# Patient Record
Sex: FEMALE | Race: WHITE | NOT HISPANIC OR LATINO | Employment: PART TIME | URBAN - METROPOLITAN AREA
[De-identification: names, ages, dates, MRNs, and addresses within clinical notes are randomized per-mention and may not be internally consistent; named-entity substitution may affect disease eponyms.]

---

## 2017-03-24 ENCOUNTER — OFFICE VISIT (OUTPATIENT)
Dept: INTERNAL MEDICINE | Facility: CLINIC | Age: 57
End: 2017-03-24
Payer: COMMERCIAL

## 2017-03-24 VITALS
SYSTOLIC BLOOD PRESSURE: 116 MMHG | HEART RATE: 73 BPM | WEIGHT: 143 LBS | BODY MASS INDEX: 26.31 KG/M2 | HEIGHT: 62 IN | OXYGEN SATURATION: 96 % | DIASTOLIC BLOOD PRESSURE: 78 MMHG | TEMPERATURE: 98 F

## 2017-03-24 DIAGNOSIS — Z00.00 ROUTINE GENERAL MEDICAL EXAMINATION AT A HEALTH CARE FACILITY: Primary | ICD-10-CM

## 2017-03-24 DIAGNOSIS — Z13.6 CARDIOVASCULAR SCREENING; LDL GOAL LESS THAN 160: ICD-10-CM

## 2017-03-24 DIAGNOSIS — K21.9 GASTROESOPHAGEAL REFLUX DISEASE WITHOUT ESOPHAGITIS: ICD-10-CM

## 2017-03-24 DIAGNOSIS — R53.83 FATIGUE, UNSPECIFIED TYPE: ICD-10-CM

## 2017-03-24 DIAGNOSIS — E21.3 HYPERPARATHYROIDISM (H): ICD-10-CM

## 2017-03-24 DIAGNOSIS — R93.1 ABNORMAL SCREENING CARDIAC CT: ICD-10-CM

## 2017-03-24 PROBLEM — Z71.89 ADVANCED DIRECTIVES, COUNSELING/DISCUSSION: Chronic | Status: ACTIVE | Noted: 2017-03-24

## 2017-03-24 LAB
ANION GAP SERPL CALCULATED.3IONS-SCNC: 8 MMOL/L (ref 3–14)
BUN SERPL-MCNC: 21 MG/DL (ref 7–30)
CALCIUM SERPL-MCNC: 9.2 MG/DL (ref 8.5–10.1)
CHLORIDE SERPL-SCNC: 107 MMOL/L (ref 94–109)
CHOLEST SERPL-MCNC: 246 MG/DL
CO2 SERPL-SCNC: 29 MMOL/L (ref 20–32)
CREAT SERPL-MCNC: 0.66 MG/DL (ref 0.52–1.04)
GFR SERPL CREATININE-BSD FRML MDRD: NORMAL ML/MIN/1.7M2
GLUCOSE SERPL-MCNC: 86 MG/DL (ref 70–99)
HDLC SERPL-MCNC: 54 MG/DL
HGB BLD-MCNC: 14.1 G/DL (ref 11.7–15.7)
LDLC SERPL CALC-MCNC: 167 MG/DL
NONHDLC SERPL-MCNC: 192 MG/DL
POTASSIUM SERPL-SCNC: 4.2 MMOL/L (ref 3.4–5.3)
SODIUM SERPL-SCNC: 144 MMOL/L (ref 133–144)
TRIGL SERPL-MCNC: 123 MG/DL
TSH SERPL DL<=0.005 MIU/L-ACNC: 0.66 MU/L (ref 0.4–4)

## 2017-03-24 PROCEDURE — 84443 ASSAY THYROID STIM HORMONE: CPT | Performed by: INTERNAL MEDICINE

## 2017-03-24 PROCEDURE — 80061 LIPID PANEL: CPT | Performed by: INTERNAL MEDICINE

## 2017-03-24 PROCEDURE — 85018 HEMOGLOBIN: CPT | Performed by: INTERNAL MEDICINE

## 2017-03-24 PROCEDURE — 80048 BASIC METABOLIC PNL TOTAL CA: CPT | Performed by: INTERNAL MEDICINE

## 2017-03-24 PROCEDURE — 36415 COLL VENOUS BLD VENIPUNCTURE: CPT | Performed by: INTERNAL MEDICINE

## 2017-03-24 PROCEDURE — 99396 PREV VISIT EST AGE 40-64: CPT | Performed by: INTERNAL MEDICINE

## 2017-03-24 NOTE — MR AVS SNAPSHOT
After Visit Summary   3/24/2017    Barbi Tiwari    MRN: [id number]           Patient Information     Date Of Birth          1960        Visit Information        Provider Department      3/24/2017 10:40 AM Dustin Choudhury MD [facility]        Today's Diagnoses     Routine general medical examination at a health care facility    -  1    Fatigue, unspecified type        Abnormal screening cardiac CT        Hyperparathyroidism s/p surgery        Gastroesophageal reflux disease without esophagitis        CARDIOVASCULAR SCREENING; LDL GOAL LESS THAN 160          Care Instructions      Preventive Health Recommendations  Female Ages 50 - 64    Yearly exam: See your health care provider every year in order to  o Review health changes.   o Discuss preventive care.    o Review your medicines if your doctor has prescribed any.      Get a Pap test every three years (unless you have an abnormal result and your provider advises testing more often).    If you get Pap tests with HPV test, you only need to test every 5 years, unless you have an abnormal result.     You do not need a Pap test if your uterus was removed (hysterectomy) and you have not had cancer.    You should be tested each year for STDs (sexually transmitted diseases) if you're at risk.     Have a mammogram every 1 to 2 years.    Have a colonoscopy at age 50, or have a yearly FIT test (stool test). These exams screen for colon cancer.      Have a cholesterol test every 5 years, or more often if advised.    Have a diabetes test (fasting glucose) every three years. If you are at risk for diabetes, you should have this test more often.     If you are at risk for osteoporosis (brittle bone disease), think about having a bone density scan (DEXA).    Shots: Get a flu shot each year. Get a tetanus shot every 10 years.    Nutrition:     Eat at least 5 servings of fruits and vegetables each day.    Eat whole-grain bread,  whole-wheat pasta and brown rice instead of white grains and rice.    Talk to your provider about Calcium and Vitamin D.     Lifestyle    Exercise at least 150 minutes a week (30 minutes a day, 5 days a week). This will help you control your weight and prevent disease.    Limit alcohol to one drink per day.    No smoking.     Wear sunscreen to prevent skin cancer.     See your dentist every six months for an exam and cleaning.    See your eye doctor every 1 to 2 years.    The heart-healthy Mediterranean is a healthy eating plan based on typical foods and recipes of Mediterranean-style cooking. Here's how to adopt the Mediterranean diet.   If you're looking for a heart-healthy eating plan, the Mediterranean diet might be right for you. The Mediterranean diet incorporates the basics of healthy eating -- plus a splash of flavorful olive oil and perhaps even a glass of red wine -- among other components characterizing the traditional cooking style of countries bordering the [place].  Most healthy diets include fruits, vegetables, fish and whole grains, and limit unhealthy fats. While these parts of a healthy diet remain tried-and-true, subtle variations or differences in proportions of certain foods may make a difference in your risk of heart disease.  Research has shown that the traditional Mediterranean diet reduces the risk of heart disease. In fact, an analysis of more than 1.5 million healthy adults demonstrated that following a Mediterranean diet was associated with a reduced risk of death from heart disease and cancer, as well as a reduced incidence of Parkinson's and Alzheimer's diseases.   The Dietary Guidelines for Americans recommends the Mediterranean diet as an eating plan that can help promote health and prevent disease. And the Mediterranean diet is one your whole family can follow for good health.   The Mediterranean diet emphasizes:  Eating primarily plant-based foods, such as fruits and  "vegetables, whole grains, legumes and nuts   Replacing butter with healthy fats, such as olive oil   Using herbs and spices instead of salt to flavor foods   Limiting red meat to no more than a few times a month   Eating fish and poultry at least twice a week   Drinking red wine in moderation (optional)  The diet also recognizes the importance of being physically active, and enjoying meals with family and friends.  The Mediterranean diet traditionally includes fruits, vegetables and grains. For example, residents of [facility] average six or more servings a day of antioxidant-rich fruits and vegetables.  Grains in the Mediterranean region are typically whole grain and usually contain very few unhealthy trans fats, and bread is an important part of the diet. However, throughout the Mediterranean region, bread is eaten plain or dipped in olive oil -- not eaten with butter or margarine, which contains saturated or trans fats.   Nuts are another part of a healthy Mediterranean diet. Nuts are high in fat, but most of the fat is healthy. Because nuts are high in calories, they should not be eaten in large amounts -- generally no more than a handful a day. For the best nutrition, avoid candied or honey-roasted and heavily salted nuts.  The focus of the Mediterranean diet isn't on limiting total fat consumption, but rather on choosing healthier types of fat. The Mediterranean diet discourages saturated fats and hydrogenated oils (trans fats), both of which contribute to heart disease.  The Mediterranean diet features olive oil as the primary source of fat. Olive oil is mainly monounsaturated fat -- a type of fat that can help reduce low-density lipoprotein (LDL) cholesterol levels when used in place of saturated or trans fats. \"Extra-virgin\" and \"virgin\" olive oils (the least processed forms) also contain the highest levels of protective plant compounds that provide antioxidant effects.  Canola oil and some nuts contain the " beneficial linolenic acid (a type of omega-3 fatty acid) in addition to healthy unsaturated fat. Omega-3 fatty acids lower triglycerides, decrease blood clotting, and are associated with decreased incidence of sudden heart attacks, improve the health of your blood vessels, and help moderate blood pressure. Fatty fish -- such as mackerel, lake trout, herring, sardines, albacore tuna and salmon -- are rich sources of omega-3 fatty acids. Fish is eaten on a regular basis in the Mediterranean diet.  The health effects of alcohol have been debated for many years, and some doctors are reluctant to encourage alcohol consumption because of the health consequences of excessive drinking. However, alcohol -- in moderation -- has been associated with a reduced risk of heart disease in some research studies.  The Mediterranean diet typically includes a moderate amount of wine, usually red wine. This means no more than 5 ounces (148 milliliters) of wine daily for women of all ages and men older than age 65 and no more than 10 ounces (296 milliliters) of wine daily for younger men. More than this may increase the risk of health problems, including increased risk of certain types of cancer.   If you're unable to limit your alcohol intake to the amounts defined above, if you have a personal or family history of alcohol abuse, or if you have heart or liver disease, refrain from drinking wine or any other alcohol.  The Mediterranean diet is a delicious and healthy way to eat. Many people who switch to this style of eating say they'll never eat any other way. Here are some specific steps to get you started:   Eat your veggies and fruits -- and switch to whole grains. Avariety of plant foods should make up the majority of your meals. They should be minimally processed -- fresh and whole are best. Include veggies and fruits in every meal and eat them for snacks as well. Switch to whole-grain bread and cereal, and begin to eat more  whole-grain rice and pasta products. Keep baby carrots, apples and bananas on hand for quick, satisfying snacks. Fruit salads are a wonderful way to eat a variety of healthy fruit.   Go nuts. Nuts and seeds are good sources of fiber, protein and healthy fats. Keep almonds, cashews, pistachios and walnuts on hand for a quick snack. Choose natural peanut butter, rather than the kind with hydrogenated fat added. Try blended sesame seeds (tahini) as a dip or spread for bread.   Pass on the butter. Try olive or canola oil as a healthy replacement for butter or margarine. Lightly drizzle it over vegetables. After cooking pasta, add a touch of olive oil, some garlic and green onions for flavoring. Dip bread in flavored olive oil or lightly spread it on whole-grain bread for a tasty alternative to butter. Try tahini as a dip or spread for bread too.   Spice it up. Herbs and spices make food tasty and can  for salt and fat in recipes.   Go fish. Eat fish at least twice a week. Fresh or water-packed tuna, salmon, trout, mackerel and herring are healthy choices. Gutierrez, bake or broil fish for great taste and easy cleanup. Avoid breaded and fried fish.   Rein in the red meat. Limit red meat to no more than a few times a month. Substitute fish and poultry for red meat. When choosing red meat, make sure it's lean and keep portions small (about the size of a deck of cards). Also avoid sausage, mae and other high-fat, processed meats.   Choose low-fat dairy. Limit higher fat dairy products, such as whole or 2 percent milk, cheese and ice cream. Switch to skim milk, fat-free yogurt and low-fat cheese            Follow-ups after your visit        Additional Services     SLEEP EVALUATION & MANAGEMENT REFERRAL - ADULT       Please be aware that coverage of these services is subject to the terms and limitations of your health insurance plan.  Call member services at your health plan with any benefit or coverage questions.       Please bring the following to your appointment:    >>   List of current medications   >>   This referral request   >>   Any documents/labs given to you for this referral    Darline Ramos)   [phone] (Age 18 and up)                  Your next 10 appointments already scheduled     Apr 03, 2017 10:45 AM CDT   (Arrive by 10:30 AM)   MR BRAIN W/O & W CONTRAST with [id number]   [facility] ([facility] [place])    [address] [address] [phone]           Take your medicines as usual, unless your doctor tells you not to. Bring a list of your current medicines to your exam (including vitamins, minerals and over-the-counter drugs).  You will be given intravenous contrast for this exam. To prepare:   The day before your exam, drink extra fluids at least six 8-ounce glasses (unless your doctor tells you to restrict your fluids).   Have a blood test (creatinine test) within 30 days of your exam. Go to your clinic or Diagnostic Imaging Department for this test.  The MRI machine uses a strong magnet. Please wear clothes without metal (snaps, zippers). A sweatsuit works well, or we may give you a hospital gown.  Please remove any body piercings and hair extensions before you arrive. You will also remove watches, jewelry, hairpins, wallets, dentures, partial dental plates and hearing aids. You may wear contact lenses, and you may be able to wear your rings. We have a safe place to keep your personal items, but it is safer to leave them at home.   **IMPORTANT** THE INSTRUCTIONS BELOW ARE ONLY FOR THOSE PATIENTS WHO HAVE BEEN TOLD THEY WILL RECEIVE SEDATION OR GENERAL ANESTHESIA DURING THEIR MRI PROCEDURE:  IF YOU WILL RECEIVE SEDATION (take medicine to help you relax during your exam):   You must get the medicine from your doctor before you arrive. Bring the medicine to the exam. Do not take it at home.   Arrive one hour early. Bring  someone who can take you home after the test. Your medicine will make you sleepy. After the exam, you may not drive, take a bus or take a taxi by yourself.   No eating 8 hours before your exam. You may have clear liquids up until 4 hours before your exam. (Clear liquids include water, clear tea, black coffee and fruit juice without pulp.)  IF YOU WILL RECEIVE ANESTHESIA (be asleep for your exam):   Arrive 1 1/2 hours early. Bring someone who can take you home after the test. You may not drive, take a bus or take a taxi by yourself.   No eating 8 hours before your exam. You may have clear liquids up until 4 hours before your exam. (Clear liquids include water, clear tea, black coffee and fruit juice without pulp.)  Please call the Imaging Department at your exam site with any questions.            Apr 10, 2017 10:00 AM CDT   (Arrive by 9:45 AM)   Return Visit with Neo Weinstein MD   [facility] ([facility] [place])    [address] [place] [address] [phone]              Future tests that were ordered for you today     Open Future Orders        Priority Expected Expires Ordered    SLEEP EVALUATION & MANAGEMENT REFERRAL - ADULT Routine  3/24/2018 3/24/2017            Who to contact     If you have questions or need follow up information about today's clinic visit or your schedule please contact [facility] directly at [phone].  Normal or non-critical lab and imaging results will be communicated to you by MyChart, letter or phone within 4 business days after the clinic has received the results. If you do not hear from us within 7 days, please contact the clinic through [employer] or phone. If you have a critical or abnormal lab result, we will notify you by phone as soon as possible.  Submit refill requests through [employer] or call your pharmacy and they will forward the refill request to us. Please allow 3 business  "days for your refill to be completed.          Additional Information About Your Visit        [employer] Information     [employer] gives you secure access to your electronic health record. If you see a primary care provider, you can also send messages to your care team and make appointments. If you have questions, please call your primary care clinic.  If you do not have a primary care provider, please call [phone] and they will assist you.        Care EveryWhere ID     This is your Care EveryWhere ID. This could be used by other organizations to access your [facility] medical records  [id number]        Your Vitals Were     Pulse Temperature Height Pulse Oximetry BMI (Body Mass Index)       73 98  F (36.7  C) (Oral) 5' 2\" (1.575 m) 96% 26.16 kg/m2        Blood Pressure from Last 3 Encounters:   03/24/17 116/78   11/22/16 124/68   10/10/16 129/84    Weight from Last 3 Encounters:   03/24/17 143 lb (64.9 kg)   11/22/16 143 lb (64.9 kg)   10/10/16 144 lb 3.2 oz (65.4 kg)              We Performed the Following     Basic metabolic panel     Hemoglobin     Lipid panel reflex to direct LDL     TSH with free T4 reflex          Today's Medication Changes          These changes are accurate as of: 3/24/17 11:27 AM.  If you have any questions, ask your nurse or doctor.               These medicines have changed or have updated prescriptions.        Dose/Directions    ranitidine 150 MG tablet   Commonly known as:  ZANTAC   This may have changed:    - medication strength  - how much to take  - how to take this  - when to take this  - reasons to take this  - additional instructions   Used for:  Gastroesophageal reflux disease without esophagitis   Changed by:  Dustin Choudhury MD        Dose:  150 mg   Take 1 tablet (150 mg) by mouth 2 times daily as needed for heartburn   Refills:  0         Stop taking these medicines if you haven't already. Please contact your care team if you have questions.     TOPROL XL 50 MG 24 hr " tablet   Generic drug:  metoprolol   Stopped by:  Dustin Choudhury MD                Where to get your medicines      Some of these will need a paper prescription and others can be bought over the counter.  Ask your nurse if you have questions.     You don't need a prescription for these medications     ranitidine 150 MG tablet                Primary Care Provider Office Phone # Fax #    Dustin Choudhury [phone]       [facility] [address] [facility] [address]        Thank you!     Thank you for choosing [facility]  for your care. Our goal is always to provide you with excellent care. Hearing back from our patients is one way we can continue to improve our services. Please take a few minutes to complete the written survey that you may receive in the mail after your visit with us. Thank you!             Your Updated Medication List - Protect others around you: Learn how to safely use, store and throw away your medicines at www.disposemymeds.org.          This list is accurate as of: 3/24/17 11:27 AM.  Always use your most recent med list.                   Brand Name Dispense Instructions for use    acetaminophen 500 MG tablet    TYLENOL     Take 1,000 mg by mouth every 6 hours if needed. Max acetaminophen dose: 4000mg in 24 hrs.       albuterol 108 (90 BASE) MCG/ACT Inhaler    PROAIR HFA/PROVENTIL HFA/VENTOLIN HFA    1 Inhaler    Inhale 2 puffs into the lungs every 6 hours as needed for shortness of breath / dyspnea or wheezing       Calcium-Vitamin D 600-200 MG-UNIT Caps      Take 1 Cap by mouth once daily.       COMPOUND - PHARMACY TO MIX COMPOUNDED MEDICATION    CMPD RX    20 g    Apply 1g daily for 2 weeks, then twice weekly to vagina       ibuprofen 200 MG tablet    ADVIL/MOTRIN     Take 4 tablets by mouth 4 times daily if needed for Pain or Headache. (rare use)       KEPPRA 500 MG tablet   Generic drug:  levETIRAcetam      1,500 mg 2 times  daily Take 3 tablets am and 3 tablets pm       * lamoTRIgine 100 MG tablet    LaMICtal     100 mg Take 1 tablet in the morning and evening       * lamoTRIgine 200 MG tablet    LaMICtal     Take one tablet by mouth every morning and every evening (Take along with 100mg tablets and 200mg= 300mg total am and pm)       MULTI-VITAMINS Tabs      take 1 tablet by oral route once daily with food       ranitidine 150 MG tablet    ZANTAC     Take 1 tablet (150 mg) by mouth 2 times daily as needed for heartburn       tretinoin 0.05 % cream    RETIN-A    45 g    Spread a pea size amount into affected area topically at bedtime.  Use sunscreen SPF>20.       * Notice:  This list has 2 medication(s) that are the same as other medications prescribed for you. Read the directions carefully, and ask your doctor or other care provider to review them with you.

## 2017-03-24 NOTE — NURSING NOTE
"Chief Complaint   Patient presents with     Physical     fasting       Initial /78  Pulse 73  Temp 98  F (36.7  C) (Oral)  Ht 5' 2\" (1.575 m)  Wt 143 lb (64.9 kg)  SpO2 96%  BMI 26.16 kg/m2 Estimated body mass index is 26.16 kg/(m^2) as calculated from the following:    Height as of this encounter: 5' 2\" (1.575 m).    Weight as of this encounter: 143 lb (64.9 kg).  Medication Reconciliation: complete    "

## 2017-03-24 NOTE — PATIENT INSTRUCTIONS
Preventive Health Recommendations  Female Ages 50 - 64    Yearly exam: See your health care provider every year in order to  o Review health changes.   o Discuss preventive care.    o Review your medicines if your doctor has prescribed any.      Get a Pap test every three years (unless you have an abnormal result and your provider advises testing more often).    If you get Pap tests with HPV test, you only need to test every 5 years, unless you have an abnormal result.     You do not need a Pap test if your uterus was removed (hysterectomy) and you have not had cancer.    You should be tested each year for STDs (sexually transmitted diseases) if you're at risk.     Have a mammogram every 1 to 2 years.    Have a colonoscopy at age 50, or have a yearly FIT test (stool test). These exams screen for colon cancer.      Have a cholesterol test every 5 years, or more often if advised.    Have a diabetes test (fasting glucose) every three years. If you are at risk for diabetes, you should have this test more often.     If you are at risk for osteoporosis (brittle bone disease), think about having a bone density scan (DEXA).    Shots: Get a flu shot each year. Get a tetanus shot every 10 years.    Nutrition:     Eat at least 5 servings of fruits and vegetables each day.    Eat whole-grain bread, whole-wheat pasta and brown rice instead of white grains and rice.    Talk to your provider about Calcium and Vitamin D.     Lifestyle    Exercise at least 150 minutes a week (30 minutes a day, 5 days a week). This will help you control your weight and prevent disease.    Limit alcohol to one drink per day.    No smoking.     Wear sunscreen to prevent skin cancer.     See your dentist every six months for an exam and cleaning.    See your eye doctor every 1 to 2 years.    The heart-healthy Mediterranean is a healthy eating plan based on typical foods and recipes of Mediterranean-style cooking. Here's how to adopt the Mediterranean  diet.   If you're looking for a heart-healthy eating plan, the Mediterranean diet might be right for you. The Mediterranean diet incorporates the basics of healthy eating -- plus a splash of flavorful olive oil and perhaps even a glass of red wine -- among other components characterizing the traditional cooking style of countries bordering the [place].  Most healthy diets include fruits, vegetables, fish and whole grains, and limit unhealthy fats. While these parts of a healthy diet remain tried-and-true, subtle variations or differences in proportions of certain foods may make a difference in your risk of heart disease.  Research has shown that the traditional Mediterranean diet reduces the risk of heart disease. In fact, an analysis of more than 1.5 million healthy adults demonstrated that following a Mediterranean diet was associated with a reduced risk of death from heart disease and cancer, as well as a reduced incidence of Parkinson's and Alzheimer's diseases.   The Dietary Guidelines for Americans recommends the Mediterranean diet as an eating plan that can help promote health and prevent disease. And the Mediterranean diet is one your whole family can follow for good health.   The Mediterranean diet emphasizes:  Eating primarily plant-based foods, such as fruits and vegetables, whole grains, legumes and nuts   Replacing butter with healthy fats, such as olive oil   Using herbs and spices instead of salt to flavor foods   Limiting red meat to no more than a few times a month   Eating fish and poultry at least twice a week   Drinking red wine in moderation (optional)  The diet also recognizes the importance of being physically active, and enjoying meals with family and friends.  The Mediterranean diet traditionally includes fruits, vegetables and grains. For example, residents of [facility] average six or more servings a day of antioxidant-rich fruits and vegetables.  Grains in the Mediterranean region  "are typically whole grain and usually contain very few unhealthy trans fats, and bread is an important part of the diet. However, throughout the Mediterranean region, bread is eaten plain or dipped in olive oil -- not eaten with butter or margarine, which contains saturated or trans fats.   Nuts are another part of a healthy Mediterranean diet. Nuts are high in fat, but most of the fat is healthy. Because nuts are high in calories, they should not be eaten in large amounts -- generally no more than a handful a day. For the best nutrition, avoid candied or honey-roasted and heavily salted nuts.  The focus of the Mediterranean diet isn't on limiting total fat consumption, but rather on choosing healthier types of fat. The Mediterranean diet discourages saturated fats and hydrogenated oils (trans fats), both of which contribute to heart disease.  The Mediterranean diet features olive oil as the primary source of fat. Olive oil is mainly monounsaturated fat -- a type of fat that can help reduce low-density lipoprotein (LDL) cholesterol levels when used in place of saturated or trans fats. \"Extra-virgin\" and \"virgin\" olive oils (the least processed forms) also contain the highest levels of protective plant compounds that provide antioxidant effects.  Canola oil and some nuts contain the beneficial linolenic acid (a type of omega-3 fatty acid) in addition to healthy unsaturated fat. Omega-3 fatty acids lower triglycerides, decrease blood clotting, and are associated with decreased incidence of sudden heart attacks, improve the health of your blood vessels, and help moderate blood pressure. Fatty fish -- such as mackerel, lake trout, herring, sardines, albacore tuna and salmon -- are rich sources of omega-3 fatty acids. Fish is eaten on a regular basis in the Mediterranean diet.  The health effects of alcohol have been debated for many years, and some doctors are reluctant to encourage alcohol consumption because of the " health consequences of excessive drinking. However, alcohol -- in moderation -- has been associated with a reduced risk of heart disease in some research studies.  The Mediterranean diet typically includes a moderate amount of wine, usually red wine. This means no more than 5 ounces (148 milliliters) of wine daily for women of all ages and men older than age 65 and no more than 10 ounces (296 milliliters) of wine daily for younger men. More than this may increase the risk of health problems, including increased risk of certain types of cancer.   If you're unable to limit your alcohol intake to the amounts defined above, if you have a personal or family history of alcohol abuse, or if you have heart or liver disease, refrain from drinking wine or any other alcohol.  The Mediterranean diet is a delicious and healthy way to eat. Many people who switch to this style of eating say they'll never eat any other way. Here are some specific steps to get you started:   Eat your veggies and fruits -- and switch to whole grains. Avariety of plant foods should make up the majority of your meals. They should be minimally processed -- fresh and whole are best. Include veggies and fruits in every meal and eat them for snacks as well. Switch to whole-grain bread and cereal, and begin to eat more whole-grain rice and pasta products. Keep baby carrots, apples and bananas on hand for quick, satisfying snacks. Fruit salads are a wonderful way to eat a variety of healthy fruit.   Go nuts. Nuts and seeds are good sources of fiber, protein and healthy fats. Keep almonds, cashews, pistachios and walnuts on hand for a quick snack. Choose natural peanut butter, rather than the kind with hydrogenated fat added. Try blended sesame seeds (tahini) as a dip or spread for bread.   Pass on the butter. Try olive or canola oil as a healthy replacement for butter or margarine. Lightly drizzle it over vegetables. After cooking pasta, add a touch of olive  oil, some garlic and green onions for flavoring. Dip bread in flavored olive oil or lightly spread it on whole-grain bread for a tasty alternative to butter. Try tahini as a dip or spread for bread too.   Spice it up. Herbs and spices make food tasty and can  for salt and fat in recipes.   Go fish. Eat fish at least twice a week. Fresh or water-packed tuna, salmon, trout, mackerel and herring are healthy choices. [place], bake or broil fish for great taste and easy cleanup. Avoid breaded and fried fish.   Rein in the red meat. Limit red meat to no more than a few times a month. Substitute fish and poultry for red meat. When choosing red meat, make sure it's lean and keep portions small (about the size of a deck of cards). Also avoid sausage, mae and other high-fat, processed meats.   Choose low-fat dairy. Limit higher fat dairy products, such as whole or 2 percent milk, cheese and ice cream. Switch to skim milk, fat-free yogurt and low-fat cheese

## 2017-03-24 NOTE — PROGRESS NOTES
SUBJECTIVE:     CC: Barbi Tiwari is an 56 year old woman who presents for preventive health visit.     Healthy Habits:    Do you get at least three servings of calcium containing foods daily (dairy, green leafy vegetables, etc.)? yes    Amount of exercise or daily activities, outside of work: none    Problems taking medications regularly No    Medication side effects: No    Have you had an eye exam in the past two years? yes    Do you see a dentist twice per year? yes    Do you have sleep apnea, excessive snoring or daytime drowsiness? Yes          Today's PHQ-2 Score:   PHQ-2 ( 1999 Pf[employer]) 3/24/2017 5/29/2015   Q1: Little interest or pleasure in doing things 0 0   Q2: Feeling down, depressed or hopeless 0 0   PHQ-2 Score 0 0       Abuse: Current or Past(Physical, Sexual or Emotional)- No  Do you feel safe in your environment - Yes    Social History   Substance Use Topics     Smoking status: Never Smoker     Smokeless tobacco: Never Used     Alcohol use Yes      Comment: 1-2 per month, only at social events     The patient does not drink >3 drinks per day nor >7 drinks per week.    Recent Labs   Lab Test  06/19/15   1004   CHOL  249*   HDL  51   LDL  166*   TRIG  159*   CHOLHDLRATIO  4.9       Reviewed orders with patient.  Reviewed health maintenance and updated orders accordingly - Yes    Mammo Decision Support:  Patient over age 50, mutual decision to screen reflected in health maintenance.    Pertinent mammograms are reviewed under the imaging tab.  History of abnormal Pap smear: NO - age 30- 65 PAP every 3 years recommended    Reviewed and updated as needed this visit by clinical staff  Tobacco  Allergies         Reviewed and updated as needed this visit by Provider            ROS:  C: NEGATIVE for fever, chills, change in weight  I: NEGATIVE for worrisome rashes, moles or lesions  E: NEGATIVE for vision changes or irritation  ENT: NEGATIVE for ear, mouth and throat problems  R: NEGATIVE for  "significant cough or SOB  B: NEGATIVE for masses, tenderness or discharge  CV: NEGATIVE for chest pain, palpitations or peripheral edema  GI: NEGATIVE for nausea, abdominal pain, heartburn, or change in bowel habits  : NEGATIVE for unusual urinary or vaginal symptoms. No vaginal bleeding.  M: NEGATIVE for significant arthralgias or myalgia  N: NEGATIVE for weakness, dizziness or paresthesias  P: NEGATIVE for changes in mood or affect     Problem list, Medication list, Allergies, and Medical/Social/Surgical histories reviewed in [facility] and updated as appropriate.  OBJECTIVE:     /78  Pulse 73  Temp 98  F (36.7  C) (Oral)  Ht 5' 2\" (1.575 m)  Wt 143 lb (64.9 kg)  SpO2 96%  BMI 26.16 kg/m2  EXAM:  GENERAL APPEARANCE: healthy, alert and no distress  EYES: Eyes grossly normal to inspection, PERRL and conjunctivae and sclerae normal  HENT: ear canals and TM's normal, nose and mouth without ulcers or lesions, oropharynx clear and oral mucous membranes moist  NECK: no adenopathy, no asymmetry, masses, or scars and thyroid normal to palpation  RESP: lungs clear to auscultation - no rales, rhonchi or wheezes  CV: regular rate and rhythm, normal S1 S2, no S3 or S4, no murmur, click or rub, no peripheral edema and peripheral pulses strong  ABDOMEN: soft, nontender, no hepatosplenomegaly, no masses and bowel sounds normal  MS: no musculoskeletal defects are noted and gait is age appropriate without ataxia  SKIN: no suspicious lesions or rashes  NEURO: Normal strength and tone, sensory exam grossly normal, mentation intact and speech normal  PSYCH: mentation appears normal and affect normal/bright    Results for orders placed or performed in visit on 03/24/17   Basic metabolic panel   Result Value Ref Range    Sodium 144 133 - 144 mmol/L    Potassium 4.2 3.4 - 5.3 mmol/L    Chloride 107 94 - 109 mmol/L    Carbon Dioxide 29 20 - 32 mmol/L    Anion Gap 8 3 - 14 mmol/L    Glucose 86 70 - 99 mg/dL    Urea Nitrogen 21 7 - " 30 mg/dL    Creatinine 0.66 0.52 - 1.04 mg/dL    GFR Estimate >90  Non  GFR Calc   >60 mL/min/1.7m2    GFR Estimate If Black >90   GFR Calc   >60 mL/min/1.7m2    Calcium 9.2 8.5 - 10.1 mg/dL   Lipid panel reflex to direct LDL   Result Value Ref Range    Cholesterol 246 (H) <200 mg/dL    Triglycerides 123 <150 mg/dL    HDL Cholesterol 54 >49 mg/dL    LDL Cholesterol Calculated 167 (H) <100 mg/dL    Non HDL Cholesterol 192 (H) <130 mg/dL   TSH with free T4 reflex   Result Value Ref Range    TSH 0.66 0.40 - 4.00 mU/L   Hemoglobin   Result Value Ref Range    Hemoglobin 14.1 11.7 - 15.7 g/dL      ASSESSMENT/PLAN:     1. Routine general medical examination at a health care facility  Overall doing quite well    2. Fatigue, unspecified type  Labs normal   - SLEEP EVALUATION & MANAGEMENT REFERRAL - ADULT; Future  - TSH with free T4 reflex  - Hemoglobin    3. Abnormal screening cardiac CT  We discussed CV risk- based on traditional factors her risk is only 2% . Her CaCT score was <10 but likely adds some risk to this - though? Is how much.  She did not continue the statin when previously rx and therefore my recommendation here is to work on diet/exerc- Mediterranean diet - consider recheck of CaCT 5 yrs post previous one and reconsider statin rx based on change in score  - Lipid panel reflex to direct LDL    4. Hyperparathyroidism s/p surgery  Ca+ and TSH fine    5. Gastroesophageal reflux disease without esophagitis  - ranitidine (ZANTAC) 150 MG tablet; Take 1 tablet (150 mg) by mouth 2 times daily as needed for heartburn    6. CARDIOVASCULAR SCREENING; LDL GOAL LESS THAN 160  - Basic metabolic panel  - Lipid panel reflex to direct LDL    COUNSELING:   Reviewed preventive health counseling, as reflected in patient instructions         reports that she has never smoked. She has never used smokeless tobacco.    Estimated body mass index is 26.16 kg/(m^2) as calculated from the following:     "Height as of this encounter: 5' 2\" (1.575 m).    Weight as of this encounter: 143 lb (64.9 kg).       Counseling Resources:  ATP IV Guidelines  Pooled Cohorts Equation Calculator  Breast Cancer Risk Calculator  FRAX Risk Assessment  ICSI Preventive Guidelines  Dietary Guidelines for Americans, 2010  [employer]'s MyPlate  ASA Prophylaxis  Lung CA Screening    Dustin Choudhury MD  [facility]    The 10-year ASCVD risk score (Anjali DUFFY Jr, et al., 2013) is: 2.1%    Values used to calculate the score:      Age: 56 years      Sex: Female      Is Non- : No      Diabetic: No      Tobacco smoker: No      Systolic Blood Pressure: 116 mmHg      Is BP treated: No      HDL Cholesterol: 54 mg/dL      Total Cholesterol: 246 mg/dL   "

## 2017-04-10 ENCOUNTER — ONCOLOGY VISIT (OUTPATIENT)
Dept: ONCOLOGY | Facility: CLINIC | Age: 57
End: 2017-04-10
Attending: INTERNAL MEDICINE
Payer: COMMERCIAL

## 2017-04-10 VITALS
DIASTOLIC BLOOD PRESSURE: 83 MMHG | RESPIRATION RATE: 18 BRPM | SYSTOLIC BLOOD PRESSURE: 117 MMHG | HEIGHT: 62 IN | HEART RATE: 90 BPM | OXYGEN SATURATION: 95 % | BODY MASS INDEX: 26.54 KG/M2 | TEMPERATURE: 98.4 F | WEIGHT: 144.2 LBS

## 2017-04-10 DIAGNOSIS — C71.9 OLIGODENDROGLIOMA (H): Primary | ICD-10-CM

## 2017-04-10 PROCEDURE — 99212 OFFICE O/P EST SF 10 MIN: CPT | Mod: ZF

## 2017-04-10 PROCEDURE — 99214 OFFICE O/P EST MOD 30 MIN: CPT | Mod: ZP | Performed by: INTERNAL MEDICINE

## 2017-04-10 RX ORDER — OMEPRAZOLE 40 MG/1
CAPSULE, DELAYED RELEASE ORAL
COMMUNITY
Start: 2016-06-02 | End: 2017-05-05

## 2017-04-10 ASSESSMENT — PAIN SCALES - GENERAL: PAINLEVEL: NO PAIN (0)

## 2017-04-10 NOTE — LETTER
4/10/2017       RE: Barbi Tiwari  [address] [facility] [id number]     Dear Colleague,    Thank you for referring your patient, Barbi Tiwari, to the [facility]. Please see a copy of my visit note below.    MEDICAL ONCOLOGY PROGRESS NOTE  Apr 10, 2017    Oncologic History:  Ms. 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 She then received 12 cycles of Temodar, completed in 2013.  She has done very well since then.    HISTORY OF PRESENT ILLNESS  Ms. Tiwari is currently doing very well. She denies any major issues, though recently has noticed an increase in symptoms related to facial twitching associated with some verbal difficulties. These occur only intermittently and interestingly occur on the right side of the face and not the left. These episodes typically last for just a few seconds, they do not generalize, and they typically resolve without any post-ictal confusion or other symptoms.v She continues on Lamotrigine under Dr. Hopper for seizure and the Keppra dose has been decreased recently with plans to further down titrate.    She otherwise denies any new neurologic problems, headache, chest pain, shortness of breath or other concerns. She continues work as a nurse and notes no change in intellectual/cognitive function or sensorimotor symptoms.    IMAGING: I reviewed the MRI brain with Ms. Tiwari, which shows no evidence of recurrent disease. There are no new enhancing lesions and only stable T2 changes around the resection cavity are seen today as on prior occasions.    REVIEW OF SYSTEMS  A 12-point ROS is negative except as in HPI    Current Outpatient Prescriptions   Medication     omeprazole (PRILOSEC) 40 MG capsule     ranitidine (ZANTAC) 150 MG tablet     COMPOUND (CMPD RX) - PHARMACY TO MIX COMPOUNDED MEDICATION     tretinoin (RETIN-A) 0.05 % cream      acetaminophen (TYLENOL) 500 MG tablet     Calcium Carbonate-Vitamin D (CALCIUM-VITAMIN D) 600-200 MG-UNIT CAPS     ibuprofen (ADVIL,MOTRIN) 200 MG tablet     Multiple Vitamin (MULTI-VITAMINS) TABS     levETIRAcetam (KEPPRA) 500 MG tablet     lamoTRIgine (LAMICTAL) 100 MG tablet     lamoTRIgine (LAMICTAL) 200 MG tablet     albuterol (PROAIR HFA, PROVENTIL HFA, VENTOLIN HFA) 108 (90 BASE) MCG/ACT inhaler     No current facility-administered medications for this visit.      Past Medical History:   Diagnosis Date     Allergic rhinitis 12/9/2009     Calculus of kidney 1/12/2011    Overview:  S/P LITHOTRIPSY 3/15/11      Esophageal reflux 10/22/2008     Hyperparathyroidism s/p surgery 1/11/2011     Hyperparathyroidism s/p surgery 1/11/2011     oligodendroglioma 7/23/2012     Osteoarthrosis 12/9/2009     Palpitations 6/5/2014     PVCs 6/5/2014     Seizure disorder (related to tumor) 8/17/2011     Past Surgical History:   Procedure Laterality Date     CRANIOTOMY  2011    tumor resection     FOOT SURGERY      mulitple     HYSTERECTOMY, PAP NO LONGER INDICATED  2008    lap hys     LITHOTRIPSY  2010     PARATHYROIDECTOMY  2011     Family History   Problem Relation Age of Onset     Arthritis Mother      Depression Mother      Respiratory Mother      COPD     C.A.D. Father 58     heart attack     DIABETES Brother 70     Depression Sister      Depression Son      Allergies Son      Depression Daughter      Allergies Daughter      Eczema Brother      Depression Sister      Depression Sister      Social History     Social History     Marital status:      Spouse name: N/A     Number of children: N/A     Years of education: N/A     Occupational History     RN- [employer]      Social History Main Topics     Smoking status: Never Smoker     Smokeless tobacco: Never Used     Alcohol use Yes      Comment: 1-2 per month, only at social events     Drug use: No     Sexual activity: Yes     Partners: Female     Birth control/  "protection: Surgical      Comment: hysterectomy     Other Topics Concern     Not on file     Social History Narrative     PHYSICAL EXAMINATION  General: Well appearing woman, in no distress  Eyes: Pupils are equal round and reactive  ENT: Oropharynx is clear  Skin: No rashes  Neuro: Awake, alert, oriented.  Her speech and language is fluent. Her memory and judgment are intact. Cranial nerve status II-XII is negative.  While talking about seizure, she developed a right facial twitch with associated droop and difficulty with verbalization. The episode lasted about 20-30 seconds and when it was done, she was able to verbalize \"That's it\". She was witnessed to have two of these episodes. Otherwise, normal motor, sensory, coordination and reflex exams.    ECOG Performance Status is 0.     IMPRESSION AND PLAN  #1 Resected Oligodendroglioma with 1p/19q co-deletion, status post 12 months adjuvant Temozolomide  #2 Seizure  Ms. Tiwari is doing well from an oncologic standpoint with no evidence of clinical or radiographic progression. She has recently had an increase in seizure activity, and I would advise against further down-titration of her anti-epileptic medications. The concern would be that she may continue to have worsening of seizure and increased risk of generalization.    We will continue with follow-up every 6 months to a year. All questions were answered.     Neo Daugherty M.D.   of Medicine  Hematology, Oncology and Transplantation  [facility]        "

## 2017-04-10 NOTE — MR AVS SNAPSHOT
After Visit Summary   4/10/2017    Barbi Tiwari    MRN: [id number]           Patient Information     Date Of Birth          1960        Visit Information        Provider Department      4/10/2017 10:00 AM Neo Gotti MD [facility]        Today's Diagnoses     Oligodendroglioma (H)    -  1       Follow-ups after your visit        Follow-up notes from your care team     Return in about 1 year (around 4/10/2018).      Your next 10 appointments already scheduled     Apr 02, 2018 10:45 AM CDT   (Arrive by 10:30 AM)   MR BRAIN W/O & W CONTRAST with [id number]   [facility] [place] [facility] ([facility] [place])    [address] [address] [phone]           Take your medicines as usual, unless your doctor tells you not to. Bring a list of your current medicines to your exam (including vitamins, minerals and over-the-counter drugs).  You will be given intravenous contrast for this exam. To prepare:   The day before your exam, drink extra fluids at least six 8-ounce glasses (unless your doctor tells you to restrict your fluids).   Have a blood test (creatinine test) within 30 days of your exam. Go to your clinic or Diagnostic Imaging Department for this test.  The MRI machine uses a strong magnet. Please wear clothes without metal (snaps, zippers). A sweatsuit works well, or we may give you a hospital gown.  Please remove any body piercings and hair extensions before you arrive. You will also remove watches, jewelry, hairpins, wallets, dentures, partial dental plates and hearing aids. You may wear contact lenses, and you may be able to wear your rings. We have a safe place to keep your personal items, but it is safer to leave them at home.   **IMPORTANT** THE INSTRUCTIONS BELOW ARE ONLY FOR THOSE PATIENTS WHO HAVE BEEN TOLD THEY WILL RECEIVE SEDATION OR GENERAL ANESTHESIA DURING THEIR MRI PROCEDURE:  IF YOU WILL  RECEIVE SEDATION (take medicine to help you relax during your exam):   You must get the medicine from your doctor before you arrive. Bring the medicine to the exam. Do not take it at home.   Arrive one hour early. Bring someone who can take you home after the test. Your medicine will make you sleepy. After the exam, you may not drive, take a bus or take a taxi by yourself.   No eating 8 hours before your exam. You may have clear liquids up until 4 hours before your exam. (Clear liquids include water, clear tea, black coffee and fruit juice without pulp.)  IF YOU WILL RECEIVE ANESTHESIA (be asleep for your exam):   Arrive 1 1/2 hours early. Bring someone who can take you home after the test. You may not drive, take a bus or take a taxi by yourself.   No eating 8 hours before your exam. You may have clear liquids up until 4 hours before your exam. (Clear liquids include water, clear tea, black coffee and fruit juice without pulp.)  Please call the Imaging Department at your exam site with any questions.            Apr 09, 2018 10:00 AM CDT   (Arrive by 9:45 AM)   Return Visit with Neo Weinstein MD   [facility] ([facility])    [address] [address] [phone]              Who to contact     If you have questions or need follow up information about today's clinic visit or your schedule please contact [facility] directly at [phone].  Normal or non-critical lab and imaging results will be communicated to you by MyChart, letter or phone within 4 business days after the clinic has received the results. If you do not hear from us within 7 days, please contact the clinic through [employer] or phone. If you have a critical or abnormal lab result, we will notify you by phone as soon as possible.  Submit refill requests through [employer] or call your pharmacy and they will forward the refill request to us. Please  "allow 3 business days for your refill to be completed.          Additional Information About Your Visit        MyChart Information     [employer] gives you secure access to your electronic health record. If you see a primary care provider, you can also send messages to your care team and make appointments. If you have questions, please call your primary care clinic.  If you do not have a primary care provider, please call [phone] and they will assist you.        Care EveryWhere ID     This is your Care EveryWhere ID. This could be used by other organizations to access your [place] medical records  [id number]        Your Vitals Were     Pulse Temperature Respirations Height Pulse Oximetry BMI (Body Mass Index)    90 98.4  F (36.9  C) (Oral) 18 1.575 m (5' 2\") 95% 26.37 kg/m2       Blood Pressure from Last 3 Encounters:   04/17/17 142/76   04/10/17 117/83   03/24/17 116/78    Weight from Last 3 Encounters:   04/17/17 65.3 kg (143 lb 14.4 oz)   04/10/17 65.4 kg (144 lb 3.2 oz)   03/24/17 64.9 kg (143 lb)               Primary Care Provider Office Phone # Fax #    Dustin Choudhury [phone]       [facility] [address] [place] [id number]        Thank you!     Thank you for choosing [facility]  for your care. Our goal is always to provide you with excellent care. Hearing back from our patients is one way we can continue to improve our services. Please take a few minutes to complete the written survey that you may receive in the mail after your visit with us. Thank you!             Your Updated Medication List - Protect others around you: Learn how to safely use, store and throw away your medicines at www.disposemymeds.org.          This list is accurate as of: 4/10/17 11:59 PM.  Always use your most recent med list.                   Brand Name Dispense Instructions for use    acetaminophen 500 MG tablet    TYLENOL     Take 1,000 mg by mouth every 6 " hours if needed. Max acetaminophen dose: 4000mg in 24 hrs.       albuterol 108 (90 BASE) MCG/ACT Inhaler    PROAIR HFA/PROVENTIL HFA/VENTOLIN HFA    1 Inhaler    Inhale 2 puffs into the lungs every 6 hours as needed for shortness of breath / dyspnea or wheezing       Calcium-Vitamin D 600-200 MG-UNIT Caps      Take 1 Cap by mouth once daily.       COMPOUND - PHARMACY TO MIX COMPOUNDED MEDICATION    CMPD RX    20 g    Apply 1g daily for 2 weeks, then twice weekly to vagina       ibuprofen 200 MG tablet    ADVIL/MOTRIN     Take 4 tablets by mouth 4 times daily if needed for Pain or Headache. (rare use)       KEPPRA 500 MG tablet   Generic drug:  levETIRAcetam      1,500 mg 2 times daily Take 3 tablets am and 3 tablets pm       * lamoTRIgine 100 MG tablet    LaMICtal     100 mg Take 1 tablet in the morning and evening       * lamoTRIgine 200 MG tablet    LaMICtal     Take one tablet by mouth every morning and every evening (Take along with 100mg tablets and 200mg= 300mg total am and pm)       MULTI-VITAMINS Tabs      take 1 tablet by oral route once daily with food       omeprazole 40 MG capsule    priLOSEC     Medications is alternated for two weeks when zantac is not working       ranitidine 150 MG tablet    ZANTAC     Take 1 tablet (150 mg) by mouth 2 times daily as needed for heartburn       tretinoin 0.05 % cream    RETIN-A    45 g    Spread a pea size amount into affected area topically at bedtime.  Use sunscreen SPF>20.       * Notice:  This list has 2 medication(s) that are the same as other medications prescribed for you. Read the directions carefully, and ask your doctor or other care provider to review them with you.

## 2017-04-10 NOTE — NURSING NOTE
"Barbi Tiwari is a 56 year old female who presents for:  Chief Complaint   Patient presents with     Oncology Clinic Visit     return patient visit for follow up related to oligodendroglioma        Initial Vitals:  /83  Pulse 90  Temp 98.4  F (36.9  C) (Oral)  Resp 18  Ht 1.575 m (5' 2\")  Wt 65.4 kg (144 lb 3.2 oz)  SpO2 95%  BMI 26.37 kg/m2 Estimated body mass index is 26.37 kg/(m^2) as calculated from the following:    Height as of this encounter: 1.575 m (5' 2\").    Weight as of this encounter: 65.4 kg (144 lb 3.2 oz).. Body surface area is 1.69 meters squared. BP completed using cuff size: regular  No Pain (0) No LMP recorded. Patient has had a hysterectomy. Allergies and medications reviewed.     Medications: Medication refills not needed today.  Pharmacy name entered into [facility]:    [facility] - [place], [place] - [address] [facility] - [place], [place] - [address] [facility]    Comments: patient denied pain/discomfort.     5 minutes for nursing intake (face to face time)   Chelo Akhtar CMA       "

## 2017-04-17 ENCOUNTER — OFFICE VISIT (OUTPATIENT)
Dept: SLEEP MEDICINE | Facility: CLINIC | Age: 57
End: 2017-04-17
Attending: NURSE PRACTITIONER
Payer: COMMERCIAL

## 2017-04-17 VITALS
OXYGEN SATURATION: 97 % | HEIGHT: 62 IN | SYSTOLIC BLOOD PRESSURE: 142 MMHG | DIASTOLIC BLOOD PRESSURE: 76 MMHG | RESPIRATION RATE: 18 BRPM | WEIGHT: 143.9 LBS | BODY MASS INDEX: 26.48 KG/M2 | HEART RATE: 82 BPM

## 2017-04-17 DIAGNOSIS — G25.81 RESTLESS LEGS SYNDROME (RLS): ICD-10-CM

## 2017-04-17 DIAGNOSIS — E66.3 OVERWEIGHT: ICD-10-CM

## 2017-04-17 DIAGNOSIS — G47.50 PARASOMNIA: ICD-10-CM

## 2017-04-17 DIAGNOSIS — R06.83 SNORING: Primary | ICD-10-CM

## 2017-04-17 PROCEDURE — 99211 OFF/OP EST MAY X REQ PHY/QHP: CPT | Mod: ZF

## 2017-04-17 NOTE — PROGRESS NOTES
Allergies:    Allergies   Allergen Reactions     Benoxinate Nausea and Vomiting     Nuts Swelling     Destinee nuts     Sulfa Drugs Hives       Medications:    Current Outpatient Prescriptions   Medication Sig Dispense Refill     omeprazole (PRILOSEC) 40 MG capsule Medications is alternated for two weeks when zantac is not working       ranitidine (ZANTAC) 150 MG tablet Take 1 tablet (150 mg) by mouth 2 times daily as needed for heartburn       COMPOUND (CMPD RX) - PHARMACY TO MIX COMPOUNDED MEDICATION Apply 1g daily for 2 weeks, then twice weekly to vagina 20 g 11     albuterol (PROAIR HFA, PROVENTIL HFA, VENTOLIN HFA) 108 (90 BASE) MCG/ACT inhaler Inhale 2 puffs into the lungs every 6 hours as needed for shortness of breath / dyspnea or wheezing 1 Inhaler 0     tretinoin (RETIN-A) 0.05 % cream Spread a pea size amount into affected area topically at bedtime.  Use sunscreen SPF>20. 45 g 11     acetaminophen (TYLENOL) 500 MG tablet Take 1,000 mg by mouth every 6 hours if needed. Max acetaminophen dose: 4000mg in 24 hrs.       Calcium Carbonate-Vitamin D (CALCIUM-VITAMIN D) 600-200 MG-UNIT CAPS Take 1 Cap by mouth once daily.       ibuprofen (ADVIL,MOTRIN) 200 MG tablet Take 4 tablets by mouth 4 times daily if needed for Pain or Headache. (rare use)       Multiple Vitamin (MULTI-VITAMINS) TABS take 1 tablet by oral route once daily with food       levETIRAcetam (KEPPRA) 500 MG tablet 1,500 mg 2 times daily Take 3 tablets am and 3 tablets pm       lamoTRIgine (LAMICTAL) 100 MG tablet 100 mg Take 1 tablet in the morning and evening       lamoTRIgine (LAMICTAL) 200 MG tablet Take one tablet by mouth every morning and every evening (Take along with 100mg tablets and 200mg= 300mg total am and pm)         Problem List:  Patient Active Problem List    Diagnosis Date Noted     Advanced directives, counseling/discussion 03/24/2017     Priority: Medium     Lumbar radiculopathy 09/08/2016     Priority: Medium     Palpitations  "06/05/2014     PVCs 06/05/2014     Hyperlipidemia LDL goal <100 06/05/2014     Abnormal screening cardiac CT 06/05/2014     Routine general medical examination at a health care facility 02/25/2013     Overview:   Last Physical -  Pre- op 5/5/2011  PAP - 2/2012 nl  [place] - 07/17/2012 = ACR 2/Benign  Lipids - 07/16/2012  HDL = 41  LDL = 108  Colonoscopy - due  Tdap - due       oligodendroglioma 07/23/2012     Seizure disorder (related to tumor) 08/17/2011     Calculus of kidney 01/12/2011     Overview:   S/P LITHOTRIPSY 3/15/11       Hyperparathyroidism s/p surgery 01/11/2011     Allergic rhinitis 12/09/2009     Osteoarthrosis 12/09/2009     Esophageal reflux 10/22/2008        Past Medical/Surgical History:  Past Medical History:   Diagnosis Date     Allergic rhinitis 12/9/2009     Calculus of kidney 1/12/2011    Overview:  S/P LITHOTRIPSY 3/15/11      Esophageal reflux 10/22/2008     Hyperparathyroidism s/p surgery 1/11/2011     Hyperparathyroidism s/p surgery 1/11/2011     oligodendroglioma 7/23/2012     Osteoarthrosis 12/9/2009     Palpitations 6/5/2014     PVCs 6/5/2014     Seizure disorder (related to tumor) 8/17/2011     Past Surgical History:   Procedure Laterality Date     CRANIOTOMY  2011    tumor resection     FOOT SURGERY      mulitple     HYSTERECTOMY, PAP NO LONGER INDICATED  2008    lap hys     LITHOTRIPSY  2010     PARATHYROIDECTOMY  2011           Physical Examination:  Vitals: /76 (BP Location: Right arm, Patient Position: Chair, Cuff Size: Adult Regular)  Pulse 82  Resp 18  Ht 1.575 m (5' 2\")  Wt 65.3 kg (143 lb 14.4 oz)  SpO2 97%  BMI 26.32 kg/m2  BMI= Body mass index is 26.32 kg/(m^2).    Neck Cir (cm): 36 cm    Ipava Total Score 4/17/2017   Total score - Ipava 7       GENERAL APPEARANCE: no distress and fatigued  EYES: Eyes grossly normal to inspection  HENT: oropharynx crowded, uvula elongated and scalloped tongue  NECK: thyroid normal to palpation  RESP: lungs clear to auscultation " - no rales, rhonchi or wheezes  CV: regular rates and rhythm, normal S1 S2, no S3 or S4 and no murmur, click or rub  Extremities are without clubbing, edema  Musculoskeletal:Moves without difficulty  Mallampati Class: IV.  Tonsillar Stage: 1  hidden by pillars.  Dental: Dentition in good condition.  Good advancement of lower jaw without tmd  Skin: without facial rash        CC: Dustin Choudhury

## 2017-04-17 NOTE — NURSING NOTE
"Chief Complaint   Patient presents with     Consult       Initial /76 (BP Location: Right arm, Patient Position: Chair, Cuff Size: Adult Regular)  Pulse 82  Resp 18  Ht 1.575 m (5' 2\")  Wt 65.3 kg (143 lb 14.4 oz)  SpO2 97%  BMI 26.32 kg/m2 Estimated body mass index is 26.32 kg/(m^2) as calculated from the following:    Height as of this encounter: 1.575 m (5' 2\").    Weight as of this encounter: 65.3 kg (143 lb 14.4 oz).  Medication Reconciliation: complete   Neck 36cm  Brian BOONE      "

## 2017-04-17 NOTE — MR AVS SNAPSHOT
"              After Visit Summary   4/17/2017    Barbi Tiwari    MRN: [id number]           Patient Information     Date Of Birth          1960        Visit Information        Provider Department      4/17/2017 10:00 AM Natalia Reynoso APRN [facility], [place], Sleep Study        Today's Diagnoses     Restless legs syndrome (RLS)    -  1      Care Instructions    MY TREATMENT INFORMATION FOR SLEEP APNEA-  Barbi Tiwari    NP : Natalia Reynoso  SLEEP CENTER :  [place]    MY CONTACT NUMBER: [phone]    Ferritin level    [phone] Kenneth    [phone] cell          If I haven't had a sleep study yet, what can I expect?  A personal story from [employer]  https://www.[employer].com/watch?v=AxPLmlRpnCs    Am I having a home sleep study?  Here is a video in case you get home and want to make sure you have done it correctly  https://www.[employer].com/watch?v=BMZ4C5pKlk1&feature=youtu.be    Frequently asked questions:  1. What is Obstructive Sleep Apnea (DENISE)? DENISE is the most common type of sleep apnea. Apnea literally means, \"without breath.\" It is characterized by repetitive pauses in breathing, despite continued effort to breathe, and is usually associated with a reduction in blood oxygen saturation. Apneas can last 10 to over 60 seconds. It is caused by narrowing or collapse of the upper airway as muscles relax during sleep. Severity of sleep apnea is determined by frequency of breathing events and their effect on your sleep and oxygen levels determined during sleep testing.   2. What are the consequences of DENISE? Symptoms include: daytime sleepiness- possibly increasing the risk of falling asleep while driving, unrefreshing/restless sleep, snoring, insomnia, waking frequently to urinate, waking with heartburn or reflux, reduced concentration and memory, and morning headaches. Other health consequences may include development of high blood pressure and other cardiovascular disease in persons who are " susceptible. Untreated DENISE  can contribute to heart disease, stroke and diabetes.   3. What are the treatment options? In most situations, sleep apnea is a lifelong disease that must be managed with daily therapy. Medications are not effective for sleep apnea and surgery is generally not performed until other therapies have been tried. Therapy is usually tailored to the individual patient based on many factors including your wishes as well as severity of sleep apnea and severity of obesity. Continuous Positive Airway (CPAP) is the most reliable treatment. An oral device to hold your jaw forward is usually the next most reliable option. Other options include postioning devices (to keep you off your back), weight loss, and surgery including a tongue pacing device. There is more detail about some of these options below.            1. CPAP-  WHAT DOES IT DO AND HOW CAN I LEARN TO WEAR IT?                               BEFORE I START, CAN I WATCH A MOVIE TO GET A PLAN ON HOW TO USE CPAP?  https://www.[employer].com/watch?f=a0L74yg474Z      Continuous positive airway pressure, or CPAP, is the most effective treatment for obstructive sleep apnea. It works by blowing room air, through a mask, to hold your throat open. A decision to use CPAP is a major step forward in the pursuit of a healthier life. The successful use of CPAP will help you breathe easier, sleep better and live healthier. You can choose CPAP equipment from any durable medical equipment provider that meets your needs.  Using CPAP can be a positive experience if you keep these valdez points in mind:  1. Commitment  CPAP is not a quick fix for your problem. It involves a long-term commitment to improve your sleep and your health.    2. Communication  Stay in close communication with both your sleep doctor and your CPAP supplier. Ask lots of questions and seek help when you need it.    3. Consistency  Use CPAP all night, every night and for every nap. You will receive  "the maximum health benefits from CPAP when you use it every time that you sleep. This will also make it easier for your body to adjust to the treatment.    4. Correction  The first machine and mask that you try may not be the best ones for you. Work with your sleep doctor and your CPAP supplier to make corrections to your equipment selection. Ask about trying a different type of machine or mask if you have ongoing problems. Make sure that your mask is a good fit and learn to use your equipment properly.    5. Challenge  Tell a family member or close friend to ask you each morning if you used your CPAP the previous night. Have someone to challenge you to give it your best effort.    6. Connection   Your adjustment to CPAP will be easier if you are able to connect with others who use the same treatment. Ask your sleep doctor if there is a support group in your area for people who have sleep apnea, or look for one on the Internet.  7. Comfort   Increase your level of comfort by using a saline spray, decongestant or heated humidifier if CPAP irritates your nose, mouth or throat. Use your unit's \"ramp\" setting to slowly get used to the air pressure level. There may be soft pads you can buy that will fit over your mask straps. Look on www.CPAP.com for accessories that can help make CPAP use more comfortable.  8. Cleaning   Clean your mask, tubing and headgear on a regular basis. Put this time in your schedule so that you don't forget to do it. Check and replace the filters for your CPAP unit and humidifier.    9. Completion   Although you are never finished with CPAP therapy, you should reward yourself by celebrating the completion of your first month of treatment. Expect this first month to be your hardest period of adjustment. It will involve some trial and error as you find the machine, mask and pressure settings that are right for you.    10. Continuation  After your first month of treatment, continue to make a daily " commitment to use your CPAP all night, every night and for every nap.    CPAP-Tips to starting with success:  Begin using your CPAP for short periods of time during the day while you watch TV or read.    Use CPAP every night and for every nap. Using it less often reduces the health benefits and makes it harder for your body to get used to it.    Make small adjustments to your mask, tubing, straps and headgear until you get the right fit. Tightening the mask may actually worsen the leak.  If it leaves significant marks on your face or irritates the bridge of your nose, it may not be the best mask for you.  Speak with the person who supplied the mask and consider trying other masks. Insurances will allow you to try different masks during the first month of starting CPAP.  Insurance also covers a new mask, hose and filter about every 6 months.    Use a saline nasal spray to ease mild nasal congestion. Neti-Pot or saline nasal rinses may also help. Nasal gel sprays can help reduce nasal dryness.  Biotene mouthwash can be helpful to protect your teeth if you experience frequent dry mouth.  Dry mouth may be a sign of air escaping out of your mouth or out of the mask in the case of a full face mask.  Speak with your provider if you expect that is the case.     Take a nasal decongestant to relieve more severe nasal or sinus congestion.  Do not use Afrin (oxymetazoline) nasal spray more than 3 days in a row.  Speak with your sleep doctor if your nasal congestion is chronic.    Use a heated humidifier that fits your CPAP model to enhance your breathing comfort. Adjust the heat setting up if you get a dry nose or throat, down if you get condensation in the hose or mask.  Position the CPAP lower than you so that any condensation in the hose drains back into the machine rather than towards the mask.    Try a system that uses nasal pillows if traditional masks give you problems.    Clean your mask, tubing and headgear once a  week. Make sure the equipment dries fully.    Regularly check and replace the filters for your CPAP unit and humidifier.    Work closely with your sleep provider and your CPAP supplier to make sure that you have the machine, mask and air pressure setting that works best for you. It is better to stop using it and call your provider to solve problems than to lay awake all night frustrated with the device.    BESIDES CPAP, WHAT OTHER THERAPIES ARE THERE?      Positioning Device  Positioning devices are generally used when sleep apnea is mild and only occurs on your back.This example shows a pillow that straps around the waist. It may be appropriate for those whose sleep study shows milder sleep apnea that occurs primarily when lying flat on one's back. Preliminary studies have shown benefit but effectiveness at home may need to be verified by a home sleep test. These devices are generally not covered by medical insurance.                      Oral Appliance  What is oral appliance therapy?  An oral appliance is a small acrylic device that fits over the upper and lower teeth or tongue (similar to an orthodontic retainer or a mouth guard). This device slightly advances the lower jaw or tongue, which moves the base of the tongue forward, opens the airway, improves breathing and can effectively treat snoring and obstructive sleep apnea sleep apnea. The appliance is fabricated and customized by a qualified dentist with experience in treating snoring and sleep apnea. Oral appliances are usually well tolerated and have relatively high compliance by patients1, 2, 3.  When is an oral appliance indicated?  Oral appliance therapy is recommended as a first-line treatment for patients with primary snoring, mild sleep apnea, and for patients with moderate sleep apnea who prefer appliance therapy to use of CPAP4, 5. Severity of sleep apnea is determined by sleep testing and is based on the number of respiratory events per hour of  sleep.   How successful is oral appliance therapy?  The success rate of oral appliance therapy in patients with mild sleep apnea is 75-80% while in patients with moderate sleep apnea it is 50-70%. The chance of success in patients with severe sleep apnea is 40-50%. The research also shows that oral appliances have a beneficial effect on the cardiovascular health of DENISE patients at the same magnitude as CPAP therapy7.  Oral appliances should be a second-line treatment in cases of severe sleep apnea, but if not completely successful then a combination therapy utilizing CPAP plus oral appliance therapy may be effective. Oral appliances tend to be effective in a broad range of patients although studies show that the patients who have the highest success are females, younger patients, those with milder disease, and less severe obesity. 3, 6.   The chances of success are lower in patients who have more severe DENISE, are older, and those who are morbidly obese.     Example of an oral appliance   Finding a dentist that practices dental sleep medicine  Specific training is available through the American Academy of Dental Sleep Medicine for dentists interested in working in the field of sleep. To find a dentist who is educated in the field of sleep and the use of oral appliances, near you, visit the Web site of the American Academy of Dental Sleep Medicine; also see   http://www.accpstorage.org/newOrganization/patients/oralAppliances.pdf  To search for a dentist certified in these practices:  Http://aadsm.org/FindADentist.aspx?1  1. Catia, et al. Objectively measured vs self-reported compliance during oral appliance therapy for sleep-disordered breathing. Chest 2013; 144(5): 0736-4137.  2. Ava et al. Objective measurement of compliance during oral appliance therapy for sleep-disordered breathing. Thorax 2013; 68(1): 91-96.  3. John et al. Mandibular advancement devices in 620 men and women with DENISE and  snoring: tolerability and predictors of treatment success. Chest 2004; 125: 8381-8207.  4. Kenney et al. Oral appliances for snoring and DENISE: a review. Sleep 2006; 29: 244-262.  5. Marion et al. Oral appliance treatment for DENISE: an update. J Clin Sleep Med 2014; 10(2): 215-227.  6. Shakeel et al. Predictors of OSAH treatment outcome. J Dent Res 2007; 86: 5854-8906.      Weight Loss:    Weight management is a personal decision.  If you are interested in exploring weight loss strategies, the following discussion covers the impact on weight loss on sleep apnea and the approaches that may be successful.    Weight loss decreases severity of sleep apnea in most people with obesity. For those with mild obesity who have developed snoring with weight gain, even 15-30 pound weight loss can improve and occasionally eliminate sleep apnea.  Structured and life-long dietary and health habits are necessary to lose weight and keep healthier weight levels.     Though there may be significant health benefits from weight loss, long-term weight loss is very difficult to achieve- studies show success with dietary management in less than 10% of people. In addition, substantial weight loss may require years of dietary control and may be difficult if patients have severe obesity. In these cases, surgical management may be considered.  Finally, older individuals who have tolerated obesity without health complications may be less likely to benefit from weight loss strategies.    Your BMI is Body mass index is 26.32 kg/(m^2).  Body mass index (BMI) is one way to tell whether you are at a healthy weight, overweight, or obese. It measures your weight in relation to your height.  A BMI of 18.5 to 24.9 is in the healthy range. A person with a BMI of 25 to 29.9 is considered overweight, and someone with a BMI of 30 or greater is considered obese. More than two-thirds of American adults are considered overweight or obese.  Being overweight  or obese increases the risk for further weight gain. Excess weight may lead to heart disease and diabetes.  Creating and following plans for healthy eating and physical activity may help you improve your health.  Weight control is part of healthy lifestyle and includes exercise, emotional health, and healthy eating habits. Careful eating habits lifelong are the mainstay of weight control. Though there are significant health benefits from weight loss, long-term weight loss with diet alone may be very difficult to achieve- studies show long-term success with dietary management in less than 10% of people. Attaining a healthy weight may be especially difficult to achieve in those with severe obesity. In some cases, medications, devices and surgical management might be considered.  What can you do?  If you are overweight or obese and are interested in methods for weight loss, you should discuss this with your provider.     Consider reducing daily calorie intake by 500 calories.     Keep a food journal.     Avoiding skipping meals, consider cutting portions instead.    Diet combined with exercise helps maintain muscle while optimizing fat loss. Strength training is particularly important for building and maintaining muscle mass. Exercise helps reduce stress, increase energy, and improves fitness. Increasing exercise without diet control, however, may not burn enough calories to loose weight.       Start walking three days a week 10-20 minutes at a time    Work towards walking thirty minutes five days a week     Eventually, increase the speed of your walking for 1-2 minutes at time    In addition, we recommend that you review healthy lifestyles and methods for weight loss available through the National Institutes of Health patient information sites:  http://win.niddk.nih.gov/publications/index.htm    And look into health and wellness programs that may be available through your health insurance provider, employer, local  [facility], or [employer].    Weight management plan: Patient was referred to their PCP to discuss a diet and exercise plan.    Surgery:    Upper Airway Surgery for DENISE  Surgery for DENISE is a second-line treatment option in the management of sleep apnea.  Surgery should be considered for patients who are having a difficult time tolerating CPAP.    Surgery for DENISE is directed at areas that are responsible for narrowing or complete obstruction of the airway during sleep.  There are a wide range of procedures available to enlarge and/or stabilize the airway to prevent blockage of breathing in the three major areas where it can occur: the palate, tongue, and nasal regions.  Successful surgical treatment depends on the accurate identification of the factors responsible for obstructive sleep apnea in each person.  A personalized approach is required because there is no single treatment that works well for everyone.  Because of anatomic variation, consultation with an examination by a sleep surgeon is a critical first step in determining what surgical options are best for each patient.  In some cases, examination during sedation may be recommended in order to guide the selection of procedures.  Patients will be counseled about risks and benefits as well as the typical recovery course after surgery. Surgery is typically not a cure for a person s DENISE.  However, surgery will often significantly improve one s DENISE severity (termed  success rate ).  Even in the absence of a cure, surgery will decrease the cardiovascular risk associated with OSA7; improve overall quality of life8 (sleepiness, functionality, sleep quality, etc).          Palate Procedures:  Patients with DENISE often have narrowing of their airway in the region of their tonsils and uvula.  The goals of palate procedures are to widen the airway in this region as well as to help the tissues resist collapse.  Modern palate procedure techniques focus on tissue  conservation and soft tissue rearrangement, rather than tissue removal.  Often the uvula is preserved in this procedure. Residual sleep apnea is common in patient after pharyngoplasty with an average reduction in sleep apnea events of 33%2.      Tongue Procedures:  While patients are awake, the muscles that surround the throat are active and keep this region open for breathing. These muscles relax during sleep, allowing the tongue and other structures to collapse and block breathing.  There are several different tongue procedures available.  Selection of a tongue base procedure depends on characteristics seen on physical exam.  Generally, procedures are aimed at removing bulky tissues in this area or preventing the back of the tongue from falling back during sleep.  Success rates for tongue surgery range from 50-62%3.    Hypoglossal Nerve Stimulation:  Hypoglossal nerve stimulation has recently received approval from the [place] Food and Drug Administration for the treatment of obstructive sleep apnea.  This is based on research showing that the system was safe and effective in treating sleep apnea6.  Results showed that the median AHI score decreased 68%, from 29.3 to 9.0. This therapy uses an implant system that senses breathing patterns and delivers mild stimulation to airway muscles, which keeps the airway open during sleep.  The system consists of three fully implanted components: a small generator (similar in size to a pacemaker), a breathing sensor, and a stimulation lead.  Using a small handheld remote, a patient turns the therapy on before bed and off upon awakening.    Candidates for this device must be greater than 22 years of age, have moderate to severe DENISE (AHI between 20-65), BMI less than 32, have tried CPAP/oral appliance without success, and have appropriate upper airway anatomy (determined by a sleep endoscopy performed by Dr. Harper).    Hypoglossal Nerve Stimulation Pathway:    The sleep  surgeon s office will work with the patient through the insurance prior-authorization process (including communications and appeals).    Nasal Procedures:  Nasal obstruction can interfere with nasal breathing during the day and night.  Studies have shown that relief of nasal obstruction can improve the ability of some patients to tolerate positive airway pressure therapy for obstructive sleep apnea1.  Treatment options include medications such as nasal saline, topical corticosteroid and antihistamine sprays, and oral medications such as antihistamines or decongestants. Non-surgical treatments can include external nasal dilators for selected patients. If these are not successful by themselves, surgery can improve the nasal airway either alone or in combination with these other options.      Combination Procedures:  Combination of surgical procedures and other treatments may be recommended, particularly if patients have more than one area of narrowing or persistent positional disease.  The success rate of combination surgery ranges from 66-80%2,3.      1. Jordi SNOWDEN. The Role of the Nose in Snoring and Obstructive Sleep Apnoea: An Update.  Eur Arch Otorhinolaryngol. 2011; 268: 1365-73.  2.  Kandi SM; Bobby JA; Nedra JR; Pallanch JF; Nando MB; Nikki SG; Martín MENDEZ. Surgical modifications of the upper airway for obstructive sleep apnea in adults: a systematic review and meta-analysis. SLEEP 2010;33(10):0968-1227. Jones KWON. Hypopharyngeal surgery in obstructive sleep apnea: an evidence-based medicine review.  Arch Otolaryngol Head Neck Surg. 2006 Feb;132(2):206-13.  3. Matthew YH1, Stephania Y, Giorgio BELKIS. The efficacy of anatomically based multilevel surgery for obstructive sleep apnea. Otolaryngol Head Neck Surg. 2003 Oct;129(4):327-35.  4. Jones KWON, Goldberg A. Hypopharyngeal Surgery in Obstructive Sleep Apnea: An Evidence-Based Medicine Review. Arch Otolaryngol Head Neck Surg. 2006 Feb;132(2):206-13.  5. Jaime MIRELES  et al. Upper-Airway Stimulation for Obstructive Sleep Apnea.  N Engl J Med. 2014 Jan 9;370(2):139-49.  6. Leonard Y et al. Increased Incidence of Cardiovascular Disease in Middle-aged Men with Obstructive Sleep Apnea. Am J Respir Crit Care Med; 2002 166: 159-165  7. Jenniffer MATAMOROS et al. Studying Life Effects and Effectiveness of Palatopharyngoplasty (SLEEP) study: Subjective Outcomes of Isolated Uvulopalatopharyngoplasty. Otolaryngol Head Neck Surg. 2011; 144: 623-631.              Your BMI is Body mass index is 26.32 kg/(m^2).  Weight management is a personal decision.  If you are interested in exploring weight loss strategies, the following discussion covers the approaches that may be successful. Body mass index (BMI) is one way to tell whether you are at a healthy weight, overweight, or obese. It measures your weight in relation to your height.  A BMI of 18.5 to 24.9 is in the healthy range. A person with a BMI of 25 to 29.9 is considered overweight, and someone with a BMI of 30 or greater is considered obese. More than two-thirds of American adults are considered overweight or obese.  Being overweight or obese increases the risk for further weight gain. Excess weight may lead to heart disease and diabetes.  Creating and following plans for healthy eating and physical activity may help you improve your health.  Weight control is part of healthy lifestyle and includes exercise, emotional health, and healthy eating habits. Careful eating habits lifelong are the mainstay of weight control. Though there are significant health benefits from weight loss, long-term weight loss with diet alone may be very difficult to achieve- studies show long-term success with dietary management in less than 10% of people. Attaining a healthy weight may be especially difficult to achieve in those with severe obesity. In some cases, medications, devices and surgical management might be considered.  What can you do?  If you are overweight or  obese and are interested in methods for weight loss, you should discuss this with your provider.     Consider reducing daily calorie intake by 500 calories.     Keep a food journal.     Avoiding skipping meals, consider cutting portions instead.    Diet combined with exercise helps maintain muscle while optimizing fat loss. Strength training is particularly important for building and maintaining muscle mass. Exercise helps reduce stress, increase energy, and improves fitness. Increasing exercise without diet control, however, may not burn enough calories to loose weight.       Start walking three days a week 10-20 minutes at a time    Work towards walking thirty minutes five days a week     Eventually, increase the speed of your walking for 1-2 minutes at time    In addition, we recommend that you review healthy lifestyles and methods for weight loss available through the National Institutes of Health patient information sites:  http://win.niddk.nih.gov/publications/index.htm    And look into health and wellness programs that may be available through your health insurance provider, employer, local community center, or radha club.    Weight management plan: Patient was referred to their PCP to discuss a diet and exercise plan.            Follow-ups after your visit        Future tests that were ordered for you today     Open Future Orders        Priority Expected Expires Ordered    Ferritin Routine  6/16/2017 4/17/2017            Who to contact     If you have questions or need follow up information about today's clinic visit or your schedule please [facility] directly at [phone].  Normal or non-critical lab and imaging results will be communicated to you by MyChart, letter or phone within 4 business days after the clinic has received the results. If you do not hear from us within 7 days, please contact the clinic through MyChart or phone. If you have a critical or abnormal lab result,  "we will notify you by phone as soon as possible.  Submit refill requests through [employer] or call your pharmacy and they will forward the refill request to us. Please allow 3 business days for your refill to be completed.          Additional Information About Your Visit        [employer] Information     [employer] gives you secure access to your electronic health record. If you see a primary care provider, you can also send messages to your care team and make appointments. If you have questions, please call your primary care clinic.  If you do not have a primary care provider, please call [phone] and they will assist you.        Care EveryWhere ID     This is your Care EveryWhere ID. This could be used by other organizations to access your [facility] medical records  [id number]        Your Vitals Were     Pulse Respirations Height Pulse Oximetry BMI (Body Mass Index)       82 18 1.575 m (5' 2\") 97% 26.32 kg/m2        Blood Pressure from Last 3 Encounters:   04/17/17 142/76   04/10/17 117/83   03/24/17 116/78    Weight from Last 3 Encounters:   04/17/17 65.3 kg (143 lb 14.4 oz)   04/10/17 65.4 kg (144 lb 3.2 oz)   03/24/17 64.9 kg (143 lb)               Primary Care Provider Office Phone # Fax #    Dustin Choudhury [phone]       [facility] [address] [facility] [address]        Thank you!     Thank you for choosing [facility]  for your care. Our goal is always to provide you with excellent care. Hearing back from our patients is one way we can continue to improve our services. Please take a few minutes to complete the written survey that you may receive in the mail after your visit with us. Thank you!             Your Updated Medication List - Protect others around you: Learn how to safely use, store and throw away your medicines at www.disposemymeds.org.          This list is accurate as of: 4/17/17 11:17 AM.  Always use your most recent med list.          "          Brand Name Dispense Instructions for use    acetaminophen 500 MG tablet    TYLENOL     Take 1,000 mg by mouth every 6 hours if needed. Max acetaminophen dose: 4000mg in 24 hrs.       albuterol 108 (90 BASE) MCG/ACT Inhaler    PROAIR HFA/PROVENTIL HFA/VENTOLIN HFA    1 Inhaler    Inhale 2 puffs into the lungs every 6 hours as needed for shortness of breath / dyspnea or wheezing       Calcium-Vitamin D 600-200 MG-UNIT Caps      Take 1 Cap by mouth once daily.       COMPOUND - PHARMACY TO MIX COMPOUNDED MEDICATION    CMPD RX    20 g    Apply 1g daily for 2 weeks, then twice weekly to vagina       ibuprofen 200 MG tablet    ADVIL/MOTRIN     Take 4 tablets by mouth 4 times daily if needed for Pain or Headache. (rare use)       KEPPRA 500 MG tablet   Generic drug:  levETIRAcetam      1,500 mg 2 times daily Take 3 tablets am and 3 tablets pm       * lamoTRIgine 100 MG tablet    LaMICtal     100 mg Take 1 tablet in the morning and evening       * lamoTRIgine 200 MG tablet    LaMICtal     Take one tablet by mouth every morning and every evening (Take along with 100mg tablets and 200mg= 300mg total am and pm)       MULTI-VITAMINS Tabs      take 1 tablet by oral route once daily with food       omeprazole 40 MG capsule    priLOSEC     Medications is alternated for two weeks when zantac is not working       ranitidine 150 MG tablet    ZANTAC     Take 1 tablet (150 mg) by mouth 2 times daily as needed for heartburn       tretinoin 0.05 % cream    RETIN-A    45 g    Spread a pea size amount into affected area topically at bedtime.  Use sunscreen SPF>20.       * Notice:  This list has 2 medication(s) that are the same as other medications prescribed for you. Read the directions carefully, and ask your doctor or other care provider to review them with you.

## 2017-04-17 NOTE — PATIENT INSTRUCTIONS
"MY TREATMENT INFORMATION FOR SLEEP APNEA-  Barbi Tiwari    NP : Natalia [facility]  SLEEP CENTER :  [place]    MY CONTACT NUMBER: [phone]    Ferritin level    [phone] Kenneth    [phone] cell          If I haven't had a sleep study yet, what can I expect?  A personal story from Ever  https://www.KBLEube.com/watch?v=AxPLmlRpnCs    Am I having a home sleep study?  Here is a video in case you get home and want to make sure you have done it correctly  https://www.KBLEube.com/watch?v=ANA0D0cNew2&feature=youtu.be    Frequently asked questions:  1. What is Obstructive Sleep Apnea (DENISE)? DENISE is the most common type of sleep apnea. Apnea literally means, \"without breath.\" It is characterized by repetitive pauses in breathing, despite continued effort to breathe, and is usually associated with a reduction in blood oxygen saturation. Apneas can last 10 to over 60 seconds. It is caused by narrowing or collapse of the upper airway as muscles relax during sleep. Severity of sleep apnea is determined by frequency of breathing events and their effect on your sleep and oxygen levels determined during sleep testing.   2. What are the consequences of DENISE? Symptoms include: daytime sleepiness- possibly increasing the risk of falling asleep while driving, unrefreshing/restless sleep, snoring, insomnia, waking frequently to urinate, waking with heartburn or reflux, reduced concentration and memory, and morning headaches. Other health consequences may include development of high blood pressure and other cardiovascular disease in persons who are susceptible. Untreated DENISE  can contribute to heart disease, stroke and diabetes.   3. What are the treatment options? In most situations, sleep apnea is a lifelong disease that must be managed with daily therapy. Medications are not effective for sleep apnea and surgery is generally not performed until other therapies have been tried. Therapy is usually tailored to the " individual patient based on many factors including your wishes as well as severity of sleep apnea and severity of obesity. Continuous Positive Airway (CPAP) is the most reliable treatment. An oral device to hold your jaw forward is usually the next most reliable option. Other options include postioning devices (to keep you off your back), weight loss, and surgery including a tongue pacing device. There is more detail about some of these options below.            1. CPAP-  WHAT DOES IT DO AND HOW CAN I LEARN TO WEAR IT?                               BEFORE I START, CAN I WATCH A MOVIE TO GET A PLAN ON HOW TO USE CPAP?  https://www.BioTe[employer].com/watch?y=r9U82kp595Z      Continuous positive airway pressure, or CPAP, is the most effective treatment for obstructive sleep apnea. It works by blowing room air, through a mask, to hold your throat open. A decision to use CPAP is a major step forward in the pursuit of a healthier life. The successful use of CPAP will help you breathe easier, sleep better and live healthier. You can choose CPAP equipment from any durable medical equipment provider that meets your needs.  Using CPAP can be a positive experience if you keep these valdez points in mind:  1. Commitment  CPAP is not a quick fix for your problem. It involves a long-term commitment to improve your sleep and your health.    2. Communication  Stay in close communication with both your sleep doctor and your CPAP supplier. Ask lots of questions and seek help when you need it.    3. Consistency  Use CPAP all night, every night and for every nap. You will receive the maximum health benefits from CPAP when you use it every time that you sleep. This will also make it easier for your body to adjust to the treatment.    4. Correction  The first machine and mask that you try may not be the best ones for you. Work with your sleep doctor and your CPAP supplier to make corrections to your equipment selection. Ask about trying a  "different type of machine or mask if you have ongoing problems. Make sure that your mask is a good fit and learn to use your equipment properly.    5. Challenge  Tell a family member or close friend to ask you each morning if you used your CPAP the previous night. Have someone to challenge you to give it your best effort.    6. Connection   Your adjustment to CPAP will be easier if you are able to connect with others who use the same treatment. Ask your sleep doctor if there is a support group in your area for people who have sleep apnea, or look for one on the Internet.  7. Comfort   Increase your level of comfort by using a saline spray, decongestant or heated humidifier if CPAP irritates your nose, mouth or throat. Use your unit's \"ramp\" setting to slowly get used to the air pressure level. There may be soft pads you can buy that will fit over your mask straps. Look on www.CPAP.com for accessories that can help make CPAP use more comfortable.  8. Cleaning   Clean your mask, tubing and headgear on a regular basis. Put this time in your schedule so that you don't forget to do it. Check and replace the filters for your CPAP unit and humidifier.    9. Completion   Although you are never finished with CPAP therapy, you should reward yourself by celebrating the completion of your first month of treatment. Expect this first month to be your hardest period of adjustment. It will involve some trial and error as you find the machine, mask and pressure settings that are right for you.    10. Continuation  After your first month of treatment, continue to make a daily commitment to use your CPAP all night, every night and for every nap.    CPAP-Tips to starting with success:  Begin using your CPAP for short periods of time during the day while you watch TV or read.    Use CPAP every night and for every nap. Using it less often reduces the health benefits and makes it harder for your body to get used to it.    Make small " adjustments to your mask, tubing, straps and headgear until you get the right fit. Tightening the mask may actually worsen the leak.  If it leaves significant marks on your face or irritates the bridge of your nose, it may not be the best mask for you.  Speak with the person who supplied the mask and consider trying other masks. Insurances will allow you to try different masks during the first month of starting CPAP.  Insurance also covers a new mask, hose and filter about every 6 months.    Use a saline nasal spray to ease mild nasal congestion. Neti-Pot or saline nasal rinses may also help. Nasal gel sprays can help reduce nasal dryness.  Biotene mouthwash can be helpful to protect your teeth if you experience frequent dry mouth.  Dry mouth may be a sign of air escaping out of your mouth or out of the mask in the case of a full face mask.  Speak with your provider if you expect that is the case.     Take a nasal decongestant to relieve more severe nasal or sinus congestion.  Do not use Afrin (oxymetazoline) nasal spray more than 3 days in a row.  Speak with your sleep doctor if your nasal congestion is chronic.    Use a heated humidifier that fits your CPAP model to enhance your breathing comfort. Adjust the heat setting up if you get a dry nose or throat, down if you get condensation in the hose or mask.  Position the CPAP lower than you so that any condensation in the hose drains back into the machine rather than towards the mask.    Try a system that uses nasal pillows if traditional masks give you problems.    Clean your mask, tubing and headgear once a week. Make sure the equipment dries fully.    Regularly check and replace the filters for your CPAP unit and humidifier.    Work closely with your sleep provider and your CPAP supplier to make sure that you have the machine, mask and air pressure setting that works best for you. It is better to stop using it and call your provider to solve problems than to lay  awake all night frustrated with the device.    BESIDES CPAP, WHAT OTHER THERAPIES ARE THERE?      Positioning Device  Positioning devices are generally used when sleep apnea is mild and only occurs on your back.This example shows a pillow that straps around the waist. It may be appropriate for those whose sleep study shows milder sleep apnea that occurs primarily when lying flat on one's back. Preliminary studies have shown benefit but effectiveness at home may need to be verified by a home sleep test. These devices are generally not covered by medical insurance.                      Oral Appliance  What is oral appliance therapy?  An oral appliance is a small acrylic device that fits over the upper and lower teeth or tongue (similar to an orthodontic retainer or a mouth guard). This device slightly advances the lower jaw or tongue, which moves the base of the tongue forward, opens the airway, improves breathing and can effectively treat snoring and obstructive sleep apnea sleep apnea. The appliance is fabricated and customized by a qualified dentist with experience in treating snoring and sleep apnea. Oral appliances are usually well tolerated and have relatively high compliance by patients1, 2, 3.  When is an oral appliance indicated?  Oral appliance therapy is recommended as a first-line treatment for patients with primary snoring, mild sleep apnea, and for patients with moderate sleep apnea who prefer appliance therapy to use of CPAP4, 5. Severity of sleep apnea is determined by sleep testing and is based on the number of respiratory events per hour of sleep.   How successful is oral appliance therapy?  The success rate of oral appliance therapy in patients with mild sleep apnea is 75-80% while in patients with moderate sleep apnea it is 50-70%. The chance of success in patients with severe sleep apnea is 40-50%. The research also shows that oral appliances have a beneficial effect on the cardiovascular health  of DENISE patients at the same magnitude as CPAP therapy7.  Oral appliances should be a second-line treatment in cases of severe sleep apnea, but if not completely successful then a combination therapy utilizing CPAP plus oral appliance therapy may be effective. Oral appliances tend to be effective in a broad range of patients although studies show that the patients who have the highest success are females, younger patients, those with milder disease, and less severe obesity. 3, 6.   The chances of success are lower in patients who have more severe DENISE, are older, and those who are morbidly obese.     Example of an oral appliance   Finding a dentist that practices dental sleep medicine  Specific training is available through the American Academy of Dental Sleep Medicine for dentists interested in working in the field of sleep. To find a dentist who is educated in the field of sleep and the use of oral appliances, near you, visit the Web site of the American Academy of Dental Sleep Medicine; also see   http://www.accpstorage.org/newOrganization/patients/oralAppliances.pdf  To search for a dentist certified in these practices:  Http://aadsm.org/FindADentist.aspx?1  1. Catia, et al. Objectively measured vs self-reported compliance during oral appliance therapy for sleep-disordered breathing. Chest 2013; 144(5): 0657-2427.  2. Ava et al. Objective measurement of compliance during oral appliance therapy for sleep-disordered breathing. Thorax 2013; 68(1): 91-96.  3. John, et al. Mandibular advancement devices in 620 men and women with DENISE and snoring: tolerability and predictors of treatment success. Chest 2004; 125: 0734-0889.  4. Kenney, et al. Oral appliances for snoring and DENISE: a review. Sleep 2006; 29: 244-262.  5. Marion, et al. Oral appliance treatment for DENISE: an update. J Clin Sleep Med 2014; 10(2): 215-227.  6. Shakeel et al. Predictors of OSAH treatment outcome. J Dent Res 2007; 86:  8223-5427.      Weight Loss:    Weight management is a personal decision.  If you are interested in exploring weight loss strategies, the following discussion covers the impact on weight loss on sleep apnea and the approaches that may be successful.    Weight loss decreases severity of sleep apnea in most people with obesity. For those with mild obesity who have developed snoring with weight gain, even 15-30 pound weight loss can improve and occasionally eliminate sleep apnea.  Structured and life-long dietary and health habits are necessary to lose weight and keep healthier weight levels.     Though there may be significant health benefits from weight loss, long-term weight loss is very difficult to achieve- studies show success with dietary management in less than 10% of people. In addition, substantial weight loss may require years of dietary control and may be difficult if patients have severe obesity. In these cases, surgical management may be considered.  Finally, older individuals who have tolerated obesity without health complications may be less likely to benefit from weight loss strategies.    Your BMI is Body mass index is 26.32 kg/(m^2).  Body mass index (BMI) is one way to tell whether you are at a healthy weight, overweight, or obese. It measures your weight in relation to your height.  A BMI of 18.5 to 24.9 is in the healthy range. A person with a BMI of 25 to 29.9 is considered overweight, and someone with a BMI of 30 or greater is considered obese. More than two-thirds of American adults are considered overweight or obese.  Being overweight or obese increases the risk for further weight gain. Excess weight may lead to heart disease and diabetes.  Creating and following plans for healthy eating and physical activity may help you improve your health.  Weight control is part of healthy lifestyle and includes exercise, emotional health, and healthy eating habits. Careful eating habits lifelong are the  mainstay of weight control. Though there are significant health benefits from weight loss, long-term weight loss with diet alone may be very difficult to achieve- studies show long-term success with dietary management in less than 10% of people. Attaining a healthy weight may be especially difficult to achieve in those with severe obesity. In some cases, medications, devices and surgical management might be considered.  What can you do?  If you are overweight or obese and are interested in methods for weight loss, you should discuss this with your provider.     Consider reducing daily calorie intake by 500 calories.     Keep a food journal.     Avoiding skipping meals, consider cutting portions instead.    Diet combined with exercise helps maintain muscle while optimizing fat loss. Strength training is particularly important for building and maintaining muscle mass. Exercise helps reduce stress, increase energy, and improves fitness. Increasing exercise without diet control, however, may not burn enough calories to loose weight.       Start walking three days a week 10-20 minutes at a time    Work towards walking thirty minutes five days a week     Eventually, increase the speed of your walking for 1-2 minutes at time    In addition, we recommend that you review healthy lifestyles and methods for weight loss available through the National Institutes of Health patient information sites:  http://win.niddk.nih.gov/publications/index.htm    And look into health and wellness programs that may be available through your health insurance provider, employer, local community center, or radha club.    Weight management plan: Patient was referred to their PCP to discuss a diet and exercise plan.    Surgery:    Upper Airway Surgery for DENISE  Surgery for DENISE is a second-line treatment option in the management of sleep apnea.  Surgery should be considered for patients who are having a difficult time tolerating CPAP.    Surgery for  DENISE is directed at areas that are responsible for narrowing or complete obstruction of the airway during sleep.  There are a wide range of procedures available to enlarge and/or stabilize the airway to prevent blockage of breathing in the three major areas where it can occur: the palate, tongue, and nasal regions.  Successful surgical treatment depends on the accurate identification of the factors responsible for obstructive sleep apnea in each person.  A personalized approach is required because there is no single treatment that works well for everyone.  Because of anatomic variation, consultation with an examination by a sleep surgeon is a critical first step in determining what surgical options are best for each patient.  In some cases, examination during sedation may be recommended in order to guide the selection of procedures.  Patients will be counseled about risks and benefits as well as the typical recovery course after surgery. Surgery is typically not a cure for a person s DENISE.  However, surgery will often significantly improve one s DENISE severity (termed  success rate ).  Even in the absence of a cure, surgery will decrease the cardiovascular risk associated with OSA7; improve overall quality of life8 (sleepiness, functionality, sleep quality, etc).          Palate Procedures:  Patients with DENISE often have narrowing of their airway in the region of their tonsils and uvula.  The goals of palate procedures are to widen the airway in this region as well as to help the tissues resist collapse.  Modern palate procedure techniques focus on tissue conservation and soft tissue rearrangement, rather than tissue removal.  Often the uvula is preserved in this procedure. Residual sleep apnea is common in patient after pharyngoplasty with an average reduction in sleep apnea events of 33%2.      Tongue Procedures:  While patients are awake, the muscles that surround the throat are active and keep this region open for  breathing. These muscles relax during sleep, allowing the tongue and other structures to collapse and block breathing.  There are several different tongue procedures available.  Selection of a tongue base procedure depends on characteristics seen on physical exam.  Generally, procedures are aimed at removing bulky tissues in this area or preventing the back of the tongue from falling back during sleep.  Success rates for tongue surgery range from 50-62%3.    Hypoglossal Nerve Stimulation:  Hypoglossal nerve stimulation has recently received approval from the [place] Food and Drug Administration for the treatment of obstructive sleep apnea.  This is based on research showing that the system was safe and effective in treating sleep apnea6.  Results showed that the median AHI score decreased 68%, from 29.3 to 9.0. This therapy uses an implant system that senses breathing patterns and delivers mild stimulation to airway muscles, which keeps the airway open during sleep.  The system consists of three fully implanted components: a small generator (similar in size to a pacemaker), a breathing sensor, and a stimulation lead.  Using a small handheld remote, a patient turns the therapy on before bed and off upon awakening.    Candidates for this device must be greater than 22 years of age, have moderate to severe DENISE (AHI between 20-65), BMI less than 32, have tried CPAP/oral appliance without success, and have appropriate upper airway anatomy (determined by a sleep endoscopy performed by Dr. Harper).    Hypoglossal Nerve Stimulation Pathway:    The sleep surgeon s office will work with the patient through the insurance prior-authorization process (including communications and appeals).    Nasal Procedures:  Nasal obstruction can interfere with nasal breathing during the day and night.  Studies have shown that relief of nasal obstruction can improve the ability of some patients to tolerate positive airway pressure therapy  for obstructive sleep apnea1.  Treatment options include medications such as nasal saline, topical corticosteroid and antihistamine sprays, and oral medications such as antihistamines or decongestants. Non-surgical treatments can include external nasal dilators for selected patients. If these are not successful by themselves, surgery can improve the nasal airway either alone or in combination with these other options.      Combination Procedures:  Combination of surgical procedures and other treatments may be recommended, particularly if patients have more than one area of narrowing or persistent positional disease.  The success rate of combination surgery ranges from 66-80%2,3.      1. Jordi SNOWDEN. The Role of the Nose in Snoring and Obstructive Sleep Apnoea: An Update.  Eur Arch Otorhinolaryngol. 2011; 268: 1365-73.  2.  Kandi SM; Bobby JA; Nedra JR; Pallanch JF; Nando MB; Nikki SG; Martín MENDEZ. Surgical modifications of the upper airway for obstructive sleep apnea in adults: a systematic review and meta-analysis. SLEEP 2010;33(10):3506-9750. Jones KWON. Hypopharyngeal surgery in obstructive sleep apnea: an evidence-based medicine review.  Arch Otolaryngol Head Neck Surg. 2006 Feb;132(2):206-13.  3. Matthew YH1, Stephania Y, Giorgio BELKIS. The efficacy of anatomically based multilevel surgery for obstructive sleep apnea. Otolaryngol Head Neck Surg. 2003 Oct;129(4):327-35.  4. Jones KWON, Goldberg A. Hypopharyngeal Surgery in Obstructive Sleep Apnea: An Evidence-Based Medicine Review. Arch Otolaryngol Head Neck Surg. 2006 Feb;132(2):206-13.  5. Jaime PJ et al. Upper-Airway Stimulation for Obstructive Sleep Apnea.  N Engl J Med. 2014 Jan 9;370(2):139-49.  6. Leonard Y et al. Increased Incidence of Cardiovascular Disease in Middle-aged Men with Obstructive Sleep Apnea. Am J Respir Crit Care Med; 2002 166: 159-165  7. Jenniffer MATAMOROS et al. Studying Life Effects and Effectiveness of Palatopharyngoplasty (SLEEP) study: Subjective  Outcomes of Isolated Uvulopalatopharyngoplasty. Otolaryngol Head Neck Surg. 2011; 144: 623-631.              Your BMI is Body mass index is 26.32 kg/(m^2).  Weight management is a personal decision.  If you are interested in exploring weight loss strategies, the following discussion covers the approaches that may be successful. Body mass index (BMI) is one way to tell whether you are at a healthy weight, overweight, or obese. It measures your weight in relation to your height.  A BMI of 18.5 to 24.9 is in the healthy range. A person with a BMI of 25 to 29.9 is considered overweight, and someone with a BMI of 30 or greater is considered obese. More than two-thirds of American adults are considered overweight or obese.  Being overweight or obese increases the risk for further weight gain. Excess weight may lead to heart disease and diabetes.  Creating and following plans for healthy eating and physical activity may help you improve your health.  Weight control is part of healthy lifestyle and includes exercise, emotional health, and healthy eating habits. Careful eating habits lifelong are the mainstay of weight control. Though there are significant health benefits from weight loss, long-term weight loss with diet alone may be very difficult to achieve- studies show long-term success with dietary management in less than 10% of people. Attaining a healthy weight may be especially difficult to achieve in those with severe obesity. In some cases, medications, devices and surgical management might be considered.  What can you do?  If you are overweight or obese and are interested in methods for weight loss, you should discuss this with your provider.     Consider reducing daily calorie intake by 500 calories.     Keep a food journal.     Avoiding skipping meals, consider cutting portions instead.    Diet combined with exercise helps maintain muscle while optimizing fat loss. Strength training is particularly important for  building and maintaining muscle mass. Exercise helps reduce stress, increase energy, and improves fitness. Increasing exercise without diet control, however, may not burn enough calories to loose weight.       Start walking three days a week 10-20 minutes at a time    Work towards walking thirty minutes five days a week     Eventually, increase the speed of your walking for 1-2 minutes at time    In addition, we recommend that you review healthy lifestyles and methods for weight loss available through the National [employer] patient information sites:  http://win.niddk.nih.gov/publications/index.htm    And look into health and wellness programs that may be available through your health insurance provider, employer, local community center, or radha club.    Weight management plan: Patient was referred to their PCP to discuss a diet and exercise plan.

## 2017-04-18 DIAGNOSIS — G25.81 RESTLESS LEGS SYNDROME (RLS): ICD-10-CM

## 2017-04-18 LAB — FERRITIN SERPL-MCNC: 33 NG/ML (ref 8–252)

## 2017-04-18 PROCEDURE — 82728 ASSAY OF FERRITIN: CPT | Performed by: NURSE PRACTITIONER

## 2017-04-18 PROCEDURE — 36415 COLL VENOUS BLD VENIPUNCTURE: CPT | Performed by: NURSE PRACTITIONER

## 2017-04-18 NOTE — PROGRESS NOTES
DATE OF VISIT:  04/17/2017      LOCATION:  [facility].      CHIEF COMPLAINT:  I have been requested to see Ms. 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  She does have a diagnosis of seizures, which heralded the fact that she had a brain tumor and continue to this date with reduced severity on treatment.      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  When working her dayshift, bedtimet is 11:00 p.m. with 11:30 being the latest 2 nights out of the 2-week period.  When she works the p.m. shift she enters bed somewhere between 1 or 2 a.m., on weekends can easily stay up to 11 or 12, and rare occasions may be up to 1 or 2 as if she has worked a 3-11 shift.  Exits bed on work days for day shift 5:00 a.m., 9:00 a.m. when she works the p.m. shift, and usually 8:00 a.m. or a bit after depending upon when she enters bed on weekends.  Sleep latency is about 5 minutes.    Prior to her bedtime she knows she is tired, but seems to not be able to stop herself from going around and doing chores.  This is probably driven by some RLS sensations that she does feel, explanation to follow.  Wakes up 2-3 times a night, one of those times will take Tylenol if RLS is still present.  Is awake for 30-90 minutes; each wakeup can take about 5 minutes to fall back asleep and the frequency of her wakeups are likely related to fluids that she does ingest after work.  Total sleep time is anywhere from 5 hours when she works day shifts to 8 hours on weekends or her p.m. shifts.  When she is not asleep she will watch the clock and be restless.      She is not able to nap.  Activities in bed prior to bedtime is about 15 minutes of reading and will use a phone or computer in bed for a brief period of time.  Reports an achy  sensation in lower thighs and upper portion of her lower extremities that she has noticed for the last 4 years.  It comes on at about bedtime and walking or movement does seem to relieve it.  She feels that this may coincide with her reason to take care of everything that is left undone at the end of the day and does actually feel quite uncomfortable.  This does persist into the middle of the night and is noticed when she has a wake up.  She does grind or clench her teeth.  Since childhood, has been seen sitting up and talking; her words are easy to understand, but the sentences are not well constructed.  She has also experienced some behaviors that are quite complicated such as fixing an IV infusion for her  but could not find the pump so as she was looking for that he did not get his infusion.  Another is walking in the room, does not go further than the bathroom and getting up for the bathroom is frequent.  She has not gone beyond the bathroom I think until she was a teenager where she went to the kitchen and was acting like she was making a whopper.  As far as performing complex behaviors she thinks that these have decreased in time or nonexistent since she did have her surgery and her bed partner in general is not aware of these infrequent behaviors unless they involve him.      SOCIAL HISTORY:  She is , lives with her spouse as 2 college children.  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  There may be a possibility for medical reasons that she could move to 1 shift.  Sleep environment does sound to be conducive to good sleep.      Family history of people with sleep apnea and also with sleep walking in her sister.        ALCOHOL AND OTHER SUBSTANCES:  May have a beer somewhere between 5:00 and 6:00 p.m. very infrequent.  No use of marijuana.  Has taken melatonin in the past but has not helped.  Minimal intake of caffeine.      Ryan Sleepiness score today is 7/24.  She  denies any difficulty with driving or performing at her job.  Seven out of 7 days she is troubled by tiredness or fatigue and does admit to some anxiety and depression, both are untreated at this point.      REVIEW OF SYSTEMS:  A 14-point comprehensive review of systems was completed and negative with the exception of the following:   CONSTITUTIONAL:  Drug allergy to sulfa.     EAR, NOSE AND THROAT:  Ear pain, postnasal drip and runny nose.   CARDIOVASCULAR:  Has had some irregular beats in the past.   NERVOUS SYSTEM:  Gets headaches, seizures; they can come in clusters.  She gets simple possible partial seizures and cluster seizures.  Her first seizure did come out of napping during the day.  It has been some time since she has had some of these more complex expressions and on her current medication I do not believe she is having seizures out of sleep.   MUSCLES AND BONES:  Muscle and joint or bone pain, and swollen joints.   MENTAL HEALTH AND ANXIETY:  As mentioned before.      Allergies:    Allergies   Allergen Reactions     Benoxinate Nausea and Vomiting     Nuts Swelling     Destinee nuts     Sulfa Drugs Hives       Medications:    Current Outpatient Prescriptions   Medication Sig Dispense Refill     omeprazole (PRILOSEC) 40 MG capsule Medications is alternated for two weeks when zantac is not working       ranitidine (ZANTAC) 150 MG tablet Take 1 tablet (150 mg) by mouth 2 times daily as needed for heartburn       COMPOUND (CMPD RX) - PHARMACY TO MIX COMPOUNDED MEDICATION Apply 1g daily for 2 weeks, then twice weekly to vagina 20 g 11     albuterol (PROAIR HFA, PROVENTIL HFA, VENTOLIN HFA) 108 (90 BASE) MCG/ACT inhaler Inhale 2 puffs into the lungs every 6 hours as needed for shortness of breath / dyspnea or wheezing 1 Inhaler 0     tretinoin (RETIN-A) 0.05 % cream Spread a pea size amount into affected area topically at bedtime.  Use sunscreen SPF>20. 45 g 11     acetaminophen (TYLENOL) 500 MG tablet Take 1,000  mg by mouth every 6 hours if needed. Max acetaminophen dose: 4000mg in 24 hrs.       Calcium Carbonate-Vitamin D (CALCIUM-VITAMIN D) 600-200 MG-UNIT CAPS Take 1 Cap by mouth once daily.       ibuprofen (ADVIL,MOTRIN) 200 MG tablet Take 4 tablets by mouth 4 times daily if needed for Pain or Headache. (rare use)       Multiple Vitamin (MULTI-VITAMINS) TABS take 1 tablet by oral route once daily with food       levETIRAcetam (KEPPRA) 500 MG tablet 1,500 mg 2 times daily Take 3 tablets am and 3 tablets pm       lamoTRIgine (LAMICTAL) 100 MG tablet 100 mg Take 1 tablet in the morning and evening       lamoTRIgine (LAMICTAL) 200 MG tablet Take one tablet by mouth every morning and every evening (Take along with 100mg tablets and 200mg= 300mg total am and pm)         Problem List:  Patient Active Problem List    Diagnosis Date Noted     Advanced directives, counseling/discussion 03/24/2017     Priority: Medium     Lumbar radiculopathy 09/08/2016     Priority: Medium     Palpitations 06/05/2014     PVCs 06/05/2014     Hyperlipidemia LDL goal <100 06/05/2014     Abnormal screening cardiac CT 06/05/2014     Routine general medical examination at a health care facility 02/25/2013     Overview:   Last Physical -  Pre- op 5/5/2011  PAP - 2/2012 nl  [place] - 07/17/2012 = ACR 2/Benign  Lipids - 07/16/2012  HDL = 41  LDL = 108  Colonoscopy - due  Tdap - due       oligodendroglioma 07/23/2012     Seizure disorder (related to tumor) 08/17/2011     Calculus of kidney 01/12/2011     Overview:   S/P LITHOTRIPSY 3/15/11       Hyperparathyroidism s/p surgery 01/11/2011     Allergic rhinitis 12/09/2009     Osteoarthrosis 12/09/2009     Esophageal reflux 10/22/2008        Past Medical/Surgical History:  Past Medical History:   Diagnosis Date     Allergic rhinitis 12/9/2009     Calculus of kidney 1/12/2011    Overview:  S/P LITHOTRIPSY 3/15/11      Esophageal reflux 10/22/2008     Hyperparathyroidism s/p surgery 1/11/2011      "Hyperparathyroidism s/p surgery 1/11/2011     oligodendroglioma 7/23/2012     Osteoarthrosis 12/9/2009     Palpitations 6/5/2014     PVCs 6/5/2014     Seizure disorder (related to tumor) 8/17/2011     Past Surgical History:   Procedure Laterality Date     CRANIOTOMY  2011    tumor resection     FOOT SURGERY      mulitple     HYSTERECTOMY, PAP NO LONGER INDICATED  2008    lap hys     LITHOTRIPSY  2010     PARATHYROIDECTOMY  2011     Physical Examination:  Vitals: /76 (BP Location: Right arm, Patient Position: Chair, Cuff Size: Adult Regular)  Pulse 82  Resp 18  Ht 1.575 m (5' 2\")  Wt 65.3 kg (143 lb 14.4 oz)  SpO2 97%  BMI 26.32 kg/m2  BMI= Body mass index is 26.32 kg/(m^2).    Neck Cir (cm): 36 cm    [place] Total Score 4/17/2017   Total score - [place] 7       GENERAL APPEARANCE: no distress and fatigued  EYES: Eyes grossly normal to inspection  HENT: oropharynx crowded, uvula elongated and scalloped tongue  NECK: thyroid normal to palpation  RESP: lungs clear to auscultation - no rales, rhonchi or wheezes  CV: regular rates and rhythm, normal S1 S2, no S3 or S4 and no murmur, click or rub  Extremities are without clubbing, edema  Musculoskeletal:Moves without difficulty  Mallampati Class: IV.  Tonsillar Stage: 1  hidden by pillars.  Dental: Dentition in good condition.  Good advancement of lower jaw without tmd  Skin: without facial rash     ASSESSMENT AND PLAN:  It is my impression that Ms. Tiwari has multiple issues with regard to her sleep.  One is a snort and gasp arousals that sound to appear frequently after a call to her bed partner should he go to bed after she does, however, that is not very frequent.  She has a STOP-Bang score of 3, which it gives her a low but present pretest probability for obstructive sleep apnea.  In the setting of parasomnias, RLS and seizures I do feel that she would benefit from an in-lab study to identify severity if DENISE is identified. The following plan of care has " been developed.   1.  Snort arousals with daytime fatigue in the setting of parasomnias, RLS, seizures, and mild depression and anxiety.  Recommend a split night polysomnogram due to the improved sensitivity of this study.  I will encourage her not to nap prior to her study as well as if she can keep a consistent bedtime for a few days prior to the study so that we may get the best study possible.  I would recommend a sleep aid to give us more total sleep time.  She is open to most possibilities for treatment and will return to see me following her sleep study.      2.  Parasomnias:  Likely with triggers of insufficient sleep with shift work and restless legs.  We did discuss the importance of working a single shift to improve total sleep time, with a likely response of more restful sleep and reduction in seizure activity.   She is looking into this.   3.  Restless legs syndrome.  I have ordered a ferritin level and recommended her ferrous sulfate and vitamin C, should it be abnormal or possible treatment with Neurontin in low doses which likely will improve her ability to initiate sleep, have improved quality of sleep, as well as total sleep time.   Will review this with Dr. Hopper.  4.  Mildly overweight for her height.  This likely is not impacting the snort arousals or gasping that is present.      Thank you for allowing me to participate in this kind woman's care.      Time spent with patient 60 minutes, of which greater than 50% was spent in counseling, education and coordination of care.         CAMDEN DUCKWORTH, CNP             D: 2017 16:35   T: 2017 06:23   MT: FREDO      Name:     DYLAN KAUFFMAN   MRN:      [id number]        Account:      [id number]   :      1960           Visit Date:   2017      Document: [id number]       cc: Dustin Hopper MD -please see #3 above

## 2017-04-19 ENCOUNTER — MYC MEDICAL ADVICE (OUTPATIENT)
Dept: SLEEP MEDICINE | Facility: CLINIC | Age: 57
End: 2017-04-19

## 2017-04-24 NOTE — PROGRESS NOTES
MEDICAL ONCOLOGY PROGRESS NOTE  Apr 10, 2017    Oncologic History:  Ms. 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 She then received 12 cycles of Temodar, completed in 2013.  She has done very well since then.    HISTORY OF PRESENT ILLNESS  Ms. Tiwari is currently doing very well. She denies any major issues, though recently has noticed an increase in symptoms related to facial twitching associated with some verbal difficulties. These occur only intermittently and interestingly occur on the right side of the face and not the left. These episodes typically last for just a few seconds, they do not generalize, and they typically resolve without any post-ictal confusion or other symptoms.v She continues on Lamotrigine under Dr. Hopper for seizure and the Keppra dose has been decreased recently with plans to further down titrate.    She otherwise denies any new neurologic problems, headache, chest pain, shortness of breath or other concerns. She continues work as a nurse and notes no change in intellectual/cognitive function or sensorimotor symptoms.    IMAGING: I reviewed the MRI brain with Ms. Tiwari, which shows no evidence of recurrent disease. There are no new enhancing lesions and only stable T2 changes around the resection cavity are seen today as on prior occasions.    REVIEW OF SYSTEMS  A 12-point ROS is negative except as in HPI    Current Outpatient Prescriptions   Medication     omeprazole (PRILOSEC) 40 MG capsule     ranitidine (ZANTAC) 150 MG tablet     COMPOUND (CMPD RX) - PHARMACY TO MIX COMPOUNDED MEDICATION     tretinoin (RETIN-A) 0.05 % cream     acetaminophen (TYLENOL) 500 MG tablet     Calcium Carbonate-Vitamin D (CALCIUM-VITAMIN D) 600-200 MG-UNIT CAPS     ibuprofen (ADVIL,MOTRIN) 200 MG tablet     Multiple Vitamin (MULTI-VITAMINS) TABS     levETIRAcetam (KEPPRA) 500 MG tablet      lamoTRIgine (LAMICTAL) 100 MG tablet     lamoTRIgine (LAMICTAL) 200 MG tablet     albuterol (PROAIR HFA, PROVENTIL HFA, VENTOLIN HFA) 108 (90 BASE) MCG/ACT inhaler     No current facility-administered medications for this visit.      Past Medical History:   Diagnosis Date     Allergic rhinitis 12/9/2009     Calculus of kidney 1/12/2011    Overview:  S/P LITHOTRIPSY 3/15/11      Esophageal reflux 10/22/2008     Hyperparathyroidism s/p surgery 1/11/2011     Hyperparathyroidism s/p surgery 1/11/2011     oligodendroglioma 7/23/2012     Osteoarthrosis 12/9/2009     Palpitations 6/5/2014     PVCs 6/5/2014     Seizure disorder (related to tumor) 8/17/2011     Past Surgical History:   Procedure Laterality Date     CRANIOTOMY  2011    tumor resection     FOOT SURGERY      mulitple     HYSTERECTOMY, PAP NO LONGER INDICATED  2008    lap hys     LITHOTRIPSY  2010     PARATHYROIDECTOMY  2011     Family History   Problem Relation Age of Onset     Arthritis Mother      Depression Mother      Respiratory Mother      COPD     C.A.D. Father 58     heart attack     DIABETES Brother 70     Depression Sister      Depression Son      Allergies Son      Depression Daughter      Allergies Daughter      Eczema Brother      Depression Sister      Depression Sister      Social History     Social History     Marital status:      Spouse name: N/A     Number of children: N/A     Years of education: N/A     Occupational History     RN- [employer]      Social History Main Topics     Smoking status: Never Smoker     Smokeless tobacco: Never Used     Alcohol use Yes      Comment: 1-2 per month, only at social events     Drug use: No     Sexual activity: Yes     Partners: Female     Birth control/ protection: Surgical      Comment: hysterectomy     Other Topics Concern     Not on file     Social History Narrative     PHYSICAL EXAMINATION  General: Well appearing woman, in no distress  Eyes: Pupils are equal round and reactive  ENT: Oropharynx is  "clear  Skin: No rashes  Neuro: Awake, alert, oriented.  Her speech and language is fluent. Her memory and judgment are intact. Cranial nerve status II-XII is negative.  While talking about seizure, she developed a right facial twitch with associated droop and difficulty with verbalization. The episode lasted about 20-30 seconds and when it was done, she was able to verbalize \"That's it\". She was witnessed to have two of these episodes. Otherwise, normal motor, sensory, coordination and reflex exams.    ECOG Performance Status is 0.     IMPRESSION AND PLAN  #1 Resected Oligodendroglioma with 1p/19q co-deletion, status post 12 months adjuvant Temozolomide  #2 Seizure  Ms. Tiwari is doing well from an oncologic standpoint with no evidence of clinical or radiographic progression. She has recently had an increase in seizure activity, and I would advise against further down-titration of her anti-epileptic medications. The concern would be that she may continue to have worsening of seizure and increased risk of generalization.    We will continue with follow-up every 6 months to a year. All questions were answered.     Neo Daugherty M.D.   of Medicine  Hematology, Oncology and Transplantation  [facility]    "

## 2017-05-01 ENCOUNTER — MYC MEDICAL ADVICE (OUTPATIENT)
Dept: SLEEP MEDICINE | Facility: CLINIC | Age: 57
End: 2017-05-01

## 2017-05-05 ENCOUNTER — APPOINTMENT (OUTPATIENT)
Dept: GENERAL RADIOLOGY | Facility: CLINIC | Age: 57
End: 2017-05-05
Attending: EMERGENCY MEDICINE
Payer: COMMERCIAL

## 2017-05-05 ENCOUNTER — ANESTHESIA (OUTPATIENT)
Dept: SURGERY | Facility: CLINIC | Age: 57
End: 2017-05-05
Payer: COMMERCIAL

## 2017-05-05 ENCOUNTER — ANESTHESIA EVENT (OUTPATIENT)
Dept: SURGERY | Facility: CLINIC | Age: 57
End: 2017-05-05
Payer: COMMERCIAL

## 2017-05-05 ENCOUNTER — APPOINTMENT (OUTPATIENT)
Dept: CT IMAGING | Facility: CLINIC | Age: 57
End: 2017-05-05
Attending: EMERGENCY MEDICINE
Payer: COMMERCIAL

## 2017-05-05 ENCOUNTER — HOSPITAL ENCOUNTER (OUTPATIENT)
Facility: CLINIC | Age: 57
Setting detail: OBSERVATION
Discharge: HOME | End: 2017-05-05
Attending: EMERGENCY MEDICINE | Admitting: INTERNAL MEDICINE
Payer: COMMERCIAL

## 2017-05-05 VITALS
BODY MASS INDEX: 26.13 KG/M2 | DIASTOLIC BLOOD PRESSURE: 66 MMHG | HEIGHT: 62 IN | TEMPERATURE: 98.1 F | RESPIRATION RATE: 24 BRPM | WEIGHT: 142 LBS | HEART RATE: 88 BPM | SYSTOLIC BLOOD PRESSURE: 104 MMHG | OXYGEN SATURATION: 93 %

## 2017-05-05 DIAGNOSIS — N20.1 URETERAL STONE: Primary | ICD-10-CM

## 2017-05-05 DIAGNOSIS — N20.0 NEPHROLITHIASIS: ICD-10-CM

## 2017-05-05 DIAGNOSIS — N13.30 HYDRONEPHROSIS, UNSPECIFIED HYDRONEPHROSIS TYPE: ICD-10-CM

## 2017-05-05 DIAGNOSIS — R82.81 PYURIA: ICD-10-CM

## 2017-05-05 LAB
ALBUMIN UR-MCNC: NEGATIVE MG/DL
ANION GAP SERPL CALCULATED.3IONS-SCNC: 7 MMOL/L (ref 3–14)
APPEARANCE UR: CLEAR
BACTERIA #/AREA URNS HPF: ABNORMAL /HPF
BILIRUB UR QL STRIP: NEGATIVE
BUN SERPL-MCNC: 25 MG/DL (ref 7–30)
CALCIUM SERPL-MCNC: 9.2 MG/DL (ref 8.5–10.1)
CHLORIDE SERPL-SCNC: 106 MMOL/L (ref 94–109)
CO2 SERPL-SCNC: 29 MMOL/L (ref 20–32)
COLOR UR AUTO: ABNORMAL
CREAT SERPL-MCNC: 0.69 MG/DL (ref 0.52–1.04)
ERYTHROCYTE [DISTWIDTH] IN BLOOD BY AUTOMATED COUNT: 12.9 % (ref 10–15)
GFR SERPL CREATININE-BSD FRML MDRD: 88 ML/MIN/1.7M2
GLUCOSE SERPL-MCNC: 155 MG/DL (ref 70–99)
GLUCOSE UR STRIP-MCNC: NEGATIVE MG/DL
HCT VFR BLD AUTO: 44.8 % (ref 35–47)
HGB BLD-MCNC: 14.6 G/DL (ref 11.7–15.7)
HGB UR QL STRIP: NEGATIVE
KETONES UR STRIP-MCNC: NEGATIVE MG/DL
LEUKOCYTE ESTERASE UR QL STRIP: ABNORMAL
MCH RBC QN AUTO: 29.7 PG (ref 26.5–33)
MCHC RBC AUTO-ENTMCNC: 32.6 G/DL (ref 31.5–36.5)
MCV RBC AUTO: 91 FL (ref 78–100)
MUCOUS THREADS #/AREA URNS LPF: PRESENT /LPF
NITRATE UR QL: NEGATIVE
PH UR STRIP: 5.5 PH (ref 5–7)
PLATELET # BLD AUTO: 241 10E9/L (ref 150–450)
POTASSIUM SERPL-SCNC: 3.6 MMOL/L (ref 3.4–5.3)
RBC # BLD AUTO: 4.91 10E12/L (ref 3.8–5.2)
RBC #/AREA URNS AUTO: 9 /HPF (ref 0–2)
SODIUM SERPL-SCNC: 142 MMOL/L (ref 133–144)
SP GR UR STRIP: 1.01 (ref 1–1.03)
SQUAMOUS #/AREA URNS AUTO: <1 /HPF (ref 0–1)
URN SPEC COLLECT METH UR: ABNORMAL
UROBILINOGEN UR STRIP-MCNC: NORMAL MG/DL (ref 0–2)
WBC # BLD AUTO: 9.5 10E9/L (ref 4–11)
WBC #/AREA URNS AUTO: 44 /HPF (ref 0–2)

## 2017-05-05 PROCEDURE — 36000093 ZZH SURGERY LEVEL 4 1ST 30 MIN: Performed by: UROLOGY

## 2017-05-05 PROCEDURE — 25000128 H RX IP 250 OP 636

## 2017-05-05 PROCEDURE — 71000013 ZZH RECOVERY PHASE 1 LEVEL 1 EA ADDTL HR: Performed by: UROLOGY

## 2017-05-05 PROCEDURE — 71000012 ZZH RECOVERY PHASE 1 LEVEL 1 FIRST HR: Performed by: UROLOGY

## 2017-05-05 PROCEDURE — 74010 XR KUB: CPT | Mod: 52

## 2017-05-05 PROCEDURE — G0378 HOSPITAL OBSERVATION PER HR: HCPCS

## 2017-05-05 PROCEDURE — 99285 EMERGENCY DEPT VISIT HI MDM: CPT | Mod: 25

## 2017-05-05 PROCEDURE — 25000128 H RX IP 250 OP 636: Performed by: NURSE ANESTHETIST, CERTIFIED REGISTERED

## 2017-05-05 PROCEDURE — 96375 TX/PRO/DX INJ NEW DRUG ADDON: CPT

## 2017-05-05 PROCEDURE — 87086 URINE CULTURE/COLONY COUNT: CPT | Performed by: EMERGENCY MEDICINE

## 2017-05-05 PROCEDURE — 36000063 ZZH SURGERY LEVEL 4 EA 15 ADDTL MIN: Performed by: UROLOGY

## 2017-05-05 PROCEDURE — 99219 ZZC INITIAL OBSERVATION CARE,LEVL II: CPT | Performed by: PHYSICIAN ASSISTANT

## 2017-05-05 PROCEDURE — 27210995 ZZH RX 272: Performed by: UROLOGY

## 2017-05-05 PROCEDURE — 25000132 ZZH RX MED GY IP 250 OP 250 PS 637: Performed by: PHYSICIAN ASSISTANT

## 2017-05-05 PROCEDURE — 25000132 ZZH RX MED GY IP 250 OP 250 PS 637: Performed by: UROLOGY

## 2017-05-05 PROCEDURE — 96376 TX/PRO/DX INJ SAME DRUG ADON: CPT

## 2017-05-05 PROCEDURE — 37000009 ZZH ANESTHESIA TECHNICAL FEE, EACH ADDTL 15 MIN: Performed by: UROLOGY

## 2017-05-05 PROCEDURE — 27210794 ZZH OR GENERAL SUPPLY STERILE: Performed by: UROLOGY

## 2017-05-05 PROCEDURE — C2617 STENT, NON-COR, TEM W/O DEL: HCPCS | Performed by: UROLOGY

## 2017-05-05 PROCEDURE — 37000008 ZZH ANESTHESIA TECHNICAL FEE, 1ST 30 MIN: Performed by: UROLOGY

## 2017-05-05 PROCEDURE — 25000131 ZZH RX MED GY IP 250 OP 636 PS 637: Performed by: ANESTHESIOLOGY

## 2017-05-05 PROCEDURE — 81001 URINALYSIS AUTO W/SCOPE: CPT | Performed by: EMERGENCY MEDICINE

## 2017-05-05 PROCEDURE — 40000170 ZZH STATISTIC PRE-PROCEDURE ASSESSMENT II: Performed by: UROLOGY

## 2017-05-05 PROCEDURE — 25000125 ZZHC RX 250: Performed by: NURSE ANESTHETIST, CERTIFIED REGISTERED

## 2017-05-05 PROCEDURE — 25800025 ZZH RX 258: Performed by: ANESTHESIOLOGY

## 2017-05-05 PROCEDURE — 40000648 ZZH STATISTIC LITHOTRIPSY IDENTIFIER: Performed by: UROLOGY

## 2017-05-05 PROCEDURE — 96366 THER/PROPH/DIAG IV INF ADDON: CPT

## 2017-05-05 PROCEDURE — C1769 GUIDE WIRE: HCPCS | Performed by: UROLOGY

## 2017-05-05 PROCEDURE — 25800025 ZZH RX 258: Performed by: UROLOGY

## 2017-05-05 PROCEDURE — 80048 BASIC METABOLIC PNL TOTAL CA: CPT | Performed by: EMERGENCY MEDICINE

## 2017-05-05 PROCEDURE — 25000128 H RX IP 250 OP 636: Performed by: PHYSICIAN ASSISTANT

## 2017-05-05 PROCEDURE — 25000128 H RX IP 250 OP 636: Performed by: EMERGENCY MEDICINE

## 2017-05-05 PROCEDURE — 96374 THER/PROPH/DIAG INJ IV PUSH: CPT

## 2017-05-05 PROCEDURE — 85027 COMPLETE CBC AUTOMATED: CPT | Performed by: EMERGENCY MEDICINE

## 2017-05-05 PROCEDURE — 99207 ZZC APP CREDIT; MD BILLING SHARED VISIT: CPT | Performed by: PHYSICIAN ASSISTANT

## 2017-05-05 PROCEDURE — 96365 THER/PROPH/DIAG IV INF INIT: CPT

## 2017-05-05 PROCEDURE — 25000125 ZZHC RX 250: Performed by: ANESTHESIOLOGY

## 2017-05-05 PROCEDURE — 96368 THER/DIAG CONCURRENT INF: CPT

## 2017-05-05 PROCEDURE — 74176 CT ABD & PELVIS W/O CONTRAST: CPT

## 2017-05-05 DEVICE — STENT URETERAL DBL PIGTAIL INLAY 6FRX24CM 778624: Type: IMPLANTABLE DEVICE | Site: URETER | Status: FUNCTIONAL

## 2017-05-05 RX ORDER — FENTANYL CITRATE 50 UG/ML
INJECTION, SOLUTION INTRAMUSCULAR; INTRAVENOUS PRN
Status: DISCONTINUED | OUTPATIENT
Start: 2017-05-05 | End: 2017-05-05

## 2017-05-05 RX ORDER — HYDROMORPHONE HYDROCHLORIDE 1 MG/ML
.3-.5 INJECTION, SOLUTION INTRAMUSCULAR; INTRAVENOUS; SUBCUTANEOUS
Status: DISCONTINUED | OUTPATIENT
Start: 2017-05-05 | End: 2017-05-05 | Stop reason: HOSPADM

## 2017-05-05 RX ORDER — ONDANSETRON 4 MG/1
4 TABLET, ORALLY DISINTEGRATING ORAL EVERY 30 MIN PRN
Status: DISCONTINUED | OUTPATIENT
Start: 2017-05-05 | End: 2017-05-05 | Stop reason: HOSPADM

## 2017-05-05 RX ORDER — HYDROMORPHONE HYDROCHLORIDE 1 MG/ML
INJECTION, SOLUTION INTRAMUSCULAR; INTRAVENOUS; SUBCUTANEOUS
Status: COMPLETED
Start: 2017-05-05 | End: 2017-05-05

## 2017-05-05 RX ORDER — HYDROMORPHONE HYDROCHLORIDE 1 MG/ML
0.5 INJECTION, SOLUTION INTRAMUSCULAR; INTRAVENOUS; SUBCUTANEOUS ONCE
Status: COMPLETED | OUTPATIENT
Start: 2017-05-05 | End: 2017-05-05

## 2017-05-05 RX ORDER — ONDANSETRON 2 MG/ML
4 INJECTION INTRAMUSCULAR; INTRAVENOUS EVERY 6 HOURS PRN
Status: DISCONTINUED | OUTPATIENT
Start: 2017-05-05 | End: 2017-05-05 | Stop reason: HOSPADM

## 2017-05-05 RX ORDER — LAMOTRIGINE 150 MG/1
300 TABLET ORAL 2 TIMES DAILY
Status: DISCONTINUED | OUTPATIENT
Start: 2017-05-05 | End: 2017-05-05 | Stop reason: HOSPADM

## 2017-05-05 RX ORDER — APREPITANT 40 MG/1
40 CAPSULE ORAL DAILY
Status: DISCONTINUED | OUTPATIENT
Start: 2017-05-05 | End: 2017-05-05 | Stop reason: HOSPADM

## 2017-05-05 RX ORDER — NALOXONE HYDROCHLORIDE 0.4 MG/ML
.1-.4 INJECTION, SOLUTION INTRAMUSCULAR; INTRAVENOUS; SUBCUTANEOUS
Status: DISCONTINUED | OUTPATIENT
Start: 2017-05-05 | End: 2017-05-05 | Stop reason: HOSPADM

## 2017-05-05 RX ORDER — SODIUM CHLORIDE, SODIUM LACTATE, POTASSIUM CHLORIDE, CALCIUM CHLORIDE 600; 310; 30; 20 MG/100ML; MG/100ML; MG/100ML; MG/100ML
INJECTION, SOLUTION INTRAVENOUS CONTINUOUS
Status: DISCONTINUED | OUTPATIENT
Start: 2017-05-05 | End: 2017-05-05 | Stop reason: HOSPADM

## 2017-05-05 RX ORDER — ONDANSETRON 2 MG/ML
4 INJECTION INTRAMUSCULAR; INTRAVENOUS ONCE
Status: COMPLETED | OUTPATIENT
Start: 2017-05-05 | End: 2017-05-05

## 2017-05-05 RX ORDER — HYDROMORPHONE HYDROCHLORIDE 1 MG/ML
.3-.5 INJECTION, SOLUTION INTRAMUSCULAR; INTRAVENOUS; SUBCUTANEOUS EVERY 5 MIN PRN
Status: DISCONTINUED | OUTPATIENT
Start: 2017-05-05 | End: 2017-05-05 | Stop reason: HOSPADM

## 2017-05-05 RX ORDER — PROCHLORPERAZINE MALEATE 5 MG
5-10 TABLET ORAL EVERY 6 HOURS PRN
Status: DISCONTINUED | OUTPATIENT
Start: 2017-05-05 | End: 2017-05-05 | Stop reason: HOSPADM

## 2017-05-05 RX ORDER — EPHEDRINE SULFATE 50 MG/ML
INJECTION, SOLUTION INTRAMUSCULAR; INTRAVENOUS; SUBCUTANEOUS PRN
Status: DISCONTINUED | OUTPATIENT
Start: 2017-05-05 | End: 2017-05-05

## 2017-05-05 RX ORDER — ONDANSETRON 2 MG/ML
4 INJECTION INTRAMUSCULAR; INTRAVENOUS EVERY 30 MIN PRN
Status: DISCONTINUED | OUTPATIENT
Start: 2017-05-05 | End: 2017-05-05 | Stop reason: HOSPADM

## 2017-05-05 RX ORDER — ASCORBIC ACID 500 MG
500 TABLET ORAL 2 TIMES DAILY
Status: ON HOLD | COMMUNITY
End: 2020-10-24

## 2017-05-05 RX ORDER — PROCHLORPERAZINE 25 MG
25 SUPPOSITORY, RECTAL RECTAL EVERY 12 HOURS PRN
Status: DISCONTINUED | OUTPATIENT
Start: 2017-05-05 | End: 2017-05-05 | Stop reason: HOSPADM

## 2017-05-05 RX ORDER — CIPROFLOXACIN 2 MG/ML
400 INJECTION, SOLUTION INTRAVENOUS
Status: DISCONTINUED | OUTPATIENT
Start: 2017-05-05 | End: 2017-05-05

## 2017-05-05 RX ORDER — OXYCODONE HYDROCHLORIDE 5 MG/1
5-10 TABLET ORAL
Status: DISCONTINUED | OUTPATIENT
Start: 2017-05-05 | End: 2017-05-05 | Stop reason: HOSPADM

## 2017-05-05 RX ORDER — POLYETHYLENE GLYCOL 3350 17 G/17G
17 POWDER, FOR SOLUTION ORAL DAILY PRN
Status: DISCONTINUED | OUTPATIENT
Start: 2017-05-05 | End: 2017-05-05 | Stop reason: HOSPADM

## 2017-05-05 RX ORDER — LAMOTRIGINE 100 MG/1
200 TABLET ORAL 2 TIMES DAILY
Status: CANCELLED | OUTPATIENT
Start: 2017-05-05

## 2017-05-05 RX ORDER — CIPROFLOXACIN 2 MG/ML
400 INJECTION, SOLUTION INTRAVENOUS EVERY 12 HOURS
Status: DISCONTINUED | OUTPATIENT
Start: 2017-05-05 | End: 2017-05-05 | Stop reason: HOSPADM

## 2017-05-05 RX ORDER — SODIUM CHLORIDE 9 MG/ML
INJECTION, SOLUTION INTRAVENOUS CONTINUOUS
Status: DISCONTINUED | OUTPATIENT
Start: 2017-05-05 | End: 2017-05-05 | Stop reason: HOSPADM

## 2017-05-05 RX ORDER — PROPOFOL 10 MG/ML
INJECTION, EMULSION INTRAVENOUS PRN
Status: DISCONTINUED | OUTPATIENT
Start: 2017-05-05 | End: 2017-05-05

## 2017-05-05 RX ORDER — FERROUS SULFATE 325(65) MG
325 TABLET ORAL 2 TIMES DAILY
COMMUNITY
End: 2019-08-29

## 2017-05-05 RX ORDER — ONDANSETRON 4 MG/1
4 TABLET, ORALLY DISINTEGRATING ORAL EVERY 6 HOURS PRN
Status: DISCONTINUED | OUTPATIENT
Start: 2017-05-05 | End: 2017-05-05 | Stop reason: HOSPADM

## 2017-05-05 RX ORDER — LIDOCAINE HYDROCHLORIDE 20 MG/ML
INJECTION, SOLUTION INFILTRATION; PERINEURAL PRN
Status: DISCONTINUED | OUTPATIENT
Start: 2017-05-05 | End: 2017-05-05

## 2017-05-05 RX ORDER — FENTANYL CITRATE 50 UG/ML
50 INJECTION, SOLUTION INTRAMUSCULAR; INTRAVENOUS ONCE
Status: COMPLETED | OUTPATIENT
Start: 2017-05-05 | End: 2017-05-05

## 2017-05-05 RX ORDER — HYDROCODONE BITARTRATE AND ACETAMINOPHEN 5; 325 MG/1; MG/1
1-2 TABLET ORAL EVERY 6 HOURS PRN
Qty: 16 TABLET | Refills: 0 | Status: SHIPPED | OUTPATIENT
Start: 2017-05-05 | End: 2017-05-15

## 2017-05-05 RX ORDER — ONDANSETRON 2 MG/ML
INJECTION INTRAMUSCULAR; INTRAVENOUS PRN
Status: DISCONTINUED | OUTPATIENT
Start: 2017-05-05 | End: 2017-05-05

## 2017-05-05 RX ORDER — LEVETIRACETAM 750 MG/1
1500 TABLET ORAL 2 TIMES DAILY
Status: DISCONTINUED | OUTPATIENT
Start: 2017-05-05 | End: 2017-05-05

## 2017-05-05 RX ORDER — PROPOFOL 10 MG/ML
INJECTION, EMULSION INTRAVENOUS CONTINUOUS PRN
Status: DISCONTINUED | OUTPATIENT
Start: 2017-05-05 | End: 2017-05-05

## 2017-05-05 RX ORDER — METOCLOPRAMIDE HYDROCHLORIDE 5 MG/ML
10 INJECTION INTRAMUSCULAR; INTRAVENOUS EVERY 6 HOURS
Status: DISCONTINUED | OUTPATIENT
Start: 2017-05-05 | End: 2017-05-05 | Stop reason: HOSPADM

## 2017-05-05 RX ORDER — MEPERIDINE HYDROCHLORIDE 25 MG/ML
12.5 INJECTION INTRAMUSCULAR; INTRAVENOUS; SUBCUTANEOUS EVERY 5 MIN PRN
Status: DISCONTINUED | OUTPATIENT
Start: 2017-05-05 | End: 2017-05-05 | Stop reason: HOSPADM

## 2017-05-05 RX ORDER — KETOROLAC TROMETHAMINE 30 MG/ML
30 INJECTION, SOLUTION INTRAMUSCULAR; INTRAVENOUS ONCE
Status: COMPLETED | OUTPATIENT
Start: 2017-05-05 | End: 2017-05-05

## 2017-05-05 RX ORDER — ACETAMINOPHEN 325 MG/1
650 TABLET ORAL EVERY 4 HOURS PRN
Status: DISCONTINUED | OUTPATIENT
Start: 2017-05-05 | End: 2017-05-05 | Stop reason: HOSPADM

## 2017-05-05 RX ORDER — LAMOTRIGINE 100 MG/1
100 TABLET ORAL EVERY EVENING
Status: DISCONTINUED | OUTPATIENT
Start: 2017-05-05 | End: 2017-05-05

## 2017-05-05 RX ORDER — FENTANYL CITRATE 50 UG/ML
25-50 INJECTION, SOLUTION INTRAMUSCULAR; INTRAVENOUS
Status: DISCONTINUED | OUTPATIENT
Start: 2017-05-05 | End: 2017-05-05 | Stop reason: HOSPADM

## 2017-05-05 RX ORDER — ACETAMINOPHEN 650 MG/1
650 SUPPOSITORY RECTAL EVERY 4 HOURS PRN
Status: DISCONTINUED | OUTPATIENT
Start: 2017-05-05 | End: 2017-05-05 | Stop reason: HOSPADM

## 2017-05-05 RX ORDER — KETOROLAC TROMETHAMINE 30 MG/ML
30 INJECTION, SOLUTION INTRAMUSCULAR; INTRAVENOUS EVERY 6 HOURS PRN
Status: DISCONTINUED | OUTPATIENT
Start: 2017-05-05 | End: 2017-05-05 | Stop reason: HOSPADM

## 2017-05-05 RX ADMIN — Medication 5 MG: at 14:47

## 2017-05-05 RX ADMIN — Medication 5 MG: at 15:10

## 2017-05-05 RX ADMIN — KETOROLAC TROMETHAMINE 30 MG: 30 INJECTION, SOLUTION INTRAMUSCULAR at 05:21

## 2017-05-05 RX ADMIN — FENTANYL CITRATE 50 MCG: 50 INJECTION, SOLUTION INTRAMUSCULAR; INTRAVENOUS at 14:53

## 2017-05-05 RX ADMIN — PHENYLEPHRINE HYDROCHLORIDE 50 MCG: 10 INJECTION, SOLUTION INTRAMUSCULAR; INTRAVENOUS; SUBCUTANEOUS at 14:55

## 2017-05-05 RX ADMIN — Medication 5 MG: at 15:43

## 2017-05-05 RX ADMIN — METOCLOPRAMIDE 10 MG: 5 INJECTION, SOLUTION INTRAMUSCULAR; INTRAVENOUS at 09:33

## 2017-05-05 RX ADMIN — RANITIDINE 150 MG: 150 TABLET ORAL at 10:23

## 2017-05-05 RX ADMIN — Medication 5 MG: at 14:46

## 2017-05-05 RX ADMIN — ONDANSETRON 4 MG: 2 INJECTION INTRAMUSCULAR; INTRAVENOUS at 15:48

## 2017-05-05 RX ADMIN — PHENYLEPHRINE HYDROCHLORIDE 50 MCG: 10 INJECTION, SOLUTION INTRAMUSCULAR; INTRAVENOUS; SUBCUTANEOUS at 14:39

## 2017-05-05 RX ADMIN — ONDANSETRON 4 MG: 2 SOLUTION INTRAMUSCULAR; INTRAVENOUS at 05:24

## 2017-05-05 RX ADMIN — SUCCINYLCHOLINE CHLORIDE 100 MG: 20 INJECTION, SOLUTION INTRAMUSCULAR; INTRAVENOUS at 14:35

## 2017-05-05 RX ADMIN — FENTANYL CITRATE 25 MCG: 50 INJECTION, SOLUTION INTRAMUSCULAR; INTRAVENOUS at 13:49

## 2017-05-05 RX ADMIN — LAMOTRIGINE 300 MG: 150 TABLET ORAL at 10:22

## 2017-05-05 RX ADMIN — CIPROFLOXACIN 400 MG: 2 INJECTION, SOLUTION INTRAVENOUS at 10:15

## 2017-05-05 RX ADMIN — PROPOFOL 180 MCG/KG/MIN: 10 INJECTION, EMULSION INTRAVENOUS at 15:12

## 2017-05-05 RX ADMIN — PHENYLEPHRINE HYDROCHLORIDE 50 MCG: 10 INJECTION, SOLUTION INTRAMUSCULAR; INTRAVENOUS; SUBCUTANEOUS at 14:42

## 2017-05-05 RX ADMIN — FENTANYL CITRATE 50 MCG: 50 INJECTION, SOLUTION INTRAMUSCULAR; INTRAVENOUS at 14:35

## 2017-05-05 RX ADMIN — KETOROLAC TROMETHAMINE 30 MG: 30 INJECTION, SOLUTION INTRAMUSCULAR at 11:29

## 2017-05-05 RX ADMIN — ONDANSETRON 4 MG: 2 SOLUTION INTRAMUSCULAR; INTRAVENOUS at 06:50

## 2017-05-05 RX ADMIN — Medication 5 MG: at 15:04

## 2017-05-05 RX ADMIN — ROCURONIUM BROMIDE 5 MG: 10 INJECTION INTRAVENOUS at 14:35

## 2017-05-05 RX ADMIN — SODIUM CHLORIDE 1000 ML: 9 INJECTION, SOLUTION INTRAVENOUS at 05:22

## 2017-05-05 RX ADMIN — Medication 5 MG: at 15:22

## 2017-05-05 RX ADMIN — PHENYLEPHRINE HYDROCHLORIDE 50 MCG: 10 INJECTION, SOLUTION INTRAMUSCULAR; INTRAVENOUS; SUBCUTANEOUS at 15:43

## 2017-05-05 RX ADMIN — LEVETIRACETAM 1500 MG: 100 INJECTION, SOLUTION INTRAVENOUS at 09:33

## 2017-05-05 RX ADMIN — SODIUM CHLORIDE, POTASSIUM CHLORIDE, SODIUM LACTATE AND CALCIUM CHLORIDE: 600; 310; 30; 20 INJECTION, SOLUTION INTRAVENOUS at 12:53

## 2017-05-05 RX ADMIN — PROPOFOL 180 MG: 10 INJECTION, EMULSION INTRAVENOUS at 14:35

## 2017-05-05 RX ADMIN — HYDROMORPHONE HYDROCHLORIDE 0.5 MG: 1 INJECTION, SOLUTION INTRAMUSCULAR; INTRAVENOUS; SUBCUTANEOUS at 06:50

## 2017-05-05 RX ADMIN — Medication 5 MG: at 14:55

## 2017-05-05 RX ADMIN — LIDOCAINE HYDROCHLORIDE 100 MG: 20 INJECTION, SOLUTION INFILTRATION; PERINEURAL at 14:35

## 2017-05-05 RX ADMIN — PHENYLEPHRINE HYDROCHLORIDE 50 MCG: 10 INJECTION, SOLUTION INTRAMUSCULAR; INTRAVENOUS; SUBCUTANEOUS at 15:10

## 2017-05-05 RX ADMIN — PHENYLEPHRINE HYDROCHLORIDE 100 MCG: 10 INJECTION, SOLUTION INTRAMUSCULAR; INTRAVENOUS; SUBCUTANEOUS at 14:46

## 2017-05-05 RX ADMIN — PROPOFOL 200 MCG/KG/MIN: 10 INJECTION, EMULSION INTRAVENOUS at 14:35

## 2017-05-05 RX ADMIN — SODIUM CHLORIDE, POTASSIUM CHLORIDE, SODIUM LACTATE AND CALCIUM CHLORIDE: 600; 310; 30; 20 INJECTION, SOLUTION INTRAVENOUS at 15:01

## 2017-05-05 RX ADMIN — MIDAZOLAM HYDROCHLORIDE 1 MG: 1 INJECTION, SOLUTION INTRAMUSCULAR; INTRAVENOUS at 14:33

## 2017-05-05 RX ADMIN — PROPOFOL 40 MG: 10 INJECTION, EMULSION INTRAVENOUS at 14:53

## 2017-05-05 RX ADMIN — MIDAZOLAM HYDROCHLORIDE 1 MG: 1 INJECTION, SOLUTION INTRAMUSCULAR; INTRAVENOUS at 14:30

## 2017-05-05 RX ADMIN — PHENYLEPHRINE HYDROCHLORIDE 50 MCG: 10 INJECTION, SOLUTION INTRAMUSCULAR; INTRAVENOUS; SUBCUTANEOUS at 15:04

## 2017-05-05 RX ADMIN — SODIUM CHLORIDE: 9 INJECTION, SOLUTION INTRAVENOUS at 08:40

## 2017-05-05 RX ADMIN — HYDROMORPHONE HYDROCHLORIDE 0.5 MG: 1 INJECTION, SOLUTION INTRAMUSCULAR; INTRAVENOUS; SUBCUTANEOUS at 05:22

## 2017-05-05 RX ADMIN — FENTANYL CITRATE 25 MCG: 50 INJECTION, SOLUTION INTRAMUSCULAR; INTRAVENOUS at 13:33

## 2017-05-05 RX ADMIN — Medication 5 MG: at 15:25

## 2017-05-05 RX ADMIN — PROCHLORPERAZINE EDISYLATE 5 MG: 5 INJECTION INTRAMUSCULAR; INTRAVENOUS at 07:44

## 2017-05-05 RX ADMIN — HYDROMORPHONE HYDROCHLORIDE 0.5 MG: 1 INJECTION, SOLUTION INTRAMUSCULAR; INTRAVENOUS; SUBCUTANEOUS at 05:42

## 2017-05-05 RX ADMIN — APREPITANT 40 MG: 40 CAPSULE ORAL at 12:53

## 2017-05-05 ASSESSMENT — ENCOUNTER SYMPTOMS
VOMITING: 1
DIARRHEA: 1
NAUSEA: 1
HEMATURIA: 0
ABDOMINAL PAIN: 1
DYSURIA: 0
FEVER: 0
SEIZURES: 1

## 2017-05-05 NOTE — TELEPHONE ENCOUNTER
"Emilee Tolliver MA 5/3/2017 9:20 AM CDT        ----- Message -----   From: Barbi Tiwari   Sent: 5/2/2017 4:50 PM   To: Sleep Ur Mychart  Subject: RE:RLS     Hi Natalia. Thanks for this follow up. I just started taking the iron, vitamin C, vitamin D last week. I was to get a recheck in 3 months. Should I wait for the gabapentin and sleep study after that time? (3 months). Also, I am going to [facility] in 2 weeks for 1.5 weeks. Can I start the paul after I get home? Also, how long after I start the paul should I do the sleep study? I'm fine with trying anything that will help me not be so tired all of the time. :)  My pharmacy is the [facility] in [place]. 11pm sleep study time will be fine. It would be in June then, correct?    Thanks, Barbi  ----- Message -----  From: CAMDEN Bateman CNP  Sent: 5/1/2017 8:20 AM CDT  To: Barbi Tiwari  Subject: RLS        Hi Barbi,    I'd like you to consider adding gabapentin to your medical treatment of your RLS. It may not be needed after the ferritin level elevates to the level we are looking for , but may improve your excessive movement and make doing a sleep study more \"managable\".    Add gabapentin 1.0-1.5 hrs before bedtime. Start with 100mg for 5-7 nights, increase to 200mg if 100mg not entirely effective, then increase to 300mg if 200mg not meeting goals. We're looking to see that you can lay down and sleep in the time you are currently up and moving about doing chores, and that with wakeups RLS symptoms are resolved. This alone should help improve total sleep times. Side effects of gabapentin: lightheadedness, dizziness, sleepiness while titrating up in dosage are most common. It is not recommended to take a dose and then drive home, so with your night shift, you'll need to take when you arrive home. What pharmacy would you like me to use?    I should go ahead and order your sleep study, and would suggest an Ambien 5mg to assist both with " falling asleep and ability to refall asleep with any wakeups throughout the night. Should this go to the same pharmacy? Also, the schedule of the study-would 11pm with a sleep aid be reasonable for you?     CAMDEN Tobias, CNP-BC  Sleep Medicine

## 2017-05-05 NOTE — IP AVS SNAPSHOT
[address]    [facility] [address]    Phone:  [phone]                                       After Visit Summary   5/5/2017    Barbi Tiwari    MRN: [id number]           After Visit Summary Signature Page     I have received my discharge instructions, and my questions have been answered. I have discussed any challenges I see with this plan with the nurse or doctor.    ..........................................................................................................................................  Patient/Patient Representative Signature      ..........................................................................................................................................  Patient Representative Print Name and Relationship to Patient    ..................................................               ................................................  Date                                            Time    ..........................................................................................................................................  Reviewed by Signature/Title    ...................................................              ..............................................  Date                                                            Time

## 2017-05-05 NOTE — ED PROVIDER NOTES
History     Chief Complaint:  Abdominal Pain     HPI   Barbi Tiwari is a 56 year old female with history of kidney stones who presents to the emergency department today for evaluation of LLQ abdominal pain that awoke her from sleep at approximately 0245. She also reports nausea with one episode of nonbloody emesis and one episode of diarrhea last night with no blood in stool. Patient reports pain is very similar to pain with previous kidney stone but more intense.  Kidney stone was last experienced 6-7 years ago and confirmed on CT, that ultimately required stenting, per patient.  Does not recall the name of her Urologist.  Pain has not migrated since onset. 10 mg Oxycodone (Rx'd to dtr for her kidney stones) at home provided no relief, prompting her visit today. She reports no urinary symptoms, fever, shortness of breath, or recent trauma. No other concerns were voiced at this time.        Allergies:  Benoxinate  Nuts  Sulfa drugs     Medications:    Prilosec  Zantac  Albuterol  Keppra  Lamictal      Past Medical History:    Allergic rhinitis  Calculus of kidney  Esophageal reflux  Hyperparathyroidism  Oligodendroglioma  Osteoarthritis  Palpitations  Seizure disorder    Past Surgical History:    Craniotomy  Foot surgery  Hysterectomy  Lithotripsy  Parathyroidectomy     Family History:    Mother - arthritis, depression, COPD  Father - CAD    Social History:  The patient was accompanied to the ED by spouse.  Smoking Status: Negative  Alcohol Use: Positive  Marital Status:   [2]  Works as a Cardiology Nurse at the .  Going to [place] today for a graduation.    Review of Systems   Constitutional: Negative for fever.   Gastrointestinal: Positive for abdominal pain, diarrhea, nausea and vomiting.   Genitourinary: Negative for dysuria and hematuria.   All other systems reviewed and are negative.    Physical Exam   First Vitals:  BP: 137/77  Pulse: 78  Temp: 97.7  F (36.5  C)  Resp: 16  Height: 157.5 cm (5'  "2\")  Weight: 64.4 kg (142 lb)  SpO2: 96 %  RA    Physical Exam  General: uncomfortable appearing woman writhing in bed, moving almost constantly and unable to initially get comfortable  HENT: mucous membranes moist  CV: regular rate, regular rhythm  Resp: clear throughout, normal effort, no crackles or wheezing  GI: abdomen soft, states that pressure in LLQ actually helps alleviate some discomfort, no guarding, no palpable masses  MSK:   Thoracic spine: nontender, no CVAT  Lumbar spine: nontender  Pelvis stable.  : deferred  Skin: appropriately warm and dry, no erythema/ecchymosis/vesicles to back  Neuro: alert, clear speech, oriented, ambulatory  Psych: normal mood and affect    Emergency Department Course     Imaging:  Radiology findings were communicated with the patient who voiced understanding of the findings.    KUB xray:  Tiny stones projecting over the lower pole of each kidney,  at least two on the left and one on the right. There is also a small  triangular-shaped calcification measuring 0.6 cm just to the left of  the L2-3 interspace which may be a renal or proximal collecting system  stone. Normal bowel gas pattern.  Reading per radiology     CT A/P: 6mm proximal L ureteral stone with mod hydro    Laboratory:  Laboratory findings were communicated with the patient who voiced understanding of the findings.  BMP: Glucose: 155(H), Creatinine: 0.69  UA: Leukocyte esterase: Large(A), WBC: 44(H), RBC: 9(H), Bacteria: Few(A), Mucous urine: Present(A)  Urine culture: Pending    Interventions:  0521 Toradol 30 mg IV  0522 NS 1000 mL IV  0522 Dilaudid 0.5 mg IV  0524 Zofran 4 mg IV  0542 Dilaudid 0.5 mg IV  0650 Zofran 4 mg IV  0650 Dilaudid 0.5 mg IV     Emergency Department Course:  Nursing notes and vitals reviewed.  I performed an exam of the patient as documented above.   The patient was sent for a xray KUB while in the emergency department, results above.   IV was inserted and blood was drawn for " laboratory testing, results above.  The patient provided a urine sample here in the emergency department. This was sent for laboratory testing, findings above.    At 0552 the patient was rechecked and we discussed available lab and imaging results. At this time, she states that she feels improved after the above interventions.     At 1806 the patient was rechecked and we discussed remaining lab results.     At 0659 the patient was updated on plan of care. I discussed the treatment plan with the patient. They expressed understanding of this plan and consented to admission.    0709: I spoke with Dr. Vee of the urology service regarding patient's presentation, findings, and plan of care.  His service will consult on the patient this morning.    0718: I discussed the patient with Dr Tate, who will admit the patient to a monitored bed for further evaluation and treatment.    I personally reviewed the treatment plan with the Patient and answered all related questions prior to admission.  Impression & Plan      Medical Decision Making:  With the abrupt onset of her symptoms, in the setting of previously confirmed kidney stone, I was highly suspiciously for recurrent ureteral stone. For this reason, I started with an xray in an attempt to minimized the radiation exposure from CT.  However, when her UA showed much more pyuria than hematuria, I felt that it was important to perform CT to confirm the diagnosis.  I considered an infected stone, though in the absence of fever, leukocytosis, or a more protracted course or urinary discomfort, I do not feel that infection is likely.  Dr Vee and I have agreed to withhold any antibiotics at this time.  She was given multiple doses of IV Dilaudid and is still fairly uncomfortable. I think she would benefit from hospitalization for continued treatment and Urologic consultation, with anticipation that she would need a urologic procedure today.  I explained this to the patient and her   at bedside and they were in agreement. She will be admitted to the hospitalists service. While she has seen a urologist in the past for lithotripsy a number of years ago, she does not remember which group she saw and efforts to determine this through the [facility] were unsuccessful.    Diagnosis:    ICD-10-CM    1. Ureteral stone N20.1    2. Hydronephrosis, unspecified hydronephrosis type N13.30    3. Pyuria N39.0        Disposition:   Admission under hospitalists service.    Scribe Disclosure:  I, Tank Sanchez, am serving as a scribe at 5:07 AM on 5/5/2017 to document services personally performed by Hola Duvall, *, based on my observations and the provider's statements to me.     EMERGENCY DEPARTMENT       Hola Duvall MD  05/05/17 0752

## 2017-05-05 NOTE — BRIEF OP NOTE
[facility] [place] Brief Operative Note    Pre-operative diagnosis: URETERAL STONE   Post-operative diagnosis left ureteral calculus, bilateral non obstructing renal calculi,  Urethral stricture    Procedure: URETHRAL DILATION,CYSTOSCOPY,LEFT STENT,ESWL   Surgeon(s): Surgeon(s) and Role:     * Angel Del Rio MD - Primary   Estimated blood loss: * No values recorded between 5/5/2017  2:49 PM and 5/5/2017  3:14 PM *    Specimens: * No specimens in log *   Findings: Bilateral non obstructing renal calculi, proximal left ureteral calculus and colic  Anesthesia: General endotracheal anesthesia   Total IV fluids: (See anesthesia record)   Blood transfusion: No transfusion was given during surgery   Total urine output: (See anesthesia record)   Foreign Bodies: Left Ureteral StentPermanent Implants: NoneComplications: None    Condition: Stable   Comments: See dictated operative report for full details

## 2017-05-05 NOTE — OP NOTE
DATE OF SERVICE:  05/05/2017.      PREOPERATIVE DIAGNOSES:  Left proximal ureteral calculus with renal colic, bilateral nonobstructing renal calculi.      PROCEDURES:  Cystoscopy, urethral dilation, preliminary left ureteral stent (6-Anguillan x 24 cm), left extracorporeal shock wave lithotripsy of 3000 shocks.      BRIEF HISTORY:  Barbi Tiwari is a pleasant 56-year-old woman admitted through the emergency room earlier this morning with acute left flank pain and CT imaging showing a 6 mm proximal ureteral stone with pyelocaliectasis.  She also had nonobstructing stones in the kidney.  She gives a history of previous stone disease, previous ESWL.  She is hoping to have the stone in the ureter taken care since she has plans for travel to [place] for her son's college graduation and birthday, which is happening tomorrow afternoon.  The patient was seen by our PA, Leda Dang, and reviewed with me the chart and labs and films.  I met with the patient and we elected to proceed with cystoscopy and stent placement with ureteral ESWL.  She understands that additional shock wave lithotripsy may be necessary at a later date.  The risks of the procedure, possible complications were fully discussed.  She is scheduled on a semi-urgent basis.      DESCRIPTION OF PROCEDURE:  Patient brought to the OR, given a general anesthetic, prepped and draped in dorsal lithotomy position.  Timeout taken.  Patient, procedure, OR staff identified.  Attempt at passing a 22-Anguillan cystoscope sheath failed because of urethral stricture.  Therefore, it was dilated from 16-24 Anguillan and then the scope was passed without difficulty.  The bladder was entered.  The bladder appeared to be normal with normal right and left ureteral orifices.  The left orifice was catheterized with a 0.035 guidewire.  This was passed under fluoroscopic imaging up past the stone and curled up in the renal pelvis.  I then passed a 6-Anguillan 24 cm double-J ureteral catheter  over the wire and the wire was removed with excellent position of the stent.  The bladder was then emptied and she was given a B&O suppository.  We then repositioned her and found the stone in the ureter, which was then fragmented using lithotripsy.  Stone appeared to be elongated and fragmented, but because she had plans for travel, we elected not to proceed with any type of renal ESWL because of the slight increased risk of bleeding associated with this.      PLAN:  The patient will be dismissed later today, hopefully early evening, and if she feels well, she will be able to go home with pain meds and be dismissed such that she may travel in a car as a passenger to [place] for her son's graduation.  The patient's  will be informed of what to look for in any type of postoperative complications.  The patient will be seen in followup in 1-2 weeks with a preliminary KUB and then either cystoscopy and stent removal or plans for left renal ESWL.         KARLA WILEY MD             D: 2017 15:50   T: 2017 17:50   MT: LIONEL      Name:     DYLAN KAUFFMAN   MRN:      [id number]        Account:        [id number]   :      1960           Procedure Date: 2017      Document: [id number]

## 2017-05-05 NOTE — PHARMACY-ADMISSION MEDICATION HISTORY
Admission medication history interview status for the 5/5/2017  admission is complete. See EPIC admission navigator for prior to admission medications     Medication history source reliability:Good    Actions taken by pharmacist (provider contacted, etc):interviewed patient     Additional medication history information not noted on PTA med list :None    Medication reconciliation/reorder completed by provider prior to medication history? Yes    Time spent in this activity: 15 minutes    Prior to Admission medications    Medication Sig Last Dose Taking? Auth Provider   OXYCODONE HCL PO Take 5 mg by mouth once 5/4/2017 at pm Yes Unknown, Entered By History   ascorbic acid 500 MG TABS Take 500 mg by mouth 2 times daily 5/4/2017 at pm Yes Unknown, Entered By History   ferrous sulfate (IRON) 325 (65 FE) MG tablet Take 325 mg by mouth 2 times daily 5/4/2017 at pm Yes Unknown, Entered By History   VITAMIN D, CHOLECALCIFEROL, PO Take 1,000 Units by mouth 2 times daily 5/4/2017 at pm Yes Unknown, Entered By History   LAMOTRIGINE PO Take 300 mg by mouth 2 times daily 5/4/2017 at pm Yes Unknown, Entered By History   ranitidine (ZANTAC) 150 MG tablet Take 1 tablet (150 mg) by mouth 2 times daily as needed for heartburn  Patient taking differently: Take 150 mg by mouth 2 times daily  5/4/2017 at pm Yes Dustin Choudhury MD   COMPOUND (CMPD RX) - PHARMACY TO MIX COMPOUNDED MEDICATION Apply 1g daily for 2 weeks, then twice weekly to vagina Past Week at Unknown time Yes Lluvia Vega MD   tretinoin (RETIN-A) 0.05 % cream Spread a pea size amount into affected area topically at bedtime.  Use sunscreen SPF>20. 5/4/2017 at Unknown time Yes Benjamin Forbes MD   Calcium Carbonate-Vitamin D (CALCIUM-VITAMIN D) 600-200 MG-UNIT CAPS Take 1 Cap by mouth once daily. 5/4/2017 at Unknown time Yes Reported, Patient   ibuprofen (ADVIL,MOTRIN) 200 MG tablet Take 4 tablets by mouth 4 times daily if needed for Pain or Headache. (rare  use) 5/4/2017 at am Yes Reported, Patient   Multiple Vitamin (MULTI-VITAMINS) TABS take 1 tablet by oral route once daily with food Past Week at Unknown time Yes Reported, Patient   levETIRAcetam (KEPPRA) 500 MG tablet 1,500 mg 2 times daily Take 3 tablets am and 3 tablets pm 5/4/2017 at pm Yes Reported, Patient   albuterol (PROAIR HFA, PROVENTIL HFA, VENTOLIN HFA) 108 (90 BASE) MCG/ACT inhaler Inhale 2 puffs into the lungs every 6 hours as needed for shortness of breath / dyspnea or wheezing   Arianne Haas PA-C   acetaminophen (TYLENOL) 500 MG tablet Take 1,000 mg by mouth every 6 hours if needed. Max acetaminophen dose: 4000mg in 24 hrs.   Reported, Patient

## 2017-05-05 NOTE — ANESTHESIA POSTPROCEDURE EVALUATION
Patient: Barbi Tiwari    Procedure(s):  CYSTO, URETHRAL DILATION, URETERAL LEFT STENT PLACEMENT, LEFT ESWL. - Wound Class: II-Clean Contaminated    Diagnosis:URETERAL STONE  Diagnosis Additional Information: No value filed.    Anesthesia Type:  General, ETT, RSI    Note:  Anesthesia Post Evaluation    Patient location during evaluation: bedside  Patient participation: Able to fully participate in evaluation  Level of consciousness: awake  Pain management: adequate  Airway patency: patent  Cardiovascular status: acceptable  Respiratory status: acceptable  Hydration status: acceptable  PONV: none     Anesthetic complications: None    Comments: No anesthetic complications noted.         Last vitals:  Vitals:    05/05/17 1228 05/05/17 1355 05/05/17 1405   BP: 125/75 127/73    Pulse: 92 88    Resp:  14 16   Temp: 36.3  C (97.4  F)     SpO2: 94% 96% 97%         Electronically Signed By: Yobany Yoon DO, DO  May 5, 2017  4:29 PM

## 2017-05-05 NOTE — H&P
PRIMARY CARE PHYSICIAN:  Dustin Choudhury MD.      DATE OF SERVICE:  05/05/2017.      CHIEF COMPLAINT:  Left abdominal pain.  History obtained from the patient who is an excellent historian.      HISTORY OF PRESENT ILLNESS:  Barbi Tiwari is a 56-year-old female with a past medical history of oligodendroglioma, status post resection in 2011, hyperparathyroidism and GERD who presented to the Emergency Department for evaluation of abdominal pain.  She noted she was in her normal state of health until approximately 2:30 this morning when she awoke with left-sided abdominal pain and associated emesis.  She said it felt like similar kidney stones that she has had in the past.  She had no urinary symptoms, no fever.  She was going to attempt to manage it at home with fluids and took one of her daughters oxycodone with minimal improvement.  She ultimately presented to the Emergency Department for further evaluation.      On arrival to the Emergency Department, blood pressure was 137/77, temperature 97.7, O2 sat 96% on room air.  Laboratory evaluation revealed normal BMP.  CBC without significant leukocytosis.  Urinalysis with large leukocyte esterase, 44 WBCs and 9 RBCs.  Urine culture is pending.  CT of the abdomen and pelvis shows moderate left hydronephrosis due to 0.6 cm proximal ureteral stone and given the above, admission has been requested for further evaluation.      Presently the patient is evaluated in the Emergency Department.  She is having some nausea but notes pain is better after IV medication.      PAST MEDICAL HISTORY:   1.  History of 1p/19q co-deleted oligodendroglioma, status post resection in 2011, also received chemotherapy.   2.  History of seizure disorder secondary to the above.   3.  Hyperparathyroidism.   4.  GERD.   5.  PVCs.      PRIOR TO ADMISSION MEDICATIONS:   Prescriptions Prior to Admission   Medication Sig Dispense Refill Last Dose     OXYCODONE HCL PO Take 5 mg by mouth once   5/4/2017  at pm     ascorbic acid 500 MG TABS Take 500 mg by mouth 2 times daily   5/4/2017 at pm     ferrous sulfate (IRON) 325 (65 FE) MG tablet Take 325 mg by mouth 2 times daily   5/4/2017 at pm     VITAMIN D, CHOLECALCIFEROL, PO Take 1,000 Units by mouth 2 times daily   5/4/2017 at pm     LAMOTRIGINE PO Take 300 mg by mouth 2 times daily   5/4/2017 at pm     ranitidine (ZANTAC) 150 MG tablet Take 1 tablet (150 mg) by mouth 2 times daily as needed for heartburn (Patient taking differently: Take 150 mg by mouth 2 times daily )   5/4/2017 at pm     COMPOUND (CMPD RX) - PHARMACY TO MIX COMPOUNDED MEDICATION Apply 1g daily for 2 weeks, then twice weekly to vagina 20 g 11 Past Week at Unknown time     tretinoin (RETIN-A) 0.05 % cream Spread a pea size amount into affected area topically at bedtime.  Use sunscreen SPF>20. 45 g 11 5/4/2017 at Unknown time     Calcium Carbonate-Vitamin D (CALCIUM-VITAMIN D) 600-200 MG-UNIT CAPS Take 1 Cap by mouth once daily.   5/4/2017 at Unknown time     ibuprofen (ADVIL,MOTRIN) 200 MG tablet Take 4 tablets by mouth 4 times daily if needed for Pain or Headache. (rare use)   5/4/2017 at am     Multiple Vitamin (MULTI-VITAMINS) TABS take 1 tablet by oral route once daily with food   Past Week at Unknown time     levETIRAcetam (KEPPRA) 500 MG tablet 1,500 mg 2 times daily Take 3 tablets am and 3 tablets pm   5/4/2017 at pm     albuterol (PROAIR HFA, PROVENTIL HFA, VENTOLIN HFA) 108 (90 BASE) MCG/ACT inhaler Inhale 2 puffs into the lungs every 6 hours as needed for shortness of breath / dyspnea or wheezing 1 Inhaler 0 Taking     acetaminophen (TYLENOL) 500 MG tablet Take 1,000 mg by mouth every 6 hours if needed. Max acetaminophen dose: 4000mg in 24 hrs.   Taking          ALLERGIES:     1.  Nuts.   2.  Sulfa.      PAST SURGICAL HISTORY:   1.  Parathyroidectomy.   2.  Lithotripsy.   3.  Hysterectomy.   4.  Foot surgery.   5.  Craniotomy.      FAMILY HISTORY:     1.  Mother had arthritis and COPD.      2.  Father had coronary artery disease.      SOCIAL HISTORY:  She is a nonsmoker, drinks alcohol on occasion, is .      REVIEW OF SYSTEMS:  A 10-point review of systems was completed.  Pertinent positives noted in HPI.  All other systems negative.      PHYSICAL EXAMINATION:   GENERAL:  Barbi Tiwari is a pleasant 56-year-old female who is sitting in bed.   VITAL SIGNS:  Blood pressure is 126/77, heart rate 77, temperature 96.1.   HEENT:  Head normocephalic, atraumatic.   NECK:  Supple, no adenopathy or thyromegaly.   CARDIOVASCULAR:  Regular rate and rhythm, no murmurs.   PULMONARY:  Normal effort.  Lungs are clear to auscultation bilaterally.   ABDOMEN:  Nondistended.  Bowel sounds are auscultated.  Adomen is tender in the left lower quadrant.  No significant CVA tenderness.   EXTREMITIES:  Moves all 4 extremities.   NEUROLOGIC:  Alert and oriented.  Cranial nerves 2-12 are grossly intact.      LABORATORY DATA:  Reviewed in Epic.      IMAGING:  Radiology report of CT reviewed.      ASSESSMENT:  Barbi Tiwari is a 56-year-old female who presented to the Emergency Department with complaints of left lower quadrant abdominal pain and is found to have a left-sided 0.6 proximal ureteral stone.  She is being admitted for further evaluation.   1.  0.6 ureteral stone.  The patient will be admitted under observation.  We will hydrate with IV fluids, provide pain control and antiemetics.  Her urinalysis is abnormal, 44 WBCs and positive leukocyte esterase.  We will cover with antibiotics, awaiting urine culture.  Urology has been consulted.  She will remain n.p.o. in anticipation of stent placement or stone removal later today.   2.  History of seizure disorder.  Continue prior to admission antiepileptics.   3.  Gastroesophageal reflux disease.  Continue prior to admission Zantac.   4.  Deep venous thrombosis prophylaxis.  Ambulation.   5.  Code status:   Full code.      This patient was discussed with Dr. Greene  Bebeto of the Hospitalist Service who independently interviewed and examined the patient.  He is in agreement with above plan.         KYLIE DE LA CRUZ MD       As dictated by VALENCIA VILLARREAL PA-C            D: 2017 08:33   T: 2017 10:17   MT: NATALIE      Name:     DYLAN KAUFFMAN   MRN:      [id number]        Account:      [id number]   :      1960           Admitted:     [id number]      Document: [id number]       cc: Dustin Choudhury MD

## 2017-05-05 NOTE — IP AVS SNAPSHOT
MRN:[id number]                      After Visit Summary   5/5/2017    Barbi Tiwari    MRN: [id number]           Thank you!     Thank you for choosing [facility] for your care. Our goal is always to provide you with excellent care. Hearing back from our patients is one way we can continue to improve our services. Please take a few minutes to complete the written survey that you may receive in the mail after you visit with us. Thank you!        Patient Information     Date Of Birth          1960        About your hospital stay     You were admitted on:  May 5, 2017 You last received care in the:  [facility] PACU    You were discharged on:  May 5, 2017       Who to Call     For medical emergencies, please call 911.  For non-urgent questions about your medical care, please call your primary care provider or clinic, [phone]  For questions related to your surgery, please call your surgery clinic        Attending Provider     Provider Specialty    Hola Duvall MD Emergency Medicine    [facility]       Primary Care Provider Office Phone # Fax #    Dustin Choudhury [phone]       [facility] [address] [facility] [address]        After Care Instructions     Diet Instructions       Resume pre procedure diet            Discharge Instructions       Resume Aspirin / warfarin (COUMADIN) when urine is clear to faint pink            Discharge Instructions       Patient to arrange for follow up appointment in 1-2 weeks.  KUB 1ST THEN SEE DR WILEY OR PARTNER FOR CYSTOSCOPY AND STENT REMOVAL.  [facility]  [address] [place] [address]  [phone] (appointments)            Encourage fluids       Encourage fluids at home to keep urine clear to light pink            No Alcohol       for 24 hours post procedure            No driving or operating machinery        until the day after  procedure                  Your next 10 appointments already scheduled     Apr 02, 2018 10:45 AM CDT   (Arrive by 10:30 AM)   MR BRAIN W/O & W CONTRAST with [id number]   [facility] ([facility])    [address] [id number] [phone]           Take your medicines as usual, unless your doctor tells you not to. Bring a list of your current medicines to your exam (including vitamins, minerals and over-the-counter drugs).  You will be given intravenous contrast for this exam. To prepare:   The day before your exam, drink extra fluids at least six 8-ounce glasses (unless your doctor tells you to restrict your fluids).   Have a blood test (creatinine test) within 30 days of your exam. Go to your clinic or Diagnostic Imaging Department for this test.  The MRI machine uses a strong magnet. Please wear clothes without metal (snaps, zippers). A sweatsuit works well, or we may give you a hospital gown.  Please remove any body piercings and hair extensions before you arrive. You will also remove watches, jewelry, hairpins, wallets, dentures, partial dental plates and hearing aids. You may wear contact lenses, and you may be able to wear your rings. We have a safe place to keep your personal items, but it is safer to leave them at home.   **IMPORTANT** THE INSTRUCTIONS BELOW ARE ONLY FOR THOSE PATIENTS WHO HAVE BEEN TOLD THEY WILL RECEIVE SEDATION OR GENERAL ANESTHESIA DURING THEIR MRI PROCEDURE:  IF YOU WILL RECEIVE SEDATION (take medicine to help you relax during your exam):   You must get the medicine from your doctor before you arrive. Bring the medicine to the exam. Do not take it at home.   Arrive one hour early. Bring someone who can take you home after the test. Your medicine will make you sleepy. After the exam, you may not drive, take a bus or take a taxi by yourself.   No eating 8 hours before your exam. You may have clear liquids up until 4  hours before your exam. (Clear liquids include water, clear tea, black coffee and fruit juice without pulp.)  IF YOU WILL RECEIVE ANESTHESIA (be asleep for your exam):   Arrive 1 1/2 hours early. Bring someone who can take you home after the test. You may not drive, take a bus or take a taxi by yourself.   No eating 8 hours before your exam. You may have clear liquids up until 4 hours before your exam. (Clear liquids include water, clear tea, black coffee and fruit juice without pulp.)  Please call the Imaging Department at your exam site with any questions.            Apr 09, 2018 10:00 AM CDT   (Arrive by 9:45 AM)   Return Visit with Neo Weinstein MD   [facility] ([facility])    [address] [place] [id number] [phone]              Further instructions from your care team       Same Day Surgery Discharge Instructions for  Sedation and General Anesthesia       It's not unusual to feel dizzy, light-headed or faint for up to 24 hours after surgery or while taking pain medication.  If you have these symptoms: sit for a few minutes before standing and have someone assist you when you get up to walk or use the bathroom.      You should rest and relax for the next 24 hours. We recommend you make arrangements to have an adult stay with you for at least 24 hours after your discharge.  Avoid hazardous and strenuous activity.      DO NOT DRIVE any vehicle or operate mechanical equipment for 24 hours following the end of your surgery.  Even though you may feel normal, your reactions may be affected by the medication you have received.      Do not drink alcoholic beverages for 24 hours following surgery.       Slowly progress to your regular diet as you feel able. It's not unusual to feel nauseated and/or vomit after receiving anesthesia.  If you develop these symptoms, drink clear liquids (apple juice, ginger ale, broth, 7-up, etc. ) until  you feel better.  If your nausea and vomiting persists for 24 hours, please notify your surgeon.        All narcotic pain medications, along with inactivity and anesthesia, can cause constipation. Drinking plenty of liquids and increasing fiber intake will help.      For any questions of a medical nature, call your surgeon.      Do not make important decisions for 24 hours.      If you had general anesthesia, you may have a sore throat for a couple of days related to the breathing tube used during surgery.  You may use Cepacol lozenges to help with this discomfort.  If it worsens or if you develop a fever, contact your surgeon.       If you feel your pain is not well managed with the pain medications prescribed by your surgeon, please contact your surgeon's office to let them know so they can address your concerns.     Cystoscopy and Stent Placement Discharge Instructions    During surgery, a stent was placed in the ureter.  The ureter is the tube that drains urine from the kidney to the bladder.  The stent is placed to dilate (open) the ureter so the stone fragments can pass easily through the ureter or to decrease ureteral swelling after surgery, or to relieve an obstruction. The stent is made of rubber. The upper end of the stent curls in the kidney while the lower end rests in the bladder      Diet:    Return to the diet that you were on before the procedure, unless you are given specific diet instructions.    It is important to drink 6-8 glasses of fluids per day at home - at least 3-4 glasses should be water.    Activity:    Walk short distances and increase as your strength allows.    You may climb stairs.    Do not do strenuous exercise or heavy lifting until approved by surgeon.    Do not drive while taking narcotic pain medications.    Bathing:    You may take a shower.    While the stent is in place you may experience the following symptoms:    Blood and/or small blood clots in urine.    Bladder spasm  (frequency and urgency of urination).    Discomfort or aching in the back or side where the stent is.    Burning or discomfort at the end of urine stream.    To decrease these symptoms you should:    Take pain medication as prescribed.    Drink plenty of fluids.    If you experience pain at the end of urination try not emptying your bladder completely.    If having discomfort in back or side, decrease activity.    Call your physician if these signs/symptoms are present:    Pain that is not relieved by a short rest or ordered pain medications.    Temperature at or above 101.0 F or chills.    Inability or difficulty urinating.     Excessive blood in urine.    Any questions or concerns.    DISCHARGE INSTRUCTIONS FOLLOWING EXTRACORPOREAL SHOCK LITHOTRIPSY (ESWL)      Your stone(s) has been fragmented into many tiny pieces, which must now pass in your urine.  Usually this process is uneventful.  Most fragments pass in the first one or two week, but some may continue to pass for three months or more.  Some pain or discomfort may accompany the passage of these fragments.    To aid in the passage of fragments, drink lots of fluid.  Aim for 1 glass an hour for the next 1 to 2 weeks (2 quarts or more a day).  Most stone patients will benefit from a continued high fluid intake and urine output indefinitely, even after the fragments are gone.  This helps prevent new stone formation.    Strain your urine.  Take the stone fragments to your urologist.  They will have them analyzed to help determine the cause of your stone(s).    You should walk around and resume every day activities.  Activity may help the stone fragments pass.  You should, however, avoid sports or really strenuous exercise for about a week, or at least until there is no more blood in your urine.    You may resume regular diet.    Call your urologist if you have:  a. Persistent severe pain not relieved by oral medications.  b. Fever (over 101 ).  c. Persistent  "vomiting.    See your urologist as directed.  They will need to take an x-ray to check your progress.  Take your stone fragments to your follow-up appointment.      Your doctor may want you to do body positioning to help pass your stone fragments.  If your doctor wants you to do these exercises do them 1-2 times a day.  (Please refer to back page).  Try to sleep on the untreated side.  Try to drink 1  quarts of liquid per day to help the fragments pass.  If these exercises seem difficult, don't attempt to do them.  If you think you can do them, try it.  But discontinue if positioning make you light headed, sick to your stomach or makes it difficult to breath.        [id number]  Rev. 6/2004              Pending Results     Date and Time Order Name Status Description    5/5/2017 0559 Urine Culture Aerobic Bacterial In process             Admission Information     Date & Time Provider Department Dept. Phone    5/5/2017 Jc Tate MD [facility] PACU [phone]      Your Vitals Were     Blood Pressure Pulse Temperature Respirations Height Weight    104/66 88 98.1  F (36.7  C) 24 1.575 m (5' 2\") 64.4 kg (142 lb)    Pulse Oximetry BMI (Body Mass Index)                93% 25.97 kg/m2          [facility] Information     [facility] [employer] gives you secure access to your electronic health record. If you see a primary care provider, you can also send messages to your care team and make appointments. If you have questions, please call your primary care clinic.  If you do not have a primary care provider, please call [phone] and they will assist you.        Care EveryWhere ID     This is your Care EveryWhere ID. This could be used by other organizations to access your [facility] medical records  [id number]           Review of your medicines      UNREVIEWED medicines. Ask your doctor about these medicines        Dose / Directions    KEPPRA 500 MG tablet   Generic drug:  levETIRAcetam        Dose:  1500 mg   1,500 mg 2 times " daily Take 3 tablets am and 3 tablets pm   Refills:  0       MULTI-VITAMINS Tabs        take 1 tablet by oral route once daily with food   Refills:  0         START taking        Dose / Directions    HYDROcodone-acetaminophen 5-325 MG per tablet   Commonly known as:  NORCO   Used for:  Ureteral stone, Hydronephrosis, unspecified hydronephrosis type        Dose:  1-2 tablet   Take 1-2 tablets by mouth every 6 hours as needed for moderate to severe pain (Moderate to Severe Pain)   Quantity:  16 tablet   Refills:  0         CONTINUE these medicines which may have CHANGED, or have new prescriptions. If we are uncertain of the size of tablets/capsules you have at home, strength may be listed as something that might have changed.        Dose / Directions    ranitidine 150 MG tablet   Commonly known as:  ZANTAC   This may have changed:  when to take this   Used for:  Gastroesophageal reflux disease without esophagitis        Dose:  150 mg   Take 1 tablet (150 mg) by mouth 2 times daily as needed for heartburn   Refills:  0         CONTINUE these medicines which have NOT CHANGED        Dose / Directions    acetaminophen 500 MG tablet   Commonly known as:  TYLENOL        Take 1,000 mg by mouth every 6 hours if needed. Max acetaminophen dose: 4000mg in 24 hrs.   Refills:  0       albuterol 108 (90 BASE) MCG/ACT Inhaler   Commonly known as:  PROAIR HFA/PROVENTIL HFA/VENTOLIN HFA   Used for:  Acute bronchitis, URI (upper respiratory infection)        Dose:  2 puff   Inhale 2 puffs into the lungs every 6 hours as needed for shortness of breath / dyspnea or wheezing   Quantity:  1 Inhaler   Refills:  0       ascorbic acid 500 MG Tabs        Dose:  500 mg   Take 500 mg by mouth 2 times daily   Refills:  0       Calcium-Vitamin D 600-200 MG-UNIT Caps        Take 1 Cap by mouth once daily.   Refills:  0       COMPOUND - PHARMACY TO MIX COMPOUNDED MEDICATION   Commonly known as:  CMPD RX   Used for:  Vaginal atrophy        Apply 1g  daily for 2 weeks, then twice weekly to vagina   Quantity:  20 g   Refills:  11       ferrous sulfate 325 (65 FE) MG tablet   Commonly known as:  IRON        Dose:  325 mg   Take 325 mg by mouth 2 times daily   Refills:  0       ibuprofen 200 MG tablet   Commonly known as:  ADVIL/MOTRIN        Take 4 tablets by mouth 4 times daily if needed for Pain or Headache. (rare use)   Refills:  0       LAMOTRIGINE PO        Dose:  300 mg   Take 300 mg by mouth 2 times daily   Refills:  0       OXYCODONE HCL PO        Dose:  5 mg   Take 5 mg by mouth once   Refills:  0       tretinoin 0.05 % cream   Commonly known as:  RETIN-A   Used for:  Senile sebaceous gland hyperplasia        Spread a pea size amount into affected area topically at bedtime.  Use sunscreen SPF>20.   Quantity:  45 g   Refills:  11       VITAMIN D (CHOLECALCIFEROL) PO        Dose:  1000 Units   Take 1,000 Units by mouth 2 times daily   Refills:  0            Where to get your medicines      Some of these will need a paper prescription and others can be bought over the counter. Ask your nurse if you have questions.     Bring a paper prescription for each of these medications     HYDROcodone-acetaminophen 5-325 MG per tablet                Protect others around you: Learn how to safely use, store and throw away your medicines at www.disposemymeds.org.             Medication List: This is a list of all your medications and when to take them. Check marks below indicate your daily home schedule. Keep this list as a reference.      Medications           Morning Afternoon Evening Bedtime As Needed    acetaminophen 500 MG tablet   Commonly known as:  TYLENOL   Take 1,000 mg by mouth every 6 hours if needed. Max acetaminophen dose: 4000mg in 24 hrs.                                albuterol 108 (90 BASE) MCG/ACT Inhaler   Commonly known as:  PROAIR HFA/PROVENTIL HFA/VENTOLIN HFA   Inhale 2 puffs into the lungs every 6 hours as needed for shortness of breath / dyspnea  or wheezing                                ascorbic acid 500 MG Tabs   Take 500 mg by mouth 2 times daily                                Calcium-Vitamin D 600-200 MG-UNIT Caps   Take 1 Cap by mouth once daily.                                COMPOUND - PHARMACY TO MIX COMPOUNDED MEDICATION   Commonly known as:  CMPD RX   Apply 1g daily for 2 weeks, then twice weekly to vagina                                ferrous sulfate 325 (65 FE) MG tablet   Commonly known as:  IRON   Take 325 mg by mouth 2 times daily                                HYDROcodone-acetaminophen 5-325 MG per tablet   Commonly known as:  NORCO   Take 1-2 tablets by mouth every 6 hours as needed for moderate to severe pain (Moderate to Severe Pain)                                ibuprofen 200 MG tablet   Commonly known as:  ADVIL/MOTRIN   Take 4 tablets by mouth 4 times daily if needed for Pain or Headache. (rare use)                                KEPPRA 500 MG tablet   1,500 mg 2 times daily Take 3 tablets am and 3 tablets pm   Generic drug:  levETIRAcetam                                LAMOTRIGINE PO   Take 300 mg by mouth 2 times daily   Last time this was given:  300 mg on 5/5/2017 10:22 AM                                MULTI-VITAMINS Tabs   take 1 tablet by oral route once daily with food                                OXYCODONE HCL PO   Take 5 mg by mouth once                                ranitidine 150 MG tablet   Commonly known as:  ZANTAC   Take 1 tablet (150 mg) by mouth 2 times daily as needed for heartburn   Last time this was given:  150 mg on 5/5/2017 10:23 AM                                tretinoin 0.05 % cream   Commonly known as:  RETIN-A   Spread a pea size amount into affected area topically at bedtime.  Use sunscreen SPF>20.                                VITAMIN D (CHOLECALCIFEROL) PO   Take 1,000 Units by mouth 2 times daily

## 2017-05-05 NOTE — PROGRESS NOTES
Goals to be met before discharge:    1. Diagnostic tests and consults completed- Patially met, surgery this afternoon  2. Vitals stable- Met  3. Tolerating oral intake- Not met, nausea  4. Adequate pain control- Not met, IV dilaudid and toradol.

## 2017-05-05 NOTE — ED NOTES
"[facility]  ED Nurse Handoff Report    ED Chief complaint: Abdominal Pain (LLQ pain woke her from sleep around 2:45; emesis x1; diarrhea x1 last night)      ED Diagnosis:   Final diagnoses:   None       Code Status: Full Code    Allergies:   Allergies   Allergen Reactions     Benoxinate Nausea and Vomiting     Nuts Swelling     Destinee nuts     Sulfa Drugs Hives       Activity level - Baseline/Home:  Independent    Activity Level - Current:   Independent     Needed?: No    Isolation: No  Infection: Not Applicable    Bariatric?: No    Vital Signs:   Vitals:    05/05/17 0459   BP: 137/77   Pulse: 78   Resp: 16   Temp: 97.7  F (36.5  C)   TempSrc: Oral   SpO2: 96%   Weight: 64.4 kg (142 lb)   Height: 1.575 m (5' 2\")       Cardiac Rhythm: ,        Pain level: 0-10 Pain Scale: 3    Is this patient confused?: No    Patient Report: Initial Complaint: left lower abd pain/flank pain woke her at 0245. Hx of kidney stones  Focused Assessment: left flank pain  Tests Performed: see epic  Abnormal Results: see epic  Treatments provided: dilaudid 0.5mg x3, zofran 4mg x2, normal saline bolus, toradol 30mg IV    Family Comments:  here    OBS brochure/video discussed/provided to patient: Yes    ED Medications:   Medications   0.9% sodium chloride BOLUS (0 mLs Intravenous Stopped 5/5/17 0650)   ketorolac (TORADOL) injection 30 mg (30 mg Intravenous Given 5/5/17 0521)   HYDROmorphone (DILAUDID) injection 0.5 mg (0.5 mg Intravenous Given 5/5/17 0542)   ondansetron (ZOFRAN) injection 4 mg (4 mg Intravenous Given 5/5/17 0524)   ondansetron (ZOFRAN) injection 4 mg (4 mg Intravenous Given 5/5/17 0650)   HYDROmorphone (PF) (DILAUDID) injection 0.5 mg (0.5 mg Intravenous Given 5/5/17 0650)       Drips infusing?:  No      ED NURSE PHONE NUMBER: [phone]       "

## 2017-05-05 NOTE — DISCHARGE INSTRUCTIONS
Same Day Surgery Discharge Instructions for  Sedation and General Anesthesia       It's not unusual to feel dizzy, light-headed or faint for up to 24 hours after surgery or while taking pain medication.  If you have these symptoms: sit for a few minutes before standing and have someone assist you when you get up to walk or use the bathroom.      You should rest and relax for the next 24 hours. We recommend you make arrangements to have an adult stay with you for at least 24 hours after your discharge.  Avoid hazardous and strenuous activity.      DO NOT DRIVE any vehicle or operate mechanical equipment for 24 hours following the end of your surgery.  Even though you may feel normal, your reactions may be affected by the medication you have received.      Do not drink alcoholic beverages for 24 hours following surgery.       Slowly progress to your regular diet as you feel able. It's not unusual to feel nauseated and/or vomit after receiving anesthesia.  If you develop these symptoms, drink clear liquids (apple juice, ginger ale, broth, 7-up, etc. ) until you feel better.  If your nausea and vomiting persists for 24 hours, please notify your surgeon.        All narcotic pain medications, along with inactivity and anesthesia, can cause constipation. Drinking plenty of liquids and increasing fiber intake will help.      For any questions of a medical nature, call your surgeon.      Do not make important decisions for 24 hours.      If you had general anesthesia, you may have a sore throat for a couple of days related to the breathing tube used during surgery.  You may use Cepacol lozenges to help with this discomfort.  If it worsens or if you develop a fever, contact your surgeon.       If you feel your pain is not well managed with the pain medications prescribed by your surgeon, please contact your surgeon's office to let them know so they can address your concerns.     Cystoscopy and Stent Placement Discharge  Instructions    During surgery, a stent was placed in the ureter.  The ureter is the tube that drains urine from the kidney to the bladder.  The stent is placed to dilate (open) the ureter so the stone fragments can pass easily through the ureter or to decrease ureteral swelling after surgery, or to relieve an obstruction. The stent is made of rubber. The upper end of the stent curls in the kidney while the lower end rests in the bladder      Diet:    Return to the diet that you were on before the procedure, unless you are given specific diet instructions.    It is important to drink 6-8 glasses of fluids per day at home - at least 3-4 glasses should be water.    Activity:    Walk short distances and increase as your strength allows.    You may climb stairs.    Do not do strenuous exercise or heavy lifting until approved by surgeon.    Do not drive while taking narcotic pain medications.    Bathing:    You may take a shower.    While the stent is in place you may experience the following symptoms:    Blood and/or small blood clots in urine.    Bladder spasm (frequency and urgency of urination).    Discomfort or aching in the back or side where the stent is.    Burning or discomfort at the end of urine stream.    To decrease these symptoms you should:    Take pain medication as prescribed.    Drink plenty of fluids.    If you experience pain at the end of urination try not emptying your bladder completely.    If having discomfort in back or side, decrease activity.    Call your physician if these signs/symptoms are present:    Pain that is not relieved by a short rest or ordered pain medications.    Temperature at or above 101.0 F or chills.    Inability or difficulty urinating.     Excessive blood in urine.    Any questions or concerns.    DISCHARGE INSTRUCTIONS FOLLOWING EXTRACORPOREAL SHOCK LITHOTRIPSY (ESWL)      Your stone(s) has been fragmented into many tiny pieces, which must now pass in your urine.  Usually  this process is uneventful.  Most fragments pass in the first one or two week, but some may continue to pass for three months or more.  Some pain or discomfort may accompany the passage of these fragments.    To aid in the passage of fragments, drink lots of fluid.  Aim for 1 glass an hour for the next 1 to 2 weeks (2 quarts or more a day).  Most stone patients will benefit from a continued high fluid intake and urine output indefinitely, even after the fragments are gone.  This helps prevent new stone formation.    Strain your urine.  Take the stone fragments to your urologist.  They will have them analyzed to help determine the cause of your stone(s).    You should walk around and resume every day activities.  Activity may help the stone fragments pass.  You should, however, avoid sports or really strenuous exercise for about a week, or at least until there is no more blood in your urine.    You may resume regular diet.    Call your urologist if you have:  a. Persistent severe pain not relieved by oral medications.  b. Fever (over 101 ).  c. Persistent vomiting.    See your urologist as directed.  They will need to take an x-ray to check your progress.  Take your stone fragments to your follow-up appointment.      Your doctor may want you to do body positioning to help pass your stone fragments.  If your doctor wants you to do these exercises do them 1-2 times a day.  (Please refer to back page).  Try to sleep on the untreated side.  Try to drink 1  quarts of liquid per day to help the fragments pass.  If these exercises seem difficult, don't attempt to do them.  If you think you can do them, try it.  But discontinue if positioning make you light headed, sick to your stomach or makes it difficult to breath.        [id number]  Rev. 6/2004

## 2017-05-05 NOTE — ANESTHESIA CARE TRANSFER NOTE
Patient: Barbi Tiwari    Procedure(s):  CYSTO, URETHRAL DILATION, URETERAL LEFT STENT PLACEMENT, LEFT ESWL. - Wound Class: II-Clean Contaminated    Diagnosis: URETERAL STONE  Diagnosis Additional Information: No value filed.    Anesthesia Type:   General, ETT, RSI     Note:  Airway :Face Mask  Patient transferred to:PACU        Vitals: (Last set prior to Anesthesia Care Transfer)    CRNA VITALS  5/5/2017 1530 - 5/5/2017 1608      5/5/2017             Pulse: 124    SpO2: 92 %    Resp Rate (observed): 14    Resp Rate (set):                 Electronically Signed By: CAMDEN Tovar CRNA  May 5, 2017  4:08 PM

## 2017-05-05 NOTE — CONSULTS
Urology consult for: Nephrolithiasis, left flank pain, Proximal left 6mm stone  Req provider: MISHEL Groves      HPI: Pt is an unassigned 57yo female with a PMH significant for kidney stones, hyperparathyroidism, arthritis, and PVCs. Around 0245 early this morning, pt started having severe, jabbing pain in her LLQ behind her left hip that woke her from sleep. Pt walked around and took 10 mg Oxycodone (Rx'd to dtr for her kidney stones) at home, which provided no relief. Pt also had nausea with one episode of emesis. Patient reports pain is very similar to pain with previous kidney stone but more intense. Pt eventually couldn't tolerate the pain any longer, so she presented to [facility] ED for evaluation and treatment.  CT in the ED showed a 6mm proximal stone, so urology     Pt is found sitting in bed with  at bedside. Pt saw Dr. Maria in the distant past for stones, though has not followed with a urologist regularly for years. Pt reports that her last stone episode was ~10 years ago. Pt reports she was able to pass a couple of stones on her own and required lithotripsy (no stent) to pass the other stone. Per chart review, lithotripsy was in 2010/2011. Pt has not had any stone problems since then. Pt reports that at that time her stones were calcium and her calcium was off due to the parathyroid problems. Once she had surgery on the parathyroid, she didn't have the calcium problems and did not form/pass any stones and has not had any urologic problems since then. Pt reports that her only symptoms are pain and nausea/vomiting from pain. Pt denies urinary symptoms including frequency, urgency, dysuria, hematuria, pyuria, foul smell to the urine, or feeling of incomplete bladder emptying. Pt does not feel she had a UTI and did not notice anything abnormal about her urine, though she noted her urine looked a little cloudy in the sample cup. Pt had a longer episode of non-bloody emesis during exam. Pt and   would like to pursue surgical treatment of stone. Pt has been NPO since 8-9pm last night.       Past Medical History:   Diagnosis Date     Allergic rhinitis 12/9/2009     Calculus of kidney 1/12/2011    Overview:  S/P LITHOTRIPSY 3/15/11      Esophageal reflux 10/22/2008     Hyperparathyroidism s/p surgery 1/11/2011     Hyperparathyroidism s/p surgery 1/11/2011     oligodendroglioma 7/23/2012     Osteoarthrosis 12/9/2009     Palpitations 6/5/2014     PVCs 6/5/2014     Seizure disorder (related to tumor) 8/17/2011     Review Of Systems:   General: Denies F/C  Respiratory: Denies SOB  Cardiovascular: Denies CP  Gastrointestinal: Confirms N/V  Genitourinary: See HPI  Musculoskeletal: Denies LE pain  Neurologic: Denies HA  Psychiatric: Denies confusion  Skin: no concerning lesions  Hematology/immunology: no unexpected bruising    Past Surgical History:   Procedure Laterality Date     CRANIOTOMY  2011    tumor resection     FOOT SURGERY      mulitple     HYSTERECTOMY, PAP NO LONGER INDICATED  2008    lap hys     LITHOTRIPSY  2010     PARATHYROIDECTOMY  2011     Social Hx:  Social History     Social History     Marital status:      Spouse name: N/A     Number of children: N/A     Years of education: N/A     Occupational History     RN- [employer]      Social History Main Topics     Smoking status: Never Smoker     Smokeless tobacco: Never Used     Alcohol use Yes      Comment: 1-2 per month, only at social events     Drug use: No     Sexual activity: Yes     Partners: Female     Birth control/ protection: Surgical      Comment: hysterectomy     Other Topics Concern     Not on file     Social History Narrative     Meds:  Current Outpatient Prescriptions   Medication Sig Dispense Refill     [DISCONTINUED] lamoTRIgine (LAMICTAL) 100 MG tablet Take 100 mg by mouth 2 times daily Take 1 tablet in the morning and evening along with 200mg tablet for total of 300mg twice daily       [DISCONTINUED] lamoTRIgine (LAMICTAL) 200  MG tablet Take 200 mg by mouth 2 times daily Take one tablet by mouth every morning and every evening (Take along with 100mg tablets and 200mg= 300mg total am and pm)       Allergies   Allergen Reactions     Benoxinate Nausea and Vomiting     Nuts Swelling     Destinee nuts     Sulfa Drugs Hives     Labs:  ROUTINE IP LABS (Last four results)  BMP  Recent Labs  Lab 05/05/17  0510      POTASSIUM 3.6   CHLORIDE 106   KIAN 9.2   CO2 29   BUN 25   CR 0.69   *     CBC  Recent Labs  Lab 05/05/17  0510   WBC 9.5   RBC 4.91   HGB 14.6   HCT 44.8   MCV 91   MCH 29.7   MCHC 32.6   RDW 12.9        UA RESULTS:  Recent Labs   Lab Test  05/05/17   0550   COLOR  Light Yellow   APPEARANCE  Clear   URINEGLC  Negative   URINEBILI  Negative   URINEKETONE  Negative   SG  1.015   UBLD  Negative   URINEPH  5.5   PROTEIN  Negative   NITRITE  Negative   LEUKEST  Large*   RBCU  9*   WBCU  44*     UC:5/5/17 pending    Imaging:CT ABDOMEN PELVIS W/O CONTRAST 5/5/2017 6:32 AM      INDICATION: Abrupt left lower quadrant pain, history of kidney stones.         TECHNIQUE: Thin axial images through the abdomen and pelvis without  contrast. Coronal reformatted images. Radiation dose for this scan was  reduced using automated exposure control, adjustment of the mA and/or  kV according to patient size, or iterative reconstruction technique.     COMPARISON: 11/25/2014.     FINDINGS: Moderate left hydronephrosis due to a 0.6 cm proximal  ureteral stone. Several additional small left renal stones measuring  up to 0.4 cm at the lower pole of the left kidney. There is also a 0.4  cm stone at the lower pole of the right kidney. Lobulated liver cyst.  Calcified splenic granulomas. The upper abdominal organs are otherwise  negative without benefit of contrast.     Scattered colonic diverticula. Minimal air in the urinary bladder.  Hysterectomy. No bowel obstruction, ascites or other acute findings.         IMPRESSION:  1. Moderate left  "hydronephrosis due to a 0.6 cm proximal ureteral  stone.  2. Small bilateral renal stones measuring up to 0.4 cm.  3. Small amount of bladder air presumably due to recent  catheterization. Correlate clinically.  4. Colonic diverticula.     JAZZY MORALES MD        Exam:  /77 (BP Location: Left arm)  Pulse 78  Temp 96.1  F (35.6  C) (Oral)  Resp 16  Ht 1.575 m (5' 2\")  Wt 64.4 kg (142 lb)  SpO2 94%  BMI 25.97 kg/m2  No intake or output data in the 24 hours ending 05/05/17 0844  EXAM:  Constitutional: healthy, alert, no acute distress and cooperative   Cardiovascular: RRR  Respiratory: CTAB anteriorly, no secondary muscle use  Psychiatric: mentation appears normal and affect normal/bright  Neck: no asymmetry  GI: Pt vomiting non-bloody emesis during exam  Abdomen: +BS, abdomen soft, non-tender on the right, tenderness on the left. No masses, no CVAT  : No urinary catheter in place  MSK: no calf tenderness, no edema, no PCDs  Skin: no open lesions, extremities warm and well perfused  Hematology: no bruising on visible skin      Assessment/plan:    Proximal left 6mm stone, UA questionable for infection   -Plan for Cysto, left stent, possible ESWL with Dr. Del Rio later today pending OR availability   -Pt received IV Cipro for prophylaxis in ED   -Keep NPO- pt has been NPO since 8-9pm   -Continue cares per IM      Discussed exam findings, lab and imaging results and plan with Dr. Del Rio.  Please contact me with any questions or concerns.     Leda Dang PA-C  [facility]  Pager:  [phone]  After 4pm and on weekends, please call [phone]    "

## 2017-05-05 NOTE — ANESTHESIA PREPROCEDURE EVALUATION
Anesthesia Evaluation     .             ROS/MED HX    ENT/Pulmonary:  - neg pulmonary ROS    (-) sleep apnea   Neurologic:     (+)seizures features: h/o brain tumor (oligodendroglioma) 2011. S/P resection/chemo. Seizure perioperatively. ,     Cardiovascular:  - neg cardiovascular ROS       METS/Exercise Tolerance:     Hematologic:  - neg hematologic  ROS       Musculoskeletal:  - neg musculoskeletal ROS       GI/Hepatic:     (+) GERD       Renal/Genitourinary:     (+) chronic renal disease, type: ARF, Nephrolithiasis , Pt has no history of transplant,       Endo:  - neg endo ROS       Psychiatric:         Infectious Disease:  - neg infectious disease ROS       Malignancy:   (+) Malignancy History of Neuro  Neuro CA Remission status post Surgery and Chemo.          Other:                     Physical Exam  Normal systems: cardiovascular, pulmonary and dental    Airway   Mallampati: II  TM distance: >3 FB  Neck ROM: full    Dental     Cardiovascular       Pulmonary                     Anesthesia Plan      History & Physical Review  History and physical reviewed and following examination; no interval change.    ASA Status:  3 .    NPO Status:  > 8 hours    Plan for General, ETT and RSI with Intravenous induction. Maintenance will be TIVA.    PONV prophylaxis:  Ondansetron (or other 5HT-3)  Propofol TIVA  Emend 40mg p.o.      Postoperative Care  Postoperative pain management:  IV analgesics.      Consents  Anesthetic plan, risks, benefits and alternatives discussed with:  Patient..          Procedure: Procedure(s):  COMBINED EXTRACORPOREAL SHOCK WAVE LITHOTRIPSY, CYSTOSCOPY, INSERT STENT URETER(S)  Preop diagnosis: URETERAL STONE    Allergies   Allergen Reactions     Benoxinate Nausea and Vomiting     Nuts Swelling     Destinee nuts     Sulfa Drugs Hives     Past Medical History:   Diagnosis Date     Allergic rhinitis 12/9/2009     Calculus of kidney 1/12/2011    Overview:  S/P LITHOTRIPSY 3/15/11      Esophageal reflux  10/22/2008     Hyperparathyroidism s/p surgery 1/11/2011     Hyperparathyroidism s/p surgery 1/11/2011     oligodendroglioma 7/23/2012     Osteoarthrosis 12/9/2009     Palpitations 6/5/2014     PVCs 6/5/2014     Seizure disorder (related to tumor) 8/17/2011     Past Surgical History:   Procedure Laterality Date     CRANIOTOMY  2011    tumor resection     FOOT SURGERY      mulitple     HYSTERECTOMY, PAP NO LONGER INDICATED  2008    lap hys     LITHOTRIPSY  2010     PARATHYROIDECTOMY  2011     Prior to Admission medications    Medication Sig Start Date End Date Taking? Authorizing Provider   OXYCODONE HCL PO Take 5 mg by mouth once   Yes Unknown, Entered By History   ascorbic acid 500 MG TABS Take 500 mg by mouth 2 times daily   Yes Unknown, Entered By History   ferrous sulfate (IRON) 325 (65 FE) MG tablet Take 325 mg by mouth 2 times daily   Yes Unknown, Entered By History   VITAMIN D, CHOLECALCIFEROL, PO Take 1,000 Units by mouth 2 times daily   Yes Unknown, Entered By History   LAMOTRIGINE PO Take 300 mg by mouth 2 times daily   Yes Unknown, Entered By History   ranitidine (ZANTAC) 150 MG tablet Take 1 tablet (150 mg) by mouth 2 times daily as needed for heartburn  Patient taking differently: Take 150 mg by mouth 2 times daily  3/24/17  Yes Dustin Choudhury MD   COMPOUND (CMPD RX) - PHARMACY TO MIX COMPOUNDED MEDICATION Apply 1g daily for 2 weeks, then twice weekly to vagina 11/22/16  Yes Lluvia Vega MD   tretinoin (RETIN-A) 0.05 % cream Spread a pea size amount into affected area topically at bedtime.  Use sunscreen SPF>20. 1/22/15  Yes Benjamin Forbes MD   Calcium Carbonate-Vitamin D (CALCIUM-VITAMIN D) 600-200 MG-UNIT CAPS Take 1 Cap by mouth once daily.   Yes Reported, Patient   ibuprofen (ADVIL,MOTRIN) 200 MG tablet Take 4 tablets by mouth 4 times daily if needed for Pain or Headache. (rare use) 5/10/12  Yes Reported, Patient   Multiple Vitamin (MULTI-VITAMINS) TABS take 1 tablet by oral route  once daily with food 12/10/09  Yes Reported, Patient   levETIRAcetam (KEPPRA) 500 MG tablet 1,500 mg 2 times daily Take 3 tablets am and 3 tablets pm 12/11/13  Yes Reported, Patient   albuterol (PROAIR HFA, PROVENTIL HFA, VENTOLIN HFA) 108 (90 BASE) MCG/ACT inhaler Inhale 2 puffs into the lungs every 6 hours as needed for shortness of breath / dyspnea or wheezing 5/29/15   Arianne Haas PA-C   acetaminophen (TYLENOL) 500 MG tablet Take 1,000 mg by mouth every 6 hours if needed. Max acetaminophen dose: 4000mg in 24 hrs.    Reported, Patient     Current Facility-Administered Medications Ordered in Epic   Medication Dose Route Frequency Last Rate Last Dose     ranitidine (ZANTAC) tablet 150 mg  150 mg Oral BID   150 mg at 05/05/17 1023     naloxone (NARCAN) injection 0.1-0.4 mg  0.1-0.4 mg Intravenous Q2 Min PRN         acetaminophen (TYLENOL) tablet 650 mg  650 mg Oral Q4H PRN         acetaminophen (TYLENOL) Suppository 650 mg  650 mg Rectal Q4H PRN         0.9% sodium chloride infusion   Intravenous Continuous 125 mL/hr at 05/05/17 0840       oxyCODONE (ROXICODONE) IR tablet 5-10 mg  5-10 mg Oral Q3H PRN         HYDROmorphone (PF) (DILAUDID) injection 0.3-0.5 mg  0.3-0.5 mg Intravenous Q2H PRN         ondansetron (ZOFRAN-ODT) ODT tab 4 mg  4 mg Oral Q6H PRN        Or     ondansetron (ZOFRAN) injection 4 mg  4 mg Intravenous Q6H PRN         prochlorperazine (COMPAZINE) injection 5-10 mg  5-10 mg Intravenous Q6H PRN        Or     prochlorperazine (COMPAZINE) tablet 5-10 mg  5-10 mg Oral Q6H PRN        Or     prochlorperazine (COMPAZINE) Suppository 25 mg  25 mg Rectal Q12H PRN         polyethylene glycol (MIRALAX/GLYCOLAX) Packet 17 g  17 g Oral Daily PRN         ketorolac (TORADOL) injection 30 mg  30 mg Intravenous Q6H PRN   30 mg at 05/05/17 1129     lamoTRIgine (LaMICtal) tablet 300 mg  300 mg Oral BID   300 mg at 05/05/17 1022     ciprofloxacin (CIPRO) infusion 400 mg  400 mg Intravenous Q12H 200 mL/hr  at 05/05/17 1015 400 mg at 05/05/17 1015     metoclopramide (REGLAN) injection 10 mg  10 mg Intravenous Q6H   10 mg at 05/05/17 0933     levETIRAcetam (KEPPRA) 1,500 mg in NaCl 0.9 % 100 mL intermittent infusion  1,500 mg Intravenous Q12H 400 mL/hr at 05/05/17 0933 1,500 mg at 05/05/17 0933     Current Outpatient Prescriptions Ordered in Epic   Medication     [DISCONTINUED] lamoTRIgine (LAMICTAL) 100 MG tablet     [DISCONTINUED] lamoTRIgine (LAMICTAL) 200 MG tablet     Wt Readings from Last 1 Encounters:   05/05/17 64.4 kg (142 lb)     Temp Readings from Last 1 Encounters:   05/05/17 35.6  C (96.1  F) (Oral)     BP Readings from Last 6 Encounters:   05/05/17 126/77   04/17/17 142/76   04/10/17 117/83   03/24/17 116/78   11/22/16 124/68   10/10/16 129/84     Pulse Readings from Last 4 Encounters:   05/05/17 78   04/17/17 82   04/10/17 90   03/24/17 73     Resp Readings from Last 1 Encounters:   05/05/17 16     SpO2 Readings from Last 1 Encounters:   05/05/17 94%     Recent Labs   Lab Test  05/05/17   0510  03/24/17   1135   NA  142  144   POTASSIUM  3.6  4.2   CHLORIDE  106  107   CO2  29  29   ANIONGAP  7  8   GLC  155*  86   BUN  25  21   CR  0.69  0.66   KIAN  9.2  9.2     Recent Labs   Lab Test  05/05/17   0510  03/24/17   1135  10/19/15   1031   WBC  9.5   --   5.7   HGB  14.6  14.1  14.3   PLT  241   --   281     No results for input(s): INR in the last 57632 hours.    Invalid input(s): APTT   RECENT LABS:   ECG:   ECHO:   CXR:                    .

## 2017-05-05 NOTE — PROGRESS NOTES
Report called to pre-op RN. Pt voided, urine strained. NPO with sips for meds x1. Pt belongings left in room per her request. Purse in closet, jewelry on table. Cipro and Keppra given this AM via IV. Consent on front of chart, will need surgeon to review and sign.

## 2017-05-06 LAB
BACTERIA SPEC CULT: NORMAL
MICRO REPORT STATUS: NORMAL
SPECIMEN SOURCE: NORMAL

## 2017-05-08 ENCOUNTER — TELEPHONE (OUTPATIENT)
Dept: INTERNAL MEDICINE | Facility: CLINIC | Age: 57
End: 2017-05-08

## 2017-05-09 ENCOUNTER — MYC MEDICAL ADVICE (OUTPATIENT)
Dept: SLEEP MEDICINE | Facility: CLINIC | Age: 57
End: 2017-05-09

## 2017-05-09 RX ORDER — GABAPENTIN 100 MG/1
CAPSULE ORAL
Qty: 90 CAPSULE | Refills: 1 | Status: SHIPPED | OUTPATIENT
Start: 2017-05-09 | End: 2017-09-05

## 2017-05-10 ENCOUNTER — OFFICE VISIT (OUTPATIENT)
Dept: INTERNAL MEDICINE | Facility: CLINIC | Age: 57
End: 2017-05-10
Payer: COMMERCIAL

## 2017-05-10 ENCOUNTER — TELEPHONE (OUTPATIENT)
Dept: INTERNAL MEDICINE | Facility: CLINIC | Age: 57
End: 2017-05-10

## 2017-05-10 VITALS
SYSTOLIC BLOOD PRESSURE: 108 MMHG | WEIGHT: 142 LBS | OXYGEN SATURATION: 95 % | BODY MASS INDEX: 25.97 KG/M2 | TEMPERATURE: 99.4 F | DIASTOLIC BLOOD PRESSURE: 70 MMHG | HEART RATE: 94 BPM

## 2017-05-10 DIAGNOSIS — N20.0 NEPHROLITHIASIS: Primary | ICD-10-CM

## 2017-05-10 DIAGNOSIS — R82.90 NONSPECIFIC FINDING ON EXAMINATION OF URINE: ICD-10-CM

## 2017-05-10 LAB
ANION GAP SERPL CALCULATED.3IONS-SCNC: 7 MMOL/L (ref 3–14)
BUN SERPL-MCNC: 13 MG/DL (ref 7–30)
CALCIUM SERPL-MCNC: 9.2 MG/DL (ref 8.5–10.1)
CHLORIDE SERPL-SCNC: 105 MMOL/L (ref 94–109)
CO2 SERPL-SCNC: 28 MMOL/L (ref 20–32)
CREAT SERPL-MCNC: 0.72 MG/DL (ref 0.52–1.04)
ERYTHROCYTE [DISTWIDTH] IN BLOOD BY AUTOMATED COUNT: 12.7 % (ref 10–15)
GFR SERPL CREATININE-BSD FRML MDRD: 84 ML/MIN/1.7M2
GLUCOSE SERPL-MCNC: 120 MG/DL (ref 70–99)
HCT VFR BLD AUTO: 42 % (ref 35–47)
HGB BLD-MCNC: 13.3 G/DL (ref 11.7–15.7)
MCH RBC QN AUTO: 29.5 PG (ref 26.5–33)
MCHC RBC AUTO-ENTMCNC: 31.7 G/DL (ref 31.5–36.5)
MCV RBC AUTO: 93 FL (ref 78–100)
PLATELET # BLD AUTO: 257 10E9/L (ref 150–450)
POTASSIUM SERPL-SCNC: 3.9 MMOL/L (ref 3.4–5.3)
RBC # BLD AUTO: 4.51 10E12/L (ref 3.8–5.2)
RBC #/AREA URNS AUTO: ABNORMAL /HPF (ref 0–2)
SODIUM SERPL-SCNC: 140 MMOL/L (ref 133–144)
URNS CMNT MICRO: ABNORMAL
WBC # BLD AUTO: 11.9 10E9/L (ref 4–11)
WBC #/AREA URNS AUTO: ABNORMAL /HPF (ref 0–2)

## 2017-05-10 PROCEDURE — 87186 SC STD MICRODIL/AGAR DIL: CPT | Performed by: INTERNAL MEDICINE

## 2017-05-10 PROCEDURE — 87086 URINE CULTURE/COLONY COUNT: CPT | Performed by: INTERNAL MEDICINE

## 2017-05-10 PROCEDURE — 85027 COMPLETE CBC AUTOMATED: CPT | Performed by: INTERNAL MEDICINE

## 2017-05-10 PROCEDURE — 99214 OFFICE O/P EST MOD 30 MIN: CPT | Performed by: INTERNAL MEDICINE

## 2017-05-10 PROCEDURE — 80048 BASIC METABOLIC PNL TOTAL CA: CPT | Performed by: INTERNAL MEDICINE

## 2017-05-10 PROCEDURE — 36415 COLL VENOUS BLD VENIPUNCTURE: CPT | Performed by: INTERNAL MEDICINE

## 2017-05-10 PROCEDURE — 87088 URINE BACTERIA CULTURE: CPT | Performed by: INTERNAL MEDICINE

## 2017-05-10 PROCEDURE — 81015 MICROSCOPIC EXAM OF URINE: CPT | Performed by: INTERNAL MEDICINE

## 2017-05-10 NOTE — PROGRESS NOTES
"  SUBJECTIVE:                                                    Barbi Tiwari is a 56 year old female who presents to clinic today for the following health issues:    Hospital follow up    Facility:  [facility]  Date of visit: 05/05/17 , d/c'd same day after surgery     Reason for visit: kidney stones w/L sided hydronephrosis  Current Status: all over aches and pains, urgency, constipation, nausea and vomited this am      Barbi Tiwari is a 56-year-old female with a past medical history of oligodendroglioma, status post resection in 2011, hyperparathyroidism and GERD who presented to the Emergency Department for evaluation of abdominal pain. She noted she was in her normal state of health until approximately 2:30 this morning when she awoke with left-sided abdominal pain and associated emesis. She said it felt like similar kidney stones that she has had in the past. She had no urinary symptoms, no fever. She was going to attempt to manage it at home with fluids and took one of her daughters oxycodone with minimal improvement. She ultimately presented to the Emergency Department for further evaluation.       On arrival to the Emergency Department, blood pressure was 137/77, temperature 97.7, O2 sat 96% on room air. Laboratory evaluation revealed normal BMP. CBC without significant leukocytosis. Urinalysis with large leukocyte esterase, 44 WBCs and 9 RBCs. Urine culture is pending. CT of the abdomen and pelvis shows moderate left hydronephrosis due to 0.6 cm proximal ureteral stone    PROCEDURES: Cystoscopy, urethral dilation, preliminary left ureteral stent (6-Romanian x 24 cm), left extracorporeal shock wave lithotripsy of 3000 shocks.     After her procedure she was d/c'd home.  She felt well the next few days while on some pain meds but then noticed gradual fatigue, some nausea the last few days, some dysuria today and feeling that her skin was \"painful\" and \"sensititve\".    Problem list and histories " reviewed & adjusted, as indicated.  Additional history: as documented    Labs reviewed in EPIC    Reviewed and updated as needed this visit by clinical staff  Tobacco  Allergies  Meds  Soc Hx      Reviewed and updated as needed this visit by Provider         ROS:  Constitutional, HEENT, cardiovascular, pulmonary, gi and gu systems are negative, except as otherwise noted.    OBJECTIVE:                                                    /70  Pulse 94  Temp 99.4  F (37.4  C) (Oral)  Wt 142 lb (64.4 kg)  SpO2 95%  BMI 25.97 kg/m2  Body mass index is 25.97 kg/(m^2).  GENERAL APPEARANCE: alert and fatigued  HENT: nose and mouth without ulcers or lesions  NECK: no adenopathy, no asymmetry, masses, or scars and thyroid normal to palpation  RESP: lungs clear to auscultation - no rales, rhonchi or wheezes  CV: regular rates and rhythm, normal S1 S2, no S3 or S4 and no murmur, click or rub  ABDOMEN: soft, nontender, without hepatosplenomegaly or masses and bowel sounds normal  MS: extremities normal- no gross deformities noted    Diagnostic test results:  Results for orders placed or performed in visit on 05/10/17 (from the past 24 hour(s))   CBC with platelets   Result Value Ref Range    WBC 11.9 (H) 4.0 - 11.0 10e9/L    RBC Count 4.51 3.8 - 5.2 10e12/L    Hemoglobin 13.3 11.7 - 15.7 g/dL    Hematocrit 42.0 35.0 - 47.0 %    MCV 93 78 - 100 fl    MCH 29.5 26.5 - 33.0 pg    MCHC 31.7 31.5 - 36.5 g/dL    RDW 12.7 10.0 - 15.0 %    Platelet Count 257 150 - 450 10e9/L   Basic metabolic panel   Result Value Ref Range    Sodium 140 133 - 144 mmol/L    Potassium 3.9 3.4 - 5.3 mmol/L    Chloride 105 94 - 109 mmol/L    Carbon Dioxide 28 20 - 32 mmol/L    Anion Gap 7 3 - 14 mmol/L    Glucose 120 (H) 70 - 99 mg/dL    Urea Nitrogen 13 7 - 30 mg/dL    Creatinine 0.72 0.52 - 1.04 mg/dL    GFR Estimate 84 >60 mL/min/1.7m2    GFR Estimate If Black >90   GFR Calc   >60 mL/min/1.7m2    Calcium 9.2 8.5 - 10.1 mg/dL    Urine Microscopic   Result Value Ref Range    WBC Urine 25-50 (A) 0 - 2 /HPF    RBC Urine  (A) 0 - 2 /HPF    Comment Urine       Interfering substances, dipstick not done  APPEARANCE: ORANGE AND SLIGHTLY CLOUDY          ASSESSMENT/PLAN:                                                    1. Nephrolithiasis  - some pyuria though not significantly more than previously seen at presentation. Will cx urine and see what grows next 24 hrs.  F/u urology tuesday  - CBC with platelets  - Basic metabolic panel  - Urine Microscopic  - Urine Culture Aerobic Bacterial    2. Nonspecific finding on examination of urine  - CBC with platelets  - Basic metabolic panel  - Urine Microscopic  - Urine Culture Aerobic Bacterial      Follow up with Provider - as above     Dustin Choudhury MD  [facility]

## 2017-05-10 NOTE — MR AVS SNAPSHOT
After Visit Summary   5/10/2017    Barbi Tiwari    MRN: [id number]           Patient Information     Date Of Birth          1960        Visit Information        Provider Department      5/10/2017 11:40 AM Dustin Choudhury MD [facility]        Today's Diagnoses     Nephrolithiasis    -  1       Follow-ups after your visit        Your next 10 appointments already scheduled     Apr 02, 2018 10:45 AM CDT   (Arrive by 10:30 AM)   MR BRAIN W/O & W CONTRAST with [id number]   [facility] ([facility])    [address] [address] [phone]           Take your medicines as usual, unless your doctor tells you not to. Bring a list of your current medicines to your exam (including vitamins, minerals and over-the-counter drugs).  You will be given intravenous contrast for this exam. To prepare:   The day before your exam, drink extra fluids at least six 8-ounce glasses (unless your doctor tells you to restrict your fluids).   Have a blood test (creatinine test) within 30 days of your exam. Go to your clinic or Diagnostic Imaging Department for this test.  The MRI machine uses a strong magnet. Please wear clothes without metal (snaps, zippers). A sweatsuit works well, or we may give you a hospital gown.  Please remove any body piercings and hair extensions before you arrive. You will also remove watches, jewelry, hairpins, wallets, dentures, partial dental plates and hearing aids. You may wear contact lenses, and you may be able to wear your rings. We have a safe place to keep your personal items, but it is safer to leave them at home.   **IMPORTANT** THE INSTRUCTIONS BELOW ARE ONLY FOR THOSE PATIENTS WHO HAVE BEEN TOLD THEY WILL RECEIVE SEDATION OR GENERAL ANESTHESIA DURING THEIR MRI PROCEDURE:  IF YOU WILL RECEIVE SEDATION (take medicine to help you relax during your exam):   You must get the medicine  from your doctor before you arrive. Bring the medicine to the exam. Do not take it at home.   Arrive one hour early. Bring someone who can take you home after the test. Your medicine will make you sleepy. After the exam, you may not drive, take a bus or take a taxi by yourself.   No eating 8 hours before your exam. You may have clear liquids up until 4 hours before your exam. (Clear liquids include water, clear tea, black coffee and fruit juice without pulp.)  IF YOU WILL RECEIVE ANESTHESIA (be asleep for your exam):   Arrive 1 1/2 hours early. Bring someone who can take you home after the test. You may not drive, take a bus or take a taxi by yourself.   No eating 8 hours before your exam. You may have clear liquids up until 4 hours before your exam. (Clear liquids include water, clear tea, black coffee and fruit juice without pulp.)  Please call the Imaging Department at your exam site with any questions.            Apr 09, 2018 10:00 AM CDT   (Arrive by 9:45 AM)   Return Visit with Neo Weinstein MD   [facility] ([facility])    [address] [place] [address] [phone]              Who to contact     If you have questions or need follow up information about today's clinic visit or your schedule please contact [facility] directly at [phone].  Normal or non-critical lab and imaging results will be communicated to you by MyChart, letter or phone within 4 business days after the clinic has received the results. If you do not hear from us within 7 days, please contact the clinic through MyChart or phone. If you have a critical or abnormal lab result, we will notify you by phone as soon as possible.  Submit refill requests through iubenda or call your pharmacy and they will forward the refill request to us. Please allow 3 business days for your refill to be completed.          Additional Information About  Your Visit        [facility] Information     [employer]nik gives you secure access to your electronic health record. If you see a primary care provider, you can also send messages to your care team and make appointments. If you have questions, please call your primary care clinic.  If you do not have a primary care provider, please call [phone] and they will assist you.        Care EveryWhere ID     This is your Care EveryWhere ID. This could be used by other organizations to access your [facility] medical records  [id number]        Your Vitals Were     Pulse Temperature Pulse Oximetry BMI (Body Mass Index)          94 99.4  F (37.4  C) (Oral) 95% 25.97 kg/m2         Blood Pressure from Last 3 Encounters:   05/10/17 108/70   05/05/17 104/66   04/17/17 142/76    Weight from Last 3 Encounters:   05/10/17 142 lb (64.4 kg)   05/05/17 142 lb (64.4 kg)   04/17/17 143 lb 14.4 oz (65.3 kg)              We Performed the Following     Basic metabolic panel     CBC with platelets     UA with Microscopic          Today's Medication Changes          These changes are accurate as of: 5/10/17 12:03 PM.  If you have any questions, ask your nurse or doctor.               These medicines have changed or have updated prescriptions.        Dose/Directions    ranitidine 150 MG tablet   Commonly known as:  ZANTAC   This may have changed:  when to take this   Used for:  Gastroesophageal reflux disease without esophagitis        Dose:  150 mg   Take 1 tablet (150 mg) by mouth 2 times daily as needed for heartburn   Refills:  0                Primary Care Provider Office Phone # Fax #    Dustin Choudhury [phone]       [facility] [address] [place] [id number]        Thank you!     Thank you for choosing [facility]  for your care. Our goal is always to provide you with excellent care. Hearing back from our patients is one way we can continue to improve our services. Please  take a few minutes to complete the written survey that you may receive in the mail after your visit with us. Thank you!             Your Updated Medication List - Protect others around you: Learn how to safely use, store and throw away your medicines at www.disposemymeds.org.          This list is accurate as of: 5/10/17 12:03 PM.  Always use your most recent med list.                   Brand Name Dispense Instructions for use    acetaminophen 500 MG tablet    TYLENOL     Take 1,000 mg by mouth every 6 hours if needed. Max acetaminophen dose: 4000mg in 24 hrs.       albuterol 108 (90 BASE) MCG/ACT Inhaler    PROAIR HFA/PROVENTIL HFA/VENTOLIN HFA    1 Inhaler    Inhale 2 puffs into the lungs every 6 hours as needed for shortness of breath / dyspnea or wheezing       ascorbic acid 500 MG Tabs      Take 500 mg by mouth 2 times daily       Calcium-Vitamin D 600-200 MG-UNIT Caps      Reported on 5/10/2017       COMPOUND - PHARMACY TO MIX COMPOUNDED MEDICATION    CMPD RX    20 g    Apply 1g daily for 2 weeks, then twice weekly to vagina       ferrous sulfate 325 (65 FE) MG tablet    IRON     Take 325 mg by mouth 2 times daily       gabapentin 100 MG capsule    NEURONTIN    90 capsule    1.5 hrs before bedtime for 7 nights, increase to 2 pills if not effective after 7 days, increase to 3 pills after 7 days if not completely effective       HYDROcodone-acetaminophen 5-325 MG per tablet    NORCO    16 tablet    Take 1-2 tablets by mouth every 6 hours as needed for moderate to severe pain (Moderate to Severe Pain)       ibuprofen 200 MG tablet    ADVIL/MOTRIN     Take 4 tablets by mouth 4 times daily if needed for Pain or Headache. (rare use)       KEPPRA 500 MG tablet   Generic drug:  levETIRAcetam      1,500 mg 2 times daily Take 3 tablets am and 3 tablets pm       LAMOTRIGINE PO      Take 300 mg by mouth 2 times daily       MULTI-VITAMINS Tabs      take 1 tablet by oral route once daily with food       OXYCODONE HCL PO       Take 5 mg by mouth once Reported on 5/10/2017       ranitidine 150 MG tablet    ZANTAC     Take 1 tablet (150 mg) by mouth 2 times daily as needed for heartburn       tretinoin 0.05 % cream    RETIN-A    45 g    Spread a pea size amount into affected area topically at bedtime.  Use sunscreen SPF>20.       VITAMIN D (CHOLECALCIFEROL) PO      Take 1,000 Units by mouth 2 times daily

## 2017-05-10 NOTE — NURSING NOTE
"Chief Complaint   Patient presents with     ER F/U     [place] on 05/05/17        Initial /70  Pulse 94  Temp 99.4  F (37.4  C) (Oral)  Wt 142 lb (64.4 kg)  SpO2 95%  BMI 25.97 kg/m2 Estimated body mass index is 25.97 kg/(m^2) as calculated from the following:    Height as of 5/5/17: 5' 2\" (1.575 m).    Weight as of this encounter: 142 lb (64.4 kg).  Medication Reconciliation: complete    "

## 2017-05-11 ENCOUNTER — APPOINTMENT (OUTPATIENT)
Dept: GENERAL RADIOLOGY | Facility: CLINIC | Age: 57
End: 2017-05-11
Attending: EMERGENCY MEDICINE
Payer: COMMERCIAL

## 2017-05-11 ENCOUNTER — TELEPHONE (OUTPATIENT)
Dept: NURSING | Facility: CLINIC | Age: 57
End: 2017-05-11

## 2017-05-11 ENCOUNTER — HOSPITAL ENCOUNTER (EMERGENCY)
Facility: CLINIC | Age: 57
Discharge: HOME | End: 2017-05-11
Attending: EMERGENCY MEDICINE | Admitting: EMERGENCY MEDICINE
Payer: COMMERCIAL

## 2017-05-11 VITALS
TEMPERATURE: 99.6 F | OXYGEN SATURATION: 93 % | DIASTOLIC BLOOD PRESSURE: 61 MMHG | BODY MASS INDEX: 26.13 KG/M2 | RESPIRATION RATE: 18 BRPM | SYSTOLIC BLOOD PRESSURE: 109 MMHG | HEIGHT: 62 IN | WEIGHT: 142 LBS

## 2017-05-11 DIAGNOSIS — R68.89 RIGORS: ICD-10-CM

## 2017-05-11 DIAGNOSIS — N12 PYELONEPHRITIS: ICD-10-CM

## 2017-05-11 LAB
ALBUMIN SERPL-MCNC: 3.3 G/DL (ref 3.4–5)
ALP SERPL-CCNC: 122 U/L (ref 40–150)
ALT SERPL W P-5'-P-CCNC: 30 U/L (ref 0–50)
ANION GAP SERPL CALCULATED.3IONS-SCNC: 10 MMOL/L (ref 3–14)
AST SERPL W P-5'-P-CCNC: 20 U/L (ref 0–45)
BASOPHILS # BLD AUTO: 0 10E9/L (ref 0–0.2)
BASOPHILS NFR BLD AUTO: 0.1 %
BILIRUB SERPL-MCNC: 0.9 MG/DL (ref 0.2–1.3)
BUN SERPL-MCNC: 15 MG/DL (ref 7–30)
CALCIUM SERPL-MCNC: 9 MG/DL (ref 8.5–10.1)
CHLORIDE SERPL-SCNC: 105 MMOL/L (ref 94–109)
CO2 BLDCOV-SCNC: 24 MMOL/L (ref 21–28)
CO2 SERPL-SCNC: 25 MMOL/L (ref 20–32)
CREAT SERPL-MCNC: 0.72 MG/DL (ref 0.52–1.04)
DIFFERENTIAL METHOD BLD: ABNORMAL
EOSINOPHIL # BLD AUTO: 0.1 10E9/L (ref 0–0.7)
EOSINOPHIL NFR BLD AUTO: 0.4 %
ERYTHROCYTE [DISTWIDTH] IN BLOOD BY AUTOMATED COUNT: 12.9 % (ref 10–15)
GFR SERPL CREATININE-BSD FRML MDRD: 84 ML/MIN/1.7M2
GLUCOSE SERPL-MCNC: 112 MG/DL (ref 70–99)
HCT VFR BLD AUTO: 39.7 % (ref 35–47)
HGB BLD-MCNC: 13.2 G/DL (ref 11.7–15.7)
IMM GRANULOCYTES # BLD: 0.1 10E9/L (ref 0–0.4)
IMM GRANULOCYTES NFR BLD: 0.5 %
LACTATE BLD-SCNC: 0.9 MMOL/L (ref 0.7–2.1)
LYMPHOCYTES # BLD AUTO: 0.5 10E9/L (ref 0.8–5.3)
LYMPHOCYTES NFR BLD AUTO: 3.5 %
MCH RBC QN AUTO: 30 PG (ref 26.5–33)
MCHC RBC AUTO-ENTMCNC: 33.2 G/DL (ref 31.5–36.5)
MCV RBC AUTO: 90 FL (ref 78–100)
MONOCYTES # BLD AUTO: 1.3 10E9/L (ref 0–1.3)
MONOCYTES NFR BLD AUTO: 9.7 %
NEUTROPHILS # BLD AUTO: 11.8 10E9/L (ref 1.6–8.3)
NEUTROPHILS NFR BLD AUTO: 85.8 %
NRBC # BLD AUTO: 0 10*3/UL
NRBC BLD AUTO-RTO: 0 /100
PCO2 BLDV: 39 MM HG (ref 40–50)
PH BLDV: 7.38 PH (ref 7.32–7.43)
PLATELET # BLD AUTO: 222 10E9/L (ref 150–450)
PO2 BLDV: 33 MM HG (ref 25–47)
POTASSIUM SERPL-SCNC: 3.2 MMOL/L (ref 3.4–5.3)
PROT SERPL-MCNC: 7.3 G/DL (ref 6.8–8.8)
RBC # BLD AUTO: 4.4 10E12/L (ref 3.8–5.2)
SAO2 % BLDV FROM PO2: 64 %
SODIUM SERPL-SCNC: 140 MMOL/L (ref 133–144)
WBC # BLD AUTO: 13.8 10E9/L (ref 4–11)

## 2017-05-11 PROCEDURE — 96375 TX/PRO/DX INJ NEW DRUG ADDON: CPT

## 2017-05-11 PROCEDURE — 80053 COMPREHEN METABOLIC PANEL: CPT | Performed by: EMERGENCY MEDICINE

## 2017-05-11 PROCEDURE — 71020 XR CHEST 2 VW: CPT

## 2017-05-11 PROCEDURE — 25000128 H RX IP 250 OP 636: Performed by: EMERGENCY MEDICINE

## 2017-05-11 PROCEDURE — 74000 XR ABDOMEN 1 VW: CPT

## 2017-05-11 PROCEDURE — 85025 COMPLETE CBC W/AUTO DIFF WBC: CPT | Performed by: EMERGENCY MEDICINE

## 2017-05-11 PROCEDURE — 82803 BLOOD GASES ANY COMBINATION: CPT

## 2017-05-11 PROCEDURE — 87040 BLOOD CULTURE FOR BACTERIA: CPT | Performed by: EMERGENCY MEDICINE

## 2017-05-11 PROCEDURE — 96365 THER/PROPH/DIAG IV INF INIT: CPT

## 2017-05-11 PROCEDURE — 36415 COLL VENOUS BLD VENIPUNCTURE: CPT

## 2017-05-11 PROCEDURE — 99284 EMERGENCY DEPT VISIT MOD MDM: CPT | Mod: 25

## 2017-05-11 PROCEDURE — 83605 ASSAY OF LACTIC ACID: CPT

## 2017-05-11 RX ORDER — CIPROFLOXACIN 2 MG/ML
400 INJECTION, SOLUTION INTRAVENOUS ONCE
Status: COMPLETED | OUTPATIENT
Start: 2017-05-11 | End: 2017-05-11

## 2017-05-11 RX ORDER — KETOROLAC TROMETHAMINE 30 MG/ML
30 INJECTION, SOLUTION INTRAMUSCULAR; INTRAVENOUS ONCE
Status: DISCONTINUED | OUTPATIENT
Start: 2017-05-11 | End: 2017-05-11

## 2017-05-11 RX ORDER — KETOROLAC TROMETHAMINE 30 MG/ML
30 INJECTION, SOLUTION INTRAMUSCULAR; INTRAVENOUS ONCE
Status: COMPLETED | OUTPATIENT
Start: 2017-05-11 | End: 2017-05-11

## 2017-05-11 RX ORDER — CIPROFLOXACIN 500 MG/1
500 TABLET, FILM COATED ORAL 2 TIMES DAILY
Qty: 14 TABLET | Refills: 0 | Status: SHIPPED | OUTPATIENT
Start: 2017-05-11 | End: 2017-05-18

## 2017-05-11 RX ADMIN — CIPROFLOXACIN 400 MG: 2 INJECTION, SOLUTION INTRAVENOUS at 19:43

## 2017-05-11 RX ADMIN — KETOROLAC TROMETHAMINE 30 MG: 30 INJECTION, SOLUTION INTRAMUSCULAR at 19:43

## 2017-05-11 ASSESSMENT — ENCOUNTER SYMPTOMS
CHILLS: 1
APPETITE CHANGE: 1
FEVER: 1

## 2017-05-11 NOTE — ED AVS SNAPSHOT
Emergency Department    [address] [facility] [address]    Phone:  [phone]    Fax:  [phone]                                       Barbi Tiwari   MRN: [id number]    Department:   Emergency Department   Date of Visit:  5/11/2017           Patient Information     Date Of Birth          1960        Your diagnoses for this visit were:     Rigors     Pyelonephritis        You were seen by Cecil Douglas MD.      Follow-up Information     Follow up with Angel Del Rio MD In 1 day.    Specialty:  Urology    Contact information:    [facility]  [address] [facility] [address]  [facility] [address]  [phone]          Discharge Instructions         Ureteral Stents  A ureteral stent is a soft plastic tube with holes in it. It s temporarily inserted into a ureter to help drain urine into the bladder. One end goes in the kidney. The other end goes in the bladder. A coil on each end holds the stent in place. The stent can t be seen from outside the body. It shouldn t interfere with your normal routine. Your stent will be put in by a urologist (doctor trained in treating the urinary tract) or another specialist. The procedure is done in a hospital or surgery center. You ll likely go home the same day.  When Is a Ureteral Stent Used?  A ureteral stent may be used:    To bypass a blockage in a kidney or ureter.    During kidney stone removal.    To let a ureter heal after surgery.    Before the Procedure  Your doctor will give you instructions to prepare for the procedure. X-rays or other imaging tests of your kidneys and ureters may be done beforehand.  During the Procedure    You receive medication to prevent pain and help you relax or sleep during the procedure. Once this takes effect, the procedure starts.    The doctor inserts a cystoscope (lighted instrument) through the urethra and into the bladder. This shows the opening to the ureter.    A thin wire is carefully threaded  through the cystoscope, up the ureter, and into the kidney. The stent is inserted over the wire.    A fluoroscope (special X-ray machine) is used to help position the stent. When the stent is in place, the wire and cystoscope are removed.  While You Have a Stent    Some discomfort is normal. Certain movements may trigger pain or a feeling that you need to urinate. You may also feel mild soreness or pressure before or during urination. These symptoms will go away a few days after the stent is removed.    Medication to control pain or bladder spasms or to prevent infection may be prescribed. Take this as directed.    Drink plenty of fluids to help flush out your urinary tract.    Your urine may be slightly pink or red. This is due to bleeding caused by minor irritation from the stent. This may happen on and off while you have the stent.    As with any synthetic device placed in the body, there is a risk of infection. The stent may have to be removed if this happens.   How Long Will You Need a Stent?  The stent is often taken out after the blockage in the ureter is treated or the ureter has healed. This may take 1 week to 2 weeks, or longer. If a stent is needed for a long time, it may need to be changed every few months.  Call Your Doctor  Contact your doctor right away if:    Your urine contains blood clots or you see a large amount of blood-tinged urine.     You have symptoms similar to those you had before the stent was placed.     You constantly leak urine.    You have a fever over 100.4 F (38 C), chills, nausea, or vomiting.    Your pain is not relieved with medication.    The end of the stent comes out of the urethra.     6856-1571 [employer]. [address] [place] [address]. All rights reserved. This information is not intended as a substitute for professional medical care. Always follow your healthcare professional's instructions.        Kidney Infection (Adult, Female)    An infection  "of the kidney is also called \"pyelonephritis.\" It usually starts as a bladder infection (\"cystitis\") that spreads to the kidneys. Pyelonephritis is more serious than a bladder infection. It can cause severe illness if not treated properly.  The usual symptoms include an aching pain in the back, side, or lower abdomen. Other symptoms may include fever, chills, nausea, vomiting, blood in the urine, an urge to urinate, and a burning sensation when urinating. Treatment is usually oral antibiotics, or in more severe cases, intramuscular or IV antibiotics. These are started right away and may be changed once urine culture results determine the infecting organisms.  Home care  The following are general care guidelines:  1. Stay home from work or school. Rest in bed until your fever breaks and you are feeling better.  2. Drink lots of fluid (at least 6 to 8 glasses a day, unless you must restrict fluids for other medical reasons). This will force the medicine into your urinary system and flush the bacteria out of your body.  3. Avoid sex until you have finished all of your medicine and your symptoms are gone.  4. Avoid caffeine, alcohol, and spicy foods. These foods may irritate the kidney and bladder.  5. You may use acetaminophen or ibuprofen to control pain, unless another pain medicine was prescribed. [NOTE: If you have chronic liver or kidney disease or ever had a stomach ulcer or GI bleeding, talk with your doctor before using these medicines.]  Follow-up care  Follow up with your doctor or as advised by our staff for a repeat urine test in 10 days. This will ensure that your infection is fully cleared.  [NOTE: If you had an X-ray, CT scan, or other diagnostic test, it will be reviewed by a specialist. You will be notified of any new findings that may affect your care.]  When to seek medical care  Get prompt medical attention if any of the following occur:    Fever over 100.4 F (38.0 C) after 48 hours of " treatment    No improvement by the third day of treatment    Increasing back or abdominal pain    Repeated vomiting or inability to take oral medicine    Weakness, dizziness, or fainting    9070-9215 [employer]. [address] [place] [address], [place] [address]. All rights reserved. This information is not intended as a substitute for professional medical care. Always follow your healthcare professional's instructions.          Future Appointments        Provider Department Dept Phone Center    4/2/2018 10:45 AM [facility] MRI 3T ROOM 1 [facility] [phone] [facility]    4/9/2018 10:00 AM Neo Weinstein MD [facility] [phone] [facility]      24 Hour Appointment Hotline       To make an appointment at any [facility], call [phone] ([phone]). If you don't have a family doctor or clinic, we will help you find one. [facility] clinics are conveniently located to serve the needs of you and your family.             Review of your medicines      START taking        Dose / Directions Last dose taken    ciprofloxacin 500 MG tablet   Commonly known as:  CIPRO   Dose:  500 mg   Quantity:  14 tablet        Take 1 tablet (500 mg) by mouth 2 times daily for 7 days   Refills:  0          Our records show that you are taking the medicines listed below. If these are incorrect, please call your family doctor or clinic.        Dose / Directions Last dose taken    acetaminophen 500 MG tablet   Commonly known as:  TYLENOL        Take 1,000 mg by mouth every 6 hours if needed. Max acetaminophen dose: 4000mg in 24 hrs.   Refills:  0        albuterol 108 (90 BASE) MCG/ACT Inhaler   Commonly known as:  PROAIR HFA/PROVENTIL HFA/VENTOLIN HFA   Dose:  2 puff   Quantity:  1 Inhaler        Inhale 2 puffs into the lungs every 6 hours as needed for shortness of breath / dyspnea or wheezing   Refills:  0        ascorbic acid 500 MG Tabs   Dose:  500 mg        Take 500  mg by mouth 2 times daily   Refills:  0        Calcium-Vitamin D 600-200 MG-UNIT Caps        Reported on 5/10/2017   Refills:  0        COMPOUND - PHARMACY TO MIX COMPOUNDED MEDICATION   Commonly known as:  CMPD RX   Quantity:  20 g        Apply 1g daily for 2 weeks, then twice weekly to vagina   Refills:  11        ferrous sulfate 325 (65 FE) MG tablet   Commonly known as:  IRON   Dose:  325 mg        Take 325 mg by mouth 2 times daily   Refills:  0        gabapentin 100 MG capsule   Commonly known as:  NEURONTIN   Quantity:  90 capsule        1.5 hrs before bedtime for 7 nights, increase to 2 pills if not effective after 7 days, increase to 3 pills after 7 days if not completely effective   Refills:  1        HYDROcodone-acetaminophen 5-325 MG per tablet   Commonly known as:  NORCO   Dose:  1-2 tablet   Quantity:  16 tablet        Take 1-2 tablets by mouth every 6 hours as needed for moderate to severe pain (Moderate to Severe Pain)   Refills:  0        ibuprofen 200 MG tablet   Commonly known as:  ADVIL/MOTRIN        Take 4 tablets by mouth 4 times daily if needed for Pain or Headache. (rare use)   Refills:  0        KEPPRA 500 MG tablet   Dose:  1500 mg   Generic drug:  levETIRAcetam        1,500 mg 2 times daily Take 3 tablets am and 3 tablets pm   Refills:  0        LAMOTRIGINE PO   Dose:  300 mg        Take 300 mg by mouth 2 times daily   Refills:  0        MULTI-VITAMINS Tabs        take 1 tablet by oral route once daily with food   Refills:  0        OXYCODONE HCL PO   Dose:  5 mg        Take 5 mg by mouth once Reported on 5/10/2017   Refills:  0        ranitidine 150 MG tablet   Commonly known as:  ZANTAC   Dose:  150 mg        Take 1 tablet (150 mg) by mouth 2 times daily as needed for heartburn   Refills:  0        tretinoin 0.05 % cream   Commonly known as:  RETIN-A   Quantity:  45 g        Spread a pea size amount into affected area topically at bedtime.  Use sunscreen SPF>20.   Refills:  11         VITAMIN D (CHOLECALCIFEROL) PO   Dose:  1000 Units        Take 1,000 Units by mouth 2 times daily   Refills:  0                Prescriptions were sent or printed at these locations (1 Prescription)                   Other Prescriptions                Printed at Department/Unit printer (1 of 1)         ciprofloxacin (CIPRO) 500 MG tablet                Procedures and tests performed during your visit     Procedure/Test Number of Times Performed    Abdomen XR 1 vw 1    Blood culture 2    CBC with platelets + differential 1    Comprehensive metabolic panel 1    ISTAT gases lactate karol POCT 1    Peripheral IV catheter 1    XR Chest 2 Views 1      Orders Needing Specimen Collection     None      Pending Results     Date and Time Order Name Status Description    5/11/2017 1912 Blood culture In process     5/11/2017 1912 Blood culture In process     5/10/2017 1234 URINE CULTURE AEROBIC BACTERIAL Preliminary             Pending Culture Results     Date and Time Order Name Status Description    5/11/2017 1912 Blood culture In process     5/11/2017 1912 Blood culture In process     5/10/2017 1234 URINE CULTURE AEROBIC BACTERIAL Preliminary             Pending Results Instructions     If you had any lab results that were not finalized at the time of your Discharge, you can call the ED Lab Result RN at [phone]. You will be contacted by this team for any positive Lab results or changes in treatment. The nurses are available 7 days a week from 10A to 6:30P.  You can leave a message 24 hours per day and they will return your call.        Test Results From Your Hospital Stay        5/11/2017  7:02 PM      Component Results     Component Value Ref Range & Units Status    Sodium 140 133 - 144 mmol/L Final    Potassium 3.2 (L) 3.4 - 5.3 mmol/L Final    Chloride 105 94 - 109 mmol/L Final    Carbon Dioxide 25 20 - 32 mmol/L Final    Anion Gap 10 3 - 14 mmol/L Final    Glucose 112 (H) 70 - 99 mg/dL Final    Urea Nitrogen 15 7 - 30  mg/dL Final    Creatinine 0.72 0.52 - 1.04 mg/dL Final    GFR Estimate 84 >60 mL/min/1.7m2 Final    Non  GFR Calc    GFR Estimate If Black >90   GFR Calc   >60 mL/min/1.7m2 Final    Calcium 9.0 8.5 - 10.1 mg/dL Final    Bilirubin Total 0.9 0.2 - 1.3 mg/dL Final    Albumin 3.3 (L) 3.4 - 5.0 g/dL Final    Protein Total 7.3 6.8 - 8.8 g/dL Final    Alkaline Phosphatase 122 40 - 150 U/L Final    ALT 30 0 - 50 U/L Final    AST 20 0 - 45 U/L Final         5/11/2017  6:45 PM      Component Results     Component Value Ref Range & Units Status    WBC 13.8 (H) 4.0 - 11.0 10e9/L Final    RBC Count 4.40 3.8 - 5.2 10e12/L Final    Hemoglobin 13.2 11.7 - 15.7 g/dL Final    Hematocrit 39.7 35.0 - 47.0 % Final    MCV 90 78 - 100 fl Final    MCH 30.0 26.5 - 33.0 pg Final    MCHC 33.2 31.5 - 36.5 g/dL Final    RDW 12.9 10.0 - 15.0 % Final    Platelet Count 222 150 - 450 10e9/L Final    Diff Method Automated Method  Final    % Neutrophils 85.8 % Final    % Lymphocytes 3.5 % Final    % Monocytes 9.7 % Final    % Eosinophils 0.4 % Final    % Basophils 0.1 % Final    % Immature Granulocytes 0.5 % Final    Nucleated RBCs 0 0 /100 Final    Absolute Neutrophil 11.8 (H) 1.6 - 8.3 10e9/L Final    Absolute Lymphocytes 0.5 (L) 0.8 - 5.3 10e9/L Final    Absolute Monocytes 1.3 0.0 - 1.3 10e9/L Final    Absolute Eosinophils 0.1 0.0 - 0.7 10e9/L Final    Absolute Basophils 0.0 0.0 - 0.2 10e9/L Final    Abs Immature Granulocytes 0.1 0 - 0.4 10e9/L Final    Absolute Nucleated RBC 0.0  Final               5/11/2017  6:51 PM      Component Results     Component Value Ref Range & Units Status    Ph Venous 7.38 7.32 - 7.43 pH Final    PCO2 Venous 39 (L) 40 - 50 mm Hg Final    PO2 Venous 33 25 - 47 mm Hg Final    Bicarbonate Venous 24 21 - 28 mmol/L Final    O2 Sat Venous 64 % Final    Lactic Acid 0.9 0.7 - 2.1 mmol/L Final         5/11/2017  7:44 PM         5/11/2017  7:42 PM         5/11/2017  8:16 PM      Narrative      CHEST TWO VIEWS  5/11/2017 8:03 PM     HISTORY: fever    COMPARISON: None.        Impression     IMPRESSION: Calcified granuloma right upper lobe. Otherwise normal.    LAKSHMI MEJIA MD         5/11/2017  9:32 PM      Narrative     ABDOMEN ONE VIEW  5/11/2017  8:02 PM     HISTORY: Stent position.    COMPARISON: 5/5/2017        Impression     IMPRESSION: Double-J stent from the left kidney due to the urinary  bladder in good position. Stones in the kidneys noted previously are  no longer well seen due to overlying bowel gas and radiographic  technique.    LAKSHMI MEJIA MD                Clinical Quality Measure: Blood Pressure Screening     Your blood pressure was checked while you were in the emergency department today. The last reading we obtained was  BP: 109/61 . Please read the guidelines below about what these numbers mean and what you should do about them.  If your systolic blood pressure (the top number) is less than 120 and your diastolic blood pressure (the bottom number) is less than 80, then your blood pressure is normal. There is nothing more that you need to do about it.  If your systolic blood pressure (the top number) is 120-139 or your diastolic blood pressure (the bottom number) is 80-89, your blood pressure may be higher than it should be. You should have your blood pressure rechecked within a year by a primary care provider.  If your systolic blood pressure (the top number) is 140 or greater or your diastolic blood pressure (the bottom number) is 90 or greater, you may have high blood pressure. High blood pressure is treatable, but if left untreated over time it can put you at risk for heart attack, stroke, or kidney failure. You should have your blood pressure rechecked by a primary care provider within the next 4 weeks.  If your provider in the emergency department today gave you specific instructions to follow-up with your doctor or provider even sooner than that, you should follow that  instruction and not wait for up to 4 weeks for your follow-up visit.        Thank you for choosing [facility]       Thank you for choosing [facility] for your care. Our goal is always to provide you with excellent care. Hearing back from our patients is one way we can continue to improve our services. Please take a few minutes to complete the written survey that you may receive in the mail after you visit with us. Thank you!        [employer] Information     [employer] gives you secure access to your electronic health record. If you see a primary care provider, you can also send messages to your care team and make appointments. If you have questions, please call your primary care clinic.  If you do not have a primary care provider, please call [phone] and they will assist you.        Care EveryWhere ID     This is your Care EveryWhere ID. This could be used by other organizations to access your [facility] medical records  [id number]        After Visit Summary       This is your record. Keep this with you and show to your community pharmacist(s) and doctor(s) at your next visit.

## 2017-05-11 NOTE — ED PROVIDER NOTES
History     Chief Complaint:  Fever    HPI   Barbi Tiwari is a 56 year old female who presents with her  for fever. 6 days ago the patient had an urinary stent placed by Dr. Phalen, then she went to in [place] with high activity. The patient returned home 3 days ago, feeling unwell. 2 days ago the patient had a follow up visit with her PCP and she had urine labs taken although she was on Pyridium. After the visit, the patient had a temperature of 101.6 with associated chills. The next day, the patient received a call from the clinic that her UA displayed high WBC. Today, the patient had body aches, and epigastric pain. She did take Zantac for relief of symptoms, but she has not eaten today secondary to loss of appetite. She does want the stent taken out soon. The patient does note that she is scheduled to go to \[facility]\"" next week.     Allergies:  Benoxinate, nausea and vomiting  Sulfa Drugs, hives     Medications:    Gabapentin  Oxycodone   Ascorbic acid  Lamotrigine   Hydrocodone-acetaminophen   Ranitidine   Albuterol  Tretinoin   Levetiracetam     Past Medical History:    HLD  Nephrolithiasis  Lumbar radiculopathy  Allergic rhinitis   Calculus of kidney   Esophageal reflux   Hyperparathyroidism s/p surgery   oligodendroglioma   Osteoarthrosis   Palpitations   PVCs   Seizure disorder (related to tumor)       Past Surgical History:    Craniotomy, tumor resection  Extracorporeal shock wave lithotripsy, cystoscopy, insert stent  Hysterectomy  Parathyroidectomy    Family History:    Mother: Arthritis, Depression, COPD  Father: Heart attack  Brother: Diabetes  Sister: Depression  Son: Depression  Daughter: Depression    Social History:  Negative for tobacco use.  Positive for alcohol use, 1-2/month, only at social events  Patient is a cardiology nurse at [employer]  Marital Status:   [2]     Review of Systems   Constitutional: Positive for appetite change, chills and fever.   Cardiovascular:        Positive for  "epigastric pain   Musculoskeletal:        Positive for body aches   All other systems reviewed and are negative.      Physical Exam   Physical Exam    Patient Vitals for the past 24 hrs:   BP Temp Temp src Heart Rate Resp SpO2 Height Weight   05/11/17 1754 109/61 99.6  F (37.6  C) Oral 116 18 93 % 1.575 m (5' 2\") 64.4 kg (142 lb)       General: Alert and Interactive.   Head: No signs of trauma.   Mouth/Throat: Oropharynx is clear and moist.   Eyes: Conjunctivae are normal. Pupils are equal, round, and reactive to light.   Neck: Normal range of motion. No nuchal rigidity.   CV: Normal rate and regular rhythm.    Resp: Effort normal and breath sounds normal. No respiratory distress.   GI: Soft. There is no tenderness or guarding.   MSK: Normal range of motion. no edema.   Neuro: The patient is alert and oriented to person, place, and time.  PERRLA, EOMI, strength in upper/lower extremities normal and symmetrical.   Sensation normal. Speech normal.  GCS eye subscore is 4. GCS verbal subscore is 5. GCS motor subscore is 6.   Skin: Skin is warm and dry. No rash noted.   Psych: normal mood and affect. behavior is normal.       Emergency Department Course   Laboratory:  ISTAT gases: pH venous: 7.38, pCO2 Venous: 39 (L), PO2 Venous: 33, Bicarbonate venous: 24, 02 sat: 64    CMP: Glucose 112 (H), Albumin: 3.3 (L), o/w WNL (Creatinine: 0.72)  CBC: WBC: 13.8 (H), HGB: 13.2, PLT: 222    Interventions:  1943 Cipro 400 mg IV  1943 Toradol 30 mg IV    Emergency Department Course:  Nursing notes and vitals reviewed. I performed an exam of the patient as documented above.     The above workup was undertaken.    1925 I checked on the patient and we discussed antibiotics for potential treatment of probable Pseudomonas.    1929 I consulted with Dr. Romero, a representative for Dr. Phalen, regarding the appropriate course of treatment for the patient.     1932 I talked with the patient about what Dr. Romero had recommended for " her.    2135 I reevaluated the patient and provided an update in regards to her ED course.      Findings and plan explained to the Patient. Patient discharged home with instructions regarding supportive care, medications, and reasons to return. The importance of close follow-up was reviewed. The patient was prescribed Cipro.    I personally reviewed the laboratory results with the Patient and answered all related questions prior to discharge.       Impression & Plan    Medical Decision Making:   Barbi Tiwari is a 56 year old female who presents for evaluation of a fever.  Urinalysis is consistent with a urinary tract infection; given the systemic symptoms present, signs and symptoms consistent with pyelonephritis.  There is no clinical evidence of perinephric abscess, emphysematous pyelonephritis, ureterolithiasis, appendicitis, colitis, diverticulitis or any intraabdominal catastrophe.  Patient was given dose of antibiotics in ED; see above.    This patient has a stent in the left ureter that is in good position per the K[facility].  I consulted with Dr Romero, who agreed with the plan to treat with cipro given the suspicion of pseudomonas infection.     I discussed inpatient vs outpatient treatment with the patient and her .  They prefer to try outpatient treatment with oral cipro.  She agrees to return if increasing pain, vomiting, fever, or inability to tolerate the oral antibiotic.  Close follow up with primary urologist is indicated.        Diagnosis:    ICD-10-CM    1. Rigors R68.89    2. Pyelonephritis N12        Disposition:  discharged to home    Discharge Medications:  New Prescriptions    CIPROFLOXACIN (CIPRO) 500 MG TABLET    Take 1 tablet (500 mg) by mouth 2 times daily for 7 days     Tabitha HEIN, am serving as a scribe on 5/11/2017 at 6:42 PM to personally document services performed by Cecil Douglas MD based on my observations and the provider's statements to me.     Tabitha  Rona  5/11/2017    EMERGENCY DEPARTMENT       Cecil Douglas MD  05/12/17 0248

## 2017-05-11 NOTE — ED AVS SNAPSHOT
Emergency Department    [address] [place] [address]    Phone:  [phone]    Fax:  [phone]                                       Barbi Tiwari   MRN: [id number]    Department:   Emergency Department   Date of Visit:  5/11/2017           After Visit Summary Signature Page     I have received my discharge instructions, and my questions have been answered. I have discussed any challenges I see with this plan with the nurse or doctor.    ..........................................................................................................................................  Patient/Patient Representative Signature      ..........................................................................................................................................  Patient Representative Print Name and Relationship to Patient    ..................................................               ................................................  Date                                            Time    ..........................................................................................................................................  Reviewed by Signature/Title    ...................................................              ..............................................  Date                                                            Time

## 2017-05-11 NOTE — TELEPHONE ENCOUNTER
Barbi Tiwari is a 56 year old female who calls with all over body aches.  Admitted to ER on 5/5/17 with ureteral stone.  Patient underwent cystoscopy, urethral dilation, lithotripsy, and left sent placement.  Had follow up visit with PCP yesterday.  Today patient reports feeling worse with all over body aches, rigors, and fever.  Rigors started yesterday afternoon and lasted for approximately 15-20 minutes, however today lasting greater than 1 hour.  Reports feeling fatigued.  Oral temperature 101.6F.      Allergies:   Allergies   Allergen Reactions     Benoxinate Nausea and Vomiting     Nuts Swelling     Destinee nuts     Sulfa Drugs Hives       NURSING PLAN: Nursing advice to patient to seek emergency care.      RECOMMENDED DISPOSITION:  To ED, another person to drive - patient stated spouse will drive.    Will comply with recommendation: Yes  If further questions/concerns or if symptoms do not improve, worsen or new symptoms develop, call your PCP or [facility] as soon as possible.      Guideline used:  Telephone Triage Protocols for Nurses, Fifth Edition, Mary [address] RN

## 2017-05-12 NOTE — DISCHARGE INSTRUCTIONS
Ureteral Stents  A ureteral stent is a soft plastic tube with holes in it. It s temporarily inserted into a ureter to help drain urine into the bladder. One end goes in the kidney. The other end goes in the bladder. A coil on each end holds the stent in place. The stent can t be seen from outside the body. It shouldn t interfere with your normal routine. Your stent will be put in by a urologist (doctor trained in treating the urinary tract) or another specialist. The procedure is done in a hospital or surgery center. You ll likely go home the same day.  When Is a Ureteral Stent Used?  A ureteral stent may be used:    To bypass a blockage in a kidney or ureter.    During kidney stone removal.    To let a ureter heal after surgery.    Before the Procedure  Your doctor will give you instructions to prepare for the procedure. X-rays or other imaging tests of your kidneys and ureters may be done beforehand.  During the Procedure    You receive medication to prevent pain and help you relax or sleep during the procedure. Once this takes effect, the procedure starts.    The doctor inserts a cystoscope (lighted instrument) through the urethra and into the bladder. This shows the opening to the ureter.    A thin wire is carefully threaded through the cystoscope, up the ureter, and into the kidney. The stent is inserted over the wire.    A fluoroscope (special X-ray machine) is used to help position the stent. When the stent is in place, the wire and cystoscope are removed.  While You Have a Stent    Some discomfort is normal. Certain movements may trigger pain or a feeling that you need to urinate. You may also feel mild soreness or pressure before or during urination. These symptoms will go away a few days after the stent is removed.    Medication to control pain or bladder spasms or to prevent infection may be prescribed. Take this as directed.    Drink plenty of fluids to help flush out your urinary tract.    Your urine  "may be slightly pink or red. This is due to bleeding caused by minor irritation from the stent. This may happen on and off while you have the stent.    As with any synthetic device placed in the body, there is a risk of infection. The stent may have to be removed if this happens.   How Long Will You Need a Stent?  The stent is often taken out after the blockage in the ureter is treated or the ureter has healed. This may take 1 week to 2 weeks, or longer. If a stent is needed for a long time, it may need to be changed every few months.  Call Your Doctor  Contact your doctor right away if:    Your urine contains blood clots or you see a large amount of blood-tinged urine.     You have symptoms similar to those you had before the stent was placed.     You constantly leak urine.    You have a fever over 100.4 F (38 C), chills, nausea, or vomiting.    Your pain is not relieved with medication.    The end of the stent comes out of the urethra.     3849-1785 [employer]. [address] [place] [address]. All rights reserved. This information is not intended as a substitute for professional medical care. Always follow your healthcare professional's instructions.        Kidney Infection (Adult, Female)    An infection of the kidney is also called \"pyelonephritis.\" It usually starts as a bladder infection (\"cystitis\") that spreads to the kidneys. Pyelonephritis is more serious than a bladder infection. It can cause severe illness if not treated properly.  The usual symptoms include an aching pain in the back, side, or lower abdomen. Other symptoms may include fever, chills, nausea, vomiting, blood in the urine, an urge to urinate, and a burning sensation when urinating. Treatment is usually oral antibiotics, or in more severe cases, intramuscular or IV antibiotics. These are started right away and may be changed once urine culture results determine the infecting organisms.  Home care  The following are " general care guidelines:  1. Stay home from work or school. Rest in bed until your fever breaks and you are feeling better.  2. Drink lots of fluid (at least 6 to 8 glasses a day, unless you must restrict fluids for other medical reasons). This will force the medicine into your urinary system and flush the bacteria out of your body.  3. Avoid sex until you have finished all of your medicine and your symptoms are gone.  4. Avoid caffeine, alcohol, and spicy foods. These foods may irritate the kidney and bladder.  5. You may use acetaminophen or ibuprofen to control pain, unless another pain medicine was prescribed. [NOTE: If you have chronic liver or kidney disease or ever had a stomach ulcer or GI bleeding, talk with your doctor before using these medicines.]  Follow-up care  Follow up with your doctor or as advised by our staff for a repeat urine test in 10 days. This will ensure that your infection is fully cleared.  [NOTE: If you had an X-ray, CT scan, or other diagnostic test, it will be reviewed by a specialist. You will be notified of any new findings that may affect your care.]  When to seek medical care  Get prompt medical attention if any of the following occur:    Fever over 100.4 F (38.0 C) after 48 hours of treatment    No improvement by the third day of treatment    Increasing back or abdominal pain    Repeated vomiting or inability to take oral medicine    Weakness, dizziness, or fainting    5835-3468 [employer]. [address] [place] [address]. All rights reserved. This information is not intended as a substitute for professional medical care. Always follow your healthcare professional's instructions.

## 2017-05-12 NOTE — DISCHARGE SUMMARY
Please see H&P from earlier today for details regarding HPI and physical exam.      HOSPITAL COURSE: Patient was admitted to observation with pain control and fluid hydration. Urology evaluated the patient and she undewent a cystoscopy, left ureteral stent placement and ESWL. She tolerated the procedure well and was discharged home in stable condition with close urology follow up as outpatient.       Review of your medicines      UNREVIEWED medicines. Ask your doctor about these medicines       Dose / Directions    KEPPRA 500 MG tablet   Generic drug:  levETIRAcetam        Dose:  1500 mg   1,500 mg 2 times daily Take 3 tablets am and 3 tablets pm   Refills:  0       MULTI-VITAMINS Tabs        take 1 tablet by oral route once daily with food   Refills:  0         START taking       Dose / Directions    HYDROcodone-acetaminophen 5-325 MG per tablet   Commonly known as:  NORCO   Used for:  Ureteral stone, Hydronephrosis, unspecified hydronephrosis type, Nephrolithiasis        Dose:  1-2 tablet   Take 1-2 tablets by mouth every 6 hours as needed for moderate to severe pain (Moderate to Severe Pain)   Quantity:  16 tablet   Refills:  0         CONTINUE these medicines which may have CHANGED, or have new prescriptions. If we are uncertain of the size of tablets/capsules you have at home, strength may be listed as something that might have changed.       Dose / Directions    ranitidine 150 MG tablet   Commonly known as:  ZANTAC   This may have changed:  when to take this   Used for:  Gastroesophageal reflux disease without esophagitis        Dose:  150 mg   Take 1 tablet (150 mg) by mouth 2 times daily as needed for heartburn   Refills:  0         CONTINUE these medicines which have NOT CHANGED       Dose / Directions    acetaminophen 500 MG tablet   Commonly known as:  TYLENOL        Take 1,000 mg by mouth every 6 hours if needed. Max acetaminophen dose: 4000mg in 24 hrs.   Refills:  0       albuterol 108 (90 BASE)  MCG/ACT Inhaler   Commonly known as:  PROAIR HFA/PROVENTIL HFA/VENTOLIN HFA   Used for:  Acute bronchitis, URI (upper respiratory infection)        Dose:  2 puff   Inhale 2 puffs into the lungs every 6 hours as needed for shortness of breath / dyspnea or wheezing   Quantity:  1 Inhaler   Refills:  0       ascorbic acid 500 MG Tabs        Dose:  500 mg   Take 500 mg by mouth 2 times daily   Refills:  0       Calcium-Vitamin D 600-200 MG-UNIT Caps        Reported on 5/10/2017   Refills:  0       COMPOUND - PHARMACY TO MIX COMPOUNDED MEDICATION   Commonly known as:  CMPD RX   Used for:  Vaginal atrophy        Apply 1g daily for 2 weeks, then twice weekly to vagina   Quantity:  20 g   Refills:  11       ferrous sulfate 325 (65 FE) MG tablet   Commonly known as:  IRON        Dose:  325 mg   Take 325 mg by mouth 2 times daily   Refills:  0       ibuprofen 200 MG tablet   Commonly known as:  ADVIL/MOTRIN        Take 4 tablets by mouth 4 times daily if needed for Pain or Headache. (rare use)   Refills:  0       LAMOTRIGINE PO        Dose:  300 mg   Take 300 mg by mouth 2 times daily   Refills:  0       OXYCODONE HCL PO        Dose:  5 mg   Take 5 mg by mouth once Reported on 5/10/2017   Refills:  0       tretinoin 0.05 % cream   Commonly known as:  RETIN-A   Used for:  Senile sebaceous gland hyperplasia        Spread a pea size amount into affected area topically at bedtime.  Use sunscreen SPF>20.   Quantity:  45 g   Refills:  11       VITAMIN D (CHOLECALCIFEROL) PO        Dose:  1000 Units   Take 1,000 Units by mouth 2 times daily   Refills:  0            Where to get your medicines      Some of these will need a paper prescription and others can be bought over the counter. Ask your nurse if you have questions.     Bring a paper prescription for each of these medications      HYDROcodone-acetaminophen 5-325 MG per tablet

## 2017-05-14 LAB
BACTERIA SPEC CULT: ABNORMAL
MICRO REPORT STATUS: ABNORMAL
MICROORGANISM SPEC CULT: ABNORMAL
MICROORGANISM SPEC CULT: ABNORMAL
SPECIMEN SOURCE: ABNORMAL

## 2017-05-15 ENCOUNTER — OFFICE VISIT (OUTPATIENT)
Dept: INTERNAL MEDICINE | Facility: CLINIC | Age: 57
End: 2017-05-15
Payer: COMMERCIAL

## 2017-05-15 VITALS
BODY MASS INDEX: 25.64 KG/M2 | SYSTOLIC BLOOD PRESSURE: 96 MMHG | DIASTOLIC BLOOD PRESSURE: 64 MMHG | TEMPERATURE: 98.3 F | OXYGEN SATURATION: 96 % | WEIGHT: 140.2 LBS | HEART RATE: 87 BPM

## 2017-05-15 DIAGNOSIS — N10 ACUTE PYELONEPHRITIS: ICD-10-CM

## 2017-05-15 DIAGNOSIS — R05.9 COUGH: ICD-10-CM

## 2017-05-15 DIAGNOSIS — B37.0 CANDIDIASIS OF MOUTH AND ESOPHAGUS (H): Primary | ICD-10-CM

## 2017-05-15 DIAGNOSIS — B37.81 CANDIDIASIS OF MOUTH AND ESOPHAGUS (H): Primary | ICD-10-CM

## 2017-05-15 PROCEDURE — 99214 OFFICE O/P EST MOD 30 MIN: CPT | Performed by: INTERNAL MEDICINE

## 2017-05-15 RX ORDER — FLUCONAZOLE 200 MG/1
TABLET ORAL
Qty: 15 TABLET | Refills: 0 | Status: SHIPPED | OUTPATIENT
Start: 2017-05-15 | End: 2018-04-13

## 2017-05-15 RX ORDER — BENZONATATE 100 MG/1
100 CAPSULE ORAL 3 TIMES DAILY PRN
Qty: 30 CAPSULE | Refills: 0 | Status: SHIPPED | OUTPATIENT
Start: 2017-05-15 | End: 2018-04-13

## 2017-05-15 RX ORDER — ALBUTEROL SULFATE 90 UG/1
2 AEROSOL, METERED RESPIRATORY (INHALATION) EVERY 6 HOURS PRN
Qty: 1 INHALER | Refills: 0 | Status: SHIPPED | OUTPATIENT
Start: 2017-05-15 | End: 2018-12-10

## 2017-05-15 NOTE — MR AVS SNAPSHOT
After Visit Summary   5/15/2017    Barbi Tiwari    MRN: [id number]           Patient Information     Date Of Birth          1960        Visit Information        Provider Department      5/15/2017 1:40 PM Dustin Choudhury MD [facility]        Today's Diagnoses     Candidiasis of mouth and esophagus (H)    -  1    Acute pyelonephritis        Cough           Follow-ups after your visit        Your next 10 appointments already scheduled     May 17, 2017 10:20 AM CDT   Office Visit with Dustin Choudhury MD   [facility]    [address] [id number] [phone]           Bring a current list of meds and any records pertaining to this visit.  For Physicals, please bring immunization records and any forms needing to be filled out.  Please arrive 10 minutes early to complete paperwork.            Apr 02, 2018 10:45 AM CDT   (Arrive by 10:30 AM)   MR BRAIN W/O & W CONTRAST with [id number]   [facility] ([facility])    [address] [address] [phone]           Take your medicines as usual, unless your doctor tells you not to. Bring a list of your current medicines to your exam (including vitamins, minerals and over-the-counter drugs).  You will be given intravenous contrast for this exam. To prepare:   The day before your exam, drink extra fluids at least six 8-ounce glasses (unless your doctor tells you to restrict your fluids).   Have a blood test (creatinine test) within 30 days of your exam. Go to your clinic or Diagnostic Imaging Department for this test.  The MRI machine uses a strong magnet. Please wear clothes without metal (snaps, zippers). A sweatsuit works well, or we may give you a hospital gown.  Please remove any body piercings and hair extensions before you arrive. You will also remove watches,  jewelry, hairpins, wallets, dentures, partial dental plates and hearing aids. You may wear contact lenses, and you may be able to wear your rings. We have a safe place to keep your personal items, but it is safer to leave them at home.   **IMPORTANT** THE INSTRUCTIONS BELOW ARE ONLY FOR THOSE PATIENTS WHO HAVE BEEN TOLD THEY WILL RECEIVE SEDATION OR GENERAL ANESTHESIA DURING THEIR MRI PROCEDURE:  IF YOU WILL RECEIVE SEDATION (take medicine to help you relax during your exam):   You must get the medicine from your doctor before you arrive. Bring the medicine to the exam. Do not take it at home.   Arrive one hour early. Bring someone who can take you home after the test. Your medicine will make you sleepy. After the exam, you may not drive, take a bus or take a taxi by yourself.   No eating 8 hours before your exam. You may have clear liquids up until 4 hours before your exam. (Clear liquids include water, clear tea, black coffee and fruit juice without pulp.)  IF YOU WILL RECEIVE ANESTHESIA (be asleep for your exam):   Arrive 1 1/2 hours early. Bring someone who can take you home after the test. You may not drive, take a bus or take a taxi by yourself.   No eating 8 hours before your exam. You may have clear liquids up until 4 hours before your exam. (Clear liquids include water, clear tea, black coffee and fruit juice without pulp.)  Please call the Imaging Department at your exam site with any questions.            Apr 09, 2018 10:00 AM CDT   (Arrive by 9:45 AM)   Return Visit with Neo Weinstein MD   [facility] ([facility])    [address] [place] [address] [phone]              Who to contact     If you have questions or need follow up information about today's clinic visit or your schedule please contact [facility] directly at [phone].  Normal or non-critical lab and imaging results will be  communicated to you by [employer], letter or phone within 4 business days after the clinic has received the results. If you do not hear from us within 7 days, please contact the clinic through [employer] or phone. If you have a critical or abnormal lab result, we will notify you by phone as soon as possible.  Submit refill requests through [employer] or call your pharmacy and they will forward the refill request to us. Please allow 3 business days for your refill to be completed.          Additional Information About Your Visit        [employer] Information     [employer] gives you secure access to your electronic health record. If you see a primary care provider, you can also send messages to your care team and make appointments. If you have questions, please call your primary care clinic.  If you do not have a primary care provider, please call [phone] and they will assist you.        Care EveryWhere ID     This is your Care EveryWhere ID. This could be used by other organizations to access your [facility] medical records  [id number]        Your Vitals Were     Pulse Temperature Pulse Oximetry BMI (Body Mass Index)          87 98.3  F (36.8  C) (Oral) 96% 25.64 kg/m2         Blood Pressure from Last 3 Encounters:   05/15/17 96/64   05/11/17 109/61   05/10/17 108/70    Weight from Last 3 Encounters:   05/15/17 140 lb 3.2 oz (63.6 kg)   05/11/17 142 lb (64.4 kg)   05/10/17 142 lb (64.4 kg)              Today, you had the following     No orders found for display         Today's Medication Changes          These changes are accurate as of: 5/15/17  2:17 PM.  If you have any questions, ask your nurse or doctor.               Start taking these medicines.        Dose/Directions    fluconazole 200 MG tablet   Commonly known as:  DIFLUCAN   Used for:  Candidiasis of mouth and esophagus (H)   Started by:  Dustin Choudhury MD        2 tablets by mouth the first day followed by 1 tablet daily for 1-2 weeks based on symptoms  resolution   Quantity:  15 tablet   Refills:  0         These medicines have changed or have updated prescriptions.        Dose/Directions    * TESSALON PERLES PO   This may have changed:  Another medication with the same name was added. Make sure you understand how and when to take each.   Used for:  Candidiasis of mouth and esophagus (H)   Changed by:  Dustin Choudhury MD        Refills:  0       * benzonatate 100 MG capsule   Commonly known as:  TESSALON   This may have changed:  You were already taking a medication with the same name, and this prescription was added. Make sure you understand how and when to take each.   Used for:  Cough   Changed by:  Dustin Choudhury MD        Dose:  100 mg   Take 1 capsule (100 mg) by mouth 3 times daily as needed for cough   Quantity:  30 capsule   Refills:  0       * Notice:  This list has 2 medication(s) that are the same as other medications prescribed for you. Read the directions carefully, and ask your doctor or other care provider to review them with you.         Where to get your medicines      These medications were sent to [address] [address]     Phone:  [phone]     albuterol 108 (90 BASE) MCG/ACT Inhaler    benzonatate 100 MG capsule    fluconazole 200 MG tablet                Primary Care Provider Office Phone # Fax #    Dustin Choudhury [phone]       [place] [address] [facility] [address]        Thank you!     Thank you for choosing [facility]  for your care. Our goal is always to provide you with excellent care. Hearing back from our patients is one way we can continue to improve our services. Please take a few minutes to complete the written survey that you may receive in the mail after your visit with us. Thank you!             Your Updated Medication List - Protect others around you: Learn how  to safely use, store and throw away your medicines at www.disposemymeds.org.          This list is accurate as of: 5/15/17  2:17 PM.  Always use your most recent med list.                   Brand Name Dispense Instructions for use    acetaminophen 500 MG tablet    TYLENOL     Take 1,000 mg by mouth every 6 hours if needed. Max acetaminophen dose: 4000mg in 24 hrs.       albuterol 108 (90 BASE) MCG/ACT Inhaler    PROAIR HFA/PROVENTIL HFA/VENTOLIN HFA    1 Inhaler    Inhale 2 puffs into the lungs every 6 hours as needed for shortness of breath / dyspnea or wheezing       ascorbic acid 500 MG Tabs      Take 500 mg by mouth 2 times daily       Calcium-Vitamin D 600-200 MG-UNIT Caps      Reported on 5/15/2017       ciprofloxacin 500 MG tablet    CIPRO    14 tablet    Take 1 tablet (500 mg) by mouth 2 times daily for 7 days       COMPOUND - PHARMACY TO MIX COMPOUNDED MEDICATION    CMPD RX    20 g    Apply 1g daily for 2 weeks, then twice weekly to vagina       ferrous sulfate 325 (65 FE) MG tablet    IRON     Take 325 mg by mouth 2 times daily Reported on 5/15/2017       fluconazole 200 MG tablet    DIFLUCAN    15 tablet    2 tablets by mouth the first day followed by 1 tablet daily for 1-2 weeks based on symptoms resolution       gabapentin 100 MG capsule    NEURONTIN    90 capsule    1.5 hrs before bedtime for 7 nights, increase to 2 pills if not effective after 7 days, increase to 3 pills after 7 days if not completely effective       ibuprofen 200 MG tablet    ADVIL/MOTRIN     Take 4 tablets by mouth 4 times daily if needed for Pain or Headache. (rare use)       KEPPRA 500 MG tablet   Generic drug:  levETIRAcetam      1,500 mg 2 times daily Take 3 tablets am and 3 tablets pm       LAMOTRIGINE PO      Take 300 mg by mouth 2 times daily       MULTI-VITAMINS Tabs      take 1 tablet by oral route once daily with food       PRILOSEC OTC PO      Take 20 mg by mouth       ranitidine 150 MG tablet    ZANTAC     Take 1 tablet  (150 mg) by mouth 2 times daily as needed for heartburn       * TESSALON PERLES PO          * benzonatate 100 MG capsule    TESSALON    30 capsule    Take 1 capsule (100 mg) by mouth 3 times daily as needed for cough       tretinoin 0.05 % cream    RETIN-A    45 g    Spread a pea size amount into affected area topically at bedtime.  Use sunscreen SPF>20.       VITAMIN D (CHOLECALCIFEROL) PO      Take 1,000 Units by mouth 2 times daily Reported on 5/15/2017       * Notice:  This list has 2 medication(s) that are the same as other medications prescribed for you. Read the directions carefully, and ask your doctor or other care provider to review them with you.

## 2017-05-15 NOTE — NURSING NOTE
"Chief Complaint   Patient presents with     Mouth/Lip Problem       Initial BP 96/64  Pulse 87  Temp 98.3  F (36.8  C) (Oral)  Wt 140 lb 3.2 oz (63.6 kg)  SpO2 96%  BMI 25.64 kg/m2 Estimated body mass index is 25.64 kg/(m^2) as calculated from the following:    Height as of 5/11/17: 5' 2\" (1.575 m).    Weight as of this encounter: 140 lb 3.2 oz (63.6 kg).  Medication Reconciliation: complete    "

## 2017-05-15 NOTE — LETTER
5/15/2017      Re: Barbi Tiwari      To Whom it may concern,    Please excuse Barbi Tiwari from work due to illness from 5/11- 5/14.  She may return to work at her next scheduled shift.       Sincerely,           Dustin Choudhury M.D.  Dept of Internal Medicine  5/15/2017

## 2017-05-15 NOTE — PROGRESS NOTES
SUBJECTIVE:                                                    Barbi Tiwari is a 56 year old female who presents to clinic today for the following health issues:    Pt seen here last Wed- s/p stent placement for kidney stones.  Was feeling a bit ill but no fever or chills. Later that day afte visit had onset of chills and fever. Went to ER next day and started on cipro for presumed UTI.  She has been on this since and her initial sx have resolved  Her urine cx grew 2 strains of pseudomonas   Since yesterday she has noticed pain w/swallowing in chest and back of throat as well as a raw, gritty sensation on tongue.     Concern - Thrush     Onset: 2 days    Description:   Patient has thrush    Intensity: mild    Progression of Symptoms:  same    Accompanying Signs & Symptoms:  none       Previous history of similar problem:   none    Precipitating factors:   Worsened by: none    Alleviating factors:  Improved by: none       Therapies Tried and outcome: none    Problem list and histories reviewed & adjusted, as indicated.  Additional history: as documented    Labs reviewed in EPIC    Reviewed and updated as needed this visit by clinical staff  Allergies       Reviewed and updated as needed this visit by Provider         ROS:  Constitutional, HEENT, cardiovascular, pulmonary, gi and gu systems are negative, except as otherwise noted.    OBJECTIVE:                                                    BP 96/64  Pulse 87  Temp 98.3  F (36.8  C) (Oral)  Wt 140 lb 3.2 oz (63.6 kg)  SpO2 96%  BMI 25.64 kg/m2  Body mass index is 25.64 kg/(m^2).  GENERAL APPEARANCE: alert and no distress  HENT: tongue with some whitish plaques. No buccal involvement. Posterior pharynx mildly erythematous w/ a few scattered whitish spots.  RESP: lungs clear to auscultation - no rales, rhonchi or wheezes  CV: regular rates and rhythm, normal S1 S2, no S3 or S4 and no murmur, click or rub    Diagnostic test results:  none       ASSESSMENT/PLAN:                                                    1. Candidiasis of mouth and esophagus (H)  While I cannot prove she has esophageal involvement her symptoms would be consistent with this  rx as such:  - fluconazole (DIFLUCAN) 200 MG tablet; 2 tablets by mouth the first day followed by 1 tablet daily for 1-2 weeks based on symptoms resolution  Dispense: 15 tablet; Refill: 0    2. Acute pyelonephritis  Finish course of cipro  Stent to be removed later this week    3. Cough  - benzonatate (TESSALON) 100 MG capsule; Take 1 capsule (100 mg) by mouth 3 times daily as needed for cough  Dispense: 30 capsule; Refill: 0  - albuterol (PROAIR HFA/PROVENTIL HFA/VENTOLIN HFA) 108 (90 BASE) MCG/ACT Inhaler; Inhale 2 puffs into the lungs every 6 hours as needed for shortness of breath / dyspnea or wheezing  Dispense: 1 Inhaler; Refill: 0      Dustin Choudhury MD  [facility]

## 2017-05-17 LAB
BACTERIA SPEC CULT: NO GROWTH
BACTERIA SPEC CULT: NO GROWTH
Lab: NORMAL
Lab: NORMAL
MICRO REPORT STATUS: NORMAL
MICRO REPORT STATUS: NORMAL
SPECIMEN SOURCE: NORMAL
SPECIMEN SOURCE: NORMAL

## 2017-05-29 ENCOUNTER — MYC MEDICAL ADVICE (OUTPATIENT)
Dept: INTERNAL MEDICINE | Facility: CLINIC | Age: 57
End: 2017-05-29

## 2017-07-28 RX ORDER — ZOLPIDEM TARTRATE 5 MG/1
TABLET ORAL
Qty: 1 TABLET | Refills: 0 | Status: SHIPPED | OUTPATIENT
Start: 2017-07-28 | End: 2018-04-13

## 2017-07-31 ENCOUNTER — DOCUMENTATION ONLY (OUTPATIENT)
Dept: SLEEP MEDICINE | Facility: CLINIC | Age: 57
End: 2017-07-31

## 2017-08-11 ENCOUNTER — THERAPY VISIT (OUTPATIENT)
Dept: SLEEP MEDICINE | Facility: CLINIC | Age: 57
End: 2017-08-11
Attending: NURSE PRACTITIONER
Payer: COMMERCIAL

## 2017-08-11 DIAGNOSIS — R06.83 SNORING: ICD-10-CM

## 2017-08-11 DIAGNOSIS — G25.81 RESTLESS LEGS SYNDROME (RLS): ICD-10-CM

## 2017-08-11 DIAGNOSIS — G47.50 PARASOMNIA: ICD-10-CM

## 2017-08-11 PROCEDURE — 95810 POLYSOM 6/> YRS 4/> PARAM: CPT | Mod: ZF | Performed by: NURSE PRACTITIONER

## 2017-08-11 NOTE — MR AVS SNAPSHOT
After Visit Summary   8/11/2017    Barbi Tiwari    MRN: [id number]           Patient Information     Date Of Birth          1960        Visit Information        Provider Department      8/11/2017 8:00 PM SLEEP STUDY RM 5 [facility], Sleep Study        Today's Diagnoses     Snoring        Restless legs syndrome (RLS)        Parasomnia          Care Instructions    [facility]    1. Your sleep study will be reviewed by a sleep physician within the next few days.     2. Please follow up in the sleep clinic as scheduled, or, make an appointment with your sleep provider to be seen within two weeks to discuss the results of the sleep study.    3. If you have any questions or problems with your treatment plan, please contact your sleep clinic provider at [phone] to further manage your condition.    4. Please review your attached medication list, and, at your follow-up appointment advise your sleep clinic provider about any changes.    5. Go to [email or web address] for more information about your sleep problems.    JEWELS Retana  August 11, 2017                    Follow-ups after your visit        Your next 10 appointments already scheduled     Sep 01, 2017  1:30 PM CDT   Return Sleep Patient with Carlos Manuel Lozoya MD   [facility], Sleep Study ([facility])    [address] [place] [address] [phone]            Apr 02, 2018 10:45 AM CDT   (Arrive by 10:30 AM)   MR BRAIN W/O & W CONTRAST with [id number]   [facility] ([facility])    [address] [address] [phone]           Take your medicines as usual, unless your doctor tells you not to. Bring a list of your current medicines to your exam (including vitamins, minerals and over-the-counter drugs).  You will be given intravenous contrast for this exam. To  prepare:   The day before your exam, drink extra fluids at least six 8-ounce glasses (unless your doctor tells you to restrict your fluids).   Have a blood test (creatinine test) within 30 days of your exam. Go to your clinic or Diagnostic Imaging Department for this test.  The MRI machine uses a strong magnet. Please wear clothes without metal (snaps, zippers). A sweatsuit works well, or we may give you a hospital gown.  Please remove any body piercings and hair extensions before you arrive. You will also remove watches, jewelry, hairpins, wallets, dentures, partial dental plates and hearing aids. You may wear contact lenses, and you may be able to wear your rings. We have a safe place to keep your personal items, but it is safer to leave them at home.   **IMPORTANT** THE INSTRUCTIONS BELOW ARE ONLY FOR THOSE PATIENTS WHO HAVE BEEN TOLD THEY WILL RECEIVE SEDATION OR GENERAL ANESTHESIA DURING THEIR MRI PROCEDURE:  IF YOU WILL RECEIVE SEDATION (take medicine to help you relax during your exam):   You must get the medicine from your doctor before you arrive. Bring the medicine to the exam. Do not take it at home.   Arrive one hour early. Bring someone who can take you home after the test. Your medicine will make you sleepy. After the exam, you may not drive, take a bus or take a taxi by yourself.   No eating 8 hours before your exam. You may have clear liquids up until 4 hours before your exam. (Clear liquids include water, clear tea, black coffee and fruit juice without pulp.)  IF YOU WILL RECEIVE ANESTHESIA (be asleep for your exam):   Arrive 1 1/2 hours early. Bring someone who can take you home after the test. You may not drive, take a bus or take a taxi by yourself.   No eating 8 hours before your exam. You may have clear liquids up until 4 hours before your exam. (Clear liquids include water, clear tea, black coffee and fruit juice without pulp.)  Please call the Imaging Department at your exam site with any  questions.            Apr 09, 2018 10:00 AM CDT   (Arrive by 9:45 AM)   Return Visit with Neo Weinstein MD   [facility] ([facility])    [address]  [address] [facility] [address] [phone]              Who to contact     If you have questions or need follow up information about today's clinic visit or your schedule please [facility] directly at [phone].  Normal or non-critical lab and imaging results will be communicated to you by [employer], letter or phone within 4 business days after the clinic has received the results. If you do not hear from us within 7 days, please contact the clinic through [employer] or phone. If you have a critical or abnormal lab result, we will notify you by phone as soon as possible.  Submit refill requests through [employer] or call your pharmacy and they will forward the refill request to us. Please allow 3 business days for your refill to be completed.          Additional Information About Your Visit        [employer] Information     [employer] gives you secure access to your electronic health record. If you see a primary care provider, you can also send messages to your care team and make appointments. If you have questions, please call your primary care clinic.  If you do not have a primary care provider, please call [phone] and they will assist you.        Care EveryWhere ID     This is your Care EveryWhere ID. This could be used by other organizations to access your [place] medical records  [id number]         Blood Pressure from Last 3 Encounters:   05/15/17 96/64   05/11/17 109/61   05/10/17 108/70    Weight from Last 3 Encounters:   05/15/17 63.6 kg (140 lb 3.2 oz)   05/11/17 64.4 kg (142 lb)   05/10/17 64.4 kg (142 lb)              We Performed the Following     Comprehensive Sleep Study        Primary Care Provider Office Phone # Fax #    Dustin Choudhury [phone]  [phone]       [address] [facility] [address]        Equal Access to Services     YOBANI GRIFFITH : Hadii aad ku rosangela Mustafa, wascarlettda luqanum, lillieta kaeddieda binh, nina rockyxiao servin lakaminiangel la. So [facility] [phone].    ATENCIÓN: Si habla español, tiene a fletcher disposición servicios gratuitos de asistencia lingüística. Llame al [phone].    We comply with applicable federal civil rights laws and [place] laws. We do not discriminate on the basis of race, color, national origin, age, disability sex, sexual orientation or gender identity.            Thank you!     Thank you for choosing [facility], SLEEP STUDY  for your care. Our goal is always to provide you with excellent care. Hearing back from our patients is one way we can continue to improve our services. Please take a few minutes to complete the written survey that you may receive in the mail after your visit with us. Thank you!             Your Updated Medication List - Protect others around you: Learn how to safely use, store and throw away your medicines at www.disposemymeds.org.          This list is accurate as of: 8/11/17  8:18 PM.  Always use your most recent med list.                   Brand Name Dispense Instructions for use Diagnosis    acetaminophen 500 MG tablet    TYLENOL     Take 1,000 mg by mouth every 6 hours if needed. Max acetaminophen dose: 4000mg in 24 hrs.        albuterol 108 (90 BASE) MCG/ACT Inhaler    PROAIR HFA/PROVENTIL HFA/VENTOLIN HFA    1 Inhaler    Inhale 2 puffs into the lungs every 6 hours as needed for shortness of breath / dyspnea or wheezing    Cough       ascorbic acid 500 MG Tabs      Take 500 mg by mouth 2 times daily        Calcium-Vitamin D 600-200 MG-UNIT Caps      Reported on 5/15/2017        COMPOUNDED NON-CONTROLLED SUBSTANCE - PHARMACY TO MIX COMPOUNDED MEDICATION    CMPD RX    20 g    Apply 1g daily for 2 weeks, then twice weekly to vagina    Vaginal atrophy       ferrous  sulfate 325 (65 FE) MG tablet    IRON     Take 325 mg by mouth 2 times daily Reported on 5/15/2017        fluconazole 200 MG tablet    DIFLUCAN    15 tablet    2 tablets by mouth the first day followed by 1 tablet daily for 1-2 weeks based on symptoms resolution    Candidiasis of mouth and esophagus (H)       gabapentin 100 MG capsule    NEURONTIN    90 capsule    1.5 hrs before bedtime for 7 nights, increase to 2 pills if not effective after 7 days, increase to 3 pills after 7 days if not completely effective        ibuprofen 200 MG tablet    ADVIL/MOTRIN     Take 4 tablets by mouth 4 times daily if needed for Pain or Headache. (rare use)        KEPPRA 500 MG tablet   Generic drug:  levETIRAcetam      1,500 mg 2 times daily Take 3 tablets am and 3 tablets pm        LAMOTRIGINE PO      Take 300 mg by mouth 2 times daily        MULTI-VITAMINS Tabs      take 1 tablet by oral route once daily with food        PRILOSEC OTC PO      Take 20 mg by mouth    Candidiasis of mouth and esophagus (H)       ranitidine 150 MG tablet    ZANTAC     Take 1 tablet (150 mg) by mouth 2 times daily as needed for heartburn    Gastroesophageal reflux disease without esophagitis       * TESSALON PERLES PO       Candidiasis of mouth and esophagus (H)       * benzonatate 100 MG capsule    TESSALON    30 capsule    Take 1 capsule (100 mg) by mouth 3 times daily as needed for cough    Cough       tretinoin 0.05 % cream    RETIN-A    45 g    Spread a pea size amount into affected area topically at bedtime.  Use sunscreen SPF>20.    Senile sebaceous gland hyperplasia       VITAMIN D (CHOLECALCIFEROL) PO      Take 1,000 Units by mouth 2 times daily Reported on 5/15/2017        zolpidem 5 MG tablet    AMBIEN    1 tablet    Take tablet by mouth 15 minutes prior to sleep, for Sleep Study    Snoring, Restless legs syndrome (RLS), Parasomnia       * Notice:  This list has 2 medication(s) that are the same as other medications prescribed for you. Read  the directions carefully, and ask your doctor or other care provider to review them with you.

## 2017-08-12 NOTE — PATIENT INSTRUCTIONS
[place] SLEEP [facility]    1. Your sleep study will be reviewed by a sleep physician within the next few days.     2. Please follow up in the sleep clinic as scheduled, or, make an appointment with your sleep provider to be seen within two weeks to discuss the results of the sleep study.    3. If you have any questions or problems with your treatment plan, please contact your sleep clinic provider at [phone] to further manage your condition.    4. Please review your attached medication list, and, at your follow-up appointment advise your sleep clinic provider about any changes.    5. Go to [email or web address] for more information about your sleep problems.    Rolanda Wilder, RPSGT  August 11, 2017

## 2017-08-15 ENCOUNTER — TELEPHONE (OUTPATIENT)
Dept: SLEEP MEDICINE | Facility: CLINIC | Age: 57
End: 2017-08-15

## 2017-08-15 NOTE — PROCEDURES
" SLEEP STUDY INTERPRETATION  POLYSOMNOGRAPHY REPORT      Patient: Barbi Tiwari  YOB: 1960  Study Date: 8/11/2017  MRN: [id number]  Referring Provider: Dustin Choudhury MD  Ordering Provider: SUSAN Tobias    Indications for Polysomnography: The patient is a 56 y old female who is 5' 2\" and weighs 143.0 lbs.  Her BMI is 26.3, [place] sleepiness scale 7.0 and neck size is 36.0.  Relevant medical history includes RLS.  A diagnostic polysomnogram was performed to evaluate for evaluation of sleep apnea.     Polysomnogram Data:  A full night polysomnogram recorded the standard physiologic parameters including EEG, EOG, EMG, ECG, nasal and oral airflow.  Respiratory parameters of chest and abdominal movements were recorded with respiratory inductance plethysmography.  Oxygen saturation was recorded by pulse oximetry.      Sleep Architecture: Brief sleep latency with medication.  The total recording time of the polysomnogram was 426.5 minutes.  The total sleep time was 394.5 minutes.  Sleep latency was decreased at 5.0 minutes with gabapentin and  10 mg zolpidem. REM latency was 125.5 minutes.  Arousal index was normal at 6.2 arousals per hour.  Sleep efficiency was normal at 92.5%.  Wake after sleep onset was 24.5 minutes.  The patient spent 1.6% of total sleep time in Stage N1, 62.2% in Stage N2, 15.8% in Stages N3, and 20.3% in REM.  Time in REM supine was 17.0 minutes.    Respiration: No significant sleep-related breathing disorder.    Events - The polysomnogram revealed a presence of 4 obstructive, 7 central apneas resulting in an apnea index of 1.7 events per hour.  There were 16 hypopneas resulting in a hypopnea index of 2.4 events per hour.  The combined apnea/hypopnea index was 4.1 events per hour.  The REM AHI was 3.8 events per hour.  The supine AHI was 8.4 events per hour.  The RERA index was 0.2 events per hour.   The RDI was 4.3 events per hour.    Snoring - was reported as " absent.    Respiratory rate and pattern - was notable for normal  respiratory rate and pattern.    Sustained Sleep Associated Hypoventilation - Transcutaneous carbon dioxide monitoring was not used, however significant hypoventilation was not suggested by oximetry.    Sleep Associated Hypoxemia - (Greater than 5 minutes O2 sat below 89%) was not present.  Baseline oxygen saturation was 93.6%. Lowest oxygen saturation was 82.7%.  Time spent less than or equal to 88% was 2.6 minutes.  Time spent less than or equal to 89% was 4.2 minutes.  4.3 0.2 4.1     Movement Activity: No abnormal sleep behaviors.     Periodic Limb Activity - There were 174 PLMs during the entire study. The PLM index was 26.5 movements per hour.  The PLM Arousal Index was 1.1 per hour.    REM EMG Activity - Excessive transient / sustained muscle activity was not present.    Nocturnal Behavior - Abnormal sleep related behaviors were not noted.    Bruxism - None apparent.    Cardiac Summary: Normal sinus rhythm.   The average pulse rate was 72.8 bpm.  The minimum pulse rate was 54.9 bpm while the maximum pulse rate was 108.8 bpm. The rhythm is normal sinus. Arrhythmias were not noted.      Assessment:     Normal sleep study.     Recommendations:    Advise regarding the risks of drowsy driving.    Suggest optimizing sleep schedule and avoiding sleep deprivation.    Pharmacologic therapy should be used for management of restless legs syndrome only if present and clinically indicated and not based on the presence of periodic limb movements alone.    Cardiac Comments: Normal Sinus Rhythm           Electronically-Signed by Bennett Ambriz 09:52  8/15/17

## 2017-09-05 NOTE — TELEPHONE ENCOUNTER
Pending Prescriptions:                       Disp   Refills    gabapentin (NEURONTIN) 100 MG capsule     90 cap*1            Si.5 hrs before bedtime for 7 nights, increase to           2 pills if not effective after 7 days, increase           to 3 pills after 7 days if not completely           effective    Last Written Prescription Date:  2017  Last Fill Quantity: 90,   # refills: 1  Last Office Visit with G, P or [facility] prescribing provider: 2017  Future Office visit:   2017.    RELL Moscoso

## 2017-09-07 ENCOUNTER — VIRTUAL VISIT (OUTPATIENT)
Dept: SLEEP MEDICINE | Facility: CLINIC | Age: 57
End: 2017-09-07
Attending: NURSE PRACTITIONER
Payer: COMMERCIAL

## 2017-09-07 DIAGNOSIS — G47.26 SHIFT WORK SLEEP DISORDER: ICD-10-CM

## 2017-09-07 DIAGNOSIS — G25.81 RESTLESS LEGS SYNDROME (RLS): Primary | ICD-10-CM

## 2017-09-07 RX ORDER — GABAPENTIN 100 MG/1
CAPSULE ORAL
Qty: 90 CAPSULE | Refills: 1 | Status: SHIPPED | OUTPATIENT
Start: 2017-09-07 | End: 2018-03-02

## 2017-09-07 NOTE — MR AVS SNAPSHOT
After Visit Summary   9/7/2017    Barbi Tiwari    MRN: [id number]           Patient Information     Date Of Birth          1960        Visit Information        Provider Department      9/7/2017 4:00 PM Natalia Reyonso APRN CNP [facility] [place], Sleep Study        Today's Diagnoses     Restless legs syndrome (RLS)    -  1    Shift work sleep disorder           Follow-ups after your visit        Follow-up notes from your care team     Return in about 6 months (around 3/7/2018).      Your next 10 appointments already scheduled     Apr 02, 2018 10:45 AM CDT   (Arrive by 10:30 AM)   MR BRAIN W/O & W CONTRAST with [id number]   [facility] ([facility] [place])    [address] [address] [phone]           Take your medicines as usual, unless your doctor tells you not to. Bring a list of your current medicines to your exam (including vitamins, minerals and over-the-counter drugs).  You will be given intravenous contrast for this exam. To prepare:   The day before your exam, drink extra fluids at least six 8-ounce glasses (unless your doctor tells you to restrict your fluids).   Have a blood test (creatinine test) within 30 days of your exam. Go to your clinic or Diagnostic Imaging Department for this test.  The MRI machine uses a strong magnet. Please wear clothes without metal (snaps, zippers). A sweatsuit works well, or we may give you a hospital gown.  Please remove any body piercings and hair extensions before you arrive. You will also remove watches, jewelry, hairpins, wallets, dentures, partial dental plates and hearing aids. You may wear contact lenses, and you may be able to wear your rings. We have a safe place to keep your personal items, but it is safer to leave them at home.   **IMPORTANT** THE INSTRUCTIONS BELOW ARE ONLY FOR THOSE PATIENTS WHO HAVE BEEN TOLD THEY WILL RECEIVE SEDATION OR GENERAL ANESTHESIA DURING  THEIR MRI PROCEDURE:  IF YOU WILL RECEIVE SEDATION (take medicine to help you relax during your exam):   You must get the medicine from your doctor before you arrive. Bring the medicine to the exam. Do not take it at home.   Arrive one hour early. Bring someone who can take you home after the test. Your medicine will make you sleepy. After the exam, you may not drive, take a bus or take a taxi by yourself.   No eating 8 hours before your exam. You may have clear liquids up until 4 hours before your exam. (Clear liquids include water, clear tea, black coffee and fruit juice without pulp.)  IF YOU WILL RECEIVE ANESTHESIA (be asleep for your exam):   Arrive 1 1/2 hours early. Bring someone who can take you home after the test. You may not drive, take a bus or take a taxi by yourself.   No eating 8 hours before your exam. You may have clear liquids up until 4 hours before your exam. (Clear liquids include water, clear tea, black coffee and fruit juice without pulp.)  Please call the Imaging Department at your exam site with any questions.            Apr 09, 2018 10:00 AM CDT   (Arrive by 9:45 AM)   Return Visit with Neo Weinstein MD   [facility] ([facility])    [address] [address] [phone]              Who to contact     If you have questions or need follow up information about today's clinic visit or your schedule please [facility] directly at [phone].  Normal or non-critical lab and imaging results will be communicated to you by MyChart, letter or phone within 4 business days after the clinic has received the results. If you do not hear from us within 7 days, please contact the clinic through [employer] or phone. If you have a critical or abnormal lab result, we will notify you by phone as soon as possible.  Submit refill requests through [email or web address] or call your pharmacy and they will forward the  refill request to us. Please allow 3 business days for your refill to be completed.          Additional Information About Your Visit        [employer] Information     [employer] gives you secure access to your electronic health record. If you see a primary care provider, you can also send messages to your care team and make appointments. If you have questions, please call your primary care clinic.  If you do not have a primary care provider, please call [phone] and they will assist you.        Care EveryWhere ID     This is your Care EveryWhere ID. This could be used by other organizations to access your [place] medical records  [id number]         Blood Pressure from Last 3 Encounters:   05/15/17 96/64   05/11/17 109/61   05/10/17 108/70    Weight from Last 3 Encounters:   05/15/17 63.6 kg (140 lb 3.2 oz)   05/11/17 64.4 kg (142 lb)   05/10/17 64.4 kg (142 lb)              Today, you had the following     No orders found for display       Primary Care Provider Office Phone # Fax #    Dustin Choudhury [phone]       [address] [place] [id number]        Equal Access to Services     ELDER GRIFFITH : Hadii aad ku hadasho Sojamalali, waaxda luqadaha, qaybta kaalmada adeegyada, nina la. So [facility] [phone].    ATENCIÓN: Si habla español, tiene a fletcher disposición servicios gratuitos de asistencia lingüística. [facility] [phone].    We comply with applicable federal civil rights laws and [place] laws. We do not discriminate on the basis of race, color, national origin, age, disability sex, sexual orientation or gender identity.            Thank you!     Thank you for choosing [facility]  for your care. Our goal is always to provide you with excellent care. Hearing back from our patients is one way we can continue to improve our services. Please take a few minutes to complete the written survey that you may receive in the mail after your  visit with us. Thank you!             Your Updated Medication List - Protect others around you: Learn how to safely use, store and throw away your medicines at www.disposemymeds.org.          This list is accurate as of: 9/7/17 11:59 PM.  Always use your most recent med list.                   Brand Name Dispense Instructions for use Diagnosis    acetaminophen 500 MG tablet    TYLENOL     Take 1,000 mg by mouth every 6 hours if needed. Max acetaminophen dose: 4000mg in 24 hrs.        albuterol 108 (90 BASE) MCG/ACT Inhaler    PROAIR HFA/PROVENTIL HFA/VENTOLIN HFA    1 Inhaler    Inhale 2 puffs into the lungs every 6 hours as needed for shortness of breath / dyspnea or wheezing    Cough       ascorbic acid 500 MG Tabs      Take 500 mg by mouth 2 times daily        Calcium-Vitamin D 600-200 MG-UNIT Caps      Reported on 5/15/2017        COMPOUNDED NON-CONTROLLED SUBSTANCE - PHARMACY TO MIX COMPOUNDED MEDICATION    CMPD RX    20 g    Apply 1g daily for 2 weeks, then twice weekly to vagina    Vaginal atrophy       ferrous sulfate 325 (65 FE) MG tablet    IRON     Take 325 mg by mouth 2 times daily Reported on 5/15/2017        fluconazole 200 MG tablet    DIFLUCAN    15 tablet    2 tablets by mouth the first day followed by 1 tablet daily for 1-2 weeks based on symptoms resolution    Candidiasis of mouth and esophagus (H)       gabapentin 100 MG capsule    NEURONTIN    90 capsule    1.5 hrs before bedtime for 7 nights, increase to 2 pills if not effective after 7 days, increase to 3 pills after 7 days if not completely effective        ibuprofen 200 MG tablet    ADVIL/MOTRIN     Take 4 tablets by mouth 4 times daily if needed for Pain or Headache. (rare use)        KEPPRA 500 MG tablet   Generic drug:  levETIRAcetam      1,500 mg 2 times daily Take 3 tablets am and 3 tablets pm        LAMOTRIGINE PO      Take 300 mg by mouth 2 times daily        MULTI-VITAMINS Tabs      take 1 tablet by oral route once daily with food         PRILOSEC OTC PO      Take 20 mg by mouth    Candidiasis of mouth and esophagus (H)       ranitidine 150 MG tablet    ZANTAC     Take 1 tablet (150 mg) by mouth 2 times daily as needed for heartburn    Gastroesophageal reflux disease without esophagitis       * TESSALON PERLES PO       Candidiasis of mouth and esophagus (H)       * benzonatate 100 MG capsule    TESSALON    30 capsule    Take 1 capsule (100 mg) by mouth 3 times daily as needed for cough    Cough       tretinoin 0.05 % cream    RETIN-A    45 g    Spread a pea size amount into affected area topically at bedtime.  Use sunscreen SPF>20.    Senile sebaceous gland hyperplasia       VITAMIN D (CHOLECALCIFEROL) PO      Take 1,000 Units by mouth 2 times daily Reported on 5/15/2017        zolpidem 5 MG tablet    AMBIEN    1 tablet    Take tablet by mouth 15 minutes prior to sleep, for Sleep Study    Snoring, Restless legs syndrome (RLS), Parasomnia       * Notice:  This list has 2 medication(s) that are the same as other medications prescribed for you. Read the directions carefully, and ask your doctor or other care provider to review them with you.

## 2017-09-09 NOTE — PROGRESS NOTES
Cc: discuss results of psg in setting of RLS, Shift work, daytime sleepiness and history of seizures    HPI:  Currently using gabapentin to help with RLS and sleep initiation, some tiredness with 300mg, has gone to 200mg with improvement in sleep initiation    PS17: AHI 4.1, supine 8.4, no snoring. Time <88% 2.6, PLM I 26.5,PLM AI 1.1    Work: shift back to 2 weeks of pms-anticipates difficulties    A/P:  1. Snoring: none, supine related apnea, does not meet requirements for tx with cpap or MAD, may benefit from positional device  2. RLS: good control, will change script to 200mg with next refill if unable to tolerate 300mg  3. Shift work: I offered help to get her on a single shift with her hx of seizures, she is to get ahold of me for letter if needed.    Time spent with patient is 15 minutes as a scheduled phone visit, of which >50% was spent in counseling, education and coordination of care.

## 2017-11-13 ENCOUNTER — OFFICE VISIT (OUTPATIENT)
Dept: PODIATRY | Facility: CLINIC | Age: 57
End: 2017-11-13
Payer: COMMERCIAL

## 2017-11-13 ENCOUNTER — RADIANT APPOINTMENT (OUTPATIENT)
Dept: GENERAL RADIOLOGY | Facility: CLINIC | Age: 57
End: 2017-11-13
Attending: PODIATRIST
Payer: COMMERCIAL

## 2017-11-13 VITALS
SYSTOLIC BLOOD PRESSURE: 122 MMHG | BODY MASS INDEX: 27.05 KG/M2 | DIASTOLIC BLOOD PRESSURE: 68 MMHG | HEIGHT: 62 IN | WEIGHT: 147 LBS

## 2017-11-13 DIAGNOSIS — M79.671 RIGHT FOOT PAIN: ICD-10-CM

## 2017-11-13 DIAGNOSIS — M20.11 HALLUX VALGUS OF RIGHT FOOT: Primary | ICD-10-CM

## 2017-11-13 DIAGNOSIS — M20.61 DEFORMITY OF TOE OF RIGHT FOOT: ICD-10-CM

## 2017-11-13 DIAGNOSIS — M19.079 ARTHRITIS, MIDFOOT: ICD-10-CM

## 2017-11-13 PROCEDURE — 73630 X-RAY EXAM OF FOOT: CPT | Mod: RT

## 2017-11-13 PROCEDURE — 99204 OFFICE O/P NEW MOD 45 MIN: CPT | Performed by: PODIATRIST

## 2017-11-13 NOTE — MR AVS SNAPSHOT
After Visit Summary   11/13/2017    Barbi Tiwari    MRN: [id number]           Patient Information     Date Of Birth          1960        Visit Information        Provider Department      11/13/2017 9:15 AM Benjamin Griffin DPM [facility]        Today's Diagnoses     Hallux valgus of right foot    -  1    Deformity of toe of right foot        Right foot pain          Care Instructions    BUNION (hallux abducto valgus)   A bunion is caused by muscle imbalance. The great toe is pulled toward the smaller toes. The metatarsal head is pushed outward creating a lump on the side of your foot. Imbalance is the result of foot structure and instability.   Bunions do not improve with time. They usually enlarge, however this is a fairly slow process. Shoes do not necessarily cause bunions, however, they can hasten development and definitely cause bunions to hurt.   Bunions often run in families. We inherit a certain foot structure, which may be predisposed to bunion development.   Bunion pain is likely a combination of shoes rubbing on the bump, nerve irritation, compression between the toes, joint misalignment, arthritis and altered gait.   SYMPTOMS   Bunions are usually termed mild, moderate or severe. Just because you have a bunion does not mean you have to have pain. There are some people with very severe bunions and no pain and people with mild bunions and a lot of pain.   1.  Pain on the inside of your foot at the big toe joint (1st MTPJ)   2.  Swelling on the inside of your foot at the big toe joint   3.  Redness on the inside of your foot at the big toe joint   4.  Numbness or burning in the big toe (hallux)   5.  Decreased motion at the big toe joint   6.  Painful bursa (fluid-filled sac) on the inside of your foot at the big toe joint   7.  Pain while wearing shoes -especially shoes too narrow or with high heels    8.  Pain during activities   9.  Corn in between the  big toe and second toe   10.  Callous formation on the side or bottom of the big toe or big toe joint   11.  Callous under the second toe joint (2nd MTPJ)   12.  Pain in the second toe joint   TREATMENT  Conservative (non-surgical) treatment will not make the bunion go away, but it will hopefully decrease the signs and symptoms you have and help you get rid of the pain and get you back to your activities.   1.  Wider shoes or extra depth shoes: Most bunion pain can be improved simply by wearing compatible shoes. People with bunions cannot choose footwear simply because they like the style. Your bunion should determine which shoes are to be worn. Wide shoes with nonirritating seams,soft leather and a square toe box are most compatible with a bunion. Shoes should fit appropriately right out of the box but may need to be professionally stretched and modified to accommodate the bump. Heels, dress shoes and shoes with pointed toes will not be comfortable.   2. NSAIDs   3.  Arch supports, custom inserts, padding, splints, toe spacers : Most bunion pain can be improved simply by wearing compatible shoes. People with bunions cannot choose footwear simply because they like the style. Your bunion should determine which shoes are to be worn. Wide shoes with nonirritating seams,soft leather and a square toe box are most compatible with a bunion. Shoes should fit appropriately right out of the box but may need to be professionally stretched and modified to accommodate the bump. Heels, dress shoes and shoes with pointed toes will not be comfortable.   4.  Change activities   5.  Physical therapy  SURGERY  Surgical treatment for bunions is sometimes needed. If you are limited by pain, cannot fit in shoes comfortably and are not able to do your daily activities then surgery may be a good option for you. There are many different surgical procedures to repair bunions. Your foot and ankle surgeon will review your foot exam findings,  your x-rays, your age, your health, your lifestyle, your physical activity level and discuss with you which procedure he or she would recommend. Surgical procedures for bunions range from soft tissue repair to cutting and realigning the bones. It is not recommended that you have bunion surgery for cosmetic reasons (you do not like how your foot looks) or because you want to fit in a certain pair of shoes; There is the risk that even after surgery, the bunion will reoccur 9-10% of the time.   Bunion surgery involves cutting and repositioning the bones surrounding the bunion. Pins and screws are used to hold the bones in place during the healing process. The goal of bunion surgery is to reduce the size of the bunion bump. Realignment of the toe and joint is attempted.     Some first toes cannot be forced back into normal alignment even with surgery. Surgery is helpful in most cases but does not necessarily create a normal foot.   Healing after surgery requires about six weeks of protection. This allows the bone to heal. Maximum recovery takes about one year. The scar tissue and jOint structures require this amount of time to finish the healing process. Expect stiffness, swelling and numbness during that time frame. Bunion surgery does involve side effects. Some side effects are predictable and others are less common but do occur. A scar will be visible and could be irritated by shoes. The shoe may rub on the screw or internal pin requiring surgical removal of these fixation devices. The screw and pin would likely be left in place for a full year. The first toe may loose motion after bunion surgery. The amount of stiffness is variable. Some people never regain normal motion of the first toe. This is due to scar tissue inherent to any surgery. The first toe may drift toward the second toe or away from the second toe. Spreading of the first and second toes is a rare occurrence after bunion surgery. This can be quite  bothersome and would need to be surgically repaired. Toe drift toward the second toe could result in a recurrent bunion and revision surgery. Joint fusion is one option to correct an unstable, drifting toe. This procedure straightens the toe, however, no motion remains. Fusion may be necessary to correct complications of bunion surgery or as the original procedure in severe cases.   All surgical procedures involve risk of infection, numbness, pain, delayed healing, osteotomy dislocation, blood clots, continued foot pain, etc. Bunion surgery is quite complex and should not be taken lightly.   Any skin incision can lead to infection. Deep infection might involve the bone and thus repeat surgery and six weeks of IV antibiotics. Scar tissue can cause nerve pain or numbness. This is generally temporary but can be permanent. We do not have treatments that cure nerve problems. Second toe pain could be related to altered mechanics and pressure transferred to the second toe. Most feet with bunions have pre-existing second toe problems. Delayed bone healing would lengthen the healing time. Some bones simply do not heal. This requires repeat surgery, electronic bone stimulation and/or extended protection. Smokers have an approximate 20% chance of poor bone healing. This is double that of a non-smoker. The bone cut may displace. This may need to be repaired with a second operation. Displacement can cause jOint malalignment. Immobility after surgery can cause blood clots in the legs and lungs. This could result in death.   Foot pain is complex. Most feet hurt for more than one reason. Fixing the bunion would not necessarily create a pain free foot. Appropriate shoes, healthy body weight, avoidance of bare foot walking and moderation of activity will always be necessary to enjoy foot comfort. Your bunion may involve arthritis, which is incurable even with surgery. Long standing bunions often involve chronic irritation to the  surrounding nerves. Nerve pain may not resolve even with reducing the bunion bump since permanent nerve damage may be present   Bunion surgery is nevertheless quite successful. Most surgical patients are pleased with their foot following bunion surgery. Many of the issues described above can be controlled by taking proper care of your foot during the healing process.   Your surgeon would be happy to fully describe any of the above issues. You should pursue a full understanding of the operation,recovery process and any potential problems that could develop.   PREVENTION  1.  Do not wear high heels if there is a family history of bunions.  2.  Wear shoes that have enough width and depth in the toe box  Here are exercises that may benefit people with bunions:   Toe stretches. Stretching out your toes can help keep them limber and offset foot pain. To stretch your toes, point your toes straight ahead for 5 seconds and then curl them under for 5 seconds. Repeat these stretches 10 times. These exercises can be especially beneficial if you also have hammertoes, or chronically bent toes, in addition to a bunion.   Toe flexing and tea. Press your toes against a hard surface such as a wall, to flex and stretch them; hold the position for 10 seconds and repeat three to four times. Then flex your toes in the opposite direction; hold the position for 10 seconds and repeat three to four times.   Stretching your big toe. Using your fingers to gently pull your big toe over into proper alignment can be helpful as well. Hold your toe in position for 10 seconds and repeat three to four times.   Resistance exercises. Wrap either a towel or belt around your big toe and use it to pull your big toe toward you while simultaneously pushing forward, against the towel, with your big toe.   Ball roll. To massage the bottom of your foot, sit down, place a golf ball on the floor under your foot, and roll it around under your foot for two  minutes. This can help relieve foot strain and cramping.   Towel curls. You can strengthen your toes by spreading out a small towel on the floor, curling your toes around it, and pulling it toward you. Repeat five times. Gripping objects with your toes like this can help keep your foot flexible.   Picking up marbles. Another gripping exercise you can perform to keep your foot flexible is picking up marbles with your toes. Do this by placing 20 marbles on the floor in front of you and use your foot to pick the marbles up one by one and place them in a bowl.   Walking along the beach. Whenever possible, spend time walking on sand. This can give you a gentle foot massage and also help strengthen your toes. This is especially beneficial for people who have arthritis associated with their bunions.   Foot and Ankle Hammertoe Post Operative Instructions   Activities:  First day of surgery you need to rest.  Stay off your feet as much as possible and keep your foot elevated above the level of your heart (about 2 pillow height).  Elevate foot 23 of 24 hours a day.  Wear your surgical shoe at all times when up.  Limit walking to 5 to 10 minutes on your heel per hour over the next few days if your doctor has previously told you that you can put some weight on the foot after surgery.  If you are suppose to be non weight bearing, which means NO WEIGHT AT ALL ON THE FOOT.  Use an ice pack on the ankle 20-30 minutes per hour to help decrease pain and swelling.  The first two weeks you need to ice and elevate as much as possible.  This will help with post op pain.  Your foot requires significant rest and elevation. Expect muscle aches, back pain, cramps, etc. Optimal posture, lumbar support, back exercises, ice and heat may all help with your new aches and pains. Do not apply a heating pad to your foot or leg as this can cause increase swelling and pain. Rather use ice in those areas.   Showering is a major challenge. Your incision  requires about three days to become sealed from water. Your bandage should not get wet and should not be removed. Do not attempt showering for the first three days. A sponge bath is preferred. You may attempt to shower on the fourth day after the operation. Your foot should be covered with a bag, tape and rubber bands. Double bagging is preferred. Standing in the shower with a bag on your foot is quite hazardous. A portable shower stool would be ideal. The bandage will need to be changed in the office if it becomes moistened. A moist bandage will not dry on its own. A moist dressing may lead to infection.      External pins need continued protection. These pins are strong but do not resist the forces of bumping. Pay attention to the position and direction of the pins. Any change in pin alignment or positioning should prompt a call. A loose pin will need to be removed. Never push a pin back into your foot.    .   Do not wear regular shoes with a surgical bandage and/or external pins in your foot.  Wear loose fitting clothing that easily will slip over the bandage and/or pins.  Also, remember that dogs are not aware of your surgery.  Please keep them away from the bandages and pins.   If your surgeon places external pins in your foot, you must keep the foot dry until the pins are removed at 6-8 weeks.  Pins should be covered with a dressing for protection.  Check for any spreading redness or yellow drainage from the pins.  Do not apply ointment around the pins.  Do not push a loose pin back into the foot.  Please call the clinic if the pin is spinning or moving in and out.  If the pins are bumped or loosened, they may need to be removed early.  This may affect your surgical outcome.    Best wishes on your recovery from surgery.  Please do not hesitate to call or  My Chart  with any questions or concerns.            Follow-ups after your visit        Your next 10 appointments already scheduled     Mar 12, 2018 11:00 AM  CDT   Return Sleep Patient with CAMDEN Bateman CNP   [facility] Sleep Study ([facility])    [address] [place] [id number] [phone]            Apr 02, 2018 10:45 AM CDT   (Arrive by 10:30 AM)   MR BRAIN W/O & W CONTRAST with [id number]   [facility] ([facility] [place])    [address]  1st Floor  [facility] [id number] [phone]           Take your medicines as usual, unless your doctor tells you not to. Bring a list of your current medicines to your exam (including vitamins, minerals and over-the-counter drugs).  You will be given intravenous contrast for this exam. To prepare:   The day before your exam, drink extra fluids at least six 8-ounce glasses (unless your doctor tells you to restrict your fluids).   Have a blood test (creatinine test) within 30 days of your exam. Go to your clinic or Diagnostic Imaging Department for this test.  The MRI machine uses a strong magnet. Please wear clothes without metal (snaps, zippers). A sweatsuit works well, or we may give you a hospital gown.  Please remove any body piercings and hair extensions before you arrive. You will also remove watches, jewelry, hairpins, wallets, dentures, partial dental plates and hearing aids. You may wear contact lenses, and you may be able to wear your rings. We have a safe place to keep your personal items, but it is safer to leave them at home.   **IMPORTANT** THE INSTRUCTIONS BELOW ARE ONLY FOR THOSE PATIENTS WHO HAVE BEEN TOLD THEY WILL RECEIVE SEDATION OR GENERAL ANESTHESIA DURING THEIR MRI PROCEDURE:  IF YOU WILL RECEIVE SEDATION (take medicine to help you relax during your exam):   You must get the medicine from your doctor before you arrive. Bring the medicine to the exam. Do not take it at home.   Arrive one hour early. Bring someone who can take you home after the test. Your medicine will make you sleepy.  After the exam, you may not drive, take a bus or take a taxi by yourself.   No eating 8 hours before your exam. You may have clear liquids up until 4 hours before your exam. (Clear liquids include water, clear tea, black coffee and fruit juice without pulp.)  IF YOU WILL RECEIVE ANESTHESIA (be asleep for your exam):   Arrive 1 1/2 hours early. Bring someone who can take you home after the test. You may not drive, take a bus or take a taxi by yourself.   No eating 8 hours before your exam. You may have clear liquids up until 4 hours before your exam. (Clear liquids include water, clear tea, black coffee and fruit juice without pulp.)  Please call the Imaging Department at your exam site with any questions.            Apr 09, 2018 10:00 AM CDT   (Arrive by 9:45 AM)   Return Visit with Neo Weinstein MD   [facility] ([facility])    [address] [place] [address]  2nd [facility] [id number] [phone]              Who to contact     If you have questions or need follow up information about today's clinic visit or your schedule please contact [facility] directly at [phone].  Normal or non-critical lab and imaging results will be communicated to you by Binfirehart, letter or phone within 4 business days after the clinic has received the results. If you do not hear from us within 7 days, please contact the clinic through Binfirehart or phone. If you have a critical or abnormal lab result, we will notify you by phone as soon as possible.  Submit refill requests through [facility] or call your pharmacy and they will forward the refill request to us. Please allow 3 business days for your refill to be completed.          Additional Information About Your Visit        [facility] Information     [facility] gives you secure access to your electronic health record. If you see a primary care provider, you can also send messages to your care team and make  "appointments. If you have questions, please call your primary care clinic.  If you do not have a primary care provider, please call [phone] and they will assist you.        Care EveryWhere ID     This is your Care EveryWhere ID. This could be used by other organizations to access your [facility] medical records  [id number]        Your Vitals Were     Height BMI (Body Mass Index)                5' 2\" (1.575 m) 26.89 kg/m2           Blood Pressure from Last 3 Encounters:   11/13/17 122/68   05/15/17 96/64   05/11/17 109/61    Weight from Last 3 Encounters:   11/13/17 147 lb (66.7 kg)   05/15/17 140 lb 3.2 oz (63.6 kg)   05/11/17 142 lb (64.4 kg)              We Performed the Following     XR Foot Right G/E 3 Views        Primary Care Provider Office Phone # Fax #    Dustin Choudhury [phone]       [address] [place] [phone]        Equal Access to Services     [facility]: Hadii aad ku hadasho Soomaali, waaxda luqadaha, qaybta kaalmada adeegyada, waxay arley hayenrico will . So [facility] [phone].    ATENCIÓN: Si habla español, tiene a fletcher disposición servicios gratuitos de asistencia lingüística. Llame al [phone].    We comply with applicable federal civil rights laws and [place] laws. We do not discriminate on the basis of race, color, national origin, age, disability, sex, sexual orientation, or gender identity.            Thank you!     Thank you for choosing [facility]  for your care. Our goal is always to provide you with excellent care. Hearing back from our patients is one way we can continue to improve our services. Please take a few minutes to complete the written survey that you may receive in the mail after your visit with us. Thank you!             Your Updated Medication List - Protect others around you: Learn how to safely use, store and throw away your medicines at www.disposemymeds.org.          This list is accurate " as of: 11/13/17 10:08 AM.  Always use your most recent med list.                   Brand Name Dispense Instructions for use Diagnosis    acetaminophen 500 MG tablet    TYLENOL     Take 1,000 mg by mouth every 6 hours if needed. Max acetaminophen dose: 4000mg in 24 hrs.        albuterol 108 (90 BASE) MCG/ACT Inhaler    PROAIR HFA/PROVENTIL HFA/VENTOLIN HFA    1 Inhaler    Inhale 2 puffs into the lungs every 6 hours as needed for shortness of breath / dyspnea or wheezing    Cough       ascorbic acid 500 MG Tabs      Take 500 mg by mouth 2 times daily        Calcium-Vitamin D 600-200 MG-UNIT Caps      Reported on 5/15/2017        COMPOUNDED NON-CONTROLLED SUBSTANCE - PHARMACY TO MIX COMPOUNDED MEDICATION    CMPD RX    20 g    Apply 1g daily for 2 weeks, then twice weekly to vagina    Vaginal atrophy       ferrous sulfate 325 (65 FE) MG tablet    IRON     Take 325 mg by mouth 2 times daily Reported on 5/15/2017        fluconazole 200 MG tablet    DIFLUCAN    15 tablet    2 tablets by mouth the first day followed by 1 tablet daily for 1-2 weeks based on symptoms resolution    Candidiasis of mouth and esophagus (H)       gabapentin 100 MG capsule    NEURONTIN    90 capsule    1.5 hrs before bedtime for 7 nights, increase to 2 pills if not effective after 7 days, increase to 3 pills after 7 days if not completely effective        ibuprofen 200 MG tablet    ADVIL/MOTRIN     Take 4 tablets by mouth 4 times daily if needed for Pain or Headache. (rare use)        KEPPRA 500 MG tablet   Generic drug:  levETIRAcetam      1,500 mg 2 times daily Take 3 tablets am and 3 tablets pm        LAMOTRIGINE PO      Take 300 mg by mouth 2 times daily        MULTI-VITAMINS Tabs      take 1 tablet by oral route once daily with food        PRILOSEC OTC PO      Take 20 mg by mouth    Candidiasis of mouth and esophagus (H)       ranitidine 150 MG tablet    ZANTAC     Take 1 tablet (150 mg) by mouth 2 times daily as needed for heartburn     Gastroesophageal reflux disease without esophagitis       * TESSALON PERLES PO       Candidiasis of mouth and esophagus (H)       * benzonatate 100 MG capsule    TESSALON    30 capsule    Take 1 capsule (100 mg) by mouth 3 times daily as needed for cough    Cough       tretinoin 0.05 % cream    RETIN-A    45 g    Spread a pea size amount into affected area topically at bedtime.  Use sunscreen SPF>20.    Senile sebaceous gland hyperplasia       VITAMIN D (CHOLECALCIFEROL) PO      Take 1,000 Units by mouth 2 times daily Reported on 5/15/2017        zolpidem 5 MG tablet    AMBIEN    1 tablet    Take tablet by mouth 15 minutes prior to sleep, for Sleep Study    Snoring, Restless legs syndrome (RLS), Parasomnia       * Notice:  This list has 2 medication(s) that are the same as other medications prescribed for you. Read the directions carefully, and ask your doctor or other care provider to review them with you.

## 2017-11-13 NOTE — PATIENT INSTRUCTIONS
BUNION (hallux abducto valgus)   A bunion is caused by muscle imbalance. The great toe is pulled toward the smaller toes. The metatarsal head is pushed outward creating a lump on the side of your foot. Imbalance is the result of foot structure and instability.   Bunions do not improve with time. They usually enlarge, however this is a fairly slow process. Shoes do not necessarily cause bunions, however, they can hasten development and definitely cause bunions to hurt.   Bunions often run in families. We inherit a certain foot structure, which may be predisposed to bunion development.   Bunion pain is likely a combination of shoes rubbing on the bump, nerve irritation, compression between the toes, joint misalignment, arthritis and altered gait.   SYMPTOMS   Bunions are usually termed mild, moderate or severe. Just because you have a bunion does not mean you have to have pain. There are some people with very severe bunions and no pain and people with mild bunions and a lot of pain.   1.  Pain on the inside of your foot at the big toe joint (1st MTPJ)   2.  Swelling on the inside of your foot at the big toe joint   3.  Redness on the inside of your foot at the big toe joint   4.  Numbness or burning in the big toe (hallux)   5.  Decreased motion at the big toe joint   6.  Painful bursa (fluid-filled sac) on the inside of your foot at the big toe joint   7.  Pain while wearing shoes -especially shoes too narrow or with high heels    8.  Pain during activities   9.  Corn in between the big toe and second toe   10.  Callous formation on the side or bottom of the big toe or big toe joint   11.  Callous under the second toe joint (2nd MTPJ)   12.  Pain in the second toe joint   TREATMENT  Conservative (non-surgical) treatment will not make the bunion go away, but it will hopefully decrease the signs and symptoms you have and help you get rid of the pain and get you back to your activities.   1.  Wider shoes or extra depth  shoes: Most bunion pain can be improved simply by wearing compatible shoes. People with bunions cannot choose footwear simply because they like the style. Your bunion should determine which shoes are to be worn. Wide shoes with nonirritating seams,soft leather and a square toe box are most compatible with a bunion. Shoes should fit appropriately right out of the box but may need to be professionally stretched and modified to accommodate the bump. Heels, dress shoes and shoes with pointed toes will not be comfortable.   2. NSAIDs   3.  Arch supports, custom inserts, padding, splints, toe spacers : Most bunion pain can be improved simply by wearing compatible shoes. People with bunions cannot choose footwear simply because they like the style. Your bunion should determine which shoes are to be worn. Wide shoes with nonirritating seams,soft leather and a square toe box are most compatible with a bunion. Shoes should fit appropriately right out of the box but may need to be professionally stretched and modified to accommodate the bump. Heels, dress shoes and shoes with pointed toes will not be comfortable.   4.  Change activities   5.  Physical therapy  SURGERY  Surgical treatment for bunions is sometimes needed. If you are limited by pain, cannot fit in shoes comfortably and are not able to do your daily activities then surgery may be a good option for you. There are many different surgical procedures to repair bunions. Your foot and ankle surgeon will review your foot exam findings, your x-rays, your age, your health, your lifestyle, your physical activity level and discuss with you which procedure he or she would recommend. Surgical procedures for bunions range from soft tissue repair to cutting and realigning the bones. It is not recommended that you have bunion surgery for cosmetic reasons (you do not like how your foot looks) or because you want to fit in a certain pair of shoes; There is the risk that even after  surgery, the bunion will reoccur 9-10% of the time.   Bunion surgery involves cutting and repositioning the bones surrounding the bunion. Pins and screws are used to hold the bones in place during the healing process. The goal of bunion surgery is to reduce the size of the bunion bump. Realignment of the toe and joint is attempted.     Some first toes cannot be forced back into normal alignment even with surgery. Surgery is helpful in most cases but does not necessarily create a normal foot.   Healing after surgery requires about six weeks of protection. This allows the bone to heal. Maximum recovery takes about one year. The scar tissue and jOint structures require this amount of time to finish the healing process. Expect stiffness, swelling and numbness during that time frame. Bunion surgery does involve side effects. Some side effects are predictable and others are less common but do occur. A scar will be visible and could be irritated by shoes. The shoe may rub on the screw or internal pin requiring surgical removal of these fixation devices. The screw and pin would likely be left in place for a full year. The first toe may loose motion after bunion surgery. The amount of stiffness is variable. Some people never regain normal motion of the first toe. This is due to scar tissue inherent to any surgery. The first toe may drift toward the second toe or away from the second toe. Spreading of the first and second toes is a rare occurrence after bunion surgery. This can be quite bothersome and would need to be surgically repaired. Toe drift toward the second toe could result in a recurrent bunion and revision surgery. Joint fusion is one option to correct an unstable, drifting toe. This procedure straightens the toe, however, no motion remains. Fusion may be necessary to correct complications of bunion surgery or as the original procedure in severe cases.   All surgical procedures involve risk of infection, numbness,  pain, delayed healing, osteotomy dislocation, blood clots, continued foot pain, etc. Bunion surgery is quite complex and should not be taken lightly.   Any skin incision can lead to infection. Deep infection might involve the bone and thus repeat surgery and six weeks of IV antibiotics. Scar tissue can cause nerve pain or numbness. This is generally temporary but can be permanent. We do not have treatments that cure nerve problems. Second toe pain could be related to altered mechanics and pressure transferred to the second toe. Most feet with bunions have pre-existing second toe problems. Delayed bone healing would lengthen the healing time. Some bones simply do not heal. This requires repeat surgery, electronic bone stimulation and/or extended protection. Smokers have an approximate 20% chance of poor bone healing. This is double that of a non-smoker. The bone cut may displace. This may need to be repaired with a second operation. Displacement can cause jOint malalignment. Immobility after surgery can cause blood clots in the legs and lungs. This could result in death.   Foot pain is complex. Most feet hurt for more than one reason. Fixing the bunion would not necessarily create a pain free foot. Appropriate shoes, healthy body weight, avoidance of bare foot walking and moderation of activity will always be necessary to enjoy foot comfort. Your bunion may involve arthritis, which is incurable even with surgery. Long standing bunions often involve chronic irritation to the surrounding nerves. Nerve pain may not resolve even with reducing the bunion bump since permanent nerve damage may be present   Bunion surgery is nevertheless quite successful. Most surgical patients are pleased with their foot following bunion surgery. Many of the issues described above can be controlled by taking proper care of your foot during the healing process.   Your surgeon would be happy to fully describe any of the above issues. You  should pursue a full understanding of the operation,recovery process and any potential problems that could develop.   PREVENTION  1.  Do not wear high heels if there is a family history of bunions.  2.  Wear shoes that have enough width and depth in the toe box  Here are exercises that may benefit people with bunions:   Toe stretches. Stretching out your toes can help keep them limber and offset foot pain. To stretch your toes, point your toes straight ahead for 5 seconds and then curl them under for 5 seconds. Repeat these stretches 10 times. These exercises can be especially beneficial if you also have hammertoes, or chronically bent toes, in addition to a bunion.   Toe flexing and tea. Press your toes against a hard surface such as a wall, to flex and stretch them; hold the position for 10 seconds and repeat three to four times. Then flex your toes in the opposite direction; hold the position for 10 seconds and repeat three to four times.   Stretching your big toe. Using your fingers to gently pull your big toe over into proper alignment can be helpful as well. Hold your toe in position for 10 seconds and repeat three to four times.   Resistance exercises. Wrap either a towel or belt around your big toe and use it to pull your big toe toward you while simultaneously pushing forward, against the towel, with your big toe.   Ball roll. To massage the bottom of your foot, sit down, place a golf ball on the floor under your foot, and roll it around under your foot for two minutes. This can help relieve foot strain and cramping.   Towel curls. You can strengthen your toes by spreading out a small towel on the floor, curling your toes around it, and pulling it toward you. Repeat five times. Gripping objects with your toes like this can help keep your foot flexible.   Picking up marbles. Another gripping exercise you can perform to keep your foot flexible is picking up marbles with your toes. Do this by placing  20 marbles on the floor in front of you and use your foot to pick the marbles up one by one and place them in a bowl.   Walking along the beach. Whenever possible, spend time walking on sand. This can give you a gentle foot massage and also help strengthen your toes. This is especially beneficial for people who have arthritis associated with their bunions.   Foot and Ankle Hammertoe Post Operative Instructions   Activities:  First day of surgery you need to rest.  Stay off your feet as much as possible and keep your foot elevated above the level of your heart (about 2 pillow height).  Elevate foot 23 of 24 hours a day.  Wear your surgical shoe at all times when up.  Limit walking to 5 to 10 minutes on your heel per hour over the next few days if your doctor has previously told you that you can put some weight on the foot after surgery.  If you are suppose to be non weight bearing, which means NO WEIGHT AT ALL ON THE FOOT.  Use an ice pack on the ankle 20-30 minutes per hour to help decrease pain and swelling.  The first two weeks you need to ice and elevate as much as possible.  This will help with post op pain.  Your foot requires significant rest and elevation. Expect muscle aches, back pain, cramps, etc. Optimal posture, lumbar support, back exercises, ice and heat may all help with your new aches and pains. Do not apply a heating pad to your foot or leg as this can cause increase swelling and pain. Rather use ice in those areas.   Showering is a major challenge. Your incision requires about three days to become sealed from water. Your bandage should not get wet and should not be removed. Do not attempt showering for the first three days. A sponge bath is preferred. You may attempt to shower on the fourth day after the operation. Your foot should be covered with a bag, tape and rubber bands. Double bagging is preferred. Standing in the shower with a bag on your foot is quite hazardous. A portable shower stool  would be ideal. The bandage will need to be changed in the office if it becomes moistened. A moist bandage will not dry on its own. A moist dressing may lead to infection.      External pins need continued protection. These pins are strong but do not resist the forces of bumping. Pay attention to the position and direction of the pins. Any change in pin alignment or positioning should prompt a call. A loose pin will need to be removed. Never push a pin back into your foot.    .   Do not wear regular shoes with a surgical bandage and/or external pins in your foot.  Wear loose fitting clothing that easily will slip over the bandage and/or pins.  Also, remember that dogs are not aware of your surgery.  Please keep them away from the bandages and pins.   If your surgeon places external pins in your foot, you must keep the foot dry until the pins are removed at 6-8 weeks.  Pins should be covered with a dressing for protection.  Check for any spreading redness or yellow drainage from the pins.  Do not apply ointment around the pins.  Do not push a loose pin back into the foot.  Please call the clinic if the pin is spinning or moving in and out.  If the pins are bumped or loosened, they may need to be removed early.  This may affect your surgical outcome.    Best wishes on your recovery from surgery.  Please do not hesitate to call or  My Chart  with any questions or concerns.

## 2017-11-13 NOTE — NURSING NOTE
"Chief Complaint   Patient presents with     Foot Problems     x1 month pain of left 2nd toe, pain on top of right foot x1 year       Initial /68 (BP Location: Right arm, Cuff Size: Adult Regular)  Ht 5' 2\" (1.575 m)  Wt 147 lb (66.7 kg)  BMI 26.89 kg/m2 Estimated body mass index is 26.89 kg/(m^2) as calculated from the following:    Height as of this encounter: 5' 2\" (1.575 m).    Weight as of this encounter: 147 lb (66.7 kg).  Medication Reconciliation: complete   Kelsey Larios MA      "

## 2017-11-13 NOTE — LETTER
2017         RE: Barbi Tiwari  [address] [place] [address]        Dear Colleague,    Thank you for referring your patient, Barbi Tiwari, to the [facility]. Please see a copy of my visit note below.      ASSESSMENT/PLAN:  Encounter Diagnoses   Name Primary?     Hallux valgus of right foot Yes     Deformity of toe of right foot      Right foot pain      Arthritis, midfoot      We reviewed the x-ray images together.  I discussed the anatomy relevant to her deformities.     We reviewed supportive, stiffer soled shoes with adequate arch support .    Quality over the counter inserts and custom orthoses discussed.     Option of future image guided steroid injections for midfoot arthritis discussed.     I also discussed surgical options with her.  Regarding her bunions, I think MTP fusion is the best option at this point.  I explained that the transverse plane component of her left 2nd toe deformity would be technically challenging to fix and somewhat unpredictable in the future.  However, she might have pain relief simply by reducing the hallux abduction that forcefully contacts her 2nd toe.     Greater than 50% (25min) was spent in face-to-face counseling and coordination of care. This excluded any procedure time during the visit.    Body mass index is 26.89 kg/(m^2).    Weight management plan: Patient was referred to their PCP to discuss a diet and exercise plan.      Benjamin Griffin DPM, FACFAS, MS    [place] [facility] Pod[facility]/Foot & Ankle Surgery      ____________________________________________________________________    HPI:         Chief Complaint: left 2nd toe pain  Onset of problem: weeks  Pain/ discomfort is described as:  shooting  Ratin/10   Frequency:  daily    The pain is made worse with flexing her toes and walking  Previous treatment: foot massage    Secondary concerns:  She also has pain on the top of her right foot.  Deep ache, throbbing,  foot cramps.  This has existed for months.     History of multiple foot surgeries years ago: bunions, hammer toes, neuroma     *  Past Medical History:   Diagnosis Date     Allergic rhinitis 12/9/2009     Calculus of kidney 1/12/2011    Overview:  S/P LITHOTRIPSY 3/15/11      Esophageal reflux 10/22/2008     Hyperparathyroidism s/p surgery 1/11/2011     Hyperparathyroidism s/p surgery 1/11/2011     oligodendroglioma 7/23/2012     Osteoarthrosis 12/9/2009     Palpitations 6/5/2014     PVCs 6/5/2014     Seizure disorder (related to tumor) 8/17/2011   *  *  Past Surgical History:   Procedure Laterality Date     CRANIOTOMY  2011    tumor resection     EXTRACORPOREAL SHOCK WAVE LITHOTRIPSY, CYSTOSCOPY, INSERT STENT URETER(S), COMBINED Bilateral 5/5/2017    Procedure: COMBINED EXTRACORPOREAL SHOCK WAVE LITHOTRIPSY, CYSTOSCOPY, INSERT STENT URETER(S);  CYSTO, URETHRAL DILATION, URETERAL LEFT STENT PLACEMENT, LEFT ESWL.;  Surgeon: Angel Del Rio MD;  Location: [facility] OR     FOOT SURGERY      mulitple     HYSTERECTOMY, PAP NO LONGER INDICATED  2008    lap hys     LITHOTRIPSY  2010 2017     PARATHYROIDECTOMY  2011   *  *  Current Outpatient Prescriptions   Medication Sig Dispense Refill     gabapentin (NEURONTIN) 100 MG capsule 1.5 hrs before bedtime for 7 nights, increase to 2 pills if not effective after 7 days, increase to 3 pills after 7 days if not completely effective 90 capsule 1     albuterol (PROAIR HFA/PROVENTIL HFA/VENTOLIN HFA) 108 (90 BASE) MCG/ACT Inhaler Inhale 2 puffs into the lungs every 6 hours as needed for shortness of breath / dyspnea or wheezing 1 Inhaler 0     ascorbic acid 500 MG TABS Take 500 mg by mouth 2 times daily       ferrous sulfate (IRON) 325 (65 FE) MG tablet Take 325 mg by mouth 2 times daily Reported on 5/15/2017       VITAMIN D, CHOLECALCIFEROL, PO Take 1,000 Units by mouth 2 times daily Reported on 5/15/2017       LAMOTRIGINE PO Take 300 mg by mouth 2 times daily       ranitidine  (ZANTAC) 150 MG tablet Take 1 tablet (150 mg) by mouth 2 times daily as needed for heartburn       COMPOUND (CMPD RX) - PHARMACY TO MIX COMPOUNDED MEDICATION Apply 1g daily for 2 weeks, then twice weekly to vagina 20 g 11     tretinoin (RETIN-A) 0.05 % cream Spread a pea size amount into affected area topically at bedtime.  Use sunscreen SPF>20. 45 g 11     acetaminophen (TYLENOL) 500 MG tablet Take 1,000 mg by mouth every 6 hours if needed. Max acetaminophen dose: 4000mg in 24 hrs.       Calcium Carbonate-Vitamin D (CALCIUM-VITAMIN D) 600-200 MG-UNIT CAPS Reported on 5/15/2017       ibuprofen (ADVIL,MOTRIN) 200 MG tablet Take 4 tablets by mouth 4 times daily if needed for Pain or Headache. (rare use)       Multiple Vitamin (MULTI-VITAMINS) TABS take 1 tablet by oral route once daily with food       levETIRAcetam (KEPPRA) 500 MG tablet 1,500 mg 2 times daily Take 3 tablets am and 3 tablets pm       zolpidem (AMBIEN) 5 MG tablet Take tablet by mouth 15 minutes prior to sleep, for Sleep Study (Patient not taking: Reported on 11/13/2017) 1 tablet 0     Omeprazole Magnesium (PRILOSEC OTC PO) Take 20 mg by mouth       Benzonatate (TESSALON PERLES PO)        fluconazole (DIFLUCAN) 200 MG tablet 2 tablets by mouth the first day followed by 1 tablet daily for 1-2 weeks based on symptoms resolution (Patient not taking: Reported on 11/13/2017) 15 tablet 0     benzonatate (TESSALON) 100 MG capsule Take 1 capsule (100 mg) by mouth 3 times daily as needed for cough (Patient not taking: Reported on 11/13/2017) 30 capsule 0       ROS:     A 10-point review of systems was performed and is positive for that noted in the HPI and as seen below.  All other areas are negative.     Numbness in feet?  no   Calf pain with walking? no  Recent foot/ankle injury? no  Weight change over past 12 months? no  Self perception as overweight? yes  Recent flu-like symptoms? no  Joint pain other than feet ? Thumb joints    Social History: Employment:   "RN;  Exercise/Physical activity:  Walking 1-2x/ week;  Tobacco use:  no  Social History     Social History     Marital status:      Spouse name: N/A     Number of children: N/A     Years of education: N/A     Occupational History     RN- [employer]      Social History Main Topics     Smoking status: Never Smoker     Smokeless tobacco: Never Used     Alcohol use Yes      Comment: 1-2 per month, only at social events     Drug use: No     Sexual activity: Yes     Partners: Female     Birth control/ protection: Surgical      Comment: hysterectomy     Other Topics Concern     Not on file     Social History Narrative       Family history:  Family History   Problem Relation Age of Onset     Arthritis Mother      Depression Mother      Respiratory Mother      COPD     C.A.D. Father 58     heart attack     DIABETES Brother 70     Depression Sister      Depression Son      Allergies Son      Depression Daughter      Allergies Daughter      Eczema Brother      Depression Sister      Depression Sister        Rheumatoid arthritis:  no  Foot Problems: sibling with bunions and surgery  Diabetes: sibling      EXAM:    Vitals: /68 (BP Location: Right arm, Cuff Size: Adult Regular)  Ht 5' 2\" (1.575 m)  Wt 147 lb (66.7 kg)  BMI 26.89 kg/m2  BMI: Body mass index is 26.89 kg/(m^2).  Height: 5' 2\"    Constitutional/ general:  Pt is in no apparent distress, appears well-nourished.  Cooperative with history and physical exam.     Vascular:  Pedal pulses are palpable bilaterally for both the DP and PT arteries.  CFT < 3 sec.  No edema.  Pedal hair growth noted.     Neuro:  Alert and oriented x 3. Coordinated gait.  Light touch sensation is intact to the L4, L5, S1 distributions. No obvious deficits.  No evidence of neurological-based weakness, spasticity, or contracture in the lower extremities.     Derm: Normal texture and turgor.  No erythema, ecchymosis, or cyanosis.  No open lesions.     Musculoskeletal:    Lower extremity " muscle strength is normal.  Patient is ambulatory without an assistive device or brace . Bilateral flat foot structure.  Hallux abducto valgus bilateral foot.  The deformity is not fully reducible in the transverse plane - there is tracking.  Digital contractures yet flexible. The left 3rd toe is rectus, elevating dorsally, crowded by 2nd and 3rd toe.  The left 2nd toe has a contracture deformity at the distal interphalangeal joint.          Radiographic Exam:  X-Ray Findings:  I personally reviewed the films.  Four views of the right foot:    Findings are consistent with the clinical exam.  There is an increased 1st intermetatarsal angle (ELODIA = 22 degrees) and increased hallux abductus angle.  The sesamoids are subluxing laterally in relation to the 1st metatarsal head.   Obvious midfoot degenerative changes.          Benjamin Griffin DPM, FACFAS, MS    [place] [facility] Pod[facility]/Foot & Ankle Surgery                Again, thank you for allowing me to participate in the care of your patient.        Sincerely,        Benjamin Griffin DPM

## 2017-11-13 NOTE — PROGRESS NOTES
ASSESSMENT/PLAN:  Encounter Diagnoses   Name Primary?     Hallux valgus of right foot Yes     Deformity of toe of right foot      Right foot pain      Arthritis, midfoot      We reviewed the x-ray images together.  I discussed the anatomy relevant to her deformities.     We reviewed supportive, stiffer soled shoes with adequate arch support .    Quality over the counter inserts and custom orthoses discussed.     Option of future image guided steroid injections for midfoot arthritis discussed.     I also discussed surgical options with her.  Regarding her bunions, I think MTP fusion is the best option at this point.  I explained that the transverse plane component of her left 2nd toe deformity would be technically challenging to fix and somewhat unpredictable in the future.  However, she might have pain relief simply by reducing the hallux abduction that forcefully contacts her 2nd toe.     Greater than 50% (25min) was spent in face-to-face counseling and coordination of care. This excluded any procedure time during the visit.    Body mass index is 26.89 kg/(m^2).    Weight management plan: Patient was referred to their PCP to discuss a diet and exercise plan.      Benjamin Griffin DPM, FACFAS, MS    [place] [facility] Pod[facility]/Foot & Ankle Surgery      ____________________________________________________________________    HPI:         Chief Complaint: left 2nd toe pain  Onset of problem: weeks  Pain/ discomfort is described as:  shooting  Ratin/10   Frequency:  daily    The pain is made worse with flexing her toes and walking  Previous treatment: foot massage    Secondary concerns:  She also has pain on the top of her right foot.  Deep ache, throbbing, foot cramps.  This has existed for months.     History of multiple foot surgeries years ago: bunions, hammer toes, neuroma     *  Past Medical History:   Diagnosis Date     Allergic rhinitis 2009     Calculus of kidney 2011    Overview:  S/P  LITHOTRIPSY 3/15/11      Esophageal reflux 10/22/2008     Hyperparathyroidism s/p surgery 1/11/2011     Hyperparathyroidism s/p surgery 1/11/2011     oligodendroglioma 7/23/2012     Osteoarthrosis 12/9/2009     Palpitations 6/5/2014     PVCs 6/5/2014     Seizure disorder (related to tumor) 8/17/2011   *  *  Past Surgical History:   Procedure Laterality Date     CRANIOTOMY  2011    tumor resection     EXTRACORPOREAL SHOCK WAVE LITHOTRIPSY, CYSTOSCOPY, INSERT STENT URETER(S), COMBINED Bilateral 5/5/2017    Procedure: COMBINED EXTRACORPOREAL SHOCK WAVE LITHOTRIPSY, CYSTOSCOPY, INSERT STENT URETER(S);  CYSTO, URETHRAL DILATION, URETERAL LEFT STENT PLACEMENT, LEFT ESWL.;  Surgeon: Angel Del Rio MD;  Location: [facility] OR     FOOT SURGERY      mulitple     HYSTERECTOMY, PAP NO LONGER INDICATED  2008    lap hys     LITHOTRIPSY  2010 2017     PARATHYROIDECTOMY  2011   *  *  Current Outpatient Prescriptions   Medication Sig Dispense Refill     gabapentin (NEURONTIN) 100 MG capsule 1.5 hrs before bedtime for 7 nights, increase to 2 pills if not effective after 7 days, increase to 3 pills after 7 days if not completely effective 90 capsule 1     albuterol (PROAIR HFA/PROVENTIL HFA/VENTOLIN HFA) 108 (90 BASE) MCG/ACT Inhaler Inhale 2 puffs into the lungs every 6 hours as needed for shortness of breath / dyspnea or wheezing 1 Inhaler 0     ascorbic acid 500 MG TABS Take 500 mg by mouth 2 times daily       ferrous sulfate (IRON) 325 (65 FE) MG tablet Take 325 mg by mouth 2 times daily Reported on 5/15/2017       VITAMIN D, CHOLECALCIFEROL, PO Take 1,000 Units by mouth 2 times daily Reported on 5/15/2017       LAMOTRIGINE PO Take 300 mg by mouth 2 times daily       ranitidine (ZANTAC) 150 MG tablet Take 1 tablet (150 mg) by mouth 2 times daily as needed for heartburn       COMPOUND (CMPD RX) - PHARMACY TO MIX COMPOUNDED MEDICATION Apply 1g daily for 2 weeks, then twice weekly to vagina 20 g 11     tretinoin (RETIN-A) 0.05 %  cream Spread a pea size amount into affected area topically at bedtime.  Use sunscreen SPF>20. 45 g 11     acetaminophen (TYLENOL) 500 MG tablet Take 1,000 mg by mouth every 6 hours if needed. Max acetaminophen dose: 4000mg in 24 hrs.       Calcium Carbonate-Vitamin D (CALCIUM-VITAMIN D) 600-200 MG-UNIT CAPS Reported on 5/15/2017       ibuprofen (ADVIL,MOTRIN) 200 MG tablet Take 4 tablets by mouth 4 times daily if needed for Pain or Headache. (rare use)       Multiple Vitamin (MULTI-VITAMINS) TABS take 1 tablet by oral route once daily with food       levETIRAcetam (KEPPRA) 500 MG tablet 1,500 mg 2 times daily Take 3 tablets am and 3 tablets pm       zolpidem (AMBIEN) 5 MG tablet Take tablet by mouth 15 minutes prior to sleep, for Sleep Study (Patient not taking: Reported on 11/13/2017) 1 tablet 0     Omeprazole Magnesium (PRILOSEC OTC PO) Take 20 mg by mouth       Benzonatate (TESSALON PERLES PO)        fluconazole (DIFLUCAN) 200 MG tablet 2 tablets by mouth the first day followed by 1 tablet daily for 1-2 weeks based on symptoms resolution (Patient not taking: Reported on 11/13/2017) 15 tablet 0     benzonatate (TESSALON) 100 MG capsule Take 1 capsule (100 mg) by mouth 3 times daily as needed for cough (Patient not taking: Reported on 11/13/2017) 30 capsule 0       ROS:     A 10-point review of systems was performed and is positive for that noted in the HPI and as seen below.  All other areas are negative.     Numbness in feet?  no   Calf pain with walking? no  Recent foot/ankle injury? no  Weight change over past 12 months? no  Self perception as overweight? yes  Recent flu-like symptoms? no  Joint pain other than feet ? 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 Father 58     heart attack     DIABETES Brother 70     Depression Sister      Depression Son      Allergies Son      Depression Daughter      Allergies Daughter      Eczema Brother      Depression Sister      Depression Sister        Rheumatoid arthritis:  no  Foot Problems: sibling with bunions and surgery  Diabetes: sibling      EXAM:    Vitals: /68 (BP Location: Right arm, Cuff Size: Adult Regular)  Ht 5' 2\" (1.575 m)  Wt 147 lb (66.7 kg)  BMI 26.89 kg/m2  BMI: Body mass index is 26.89 kg/(m^2).  Height: 5' 2\"    Constitutional/ general:  Pt is in no apparent distress, appears well-nourished.  Cooperative with history and physical exam.     Vascular:  Pedal pulses are palpable bilaterally for both the DP and PT arteries.  CFT < 3 sec.  No edema.  Pedal hair growth noted.     Neuro:  Alert and oriented x 3. Coordinated gait.  Light touch sensation is intact to the L4, L5, S1 distributions. No obvious deficits.  No evidence of neurological-based weakness, spasticity, or contracture in the lower extremities.     Derm: Normal texture and turgor.  No erythema, ecchymosis, or cyanosis.  No open lesions.     Musculoskeletal:    Lower extremity muscle strength is normal.  Patient is ambulatory without an assistive device or brace . Bilateral flat foot structure.  Hallux abducto valgus bilateral foot.  The deformity is not fully reducible in the transverse plane - there is tracking.  " Digital contractures yet flexible. The left 3rd toe is rectus, elevating dorsally, crowded by 2nd and 3rd toe.  The left 2nd toe has a contracture deformity at the distal interphalangeal joint.          Radiographic Exam:  X-Ray Findings:  I personally reviewed the films.  Four views of the right foot:    Findings are consistent with the clinical exam.  There is an increased 1st intermetatarsal angle (ELODIA = 22 degrees) and increased hallux abductus angle.  The sesamoids are subluxing laterally in relation to the 1st metatarsal head.   Obvious midfoot degenerative changes.          Benjamin Griffin DPM, FACFAS, MS    [facility] of Podiatry/Foot & Ankle Surgery

## 2017-12-11 RX ORDER — GABAPENTIN 100 MG/1
CAPSULE ORAL
Qty: 90 CAPSULE | Refills: 1 | OUTPATIENT
Start: 2017-12-11

## 2017-12-11 RX ORDER — GABAPENTIN 100 MG/1
CAPSULE ORAL
Qty: 60 CAPSULE | Refills: 1 | Status: SHIPPED | OUTPATIENT
Start: 2017-12-11 | End: 2018-04-13

## 2017-12-11 NOTE — TELEPHONE ENCOUNTER
Pending Prescriptions:                       Disp   Refills    gabapentin (NEURONTIN) 100 MG capsule     90 cap*1            Si.5 hrs before bedtime for 7 nights, increase to           2 pills if not effective after 7 days, increase           to 3 pills after 7 days if not completely           effective    Last Written Prescription Date:  17   Last Fill Quantity: 90,   # refills: 1  Last Office Visit with LIBRADO, BERNARDINO or [facility] prescribing provider: Angeles on 17 (virtual visit)  Future Office visit:   18      Please note that per the last virtual visit patient stated that she is only taking 200 mg a night and has been doing good on this dose because the 300mg made her to tired. Did you want to change the rx? I te'ed it up both ways. Please just remove the one you don't want.    Thanks

## 2018-03-02 RX ORDER — GABAPENTIN 100 MG/1
CAPSULE ORAL
Qty: 90 CAPSULE | Refills: 1 | Status: SHIPPED | OUTPATIENT
Start: 2018-03-02 | End: 2018-05-25

## 2018-03-02 NOTE — TELEPHONE ENCOUNTER
Pending Prescriptions:                       Disp   Refills    gabapentin (NEURONTIN) 100 MG capsule     90 cap*1            Si.5 hrs before bedtime for 7 nights, increase to           2 pills if not effective after 7 days, increase           to 3 pills after 7 days if not completely           effective    Last Written Prescription Date:  2017  Last Fill Quantity: 60,   # refills: 1  Last Office Visit with G, P or [facility] prescribing provider: 17 (Virtual Visit)  Future Office visit:   3/12/2017

## 2018-04-02 ENCOUNTER — RADIANT APPOINTMENT (OUTPATIENT)
Dept: MRI IMAGING | Facility: CLINIC | Age: 58
End: 2018-04-02
Attending: INTERNAL MEDICINE
Payer: COMMERCIAL

## 2018-04-02 DIAGNOSIS — C71.9 OLIGODENDROGLIOMA (H): ICD-10-CM

## 2018-04-02 RX ORDER — GADOBUTROL 604.72 MG/ML
7.5 INJECTION INTRAVENOUS ONCE
Status: COMPLETED | OUTPATIENT
Start: 2018-04-02 | End: 2018-04-02

## 2018-04-02 RX ADMIN — GADOBUTROL 7.5 ML: 604.72 INJECTION INTRAVENOUS at 10:12

## 2018-04-02 ASSESSMENT — ENCOUNTER SYMPTOMS
SEIZURES: 1
MEMORY LOSS: 0
ORTHOPNEA: 0
DISTURBANCES IN COORDINATION: 0
SPEECH CHANGE: 0
PALPITATIONS: 1
HEADACHES: 0
PARALYSIS: 0
LIGHT-HEADEDNESS: 0
HYPERTENSION: 0
TINGLING: 0
SYNCOPE: 0
WEAKNESS: 0
EXERCISE INTOLERANCE: 0
LEG PAIN: 0
SLEEP DISTURBANCES DUE TO BREATHING: 0
DIZZINESS: 1
TREMORS: 0
LOSS OF CONSCIOUSNESS: 0
NUMBNESS: 0
HYPOTENSION: 0

## 2018-04-02 NOTE — DISCHARGE INSTRUCTIONS
MRI Contrast Discharge Instructions    The IV contrast you received today will pass out of your body in your  urine. This will happen in the next 24 hours. You will not feel this process.  Your urine will not change color.    Drink at least 4 extra glasses of water or juice today (unless your doctor  has restricted your fluids). This reduces the stress on your kidneys.  You may take your regular medicines.    If you are on dialysis: It is best to have dialysis today.    If you have a reaction: Most reactions happen right away. If you have  any new symptoms after leaving the hospital (such as hives or swelling),  call your hospital at the correct number below. Or call your family doctor.  If you have breathing distress or wheezing, call 911.    Special instructions: ***    I have read and understand the above information.    Signature:______________________________________ Date:___________    Staff:__________________________________________ Date:___________     Time:__________    [facility] Departments:    ___[place]: [phone]  ___[facility]: [phone]  ___[place]: [phone] ___[facility]: [phone]  ___[facility]: [phone]  ___[facility]: [phone]  ___[phone]  ___[facility]: [phone]  ___[phone]

## 2018-04-09 ENCOUNTER — ONCOLOGY VISIT (OUTPATIENT)
Dept: ONCOLOGY | Facility: CLINIC | Age: 58
End: 2018-04-09
Attending: INTERNAL MEDICINE
Payer: COMMERCIAL

## 2018-04-09 VITALS
SYSTOLIC BLOOD PRESSURE: 116 MMHG | HEIGHT: 62 IN | RESPIRATION RATE: 16 BRPM | HEART RATE: 72 BPM | WEIGHT: 149.6 LBS | DIASTOLIC BLOOD PRESSURE: 85 MMHG | OXYGEN SATURATION: 93 % | TEMPERATURE: 97.1 F | BODY MASS INDEX: 27.53 KG/M2

## 2018-04-09 DIAGNOSIS — F41.9 ANXIETY: ICD-10-CM

## 2018-04-09 DIAGNOSIS — C71.9 OLIGODENDROGLIOMA (H): Primary | ICD-10-CM

## 2018-04-09 PROCEDURE — G0463 HOSPITAL OUTPT CLINIC VISIT: HCPCS | Mod: ZF

## 2018-04-09 PROCEDURE — 99214 OFFICE O/P EST MOD 30 MIN: CPT | Mod: GC | Performed by: INTERNAL MEDICINE

## 2018-04-09 RX ORDER — LORAZEPAM 0.5 MG/1
0.5 TABLET ORAL EVERY 6 HOURS PRN
Qty: 30 TABLET | Refills: 0 | Status: SHIPPED | OUTPATIENT
Start: 2018-04-09 | End: 2018-06-15

## 2018-04-09 ASSESSMENT — PAIN SCALES - GENERAL: PAINLEVEL: NO PAIN (0)

## 2018-04-09 NOTE — PROGRESS NOTES
MEDICAL ONCOLOGY PROGRESS NOTE  Apr 9, 2018    Oncologic History:  Ms. 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 She then received 12 cycles of Temodar, completed in 2013.  She has done very well since then.    HISTORY OF PRESENT ILLNESS  Ms. Tiwari is here for followup.  She denies any new symptoms or any major issues.  She feels fatigued.  She attributes that to her anti-seizure medications.  She continues to have 1-2 episodes of months of facial twitching associated with some difficulty talking and swallowing; these occur intermittently once or twice a month, mostly right-side of the face.  No changes in frequency or changes and pattern of those episodes.  She never had generalized seizures.  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  She has no difficulties performing her duties.  She denies any new weakness, numbness, headaches or visual changes.  She denies shortness of breath, chest pain, GI symptoms, skin rash, problems with walking or balance.  No change in her cognitive function.      Her recent MRI showed evidence of possible disease recurrence with a new 1.2 cm non-enhancing nodule within the cortex of the right frontal lobe adjacent to the resection cavity that is concerning for possible recurrence for her oligodendroglioma.           REVIEW OF SYSTEMS  A 12-point ROS is negative except as in HPI    Current Outpatient Prescriptions   Medication     gabapentin (NEURONTIN) 100 MG capsule     Omeprazole Magnesium (PRILOSEC OTC PO)     Benzonatate (TESSALON PERLES PO)     albuterol (PROAIR HFA/PROVENTIL HFA/VENTOLIN HFA) 108 (90 BASE) MCG/ACT Inhaler     ascorbic acid 500 MG TABS     ferrous sulfate (IRON) 325 (65 FE) MG tablet     VITAMIN D, CHOLECALCIFEROL, PO     LAMOTRIGINE PO     ranitidine (ZANTAC) 150 MG tablet     COMPOUND (CMPD RX) - PHARMACY TO MIX COMPOUNDED MEDICATION      tretinoin (RETIN-A) 0.05 % cream     acetaminophen (TYLENOL) 500 MG tablet     Calcium Carbonate-Vitamin D (CALCIUM-VITAMIN D) 600-200 MG-UNIT CAPS     ibuprofen (ADVIL,MOTRIN) 200 MG tablet     Multiple Vitamin (MULTI-VITAMINS) TABS     levETIRAcetam (KEPPRA) 500 MG tablet     gabapentin (NEURONTIN) 100 MG capsule     zolpidem (AMBIEN) 5 MG tablet     fluconazole (DIFLUCAN) 200 MG tablet     benzonatate (TESSALON) 100 MG capsule     No current facility-administered medications for this visit.      Past Medical History:   Diagnosis Date     Allergic rhinitis 12/9/2009     Calculus of kidney 1/12/2011    Overview:  S/P LITHOTRIPSY 3/15/11      Esophageal reflux 10/22/2008     Hyperparathyroidism s/p surgery 1/11/2011     Hyperparathyroidism s/p surgery 1/11/2011     oligodendroglioma 7/23/2012     Osteoarthrosis 12/9/2009     Palpitations 6/5/2014     PVCs 6/5/2014     Seizure disorder (related to tumor) 8/17/2011     Past Surgical History:   Procedure Laterality Date     CRANIOTOMY  2011    tumor resection     EXTRACORPOREAL SHOCK WAVE LITHOTRIPSY, CYSTOSCOPY, INSERT STENT URETER(S), COMBINED Bilateral 5/5/2017    Procedure: COMBINED EXTRACORPOREAL SHOCK WAVE LITHOTRIPSY, CYSTOSCOPY, INSERT STENT URETER(S);  CYSTO, URETHRAL DILATION, URETERAL LEFT STENT PLACEMENT, LEFT ESWL.;  Surgeon: Angel Del Rio MD;  Location: [facility] OR     FOOT SURGERY      mulitple     HYSTERECTOMY, PAP NO LONGER INDICATED  2008    lap hys     LITHOTRIPSY  2010 2017     PARATHYROIDECTOMY  2011     Family History   Problem Relation Age of Onset     Arthritis Mother      Depression Mother      Respiratory Mother      COPD     C.A.D. 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      Social History Main Topics     Smoking status: Never Smoker     Smokeless tobacco: Never Used     Alcohol use Yes      Comment: 1-2 per month, only at social events     Drug use: No     Sexual activity: Yes     Partners: Female     Birth control/ protection: Surgical      Comment: hysterectomy     Other Topics Concern     Not on file     Social History Narrative     PHYSICAL EXAMINATION    \/85 (BP Location: Right arm, Patient Position: Sitting, Cuff Size: Adult Regular)  Pulse 72  Temp 97.1  F (36.2  C) (Tympanic)  Resp 16  Ht 1.575 m (5' 2.01\")  Wt 67.9 kg (149 lb 9.6 oz)  SpO2 93%  BMI 27.36 kg/m2    General: in no distress  Eyes: Pupils are equal round and reactive  ENT: Oropharynx is clear  Skin: No rashes  Neuro: Awake, alert, oriented.  Her speech and language is fluent. Her memory and judgment are intact. Cranial nerve status II-XII without focal abnormalities. Symmetric DTRs from the LE and UE, normal Romberg.    ECOG Performance Status is 0.     Imaging:  Reviewed with the patient and copy of the report provided.     Brain MRI:    4/2/18  Findings:  Stable postsurgical changes of right frontal craniotomy with subjacent  hemosiderin stained resection cavity and surrounding confluent T2  hyperintense signal. New nonenhancing 1.2 cm nodular T2 hyperintense  signal within the cortex of the right inferior frontal gyrus  immediately subjacent to the resection cavity (series 6, image 26).     Apparent high intensity signal of the basilar/cisternal CSF on FLAIR  images is favored to be artifactual.     No midline shift, herniation, or hydrocephalus. Choroid plexus  xanthogranulomas. No other areas of restricted diffusion. Preserved  major intracranial arterial vascular flow-voids. Clear mastoid air  cells and paranasal sinuses. Normal orbits.         Impression: New 1.2 cm nonenhancing nodule " within the cortex of the  right frontal lobe immediately subjacent to the resection cavity,  highly concerning for recurrent site of oligodendroglioma.         IMPRESSION AND PLAN  #1 Resected Oligodendroglioma with 1p/19q co-deletion, status post 12 months adjuvant Temozolomide. Now with local recurrence. Patient denies any new symptoms at this time. The lesion is 1.2 cm. We will refer her to neuro-surgery for evaluation of resectability. This would be preferred approach.    #2 Seizure  Her seizure activity has been stable, she is on the same dose of her anti-epileptic medications (lamictal and Keppra). She is following with epileptologist and has upcoming appointment at the end of the month. Would advise against de-escalation of anti-seizure meds at this time given concern for disease recurrence.     Plan:    Referral to neurosurgery.     All questions were answered.     Patient seen and discussed with Dr. Daugherty.    Richelle Vanessa  Hem-Onc Fellow    ---  I evaluated the patient and reviewed the plan of care with the patient and the Oncology Fellow. I agree with the history, physical exam, and assessment and plan as documented in the clinical note.    We will also try to have her seen by Dr. Gallagher in neuro-oncology and there may be a role for chemoradiation as she has not had radiation therapy before.    Neo Daugherty M.D.   of Medicine  Hematology, Oncology and Transplantation  [facility]

## 2018-04-09 NOTE — NURSING NOTE
"Oncology Rooming Note    April 9, 2018 10:22 AM   Barbi Tiwari is a 57 year old female who presents for:    Chief Complaint   Patient presents with     Oncology Clinic Visit     Return-Oligodendroglioma     Initial Vitals: There were no vitals taken for this visit. Estimated body mass index is 26.89 kg/(m^2) as calculated from the following:    Height as of 11/13/17: 1.575 m (5' 2\").    Weight as of 11/13/17: 66.7 kg (147 lb). There is no height or weight on file to calculate BSA.  Data Unavailable Comment: Data Unavailable   No LMP recorded. Patient has had a hysterectomy.  Allergies reviewed: Yes  Medications reviewed: Yes    Medications: Medication refills not needed today.  Pharmacy name entered into [facility]:    [facility] - [place], [place] - [address] [place] [address] PHARMACY [facility] - [place], [place] - [address] [place] [address]    Clinical concerns:     6 minutes for nursing intake (face to face time)     Alessia Slater LPN            "

## 2018-04-09 NOTE — MR AVS SNAPSHOT
After Visit Summary   4/9/2018    Barbi Tiwari    MRN: [id number]           Patient Information     Date Of Birth          1960        Visit Information        Provider Department      4/9/2018 10:00 AM Neo Gotti MD [facility]        Today's Diagnoses     Oligodendroglioma (H)    -  1    Anxiety           Follow-ups after your visit        Additional Services     NEUROSURGERY REFERRAL       Your provider has referred you to: [facility]: Neurosurgery Clinic [facility] [phone]   [email or web address]    Please be aware that coverage of these services is subject to the terms and limitations of your health insurance plan.  Call member services at your health plan with any benefit or coverage questions.      Please bring the following with you to your appointment:    (1) Any X-Rays, CTs or MRIs which have been performed.  Contact the facility where they were done to arrange for  prior to your scheduled appointment.   (2) List of current medications  (3) This referral request   (4) Any documents/labs given to you for this referral            ONC/HEME ADULT REFERRAL       Your provider has referred you to: [facility]: Cancer Risk Management Program [phone]  [email or web address]    Please be aware that coverage of these services is subject to the terms and limitations of your health insurance plan.  Call member services at your health plan with any benefit or coverage questions.      Please bring the following with you to your appointment:    (1) Any X-Rays, CTs or MRIs which have been performed.  Contact the facility where they were done to arrange for  prior to your scheduled appointment.   (2) List of current medications  (3) This referral request   (4) Any documents/labs given to you for this referral            Radiation Oncology IP Consult                  Follow-up notes from your care team     Return in about 3 months (around 7/9/2018).      Your next 10 appointments already scheduled     Apr 13, 2018  9:00 AM CDT   (Arrive by 8:45 AM)   Return Visit with Myrtle Gallagher MD   [facility])    [address]  [address]  [facility] [address]   [phone]            Jun 08, 2018  2:15 PM CDT   (Arrive by 2:00 PM)   NEW WITH ROOM with Zuly Perez GC,  2 119 CONSULT RM   [facility] ([facility])    [address]  [address]  [place] [address]   [phone]            Jul 03, 2018 10:00 AM CDT   (Arrive by 9:45 AM)   MR BRAIN W/O & W CONTRAST with [address] [facility] ([facility])    [address]  1st Floor  [facility] [address]   [phone]           Take your medicines as usual, unless your doctor tells you not to. Bring a list of your current medicines to your exam (including vitamins, minerals and over-the-counter drugs).  You may or may not receive intravenous (IV) contrast for this exam pending the discretion of the Radiologist.  You do not need to do anything special to prepare.  The MRI machine uses a strong magnet. Please wear clothes without metal (snaps, zippers). A sweatsuit works well, or we may give you a hospital gown.  Please remove any body piercings and hair extensions before you arrive. You will also remove watches, jewelry, hairpins, wallets, dentures, partial dental plates and hearing aids. You may wear contact lenses, and you may be able to wear your rings. We have a safe place to keep your personal items, but it is safer to leave them at home.  **IMPORTANT** THE INSTRUCTIONS BELOW ARE ONLY FOR THOSE PATIENTS WHO HAVE BEEN PRESCRIBED SEDATION OR GENERAL ANESTHESIA DURING THEIR MRI PROCEDURE:  IF YOUR DOCTOR PRESCRIBED ORAL SEDATION (take medicine  to help you relax during your exam):   You must get the medicine from your doctor (oral medication) before you arrive. Bring the medicine to the exam. Do not take it at home. You ll be told when to take it upon arriving for your exam.   Arrive one hour early. Bring someone who can take you home after the test. Your medicine will make you sleepy. After the exam, you may not drive, take a bus or take a taxi by yourself.  IF YOUR DOCTOR PRESCRIBED IV SEDATION:   Arrive one hour early. Bring someone who can take you home after the test. Your medicine will make you sleepy. After the exam, you may not drive, take a bus or take a taxi by yourself.   No eating 6 hours before your exam. You may have clear liquids up until 4 hours before your exam. (Clear liquids include water, clear tea, black coffee and fruit juice without pulp.)  IF YOUR DOCTOR PRESCRIBED ANESTHESIA (be asleep for your exam):   Arrive 1 1/2 hours early. Bring someone who can take you home after the test. You may not drive, take a bus or take a taxi by yourself.   No eating 8 hours before your exam. You may have clear liquids up until 4 hours before your exam. (Clear liquids include water, clear tea, black coffee and fruit juice without pulp.)   You will spend four to five hours in the recovery room.  Please call the Imaging Department at your exam site with any questions.            Jul 09, 2018 10:45 AM CDT   (Arrive by 10:30 AM)   Return Visit with Neo Weinstein MD   [facility] ([facility])    [address] [place] [address]  [address] [place] [address] [phone]              Who to contact     If you have questions or need follow up information about today's clinic visit or your schedule please contact [facility] [place] directly at [phone].  Normal or non-critical lab and imaging results will be communicated to you by MyChart, letter or phone within 4 business days after the  "clinic has received the results. If you do not hear from us within 7 days, please contact the clinic through [employer] or phone. If you have a critical or abnormal lab result, we will notify you by phone as soon as possible.  Submit refill requests through [employer] or call your pharmacy and they will forward the refill request to us. Please allow 3 business days for your refill to be completed.          Additional Information About Your Visit        [employer] Information     [employer] gives you secure access to your electronic health record. If you see a primary care provider, you can also send messages to your care team and make appointments. If you have questions, please call your primary care clinic.  If you do not have a primary care provider, please call [phone] and they will assist you.        Care EveryWhere ID     This is your Care EveryWhere ID. This could be used by other organizations to access your [facility] medical records  [id number]        Your Vitals Were     Pulse Temperature Respirations Height Pulse Oximetry BMI (Body Mass Index)    72 97.1  F (36.2  C) (Tympanic) 16 1.575 m (5' 2.01\") 93% 27.36 kg/m2       Blood Pressure from Last 3 Encounters:   04/09/18 116/85   11/13/17 122/68   05/15/17 96/64    Weight from Last 3 Encounters:   04/09/18 67.9 kg (149 lb 9.6 oz)   11/13/17 66.7 kg (147 lb)   05/15/17 63.6 kg (140 lb 3.2 oz)              We Performed the Following     NEUROSURGERY REFERRAL     ONC/HEME ADULT REFERRAL     Radiation Oncology IP Consult          Today's Medication Changes          These changes are accurate as of 4/9/18 11:59 PM.  If you have any questions, ask your nurse or doctor.               Start taking these medicines.        Dose/Directions    LORazepam 0.5 MG tablet   Commonly known as:  ATIVAN   Used for:  Oligodendroglioma (H), Anxiety        Dose:  0.5 mg   Take 1 tablet (0.5 mg) by mouth every 6 hours as needed for anxiety   Quantity:  30 tablet   Refills:  0          "   Where to get your medicines      Some of these will need a paper prescription and others can be bought over the counter.  Ask your nurse if you have questions.     Bring a paper prescription for each of these medications     LORazepam 0.5 MG tablet                Primary Care Provider Office Phone # Fax #    Dustin Choudhury [phone]       [address] [facility] [address]        Equal Access to Services     YOBANI GRIFFITH : Hadii aad ku hadasho Soomaali, waaxda luqadaha, qaybta kaalmada adeegyada, waxay idiin hayroyaln adeeg gareth laKarenenrico . So [facility] [phone].    ATENCIÓN: Si habla espky, tiene a fletcher disposición servicios gratuitos de asistencia lingüística. Llame al [phone].    We comply with applicable federal civil rights laws and [place] laws. We do not discriminate on the basis of race, color, national origin, age, disability, sex, sexual orientation, or gender identity.            Thank you!     Thank you for choosing [facility]  for your care. Our goal is always to provide you with excellent care. Hearing back from our patients is one way we can continue to improve our services. Please take a few minutes to complete the written survey that you may receive in the mail after your visit with us. Thank you!             Your Updated Medication List - Protect others around you: Learn how to safely use, store and throw away your medicines at www.disposemymeds.org.          This list is accurate as of 4/9/18 11:59 PM.  Always use your most recent med list.                   Brand Name Dispense Instructions for use Diagnosis    acetaminophen 500 MG tablet    TYLENOL     Take 1,000 mg by mouth every 6 hours if needed. Max acetaminophen dose: 4000mg in 24 hrs.        albuterol 108 (90 Base) MCG/ACT Inhaler    PROAIR HFA/PROVENTIL HFA/VENTOLIN HFA    1 Inhaler    Inhale 2 puffs into the lungs every 6 hours as needed for shortness of breath / dyspnea or wheezing     Cough       ascorbic acid 500 MG Tabs      Take 500 mg by mouth 2 times daily        Calcium-Vitamin D 600-200 MG-UNIT Caps      Reported on 5/15/2017        COMPOUNDED NON-CONTROLLED SUBSTANCE - PHARMACY TO MIX COMPOUNDED MEDICATION    CMPD RX    20 g    Apply 1g daily for 2 weeks, then twice weekly to vagina    Vaginal atrophy       ferrous sulfate 325 (65 Fe) MG tablet    IRON     Take 325 mg by mouth 2 times daily Reported on 5/15/2017        fluconazole 200 MG tablet    DIFLUCAN    15 tablet    2 tablets by mouth the first day followed by 1 tablet daily for 1-2 weeks based on symptoms resolution    Candidiasis of mouth and esophagus (H)       * gabapentin 100 MG capsule    NEURONTIN    60 capsule    Take two pills (200 MG) 1 and a 1/2 hours before bed.        * gabapentin 100 MG capsule    NEURONTIN    90 capsule    1.5 hrs before bedtime for 7 nights, increase to 2 pills if not effective after 7 days, increase to 3 pills after 7 days if not completely effective        ibuprofen 200 MG tablet    ADVIL/MOTRIN     Take 4 tablets by mouth 4 times daily if needed for Pain or Headache. (rare use)        KEPPRA 500 MG tablet   Generic drug:  levETIRAcetam      1,500 mg 2 times daily Take 3 tablets am and 3 tablets pm        LAMOTRIGINE PO      Take 300 mg by mouth 2 times daily        LORazepam 0.5 MG tablet    ATIVAN    30 tablet    Take 1 tablet (0.5 mg) by mouth every 6 hours as needed for anxiety    Oligodendroglioma (H), Anxiety       MULTI-VITAMINS Tabs      take 1 tablet by oral route once daily with food        PRILOSEC OTC PO      Take 20 mg by mouth    Candidiasis of mouth and esophagus (H)       ranitidine 150 MG tablet    ZANTAC     Take 1 tablet (150 mg) by mouth 2 times daily as needed for heartburn    Gastroesophageal reflux disease without esophagitis       * TESSALON PERLES PO       Candidiasis of mouth and esophagus (H)       * benzonatate 100 MG capsule    TESSALON    30 capsule    Take 1  capsule (100 mg) by mouth 3 times daily as needed for cough    Cough       tretinoin 0.05 % cream    RETIN-A    45 g    Spread a pea size amount into affected area topically at bedtime.  Use sunscreen SPF>20.    Senile sebaceous gland hyperplasia       VITAMIN D (CHOLECALCIFEROL) PO      Take 1,000 Units by mouth 2 times daily Reported on 5/15/2017        zolpidem 5 MG tablet    AMBIEN    1 tablet    Take tablet by mouth 15 minutes prior to sleep, for Sleep Study    Snoring, Restless legs syndrome (RLS), Parasomnia       * Notice:  This list has 4 medication(s) that are the same as other medications prescribed for you. Read the directions carefully, and ask your doctor or other care provider to review them with you.

## 2018-04-09 NOTE — LETTER
4/9/2018       RE: Barbi Tiwari  [address] [facility] [address]     Dear Colleague,    Thank you for referring your patient, Barbi Tiwari, to the [facility]. Please see a copy of my visit note below.    MEDICAL ONCOLOGY PROGRESS NOTE  Apr 9, 2018    Oncologic History:  Ms. 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 She then received 12 cycles of Temodar, completed in 2013.  She has done very well since then.    HISTORY OF PRESENT ILLNESS  Ms. Tiwari is here for followup.  She denies any new symptoms or any major issues.  She feels fatigued.  She attributes that to her anti-seizure medications.  She continues to have 1-2 episodes of months of facial twitching associated with some difficulty talking and swallowing; these occur intermittently once or twice a month, mostly right-side of the face.  No changes in frequency or changes and pattern of those episodes.  She never had generalized seizures.  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  She has no difficulties performing her duties.  She denies any new weakness, numbness, headaches or visual changes.  She denies shortness of breath, chest pain, GI symptoms, skin rash, problems with walking or balance.  No change in her cognitive function.      Her recent MRI showed evidence of possible disease recurrence with a new 1.2 cm non-enhancing nodule within the cortex of the right frontal lobe adjacent to the resection cavity that is concerning for possible recurrence for her oligodendroglioma.           REVIEW OF SYSTEMS  A 12-point ROS is negative except as in HPI    Current Outpatient Prescriptions   Medication     gabapentin (NEURONTIN) 100 MG capsule     Omeprazole Magnesium (PRILOSEC OTC PO)     Benzonatate (TESSALON PERLES PO)     albuterol (PROAIR HFA/PROVENTIL HFA/VENTOLIN HFA) 108 (90 BASE) MCG/ACT Inhaler      ascorbic acid 500 MG TABS     ferrous sulfate (IRON) 325 (65 FE) MG tablet     VITAMIN D, CHOLECALCIFEROL, PO     LAMOTRIGINE PO     ranitidine (ZANTAC) 150 MG tablet     COMPOUND (CMPD RX) - PHARMACY TO MIX COMPOUNDED MEDICATION     tretinoin (RETIN-A) 0.05 % cream     acetaminophen (TYLENOL) 500 MG tablet     Calcium Carbonate-Vitamin D (CALCIUM-VITAMIN D) 600-200 MG-UNIT CAPS     ibuprofen (ADVIL,MOTRIN) 200 MG tablet     Multiple Vitamin (MULTI-VITAMINS) TABS     levETIRAcetam (KEPPRA) 500 MG tablet     gabapentin (NEURONTIN) 100 MG capsule     zolpidem (AMBIEN) 5 MG tablet     fluconazole (DIFLUCAN) 200 MG tablet     benzonatate (TESSALON) 100 MG capsule     No current facility-administered medications for this visit.      Past Medical History:   Diagnosis Date     Allergic rhinitis 12/9/2009     Calculus of kidney 1/12/2011    Overview:  S/P LITHOTRIPSY 3/15/11      Esophageal reflux 10/22/2008     Hyperparathyroidism s/p surgery 1/11/2011     Hyperparathyroidism s/p surgery 1/11/2011     oligodendroglioma 7/23/2012     Osteoarthrosis 12/9/2009     Palpitations 6/5/2014     PVCs 6/5/2014     Seizure disorder (related to tumor) 8/17/2011     Past Surgical History:   Procedure Laterality Date     CRANIOTOMY  2011    tumor resection     EXTRACORPOREAL SHOCK WAVE LITHOTRIPSY, CYSTOSCOPY, INSERT STENT URETER(S), COMBINED Bilateral 5/5/2017    Procedure: COMBINED EXTRACORPOREAL SHOCK WAVE LITHOTRIPSY, CYSTOSCOPY, INSERT STENT URETER(S);  CYSTO, URETHRAL DILATION, URETERAL LEFT STENT PLACEMENT, LEFT ESWL.;  Surgeon: Angel Del Rio MD;  Location: [facility] OR     FOOT SURGERY      mulitple     HYSTERECTOMY, PAP NO LONGER INDICATED  2008    lap hys     LITHOTRIPSY  2010 2017     PARATHYROIDECTOMY  2011     Family History   Problem Relation Age of Onset     Arthritis Mother      Depression Mother      Respiratory Mother      COPD     C.A.D. Father 58     heart attack     DIABETES Brother 70     Depression Sister       "Depression Son      Allergies Son      Depression Daughter      Allergies Daughter      Eczema Brother      Depression Sister      Depression Sister      Social History     Social History     Marital status:      Spouse name: N/A     Number of children: N/A     Years of education: N/A     Occupational History     RN- [employer]      Social History Main Topics     Smoking status: Never Smoker     Smokeless tobacco: Never Used     Alcohol use Yes      Comment: 1-2 per month, only at social events     Drug use: No     Sexual activity: Yes     Partners: Female     Birth control/ protection: Surgical      Comment: hysterectomy     Other Topics Concern     Not on file     Social History Narrative     PHYSICAL EXAMINATION    \/85 (BP Location: Right arm, Patient Position: Sitting, Cuff Size: Adult Regular)  Pulse 72  Temp 97.1  F (36.2  C) (Tympanic)  Resp 16  Ht 1.575 m (5' 2.01\")  Wt 67.9 kg (149 lb 9.6 oz)  SpO2 93%  BMI 27.36 kg/m2    General: in no distress  Eyes: Pupils are equal round and reactive  ENT: Oropharynx is clear  Skin: No rashes  Neuro: Awake, alert, oriented.  Her speech and language is fluent. Her memory and judgment are intact. Cranial nerve status II-XII without focal abnormalities. Symmetric DTRs from the LE and UE, normal Romberg.    ECOG Performance Status is 0.     Imaging:  Reviewed with the patient and copy of the report provided.     Brain MRI:    4/2/18  Findings:  Stable postsurgical changes of right frontal craniotomy with subjacent  hemosiderin stained resection cavity and surrounding confluent T2  hyperintense signal. New nonenhancing 1.2 cm nodular T2 hyperintense  signal within the cortex of the right inferior frontal gyrus  immediately subjacent to the resection cavity (series 6, image 26).     Apparent high intensity signal of the basilar/cisternal CSF on FLAIR  images is favored to be artifactual.     No midline shift, herniation, or hydrocephalus. Choroid " plexus  xanthogranulomas. No other areas of restricted diffusion. Preserved  major intracranial arterial vascular flow-voids. Clear mastoid air  cells and paranasal sinuses. Normal orbits.         Impression: New 1.2 cm nonenhancing nodule within the cortex of the  right frontal lobe immediately subjacent to the resection cavity,  highly concerning for recurrent site of oligodendroglioma.         IMPRESSION AND PLAN  #1 Resected Oligodendroglioma with 1p/19q co-deletion, status post 12 months adjuvant Temozolomide. Now with local recurrence. Patient denies any new symptoms at this time. The lesion is 1.2 cm. We will refer her to neuro-surgery for evaluation of resectability. This would be preferred approach.    #2 Seizure  Her seizure activity has been stable, she is on the same dose of her anti-epileptic medications (lamictal and Keppra). She is following with epileptologist and has upcoming appointment at the end of the month. Would advise against de-escalation of anti-seizure meds at this time given concern for disease recurrence.     Plan:    Referral to neurosurgery.     All questions were answered.     Patient seen and discussed with Dr. Daugherty.    Richelle Vanessa  Hem-Onc Fellow    ---  I evaluated the patient and reviewed the plan of care with the patient and the Oncology Fellow. I agree with the history, physical exam, and assessment and plan as documented in the clinical note.    We will also try to have her seen by Dr. Gallagher in neuro-oncology and there may be a role for chemoradiation as she has not had radiation therapy before.    Neo Daugherty M.D.   of Medicine  Hematology, Oncology and Transplantation  [facility]

## 2018-04-13 ENCOUNTER — ONCOLOGY VISIT (OUTPATIENT)
Dept: ONCOLOGY | Facility: CLINIC | Age: 58
End: 2018-04-13
Attending: PSYCHIATRY & NEUROLOGY
Payer: COMMERCIAL

## 2018-04-13 VITALS
HEART RATE: 59 BPM | RESPIRATION RATE: 16 BRPM | TEMPERATURE: 96.6 F | OXYGEN SATURATION: 99 % | HEIGHT: 62 IN | SYSTOLIC BLOOD PRESSURE: 124 MMHG | DIASTOLIC BLOOD PRESSURE: 76 MMHG | WEIGHT: 148.2 LBS | BODY MASS INDEX: 27.27 KG/M2

## 2018-04-13 DIAGNOSIS — C71.9 OLIGODENDROGLIOMA, WHO GRADE II (H): Primary | ICD-10-CM

## 2018-04-13 DIAGNOSIS — F41.9 ANXIETY: ICD-10-CM

## 2018-04-13 DIAGNOSIS — R56.9 CONVULSIONS, UNSPECIFIED CONVULSION TYPE (H): ICD-10-CM

## 2018-04-13 PROCEDURE — 99215 OFFICE O/P EST HI 40 MIN: CPT | Mod: ZP | Performed by: PSYCHIATRY & NEUROLOGY

## 2018-04-13 PROCEDURE — G0463 HOSPITAL OUTPT CLINIC VISIT: HCPCS | Mod: ZF

## 2018-04-13 ASSESSMENT — PAIN SCALES - GENERAL: PAINLEVEL: NO PAIN (0)

## 2018-04-13 NOTE — PATIENT INSTRUCTIONS
Follow-up scheduled with Dr. Rodriguez on Monday 4/16 at 3:00.     Follow-up with me to be scheduled following surgery.     Myrtle Gallagher MD  Neuro-oncology  4/13/2018

## 2018-04-13 NOTE — LETTER
4/13/2018       RE: Barbi Tiwari  [address] [facility] [address]     Dear Colleague,    Thank you for referring your patient, Barbi Tiwari, to the [facility]. Please see a copy of my visit note below.    NEURO-ONCOLOGY INITIAL VISIT  Apr 13, 2018    CHIEF COMPLAINT: Ms. Barbi Tiwari is a 57 year old right-handed woman with a right frontal diffuse oligodendroglioma (1p19q co-deleted), diagnosed following resection in 5/2011. She is presenting to this initial clinic visit as referred by Dr. Neo Daugherty for evaluation and recommendations on treatment in the setting of possible disease recurrence on MR brain imaging from 4/2/2018.     Accompanying her to this visit is Kenneth ().       HISTORY OF PRESENT ILLNESS  A summary of the patient s oncologic history is as follows;   -4/27/2011 PRESENTATION: New onset seizure, generalized event.   -5/2011 MRB with a non-contrast enhancing right frontal mass lesion.   -5/2011 SURGERY: Craniectomy for mass resection at [facility].   PATHOLOGY: Diffuse oligodendroglioma; WHO grade II, 1p/19q co-deleted.  -6/2011-5/2012 CHEMO: Adjuvant dosed temozolomide x 12 cycles.  -4/2/2018 MRB with possible disease recurrence with a new 1.2 cm non-enhancing nodule within the cortex of the right frontal lobe adjacent to the resection cavity.  -4/12/2018 NEURO-ONC: Recommending repeat resection, appointment scheduled with Dr. Dustin Rodriguez, neurosurgery at the [facility].     Today in clinic;   She denies any new symptoms; no weakness, numbness, headaches or visual changes. Continues to experience chonic, low-grade fatigued attributed to her anti-seizure medications; Lamictal and Keppra. Also on gabapentin for to help with RLS and sleep initiation. No change in her cognitive function. 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    Continues to have 1-2 episodes/ months of right sided facial twitching associated  with some difficulty talking and swallowing. She has had a generalized seizure.        Off all steroids.    Fertility preservation was not discussed with Barbi as she underwent total laparoscopic hysterectomy in 2008 for fibroid uterus.    REVIEW OF SYSTEMS  A comprehensive ROS negative except as in HPI.      MEDICATIONS   Current Outpatient Prescriptions   Medication Sig Dispense Refill     LORazepam (ATIVAN) 0.5 MG tablet Take 1 tablet (0.5 mg) by mouth every 6 hours as needed for anxiety 30 tablet 0     gabapentin (NEURONTIN) 100 MG capsule 1.5 hrs before bedtime for 7 nights, increase to 2 pills if not effective after 7 days, increase to 3 pills after 7 days if not completely effective 90 capsule 1     Omeprazole Magnesium (PRILOSEC OTC PO) Take 20 mg by mouth       albuterol (PROAIR HFA/PROVENTIL HFA/VENTOLIN HFA) 108 (90 BASE) MCG/ACT Inhaler Inhale 2 puffs into the lungs every 6 hours as needed for shortness of breath / dyspnea or wheezing 1 Inhaler 0     ascorbic acid 500 MG TABS Take 500 mg by mouth 2 times daily       ferrous sulfate (IRON) 325 (65 FE) MG tablet Take 325 mg by mouth 2 times daily Reported on 5/15/2017       VITAMIN D, CHOLECALCIFEROL, PO Take 1,000 Units by mouth 2 times daily Reported on 5/15/2017       LAMOTRIGINE PO Take 300 mg by mouth 2 times daily       ranitidine (ZANTAC) 150 MG tablet Take 1 tablet (150 mg) by mouth 2 times daily as needed for heartburn       COMPOUND (CMPD RX) - PHARMACY TO MIX COMPOUNDED MEDICATION Apply 1g daily for 2 weeks, then twice weekly to vagina 20 g 11     tretinoin (RETIN-A) 0.05 % cream Spread a pea size amount into affected area topically at bedtime.  Use sunscreen SPF>20. 45 g 11     acetaminophen (TYLENOL) 500 MG tablet Take 1,000 mg by mouth every 6 hours if needed. Max acetaminophen dose: 4000mg in 24 hrs.       ibuprofen (ADVIL,MOTRIN) 200 MG tablet Take 4 tablets by mouth 4 times daily if needed for Pain or Headache. (rare use)       Multiple  Vitamin (MULTI-VITAMINS) TABS take 1 tablet by oral route once daily with food       levETIRAcetam (KEPPRA) 500 MG tablet 1,500 mg 2 times daily Take 3 tablets am and 3 tablets pm       [DISCONTINUED] gabapentin (NEURONTIN) 100 MG capsule Take two pills (200 MG) 1 and a 1/2 hours before bed. 60 capsule 1     DRUG ALLERGIES   Allergies   Allergen Reactions     Benoxinate Nausea and Vomiting     Sulfa Drugs Hives     IMMUNIZATIONS   Immunization History   Administered Date(s) Administered     Influenza (IIV3) PF 10/15/2014     Tdap (Adacel,Boostrix) 02/26/2013     PAST MEDICAL HISTORY   Past Medical History:   Diagnosis Date     Allergic rhinitis 12/9/2009     Calculus of kidney 1/12/2011    Overview:  S/P LITHOTRIPSY 3/15/11      Esophageal reflux 10/22/2008     Hyperparathyroidism s/p surgery 1/11/2011     Hyperparathyroidism s/p surgery 1/11/2011     oligodendroglioma 7/23/2012     Osteoarthrosis 12/9/2009     Palpitations 6/5/2014     PVCs 6/5/2014     Seizure disorder (related to tumor) 8/17/2011     PAST SURGICAL HISTORY   Past Surgical History:   Procedure Laterality Date     CRANIOTOMY  2011    tumor resection     EXTRACORPOREAL SHOCK WAVE LITHOTRIPSY, CYSTOSCOPY, INSERT STENT URETER(S), COMBINED Bilateral 5/5/2017    Procedure: COMBINED EXTRACORPOREAL SHOCK WAVE LITHOTRIPSY, CYSTOSCOPY, INSERT STENT URETER(S);  CYSTO, URETHRAL DILATION, URETERAL LEFT STENT PLACEMENT, LEFT ESWL.;  Surgeon: Angel Del Rio MD;  Location: [facility] OR     FOOT SURGERY      mulitple     HYSTERECTOMY, PAP NO LONGER INDICATED  2008    lap hys     LITHOTRIPSY  2010 2017     PARATHYROIDECTOMY  2011     SOCIAL HISTORY   History   Smoking Status     Never Smoker   Smokeless Tobacco     Never Used      Alcohol use Yes   Comment: 1-2 per month, only at social events    History   Drug Use No     Employment: Nurse.   , 2 children (son 23 and daughter 21)    FAMILY HISTORY   Family History   Problem Relation Age of Onset      "Arthritis Mother      Depression Mother      Respiratory Mother      COPD     C.A.D. Father 58     heart attack     DIABETES Brother 70     Depression Sister      Depression Son      Allergies Son      Depression Daughter      Allergies Daughter      Eczema Brother      Depression Sister      Depression Sister        PHYSICAL EXAMINATION  /76  Pulse 59  Temp 96.6  F (35.9  C) (Tympanic)  Resp 16  Ht 1.575 m (5' 2.01\")  Wt 67.2 kg (148 lb 3.2 oz)  SpO2 99%  BMI 27.1 kg/m2  Wt Readings from Last 2 Encounters:   04/13/18 67.2 kg (148 lb 3.2 oz)   04/09/18 67.9 kg (149 lb 9.6 oz)    Ht Readings from Last 2 Encounters:   04/13/18 1.575 m (5' 2.01\")   04/09/18 1.575 m (5' 2.01\")     KPS: 100    -Generally well appearing.  -Throat: No oral thrush.  -Respiratory: Normal breath sounds, no audible wheezing.   -Skin: No rashes. Healed head incision.  -Hematologic/ lymphatic: No abnormal bruising. No leg swelling.  -Psychiatric: Normal mood and affect, tearful at times. Pleasant, talkative.  -Neurologic:   MENTAL STATUS:     Alert, oriented to date.    Recall: Immediate 3/3, delayed 3/3.    Speech fluent. Comprehension intact to multi-step commands.   Normal naming, repetition. Able to read.   Good right-left orientation.     CRANIAL NERVES:     Discs flat on fundoscopy.    Pupils are equal, round, reactive to light.     Extraocular movements full, patient denies diplopia.     Visual fields full.     Facial sensation intact to light touch.   Decreased activation with smiling/ talking on the left side of face.    Hearing intact.   Palate moves symmetrically.     Sternocleidomastoid and trapezius strength intact.    Tongue midline.  MOTOR:    Normal and symmetric tone.   Grossly 5/5 throughout.    No pronation or drift. No orbiting.   Able to rise from a chair without use of arms.   On toe/ heel walk, equal distance from floor to heels/ toes.   SENSATION:    Intact to light touch throughout.  COORDINATION:   Intact " finger-nose with eyes open and closed.   REFLEXES:    Slightly more brisk on the left hand.    Toes not tested. No clonus. No Hoffmans.   No grasp.    GAIT:  Walks without assistance. Slightly antalgic, left foot pain.    Good speed. Normal stride length and heel strike. Normal turns. Normal arm swing.   Able to toe, heel walk. Able to tandem walk.       MEDICAL RECORDS  Obtained and personally reviewed all available outside medical records in addition to reviewing any records available in our electronic system.     LABS  Personally reviewed all available lab results.     IMAGING  Personally reviewed MR brain imaging from 4/2017 and compared to that performed in 4/2018. To my eye, there is no new contrast enhancement, however, there is a nodular area of T2 FLAIR changes on the medial lips of the right frontal resection cavity.     Imaging was shown to and results were reviewed with Melvin.     Imaging and case will be reviewed and discussed at Brain Tumor Conference.       IMPRESSION  For the 60 minute appointment, more than 50% of the encounter was spent discussing in detail the nature of this tumor in light of the recent changes on imaging, providing emotional support, answering questions pertaining to my recommendations, and devising the treatment plan as outlined below. It was explained to Melvin that she has a brain tumor for which there is currently no cure. Therefore, cancer-directed treatment strives to slow further growth and increase the time interval to recurrence.    Imaging is concerning for disease recurrence given the new non-contrast enhancing nodular lesion on the medial aspect of the right frontal resection cavity. She is grossly asymptomatic with no increase in severity or frequency of seizures. With regard to cancer-directed therapy, a multimodal treatment approach first involves attempting a maximally safe total surgical resection. Following surgery, treatment will be directed  based on review of the pathology.    PROBLEM LIST  Low grade 1p19q co-deleted glioma (grade II)  Seizure  Mood disturbance; anxiety  Left facial weakness, subtle  Sleep disturbance  RLS     PLAN  -CANCER-DIRECTED THERAPY-  -Recommending referral to neurosurgery for a discussion on the risks/ benefits of surgical resection.  -Treatment options including chemotherapy +/- radiation or even close imaging surveillance to be directed by the pathology results.    -SEIZURE MANAGEMENT-  -Continue to follow with currently epileptologist.   -Currently on VPA and Keppra.     -Quality of life/ MOOD/ FATIGUE-  -Denies any mood issues.  -Continue to monitor mood as untreated/ undertreated depression can worsen fatigue, dysorexia, and quality of life.  -Issues with sleep well controlled.     Return to clinic pending plan for surgery.     In the meantime, Barbi knows to call with questions or concerns or to report new complaints and can be seen sooner if needed. Urgent evaluation is needed in the setting of acute onset of severe headache, abrupt change in mental status, on-going seizures, new focal deficits, or new leg swelling/ pain. Everyone in attendance voiced understanding.    Myrtle Gallagher MD  Neuro-oncology

## 2018-04-13 NOTE — MR AVS SNAPSHOT
After Visit Summary   4/13/2018    Barbi Tiwari    MRN: [id number]           Patient Information     Date Of Birth          1960        Visit Information        Provider Department      4/13/2018 9:00 AM Myrtle Gallagher MD [facility]        Care Instructions    Follow-up scheduled with Dr. Rodriguez on Monday 4/16 at 3:00.     Follow-up with me to be scheduled following surgery.     Myrtle Gallagher MD  Neuro-oncology  4/13/2018             Follow-ups after your visit        Your next 10 appointments already scheduled     Apr 16, 2018  1:20 PM CDT   MyChart Short with Dustin Choudhury MD   [facility] ([facility]    [address] [id number] [phone]            Apr 16, 2018  3:00 PM CDT   (Arrive by 2:45 PM)   New Patient Visit with Dustin Rodriguez MD   [facility] ([facility])    [address] [place] [address]  [address] [place] [address]   [phone]            Jun 08, 2018  2:15 PM CDT   (Arrive by 2:00 PM)   NEW WITH ROOM with Zuly Perez GC,  [address] CONSULT [facility] ([facility])    [address] [place] [address]  [address] [place] [address]   [phone]            Jul 03, 2018 10:00 AM CDT   (Arrive by 9:45 AM)   MR BRAIN W/O & W CONTRAST with [facility] ([facility])    [address] [place] [address] Floor  [facility] [address] [phone]           Take your medicines as usual, unless your doctor tells you not to. Bring a list of your current medicines to your exam (including vitamins, minerals and over-the-counter drugs).  You may or may not receive intravenous (IV) contrast for this exam pending the discretion of the Radiologist.  You do not need to do anything special to prepare.  The MRI machine uses a strong magnet.  Please wear clothes without metal (snaps, zippers). A sweatsuit works well, or we may give you a hospital gown.  Please remove any body piercings and hair extensions before you arrive. You will also remove watches, jewelry, hairpins, wallets, dentures, partial dental plates and hearing aids. You may wear contact lenses, and you may be able to wear your rings. We have a safe place to keep your personal items, but it is safer to leave them at home.  **IMPORTANT** THE INSTRUCTIONS BELOW ARE ONLY FOR THOSE PATIENTS WHO HAVE BEEN PRESCRIBED SEDATION OR GENERAL ANESTHESIA DURING THEIR MRI PROCEDURE:  IF YOUR DOCTOR PRESCRIBED ORAL SEDATION (take medicine to help you relax during your exam):   You must get the medicine from your doctor (oral medication) before you arrive. Bring the medicine to the exam. Do not take it at home. You ll be told when to take it upon arriving for your exam.   Arrive one hour early. Bring someone who can take you home after the test. Your medicine will make you sleepy. After the exam, you may not drive, take a bus or take a taxi by yourself.  IF YOUR DOCTOR PRESCRIBED IV SEDATION:   Arrive one hour early. Bring someone who can take you home after the test. Your medicine will make you sleepy. After the exam, you may not drive, take a bus or take a taxi by yourself.   No eating 6 hours before your exam. You may have clear liquids up until 4 hours before your exam. (Clear liquids include water, clear tea, black coffee and fruit juice without pulp.)  IF YOUR DOCTOR PRESCRIBED ANESTHESIA (be asleep for your exam):   Arrive 1 1/2 hours early. Bring someone who can take you home after the test. You may not drive, take a bus or take a taxi by yourself.   No eating 8 hours before your exam. You may have clear liquids up until 4 hours before your exam. (Clear liquids include water, clear tea, black coffee and fruit juice without pulp.)   You will spend four to five hours in the recovery room.  Please call  "the Imaging Department at your exam site with any questions.            Jul 09, 2018 10:45 AM CDT   (Arrive by 10:30 AM)   Return Visit with Neo Weinstein MD   [facility] ([facility])    [address] [facility]  [address]  [facility] [address] [phone]              Who to contact     If you have questions or need follow up information about today's clinic visit or your schedule please contact [facility] directly at [phone].  Normal or non-critical lab and imaging results will be communicated to you by [employer], letter or phone within 4 business days after the clinic has received the results. If you do not hear from us within 7 days, please contact the clinic through [employer] or phone. If you have a critical or abnormal lab result, we will notify you by phone as soon as possible.  Submit refill requests through [employer] or call your pharmacy and they will forward the refill request to us. Please allow 3 business days for your refill to be completed.          Additional Information About Your Visit        MyChart Information     [employer] gives you secure access to your electronic health record. If you see a primary care provider, you can also send messages to your care team and make appointments. If you have questions, please call your primary care clinic.  If you do not have a primary care provider, please call [phone] and they will assist you.        Care EveryWhere ID     This is your Care EveryWhere ID. This could be used by other organizations to access your [facility] medical records  [id number]        Your Vitals Were     Pulse Temperature Respirations Height Pulse Oximetry BMI (Body Mass Index)    59 96.6  F (35.9  C) (Tympanic) 16 1.575 m (5' 2.01\") 99% 27.1 kg/m2       Blood Pressure from Last 3 Encounters:   04/13/18 124/76   04/09/18 116/85   11/13/17 122/68    Weight from Last 3 Encounters:   04/13/18 67.2 kg (148 lb " 3.2 oz)   04/09/18 67.9 kg (149 lb 9.6 oz)   11/13/17 66.7 kg (147 lb)              Today, you had the following     No orders found for display         Today's Medication Changes          These changes are accurate as of 4/13/18 10:02 AM.  If you have any questions, ask your nurse or doctor.               These medicines have changed or have updated prescriptions.        Dose/Directions    gabapentin 100 MG capsule   Commonly known as:  NEURONTIN   This may have changed:  Another medication with the same name was removed. Continue taking this medication, and follow the directions you see here.   Changed by:  Myrtle Gallagher MD        1.5 hrs before bedtime for 7 nights, increase to 2 pills if not effective after 7 days, increase to 3 pills after 7 days if not completely effective   Quantity:  90 capsule   Refills:  1         Stop taking these medicines if you haven't already. Please contact your care team if you have questions.     benzonatate 100 MG capsule   Commonly known as:  TESSALON   Stopped by:  Myrtle Gallagher MD           fluconazole 200 MG tablet   Commonly known as:  DIFLUCAN   Stopped by:  Myrtle Gallagher MD           TESSALON PERLES PO   Stopped by:  Myrtle Gallagher MD           zolpidem 5 MG tablet   Commonly known as:  AMBIEN   Stopped by:  Myrtle Gallagher MD                    Primary Care Provider Office Phone # Fax #    Dustin Choudhury [phone]       [address] [place] [address]        Equal Access to Services     [facility] AH: Hadii aad ku hadasho Soomaali, waaxda luqadaha, qaybta kaalmada adeegyada, waxay idiin hayaan dina servin la'enrico . So [facility] [phone].    ATENCIÓN: Si habla español, tiene a fletcher disposición servicios gratuitos de asistencia lingüística. Llame al [phone].    We comply with applicable federal civil rights laws and [place] laws. We do not discriminate on the basis of race,  color, national origin, age, disability, sex, sexual orientation, or gender identity.            Thank you!     Thank you for choosing [facility]  for your care. Our goal is always to provide you with excellent care. Hearing back from our patients is one way we can continue to improve our services. Please take a few minutes to complete the written survey that you may receive in the mail after your visit with us. Thank you!             Your Updated Medication List - Protect others around you: Learn how to safely use, store and throw away your medicines at www.disposemymeds.org.          This list is accurate as of 4/13/18 10:02 AM.  Always use your most recent med list.                   Brand Name Dispense Instructions for use Diagnosis    acetaminophen 500 MG tablet    TYLENOL     Take 1,000 mg by mouth every 6 hours if needed. Max acetaminophen dose: 4000mg in 24 hrs.        albuterol 108 (90 Base) MCG/ACT Inhaler    PROAIR HFA/PROVENTIL HFA/VENTOLIN HFA    1 Inhaler    Inhale 2 puffs into the lungs every 6 hours as needed for shortness of breath / dyspnea or wheezing    Cough       ascorbic acid 500 MG Tabs      Take 500 mg by mouth 2 times daily        COMPOUNDED NON-CONTROLLED SUBSTANCE - PHARMACY TO MIX COMPOUNDED MEDICATION    CMPD RX    20 g    Apply 1g daily for 2 weeks, then twice weekly to vagina    Vaginal atrophy       ferrous sulfate 325 (65 Fe) MG tablet    IRON     Take 325 mg by mouth 2 times daily Reported on 5/15/2017        gabapentin 100 MG capsule    NEURONTIN    90 capsule    1.5 hrs before bedtime for 7 nights, increase to 2 pills if not effective after 7 days, increase to 3 pills after 7 days if not completely effective        ibuprofen 200 MG tablet    ADVIL/MOTRIN     Take 4 tablets by mouth 4 times daily if needed for Pain or Headache. (rare use)        KEPPRA 500 MG tablet   Generic drug:  levETIRAcetam      1,500 mg 2 times daily Take 3 tablets am and 3 tablets pm         LAMOTRIGINE PO      Take 300 mg by mouth 2 times daily        LORazepam 0.5 MG tablet    ATIVAN    30 tablet    Take 1 tablet (0.5 mg) by mouth every 6 hours as needed for anxiety    Oligodendroglioma (H), Anxiety       MULTI-VITAMINS Tabs      take 1 tablet by oral route once daily with food        PRILOSEC OTC PO      Take 20 mg by mouth    Candidiasis of mouth and esophagus (H)       ranitidine 150 MG tablet    ZANTAC     Take 1 tablet (150 mg) by mouth 2 times daily as needed for heartburn    Gastroesophageal reflux disease without esophagitis       tretinoin 0.05 % cream    RETIN-A    45 g    Spread a pea size amount into affected area topically at bedtime.  Use sunscreen SPF>20.    Senile sebaceous gland hyperplasia       VITAMIN D (CHOLECALCIFEROL) PO      Take 1,000 Units by mouth 2 times daily Reported on 5/15/2017

## 2018-04-13 NOTE — NURSING NOTE
"Oncology Rooming Note    April 13, 2018 8:53 AM   Barbi Tiwari is a 57 year old female who presents for:    Chief Complaint   Patient presents with     Oncology Clinic Visit     F/U Oligodendroglioma     Initial Vitals: /76  Pulse 59  Temp 96.6  F (35.9  C) (Tympanic)  Resp 16  Ht 1.575 m (5' 2.01\")  Wt 67.2 kg (148 lb 3.2 oz)  SpO2 99%  BMI 27.1 kg/m2 Estimated body mass index is 27.1 kg/(m^2) as calculated from the following:    Height as of this encounter: 1.575 m (5' 2.01\").    Weight as of this encounter: 67.2 kg (148 lb 3.2 oz). Body surface area is 1.71 meters squared.  No Pain (0) Comment: Data Unavailable   No LMP recorded. Patient has had a hysterectomy.  Allergies reviewed: Yes  Medications reviewed: Yes    Medications: Medication refills not needed today.  Pharmacy name entered into [facility]:    [facility] [place], [place] - [address] [place] [address] PHARMACY [facility] [place], [place] - [address] [place] [address]    Clinical concerns: none DR Gallagher was NOT notified.    10 minutes for nursing intake (face to face time)     GINA BARONE LPN            "

## 2018-04-16 ENCOUNTER — CARE COORDINATION (OUTPATIENT)
Dept: ONCOLOGY | Facility: CLINIC | Age: 58
End: 2018-04-16

## 2018-04-16 ENCOUNTER — OFFICE VISIT (OUTPATIENT)
Dept: NEUROSURGERY | Facility: CLINIC | Age: 58
End: 2018-04-16
Attending: NEUROLOGICAL SURGERY
Payer: COMMERCIAL

## 2018-04-16 VITALS
OXYGEN SATURATION: 97 % | SYSTOLIC BLOOD PRESSURE: 113 MMHG | HEIGHT: 62 IN | RESPIRATION RATE: 16 BRPM | WEIGHT: 148.4 LBS | HEART RATE: 65 BPM | DIASTOLIC BLOOD PRESSURE: 78 MMHG | TEMPERATURE: 97.2 F | BODY MASS INDEX: 27.31 KG/M2

## 2018-04-16 DIAGNOSIS — C71.9 OLIGODENDROGLIOMA, WHO GRADE II (H): Primary | ICD-10-CM

## 2018-04-16 PROCEDURE — G0463 HOSPITAL OUTPT CLINIC VISIT: HCPCS

## 2018-04-16 ASSESSMENT — PAIN SCALES - GENERAL: PAINLEVEL: NO PAIN (0)

## 2018-04-16 NOTE — PROGRESS NOTES
Met with patient and mother after consultation appt with Dr. Rodriguez.  Pt  verbalizes understanding of Dr. Rodriguez's plan of care and denies questions or barriers to care today.    Household includes:    Introduced self and role of RN Care Coordinator at [facility].  Provided my contact information.    Answered question regarding follow up plan and possible treatment plans after surgery     Verbal and writtien instructions provided re:     Pre-operative instructions/routine and requirements to include:  Surgical procedure, need for History and Physical Preoperative Assessment Center appointment, NPO prior to surgery, showering instructions, medications to avoid,  post-operative expectations, pain control, follow-up after surgery and contact information for questions or concerns prior to surgery.        Time was provided for patient to ask questions and they were answered to her stated satisfaction and understanding.

## 2018-04-16 NOTE — MR AVS SNAPSHOT
After Visit Summary   4/16/2018    Barbi Tiwari    MRN: [id number]           Patient Information     Date Of Birth          1960        Visit Information        Provider Department      4/16/2018 3:00 PM Dustin Rodriguez MD [facility]        Today's Diagnoses     Oligodendroglioma, WHO grade II (H)    -  1       Follow-ups after your visit        Your next 10 appointments already scheduled     May 15, 2018  9:30 AM CDT   Return Sleep Patient with CAMDEN Bateman CNP   [facility], Sleep Study ([facility])    [address] [place] [address]   [phone]            May 23, 2018  9:00 AM CDT   (Arrive by 8:45 AM)   PAC EVALUATION with  Pac Gómez 1   [facility] ([facility])    [address] [address] [phone]            May 23, 2018 10:00 AM CDT   (Arrive by 9:45 AM)   PAC RN ASSESSMENT with  Pac Rn   [facility] ([facility])    [address] [address] [phone]            May 23, 2018 10:20 AM CDT   (Arrive by 10:05 AM)   PAC Anesthesia Consult with  Pac Anesthesiologist   [facility] ([facility])    [address] [address] [phone]            May 29, 2018  9:00 AM CDT   (Arrive by 8:45 AM)   MR BRAIN W CONTRAST with UUMR1   [facility], MRI ([facility], [facility])    [address] [facility] [address]   [phone]           Take your medicines as usual, unless your doctor tells you not to. Bring a list of your current medicines to your exam (including vitamins, minerals and over-the-counter drugs).  You may or may not receive intravenous (IV) contrast for this  exam pending the discretion of the Radiologist.  You do not need to do anything special to prepare.  The MRI machine uses a strong magnet. Please wear clothes without metal (snaps, zippers). A sweatsuit works well, or we may give you a hospital gown.  Please remove any body piercings and hair extensions before you arrive. You will also remove watches, jewelry, hairpins, wallets, dentures, partial dental plates and hearing aids. You may wear contact lenses, and you may be able to wear your rings. We have a safe place to keep your personal items, but it is safer to leave them at home.  **IMPORTANT** THE INSTRUCTIONS BELOW ARE ONLY FOR THOSE PATIENTS WHO HAVE BEEN PRESCRIBED SEDATION OR GENERAL ANESTHESIA DURING THEIR MRI PROCEDURE:  IF YOUR DOCTOR PRESCRIBED ORAL SEDATION (take medicine to help you relax during your exam):   You must get the medicine from your doctor (oral medication) before you arrive. Bring the medicine to the exam. Do not take it at home. You ll be told when to take it upon arriving for your exam.   Arrive one hour early. Bring someone who can take you home after the test. Your medicine will make you sleepy. After the exam, you may not drive, take a bus or take a taxi by yourself.  IF YOUR DOCTOR PRESCRIBED IV SEDATION:   Arrive one hour early. Bring someone who can take you home after the test. Your medicine will make you sleepy. After the exam, you may not drive, take a bus or take a taxi by yourself.   No eating 6 hours before your exam. You may have clear liquids up until 4 hours before your exam. (Clear liquids include water, clear tea, black coffee and fruit juice without pulp.)  IF YOUR DOCTOR PRESCRIBED ANESTHESIA (be asleep for your exam):   Arrive 1 1/2 hours early. Bring someone who can take you home after the test. You may not drive, take a bus or take a taxi by yourself.   No eating 8 hours before your exam. You may have clear liquids up until 4 hours before your exam. (Clear liquids  include water, clear tea, black coffee and fruit juice without pulp.)   You will spend four to five hours in the recovery room.  Please call the Imaging Department at your exam site with any questions.            May 30, 2018  6:00 AM CDT   CT HEAD W/O CONTRAST with UUCT1   [facility], [place], CT ([facility], [facility])    [address] [facility] [address]   [phone]           Please bring any scans or X-rays taken at other hospitals, if similar tests were done. Also bring a list of your medicines, including vitamins, minerals and over-the-counter drugs. It is safest to leave personal items at home.  Be sure to tell your doctor:   If you have any allergies.   If there s any chance you are pregnant.   If you are breastfeeding.  You do not need to do anything special to prepare for this exam.  Please wear loose clothing, such as a sweat suit or jogging clothes. Avoid snaps, zippers and other metal. We may ask you to undress and put on a hospital gown.            May 30, 2018   Procedure with Dustin Rodriguez MD   [facility], [place], Same Day Surgery (--)    [address] [facility] [address]   [phone]            Jun 08, 2018  2:15 PM CDT   (Arrive by 2:00 PM)   NEW WITH ROOM with Zuly Perez GC,  2 119 CONSULT [facility] ([facility])    [address] [facility]  [address]  [facility] [address] [phone]            Jul 03, 2018 10:00 AM CDT   (Arrive by 9:45 AM)   MR BRAIN W/O & W CONTRAST with [facility] [facility] MRI ([facility])    [address] [facility] [address]  1st Floor  [facility] [id number] [phone]           Take your medicines as usual, unless your doctor tells you not to. Bring a list of your current medicines to your exam (including vitamins, minerals and over-the-counter drugs).  You may or may not receive intravenous (IV) contrast for this exam  pending the discretion of the Radiologist.  You do not need to do anything special to prepare.  The MRI machine uses a strong magnet. Please wear clothes without metal (snaps, zippers). A sweatsuit works well, or we may give you a hospital gown.  Please remove any body piercings and hair extensions before you arrive. You will also remove watches, jewelry, hairpins, wallets, dentures, partial dental plates and hearing aids. You may wear contact lenses, and you may be able to wear your rings. We have a safe place to keep your personal items, but it is safer to leave them at home.  **IMPORTANT** THE INSTRUCTIONS BELOW ARE ONLY FOR THOSE PATIENTS WHO HAVE BEEN PRESCRIBED SEDATION OR GENERAL ANESTHESIA DURING THEIR MRI PROCEDURE:  IF YOUR DOCTOR PRESCRIBED ORAL SEDATION (take medicine to help you relax during your exam):   You must get the medicine from your doctor (oral medication) before you arrive. Bring the medicine to the exam. Do not take it at home. You ll be told when to take it upon arriving for your exam.   Arrive one hour early. Bring someone who can take you home after the test. Your medicine will make you sleepy. After the exam, you may not drive, take a bus or take a taxi by yourself.  IF YOUR DOCTOR PRESCRIBED IV SEDATION:   Arrive one hour early. Bring someone who can take you home after the test. Your medicine will make you sleepy. After the exam, you may not drive, take a bus or take a taxi by yourself.   No eating 6 hours before your exam. You may have clear liquids up until 4 hours before your exam. (Clear liquids include water, clear tea, black coffee and fruit juice without pulp.)  IF YOUR DOCTOR PRESCRIBED ANESTHESIA (be asleep for your exam):   Arrive 1 1/2 hours early. Bring someone who can take you home after the test. You may not drive, take a bus or take a taxi by yourself.   No eating 8 hours before your exam. You may have clear liquids up until 4 hours before your exam. (Clear liquids  "include water, clear tea, black coffee and fruit juice without pulp.)   You will spend four to five hours in the recovery room.  Please call the Imaging Department at your exam site with any questions.              Who to contact     If you have questions or need follow up information about today's clinic visit or your schedule please contact [facility] directly at [phone].  Normal or non-critical lab and imaging results will be communicated to you by MyChart, letter or phone within 4 business days after the clinic has received the results. If you do not hear from us within 7 days, please contact the clinic through [employer] or phone. If you have a critical or abnormal lab result, we will notify you by phone as soon as possible.  Submit refill requests through [employer] or call your pharmacy and they will forward the refill request to us. Please allow 3 business days for your refill to be completed.          Additional Information About Your Visit        [employer] Information     [employer] gives you secure access to your electronic health record. If you see a primary care provider, you can also send messages to your care team and make appointments. If you have questions, please call your primary care clinic.  If you do not have a primary care provider, please call [phone] and they will assist you.        Care EveryWhere ID     This is your Care EveryWhere ID. This could be used by other organizations to access your [facility] medical records  [id number]        Your Vitals Were     Pulse Temperature Respirations Height Pulse Oximetry BMI (Body Mass Index)    65 97.2  F (36.2  C) (Oral) 16 1.575 m (5' 2.01\") 97% 27.13 kg/m2       Blood Pressure from Last 3 Encounters:   04/17/18 134/78   04/16/18 113/78   04/13/18 124/76    Weight from Last 3 Encounters:   04/17/18 67.1 kg (148 lb)   04/16/18 67.3 kg (148 lb 6.4 oz)   04/13/18 67.2 kg (148 lb 3.2 oz)              We Performed the Following     " Marisa-Operative Worksheet        Primary Care Provider Office Phone # Fax #    Dustin Choudhury [phone]       [address] [place] [address]        Equal Access to Services     YOBANI GRIFFITH : Hadii aad ku hadkeenano Soomaali, waaxda luqadaha, qaybta kaalmada adekirstenyada, nina de leónn dina servin laKarenenrico la. So [facility] [phone].    ATENCIÓN: Si habla español, tiene a fletcher disposición servicios gratuitos de asistencia lingüística. Llame al [phone].    We comply with applicable federal civil rights laws and [place] laws. We do not discriminate on the basis of race, color, national origin, age, disability, sex, sexual orientation, or gender identity.            Thank you!     Thank you for choosing [facility]  for your care. Our goal is always to provide you with excellent care. Hearing back from our patients is one way we can continue to improve our services. Please take a few minutes to complete the written survey that you may receive in the mail after your visit with us. Thank you!             Your Updated Medication List - Protect others around you: Learn how to safely use, store and throw away your medicines at www.disposemymeds.org.          This list is accurate as of 4/16/18 11:59 PM.  Always use your most recent med list.                   Brand Name Dispense Instructions for use Diagnosis    acetaminophen 500 MG tablet    TYLENOL     Take 1,000 mg by mouth every 6 hours if needed. Max acetaminophen dose: 4000mg in 24 hrs.        albuterol 108 (90 Base) MCG/ACT Inhaler    PROAIR HFA/PROVENTIL HFA/VENTOLIN HFA    1 Inhaler    Inhale 2 puffs into the lungs every 6 hours as needed for shortness of breath / dyspnea or wheezing    Cough       ascorbic acid 500 MG Tabs      Take 500 mg by mouth 2 times daily        COMPOUNDED NON-CONTROLLED SUBSTANCE - PHARMACY TO MIX COMPOUNDED MEDICATION    CMPD RX    20 g    Apply 1g daily for 2 weeks, then twice weekly to  vagina    Vaginal atrophy       ferrous sulfate 325 (65 Fe) MG tablet    IRON     Take 325 mg by mouth 2 times daily Reported on 5/15/2017        gabapentin 100 MG capsule    NEURONTIN    90 capsule    1.5 hrs before bedtime for 7 nights, increase to 2 pills if not effective after 7 days, increase to 3 pills after 7 days if not completely effective        ibuprofen 200 MG tablet    ADVIL/MOTRIN     Take 4 tablets by mouth 4 times daily if needed for Pain or Headache. (rare use)        KEPPRA 500 MG tablet   Generic drug:  levETIRAcetam      1,500 mg 2 times daily Take 3 tablets am and 3 tablets pm        LAMOTRIGINE PO      Take 300 mg by mouth 2 times daily        LORazepam 0.5 MG tablet    ATIVAN    30 tablet    Take 1 tablet (0.5 mg) by mouth every 6 hours as needed for anxiety    Oligodendroglioma (H), Anxiety       MULTI-VITAMINS Tabs      take 1 tablet by oral route once daily with food        PRILOSEC OTC PO      Take 20 mg by mouth    Candidiasis of mouth and esophagus (H)       ranitidine 150 MG tablet    ZANTAC     Take 1 tablet (150 mg) by mouth 2 times daily as needed for heartburn    Gastroesophageal reflux disease without esophagitis       tretinoin 0.05 % cream    RETIN-A    45 g    Spread a pea size amount into affected area topically at bedtime.  Use sunscreen SPF>20.    Senile sebaceous gland hyperplasia       VITAMIN D (CHOLECALCIFEROL) PO      Take 1,000 Units by mouth 2 times daily Reported on 5/15/2017

## 2018-04-16 NOTE — NURSING NOTE
"Oncology Rooming Note    April 16, 2018 2:53 PM   Barbi Tiwari is a 57 year old female who presents for:    Chief Complaint   Patient presents with     RECHECK     New: Oligodendroglioma      Initial Vitals: /78 (BP Location: Left arm, Patient Position: Sitting, Cuff Size: Adult Regular)  Pulse 65  Temp 97.2  F (36.2  C) (Oral)  Resp 16  Ht 1.575 m (5' 2.01\")  Wt 67.3 kg (148 lb 6.4 oz)  SpO2 97%  BMI 27.13 kg/m2 Estimated body mass index is 27.13 kg/(m^2) as calculated from the following:    Height as of this encounter: 1.575 m (5' 2.01\").    Weight as of this encounter: 67.3 kg (148 lb 6.4 oz). Body surface area is 1.72 meters squared.  No Pain (0) Comment: Data Unavailable   No LMP recorded. Patient has had a hysterectomy.  Allergies reviewed: Yes  Medications reviewed: Yes    Medications: Medication refills not needed today.  Pharmacy name entered into [facility]:    [facility] - [place], [place] - [address] [facility]Y [facility] - [place], [place] - [address] [facility]    Clinical concerns: N/A    5 minutes for nursing intake (face to face time)     Lubna Zavala CMA              "

## 2018-04-16 NOTE — LETTER
"4/16/2018       RE: Barbi Tiwari  [address] [place] [address]     Dear Colleague,    Thank you for referring your patient, Barbi Tiwari, to the [facility]. Please see a copy of my visit note below.    It was a pleasure to see Barbi Tiwari today in Neurosurgery Clinic. 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 Underwent surgery at [facility] with Dr. Kerr, followed by 1 year of adjuvant TMZ. Has had some worsening of her seizures. She has simple partial seizures involving her face and throat. They tend to come in clusters.    Reports R arm and face weakness after surgery that improved.    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.    Has two children. Here with her sister.    Vitals:    04/16/18 1452   BP: 113/78   BP Location: Left arm   Patient Position: Sitting   Cuff Size: Adult Regular   Pulse: 65   Resp: 16   Temp: 97.2  F (36.2  C)   TempSrc: Oral   SpO2: 97%   Weight: 67.3 kg (148 lb 6.4 oz)   Height: 1.575 m (5' 2.01\")     Body mass index is 27.13 kg/(m^2).  No Pain (0)    Awake, alert.     Strength 5/5 BUE/LE all muscle groups. Mild facial asymmetry    Imaging: MRI shows a nodular area at the inferolateral aspect of the resection cavity. It may involve the face motor cortex. The imaging was shown to the patient and reviewed in clinic.     Assessment: Recurrent oligodendroglioma    Plan: R Stealth craniotomy with motor mapping and tumor resection.     We discussed the typical treatment plan and that we like to obtain new pathology at recurrence to see if there is a change in grade. There is also some evidence that repeat surgery can improve survival. We may also impact her seizure control. I would like to do surgery with motor  Mapping to see which areas are eloquent, but I did discuss that if the tumor is in her face areas, that these should recover, even if we resect tumor in these areas. The risks benefits and " alternatives to the procedure were discussed, questions solicited and answered, and the patient wishes to proceed with surgery.          Again, thank you for allowing me to participate in the care of your patient.      Sincerely,    Dustin Rodriguez MD

## 2018-04-17 ENCOUNTER — OFFICE VISIT (OUTPATIENT)
Dept: INTERNAL MEDICINE | Facility: CLINIC | Age: 58
End: 2018-04-17
Payer: COMMERCIAL

## 2018-04-17 VITALS
WEIGHT: 148 LBS | TEMPERATURE: 97.9 F | BODY MASS INDEX: 27.06 KG/M2 | SYSTOLIC BLOOD PRESSURE: 134 MMHG | DIASTOLIC BLOOD PRESSURE: 78 MMHG | RESPIRATION RATE: 12 BRPM | HEART RATE: 70 BPM

## 2018-04-17 DIAGNOSIS — I49.3 SYMPTOMATIC PREMATURE VENTRICULAR CONTRACTIONS: Primary | ICD-10-CM

## 2018-04-17 PROCEDURE — 99214 OFFICE O/P EST MOD 30 MIN: CPT | Performed by: INTERNAL MEDICINE

## 2018-04-17 RX ORDER — METOPROLOL TARTRATE 25 MG/1
25 TABLET, FILM COATED ORAL 2 TIMES DAILY
Qty: 60 TABLET | Refills: 11 | Status: SHIPPED | OUTPATIENT
Start: 2018-04-17 | End: 2018-04-17

## 2018-04-17 RX ORDER — METOPROLOL TARTRATE 25 MG/1
25 TABLET, FILM COATED ORAL 2 TIMES DAILY
Qty: 180 TABLET | Refills: 3 | Status: SHIPPED | OUTPATIENT
Start: 2018-04-17 | End: 2019-04-17

## 2018-04-17 ASSESSMENT — PAIN SCALES - GENERAL: PAINLEVEL: NO PAIN (0)

## 2018-04-17 NOTE — MR AVS SNAPSHOT
After Visit Summary   4/17/2018    Barbi Tiwari    MRN: [id number]           Patient Information     Date Of Birth          1960        Visit Information        Provider Department      4/17/2018 8:40 AM Dustin Choudhury MD [facility]        Today's Diagnoses     Symptomatic premature ventricular contractions    -  1       Follow-ups after your visit        Your next 10 appointments already scheduled     May 23, 2018  9:00 AM CDT   (Arrive by 8:45 AM)   PAC EVALUATION with  Pac Gómez 1   [facility] Preoperative Assessment [place] ([facility])    [address] [place] [address]   [phone]            May 23, 2018 10:00 AM CDT   (Arrive by 9:45 AM)   PAC RN ASSESSMENT with Ward Pac Rn   [place] ([facility])    [address] [place] [address]   [phone]            May 23, 2018 10:20 AM CDT   (Arrive by 10:05 AM)   PAC Anesthesia Consult with  Pac Anesthesiologist   [place] ([facility])    [address] [place] [address]   [phone]            May 29, 2018  9:00 AM CDT   (Arrive by 8:45 AM)   MR BRAIN W CONTRAST with UUMR1   [facility], [place], MRI ([facility] [place])    [address] [address] [phone]           Take your medicines as usual, unless your doctor tells you not to. Bring a list of your current medicines to your exam (including vitamins, minerals and over-the-counter drugs).  You may or may not receive intravenous (IV) contrast for this exam pending the discretion of the Radiologist.  You do not need to do anything special to prepare.  The MRI machine uses a strong magnet. Please wear clothes without metal (snaps, zippers). A sweatsuit works well, or we may give you  a hospital gown.  Please remove any body piercings and hair extensions before you arrive. You will also remove watches, jewelry, hairpins, wallets, dentures, partial dental plates and hearing aids. You may wear contact lenses, and you may be able to wear your rings. We have a safe place to keep your personal items, but it is safer to leave them at home.  **IMPORTANT** THE INSTRUCTIONS BELOW ARE ONLY FOR THOSE PATIENTS WHO HAVE BEEN PRESCRIBED SEDATION OR GENERAL ANESTHESIA DURING THEIR MRI PROCEDURE:  IF YOUR DOCTOR PRESCRIBED ORAL SEDATION (take medicine to help you relax during your exam):   You must get the medicine from your doctor (oral medication) before you arrive. Bring the medicine to the exam. Do not take it at home. You ll be told when to take it upon arriving for your exam.   Arrive one hour early. Bring someone who can take you home after the test. Your medicine will make you sleepy. After the exam, you may not drive, take a bus or take a taxi by yourself.  IF YOUR DOCTOR PRESCRIBED IV SEDATION:   Arrive one hour early. Bring someone who can take you home after the test. Your medicine will make you sleepy. After the exam, you may not drive, take a bus or take a taxi by yourself.   No eating 6 hours before your exam. You may have clear liquids up until 4 hours before your exam. (Clear liquids include water, clear tea, black coffee and fruit juice without pulp.)  IF YOUR DOCTOR PRESCRIBED ANESTHESIA (be asleep for your exam):   Arrive 1 1/2 hours early. Bring someone who can take you home after the test. You may not drive, take a bus or take a taxi by yourself.   No eating 8 hours before your exam. You may have clear liquids up until 4 hours before your exam. (Clear liquids include water, clear tea, black coffee and fruit juice without pulp.)   You will spend four to five hours in the recovery room.  Please call the Imaging Department at your exam site with any questions.            May 30, 2018  6:00 AM  CDT   CT HEAD W/O CONTRAST with UUCT1   [facility] [place], [place] ([facility] [place])    [address] [address] [phone]           Please bring any scans or X-rays taken at other hospitals, if similar tests were done. Also bring a list of your medicines, including vitamins, minerals and over-the-counter drugs. It is safest to leave personal items at home.  Be sure to tell your doctor:   If you have any allergies.   If there s any chance you are pregnant.   If you are breastfeeding.  You do not need to do anything special to prepare for this exam.  Please wear loose clothing, such as a sweat suit or jogging clothes. Avoid snaps, zippers and other metal. We may ask you to undress and put on a hospital gown.            May 30, 2018   Procedure with Dustin Rodriguez MD   [facility], [place], Same Day Surgery (--)    [address] [address] [phone]            Jun 08, 2018  2:15 PM CDT   (Arrive by 2:00 PM)   NEW WITH ROOM with Zuly Perez GC,  2 [facility] ([facility])    [address]  [address]  [facility] [address] [phone]            Jul 03, 2018 10:00 AM CDT   (Arrive by 9:45 AM)   MR BRAIN W/O & W CONTRAST with [address] [facility] MRI ([facility] [place])    [address]  1st Floor  [facility] [address] [phone]           Take your medicines as usual, unless your doctor tells you not to. Bring a list of your current medicines to your exam (including vitamins, minerals and over-the-counter drugs).  You may or may not receive intravenous (IV) contrast for this exam pending the discretion of the Radiologist.  You do not need to do anything special to prepare.  The MRI machine uses a strong magnet. Please wear clothes without metal (snaps, zippers). A sweatsuit works well, or we may give you a hospital  gown.  Please remove any body piercings and hair extensions before you arrive. You will also remove watches, jewelry, hairpins, wallets, dentures, partial dental plates and hearing aids. You may wear contact lenses, and you may be able to wear your rings. We have a safe place to keep your personal items, but it is safer to leave them at home.  **IMPORTANT** THE INSTRUCTIONS BELOW ARE ONLY FOR THOSE PATIENTS WHO HAVE BEEN PRESCRIBED SEDATION OR GENERAL ANESTHESIA DURING THEIR MRI PROCEDURE:  IF YOUR DOCTOR PRESCRIBED ORAL SEDATION (take medicine to help you relax during your exam):   You must get the medicine from your doctor (oral medication) before you arrive. Bring the medicine to the exam. Do not take it at home. You ll be told when to take it upon arriving for your exam.   Arrive one hour early. Bring someone who can take you home after the test. Your medicine will make you sleepy. After the exam, you may not drive, take a bus or take a taxi by yourself.  IF YOUR DOCTOR PRESCRIBED IV SEDATION:   Arrive one hour early. Bring someone who can take you home after the test. Your medicine will make you sleepy. After the exam, you may not drive, take a bus or take a taxi by yourself.   No eating 6 hours before your exam. You may have clear liquids up until 4 hours before your exam. (Clear liquids include water, clear tea, black coffee and fruit juice without pulp.)  IF YOUR DOCTOR PRESCRIBED ANESTHESIA (be asleep for your exam):   Arrive 1 1/2 hours early. Bring someone who can take you home after the test. You may not drive, take a bus or take a taxi by yourself.   No eating 8 hours before your exam. You may have clear liquids up until 4 hours before your exam. (Clear liquids include water, clear tea, black coffee and fruit juice without pulp.)   You will spend four to five hours in the recovery room.  Please call the Imaging Department at your exam site with any questions.            Jul 09, 2018 10:45 AM CDT    (Arrive by 10:30 AM)   Return Visit with Neo Weinstein MD   [facility] ([facility])    [address]  [address]  [facility] [facility] [address] [phone]              Future tests that were ordered for you today     Open Future Orders        Priority Expected Expires Ordered    CT Head w/o Contrast STAT  4/16/2019 4/16/2018    MR Brain w Contrast STAT  7/15/2018 4/16/2018            Who to contact     If you have questions or need follow up information about today's clinic visit or your schedule please contact [facility] directly at [phone].  Normal or non-critical lab and imaging results will be communicated to you by MyChart, letter or phone within 4 business days after the clinic has received the results. If you do not hear from us within 7 days, please contact the clinic through [employer] or phone. If you have a critical or abnormal lab result, we will notify you by phone as soon as possible.  Submit refill requests through [facility] [employer] or call your pharmacy and they will forward the refill request to us. Please allow 3 business days for your refill to be completed.          Additional Information About Your Visit        [employer] Information     [facility] [employer] gives you secure access to your electronic health record. If you see a primary care provider, you can also send messages to your care team and make appointments. If you have questions, please call your primary care clinic.  If you do not have a primary care provider, please call [phone] and they will assist you.        Care EveryWhere ID     This is your Care EveryWhere ID. This could be used by other organizations to access your [facility] medical records  [id number]        Your Vitals Were     Pulse Temperature Respirations BMI (Body Mass Index)          70 97.9  F (36.6  C) (Oral) 12 27.06 kg/m2         Blood Pressure from Last 3 Encounters:   04/17/18 134/78   04/16/18  113/78   04/13/18 124/76    Weight from Last 3 Encounters:   04/17/18 148 lb (67.1 kg)   04/16/18 148 lb 6.4 oz (67.3 kg)   04/13/18 148 lb 3.2 oz (67.2 kg)              Today, you had the following     No orders found for display         Today's Medication Changes          These changes are accurate as of 4/17/18  9:31 AM.  If you have any questions, ask your nurse or doctor.               Start taking these medicines.        Dose/Directions    metoprolol tartrate 25 MG tablet   Commonly known as:  LOPRESSOR   Used for:  Symptomatic premature ventricular contractions   Started by:  Dustin Choudhury MD        Dose:  25 mg   Take 1 tablet (25 mg) by mouth 2 times daily   Quantity:  180 tablet   Refills:  3            Where to get your medicines      These medications were sent to [address] [address]     Phone:  [phone]     metoprolol tartrate 25 MG tablet                Primary Care Provider Office Phone # Fax #    Dustin Choudhury [phone]       [address] [place] [address]        Equal Access to Services     YOBANI GRIFFITH : Hadii yovany crisostomo hadasho Soomaali, waaxda luqadaha, qaybta kaalmada adeegyada, nina la. So [facility] [phone].    ATENCIÓN: Si habla español, tiene a fletcher disposición servicios gratuitos de asistencia lingüística. Llame al [phone].    We comply with applicable federal civil rights laws and [place] laws. We do not discriminate on the basis of race, color, national origin, age, disability, sex, sexual orientation, or gender identity.            Thank you!     Thank you for choosing [facility]  for your care. Our goal is always to provide you with excellent care. Hearing back from our patients is one way we can continue to improve our services. Please take a few minutes to complete the written survey that you may  receive in the mail after your visit with us. Thank you!             Your Updated Medication List - Protect others around you: Learn how to safely use, store and throw away your medicines at www.disposemymeds.org.          This list is accurate as of 4/17/18  9:31 AM.  Always use your most recent med list.                   Brand Name Dispense Instructions for use Diagnosis    acetaminophen 500 MG tablet    TYLENOL     Take 1,000 mg by mouth every 6 hours if needed. Max acetaminophen dose: 4000mg in 24 hrs.        albuterol 108 (90 Base) MCG/ACT Inhaler    PROAIR HFA/PROVENTIL HFA/VENTOLIN HFA    1 Inhaler    Inhale 2 puffs into the lungs every 6 hours as needed for shortness of breath / dyspnea or wheezing    Cough       ascorbic acid 500 MG Tabs      Take 500 mg by mouth 2 times daily        COMPOUNDED NON-CONTROLLED SUBSTANCE - PHARMACY TO MIX COMPOUNDED MEDICATION    CMPD RX    20 g    Apply 1g daily for 2 weeks, then twice weekly to vagina    Vaginal atrophy       ferrous sulfate 325 (65 Fe) MG tablet    IRON     Take 325 mg by mouth 2 times daily Reported on 5/15/2017        gabapentin 100 MG capsule    NEURONTIN    90 capsule    1.5 hrs before bedtime for 7 nights, increase to 2 pills if not effective after 7 days, increase to 3 pills after 7 days if not completely effective        ibuprofen 200 MG tablet    ADVIL/MOTRIN     Take 4 tablets by mouth 4 times daily if needed for Pain or Headache. (rare use)        KEPPRA 500 MG tablet   Generic drug:  levETIRAcetam      1,500 mg 2 times daily Take 3 tablets am and 3 tablets pm        LAMOTRIGINE PO      Take 300 mg by mouth 2 times daily        LORazepam 0.5 MG tablet    ATIVAN    30 tablet    Take 1 tablet (0.5 mg) by mouth every 6 hours as needed for anxiety    Oligodendroglioma (H), Anxiety       metoprolol tartrate 25 MG tablet    LOPRESSOR    180 tablet    Take 1 tablet (25 mg) by mouth 2 times daily    Symptomatic premature ventricular contractions        MULTI-VITAMINS Tabs      take 1 tablet by oral route once daily with food        PRILOSEC OTC PO      Take 20 mg by mouth    Candidiasis of mouth and esophagus (H)       ranitidine 150 MG tablet    ZANTAC     Take 1 tablet (150 mg) by mouth 2 times daily as needed for heartburn    Gastroesophageal reflux disease without esophagitis       tretinoin 0.05 % cream    RETIN-A    45 g    Spread a pea size amount into affected area topically at bedtime.  Use sunscreen SPF>20.    Senile sebaceous gland hyperplasia       VITAMIN D (CHOLECALCIFEROL) PO      Take 1,000 Units by mouth 2 times daily Reported on 5/15/2017

## 2018-04-17 NOTE — PROGRESS NOTES
SUBJECTIVE:   Barbi Tiwari is a 57 year old female who presents to clinic today for the following health issues:      Concern - Palpitations    Barbi has a history of symptomatic PVCs for which she was treated with metoprolol in the past.  This has not been an issue recently but just within the last week or 2 she was told that her Glioma had return and that she was looking at repeat brain surgery and chemotherapy.  Obviously this is very stressful.  In addition to the palpitations she feels heaviness in her chest when she has these episodes.  She does not notice any heaviness at any other times including exertion exercise etc.  She has some leftover metoprolol at home and restarted this and since has noted resolution of all symptoms.    With workup in the past she has had 2 echocardiograms both normal and a coronary calcium scan with a score of less than 10. She also had stress testing done for atypical CP in the past , again normal.     Onset: 2 months or so    Description:   Patient complains of heart palpitations that are happening about once a day she describes it as like a bubble in her chest    Intensity: mild    Progression of Symptoms:  same    Accompanying Signs & Symptoms:  none    Previous history of similar problem:   Had this a couple years ago    Precipitating factors:   Worsened by: jarrodn    Alleviating factors:  Improved by: none    Therapies Tried and outcome: none    Problem list and histories reviewed & adjusted, as indicated.  Additional history: as documented    Labs reviewed in EPIC    Reviewed and updated as needed this visit by clinical staff  Allergies  Meds       Reviewed and updated as needed this visit by Provider         ROS:  Constitutional, HEENT, cardiovascular, pulmonary, gi and gu systems are negative, except as otherwise noted.    OBJECTIVE:                                                    /78  Pulse 70  Temp 97.9  F (36.6  C) (Oral)  Resp 12  Wt 148 lb (67.1 kg)   BMI 27.06 kg/m2  Body mass index is 27.06 kg/(m^2).  GENERAL APPEARANCE: healthy, alert and no distress  HENT: nose and mouth without ulcers or lesions and normal cephalic/atraumatic  NECK: no adenopathy, no asymmetry, masses, or scars and thyroid normal to palpation  RESP: lungs clear to auscultation - no rales, rhonchi or wheezes  CV: regular rates and rhythm, normal S1 S2, no S3 or S4 and no murmur, click or rub  MS: extremities normal- no gross deformities noted    Diagnostic test results:  none      ASSESSMENT/PLAN:                                                    1. Symptomatic premature ventricular contractions  Restart the metoprolol continue  twice daily  If she has any recurrence in symptoms, especially the heaviness or has this symptom associated with exertion and will need to further evaluate this as a possible anginal equivalent.  But given what we know now do not think this is needed, given prior w/u, stress testing, echos, etc -  as long as the beta-blocker resolves all symptoms.  - metoprolol tartrate (LOPRESSOR) 25 MG tablet; Take 1 tablet (25 mg) by mouth 2 times daily  Dispense: 180 tablet; Refill: 3    Dustin Choudhury MD  [facility]

## 2018-04-24 ENCOUNTER — TRANSFERRED RECORDS (OUTPATIENT)
Dept: HEALTH INFORMATION MANAGEMENT | Facility: CLINIC | Age: 58
End: 2018-04-24

## 2018-04-24 ENCOUNTER — MEDICAL CORRESPONDENCE (OUTPATIENT)
Dept: HEALTH INFORMATION MANAGEMENT | Facility: CLINIC | Age: 58
End: 2018-04-24

## 2018-04-25 DIAGNOSIS — G40.119 LOCALIZATION-RELATED SYMPTOMATIC EPILEPSY AND EPILEPTIC SYNDROMES WITH SIMPLE PARTIAL SEIZURES, INTRACTABLE, WITHOUT STATUS EPILEPTICUS (H): Primary | ICD-10-CM

## 2018-04-25 PROCEDURE — 36415 COLL VENOUS BLD VENIPUNCTURE: CPT | Performed by: INTERNAL MEDICINE

## 2018-04-25 PROCEDURE — 80177 DRUG SCRN QUAN LEVETIRACETAM: CPT | Mod: 90 | Performed by: INTERNAL MEDICINE

## 2018-04-25 PROCEDURE — 99000 SPECIMEN HANDLING OFFICE-LAB: CPT | Performed by: INTERNAL MEDICINE

## 2018-04-25 PROCEDURE — 80175 DRUG SCREEN QUAN LAMOTRIGINE: CPT | Mod: 90 | Performed by: INTERNAL MEDICINE

## 2018-04-26 LAB
LAMOTRIGINE SERPL-MCNC: 11.7 UG/ML (ref 2.5–15)
LEVETIRACETAM SERPL-MCNC: 63 UG/ML (ref 12–46)

## 2018-05-15 ENCOUNTER — OFFICE VISIT (OUTPATIENT)
Dept: SLEEP MEDICINE | Facility: CLINIC | Age: 58
End: 2018-05-15
Payer: COMMERCIAL

## 2018-05-15 VITALS
HEART RATE: 58 BPM | DIASTOLIC BLOOD PRESSURE: 63 MMHG | RESPIRATION RATE: 16 BRPM | SYSTOLIC BLOOD PRESSURE: 104 MMHG | OXYGEN SATURATION: 95 % | WEIGHT: 150 LBS | HEIGHT: 62 IN | BODY MASS INDEX: 27.6 KG/M2

## 2018-05-15 DIAGNOSIS — G25.81 RESTLESS LEGS SYNDROME (RLS): ICD-10-CM

## 2018-05-15 DIAGNOSIS — G47.19 EXCESSIVE DAYTIME SLEEPINESS: ICD-10-CM

## 2018-05-15 DIAGNOSIS — G47.50 PARASOMNIA: ICD-10-CM

## 2018-05-15 DIAGNOSIS — F51.04 PSYCHOPHYSIOLOGICAL INSOMNIA: Primary | ICD-10-CM

## 2018-05-15 PROCEDURE — 99214 OFFICE O/P EST MOD 30 MIN: CPT | Performed by: NURSE PRACTITIONER

## 2018-05-15 RX ORDER — ZALEPLON 5 MG/1
5 CAPSULE ORAL AT BEDTIME
Qty: 6 CAPSULE | Refills: 0 | Status: SHIPPED | OUTPATIENT
Start: 2018-05-15 | End: 2018-05-23

## 2018-05-15 NOTE — PROGRESS NOTES
"  CC: returns to clinic as scheduled for 3/18.    Barbi Tiwari returns  for evaluation of tx for RLS (unable to sit still when tired and would normally go to bed, legs hurts if she enters bed, has been taken gabapentin and legs are better, may hurt with a wakeup later in the night and treats with ibuprofen)  Parasomnias (talks, may walk in bedroom, fix imaginary iv) and difficulties with shift work.  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  She does have a diagnosis of seizures, which heralded the fact that she had a brain tumor and continue to this date with reduced severity on treatment.     New concerns: has another brain tumor, needs surgery in 2 wks, has not told daughter yet who is under stress and finishing up semester-with a party at their house that they are currently remodeling .  Minimal facial seizures.  With sleep: wakeups are long with stress and the ativan she's taking at night is not helping with sleep.  Some brief dose offs in traffic, no significant inc that she's aware of with parasomnia,  Last seizure 3/18.    RLS: did not get ferritin, tibc.  With 300mg of gabapentin-tired in am, ok with 200mg.    Snort arousals: no change, infrequent    SLEEP SCHEDULE:  Shift: Days: 7-3 up at 5A, tired when home may take 1/2 hr nap, dead by 9 but does not enter bed till 11-so much going on at night  Jenni shift:bed by 12:30 or 1A go to bed right away, then 9 or 9:30 A sleep elsewhere, basement, but does not sleep well in \"off\" shift    Awake q 2-3 hrs, occasional 4 A is end, sometimes toss/turn till 3A  Check clock, WORRY    Allergies:    Allergies   Allergen Reactions     Benoxinate Nausea and Vomiting     Sulfa Drugs Hives       Medications:    Current Outpatient Prescriptions   Medication Sig Dispense " Refill     acetaminophen (TYLENOL) 500 MG tablet Take 1,000 mg by mouth every 6 hours if needed. Max acetaminophen dose: 4000mg in 24 hrs.       albuterol (PROAIR HFA/PROVENTIL HFA/VENTOLIN HFA) 108 (90 BASE) MCG/ACT Inhaler Inhale 2 puffs into the lungs every 6 hours as needed for shortness of breath / dyspnea or wheezing 1 Inhaler 0     ascorbic acid 500 MG TABS Take 500 mg by mouth 2 times daily       COMPOUND (CMPD RX) - PHARMACY TO MIX COMPOUNDED MEDICATION Apply 1g daily for 2 weeks, then twice weekly to vagina 20 g 11     ferrous sulfate (IRON) 325 (65 FE) MG tablet Take 325 mg by mouth 2 times daily Reported on 5/15/2017       gabapentin (NEURONTIN) 100 MG capsule 1.5 hrs before bedtime for 7 nights, increase to 2 pills if not effective after 7 days, increase to 3 pills after 7 days if not completely effective 90 capsule 1     ibuprofen (ADVIL,MOTRIN) 200 MG tablet Take 4 tablets by mouth 4 times daily if needed for Pain or Headache. (rare use)       LAMOTRIGINE PO Take 300 mg by mouth 2 times daily       levETIRAcetam (KEPPRA) 500 MG tablet 1,500 mg 2 times daily Take 3 tablets am and 3 tablets pm       LORazepam (ATIVAN) 0.5 MG tablet Take 1 tablet (0.5 mg) by mouth every 6 hours as needed for anxiety 30 tablet 0     metoprolol tartrate (LOPRESSOR) 25 MG tablet Take 1 tablet (25 mg) by mouth 2 times daily 180 tablet 3     Multiple Vitamin (MULTI-VITAMINS) TABS take 1 tablet by oral route once daily with food       Omeprazole Magnesium (PRILOSEC OTC PO) Take 20 mg by mouth       ranitidine (ZANTAC) 150 MG tablet Take 1 tablet (150 mg) by mouth 2 times daily as needed for heartburn       tretinoin (RETIN-A) 0.05 % cream Spread a pea size amount into affected area topically at bedtime.  Use sunscreen SPF>20. 45 g 11     VITAMIN D, CHOLECALCIFEROL, PO Take 1,000 Units by mouth 2 times daily Reported on 5/15/2017         Problem List:  Patient Active Problem List    Diagnosis Date Noted     Oligodendroglioma,  WHO grade II (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     Priority: Medium     Nephrolithiasis 05/05/2017     Priority: Medium     Advanced directives, counseling/discussion 03/24/2017     Priority: Medium     Lumbar radiculopathy 09/08/2016     Priority: Medium     Palpitations 06/05/2014     Priority: Medium     PVCs 06/05/2014     Priority: Medium     Hyperlipidemia LDL goal <100 06/05/2014     Priority: Medium     Abnormal screening cardiac CT 06/05/2014     Priority: Medium     Routine general medical examination at a health care facility 02/25/2013     Priority: Medium     Overview:   Last Physical -  Pre- op 5/5/2011  PAP - 2/2012 nl  [place] - 07/17/2012 = ACR 2/Benign  Lipids - 07/16/2012  HDL = 41  LDL = 108  Colonoscopy - due  Tdap - due       Seizure disorder (related to tumor) 08/17/2011     Priority: Medium     Calculus of kidney 01/12/2011     Priority: Medium     Overview:   S/P LITHOTRIPSY 3/15/11       Hyperparathyroidism s/p surgery 01/11/2011     Priority: Medium     Allergic rhinitis 12/09/2009     Priority: Medium     Osteoarthrosis 12/09/2009     Priority: Medium     Esophageal reflux 10/22/2008     Priority: Medium        Past Medical/Surgical History:  Past Medical History:   Diagnosis Date     Allergic rhinitis 12/9/2009     Calculus of kidney 1/12/2011    Overview:  S/P LITHOTRIPSY 3/15/11      Esophageal reflux 10/22/2008     Hyperparathyroidism s/p surgery 1/11/2011     Hyperparathyroidism s/p surgery 1/11/2011     oligodendroglioma 7/23/2012     Osteoarthrosis 12/9/2009     Palpitations 6/5/2014     PVCs 6/5/2014     Seizure disorder (related to tumor) 8/17/2011     Past Surgical History:   Procedure Laterality Date     CRANIOTOMY  2011    tumor resection     EXTRACORPOREAL SHOCK WAVE LITHOTRIPSY, CYSTOSCOPY, INSERT STENT URETER(S), COMBINED Bilateral 5/5/2017    Procedure: COMBINED EXTRACORPOREAL SHOCK WAVE LITHOTRIPSY, CYSTOSCOPY, INSERT STENT URETER(S);  CYSTO, URETHRAL DILATION, URETERAL LEFT STENT PLACEMENT,  "LEFT ESWL.;  Surgeon: Angel Del Rio MD;  Location: [facility] OR     FOOT SURGERY      mulitple     HYSTERECTOMY, PAP NO LONGER INDICATED  2008    lap hys     LITHOTRIPSY  2010 2017     PARATHYROIDECTOMY  2011           Physical Examination:  Vitals: /63  Pulse 58  Resp 16  Ht 1.575 m (5' 2\")  Wt 68 kg (150 lb)  SpO2 95%  BMI 27.44 kg/m2  BMI= Body mass index is 27.44 kg/(m^2).         [place] Total Score 5/15/2018   Total score - [place] 14       GENERAL APPEARANCE: healthy, alert and mild distress    ASSESSMENT/PLAN:  1. Insomnia: psychophysiologic with shift work and new brain tumor requiring surgery.  With acute phases surrounding surgery will add sonata, taken at 9P when entering bed, and limiting excessive activity at night when she is tired.  Discussed worry time.  Long range plan CBTI.  2. RLS: likely better on gabapentin, ferritin still pending  3. Parasomnia: rare to be up out of bed in bedroom, recommend baracade of hallway door, use master bath, with her sonata.  Call me on Monday/or my chart me  4. EDS: pull over if exceptionally sleepy when driving, or don't drive if feeling sleeping prior to entering car.  Shift work not ideal in her situation    Time spent with patient is 25 minutes, of which >50% was spent in counseling, education and coordination of care.                          "

## 2018-05-15 NOTE — PATIENT INSTRUCTIONS
Trial of sonata with evening meds  (no ativan with this)  Enter bed at 9, devote 7-9 hrs to sleep  Block bedroom door and use bedroom bathroom  My chart me on Monday-I'll be expecting this    No driving if sleepy-pull over for a quick nap    Over time CBTI    Worry time:      Your BMI is Body mass index is 27.44 kg/(m^2).  Weight management is a personal decision.  If you are interested in exploring weight loss strategies, the following discussion covers the approaches that may be successful. Body mass index (BMI) is one way to tell whether you are at a healthy weight, overweight, or obese. It measures your weight in relation to your height.  A BMI of 18.5 to 24.9 is in the healthy range. A person with a BMI of 25 to 29.9 is considered overweight, and someone with a BMI of 30 or greater is considered obese. More than two-thirds of American adults are considered overweight or obese.  Being overweight or obese increases the risk for further weight gain. Excess weight may lead to heart disease and diabetes.  Creating and following plans for healthy eating and physical activity may help you improve your health.  Weight control is part of healthy lifestyle and includes exercise, emotional health, and healthy eating habits. Careful eating habits lifelong are the mainstay of weight control. Though there are significant health benefits from weight loss, long-term weight loss with diet alone may be very difficult to achieve- studies show long-term success with dietary management in less than 10% of people. Attaining a healthy weight may be especially difficult to achieve in those with severe obesity. In some cases, medications, devices and surgical management might be considered.  What can you do?  If you are overweight or obese and are interested in methods for weight loss, you should discuss this with your provider.     Consider reducing daily calorie intake by 500 calories.     Keep a food journal.     Avoiding skipping  meals, consider cutting portions instead.    Diet combined with exercise helps maintain muscle while optimizing fat loss. Strength training is particularly important for building and maintaining muscle mass. Exercise helps reduce stress, increase energy, and improves fitness. Increasing exercise without diet control, however, may not burn enough calories to loose weight.       Start walking three days a week 10-20 minutes at a time    Work towards walking thirty minutes five days a week     Eventually, increase the speed of your walking for 1-2 minutes at time    In addition, we recommend that you review healthy lifestyles and methods for weight loss available through the [employer] of [employer] patient information sites:  http://win.niddk.nih.gov/publications/index.htm    And look into health and wellness programs that may be available through your health insurance provider, employer, local community center, or [employer]ha club.    Weight management plan: Patient was referred to their PCP to discuss a diet and exercise plan.

## 2018-05-15 NOTE — MR AVS SNAPSHOT
After Visit Summary   5/15/2018    Barbi Tiwari    MRN: [id number]           Patient Information     Date Of Birth          1960        Visit Information        Provider Department      5/15/2018 9:30 AM Natalia Reynoso APRN [facility], [place], Sleep Study        Care Instructions    Trial of sonata with evening meds  (no ativan with this)  Enter bed at 9, devote 7-9 hrs to sleep  Block bedroom door and use bedroom bathroom  My chart me on Monday-I'll be expecting this    No driving if sleepy-pull over for a quick nap    Over time CBTI    Worry time:      Your BMI is Body mass index is 27.44 kg/(m^2).  Weight management is a personal decision.  If you are interested in exploring weight loss strategies, the following discussion covers the approaches that may be successful. Body mass index (BMI) is one way to tell whether you are at a healthy weight, overweight, or obese. It measures your weight in relation to your height.  A BMI of 18.5 to 24.9 is in the healthy range. A person with a BMI of 25 to 29.9 is considered overweight, and someone with a BMI of 30 or greater is considered obese. More than two-thirds of American adults are considered overweight or obese.  Being overweight or obese increases the risk for further weight gain. Excess weight may lead to heart disease and diabetes.  Creating and following plans for healthy eating and physical activity may help you improve your health.  Weight control is part of healthy lifestyle and includes exercise, emotional health, and healthy eating habits. Careful eating habits lifelong are the mainstay of weight control. Though there are significant health benefits from weight loss, long-term weight loss with diet alone may be very difficult to achieve- studies show long-term success with dietary management in less than 10% of people. Attaining a healthy weight may be especially difficult to achieve in those with severe obesity. In some cases,  medications, devices and surgical management might be considered.  What can you do?  If you are overweight or obese and are interested in methods for weight loss, you should discuss this with your provider.     Consider reducing daily calorie intake by 500 calories.     Keep a food journal.     Avoiding skipping meals, consider cutting portions instead.    Diet combined with exercise helps maintain muscle while optimizing fat loss. Strength training is particularly important for building and maintaining muscle mass. Exercise helps reduce stress, increase energy, and improves fitness. Increasing exercise without diet control, however, may not burn enough calories to loose weight.       Start walking three days a week 10-20 minutes at a time    Work towards walking thirty minutes five days a week     Eventually, increase the speed of your walking for 1-2 minutes at time    In addition, we recommend that you review healthy lifestyles and methods for weight loss available through the National Institutes of Health patient information sites:  http://win.niddk.nih.gov/publications/index.htm    And look into health and wellness programs that may be available through your health insurance provider, employer, local community center, or radha club.    Weight management plan: Patient was referred to their PCP to discuss a diet and exercise plan.              Follow-ups after your visit        Your next 10 appointments already scheduled     May 23, 2018  9:00 AM CDT   (Arrive by 8:45 AM)   PAC EVALUATION with Ward Pac Gómez 1   [facility] Preoperative Assessment [place] ([facility] [place])    [address] [place] [address] [phone]            May 23, 2018 10:00 AM CDT   (Arrive by 9:45 AM)   PAC RN ASSESSMENT with Ward Pac Rn   [facility] Preoperative Assessment [place] ([facility])    [address] [place] [id number] [phone]             May 23, 2018 10:20 AM CDT   (Arrive by 10:05 AM)   PAC Anesthesia Consult with  Pac Anesthesiologist   [facility] Pre[facility] ([facility] [place])    [address]  4th Floor  [facility] [id number] [phone]            May 29, 2018  9:00 AM CDT   MR BRAIN W CONTRAST with UUMR1   [facility], [place], MRI ([facility], [facility])    [address] [facility] [address] [id number] [phone]           Take your medicines as usual, unless your doctor tells you not to. Bring a list of your current medicines to your exam (including vitamins, minerals and over-the-counter drugs).  You may or may not receive intravenous (IV) contrast for this exam pending the discretion of the Radiologist.  You do not need to do anything special to prepare.  The MRI machine uses a strong magnet. Please wear clothes without metal (snaps, zippers). A sweatsuit works well, or we may give you a hospital gown.  Please remove any body piercings and hair extensions before you arrive. You will also remove watches, jewelry, hairpins, wallets, dentures, partial dental plates and hearing aids. You may wear contact lenses, and you may be able to wear your rings. We have a safe place to keep your personal items, but it is safer to leave them at home.  **IMPORTANT** THE INSTRUCTIONS BELOW ARE ONLY FOR THOSE PATIENTS WHO HAVE BEEN PRESCRIBED SEDATION OR GENERAL ANESTHESIA DURING THEIR MRI PROCEDURE:  IF YOUR DOCTOR PRESCRIBED ORAL SEDATION (take medicine to help you relax during your exam):   You must get the medicine from your doctor (oral medication) before you arrive. Bring the medicine to the exam. Do not take it at home. You ll be told when to take it upon arriving for your exam.   Arrive one hour early. Bring someone who can take you home after the test. Your medicine will make you sleepy. After the exam, you may not drive, take a bus or take a taxi by  yourself.  IF YOUR DOCTOR PRESCRIBED IV SEDATION:   Arrive one hour early. Bring someone who can take you home after the test. Your medicine will make you sleepy. After the exam, you may not drive, take a bus or take a taxi by yourself.   No eating 6 hours before your exam. You may have clear liquids up until 4 hours before your exam. (Clear liquids include water, clear tea, black coffee and fruit juice without pulp.)  IF YOUR DOCTOR PRESCRIBED ANESTHESIA (be asleep for your exam):   Arrive 1 1/2 hours early. Bring someone who can take you home after the test. You may not drive, take a bus or take a taxi by yourself.   No eating 8 hours before your exam. You may have clear liquids up until 4 hours before your exam. (Clear liquids include water, clear tea, black coffee and fruit juice without pulp.)   You will spend four to five hours in the recovery room.  Please call the Imaging Department at your exam site with any questions.            May 30, 2018  6:00 AM CDT   CT HEAD W/O CONTRAST with UUCT1   [place], [place] ([facility], [facility])    [address] [address] [phone]           Please bring any scans or X-rays taken at other hospitals, if similar tests were done. Also bring a list of your medicines, including vitamins, minerals and over-the-counter drugs. It is safest to leave personal items at home.  Be sure to tell your doctor:   If you have any allergies.   If there s any chance you are pregnant.   If you are breastfeeding.  You do not need to do anything special to prepare for this exam.  Please wear loose clothing, such as a sweat suit or jogging clothes. Avoid snaps, zippers and other metal. We may ask you to undress and put on a hospital gown.            May 30, 2018   Procedure with Dustin Rodriguez MD   [facility], [place], Same Day Surgery (--)    [address] [place] [id number] [phone]            Jun 08, 2018  2:15 PM CDT    (Arrive by 2:00 PM)   NEW WITH ROOM with Zuly Perez GC,  2 119 CONSULT [facility] ([facility])    [address]  [address]  [facility] [id number] [phone]            Jul 03, 2018 10:00 AM CDT   MR BRAIN W/O & W CONTRAST with UCMR1   [facility] [place] [facility] ([facility])    [address]  1st Floor  [facility] [phone] [phone]           Take your medicines as usual, unless your doctor tells you not to. Bring a list of your current medicines to your exam (including vitamins, minerals and over-the-counter drugs).  You may or may not receive intravenous (IV) contrast for this exam pending the discretion of the Radiologist.  You do not need to do anything special to prepare.  The MRI machine uses a strong magnet. Please wear clothes without metal (snaps, zippers). A sweatsuit works well, or we may give you a hospital gown.  Please remove any body piercings and hair extensions before you arrive. You will also remove watches, jewelry, hairpins, wallets, dentures, partial dental plates and hearing aids. You may wear contact lenses, and you may be able to wear your rings. We have a safe place to keep your personal items, but it is safer to leave them at home.  **IMPORTANT** THE INSTRUCTIONS BELOW ARE ONLY FOR THOSE PATIENTS WHO HAVE BEEN PRESCRIBED SEDATION OR GENERAL ANESTHESIA DURING THEIR MRI PROCEDURE:  IF YOUR DOCTOR PRESCRIBED ORAL SEDATION (take medicine to help you relax during your exam):   You must get the medicine from your doctor (oral medication) before you arrive. Bring the medicine to the exam. Do not take it at home. You ll be told when to take it upon arriving for your exam.   Arrive one hour early. Bring someone who can take you home after the test. Your medicine will make you sleepy. After the exam, you may not drive, take a bus or take a taxi by yourself.  IF YOUR DOCTOR  PRESCRIBED IV SEDATION:   Arrive one hour early. Bring someone who can take you home after the test. Your medicine will make you sleepy. After the exam, you may not drive, take a bus or take a taxi by yourself.   No eating 6 hours before your exam. You may have clear liquids up until 4 hours before your exam. (Clear liquids include water, clear tea, black coffee and fruit juice without pulp.)  IF YOUR DOCTOR PRESCRIBED ANESTHESIA (be asleep for your exam):   Arrive 1 1/2 hours early. Bring someone who can take you home after the test. You may not drive, take a bus or take a taxi by yourself.   No eating 8 hours before your exam. You may have clear liquids up until 4 hours before your exam. (Clear liquids include water, clear tea, black coffee and fruit juice without pulp.)   You will spend four to five hours in the recovery room.  Please call the Imaging Department at your exam site with any questions.            Jul 09, 2018 10:45 AM CDT   (Arrive by 10:30 AM)   Return Visit with Neo Weinstein MD   [facility] ([facility])    [address] [place] [address]  [address]  [facility] [address] [phone]              Who to contact     If you have questions or need follow up information about today's clinic visit or your schedule please contact [facility] directly at [phone].  Normal or non-critical lab and imaging results will be communicated to you by MyChart, letter or phone within 4 business days after the clinic has received the results. If you do not hear from us within 7 days, please contact the clinic through MyChart or phone. If you have a critical or abnormal lab result, we will notify you by phone as soon as possible.  Submit refill requests through [employer] or call your pharmacy and they will forward the refill request to us. Please allow 3 business days for your refill to be completed.          Additional Information About Your  "Visit        MyChart Information     [facility] [facility] gives you secure access to your electronic health record. If you see a primary care provider, you can also send messages to your care team and make appointments. If you have questions, please call your primary care clinic.  If you do not have a primary care provider, please call [phone] and they will assist you.        Care EveryWhere ID     This is your Care EveryWhere ID. This could be used by other organizations to access your [facility] medical records  [id number]        Your Vitals Were     Pulse Respirations Height Pulse Oximetry BMI (Body Mass Index)       58 16 1.575 m (5' 2\") 95% 27.44 kg/m2        Blood Pressure from Last 3 Encounters:   05/15/18 104/63   04/17/18 134/78   04/16/18 113/78    Weight from Last 3 Encounters:   05/15/18 68 kg (150 lb)   04/17/18 67.1 kg (148 lb)   04/16/18 67.3 kg (148 lb 6.4 oz)              Today, you had the following     No orders found for display         Today's Medication Changes          These changes are accurate as of 5/15/18 10:48 AM.  If you have any questions, ask your nurse or doctor.               Start taking these medicines.        Dose/Directions    zaleplon 5 MG capsule   Commonly known as:  SONATA        Dose:  5 mg   Take 1 capsule (5 mg) by mouth At Bedtime   Quantity:  6 capsule   Refills:  0            Where to get your medicines      Some of these will need a paper prescription and others can be bought over the counter.  Ask your nurse if you have questions.     Bring a paper prescription for each of these medications     zaleplon 5 MG capsule                Primary Care Provider Office Phone # Fax #    Dustin Choudhury [phone]       [address] [place] [phone]        Equal Access to Services     YOBANI GRIFFITH AH: Brown Mustafa, rosalind beltran, nina kumar. So [facility] [phone].    ATENCIÓN: Si " nabeel sanchez, tiene a fletcher disposición servicios gratuitos de asistencia lingüística. Eufemia love [phone].    We comply with applicable federal civil rights laws and [place] laws. We do not discriminate on the basis of race, color, national origin, age, disability, sex, sexual orientation, or gender identity.            Thank you!     Thank you for choosing [facility] [place]  for your care. Our goal is always to provide you with excellent care. Hearing back from our patients is one way we can continue to improve our services. Please take a few minutes to complete the written survey that you may receive in the mail after your visit with us. Thank you!             Your Updated Medication List - Protect others around you: Learn how to safely use, store and throw away your medicines at www.disposemymeds.org.          This list is accurate as of 5/15/18 10:48 AM.  Always use your most recent med list.                   Brand Name Dispense Instructions for use Diagnosis    acetaminophen 500 MG tablet    TYLENOL     Take 1,000 mg by mouth every 6 hours if needed. Max acetaminophen dose: 4000mg in 24 hrs.        albuterol 108 (90 Base) MCG/ACT Inhaler    PROAIR HFA/PROVENTIL HFA/VENTOLIN HFA    1 Inhaler    Inhale 2 puffs into the lungs every 6 hours as needed for shortness of breath / dyspnea or wheezing    Cough       ascorbic acid 500 MG Tabs      Take 500 mg by mouth 2 times daily        COMPOUNDED NON-CONTROLLED SUBSTANCE - PHARMACY TO MIX COMPOUNDED MEDICATION    CMPD RX    20 g    Apply 1g daily for 2 weeks, then twice weekly to vagina    Vaginal atrophy       ferrous sulfate 325 (65 Fe) MG tablet    IRON     Take 325 mg by mouth 2 times daily Reported on 5/15/2017        gabapentin 100 MG capsule    NEURONTIN    90 capsule    1.5 hrs before bedtime for 7 nights, increase to 2 pills if not effective after 7 days, increase to 3 pills after 7 days if not completely effective        ibuprofen 200 MG tablet     ADVIL/MOTRIN     Take 4 tablets by mouth 4 times daily if needed for Pain or Headache. (rare use)        KEPPRA 500 MG tablet   Generic drug:  levETIRAcetam      1,500 mg 2 times daily Take 3 tablets am and 3 tablets pm        LAMOTRIGINE PO      Take 300 mg by mouth 2 times daily        LORazepam 0.5 MG tablet    ATIVAN    30 tablet    Take 1 tablet (0.5 mg) by mouth every 6 hours as needed for anxiety    Oligodendroglioma (H), Anxiety       metoprolol tartrate 25 MG tablet    LOPRESSOR    180 tablet    Take 1 tablet (25 mg) by mouth 2 times daily    Symptomatic premature ventricular contractions       MULTI-VITAMINS Tabs      take 1 tablet by oral route once daily with food        PRILOSEC OTC PO      Take 20 mg by mouth    Candidiasis of mouth and esophagus (H)       ranitidine 150 MG tablet    ZANTAC     Take 1 tablet (150 mg) by mouth 2 times daily as needed for heartburn    Gastroesophageal reflux disease without esophagitis       tretinoin 0.05 % cream    RETIN-A    45 g    Spread a pea size amount into affected area topically at bedtime.  Use sunscreen SPF>20.    Senile sebaceous gland hyperplasia       VITAMIN D (CHOLECALCIFEROL) PO      Take 1,000 Units by mouth 2 times daily Reported on 5/15/2017        zaleplon 5 MG capsule    SONATA    6 capsule    Take 1 capsule (5 mg) by mouth At Bedtime

## 2018-05-18 ENCOUNTER — ANESTHESIA EVENT (OUTPATIENT)
Dept: SURGERY | Facility: CLINIC | Age: 58
DRG: 026 | End: 2018-05-18
Payer: COMMERCIAL

## 2018-05-23 ENCOUNTER — OFFICE VISIT (OUTPATIENT)
Dept: SURGERY | Facility: CLINIC | Age: 58
End: 2018-05-23
Payer: COMMERCIAL

## 2018-05-23 ENCOUNTER — APPOINTMENT (OUTPATIENT)
Dept: SURGERY | Facility: CLINIC | Age: 58
End: 2018-05-23
Payer: COMMERCIAL

## 2018-05-23 ENCOUNTER — ALLIED HEALTH/NURSE VISIT (OUTPATIENT)
Dept: SURGERY | Facility: CLINIC | Age: 58
End: 2018-05-23
Payer: COMMERCIAL

## 2018-05-23 VITALS
DIASTOLIC BLOOD PRESSURE: 76 MMHG | OXYGEN SATURATION: 98 % | RESPIRATION RATE: 16 BRPM | TEMPERATURE: 97.9 F | SYSTOLIC BLOOD PRESSURE: 117 MMHG | BODY MASS INDEX: 27.14 KG/M2 | WEIGHT: 147.5 LBS | HEIGHT: 62 IN | HEART RATE: 60 BPM

## 2018-05-23 DIAGNOSIS — Z01.811 PREOP PULMONARY/RESPIRATORY EXAM: ICD-10-CM

## 2018-05-23 DIAGNOSIS — Z01.811 PREOP PULMONARY/RESPIRATORY EXAM: Primary | ICD-10-CM

## 2018-05-23 LAB
ANION GAP SERPL CALCULATED.3IONS-SCNC: 6 MMOL/L (ref 3–14)
APTT PPP: 31 SEC (ref 22–37)
BUN SERPL-MCNC: 18 MG/DL (ref 7–30)
CALCIUM SERPL-MCNC: 9.8 MG/DL (ref 8.5–10.1)
CHLORIDE SERPL-SCNC: 104 MMOL/L (ref 94–109)
CO2 SERPL-SCNC: 30 MMOL/L (ref 20–32)
CREAT SERPL-MCNC: 0.72 MG/DL (ref 0.52–1.04)
ERYTHROCYTE [DISTWIDTH] IN BLOOD BY AUTOMATED COUNT: 12.5 % (ref 10–15)
GFR SERPL CREATININE-BSD FRML MDRD: 83 ML/MIN/1.7M2
GLUCOSE SERPL-MCNC: 94 MG/DL (ref 70–99)
HCT VFR BLD AUTO: 46.1 % (ref 35–47)
HGB BLD-MCNC: 15 G/DL (ref 11.7–15.7)
INR PPP: 1 (ref 0.86–1.14)
MCH RBC QN AUTO: 30 PG (ref 26.5–33)
MCHC RBC AUTO-ENTMCNC: 32.5 G/DL (ref 31.5–36.5)
MCV RBC AUTO: 92 FL (ref 78–100)
PLATELET # BLD AUTO: 225 10E9/L (ref 150–450)
POTASSIUM SERPL-SCNC: 4.7 MMOL/L (ref 3.4–5.3)
RBC # BLD AUTO: 5 10E12/L (ref 3.8–5.2)
SODIUM SERPL-SCNC: 139 MMOL/L (ref 133–144)
WBC # BLD AUTO: 5.9 10E9/L (ref 4–11)

## 2018-05-23 ASSESSMENT — ENCOUNTER SYMPTOMS: SEIZURES: 1

## 2018-05-23 NOTE — ANESTHESIA PREPROCEDURE EVALUATION
Anesthesia Evaluation     . Pt has had prior anesthetic. Type: General and MAC    History of anesthetic complications   - PONV        ROS/MED HX    ENT/Pulmonary:  - neg pulmonary ROS    (-) sleep apnea   Neurologic:     (+)seizures last seizure: 3/2018 features: simple partial, left side of face, other neuro h/o brain tumor (oligodendroglioma) 2011. S/P resection/chemo.    Cardiovascular:  - neg cardiovascular ROS   (+) ----. : . . . :. . Previous cardiac testing Echodate:4/29/2014results:date: results: date: results: date: results:          METS/Exercise Tolerance:  >4 METS   Hematologic:  - neg hematologic  ROS       Musculoskeletal:  - neg musculoskeletal ROS       GI/Hepatic:     (+) GERD Asymptomatic on medication,       Renal/Genitourinary:     (+) Nephrolithiasis , Pt has no history of transplant,       Endo:  - neg endo ROS       Psychiatric:         Infectious Disease:  - neg infectious disease ROS       Malignancy:   (+) Malignancy History of Neuro  Neuro CA Remission status post Surgery and Chemo.          Other:    (+) No chance of pregnancy C-spine cleared: N/A, no H/O Chronic Pain,no other significant disability                    Physical Exam  Normal systems: cardiovascular and pulmonary    Airway   Mallampati: II  TM distance: >3 FB  Neck ROM: full    Dental   (+) caps  Comment: Left upper cap, temporary at this time, but will have this replaced day before surgery    Cardiovascular   Rhythm and rate: regular and normal      Pulmonary    breath sounds clear to auscultation    Other findings:   Results for DYLAN KAUFFMAN (MRN [id number]) as of 5/23/2018 10:30    5/23/2018 09:13  Sodium: 139  Potassium: 4.7  Chloride: 104  Carbon Dioxide: 30  Urea Nitrogen: 18  Creatinine: 0.72  GFR Estimate: 83  GFR Estimate If Black: >90  Calcium: 9.8  Anion Gap: 6  Glucose: 94  WBC: 5.9  Hemoglobin: 15.0  Hematocrit: 46.1  Platelet Count: 225  RBC Count: 5.00  MCV: 92  MCH: 30.0  MCHC: 32.5  RDW: 12.5  INR:  1.00  PTT: 31  ABO: PENDING  Antibody Screen: PENDING  Test Valid Only At: [facility].      Echocardiogram 4/29/2014      Final Impressions:   1. Normal LV size, normal wall thickness, normal global systolic function with an estimated EF of 65 - 70%.   2. No significant valve disease detected.    Chamber Sizes and Function  Normal left ventricular size, normal wall thickness, normal global systolic function with an estimated EF of 65 - 70%. Left atrial size is normal. Right ventricular cavity size is normal, global systolic RV function is normal. The right atrium is normal. Right atrial area is 8 cm . The pulmonary artery is of normal size and origin. The sinus of Valsalva is normal sized. The ascending aorta is normal sized.    Valves, RV Pressures and Diastolic Function    The aortic valve is normal in structure, no stenosis and no regurgitation. The mitral valve is normal in structure, no mitral regurgitation. No mitral annular calcification seen. Normal diastolic function. The tricuspid valve is normal in structure. Tricuspid regurgitation is not evident. Unable to assess right ventricular systolic pressure. The pulmonic valve is not well visualized. Not well visualized pulmonic regurgitation is present on color flow.    Masses, Effusion, Shunts  There is no pericardial effusion. The inferior vena cava is normal sized, respiratory size variation greater than 50%. No left to right shunting was detected by limited color flow Doppler interrogation of the interatrial septum.           PAC Discussion and Assessment    ASA Classification: 3  Case is suitable for: [place]  Anesthetic techniques and relevant risks discussed: GA  Invasive monitoring and risk discussed: Yes  Types:   Possibility and Risk of blood transfusion discussed: Yes  NPO instructions given:   Additional anesthetic preparation and risks discussed:   Needs early admission to pre-op area:   Other:     PAC Resident/NP Anesthesia  Assessment:  Barbi Tiwari is a 58 yo female scheduled for Right Stealth Assisted Craniotomy With Motor Mapping And Tumor Resection on 5/30/20178 by Dr. Mcduffie in treatment of Oligodendroglioma.      Previous anesthesia at [facility] with PONV  1) Cardiac: History of PVC with negative cardiac work-up in 2014. Echo showed EF of 65-70%. She was started on metoprolol. METS well over 4.  2) Pulmonary: Never smoked, denies pulmonary symptoms.  3) GI: GERD, well managed with zantac and omeprazole  4) Neuro: Oligodendroglioma, diagnosed in 2011. Underwent surgery followed by 1 year of adjuvant TMZ. Has had some worsening of her seizures. She has simple partial seizures involving her face and throat. She continues with Keppra  Feasible airway    She has a temporary loose cap on left upper, but will have this replaced the day before surgery         I spent 30 minutes with patient, greater than 50% educating on preop meds, counseling on anesthesia and coordinating care for craniotomy         Reviewed and Signed by PAC Mid-Level Provider/Resident  Mid-Level Provider/Resident: CAMDEN Gordon  Date: 5/23/2018  Time: 0800    Attending Anesthesiologist Anesthesia Assessment:        Anesthesiologist:   Date:   Time:   Pass/Fail:   Disposition:     PAC Pharmacist Assessment:        Pharmacist:   Date:   Time:      Anesthesia Plan      History & Physical Review  History and physical reviewed and following examination; no interval change.    ASA Status:  3 .    NPO Status:  > 8 hours    Plan for General and ETT with Intravenous and Propofol induction. Maintenance will be TIVA.    PONV prophylaxis:  Ondansetron (or other 5HT-3), Dexamethasone or Solumedrol and Scopolamine patch  Additional equipment: 2nd IV and Arterial Line To be discussed with staff.   - ASA 3  - GETA with standard ASA monitors, IV induction, TIVA  - PIVx2, arterial line  - Antibiotics per surgery  - PONV prophylaxis        Postoperative Care  Postoperative pain  management:  IV analgesics and Multi-modal analgesia.  Plan for postoperative opioid use.    Consents  Anesthetic plan, risks, benefits and alternatives discussed with:  Patient and Spouse..            Edith Gregg MD  Anesthesiology resident   [facility]                .

## 2018-05-23 NOTE — MR AVS SNAPSHOT
After Visit Summary   2018    Barbi Tiwari    MRN: [id number]           Patient Information     Date Of Birth          1960        Visit Information        Provider Department      2018 9:00 AM Rn, [facility]        Care Instructions    Preparing for Your Surgery      Name:  Barbi Tiwari   MRN:  [id number]   :  1960   Today's Date:  2018     Arriving for surgery:  Craniotomy   Surgery date:  2018  Arrival time:   5:30 am  Please come to:       [facility] [address]  [address] [place]  [address]    -   parking is available in front of the hospital from 5:15 am to 8:00 pm    -  Stop at the Information Desk in the lobby    -   Inform the information person that you are here for surgery. An escort to 3c will be provided. If you would not like an escort, please proceed to 3C on the 3rd floor. [phone]     What can I eat or drink?  -  You may have solid food or milk products until 8 hours prior to your surgery.  Nothing after 11 pm   -  You may have water, apple juice or 7up/Sprite until 2 hours prior to your surgery .  Until 5:30 am arrival time     Which medicines can I take?         Hold aspirin, vitamins and supplements for 1 week before surgery        Hold ibuprofen/naproxen for 24 hours before surgery       -  Please take these medications the day of surgery:    Inhalers as usual and bring to hospital        Lamotrigine       levetriacetam (Keppra)        Metoprolol         Lorazepam if needed         Omeprazole        Ranitidine       How do I prepare myself?  -  Take two showers: one the night before surgery; and one the morning of surgery.         Use Scrubcare or Hibiclens to wash from neck down.  You may use your own     shampoo and conditioner. No other hair products.   -  Do NOT use lotion, powder, deodorant, or antiperspirant the day of your surgery.  -  Do NOT wear  any makeup, fingernail polish or jewelry.  - Do not bring your own medications to the hospital, except for inhalers and eye   drops.  -  Bring your ID and insurance card.    Questions or Concerns:  If you have questions or concerns regarding the day of surgery, please call [phone]      AFTER YOUR SURGERY  Breathing exercises   Breathing exercises help you recover faster. Take deep breaths and let the air out slowly. This will:     Help you wake up after surgery.    Help prevent complications like pneumonia.  Preventing complications will help you go home sooner.   We may give you a breathing device (incentive spirometer) to encourage you to breathe deeply.   Nausea and vomiting   You may feel sick to your stomach after surgery; if so, let your nurse know.    Pain control:  After surgery, you may have pain. Our goal is to help you manage your pain. Pain medicine will help you feel comfortable enough to do activities that will help you heal.  These activities may include breathing exercises, walking and physical therapy.   To help your health care team treat your pain we will ask: 1) If you have pain  2) where it is located 3) describe your pain in your words  Methods of pain control include medications given by mouth, vein or by nerve block for some surgeries.  We may give you a pain control pump that will:  1) Deliver the medicine through a tube placed in your vein  2) Control the amount of medicine you receive  3) Allow you to push a button to deliver a dose of pain medicine  Sequential Compression Device (SCD) or Pneumo Boots:  You may need to wear SCD S on your legs or feet. These are wraps connected to a machine that pumps in air and releases it. The repeated pumping helps prevent blood clots from forming.           Follow-ups after your visit        Your next 10 appointments already scheduled     May 23, 2018  9:00 AM CDT   (Arrive by 8:45 AM)   PAC RN ASSESSMENT with Ward Pac Rn   [facility] ([facility])    [address]  4th Floor  [facility] [address]   [phone]            May 23, 2018  9:30 AM CDT   (Arrive by 9:15 AM)   PAC Anesthesia Consult with  Pac Anesthesiologist   [facility] ([facility])    [address]  4th Floor  [facility] [address]   [phone]            May 29, 2018  9:00 AM CDT   MR BRAIN W CONTRAST with UUMR1   [facility], [place], MRI ([facility])    [address] [address]   [phone]           Take your medicines as usual, unless your doctor tells you not to. Bring a list of your current medicines to your exam (including vitamins, minerals and over-the-counter drugs).  You may or may not receive intravenous (IV) contrast for this exam pending the discretion of the Radiologist.  You do not need to do anything special to prepare.  The MRI machine uses a strong magnet. Please wear clothes without metal (snaps, zippers). A sweatsuit works well, or we may give you a hospital gown.  Please remove any body piercings and hair extensions before you arrive. You will also remove watches, jewelry, hairpins, wallets, dentures, partial dental plates and hearing aids. You may wear contact lenses, and you may be able to wear your rings. We have a safe place to keep your personal items, but it is safer to leave them at home.  **IMPORTANT** THE INSTRUCTIONS BELOW ARE ONLY FOR THOSE PATIENTS WHO HAVE BEEN PRESCRIBED SEDATION OR GENERAL ANESTHESIA DURING THEIR MRI PROCEDURE:  IF YOUR DOCTOR PRESCRIBED ORAL SEDATION (take medicine to help you relax during your exam):   You must get the medicine from your doctor (oral medication) before you arrive. Bring the medicine to the exam. Do not take it at home. You ll be told when to take it upon arriving for your exam.   Arrive one hour early. Bring someone  who can take you home after the test. Your medicine will make you sleepy. After the exam, you may not drive, take a bus or take a taxi by yourself.  IF YOUR DOCTOR PRESCRIBED IV SEDATION:   Arrive one hour early. Bring someone who can take you home after the test. Your medicine will make you sleepy. After the exam, you may not drive, take a bus or take a taxi by yourself.   No eating 6 hours before your exam. You may have clear liquids up until 4 hours before your exam. (Clear liquids include water, clear tea, black coffee and fruit juice without pulp.)  IF YOUR DOCTOR PRESCRIBED ANESTHESIA (be asleep for your exam):   Arrive 1 1/2 hours early. Bring someone who can take you home after the test. You may not drive, take a bus or take a taxi by yourself.   No eating 8 hours before your exam. You may have clear liquids up until 4 hours before your exam. (Clear liquids include water, clear tea, black coffee and fruit juice without pulp.)   You will spend four to five hours in the recovery room.  Please call the Imaging Department at your exam site with any questions.            May 30, 2018  6:00 AM CDT   CT HEAD W/O CONTRAST with UUCT1   [facility], [place], [place] ([facility])    [address] [address] [phone]           Please bring any scans or X-rays taken at other hospitals, if similar tests were done. Also bring a list of your medicines, including vitamins, minerals and over-the-counter drugs. It is safest to leave personal items at home.  Be sure to tell your doctor:   If you have any allergies.   If there s any chance you are pregnant.   If you are breastfeeding.  You do not need to do anything special to prepare for this exam.  Please wear loose clothing, such as a sweat suit or jogging clothes. Avoid snaps, zippers and other metal. We may ask you to undress and put on a hospital gown.            May 30, 2018   Procedure with Dustin Lopez  MD Michael   [facility], [place], Same Day Surgery (--)    [address]  [place] [address]   [phone]            Jun 08, 2018  2:15 PM CDT   (Arrive by 2:00 PM)   NEW WITH ROOM with Zuly Perez GC,  2 119 CONSULT [facility] ([facility])    [address]  [address]  [facility] [id number] [phone]            Jul 03, 2018 10:00 AM CDT   MR BRAIN W/O & W CONTRAST with [address] [facility] [place] [facility] ([facility])    [address]  1st Floor  [facility] [id number] [phone]           Take your medicines as usual, unless your doctor tells you not to. Bring a list of your current medicines to your exam (including vitamins, minerals and over-the-counter drugs).  You may or may not receive intravenous (IV) contrast for this exam pending the discretion of the Radiologist.  You do not need to do anything special to prepare.  The MRI machine uses a strong magnet. Please wear clothes without metal (snaps, zippers). A sweatsuit works well, or we may give you a hospital gown.  Please remove any body piercings and hair extensions before you arrive. You will also remove watches, jewelry, hairpins, wallets, dentures, partial dental plates and hearing aids. You may wear contact lenses, and you may be able to wear your rings. We have a safe place to keep your personal items, but it is safer to leave them at home.  **IMPORTANT** THE INSTRUCTIONS BELOW ARE ONLY FOR THOSE PATIENTS WHO HAVE BEEN PRESCRIBED SEDATION OR GENERAL ANESTHESIA DURING THEIR MRI PROCEDURE:  IF YOUR DOCTOR PRESCRIBED ORAL SEDATION (take medicine to help you relax during your exam):   You must get the medicine from your doctor (oral medication) before you arrive. Bring the medicine to the exam. Do not take it at home. You ll be told when to take it upon arriving for your exam.   Arrive one hour early. Bring someone who can take you home after  the test. Your medicine will make you sleepy. After the exam, you may not drive, take a bus or take a taxi by yourself.  IF YOUR DOCTOR PRESCRIBED IV SEDATION:   Arrive one hour early. Bring someone who can take you home after the test. Your medicine will make you sleepy. After the exam, you may not drive, take a bus or take a taxi by yourself.   No eating 6 hours before your exam. You may have clear liquids up until 4 hours before your exam. (Clear liquids include water, clear tea, black coffee and fruit juice without pulp.)  IF YOUR DOCTOR PRESCRIBED ANESTHESIA (be asleep for your exam):   Arrive 1 1/2 hours early. Bring someone who can take you home after the test. You may not drive, take a bus or take a taxi by yourself.   No eating 8 hours before your exam. You may have clear liquids up until 4 hours before your exam. (Clear liquids include water, clear tea, black coffee and fruit juice without pulp.)   You will spend four to five hours in the recovery room.  Please call the Imaging Department at your exam site with any questions.            Jul 09, 2018 10:45 AM CDT   (Arrive by 10:30 AM)   Return Visit with Neo Weinstein MD   [facility] ([facility])    [address]  [address]  [facility] [address] [phone]              Who to contact     Please call your clinic at [phone] to:    Ask questions about your health    Make or cancel appointments    Discuss your medicines    Learn about your test results    Speak to your doctor            Additional Information About Your Visit        [employer] Information     [employer] gives you secure access to your electronic health record. If you see a primary care provider, you can also send messages to your care team and make appointments. If you have questions, please call your primary care clinic.  If you do not have a primary care provider, please call [phone] and they will assist you.       [employer] is an electronic gateway that provides easy, online access to your medical records. With [employer], you can request a clinic appointment, read your test results, renew a prescription or communicate with your care team.     To access your existing account, please contact your [facility] or call [phone] for assistance.        Care EveryWhere ID     This is your Care EveryWhere ID. This could be used by other organizations to access your [facility] medical records  [id number]         Blood Pressure from Last 3 Encounters:   05/23/18 117/76   05/15/18 104/63   04/17/18 134/78    Weight from Last 3 Encounters:   05/23/18 66.9 kg (147 lb 8 oz)   05/15/18 68 kg (150 lb)   04/17/18 67.1 kg (148 lb)              Today, you had the following     No orders found for display         Today's Medication Changes          These changes are accurate as of 5/23/18  8:13 AM.  If you have any questions, ask your nurse or doctor.               These medicines have changed or have updated prescriptions.        Dose/Directions    COMPOUNDED NON-CONTROLLED SUBSTANCE - PHARMACY TO MIX COMPOUNDED MEDICATION   Commonly known as:  CMPD RX   This may have changed:    - when to take this  - reasons to take this  - additional instructions   Used for:  Vaginal atrophy        Apply 1g daily for 2 weeks, then twice weekly to vagina   Quantity:  20 g   Refills:  11       gabapentin 100 MG capsule   Commonly known as:  NEURONTIN   This may have changed:    - how much to take  - how to take this  - when to take this  - additional instructions        1.5 hrs before bedtime for 7 nights, increase to 2 pills if not effective after 7 days, increase to 3 pills after 7 days if not completely effective   Quantity:  90 capsule   Refills:  1       LORazepam 0.5 MG tablet   Commonly known as:  ATIVAN   This may have changed:  when to take this   Used for:  Oligodendroglioma (H), Anxiety        Dose:  0.5 mg   Take 1  tablet (0.5 mg) by mouth every 6 hours as needed for anxiety   Quantity:  30 tablet   Refills:  0       ranitidine 150 MG tablet   Commonly known as:  ZANTAC   This may have changed:  when to take this   Used for:  Gastroesophageal reflux disease without esophagitis        Dose:  150 mg   Take 1 tablet (150 mg) by mouth 2 times daily as needed for heartburn   Refills:  0                Primary Care Provider Office Phone # Fax #    Dustin Choudhury [phone]       [address] [place] [address]        Equal Access to Services     [facility]: Hadii yovany crisostomo hadasho Sojeff, waaxda luqadaha, qaybta kaalmada binh, nina will . So [facility] [phone].    ATENCIÓN: Si habla español, tiene a fletcher disposición servicios gratuitos de asistencia lingüística. [facility] [phone].    We comply with applicable federal civil rights laws and [place] laws. We do not discriminate on the basis of race, color, national origin, age, disability, sex, sexual orientation, or gender identity.            Thank you!     Thank you for choosing [facility]  for your care. Our goal is always to provide you with excellent care. Hearing back from our patients is one way we can continue to improve our services. Please take a few minutes to complete the written survey that you may receive in the mail after your visit with us. Thank you!             Your Updated Medication List - Protect others around you: Learn how to safely use, store and throw away your medicines at www.disposemymeds.org.          This list is accurate as of 5/23/18  8:13 AM.  Always use your most recent med list.                   Brand Name Dispense Instructions for use Diagnosis    acetaminophen 500 MG tablet    TYLENOL     as needed Take 1,000 mg by mouth every 6 hours if needed. Max acetaminophen dose: 4000mg in 24 hrs.        albuterol 108 (90 Base) MCG/ACT Inhaler    PROAIR  HFA/PROVENTIL HFA/VENTOLIN HFA    1 Inhaler    Inhale 2 puffs into the lungs every 6 hours as needed for shortness of breath / dyspnea or wheezing    Cough       ascorbic acid 500 MG Tabs      Take 500 mg by mouth 2 times daily        COMPOUNDED NON-CONTROLLED SUBSTANCE - PHARMACY TO MIX COMPOUNDED MEDICATION    CMPD RX    20 g    Apply 1g daily for 2 weeks, then twice weekly to vagina    Vaginal atrophy       ferrous sulfate 325 (65 Fe) MG tablet    IRON     Take 325 mg by mouth 2 times daily Reported on 5/15/2017        gabapentin 100 MG capsule    NEURONTIN    90 capsule    1.5 hrs before bedtime for 7 nights, increase to 2 pills if not effective after 7 days, increase to 3 pills after 7 days if not completely effective        ibuprofen 200 MG tablet    ADVIL/MOTRIN     Take 800 mg by mouth as needed Take 4 tablets by mouth 4 times daily if needed for Pain or Headache. (rare use)        KEPPRA 500 MG tablet   Generic drug:  levETIRAcetam      Take 1,500 mg by mouth 2 times daily        LACTAID PO      Take 3,000 Units by mouth as needed for indigestion        LAMOTRIGINE PO      Take 300 mg by mouth 2 times daily        LORazepam 0.5 MG tablet    ATIVAN    30 tablet    Take 1 tablet (0.5 mg) by mouth every 6 hours as needed for anxiety    Oligodendroglioma (H), Anxiety       metoprolol tartrate 25 MG tablet    LOPRESSOR    180 tablet    Take 1 tablet (25 mg) by mouth 2 times daily    Symptomatic premature ventricular contractions       MULTI-VITAMINS Tabs      every morning take 1 tablet by oral route once daily with food        PRILOSEC OTC PO      Take 20 mg by mouth as needed    Candidiasis of mouth and esophagus (H)       ranitidine 150 MG tablet    ZANTAC     Take 1 tablet (150 mg) by mouth 2 times daily as needed for heartburn    Gastroesophageal reflux disease without esophagitis       tretinoin 0.05 % cream    RETIN-A    45 g    Spread a pea size amount into affected area topically at bedtime.  Use  sunscreen SPF>20.    Senile sebaceous gland hyperplasia       VITAMIN D (CHOLECALCIFEROL) PO      Take 1,000 Units by mouth 2 times daily Reported on 5/15/2017

## 2018-05-23 NOTE — H&P
Pre-Operative H & P     CC:  Preoperative exam to assess for increased cardiopulmonary risk while undergoing surgery and anesthesia.    Date of Encounter: 5/23/2018  Primary Care Physician:  Dustin Choudhury    Reason for visit:  Preop pulmonary/respiratory exam  Oligodendroglioma      HPI  Barbi Tiwari is a 57 year old female who presents for pre-operative H & P in preparation for Right Stealth Assisted Craniotomy With Motor Mapping And Tumor Resection on 5/30/20178 by Dr. Mcduffie in treatment of Oligodendroglioma at [facility].     History is obtained from the patient.   Oligodendroglioma, diagnosed in 2011. Underwent surgery followed by 1 year of adjuvant TMZ. Has had some worsening of her seizures. She has simple partial seizures involving her face and throat. She continues with Keppra.      Past Medical History  Past Medical History:   Diagnosis Date     Allergic rhinitis 12/9/2009     Calculus of kidney 1/12/2011    Overview:  S/P LITHOTRIPSY 3/15/11      Esophageal reflux 10/22/2008     Hyperparathyroidism s/p surgery 1/11/2011     oligodendroglioma 7/23/2012     Osteoarthrosis 12/9/2009     Palpitations 6/5/2014     PVCs 6/5/2014     Seizure disorder (related to tumor) 8/17/2011       Past Surgical History  Past Surgical History:   Procedure Laterality Date     CRANIOTOMY  2011    tumor resection     EXTRACORPOREAL SHOCK WAVE LITHOTRIPSY, CYSTOSCOPY, INSERT STENT URETER(S), COMBINED Bilateral 5/5/2017    Procedure: COMBINED EXTRACORPOREAL SHOCK WAVE LITHOTRIPSY, CYSTOSCOPY, INSERT STENT URETER(S);  CYSTO, URETHRAL DILATION, URETERAL LEFT STENT PLACEMENT, LEFT ESWL.;  Surgeon: Angel Del Rio MD;  Location:  OR     FOOT SURGERY      mulitple     HYSTERECTOMY, PAP NO LONGER INDICATED  2008    lap hys     LITHOTRIPSY  2010 2017     PARATHYROIDECTOMY  2011       Hx of Blood transfusions/reactions: none    Hx of abnormal bleeding or anti-platelet use:  none    Menstrual history: No LMP recorded. Patient has had a hysterectomy.:     Steroid use in the last year: none    Personal or FH with difficulty with Anesthesia:  PONV        Prior to Admission Medications  Current Outpatient Prescriptions   Medication Sig Dispense Refill     acetaminophen (TYLENOL) 500 MG tablet as needed Take 1,000 mg by mouth every 6 hours if needed. Max acetaminophen dose: 4000mg in 24 hrs.        ascorbic acid 500 MG TABS Take 500 mg by mouth 2 times daily       COMPOUND (CMPD RX) - PHARMACY TO MIX COMPOUNDED MEDICATION Apply 1g daily for 2 weeks, then twice weekly to vagina (Patient taking differently: as needed Apply 1g daily for 2 weeks, then twice weekly to vagina) 20 g 11     ferrous sulfate (IRON) 325 (65 FE) MG tablet Take 325 mg by mouth 2 times daily Reported on 5/15/2017       gabapentin (NEURONTIN) 100 MG capsule 1.5 hrs before bedtime for 7 nights, increase to 2 pills if not effective after 7 days, increase to 3 pills after 7 days if not completely effective (Patient taking differently: Take 200 mg by mouth At Bedtime 1.5 hrs before bedtime for 7 nights, increase to 2 pills if not effective after 7 days, increase to 3 pills after 7 days if not completely effective) 90 capsule 1     ibuprofen (ADVIL,MOTRIN) 200 MG tablet Take 800 mg by mouth as needed Take 4 tablets by mouth 4 times daily if needed for Pain or Headache. (rare use)       Lactase (LACTAID PO) Take 3,000 Units by mouth as needed for indigestion       LAMOTRIGINE PO Take 300 mg by mouth 2 times daily       levETIRAcetam (KEPPRA) 500 MG tablet Take 1,500 mg by mouth 2 times daily        LORazepam (ATIVAN) 0.5 MG tablet Take 1 tablet (0.5 mg) by mouth every 6 hours as needed for anxiety (Patient taking differently: Take 0.5 mg by mouth as needed for anxiety ) 30 tablet 0     metoprolol tartrate (LOPRESSOR) 25 MG tablet Take 1 tablet (25 mg) by mouth 2 times daily 180 tablet 3     Multiple Vitamin (MULTI-VITAMINS) TABS  every morning take 1 tablet by oral route once daily with food       Omeprazole Magnesium (PRILOSEC OTC PO) Take 20 mg by mouth as needed        ranitidine (ZANTAC) 150 MG tablet Take 1 tablet (150 mg) by mouth 2 times daily as needed for heartburn (Patient taking differently: Take 150 mg by mouth At Bedtime )       tretinoin (RETIN-A) 0.05 % cream Spread a pea size amount into affected area topically at bedtime.  Use sunscreen SPF>20. 45 g 11     VITAMIN D, CHOLECALCIFEROL, PO Take 1,000 Units by mouth 2 times daily Reported on 5/15/2017       albuterol (PROAIR HFA/PROVENTIL HFA/VENTOLIN HFA) 108 (90 BASE) MCG/ACT Inhaler Inhale 2 puffs into the lungs every 6 hours as needed for shortness of breath / dyspnea or wheezing 1 Inhaler 0       Allergies  Allergies   Allergen Reactions     Benoxinate Nausea and Vomiting     Sulfa Drugs Hives       Social History  Social History     Social History     Marital status:      Spouse name: N/A     Number of children: N/A     Years of education: N/A     Occupational History     RN- [employer]      Social History Main Topics     Smoking status: Never Smoker     Smokeless tobacco: Never Used     Alcohol use Yes      Comment: 1-2 per month, only at social events     Drug use: No     Sexual activity: Yes     Partners: Female     Birth control/ protection: Surgical      Comment: hysterectomy     Other Topics Concern     Not on file     Social History Narrative       Family History  Family History   Problem Relation Age of Onset     Arthritis Mother      Depression Mother      Respiratory Mother      COPD     C.A.D. Father 58     heart attack     DIABETES Brother 70     Depression Sister      Depression Son      Allergies Son      Depression Daughter      Allergies Daughter      Eczema Brother      Depression Sister      Depression Sister              Anesthesia Evaluation     . Pt has had prior anesthetic. Type: General and MAC    History of anesthetic complications   -  "PONV        ROS/MED HX    ENT/Pulmonary:  - neg pulmonary ROS    (-) sleep apnea   Neurologic:     (+)seizures last seizure: 3/2018 features: simple partial, left side of face, other neuro h/o brain tumor (oligodendroglioma) 2011. S/P resection/chemo.    Cardiovascular:  - neg cardiovascular ROS   (+) ----. : . . . :. . Previous cardiac testing Echodate:4/29/2014results:date: results: date: results: date: results:          METS/Exercise Tolerance:  >4 METS   Hematologic:  - neg hematologic  ROS       Musculoskeletal:  - neg musculoskeletal ROS       GI/Hepatic:     (+) GERD Asymptomatic on medication,       Renal/Genitourinary:     (+) Nephrolithiasis , Pt has no history of transplant,       Endo:  - neg endo ROS       Psychiatric:         Infectious Disease:  - neg infectious disease ROS       Malignancy:   (+) Malignancy History of Neuro  Neuro CA Remission status post Surgery and Chemo.          Other:    (+) No chance of pregnancy C-spine cleared: N/A, no H/O Chronic Pain,no other significant disability                Physical Exam  Normal systems: cardiovascular and pulmonary    Airway   Mallampati: III  TM distance: >3 FB  Neck ROM: full    Dental   (+) caps  Comment: Left upper cap, temporary, but will have new one placed     Cardiovascular   Rhythm and rate: regular and normal      Pulmonary    breath sounds clear to auscultation        The complete review of systems is negative other than noted in the HPI or here.   Temp: 97.9  F (36.6  C) Temp src: Oral BP: 117/76 Pulse: 60   Resp: 16 SpO2: 98 %         147 lbs 8 oz  5' 2\"   Body mass index is 26.98 kg/(m^2).       Physical Exam  Constitutional: Awake, alert, cooperative, no apparent distress, and appears stated age.  Eyes: Pupils equal, round and reactive to light, extra ocular muscles intact, sclera clear, conjunctiva normal.  HENT: Normocephalic, oral pharynx with moist mucus membranes, good dentition. No goiter appreciated.   Respiratory: Clear to " auscultation bilaterally, no crackles or wheezing.  Cardiovascular: Regular rate and rhythm, normal S1 and S2, and no murmur noted.  Carotids +2, no bruits. No edema. Palpable pulses to radial  DP and PT arteries.   GI: Normal bowel sounds, soft, non-distended, non-tender, no masses palpated, no hepatosplenomegaly.   Lymph/Hematologic: No cervical lymphadenopathy and no supraclavicular lymphadenopathy.  Genitourinary:  deffered  Skin: Warm and dry.  No rashes at anticipated surgical site.   Musculoskeletal: Full ROM of neck. There is no redness, warmth, or swelling of the joints. Gross motor strength is normal.    Neurologic: Awake, alert, oriented to name, place and time. Cranial nerves II-XII are grossly intact. Gait is normal.   Neuropsychiatric: Calm, cooperative. Normal affect.     Labs: (personally reviewed)  Results for DYLAN KAUFFMAN (MRN [id number]) as of 5/23/2018 10:30   Ref. Range 5/23/2018 09:13   Sodium Latest Ref Range: 133 - 144 mmol/L 139   Potassium Latest Ref Range: 3.4 - 5.3 mmol/L 4.7   Chloride Latest Ref Range: 94 - 109 mmol/L 104   Carbon Dioxide Latest Ref Range: 20 - 32 mmol/L 30   Urea Nitrogen Latest Ref Range: 7 - 30 mg/dL 18   Creatinine Latest Ref Range: 0.52 - 1.04 mg/dL 0.72   GFR Estimate Latest Ref Range: >60 mL/min/1.7m2 83   GFR Estimate If Black Latest Ref Range: >60 mL/min/1.7m2 >90   Calcium Latest Ref Range: 8.5 - 10.1 mg/dL 9.8   Anion Gap Latest Ref Range: 3 - 14 mmol/L 6   Glucose Latest Ref Range: 70 - 99 mg/dL 94   WBC Latest Ref Range: 4.0 - 11.0 10e9/L 5.9   Hemoglobin Latest Ref Range: 11.7 - 15.7 g/dL 15.0   Hematocrit Latest Ref Range: 35.0 - 47.0 % 46.1   Platelet Count Latest Ref Range: 150 - 450 10e9/L 225   RBC Count Latest Ref Range: 3.8 - 5.2 10e12/L 5.00   MCV Latest Ref Range: 78 - 100 fl 92   MCH Latest Ref Range: 26.5 - 33.0 pg 30.0   MCHC Latest Ref Range: 31.5 - 36.5 g/dL 32.5   RDW Latest Ref Range: 10.0 - 15.0 % 12.5   INR Latest Ref Range: 0.86 -  1.14  1.00   PTT Latest Ref Range: 22 - 37 sec 31   ABO Unknown PENDING   Antibody Screen Unknown PENDING   Test Valid Only At Latest Units:     [facility].   Specimen Expires Unknown 05/26/2018     EKG: Personally reviewed but formal cardiology read pending: not indicated    Cardiac echo: 4/29/2014    Final Impressions:   1. Normal LV size, normal wall thickness, normal global systolic function with an estimated EF of 65 - 70%.   2. No significant valve disease detected.    Chamber Sizes and Function  Normal left ventricular size, normal wall thickness, normal global systolic function with an estimated EF of 65 - 70%. Left atrial size is normal. Right ventricular cavity size is normal, global systolic RV function is normal. The right atrium is normal. Right atrial area is 8 cm . The pulmonary artery is of normal size and origin. The sinus of Valsalva is normal sized. The ascending aorta is normal sized.    Valves, RV Pressures and Diastolic Function    The aortic valve is normal in structure, no stenosis and no regurgitation. The mitral valve is normal in structure, no mitral regurgitation. No mitral annular calcification seen. Normal diastolic function. The tricuspid valve is normal in structure. Tricuspid regurgitation is not evident. Unable to assess right ventricular systolic pressure. The pulmonic valve is not well visualized. Not well visualized pulmonic regurgitation is present on color flow.    Masses, Effusion, Shunts  There is no pericardial effusion. The inferior vena cava is normal sized, respiratory size variation greater than 50%. No left to right shunting was detected by limited color flow Doppler interrogation of the interatrial septum.        ASSESSMENT and PLAN  Barbi Tiwari is a 57 year old female scheduled to undergo Right Stealth Assisted Craniotomy With Motor Mapping And Tumor Resection on 5/30/20178 by Dr. Mcduffie in treatment of Oligodendroglioma. She has the following specific  operative considerations:   - RCRI : No serious cardiac risks.  0.4% risk of major adverse cardiac event.   - Anesthesia considerations:  Refer to PAC assessment in anesthesia records  - VTE risk: 3%  - DENISE # of risks 2/8 = low risk  - Post-op delirium risk: low risk  - Risk of PONV score = known PONV.  If > 2, anti-emetic intervention recommended.      Previous anesthesia at [facility] with PONV  1) Cardiac: History of PVC with negative cardiac work-up in 2014. Echo showed EF of 65-70%. She was started on metoprolol. METS well over 4.  2) Pulmonary: Never smoked, denies pulmonary symptoms.  3) GI: GERD, well managed with zantac and omeprazole  4) Neuro: Oligodendroglioma, diagnosed in 2011. Underwent surgery followed by 1 year of adjuvant TMZ. Has had some worsening of her seizures. She has simple partial seizures involving her face and throat. She continues with Keppra  Feasible airway    She has a temporary loose cap on left upper, but will have this replaced the day before surgery         I spent 30 minutes with patient, greater than 50% educating on preop meds, counseling on anesthesia and coordinating care for craniotomy   Pt optimized for surgery. AVS with information on surgery time/arrival time, meds and NPO status given by nursing staff      Patient was discussed with Dr Almonte.    CAMDEN Hu CNS  Preoperative Assessment Center  [facility]  Clinic and Surgery Center  Phone: [phone]  Fax: [phone]    Barbi Tiwari is a 57 year old female patient born on 1960.  1. Preop pulmonary/respiratory exam      Past Medical History:   Diagnosis Date     Allergic rhinitis 12/9/2009     Calculus of kidney 1/12/2011    Overview:  S/P LITHOTRIPSY 3/15/11      Esophageal reflux 10/22/2008     Hyperparathyroidism s/p surgery 1/11/2011     oligodendroglioma 7/23/2012     Osteoarthrosis 12/9/2009     Palpitations 6/5/2014     PVCs 6/5/2014     Seizure disorder (related to tumor)  "8/17/2011     Allergies   Allergen Reactions     Benoxinate Nausea and Vomiting     Sulfa Drugs Hives     Active Problems:    * No active hospital problems. *    Blood pressure 117/76, pulse 60, temperature 97.9  F (36.6  C), temperature source Oral, resp. rate 16, height 1.575 m (5' 2\"), weight 66.9 kg (147 lb 8 oz), SpO2 98 %, not currently breastfeeding.    NICU Admission  Maternal Medical History  Assessment & Plan    Max Brannon  5/23/2018    "

## 2018-05-23 NOTE — PATIENT INSTRUCTIONS
Preparing for Your Surgery      Name:  Barbi Tiwari   MRN:  [id number]   :  1960   Today's Date:  2018     Arriving for surgery:  Craniotomy   Surgery date:  2018  Arrival time:   5:30 am  Please come to:       [facility] [address]  [address] [place]  [address]    -   parking is available in front of the hospital from 5:15 am to 8:00 pm    -  Stop at the Information Desk in the lobby    -   Inform the information person that you are here for surgery. An escort to 3c will be provided. If you would not like an escort, please proceed to 3C on the 3rd floor. [phone]     What can I eat or drink?  -  You may have solid food or milk products until 8 hours prior to your surgery.  Nothing after 11 pm   -  You may have water, apple juice or 7up/Sprite until 2 hours prior to your surgery .  Until 5:30 am arrival time     Which medicines can I take?         Hold aspirin, vitamins and supplements for 1 week before surgery        Hold ibuprofen/naproxen for 24 hours before surgery       -  Please take these medications the day of surgery:    Inhalers as usual and bring to hospital        Lamotrigine       levetriacetam (Keppra)        Metoprolol         Lorazepam if needed         Omeprazole        Ranitidine       How do I prepare myself?  -  Take two showers: one the night before surgery; and one the morning of surgery.         Use Scrubcare or Hibiclens to wash from neck down.  You may use your own     shampoo and conditioner. No other hair products.   -  Do NOT use lotion, powder, deodorant, or antiperspirant the day of your surgery.  -  Do NOT wear any makeup, fingernail polish or jewelry.  - Do not bring your own medications to the hospital, except for inhalers and eye   drops.  -  Bring your ID and insurance card.    Questions or Concerns:  If you have questions or concerns regarding the day of surgery, please call [phone]      AFTER YOUR  SURGERY  Breathing exercises   Breathing exercises help you recover faster. Take deep breaths and let the air out slowly. This will:     Help you wake up after surgery.    Help prevent complications like pneumonia.  Preventing complications will help you go home sooner.   We may give you a breathing device (incentive spirometer) to encourage you to breathe deeply.   Nausea and vomiting   You may feel sick to your stomach after surgery; if so, let your nurse know.    Pain control:  After surgery, you may have pain. Our goal is to help you manage your pain. Pain medicine will help you feel comfortable enough to do activities that will help you heal.  These activities may include breathing exercises, walking and physical therapy.   To help your health care team treat your pain we will ask: 1) If you have pain  2) where it is located 3) describe your pain in your words  Methods of pain control include medications given by mouth, vein or by nerve block for some surgeries.  We may give you a pain control pump that will:  1) Deliver the medicine through a tube placed in your vein  2) Control the amount of medicine you receive  3) Allow you to push a button to deliver a dose of pain medicine  Sequential Compression Device (SCD) or Pneumo Boots:  You may need to wear SCD S on your legs or feet. These are wraps connected to a machine that pumps in air and releases it. The repeated pumping helps prevent blood clots from forming.

## 2018-05-25 ENCOUNTER — TELEPHONE (OUTPATIENT)
Dept: SLEEP MEDICINE | Facility: CLINIC | Age: 58
End: 2018-05-25

## 2018-05-25 RX ORDER — GABAPENTIN 100 MG/1
CAPSULE ORAL
Qty: 90 CAPSULE | Refills: 1 | Status: SHIPPED | OUTPATIENT
Start: 2018-05-25 | End: 2018-07-29

## 2018-05-29 ENCOUNTER — HOSPITAL ENCOUNTER (OUTPATIENT)
Dept: MRI IMAGING | Facility: CLINIC | Age: 58
Discharge: HOME | End: 2018-05-29
Attending: NEUROLOGICAL SURGERY | Admitting: NEUROLOGICAL SURGERY
Payer: COMMERCIAL

## 2018-05-29 ENCOUNTER — HOSPITAL ENCOUNTER (OUTPATIENT)
Dept: MRI IMAGING | Facility: CLINIC | Age: 58
End: 2018-05-29
Attending: NEUROLOGICAL SURGERY
Payer: COMMERCIAL

## 2018-05-29 DIAGNOSIS — C71.9 OLIGODENDROGLIOMA, WHO GRADE II (H): ICD-10-CM

## 2018-05-29 PROCEDURE — 25000128 H RX IP 250 OP 636: Performed by: NEUROLOGICAL SURGERY

## 2018-05-29 PROCEDURE — 70552 MRI BRAIN STEM W/DYE: CPT

## 2018-05-29 PROCEDURE — 70551 MRI BRAIN STEM W/O DYE: CPT

## 2018-05-29 PROCEDURE — A9585 GADOBUTROL INJECTION: HCPCS | Performed by: NEUROLOGICAL SURGERY

## 2018-05-29 RX ORDER — GADOBUTROL 604.72 MG/ML
7.5 INJECTION INTRAVENOUS ONCE
Status: COMPLETED | OUTPATIENT
Start: 2018-05-29 | End: 2018-05-29

## 2018-05-29 RX ADMIN — GADOBUTROL 7.5 ML: 604.72 INJECTION INTRAVENOUS at 12:07

## 2018-05-30 ENCOUNTER — ANESTHESIA (OUTPATIENT)
Dept: SURGERY | Facility: CLINIC | Age: 58
DRG: 026 | End: 2018-05-30
Payer: COMMERCIAL

## 2018-05-30 ENCOUNTER — HOSPITAL ENCOUNTER (OUTPATIENT)
Dept: CT IMAGING | Facility: CLINIC | Age: 58
DRG: 026 | End: 2018-05-30
Attending: NEUROLOGICAL SURGERY | Admitting: NEUROLOGICAL SURGERY
Payer: COMMERCIAL

## 2018-05-30 ENCOUNTER — HOSPITAL ENCOUNTER (INPATIENT)
Facility: CLINIC | Age: 58
LOS: 1 days | Discharge: HOME | DRG: 026 | End: 2018-05-31
Attending: NEUROLOGICAL SURGERY | Admitting: NEUROLOGICAL SURGERY
Payer: COMMERCIAL

## 2018-05-30 DIAGNOSIS — C71.9 OLIGODENDROGLIOMA, WHO GRADE II (H): Primary | ICD-10-CM

## 2018-05-30 DIAGNOSIS — G40.109 LOCALIZATION-RELATED FOCAL EPILEPSY WITH SIMPLE PARTIAL SEIZURES (H): ICD-10-CM

## 2018-05-30 DIAGNOSIS — C71.9 OLIGODENDROGLIOMA, WHO GRADE II (H): ICD-10-CM

## 2018-05-30 PROBLEM — D49.6 BRAIN TUMOR (H): Status: ACTIVE | Noted: 2018-05-30

## 2018-05-30 LAB
ABO + RH BLD: NORMAL
ABO + RH BLD: NORMAL
ANION GAP SERPL CALCULATED.3IONS-SCNC: 8 MMOL/L (ref 3–14)
APTT PPP: 31 SEC (ref 22–37)
BLD GP AB SCN SERPL QL: NORMAL
BLOOD BANK CMNT PATIENT-IMP: NORMAL
BLOOD BANK CMNT PATIENT-IMP: NORMAL
BUN SERPL-MCNC: 13 MG/DL (ref 7–30)
CALCIUM SERPL-MCNC: 8.6 MG/DL (ref 8.5–10.1)
CHLORIDE SERPL-SCNC: 106 MMOL/L (ref 94–109)
CO2 SERPL-SCNC: 26 MMOL/L (ref 20–32)
CREAT SERPL-MCNC: 0.68 MG/DL (ref 0.52–1.04)
ERYTHROCYTE [DISTWIDTH] IN BLOOD BY AUTOMATED COUNT: 12.8 % (ref 10–15)
GFR SERPL CREATININE-BSD FRML MDRD: 90 ML/MIN/1.7M2
GLUCOSE BLDC GLUCOMTR-MCNC: 98 MG/DL (ref 70–99)
GLUCOSE SERPL-MCNC: 122 MG/DL (ref 70–99)
HCT VFR BLD AUTO: 41.2 % (ref 35–47)
HGB BLD-MCNC: 13.4 G/DL (ref 11.7–15.7)
HGB BLD-MCNC: 14.4 G/DL (ref 11.7–15.7)
INR PPP: 1.03 (ref 0.86–1.14)
MCH RBC QN AUTO: 30.7 PG (ref 26.5–33)
MCHC RBC AUTO-ENTMCNC: 32.5 G/DL (ref 31.5–36.5)
MCV RBC AUTO: 94 FL (ref 78–100)
MRSA DNA SPEC QL NAA+PROBE: NEGATIVE
PLATELET # BLD AUTO: 178 10E9/L (ref 150–450)
POTASSIUM SERPL-SCNC: 3.9 MMOL/L (ref 3.4–5.3)
POTASSIUM SERPL-SCNC: 4.3 MMOL/L (ref 3.4–5.3)
RBC # BLD AUTO: 4.37 10E12/L (ref 3.8–5.2)
SODIUM SERPL-SCNC: 141 MMOL/L (ref 133–144)
SPECIMEN EXP DATE BLD: NORMAL
SPECIMEN SOURCE: NORMAL
WBC # BLD AUTO: 6.6 10E9/L (ref 4–11)

## 2018-05-30 PROCEDURE — C1763 CONN TISS, NON-HUMAN: HCPCS | Performed by: NEUROLOGICAL SURGERY

## 2018-05-30 PROCEDURE — 25000125 ZZHC RX 250: Performed by: NEUROLOGICAL SURGERY

## 2018-05-30 PROCEDURE — 27810169 ZZH OR IMPLANT GENERAL: Performed by: NEUROLOGICAL SURGERY

## 2018-05-30 PROCEDURE — 40000014 ZZH STATISTIC ARTERIAL MONITORING DAILY

## 2018-05-30 PROCEDURE — 25000128 H RX IP 250 OP 636: Performed by: STUDENT IN AN ORGANIZED HEALTH CARE EDUCATION/TRAINING PROGRAM

## 2018-05-30 PROCEDURE — 70450 CT HEAD/BRAIN W/O DYE: CPT

## 2018-05-30 PROCEDURE — 40000170 ZZH STATISTIC PRE-PROCEDURE ASSESSMENT II: Performed by: NEUROLOGICAL SURGERY

## 2018-05-30 PROCEDURE — 00000146 ZZHCL STATISTIC GLUCOSE BY METER IP

## 2018-05-30 PROCEDURE — 85730 THROMBOPLASTIN TIME PARTIAL: CPT | Performed by: STUDENT IN AN ORGANIZED HEALTH CARE EDUCATION/TRAINING PROGRAM

## 2018-05-30 PROCEDURE — 25000128 H RX IP 250 OP 636: Performed by: ANESTHESIOLOGY

## 2018-05-30 PROCEDURE — 40000275 ZZH STATISTIC RCP TIME EA 10 MIN

## 2018-05-30 PROCEDURE — 25000565 ZZH ISOFLURANE, EA 15 MIN: Performed by: NEUROLOGICAL SURGERY

## 2018-05-30 PROCEDURE — 36415 COLL VENOUS BLD VENIPUNCTURE: CPT | Performed by: STUDENT IN AN ORGANIZED HEALTH CARE EDUCATION/TRAINING PROGRAM

## 2018-05-30 PROCEDURE — 80048 BASIC METABOLIC PNL TOTAL CA: CPT | Performed by: STUDENT IN AN ORGANIZED HEALTH CARE EDUCATION/TRAINING PROGRAM

## 2018-05-30 PROCEDURE — 00B70ZZ EXCISION OF CEREBRAL HEMISPHERE, OPEN APPROACH: ICD-10-PCS | Performed by: NEUROLOGICAL SURGERY

## 2018-05-30 PROCEDURE — 87641 MR-STAPH DNA AMP PROBE: CPT | Performed by: NEUROLOGICAL SURGERY

## 2018-05-30 PROCEDURE — 85610 PROTHROMBIN TIME: CPT | Performed by: STUDENT IN AN ORGANIZED HEALTH CARE EDUCATION/TRAINING PROGRAM

## 2018-05-30 PROCEDURE — 87640 STAPH A DNA AMP PROBE: CPT | Performed by: NEUROLOGICAL SURGERY

## 2018-05-30 PROCEDURE — 85027 COMPLETE CBC AUTOMATED: CPT | Performed by: STUDENT IN AN ORGANIZED HEALTH CARE EDUCATION/TRAINING PROGRAM

## 2018-05-30 PROCEDURE — 71000014 ZZH RECOVERY PHASE 1 LEVEL 2 FIRST HR: Performed by: NEUROLOGICAL SURGERY

## 2018-05-30 PROCEDURE — 95940 IONM IN OPERATNG ROOM 15 MIN: CPT | Performed by: NEUROLOGICAL SURGERY

## 2018-05-30 PROCEDURE — 37000009 ZZH ANESTHESIA TECHNICAL FEE, EACH ADDTL 15 MIN: Performed by: NEUROLOGICAL SURGERY

## 2018-05-30 PROCEDURE — 27210995 ZZH RX 272: Performed by: NEUROLOGICAL SURGERY

## 2018-05-30 PROCEDURE — 27211022 ZZHC OR IOM SUPPLIES OPNP: Performed by: NEUROLOGICAL SURGERY

## 2018-05-30 PROCEDURE — 25000125 ZZHC RX 250: Performed by: ANESTHESIOLOGY

## 2018-05-30 PROCEDURE — 25000131 ZZH RX MED GY IP 250 OP 636 PS 637: Performed by: STUDENT IN AN ORGANIZED HEALTH CARE EDUCATION/TRAINING PROGRAM

## 2018-05-30 PROCEDURE — 88307 TISSUE EXAM BY PATHOLOGIST: CPT | Performed by: NEUROLOGICAL SURGERY

## 2018-05-30 PROCEDURE — 36000062 ZZH SURGERY LEVEL 4 1ST 30 MIN - UMMC: Performed by: NEUROLOGICAL SURGERY

## 2018-05-30 PROCEDURE — 8E09XBG COMPUTER ASSISTED PROCEDURE OF HEAD AND NECK REGION, WITH COMPUTERIZED TOMOGRAPHY: ICD-10-PCS | Performed by: NEUROLOGICAL SURGERY

## 2018-05-30 PROCEDURE — 25000132 ZZH RX MED GY IP 250 OP 250 PS 637: Performed by: ANESTHESIOLOGY

## 2018-05-30 PROCEDURE — 37000008 ZZH ANESTHESIA TECHNICAL FEE, 1ST 30 MIN: Performed by: NEUROLOGICAL SURGERY

## 2018-05-30 PROCEDURE — 25000128 H RX IP 250 OP 636: Performed by: NEUROLOGICAL SURGERY

## 2018-05-30 PROCEDURE — 20000004 ZZH R&B ICU UMMC

## 2018-05-30 PROCEDURE — 85018 HEMOGLOBIN: CPT | Performed by: STUDENT IN AN ORGANIZED HEALTH CARE EDUCATION/TRAINING PROGRAM

## 2018-05-30 PROCEDURE — C1713 ANCHOR/SCREW BN/BN,TIS/BN: HCPCS | Performed by: NEUROLOGICAL SURGERY

## 2018-05-30 PROCEDURE — 25000132 ZZH RX MED GY IP 250 OP 250 PS 637: Performed by: STUDENT IN AN ORGANIZED HEALTH CARE EDUCATION/TRAINING PROGRAM

## 2018-05-30 PROCEDURE — 36000064 ZZH SURGERY LEVEL 4 EA 15 ADDTL MIN - UMMC: Performed by: NEUROLOGICAL SURGERY

## 2018-05-30 PROCEDURE — 27210794 ZZH OR GENERAL SUPPLY STERILE: Performed by: NEUROLOGICAL SURGERY

## 2018-05-30 PROCEDURE — 71000015 ZZH RECOVERY PHASE 1 LEVEL 2 EA ADDTL HR: Performed by: NEUROLOGICAL SURGERY

## 2018-05-30 PROCEDURE — 84132 ASSAY OF SERUM POTASSIUM: CPT | Performed by: STUDENT IN AN ORGANIZED HEALTH CARE EDUCATION/TRAINING PROGRAM

## 2018-05-30 DEVICE — IMP PLATE SYN STR DOG BONE LOW PROFILE 2H TI 421.502: Type: IMPLANTABLE DEVICE | Site: CRANIAL | Status: FUNCTIONAL

## 2018-05-30 DEVICE — IMP SCR SYN MATRIX LOW PRO 1.5X04MM SELF DRILL 04.503.104.01: Type: IMPLANTABLE DEVICE | Site: CRANIAL | Status: FUNCTIONAL

## 2018-05-30 DEVICE — IMP BUR HOLE COVER 24MM LOW PROFILE TI 421.528: Type: IMPLANTABLE DEVICE | Site: CRANIAL | Status: FUNCTIONAL

## 2018-05-30 DEVICE — GRAFT DURAGEN 2X2" ID220: Type: IMPLANTABLE DEVICE | Site: CRANIAL | Status: FUNCTIONAL

## 2018-05-30 DEVICE — IMP PLATE SYN X LOW PROFILE 4H TI 421.510: Type: IMPLANTABLE DEVICE | Site: CRANIAL | Status: FUNCTIONAL

## 2018-05-30 RX ORDER — ONDANSETRON 4 MG/1
4 TABLET, ORALLY DISINTEGRATING ORAL EVERY 30 MIN PRN
Status: DISCONTINUED | OUTPATIENT
Start: 2018-05-30 | End: 2018-05-30 | Stop reason: HOSPADM

## 2018-05-30 RX ORDER — ALBUTEROL SULFATE 90 UG/1
2 AEROSOL, METERED RESPIRATORY (INHALATION) EVERY 6 HOURS PRN
Status: DISCONTINUED | OUTPATIENT
Start: 2018-05-30 | End: 2018-05-31 | Stop reason: HOSPADM

## 2018-05-30 RX ORDER — LIDOCAINE 40 MG/G
CREAM TOPICAL
Status: DISCONTINUED | OUTPATIENT
Start: 2018-05-30 | End: 2018-05-30 | Stop reason: HOSPADM

## 2018-05-30 RX ORDER — DEXAMETHASONE SODIUM PHOSPHATE 4 MG/ML
4 INJECTION, SOLUTION INTRA-ARTICULAR; INTRALESIONAL; INTRAMUSCULAR; INTRAVENOUS; SOFT TISSUE EVERY 6 HOURS
Status: DISCONTINUED | OUTPATIENT
Start: 2018-05-30 | End: 2018-05-30

## 2018-05-30 RX ORDER — ACETAMINOPHEN 325 MG/1
650 TABLET ORAL EVERY 4 HOURS PRN
Status: DISCONTINUED | OUTPATIENT
Start: 2018-06-02 | End: 2018-05-31 | Stop reason: HOSPADM

## 2018-05-30 RX ORDER — FERROUS SULFATE 325(65) MG
325 TABLET ORAL 2 TIMES DAILY
Status: DISCONTINUED | OUTPATIENT
Start: 2018-05-30 | End: 2018-05-31 | Stop reason: HOSPADM

## 2018-05-30 RX ORDER — ONDANSETRON 2 MG/ML
INJECTION INTRAMUSCULAR; INTRAVENOUS PRN
Status: DISCONTINUED | OUTPATIENT
Start: 2018-05-30 | End: 2018-05-30

## 2018-05-30 RX ORDER — LAMOTRIGINE 150 MG/1
300 TABLET ORAL 2 TIMES DAILY
Status: DISCONTINUED | OUTPATIENT
Start: 2018-05-30 | End: 2018-05-31 | Stop reason: HOSPADM

## 2018-05-30 RX ORDER — POLYETHYLENE GLYCOL 3350 17 G/17G
17 POWDER, FOR SOLUTION ORAL DAILY
Status: DISCONTINUED | OUTPATIENT
Start: 2018-05-30 | End: 2018-05-31 | Stop reason: HOSPADM

## 2018-05-30 RX ORDER — SODIUM CHLORIDE, SODIUM LACTATE, POTASSIUM CHLORIDE, CALCIUM CHLORIDE 600; 310; 30; 20 MG/100ML; MG/100ML; MG/100ML; MG/100ML
INJECTION, SOLUTION INTRAVENOUS CONTINUOUS
Status: DISCONTINUED | OUTPATIENT
Start: 2018-05-30 | End: 2018-05-30 | Stop reason: HOSPADM

## 2018-05-30 RX ORDER — PROPOFOL 10 MG/ML
INJECTION, EMULSION INTRAVENOUS PRN
Status: DISCONTINUED | OUTPATIENT
Start: 2018-05-30 | End: 2018-05-30

## 2018-05-30 RX ORDER — ACETAMINOPHEN 325 MG/1
975 TABLET ORAL EVERY 8 HOURS
Status: DISCONTINUED | OUTPATIENT
Start: 2018-05-30 | End: 2018-05-31 | Stop reason: HOSPADM

## 2018-05-30 RX ORDER — TRAMADOL HYDROCHLORIDE 50 MG/1
50 TABLET ORAL ONCE
Status: DISCONTINUED | OUTPATIENT
Start: 2018-05-30 | End: 2018-05-30

## 2018-05-30 RX ORDER — PROPOFOL 10 MG/ML
INJECTION, EMULSION INTRAVENOUS CONTINUOUS PRN
Status: DISCONTINUED | OUTPATIENT
Start: 2018-05-30 | End: 2018-05-30

## 2018-05-30 RX ORDER — HYDRALAZINE HYDROCHLORIDE 20 MG/ML
10-20 INJECTION INTRAMUSCULAR; INTRAVENOUS EVERY 30 MIN PRN
Status: DISCONTINUED | OUTPATIENT
Start: 2018-05-30 | End: 2018-05-31 | Stop reason: HOSPADM

## 2018-05-30 RX ORDER — ASCORBIC ACID 500 MG
500 TABLET ORAL 2 TIMES DAILY
Status: DISCONTINUED | OUTPATIENT
Start: 2018-05-30 | End: 2018-05-31 | Stop reason: HOSPADM

## 2018-05-30 RX ORDER — CEFAZOLIN SODIUM 2 G/100ML
2 INJECTION, SOLUTION INTRAVENOUS
Status: COMPLETED | OUTPATIENT
Start: 2018-05-30 | End: 2018-05-30

## 2018-05-30 RX ORDER — NALOXONE HYDROCHLORIDE 0.4 MG/ML
.1-.4 INJECTION, SOLUTION INTRAMUSCULAR; INTRAVENOUS; SUBCUTANEOUS
Status: DISCONTINUED | OUTPATIENT
Start: 2018-05-30 | End: 2018-05-31 | Stop reason: HOSPADM

## 2018-05-30 RX ORDER — DEXAMETHASONE SODIUM PHOSPHATE 4 MG/ML
INJECTION, SOLUTION INTRA-ARTICULAR; INTRALESIONAL; INTRAMUSCULAR; INTRAVENOUS; SOFT TISSUE PRN
Status: DISCONTINUED | OUTPATIENT
Start: 2018-05-30 | End: 2018-05-30

## 2018-05-30 RX ORDER — ONDANSETRON 4 MG/1
4 TABLET, ORALLY DISINTEGRATING ORAL EVERY 6 HOURS PRN
Status: DISCONTINUED | OUTPATIENT
Start: 2018-05-30 | End: 2018-05-31 | Stop reason: HOSPADM

## 2018-05-30 RX ORDER — ACETAMINOPHEN 325 MG/1
975 TABLET ORAL ONCE
Status: COMPLETED | OUTPATIENT
Start: 2018-05-30 | End: 2018-05-30

## 2018-05-30 RX ORDER — AMOXICILLIN 250 MG
1 CAPSULE ORAL 2 TIMES DAILY
Status: DISCONTINUED | OUTPATIENT
Start: 2018-05-30 | End: 2018-05-31 | Stop reason: HOSPADM

## 2018-05-30 RX ORDER — DEXAMETHASONE 4 MG/1
4 TABLET ORAL EVERY 8 HOURS SCHEDULED
Status: DISCONTINUED | OUTPATIENT
Start: 2018-05-30 | End: 2018-05-31 | Stop reason: HOSPADM

## 2018-05-30 RX ORDER — LORAZEPAM 0.5 MG/1
0.5 TABLET ORAL
Status: COMPLETED | OUTPATIENT
Start: 2018-05-30 | End: 2018-05-31

## 2018-05-30 RX ORDER — SODIUM CHLORIDE, SODIUM LACTATE, POTASSIUM CHLORIDE, CALCIUM CHLORIDE 600; 310; 30; 20 MG/100ML; MG/100ML; MG/100ML; MG/100ML
INJECTION, SOLUTION INTRAVENOUS CONTINUOUS PRN
Status: DISCONTINUED | OUTPATIENT
Start: 2018-05-30 | End: 2018-05-30

## 2018-05-30 RX ORDER — AMOXICILLIN 250 MG
2 CAPSULE ORAL 2 TIMES DAILY
Status: DISCONTINUED | OUTPATIENT
Start: 2018-05-30 | End: 2018-05-31 | Stop reason: HOSPADM

## 2018-05-30 RX ORDER — LIDOCAINE 40 MG/G
CREAM TOPICAL
Status: DISCONTINUED | OUTPATIENT
Start: 2018-05-30 | End: 2018-05-31 | Stop reason: HOSPADM

## 2018-05-30 RX ORDER — FENTANYL CITRATE 50 UG/ML
25-50 INJECTION, SOLUTION INTRAMUSCULAR; INTRAVENOUS
Status: DISCONTINUED | OUTPATIENT
Start: 2018-05-30 | End: 2018-05-30 | Stop reason: HOSPADM

## 2018-05-30 RX ORDER — DEXAMETHASONE 1 MG
1 TABLET ORAL DAILY
Status: DISCONTINUED | OUTPATIENT
Start: 2018-06-05 | End: 2018-05-31 | Stop reason: HOSPADM

## 2018-05-30 RX ORDER — DEXAMETHASONE 4 MG/1
4 TABLET ORAL 2 TIMES DAILY
Status: DISCONTINUED | OUTPATIENT
Start: 2018-06-01 | End: 2018-05-31 | Stop reason: HOSPADM

## 2018-05-30 RX ORDER — LORAZEPAM 2 MG/ML
2 INJECTION INTRAMUSCULAR
Status: DISCONTINUED | OUTPATIENT
Start: 2018-05-30 | End: 2018-05-31 | Stop reason: HOSPADM

## 2018-05-30 RX ORDER — SCOLOPAMINE TRANSDERMAL SYSTEM 1 MG/1
1 PATCH, EXTENDED RELEASE TRANSDERMAL
Status: DISCONTINUED | OUTPATIENT
Start: 2018-05-30 | End: 2018-05-31 | Stop reason: HOSPADM

## 2018-05-30 RX ORDER — BUPIVACAINE HYDROCHLORIDE AND EPINEPHRINE 5; 5 MG/ML; UG/ML
INJECTION, SOLUTION EPIDURAL; INTRACAUDAL; PERINEURAL PRN
Status: DISCONTINUED | OUTPATIENT
Start: 2018-05-30 | End: 2018-05-30 | Stop reason: HOSPADM

## 2018-05-30 RX ORDER — SODIUM CHLORIDE 9 MG/ML
INJECTION, SOLUTION INTRAVENOUS CONTINUOUS
Status: DISCONTINUED | OUTPATIENT
Start: 2018-05-30 | End: 2018-05-31

## 2018-05-30 RX ORDER — CEFAZOLIN SODIUM 1 G/3ML
1 INJECTION, POWDER, FOR SOLUTION INTRAMUSCULAR; INTRAVENOUS SEE ADMIN INSTRUCTIONS
Status: DISCONTINUED | OUTPATIENT
Start: 2018-05-30 | End: 2018-05-30 | Stop reason: HOSPADM

## 2018-05-30 RX ORDER — FENTANYL CITRATE 50 UG/ML
INJECTION, SOLUTION INTRAMUSCULAR; INTRAVENOUS PRN
Status: DISCONTINUED | OUTPATIENT
Start: 2018-05-30 | End: 2018-05-30

## 2018-05-30 RX ORDER — HYDROMORPHONE HYDROCHLORIDE 1 MG/ML
.3-.5 INJECTION, SOLUTION INTRAMUSCULAR; INTRAVENOUS; SUBCUTANEOUS EVERY 5 MIN PRN
Status: DISCONTINUED | OUTPATIENT
Start: 2018-05-30 | End: 2018-05-30 | Stop reason: HOSPADM

## 2018-05-30 RX ORDER — LIDOCAINE HYDROCHLORIDE 20 MG/ML
INJECTION, SOLUTION INFILTRATION; PERINEURAL PRN
Status: DISCONTINUED | OUTPATIENT
Start: 2018-05-30 | End: 2018-05-30

## 2018-05-30 RX ORDER — METOPROLOL TARTRATE 25 MG/1
25 TABLET, FILM COATED ORAL 2 TIMES DAILY
Status: DISCONTINUED | OUTPATIENT
Start: 2018-05-30 | End: 2018-05-31 | Stop reason: HOSPADM

## 2018-05-30 RX ORDER — HYDROMORPHONE HCL/0.9% NACL/PF 0.2MG/0.2
.2-.4 SYRINGE (ML) INTRAVENOUS
Status: DISCONTINUED | OUTPATIENT
Start: 2018-05-30 | End: 2018-05-31

## 2018-05-30 RX ORDER — OXYCODONE HYDROCHLORIDE 5 MG/1
5-10 TABLET ORAL
Status: DISCONTINUED | OUTPATIENT
Start: 2018-05-30 | End: 2018-05-31 | Stop reason: HOSPADM

## 2018-05-30 RX ORDER — ONDANSETRON 2 MG/ML
4 INJECTION INTRAMUSCULAR; INTRAVENOUS EVERY 30 MIN PRN
Status: DISCONTINUED | OUTPATIENT
Start: 2018-05-30 | End: 2018-05-30 | Stop reason: HOSPADM

## 2018-05-30 RX ORDER — LEVETIRACETAM 10 MG/ML
1000 INJECTION INTRAVASCULAR ONCE
Status: COMPLETED | OUTPATIENT
Start: 2018-05-30 | End: 2018-05-30

## 2018-05-30 RX ORDER — NALOXONE HYDROCHLORIDE 0.4 MG/ML
.1-.4 INJECTION, SOLUTION INTRAMUSCULAR; INTRAVENOUS; SUBCUTANEOUS
Status: DISCONTINUED | OUTPATIENT
Start: 2018-05-30 | End: 2018-05-30 | Stop reason: HOSPADM

## 2018-05-30 RX ORDER — DEXAMETHASONE 2 MG/1
2 TABLET ORAL EVERY 12 HOURS SCHEDULED
Status: DISCONTINUED | OUTPATIENT
Start: 2018-06-03 | End: 2018-05-31 | Stop reason: HOSPADM

## 2018-05-30 RX ORDER — LABETALOL HYDROCHLORIDE 5 MG/ML
10-40 INJECTION, SOLUTION INTRAVENOUS EVERY 10 MIN PRN
Status: DISCONTINUED | OUTPATIENT
Start: 2018-05-30 | End: 2018-05-31 | Stop reason: HOSPADM

## 2018-05-30 RX ORDER — ONDANSETRON 2 MG/ML
4 INJECTION INTRAMUSCULAR; INTRAVENOUS EVERY 6 HOURS PRN
Status: DISCONTINUED | OUTPATIENT
Start: 2018-05-30 | End: 2018-05-31 | Stop reason: HOSPADM

## 2018-05-30 RX ORDER — LEVETIRACETAM 750 MG/1
1500 TABLET ORAL 2 TIMES DAILY
Status: DISCONTINUED | OUTPATIENT
Start: 2018-05-30 | End: 2018-05-31 | Stop reason: HOSPADM

## 2018-05-30 RX ADMIN — DEXAMETHASONE 4 MG: 4 TABLET ORAL at 22:03

## 2018-05-30 RX ADMIN — SODIUM CHLORIDE, POTASSIUM CHLORIDE, SODIUM LACTATE AND CALCIUM CHLORIDE: 600; 310; 30; 20 INJECTION, SOLUTION INTRAVENOUS at 08:30

## 2018-05-30 RX ADMIN — FENTANYL CITRATE 12.5 MCG: 50 INJECTION, SOLUTION INTRAMUSCULAR; INTRAVENOUS at 12:42

## 2018-05-30 RX ADMIN — ACETAMINOPHEN 975 MG: 325 TABLET, FILM COATED ORAL at 13:05

## 2018-05-30 RX ADMIN — Medication 0.4 MG: at 18:30

## 2018-05-30 RX ADMIN — OXYCODONE HYDROCHLORIDE 5 MG: 5 TABLET ORAL at 20:02

## 2018-05-30 RX ADMIN — SUFENTANIL CITRATE 0.2 MCG/KG/HR: 50 INJECTION EPIDURAL; INTRAVENOUS at 08:26

## 2018-05-30 RX ADMIN — METOPROLOL TARTRATE 25 MG: 25 TABLET ORAL at 19:55

## 2018-05-30 RX ADMIN — FENTANYL CITRATE 12.5 MCG: 50 INJECTION, SOLUTION INTRAMUSCULAR; INTRAVENOUS at 13:20

## 2018-05-30 RX ADMIN — CEFAZOLIN SODIUM 2 G: 2 INJECTION, SOLUTION INTRAVENOUS at 08:37

## 2018-05-30 RX ADMIN — ONDANSETRON 4 MG: 2 INJECTION INTRAMUSCULAR; INTRAVENOUS at 11:00

## 2018-05-30 RX ADMIN — LAMOTRIGINE 300 MG: 150 TABLET ORAL at 19:55

## 2018-05-30 RX ADMIN — CEFAZOLIN 1 G: 1 INJECTION, POWDER, FOR SOLUTION INTRAMUSCULAR; INTRAVENOUS at 10:23

## 2018-05-30 RX ADMIN — PROPOFOL 75 MCG/KG/MIN: 10 INJECTION, EMULSION INTRAVENOUS at 08:24

## 2018-05-30 RX ADMIN — OXYCODONE HYDROCHLORIDE 5 MG: 5 TABLET ORAL at 16:41

## 2018-05-30 RX ADMIN — FENTANYL CITRATE 50 MCG: 50 INJECTION, SOLUTION INTRAMUSCULAR; INTRAVENOUS at 11:22

## 2018-05-30 RX ADMIN — RANITIDINE 150 MG: 150 TABLET ORAL at 22:03

## 2018-05-30 RX ADMIN — SENNOSIDES AND DOCUSATE SODIUM 1 TABLET: 8.6; 5 TABLET ORAL at 19:56

## 2018-05-30 RX ADMIN — SODIUM CHLORIDE, POTASSIUM CHLORIDE, SODIUM LACTATE AND CALCIUM CHLORIDE: 600; 310; 30; 20 INJECTION, SOLUTION INTRAVENOUS at 07:30

## 2018-05-30 RX ADMIN — ROCURONIUM BROMIDE 30 MG: 10 INJECTION INTRAVENOUS at 07:42

## 2018-05-30 RX ADMIN — DEXAMETHASONE 4 MG: 4 TABLET ORAL at 15:07

## 2018-05-30 RX ADMIN — LIDOCAINE HYDROCHLORIDE 70 MG: 20 INJECTION, SOLUTION INFILTRATION; PERINEURAL at 07:35

## 2018-05-30 RX ADMIN — LEVETIRACETAM 1000 MG: 10 INJECTION INTRAVENOUS at 20:52

## 2018-05-30 RX ADMIN — MIDAZOLAM 1 MG: 1 INJECTION INTRAMUSCULAR; INTRAVENOUS at 07:30

## 2018-05-30 RX ADMIN — FENTANYL CITRATE 100 MCG: 50 INJECTION, SOLUTION INTRAMUSCULAR; INTRAVENOUS at 07:35

## 2018-05-30 RX ADMIN — DEXAMETHASONE SODIUM PHOSPHATE 8 MG: 4 INJECTION, SOLUTION INTRA-ARTICULAR; INTRALESIONAL; INTRAMUSCULAR; INTRAVENOUS; SOFT TISSUE at 08:29

## 2018-05-30 RX ADMIN — ACETAMINOPHEN 975 MG: 325 TABLET, FILM COATED ORAL at 20:55

## 2018-05-30 RX ADMIN — LEVETIRACETAM 1500 MG: 750 TABLET, FILM COATED ORAL at 19:55

## 2018-05-30 RX ADMIN — PROPOFOL 50 MG: 10 INJECTION, EMULSION INTRAVENOUS at 11:22

## 2018-05-30 RX ADMIN — PROPOFOL 150 MG: 10 INJECTION, EMULSION INTRAVENOUS at 07:42

## 2018-05-30 RX ADMIN — OXYCODONE HYDROCHLORIDE 10 MG: 5 TABLET ORAL at 23:16

## 2018-05-30 RX ADMIN — SODIUM CHLORIDE: 9 INJECTION, SOLUTION INTRAVENOUS at 14:54

## 2018-05-30 RX ADMIN — SCOPOLAMINE 1 PATCH: 1 PATCH, EXTENDED RELEASE TRANSDERMAL at 07:17

## 2018-05-30 RX ADMIN — Medication 0.2 MG: at 14:49

## 2018-05-30 ASSESSMENT — VISUAL ACUITY
OU: GLASSES

## 2018-05-30 NOTE — IP AVS SNAPSHOT
MRN:[id number]                      After Visit Summary   2018    Barbi Tiwari    MRN: [id number]           Thank you!     Thank you for choosing [facility] for your care. Our goal is always to provide you with excellent care. Hearing back from our patients is one way we can continue to improve our services. Please take a few minutes to complete the written survey that you may receive in the mail after you visit with us. Thank you!        Patient Information     Date Of Birth          1960        Designated Caregiver       Most Recent Value    Caregiver    Will someone help with your care after discharge? 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 You underwent a Right-Sided Craniotomy for Tumor Resection by Dr. Dustin Rodriguez on 2018.                  Who to Call     For medical emergencies, please call 911.  For non-urgent questions about your medical care, please call your primary care provider or clinic, [phone]  For questions related to your surgery, please call your surgery clinic        Attending Provider     Provider Specialty    Dustin Rodriguez MD Neurosurgery       Primary Care Provider Office Phone # Fax #    Dustin Choudhury [phone]       When to contact your care team       Call if you have any of the followin. Temperature greater than 101.5 F.   2. Any redness, swelling or discharge from the wound.   3. Any new weakness, numbness or altered mental status.  4. Worsening pain that is not improving with the pain medications you were prescribed.     Call [phone] or after 5:00  pm or on weekends call [phone] and ask for the neurosurgery resident on call. Thank You.                  After Care Instructions     Activity       Your activity upon discharge:   - You may gradually increase your activity as tolerated beginning with short and frequent walks.   - Please avoid any strenuous exercise, straining or lifting > 10 lbs for the first 2 weeks following your operation.   - Please DO NOT drive while using Opioid Analgesics (Oxycodone)   - According to [place] State Law, you are required to contact the [place] Department of Motor Vehicles (DMV) within 30 days of having a seizure.   - Please DO NOT submerge your incision beneath water in a bath tub, hot tub, or pool for 4-6 weeks post-operatively.            Diet       Follow this diet upon discharge: Orders Placed This Encounter      Advance Diet as Tolerated: Regular Diet Adult; Regular Diet Adult            Discharge Instructions       - Please follow the prescribed Dexamethasone Taper.   - For Pain Management, you may use Tylenol for Mild to Moderate Pain or Oxycodone for Moderate to Severe Pain. Please DO NOT use any Advil, Aleve, Excedrin, Ibuprofen, Motrin, or Naproxen for at least 7 days following your operation.            Wound care and dressings       Instructions to care for your wound at home:  - You may remove your surgical dressing on post-operative day #2 (6/1/2018).   - After the surgical dressing is removed, keep your incision open to air.   - You may shower on post-operative day #3 (6/2/2018).   - After your incision gets wet, pat your incision dry. Do not vigorously rub your incision.  - Do not apply any creams, gels, lotions, or ointments to your incision.  - Do not submerge your incision in water for 4-6 weeks post-operatively.  - Monitor your incision for signs of infection including redness, swelling, drainage or warmth at the surgical site.  - Your sutures/staples will be removed about 14 days following your  operation.                  Follow-up Appointments     Follow Up and recommended labs and tests       - Please follow-up with Prabha Forte PA-C in the Neurosurgery Clinic in about 2 weeks for suture removal and wound evaluation. Please call [phone] to schedule your follow-up appointment.     - Please follow-up with your Epileptologist in 2-4 weeks to discuss medication adjustment.     - You will be discussed at the Multidisciplinary Tumor Board Conference on Monday 6/4/2018 upon which further recommendations regarding your plan of care will be made. You will be contacted with these recommendations and provided with directions regarding next steps in your plan of care.                  Your next 10 appointments already scheduled     Jun 08, 2018  2:15 PM CDT   (Arrive by 2:00 PM)   NEW WITH ROOM with Zuly Perez GC,  2 119 CONSULT [facility] ([facility])    [address] [place] [address]  [address]  [facility] [id number] [phone]            Jun 14, 2018 10:30 AM CDT   (Arrive by 10:15 AM)   Return Visit with CAMDEN Chavarria [facility] ([facility])    [address]  3rd Floor  [facility] [id number] [phone]            Jul 03, 2018 10:00 AM CDT   MR BRAIN W/O & W CONTRAST with [address] [facility] ([facility])    [address]  1st Floor  [facility] [id number] [phone]           Take your medicines as usual, unless your doctor tells you not to. Bring a list of your current medicines to your exam (including vitamins, minerals and over-the-counter drugs).  You may or may not receive intravenous (IV) contrast for this exam pending the discretion of the Radiologist.  You do not need to do anything special to prepare.  The MRI machine uses a strong magnet. Please wear clothes without metal (snaps, zippers). A sweatsuit works  well, or we may give you a hospital gown.  Please remove any body piercings and hair extensions before you arrive. You will also remove watches, jewelry, hairpins, wallets, dentures, partial dental plates and hearing aids. You may wear contact lenses, and you may be able to wear your rings. We have a safe place to keep your personal items, but it is safer to leave them at home.  **IMPORTANT** THE INSTRUCTIONS BELOW ARE ONLY FOR THOSE PATIENTS WHO HAVE BEEN PRESCRIBED SEDATION OR GENERAL ANESTHESIA DURING THEIR MRI PROCEDURE:  IF YOUR DOCTOR PRESCRIBED ORAL SEDATION (take medicine to help you relax during your exam):   You must get the medicine from your doctor (oral medication) before you arrive. Bring the medicine to the exam. Do not take it at home. You ll be told when to take it upon arriving for your exam.   Arrive one hour early. Bring someone who can take you home after the test. Your medicine will make you sleepy. After the exam, you may not drive, take a bus or take a taxi by yourself.  IF YOUR DOCTOR PRESCRIBED IV SEDATION:   Arrive one hour early. Bring someone who can take you home after the test. Your medicine will make you sleepy. After the exam, you may not drive, take a bus or take a taxi by yourself.   No eating 6 hours before your exam. You may have clear liquids up until 4 hours before your exam. (Clear liquids include water, clear tea, black coffee and fruit juice without pulp.)  IF YOUR DOCTOR PRESCRIBED ANESTHESIA (be asleep for your exam):   Arrive 1 1/2 hours early. Bring someone who can take you home after the test. You may not drive, take a bus or take a taxi by yourself.   No eating 8 hours before your exam. You may have clear liquids up until 4 hours before your exam. (Clear liquids include water, clear tea, black coffee and fruit juice without pulp.)   You will spend four to five hours in the recovery room.  Please call the Imaging Department at your exam site with any questions.          "   Jul 09, 2018 10:45 AM CDT   (Arrive by 10:30 AM)   Return Visit with Neo Weinstein MD   [facility] ([facility])    [address]  [address] [place] [id number] [phone]              Pending Results     Date and Time Order Name Status Description    5/31/2018 0808 Lamotrigine Level In process     5/31/2018 0808 Keppra (Levetiracetam) Level In process     5/31/2018 0007 MR Brain w/o & w Contrast Preliminary             Statement of Approval     Ordered          05/31/18 3910  I have reviewed and agree with all the recommendations and orders detailed in this document.  EFFECTIVE NOW     Approved and electronically signed by:  Katina Whiting APRN CNP             Admission Information     Date & Time Provider Department Dept. Phone    5/30/2018 Dustin Rodriguez MD [address] [facility] [phone]      Your Vitals Were     Blood Pressure Temperature Respirations Height Weight Pulse Oximetry    92/63 (BP Location: Left arm) 98.6  F (37  C) (Axillary) 16 1.575 m (5' 2\") 67.9 kg (149 lb 11.1 oz) 96%    BMI (Body Mass Index)                   27.38 kg/m2           [employer] Information     [employer] gives you secure access to your electronic health record. If you see a primary care provider, you can also send messages to your care team and make appointments. If you have questions, please call your primary care clinic.  If you do not have a primary care provider, please call [phone] and they will assist you.        Care EveryWhere ID     This is your Care EveryWhere ID. This could be used by other organizations to access your [facility] medical records  [id number]        Equal Access to Services     YOBANI GRIFFITH AH: Brown Mustafa, wayasmine luqadaha, qacarlota kaalmada binh, nina odonnell [facility] [phone].    ATENCIÓN: Si habla español, tiene a fletcher disposición servicios gratuitos de " letitiaa lingüística. Eufemia al [phone].    We comply with applicable federal civil rights laws and [place] laws. We do not discriminate on the basis of race, color, national origin, age, disability, sex, sexual orientation, or gender identity.               Review of your medicines      START taking        Dose / Directions    dexamethasone 4 MG tablet   Commonly known as:  DECADRON   Used for:  Oligodendroglioma, WHO grade II (H)        Dose:  4 mg   Take 1 tablet (4 mg) by mouth every 8 hours   Quantity:  20 tablet   Refills:  0       oxyCODONE IR 5 MG tablet   Commonly known as:  ROXICODONE   Used for:  Oligodendroglioma, WHO grade II (H)        Dose:  5-10 mg   Take 1-2 tablets (5-10 mg) by mouth every 6 hours as needed for moderate to severe pain (pain control or improvement in physical function. Hold dose for analgesic side effects.)   Quantity:  50 tablet   Refills:  0       phenytoin 50 MG chewable tablet   Commonly known as:  DILANTIN   Used for:  Oligodendroglioma, WHO grade II (H)        Dose:  50 mg   Start taking on:  6/1/2018   1 tablet (50 mg) by Oral or Feeding Tube route 3 times daily   Quantity:  84 tablet   Refills:  1       senna-docusate 8.6-50 MG per tablet   Commonly known as:  SENOKOT-S;PERICOLACE   Used for:  Oligodendroglioma, WHO grade II (H)        Dose:  2 tablet   Take 2 tablets by mouth 2 times daily   Quantity:  100 tablet   Refills:  1         CONTINUE these medicines which may have CHANGED, or have new prescriptions. If we are uncertain of the size of tablets/capsules you have at home, strength may be listed as something that might have changed.        Dose / Directions    COMPOUNDED NON-CONTROLLED SUBSTANCE - PHARMACY TO MIX COMPOUNDED MEDICATION   Commonly known as:  CMPD RX   This may have changed:    - when to take this  - reasons to take this  - additional instructions   Used for:  Vaginal atrophy        Apply 1g daily for 2 weeks, then twice weekly to vagina    Quantity:  20 g   Refills:  11       LORazepam 0.5 MG tablet   Commonly known as:  ATIVAN   This may have changed:  when to take this   Used for:  Oligodendroglioma (H), Anxiety        Dose:  0.5 mg   Take 1 tablet (0.5 mg) by mouth every 6 hours as needed for anxiety   Quantity:  30 tablet   Refills:  0       ranitidine 150 MG tablet   Commonly known as:  ZANTAC   This may have changed:  when to take this   Used for:  Gastroesophageal reflux disease without esophagitis        Dose:  150 mg   Take 1 tablet (150 mg) by mouth 2 times daily as needed for heartburn   Refills:  0         CONTINUE these medicines which have NOT CHANGED        Dose / Directions    acetaminophen 500 MG tablet   Commonly known as:  TYLENOL        as needed Take 1,000 mg by mouth every 6 hours if needed. Max acetaminophen dose: 4000mg in 24 hrs.   Refills:  0       albuterol 108 (90 Base) MCG/ACT Inhaler   Commonly known as:  PROAIR HFA/PROVENTIL HFA/VENTOLIN HFA   Used for:  Cough        Dose:  2 puff   Inhale 2 puffs into the lungs every 6 hours as needed for shortness of breath / dyspnea or wheezing   Quantity:  1 Inhaler   Refills:  0       ALLEGRA PO        Take by mouth daily   Refills:  0       ascorbic acid 500 MG Tabs        Dose:  500 mg   Take 500 mg by mouth 2 times daily   Refills:  0       ferrous sulfate 325 (65 Fe) MG tablet   Commonly known as:  IRON        Dose:  325 mg   Take 325 mg by mouth 2 times daily Reported on 5/15/2017   Refills:  0       gabapentin 100 MG capsule   Commonly known as:  NEURONTIN        2 pills 1.5 hrs before bedtime for 7 nights, may increase to 3 pills if needed.   Quantity:  90 capsule   Refills:  1       KEPPRA 500 MG tablet   Generic drug:  levETIRAcetam        Dose:  1500 mg   Take 1,500 mg by mouth 2 times daily   Refills:  0       LACTAID PO        Dose:  3000 Units   Take 3,000 Units by mouth as needed for indigestion   Refills:  0       LAMOTRIGINE PO        Dose:  300 mg   Take 300 mg by  mouth 2 times daily   Refills:  0       metoprolol tartrate 25 MG tablet   Commonly known as:  LOPRESSOR   Used for:  Symptomatic premature ventricular contractions        Dose:  25 mg   Take 1 tablet (25 mg) by mouth 2 times daily   Quantity:  180 tablet   Refills:  3       MULTI-VITAMINS Tabs        every morning take 1 tablet by oral route once daily with food   Refills:  0       PRILOSEC OTC PO   Used for:  Candidiasis of mouth and esophagus (H)        Dose:  20 mg   Take 20 mg by mouth as needed   Refills:  0       tretinoin 0.05 % cream   Commonly known as:  RETIN-A   Used for:  Senile sebaceous gland hyperplasia        Spread a pea size amount into affected area topically at bedtime.  Use sunscreen SPF>20.   Quantity:  45 g   Refills:  11       VITAMIN D (CHOLECALCIFEROL) PO        Dose:  1000 Units   Take 1,000 Units by mouth 2 times daily Reported on 5/15/2017   Refills:  0         STOP taking     ibuprofen 200 MG tablet   Commonly known as:  ADVIL/MOTRIN                Where to get your medicines      Some of these will need a paper prescription and others can be bought over the counter. Ask your nurse if you have questions.     Bring a paper prescription for each of these medications     dexamethasone 4 MG tablet    oxyCODONE IR 5 MG tablet    phenytoin 50 MG chewable tablet    senna-docusate 8.6-50 MG per tablet                Protect others around you: Learn how to safely use, store and throw away your medicines at www.disposemymeds.org.        Information about OPIOIDS     PRESCRIPTION OPIOIDS: WHAT YOU NEED TO KNOW   You have a prescription for an opioid (narcotic) pain medicine. Opioids can cause addiction. If you have a history of chemical dependency of any type, you are at a higher risk of becoming addicted to opioids. Only take this medicine after all other options have been tried. Take it for as short a time and as few doses as possible.     Do not:    Drive. If you drive while taking these  medicines, you could be arrested for driving under the influence (DUI).    Operate heavy machinery    Do any other dangerous activities while taking these medicines.     Drink any alcohol while taking these medicines.      Take with any other medicines that contain acetaminophen. Read all labels carefully. Look for the word  acetaminophen  or  Tylenol.  Ask your pharmacist if you have questions or are unsure.    Store your pills in a secure place, locked if possible. We will not replace any lost or stolen medicine. If you don t finish your medicine, please throw away (dispose) as directed by your pharmacist. The [place] Pollution [employer] Agency has more information about safe disposal: https://www.pca.[facility].us/living-green/managing-unwanted-medications    All opioids tend to cause constipation. Drink plenty of water and eat foods that have a lot of fiber, such as fruits, vegetables, prune juice, apple juice and high-fiber cereal. Take a laxative (Miralax, milk of magnesia, Colace, Senna) if you don t move your bowels at least every other day.              Medication List: This is a list of all your medications and when to take them. Check marks below indicate your daily home schedule. Keep this list as a reference.      Medications           Morning Afternoon Evening Bedtime As Needed    acetaminophen 500 MG tablet   Commonly known as:  TYLENOL   as needed Take 1,000 mg by mouth every 6 hours if needed. Max acetaminophen dose: 4000mg in 24 hrs.   Last time this was given:  975 mg on 5/31/2018  5:03 AM                                albuterol 108 (90 Base) MCG/ACT Inhaler   Commonly known as:  PROAIR HFA/PROVENTIL HFA/VENTOLIN HFA   Inhale 2 puffs into the lungs every 6 hours as needed for shortness of breath / dyspnea or wheezing                                ALLEGRA PO   Take by mouth daily                                ascorbic acid 500 MG Tabs   Take 500 mg by mouth 2 times daily                                 COMPOUNDED NON-CONTROLLED SUBSTANCE - PHARMACY TO MIX COMPOUNDED MEDICATION   Commonly known as:  CMPD RX   Apply 1g daily for 2 weeks, then twice weekly to vagina                                dexamethasone 4 MG tablet   Commonly known as:  DECADRON   Take 1 tablet (4 mg) by mouth every 8 hours   Last time this was given:  4 mg on 5/31/2018  6:08 AM                                ferrous sulfate 325 (65 Fe) MG tablet   Commonly known as:  IRON   Take 325 mg by mouth 2 times daily Reported on 5/15/2017                                gabapentin 100 MG capsule   Commonly known as:  NEURONTIN   2 pills 1.5 hrs before bedtime for 7 nights, may increase to 3 pills if needed.                                KEPPRA 500 MG tablet   Take 1,500 mg by mouth 2 times daily   Last time this was given:  1,500 mg on 5/31/2018  8:49 AM   Generic drug:  levETIRAcetam                                LACTAID PO   Take 3,000 Units by mouth as needed for indigestion                                LAMOTRIGINE PO   Take 300 mg by mouth 2 times daily   Last time this was given:  300 mg on 5/31/2018  8:49 AM                                LORazepam 0.5 MG tablet   Commonly known as:  ATIVAN   Take 1 tablet (0.5 mg) by mouth every 6 hours as needed for anxiety   Last time this was given:  0.5 mg on 5/31/2018  8:39 AM                                metoprolol tartrate 25 MG tablet   Commonly known as:  LOPRESSOR   Take 1 tablet (25 mg) by mouth 2 times daily   Last time this was given:  25 mg on 5/30/2018  7:55 PM                                MULTI-VITAMINS Tabs   every morning take 1 tablet by oral route once daily with food                                oxyCODONE IR 5 MG tablet   Commonly known as:  ROXICODONE   Take 1-2 tablets (5-10 mg) by mouth every 6 hours as needed for moderate to severe pain (pain control or improvement in physical function. Hold dose for analgesic side effects.)   Last time this was given:  5 mg on 5/31/2018  11:43 AM                                phenytoin 50 MG chewable tablet   Commonly known as:  DILANTIN   1 tablet (50 mg) by Oral or Feeding Tube route 3 times daily   Start taking on:  6/1/2018   Last time this was given:  300 mg on 5/31/2018 11:50 AM                                PRILOSEC OTC PO   Take 20 mg by mouth as needed                                ranitidine 150 MG tablet   Commonly known as:  ZANTAC   Take 1 tablet (150 mg) by mouth 2 times daily as needed for heartburn   Last time this was given:  150 mg on 5/30/2018 10:03 PM                                senna-docusate 8.6-50 MG per tablet   Commonly known as:  SENOKOT-S;PERICOLACE   Take 2 tablets by mouth 2 times daily   Last time this was given:  1 tablet on 5/31/2018  8:16 AM                                tretinoin 0.05 % cream   Commonly known as:  RETIN-A   Spread a pea size amount into affected area topically at bedtime.  Use sunscreen SPF>20.                                VITAMIN D (CHOLECALCIFEROL) PO   Take 1,000 Units by mouth 2 times daily Reported on 5/15/2017

## 2018-05-30 NOTE — OP NOTE
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      OPERATION PERFORMED:   1.  Redo right frontal craniotomy for tumor resection.   2.  Intraoperative neuronavigation with motor mapping.   3.  Use of operating microscope.   4.  Use of frameless stereotaxy.      ATTENDING SURGEON:  Dustin Rodriguez MD      RESIDENT SURGEONS:     1.  Glynn Melara MD   2.  Amadou Modi MD       ANESTHESIA:  General endotracheal.      ESTIMATED BLOOD LOSS:  10 mL.      SPECIMENS:  Tumor tissue was sent for permanent pathology.      IMPLANTS:  Synthes cranial plating system.      INDICATIONS FOR OPERATION:  Ms. Barbi Tiwari is a 57-year-old female who was diagnosed with a grade 2 oligodendroglioma back in 2011.  On her most recent imaging, it appeared that she had recurrence along the inferior aspect of her resection cavity.  For this reason, we felt that surgical intervention was indicated.  The patient therefore presented electively today and provided consent for the above-named operation after risks, benefits and alternatives were thoroughly explained.      DESCRIPTION OF PROCEDURE:  The patient was brought to the operating theater and transferred onto the operating table in the supine position.  General endotracheal anesthesia was induced.  All appropriate IV accesses obtained.  A Silva catheter and arterial line were placed.  The bed was turned 90 degrees so the patient's right side was facing away from our anesthesia colleagues.  Her head was secured in the Rodney frame via 3 points of fixation.  This was then secured to the operating table with her head turned to the left so the right calvarium was facing up.  Her right arm was tucked and all pressure points were appropriately padded.  The Stealth for navigation was then appropriately registered.  We planned her incision incorporating her  previous scar and then prepped and draped the right side of her head in the standard sterile fashion.  Perioperative antibiotics were administered.  Sequential compression devices were applied.      After conducting appropriate timeout, a #10 blade scalpel was used to make skin incision.  Subcutaneous dissection was carried down with monopolar cautery.  A cerebellar retractor was used to facilitate visualization.  We dissected down to the bone and used periosteal elevators to expose the calvarium.  The bur hole cover from the previous craniotomy was then removed and we were able to use that bur hole to dissect in the epidural space for turning over the new craniotomy flap.  We used the Stealth Neuronavigation to guide our craniotomy and then used the B1 with a footplate to turn the flap.      Epidural hemostasis was achieved using bipolar cautery and FloSeal.  We placed tack-up sutures around the periphery using 4-0 Nurolon suture.  Again, we used the Stealth for navigation to confirm that we had an adequate bone flap opening.  The dura was then opened in a curvilinear fashion with the base situated inferiorly.  Again, we used neuronavigation to confirm that we had visualization of the tumor prior to proceeding with resection.      A 6-lead subdural stimulating grid was then passed along the surface of the cortex in an anterior to posterior fashion in order to map where the central sulcus was.  We were able to determine the phase reversal was well behind our exposure and well behind the posterior aspect of the tumor.  We then used one of the contact leads to stimulate around the periphery of the tumor and noticed that there was some stimulation of the oculi muscles.  This was not unexpected, as the patient had some facial symptoms for her first surgery, which completely resolved postoperatively.        The sterilely draped operating microscope was then brought into the field.  We used neuronavigation to plan our  corticectomy and then coagulated the dinh using bipolar cautery.  The dinh was then opened using microscissors.  We used a combination of the micro dissectors, bipolar and suction to then remove the tumor.  We used a micro cupped forceps to collect some of the tumor tissue and sent it for permanent pathology.  We worked in a circumferential fashion and did not find any obvious planes, but we could see where the tumor seemed to blend into the white matter.  We therefore resected all the tissue that seemed like tumor until we encountered white matter.  Anteriorly, we actually encountered the dinh that was adjacent to the sulcus just anterior to the tumor, which is what we expected based on the patient's preoperative imaging.  The tumor itself was not vascular and did not have any obvious, distinguishing features to it.  Once we were happy with our resection, we turned our attention to closing.      Meticulous hemostasis within the tumor cavity was achieved using bipolar cautery.  The dura was then closed primarily using 4-0 Nurolon suture in a simple running fashion.  The dural closure was reinforced using Duragen.  The bone flap was then reaffixed to the patient's native skull using the Synthes cranial plating system.  The temporalis muscle was brought together using 2-0 Vicryl suture in a simple interrupted fashion.  The galea was closed using 3-0 Vicryl suture in an inverted interrupted fashion followed by 3-0 nylon in a simple running fashion for the skin.  The wound was then cleaned with wet and dry sponge followed by ChloraPrep and then a sterile dressing was applied.  The drapes were then taken down and the patient was taken out of the [place] frame, after which she was transferred onto a [facility] and easily extubated prior to being taken to the postanesthesia care unit for ongoing recovery.      Instrument, needle and sponge counts were correct x2 at the end operation.      Dr. Dustin Rodriguez, neurosurgery  staff, was present and scrubbed for all critical portions of the operation and immediately available for its entirety.     I, Dr. Shaheen Sood, was present for the key portions of the procedure and immediately available for the entire operation.         SHAHEEN SOOD MD       As dictated by COLT CLINE MD            D: 2018   T: 2018   MT: CC      Name:     DYLAN KAUFFMAN   MRN:      [id number]        Account:        [id number]   :      1960           Procedure Date: 2018      Document: [id number]

## 2018-05-30 NOTE — OR NURSING
Dr. Jacob (anesthesia) at bedside, okay for patient to receive 975mg acetaminophen PO X1 in PACU.  Dr. Jacob aware pt has slight tingling to left 4th and fifth fingers (ring and pinky), pt reassured that it is likely from positioning and monitoring while in OR.  No new orders received.

## 2018-05-30 NOTE — PROGRESS NOTES
"SPIRITUAL HEALTH SERVICES  SPIRITUAL ASSESSMENT Progress Note  [facility] ([place]) 4A     REFERRAL SOURCE: Epic request for hospital     Visited with pt, , pt's kids, and pt's sister.  Pt and  stated that they received \"good results\" today and were smiling.  Pt stated she hopes to go home soon.  Requested prayer, which I provided.  Stated spiritual needs were met at this time, I affirmed ongoing availability of Spiritual Health Services.    PLAN: Unit  continue to follow as needed.  Pt hopes to go home soon.    Tamiko Crowe  Chaplain Resident  Pager [phone]  "

## 2018-05-30 NOTE — OR NURSING
Pt has small focal seizures that exhibits with spasming to her left side of her mouth, fluttering of her left eye, sometimes the right side of her mouth is also affected. She has problems with her speech afterward and is a little groggy. She does not lose control of urine or bowel. She only knows the seizure is coming right before it happens. She does not have grand mal seizures, or tonic-clonic movement. Her last focal seizure was in February 2018. She does have intermittent chronic headaches, and has an unsteady gait.

## 2018-05-30 NOTE — OR NURSING
PO acetaminophen ordered for patient.  Pt has no history of dysphagia, no facial droop, able to control secretions.  Patient given sip of water and observed for dysphagia.  No signs/symptoms of dysphagia for one minute after water given, pt given PO acetaminophen.

## 2018-05-30 NOTE — BRIEF OP NOTE
[facility]    Brief Operative Note    Pre-operative diagnosis: Recurrent oligodendroglioma, WHO grade II.  Post-operative diagnosis: Recurrent oligodendroglioma, WHO grade II.  Procedure: Procedure(s):  Right Stealth Assisted Craniotomy With Motor Mapping And Tumor Resection  - Wound Class: I-Clean  Surgeon: Surgeon(s) and Role:     * Dustin Rodriguez MD - Primary     * Amadou Modi MD - Resident - Assisting     * Glynn Melara MD - Resident - Assisting  Anesthesia: General   Estimated blood loss:  10 mL  Drains:   None.    Specimens:   ID Type Source Tests Collected by Time Destination   A : recurrent right frontal tumor Tissue Brain SURGICAL PATHOLOGY EXAM Glynn Melara MD 5/30/2018 10:12 AM      Findings:   Soft easily-suctioned tissue with low-grade appearance.  Complications: None.  Implants:  Synthes cranial plating system.    -----  Glynn Melara MD  PGY-7, Neurosurgery    Please dial * [phone] and enter job code [phone] to reach the neurosurgery team.

## 2018-05-30 NOTE — ANESTHESIA CARE TRANSFER NOTE
Patient: Barbi Tiwari    Procedure(s):  Right Stealth Assisted Craniotomy With Motor Mapping And Tumor Resection  - Wound Class: I-Clean    Diagnosis: Oligodendroglioma, WHO Grade II   Diagnosis Additional Information: No value filed.    Anesthesia Type:   General, ETT     Note:  Airway :Blow-by  Patient transferred to:PACU  Comments: VSS. Breathing spontaneously at a regular rate with adequate tidal volumes and maintaining O2 sats on 6L facemask. Denies nausea or pain. No apparent complications from anesthesia.     Edith Gregg MD  Anesthesiology resident   [facility]  Handoff Report: Identifed the Patient, Identified the Reponsible Provider, Reviewed the pertinent medical history, Discussed the surgical course, Reviewed Intra-OP anesthesia mangement and issues during anesthesia, Set expectations for post-procedure period and Allowed opportunity for questions and acknowledgement of understanding      Vitals: (Last set prior to Anesthesia Care Transfer)    CRNA VITALS  5/30/2018 1112 - 5/30/2018 1150      5/30/2018             NIBP: 121/77    NIBP Mean: 102    Ht Rate: 80                Electronically Signed By: Edith Gregg MD  May 30, 2018  11:50 AM

## 2018-05-30 NOTE — ANESTHESIA POSTPROCEDURE EVALUATION
Patient: Barbi Tiwari    Procedure(s):  Right Stealth Assisted Craniotomy With Motor Mapping And Tumor Resection  - Wound Class: I-Clean    Diagnosis:Oligodendroglioma, WHO Grade II   Diagnosis Additional Information: No value filed.    Anesthesia Type:  General, ETT    Note:  Anesthesia Post Evaluation    Patient location during evaluation: PACU  Patient participation: Able to fully participate in evaluation  Level of consciousness: awake and alert  Pain management: adequate  Airway patency: patent  Cardiovascular status: acceptable  Respiratory status: acceptable  Hydration status: acceptable  PONV: none     Anesthetic complications: None    Comments: Neuro intact to gross exam.  Notes tingling in her left hand - otherwise normal.          Last vitals:  Vitals:    05/30/18 0600 05/30/18 1200 05/30/18 1215   BP: 128/80     Resp: 16     Temp: 36.6  C (97.9  F)     SpO2: 97% 99% 97%         Electronically Signed By: Benjamin Jacob MD  May 30, 2018  12:50 PM

## 2018-05-30 NOTE — ANESTHESIA PROCEDURE NOTES
Arterial Line Procedure Note  Staff:     Anesthesiologist:  YOVANA ALCARAZ    Resident/CRNA:  SALINAS HOYOS    Arterial line performed by resident/CRNA in presence of a teaching physician    Location: In OR After Induction  Procedure Start/Stop Times:     patient identified, IV checked, site marked, risks and benefits discussed, informed consent, monitors and equipment checked, pre-op evaluation and at physician/surgeon's request      Correct Patient: Yes      Correct Position: Yes      Correct Site: Yes      Correct Procedure: Yes      Correct Laterality:  Yes    Site Marked:  Yes  Line Placement:     Procedure:  Arterial Line    Insertion Site:  Radial    Insertion laterality:  Left    Skin Prep: Chloraprep      Patient Prep: patient draped, mask, sterile gloves, hat and hand hygiene      Local skin infiltration:  None    Ultrasound Guided?: No      Catheter size:  20 gauge, 12 cm    Cath secured with: suture      Dressing:  Tegaderm    Complications:  None obvious    Arterial waveform: Yes      IBP within 10% of NIBP: Yes

## 2018-05-30 NOTE — IP AVS SNAPSHOT
[address] [address]    Phone:  [phone]                                       After Visit Summary   5/30/2018    Barbi Tiwari    MRN: [id number]           After Visit Summary Signature Page     I have received my discharge instructions, and my questions have been answered. I have discussed any challenges I see with this plan with the nurse or doctor.    ..........................................................................................................................................  Patient/Patient Representative Signature      ..........................................................................................................................................  Patient Representative Print Name and Relationship to Patient    ..................................................               ................................................  Date                                            Time    ..........................................................................................................................................  Reviewed by Signature/Title    ...................................................              ..............................................  Date                                                            Time

## 2018-05-30 NOTE — OR NURSING
Pre-op admission process extended because pt went to CT at the very beginning. She returned at 0620 and is getting changed, VS checked, Blood sugar checked afterward.

## 2018-05-31 ENCOUNTER — APPOINTMENT (OUTPATIENT)
Dept: MRI IMAGING | Facility: CLINIC | Age: 58
DRG: 026 | End: 2018-05-31
Attending: NEUROLOGICAL SURGERY
Payer: COMMERCIAL

## 2018-05-31 VITALS
BODY MASS INDEX: 27.55 KG/M2 | OXYGEN SATURATION: 96 % | RESPIRATION RATE: 16 BRPM | DIASTOLIC BLOOD PRESSURE: 63 MMHG | SYSTOLIC BLOOD PRESSURE: 92 MMHG | HEIGHT: 62 IN | WEIGHT: 149.69 LBS | TEMPERATURE: 98.6 F

## 2018-05-31 LAB
ANION GAP SERPL CALCULATED.3IONS-SCNC: 11 MMOL/L (ref 3–14)
BUN SERPL-MCNC: 11 MG/DL (ref 7–30)
CALCIUM SERPL-MCNC: 8 MG/DL (ref 8.5–10.1)
CHLORIDE SERPL-SCNC: 112 MMOL/L (ref 94–109)
CO2 SERPL-SCNC: 22 MMOL/L (ref 20–32)
COPATH REPORT: NORMAL
CREAT SERPL-MCNC: 0.48 MG/DL (ref 0.52–1.04)
ERYTHROCYTE [DISTWIDTH] IN BLOOD BY AUTOMATED COUNT: 13 % (ref 10–15)
GFR SERPL CREATININE-BSD FRML MDRD: >90 ML/MIN/1.7M2
GLUCOSE SERPL-MCNC: 157 MG/DL (ref 70–99)
HCT VFR BLD AUTO: 35.9 % (ref 35–47)
HGB BLD-MCNC: 11.5 G/DL (ref 11.7–15.7)
MCH RBC QN AUTO: 29.5 PG (ref 26.5–33)
MCHC RBC AUTO-ENTMCNC: 32 G/DL (ref 31.5–36.5)
MCV RBC AUTO: 92 FL (ref 78–100)
PLATELET # BLD AUTO: 173 10E9/L (ref 150–450)
POTASSIUM SERPL-SCNC: 4 MMOL/L (ref 3.4–5.3)
RBC # BLD AUTO: 3.9 10E12/L (ref 3.8–5.2)
SODIUM SERPL-SCNC: 145 MMOL/L (ref 133–144)
WBC # BLD AUTO: 10.6 10E9/L (ref 4–11)

## 2018-05-31 PROCEDURE — 25000132 ZZH RX MED GY IP 250 OP 250 PS 637: Performed by: STUDENT IN AN ORGANIZED HEALTH CARE EDUCATION/TRAINING PROGRAM

## 2018-05-31 PROCEDURE — 80175 DRUG SCREEN QUAN LAMOTRIGINE: CPT | Performed by: STUDENT IN AN ORGANIZED HEALTH CARE EDUCATION/TRAINING PROGRAM

## 2018-05-31 PROCEDURE — 85027 COMPLETE CBC AUTOMATED: CPT | Performed by: STUDENT IN AN ORGANIZED HEALTH CARE EDUCATION/TRAINING PROGRAM

## 2018-05-31 PROCEDURE — 40000014 ZZH STATISTIC ARTERIAL MONITORING DAILY

## 2018-05-31 PROCEDURE — 80177 DRUG SCRN QUAN LEVETIRACETAM: CPT | Performed by: STUDENT IN AN ORGANIZED HEALTH CARE EDUCATION/TRAINING PROGRAM

## 2018-05-31 PROCEDURE — A9585 GADOBUTROL INJECTION: HCPCS | Performed by: NEUROLOGICAL SURGERY

## 2018-05-31 PROCEDURE — 36415 COLL VENOUS BLD VENIPUNCTURE: CPT | Performed by: STUDENT IN AN ORGANIZED HEALTH CARE EDUCATION/TRAINING PROGRAM

## 2018-05-31 PROCEDURE — 25000131 ZZH RX MED GY IP 250 OP 636 PS 637: Performed by: STUDENT IN AN ORGANIZED HEALTH CARE EDUCATION/TRAINING PROGRAM

## 2018-05-31 PROCEDURE — 40000275 ZZH STATISTIC RCP TIME EA 10 MIN

## 2018-05-31 PROCEDURE — 80048 BASIC METABOLIC PNL TOTAL CA: CPT | Performed by: STUDENT IN AN ORGANIZED HEALTH CARE EDUCATION/TRAINING PROGRAM

## 2018-05-31 PROCEDURE — 25000128 H RX IP 250 OP 636: Performed by: STUDENT IN AN ORGANIZED HEALTH CARE EDUCATION/TRAINING PROGRAM

## 2018-05-31 PROCEDURE — 25000128 H RX IP 250 OP 636: Performed by: NEUROLOGICAL SURGERY

## 2018-05-31 PROCEDURE — 80299 QUANTITATIVE ASSAY DRUG: CPT | Performed by: NEUROLOGICAL SURGERY

## 2018-05-31 PROCEDURE — 70553 MRI BRAIN STEM W/O & W/DYE: CPT

## 2018-05-31 RX ORDER — PHENYTOIN 50 MG/1
50 TABLET, CHEWABLE ORAL 3 TIMES DAILY
Qty: 84 TABLET | Refills: 1 | Status: SHIPPED | OUTPATIENT
Start: 2018-06-01 | End: 2018-06-15

## 2018-05-31 RX ORDER — PHENYTOIN 50 MG/1
300 TABLET, CHEWABLE ORAL 3 TIMES DAILY
Status: DISCONTINUED | OUTPATIENT
Start: 2018-05-31 | End: 2018-05-31 | Stop reason: HOSPADM

## 2018-05-31 RX ORDER — DEXAMETHASONE 4 MG/1
4 TABLET ORAL EVERY 8 HOURS
Qty: 20 TABLET | Refills: 0 | Status: SHIPPED | OUTPATIENT
Start: 2018-05-31 | End: 2018-06-15

## 2018-05-31 RX ORDER — AMOXICILLIN 250 MG
2 CAPSULE ORAL 2 TIMES DAILY
Qty: 100 TABLET | Refills: 1 | Status: SHIPPED | OUTPATIENT
Start: 2018-05-31 | End: 2019-08-19

## 2018-05-31 RX ORDER — OXYCODONE HYDROCHLORIDE 5 MG/1
5-10 TABLET ORAL EVERY 6 HOURS PRN
Qty: 50 TABLET | Refills: 0 | Status: SHIPPED | OUTPATIENT
Start: 2018-05-31 | End: 2018-06-15

## 2018-05-31 RX ORDER — GADOBUTROL 604.72 MG/ML
7.5 INJECTION INTRAVENOUS ONCE
Status: COMPLETED | OUTPATIENT
Start: 2018-05-31 | End: 2018-05-31

## 2018-05-31 RX ORDER — BISACODYL 10 MG
10 SUPPOSITORY, RECTAL RECTAL ONCE
Status: DISCONTINUED | OUTPATIENT
Start: 2018-05-31 | End: 2018-05-31 | Stop reason: HOSPADM

## 2018-05-31 RX ORDER — PHENYTOIN 50 MG/1
50 TABLET, CHEWABLE ORAL 3 TIMES DAILY
Status: DISCONTINUED | OUTPATIENT
Start: 2018-06-01 | End: 2018-05-31 | Stop reason: HOSPADM

## 2018-05-31 RX ADMIN — LAMOTRIGINE 300 MG: 150 TABLET ORAL at 08:49

## 2018-05-31 RX ADMIN — DEXAMETHASONE 4 MG: 4 TABLET ORAL at 06:08

## 2018-05-31 RX ADMIN — GADOBUTROL 7 ML: 604.72 INJECTION INTRAVENOUS at 09:54

## 2018-05-31 RX ADMIN — PHENYTOIN 300 MG: 50 TABLET, CHEWABLE ORAL at 11:50

## 2018-05-31 RX ADMIN — OXYCODONE HYDROCHLORIDE 10 MG: 5 TABLET ORAL at 02:58

## 2018-05-31 RX ADMIN — SODIUM CHLORIDE: 9 INJECTION, SOLUTION INTRAVENOUS at 00:27

## 2018-05-31 RX ADMIN — LEVETIRACETAM 1500 MG: 750 TABLET, FILM COATED ORAL at 08:49

## 2018-05-31 RX ADMIN — LORAZEPAM 0.5 MG: 0.5 TABLET ORAL at 08:39

## 2018-05-31 RX ADMIN — SENNOSIDES AND DOCUSATE SODIUM 1 TABLET: 8.6; 5 TABLET ORAL at 08:16

## 2018-05-31 RX ADMIN — OXYCODONE HYDROCHLORIDE 10 MG: 5 TABLET ORAL at 08:14

## 2018-05-31 RX ADMIN — ACETAMINOPHEN 975 MG: 325 TABLET, FILM COATED ORAL at 05:03

## 2018-05-31 RX ADMIN — SODIUM CHLORIDE 1000 ML: 9 INJECTION, SOLUTION INTRAVENOUS at 01:00

## 2018-05-31 RX ADMIN — OXYCODONE HYDROCHLORIDE 5 MG: 5 TABLET ORAL at 11:43

## 2018-05-31 ASSESSMENT — VISUAL ACUITY
OU: GLASSES

## 2018-05-31 NOTE — DISCHARGE SUMMARY
[facility] Discharge Summary and Instructions    Barbi Tiwari MRN# [id number]   Age: 57 year old YOB: 1960     Date of Admission:  5/30/2018  Date of Discharge::  5/31/2018  Admitting Physician:  Dustin Rodriguez MD  Discharge Physician:  Dustin Rodriguez MD          Admission Diagnoses:   Brain tumor (H) [D49.6]          Discharge Diagnosis:   Brain tumor (H) [D49.6]          Procedures:   5/30/2018: Right Stealth-Assisted Craniotomy with Motor Mapping for Tumor Resection           Brief History of Illness:   Barbi Tiwari is a very pleasant 57 year old female patient whose past medical history includes GERD, Seizure Disorder 2/2 Brain Tumor and Oligodendroglioma, which was diagnosed in 2011. She underwent a Craniotomy for Tumor Resection by Dr. Kerr at [facility] followed by 1 year of adjuvant therapy with Temazolamide A follow-up MRI Brain was obtained that demonstrated tumor recurrence in the inferolateral aspect of the resection cavity. Therefore, re-operation for tumor resection was recommended, to which the patient was agreeable and consented to the operative intervention.            Hospital Course:   The patient was admitted to the hospital on 5/30/2018 and underwent the above named operation. There were no sg-operative complications. Post-operatively, the patient was transferred to Unit 4A, Neuro-Surgical ICU for routine post-operative cares. That evening, the patient had 5 simple partial seizures for which she was treated with additional Keppra 1G IV. She had been maintained on her home anti-epileptic medications, those being Lamictal 300 mg BID and Keppra 1500 mg BID. After the additional dosage of Keppra, the patient had no further seizures during her hospitalization. On post-operative day #1, the patient was evaluated by the Neurocritical Care Team. She was loaded with Phenytoin and initiated on Phenytoin 50 mg Q 8 hours with instruction to follow-up  "with her regular epileptologist in 2-4 weeks for re-evaluation of her medications. On post-operative day #1, the patient was also evaluated with an MRI Brain with and without contrast to assess the resection cavity. The MRI demonstrated post-surgical changes and resection of the T2 hyperintense areas that were identified on the patient's pre-operative MRI. The patient's surgical pathology was consistent with a recurrent Oligodendroglioma (WHO Grade II). On post-operative day #1, the patient had met all of her other discharge goals including tolerating a regular diet, ambulating, voiding and passing flatus. She was stable from a medical and surgical standpoint and therefore was discharged home.     Examination:   BP 92/63 (BP Location: Left arm)  Temp 98.6  F (37  C) (Axillary)  Resp 16  Ht 1.575 m (5' 2\")  Wt 67.9 kg (149 lb 11.1 oz)  SpO2 96%  BMI 27.38 kg/m2     General: Awake;  Alert, In No Acute Distress  Pulm: Breathing Comfortably on RA  Mental status: Oriented x 3  Cranial Nerves: Cranial Nerves II-XII Grossly Intact Bilaterally except for left facial droop post-ictal  Strength:                         Del                 Tr                   Bi                   WE                WF                 Gr  R                    5                    5                    5                    5                    5                    5  L                     5                    5                    5                    5                    5                    5                          HF                 KE                 KF                  DF                 PF                  EHL  R                    5                    5                    5                    5                    5                    5  L                     5                    5                    5                    5                    5                    5     Pronator Drift: Absent  Sensory: Intact to Light Touch  Reflexes: " No Hyperreflexia, Plata s or Clonus Present; Toes Down-Going Bilaterally     INCISION: c/d/i with primapore in place          Discharge Medications:     Current Discharge Medication List      START taking these medications    Details   dexamethasone (DECADRON) 4 MG tablet Take 1 tablet (4 mg) by mouth every 8 hours  Qty: 20 tablet, Refills: 0    Comments: Please follow the Prescribed Taper:  Take 4 mg every 8 hours on 5/31  Then, take 4 mg every 12 hours on 6/1 and 6/2  Then, take 2 mg every 8 hours on 6/3 and 6/4  Then, take 2 mg every 12 hours on 6/5 and 6/6  Then, take 2 mg daily on 6/7 and 6/8, then stop.  Associated Diagnoses: Oligodendroglioma, WHO grade II (H)      oxyCODONE IR (ROXICODONE) 5 MG tablet Take 1-2 tablets (5-10 mg) by mouth every 6 hours as needed for moderate to severe pain (pain control or improvement in physical function. Hold dose for analgesic side effects.)  Qty: 50 tablet, Refills: 0    Comments: Indication: Take as needed for Moderate to Severe Post-Operative Pain  Associated Diagnoses: Oligodendroglioma, WHO grade II (H)      phenytoin (DILANTIN) 50 MG chewable tablet 1 tablet (50 mg) by Oral or Feeding Tube route 3 times daily  Qty: 84 tablet, Refills: 1    Comments: Indication: Complex Partial Seizures  28 Day Supply  Associated Diagnoses: Oligodendroglioma, WHO grade II (H)      senna-docusate (SENOKOT-S;PERICOLACE) 8.6-50 MG per tablet Take 2 tablets by mouth 2 times daily  Qty: 100 tablet, Refills: 1    Comments: Indication: Prevention of Constipation with Concurrent use of Opioid Analgesics  Associated Diagnoses: Oligodendroglioma, WHO grade II (H)         CONTINUE these medications which have NOT CHANGED    Details   acetaminophen (TYLENOL) 500 MG tablet as needed Take 1,000 mg by mouth every 6 hours if needed. Max acetaminophen dose: 4000mg in 24 hrs.       ascorbic acid 500 MG TABS Take 500 mg by mouth 2 times daily      COMPOUND (CMPD RX) - PHARMACY TO MIX COMPOUNDED  MEDICATION Apply 1g daily for 2 weeks, then twice weekly to vagina  Qty: 20 g, Refills: 11    Comments: Estradiol cream 100mcg/g, 1g daily for 2 weeks, then twice weekly  Associated Diagnoses: Vaginal atrophy      ferrous sulfate (IRON) 325 (65 FE) MG tablet Take 325 mg by mouth 2 times daily Reported on 5/15/2017      Fexofenadine HCl (ALLEGRA PO) Take by mouth daily      gabapentin (NEURONTIN) 100 MG capsule 2 pills 1.5 hrs before bedtime for 7 nights, may increase to 3 pills if needed.  Qty: 90 capsule, Refills: 1      Lactase (LACTAID PO) Take 3,000 Units by mouth as needed for indigestion      LAMOTRIGINE PO Take 300 mg by mouth 2 times daily      levETIRAcetam (KEPPRA) 500 MG tablet Take 1,500 mg by mouth 2 times daily       LORazepam (ATIVAN) 0.5 MG tablet Take 1 tablet (0.5 mg) by mouth every 6 hours as needed for anxiety  Qty: 30 tablet, Refills: 0    Associated Diagnoses: Oligodendroglioma (H); Anxiety      metoprolol tartrate (LOPRESSOR) 25 MG tablet Take 1 tablet (25 mg) by mouth 2 times daily  Qty: 180 tablet, Refills: 3    Associated Diagnoses: Symptomatic premature ventricular contractions      Multiple Vitamin (MULTI-VITAMINS) TABS every morning take 1 tablet by oral route once daily with food      Omeprazole Magnesium (PRILOSEC OTC PO) Take 20 mg by mouth as needed     Associated Diagnoses: Candidiasis of mouth and esophagus (H)      ranitidine (ZANTAC) 150 MG tablet Take 1 tablet (150 mg) by mouth 2 times daily as needed for heartburn    Associated Diagnoses: Gastroesophageal reflux disease without esophagitis      VITAMIN D, CHOLECALCIFEROL, PO Take 1,000 Units by mouth 2 times daily Reported on 5/15/2017      albuterol (PROAIR HFA/PROVENTIL HFA/VENTOLIN HFA) 108 (90 BASE) MCG/ACT Inhaler Inhale 2 puffs into the lungs every 6 hours as needed for shortness of breath / dyspnea or wheezing  Qty: 1 Inhaler, Refills: 0    Associated Diagnoses: Cough      tretinoin (RETIN-A) 0.05 % cream Spread a pea  size amount into affected area topically at bedtime.  Use sunscreen SPF>20.  Qty: 45 g, Refills: 11    Associated Diagnoses: Senile sebaceous gland hyperplasia         STOP taking these medications       ibuprofen (ADVIL,MOTRIN) 200 MG tablet Comments:   Reason for Stopping:                       Discharge Instructions and Follow-Up:   Discharge diet: Regular   Discharge activity: You may advance activity as tolerated. No strenuous exercise or heay lifting greater than 10 lbs for 4 weeks or until seen and cleared in clinic.   Discharge follow-up: Follow-up with Dr. Dustin Rodriguez MD in 2 weeks   Wound care: Ok to shower,however no scrubbing of the wound and no soaking of the wound, meaning no bathtubs or swimming pools. Pat dry only. Leave wound open to air.  Sutures are not absorbable and need to be removed in 2 weeks. If patient still at rehab by this time, the sutures may be removed by the rehab physician if he or she considers that the wound has healed completely.     Please call if you have:  1. increased pain, redness, drainage, swelling at your incision  2. fevers > 101.5 F degrees  3. with any questions or concerns.  You may reach the Neurosurgery clinic at [phone] during regular work hours. ER at [phone].    and ask for the Neurosurgery Resident on call at [phone], during off hours or weekends.         Discharge Disposition:   Discharged to home        Lexi Becerril, EDWARDP-BC, CCRN, CNRN  Department of Neurosurgery  Pager: [phone]

## 2018-05-31 NOTE — PROVIDER NOTIFICATION
PROVIDER NOTIFICATION    Date/Time 05/31 05:20   Provider Name/Title NSG resident   Method of Notification Via text   Notification Reason Informed MD of seizure.Self limiting and typical for patient   Intervention Continue to monitor   Plan of Action MD came to assess at 05:30

## 2018-05-31 NOTE — PLAN OF CARE
Problem: Patient Care Overview  Goal: Plan of Care/Patient Progress Review  Outcome: Improving  Neuro: A/Ox4, appropriate, motor intact; seizures at 1930 and 2015, IV keppra administered in addition to PO. Pt states that she does not wish to receive IV ativan unless seizure lasts more than 5 minutes.  Cardiac: Sinus bradycardia/sinus rhythm; low BP corrected with 1L NS fluid bolus; afebrile.  Pulm: 2L NC, lungs clear.  GI/: Silva removed 0600, not passing flatus, tolerating clear liquid diet w/o n/v.  Skin: Primapore dressing in place with dried drainage; intact.  LDA: R hand PIV, L radial art line removed 0600.

## 2018-05-31 NOTE — PROGRESS NOTES
"Neuro ICU progress Note:    Summary:  Ms. 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     Assessment: post-operative course complicated by poorly controlled simple partial seizures    24h events:   Admitted to 4A post-operatively. Increased frequency of seizures from baseline.     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)   - post-op MRI brain completed   - decadron wean per neurosurgery    #seizures  - vimpat and lamotrigine level pending   - continue lamotrigine (300 mg bid) and keppra (1 g bid)   - Recommend starting phenytoin: load 300 mg Q2hrs X 3 followed by 50 mg q8h thereafter  - follow-up with patient's epileptologist in 2-4 weeks for anti-epileptic management     #post-op pain  - acetaminophen  - oxycodone prn     CV:  - home metoprolol  - labetalol and hydralazine prn   - SBP < 140     P:  #no issues  - incentive spirometry 10 x q1h while awake  - nasal cannula as needed to maintain saturations > 90%    GI:  #no issues  #history of hysterectomy   #gastroesophageal reflux disease   - bowel regimen   - ranitidine while on decadron     FEN:  - ferrous sulfate  - vitamin C, D3  - advance diet as tolerated     R:  #no issues   - monitor intake and output    ID:  - monitor fever curve    Heme:  #normocytic anemia  - continue to monitor for hemodynamic changes     Endo:  #hyperparathyroidism s/p parathyroidectomy   - sliding scale insulin while on decadron    Prophylaxis:  DVT: sequential compressive devices, no chemoprophylaxis due to risk of bleeding per Neurosurgery   Ulcer: ranitidine   Dispo: per neurosurgery     Mac \"Vinod\" MD Samira   Neurosurgery, PGY-1    Patient seen and discussed with Dr. Chapman. "     --------------------------------------------------------------------------    Subjective: stating that she is having more frequent and longer duration (2 minutes) simple partial (left mouth) seizures in the immediate post-op period. No new numbness/tingling, muscle weakness (baseline weakness in the left lower extremity), or cognitive changes. Denies post-ictal period or loss of consciousness or generalized shaking. This information reported by the patient was confirmed with nursing.    Objective:  Physical exam:   Blood pressure 113/54, temperature 98.6  F (37  C), temperature source Oral, resp. rate 12, weight 80.3 kg (177 lb 0.5 oz), SpO2 100 %.  GENERAL: in no acute distress  PULM: breathing comfortably on room air   NEUROLOGIC:  -- Awake; Alert; oriented x 4  -- Follows commands briskly  -- Speech fluent, spontaneous. No aphasia or dysarthria. Intact naming   -- no gaze preference. No apparent hemineglect.  Cranial Nerves:  -- PERRL 3-2mm bilat and brisk, extraocular movements intact  -- face symmetrical  -- palate elevates symmetrically  -- hearing grossly intact   Motor:   Delt Bi Tri Hand Flexion/  Extension Iliopsoas Quadriceps Hamstrings Tibialis Anterior Gastroc    C5 C6 C7 C8/T1 L2 L3 L4-S1 L4 S1   R 5 5 5 5 5 5 5 5 5   L 5 5 5 5 4+ 4+ 4+ 4+ 4+     Imaging:  MRI brain:   Postsurgical changes of tumor resection within the right frontal lobe.  Small focus of linear T2 hyperintensity is again seen at the posterior  aspect of the old resection cavity. Nodular T2 hyperintensity has been  resected. Small focus of nodular enhancement at the posterior inferior  aspect of the resection cavity may be postsurgical in nature. Short  interval follow-up in 2-3 months is recommended for further  evaluation.     Labs:  BMP: notable for slight hypernatremia and hyperchloremia  CBC: mild normocytic anemia

## 2018-05-31 NOTE — PLAN OF CARE
Problem: Patient Care Overview  Goal: Plan of Care/Patient Progress Review  Outcome: No Change  D/A/I. Pt arrived from PACU at 1500. Neuros intact, seizure witnessed at 1825 for 30 seconds. Neurosurgery notified. Left facial droop noted post seizure and slurred speech. PRN oxy and dilaudid given for pain. Vitals stable. UO adequate via casey. Scop patch behind left ear. ADAT diet.   P. Cont to monitor, hourly neuro's, notify MD with any changes or concerns.

## 2018-05-31 NOTE — PROGRESS NOTES
S: Pain in right masseter.  Several seizures overnight.      O:  Exam:  General: Awake;  Alert, In No Acute Distress  Pulm: Breathing Comfortably on RA  Mental status: Oriented x 3  Cranial Nerves: Cranial Nerves II-XII Grossly Intact Bilaterally except for left facial droop post-ictal  Strength:      Del Tr Bi WE WF Gr  R 5 5 5 5 5 5  L 5 5 5 5 5 5     HF KE KF DF PF EHL  R 5 5 5 5 5 5  L 5 5 5 5 5 5    Pronator Drift: Absent  Sensory: Intact to Light Touch  Reflexes: No Hyperreflexia, Plata s or Clonus Present; Toes Down-Going Bilaterally    INCISION: c/d/i with primapore in place     A: S/P right sided  craniotomy for tumor resection, doing well post-operatively other than an increase in seizure frequency likely due to the increased irritability of the brain in the immediate post-operative period as well as the stress of surgery.      P:  Operation: Status post right sided craniotomy for tumor resection; Sutures out: 6/13  Drains: none  Imaging: MRI brain pending  Pain: pain controlled  Anti-epileptics: Lamictal,  keppra  Cerebral Edema: Decadron 4 mg q6h  Blood pressure goals: SBP < 140   Diet: Advance diet as tolerated  Hematological goals: Platelets > 100k, INR < 1.5, Hemoglobin > 8   Antibiotics: none  PT/OT: pending  DVT prophylaxis: Sequential compression devices  Ulcer prophylaxis: protonix   DISPO:  Anticipate D/C Home 6/1  Barriers: evaluation by therapies, ambulating, BM/flatus, voiding without a Silva, pain controlled on PO medications      Contact the neurosurgery resident on call with questions.    Dial * * * 777: Enter job code [id number] when prompted.    Amadou Modi MD  Neurosurgery PGY2

## 2018-05-31 NOTE — PLAN OF CARE
Problem: Patient Care Overview  Goal: Plan of Care/Patient Progress Review  Outcome: Adequate for Discharge Date Met: 05/31/18  Patient discharged home with  and son at 1400. Patient sent with scripts and discharge papers. IV removed, AVS reviewed and signed. All belongs sent with patient. Neuro completely intact.

## 2018-06-01 ENCOUNTER — DOCUMENTATION ONLY (OUTPATIENT)
Dept: ONCOLOGY | Facility: CLINIC | Age: 58
End: 2018-06-01

## 2018-06-01 ENCOUNTER — TELEPHONE (OUTPATIENT)
Dept: INTERNAL MEDICINE | Facility: CLINIC | Age: 58
End: 2018-06-01

## 2018-06-01 LAB
LAMOTRIGINE SERPL-MCNC: 7.7 UG/ML (ref 2.5–15)
LEVETIRACETAM SERPL-MCNC: 31 UG/ML (ref 12–46)

## 2018-06-01 NOTE — TELEPHONE ENCOUNTER
"Hospital/TCU/ED for chronic condition Discharge Protocol    \"Hi, my name is Joselyn Way, a registered nurse, and I am calling from [facility].  I am calling to follow up and see how things are going for you after your recent emergency visit/hospital/TCU stay.\"    Tell me how you are doing now that you are home?\" Patient stated to be feeling well.  Pain controlled with Tylenol.          Discharge Instructions    \"Let's review your discharge instructions.  What is/are the follow-up recommendations?  Pt. Response: Follow up with Dr. Rodriguez, patient stated they will be calling her to schedule.    \"Has an appointment with your primary care provider been scheduled?\"   No (schedule appointment)  Patient requested to wait to schedule follow up with PCP until neurosurgery appointments are scheduled.      \"When you see the provider, I would recommend that you bring your medications with you.\"    Medications    \"Tell me what changed about your medicines when you discharged?\"    Changes to chronic meds?    0-1    \"What questions do you have about your medications?\"    None     New diagnoses of heart failure, COPD, diabetes, or MI?    No              Medication reconciliation completed? Yes  Was MTM referral placed (*Make sure to put transitions as reason for referral)?   No    Call Summary    \"What questions or concerns do you have about your recent visit and your follow-up care?\"     none    \"If you have questions or things don't continue to improve, we encourage you contact us through the main clinic number (give number).  Even if the clinic is not open, triage nurses are available 24/7 to help you.     We would like you to know that our clinic has extended hours (provide information).  We also have urgent care (provide details on closest location and hours/contact info)\"      \"Thank you for your time and take care!\"             "

## 2018-06-01 NOTE — PROGRESS NOTES
Form Request Documentation    Date Received in Clinic:  5/16/18  Name/Type of Form: FMLA  Questions that need to be addressed:   Current Employment Status: not currently working, having last worked on 5/24/18   Amount of Leave Requested: Continuous Leave beginning 5/25/18 - 6/27/18 or longer dependent on additional treatment    Other: None  Date Completed: 6/1/2018  Copy Mailed to patient: Yes on 6/1/2018  Disposition of Form: Fax to [facility] at [phone]

## 2018-06-03 ENCOUNTER — OFFICE VISIT (OUTPATIENT)
Dept: URGENT CARE | Facility: URGENT CARE | Age: 58
End: 2018-06-03
Payer: COMMERCIAL

## 2018-06-03 VITALS
TEMPERATURE: 97.5 F | HEART RATE: 51 BPM | DIASTOLIC BLOOD PRESSURE: 74 MMHG | RESPIRATION RATE: 16 BRPM | OXYGEN SATURATION: 98 % | SYSTOLIC BLOOD PRESSURE: 118 MMHG

## 2018-06-03 DIAGNOSIS — B37.0 THRUSH: Primary | ICD-10-CM

## 2018-06-03 PROCEDURE — 99213 OFFICE O/P EST LOW 20 MIN: CPT | Performed by: FAMILY MEDICINE

## 2018-06-03 RX ORDER — CLOTRIMAZOLE 10 MG/1
1 LOZENGE ORAL
Qty: 70 TROCHE | Refills: 0 | Status: SHIPPED | OUTPATIENT
Start: 2018-06-03 | End: 2018-06-15

## 2018-06-03 NOTE — MR AVS SNAPSHOT
After Visit Summary   6/3/2018    Barbi Tiwari    MRN: [id number]           Patient Information     Date Of Birth          1960        Visit Information        Provider Department      6/3/2018 2:25 PM Angel Taylor,  [facility]        Today's Diagnoses     Thrush    -  1       Follow-ups after your visit        Your next 10 appointments already scheduled     Jun 08, 2018  2:15 PM CDT   (Arrive by 2:00 PM)   NEW WITH ROOM with Zuly Perez GC,  2 [address] CONSULT [facility] ([facility])    [address]  [address]  [place] [address]   [phone]            Jun 14, 2018 10:30 AM CDT   (Arrive by 10:15 AM)   Return Visit with CAMDEN Chavarria Pending sale to [facility] ([facility])    [address]  3rd Floor  [place] [id number] [phone]            Jul 03, 2018 10:00 AM CDT   MR BRAIN W/O & W CONTRAST with [address] [facility] ([facility])    [address]  1st Floor  [place] [address] [phone]           Take your medicines as usual, unless your doctor tells you not to. Bring a list of your current medicines to your exam (including vitamins, minerals and over-the-counter drugs).  You may or may not receive intravenous (IV) contrast for this exam pending the discretion of the Radiologist.  You do not need to do anything special to prepare.  The MRI machine uses a strong magnet. Please wear clothes without metal (snaps, zippers). A sweatsuit works well, or we may give you a hospital gown.  Please remove any body piercings and hair extensions before you arrive. You will also remove watches, jewelry, hairpins, wallets, dentures, partial dental plates and hearing aids. You may wear contact lenses, and you may be able to wear your rings. We have a safe place to keep your personal  items, but it is safer to leave them at home.  **IMPORTANT** THE INSTRUCTIONS BELOW ARE ONLY FOR THOSE PATIENTS WHO HAVE BEEN PRESCRIBED SEDATION OR GENERAL ANESTHESIA DURING THEIR MRI PROCEDURE:  IF YOUR DOCTOR PRESCRIBED ORAL SEDATION (take medicine to help you relax during your exam):   You must get the medicine from your doctor (oral medication) before you arrive. Bring the medicine to the exam. Do not take it at home. You ll be told when to take it upon arriving for your exam.   Arrive one hour early. Bring someone who can take you home after the test. Your medicine will make you sleepy. After the exam, you may not drive, take a bus or take a taxi by yourself.  IF YOUR DOCTOR PRESCRIBED IV SEDATION:   Arrive one hour early. Bring someone who can take you home after the test. Your medicine will make you sleepy. After the exam, you may not drive, take a bus or take a taxi by yourself.   No eating 6 hours before your exam. You may have clear liquids up until 4 hours before your exam. (Clear liquids include water, clear tea, black coffee and fruit juice without pulp.)  IF YOUR DOCTOR PRESCRIBED ANESTHESIA (be asleep for your exam):   Arrive 1 1/2 hours early. Bring someone who can take you home after the test. You may not drive, take a bus or take a taxi by yourself.   No eating 8 hours before your exam. You may have clear liquids up until 4 hours before your exam. (Clear liquids include water, clear tea, black coffee and fruit juice without pulp.)   You will spend four to five hours in the recovery room.  Please call the Imaging Department at your exam site with any questions.            Jul 09, 2018 10:45 AM CDT   (Arrive by 10:30 AM)   Return Visit with Neo Weinstein MD   [facility] ([facility])    [address]  [address]  [place] [address] [phone]              Who to contact     If you have questions or need follow up information  about today's clinic visit or your schedule please contact [facility] directly at [phone].  Normal or non-critical lab and imaging results will be communicated to you by [employer], letter or phone within 4 business days after the clinic has received the results. If you do not hear from us within 7 days, please contact the clinic through [employer] or phone. If you have a critical or abnormal lab result, we will notify you by phone as soon as possible.  Submit refill requests through [employer] or call your pharmacy and they will forward the refill request to us. Please allow 3 business days for your refill to be completed.          Additional Information About Your Visit        [employer] Information     [employer] gives you secure access to your electronic health record. If you see a primary care provider, you can also send messages to your care team and make appointments. If you have questions, please call your primary care clinic.  If you do not have a primary care provider, please call [phone] and they will assist you.        Care EveryWhere ID     This is your Care EveryWhere ID. This could be used by other organizations to access your [place] medical records  [id number]        Your Vitals Were     Pulse Temperature Respirations Pulse Oximetry          51 97.5  F (36.4  C) (Tympanic) 16 98%         Blood Pressure from Last 3 Encounters:   06/03/18 118/74   05/31/18 92/63   05/23/18 117/76    Weight from Last 3 Encounters:   05/30/18 149 lb 11.1 oz (67.9 kg)   05/23/18 147 lb 8 oz (66.9 kg)   05/15/18 150 lb (68 kg)              Today, you had the following     No orders found for display         Today's Medication Changes          These changes are accurate as of 6/3/18  2:56 PM.  If you have any questions, ask your nurse or doctor.               Start taking these medicines.        Dose/Directions    clotrimazole 10 MG brianda   Used for:  Thrush   Started by:  Angel Taylor DO         Dose:  1 Brianda   Place 1 Brianda (10 mg) inside cheek 5 times daily for 14 days   Quantity:  70 Brianda   Refills:  0         These medicines have changed or have updated prescriptions.        Dose/Directions    COMPOUNDED NON-CONTROLLED SUBSTANCE - PHARMACY TO MIX COMPOUNDED MEDICATION   Commonly known as:  CMPD RX   This may have changed:    - when to take this  - reasons to take this  - additional instructions   Used for:  Vaginal atrophy        Apply 1g daily for 2 weeks, then twice weekly to vagina   Quantity:  20 g   Refills:  11       LORazepam 0.5 MG tablet   Commonly known as:  ATIVAN   This may have changed:  when to take this   Used for:  Oligodendroglioma (H), Anxiety        Dose:  0.5 mg   Take 1 tablet (0.5 mg) by mouth every 6 hours as needed for anxiety   Quantity:  30 tablet   Refills:  0       ranitidine 150 MG tablet   Commonly known as:  ZANTAC   This may have changed:  when to take this   Used for:  Gastroesophageal reflux disease without esophagitis        Dose:  150 mg   Take 1 tablet (150 mg) by mouth 2 times daily as needed for heartburn   Refills:  0            Where to get your medicines      These medications were sent to [address] [address]     Phone:  [phone]     clotrimazole 10 MG brianda                Primary Care Provider Office Phone # Fax #    Dustin Choudhury [phone]       [address] [place] [address]        Equal Access to Services     YOBANI GRIFFITH AH: Hadii yovany allano Sojeff, waaxda luqadaha, qaybta kaalmada adeegyada, nina la. So [facility] [phone].    ATENCIÓN: Si habla español, tiene a fletcher disposición servicios gratuitos de asistencia lingüística. Eufemia al [phone].    We comply with applicable federal civil rights laws and [place] laws. We do not discriminate on the basis of race, color, national origin,  age, disability, sex, sexual orientation, or gender identity.            Thank you!     Thank you for choosing [facility]  for your care. Our goal is always to provide you with excellent care. Hearing back from our patients is one way we can continue to improve our services. Please take a few minutes to complete the written survey that you may receive in the mail after your visit with us. Thank you!             Your Updated Medication List - Protect others around you: Learn how to safely use, store and throw away your medicines at www.disposemymeds.org.          This list is accurate as of 6/3/18  2:56 PM.  Always use your most recent med list.                   Brand Name Dispense Instructions for use Diagnosis    acetaminophen 500 MG tablet    TYLENOL     as needed Take 1,000 mg by mouth every 6 hours if needed. Max acetaminophen dose: 4000mg in 24 hrs.        albuterol 108 (90 Base) MCG/ACT Inhaler    PROAIR HFA/PROVENTIL HFA/VENTOLIN HFA    1 Inhaler    Inhale 2 puffs into the lungs every 6 hours as needed for shortness of breath / dyspnea or wheezing    Cough       ALLEGRA PO      Take by mouth daily        ascorbic acid 500 MG Tabs      Take 500 mg by mouth 2 times daily        clotrimazole 10 MG saranya     70 Saranya    Place 1 Saranya (10 mg) inside cheek 5 times daily for 14 days    Thrush       COMPOUNDED NON-CONTROLLED SUBSTANCE - PHARMACY TO MIX COMPOUNDED MEDICATION    CMPD RX    20 g    Apply 1g daily for 2 weeks, then twice weekly to vagina    Vaginal atrophy       dexamethasone 4 MG tablet    DECADRON    20 tablet    Take 1 tablet (4 mg) by mouth every 8 hours    Oligodendroglioma, WHO grade II (H)       ferrous sulfate 325 (65 Fe) MG tablet    IRON     Take 325 mg by mouth 2 times daily Reported on 5/15/2017        gabapentin 100 MG capsule    NEURONTIN    90 capsule    2 pills 1.5 hrs before bedtime for 7 nights, may increase to 3 pills if needed.        KEPPRA 500 MG  tablet   Generic drug:  levETIRAcetam      Take 1,500 mg by mouth 2 times daily        LACTAID PO      Take 3,000 Units by mouth as needed for indigestion        LAMOTRIGINE PO      Take 300 mg by mouth 2 times daily        LORazepam 0.5 MG tablet    ATIVAN    30 tablet    Take 1 tablet (0.5 mg) by mouth every 6 hours as needed for anxiety    Oligodendroglioma (H), Anxiety       metoprolol tartrate 25 MG tablet    LOPRESSOR    180 tablet    Take 1 tablet (25 mg) by mouth 2 times daily    Symptomatic premature ventricular contractions       MULTI-VITAMINS Tabs      every morning take 1 tablet by oral route once daily with food        oxyCODONE IR 5 MG tablet    ROXICODONE    50 tablet    Take 1-2 tablets (5-10 mg) by mouth every 6 hours as needed for moderate to severe pain (pain control or improvement in physical function. Hold dose for analgesic side effects.)    Oligodendroglioma, WHO grade II (H)       phenytoin 50 MG chewable tablet    DILANTIN    84 tablet    1 tablet (50 mg) by Oral or Feeding Tube route 3 times daily    Oligodendroglioma, WHO grade II (H)       PRILOSEC OTC PO      Take 20 mg by mouth as needed    Candidiasis of mouth and esophagus (H)       ranitidine 150 MG tablet    ZANTAC     Take 1 tablet (150 mg) by mouth 2 times daily as needed for heartburn    Gastroesophageal reflux disease without esophagitis       senna-docusate 8.6-50 MG per tablet    SENOKOT-S;PERICOLACE    100 tablet    Take 2 tablets by mouth 2 times daily    Oligodendroglioma, WHO grade II (H)       tretinoin 0.05 % cream    RETIN-A    45 g    Spread a pea size amount into affected area topically at bedtime.  Use sunscreen SPF>20.    Senile sebaceous gland hyperplasia       VITAMIN D (CHOLECALCIFEROL) PO      Take 1,000 Units by mouth 2 times daily Reported on 5/15/2017

## 2018-06-04 ENCOUNTER — CARE COORDINATION (OUTPATIENT)
Dept: ONCOLOGY | Facility: CLINIC | Age: 58
End: 2018-06-04

## 2018-06-04 NOTE — PROGRESS NOTES
SHRUTHI for pt asking she call our Nurse Line [phone] with any questions/concerns post-op. SHRUTHI the scheduling dept will be contacting them to set up appts as per discharge instructions:   2 week suture removal in Neurosurgery clinic with FRANKO. To coordinate appointment with Dr. Elliott lindo in 2 weeks.

## 2018-06-05 ENCOUNTER — TELEPHONE (OUTPATIENT)
Dept: ONCOLOGY | Facility: CLINIC | Age: 58
End: 2018-06-05

## 2018-06-05 NOTE — TELEPHONE ENCOUNTER
6/5/2018    Per Barbi's request (communicated to me by Arianne Cantu), I called Barbi today to discuss questions she has prior to her genetic counseling appointment, but was unable to reach her. I left a non-detailed voicemail with my name and phone number.    Zuly Perez MS, [facility]  Licensed Genetic Counselor  Office: [phone]  Pager: [phone]

## 2018-06-06 ENCOUNTER — CARE COORDINATION (OUTPATIENT)
Dept: ONCOLOGY | Facility: CLINIC | Age: 58
End: 2018-06-06

## 2018-06-06 NOTE — PROGRESS NOTES
"Patient called to inquire about appts and pathology. Returned call to patient. Reviewed upcoming appointments with patient, she voiced understanding. Also reviewed pathology results, tumor board discussion and recommendations. Informed patient pathology showed recurrent Oligodendroglioma, who grade 2, which is unchanged in grade. Dr. Gallagher and Dr. Rodriguez recommend continue follow up and monitoring every 3 months with MRI. No recommendations for chemo or RT at this time. Patient voiced understanding. Patient also states she has been noticing a \"brain fogginess, floating type feeling about 4-5 hours after taking all my seizure meds.\" Recommended patient discuss this further with Dr. Gallagher at follow up appointment next week. Patient also follows with epileptologist outside of [facility]. Patient also plans to follow up with this physician as well. Patient had no further questions or concerns at this time. She knows to call the clinic should anything arise.   "

## 2018-06-13 ENCOUNTER — TELEPHONE (OUTPATIENT)
Dept: ONCOLOGY | Facility: CLINIC | Age: 58
End: 2018-06-13

## 2018-06-13 NOTE — TELEPHONE ENCOUNTER
Attending Physician's Statement completed. Faxed to [facility] [phone]. Original to scanning. Copy mailed to pt

## 2018-06-14 NOTE — PROGRESS NOTES
SUBJECTIVE: Barbi Tiwari is a 57 year old female presenting with a chief complaint of sore throat.  Onset of symptoms was day(s) ago.  Course of illness is same.    Severity moderate  Current and Associated symptoms: none  Treatment measures tried include None tried.  Predisposing factors include see problems list.    Past Medical History:   Diagnosis Date     Allergic rhinitis 12/9/2009     Calculus of kidney 1/12/2011    Overview:  S/P LITHOTRIPSY 3/15/11      Esophageal reflux 10/22/2008     Hyperparathyroidism s/p surgery 1/11/2011     oligodendroglioma 7/23/2012     Osteoarthrosis 12/9/2009     Palpitations 6/5/2014     PVCs 6/5/2014     Seizure disorder (related to tumor) 8/17/2011     Allergies   Allergen Reactions     Benoxinate Nausea and Vomiting     Sulfa Drugs Hives     Social History   Substance Use Topics     Smoking status: Never Smoker     Smokeless tobacco: Never Used     Alcohol use Yes      Comment: 1-2 per month, only at social events       ROS:  SKIN: no rash  GI: no vomiting    OBJECTIVE:  /74  Pulse 51  Temp 97.5  F (36.4  C) (Tympanic)  Resp 16  SpO2 98%GENERAL APPEARANCE: healthy, alert and no distress  EYES: EOMI,  PERRL, conjunctiva clear  HENT: ear canals and TM's normal.  Nose and mouth without ulcers, erythema or lesions  NECK: supple, nontender, no lymphadenopathy  RESP: lungs clear to auscultation - no rales, rhonchi or wheezes  SKIN: no suspicious lesions or rashes      ICD-10-CM    1. Thrush B37.0 clotrimazole 10 MG brianda       Fluids/Rest, f/u if worse/not any better

## 2018-06-15 ENCOUNTER — ONCOLOGY VISIT (OUTPATIENT)
Dept: ONCOLOGY | Facility: CLINIC | Age: 58
End: 2018-06-15
Attending: PSYCHIATRY & NEUROLOGY
Payer: COMMERCIAL

## 2018-06-15 ENCOUNTER — OFFICE VISIT (OUTPATIENT)
Dept: NEUROSURGERY | Facility: CLINIC | Age: 58
End: 2018-06-15
Payer: COMMERCIAL

## 2018-06-15 VITALS
BODY MASS INDEX: 24.99 KG/M2 | WEIGHT: 146.39 LBS | DIASTOLIC BLOOD PRESSURE: 72 MMHG | HEIGHT: 64 IN | SYSTOLIC BLOOD PRESSURE: 103 MMHG | TEMPERATURE: 98 F | OXYGEN SATURATION: 99 % | HEART RATE: 68 BPM | RESPIRATION RATE: 18 BRPM

## 2018-06-15 VITALS
HEART RATE: 68 BPM | TEMPERATURE: 98 F | WEIGHT: 146.3 LBS | BODY MASS INDEX: 24.98 KG/M2 | DIASTOLIC BLOOD PRESSURE: 72 MMHG | HEIGHT: 64 IN | RESPIRATION RATE: 24 BRPM | SYSTOLIC BLOOD PRESSURE: 103 MMHG | OXYGEN SATURATION: 91 %

## 2018-06-15 DIAGNOSIS — K21.9 GASTROESOPHAGEAL REFLUX DISEASE WITHOUT ESOPHAGITIS: ICD-10-CM

## 2018-06-15 DIAGNOSIS — C71.9 OLIGODENDROGLIOMA (H): ICD-10-CM

## 2018-06-15 DIAGNOSIS — Z48.02 VISIT FOR SUTURE REMOVAL: ICD-10-CM

## 2018-06-15 DIAGNOSIS — F41.9 ANXIETY: ICD-10-CM

## 2018-06-15 DIAGNOSIS — R56.9 SEIZURE (H): ICD-10-CM

## 2018-06-15 DIAGNOSIS — C71.9 OLIGODENDROGLIOMA, WHO GRADE II (H): Primary | ICD-10-CM

## 2018-06-15 DIAGNOSIS — N95.2 VAGINAL ATROPHY: ICD-10-CM

## 2018-06-15 DIAGNOSIS — Z98.890 S/P CRANIOTOMY: ICD-10-CM

## 2018-06-15 PROBLEM — Z90.710 S/P LAPAROSCOPIC HYSTERECTOMY: Status: ACTIVE | Noted: 2018-06-15

## 2018-06-15 PROBLEM — D49.6 BRAIN TUMOR (H): Status: RESOLVED | Noted: 2018-05-30 | Resolved: 2018-06-15

## 2018-06-15 PROCEDURE — G0463 HOSPITAL OUTPT CLINIC VISIT: HCPCS | Mod: ZF

## 2018-06-15 PROCEDURE — 99214 OFFICE O/P EST MOD 30 MIN: CPT | Mod: ZP | Performed by: PSYCHIATRY & NEUROLOGY

## 2018-06-15 RX ORDER — PHENYTOIN 50 MG/1
50 TABLET, CHEWABLE ORAL 3 TIMES DAILY
COMMUNITY
Start: 2018-06-15 | End: 2018-09-17

## 2018-06-15 RX ORDER — LORAZEPAM 0.5 MG/1
0.5 TABLET ORAL PRN
COMMUNITY
Start: 2018-06-15 | End: 2019-08-29

## 2018-06-15 ASSESSMENT — PAIN SCALES - GENERAL
PAINLEVEL: NO PAIN (0)
PAINLEVEL: NO PAIN (0)

## 2018-06-15 NOTE — MR AVS SNAPSHOT
After Visit Summary   6/15/2018    Barbi Tiwari    MRN: [id number]           Patient Information     Date Of Birth          1960        Visit Information        Provider Department      6/15/2018 10:00 AM Alana Herring APRN [facility] Neurosurgery        Today's Diagnoses     Oligodendroglioma, WHO grade II (H)    -  1    S/P craniotomy        Visit for suture removal          Care Instructions    1. May resume hair washing after 48 hours. No perming or coloring until 30 days after surgery.  2. Followup with Dr. Rodriguez in 3 months or as instructed.  3. Followup with Dr. Gallagher as instructed.  4. Call [phone] Neurosurgery office for concerns or questions.   5. May return to work in a couple of weeks as tolerated. Advisestart working part time intially and gradually increase the work load.  6.  Call Leah  for questions/concerns.                Follow-ups after your visit        Your next 10 appointments already scheduled     Fercho 15, 2018 11:00 AM CDT   (Arrive by 10:45 AM)   Return Visit with Myrtle Gallagher MD   [facility] ([facility])    [address]  [address]  [place] [facility] [facility] [id number] [phone]            Jul 03, 2018 10:00 AM CDT   MR BRAIN W/O & W CONTRAST with [facility] ([facility])    [address]  1st Floor  [place] [facility] [id number] [phone]           Take your medicines as usual, unless your doctor tells you not to. Bring a list of your current medicines to your exam (including vitamins, minerals and over-the-counter drugs).  You may or may not receive intravenous (IV) contrast for this exam pending the discretion of the Radiologist.  You do not need to do anything special to prepare.  The MRI machine uses a strong magnet. Please wear clothes without metal (snaps, zippers). A sweatsuit works well, or we may give you a hospital  gown.  Please remove any body piercings and hair extensions before you arrive. You will also remove watches, jewelry, hairpins, wallets, dentures, partial dental plates and hearing aids. You may wear contact lenses, and you may be able to wear your rings. We have a safe place to keep your personal items, but it is safer to leave them at home.  **IMPORTANT** THE INSTRUCTIONS BELOW ARE ONLY FOR THOSE PATIENTS WHO HAVE BEEN PRESCRIBED SEDATION OR GENERAL ANESTHESIA DURING THEIR MRI PROCEDURE:  IF YOUR DOCTOR PRESCRIBED ORAL SEDATION (take medicine to help you relax during your exam):   You must get the medicine from your doctor (oral medication) before you arrive. Bring the medicine to the exam. Do not take it at home. You ll be told when to take it upon arriving for your exam.   Arrive one hour early. Bring someone who can take you home after the test. Your medicine will make you sleepy. After the exam, you may not drive, take a bus or take a taxi by yourself.  IF YOUR DOCTOR PRESCRIBED IV SEDATION:   Arrive one hour early. Bring someone who can take you home after the test. Your medicine will make you sleepy. After the exam, you may not drive, take a bus or take a taxi by yourself.   No eating 6 hours before your exam. You may have clear liquids up until 4 hours before your exam. (Clear liquids include water, clear tea, black coffee and fruit juice without pulp.)  IF YOUR DOCTOR PRESCRIBED ANESTHESIA (be asleep for your exam):   Arrive 1 1/2 hours early. Bring someone who can take you home after the test. You may not drive, take a bus or take a taxi by yourself.   No eating 8 hours before your exam. You may have clear liquids up until 4 hours before your exam. (Clear liquids include water, clear tea, black coffee and fruit juice without pulp.)   You will spend four to five hours in the recovery room.  Please call the Imaging Department at your exam site with any questions.            Jul 09, 2018 10:45 AM CDT  "  (Arrive by 10:30 AM)   Return Visit with Neo Weinstein MD   [facility] ([facility])    [address]  [address]  [facility] [place] [facility] [address] [phone]              Who to contact     Please call your clinic at [phone] to:    Ask questions about your health    Make or cancel appointments    Discuss your medicines    Learn about your test results    Speak to your doctor            Additional Information About Your Visit        [employer] Mobile Information     [employer] gives you secure access to your electronic health record. If you see a primary care provider, you can also send messages to your care team and make appointments. If you have questions, please call your primary care clinic.  If you do not have a primary care provider, please call [phone] and they will assist you.      [employer] is an electronic gateway that provides easy, online access to your medical records. With [employer], you can request a clinic appointment, read your test results, renew a prescription or communicate with your care team.     To access your existing account, please contact your [facility] or call [phone] for assistance.        Care EveryWhere ID     This is your Care EveryWhere ID. This could be used by other organizations to access your [facility] medical records  [id number]        Your Vitals Were     Pulse Temperature Respirations Height Pulse Oximetry Breastfeeding?    68 98  F (36.7  C) (Oral) 24 1.613 m (5' 3.5\") 91% No    BMI (Body Mass Index)                   25.51 kg/m2            Blood Pressure from Last 3 Encounters:   06/15/18 103/72   06/03/18 118/74   05/31/18 92/63    Weight from Last 3 Encounters:   06/15/18 66.4 kg (146 lb 4.8 oz)   05/30/18 67.9 kg (149 lb 11.1 oz)   05/23/18 66.9 kg (147 lb 8 oz)              Today, you had the following     No orders found for display         Today's Medication Changes          These " changes are accurate as of 6/15/18 10:59 AM.  If you have any questions, ask your nurse or doctor.               These medicines have changed or have updated prescriptions.        Dose/Directions    COMPOUNDED NON-CONTROLLED SUBSTANCE - PHARMACY TO MIX COMPOUNDED MEDICATION   Commonly known as:  CMPD RX   This may have changed:    - when to take this  - reasons to take this  - additional instructions   Used for:  Vaginal atrophy        Apply 1g daily for 2 weeks, then twice weekly to vagina   Quantity:  20 g   Refills:  11       LORazepam 0.5 MG tablet   Commonly known as:  ATIVAN   This may have changed:  when to take this   Used for:  Oligodendroglioma (H), Anxiety        Dose:  0.5 mg   Take 1 tablet (0.5 mg) by mouth every 6 hours as needed for anxiety   Quantity:  30 tablet   Refills:  0       phenytoin 50 MG chewable tablet   Commonly known as:  DILANTIN   This may have changed:  how to take this   Used for:  Oligodendroglioma, WHO grade II (H)        Dose:  50 mg   1 tablet (50 mg) by Oral or Feeding Tube route 3 times daily   Quantity:  84 tablet   Refills:  1       ranitidine 150 MG tablet   Commonly known as:  ZANTAC   This may have changed:  when to take this   Used for:  Gastroesophageal reflux disease without esophagitis        Dose:  150 mg   Take 1 tablet (150 mg) by mouth 2 times daily as needed for heartburn   Refills:  0                Primary Care Provider Office Phone # Fax #    Dustin Choudhury [phone]       [address] [place] [address]        Equal Access to Services     [facility] Center[facility]: Hadii yovany allano Sojeff, waaxda luqadaha, qaybta kaalmada adeegyada, nina will . So [facility] [phone].    ATENCIÓN: Si habla español, tiene a fletcher disposición servicios gratuitos de asistencia lingüística. Llakira al [phone].    We comply with applicable federal civil rights laws and [place] laws. We do not discriminate on the basis of  race, color, national origin, age, disability, sex, sexual orientation, or gender identity.            Thank you!     Thank you for choosing [facility] NEUROSURGERY  for your care. Our goal is always to provide you with excellent care. Hearing back from our patients is one way we can continue to improve our services. Please take a few minutes to complete the written survey that you may receive in the mail after your visit with us. Thank you!             Your Updated Medication List - Protect others around you: Learn how to safely use, store and throw away your medicines at www.disposemymeds.org.          This list is accurate as of 6/15/18 10:59 AM.  Always use your most recent med list.                   Brand Name Dispense Instructions for use Diagnosis    acetaminophen 500 MG tablet    TYLENOL     as needed Take 1,000 mg by mouth every 6 hours if needed. Max acetaminophen dose: 4000mg in 24 hrs.        albuterol 108 (90 Base) MCG/ACT Inhaler    PROAIR HFA/PROVENTIL HFA/VENTOLIN HFA    1 Inhaler    Inhale 2 puffs into the lungs every 6 hours as needed for shortness of breath / dyspnea or wheezing    Cough       ALLEGRA PO      Take by mouth daily        ascorbic acid 500 MG Tabs      Take 500 mg by mouth 2 times daily        COMPOUNDED NON-CONTROLLED SUBSTANCE - PHARMACY TO MIX COMPOUNDED MEDICATION    CMPD RX    20 g    Apply 1g daily for 2 weeks, then twice weekly to vagina    Vaginal atrophy       dexamethasone 4 MG tablet    DECADRON    20 tablet    Take 1 tablet (4 mg) by mouth every 8 hours    Oligodendroglioma, WHO grade II (H)       ferrous sulfate 325 (65 Fe) MG tablet    IRON     Take 325 mg by mouth 2 times daily Reported on 5/15/2017        gabapentin 100 MG capsule    NEURONTIN    90 capsule    2 pills 1.5 hrs before bedtime for 7 nights, may increase to 3 pills if needed.        KEPPRA 500 MG tablet   Generic drug:  levETIRAcetam      Take 1,500 mg by mouth 2 times daily        LACTAID PO      Take  3,000 Units by mouth as needed for indigestion        LAMOTRIGINE PO      Take 300 mg by mouth 2 times daily        LORazepam 0.5 MG tablet    ATIVAN    30 tablet    Take 1 tablet (0.5 mg) by mouth every 6 hours as needed for anxiety    Oligodendroglioma (H), Anxiety       metoprolol tartrate 25 MG tablet    LOPRESSOR    180 tablet    Take 1 tablet (25 mg) by mouth 2 times daily    Symptomatic premature ventricular contractions       MULTI-VITAMINS Tabs      every morning take 1 tablet by oral route once daily with food        oxyCODONE IR 5 MG tablet    ROXICODONE    50 tablet    Take 1-2 tablets (5-10 mg) by mouth every 6 hours as needed for moderate to severe pain (pain control or improvement in physical function. Hold dose for analgesic side effects.)    Oligodendroglioma, WHO grade II (H)       phenytoin 50 MG chewable tablet    DILANTIN    84 tablet    1 tablet (50 mg) by Oral or Feeding Tube route 3 times daily    Oligodendroglioma, WHO grade II (H)       PRILOSEC OTC PO      Take 20 mg by mouth as needed    Candidiasis of mouth and esophagus (H)       ranitidine 150 MG tablet    ZANTAC     Take 1 tablet (150 mg) by mouth 2 times daily as needed for heartburn    Gastroesophageal reflux disease without esophagitis       senna-docusate 8.6-50 MG per tablet    SENOKOT-S;PERICOLACE    100 tablet    Take 2 tablets by mouth 2 times daily    Oligodendroglioma, WHO grade II (H)       tretinoin 0.05 % cream    RETIN-A    45 g    Spread a pea size amount into affected area topically at bedtime.  Use sunscreen SPF>20.    Senile sebaceous gland hyperplasia       VITAMIN D (CHOLECALCIFEROL) PO      Take 1,000 Units by mouth 2 times daily Reported on 5/15/2017

## 2018-06-15 NOTE — NURSING NOTE
"Oncology Rooming Note    Megan 15, 2018 11:09 AM   Barbi Tiwari is a 57 year old female who presents for:    Chief Complaint   Patient presents with     Oncology Clinic Visit     Return visit related to Oliogodendroglioma     Initial Vitals: /72 (BP Location: Right arm, Patient Position: Sitting, Cuff Size: Adult Regular)  Pulse 68  Temp 98  F (36.7  C)  Resp 24  Ht 1.613 m (5' 3.5\")  Wt 66.4 kg (146 lb 6.2 oz)  BMI 25.52 kg/m2 Estimated body mass index is 25.52 kg/(m^2) as calculated from the following:    Height as of this encounter: 1.613 m (5' 3.5\").    Weight as of this encounter: 66.4 kg (146 lb 6.2 oz). Body surface area is 1.72 meters squared.  No Pain (0) Comment: Data Unavailable   No LMP recorded. Patient has had a hysterectomy.  Allergies reviewed: Yes  Medications reviewed: Yes    Medications: Medication refills not needed today.  Pharmacy name entered into [facility]:    [facility] - [place], [place] - [address] [place] [address] PHARMACY [place], [place] - [address]    Clinical concerns: No new concerns. Provider was notified.    10 minutes for nursing intake (face to face time)     Greta Sorenson LPN            "

## 2018-06-15 NOTE — PATIENT INSTRUCTIONS
1. May resume hair washing after 48 hours. No perming or coloring until 30 days after surgery.  2. Followup with Dr. Rodriguez in 3 months or as instructed.  3. Followup with Dr. Gallagher as instructed.  4. Call [phone] Neurosurgery office for concerns or questions.   5. May return to work in a couple of weeks as tolerated. Advisestart working part time intially and gradually increase the work load.  6.  Call Leah  for questions/concerns.

## 2018-06-15 NOTE — LETTER
6/15/2018       RE: Barbi Tiwari  [address] [facility] [address]     Dear Colleague,    Thank you for referring your patient, Barbi Tiwari, to the [facility] NEUROSURGERY at [facility]. Please see a copy of my visit note below.    NEUROSURGERY PROGRESS NOTE      REASON FOR VISIT: Postop wound check and suture/staple removal.      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     OPERATION PERFORMED:   1.  Redo right frontal craniotomy for tumor resection.   2.  Intraoperative neuronavigation with motor mapping.   3.  Use of operating microscope.   4.  Use of frameless stereotaxy.   IMPLANTS:  Synthes cranial plating system.   ATTENDING SURGEON:  Dustin Rodriguez MD         HPI:   Ms. Tiwari is a pleasant 57 year old female patient whose past medical history includes GERD, seizure disorder secondary to brain tumor and Oligodendroglioma, which was diagnosed in 2011. She underwent a craniotomy for tumor resection by Dr. Kerr at [facility] followed by 1 year of adjuvant therapy with Temazolamide. A follow-up MRI Brain was obtained that demonstrated tumor recurrence in the inferolateral aspect of the resection cavity. Therefore, re-operation for tumor resection was recommended, to which the patient was agreeable and consented to the operative intervention.  The procedure itself was without incident. The pathology showed recurrent WHO Grade 2 oligodenroglioma. She recovered well and was discharged home on POD 1 in good condition.  Since then she presents for routine wound check and suture/staple removal.    The patient denies fever, chills, unusual headaches, nausea, vomiting, redness, swelling, and drainage from incision. She has had several small seizures after the surgery, but states that this is not particularly unusual for her. 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 She informed me that she will Dr. HILTON Gallagher, her neuro-oncologist for continued evaluation and recommendation on her oligodendroglioma after our visit today.    STATUS REPORT  Patient Supplied Answers To the [facility] Pain Questionnaire  [facility] Pain -  Patient Entered Questionnaire/Answers 6/15/2018   What number best describes your pain right now:  0 = No pain  to  10 = Worst pain imaginable 0   What number best describes your average pain for the past week:  0 = No pain  to  10 = Worst pain imaginable 1   What number best describes your LOWEST pain in past 24 hours:  0 = No pain  to  10 = Worst pain imaginable 0   What number best describes your WORST pain in past 24 hours:  0 = No pain  to  10 = Worst pain imaginable 3   When is your pain worst? AM   What non-medicine treatments have you already had for your pain? none   Have you tried treating your pain with medication?  Yes   Are you currently taking medications for your pain? Yes       CURRENT MEDICATIONS:    Current Outpatient Prescriptions:      acetaminophen (TYLENOL) 500 MG tablet, as needed Take 1,000 mg by mouth every 6 hours if needed. Max acetaminophen dose: 4000mg in 24 hrs. , Disp: , Rfl:      albuterol (PROAIR HFA/PROVENTIL HFA/VENTOLIN HFA) 108 (90 BASE) MCG/ACT Inhaler, Inhale 2 puffs into the lungs every 6 hours as needed for shortness of breath / dyspnea or wheezing, Disp: 1 Inhaler, Rfl: 0     ascorbic acid 500 MG TABS, Take 500 mg by mouth 2 times daily, Disp: , Rfl:      clotrimazole 10 MG brianda, Place 1 Brianda (10 mg) inside cheek 5 times daily for 14 days, Disp: 70 Brianda, Rfl: 0     COMPOUND (CMPD RX) - PHARMACY TO MIX COMPOUNDED MEDICATION, Apply 1g daily for 2 weeks, then twice weekly to vagina (Patient taking differently: as needed Apply 1g daily for 2 weeks, then twice weekly to vagina), Disp: 20 g, Rfl: 11     dexamethasone (DECADRON) 4 MG tablet, Take 1 tablet (4 mg) by mouth every 8 hours, Disp: 20  tablet, Rfl: 0     ferrous sulfate (IRON) 325 (65 FE) MG tablet, Take 325 mg by mouth 2 times daily Reported on 5/15/2017, Disp: , Rfl:      Fexofenadine HCl (ALLEGRA PO), Take by mouth daily, Disp: , Rfl:      gabapentin (NEURONTIN) 100 MG capsule, 2 pills 1.5 hrs before bedtime for 7 nights, may increase to 3 pills if needed., Disp: 90 capsule, Rfl: 1     Lactase (LACTAID PO), Take 3,000 Units by mouth as needed for indigestion, Disp: , Rfl:      LAMOTRIGINE PO, Take 300 mg by mouth 2 times daily, Disp: , Rfl:      levETIRAcetam (KEPPRA) 500 MG tablet, Take 1,500 mg by mouth 2 times daily , Disp: , Rfl:      LORazepam (ATIVAN) 0.5 MG tablet, Take 1 tablet (0.5 mg) by mouth every 6 hours as needed for anxiety (Patient taking differently: Take 0.5 mg by mouth as needed for anxiety ), Disp: 30 tablet, Rfl: 0     metoprolol tartrate (LOPRESSOR) 25 MG tablet, Take 1 tablet (25 mg) by mouth 2 times daily, Disp: 180 tablet, Rfl: 3     Multiple Vitamin (MULTI-VITAMINS) TABS, every morning take 1 tablet by oral route once daily with food, Disp: , Rfl:      Omeprazole Magnesium (PRILOSEC OTC PO), Take 20 mg by mouth as needed , Disp: , Rfl:      oxyCODONE IR (ROXICODONE) 5 MG tablet, Take 1-2 tablets (5-10 mg) by mouth every 6 hours as needed for moderate to severe pain (pain control or improvement in physical function. Hold dose for analgesic side effects.), Disp: 50 tablet, Rfl: 0     phenytoin (DILANTIN) 50 MG chewable tablet, 1 tablet (50 mg) by Oral or Feeding Tube route 3 times daily, Disp: 84 tablet, Rfl: 1     ranitidine (ZANTAC) 150 MG tablet, Take 1 tablet (150 mg) by mouth 2 times daily as needed for heartburn (Patient taking differently: Take 150 mg by mouth At Bedtime ), Disp: , Rfl:      senna-docusate (SENOKOT-S;PERICOLACE) 8.6-50 MG per tablet, Take 2 tablets by mouth 2 times daily, Disp: 100 tablet, Rfl: 1     tretinoin (RETIN-A) 0.05 % cream, Spread a pea size amount into affected area topically at  bedtime.  Use sunscreen SPF>20., Disp: 45 g, Rfl: 11     VITAMIN D, CHOLECALCIFEROL, PO, Take 1,000 Units by mouth 2 times daily Reported on 5/15/2017, Disp: , Rfl:       ALLERGIES:  Allergies   Allergen Reactions     Benoxinate Nausea and Vomiting     Sulfa Drugs Hives       PMH, SOC HIST, FAM HIST, PROBLEM LIST:  All reviewed in EPIC.      FOCUS EXAMINATION:  There were no vitals taken for this visit.  Well developed well nourished female found seated comfortably in exam chair.  No apparent distress. She is accompanied by her  today.  She is A&O X3.  Mood and affect WNL. Language and fund of knowledge intact. CN II-XII are grossly intact.  She has a nicely healed incision.  I prepped the wound with ChloraPrep and cleanly removed nylon sutures/staples without difficulty.  Wearing brace/collar appropriately.      ASSESSMENT:  1. Oligodendroglioma, WHO grade II (H)    2. S/P craniotomy    3. Visit for suture removal    Barbi Tiwari is doing reasonably well.The wound is healthy without evidence of infection.       PLAN:  We discussed wound care.  *  Keep it open to air  *  May resume shampoo with mild soap/shampoo including the incision after 48 hours. No hair conditioners, hair treatments or skin cream for two more weeks.  *  May swim or bathe when all scabbing is gone from the incision.  *  Call our services if you develop fever, chills, redness, swelling, and/or drainage from incision.  We discussed medication.    *  Advise the patient to continue working with her neurologist regarding medial management for her chronic seizures.  We discussed activities and return to work.  *  We recommend she continue return to normal activities as able.  *  She can return to driving if she is off narcotic and the seizure is under control;  the vehicle is equipped with blindspot mirrors.  *  She can return to work without limits:  We will complete a RTW letter per her request.    We discussed follow up  *  All the  patient's questions have been answered and they demonstrate good understanding of the above.    *  Followup with Dr. Rodriguez and Dr. Gallagher at [facility] as instructed.  Imaging for that visit: MRI of brain with and without contrast.  *  The patient has our contact information and is aware that she should call if she has questions comments or concerns.     We appreciate the opportunity to be of service in the care of this pleasant patient.  Please do call if there is anything more we can do    Alana Herring, NINA, APRN, FNP-BC  [facility]   Department of Neurosurgery  Phone: [phone]  Fax: [phone]

## 2018-06-15 NOTE — LETTER
6/15/2018       RE: Barbi Tiwari  [address] [facility] [id number]     Dear Colleague,    Thank you for referring your patient, Barbi Tiwari, to the [facility]. Please see a copy of my visit note below.    NEURO-ONCOLOGY VISIT  06/15/2018    CHIEF COMPLAINT: Ms. Barbi Tiwari is a 57 year old right-handed woman with a right frontal diffuse oligodendroglioma (1p19q co-deleted), diagnosed following resection in 5/2011. Repeat resection in 5/2018 did not demonstrate malignant transformation.     She is presenting in follow-up for contiuned evaluation and recommendations on treatment accompanied by Kenneth ().       HISTORY OF PRESENT ILLNESS  Today in clinic;   -She denies any new symptoms; no weakness, numbness, headaches or visual changes.   -Recovered well from surgery.  -Infrequent AM headache (about 2-3 x a week). Resolves with Tylenol.   -Fatigue remains an ongoing complaint. Thought to be related to her anti-seizure medications; Lamictal and Keppra.   -On gabapentin for to help with RLS and sleep initiation. Sleep is still poor.   -Continued mild word-finding issues, otherwise no change in her cognitive function.   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  -Recurrent episodes post-op (started on Dilantin) and well as on Wednesday x 3 episodes of right sided facial twitching. No other events concerning for seizures.     -Off all steroids.    REVIEW OF SYSTEMS  A comprehensive ROS negative except as in HPI.      MEDICATIONS   Current Outpatient Prescriptions   Medication     acetaminophen (TYLENOL) 500 MG tablet     albuterol (PROAIR HFA/PROVENTIL HFA/VENTOLIN HFA) 108 (90 BASE) MCG/ACT Inhaler     ascorbic acid 500 MG TABS     COMPOUNDED NON-CONTROLLED SUBSTANCE (CMPD RX) - PHARMACY TO MIX COMPOUNDED MEDICATION     ferrous sulfate (IRON) 325 (65 FE) MG tablet     Fexofenadine HCl (ALLEGRA PO)     gabapentin (NEURONTIN) 100 MG capsule     Lactase  "(LACTAID PO)     LAMOTRIGINE PO     levETIRAcetam (KEPPRA) 500 MG tablet     LORazepam (ATIVAN) 0.5 MG tablet     metoprolol tartrate (LOPRESSOR) 25 MG tablet     Multiple Vitamin (MULTI-VITAMINS) TABS     Omeprazole Magnesium (PRILOSEC OTC PO)     phenytoin (DILANTIN) 50 MG chewable tablet     ranitidine (ZANTAC) 150 MG tablet     senna-docusate (SENOKOT-S;PERICOLACE) 8.6-50 MG per tablet     tretinoin (RETIN-A) 0.05 % cream     VITAMIN D, CHOLECALCIFEROL, PO     [DISCONTINUED] phenytoin (DILANTIN) 50 MG chewable tablet     No current facility-administered medications for this visit.      DRUG ALLERGIES   Allergies   Allergen Reactions     Benoxinate Nausea and Vomiting     Sulfa Drugs Hives       ONCOLOGIC HISTORY  -4/27/2011 PRESENTATION: New onset seizure, generalized event.   -5/2011 MRB with a non-contrast enhancing right frontal mass lesion.   -5/2011 SURGERY: Craniectomy for mass resection at [facility].   PATHOLOGY: Diffuse oligodendroglioma; WHO grade II, 1p/19q co-deleted.  -6/2011-5/2012 CHEMO: Adjuvant dosed temozolomide x 12 cycles.  -4/2/2018 MRB with possible disease recurrence with a new 1.2 cm non-enhancing nodule within the cortex of the right frontal lobe adjacent to the resection cavity.  -4/12/2018 NEURO-ONC: Recommending repeat resection, appointment scheduled with Dr. Dustin Rodriguez, neurosurgery at the [facility].   -5/30/2018 SURGERY: Repeat resection with Dr. Rodriguez.   PATHOLOGY: Remains low grade, without concerning features.   -6/15/2018 NEURO-ONC: Start imaging surveillance.    SOCIAL HISTORY   Tobacco use: Never smoker.   Alcohol use: Social.   Drug use: Denies marijuana use.  Supplement, complimentary/ alternative medicine: None.   Employment: RN.   , 2 children (son 23 and daughter 21)      PHYSICAL EXAMINATION  /72 (BP Location: Right arm, Patient Position: Sitting, Cuff Size: Adult Regular)  Pulse 68  Temp 98  F (36.7  C)  Resp 18  Ht 1.613 m (5' 3.5\")  Wt 66.4 kg (146 lb 6.2 " "oz)  SpO2 99%  BMI 25.52 kg/m2   Wt Readings from Last 2 Encounters:   06/15/18 66.4 kg (146 lb 6.2 oz)   06/15/18 66.4 kg (146 lb 4.8 oz)     Ht Readings from Last 2 Encounters:   06/15/18 1.613 m (5' 3.5\")   06/15/18 1.613 m (5' 3.5\")     KPS: 100    -Generally well appearing.  -Throat: No oral thrush.  -Respiratory: Normal breath sounds, no audible wheezing.   -Skin: No rashes. Healing head incision.  -Hematologic/ lymphatic: No abnormal bruising. No leg swelling.  -Psychiatric: Normal mood and affect. Pleasant, talkative.  -Neurologic:   MENTAL STATUS:     Alert, oriented to date.    Recall: Intact.    Speech fluent. Comprehension intact to multi-step commands.   Normal naming, repetition. Able to read.   Good right-left orientation.     CRANIAL NERVES:     Discs flat on fundoscopy.    Pupils are equal, round, reactive to light.     Extraocular movements full, patient denies diplopia.     Visual fields full.     Facial sensation intact to light touch.   Decreased activation with smiling/ talking on the left side of face.    Hearing intact.   Palate moves symmetrically.     Sternocleidomastoid and trapezius strength intact.    Tongue midline.  MOTOR:    Normal and symmetric tone.   Grossly 5/5 throughout.    No pronation or drift. No orbiting.   Able to rise from a chair without use of arms.   On toe/ heel walk, equal distance from floor to heels/ toes.   SENSATION:    Intact to light touch throughout.  COORDINATION:   Intact finger-nose with eyes open and closed.   REFLEXES:    Slightly more brisk on the left arm.    Toes not tested. No clonus. No Hoffmans.   No grasp.    GAIT:  Walks without assistance. Not antalgic.    Good speed. Normal stride length and heel strike. Normal turns. Normal arm swing.   Able to toe, heel walk. Able to tandem walk.       MEDICAL RECORDS  Obtained and personally reviewed all available outside medical records in addition to reviewing any records available in our electronic " system.     LABS  Personally reviewed all available lab results.     IMAGING  Personally reviewed pre- and post-operative MR brain imaging as well as imaging performed in 4/2014. To my eye, there is no new contrast enhancement. Additionally, the nodular area of T2 FLAIR seen in the 4/2018 scan on the medial lips of the right frontal resection cavity has been resected. All other FLAIR hyperintensity about the cavity has remained stable for the past 4 years.     Imaging was shown to and results were reviewed with Barbi and Kenneth.     Imaging and case will be reviewed and discussed at Brain Tumor Conference.       IMPRESSION  For the 30 minute appointment, more than 50% of the encounter was spent discussing in detail the nature of this tumor in light of her resect resection. This is in addition to providing emotional support, answering questions pertaining to my recommendations, and devising the treatment plan as outlined below.     Post-operative imaging demonstrates a complete resection of the nodular focus of T2 hyperintensity. Pathology was consistent with a low grade glioma without concern features suggesting malignant progression/ transformation. She remains grossly asymptomatic with no increase in severity or frequency of seizures. With regard to cancer-directed therapy, I am recommending imaging surveillance at this time because other than the area that was completely resected, there have been no concerning changes on her scans for the past 4+ years. Barbi and Kenneth are in agreement with this plan.    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   -Repeat imaging in 3 months (9/2018).    -SEIZURE MANAGEMENT-  -Continue to follow with currently epileptologist.   -Currently on VPA and Keppra.   -Dilantin was started post-operatively due to a  seizure.     -Quality of life/ MOOD/ FATIGUE-  -Denies any mood issues.  -Continue to monitor mood as untreated/ undertreated depression can worsen fatigue, dysorexia, and quality of life.  -Issues with sleep.     Return to clinic in 3 months for repeat evaluation and to review new imaging.     In the meantime, Barbi knows to call with questions or concerns or to report new complaints and can be seen sooner if needed. Urgent evaluation is needed in the setting of acute onset of severe headache, abrupt change in mental status, on-going seizures, new focal deficits, or new leg swelling/ pain. Everyone in attendance voiced understanding.    Myrtle Gallagher MD  Neuro-oncology

## 2018-06-15 NOTE — PROGRESS NOTES
NEURO-ONCOLOGY VISIT  06/15/2018    CHIEF COMPLAINT: Ms. Barbi Tiwari is a 57 year old right-handed woman with a right frontal diffuse oligodendroglioma (1p19q co-deleted), diagnosed following resection in 5/2011. Repeat resection in 5/2018 did not demonstrate malignant transformation.     She is presenting in follow-up for contiuned evaluation and recommendations on treatment accompanied by Kenneth ().       HISTORY OF PRESENT ILLNESS  Today in clinic;   -She denies any new symptoms; no weakness, numbness, headaches or visual changes.   -Recovered well from surgery.  -Infrequent AM headache (about 2-3 x a week). Resolves with Tylenol.   -Fatigue remains an ongoing complaint. Thought to be related to her anti-seizure medications; Lamictal and Keppra.   -On gabapentin for to help with RLS and sleep initiation. Sleep is still poor.   -Continued mild word-finding issues, otherwise no change in her cognitive function.   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  -Recurrent episodes post-op (started on Dilantin) and well as on Wednesday x 3 episodes of right sided facial twitching. No other events concerning for seizures.     -Off all steroids.    REVIEW OF SYSTEMS  A comprehensive ROS negative except as in HPI.      MEDICATIONS   Current Outpatient Prescriptions   Medication     acetaminophen (TYLENOL) 500 MG tablet     albuterol (PROAIR HFA/PROVENTIL HFA/VENTOLIN HFA) 108 (90 BASE) MCG/ACT Inhaler     ascorbic acid 500 MG TABS     COMPOUNDED NON-CONTROLLED SUBSTANCE (CMPD RX) - PHARMACY TO MIX COMPOUNDED MEDICATION     ferrous sulfate (IRON) 325 (65 FE) MG tablet     Fexofenadine HCl (ALLEGRA PO)     gabapentin (NEURONTIN) 100 MG capsule     Lactase (LACTAID PO)     LAMOTRIGINE PO     levETIRAcetam (KEPPRA) 500 MG tablet     LORazepam (ATIVAN) 0.5 MG tablet     metoprolol tartrate (LOPRESSOR) 25 MG tablet     Multiple Vitamin (MULTI-VITAMINS) TABS     Omeprazole Magnesium (PRILOSEC OTC PO)  "    phenytoin (DILANTIN) 50 MG chewable tablet     ranitidine (ZANTAC) 150 MG tablet     senna-docusate (SENOKOT-S;PERICOLACE) 8.6-50 MG per tablet     tretinoin (RETIN-A) 0.05 % cream     VITAMIN D, CHOLECALCIFEROL, PO     [DISCONTINUED] phenytoin (DILANTIN) 50 MG chewable tablet     No current facility-administered medications for this visit.      DRUG ALLERGIES   Allergies   Allergen Reactions     Benoxinate Nausea and Vomiting     Sulfa Drugs Hives       ONCOLOGIC HISTORY  -4/27/2011 PRESENTATION: New onset seizure, generalized event.   -5/2011 MRB with a non-contrast enhancing right frontal mass lesion.   -5/2011 SURGERY: Craniectomy for mass resection at [facility].   PATHOLOGY: Diffuse oligodendroglioma; WHO grade II, 1p/19q co-deleted.  -6/2011-5/2012 CHEMO: Adjuvant dosed temozolomide x 12 cycles.  -4/2/2018 MRB with possible disease recurrence with a new 1.2 cm non-enhancing nodule within the cortex of the right frontal lobe adjacent to the resection cavity.  -4/12/2018 NEURO-ONC: Recommending repeat resection, appointment scheduled with Dr. Dustin Rodriguez, neurosurgery at the [facility].   -5/30/2018 SURGERY: Repeat resection with Dr. Rodriguez.   PATHOLOGY: Remains low grade, without concerning features.   -6/15/2018 NEURO-ONC: Start imaging surveillance.    SOCIAL HISTORY   Tobacco use: Never smoker.   Alcohol use: Social.   Drug use: Denies marijuana use.  Supplement, complimentary/ alternative medicine: None.   Employment: RN.   , 2 children (son 23 and daughter 21)      PHYSICAL EXAMINATION  /72 (BP Location: Right arm, Patient Position: Sitting, Cuff Size: Adult Regular)  Pulse 68  Temp 98  F (36.7  C)  Resp 18  Ht 1.613 m (5' 3.5\")  Wt 66.4 kg (146 lb 6.2 oz)  SpO2 99%  BMI 25.52 kg/m2   Wt Readings from Last 2 Encounters:   06/15/18 66.4 kg (146 lb 6.2 oz)   06/15/18 66.4 kg (146 lb 4.8 oz)     Ht Readings from Last 2 Encounters:   06/15/18 1.613 m (5' 3.5\")   06/15/18 1.613 m (5' 3.5\") "     KPS: 100    -Generally well appearing.  -Throat: No oral thrush.  -Respiratory: Normal breath sounds, no audible wheezing.   -Skin: No rashes. Healing head incision.  -Hematologic/ lymphatic: No abnormal bruising. No leg swelling.  -Psychiatric: Normal mood and affect. Pleasant, talkative.  -Neurologic:   MENTAL STATUS:     Alert, oriented to date.    Recall: Intact.    Speech fluent. Comprehension intact to multi-step commands.   Normal naming, repetition. Able to read.   Good right-left orientation.     CRANIAL NERVES:     Discs flat on fundoscopy.    Pupils are equal, round, reactive to light.     Extraocular movements full, patient denies diplopia.     Visual fields full.     Facial sensation intact to light touch.   Decreased activation with smiling/ talking on the left side of face.    Hearing intact.   Palate moves symmetrically.     Sternocleidomastoid and trapezius strength intact.    Tongue midline.  MOTOR:    Normal and symmetric tone.   Grossly 5/5 throughout.    No pronation or drift. No orbiting.   Able to rise from a chair without use of arms.   On toe/ heel walk, equal distance from floor to heels/ toes.   SENSATION:    Intact to light touch throughout.  COORDINATION:   Intact finger-nose with eyes open and closed.   REFLEXES:    Slightly more brisk on the left arm.    Toes not tested. No clonus. No Hoffmans.   No grasp.    GAIT:  Walks without assistance. Not antalgic.    Good speed. Normal stride length and heel strike. Normal turns. Normal arm swing.   Able to toe, heel walk. Able to tandem walk.       MEDICAL RECORDS  Obtained and personally reviewed all available outside medical records in addition to reviewing any records available in our electronic system.     LABS  Personally reviewed all available lab results.     IMAGING  Personally reviewed pre- and post-operative MR brain imaging as well as imaging performed in 4/2014. To my eye, there is no new contrast enhancement. Additionally, the  nodular area of T2 FLAIR seen in the 4/2018 scan on the medial lips of the right frontal resection cavity has been resected. All other FLAIR hyperintensity about the cavity has remained stable for the past 4 years.     Imaging was shown to and results were reviewed with Barib and Kenneht.     Imaging and case will be reviewed and discussed at Brain Tumor Conference.       IMPRESSION  For the 30 minute appointment, more than 50% of the encounter was spent discussing in detail the nature of this tumor in light of her resect resection. This is in addition to providing emotional support, answering questions pertaining to my recommendations, and devising the treatment plan as outlined below.     Post-operative imaging demonstrates a complete resection of the nodular focus of T2 hyperintensity. Pathology was consistent with a low grade glioma without concern features suggesting malignant progression/ transformation. She remains grossly asymptomatic with no increase in severity or frequency of seizures. With regard to cancer-directed therapy, I am recommending imaging surveillance at this time because other than the area that was completely resected, there have been no concerning changes on her scans for the past 4+ years. Barbi and Kenneth are in agreement with this plan.    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   -Repeat imaging in 3 months (9/2018).    -SEIZURE MANAGEMENT-  -Continue to follow with currently epileptologist.   -Currently on VPA and Keppra.   -Dilantin was started post-operatively due to a seizure.     -Quality of life/ MOOD/ FATIGUE-  -Denies any mood issues.  -Continue to monitor mood as untreated/ undertreated depression can worsen fatigue, dysorexia, and quality of life.  -Issues with sleep.     Return to clinic in 3 months for  repeat evaluation and to review new imaging.     In the meantime, Barbi knows to call with questions or concerns or to report new complaints and can be seen sooner if needed. Urgent evaluation is needed in the setting of acute onset of severe headache, abrupt change in mental status, on-going seizures, new focal deficits, or new leg swelling/ pain. Everyone in attendance voiced understanding.    Myrtle Gallagher MD  Neuro-oncology

## 2018-06-15 NOTE — PROGRESS NOTES
NEUROSURGERY PROGRESS NOTE      REASON FOR VISIT: Postop wound check and suture/staple removal.      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     OPERATION PERFORMED:   1.  Redo right frontal craniotomy for tumor resection.   2.  Intraoperative neuronavigation with motor mapping.   3.  Use of operating microscope.   4.  Use of frameless stereotaxy.   IMPLANTS:  Synthes cranial plating system.   ATTENDING SURGEON:  Dustin Rodriguez MD         HPI:   Ms. Tiwari is a pleasant 57 year old female patient whose past medical history includes GERD, seizure disorder secondary to brain tumor and Oligodendroglioma, which was diagnosed in 2011. She underwent a craniotomy for tumor resection by Dr. Kerr at [facility] followed by 1 year of adjuvant therapy with Temazolamide. A follow-up MRI Brain was obtained that demonstrated tumor recurrence in the inferolateral aspect of the resection cavity. Therefore, re-operation for tumor resection was recommended, to which the patient was agreeable and consented to the operative intervention.  The procedure itself was without incident. The pathology showed recurrent WHO Grade 2 oligodenroglioma. She recovered well and was discharged home on POD 1 in good condition.  Since then she presents for routine wound check and suture/staple removal.    The patient denies fever, chills, unusual headaches, nausea, vomiting, redness, swelling, and drainage from incision. She has had several small seizures after the surgery, but states that this is not particularly unusual for her. 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 She informed me that she will Dr. HILTON Gallagher, her neuro-oncologist for continued evaluation and recommendation on her oligodendroglioma after our visit today.    STATUS REPORT  Patient Supplied Answers To the UC Pain Questionnaire  [facility] Pain -   Patient Entered Questionnaire/Answers 6/15/2018   What number best describes your pain right now:  0 = No pain  to  10 = Worst pain imaginable 0   What number best describes your average pain for the past week:  0 = No pain  to  10 = Worst pain imaginable 1   What number best describes your LOWEST pain in past 24 hours:  0 = No pain  to  10 = Worst pain imaginable 0   What number best describes your WORST pain in past 24 hours:  0 = No pain  to  10 = Worst pain imaginable 3   When is your pain worst? AM   What non-medicine treatments have you already had for your pain? none   Have you tried treating your pain with medication?  Yes   Are you currently taking medications for your pain? Yes       CURRENT MEDICATIONS:    Current Outpatient Prescriptions:      acetaminophen (TYLENOL) 500 MG tablet, as needed Take 1,000 mg by mouth every 6 hours if needed. Max acetaminophen dose: 4000mg in 24 hrs. , Disp: , Rfl:      albuterol (PROAIR HFA/PROVENTIL HFA/VENTOLIN HFA) 108 (90 BASE) MCG/ACT Inhaler, Inhale 2 puffs into the lungs every 6 hours as needed for shortness of breath / dyspnea or wheezing, Disp: 1 Inhaler, Rfl: 0     ascorbic acid 500 MG TABS, Take 500 mg by mouth 2 times daily, Disp: , Rfl:      clotrimazole 10 MG saranya, Place 1 Saranya (10 mg) inside cheek 5 times daily for 14 days, Disp: 70 Saranya, Rfl: 0     COMPOUND (CMPD RX) - PHARMACY TO MIX COMPOUNDED MEDICATION, Apply 1g daily for 2 weeks, then twice weekly to vagina (Patient taking differently: as needed Apply 1g daily for 2 weeks, then twice weekly to vagina), Disp: 20 g, Rfl: 11     dexamethasone (DECADRON) 4 MG tablet, Take 1 tablet (4 mg) by mouth every 8 hours, Disp: 20 tablet, Rfl: 0     ferrous sulfate (IRON) 325 (65 FE) MG tablet, Take 325 mg by mouth 2 times daily Reported on 5/15/2017, Disp: , Rfl:      Fexofenadine HCl (ALLEGRA PO), Take by mouth daily, Disp: , Rfl:      gabapentin (NEURONTIN) 100 MG capsule, 2 pills 1.5 hrs before bedtime for 7  nights, may increase to 3 pills if needed., Disp: 90 capsule, Rfl: 1     Lactase (LACTAID PO), Take 3,000 Units by mouth as needed for indigestion, Disp: , Rfl:      LAMOTRIGINE PO, Take 300 mg by mouth 2 times daily, Disp: , Rfl:      levETIRAcetam (KEPPRA) 500 MG tablet, Take 1,500 mg by mouth 2 times daily , Disp: , Rfl:      LORazepam (ATIVAN) 0.5 MG tablet, Take 1 tablet (0.5 mg) by mouth every 6 hours as needed for anxiety (Patient taking differently: Take 0.5 mg by mouth as needed for anxiety ), Disp: 30 tablet, Rfl: 0     metoprolol tartrate (LOPRESSOR) 25 MG tablet, Take 1 tablet (25 mg) by mouth 2 times daily, Disp: 180 tablet, Rfl: 3     Multiple Vitamin (MULTI-VITAMINS) TABS, every morning take 1 tablet by oral route once daily with food, Disp: , Rfl:      Omeprazole Magnesium (PRILOSEC OTC PO), Take 20 mg by mouth as needed , Disp: , Rfl:      oxyCODONE IR (ROXICODONE) 5 MG tablet, Take 1-2 tablets (5-10 mg) by mouth every 6 hours as needed for moderate to severe pain (pain control or improvement in physical function. Hold dose for analgesic side effects.), Disp: 50 tablet, Rfl: 0     phenytoin (DILANTIN) 50 MG chewable tablet, 1 tablet (50 mg) by Oral or Feeding Tube route 3 times daily, Disp: 84 tablet, Rfl: 1     ranitidine (ZANTAC) 150 MG tablet, Take 1 tablet (150 mg) by mouth 2 times daily as needed for heartburn (Patient taking differently: Take 150 mg by mouth At Bedtime ), Disp: , Rfl:      senna-docusate (SENOKOT-S;PERICOLACE) 8.6-50 MG per tablet, Take 2 tablets by mouth 2 times daily, Disp: 100 tablet, Rfl: 1     tretinoin (RETIN-A) 0.05 % cream, Spread a pea size amount into affected area topically at bedtime.  Use sunscreen SPF>20., Disp: 45 g, Rfl: 11     VITAMIN D, CHOLECALCIFEROL, PO, Take 1,000 Units by mouth 2 times daily Reported on 5/15/2017, Disp: , Rfl:       ALLERGIES:  Allergies   Allergen Reactions     Benoxinate Nausea and Vomiting     Sulfa Drugs Hives       PMH, SOC HIST,  FAM HIST, PROBLEM LIST:  All reviewed in EPIC.      FOCUS EXAMINATION:  There were no vitals taken for this visit.  Well developed well nourished female found seated comfortably in exam chair.  No apparent distress. She is accompanied by her  today.  She is A&O X3.  Mood and affect WNL. Language and fund of knowledge intact. CN II-XII are grossly intact.  She has a nicely healed incision.  I prepped the wound with ChloraPrep and cleanly removed nylon sutures/staples without difficulty.  Wearing brace/collar appropriately.      ASSESSMENT:  1. Oligodendroglioma, WHO grade II (H)    2. S/P craniotomy    3. Visit for suture removal    Barbi Tiwari is doing reasonably well.The wound is healthy without evidence of infection.       PLAN:  We discussed wound care.  *  Keep it open to air  *  May resume shampoo with mild soap/shampoo including the incision after 48 hours. No hair conditioners, hair treatments or skin cream for two more weeks.  *  May swim or bathe when all scabbing is gone from the incision.  *  Call our services if you develop fever, chills, redness, swelling, and/or drainage from incision.  We discussed medication.    *  Advise the patient to continue working with her neurologist regarding medial management for her chronic seizures.  We discussed activities and return to work.  *  We recommend she continue return to normal activities as able.  *  She can return to driving if she is off narcotic and the seizure is under control;  the vehicle is equipped with blindspot mirrors.  *  She can return to work without limits:  We will complete a RTW letter per her request.    We discussed follow up  *  All the patient's questions have been answered and they demonstrate good understanding of the above.    *  Followup with Dr. Rodriguez and Dr. Gallagher at [facility] as instructed.  Imaging for that visit: MRI of brain with and without contrast.  *  The patient has our contact information and is aware  that she should call if she has questions comments or concerns.     We appreciate the opportunity to be of service in the care of this pleasant patient.  Please do call if there is anything more we can do    Alana Herring DNP, APRN, FNP-BC  [facility]   Department of Neurosurgery  Phone: [phone]  Fax: [phone]

## 2018-06-15 NOTE — PATIENT INSTRUCTIONS
Repeat imaging in 9/2018.   Return to clinic same day for repeat evaluation and to review new imaging.     Call your epileptologist to wean Dilantin.     Myrtle Gallagher MD  Neuro-oncology  6/15/2018

## 2018-06-15 NOTE — NURSING NOTE
Chief Complaint   Patient presents with     RECHECK     [facility]- 2 WEEKS POST-OP F/U     Berry Moralez, CMA

## 2018-06-15 NOTE — MR AVS SNAPSHOT
After Visit Summary   6/15/2018    Babri Tiwari    MRN: [id number]           Patient Information     Date Of Birth          1960        Visit Information        Provider Department      6/15/2018 11:00 AM Myrtle Gallagher MD [facility]        Today's Diagnoses     Oligodendroglioma, WHO grade II (H)    -  1    Seizure (H)        Vaginal atrophy        Oligodendroglioma (H)        Anxiety        Gastroesophageal reflux disease without esophagitis          Care Instructions    Repeat imaging in 9/2018.   Return to clinic same day for repeat evaluation and to review new imaging.     Call your epileptologist to wean Dilantin.     Myrtle Gallagher MD  Neuro-oncology  6/15/2018                 Follow-ups after your visit        Your next 10 appointments already scheduled     Sep 17, 2018  1:00 PM CDT   MR BRAIN W/O & W CONTRAST with [id number]   [facility] ([facility])    [address] [place] [id number] [phone]           Take your medicines as usual, unless your doctor tells you not to. Bring a list of your current medicines to your exam (including vitamins, minerals and over-the-counter drugs).  You may or may not receive intravenous (IV) contrast for this exam pending the discretion of the Radiologist.  You do not need to do anything special to prepare.  The MRI machine uses a strong magnet. Please wear clothes without metal (snaps, zippers). A sweatsuit works well, or we may give you a hospital gown.  Please remove any body piercings and hair extensions before you arrive. You will also remove watches, jewelry, hairpins, wallets, dentures, partial dental plates and hearing aids. You may wear contact lenses, and you may be able to wear your rings. We have a safe place to keep your personal items, but it is safer to leave them at home.  **IMPORTANT** THE INSTRUCTIONS BELOW ARE ONLY FOR THOSE PATIENTS  WHO HAVE BEEN PRESCRIBED SEDATION OR GENERAL ANESTHESIA DURING THEIR MRI PROCEDURE:  IF YOUR DOCTOR PRESCRIBED ORAL SEDATION (take medicine to help you relax during your exam):   You must get the medicine from your doctor (oral medication) before you arrive. Bring the medicine to the exam. Do not take it at home. You ll be told when to take it upon arriving for your exam.   Arrive one hour early. Bring someone who can take you home after the test. Your medicine will make you sleepy. After the exam, you may not drive, take a bus or take a taxi by yourself.  IF YOUR DOCTOR PRESCRIBED IV SEDATION:   Arrive one hour early. Bring someone who can take you home after the test. Your medicine will make you sleepy. After the exam, you may not drive, take a bus or take a taxi by yourself.   No eating 6 hours before your exam. You may have clear liquids up until 4 hours before your exam. (Clear liquids include water, clear tea, black coffee and fruit juice without pulp.)  IF YOUR DOCTOR PRESCRIBED ANESTHESIA (be asleep for your exam):   Arrive 1 1/2 hours early. Bring someone who can take you home after the test. You may not drive, take a bus or take a taxi by yourself.   No eating 8 hours before your exam. You may have clear liquids up until 4 hours before your exam. (Clear liquids include water, clear tea, black coffee and fruit juice without pulp.)   You will spend four to five hours in the recovery room.  Please call the Imaging Department at your exam site with any questions.            Sep 17, 2018  3:00 PM CDT   (Arrive by 2:45 PM)   Return Visit with Myrtle Gallagher MD   [facility] ([facility])    [address]  [address]  [place] [address] [phone]              Who to contact     If you have questions or need follow up information about today's clinic visit or your schedule please contact [facility] directly at  "[phone].  Normal or non-critical lab and imaging results will be communicated to you by Moneythinkhart, letter or phone within 4 business days after the clinic has received the results. If you do not hear from us within 7 days, please contact the clinic through Ter[employer] or phone. If you have a critical or abnormal lab result, we will notify you by phone as soon as possible.  Submit refill requests through [employer] or call your pharmacy and they will forward the refill request to us. Please allow 3 business days for your refill to be completed.          Additional Information About Your Visit        Moneythinkhart Information     [employer] gives you secure access to your electronic health record. If you see a primary care provider, you can also send messages to your care team and make appointments. If you have questions, please call your primary care clinic.  If you do not have a primary care provider, please call [phone] and they will assist you.        Care EveryWhere ID     This is your Care EveryWhere ID. This could be used by other organizations to access your [facility] medical records  [id number]        Your Vitals Were     Pulse Temperature Respirations Height Pulse Oximetry BMI (Body Mass Index)    68 98  F (36.7  C) 18 1.613 m (5' 3.5\") 99% 25.52 kg/m2       Blood Pressure from Last 3 Encounters:   06/15/18 103/72   06/15/18 103/72   06/03/18 118/74    Weight from Last 3 Encounters:   06/15/18 66.4 kg (146 lb 6.2 oz)   06/15/18 66.4 kg (146 lb 4.8 oz)   05/30/18 67.9 kg (149 lb 11.1 oz)              Today, you had the following     No orders found for display         Today's Medication Changes          These changes are accurate as of 6/15/18  2:13 PM.  If you have any questions, ask your nurse or doctor.               These medicines have changed or have updated prescriptions.        Dose/Directions    COMPOUNDED NON-CONTROLLED SUBSTANCE - PHARMACY TO MIX COMPOUNDED MEDICATION   Commonly known as:  CMPD RX   This " may have changed:    - when to take this  - reasons to take this   Used for:  Vaginal atrophy   Changed by:  Myrtle Gallagher MD        as needed Apply 1g daily for 2 weeks, then twice weekly to vagina   Refills:  0         Stop taking these medicines if you haven't already. Please contact your care team if you have questions.     dexamethasone 4 MG tablet   Commonly known as:  DECADRON   Stopped by:  Myrtle Gallagher MD           oxyCODONE IR 5 MG tablet   Commonly known as:  ROXICODONE   Stopped by:  Myrtle Gallagher MD                    Primary Care Provider Office Phone # Fax #    Dustin Choudhury [phone]       [address] [place] [address]        Equal Access to Services     [facility] : Brown allano Sojeff, waaxda luqadaha, qaybta kaalmada adekirstenyakayy, nina will . So [place] [phone].    ATENCIÓN: Si habla español, tiene a fletcher disposición servicios gratuitos de asistencia lingüística. Llame al [phone].    We comply with applicable federal civil rights laws and [place] laws. We do not discriminate on the basis of race, color, national origin, age, disability, sex, sexual orientation, or gender identity.            Thank you!     Thank you for choosing [facility]  for your care. Our goal is always to provide you with excellent care. Hearing back from our patients is one way we can continue to improve our services. Please take a few minutes to complete the written survey that you may receive in the mail after your visit with us. Thank you!             Your Updated Medication List - Protect others around you: Learn how to safely use, store and throw away your medicines at www.disposemymeds.org.          This list is accurate as of 6/15/18  2:13 PM.  Always use your most recent med list.                   Brand Name Dispense Instructions for use Diagnosis    acetaminophen 500 MG  tablet    TYLENOL     as needed Take 1,000 mg by mouth every 6 hours if needed. Max acetaminophen dose: 4000mg in 24 hrs.        albuterol 108 (90 Base) MCG/ACT Inhaler    PROAIR HFA/PROVENTIL HFA/VENTOLIN HFA    1 Inhaler    Inhale 2 puffs into the lungs every 6 hours as needed for shortness of breath / dyspnea or wheezing    Cough       ALLEGRA PO      Take by mouth daily        ascorbic acid 500 MG Tabs      Take 500 mg by mouth 2 times daily        COMPOUNDED NON-CONTROLLED SUBSTANCE - PHARMACY TO MIX COMPOUNDED MEDICATION    CMPD RX     as needed Apply 1g daily for 2 weeks, then twice weekly to vagina    Vaginal atrophy       ferrous sulfate 325 (65 Fe) MG tablet    IRON     Take 325 mg by mouth 2 times daily Reported on 5/15/2017        gabapentin 100 MG capsule    NEURONTIN    90 capsule    2 pills 1.5 hrs before bedtime for 7 nights, may increase to 3 pills if needed.        KEPPRA 500 MG tablet   Generic drug:  levETIRAcetam      Take 1,500 mg by mouth 2 times daily        LACTAID PO      Take 3,000 Units by mouth as needed for indigestion        LAMOTRIGINE PO      Take 300 mg by mouth 2 times daily        LORazepam 0.5 MG tablet    ATIVAN     Take 1 tablet (0.5 mg) by mouth as needed for anxiety    Oligodendroglioma (H), Anxiety       metoprolol tartrate 25 MG tablet    LOPRESSOR    180 tablet    Take 1 tablet (25 mg) by mouth 2 times daily    Symptomatic premature ventricular contractions       MULTI-VITAMINS Tabs      every morning take 1 tablet by oral route once daily with food        phenytoin 50 MG chewable tablet    DILANTIN     Take 1 tablet (50 mg) by mouth 3 times daily    Oligodendroglioma, WHO grade II (H)       PRILOSEC OTC PO      Take 20 mg by mouth as needed    Candidiasis of mouth and esophagus (H)       ranitidine 150 MG tablet    ZANTAC     Take 1 tablet (150 mg) by mouth At Bedtime    Gastroesophageal reflux disease without esophagitis       senna-docusate 8.6-50 MG per tablet     SENOKOT-S;PERICOLACE    100 tablet    Take 2 tablets by mouth 2 times daily    Oligodendroglioma, WHO grade II (H)       tretinoin 0.05 % cream    RETIN-A    45 g    Spread a pea size amount into affected area topically at bedtime.  Use sunscreen SPF>20.    Senile sebaceous gland hyperplasia       VITAMIN D (CHOLECALCIFEROL) PO      Take 1,000 Units by mouth 2 times daily Reported on 5/15/2017

## 2018-06-18 ENCOUNTER — MEDICAL CORRESPONDENCE (OUTPATIENT)
Dept: HEALTH INFORMATION MANAGEMENT | Facility: CLINIC | Age: 58
End: 2018-06-18

## 2018-06-21 DIAGNOSIS — G40.119 LOCALIZATION-RELATED SYMPTOMATIC EPILEPSY AND EPILEPTIC SYNDROMES WITH SIMPLE PARTIAL SEIZURES, INTRACTABLE, WITHOUT STATUS EPILEPTICUS (H): Primary | ICD-10-CM

## 2018-06-21 LAB
PHENYTOIN FREE SERPL-MCNC: 0.52 UG/ML
PHENYTOIN SERPL-MCNC: 7.4 MG/L (ref 10–20)

## 2018-06-21 PROCEDURE — 80177 DRUG SCRN QUAN LEVETIRACETAM: CPT | Mod: 90

## 2018-06-21 PROCEDURE — 80186 ASSAY OF PHENYTOIN FREE: CPT | Mod: 90

## 2018-06-21 PROCEDURE — 99000 SPECIMEN HANDLING OFFICE-LAB: CPT

## 2018-06-21 PROCEDURE — 80175 DRUG SCREEN QUAN LAMOTRIGINE: CPT | Mod: 90

## 2018-06-21 PROCEDURE — 80185 ASSAY OF PHENYTOIN TOTAL: CPT

## 2018-06-21 PROCEDURE — 36415 COLL VENOUS BLD VENIPUNCTURE: CPT

## 2018-06-22 LAB
LAMOTRIGINE SERPL-MCNC: 4.1 UG/ML (ref 2.5–15)
LEVETIRACETAM SERPL-MCNC: 31 UG/ML (ref 12–46)

## 2018-07-26 ENCOUNTER — DOCUMENTATION ONLY (OUTPATIENT)
Dept: LAB | Facility: CLINIC | Age: 58
End: 2018-07-26

## 2018-07-26 NOTE — PROGRESS NOTES
Please place or confirm orders for upcoming lab appointment on 07/27/18. Thank you.    If patient doesn't need any labs, please let patient know and cancel appointment. Thank you.

## 2018-07-26 NOTE — PROGRESS NOTES
Maybe outside orders?  I don't need any labs but has had several labs done recently that where she has brought in outside orders

## 2018-07-27 ENCOUNTER — DOCUMENTATION ONLY (OUTPATIENT)
Dept: LAB | Facility: CLINIC | Age: 58
End: 2018-07-27

## 2018-07-29 ENCOUNTER — MYC REFILL (OUTPATIENT)
Dept: SLEEP MEDICINE | Facility: CLINIC | Age: 58
End: 2018-07-29

## 2018-07-29 ENCOUNTER — MYC REFILL (OUTPATIENT)
Dept: INTERNAL MEDICINE | Facility: CLINIC | Age: 58
End: 2018-07-29

## 2018-07-29 DIAGNOSIS — I49.3 SYMPTOMATIC PREMATURE VENTRICULAR CONTRACTIONS: ICD-10-CM

## 2018-07-29 RX ORDER — METOPROLOL TARTRATE 25 MG/1
25 TABLET, FILM COATED ORAL 2 TIMES DAILY
Qty: 180 TABLET | Refills: 3 | Status: CANCELLED | OUTPATIENT
Start: 2018-07-29

## 2018-07-29 RX ORDER — GABAPENTIN 100 MG/1
CAPSULE ORAL
Qty: 90 CAPSULE | Refills: 1 | Status: CANCELLED | OUTPATIENT
Start: 2018-07-29

## 2018-07-30 NOTE — TELEPHONE ENCOUNTER
Message from MyChart:  Original authorizing provider: MD Jessa Hamiltonblair DOUGLASSantiago Te would like a refill of the following medications:  metoprolol tartrate (LOPRESSOR) 25 MG tablet [Dustin Choudhury MD]    Preferred pharmacy: [address]    Comment:

## 2018-07-30 NOTE — TELEPHONE ENCOUNTER
Message from [employer]:  Original authorizing provider: Dustin Choudhury MD    Barbi DOUGLASSantiago Te would like a refill of the following medications:  metoprolol tartrate (LOPRESSOR) 25 MG tablet [Dustin Choudhury MD]    Preferred pharmacy: [address]    Comment:      Medication renewals requested in this message routed to other providers:  gabapentin (NEURONTIN) 100 MG capsule [Natalia Reynoso APRN CNP]

## 2018-07-31 NOTE — TELEPHONE ENCOUNTER
Message from [employer]:  Original authorizing provider: CAMDEN Bateman CNPbalir Tiwari would like a refill of the following medications:  gabapentin (NEURONTIN) 100 MG capsule [CAMDEN Bateman CNP]    Preferred pharmacy: [address]    Comment:      Medication renewals requested in this message routed to other providers:  metoprolol tartrate (LOPRESSOR) 25 MG tablet [Dustin Choudhury MD]

## 2018-07-31 NOTE — TELEPHONE ENCOUNTER
"Requested Prescriptions   Pending Prescriptions Disp Refills     metoprolol tartrate (LOPRESSOR) 25 MG tablet 180 tablet 3      Last Written Prescription Date:  04/17/18  Last Fill Quantity: 180,  # refills: 3   Last office visit: 4/17/2018 with prescribing provider:  04/17/18   Future Office Visit: 0  Next 5 appointments (look out 90 days)     Aug 01, 2018  9:00 AM CDT   [facility]   [facility]    [address] [address] [phone]                Sig: Take 1 tablet (25 mg) by mouth 2 times daily    Beta-Blockers Protocol Passed    7/31/2018  9:53 AM       Passed - Blood pressure under 140/90 in past 12 months    BP Readings from Last 3 Encounters:   06/15/18 103/72   06/15/18 103/72   06/03/18 118/74                Passed - Patient is age 6 or older       Passed - Recent (12 mo) or future (30 days) visit within the authorizing provider's specialty    Patient had office visit in the last 12 months or has a visit in the next 30 days with authorizing provider or within the authorizing provider's specialty.  See \"Patient Info\" tab in inbasket, or \"Choose Columns\" in Meds & Orders section of the refill encounter.            "

## 2018-08-01 DIAGNOSIS — G40.109 LOCALIZATION-RELATED (FOCAL) (PARTIAL) SYMPTOMATIC EPILEPSY AND EPILEPTIC SYNDROMES WITH SIMPLE PARTIAL SEIZURES (H): Primary | ICD-10-CM

## 2018-08-01 PROCEDURE — 80175 DRUG SCREEN QUAN LAMOTRIGINE: CPT | Mod: 90 | Performed by: INTERNAL MEDICINE

## 2018-08-01 PROCEDURE — 99000 SPECIMEN HANDLING OFFICE-LAB: CPT | Performed by: INTERNAL MEDICINE

## 2018-08-02 LAB — LAMOTRIGINE SERPL-MCNC: 7.7 UG/ML (ref 2.5–15)

## 2018-09-17 ENCOUNTER — OFFICE VISIT (OUTPATIENT)
Dept: NEUROSURGERY | Facility: CLINIC | Age: 58
End: 2018-09-17
Attending: NEUROLOGICAL SURGERY
Payer: COMMERCIAL

## 2018-09-17 ENCOUNTER — ONCOLOGY VISIT (OUTPATIENT)
Dept: ONCOLOGY | Facility: CLINIC | Age: 58
End: 2018-09-17
Attending: PSYCHIATRY & NEUROLOGY
Payer: COMMERCIAL

## 2018-09-17 ENCOUNTER — RADIANT APPOINTMENT (OUTPATIENT)
Dept: MRI IMAGING | Facility: CLINIC | Age: 58
End: 2018-09-17
Attending: INTERNAL MEDICINE
Payer: COMMERCIAL

## 2018-09-17 VITALS
RESPIRATION RATE: 18 BRPM | BODY MASS INDEX: 26.78 KG/M2 | TEMPERATURE: 97.8 F | HEART RATE: 93 BPM | WEIGHT: 153.6 LBS | SYSTOLIC BLOOD PRESSURE: 118 MMHG | DIASTOLIC BLOOD PRESSURE: 80 MMHG | OXYGEN SATURATION: 93 %

## 2018-09-17 DIAGNOSIS — K21.9 GASTROESOPHAGEAL REFLUX DISEASE WITHOUT ESOPHAGITIS: ICD-10-CM

## 2018-09-17 DIAGNOSIS — C71.9 OLIGODENDROGLIOMA (H): ICD-10-CM

## 2018-09-17 DIAGNOSIS — C71.9 OLIGODENDROGLIOMA (H): Primary | ICD-10-CM

## 2018-09-17 DIAGNOSIS — C71.9 OLIGODENDROGLIOMA, WHO GRADE II (H): Primary | ICD-10-CM

## 2018-09-17 DIAGNOSIS — R56.9 SEIZURE (H): ICD-10-CM

## 2018-09-17 DIAGNOSIS — F41.9 ANXIETY: ICD-10-CM

## 2018-09-17 PROCEDURE — 40000114 ZZH STATISTIC NO CHARGE CLINIC VISIT

## 2018-09-17 PROCEDURE — 99214 OFFICE O/P EST MOD 30 MIN: CPT | Mod: ZP | Performed by: PSYCHIATRY & NEUROLOGY

## 2018-09-17 RX ORDER — GADOBUTROL 604.72 MG/ML
7.5 INJECTION INTRAVENOUS ONCE
Status: COMPLETED | OUTPATIENT
Start: 2018-09-17 | End: 2018-09-17

## 2018-09-17 RX ADMIN — GADOBUTROL 7.5 ML: 604.72 INJECTION INTRAVENOUS at 12:56

## 2018-09-17 ASSESSMENT — PAIN SCALES - GENERAL: PAINLEVEL: NO PAIN (0)

## 2018-09-17 NOTE — MR AVS SNAPSHOT
After Visit Summary   9/17/2018    Barbi Tiwari    MRN: [id number]           Patient Information     Date Of Birth          1960        Visit Information        Provider Department      9/17/2018 3:45 PM Dustin Rodriguez MD [facility]        Today's Diagnoses     Oligodendroglioma (H)    -  1       Follow-ups after your visit        Your next 10 appointments already scheduled     Dec 10, 2018  1:00 PM CST   MR BRAIN W/O & W CONTRAST with [id number]   [facility] ([facility])    [address] [place] [id number] [phone]           How do I prepare for my exam? (Food and drink instructions) **If you will be receiving sedation or general anesthesia, please see special notes below.**  How do I prepare for my exam? (Other instructions) Take your medicines as usual, unless your doctor tells you not to. You may or may not receive intravenous (IV) contrast for this exam pending the discretion of the Radiologist.  You do not need to do anything special to prepare.  **If you will be receiving sedation or general anesthesia, please see special notes below.**  What should I wear: The MRI machine uses a strong magnet. Please wear clothes without metal (snaps, zippers). A sweatsuit works well, or we may give you a hospital gown. Please remove any body piercings and hair extensions before you arrive. You will also remove watches, jewelry, hairpins, wallets, dentures, partial dental plates and hearing aids. You may wear contact lenses, and you may be able to wear your rings. We have a safe place to keep your personal items, but it is safer to leave them at home.  How long does the exam take: Most tests take 30 to 60 minutes.  HOWEVER, IF YOUR DOCTOR PRESCRIBES ANESTHESIA please plan on spending four to five hours in the recovery room.  What should I bring:  Bring a list of your current medicines to your exam (including  vitamins, minerals and over-the-counter drugs).  Do I need a :  **If you will be receiving sedation or general anesthesia, please see special notes below.**  What should I do after the exam: No Restrictions, You may resume normal activities.  What is this test: MRI (magnetic resonance imaging) uses a strong magnet and radio waves to look inside the body. An MRA (magnetic resonance angiogram) does the same thing, but it lets us look at your blood vessels. A computer turns the radio waves into pictures showing cross sections of the body, much like slices of bread. This helps us see any problems more clearly. You may receive fluid (called  contrast ) before or during your scan. The fluid helps us see the pictures better. We give the fluid through an IV (small needle in your arm).  Who should I call with questions:  Please call the Imaging Department at your exam site with any questions. Directions, parking instructions, and other information is available on our website, [email or web address].  How do I prepare if I m having sedation or anesthesia? **IMPORTANT** THE INSTRUCTIONS BELOW ARE ONLY FOR THOSE PATIENTS WHO HAVE BEEN TOLD THEY WILL RECEIVE SEDATION OR GENERAL ANESTHESIA DURING THEIR MRI PROCEDURE:  IF YOU WILL RECEIVE SEDATION (take medicine to help you relax during your exam): You must get the medicine from your doctor before you arrive. Bring the medicine to the exam. Do not take it at home. Arrive one hour early. Bring someone who can take you home after the test. Your medicine will make you sleepy. After the exam, you may not drive, take a bus or take a taxi by yourself. No eating 8 hours before your exam. You may have clear liquids up until 4 hours before your exam. (Clear liquids include water, clear tea, black coffee and fruit juice without pulp.)  IF YOU WILL RECEIVE ANESTHESIA (be asleep for your exam): Arrive 1 1/2 hours early. Bring someone who can take you home after the test. You may not  drive, take a bus or take a taxi by yourself. No eating 8 hours before your exam. You may have clear liquids up until 4 hours before your exam. (Clear liquids include water, clear tea, black coffee and fruit juice without pulp.)            Dec 10, 2018  3:30 PM CST   (Arrive by 3:15 PM)   Return Visit with Myrtle Gallagher MD   [facility] ([facility] [place]    [address]  [address]  [facility] [address] [phone]              Who to contact     If you have questions or need follow up information about today's clinic visit or your schedule please contact [facility] directly at [phone].  Normal or non-critical lab and imaging results will be communicated to you by MyChart, letter or phone within 4 business days after the clinic has received the results. If you do not hear from us within 7 days, please contact the clinic through [employer] or phone. If you have a critical or abnormal lab result, we will notify you by phone as soon as possible.  Submit refill requests through [employer] or call your pharmacy and they will forward the refill request to us. Please allow 3 business days for your refill to be completed.          Additional Information About Your Visit        [employer] Information     [employer] gives you secure access to your electronic health record. If you see a primary care provider, you can also send messages to your care team and make appointments. If you have questions, please call your primary care clinic.  If you do not have a primary care provider, please call [phone] and they will assist you.        Care EveryWhere ID     This is your Care EveryWhere ID. This could be used by other organizations to access your [facility] medical records  [id number]         Blood Pressure from Last 3 Encounters:   09/17/18 118/80   06/15/18 103/72   06/15/18 103/72    Weight from Last 3 Encounters:   09/17/18 69.7 kg (153 lb 9.6  oz)   06/15/18 66.4 kg (146 lb 6.2 oz)   06/15/18 66.4 kg (146 lb 4.8 oz)              Today, you had the following     No orders found for display         Today's Medication Changes          These changes are accurate as of 9/17/18 11:59 PM.  If you have any questions, ask your nurse or doctor.               Stop taking these medicines if you haven't already. Please contact your care team if you have questions.     phenytoin 50 MG chewable tablet   Commonly known as:  DILANTIN   Stopped by:  Myrtle Gallagher MD                    Primary Care Provider Office Phone # Fax #    Dustin Choudhury [phone]       [address] [place] [id number]        Equal Access to Services     YOBANI GRIFFITH : Brown allano Sojeff, waaxda luqadaha, qaybta kaalmada adekirstenyakayy, nina will . So [facility] [phone].    ATENCIÓN: Si habla español, tiene a fletcher disposición servicios gratuitos de asistencia lingüística. Llame al [phone].    We comply with applicable federal civil rights laws and [place] laws. We do not discriminate on the basis of race, color, national origin, age, disability, sex, sexual orientation, or gender identity.            Thank you!     Thank you for choosing [facility]  for your care. Our goal is always to provide you with excellent care. Hearing back from our patients is one way we can continue to improve our services. Please take a few minutes to complete the written survey that you may receive in the mail after your visit with us. Thank you!             Your Updated Medication List - Protect others around you: Learn how to safely use, store and throw away your medicines at www.disposemymeds.org.          This list is accurate as of 9/17/18 11:59 PM.  Always use your most recent med list.                   Brand Name Dispense Instructions for use Diagnosis    acetaminophen 500 MG tablet    TYLENOL     as needed  Take 1,000 mg by mouth every 6 hours if needed. Max acetaminophen dose: 4000mg in 24 hrs.        albuterol 108 (90 Base) MCG/ACT inhaler    PROAIR HFA/PROVENTIL HFA/VENTOLIN HFA    1 Inhaler    Inhale 2 puffs into the lungs every 6 hours as needed for shortness of breath / dyspnea or wheezing    Cough       ALLEGRA PO      Take by mouth daily        ascorbic acid 500 MG Tabs      Take 500 mg by mouth 2 times daily        COMPOUNDED NON-CONTROLLED SUBSTANCE - PHARMACY TO MIX COMPOUNDED MEDICATION    CMPD RX     as needed Apply 1g daily for 2 weeks, then twice weekly to vagina    Vaginal atrophy       ferrous sulfate 325 (65 Fe) MG tablet    IRON     Take 325 mg by mouth 2 times daily Reported on 5/15/2017        gabapentin 100 MG capsule    NEURONTIN    90 capsule    2 pills 1.5 hrs before bedtime for 7 nights, may increase to 3 pills if needed.        KEPPRA 500 MG tablet   Generic drug:  levETIRAcetam      Take 1,500 mg by mouth 2 times daily        LACTAID PO      Take 3,000 Units by mouth as needed for indigestion        LAMOTRIGINE PO      Take 300 mg by mouth 2 times daily        LORazepam 0.5 MG tablet    ATIVAN     Take 1 tablet (0.5 mg) by mouth as needed for anxiety    Oligodendroglioma (H), Anxiety       metoprolol tartrate 25 MG tablet    LOPRESSOR    180 tablet    Take 1 tablet (25 mg) by mouth 2 times daily    Symptomatic premature ventricular contractions       MULTI-VITAMINS Tabs      every morning take 1 tablet by oral route once daily with food        PRILOSEC OTC PO      Take 20 mg by mouth as needed    Candidiasis of mouth and esophagus (H)       ranitidine 150 MG tablet    ZANTAC     Take 1 tablet (150 mg) by mouth At Bedtime    Gastroesophageal reflux disease without esophagitis       senna-docusate 8.6-50 MG per tablet    SENOKOT-S;PERICOLACE    100 tablet    Take 2 tablets by mouth 2 times daily    Oligodendroglioma, WHO grade II (H)       tretinoin 0.05 % cream    RETIN-A    45 g    Spread a  pea size amount into affected area topically at bedtime.  Use sunscreen SPF>20.    Senile sebaceous gland hyperplasia       VITAMIN D (CHOLECALCIFEROL) PO      Take 1,000 Units by mouth 2 times daily Reported on 5/15/2017

## 2018-09-17 NOTE — PATIENT INSTRUCTIONS
Be evaluated by your eye doctor.     Imaging stable, repeat imaging in 3 months.     Return to clinic in 3 months.    Myrtle Gallagher MD  Neuro-oncology  9/17/2018

## 2018-09-17 NOTE — LETTER
9/17/2018       RE: Barbi Tiwari  [address] [facility] [address]     Dear Colleague,    Thank you for referring your patient, Barbi Tiwari, to the [facility]. Please see a copy of my visit note below.    NEURO-ONCOLOGY VISIT  09/17/2018    CHIEF COMPLAINT: Ms. Barbi Tiwari is a 57 year old right-handed woman with a right frontal diffuse oligodendroglioma (1p19q co-deleted), diagnosed following resection in 5/2011. Repeat resection in 5/2018 did not demonstrate malignant transformation. Currently managed on imaging surveillance.     She is presenting in follow-up for contiuned evaluation and recommendations on treatment accompanied by Kenneth ().       HISTORY OF PRESENT ILLNESS  -She denies any new symptoms; no weakness, numbness, headaches or visual changes.   -Infrequent AM headache (about 2-3 x a week), no change in character. Resolves with Tylenol.   -Fatigue remains an ongoing complaint. Thought to be related to her anti-seizure medications; Lamictal and Keppra.   -On gabapentin for to help with RLS and sleep initiation. Sleep is still poor. Seeing a doctor specializing in sleep medicine.   -Continued mild word-finding issues, otherwise no change in her cognitive function.   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  -No recurrent episodes of right sided facial twitching. No other events concerning for seizures.       REVIEW OF SYSTEMS  A comprehensive ROS negative except as in HPI.      MEDICATIONS   Current Outpatient Prescriptions   Medication     acetaminophen (TYLENOL) 500 MG tablet     ascorbic acid 500 MG TABS     COMPOUNDED NON-CONTROLLED SUBSTANCE (CMPD RX) - PHARMACY TO MIX COMPOUNDED MEDICATION     ferrous sulfate (IRON) 325 (65 FE) MG tablet     Fexofenadine HCl (ALLEGRA PO)     gabapentin (NEURONTIN) 100 MG capsule     Lactase (LACTAID PO)     LAMOTRIGINE PO     levETIRAcetam (KEPPRA) 500 MG tablet     LORazepam (ATIVAN) 0.5 MG tablet      metoprolol tartrate (LOPRESSOR) 25 MG tablet     Multiple Vitamin (MULTI-VITAMINS) TABS     VITAMIN D, CHOLECALCIFEROL, PO     albuterol (PROAIR HFA/PROVENTIL HFA/VENTOLIN HFA) 108 (90 BASE) MCG/ACT Inhaler     Omeprazole Magnesium (PRILOSEC OTC PO)     ranitidine (ZANTAC) 150 MG tablet     senna-docusate (SENOKOT-S;PERICOLACE) 8.6-50 MG per tablet     tretinoin (RETIN-A) 0.05 % cream     No current facility-administered medications for this visit.      DRUG ALLERGIES   Allergies   Allergen Reactions     Benoxinate Nausea and Vomiting     Sulfa Drugs Hives       ONCOLOGIC HISTORY  -4/27/2011 PRESENTATION: New onset seizure, generalized event.   -5/2011 MRB with a non-contrast enhancing right frontal mass lesion.   -5/2011 SURGERY: Craniectomy for mass resection at [facility].   PATHOLOGY: Diffuse oligodendroglioma; WHO grade II, 1p/19q co-deleted.  -6/2011-5/2012 CHEMO: Adjuvant dosed temozolomide x 12 cycles.  -4/2/2018 MRB with possible disease recurrence with a new 1.2 cm non-enhancing nodule within the cortex of the right frontal lobe adjacent to the resection cavity.  -4/12/2018 NEURO-ONC: Recommending repeat resection, appointment scheduled with Dr. Dustin Rodriguez, neurosurgery at the [facility].   -5/30/2018 SURGERY: Repeat resection with Dr. Rodriguez.   PATHOLOGY: Remains low grade, without concerning features.   -6/15/2018 NEURO-ONC: Start imaging surveillance.  -9/17/2018 NEURO-ONC/ MRB: Imaging stable, continue with surveillance.     SOCIAL HISTORY   Tobacco use: Never smoker.   Alcohol use: Social.   Drug use: Denies marijuana use.  Supplement, complimentary/ alternative medicine: None.   Employment: RN.   , 2 children      PHYSICAL EXAMINATION  /80  Pulse 93  Temp 97.8  F (36.6  C) (Oral)  Resp 18  Wt 69.7 kg (153 lb 9.6 oz)  SpO2 93%  BMI 26.78 kg/m2   Wt Readings from Last 2 Encounters:   09/17/18 69.7 kg (153 lb 9.6 oz)   06/15/18 66.4 kg (146 lb 6.2 oz)     Ht Readings from Last 2 Encounters:  "  06/15/18 1.613 m (5' 3.5\")   06/15/18 1.613 m (5' 3.5\")     KPS: 100    -Generally well appearing.  -Throat: No oral thrush.  -Respiratory: Normal breath sounds, no audible wheezing.   -Skin: No rashes. Healed head incision.  -Hematologic/ lymphatic: No abnormal bruising. No leg swelling.  -Psychiatric: Normal mood and affect. Pleasant, talkative.  -Neurologic:   MENTAL STATUS:     Alert, oriented to date.    Recall: Intact.    Speech fluent. Comprehension intact to multi-step commands.   Normal naming, repetition. Able to read.   Good right-left orientation.     CRANIAL NERVES:     Discs flat on fundoscopy.    Pupils are equal, round, reactive to light.     Extraocular movements full, patient denies diplopia.     Visual fields full.     Facial sensation intact to light touch.   Decreased activation with smiling/ talking on the left side of face, very subtle.    Hearing intact.   Palate moves symmetrically.     Sternocleidomastoid and trapezius strength intact.    Tongue midline.  MOTOR:    Normal and symmetric tone.   Grossly 5/5 throughout.    No pronation or drift. No orbiting.   Able to rise from a chair without use of arms.   On toe/ heel walk, equal distance from floor to heels/ toes.   SENSATION:    Intact to light touch throughout.  COORDINATION:   Intact finger-nose with eyes open and closed.   REFLEXES:    Slightly more brisk on the left arm.    Toes not tested. No clonus. No Hoffmans.   No grasp.    GAIT:  Walks without assistance. Foot pain, slightly antalgic.    Good speed. Normal stride length and heel strike. Normal turns. Normal arm swing.   Able to toe, heel walk. Able to tandem walk.       MEDICAL RECORDS  Obtained and personally reviewed all available outside medical records in addition to reviewing any records available in our electronic system.     LABS  Personally reviewed all available lab results.     IMAGING  Personally reviewed MR brain imaging from today. To my eye, there is no new " contrast enhancement. Decreased to stable FLAIR hyperintensity about the cavity.     Imaging was shown to and results were reviewed with Barbi and Kenneth.       IMPRESSION  For the 30 minute appointment, more than 50% of the encounter was spent discussing in detail the nature of this tumor in light of her resect resection. This is in addition to providing emotional support, answering questions pertaining to my recommendations, and devising the treatment plan as outlined below.     Imaging with no concerning changes. She remains grossly asymptomatic with no increase in severity or frequency of seizures. With regard to cancer-directed therapy, I am continuing to recommend imaging surveillance at this time. Barbi and Kenneth are in agreement with this plan.    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   -Repeat imaging in 3 months (12/2018).  -Can increase to 4-6 months if stable at that time.     -SEIZURE MANAGEMENT-  -Continue to follow with currently epileptologist.   -Currently on VPA and Keppra plus Dilantin.     -Quality of life/ MOOD/ FATIGUE-  -Denies any mood issues.  -Continue to monitor mood as untreated/ undertreated depression can worsen fatigue, dysorexia, and quality of life.  -Issues with sleep.     -OTHER-  -Floater; encouraged to be evaluated by an ophthalmologist.     Return to clinic in 3 months for repeat evaluation and to review new imaging.     In the meantime, Barbi knows to call with questions or concerns or to report new complaints and can be seen sooner if needed. Urgent evaluation is needed in the setting of acute onset of severe headache, abrupt change in mental status, on-going seizures, new focal deficits, or new leg swelling/ pain. Everyone in attendance voiced understanding.    Myrtle Gallagher,  MD  Neuro-oncology

## 2018-09-17 NOTE — PROGRESS NOTES
NEURO-ONCOLOGY VISIT  09/17/2018    CHIEF COMPLAINT: Ms. Barbi Tiwari is a 57 year old right-handed woman with a right frontal diffuse oligodendroglioma (1p19q co-deleted), diagnosed following resection in 5/2011. Repeat resection in 5/2018 did not demonstrate malignant transformation. Currently managed on imaging surveillance.     She is presenting in follow-up for contiuned evaluation and recommendations on treatment accompanied by Kenneth ().       HISTORY OF PRESENT ILLNESS  -She denies any new symptoms; no weakness, numbness, headaches or visual changes.   -Infrequent AM headache (about 2-3 x a week), no change in character. Resolves with Tylenol.   -Fatigue remains an ongoing complaint. Thought to be related to her anti-seizure medications; Lamictal and Keppra.   -On gabapentin for to help with RLS and sleep initiation. Sleep is still poor. Seeing a doctor specializing in sleep medicine.   -Continued mild word-finding issues, otherwise no change in her cognitive function.   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  -No recurrent episodes of right sided facial twitching. No other events concerning for seizures.       REVIEW OF SYSTEMS  A comprehensive ROS negative except as in HPI.      MEDICATIONS   Current Outpatient Prescriptions   Medication     acetaminophen (TYLENOL) 500 MG tablet     ascorbic acid 500 MG TABS     COMPOUNDED NON-CONTROLLED SUBSTANCE (CMPD RX) - PHARMACY TO MIX COMPOUNDED MEDICATION     ferrous sulfate (IRON) 325 (65 FE) MG tablet     Fexofenadine HCl (ALLEGRA PO)     gabapentin (NEURONTIN) 100 MG capsule     Lactase (LACTAID PO)     LAMOTRIGINE PO     levETIRAcetam (KEPPRA) 500 MG tablet     LORazepam (ATIVAN) 0.5 MG tablet     metoprolol tartrate (LOPRESSOR) 25 MG tablet     Multiple Vitamin (MULTI-VITAMINS) TABS     VITAMIN D, CHOLECALCIFEROL, PO     albuterol (PROAIR HFA/PROVENTIL HFA/VENTOLIN HFA) 108 (90 BASE) MCG/ACT Inhaler     Omeprazole Magnesium (PRILOSEC OTC  "PO)     ranitidine (ZANTAC) 150 MG tablet     senna-docusate (SENOKOT-S;PERICOLACE) 8.6-50 MG per tablet     tretinoin (RETIN-A) 0.05 % cream     No current facility-administered medications for this visit.      DRUG ALLERGIES   Allergies   Allergen Reactions     Benoxinate Nausea and Vomiting     Sulfa Drugs Hives       ONCOLOGIC HISTORY  -4/27/2011 PRESENTATION: New onset seizure, generalized event.   -5/2011 MRB with a non-contrast enhancing right frontal mass lesion.   -5/2011 SURGERY: Craniectomy for mass resection at [facility].   PATHOLOGY: Diffuse oligodendroglioma; WHO grade II, 1p/19q co-deleted.  -6/2011-5/2012 CHEMO: Adjuvant dosed temozolomide x 12 cycles.  -4/2/2018 MRB with possible disease recurrence with a new 1.2 cm non-enhancing nodule within the cortex of the right frontal lobe adjacent to the resection cavity.  -4/12/2018 NEURO-ONC: Recommending repeat resection, appointment scheduled with Dr. Dustin Rodriguez, neurosurgery at the [facility].   -5/30/2018 SURGERY: Repeat resection with Dr. Rodriguez.   PATHOLOGY: Remains low grade, without concerning features.   -6/15/2018 NEURO-ONC: Start imaging surveillance.  -9/17/2018 NEURO-ONC/ MRB: Imaging stable, continue with surveillance.     SOCIAL HISTORY   Tobacco use: Never smoker.   Alcohol use: Social.   Drug use: Denies marijuana use.  Supplement, complimentary/ alternative medicine: None.   Employment: RN.   , 2 children      PHYSICAL EXAMINATION  /80  Pulse 93  Temp 97.8  F (36.6  C) (Oral)  Resp 18  Wt 69.7 kg (153 lb 9.6 oz)  SpO2 93%  BMI 26.78 kg/m2   Wt Readings from Last 2 Encounters:   09/17/18 69.7 kg (153 lb 9.6 oz)   06/15/18 66.4 kg (146 lb 6.2 oz)     Ht Readings from Last 2 Encounters:   06/15/18 1.613 m (5' 3.5\")   06/15/18 1.613 m (5' 3.5\")     KPS: 100    -Generally well appearing.  -Throat: No oral thrush.  -Respiratory: Normal breath sounds, no audible wheezing.   -Skin: No rashes. Healed head incision.  -Hematologic/ " lymphatic: No abnormal bruising. No leg swelling.  -Psychiatric: Normal mood and affect. Pleasant, talkative.  -Neurologic:   MENTAL STATUS:     Alert, oriented to date.    Recall: Intact.    Speech fluent. Comprehension intact to multi-step commands.   Normal naming, repetition. Able to read.   Good right-left orientation.     CRANIAL NERVES:     Discs flat on fundoscopy.    Pupils are equal, round, reactive to light.     Extraocular movements full, patient denies diplopia.     Visual fields full.     Facial sensation intact to light touch.   Decreased activation with smiling/ talking on the left side of face, very subtle.    Hearing intact.   Palate moves symmetrically.     Sternocleidomastoid and trapezius strength intact.    Tongue midline.  MOTOR:    Normal and symmetric tone.   Grossly 5/5 throughout.    No pronation or drift. No orbiting.   Able to rise from a chair without use of arms.   On toe/ heel walk, equal distance from floor to heels/ toes.   SENSATION:    Intact to light touch throughout.  COORDINATION:   Intact finger-nose with eyes open and closed.   REFLEXES:    Slightly more brisk on the left arm.    Toes not tested. No clonus. No Hoffmans.   No grasp.    GAIT:  Walks without assistance. Foot pain, slightly antalgic.    Good speed. Normal stride length and heel strike. Normal turns. Normal arm swing.   Able to toe, heel walk. Able to tandem walk.       MEDICAL RECORDS  Obtained and personally reviewed all available outside medical records in addition to reviewing any records available in our electronic system.     LABS  Personally reviewed all available lab results.     IMAGING  Personally reviewed MR brain imaging from today. To my eye, there is no new contrast enhancement. Decreased to stable FLAIR hyperintensity about the cavity.     Imaging was shown to and results were reviewed with Melvin.       IMPRESSION  For the 30 minute appointment, more than 50% of the encounter was spent  discussing in detail the nature of this tumor in light of her resect resection. This is in addition to providing emotional support, answering questions pertaining to my recommendations, and devising the treatment plan as outlined below.     Imaging with no concerning changes. She remains grossly asymptomatic with no increase in severity or frequency of seizures. With regard to cancer-directed therapy, I am continuing to recommend imaging surveillance at this time. Barbi and Kenneth are in agreement with this plan.    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   -Repeat imaging in 3 months (12/2018).  -Can increase to 4-6 months if stable at that time.     -SEIZURE MANAGEMENT-  -Continue to follow with currently epileptologist.   -Currently on VPA and Keppra plus Dilantin.     -Quality of life/ MOOD/ FATIGUE-  -Denies any mood issues.  -Continue to monitor mood as untreated/ undertreated depression can worsen fatigue, dysorexia, and quality of life.  -Issues with sleep.     -OTHER-  -Floater; encouraged to be evaluated by an ophthalmologist.     Return to clinic in 3 months for repeat evaluation and to review new imaging.     In the meantime, Barbi knows to call with questions or concerns or to report new complaints and can be seen sooner if needed. Urgent evaluation is needed in the setting of acute onset of severe headache, abrupt change in mental status, on-going seizures, new focal deficits, or new leg swelling/ pain. Everyone in attendance voiced understanding.    Myrtle Gallagher MD  Neuro-oncology

## 2018-09-17 NOTE — DISCHARGE INSTRUCTIONS
MRI Contrast Discharge Instructions    The IV contrast you received today will pass out of your body in your  urine. This will happen in the next 24 hours. You will not feel this process.  Your urine will not change color.    Drink at least 4 extra glasses of water or juice today (unless your doctor  has restricted your fluids). This reduces the stress on your kidneys.  You may take your regular medicines.    If you are on dialysis: It is best to have dialysis today.    If you have a reaction: Most reactions happen right away. If you have  any new symptoms after leaving the hospital (such as hives or swelling),  call your hospital at the correct number below. Or call your family doctor.  If you have breathing distress or wheezing, call 911.    Special instructions: ***    I have read and understand the above information.    Signature:______________________________________ Date:___________    Staff:__________________________________________ Date:___________     Time:__________    [facility] Departments:    ___[place]: [phone]  ___[facility]: [phone]  ___[place]: [phone] ___[facility]: [phone]  ___[facility]: [phone]  ___[facility]: [phone]  ___[phone]  ___[facility]: [phone]  ___[phone]

## 2018-09-17 NOTE — MR AVS SNAPSHOT
After Visit Summary   9/17/2018    Barbi Tiwari    MRN: [id number]           Patient Information     Date Of Birth          1960        Visit Information        Provider Department      9/17/2018 3:00 PM Myrtle Gallagher MD [facility]        Today's Diagnoses     Oligodendroglioma, WHO grade II (H)    -  1    Seizure (H)        Gastroesophageal reflux disease without esophagitis        Anxiety          Care Instructions    Be evaluated by your eye doctor.     Imaging stable, repeat imaging in 3 months.     Return to clinic in 3 months.    Myrtle Gallagher MD  Neuro-oncology  9/17/2018             Follow-ups after your visit        Your next 10 appointments already scheduled     Dec 10, 2018  1:00 PM CST   MR BRAIN W/O & W CONTRAST with [id number]   [facility] MRI ([facility])    [address] [place] [address] [phone]           Take your medicines as usual, unless your doctor tells you not to. Bring a list of your current medicines to your exam (including vitamins, minerals and over-the-counter drugs).  You may or may not receive intravenous (IV) contrast for this exam pending the discretion of the Radiologist.  You do not need to do anything special to prepare.  The MRI machine uses a strong magnet. Please wear clothes without metal (snaps, zippers). A sweatsuit works well, or we may give you a hospital gown.  Please remove any body piercings and hair extensions before you arrive. You will also remove watches, jewelry, hairpins, wallets, dentures, partial dental plates and hearing aids. You may wear contact lenses, and you may be able to wear your rings. We have a safe place to keep your personal items, but it is safer to leave them at home.  **IMPORTANT** THE INSTRUCTIONS BELOW ARE ONLY FOR THOSE PATIENTS WHO HAVE BEEN PRESCRIBED SEDATION OR GENERAL ANESTHESIA DURING THEIR MRI PROCEDURE:  IF YOUR  DOCTOR PRESCRIBED ORAL SEDATION (take medicine to help you relax during your exam):   You must get the medicine from your doctor (oral medication) before you arrive. Bring the medicine to the exam. Do not take it at home. You ll be told when to take it upon arriving for your exam.   Arrive one hour early. Bring someone who can take you home after the test. Your medicine will make you sleepy. After the exam, you may not drive, take a bus or take a taxi by yourself.  IF YOUR DOCTOR PRESCRIBED IV SEDATION:   Arrive one hour early. Bring someone who can take you home after the test. Your medicine will make you sleepy. After the exam, you may not drive, take a bus or take a taxi by yourself.   No eating 6 hours before your exam. You may have clear liquids up until 4 hours before your exam. (Clear liquids include water, clear tea, black coffee and fruit juice without pulp.)  IF YOUR DOCTOR PRESCRIBED ANESTHESIA (be asleep for your exam):   Arrive 1 1/2 hours early. Bring someone who can take you home after the test. You may not drive, take a bus or take a taxi by yourself.   No eating 8 hours before your exam. You may have clear liquids up until 4 hours before your exam. (Clear liquids include water, clear tea, black coffee and fruit juice without pulp.)   You will spend four to five hours in the recovery room.  Please call the Imaging Department at your exam site with any questions.            Dec 10, 2018  3:30 PM CST   (Arrive by 3:15 PM)   Return Visit with Myrtle Gallagher MD   [facility] ([facility])    [address] [place] [address]  [address]  [facility] [address] [phone]              Future tests that were ordered for you today     Open Future Orders        Priority Expected Expires Ordered    MR Brain w/o & w Contrast Routine  9/17/2019 9/17/2018            Who to contact     If you have questions or need follow up information about today's clinic visit or  your schedule please contact [facility] directly at [phone].  Normal or non-critical lab and imaging results will be communicated to you by MyChart, letter or phone within 4 business days after the clinic has received the results. If you do not hear from us within 7 days, please contact the clinic through [email or web address] or phone. If you have a critical or abnormal lab result, we will notify you by phone as soon as possible.  Submit refill requests through [employer] or call your pharmacy and they will forward the refill request to us. Please allow 3 business days for your refill to be completed.          Additional Information About Your Visit        [email or web address] Information     [employer] gives you secure access to your electronic health record. If you see a primary care provider, you can also send messages to your care team and make appointments. If you have questions, please call your primary care clinic.  If you do not have a primary care provider, please call [phone] and they will assist you.        Care EveryWhere ID     This is your Care EveryWhere ID. This could be used by other organizations to access your [facility] medical records  [id number]        Your Vitals Were     Pulse Temperature Respirations Pulse Oximetry BMI (Body Mass Index)       93 97.8  F (36.6  C) (Oral) 18 93% 26.78 kg/m2        Blood Pressure from Last 3 Encounters:   09/17/18 118/80   06/15/18 103/72   06/15/18 103/72    Weight from Last 3 Encounters:   09/17/18 69.7 kg (153 lb 9.6 oz)   06/15/18 66.4 kg (146 lb 6.2 oz)   06/15/18 66.4 kg (146 lb 4.8 oz)                 Today's Medication Changes          These changes are accurate as of 9/17/18 10:00 PM.  If you have any questions, ask your nurse or doctor.               Stop taking these medicines if you haven't already. Please contact your care team if you have questions.     phenytoin 50 MG chewable tablet   Commonly known as:  DILANTIN   Stopped by:  Myrtle Gallagher  MD Delisa                    Primary Care Provider Office Phone # Fax #    Dustin Choudhury [phone]       [address] [place] [phone]        Equal Access to Services     YOBANI GRIFFITH : Brown pedroza ku sanjanao Soomaali, waaxda luqadaha, qaybta kaalmada adekirstenyada, nina callangel la. So [facility] [phone].    ATENCIÓN: Si habla español, tiene a fletcher disposición servicios gratuitos de asistencia lingüística. Llame al [phone].    We comply with applicable federal civil rights laws and [place] laws. We do not discriminate on the basis of race, color, national origin, age, disability, sex, sexual orientation, or gender identity.            Thank you!     Thank you for choosing [facility]  for your care. Our goal is always to provide you with excellent care. Hearing back from our patients is one way we can continue to improve our services. Please take a few minutes to complete the written survey that you may receive in the mail after your visit with us. Thank you!             Your Updated Medication List - Protect others around you: Learn how to safely use, store and throw away your medicines at www.disposemymeds.org.          This list is accurate as of 9/17/18 10:00 PM.  Always use your most recent med list.                   Brand Name Dispense Instructions for use Diagnosis    acetaminophen 500 MG tablet    TYLENOL     as needed Take 1,000 mg by mouth every 6 hours if needed. Max acetaminophen dose: 4000mg in 24 hrs.        albuterol 108 (90 Base) MCG/ACT inhaler    PROAIR HFA/PROVENTIL HFA/VENTOLIN HFA    1 Inhaler    Inhale 2 puffs into the lungs every 6 hours as needed for shortness of breath / dyspnea or wheezing    Cough       ALLEGRA PO      Take by mouth daily        ascorbic acid 500 MG Tabs      Take 500 mg by mouth 2 times daily        COMPOUNDED NON-CONTROLLED SUBSTANCE - PHARMACY TO MIX COMPOUNDED MEDICATION    CMPD RX      as needed Apply 1g daily for 2 weeks, then twice weekly to vagina    Vaginal atrophy       ferrous sulfate 325 (65 Fe) MG tablet    IRON     Take 325 mg by mouth 2 times daily Reported on 5/15/2017        gabapentin 100 MG capsule    NEURONTIN    90 capsule    2 pills 1.5 hrs before bedtime for 7 nights, may increase to 3 pills if needed.        KEPPRA 500 MG tablet   Generic drug:  levETIRAcetam      Take 1,500 mg by mouth 2 times daily        LACTAID PO      Take 3,000 Units by mouth as needed for indigestion        LAMOTRIGINE PO      Take 300 mg by mouth 2 times daily        LORazepam 0.5 MG tablet    ATIVAN     Take 1 tablet (0.5 mg) by mouth as needed for anxiety    Oligodendroglioma (H), Anxiety       metoprolol tartrate 25 MG tablet    LOPRESSOR    180 tablet    Take 1 tablet (25 mg) by mouth 2 times daily    Symptomatic premature ventricular contractions       MULTI-VITAMINS Tabs      every morning take 1 tablet by oral route once daily with food        PRILOSEC OTC PO      Take 20 mg by mouth as needed    Candidiasis of mouth and esophagus (H)       ranitidine 150 MG tablet    ZANTAC     Take 1 tablet (150 mg) by mouth At Bedtime    Gastroesophageal reflux disease without esophagitis       senna-docusate 8.6-50 MG per tablet    SENOKOT-S;PERICOLACE    100 tablet    Take 2 tablets by mouth 2 times daily    Oligodendroglioma, WHO grade II (H)       tretinoin 0.05 % cream    RETIN-A    45 g    Spread a pea size amount into affected area topically at bedtime.  Use sunscreen SPF>20.    Senile sebaceous gland hyperplasia       VITAMIN D (CHOLECALCIFEROL) PO      Take 1,000 Units by mouth 2 times daily Reported on 5/15/2017

## 2018-09-17 NOTE — LETTER
9/17/2018       RE: Barbi Tiwari  [address] [facility] [address]     Dear Colleague,    Thank you for referring your patient, Barbi Tiwari, to the [facility]. Please see a copy of my visit note below.    Service Date: 09/17/2018      It was my pleasure to meet Ms. Tiwari and her , Kenneth, in our clinic today.      Ms. 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      Ms. Tiwari initially underwent craniotomy for tumor resection at [facility] by Dr. Kerr, followed by 1 year of adjuvant therapy with temozolomide.  A followup MRI was obtained that demonstrated tumor recurrence in the inferolateral aspect of the resection cavity.  Therefore, she underwent a repeat craniotomy for tumor resection as stated above, with pathology confirming recurrent WHO grade 2 oligodendroglioma.  She recovered well and was discharged home on postop day 1 in good condition.  She presents to our clinic today with no neurologic symptoms and no complaints with a repeat MRI of the head with and without contrast.      Ms. Tiwari also expresses questions regarding the palpable feeling of a slight depression along the edge where the bone flap meets the skull.  She denies any incisional pain, headache, blurry vision, or any neurologic symptoms.       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     IMAGING:  MRI of the brain with and without contrast dated today, 09/17/2018:  Demonstrative of stable postsurgical changes of a right frontoparietal craniotomy with underlying resection cavity associated with hemosiderin staining.  No evidence of tumor recurrence.  No suspicious foci of intracranial enhancement.     ASSESSMENT:  Recurrent WHO grade 2 oligodendroglioma status post redo craniotomy  for tumor resection.      PLAN:  Ms. Kauffman has been doing very well in the postoperative period.  As she does not note any neurologic symptoms at this time and has no evidence of tumor recurrence on MRI with no new areas of suspicious enhancement, Dr. Rodriguez informed the patient that she will not need scheduled followup in our Neurosurgery Clinic in the future.  However, Ms. Kauffman may feel free to call our clinic at any time with further issues, questions or concerns or to schedule an appointment if needed.  Ms. Kauffman will continue to follow up with Dr. Gallagher, her neuro-oncologist, with plan for a repeat MRI of the brain with and without contrast and subsequent clinic visit in 3 months.  This plan was discussed with the patient, Dr. Rodriguez, and Dr. Gallagher, and all parties were amenable.      Again, it was my pleasure to meet Ms. Kauffman and her  during today's clinic visit.      Dr. Rodriguez spent approximately 20 minutes in conversation with the patient during this encounter.  The majority of that time was spent in counseling and care coordination.   Dictated by Scotty Lopez MD, Neurosurgery Resident, PGY1.       cc:  MD Myrtle Crisostomo MD MATTHEW A. HUNT, MD       As dictated by SCOTTY LOPEZ MD     I saw the patient with the resident.  I have reviewed and edited the resident note and agree with the plan of care.      Dustin Rodriguez MD            D: 2018   T: 2018   MT: LG      Name:     DYLAN KAUFFMAN   MRN:      [id number]        Account:      [id number]   :      1960           Service Date: 2018      Document: [id number]        Again, thank you for allowing me to participate in the care of your patient.      Sincerely,    Dustin Rodriguez MD

## 2018-09-18 NOTE — PROGRESS NOTES
Service Date: 09/17/2018      It was my pleasure to meet Ms. Tiwari and her , Kenneth, in our clinic today.      Ms. 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      Ms. Tiwari initially underwent craniotomy for tumor resection at [facility] by Dr. Kerr, followed by 1 year of adjuvant therapy with temozolomide.  A followup MRI was obtained that demonstrated tumor recurrence in the inferolateral aspect of the resection cavity.  Therefore, she underwent a repeat craniotomy for tumor resection as stated above, with pathology confirming recurrent WHO grade 2 oligodendroglioma.  She recovered well and was discharged home on postop day 1 in good condition.  She presents to our clinic today with no neurologic symptoms and no complaints with a repeat MRI of the head with and without contrast.      Ms. Tiwari also expresses questions regarding the palpable feeling of a slight depression along the edge where the bone flap meets the skull.  She denies any incisional pain, headache, blurry vision, or any neurologic symptoms.       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     IMAGING:  MRI of the brain with and without contrast dated today, 09/17/2018:  Demonstrative of stable postsurgical changes of a right frontoparietal craniotomy with underlying resection cavity associated with hemosiderin staining.  No evidence of tumor recurrence.  No suspicious foci of intracranial enhancement.     ASSESSMENT:  Recurrent WHO grade 2 oligodendroglioma status post redo craniotomy for tumor resection.      PLAN:  Ms. Tiwari has been doing very well in the postoperative period.  As she does not note any neurologic symptoms at this time and has no evidence of tumor recurrence on MRI with no new areas of suspicious  enhancement, Dr. Rodriguez informed the patient that she will not need scheduled followup in our Neurosurgery Clinic in the future.  However, Ms. Kauffman may feel free to call our clinic at any time with further issues, questions or concerns or to schedule an appointment if needed.  Ms. Kauffman will continue to follow up with Dr. Gallagher, her neuro-oncologist, with plan for a repeat MRI of the brain with and without contrast and subsequent clinic visit in 3 months.  This plan was discussed with the patient, Dr. Rodriguez, and Dr. Gallagher, and all parties were amenable.      Again, it was my pleasure to meet Ms. Kauffman and her  during today's clinic visit.      Dr. Rodriguez spent approximately 20 minutes in conversation with the patient during this encounter.  The majority of that time was spent in counseling and care coordination.   Dictated by Scotty Lopez MD, Neurosurgery Resident, PGY1.       cc:  MD Myrtle Crisostomo MD MATTHEW A. HUNT, MD       As dictated by SCOTTY LOPEZ MD     I saw the patient with the resident.  I have reviewed and edited the resident note and agree with the plan of care.      Dustin Rodriguez MD            D: 2018   T: 2018   MT: MARIA TERESA      Name:     DYLAN KAUFFMAN   MRN:      [id number]        Account:      [id number]   :      1960           Service Date: 2018      Document: [id number]

## 2018-10-08 ENCOUNTER — OFFICE VISIT (OUTPATIENT)
Dept: PODIATRY | Facility: CLINIC | Age: 58
End: 2018-10-08
Payer: COMMERCIAL

## 2018-10-08 ENCOUNTER — RADIANT APPOINTMENT (OUTPATIENT)
Dept: GENERAL RADIOLOGY | Facility: CLINIC | Age: 58
End: 2018-10-08
Attending: PODIATRIST
Payer: COMMERCIAL

## 2018-10-08 ENCOUNTER — MYC MEDICAL ADVICE (OUTPATIENT)
Dept: INTERNAL MEDICINE | Facility: CLINIC | Age: 58
End: 2018-10-08

## 2018-10-08 VITALS
WEIGHT: 153 LBS | BODY MASS INDEX: 26.12 KG/M2 | DIASTOLIC BLOOD PRESSURE: 76 MMHG | HEIGHT: 64 IN | SYSTOLIC BLOOD PRESSURE: 124 MMHG

## 2018-10-08 DIAGNOSIS — M25.562 LEFT KNEE PAIN, UNSPECIFIED CHRONICITY: Primary | ICD-10-CM

## 2018-10-08 DIAGNOSIS — M25.571 PAIN, JOINT, FOOT, RIGHT: Primary | ICD-10-CM

## 2018-10-08 DIAGNOSIS — M20.11 HALLUX VALGUS OF RIGHT FOOT: ICD-10-CM

## 2018-10-08 PROCEDURE — 73630 X-RAY EXAM OF FOOT: CPT | Mod: RT

## 2018-10-08 PROCEDURE — 99213 OFFICE O/P EST LOW 20 MIN: CPT | Performed by: PODIATRIST

## 2018-10-08 NOTE — MR AVS SNAPSHOT
"              After Visit Summary   10/8/2018    Barbi Tiwari    MRN: [id number]           Patient Information     Date Of Birth          1960        Visit Information        Provider Department      10/8/2018 9:45 AM Benjamin Salamanca DPM [facility]        Care Instructions    Thank you for choosing [facility]!     DR. SALAMANCA'S CLINIC LOCATIONS     MONDAY- [place] WEDNESDAY - [place]   [address] [address] [place] [address]   [phone] / [phone] / [phone]       THURSDAY - [place] SCHEDULE SURGERY: [phone]   [address] APPOINTMENTS: [phone]   [place] [address] BILLING QUESTIONS: [phone] / [phone]       FOOT & ANKLE SURGERY PLANNING CHECKLIST  If you have decided to have surgery, follow these 5 steps to get the procedure scheduled and to have the proper paperwork filled out. If you are unsure about surgery or would like to sit down and further discuss your issue and treatment options, please make an appointment.    1.  Pick the date that you would like to have surgery. Surgery is done on a Tuesday. Keep in mind that you will likely need at least 2 weeks off after the procedure for proper rest and healing.    2.  Call the surgery scheduling line at [phone] to get the procedure scheduled. Our  will also help you make your pre-operative physical with a primary doctor, your surgery consult appointment with me and your one week follow up after surgery for your first dressing change.    3.  If our surgery scheduler does not make your surgery consultation appointment with me then call to schedule that. When making the appointment, say \"I need to make a 30 minute surgical consult appointment\". It is recommended that you bring a spouse, family member or friend with you. There will be lots of information presented. It can be " overwhelming and it is better to have someone there to help sort out the details.    4. Contact your employer to request time off from work if needed. If there is going to be FMLA used, please have them fax the forms to [phone] at least one week prior to surgery.    * If you have any post-operative questions regarding your procedure, call our triage team at the [facility] at [phone] (option 2 > option 3).    BUNION (HALLUX ABDUCTO VALGUS)  A bunion is caused by muscle imbalance. The great toe is pulled toward the smaller toes. The metatarsal head is pushed outward creating a lump on the side of your foot. Imbalance is the result of foot structure and instability.   Bunions do not improve with time. They usually enlarge, however this is a fairly slow process. Shoes do not necessarily cause bunions, however, they can hasten development and definitely cause bunions to hurt.   Bunions often run in families. We inherit a certain foot structure, which may be predisposed to bunion development.   Bunion pain is likely a combination of shoes rubbing on the bump, nerve irritation, compression between the toes, joint misalignment, arthritis and altered gait.   SYMPTOMS   Bunions are usually termed mild, moderate or severe. Just because you have a bunion does not mean you have to have pain. There are some people with very severe bunions and no pain and people with mild bunions and a lot of pain.   - Pain on the inside of your foot at the big toe joint (1st MTPJ)   - Swelling on the inside of your foot at the big toe joint   - Redness on the inside of your foot at the big toe joint   - Numbness or burning in the big toe (hallux)   - Decreased motion at the big toe joint   - Painful bursa (fluid-filled sac) on the inside of your foot at the big toe joint   - Pain while wearing shoes -especially shoes too narrow or with high heels    - Pain during activities   - Corn in between the big toe and  second toe   - Callous formation on the side or bottom of the big toe or big toe joint   - Callous under the second toe joint (2nd MTPJ)   - Pain in the second toe joint   TREATMENT  Conservative (non-surgical) treatment will not make the bunion go away, but it will hopefully decrease the signs and symptoms you have and help you get rid of the pain and get you back to your activities.   1.  Wider shoes or extra depth shoes: Most bunion pain can be improved simply by wearing compatible shoes. People with bunions cannot choose footwear simply because they like the style. Your bunion should determine which shoes are to be worn. Wide shoes with nonirritating seams,soft leather and a square toe box are most compatible with a bunion. Shoes should fit appropriately right out of the box but may need to be professionally stretched and modified to accommodate the bump. Heels, dress shoes and shoes with pointed toes will not be comfortable.   2. NSAIDs   3.  Arch supports, custom inserts, padding, splints, toe spacers : Most bunion pain can be improved simply by wearing compatible shoes. People with bunions cannot choose footwear simply because they like the style. Your bunion should determine which shoes are to be worn. Wide shoes with nonirritating seams,soft leather and a square toe box are most compatible with a bunion. Shoes should fit appropriately right out of the box but may need to be professionally stretched and modified to accommodate the bump. Heels, dress shoes and shoes with pointed toes will not be comfortable.   4.  Change activities   5.  Physical therapy  SURGERY  Surgical treatment for bunions is sometimes needed. If you are limited by pain, cannot fit in shoes comfortably and are not able to do your daily activities then surgery may be a good option for you. There are many different surgical procedures to repair bunions. Your foot and ankle surgeon will review your foot exam findings, your x-rays, your age,  your health, your lifestyle, your physical activity level and discuss with you which procedure he or she would recommend. Surgical procedures for bunions range from soft tissue repair to cutting and realigning the bones. It is not recommended that you have bunion surgery for cosmetic reasons (you do not like how your foot looks) or because you want to fit in a certain pair of shoes; There is the risk that even after surgery, the bunion will reoccur 9-10% of the time.   Bunion surgery involves cutting and repositioning the bones surrounding the bunion. Pins and screws are used to hold the bones in place during the healing process. The goal of bunion surgery is to reduce the size of the bunion bump. Realignment of the toe and joint is attempted.     Some first toes cannot be forced back into normal alignment even with surgery. Surgery is helpful in most cases but does not necessarily create a normal foot.   Healing after surgery requires about six weeks of protection. This allows the bone to heal. Maximum recovery takes about one year. The scar tissue and jOint structures require this amount of time to finish the healing process. Expect stiffness, swelling and numbness during that time frame. Bunion surgery does involve side effects. Some side effects are predictable and others are less common but do occur. A scar will be visible and could be irritated by shoes. The shoe may rub on the screw or internal pin requiring surgical removal of these fixation devices. The screw and pin would likely be left in place for a full year. The first toe may loose motion after bunion surgery. The amount of stiffness is variable. Some people never regain normal motion of the first toe. This is due to scar tissue inherent to any surgery. The first toe may drift toward the second toe or away from the second toe. Spreading of the first and second toes is a rare occurrence after bunion surgery. This can be quite bothersome and would need  to be surgically repaired. Toe drift toward the second toe could result in a recurrent bunion and revision surgery. Joint fusion is one option to correct an unstable, drifting toe. This procedure straightens the toe, however, no motion remains. Fusion may be necessary to correct complications of bunion surgery or as the original procedure in severe cases.   All surgical procedures involve risk of infection, numbness, pain, delayed healing, osteotomy dislocation, blood clots, continued foot pain, etc. Bunion surgery is quite complex and should not be taken lightly.   Any skin incision can lead to infection. Deep infection might involve the bone and thus repeat surgery and six weeks of IV antibiotics. Scar tissue can cause nerve pain or numbness. This is generally temporary but can be permanent. We do not have treatments that cure nerve problems. Second toe pain could be related to altered mechanics and pressure transferred to the second toe. Most feet with bunions have pre-existing second toe problems. Delayed bone healing would lengthen the healing time. Some bones simply do not heal. This requires repeat surgery, electronic bone stimulation and/or extended protection. Smokers have an approximate 20% chance of poor bone healing. This is double that of a non-smoker. The bone cut may displace. This may need to be repaired with a second operation. Displacement can cause jOint malalignment. Immobility after surgery can cause blood clots in the legs and lungs. This could result in death.   Foot pain is complex. Most feet hurt for more than one reason. Fixing the bunion would not necessarily create a pain free foot. Appropriate shoes, healthy body weight, avoidance of bare foot walking and moderation of activity will always be necessary to enjoy foot comfort. Your bunion may involve arthritis, which is incurable even with surgery. Long standing bunions often involve chronic irritation to the surrounding nerves. Nerve pain  may not resolve even with reducing the bunion bump since permanent nerve damage may be present   Bunion surgery is nevertheless quite successful. Most surgical patients are pleased with their foot following bunion surgery. Many of the issues described above can be controlled by taking proper care of your foot during the healing process.   Your surgeon would be happy to fully describe any of the above issues. You should pursue a full understanding of the operation,recovery process and any potential problems that could develop.   PREVENTION  1.  Do not wear high heels if there is a family history of bunions.  2.  Wear shoes that have enough width and depth in the toe box  Here are exercises that may benefit people with bunions:   Toe stretches - Stretching out your toes can help keep them limber and offset foot pain. To stretch your toes, point your toes straight ahead for 5 seconds and then curl them under for 5 seconds. Repeat these stretches 10 times. These exercises can be especially beneficial if you also have hammertoes, or chronically bent toes, in addition to a bunion.   Toe flexing and tea - Press your toes against a hard surface such as a wall, to flex and stretch them; hold the position for 10 seconds and repeat three to four times. Then flex your toes in the opposite direction; hold the position for 10 seconds and repeat three to four times.   Stretching your big toe - Using your fingers to gently pull your big toe over into proper alignment can be helpful as well. Hold your toe in position for 10 seconds and repeat three to four times.   Resistance exercises - Wrap either a towel or belt around your big toe and use it to pull your big toe toward you while simultaneously pushing forward, against the towel, with your big toe.   Ball roll - To massage the bottom of your foot, sit down, place a golf ball on the floor under your foot, and roll it around under your foot for two minutes. This can help  relieve foot strain and cramping.   Towel curls - You can strengthen your toes by spreading out a small towel on the floor, curling your toes around it, and pulling it toward you. Repeat five times. Gripping objects with your toes like this can help keep your foot flexible.   Picking up marbles - Another gripping exercise you can perform to keep your foot flexible is picking up marbles with your toes. Do this by placing 20 marbles on the floor in front of you and use your foot to pick the marbles up one by one and place them in a bowl.   Walking along the beach - Whenever possible, spend time walking on sand. This can give you a gentle foot massage and also help strengthen your toes. This is especially beneficial for people who have arthritis associated with their bunions.       Body Mass Index (BMI)  Many things can cause foot and ankle problems. Foot structure, activity level, foot mechanics and injuries are common causes of pain.  One very important issue that often goes unmentioned, is body weight.  Extra weight can cause increased stress on muscles, ligaments, bones and tendons.  Sometimes just a few extra pounds is all it takes to put one over her/his threshold. Without reducing that stress, it can be difficult to alleviate pain. Some people are uncomfortable addressing this issue, but we feel it is important for you to think about it. As Foot &  Ankle specialists, our job is addressing the lower extremity problem and possible causes. Regarding extra body weight, we encourage patients to discuss diet and weight management plans with their primary care doctors. It is this team approach that gives you the best opportunity for pain relief and getting you back on your feet.                  Follow-ups after your visit        Your next 10 appointments already scheduled     Dec 10, 2018  1:00 PM CST   MR BRAIN W/O & W CONTRAST with [id number]   [facility] ([facility])    [address] [id number] [phone]           How do I prepare for my exam? (Food and drink instructions) **If you will be receiving sedation or general anesthesia, please see special notes below.**  How do I prepare for my exam? (Other instructions) Take your medicines as usual, unless your doctor tells you not to. You may or may not receive intravenous (IV) contrast for this exam pending the discretion of the Radiologist.  You do not need to do anything special to prepare.  **If you will be receiving sedation or general anesthesia, please see special notes below.**  What should I wear: The MRI machine uses a strong magnet. Please wear clothes without metal (snaps, zippers). A sweatsuit works well, or we may give you a hospital gown. Please remove any body piercings and hair extensions before you arrive. You will also remove watches, jewelry, hairpins, wallets, dentures, partial dental plates and hearing aids. You may wear contact lenses, and you may be able to wear your rings. We have a safe place to keep your personal items, but it is safer to leave them at home.  How long does the exam take: Most tests take 30 to 60 minutes.  HOWEVER, IF YOUR DOCTOR PRESCRIBES ANESTHESIA please plan on spending four to five hours in the recovery room.  What should I bring:  Bring a list of your current medicines to your exam (including vitamins, minerals and over-the-counter drugs).  Do I need a :  **If you will be receiving sedation or general anesthesia, please see special notes below.**  What should I do after the exam: No Restrictions, You may resume normal activities.  What is this test: MRI (magnetic resonance imaging) uses a strong magnet and radio waves to look inside the body. An MRA (magnetic resonance angiogram) does the same thing, but it lets us look at your blood vessels. A computer turns the radio waves into pictures showing cross sections of the body, much like slices of  bread. This helps us see any problems more clearly. You may receive fluid (called  contrast ) before or during your scan. The fluid helps us see the pictures better. We give the fluid through an IV (small needle in your arm).  Who should I call with questions:  Please call the Imaging Department at your exam site with any questions. Directions, parking instructions, and other information is available on our website, [email or web address].  How do I prepare if I m having sedation or anesthesia? **IMPORTANT** THE INSTRUCTIONS BELOW ARE ONLY FOR THOSE PATIENTS WHO HAVE BEEN TOLD THEY WILL RECEIVE SEDATION OR GENERAL ANESTHESIA DURING THEIR MRI PROCEDURE:  IF YOU WILL RECEIVE SEDATION (take medicine to help you relax during your exam): You must get the medicine from your doctor before you arrive. Bring the medicine to the exam. Do not take it at home. Arrive one hour early. Bring someone who can take you home after the test. Your medicine will make you sleepy. After the exam, you may not drive, take a bus or take a taxi by yourself. No eating 8 hours before your exam. You may have clear liquids up until 4 hours before your exam. (Clear liquids include water, clear tea, black coffee and fruit juice without pulp.)  IF YOU WILL RECEIVE ANESTHESIA (be asleep for your exam): Arrive 1 1/2 hours early. Bring someone who can take you home after the test. You may not drive, take a bus or take a taxi by yourself. No eating 8 hours before your exam. You may have clear liquids up until 4 hours before your exam. (Clear liquids include water, clear tea, black coffee and fruit juice without pulp.)            Dec 10, 2018  3:30 PM CST   (Arrive by 3:15 PM)   Return Visit with Myrtle Gallagher MD   [facility] ([facility])    [address]  [address]  [facility] [address] [phone]              Who to contact     If you have questions or need follow up information  "about today's clinic visit or your schedule please contact [facility] directly at [phone].  Normal or non-critical lab and imaging results will be communicated to you by MyChart, letter or phone within 4 business days after the clinic has received the results. If you do not hear from us within 7 days, please contact the clinic through [employer] or phone. If you have a critical or abnormal lab result, we will notify you by phone as soon as possible.  Submit refill requests through [employer] or call your pharmacy and they will forward the refill request to us. Please allow 3 business days for your refill to be completed.          Additional Information About Your Visit        [employer] Information     [employer] gives you secure access to your electronic health record. If you see a primary care provider, you can also send messages to your care team and make appointments. If you have questions, please call your primary care clinic.  If you do not have a primary care provider, please call [phone] and they will assist you.        Care EveryWhere ID     This is your Care EveryWhere ID. This could be used by other organizations to access your [place] medical records  [id number]        Your Vitals Were     Height BMI (Body Mass Index)                5' 3.5\" (1.613 m) 26.68 kg/m2           Blood Pressure from Last 3 Encounters:   10/08/18 124/76   09/17/18 118/80   06/15/18 103/72    Weight from Last 3 Encounters:   10/08/18 153 lb (69.4 kg)   09/17/18 153 lb 9.6 oz (69.7 kg)   06/15/18 146 lb 6.2 oz (66.4 kg)              Today, you had the following     No orders found for display       Primary Care Provider Office Phone # Fax #    Dustin Choudhury [phone]       [address] [facility] [address]        Equal Access to Services     YOBANI GRIFFITH AH: Brown Mustafa, waaxda luqadaha, qaybta kaalmada binh, nina la. So " [facility] [phone].    ATENCIÓN: Si nabeel sanchez, tiene a fletcher disposición servicios gratuitos de asistencia lingüística. Eufemia love [phone].    We comply with applicable federal civil rights laws and [place] laws. We do not discriminate on the basis of race, color, national origin, age, disability, sex, sexual orientation, or gender identity.            Thank you!     Thank you for choosing [facility]  for your care. Our goal is always to provide you with excellent care. Hearing back from our patients is one way we can continue to improve our services. Please take a few minutes to complete the written survey that you may receive in the mail after your visit with us. Thank you!             Your Updated Medication List - Protect others around you: Learn how to safely use, store and throw away your medicines at www.disposemymeds.org.          This list is accurate as of 10/8/18 10:12 AM.  Always use your most recent med list.                   Brand Name Dispense Instructions for use Diagnosis    acetaminophen 500 MG tablet    TYLENOL     as needed Take 1,000 mg by mouth every 6 hours if needed. Max acetaminophen dose: 4000mg in 24 hrs.        albuterol 108 (90 Base) MCG/ACT inhaler    PROAIR HFA/PROVENTIL HFA/VENTOLIN HFA    1 Inhaler    Inhale 2 puffs into the lungs every 6 hours as needed for shortness of breath / dyspnea or wheezing    Cough       ALLEGRA PO      Take by mouth daily        ascorbic acid 500 MG Tabs      Take 500 mg by mouth 2 times daily        COMPOUNDED NON-CONTROLLED SUBSTANCE - PHARMACY TO MIX COMPOUNDED MEDICATION    CMPD RX     as needed Apply 1g daily for 2 weeks, then twice weekly to vagina    Vaginal atrophy       ferrous sulfate 325 (65 Fe) MG tablet    IRON     Take 325 mg by mouth 2 times daily Reported on 5/15/2017        gabapentin 100 MG capsule    NEURONTIN    90 capsule    2 pills 1.5 hrs before bedtime for 7 nights, may increase to 3 pills if needed.         KEPPRA 500 MG tablet   Generic drug:  levETIRAcetam      Take 1,500 mg by mouth 2 times daily        LACTAID PO      Take 3,000 Units by mouth as needed for indigestion        LAMOTRIGINE PO      Take 300 mg by mouth 2 times daily        LORazepam 0.5 MG tablet    ATIVAN     Take 1 tablet (0.5 mg) by mouth as needed for anxiety    Oligodendroglioma (H), Anxiety       metoprolol tartrate 25 MG tablet    LOPRESSOR    180 tablet    Take 1 tablet (25 mg) by mouth 2 times daily    Symptomatic premature ventricular contractions       MULTI-VITAMINS Tabs      every morning take 1 tablet by oral route once daily with food        PRILOSEC OTC PO      Take 20 mg by mouth as needed    Candidiasis of mouth and esophagus (H)       ranitidine 150 MG tablet    ZANTAC     Take 1 tablet (150 mg) by mouth At Bedtime    Gastroesophageal reflux disease without esophagitis       senna-docusate 8.6-50 MG per tablet    SENOKOT-S;PERICOLACE    100 tablet    Take 2 tablets by mouth 2 times daily    Oligodendroglioma, WHO grade II (H)       tretinoin 0.05 % cream    RETIN-A    45 g    Spread a pea size amount into affected area topically at bedtime.  Use sunscreen SPF>20.    Senile sebaceous gland hyperplasia       VITAMIN D (CHOLECALCIFEROL) PO      Take 1,000 Units by mouth 2 times daily Reported on 5/15/2017

## 2018-10-08 NOTE — TELEPHONE ENCOUNTER
Please review [employer] message.     Physical therapy referral pended. Please review, edit/add, sign if appropriate.    Donya WHITTEN RN, BSN, PHN

## 2018-10-08 NOTE — LETTER
"    10/8/2018         RE: Barbi Tiwari  [address]  [facility] [address]        Dear Colleague,    Thank you for referring your patient, Barbi Tiwari, to the [facility]. Please see a copy of my visit note below.    ASSESSMENT/PLAN:  Encounter Diagnoses   Name Primary?     Pain, joint, foot, right Yes     Hallux valgus of right foot       I reviewed the XR images with the patient.  The relevant anatomy and function was discussed, in relation to the her \"bunion.\"    I discussed surgical options with her.   I think MTP fusion is the best option at this point. Her deformity is significant and rigid.  She had two prior bunion procedures in the past.  The hallux is subluxing off of the first metatarsal head.      NOTE:  I realized the signficant difference in length between the 2nd metatarsal and first.  I asked her if she has ball of foot pain. She answered yes.  Re-exam of the foot revealed pain sub right 2nd metatarsophalangeal joint.  We also discussed a Weil osteotomy.     We discussed the surgical procedures and expected length of recovery.   The patient was asked to carefully consider surgery.  If it is scheduled, she will need to return for a more detailed discussion including the planned procedure(s), risks, benefits, post operative cares, course and prognosis.        Body mass index is 26.68 kg/(m^2).    Benjamin Griffin DPM, FACFAS, MS    [place] [facility] of Podiatry/Foot & Ankle Surgery      ____________________________________________________________________    HPI:         Chief Complaint: follow up to learn more about bunion surgery for her right foot.  Last evaluated in November 2017.    Onset of problem: years  Pain/ discomfort is described as:  Throbbing, shooting.  Pain is progressing.  Frequency:  daily    The pain is made worse with walking  Previous treatment: I reviewed conservative options with her at her last visit.     Patient Active Problem List "   Diagnosis     Calculus of kidney     Hyperparathyroidism s/p surgery     GERD (gastroesophageal reflux disease)     Allergic rhinitis     Palpitations     PVC's (premature ventricular contractions)     Seizure (H)     Osteoarthritis     Elevated cholesterol     Abnormal screening cardiac CT     Lumbar radiculopathy     Advanced directives, counseling/discussion     Nephrolithiasis     Oligodendroglioma (H)     Moderate major depression, single episode (H)     S/P laparoscopic hysterectomy     Weakness of face muscles     Past Surgical History:   Procedure Laterality Date     CRANIOTOMY  2011    tumor resection     EXTRACORPOREAL SHOCK WAVE LITHOTRIPSY, CYSTOSCOPY, INSERT STENT URETER(S), COMBINED Bilateral 5/5/2017    Procedure: COMBINED EXTRACORPOREAL SHOCK WAVE LITHOTRIPSY, CYSTOSCOPY, INSERT STENT URETER(S);  CYSTO, URETHRAL DILATION, URETERAL LEFT STENT PLACEMENT, LEFT ESWL.;  Surgeon: Angel Del Rio MD;  Location:  OR     FOOT SURGERY      mulitple     HYSTERECTOMY, PAP NO LONGER INDICATED  2008    lap hys     LITHOTRIPSY  2010 2017     OPTICAL TRACKING SYSTEM CRANIOTOMY, EXCISE TUMOR WITH MAPPING, COMBINED Right 5/30/2018    Procedure: COMBINED OPTICAL TRACKING SYSTEM CRANIOTOMY, EXCISE TUMOR WITH MAPPING;  Right Stealth Assisted Craniotomy With Motor Mapping And Tumor Resection ;  Surgeon: Dustin Rodriguez MD;  Location: [facility] OR     ORTHOPEDIC SURGERY       PARATHYROIDECTOMY  2011     Current Outpatient Prescriptions   Medication Sig Dispense Refill     acetaminophen (TYLENOL) 500 MG tablet as needed Take 1,000 mg by mouth every 6 hours if needed. Max acetaminophen dose: 4000mg in 24 hrs.        ascorbic acid 500 MG TABS Take 500 mg by mouth 2 times daily       COMPOUNDED NON-CONTROLLED SUBSTANCE (CMPD RX) - PHARMACY TO MIX COMPOUNDED MEDICATION as needed Apply 1g daily for 2 weeks, then twice weekly to vagina       ferrous sulfate (IRON) 325 (65 FE) MG tablet Take 325 mg by mouth 2 times daily  Reported on 5/15/2017       Fexofenadine HCl (ALLEGRA PO) Take by mouth daily       gabapentin (NEURONTIN) 100 MG capsule 2 pills 1.5 hrs before bedtime for 7 nights, may increase to 3 pills if needed. 90 capsule 1     Lactase (LACTAID PO) Take 3,000 Units by mouth as needed for indigestion       LAMOTRIGINE PO Take 300 mg by mouth 2 times daily       levETIRAcetam (KEPPRA) 500 MG tablet Take 1,500 mg by mouth 2 times daily        LORazepam (ATIVAN) 0.5 MG tablet Take 1 tablet (0.5 mg) by mouth as needed for anxiety       metoprolol tartrate (LOPRESSOR) 25 MG tablet Take 1 tablet (25 mg) by mouth 2 times daily 180 tablet 3     Multiple Vitamin (MULTI-VITAMINS) TABS every morning take 1 tablet by oral route once daily with food       Omeprazole Magnesium (PRILOSEC OTC PO) Take 20 mg by mouth as needed        ranitidine (ZANTAC) 150 MG tablet Take 1 tablet (150 mg) by mouth At Bedtime       VITAMIN D, CHOLECALCIFEROL, PO Take 1,000 Units by mouth 2 times daily Reported on 5/15/2017       albuterol (PROAIR HFA/PROVENTIL HFA/VENTOLIN HFA) 108 (90 BASE) MCG/ACT Inhaler Inhale 2 puffs into the lungs every 6 hours as needed for shortness of breath / dyspnea or wheezing (Patient not taking: Reported on 9/17/2018) 1 Inhaler 0     senna-docusate (SENOKOT-S;PERICOLACE) 8.6-50 MG per tablet Take 2 tablets by mouth 2 times daily (Patient not taking: Reported on 9/17/2018) 100 tablet 1     tretinoin (RETIN-A) 0.05 % cream Spread a pea size amount into affected area topically at bedtime.  Use sunscreen SPF>20. (Patient not taking: Reported on 9/17/2018) 45 g 11       ROS:    A 10-point review of systems was performed.  It is positive for that noted in the HPI and as seen below.  All other systems found to be negative.     Numbness in feet?  no   Calf pain with walking? no  Recent foot/ankle injury? no  Weight change  over past 12 months? no  Self perception as overweight? yes  Recent flu-like symptoms? no  Joint pain other than  "feet ? hands    EXAM:    Vitals: /76 (BP Location: Right arm, Patient Position: Chair, Cuff Size: Adult Regular)  Ht 5' 3.5\" (1.613 m)  Wt 153 lb (69.4 kg)  BMI 26.68 kg/m2  BMI: Body mass index is 26.68 kg/(m^2).  Height: 5' 3.5\"    Constitutional/ general:  Pt is in no apparent distress, appears well-nourished.  Cooperative with history and physical exam.     Vascular:  Pedal pulses are palpable bilaterally for both the DP and PT arteries.  CFT < 3 sec.  No edema.  Pedal hair growth noted.      Neuro:  Alert and oriented x 3. Coordinated gait.  Light touch sensation is intact to the L4, L5, S1 distributions. No obvious deficits.  No evidence of neurological-based weakness, spasticity, or contracture in the lower extremities.      Derm: Normal texture and turgor.  No erythema, ecchymosis, or cyanosis.  No open lesions.      Musculoskeletal:    Lower extremity muscle strength is normal.  Patient is ambulatory without an assistive device or brace . Bilateral flat foot structure.  Hallux abducto valgus bilateral foot.   Right foot:  The deformity is not fully reducible in the transverse plane - there is tracking.  Digital contractures yet flexible. Right 2nd toe is very short, flaccid, from a previous surgery.       Radiographic Exam:  X-Ray Findings:  I personally reviewed the films.  Four views of the right foot:     Findings are consistent with the clinical exam.  There is an increased 1st intermetatarsal angle (ELODIA = 24 degrees) and increased hallux abductus angle.  The sesamoids are subluxing laterally in relation to the 1st metatarsal head.   Obvious midfoot degenerative changes.  Apparently a phalangectomy of some sort, right 2nd toe.  Post surgical changes of the 1st metatarsal.        Benjamin Griffin DPM, FACFAS, MS    [place] [facility] of Podiatry/Foot & Ankle Surgery              Again, thank you for allowing me to participate in the care of your patient.        Sincerely,        Benjamin Griffin, " SANTO

## 2018-10-08 NOTE — PROGRESS NOTES
"ASSESSMENT/PLAN:  Encounter Diagnoses   Name Primary?     Pain, joint, foot, right Yes     Hallux valgus of right foot       I reviewed the XR images with the patient.  The relevant anatomy and function was discussed, in relation to the her \"bunion.\"    I discussed surgical options with her.   I think MTP fusion is the best option at this point. Her deformity is significant and rigid.  She had two prior bunion procedures in the past.  The hallux is subluxing off of the first metatarsal head.      NOTE:  I realized the signficant difference in length between the 2nd metatarsal and first.  I asked her if she has ball of foot pain. She answered yes.  Re-exam of the foot revealed pain sub right 2nd metatarsophalangeal joint.  We also discussed a Weil osteotomy.     We discussed the surgical procedures and expected length of recovery.   The patient was asked to carefully consider surgery.  If it is scheduled, she will need to return for a more detailed discussion including the planned procedure(s), risks, benefits, post operative cares, course and prognosis.        Body mass index is 26.68 kg/(m^2).    Benjamin Griffin DPM, FACFAS, MS    [place] [facility] of Podiatry/Foot & Ankle Surgery      ____________________________________________________________________    HPI:         Chief Complaint: follow up to learn more about bunion surgery for her right foot.  Last evaluated in November 2017.    Onset of problem: years  Pain/ discomfort is described as:  Throbbing, shooting.  Pain is progressing.  Frequency:  daily    The pain is made worse with walking  Previous treatment: I reviewed conservative options with her at her last visit.     Patient Active Problem List   Diagnosis     Calculus of kidney     Hyperparathyroidism s/p surgery     GERD (gastroesophageal reflux disease)     Allergic rhinitis     Palpitations     PVC's (premature ventricular contractions)     Seizure (H)     Osteoarthritis     Elevated cholesterol     " Abnormal screening cardiac CT     Lumbar radiculopathy     Advanced directives, counseling/discussion     Nephrolithiasis     Oligodendroglioma (H)     Moderate major depression, single episode (H)     S/P laparoscopic hysterectomy     Weakness of face muscles     Past Surgical History:   Procedure Laterality Date     CRANIOTOMY  2011    tumor resection     EXTRACORPOREAL SHOCK WAVE LITHOTRIPSY, CYSTOSCOPY, INSERT STENT URETER(S), COMBINED Bilateral 5/5/2017    Procedure: COMBINED EXTRACORPOREAL SHOCK WAVE LITHOTRIPSY, CYSTOSCOPY, INSERT STENT URETER(S);  CYSTO, URETHRAL DILATION, URETERAL LEFT STENT PLACEMENT, LEFT ESWL.;  Surgeon: Angel Del Rio MD;  Location: [facility] OR     FOOT SURGERY      mulitple     HYSTERECTOMY, PAP NO LONGER INDICATED  2008    lap hys     LITHOTRIPSY  2010 2017     OPTICAL TRACKING SYSTEM CRANIOTOMY, EXCISE TUMOR WITH MAPPING, COMBINED Right 5/30/2018    Procedure: COMBINED OPTICAL TRACKING SYSTEM CRANIOTOMY, EXCISE TUMOR WITH MAPPING;  Right Stealth Assisted Craniotomy With Motor Mapping And Tumor Resection ;  Surgeon: Dustin Rodriguez MD;  Location: [facility] OR     ORTHOPEDIC SURGERY       PARATHYROIDECTOMY  2011     Current Outpatient Prescriptions   Medication Sig Dispense Refill     acetaminophen (TYLENOL) 500 MG tablet as needed Take 1,000 mg by mouth every 6 hours if needed. Max acetaminophen dose: 4000mg in 24 hrs.        ascorbic acid 500 MG TABS Take 500 mg by mouth 2 times daily       COMPOUNDED NON-CONTROLLED SUBSTANCE (CMPD RX) - PHARMACY TO MIX COMPOUNDED MEDICATION as needed Apply 1g daily for 2 weeks, then twice weekly to vagina       ferrous sulfate (IRON) 325 (65 FE) MG tablet Take 325 mg by mouth 2 times daily Reported on 5/15/2017       Fexofenadine HCl (ALLEGRA PO) Take by mouth daily       gabapentin (NEURONTIN) 100 MG capsule 2 pills 1.5 hrs before bedtime for 7 nights, may increase to 3 pills if needed. 90 capsule 1     Lactase (LACTAID PO) Take 3,000 Units by  "mouth as needed for indigestion       LAMOTRIGINE PO Take 300 mg by mouth 2 times daily       levETIRAcetam (KEPPRA) 500 MG tablet Take 1,500 mg by mouth 2 times daily        LORazepam (ATIVAN) 0.5 MG tablet Take 1 tablet (0.5 mg) by mouth as needed for anxiety       metoprolol tartrate (LOPRESSOR) 25 MG tablet Take 1 tablet (25 mg) by mouth 2 times daily 180 tablet 3     Multiple Vitamin (MULTI-VITAMINS) TABS every morning take 1 tablet by oral route once daily with food       Omeprazole Magnesium (PRILOSEC OTC PO) Take 20 mg by mouth as needed        ranitidine (ZANTAC) 150 MG tablet Take 1 tablet (150 mg) by mouth At Bedtime       VITAMIN D, CHOLECALCIFEROL, PO Take 1,000 Units by mouth 2 times daily Reported on 5/15/2017       albuterol (PROAIR HFA/PROVENTIL HFA/VENTOLIN HFA) 108 (90 BASE) MCG/ACT Inhaler Inhale 2 puffs into the lungs every 6 hours as needed for shortness of breath / dyspnea or wheezing (Patient not taking: Reported on 9/17/2018) 1 Inhaler 0     senna-docusate (SENOKOT-S;PERICOLACE) 8.6-50 MG per tablet Take 2 tablets by mouth 2 times daily (Patient not taking: Reported on 9/17/2018) 100 tablet 1     tretinoin (RETIN-A) 0.05 % cream Spread a pea size amount into affected area topically at bedtime.  Use sunscreen SPF>20. (Patient not taking: Reported on 9/17/2018) 45 g 11       ROS:    A 10-point review of systems was performed.  It is positive for that noted in the HPI and as seen below.  All other systems found to be negative.     Numbness in feet?  no   Calf pain with walking? no  Recent foot/ankle injury? no  Weight change  over past 12 months? no  Self perception as overweight? yes  Recent flu-like symptoms? no  Joint pain other than feet ? hands    EXAM:    Vitals: /76 (BP Location: Right arm, Patient Position: Chair, Cuff Size: Adult Regular)  Ht 5' 3.5\" (1.613 m)  Wt 153 lb (69.4 kg)  BMI 26.68 kg/m2  BMI: Body mass index is 26.68 kg/(m^2).  Height: 5' 3.5\"    Constitutional/ " general:  Pt is in no apparent distress, appears well-nourished.  Cooperative with history and physical exam.     Vascular:  Pedal pulses are palpable bilaterally for both the DP and PT arteries.  CFT < 3 sec.  No edema.  Pedal hair growth noted.      Neuro:  Alert and oriented x 3. Coordinated gait.  Light touch sensation is intact to the L4, L5, S1 distributions. No obvious deficits.  No evidence of neurological-based weakness, spasticity, or contracture in the lower extremities.      Derm: Normal texture and turgor.  No erythema, ecchymosis, or cyanosis.  No open lesions.      Musculoskeletal:    Lower extremity muscle strength is normal.  Patient is ambulatory without an assistive device or brace . Bilateral flat foot structure.  Hallux abducto valgus bilateral foot.  Right foot:  The deformity is not fully reducible in the transverse plane - there is tracking.  Digital contractures yet flexible. Right 2nd toe is very short, flaccid, from a previous surgery.       Radiographic Exam:  X-Ray Findings:  I personally reviewed the films.  Four views of the right foot:     Findings are consistent with the clinical exam.  There is an increased 1st intermetatarsal angle (ELODIA = 24 degrees) and increased hallux abductus angle.  The sesamoids are subluxing laterally in relation to the 1st metatarsal head.   Obvious midfoot degenerative changes.  Apparently a phalangectomy of some sort, right 2nd toe.  Post surgical changes of the 1st metatarsal.        Benjamin Griffin DPM, FACJONATHAN, MS    [place] [facility] of Podiatry/Foot & Ankle Surgery

## 2018-10-08 NOTE — PATIENT INSTRUCTIONS
"Thank you for choosing [facility]!     DR. SALAMANCA'S CLINIC LOCATIONS     MONDAY- [place] WEDNESDAY - [place]   [address] [address] [place] [address]   [phone] / [phone] / [phone]       THURSDAY - [place] SCHEDULE SURGERY: [phone]   [address] APPOINTMENTS: [phone]   [place] [address] BILLING QUESTIONS: [phone] / [phone]       FOOT & ANKLE SURGERY PLANNING CHECKLIST  If you have decided to have surgery, follow these 5 steps to get the procedure scheduled and to have the proper paperwork filled out. If you are unsure about surgery or would like to sit down and further discuss your issue and treatment options, please make an appointment.    1.  Pick the date that you would like to have surgery. Surgery is done on a Tuesday. Keep in mind that you will likely need at least 2 weeks off after the procedure for proper rest and healing.    2.  Call the surgery scheduling line at [phone] to get the procedure scheduled. Our  will also help you make your pre-operative physical with a primary doctor, your surgery consult appointment with me and your one week follow up after surgery for your first dressing change.    3.  If our surgery scheduler does not make your surgery consultation appointment with me then call to schedule that. When making the appointment, say \"I need to make a 30 minute surgical consult appointment\". It is recommended that you bring a spouse, family member or friend with you. There will be lots of information presented. It can be overwhelming and it is better to have someone there to help sort out the details.    4. Contact your employer to request time off from work if needed. If there is going to be FMLA used, please have them fax the forms to [phone] at least one week prior to surgery.    * If you have any post-operative questions " regarding your procedure, call our triage team at the [facility] at [phone] (option 2 > option 3).    BUNION (HALLUX ABDUCTO VALGUS)  A bunion is caused by muscle imbalance. The great toe is pulled toward the smaller toes. The metatarsal head is pushed outward creating a lump on the side of your foot. Imbalance is the result of foot structure and instability.   Bunions do not improve with time. They usually enlarge, however this is a fairly slow process. Shoes do not necessarily cause bunions, however, they can hasten development and definitely cause bunions to hurt.   Bunions often run in families. We inherit a certain foot structure, which may be predisposed to bunion development.   Bunion pain is likely a combination of shoes rubbing on the bump, nerve irritation, compression between the toes, joint misalignment, arthritis and altered gait.   SYMPTOMS   Bunions are usually termed mild, moderate or severe. Just because you have a bunion does not mean you have to have pain. There are some people with very severe bunions and no pain and people with mild bunions and a lot of pain.   - Pain on the inside of your foot at the big toe joint (1st MTPJ)   - Swelling on the inside of your foot at the big toe joint   - Redness on the inside of your foot at the big toe joint   - Numbness or burning in the big toe (hallux)   - Decreased motion at the big toe joint   - Painful bursa (fluid-filled sac) on the inside of your foot at the big toe joint   - Pain while wearing shoes -especially shoes too narrow or with high heels    - Pain during activities   - Corn in between the big toe and second toe   - Callous formation on the side or bottom of the big toe or big toe joint   - Callous under the second toe joint (2nd MTPJ)   - Pain in the second toe joint   TREATMENT  Conservative (non-surgical) treatment will not make the bunion go away, but it will hopefully decrease the signs and symptoms you have  and help you get rid of the pain and get you back to your activities.   1.  Wider shoes or extra depth shoes: Most bunion pain can be improved simply by wearing compatible shoes. People with bunions cannot choose footwear simply because they like the style. Your bunion should determine which shoes are to be worn. Wide shoes with nonirritating seams,soft leather and a square toe box are most compatible with a bunion. Shoes should fit appropriately right out of the box but may need to be professionally stretched and modified to accommodate the bump. Heels, dress shoes and shoes with pointed toes will not be comfortable.   2. NSAIDs   3.  Arch supports, custom inserts, padding, splints, toe spacers : Most bunion pain can be improved simply by wearing compatible shoes. People with bunions cannot choose footwear simply because they like the style. Your bunion should determine which shoes are to be worn. Wide shoes with nonirritating seams,soft leather and a square toe box are most compatible with a bunion. Shoes should fit appropriately right out of the box but may need to be professionally stretched and modified to accommodate the bump. Heels, dress shoes and shoes with pointed toes will not be comfortable.   4.  Change activities   5.  Physical therapy  SURGERY  Surgical treatment for bunions is sometimes needed. If you are limited by pain, cannot fit in shoes comfortably and are not able to do your daily activities then surgery may be a good option for you. There are many different surgical procedures to repair bunions. Your foot and ankle surgeon will review your foot exam findings, your x-rays, your age, your health, your lifestyle, your physical activity level and discuss with you which procedure he or she would recommend. Surgical procedures for bunions range from soft tissue repair to cutting and realigning the bones. It is not recommended that you have bunion surgery for cosmetic reasons (you do not like how  your foot looks) or because you want to fit in a certain pair of shoes; There is the risk that even after surgery, the bunion will reoccur 9-10% of the time.   Bunion surgery involves cutting and repositioning the bones surrounding the bunion. Pins and screws are used to hold the bones in place during the healing process. The goal of bunion surgery is to reduce the size of the bunion bump. Realignment of the toe and joint is attempted.     Some first toes cannot be forced back into normal alignment even with surgery. Surgery is helpful in most cases but does not necessarily create a normal foot.   Healing after surgery requires about six weeks of protection. This allows the bone to heal. Maximum recovery takes about one year. The scar tissue and jOint structures require this amount of time to finish the healing process. Expect stiffness, swelling and numbness during that time frame. Bunion surgery does involve side effects. Some side effects are predictable and others are less common but do occur. A scar will be visible and could be irritated by shoes. The shoe may rub on the screw or internal pin requiring surgical removal of these fixation devices. The screw and pin would likely be left in place for a full year. The first toe may loose motion after bunion surgery. The amount of stiffness is variable. Some people never regain normal motion of the first toe. This is due to scar tissue inherent to any surgery. The first toe may drift toward the second toe or away from the second toe. Spreading of the first and second toes is a rare occurrence after bunion surgery. This can be quite bothersome and would need to be surgically repaired. Toe drift toward the second toe could result in a recurrent bunion and revision surgery. Joint fusion is one option to correct an unstable, drifting toe. This procedure straightens the toe, however, no motion remains. Fusion may be necessary to correct complications of bunion surgery or  as the original procedure in severe cases.   All surgical procedures involve risk of infection, numbness, pain, delayed healing, osteotomy dislocation, blood clots, continued foot pain, etc. Bunion surgery is quite complex and should not be taken lightly.   Any skin incision can lead to infection. Deep infection might involve the bone and thus repeat surgery and six weeks of IV antibiotics. Scar tissue can cause nerve pain or numbness. This is generally temporary but can be permanent. We do not have treatments that cure nerve problems. Second toe pain could be related to altered mechanics and pressure transferred to the second toe. Most feet with bunions have pre-existing second toe problems. Delayed bone healing would lengthen the healing time. Some bones simply do not heal. This requires repeat surgery, electronic bone stimulation and/or extended protection. Smokers have an approximate 20% chance of poor bone healing. This is double that of a non-smoker. The bone cut may displace. This may need to be repaired with a second operation. Displacement can cause jOint malalignment. Immobility after surgery can cause blood clots in the legs and lungs. This could result in death.   Foot pain is complex. Most feet hurt for more than one reason. Fixing the bunion would not necessarily create a pain free foot. Appropriate shoes, healthy body weight, avoidance of bare foot walking and moderation of activity will always be necessary to enjoy foot comfort. Your bunion may involve arthritis, which is incurable even with surgery. Long standing bunions often involve chronic irritation to the surrounding nerves. Nerve pain may not resolve even with reducing the bunion bump since permanent nerve damage may be present   Bunion surgery is nevertheless quite successful. Most surgical patients are pleased with their foot following bunion surgery. Many of the issues described above can be controlled by taking proper care of your foot  during the healing process.   Your surgeon would be happy to fully describe any of the above issues. You should pursue a full understanding of the operation,recovery process and any potential problems that could develop.   PREVENTION  1.  Do not wear high heels if there is a family history of bunions.  2.  Wear shoes that have enough width and depth in the toe box  Here are exercises that may benefit people with bunions:   Toe stretches - Stretching out your toes can help keep them limber and offset foot pain. To stretch your toes, point your toes straight ahead for 5 seconds and then curl them under for 5 seconds. Repeat these stretches 10 times. These exercises can be especially beneficial if you also have hammertoes, or chronically bent toes, in addition to a bunion.   Toe flexing and tea - Press your toes against a hard surface such as a wall, to flex and stretch them; hold the position for 10 seconds and repeat three to four times. Then flex your toes in the opposite direction; hold the position for 10 seconds and repeat three to four times.   Stretching your big toe - Using your fingers to gently pull your big toe over into proper alignment can be helpful as well. Hold your toe in position for 10 seconds and repeat three to four times.   Resistance exercises - Wrap either a towel or belt around your big toe and use it to pull your big toe toward you while simultaneously pushing forward, against the towel, with your big toe.   Ball roll - To massage the bottom of your foot, sit down, place a golf ball on the floor under your foot, and roll it around under your foot for two minutes. This can help relieve foot strain and cramping.   Towel curls - You can strengthen your toes by spreading out a small towel on the floor, curling your toes around it, and pulling it toward you. Repeat five times. Gripping objects with your toes like this can help keep your foot flexible.   Picking up marbles - Another  gripping exercise you can perform to keep your foot flexible is picking up marbles with your toes. Do this by placing 20 marbles on the floor in front of you and use your foot to pick the marbles up one by one and place them in a bowl.   Walking along the beach - Whenever possible, spend time walking on sand. This can give you a gentle foot massage and also help strengthen your toes. This is especially beneficial for people who have arthritis associated with their bunions.       Body Mass Index (BMI)  Many things can cause foot and ankle problems. Foot structure, activity level, foot mechanics and injuries are common causes of pain.  One very important issue that often goes unmentioned, is body weight.  Extra weight can cause increased stress on muscles, ligaments, bones and tendons.  Sometimes just a few extra pounds is all it takes to put one over her/his threshold. Without reducing that stress, it can be difficult to alleviate pain. Some people are uncomfortable addressing this issue, but we feel it is important for you to think about it. As Foot &  Ankle specialists, our job is addressing the lower extremity problem and possible causes. Regarding extra body weight, we encourage patients to discuss diet and weight management plans with their primary care doctors. It is this team approach that gives you the best opportunity for pain relief and getting you back on your feet.

## 2018-10-10 ENCOUNTER — TELEPHONE (OUTPATIENT)
Dept: ORTHOPEDICS | Facility: CLINIC | Age: 58
End: 2018-10-10

## 2018-10-15 ENCOUNTER — OFFICE VISIT (OUTPATIENT)
Dept: ORTHOPEDICS | Facility: CLINIC | Age: 58
End: 2018-10-15
Attending: INTERNAL MEDICINE
Payer: COMMERCIAL

## 2018-10-15 VITALS
HEIGHT: 64 IN | WEIGHT: 153 LBS | DIASTOLIC BLOOD PRESSURE: 77 MMHG | SYSTOLIC BLOOD PRESSURE: 122 MMHG | BODY MASS INDEX: 26.12 KG/M2

## 2018-10-15 DIAGNOSIS — M17.12 PRIMARY OSTEOARTHRITIS OF LEFT KNEE: Primary | ICD-10-CM

## 2018-10-15 PROCEDURE — 99203 OFFICE O/P NEW LOW 30 MIN: CPT | Performed by: FAMILY MEDICINE

## 2018-10-15 NOTE — PROGRESS NOTES
[facility]   Clinic Visit s Oct 15, 2018    PCP: Dustin Choudhury      Barbi is a 58 year old female who is seen in consultation at the request of Dr. Choudhury for   Chief Complaint   Patient presents with     Left Knee - Pain       Injury: Reports insidious onset without acute precipitating event. She is having right foot surgery coming up and wants to strengthen her left leg.      Location of Pain: left knee posterior, nonradiating   Duration of Pain: 1 month(s)  Rating of Pain at worst: 3/10  Rating of Pain Currently: 3/10  Pain is better with: activity avoidance   Pain is worse with: sitting, sitting to standing and stair climbing   Treatment so far consists of: ibuprofen  Associated symptoms: no distal numbness or tingling; denies swelling or warmth  Recent imaging completed: No recent imaging completed.  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     Priority: Medium     Nephrolithiasis 05/05/2017     Priority: Medium     Overview:   S/P LITHOTRIPSY 3/15/11       Advanced directives, counseling/discussion 03/24/2017     Priority: Medium     Lumbar radiculopathy 09/08/2016     Priority: Medium     Palpitations 06/05/2014     Priority: Medium     PVC's (premature ventricular contractions) 06/05/2014     Priority: Medium     Elevated cholesterol 06/05/2014     Priority: Medium     Abnormal screening cardiac  CT 06/05/2014     Priority: Medium     Moderate major depression, single episode (H) 03/06/2013     Priority: Medium     Weakness of face muscles 08/18/2011     Priority: Medium     Seizure (H) 08/17/2011     Priority: Medium     Calculus of kidney 01/12/2011     Priority: Medium     Overview:   S/P LITHOTRIPSY 3/15/11       Hyperparathyroidism s/p surgery 01/11/2011     Priority: Medium     Allergic rhinitis 12/09/2009     Priority: Medium     Osteoarthritis 12/09/2009     Priority: Medium     GERD (gastroesophageal reflux disease) 10/22/2008     Priority: Medium       Family History   Problem Relation Age of Onset     Arthritis Mother      Depression Mother      Respiratory Mother      COPD     LUNG DISEASE Mother      C.A.D. Father 58     heart attack     HEART DISEASE Father      Diabetes Brother 70     Depression Sister      Depression Son      Allergies Son      Depression Daughter      Allergies Daughter      Eczema Brother      Depression Sister      Depression Sister        Social History     Social History     Marital status:      Spouse name: N/A     Number of children: N/A     Years of education: N/A     Occupational History     RN- [employer]      Social History Main Topics     Smoking status: Never Smoker     Smokeless tobacco: Never Used     Alcohol use Yes      Comment: 1-2 per month, only at social events     Drug use: No     Sexual activity: Yes     Partners: Male     Birth control/ protection: Female Surgical      Comment: hysterectomy     Other Topics Concern     Parent/Sibling W/ Cabg, Mi Or Angioplasty Before 65f 55m? Yes     Social History Narrative       Past Surgical History:   Procedure Laterality Date     CRANIOTOMY  2011    tumor resection     EXTRACORPOREAL SHOCK WAVE LITHOTRIPSY, CYSTOSCOPY, INSERT STENT URETER(S), COMBINED Bilateral 5/5/2017    Procedure: COMBINED EXTRACORPOREAL SHOCK WAVE LITHOTRIPSY, CYSTOSCOPY, INSERT STENT URETER(S);  CYSTO, URETHRAL DILATION, URETERAL LEFT  "STENT PLACEMENT, LEFT ESWL.;  Surgeon: Angel Del Rio MD;  Location: [facility] OR     FOOT SURGERY      mulitple     HYSTERECTOMY, PAP NO LONGER INDICATED  2008    lap hys     LITHOTRIPSY  2010 2017     OPTICAL TRACKING SYSTEM CRANIOTOMY, EXCISE TUMOR WITH MAPPING, COMBINED Right 5/30/2018    Procedure: COMBINED OPTICAL TRACKING SYSTEM CRANIOTOMY, EXCISE TUMOR WITH MAPPING;  Right Stealth Assisted Craniotomy With Motor Mapping And Tumor Resection ;  Surgeon: Dustin Rodriguez MD;  Location: [facility] OR     ORTHOPEDIC SURGERY       PARATHYROIDECTOMY  2011         Review of Systems   Musculoskeletal: Positive for joint pain.   All other systems reviewed and are negative.        Physical Exam  /77  Ht 5' 3.5\" (1.613 m)  Wt 153 lb (69.4 kg)  BMI 26.68 kg/m2  Constitutional:well-developed, well-nourished, and in no distress.   Cardiovascular: Intact distal pulses.    Neurological: alert. Gait Normal:   Gait, station, stance, and balance appear normal for age  Skin: Skin is warm and dry.   Psychiatric: Mood and affect normal.   Respiratory: unlabored, speaks in full sentences  Lymph: no LAD, no lymphangitis      Left Knee Exam   Swelling: None  Effusion: No    Tenderness   The patient is experiencing tenderness in the Patella facets.    Range of Motion   Extension: Normal  Flexion:     Normal    Tests   McMurrays:  Medial - Negative      Lateral - Negative  Lachman:  Anterior - Negative    Posterior - Negative  Drawer:       Anterior - Negative     Posterior - Negative  Varus:  Negative  Valgus: Negative  Pivot Shift: Negative  Patellar Apprehension: No    Comments:  Mild patellofemoral crepitus with range of motion          ASSESSMENT/PLAN    ICD-10-CM    1. Primary osteoarthritis of left knee M17.12 [facility]     Patient  Declined x-rays today but with symptoms primarily located in the anterior knee where there is correlating crepitus with range of motion, logically this is " chondromalacia also known as patellofemoral degenerative arthritis.  She is not having too much in the way of symptoms but is worried about becoming increasingly reliant on her left knee due to upcoming right foot surgery.  Her preparation is ballotable, and physical therapy designed to strengthen the left knee as much as possible is recommended.  Discussed options including home exercises but she opted for formal physical therapy.  Declined injection but this could still be performed around the time of surgery if needed.    Time spent in one-on-one evalution and discussion with a new patient to this clinic and so  I reviewed/updated the Past Medical History, the Family History and the Social History   regarding nature of problem, course, prior treatments, and therapeutic options, at least 50% of which was spent in counseling and coordination of care: 30 minutes.

## 2018-10-15 NOTE — LETTER
10/15/2018         RE: Barbi Tiwari  [address] [facility] [address]        Dear Colleague,    Thank you for referring your patient, Barbi Tiwari, to the  SP[facility]TS MEDICINE. Please see a copy of my visit note below.    HPI   [facility]   Clinic Visit s Oct 15, 2018    PCP: Dustin Choudhury      Barbi is a 58 year old female who is seen in consultation at the request of Dr. Choudhury for   Chief Complaint   Patient presents with     Left Knee - Pain       Injury: Reports insidious onset without acute precipitating event. She is having right foot surgery coming up and wants to strengthen her left leg.      Location of Pain: left knee posterior, nonradiating   Duration of Pain: 1 month(s)  Rating of Pain at worst: 3/10  Rating of Pain Currently: 3/10  Pain is better with: activity avoidance   Pain is worse with: sitting, sitting to standing and stair climbing   Treatment so far consists of: ibuprofen  Associated symptoms: no distal numbness or tingling; denies swelling or warmth  Recent imaging completed: No recent imaging completed.  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     Priority: Medium     Nephrolithiasis 05/05/2017     Priority: Medium     Overview:   S/P LITHOTRIPSY 3/15/11       Advanced directives, counseling/discussion 03/24/2017     Priority: Medium     Lumbar radiculopathy  09/08/2016     Priority: Medium     Palpitations 06/05/2014     Priority: Medium     PVC's (premature ventricular contractions) 06/05/2014     Priority: Medium     Elevated cholesterol 06/05/2014     Priority: Medium     Abnormal screening cardiac CT 06/05/2014     Priority: Medium     Moderate major depression, single episode (H) 03/06/2013     Priority: Medium     Weakness of face muscles 08/18/2011     Priority: Medium     Seizure (H) 08/17/2011     Priority: Medium     Calculus of kidney 01/12/2011     Priority: Medium     Overview:   S/P LITHOTRIPSY 3/15/11       Hyperparathyroidism s/p surgery 01/11/2011     Priority: Medium     Allergic rhinitis 12/09/2009     Priority: Medium     Osteoarthritis 12/09/2009     Priority: Medium     GERD (gastroesophageal reflux disease) 10/22/2008     Priority: Medium       Family History   Problem Relation Age of Onset     Arthritis Mother      Depression Mother      Respiratory Mother      COPD     LUNG DISEASE Mother      C.A.D. Father 58     heart attack     HEART DISEASE Father      Diabetes Brother 70     Depression Sister      Depression Son      Allergies Son      Depression Daughter      Allergies Daughter      Eczema Brother      Depression Sister      Depression Sister        Social History     Social History     Marital status:      Spouse name: N/A     Number of children: N/A     Years of education: N/A     Occupational History     RN- [employer]      Social History Main Topics     Smoking status: Never Smoker     Smokeless tobacco: Never Used     Alcohol use Yes      Comment: 1-2 per month, only at social events     Drug use: No     Sexual activity: Yes     Partners: Male     Birth control/ protection: Female Surgical      Comment: hysterectomy     Other Topics Concern     Parent/Sibling W/ Cabg, Mi Or Angioplasty Before 65f 55m? Yes     Social History Narrative       Past Surgical History:   Procedure Laterality Date     CRANIOTOMY  2011    tumor resection  "    EXTRACORPOREAL SHOCK WAVE LITHOTRIPSY, CYSTOSCOPY, INSERT STENT URETER(S), COMBINED Bilateral 5/5/2017    Procedure: COMBINED EXTRACORPOREAL SHOCK WAVE LITHOTRIPSY, CYSTOSCOPY, INSERT STENT URETER(S);  CYSTO, URETHRAL DILATION, URETERAL LEFT STENT PLACEMENT, LEFT ESWL.;  Surgeon: Angel Del Rio MD;  Location: [facility] OR     FOOT SURGERY      mulitple     HYSTERECTOMY, PAP NO LONGER INDICATED  2008    lap hys     LITHOTRIPSY  2010 2017     OPTICAL TRACKING SYSTEM CRANIOTOMY, EXCISE TUMOR WITH MAPPING, COMBINED Right 5/30/2018    Procedure: COMBINED OPTICAL TRACKING SYSTEM CRANIOTOMY, EXCISE TUMOR WITH MAPPING;  Right Stealth Assisted Craniotomy With Motor Mapping And Tumor Resection ;  Surgeon: Dustin Rodriguez MD;  Location: [facility] OR     ORTHOPEDIC SURGERY       PARATHYROIDECTOMY  2011         Review of Systems   Musculoskeletal: Positive for joint pain.   All other systems reviewed and are negative.        Physical Exam  /77  Ht 5' 3.5\" (1.613 m)  Wt 153 lb (69.4 kg)  BMI 26.68 kg/m2  Constitutional:well-developed, well-nourished, and in no distress.   Cardiovascular: Intact distal pulses.    Neurological: alert. Gait Normal:   Gait, station, stance, and balance appear normal for age  Skin: Skin is warm and dry.   Psychiatric: Mood and affect normal.   Respiratory: unlabored, speaks in full sentences  Lymph: no LAD, no lymphangitis      Left Knee Exam   Swelling: None  Effusion: No    Tenderness   The patient is experiencing tenderness in the Patella facets.    Range of Motion   Extension: Normal  Flexion:     Normal    Tests   McMurrays:  Medial - Negative      Lateral - Negative  Lachman:  Anterior - Negative    Posterior - Negative  Drawer:       Anterior - Negative     Posterior - Negative  Varus:  Negative  Valgus: Negative  Pivot Shift: Negative  Patellar Apprehension: No    Comments:  Mild patellofemoral crepitus with range of motion          ASSESSMENT/PLAN    ICD-10-CM    1. Primary " osteoarthritis of left knee M17.12 [facility]     Patient  Declined x-rays today but with symptoms primarily located in the anterior knee where there is correlating crepitus with range of motion, logically this is chondromalacia also known as patellofemoral degenerative arthritis.  She is not having too much in the way of symptoms but is worried about becoming increasingly reliant on her left knee due to upcoming right foot surgery.  Her preparation is ballotable, and physical therapy designed to strengthen the left knee as much as possible is recommended.  Discussed options including home exercises but she opted for formal physical therapy.  Declined injection but this could still be performed around the time of surgery if needed.    Time spent in one-on-one evalution and discussion with a new patient to this clinic and so  I reviewed/updated the Past Medical History, the Family History and the Social History   regarding nature of problem, course, prior treatments, and therapeutic options, at least 50% of which was spent in counseling and coordination of care: 30 minutes.      Again, thank you for allowing me to participate in the care of your patient.        Sincerely,        Pool Tong MD

## 2018-10-15 NOTE — MR AVS SNAPSHOT
After Visit Summary   10/15/2018    Barbi Tiwari    MRN: [id number]           Patient Information     Date Of Birth          1960        Visit Information        Provider Department      10/15/2018 10:00 AM Pool Tong MD  Sports Medicine        Today's Diagnoses     Primary osteoarthritis of left knee    -  1       Follow-ups after your visit        Additional Services     [facility]       Physical Therapy, Hand Therapy and Chiropractic Care are available through:  *[facility]  *Hand Therapy (Occupational Therapy or Physical Therapy)  *[facility]    Call one number to schedule at any of the above locations: [phone].    Physical therapy, Hand therapy and/or Chiropractic care has been recommended by your physician as an excellent treatment option to reduce pain and help people return to normal activities, including sports.  Therapy and/or chiropractic care services are a great complement or alternative to expensive and invasive surgery, injections, or long-term use of prescription medications. The primary goal is to identify the underlying problem and provide you the tools to manage your condition on your own.     Please be aware that coverage of these services is subject to the terms and limitations of your health insurance plan.  Call member services at your health plan with any benefit or coverage questions.      Please bring the following to your appointment:  *Your personal calendar for scheduling future appointments  *Comfortable clothing                  Your next 10 appointments already scheduled     Dec 10, 2018  1:00 PM CST   MR BRAIN W/O & W CONTRAST with [id number]   [facility] ([facility])    [address] [address] [phone]           How do I prepare for my exam? (Food and drink instructions) **If you will  be receiving sedation or general anesthesia, please see special notes below.**  How do I prepare for my exam? (Other instructions) Take your medicines as usual, unless your doctor tells you not to. You may or may not receive intravenous (IV) contrast for this exam pending the discretion of the Radiologist.  You do not need to do anything special to prepare.  **If you will be receiving sedation or general anesthesia, please see special notes below.**  What should I wear: The MRI machine uses a strong magnet. Please wear clothes without metal (snaps, zippers). A sweatsuit works well, or we may give you a hospital gown. Please remove any body piercings and hair extensions before you arrive. You will also remove watches, jewelry, hairpins, wallets, dentures, partial dental plates and hearing aids. You may wear contact lenses, and you may be able to wear your rings. We have a safe place to keep your personal items, but it is safer to leave them at home.  How long does the exam take: Most tests take 30 to 60 minutes.  HOWEVER, IF YOUR DOCTOR PRESCRIBES ANESTHESIA please plan on spending four to five hours in the recovery room.  What should I bring:  Bring a list of your current medicines to your exam (including vitamins, minerals and over-the-counter drugs).  Do I need a :  **If you will be receiving sedation or general anesthesia, please see special notes below.**  What should I do after the exam: No Restrictions, You may resume normal activities.  What is this test: MRI (magnetic resonance imaging) uses a strong magnet and radio waves to look inside the body. An MRA (magnetic resonance angiogram) does the same thing, but it lets us look at your blood vessels. A computer turns the radio waves into pictures showing cross sections of the body, much like slices of bread. This helps us see any problems more clearly. You may receive fluid (called  contrast ) before or during your scan. The fluid helps us see the  pictures better. We give the fluid through an IV (small needle in your arm).  Who should I call with questions:  Please call the Imaging Department at your exam site with any questions. Directions, parking instructions, and other information is available on our website, [email or web address].  How do I prepare if I m having sedation or anesthesia? **IMPORTANT** THE INSTRUCTIONS BELOW ARE ONLY FOR THOSE PATIENTS WHO HAVE BEEN TOLD THEY WILL RECEIVE SEDATION OR GENERAL ANESTHESIA DURING THEIR MRI PROCEDURE:  IF YOU WILL RECEIVE SEDATION (take medicine to help you relax during your exam): You must get the medicine from your doctor before you arrive. Bring the medicine to the exam. Do not take it at home. Arrive one hour early. Bring someone who can take you home after the test. Your medicine will make you sleepy. After the exam, you may not drive, take a bus or take a taxi by yourself. No eating 8 hours before your exam. You may have clear liquids up until 4 hours before your exam. (Clear liquids include water, clear tea, black coffee and fruit juice without pulp.)  IF YOU WILL RECEIVE ANESTHESIA (be asleep for your exam): Arrive 1 1/2 hours early. Bring someone who can take you home after the test. You may not drive, take a bus or take a taxi by yourself. No eating 8 hours before your exam. You may have clear liquids up until 4 hours before your exam. (Clear liquids include water, clear tea, black coffee and fruit juice without pulp.)            Dec 10, 2018  3:30 PM CST   (Arrive by 3:15 PM)   Return Visit with Myrtle Gallagher MD   [facility] ([facility])    [address]  [address]  [facility] [address] [phone]              Who to contact     If you have questions or need follow up information about today's clinic visit or your schedule please contact  SPORTS MEDICINE directly at [phone].  Normal or non-critical lab and imaging results  "will be communicated to you by MyChart, letter or phone within 4 business days after the clinic has received the results. If you do not hear from us within 7 days, please contact the clinic through [facility] or phone. If you have a critical or abnormal lab result, we will notify you by phone as soon as possible.  Submit refill requests through [facility] or call your pharmacy and they will forward the refill request to us. Please allow 3 business days for your refill to be completed.          Additional Information About Your Visit        [facility] Information     [facility] gives you secure access to your electronic health record. If you see a primary care provider, you can also send messages to your care team and make appointments. If you have questions, please call your primary care clinic.  If you do not have a primary care provider, please call [phone] and they will assist you.        Care EveryWhere ID     This is your Care EveryWhere ID. This could be used by other organizations to access your [facility] medical records  [id number]        Your Vitals Were     Height BMI (Body Mass Index)                5' 3.5\" (1.613 m) 26.68 kg/m2           Blood Pressure from Last 3 Encounters:   10/15/18 122/77   10/08/18 124/76   09/17/18 118/80    Weight from Last 3 Encounters:   10/15/18 153 lb (69.4 kg)   10/08/18 153 lb (69.4 kg)   09/17/18 153 lb 9.6 oz (69.7 kg)               Primary Care Provider Office Phone # Fax #    Dustin Choudhury [phone]       [address] [place] [address]        Equal Access to Services     [facility] Center[facility]ICOLE : Hadii aad ku hadasho Soomaali, waaxda luqadaha, qaybta kaalmada binh, nina la. So [facility] [phone].    ATENCIÓN: Si habla español, tiene a fletcher disposición servicios gratuitos de asistencia lingüística. Llame al [phone].    We comply with applicable federal civil rights laws and [place] laws. We do not discriminate " on the basis of race, color, national origin, age, disability, sex, sexual orientation, or gender identity.            Thank you!     Thank you for choosing  [facility] MEDICINE  for your care. Our goal is always to provide you with excellent care. Hearing back from our patients is one way we can continue to improve our services. Please take a few minutes to complete the written survey that you may receive in the mail after your visit with us. Thank you!             Your Updated Medication List - Protect others around you: Learn how to safely use, store and throw away your medicines at www.disposemymeds.org.          This list is accurate as of 10/15/18 11:59 PM.  Always use your most recent med list.                   Brand Name Dispense Instructions for use Diagnosis    acetaminophen 500 MG tablet    TYLENOL     as needed Take 1,000 mg by mouth every 6 hours if needed. Max acetaminophen dose: 4000mg in 24 hrs.        albuterol 108 (90 Base) MCG/ACT inhaler    PROAIR HFA/PROVENTIL HFA/VENTOLIN HFA    1 Inhaler    Inhale 2 puffs into the lungs every 6 hours as needed for shortness of breath / dyspnea or wheezing    Cough       ALLEGRA PO      Take by mouth daily        ascorbic acid 500 MG Tabs      Take 500 mg by mouth 2 times daily        COMPOUNDED NON-CONTROLLED SUBSTANCE - PHARMACY TO MIX COMPOUNDED MEDICATION    CMPD RX     as needed Apply 1g daily for 2 weeks, then twice weekly to vagina    Vaginal atrophy       ferrous sulfate 325 (65 Fe) MG tablet    IRON     Take 325 mg by mouth 2 times daily Reported on 5/15/2017        gabapentin 100 MG capsule    NEURONTIN    90 capsule    2 pills 1.5 hrs before bedtime for 7 nights, may increase to 3 pills if needed.        KEPPRA 500 MG tablet   Generic drug:  levETIRAcetam      Take 1,500 mg by mouth 2 times daily        LACTAID PO      Take 3,000 Units by mouth as needed for indigestion        LAMOTRIGINE PO      Take 300 mg by mouth 2 times daily        LORazepam 0.5  MG tablet    ATIVAN     Take 1 tablet (0.5 mg) by mouth as needed for anxiety    Oligodendroglioma (H), Anxiety       metoprolol tartrate 25 MG tablet    LOPRESSOR    180 tablet    Take 1 tablet (25 mg) by mouth 2 times daily    Symptomatic premature ventricular contractions       MULTI-VITAMINS Tabs      every morning take 1 tablet by oral route once daily with food        PRILOSEC OTC PO      Take 20 mg by mouth as needed    Candidiasis of mouth and esophagus (H)       ranitidine 150 MG tablet    ZANTAC     Take 1 tablet (150 mg) by mouth At Bedtime    Gastroesophageal reflux disease without esophagitis       senna-docusate 8.6-50 MG per tablet    SENOKOT-S;PERICOLACE    100 tablet    Take 2 tablets by mouth 2 times daily    Oligodendroglioma, WHO grade II (H)       tretinoin 0.05 % cream    RETIN-A    45 g    Spread a pea size amount into affected area topically at bedtime.  Use sunscreen SPF>20.    Senile sebaceous gland hyperplasia       VITAMIN D (CHOLECALCIFEROL) PO      Take 1,000 Units by mouth 2 times daily Reported on 5/15/2017

## 2018-10-19 NOTE — TELEPHONE ENCOUNTER
2nd attempt to reach patient to schedule surgery.  Left message for patient to call surgery scheduling line.       Dennis Alvares, Surgery Scheduler

## 2018-10-24 ENCOUNTER — THERAPY VISIT (OUTPATIENT)
Dept: PHYSICAL THERAPY | Facility: CLINIC | Age: 58
End: 2018-10-24
Attending: FAMILY MEDICINE
Payer: COMMERCIAL

## 2018-10-24 DIAGNOSIS — M25.562 CHRONIC PAIN OF LEFT KNEE: Primary | ICD-10-CM

## 2018-10-24 DIAGNOSIS — G89.29 CHRONIC PAIN OF LEFT KNEE: Primary | ICD-10-CM

## 2018-10-24 PROCEDURE — 97110 THERAPEUTIC EXERCISES: CPT | Mod: GP | Performed by: PHYSICAL THERAPIST

## 2018-10-24 PROCEDURE — 97165 OT EVAL LOW COMPLEX 30 MIN: CPT | Mod: GO | Performed by: PHYSICAL THERAPIST

## 2018-10-24 ASSESSMENT — ACTIVITIES OF DAILY LIVING (ADL)
RISE FROM A CHAIR: ACTIVITY IS MINIMALLY DIFFICULT
PAIN: THE SYMPTOM AFFECTS MY ACTIVITY SLIGHTLY
STAND: ACTIVITY IS NOT DIFFICULT
GO DOWN STAIRS: ACTIVITY IS MINIMALLY DIFFICULT
KNEEL ON THE FRONT OF YOUR KNEE: ACTIVITY IS NOT DIFFICULT
KNEE_ACTIVITY_OF_DAILY_LIVING_SUM: 55
GIVING WAY, BUCKLING OR SHIFTING OF KNEE: THE SYMPTOM AFFECTS MY ACTIVITY MODERATELY
SIT WITH YOUR KNEE BENT: ACTIVITY IS NOT DIFFICULT
SQUAT: ACTIVITY IS MINIMALLY DIFFICULT
KNEE_ACTIVITY_OF_DAILY_LIVING_SCORE: 78.57
RAW_SCORE: 55
AS_A_RESULT_OF_YOUR_KNEE_INJURY,_HOW_WOULD_YOU_RATE_YOUR_CURRENT_LEVEL_OF_DAILY_ACTIVITY?: NORMAL
STIFFNESS: I HAVE THE SYMPTOM BUT IT DOES NOT AFFECT MY ACTIVITY
SWELLING: I DO NOT HAVE THE SYMPTOM
GO UP STAIRS: ACTIVITY IS MINIMALLY DIFFICULT
WALK: ACTIVITY IS NOT DIFFICULT
LIMPING: THE SYMPTOM AFFECTS MY ACTIVITY SLIGHTLY
HOW_WOULD_YOU_RATE_THE_CURRENT_FUNCTION_OF_YOUR_KNEE_DURING_YOUR_USUAL_DAILY_ACTIVITIES_ON_A_SCALE_FROM_0_TO_100_WITH_100_BEING_YOUR_LEVEL_OF_KNEE_FUNCTION_PRIOR_TO_YOUR_INJURY_AND_0_BEING_THE_INABILITY_TO_PERFORM_ANY_OF_YOUR_USUAL_DAILY_ACTIVITIES?: 65
WEAKNESS: THE SYMPTOM AFFECTS MY ACTIVITY MODERATELY

## 2018-10-24 NOTE — MR AVS SNAPSHOT
After Visit Summary   10/24/2018    Barbi Tiwari    MRN: [id number]           Patient Information     Date Of Birth          1960        Visit Information        Provider Department      10/24/2018 5:20 PM Miriam Méndez PT [facility]        Today's Diagnoses     Chronic pain of left knee    -  1       Follow-ups after your visit        Your next 10 appointments already scheduled     Oct 29, 2018  4:40 PM CDT   MARIANA Extremity with Miriam Méndez PT   [facility] (MARIANA Swisher  )    [address] [place] [address] [address]  [facility] [id number] [phone]            Dec 10, 2018  1:00 PM CST   MR BRAIN W/O & W CONTRAST with [id number]   [facility] ([facility])    [address]  [address] [facility] [address] [phone]           How do I prepare for my exam? (Food and drink instructions) **If you will be receiving sedation or general anesthesia, please see special notes below.**  How do I prepare for my exam? (Other instructions) Take your medicines as usual, unless your doctor tells you not to. You may or may not receive intravenous (IV) contrast for this exam pending the discretion of the Radiologist.  You do not need to do anything special to prepare.  **If you will be receiving sedation or general anesthesia, please see special notes below.**  What should I wear: The MRI machine uses a strong magnet. Please wear clothes without metal (snaps, zippers). A sweatsuit works well, or we may give you a hospital gown. Please remove any body piercings and hair extensions before you arrive. You will also remove watches, jewelry, hairpins, wallets, dentures, partial dental plates and hearing aids. You may wear contact lenses, and you may be able to wear your rings. We have a safe place to keep your personal items, but it is safer to leave them at home.   How long does the exam take: Most tests take 30 to 60 minutes.  HOWEVER, IF YOUR DOCTOR PRESCRIBES ANESTHESIA please plan on spending four to five hours in the recovery room.  What should I bring:  Bring a list of your current medicines to your exam (including vitamins, minerals and over-the-counter drugs).  Do I need a :  **If you will be receiving sedation or general anesthesia, please see special notes below.**  What should I do after the exam: No Restrictions, You may resume normal activities.  What is this test: MRI (magnetic resonance imaging) uses a strong magnet and radio waves to look inside the body. An MRA (magnetic resonance angiogram) does the same thing, but it lets us look at your blood vessels. A computer turns the radio waves into pictures showing cross sections of the body, much like slices of bread. This helps us see any problems more clearly. You may receive fluid (called  contrast ) before or during your scan. The fluid helps us see the pictures better. We give the fluid through an IV (small needle in your arm).  Who should I call with questions:  Please call the Imaging Department at your exam site with any questions. Directions, parking instructions, and other information is available on our website, [email or web address].  How do I prepare if I m having sedation or anesthesia? **IMPORTANT** THE INSTRUCTIONS BELOW ARE ONLY FOR THOSE PATIENTS WHO HAVE BEEN TOLD THEY WILL RECEIVE SEDATION OR GENERAL ANESTHESIA DURING THEIR MRI PROCEDURE:  IF YOU WILL RECEIVE SEDATION (take medicine to help you relax during your exam): You must get the medicine from your doctor before you arrive. Bring the medicine to the exam. Do not take it at home. Arrive one hour early. Bring someone who can take you home after the test. Your medicine will make you sleepy. After the exam, you may not drive, take a bus or take a taxi by yourself. No eating 8 hours before your exam. You may have clear liquids up until 4  hours before your exam. (Clear liquids include water, clear tea, black coffee and fruit juice without pulp.)  IF YOU WILL RECEIVE ANESTHESIA (be asleep for your exam): Arrive 1 1/2 hours early. Bring someone who can take you home after the test. You may not drive, take a bus or take a taxi by yourself. No eating 8 hours before your exam. You may have clear liquids up until 4 hours before your exam. (Clear liquids include water, clear tea, black coffee and fruit juice without pulp.)            Dec 10, 2018  3:30 PM CST   (Arrive by 3:15 PM)   Return Visit with Myrtle Gallagher MD   [facility]    [address] [facility] [facility]  [address]  [facility] [address] [phone]              Who to contact     If you have questions or need follow up information about today's clinic visit or your schedule please contact [facility] PH[facility] THERAPY directly at [phone].  Normal or non-critical lab and imaging results will be communicated to you by MyChart, letter or phone within 4 business days after the clinic has received the results. If you do not hear from us within 7 days, please contact the clinic through [employer] or phone. If you have a critical or abnormal lab result, we will notify you by phone as soon as possible.  Submit refill requests through [employer] or call your pharmacy and they will forward the refill request to us. Please allow 3 business days for your refill to be completed.          Additional Information About Your Visit        [employer] Information     [employer] gives you secure access to your electronic health record. If you see a primary care provider, you can also send messages to your care team and make appointments. If you have questions, please call your primary care clinic.  If you do not have a primary care provider, please call [phone] and they will assist you.        Care EveryWhere ID     This  is your Care EveryWhere ID. This could be used by other organizations to access your [facility] medical records  [id number]         Blood Pressure from Last 3 Encounters:   10/15/18 122/77   10/08/18 124/76   09/17/18 118/80    Weight from Last 3 Encounters:   10/15/18 69.4 kg (153 lb)   10/08/18 69.4 kg (153 lb)   09/17/18 69.7 kg (153 lb 9.6 oz)              We Performed the Following     MARIANA Inital Eval Report     OT Eval, Low Complexity ([id number])     Therapeutic Exercises        Primary Care Provider Office Phone # Fax #    Dustin Choudhury [phone]       [address] [facility] [address]        Equal Access to Services     YOBANI GRIFFITH : Hadii yovany crisostomo hadasho Soomaali, waaxda luqadaha, qaybta kaalmada adeegyada, waxay arley hayenrico will . So [facility] [phone].    ATENCIÓN: Si habla español, tiene a fletcher disposición servicios gratuitos de asistencia lingüística. Llame al [phone].    We comply with applicable federal civil rights laws and [place] laws. We do not discriminate on the basis of race, color, national origin, age, disability, sex, sexual orientation, or gender identity.            Thank you!     Thank you for choosing [facility]  for your care. Our goal is always to provide you with excellent care. Hearing back from our patients is one way we can continue to improve our services. Please take a few minutes to complete the written survey that you may receive in the mail after your visit with us. Thank you!             Your Updated Medication List - Protect others around you: Learn how to safely use, store and throw away your medicines at www.disposemymeds.org.          This list is accurate as of 10/24/18  8:29 PM.  Always use your most recent med list.                   Brand Name Dispense Instructions for use Diagnosis    acetaminophen 500 MG tablet    TYLENOL     as needed Take 1,000 mg by mouth every 6  hours if needed. Max acetaminophen dose: 4000mg in 24 hrs.        albuterol 108 (90 Base) MCG/ACT inhaler    PROAIR HFA/PROVENTIL HFA/VENTOLIN HFA    1 Inhaler    Inhale 2 puffs into the lungs every 6 hours as needed for shortness of breath / dyspnea or wheezing    Cough       ALLEGRA PO      Take by mouth daily        ascorbic acid 500 MG Tabs      Take 500 mg by mouth 2 times daily        COMPOUNDED NON-CONTROLLED SUBSTANCE - PHARMACY TO MIX COMPOUNDED MEDICATION    CMPD RX     as needed Apply 1g daily for 2 weeks, then twice weekly to vagina    Vaginal atrophy       ferrous sulfate 325 (65 Fe) MG tablet    IRON     Take 325 mg by mouth 2 times daily Reported on 5/15/2017        gabapentin 100 MG capsule    NEURONTIN    90 capsule    2 pills 1.5 hrs before bedtime for 7 nights, may increase to 3 pills if needed.        KEPPRA 500 MG tablet   Generic drug:  levETIRAcetam      Take 1,500 mg by mouth 2 times daily        LACTAID PO      Take 3,000 Units by mouth as needed for indigestion        LAMOTRIGINE PO      Take 300 mg by mouth 2 times daily        LORazepam 0.5 MG tablet    ATIVAN     Take 1 tablet (0.5 mg) by mouth as needed for anxiety    Oligodendroglioma (H), Anxiety       metoprolol tartrate 25 MG tablet    LOPRESSOR    180 tablet    Take 1 tablet (25 mg) by mouth 2 times daily    Symptomatic premature ventricular contractions       MULTI-VITAMINS Tabs      every morning take 1 tablet by oral route once daily with food        PRILOSEC OTC PO      Take 20 mg by mouth as needed    Candidiasis of mouth and esophagus (H)       ranitidine 150 MG tablet    ZANTAC     Take 1 tablet (150 mg) by mouth At Bedtime    Gastroesophageal reflux disease without esophagitis       senna-docusate 8.6-50 MG per tablet    SENOKOT-S;PERICOLACE    100 tablet    Take 2 tablets by mouth 2 times daily    Oligodendroglioma, WHO grade II (H)       tretinoin 0.05 % cream    RETIN-A    45 g    Spread a pea size amount into affected  area topically at bedtime.  Use sunscreen SPF>20.    Senile sebaceous gland hyperplasia       VITAMIN D (CHOLECALCIFEROL) PO      Take 1,000 Units by mouth 2 times daily Reported on 5/15/2017

## 2018-10-25 NOTE — PROGRESS NOTES
[facility] Initial Evaluation  Subjective:  Patient is a 58 year old female presenting with rehab left knee hpi. The history is provided by the patient. No  was used.   Barbi Tiwari is a 58 year old female with a left knee condition.  Condition occurred with:  Insidious onset.  Condition occurred: for unknown reasons.  This is a chronic condition  Insidious onset of L knee pain 08/24/2018.  Patient will be having R foot surgery (bunion and toe) hopefully in January 2019 and wants to make sure her L knee is in good shape to support her after this.  .    Patient reports pain:  Lateral and posterior.    Pain is described as aching and sharp and is intermittent and reported as 1/10 and 5/10.   Pain is the same all the time.  Exacerbated by: lowering herself down to sit, descending stairs--uses railing and has sharp pain. and relieved by rest and other (avoiding aggravating motions).  Since onset symptoms are unchanged.  Special testing: none.  Previous treatment: none.    General health as reported by patient is good.  Pertinent medical history includes:  Cancer, heart problems, osteoarthritis, overweight and seizures.  Medical allergies: yes (sulfa).  Surgical history: craniotomies, multiple foot surgeries.  Current medications:  Anti-inflammatory, cardiac medication, sleep medication and pain medication.  Current occupation is RN.  Patient is working in normal job without restrictions.  Primary job tasks include:  Prolonged standing.    Barriers include:  None as reported by the patient.    Red flags:  None as reported by the patient.                        Objective:    Gait:    Gait Type:  Normal                                                           Knee Evaluation:  ROM:    AROM      Extension: Left: 0    Right:   Flexion: Left: WNL   Right:  PROM    Hyperextension: Left: 10    Right:  12  Extension: Left: 0    Right:  0  Flexion: Left: WNL    Right:  WNL    Endfeel:  Repeated motion testing:  repeated L knee extension in sitting, AROM--NE during or after, NE concordant signs; repeated extension in standing with self-OP--increases during, W after, increased pain concordant signs; repeated flexion in loaded--NE during or after, NE concordant signs; repeated L knee extension in sitting, foot propped on another chair, QS for ext--decreases, B, abolishes pain with concordant signs  Strength:     Extension:  Left: 5-/5    Pain:+/-      Right: 5/5    Pain:-  Flexion:  Left: 5/5    Pain:-      Right: 5-/5    Pain:-    Quad Set Left:  Good    Pain: +/-                     General     ROS    Assessment/Plan:    Patient is a 58 year old female with left side knee complaints.  Provisional classification of knee joint derangement with directional preference for extension.  She was able to abolish concordant signs of pain with step-down and pain with lowering to sit after repeated L knee extension in sitting using QS for extension.  She will try at home to assess further.  Treatment will focus on directional preference exercises to improve mechanics, decrease pain, and improve overall mobility and function.     Patient has the following significant findings with corresponding treatment plan.                Diagnosis 1:  L knee pain  Pain -  self management, education, directional preference exercise and home program  Decreased ROM/flexibility - therapeutic exercise and home program  Decreased strength - therapeutic exercise, therapeutic activities and home program  Decreased function - therapeutic activities and home program    Therapy Evaluation Codes:   1) History comprised of:   Personal factors that impact the plan of care:      None.    Comorbidity factors that impact the plan of care are:      None.     Medications impacting care: None.  2) Examination of Body Systems comprised of:   Body structures and functions that impact the plan of care:      Knee.   Activity limitations that  impact the plan of care are:      Stairs and lowering to sit.  3) Clinical presentation characteristics are:   Stable/Uncomplicated.  4) Decision-Making    Low complexity using standardized patient assessment instrument and/or measureable assessment of functional outcome.  Cumulative Therapy Evaluation is: Low complexity.    Previous and current functional limitations:  (See Goal Flow Sheet for this information)    Short term and Long term goals: (See Goal Flow Sheet for this information)     Communication ability:  Patient appears to be able to clearly communicate and understand verbal and written communication and follow directions correctly.  Treatment Explanation - The following has been discussed with the patient:   RX ordered/plan of care  Anticipated outcomes  Possible risks and side effects  This patient would benefit from PT intervention to resume normal activities.   Rehab potential is good.    Frequency:  1 X week, once daily  Duration:  for 4 weeks tapering to 2 X a month over 1 month  Discharge Plan:  Achieve all LTG.  Independent in home treatment program.  Reach maximal therapeutic benefit.    Please refer to the daily flowsheet for treatment today, total treatment time and time spent performing 1:1 timed codes.

## 2018-10-29 ENCOUNTER — THERAPY VISIT (OUTPATIENT)
Dept: PHYSICAL THERAPY | Facility: CLINIC | Age: 58
End: 2018-10-29
Payer: COMMERCIAL

## 2018-10-29 DIAGNOSIS — M25.562 CHRONIC PAIN OF LEFT KNEE: ICD-10-CM

## 2018-10-29 DIAGNOSIS — G89.29 CHRONIC PAIN OF LEFT KNEE: ICD-10-CM

## 2018-10-29 PROCEDURE — 97112 NEUROMUSCULAR REEDUCATION: CPT | Mod: GP | Performed by: PHYSICAL THERAPIST

## 2018-10-29 PROCEDURE — 97110 THERAPEUTIC EXERCISES: CPT | Mod: GP | Performed by: PHYSICAL THERAPIST

## 2018-11-09 NOTE — TELEPHONE ENCOUNTER
Patient called to schedule surgery.    Type of surgery:1) right first metatarsophalangeal joint arthrodesis (fusion)  2) right 2nd metatarsal Weil osteotomy (shortening)  Location of surgery: [facility]  Date and time of surgery: 1/15/2019 @ 0730  Surgeon: Elias  Pre-Op Appt Date: 1/7/19 Dr Banda  Post-Op Appt Date: 1/21/18   Packet sent out: Yes  Pre-cert/Authorization completed:  Not Applicable  Date: 11/9/18      Dennis Alvares Surgery Scheduler

## 2018-12-04 ENCOUNTER — THERAPY VISIT (OUTPATIENT)
Dept: PHYSICAL THERAPY | Facility: CLINIC | Age: 58
End: 2018-12-04
Payer: COMMERCIAL

## 2018-12-04 DIAGNOSIS — M25.562 CHRONIC PAIN OF LEFT KNEE: ICD-10-CM

## 2018-12-04 DIAGNOSIS — G89.29 CHRONIC PAIN OF LEFT KNEE: ICD-10-CM

## 2018-12-04 PROCEDURE — 97110 THERAPEUTIC EXERCISES: CPT | Mod: GP | Performed by: PHYSICAL THERAPIST

## 2018-12-10 ENCOUNTER — ONCOLOGY VISIT (OUTPATIENT)
Dept: ONCOLOGY | Facility: CLINIC | Age: 58
End: 2018-12-10
Attending: PSYCHIATRY & NEUROLOGY
Payer: COMMERCIAL

## 2018-12-10 ENCOUNTER — ANCILLARY PROCEDURE (OUTPATIENT)
Dept: MRI IMAGING | Facility: CLINIC | Age: 58
End: 2018-12-10
Attending: PSYCHIATRY & NEUROLOGY
Payer: COMMERCIAL

## 2018-12-10 VITALS
RESPIRATION RATE: 14 BRPM | DIASTOLIC BLOOD PRESSURE: 70 MMHG | TEMPERATURE: 97.3 F | WEIGHT: 150 LBS | HEIGHT: 64 IN | SYSTOLIC BLOOD PRESSURE: 128 MMHG | BODY MASS INDEX: 25.61 KG/M2 | OXYGEN SATURATION: 97 % | HEART RATE: 71 BPM

## 2018-12-10 DIAGNOSIS — C71.9 OLIGODENDROGLIOMA, WHO GRADE II (H): ICD-10-CM

## 2018-12-10 DIAGNOSIS — C71.9 OLIGODENDROGLIOMA (H): Primary | ICD-10-CM

## 2018-12-10 DIAGNOSIS — R56.9 SEIZURE (H): ICD-10-CM

## 2018-12-10 PROCEDURE — 99214 OFFICE O/P EST MOD 30 MIN: CPT | Mod: ZP | Performed by: PSYCHIATRY & NEUROLOGY

## 2018-12-10 PROCEDURE — G0463 HOSPITAL OUTPT CLINIC VISIT: HCPCS | Mod: ZF

## 2018-12-10 RX ORDER — LAMOTRIGINE 200 MG/1
TABLET ORAL
COMMUNITY
Start: 2018-11-26 | End: 2019-01-07

## 2018-12-10 RX ORDER — GADOBUTROL 604.72 MG/ML
7.5 INJECTION INTRAVENOUS ONCE
Status: COMPLETED | OUTPATIENT
Start: 2018-12-10 | End: 2018-12-10

## 2018-12-10 RX ORDER — IBUPROFEN 200 MG
800 TABLET ORAL DAILY PRN
Status: ON HOLD | COMMUNITY
Start: 2012-05-10 | End: 2022-02-01

## 2018-12-10 RX ORDER — LAMOTRIGINE 100 MG/1
TABLET ORAL
COMMUNITY
Start: 2018-11-26 | End: 2019-01-07

## 2018-12-10 RX ADMIN — GADOBUTROL 7.5 ML: 604.72 INJECTION INTRAVENOUS at 13:06

## 2018-12-10 ASSESSMENT — MIFFLIN-ST. JEOR: SCORE: 1242.46

## 2018-12-10 ASSESSMENT — PAIN SCALES - GENERAL: PAINLEVEL: NO PAIN (0)

## 2018-12-10 NOTE — NURSING NOTE
"Oncology Rooming Note    December 10, 2018 2:53 PM   Barbi Tiwari is a 58 year old female who presents for:    Chief Complaint   Patient presents with     Oncology Clinic Visit     Return Oligodendroglioma     Initial Vitals: /70   Pulse 71   Temp 97.3  F (36.3  C) (Oral)   Resp 14   Ht 1.613 m (5' 3.5\")   Wt 68 kg (150 lb)   SpO2 97%   BMI 26.15 kg/m   Estimated body mass index is 26.15 kg/m  as calculated from the following:    Height as of this encounter: 1.613 m (5' 3.5\").    Weight as of this encounter: 68 kg (150 lb). Body surface area is 1.75 meters squared.  No Pain (0) Comment: Data Unavailable   No LMP recorded. Patient has had a hysterectomy.  Allergies reviewed: Yes  Medications reviewed: Yes    Medications: Medication refills not needed today.  Pharmacy name entered into EPIC:    [facility] - [place], [place] - [address] [place] [address] [facility] - [place], [place] - [address] [facility]    Clinical concerns: No new concerns.       6 minutes for nursing intake (face to face time)     Keily Guy CMA              "

## 2018-12-10 NOTE — LETTER
12/10/2018       RE: Barbi Tiwari  [address] [facility] [address]     Dear Colleague,    Thank you for referring your patient, Barbi Tiwari, to the [facility]. Please see a copy of my visit note below.    NEURO-ONCOLOGY VISIT  12/10/2018    CHIEF COMPLAINT: Ms. Barbi Tiwari is a 58 year old right-handed woman with a right frontal diffuse oligodendroglioma (1p19q co-deleted), diagnosed following resection in 5/2011. She received 12 cycles of temozolomide, completed in 5/2012. Changes on imaging necessitated a repeat resection on 5/30/2018 and pathology was unchanged. No adjuvant cancer-directed therapy inititated. Currently, Barbi is managed on imaging surveillance.     She is presenting in follow-up for contiuned evaluation and recommendations on treatment. She is not accompanied.      HISTORY OF PRESENT ILLNESS  -She denies any new symptoms; no weakness, numbness, or headaches.  -Continued floater noted in visual field, has not been evaluated by ophtho.  -Fatigue remains an ongoing complaint. Thought to be related to her anti-seizure medications; Lamictal and Keppra.   -On gabapentin for to help with RLS and sleep initiation. Sleep is still poor. Seeing a doctor specializing in sleep medicine.   -Continued mild word-finding issues, otherwise no change in her cognitive function. 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  -No change in frequency, severity of seizure events.  -Continued foot pain, having foot surgery in January. Mild knee pain.   -Mild balance issues.   -Impaired singing voice over the few months.     REVIEW OF SYSTEMS  A comprehensive ROS negative except as in HPI.      MEDICATIONS   Current Outpatient Medications   Medication     acetaminophen (TYLENOL) 500 MG tablet     ascorbic acid 500 MG TABS     Calcium Carb-Cholecalciferol (CALCIUM-VITAMIN D3) 600-400 MG-UNIT CAPS     COMPOUNDED NON-CONTROLLED SUBSTANCE (CMPD RX) - PHARMACY TO MIX COMPOUNDED  MEDICATION     ferrous sulfate (IRON) 325 (65 FE) MG tablet     Fexofenadine HCl (ALLEGRA PO)     gabapentin (NEURONTIN) 100 MG capsule     ibuprofen (ADVIL/MOTRIN) 200 MG tablet     Lactase (LACTAID PO)     lamoTRIgine (LAMICTAL) 100 MG tablet     lamoTRIgine (LAMICTAL) 200 MG tablet     levETIRAcetam (KEPPRA) 500 MG tablet     LORazepam (ATIVAN) 0.5 MG tablet     metoprolol tartrate (LOPRESSOR) 25 MG tablet     Multiple Vitamin (MULTI-VITAMINS) TABS     Omeprazole Magnesium (PRILOSEC OTC PO)     ranitidine (ZANTAC) 150 MG tablet     senna-docusate (SENOKOT-S;PERICOLACE) 8.6-50 MG per tablet     tretinoin (RETIN-A) 0.05 % cream     VITAMIN D, CHOLECALCIFEROL, PO     No current facility-administered medications for this visit.      DRUG ALLERGIES   Allergies   Allergen Reactions     Benoxinate Nausea and Vomiting     Sulfa Drugs Hives       ONCOLOGIC HISTORY  -4/27/2011 PRESENTATION: New onset seizure, generalized event.   -5/2011 MRB with a non-contrast enhancing right frontal mass lesion.   -5/2011 SURGERY: Craniectomy for mass resection at [facility].   PATHOLOGY: Diffuse oligodendroglioma; WHO grade II, 1p/19q co-deleted.  -6/2011-5/2012 CHEMO: Adjuvant dosed temozolomide x 12 cycles.  -4/2/2018 MRB with possible disease recurrence with a new 1.2 cm non-enhancing nodule within the cortex of the right frontal lobe adjacent to the resection cavity.  -4/12/2018 NEURO-ONC: Recommending repeat resection, appointment scheduled with Dr. Dustin Rodriguez, [facility]surgery at [facility].   -5/30/2018 SURGERY: Repeat resection with Dr. Rodriguez.   PATHOLOGY: Remains low grade, without concerning features.   -6/15/2018 NEURO-ONC: Start imaging surveillance.  -9/17/2018 NEURO-ONC/ MRB: Imaging stable, continue with surveillance.   -12/10/2018 NEURO-ONC/ MRB: Imaging stable, continue with surveillance.     SOCIAL HISTORY   Tobacco use: Never smoker.   Alcohol use: Social.   Drug use: Denies marijuana use.  Supplement, complimentary/  "alternative medicine: None.   Employment: RN.   , 2 children      PHYSICAL EXAMINATION  /70   Pulse 71   Temp 97.3  F (36.3  C) (Oral)   Resp 14   Ht 1.613 m (5' 3.5\")   Wt 68 kg (150 lb)   SpO2 97%   BMI 26.15 kg/m      Wt Readings from Last 2 Encounters:   12/10/18 68 kg (150 lb)   10/15/18 69.4 kg (153 lb)     Ht Readings from Last 2 Encounters:   12/10/18 1.613 m (5' 3.5\")   10/15/18 1.613 m (5' 3.5\")     KPS: 100    -Generally well appearing.  -Throat: No oral thrush.  -Respiratory: Normal breath sounds, no audible wheezing.   -Skin: No rashes. Healed head incision.  -Hematologic/ lymphatic: No abnormal bruising. No leg swelling.  -Psychiatric: Normal mood and affect. Pleasant, talkative.  -Neurologic:   MENTAL STATUS:     Alert, oriented to date.    Recall: Intact.    Speech fluent. Comprehension intact to multi-step commands.   Normal naming, repetition. Able to read.   Good right-left orientation.     CRANIAL NERVES:     Discs flat on fundoscopy.    Pupils are equal, round, reactive to light.     Extraocular movements full, patient denies diplopia.     Visual fields full.     Facial sensation intact to light touch.   Decreased activation with smiling/ talking on the left side of face, very subtle.    Hearing intact.   Palate moves symmetrically.     Sternocleidomastoid and trapezius strength intact.    Tongue midline.  MOTOR:    Normal and symmetric tone.   Grossly 5/5 throughout.    No pronation or drift. No orbiting.   Able to rise from a chair without use of arms.   On toe/ heel walk, equal distance from floor to heels/ toes.   SENSATION:    Intact to light touch throughout.  COORDINATION:   Intact finger-nose with eyes open and closed.   REFLEXES:    Slightly more brisk on the left arm.    Toes not tested. No clonus. No Hoffmans.   No grasp.    GAIT:  Walks without assistance. Foot pain, rather antalgic.    Good speed. Normal stride length and heel strike. Normal turns. Normal arm " swing.   Did not test toe, heel walk due to pain. Did not test tandem walk.       MEDICAL RECORDS  Obtained and personally reviewed all available outside medical records in addition to reviewing any records available in our electronic system.     LABS  Personally reviewed all available lab results.     IMAGING  Personally reviewed MR brain imaging from today. To my eye, there is no new contrast enhancement. Stable FLAIR hyperintensity about the cavity since 9/2018.     Imaging was shown to and results were reviewed with Barbi.       IMPRESSION  For the 30 minute appointment, more than 50% of the encounter was spent discussing in detail the nature of this tumor in light of her repeat imaging from today. This is in addition to providing emotional support, answering questions pertaining to my recommendations, and devising the treatment plan as outlined below.     Imaging with no concerning changes. She remains grossly asymptomatic with no increase in severity or frequency of seizures. With regard to cancer-directed therapy, continue imaging surveillance at this time. Barbi is in agreement with this plan.    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   -Repeat imaging in 4 months.    -SEIZURE MANAGEMENT-  -Continue to follow with currently epileptologist.   -Currently on Keppra plus Lamictal.     -Quality of life/ MOOD/ FATIGUE-  -Denies any mood issues. Endorses fatigue. Consider alternative BP med instead of a beta blocker.   -Continue to monitor mood as untreated/ undertreated depression can worsen fatigue, dysorexia, and quality of life.  -Issues with sleep, on gabapentin.     -OTHER-  -Floater; encouraged to be evaluated by an ophthalmologist.     Return to clinic in 4/2019 for repeat evaluation and to review new imaging.      In the meantime, Barbi knows to call with questions or concerns or to report new complaints and can be seen sooner if needed. Urgent evaluation is needed in the setting of acute onset of severe headache, abrupt change in mental status, on-going seizures, new focal deficits, or new leg swelling/ pain.    Myrtle Gallagher MD  Neuro-oncology

## 2018-12-10 NOTE — DISCHARGE INSTRUCTIONS
MRI Contrast Discharge Instructions    The IV contrast you received today will pass out of your body in your  urine. This will happen in the next 24 hours. You will not feel this process.  Your urine will not change color.    Drink at least 4 extra glasses of water or juice today (unless your doctor  has restricted your fluids). This reduces the stress on your kidneys.  You may take your regular medicines.    If you are on dialysis: It is best to have dialysis today.    If you have a reaction: Most reactions happen right away. If you have  any new symptoms after leaving the hospital (such as hives or swelling),  call your hospital at the correct number below. Or call your family doctor.  If you have breathing distress or wheezing, call 911.    Special instructions: ***    I have read and understand the above information.    Signature:______________________________________ Date:___________    Staff:__________________________________________ Date:___________     Time:__________    [facility] Departments:    ___[place]: [phone]  ___[facility]: [phone]  ___[place]: [phone] ___[facility]: [phone]  ___[facility]: [phone]  ___[facility]: [phone]  ___[phone]  ___[facility]: [phone]  ___[phone]

## 2018-12-10 NOTE — PROGRESS NOTES
NEURO-ONCOLOGY VISIT  12/10/2018    CHIEF COMPLAINT: Ms. Barbi Tiwari is a 58 year old right-handed woman with a right frontal diffuse oligodendroglioma (1p19q co-deleted), diagnosed following resection in 5/2011. She received 12 cycles of temozolomide, completed in 5/2012. Changes on imaging necessitated a repeat resection on 5/30/2018 and pathology was unchanged. No adjuvant cancer-directed therapy inititated. Currently, Barbi is managed on imaging surveillance.     She is presenting in follow-up for contiuned evaluation and recommendations on treatment. She is not accompanied.      HISTORY OF PRESENT ILLNESS  -She denies any new symptoms; no weakness, numbness, or headaches.  -Continued floater noted in visual field, has not been evaluated by ophtho.  -Fatigue remains an ongoing complaint. Thought to be related to her anti-seizure medications; Lamictal and Keppra.   -On gabapentin for to help with RLS and sleep initiation. Sleep is still poor. Seeing a doctor specializing in sleep medicine.   -Continued mild word-finding issues, otherwise no change in her cognitive function. 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  -No change in frequency, severity of seizure events.  -Continued foot pain, having foot surgery in January. Mild knee pain.   -Mild balance issues.   -Impaired singing voice over the few months.     REVIEW OF SYSTEMS  A comprehensive ROS negative except as in HPI.      MEDICATIONS   Current Outpatient Medications   Medication     acetaminophen (TYLENOL) 500 MG tablet     ascorbic acid 500 MG TABS     Calcium Carb-Cholecalciferol (CALCIUM-VITAMIN D3) 600-400 MG-UNIT CAPS     COMPOUNDED NON-CONTROLLED SUBSTANCE (CMPD RX) - PHARMACY TO MIX COMPOUNDED MEDICATION     ferrous sulfate (IRON) 325 (65 FE) MG tablet     Fexofenadine HCl (ALLEGRA PO)     gabapentin (NEURONTIN) 100 MG capsule     ibuprofen (ADVIL/MOTRIN) 200 MG tablet     Lactase (LACTAID PO)     lamoTRIgine (LAMICTAL) 100 MG tablet  "    lamoTRIgine (LAMICTAL) 200 MG tablet     levETIRAcetam (KEPPRA) 500 MG tablet     LORazepam (ATIVAN) 0.5 MG tablet     metoprolol tartrate (LOPRESSOR) 25 MG tablet     Multiple Vitamin (MULTI-VITAMINS) TABS     Omeprazole Magnesium (PRILOSEC OTC PO)     ranitidine (ZANTAC) 150 MG tablet     senna-docusate (SENOKOT-S;PERICOLACE) 8.6-50 MG per tablet     tretinoin (RETIN-A) 0.05 % cream     VITAMIN D, CHOLECALCIFEROL, PO     No current facility-administered medications for this visit.      DRUG ALLERGIES   Allergies   Allergen Reactions     Benoxinate Nausea and Vomiting     Sulfa Drugs Hives       ONCOLOGIC HISTORY  -4/27/2011 PRESENTATION: New onset seizure, generalized event.   -5/2011 MRB with a non-contrast enhancing right frontal mass lesion.   -5/2011 SURGERY: Craniectomy for mass resection at [facility].   PATHOLOGY: Diffuse oligodendroglioma; WHO grade II, 1p/19q co-deleted.  -6/2011-5/2012 CHEMO: Adjuvant dosed temozolomide x 12 cycles.  -4/2/2018 MRB with possible disease recurrence with a new 1.2 cm non-enhancing nodule within the cortex of the right frontal lobe adjacent to the resection cavity.  -4/12/2018 NEURO-ONC: Recommending repeat resection, appointment scheduled with Dr. Dustin Rodriguez, neurosurgery at the [facility].   -5/30/2018 SURGERY: Repeat resection with Dr. Rodriguez.   PATHOLOGY: Remains low grade, without concerning features.   -6/15/2018 NEURO-ONC: Start imaging surveillance.  -9/17/2018 NEURO-ONC/ MRB: Imaging stable, continue with surveillance.   -12/10/2018 NEURO-ONC/ MRB: Imaging stable, continue with surveillance.     SOCIAL HISTORY   Tobacco use: Never smoker.   Alcohol use: Social.   Drug use: Denies marijuana use.  Supplement, complimentary/ alternative medicine: None.   Employment: RN.   , 2 children      PHYSICAL EXAMINATION  /70   Pulse 71   Temp 97.3  F (36.3  C) (Oral)   Resp 14   Ht 1.613 m (5' 3.5\")   Wt 68 kg (150 lb)   SpO2 97%   BMI 26.15 kg/m     Wt Readings " "from Last 2 Encounters:   12/10/18 68 kg (150 lb)   10/15/18 69.4 kg (153 lb)     Ht Readings from Last 2 Encounters:   12/10/18 1.613 m (5' 3.5\")   10/15/18 1.613 m (5' 3.5\")     KPS: 100    -Generally well appearing.  -Throat: No oral thrush.  -Respiratory: Normal breath sounds, no audible wheezing.   -Skin: No rashes. Healed head incision.  -Hematologic/ lymphatic: No abnormal bruising. No leg swelling.  -Psychiatric: Normal mood and affect. Pleasant, talkative.  -Neurologic:   MENTAL STATUS:     Alert, oriented to date.    Recall: Intact.    Speech fluent. Comprehension intact to multi-step commands.   Normal naming, repetition. Able to read.   Good right-left orientation.     CRANIAL NERVES:     Discs flat on fundoscopy.    Pupils are equal, round, reactive to light.     Extraocular movements full, patient denies diplopia.     Visual fields full.     Facial sensation intact to light touch.   Decreased activation with smiling/ talking on the left side of face, very subtle.    Hearing intact.   Palate moves symmetrically.     Sternocleidomastoid and trapezius strength intact.    Tongue midline.  MOTOR:    Normal and symmetric tone.   Grossly 5/5 throughout.    No pronation or drift. No orbiting.   Able to rise from a chair without use of arms.   On toe/ heel walk, equal distance from floor to heels/ toes.   SENSATION:    Intact to light touch throughout.  COORDINATION:   Intact finger-nose with eyes open and closed.   REFLEXES:    Slightly more brisk on the left arm.    Toes not tested. No clonus. No Hoffmans.   No grasp.    GAIT:  Walks without assistance. Foot pain, rather antalgic.    Good speed. Normal stride length and heel strike. Normal turns. Normal arm swing.   Did not test toe, heel walk due to pain. Did not test tandem walk.       MEDICAL RECORDS  Obtained and personally reviewed all available outside medical records in addition to reviewing any records available in our electronic system. "     LABS  Personally reviewed all available lab results.     IMAGING  Personally reviewed MR brain imaging from today. To my eye, there is no new contrast enhancement. Stable FLAIR hyperintensity about the cavity since 9/2018.     Imaging was shown to and results were reviewed with Barbi.       IMPRESSION  For the 30 minute appointment, more than 50% of the encounter was spent discussing in detail the nature of this tumor in light of her repeat imaging from today. This is in addition to providing emotional support, answering questions pertaining to my recommendations, and devising the treatment plan as outlined below.     Imaging with no concerning changes. She remains grossly asymptomatic with no increase in severity or frequency of seizures. With regard to cancer-directed therapy, continue imaging surveillance at this time. Barbi is in agreement with this plan.    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   -Repeat imaging in 4 months.    -SEIZURE MANAGEMENT-  -Continue to follow with currently epileptologist.   -Currently on Keppra plus Lamictal.     -Quality of life/ MOOD/ FATIGUE-  -Denies any mood issues. Endorses fatigue. Consider alternative BP med instead of a beta blocker.   -Continue to monitor mood as untreated/ undertreated depression can worsen fatigue, dysorexia, and quality of life.  -Issues with sleep, on gabapentin.     -OTHER-  -Floater; encouraged to be evaluated by an ophthalmologist.     Return to clinic in 4/2019 for repeat evaluation and to review new imaging.     In the meantime, Barbi knows to call with questions or concerns or to report new complaints and can be seen sooner if needed. Urgent evaluation is needed in the setting of acute onset of severe headache, abrupt change in mental status,  on-going seizures, new focal deficits, or new leg swelling/ pain.    Myrtle Gallagher MD  Neuro-oncology

## 2018-12-10 NOTE — PATIENT INSTRUCTIONS
Recommend follow-up with ophthalmology; Dr. Benjamin Alvares (neuro-ophthalmology).     Imaging reviewed, stable. No concerns.   Repeat in 4 months.     Return to clinic in 4/2019.     Myrtle Gallagher MD  Neuro-oncology  12/10/2018

## 2018-12-19 ENCOUNTER — THERAPY VISIT (OUTPATIENT)
Dept: PHYSICAL THERAPY | Facility: CLINIC | Age: 58
End: 2018-12-19
Payer: COMMERCIAL

## 2018-12-19 DIAGNOSIS — M25.562 CHRONIC PAIN OF LEFT KNEE: ICD-10-CM

## 2018-12-19 DIAGNOSIS — G89.29 CHRONIC PAIN OF LEFT KNEE: ICD-10-CM

## 2018-12-19 PROCEDURE — 97530 THERAPEUTIC ACTIVITIES: CPT | Mod: GP | Performed by: PHYSICAL THERAPIST

## 2018-12-19 PROCEDURE — 97112 NEUROMUSCULAR REEDUCATION: CPT | Mod: GP | Performed by: PHYSICAL THERAPIST

## 2018-12-19 PROCEDURE — 97110 THERAPEUTIC EXERCISES: CPT | Mod: GP | Performed by: PHYSICAL THERAPIST

## 2018-12-19 ASSESSMENT — ACTIVITIES OF DAILY LIVING (ADL)
KNEE_ACTIVITY_OF_DAILY_LIVING_SCORE: 88.57
HOW_WOULD_YOU_RATE_THE_OVERALL_FUNCTION_OF_YOUR_KNEE_DURING_YOUR_USUAL_DAILY_ACTIVITIES?: NEARLY NORMAL
WEAKNESS: THE SYMPTOM AFFECTS MY ACTIVITY SLIGHTLY
SIT WITH YOUR KNEE BENT: ACTIVITY IS NOT DIFFICULT
GIVING WAY, BUCKLING OR SHIFTING OF KNEE: I HAVE THE SYMPTOM BUT IT DOES NOT AFFECT MY ACTIVITY
GO DOWN STAIRS: ACTIVITY IS SOMEWHAT DIFFICULT
HOW_WOULD_YOU_RATE_THE_CURRENT_FUNCTION_OF_YOUR_KNEE_DURING_YOUR_USUAL_DAILY_ACTIVITIES_ON_A_SCALE_FROM_0_TO_100_WITH_100_BEING_YOUR_LEVEL_OF_KNEE_FUNCTION_PRIOR_TO_YOUR_INJURY_AND_0_BEING_THE_INABILITY_TO_PERFORM_ANY_OF_YOUR_USUAL_DAILY_ACTIVITIES?: 90
RISE FROM A CHAIR: ACTIVITY IS NOT DIFFICULT
PAIN: I DO NOT HAVE THE SYMPTOM
STAND: ACTIVITY IS NOT DIFFICULT
AS_A_RESULT_OF_YOUR_KNEE_INJURY,_HOW_WOULD_YOU_RATE_YOUR_CURRENT_LEVEL_OF_DAILY_ACTIVITY?: NEARLY NORMAL
KNEEL ON THE FRONT OF YOUR KNEE: ACTIVITY IS NOT DIFFICULT
GO UP STAIRS: ACTIVITY IS NOT DIFFICULT
LIMPING: I DO NOT HAVE THE SYMPTOM
SQUAT: ACTIVITY IS SOMEWHAT DIFFICULT
STIFFNESS: I HAVE THE SYMPTOM BUT IT DOES NOT AFFECT MY ACTIVITY
SWELLING: I DO NOT HAVE THE SYMPTOM
WALK: ACTIVITY IS NOT DIFFICULT
KNEE_ACTIVITY_OF_DAILY_LIVING_SUM: 62
RAW_SCORE: 62

## 2018-12-19 NOTE — PROGRESS NOTES
"Subjective:  HPI                    Objective:  System    Physical Exam    General     ROS    Assessment/Plan:    DISCHARGE REPORT    Progress reporting period is from 10/24/2018 to 12/19/2018.       SUBJECTIVE  Subjective: Patient reports the pain she had initially in the back of her L knee is gone.  She now has pain in the front and just in the knee in general, especially with going down stairs.  She still feels like her L knee is weak, and she is hoping to get it stronger before she has R foot surgery and needs to be non-weightbearing after 01/14/2019.     Current Pain level: 2/10.     Initial Pain level: 3/10.   Changes in function:  Yes, no pain with lowering to sit now, less pain descending stairs.   Adverse reaction to treatment or activity: None    OBJECTIVE  Objective: Concordant sign of mild L knee pain with 8\" step-down.  Decreased pain after repated L knee extension using quadset.  Patient to continue.  Progressed hip strengthening exercises without difficulty  Pain abolished after treatment.       ASSESSMENT/PLAN  Patient has been seen for 4 visits with treatment focusing on directional preference exercises for the L knee (extension) in addition to hip and knee strengthening exercises.  Her initial posterior L knee pain has been abolished with repeated knee extension exercises, and she now has general anterior knee pain.  She has a good HEP for continued use of directiona preference and strengthening exercises to impact the mechanics of her joint and decrease pain.  She should do well continuing independently at this point.    Updated problem list and treatment plan: Diagnosis 1:  L knee pain  Pain -  self management, education, directional preference exercise and home program  Decreased ROM/flexibility - home program  Decreased strength - home program  Decreased function - home program  STG/LTGs have been met or progress has been made towards goals:  Yes, goals for no pain lowering to sit have been " met.  STG for stairs has been met, but LTG is progressing.    Assessment of Progress: The patient's condition is improving.  Self Management Plans:  Patient has been instructed in a home treatment program.  Patient is independent in a home treatment program.  Patient  has been instructed in self management of symptoms.  Patient is independent in self management of symptoms.  Barbi continues to require the following intervention to meet STG and LTG's:  PT intervention is no longer required to meet STG/LTG.    Recommendations:  This patient is ready to be discharged from therapy and continue their home treatment program.    Please refer to the daily flowsheet for treatment today, total treatment time and time spent performing 1:1 timed codes.

## 2019-01-07 ENCOUNTER — OFFICE VISIT (OUTPATIENT)
Dept: PODIATRY | Facility: CLINIC | Age: 59
End: 2019-01-07
Payer: COMMERCIAL

## 2019-01-07 ENCOUNTER — OFFICE VISIT (OUTPATIENT)
Dept: INTERNAL MEDICINE | Facility: CLINIC | Age: 59
End: 2019-01-07
Payer: COMMERCIAL

## 2019-01-07 VITALS
RESPIRATION RATE: 16 BRPM | WEIGHT: 148.7 LBS | SYSTOLIC BLOOD PRESSURE: 114 MMHG | HEART RATE: 61 BPM | BODY MASS INDEX: 25.39 KG/M2 | TEMPERATURE: 97.8 F | OXYGEN SATURATION: 97 % | HEIGHT: 64 IN | DIASTOLIC BLOOD PRESSURE: 78 MMHG

## 2019-01-07 VITALS
DIASTOLIC BLOOD PRESSURE: 80 MMHG | HEIGHT: 64 IN | SYSTOLIC BLOOD PRESSURE: 112 MMHG | BODY MASS INDEX: 25.27 KG/M2 | WEIGHT: 148 LBS

## 2019-01-07 DIAGNOSIS — M77.41 METATARSALGIA, RIGHT FOOT: ICD-10-CM

## 2019-01-07 DIAGNOSIS — Z01.818 PREOP GENERAL PHYSICAL EXAM: Primary | ICD-10-CM

## 2019-01-07 DIAGNOSIS — Z12.31 VISIT FOR SCREENING MAMMOGRAM: ICD-10-CM

## 2019-01-07 DIAGNOSIS — F32.1 MODERATE MAJOR DEPRESSION, SINGLE EPISODE (H): ICD-10-CM

## 2019-01-07 DIAGNOSIS — C71.9 OLIGODENDROGLIOMA (H): ICD-10-CM

## 2019-01-07 DIAGNOSIS — M20.11 HALLUX VALGUS OF RIGHT FOOT: Primary | ICD-10-CM

## 2019-01-07 DIAGNOSIS — M20.41 ACQUIRED HAMMER TOE DEFORMITY OF LESSER TOE OF RIGHT FOOT: ICD-10-CM

## 2019-01-07 DIAGNOSIS — M79.671 RIGHT FOOT PAIN: ICD-10-CM

## 2019-01-07 DIAGNOSIS — M20.11 HALLUX VALGUS, ACQUIRED, RIGHT: ICD-10-CM

## 2019-01-07 PROCEDURE — 99214 OFFICE O/P EST MOD 30 MIN: CPT | Performed by: PODIATRIST

## 2019-01-07 PROCEDURE — 99214 OFFICE O/P EST MOD 30 MIN: CPT | Performed by: INTERNAL MEDICINE

## 2019-01-07 RX ORDER — LAMOTRIGINE 200 MG/1
200 TABLET ORAL 2 TIMES DAILY
COMMUNITY
Start: 2019-01-07 | End: 2019-10-31

## 2019-01-07 RX ORDER — LAMOTRIGINE 100 MG/1
100 TABLET ORAL 2 TIMES DAILY
COMMUNITY
Start: 2019-01-07 | End: 2019-10-31

## 2019-01-07 ASSESSMENT — MIFFLIN-ST. JEOR
SCORE: 1236.56
SCORE: 1233.38

## 2019-01-07 ASSESSMENT — PATIENT HEALTH QUESTIONNAIRE - PHQ9: SUM OF ALL RESPONSES TO PHQ QUESTIONS 1-9: 3

## 2019-01-07 NOTE — PROGRESS NOTES
PRE-OP QUESTIONNAIRE:    1)  Do you smoke?  No    2)  Have you ever had a blood clot in your legs or lungs?  No    3)  Any family history of blood clots or bleeding disorders?  No    4)  Do you have an allergy or intolerance to any pain medication? No    5)  Do you have a history of any dependency on pain medications? No    6)  Does another doctor treat you for chronic pain or prescribe pain medication?  No    7)  Do you have help at home (family/friends) to assist after surgery?  Yes    8)  Have you ever had complications after surgery? No      9)  Do you have problems with balance?  No    10) Any allergy to metal or jewelry?  No      11) Any allergy to suture material? No    12) How much time off from work are you allowed or planning for?  Discuss

## 2019-01-07 NOTE — H&P (VIEW-ONLY)
[address] [address]  [phone]  Dept: [phone]    PRE-OP EVALUATION:  Today's date: 2019    Barbi Tiwari (: 1960) presents for pre-operative evaluation assessment as requested by Dr. Griffin.  She requires evaluation and anesthesia risk assessment prior to undergoing surgery/procedure for treatment of R foot bunion     Fax number for surgical facility: [facility]  Primary Physician: Dustin Choudhury  Type of Anesthesia Anticipated: General    Patient has a Health Care Directive or Living Will:  NO    Preop Questions 2019   Who is doing your surgery? Benjamin Griffin   What are you having done? buionectomy   Date of Surgery/Procedure: 1/15/2019   1.  Do you have a history of Heart attack, stroke, stent, coronary bypass surgery, or other heart surgery? No   2.  Do you ever have any pain or discomfort in your chest? No   3.  Do you have a history of  Heart Failure? No   4.   Are you troubled by shortness of breath when:  walking on a level surface, or up a slight hill, or at night? No   5.  Do you currently have a cold, bronchitis or other respiratory infection? No   6.  Do you have a cough, shortness of breath, or wheezing? No   7.  Do you sometimes get pains in the calves of your legs when you walk? No   8. Do you or anyone in your family have previous history of blood clots? No   9.  Do you or does anyone in your family have a serious bleeding problem such as prolonged bleeding following surgeries or cuts? No   10. Have you ever had problems with anemia or been told to take iron pills? No   11. Have you had any abnormal blood loss such as black, tarry or bloody stools, or abnormal vaginal bleeding? No   12. Have you ever had a blood transfusion? No   13. Have you or any of your relatives ever had problems with anesthesia? No   14. Do you have sleep apnea, excessive snoring or daytime drowsiness? No   15. Do you have any  prosthetic heart valves? No   16. Do you have prosthetic joints? No   17. Is there any chance that you may be pregnant? No         HPI:     Longstanding osteoarthritis, bunions in feet since adolescence. Increasingly painful and problematic for patient.        See problem list for active medical problems.  Problems all longstanding and stable, except as noted/documented.  See ROS for pertinent symptoms related to these conditions.                                                                                                                                                          .    MEDICAL HISTORY:     Patient Active Problem List    Diagnosis Date Noted     PVC's (premature ventricular contractions) 06/05/2014     Priority: High     Elevated cholesterol 06/05/2014     Priority: High     Abnormal screening cardiac CT 06/05/2014     Priority: High     Seizure (H) 08/17/2011     Priority: High     Chronic pain of left knee 10/24/2018     Priority: Medium     Primary osteoarthritis of left knee 10/15/2018     Priority: Medium     S/P laparoscopic hysterectomy 06/15/2018     Priority: Medium     Oligodendroglioma (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     Priority: Medium     Nephrolithiasis 05/05/2017     Priority: Medium     Overview:   S/P LITHOTRIPSY 3/15/11       Advanced directives, counseling/discussion 03/24/2017     Priority: Medium     Lumbar radiculopathy 09/08/2016     Priority: Medium     Palpitations 06/05/2014     Priority: Medium     Moderate major depression, single episode (H) 03/06/2013     Priority: Medium     Weakness of face muscles 08/18/2011     Priority: Medium     Calculus of kidney 01/12/2011     Priority: Medium     Overview:   S/P LITHOTRIPSY 3/15/11       Allergic rhinitis 12/09/2009     Priority: Medium     Hyperparathyroidism s/p surgery 01/11/2011     Priority: Low     Osteoarthritis 12/09/2009     Priority: Low     GERD (gastroesophageal reflux disease) 10/22/2008     Priority: Low      Past Medical  History:   Diagnosis Date     Allergic rhinitis 12/9/2009     Calculus of kidney 1/12/2011    Overview:  S/P LITHOTRIPSY 3/15/11      Esophageal reflux 10/22/2008     Hyperparathyroidism s/p surgery 1/11/2011     oligodendroglioma 7/23/2012     Osteoarthrosis 12/9/2009     Palpitations 6/5/2014     PVCs 6/5/2014     Seizure disorder (related to tumor) 8/17/2011     Past Surgical History:   Procedure Laterality Date     CRANIOTOMY  2011    tumor resection     EXTRACORPOREAL SHOCK WAVE LITHOTRIPSY, CYSTOSCOPY, INSERT STENT URETER(S), COMBINED Bilateral 5/5/2017    Procedure: COMBINED EXTRACORPOREAL SHOCK WAVE LITHOTRIPSY, CYSTOSCOPY, INSERT STENT URETER(S);  CYSTO, URETHRAL DILATION, URETERAL LEFT STENT PLACEMENT, LEFT ESWL.;  Surgeon: Angel Del Rio MD;  Location:  OR     FOOT SURGERY      mulitple     HYSTERECTOMY, PAP NO LONGER INDICATED  2008    lap hys     LITHOTRIPSY  2010 2017     OPTICAL TRACKING SYSTEM CRANIOTOMY, EXCISE TUMOR WITH MAPPING, COMBINED Right 5/30/2018    Procedure: COMBINED OPTICAL TRACKING SYSTEM CRANIOTOMY, EXCISE TUMOR WITH MAPPING;  Right Stealth Assisted Craniotomy With Motor Mapping And Tumor Resection ;  Surgeon: Dustin Rodriguez MD;  Location: [facility] OR     ORTHOPEDIC SURGERY       PARATHYROIDECTOMY  2011     Current Outpatient Medications   Medication Sig Dispense Refill     acetaminophen (TYLENOL) 500 MG tablet as needed Take 1,000 mg by mouth every 6 hours if needed. Max acetaminophen dose: 4000mg in 24 hrs.        ascorbic acid 500 MG TABS Take 500 mg by mouth 2 times daily       Calcium Carb-Cholecalciferol (CALCIUM-VITAMIN D3) 600-400 MG-UNIT CAPS        COMPOUNDED NON-CONTROLLED SUBSTANCE (CMPD RX) - PHARMACY TO MIX COMPOUNDED MEDICATION as needed Apply 1g daily for 2 weeks, then twice weekly to vagina       ferrous sulfate (IRON) 325 (65 FE) MG tablet Take 325 mg by mouth 2 times daily Reported on 5/15/2017       Fexofenadine HCl (ALLEGRA PO) Take by mouth daily        gabapentin (NEURONTIN) 100 MG capsule 2 pills 1.5 hrs before bedtime for 7 nights, may increase to 3 pills if needed. 90 capsule 1     Lactase (LACTAID PO) Take 3,000 Units by mouth as needed for indigestion       lamoTRIgine (LAMICTAL) 100 MG tablet Take 1 tablet (100 mg) by mouth 2 times daily       lamoTRIgine (LAMICTAL) 200 MG tablet Take 1 tablet (200 mg) by mouth 2 times daily       levETIRAcetam (KEPPRA) 500 MG tablet Take 1,500 mg by mouth 2 times daily        LORazepam (ATIVAN) 0.5 MG tablet Take 1 tablet (0.5 mg) by mouth as needed for anxiety       metoprolol tartrate (LOPRESSOR) 25 MG tablet Take 1 tablet (25 mg) by mouth 2 times daily 180 tablet 3     Multiple Vitamin (MULTI-VITAMINS) TABS every morning take 1 tablet by oral route once daily with food       Omeprazole Magnesium (PRILOSEC OTC PO) Take 20 mg by mouth as needed        ranitidine (ZANTAC) 150 MG tablet Take 1 tablet (150 mg) by mouth At Bedtime       senna-docusate (SENOKOT-S;PERICOLACE) 8.6-50 MG per tablet Take 2 tablets by mouth 2 times daily 100 tablet 1     tretinoin (RETIN-A) 0.05 % cream Spread a pea size amount into affected area topically at bedtime.  Use sunscreen SPF>20. 45 g 11     VITAMIN D, CHOLECALCIFEROL, PO Take 1,000 Units by mouth 2 times daily Reported on 5/15/2017       ibuprofen (ADVIL/MOTRIN) 200 MG tablet Take 800 mg by mouth       OTC products: None, except as noted above    Allergies   Allergen Reactions     Benoxinate Nausea and Vomiting     Sulfa Drugs Hives      Latex Allergy: NO    Social History     Tobacco Use     Smoking status: Never Smoker     Smokeless tobacco: Never Used   Substance Use Topics     Alcohol use: Yes     Comment: 1-2 per month, only at social events     History   Drug Use No       REVIEW OF SYSTEMS:   Constitutional, neuro, ENT, endocrine, pulmonary, cardiac, gastrointestinal, genitourinary, musculoskeletal, integument and psychiatric systems are negative, except as otherwise  "noted.    EXAM:   /78   Pulse 61   Temp 97.8  F (36.6  C) (Oral)   Resp 16   Ht 1.613 m (5' 3.5\")   Wt 67.4 kg (148 lb 11.2 oz)   SpO2 97%   BMI 25.93 kg/m      GENERAL APPEARANCE: healthy, alert and no distress     EYES: EOMI, PERRL     HENT: nose and mouth without ulcers or lesions and normal cephalic/atraumatic     RESP: lungs clear to auscultation - no rales, rhonchi or wheezes     CV: regular rates and rhythm, normal S1 S2, no S3 or S4 and no murmur, click or rub     ABDOMEN:  soft, nontender, no HSM or masses and bowel sounds normal     MS: extremities normal- no gross deformities noted, no evidence of inflammation in joints, FROM in all extremities.     SKIN: no suspicious lesions or rashes     NEURO: Normal strength and tone, sensory exam grossly normal, mentation intact and speech normal     PSYCH: mentation appears normal. and affect normal/bright     LYMPHATICS: No cervical adenopathy    DIAGNOSTICS:   No labs or EKG required for low risk surgery (cataract, skin procedure, breast biopsy, etc)    Recent Labs   Lab Test 05/31/18  0357 05/30/18  1215 05/30/18  0626 05/23/18  0913   HGB 11.5* 13.4 14.4 15.0    178  --  225   INR  --   --  1.03 1.00   * 141  --  139   POTASSIUM 4.0 3.9 4.3 4.7   CR 0.48* 0.68  --  0.72        IMPRESSION:   Reason for surgery/procedure: R bunion  Diagnosis/reason for consult: hx of symptomatic PVCs on B-Blocker, Hx seizures related to CNS surgery, oligodendroglioma.     The proposed surgical procedure is considered LOW risk.    REVISED CARDIAC RISK INDEX  The patient has the following serious cardiovascular risks for perioperative complications such as (MI, PE, VFib and 3  AV Block):  No serious cardiac risks  INTERPRETATION: 0 risks: Class I (very low risk - 0.4% complication rate)    The patient has the following additional risks for perioperative complications:  No identified additional risks      ICD-10-CM    1. Preop general physical exam Z01.818  "   2. Visit for screening mammogram Z12.31 MA SCREENING DIGITAL BILAT - Future  (s+30)   3. Moderate major depression, single episode (H) F32.1    4. Oligodendroglioma (H) C71.9        RECOMMENDATIONS:       --Patient is to take all scheduled medications on the day of surgery EXCEPT for modifications listed below.    APPROVAL GIVEN to proceed with proposed procedure, without further diagnostic evaluation       Signed Electronically by: Dustin Choudhury MD    Copy of this evaluation report is provided to requesting physician.    [place] Preop Guidelines    Revised Cardiac Risk Index

## 2019-01-07 NOTE — PATIENT INSTRUCTIONS
Before Your Surgery      Call your surgeon if there is any change in your health. This includes signs of a cold or flu (such as a sore throat, runny nose, cough, rash or fever).    Do not smoke, drink alcohol or take over the counter medicine (unless your surgeon or primary care doctor tells you to) for the 24 hours before and after surgery.    If you take prescribed drugs: Follow your doctor s orders about which medicines to take and which to stop until after surgery.    Eating and drinking prior to surgery: follow the instructions from your surgeon  Take a shower or bath the night before surgery. Use the soap your surgeon gave you to gently clean your skin. If you do not have soap from your surgeon, use your regular soap. Do not shave or scrub the surgery site.  Wear clean pajamas and have clean sheets on your bed. The heart-healthy Mediterranean is a healthy eating plan based on typical foods and recipes of Mediterranean-style cooking. Here's how to adopt the Mediterranean diet.   If you're looking for a heart-healthy eating plan, the Mediterranean diet might be right for you. The Mediterranean diet incorporates the basics of healthy eating -- plus a splash of flavorful olive oil and perhaps even a glass of red wine -- among other components characterizing the traditional cooking style of countries bordering the [place].  Most healthy diets include fruits, vegetables, fish and whole grains, and limit unhealthy fats. While these parts of a healthy diet remain tried-and-true, subtle variations or differences in proportions of certain foods may make a difference in your risk of heart disease.  Research has shown that the traditional Mediterranean diet reduces the risk of heart disease. In fact, an analysis of more than 1.5 million healthy adults demonstrated that following a Mediterranean diet was associated with a reduced risk of death from heart disease and cancer, as well as a reduced incidence of  Parkinson's and Alzheimer's diseases.   The Dietary Guidelines for Americans recommends the Mediterranean diet as an eating plan that can help promote health and prevent disease. And the Mediterranean diet is one your whole family can follow for good health.   The Mediterranean diet emphasizes:  Eating primarily plant-based foods, such as fruits and vegetables, whole grains, legumes and nuts   Replacing butter with healthy fats, such as olive oil   Using herbs and spices instead of salt to flavor foods   Limiting red meat to no more than a few times a month   Eating fish and poultry at least twice a week   Drinking red wine in moderation (optional)  The diet also recognizes the importance of being physically active, and enjoying meals with family and friends.  The Mediterranean diet traditionally includes fruits, vegetables and grains. For example, residents of [facility] average six or more servings a day of antioxidant-rich fruits and vegetables.  Grains in the Mediterranean region are typically whole grain and usually contain very few unhealthy trans fats, and bread is an important part of the diet. However, throughout the Mediterranean region, bread is eaten plain or dipped in olive oil -- not eaten with butter or margarine, which contains saturated or trans fats.   Nuts are another part of a healthy Mediterranean diet. Nuts are high in fat, but most of the fat is healthy. Because nuts are high in calories, they should not be eaten in large amounts -- generally no more than a handful a day. For the best nutrition, avoid candied or honey-roasted and heavily salted nuts.  The focus of the Mediterranean diet isn't on limiting total fat consumption, but rather on choosing healthier types of fat. The Mediterranean diet discourages saturated fats and hydrogenated oils (trans fats), both of which contribute to heart disease.  The Mediterranean diet features olive oil as the primary source of fat. Olive oil is mainly  "monounsaturated fat -- a type of fat that can help reduce low-density lipoprotein (LDL) cholesterol levels when used in place of saturated or trans fats. \"Extra-virgin\" and \"virgin\" olive oils (the least processed forms) also contain the highest levels of protective plant compounds that provide antioxidant effects.  Canola oil and some nuts contain the beneficial linolenic acid (a type of omega-3 fatty acid) in addition to healthy unsaturated fat. Omega-3 fatty acids lower triglycerides, decrease blood clotting, and are associated with decreased incidence of sudden heart attacks, improve the health of your blood vessels, and help moderate blood pressure. Fatty fish -- such as mackerel, lake trout, herring, sardines, albacore tuna and salmon -- are rich sources of omega-3 fatty acids. Fish is eaten on a regular basis in the Mediterranean diet.  The health effects of alcohol have been debated for many years, and some doctors are reluctant to encourage alcohol consumption because of the health consequences of excessive drinking. However, alcohol -- in moderation -- has been associated with a reduced risk of heart disease in some research studies.  The Mediterranean diet typically includes a moderate amount of wine, usually red wine. This means no more than 5 ounces (148 milliliters) of wine daily for women of all ages and men older than age 65 and no more than 10 ounces (296 milliliters) of wine daily for younger men. More than this may increase the risk of health problems, including increased risk of certain types of cancer.   If you're unable to limit your alcohol intake to the amounts defined above, if you have a personal or family history of alcohol abuse, or if you have heart or liver disease, refrain from drinking wine or any other alcohol.  The Mediterranean diet is a delicious and healthy way to eat. Many people who switch to this style of eating say they'll never eat any other way. Here are some specific steps " to get you started:   Eat your veggies and fruits -- and switch to whole grains. [place] of plant foods should make up the majority of your meals. They should be minimally processed -- fresh and whole are best. Include veggies and fruits in every meal and eat them for snacks as well. Switch to whole-grain bread and cereal, and begin to eat more whole-grain rice and pasta products. Keep baby carrots, apples and bananas on hand for quick, satisfying snacks. Fruit salads are a wonderful way to eat a variety of healthy fruit.   Go nuts. Nuts and seeds are good sources of fiber, protein and healthy fats. Keep almonds, cashews, pistachios and walnuts on hand for a quick snack. Choose natural peanut butter, rather than the kind with hydrogenated fat added. Try blended sesame seeds (tahini) as a dip or spread for bread.   Pass on the butter. Try olive or canola oil as a healthy replacement for butter or margarine. Lightly drizzle it over vegetables. After cooking pasta, add a touch of olive oil, some garlic and green onions for flavoring. Dip bread in flavored olive oil or lightly spread it on whole-grain bread for a tasty alternative to butter. Try tahini as a dip or spread for bread too.   Spice it up. Herbs and spices make food tasty and can  for salt and fat in recipes.   Go fish. Eat fish at least twice a week. Fresh or water-packed tuna, salmon, trout, mackerel and herring are healthy choices. [place], bake or broil fish for great taste and easy cleanup. Avoid breaded and fried fish.   Rein in the red meat. Limit red meat to no more than a few times a month. Substitute fish and poultry for red meat. When choosing red meat, make sure it's lean and keep portions small (about the size of a deck of cards). Also avoid sausage, mae and other high-fat, processed meats.   Choose low-fat dairy. Limit higher fat dairy products, such as whole or 2 percent milk, cheese and ice cream. Switch to skim milk, fat-free yogurt  and low-fat cheese

## 2019-01-07 NOTE — LETTER
1/7/2019         RE: Barbi Tiwari  [address] [facility] [address]        Dear Colleague,    Thank you for referring your patient, Barbi Tiwari, to the [facility]. Please see a copy of my visit note below.    PRE-OP QUESTIONNAIRE:    1)  Do you smoke?  No    2)  Have you ever had a blood clot in your legs or lungs?  No    3)  Any family history of blood clots or bleeding disorders?  No    4)  Do you have an allergy or intolerance to any pain medication? No    5)  Do you have a history of any dependency on pain medications? No    6)  Does another doctor treat you for chronic pain or prescribe pain medication?  No    7)  Do you have help at home (family/friends) to assist after surgery?  Yes    8)  Have you ever had complications after surgery? No      9)  Do you have problems with balance?  No    10) Any allergy to metal or jewelry?  No      11) Any allergy to suture material? No    12) How much time off from work are you allowed or planning for?  Discuss      Subjective:    Barbi Tiwari is a 58 year old female who presents to clinic today for surgical planning and discussion.   She is scheduled to undergo right foot surgery on 1/15/19: 1) right first metatarsophalangeal joint arthrodesis  2) right 2nd metatarsal Weil osteotomy.  Conservative measures  no longer provide adequate relief.  She asked about the plan with her right 2nd toe.  Prior surgery.  The toe is short, flaccid and drifts dorsally causes discomfort in foot wear.     Please see the surgical planning questionnaire below.     Patient Active Problem List   Diagnosis     Calculus of kidney     Hyperparathyroidism s/p surgery     GERD (gastroesophageal reflux disease)     Allergic rhinitis     Palpitations     PVC's (premature ventricular contractions)     Seizure (H)     Osteoarthritis     Elevated cholesterol     Abnormal screening cardiac CT     Lumbar radiculopathy     Advanced directives,  counseling/discussion     Nephrolithiasis     Oligodendroglioma (H)     Moderate major depression, single episode (H)     S/P laparoscopic hysterectomy     Weakness of face muscles     Primary osteoarthritis of left knee     Chronic pain of left knee       Current Outpatient Medications   Medication     acetaminophen (TYLENOL) 500 MG tablet     ascorbic acid 500 MG TABS     Calcium Carb-Cholecalciferol (CALCIUM-VITAMIN D3) 600-400 MG-UNIT CAPS     COMPOUNDED NON-CONTROLLED SUBSTANCE (CMPD RX) - PHARMACY TO MIX COMPOUNDED MEDICATION     ferrous sulfate (IRON) 325 (65 FE) MG tablet     Fexofenadine HCl (ALLEGRA PO)     gabapentin (NEURONTIN) 100 MG capsule     ibuprofen (ADVIL/MOTRIN) 200 MG tablet     Lactase (LACTAID PO)     lamoTRIgine (LAMICTAL) 100 MG tablet     lamoTRIgine (LAMICTAL) 200 MG tablet     levETIRAcetam (KEPPRA) 500 MG tablet     LORazepam (ATIVAN) 0.5 MG tablet     metoprolol tartrate (LOPRESSOR) 25 MG tablet     Multiple Vitamin (MULTI-VITAMINS) TABS     Omeprazole Magnesium (PRILOSEC OTC PO)     ranitidine (ZANTAC) 150 MG tablet     senna-docusate (SENOKOT-S;PERICOLACE) 8.6-50 MG per tablet     tretinoin (RETIN-A) 0.05 % cream     VITAMIN D, CHOLECALCIFEROL, PO     No current facility-administered medications for this visit.       Exam:    Constitutional/ general:  Pt is in no apparent distress, appears well-nourished.  Cooperative with history and physical exam.      Vascular:  Pedal pulses are palpable bilaterally for both the DP and PT arteries.  CFT < 3 sec.  No edema.  Pedal hair growth noted.      Neuro:  Alert and oriented x 3. Coordinated gait.  Light touch sensation is intact to the L4, L5, S1 distributions. No obvious deficits.  No evidence of neurological-based weakness, spasticity, or contracture in the lower extremities.      Derm: Normal texture and turgor.  No erythema, ecchymosis, or cyanosis.  No open lesions.      Musculoskeletal:    Lower extremity muscle strength is normal.   Patient is ambulatory without an assistive device or brace . Bilateral flat foot structure.  Hallux abducto valgus bilateral foot.  Right foot:  The deformity is not fully reducible in the transverse plane - there is tracking. Significant deformity. Digital contractures yet flexible. Right 2nd toe is very short, flaccid, from a previous surgery.  It drifts dorsally.  Pain sub right 2nd metatarsophalangeal joint.       Radiographic Exam:  X-Ray Findings:  I personally reviewed the films.  Four views of the right foot:     Findings are consistent with the clinical exam.  There is an increased 1st intermetatarsal angle (ELODIA = 24 degrees) and increased hallux abductus angle.  The sesamoids are subluxing laterally in relation to the 1st metatarsal head. Obvious midfoot degenerative changes.  Apparently a phalangectomy of some sort, right 2nd toe.  Post surgical changes of the 1st metatarsal.  The 2nd metatarsal is considered significantly longer than the first.      Assessment:  Encounter Diagnoses   Name Primary?     Hallux valgus of right foot Yes     Metatarsalgia, right foot      Acquired hammer toe deformity of lesser toe of right foot      Right foot pain          Plan:  The surgical procedures were discussed in detail, including risks, benefits, post operative course and cares, and prognosis.  Risks discussed include, but are not limited to,  postoperative pain, swelling,  infection, healing complications, temporary or permanent numbness,  DVT, hypertropohic scar formation, complex regional pain syndrome and need for additional surgery.       I explained that although infrequent, there are intra-operative and post-operative challenges and complications that can occur.  Some of the latter are listed above.  Intra-operative challenges can involve finding poor bone quality, difficulty with the internal fixation (when used), and even inadvertent injury to neighboring structures that would then need to be repaired.  "    No guarantees were given.     Although the surgical hardware typically is left in, it was explained that if there are hardware-related complications, future removal might be needed.     The patient was also informed that a  might be present on the day of surgery.     I explained that the right 2nd toe might sit lower in the sagittal plane after the planned Weil osteotomy of the 2nd metatarsal.  However, there is nothing more that can be done for the toe.  Amputation is an option and she is considering this.  I explained that it might serve as a \"spacer\" and help keep the 3rd toe from drifting medially.  I also explained, that as far as her bunion, this is a revision surgery.  Options are limited and I think the first metatarsophalangeal joint arthrodesis is her best option. We looked at the XR images again.      PROCEDURE:  1) Right first metatarsophalangeal joint arthrodesis  2) right second metatarsal Weil osteotomy  3) possible amputation, right second toe.     Greater than 50% (25min) was spent in face-to-face counseling and coordination of care. This excluded any procedure time during the visit.    Benjamin Griffin DPM, FACFAS, MS    [facility]      Again, thank you for allowing me to participate in the care of your patient.        Sincerely,        Benjamin Griffin DPM    "

## 2019-01-07 NOTE — PATIENT INSTRUCTIONS
Thank you for choosing [facility]!     DR. [facility]S CLINIC LOCATIONS     MONDAY - [place] WEDNESDAY - [place]   [address] [place], [place] [address] [place] [address] [place] [address]   [phone] / [phone] / [phone]       THURSDAY - [place] SCHEDULE SURGERY: [phone]   [address] APPOINTMENTS: [phone]   [place] [address] BILLING QUESTIONS: [phone] / [phone]       REVIEW OF SURGICAL RISKS    The following is a list of possible risks associated with foot and ankle surgery. This is not all-inclusive. Please realize that risks associated with elective foot surgery are low and there are ways to deal with them when they occur.     1) Infection:  Probably the most concerning and discussed risk of surgery, the chance of infection is about 1% with elective surgery. Often it involves the skin around the incision and resolves with oral antibiotics. It is rare that infection will be deep and require surgical intervention. You will receive an antibiotic prior to surgery.    2)  Pain: Surgery is trauma to the foot. Therefore a certain amount of pain is to be expected. This will be most bothersome during the first 1-2 days. Taking your pain medication, elevating the extremity above heart level, and using ice are all very important for adequate pain management.     3)  Swelling: This is due to the trauma of surgery. A certain degree of swelling is normal. However, if there is too much, it can impair healing, increase the risk of infection, add to your pain, and linger for several months after surgery making return to normal shoes difficult. Elevating the limb is extremely important.    4)  Healing Complications: There are many factors that can impair healing. There is no way to speed up the biological rate of healing. People tend to find ways to slow it down. Proper nutrition, not smoking, following the  instructions provided by your surgeon are ways to help with normal healing.    Sometimes the skin is slow to heal, and an open area along the incision develops.  If this happens, it cannot be stitched closed. It then needs to heal from the inside out. Rarely there will be an issue with bone healing, which might require a special device called a bone stimulator and/or a revision surgery.    5)  Temporary and Permanent Numbness: Numbness beyond the area of surgery is to be expected. Initially it is from the local anesthetic that was injected into your foot. This wears off within 24 hours. Continued numbness or tingling is usually from swelling that compresses the nerves. Numbness that lasts for weeks is from nerve injury/irritation. This might eventually resolve or be permanent.      6)  Blood Clot:  Although rare, this is potentially the most dangerous risk associated with foot surgery. A blood clot in the leg can lead to varicose veins and chronic swelling. A blood clot that travels to the lungs is a very serious condition requiring hospitalization. Increased risk is related to trauma of surgery and degree of immobilization. There are many other risk factors that are not related directly to surgery (smoking, family history, personal history of varicose veins and/or blood clots, cancer, high fat cholesterol and lipids). Your surgeon will discuss this with you and devise an appropriate plan to help prevent this complication.    7)  Hypertrophic Scar: Some people have skin that is prone to forming exaggerated scar tissue. This can be unsightly and uncomfortable. There are ways to treat it.        8)  Complex Regional Pain Syndrome:  Rarely some people have pain that is out of proportion to the surgical procedure and harder to get control of. This pain can linger and cause changes in skin temperature and appearance. If this occurs, physical therapy and/or a pain specialist might be needed.      9) There are also  "intra-operative challenges and complications that can occur.   Intra-operative challenges can involve finding poor bone quality, difficulty with the internal fixation (when used), and even inadvertent injury to neighboring structures that would then need to be repaired.     Please remember that surgical complications are rare. Your surgeon has a plan to deal with them, should one occur. The primary goal of surgery is pain reduction. There are no guarantees. An \"abnormal\" foot prior to surgery, is not necessarily a \"normal\" foot afterwards. Hopefully it is a less painful one.      If you have any questions, please discuss with Dr. Griffin prior to surgery.        POST-OPERATIVE CARE ORDERS    ACTIVITY:    1)  Weight bearing status: __________________________    2)  Keep your surgical lower extremity elevated 23/24 hours above heart level. You will want to keep your foot elevated as much as possible for the first week after surgery.     3)  It is recommended that you get up and move around (walk, crutch, roll) for short periods each hour while you are awake. It is okay to wiggle your toes. If you are not in a splint or cast, move your ankle joint. Motion helps lower risk of a blood clot in your leg.      4)  If you bathe or shower, the dressing must be kept dry. Safety is a concern and sponge bathing might be best. You can purchase a water proof cast protector.     5)  You are not to drive while you are taking pain medications. No driving, if surgery was done on the right foot/ ankle or if you drive a stick shift.     SPECIFIC CARES:    1)  Keep the dressing clean, dry, and intact. If your dressing gets wet, comes apart, or falls off, please call your clinic and notify Dr. Griffin. Some bleeding through the dressing is okay. But if it causes a spot bigger than 2 inches in diameter, please notify Dr. Griffin.     2)  Place an ice pack behind the knee of the surgical side for up to 20min/hour. It can also be placed on the front " of the ankle.     4)  Call your clinic if you experience calf pain, chest pain, or problems breathing.    5)  Call your clinic if fever, increasing pain that you can't control or a large amount of bleeding.      6) What to do if your pain seems out of control:   1)  Make sure you are truly elevating the foot/ankle above heart level.   2)  Make sure you are using a cold pack/ ice   3)  Check the pain medication instructions:     Usually you can take two pain pills every four hours, if needed.   4)  If the above are not adequate, then you need to remove the brace/ boot and   remove the compression wrap. The wrap might be too tight. After you do   this, elevate the foot for an hour and then re-wrap the foot lightly and   replace the splint / boot.      Follow up in clinic one week after surgery. This appointment should already be set up.      MEDICATIONS:    IMPORTANT:  Take your pain medication when you get home, even if you are not having pain. You want it in your system before the local anesthetic (foot numbness from the shot) wears off.      1)  Take your pain medication with food. This will help prevent any nausea side effects.  If hydroxyzine was prescribed, it is for itching, leg spasms, and makes the other pain medication work better.    2)  Anti blood clot plan: To help prevent a blood clot in your legs, it is important that you wiggle your toes and ankles frequently. Obviously, this might be limited on the surgical side. Get up and move around a few minutes every hour while you are awake.  Keep the white sock on the non-surgical side and the compression wraps to the knee on the surgical side. If Dr. Griffin thinks that you are at high risk for a blood clot, he might put you on a blood thinner called enoxaparin.    Please call the clinic if you have any pain or swelling in either calf.        SHOWERING BEFORE SURGERY  You must wash with the soap (Hibiclens - 4% CHG) twice before coming to the hospital for your  surgery. This will decrease bacteria (germs) on your skin. It will also help reduce your chance of infection (illness caused by germs) after surgery.  Read the directions and safety tips on the bottle of soap. Wash once the evening before surgery and once the morning of surgery. Use 4 ounces of soap each time. When showering, it is best to use two fresh washcloths and a fresh towel.   Items you will need for showerin newly washed washcloths    2 newly washed towels    8 ounces of one of the soap  FOLLOW THESE INSTRUCTIONS:  The evening before surgery   1. Shower or bathe as you normally would, use your regular soap and a clean washcloth. Give special attention to places where your incision (surgical cut) or catheters will be. This includes your groin area. Rinse well. You may wash your hair with your regular shampoo.  2. Next, wash your entire body from your chin down with the antiseptic soap. See the next page for directions about how to do this.  3. Rinse well and dry off using a newly washed towel.  The morning of surgery  Repeat steps 1, 2 and 3.   Other suggestions:    Wear freshly washed pajamas or clothing after your evening shower.    Wear freshly washed clothes the day of surgery.    Wash and change your bed sheets the day before surgery to have clean bed sheets after you shower and when you get home from surgery.    If you have trouble washing all areas, make sure someone helps you.    Don t use any deodorant, lotion or powder after your shower.    Women who are menstruating should wear a fresh sanitary pad to the hospital.       If you have questions or concerns after surgery, please call: Podiatry/Foot & Ankle Surgery Triage Team at the [facility] at [phone] (option 2 > option 3 for triage).   If it is after 4:30PM you can reach a [facility] Nurse Advisor at [phone].   They can then page the podiatrist on call, if needed. There is a [facility] podiatrist on call  24/7.    Body Mass Index (BMI)  Many things can cause foot and ankle problems. Foot structure, activity level, foot mechanics and injuries are common causes of pain.  One very important issue that often goes unmentioned is body weight.  Extra weight can cause increased stress on muscles, ligaments, bones and tendons.  Sometimes just a few extra pounds is all it takes to put one over her/his threshold. Without reducing that stress, it can be difficult to alleviate pain. Some people are uncomfortable addressing this issue, but we feel it is important for you to think about it. As Foot & Ankle specialists, our job is addressing the lower extremity problem and possible causes. Regarding extra body weight, we encourage patients to discuss diet and weight management plans with their primary care doctors. It is this team approach that gives you the best opportunity for pain relief and getting you back on your feet.

## 2019-01-07 NOTE — PROGRESS NOTES
[address] [address]  [phone]  Dept: [phone]    PRE-OP EVALUATION:  Today's date: 2019    Barbi Tiwari (: 1960) presents for pre-operative evaluation assessment as requested by Dr. Griffin.  She requires evaluation and anesthesia risk assessment prior to undergoing surgery/procedure for treatment of R foot bunion     Fax number for surgical facility: [facility]  Primary Physician: Dustin Choudhury  Type of Anesthesia Anticipated: General    Patient has a Health Care Directive or Living Will:  NO    Preop Questions 2019   Who is doing your surgery? Benjamin Griffin   What are you having done? buionectomy   Date of Surgery/Procedure: 1/15/2019   1.  Do you have a history of Heart attack, stroke, stent, coronary bypass surgery, or other heart surgery? No   2.  Do you ever have any pain or discomfort in your chest? No   3.  Do you have a history of  Heart Failure? No   4.   Are you troubled by shortness of breath when:  walking on a level surface, or up a slight hill, or at night? No   5.  Do you currently have a cold, bronchitis or other respiratory infection? No   6.  Do you have a cough, shortness of breath, or wheezing? No   7.  Do you sometimes get pains in the calves of your legs when you walk? No   8. Do you or anyone in your family have previous history of blood clots? No   9.  Do you or does anyone in your family have a serious bleeding problem such as prolonged bleeding following surgeries or cuts? No   10. Have you ever had problems with anemia or been told to take iron pills? No   11. Have you had any abnormal blood loss such as black, tarry or bloody stools, or abnormal vaginal bleeding? No   12. Have you ever had a blood transfusion? No   13. Have you or any of your relatives ever had problems with anesthesia? No   14. Do you have sleep apnea, excessive snoring or daytime drowsiness? No   15. Do you have any  prosthetic heart valves? No   16. Do you have prosthetic joints? No   17. Is there any chance that you may be pregnant? No         HPI:     Longstanding osteoarthritis, bunions in feet since adolescence. Increasingly painful and problematic for patient.        See problem list for active medical problems.  Problems all longstanding and stable, except as noted/documented.  See ROS for pertinent symptoms related to these conditions.                                                                                                                                                          .    MEDICAL HISTORY:     Patient Active Problem List    Diagnosis Date Noted     PVC's (premature ventricular contractions) 06/05/2014     Priority: High     Elevated cholesterol 06/05/2014     Priority: High     Abnormal screening cardiac CT 06/05/2014     Priority: High     Seizure (H) 08/17/2011     Priority: High     Chronic pain of left knee 10/24/2018     Priority: Medium     Primary osteoarthritis of left knee 10/15/2018     Priority: Medium     S/P laparoscopic hysterectomy 06/15/2018     Priority: Medium     Oligodendroglioma (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     Priority: Medium     Nephrolithiasis 05/05/2017     Priority: Medium     Overview:   S/P LITHOTRIPSY 3/15/11       Advanced directives, counseling/discussion 03/24/2017     Priority: Medium     Lumbar radiculopathy 09/08/2016     Priority: Medium     Palpitations 06/05/2014     Priority: Medium     Moderate major depression, single episode (H) 03/06/2013     Priority: Medium     Weakness of face muscles 08/18/2011     Priority: Medium     Calculus of kidney 01/12/2011     Priority: Medium     Overview:   S/P LITHOTRIPSY 3/15/11       Allergic rhinitis 12/09/2009     Priority: Medium     Hyperparathyroidism s/p surgery 01/11/2011     Priority: Low     Osteoarthritis 12/09/2009     Priority: Low     GERD (gastroesophageal reflux disease) 10/22/2008     Priority: Low      Past Medical  History:   Diagnosis Date     Allergic rhinitis 12/9/2009     Calculus of kidney 1/12/2011    Overview:  S/P LITHOTRIPSY 3/15/11      Esophageal reflux 10/22/2008     Hyperparathyroidism s/p surgery 1/11/2011     oligodendroglioma 7/23/2012     Osteoarthrosis 12/9/2009     Palpitations 6/5/2014     PVCs 6/5/2014     Seizure disorder (related to tumor) 8/17/2011     Past Surgical History:   Procedure Laterality Date     CRANIOTOMY  2011    tumor resection     EXTRACORPOREAL SHOCK WAVE LITHOTRIPSY, CYSTOSCOPY, INSERT STENT URETER(S), COMBINED Bilateral 5/5/2017    Procedure: COMBINED EXTRACORPOREAL SHOCK WAVE LITHOTRIPSY, CYSTOSCOPY, INSERT STENT URETER(S);  CYSTO, URETHRAL DILATION, URETERAL LEFT STENT PLACEMENT, LEFT ESWL.;  Surgeon: Angel Del Rio MD;  Location:  OR     FOOT SURGERY      mulitple     HYSTERECTOMY, PAP NO LONGER INDICATED  2008    lap hys     LITHOTRIPSY  2010 2017     OPTICAL TRACKING SYSTEM CRANIOTOMY, EXCISE TUMOR WITH MAPPING, COMBINED Right 5/30/2018    Procedure: COMBINED OPTICAL TRACKING SYSTEM CRANIOTOMY, EXCISE TUMOR WITH MAPPING;  Right Stealth Assisted Craniotomy With Motor Mapping And Tumor Resection ;  Surgeon: Dustin Rodriguez MD;  Location: [facility] OR     ORTHOPEDIC SURGERY       PARATHYROIDECTOMY  2011     Current Outpatient Medications   Medication Sig Dispense Refill     acetaminophen (TYLENOL) 500 MG tablet as needed Take 1,000 mg by mouth every 6 hours if needed. Max acetaminophen dose: 4000mg in 24 hrs.        ascorbic acid 500 MG TABS Take 500 mg by mouth 2 times daily       Calcium Carb-Cholecalciferol (CALCIUM-VITAMIN D3) 600-400 MG-UNIT CAPS        COMPOUNDED NON-CONTROLLED SUBSTANCE (CMPD RX) - PHARMACY TO MIX COMPOUNDED MEDICATION as needed Apply 1g daily for 2 weeks, then twice weekly to vagina       ferrous sulfate (IRON) 325 (65 FE) MG tablet Take 325 mg by mouth 2 times daily Reported on 5/15/2017       Fexofenadine HCl (ALLEGRA PO) Take by mouth daily        gabapentin (NEURONTIN) 100 MG capsule 2 pills 1.5 hrs before bedtime for 7 nights, may increase to 3 pills if needed. 90 capsule 1     Lactase (LACTAID PO) Take 3,000 Units by mouth as needed for indigestion       lamoTRIgine (LAMICTAL) 100 MG tablet Take 1 tablet (100 mg) by mouth 2 times daily       lamoTRIgine (LAMICTAL) 200 MG tablet Take 1 tablet (200 mg) by mouth 2 times daily       levETIRAcetam (KEPPRA) 500 MG tablet Take 1,500 mg by mouth 2 times daily        LORazepam (ATIVAN) 0.5 MG tablet Take 1 tablet (0.5 mg) by mouth as needed for anxiety       metoprolol tartrate (LOPRESSOR) 25 MG tablet Take 1 tablet (25 mg) by mouth 2 times daily 180 tablet 3     Multiple Vitamin (MULTI-VITAMINS) TABS every morning take 1 tablet by oral route once daily with food       Omeprazole Magnesium (PRILOSEC OTC PO) Take 20 mg by mouth as needed        ranitidine (ZANTAC) 150 MG tablet Take 1 tablet (150 mg) by mouth At Bedtime       senna-docusate (SENOKOT-S;PERICOLACE) 8.6-50 MG per tablet Take 2 tablets by mouth 2 times daily 100 tablet 1     tretinoin (RETIN-A) 0.05 % cream Spread a pea size amount into affected area topically at bedtime.  Use sunscreen SPF>20. 45 g 11     VITAMIN D, CHOLECALCIFEROL, PO Take 1,000 Units by mouth 2 times daily Reported on 5/15/2017       ibuprofen (ADVIL/MOTRIN) 200 MG tablet Take 800 mg by mouth       OTC products: None, except as noted above    Allergies   Allergen Reactions     Benoxinate Nausea and Vomiting     Sulfa Drugs Hives      Latex Allergy: NO    Social History     Tobacco Use     Smoking status: Never Smoker     Smokeless tobacco: Never Used   Substance Use Topics     Alcohol use: Yes     Comment: 1-2 per month, only at social events     History   Drug Use No       REVIEW OF SYSTEMS:   Constitutional, neuro, ENT, endocrine, pulmonary, cardiac, gastrointestinal, genitourinary, musculoskeletal, integument and psychiatric systems are negative, except as otherwise  "noted.    EXAM:   /78   Pulse 61   Temp 97.8  F (36.6  C) (Oral)   Resp 16   Ht 1.613 m (5' 3.5\")   Wt 67.4 kg (148 lb 11.2 oz)   SpO2 97%   BMI 25.93 kg/m      GENERAL APPEARANCE: healthy, alert and no distress     EYES: EOMI, PERRL     HENT: nose and mouth without ulcers or lesions and normal cephalic/atraumatic     RESP: lungs clear to auscultation - no rales, rhonchi or wheezes     CV: regular rates and rhythm, normal S1 S2, no S3 or S4 and no murmur, click or rub     ABDOMEN:  soft, nontender, no HSM or masses and bowel sounds normal     MS: extremities normal- no gross deformities noted, no evidence of inflammation in joints, FROM in all extremities.     SKIN: no suspicious lesions or rashes     NEURO: Normal strength and tone, sensory exam grossly normal, mentation intact and speech normal     PSYCH: mentation appears normal. and affect normal/bright     LYMPHATICS: No cervical adenopathy    DIAGNOSTICS:   No labs or EKG required for low risk surgery (cataract, skin procedure, breast biopsy, etc)    Recent Labs   Lab Test 05/31/18  0357 05/30/18  1215 05/30/18  0626 05/23/18  0913   HGB 11.5* 13.4 14.4 15.0    178  --  225   INR  --   --  1.03 1.00   * 141  --  139   POTASSIUM 4.0 3.9 4.3 4.7   CR 0.48* 0.68  --  0.72        IMPRESSION:   Reason for surgery/procedure: R bunion  Diagnosis/reason for consult: hx of symptomatic PVCs on B-Blocker, Hx seizures related to CNS surgery, oligodendroglioma.     The proposed surgical procedure is considered LOW risk.    REVISED CARDIAC RISK INDEX  The patient has the following serious cardiovascular risks for perioperative complications such as (MI, PE, VFib and 3  AV Block):  No serious cardiac risks  INTERPRETATION: 0 risks: Class I (very low risk - 0.4% complication rate)    The patient has the following additional risks for perioperative complications:  No identified additional risks      ICD-10-CM    1. Preop general physical exam Z01.818  "   2. Visit for screening mammogram Z12.31 MA SCREENING DIGITAL BILAT - Future  (s+30)   3. Moderate major depression, single episode (H) F32.1    4. Oligodendroglioma (H) C71.9        RECOMMENDATIONS:       --Patient is to take all scheduled medications on the day of surgery EXCEPT for modifications listed below.    APPROVAL GIVEN to proceed with proposed procedure, without further diagnostic evaluation       Signed Electronically by: Dustin Choudhury MD    Copy of this evaluation report is provided to requesting physician.    [place] Preop Guidelines    Revised Cardiac Risk Index

## 2019-01-07 NOTE — PROGRESS NOTES
Subjective:    Barbi Tiwari is a 58 year old female who presents to clinic today for surgical planning and discussion.   She is scheduled to undergo right foot surgery on 1/15/19: 1) right first metatarsophalangeal joint arthrodesis  2) right 2nd metatarsal Weil osteotomy.  Conservative measures  no longer provide adequate relief.  She asked about the plan with her right 2nd toe.  Prior surgery.  The toe is short, flaccid and drifts dorsally causes discomfort in foot wear.     Please see the surgical planning questionnaire below.     Patient Active Problem List   Diagnosis     Calculus of kidney     Hyperparathyroidism s/p surgery     GERD (gastroesophageal reflux disease)     Allergic rhinitis     Palpitations     PVC's (premature ventricular contractions)     Seizure (H)     Osteoarthritis     Elevated cholesterol     Abnormal screening cardiac CT     Lumbar radiculopathy     Advanced directives, counseling/discussion     Nephrolithiasis     Oligodendroglioma (H)     Moderate major depression, single episode (H)     S/P laparoscopic hysterectomy     Weakness of face muscles     Primary osteoarthritis of left knee     Chronic pain of left knee       Current Outpatient Medications   Medication     acetaminophen (TYLENOL) 500 MG tablet     ascorbic acid 500 MG TABS     Calcium Carb-Cholecalciferol (CALCIUM-VITAMIN D3) 600-400 MG-UNIT CAPS     COMPOUNDED NON-CONTROLLED SUBSTANCE (CMPD RX) - PHARMACY TO MIX COMPOUNDED MEDICATION     ferrous sulfate (IRON) 325 (65 FE) MG tablet     Fexofenadine HCl (ALLEGRA PO)     gabapentin (NEURONTIN) 100 MG capsule     ibuprofen (ADVIL/MOTRIN) 200 MG tablet     Lactase (LACTAID PO)     lamoTRIgine (LAMICTAL) 100 MG tablet     lamoTRIgine (LAMICTAL) 200 MG tablet     levETIRAcetam (KEPPRA) 500 MG tablet     LORazepam (ATIVAN) 0.5 MG tablet     metoprolol tartrate (LOPRESSOR) 25 MG tablet     Multiple Vitamin (MULTI-VITAMINS) TABS     Omeprazole Magnesium (PRILOSEC OTC PO)      ranitidine (ZANTAC) 150 MG tablet     senna-docusate (SENOKOT-S;PERICOLACE) 8.6-50 MG per tablet     tretinoin (RETIN-A) 0.05 % cream     VITAMIN D, CHOLECALCIFEROL, PO     No current facility-administered medications for this visit.       Exam:    Constitutional/ general:  Pt is in no apparent distress, appears well-nourished.  Cooperative with history and physical exam.      Vascular:  Pedal pulses are palpable bilaterally for both the DP and PT arteries.  CFT < 3 sec.  No edema.  Pedal hair growth noted.      Neuro:  Alert and oriented x 3. Coordinated gait.  Light touch sensation is intact to the L4, L5, S1 distributions. No obvious deficits.  No evidence of neurological-based weakness, spasticity, or contracture in the lower extremities.      Derm: Normal texture and turgor.  No erythema, ecchymosis, or cyanosis.  No open lesions.      Musculoskeletal:    Lower extremity muscle strength is normal.  Patient is ambulatory without an assistive device or brace . Bilateral flat foot structure.  Hallux abducto valgus bilateral foot.  Right foot:  The deformity is not fully reducible in the transverse plane - there is tracking. Significant deformity. Digital contractures yet flexible. Right 2nd toe is very short, flaccid, from a previous surgery.  It drifts dorsally.  Pain sub right 2nd metatarsophalangeal joint.       Radiographic Exam:  X-Ray Findings:  I personally reviewed the films.  Four views of the right foot:     Findings are consistent with the clinical exam.  There is an increased 1st intermetatarsal angle (ELODIA = 24 degrees) and increased hallux abductus angle.  The sesamoids are subluxing laterally in relation to the 1st metatarsal head. Obvious midfoot degenerative changes.  Apparently a phalangectomy of some sort, right 2nd toe.  Post surgical changes of the 1st metatarsal.  The 2nd metatarsal is considered significantly longer than the first.      Assessment:  Encounter Diagnoses   Name Primary?      "Hallux valgus of right foot Yes     Metatarsalgia, right foot      Acquired hammer toe deformity of lesser toe of right foot      Right foot pain          Plan:  The surgical procedures were discussed in detail, including risks, benefits, post operative course and cares, and prognosis.  Risks discussed include, but are not limited to,  postoperative pain, swelling,  infection, healing complications, temporary or permanent numbness,  DVT, hypertropohic scar formation, complex regional pain syndrome and need for additional surgery.       I explained that although infrequent, there are intra-operative and post-operative challenges and complications that can occur.  Some of the latter are listed above.  Intra-operative challenges can involve finding poor bone quality, difficulty with the internal fixation (when used), and even inadvertent injury to neighboring structures that would then need to be repaired.     No guarantees were given.     Although the surgical hardware typically is left in, it was explained that if there are hardware-related complications, future removal might be needed.     The patient was also informed that a  might be present on the day of surgery.     I explained that the right 2nd toe might sit lower in the sagittal plane after the planned Weil osteotomy of the 2nd metatarsal.  However, there is nothing more that can be done for the toe.  Amputation is an option and she is considering this.  I explained that it might serve as a \"spacer\" and help keep the 3rd toe from drifting medially.  I also explained, that as far as her bunion, this is a revision surgery.  Options are limited and I think the first metatarsophalangeal joint arthrodesis is her best option. We looked at the XR images again.      PROCEDURE:  1) Right first metatarsophalangeal joint arthrodesis  2) right second metatarsal Weil osteotomy  3) possible amputation, right second toe.     Greater than 50% (25min) was " spent in face-to-face counseling and coordination of care. This excluded any procedure time during the visit.    Benjamin Griffin DPM, FACFAS, MS    [facility]le [facility]

## 2019-01-09 ENCOUNTER — OFFICE VISIT (OUTPATIENT)
Dept: URGENT CARE | Facility: URGENT CARE | Age: 59
End: 2019-01-09
Payer: COMMERCIAL

## 2019-01-09 VITALS
DIASTOLIC BLOOD PRESSURE: 82 MMHG | TEMPERATURE: 97.8 F | HEART RATE: 68 BPM | SYSTOLIC BLOOD PRESSURE: 128 MMHG | OXYGEN SATURATION: 93 % | RESPIRATION RATE: 16 BRPM

## 2019-01-09 DIAGNOSIS — L72.3 INFECTED SEBACEOUS CYST: Primary | ICD-10-CM

## 2019-01-09 DIAGNOSIS — L08.9 INFECTED SEBACEOUS CYST: Primary | ICD-10-CM

## 2019-01-09 PROCEDURE — 99213 OFFICE O/P EST LOW 20 MIN: CPT | Performed by: PHYSICIAN ASSISTANT

## 2019-01-09 RX ORDER — CEPHALEXIN 500 MG/1
500 CAPSULE ORAL 3 TIMES DAILY
Qty: 30 CAPSULE | Refills: 0 | Status: SHIPPED | OUTPATIENT
Start: 2019-01-09 | End: 2019-03-20

## 2019-01-09 NOTE — PROGRESS NOTES
"SUBJECTIVE:  Barbi Tiwari is a 58 year old female who presents to the clinic today for a red and tender lump under her right breast.  She noticed a small lump in the area., with a \"white spot in the middle\" about 2 months ago.  It has only recently increased in size and redness and is now tender.  No open sores.  No trauma.    No fevers.    No nipple drainage.    Past Medical History:   Diagnosis Date     Allergic rhinitis 12/9/2009     Calculus of kidney 1/12/2011    Overview:  S/P LITHOTRIPSY 3/15/11      Esophageal reflux 10/22/2008     Hyperparathyroidism s/p surgery 1/11/2011     oligodendroglioma 7/23/2012     Osteoarthrosis 12/9/2009     Palpitations 6/5/2014     PVCs 6/5/2014     Seizure disorder (related to tumor) 8/17/2011        Allergies   Allergen Reactions     Benoxinate Nausea and Vomiting     Sulfa Drugs Hives     Social History     Tobacco Use     Smoking status: Never Smoker     Smokeless tobacco: Never Used   Substance Use Topics     Alcohol use: Yes     Comment: 1-2 per month, only at social events       ROS:  CONSTITUTIONAL:NEGATIVE for fever, chills, change in weight  INTEGUMENTARY/SKIN: as per HPI  RESP:NEGATIVE for significant cough or SOB  BREAST: as per HPI  CV: NEGATIVE for chest pain, palpitations or peripheral edema  Review of systems negative except as stated above.    EXAM:   /82   Pulse 68   Temp 97.8  F (36.6  C) (Oral)   Resp 16   SpO2 93%   GENERAL: alert, no acute distress.  SKIN: erythematous raised lesion with soft, albeit not fluctuant, center.  Tender.  Apprixmately 2cm in width by 1cm height.    BREAST: no dimpling or nipple discharge.    (L72.3,  L08.9) Infected sebaceous cyst  (primary encounter diagnosis)  Comment: of right breast  Plan: cephALEXin (KEFLEX) 500 MG capsule        Warm compress to area.    Follow up with primary clinic for re-check within 5 days, sooner should symptoms persist or worsen.  Must be followed to resolution.  Should any lump persist " in area after finishing antibiotics, will need imaging.      Patient expresses understanding and agreement with the assessment and plan as above.

## 2019-01-09 NOTE — TELEPHONE ENCOUNTER
Pending Prescriptions:                       Disp   Refills    gabapentin (NEURONTIN) 100 MG capsule     90 cap*1            Si pills 1.5 hrs before bedtime for 7 nights, may           increase to 3 pills if needed.    Last Written Prescription Date:  2018  Last Fill Quantity: 90,   # refills: 1  Last Office Visit with [facility], [facility] or [facility] prescribing provider: 5/15/2018  Future Office visit: No follow up scheduled at this time.    RELL Moscoso

## 2019-01-10 RX ORDER — GABAPENTIN 100 MG/1
CAPSULE ORAL
Qty: 90 CAPSULE | Refills: 1 | Status: SHIPPED | OUTPATIENT
Start: 2019-01-10 | End: 2019-04-15

## 2019-01-14 RX ORDER — FEXOFENADINE HCL 180 MG/1
180 TABLET ORAL DAILY
COMMUNITY
End: 2024-03-05

## 2019-01-14 RX ORDER — CALCIUM CARBONATE 500 MG/1
1 TABLET, CHEWABLE ORAL DAILY PRN
COMMUNITY
End: 2021-07-14

## 2019-01-14 RX ORDER — SENNOSIDES A AND B 8.6 MG/1
1 TABLET, FILM COATED ORAL DAILY PRN
COMMUNITY
End: 2019-08-29

## 2019-01-15 ENCOUNTER — HOSPITAL ENCOUNTER (OUTPATIENT)
Facility: CLINIC | Age: 59
Discharge: HOME | End: 2019-01-15
Attending: PODIATRIST | Admitting: PODIATRIST
Payer: COMMERCIAL

## 2019-01-15 ENCOUNTER — APPOINTMENT (OUTPATIENT)
Dept: GENERAL RADIOLOGY | Facility: CLINIC | Age: 59
End: 2019-01-15
Attending: PODIATRIST
Payer: COMMERCIAL

## 2019-01-15 ENCOUNTER — ANESTHESIA EVENT (OUTPATIENT)
Dept: SURGERY | Facility: CLINIC | Age: 59
End: 2019-01-15
Payer: COMMERCIAL

## 2019-01-15 ENCOUNTER — ANESTHESIA (OUTPATIENT)
Dept: SURGERY | Facility: CLINIC | Age: 59
End: 2019-01-15
Payer: COMMERCIAL

## 2019-01-15 VITALS
HEIGHT: 62 IN | HEART RATE: 78 BPM | OXYGEN SATURATION: 97 % | SYSTOLIC BLOOD PRESSURE: 110 MMHG | WEIGHT: 147.9 LBS | RESPIRATION RATE: 16 BRPM | TEMPERATURE: 96.8 F | DIASTOLIC BLOOD PRESSURE: 59 MMHG | BODY MASS INDEX: 27.22 KG/M2

## 2019-01-15 DIAGNOSIS — T50.905A ITCHING DUE TO DRUG: ICD-10-CM

## 2019-01-15 DIAGNOSIS — G89.18 POST-OPERATIVE PAIN: Primary | ICD-10-CM

## 2019-01-15 DIAGNOSIS — L29.89 ITCHING DUE TO DRUG: ICD-10-CM

## 2019-01-15 PROCEDURE — 25000125 ZZHC RX 250

## 2019-01-15 PROCEDURE — 28308 INCISION OF METATARSAL: CPT | Mod: T6 | Performed by: PODIATRIST

## 2019-01-15 PROCEDURE — 27110028 ZZH OR GENERAL SUPPLY NON-STERILE: Performed by: PODIATRIST

## 2019-01-15 PROCEDURE — 25000125 ZZHC RX 250: Performed by: PODIATRIST

## 2019-01-15 PROCEDURE — 25000125 ZZHC RX 250: Performed by: ANESTHESIOLOGY

## 2019-01-15 PROCEDURE — 37000009 ZZH ANESTHESIA TECHNICAL FEE, EACH ADDTL 15 MIN: Performed by: PODIATRIST

## 2019-01-15 PROCEDURE — 36000063 ZZH SURGERY LEVEL 4 EA 15 ADDTL MIN: Performed by: PODIATRIST

## 2019-01-15 PROCEDURE — 40000277 XR SURGERY CARM FLUORO LESS THAN 5 MIN W STILLS

## 2019-01-15 PROCEDURE — 25000128 H RX IP 250 OP 636

## 2019-01-15 PROCEDURE — 40000170 ZZH STATISTIC PRE-PROCEDURE ASSESSMENT II: Performed by: PODIATRIST

## 2019-01-15 PROCEDURE — 36000065 ZZH SURGERY LEVEL 4 W FLUORO 1ST 30 MIN: Performed by: PODIATRIST

## 2019-01-15 PROCEDURE — 25000128 H RX IP 250 OP 636: Performed by: ANESTHESIOLOGY

## 2019-01-15 PROCEDURE — 37000008 ZZH ANESTHESIA TECHNICAL FEE, 1ST 30 MIN: Performed by: PODIATRIST

## 2019-01-15 PROCEDURE — 71000013 ZZH RECOVERY PHASE 1 LEVEL 1 EA ADDTL HR: Performed by: PODIATRIST

## 2019-01-15 PROCEDURE — 25000566 ZZH SEVOFLURANE, EA 15 MIN: Performed by: PODIATRIST

## 2019-01-15 PROCEDURE — 40000986 XR FOOT PORT RT 3 VW: Mod: RT

## 2019-01-15 PROCEDURE — 71000027 ZZH RECOVERY PHASE 2 EACH 15 MINS: Performed by: PODIATRIST

## 2019-01-15 PROCEDURE — 28750 FUSION OF BIG TOE JOINT: CPT | Mod: T5 | Performed by: PODIATRIST

## 2019-01-15 PROCEDURE — 71000012 ZZH RECOVERY PHASE 1 LEVEL 1 FIRST HR: Performed by: PODIATRIST

## 2019-01-15 PROCEDURE — C1713 ANCHOR/SCREW BN/BN,TIS/BN: HCPCS | Performed by: PODIATRIST

## 2019-01-15 PROCEDURE — 25000128 H RX IP 250 OP 636: Performed by: PODIATRIST

## 2019-01-15 PROCEDURE — 27210794 ZZH OR GENERAL SUPPLY STERILE: Performed by: PODIATRIST

## 2019-01-15 DEVICE — IMP SCR ARTHREX VAL 3.0X16MM TI AR-8933V-16: Type: IMPLANTABLE DEVICE | Site: FOOT | Status: FUNCTIONAL

## 2019-01-15 DEVICE — IMP SCR ARTHREX QUICKFIX TI 2X12MM AR-8930-12: Type: IMPLANTABLE DEVICE | Site: FOOT | Status: FUNCTIONAL

## 2019-01-15 DEVICE — IMP SCR ARTHREX VAL 3.0X14MM TI AR-8933V-14: Type: IMPLANTABLE DEVICE | Site: FOOT | Status: FUNCTIONAL

## 2019-01-15 DEVICE — IMPLANTABLE DEVICE: Type: IMPLANTABLE DEVICE | Site: FOOT | Status: FUNCTIONAL

## 2019-01-15 DEVICE — IMP SCR ARTHREX CORTICAL LP 3X18MM TI AR-8933-18: Type: IMPLANTABLE DEVICE | Site: FOOT | Status: FUNCTIONAL

## 2019-01-15 DEVICE — IMP PLATE ARTHREX LOW PRO MTP CNTRD SHORT RT TI AR-8944CR-P: Type: IMPLANTABLE DEVICE | Site: FOOT | Status: FUNCTIONAL

## 2019-01-15 RX ORDER — NALOXONE HYDROCHLORIDE 0.4 MG/ML
.1-.4 INJECTION, SOLUTION INTRAMUSCULAR; INTRAVENOUS; SUBCUTANEOUS
Status: DISCONTINUED | OUTPATIENT
Start: 2019-01-15 | End: 2019-01-15 | Stop reason: HOSPADM

## 2019-01-15 RX ORDER — CEFAZOLIN SODIUM 1 G/3ML
1 INJECTION, POWDER, FOR SOLUTION INTRAMUSCULAR; INTRAVENOUS SEE ADMIN INSTRUCTIONS
Status: DISCONTINUED | OUTPATIENT
Start: 2019-01-15 | End: 2019-01-15 | Stop reason: HOSPADM

## 2019-01-15 RX ORDER — MAGNESIUM HYDROXIDE 1200 MG/15ML
LIQUID ORAL PRN
Status: DISCONTINUED | OUTPATIENT
Start: 2019-01-15 | End: 2019-01-15 | Stop reason: HOSPADM

## 2019-01-15 RX ORDER — BUPIVACAINE HYDROCHLORIDE 5 MG/ML
INJECTION, SOLUTION EPIDURAL; INTRACAUDAL PRN
Status: DISCONTINUED | OUTPATIENT
Start: 2019-01-15 | End: 2019-01-15 | Stop reason: HOSPADM

## 2019-01-15 RX ORDER — KETOROLAC TROMETHAMINE 30 MG/ML
INJECTION, SOLUTION INTRAMUSCULAR; INTRAVENOUS PRN
Status: DISCONTINUED | OUTPATIENT
Start: 2019-01-15 | End: 2019-01-15

## 2019-01-15 RX ORDER — DEXAMETHASONE SODIUM PHOSPHATE 4 MG/ML
INJECTION, SOLUTION INTRA-ARTICULAR; INTRALESIONAL; INTRAMUSCULAR; INTRAVENOUS; SOFT TISSUE PRN
Status: DISCONTINUED | OUTPATIENT
Start: 2019-01-15 | End: 2019-01-15

## 2019-01-15 RX ORDER — ONDANSETRON 2 MG/ML
4 INJECTION INTRAMUSCULAR; INTRAVENOUS EVERY 30 MIN PRN
Status: DISCONTINUED | OUTPATIENT
Start: 2019-01-15 | End: 2019-01-15 | Stop reason: HOSPADM

## 2019-01-15 RX ORDER — SODIUM CHLORIDE, SODIUM LACTATE, POTASSIUM CHLORIDE, CALCIUM CHLORIDE 600; 310; 30; 20 MG/100ML; MG/100ML; MG/100ML; MG/100ML
INJECTION, SOLUTION INTRAVENOUS CONTINUOUS
Status: DISCONTINUED | OUTPATIENT
Start: 2019-01-15 | End: 2019-01-15 | Stop reason: HOSPADM

## 2019-01-15 RX ORDER — ACETAMINOPHEN 500 MG
TABLET ORAL
Qty: 60 TABLET | Refills: 1 | Status: SHIPPED | OUTPATIENT
Start: 2019-01-15 | End: 2021-07-12

## 2019-01-15 RX ORDER — PROPOFOL 10 MG/ML
INJECTION, EMULSION INTRAVENOUS PRN
Status: DISCONTINUED | OUTPATIENT
Start: 2019-01-15 | End: 2019-01-15

## 2019-01-15 RX ORDER — EPHEDRINE SULFATE 50 MG/ML
INJECTION, SOLUTION INTRAMUSCULAR; INTRAVENOUS; SUBCUTANEOUS PRN
Status: DISCONTINUED | OUTPATIENT
Start: 2019-01-15 | End: 2019-01-15

## 2019-01-15 RX ORDER — FENTANYL CITRATE 50 UG/ML
25-50 INJECTION, SOLUTION INTRAMUSCULAR; INTRAVENOUS EVERY 5 MIN PRN
Status: DISCONTINUED | OUTPATIENT
Start: 2019-01-15 | End: 2019-01-15 | Stop reason: HOSPADM

## 2019-01-15 RX ORDER — ONDANSETRON 4 MG/1
4 TABLET, ORALLY DISINTEGRATING ORAL EVERY 30 MIN PRN
Status: DISCONTINUED | OUTPATIENT
Start: 2019-01-15 | End: 2019-01-15 | Stop reason: HOSPADM

## 2019-01-15 RX ORDER — LIDOCAINE HYDROCHLORIDE 20 MG/ML
INJECTION, SOLUTION INFILTRATION; PERINEURAL PRN
Status: DISCONTINUED | OUTPATIENT
Start: 2019-01-15 | End: 2019-01-15

## 2019-01-15 RX ORDER — SCOLOPAMINE TRANSDERMAL SYSTEM 1 MG/1
1 PATCH, EXTENDED RELEASE TRANSDERMAL ONCE
Status: COMPLETED | OUTPATIENT
Start: 2019-01-15 | End: 2019-01-15

## 2019-01-15 RX ORDER — CEFAZOLIN SODIUM 2 G/100ML
2 INJECTION, SOLUTION INTRAVENOUS
Status: COMPLETED | OUTPATIENT
Start: 2019-01-15 | End: 2019-01-15

## 2019-01-15 RX ORDER — PROPOFOL 10 MG/ML
INJECTION, EMULSION INTRAVENOUS CONTINUOUS PRN
Status: DISCONTINUED | OUTPATIENT
Start: 2019-01-15 | End: 2019-01-15

## 2019-01-15 RX ORDER — IBUPROFEN 800 MG/1
800 TABLET, FILM COATED ORAL EVERY 6 HOURS PRN
Qty: 40 TABLET | Refills: 1 | Status: SHIPPED | OUTPATIENT
Start: 2019-01-15 | End: 2019-02-14

## 2019-01-15 RX ORDER — HYDROXYZINE HYDROCHLORIDE 25 MG/1
25 TABLET, FILM COATED ORAL 3 TIMES DAILY PRN
Qty: 40 TABLET | Refills: 1 | Status: SHIPPED | OUTPATIENT
Start: 2019-01-15 | End: 2019-03-20

## 2019-01-15 RX ORDER — HYDROMORPHONE HYDROCHLORIDE 1 MG/ML
.3-.5 INJECTION, SOLUTION INTRAMUSCULAR; INTRAVENOUS; SUBCUTANEOUS EVERY 10 MIN PRN
Status: DISCONTINUED | OUTPATIENT
Start: 2019-01-15 | End: 2019-01-15 | Stop reason: HOSPADM

## 2019-01-15 RX ORDER — ONDANSETRON 2 MG/ML
INJECTION INTRAMUSCULAR; INTRAVENOUS PRN
Status: DISCONTINUED | OUTPATIENT
Start: 2019-01-15 | End: 2019-01-15

## 2019-01-15 RX ORDER — HYDROMORPHONE HYDROCHLORIDE 2 MG/1
TABLET ORAL
Qty: 20 TABLET | Refills: 0 | Status: SHIPPED | OUTPATIENT
Start: 2019-01-15 | End: 2019-03-20

## 2019-01-15 RX ORDER — FENTANYL CITRATE 50 UG/ML
50 INJECTION, SOLUTION INTRAMUSCULAR; INTRAVENOUS
Status: COMPLETED | OUTPATIENT
Start: 2019-01-15 | End: 2019-01-15

## 2019-01-15 RX ADMIN — Medication 5 MG: at 08:00

## 2019-01-15 RX ADMIN — PHENYLEPHRINE HYDROCHLORIDE 100 MCG: 10 INJECTION, SOLUTION INTRAMUSCULAR; INTRAVENOUS; SUBCUTANEOUS at 08:12

## 2019-01-15 RX ADMIN — SODIUM CHLORIDE, POTASSIUM CHLORIDE, SODIUM LACTATE AND CALCIUM CHLORIDE: 600; 310; 30; 20 INJECTION, SOLUTION INTRAVENOUS at 09:00

## 2019-01-15 RX ADMIN — PHENYLEPHRINE HYDROCHLORIDE 100 MCG: 10 INJECTION, SOLUTION INTRAMUSCULAR; INTRAVENOUS; SUBCUTANEOUS at 07:59

## 2019-01-15 RX ADMIN — DEXAMETHASONE SODIUM PHOSPHATE 4 MG: 4 INJECTION, SOLUTION INTRA-ARTICULAR; INTRALESIONAL; INTRAMUSCULAR; INTRAVENOUS; SOFT TISSUE at 07:59

## 2019-01-15 RX ADMIN — SODIUM CHLORIDE, POTASSIUM CHLORIDE, SODIUM LACTATE AND CALCIUM CHLORIDE: 600; 310; 30; 20 INJECTION, SOLUTION INTRAVENOUS at 07:31

## 2019-01-15 RX ADMIN — KETOROLAC TROMETHAMINE 30 MG: 30 INJECTION, SOLUTION INTRAMUSCULAR at 09:20

## 2019-01-15 RX ADMIN — Medication 5 MG: at 08:31

## 2019-01-15 RX ADMIN — PROPOFOL 50 MCG/KG/MIN: 10 INJECTION, EMULSION INTRAVENOUS at 07:54

## 2019-01-15 RX ADMIN — Medication 5 MG: at 08:10

## 2019-01-15 RX ADMIN — PHENYLEPHRINE HYDROCHLORIDE 50 MCG: 10 INJECTION, SOLUTION INTRAMUSCULAR; INTRAVENOUS; SUBCUTANEOUS at 08:31

## 2019-01-15 RX ADMIN — PHENYLEPHRINE HYDROCHLORIDE 100 MCG: 10 INJECTION, SOLUTION INTRAMUSCULAR; INTRAVENOUS; SUBCUTANEOUS at 08:48

## 2019-01-15 RX ADMIN — FENTANYL CITRATE 50 MCG: 50 INJECTION, SOLUTION INTRAMUSCULAR; INTRAVENOUS at 07:14

## 2019-01-15 RX ADMIN — Medication 5 MG: at 08:21

## 2019-01-15 RX ADMIN — PHENYLEPHRINE HYDROCHLORIDE 50 MCG: 10 INJECTION, SOLUTION INTRAMUSCULAR; INTRAVENOUS; SUBCUTANEOUS at 08:21

## 2019-01-15 RX ADMIN — ONDANSETRON 4 MG: 2 INJECTION INTRAMUSCULAR; INTRAVENOUS at 09:03

## 2019-01-15 RX ADMIN — Medication 5 MG: at 08:47

## 2019-01-15 RX ADMIN — CEFAZOLIN SODIUM 2 G: 2 INJECTION, SOLUTION INTRAVENOUS at 07:40

## 2019-01-15 RX ADMIN — PROPOFOL 200 MG: 10 INJECTION, EMULSION INTRAVENOUS at 07:37

## 2019-01-15 RX ADMIN — LIDOCAINE HYDROCHLORIDE 60 MG: 20 INJECTION, SOLUTION INFILTRATION; PERINEURAL at 07:37

## 2019-01-15 RX ADMIN — MIDAZOLAM HYDROCHLORIDE 2 MG: 1 INJECTION, SOLUTION INTRAMUSCULAR; INTRAVENOUS at 07:12

## 2019-01-15 RX ADMIN — BUPIVACAINE HYDROCHLORIDE 25 ML GIVEN: 5 INJECTION, SOLUTION EPIDURAL; INTRACAUDAL; PERINEURAL at 07:17

## 2019-01-15 RX ADMIN — SCOPALAMINE 1 PATCH: 1 PATCH, EXTENDED RELEASE TRANSDERMAL at 07:03

## 2019-01-15 RX ADMIN — PROPOFOL 30 MG: 10 INJECTION, EMULSION INTRAVENOUS at 09:12

## 2019-01-15 RX ADMIN — PHENYLEPHRINE HYDROCHLORIDE 100 MCG: 10 INJECTION, SOLUTION INTRAMUSCULAR; INTRAVENOUS; SUBCUTANEOUS at 07:52

## 2019-01-15 RX ADMIN — BUPIVACAINE HYDROCHLORIDE 8 ML GIVEN: 5 INJECTION, SOLUTION EPIDURAL; INTRACAUDAL; PERINEURAL at 07:13

## 2019-01-15 ASSESSMENT — MIFFLIN-ST. JEOR: SCORE: 1209.12

## 2019-01-15 ASSESSMENT — ENCOUNTER SYMPTOMS: SEIZURES: 1

## 2019-01-15 ASSESSMENT — LIFESTYLE VARIABLES: TOBACCO_USE: 0

## 2019-01-15 NOTE — BRIEF OP NOTE
[facility] Brief Operative Note    Pre-operative diagnosis: RIGHT FOOT JOINT PAIN, HALLUX VALGUS   Post-operative diagnosis Same     Procedure: Procedure(s):  RIGHT FIRST METATARSAL PHALAGEAL JOINT ARTHRODESIS, RIGHT SECOND METATARSAL WEIL OSTEOTOMY  OSTEOTOMY FOOT   Surgeon(s): Surgeon(s) and Role:     * Benjamin Griffin DPM - Primary   Estimated blood loss: 3 ml   Specimens: * No specimens in log *   Findings: Degeneration of articular cartilage on both the first and second metatarsal heads.  Dorsal spurring, right 2nd metatarsal head.

## 2019-01-15 NOTE — ANESTHESIA POSTPROCEDURE EVALUATION
Patient: Barbi Tiwari    Procedure(s):  RIGHT FIRST METATARSAL PHALAGEAL JOINT ARTHRODESIS, RIGHT SECOND METATARSAL WEIL OSTEOTOMY  OSTEOTOMY FOOT    Diagnosis:RIGHT FOOT JOINT PAIN, HALLUX VALGUS  Diagnosis Additional Information: No value filed.    Anesthesia Type:  General, LMA, Periph. Nerve Block for postop pain    Note:  Anesthesia Post Evaluation    Patient location during evaluation: PACU  Patient participation: Able to fully participate in evaluation  Level of consciousness: awake  Pain management: adequate  Airway patency: patent  Cardiovascular status: acceptable  Respiratory status: acceptable  Hydration status: acceptable     Anesthetic complications: None          Last vitals:  Vitals:    01/15/19 1015 01/15/19 1030 01/15/19 1130   BP: 111/65 97/61 110/59   Pulse: 89 78    Resp: 15 10 16   Temp: 35.9  C (96.6  F) 36  C (96.8  F)    SpO2: 94% 98% 97%         Electronically Signed By: Rolanda Tomlin  January 15, 2019  11:58 AM

## 2019-01-15 NOTE — DISCHARGE INSTRUCTIONS
Crutch Instructions      Because of your surgery you will need crutches.  Make sure you tell your healthcare provider if crutches do not seem to work for you.  Using Crutches   Crutches are the most commonly used device for injuries to the lower part of the body because they allow the most mobility. All walking assistance devices require strength in your upper body but crutches require more coordination. If you are unable to use crutches then a cane or walker may be better.   Remember these rules when using crutches:   Always look straight ahead when you walk. Do not look down.   Hold the top padded part of the crutches into your chest near your armpits. Grasp the padded hand  and always support your weight with your arms and hands.   Do not lean on the crutches with your armpit as this could cause nerve damage.  Standing Up  Make sure you are in a stable chair. Move forward to the edge of the chair so that your  good  foot is flat on the floor. Put both crutches together and hold the handgrips in one hand. Stretch the injured leg out straight and then use the crutches with one hand and the chair armrest with the other hand to push yourself into a standing position onto your  good  leg. Once you are standing, wait until you are steady before placing a crutch in each hand. Until you become good at standing, have someone assist you in case you lose your balance. When standing still, lean slightly forward with the crutches ahead of you and about 3 feet apart. Unless you are told otherwise, you should keep weight off your injured leg.  Walking  To begin crutch walking, balance yourself on your  good  leg. Move both crutches forward about the length of one step and place them firmly on the floor in front of you about 3 feet apart. Support your weight on the padded hand rests while leaning forward. Press the top of the crutches against your chest wall and not into your armpits. Be sure to hold the injured leg up off  "of the floor. When ready, swing the \"good\" leg forward about one step length past the crutches while supporting your weight on the padded hand . Be careful not to go too far. Transfer your weight onto the \"good\" leg then bring both crutches forward about the length of one step. Repeating this motion will allow you to move fairly quickly without the use of your injured leg. Keep practicing until you become good at crutch walking.  Sitting Down  Make sure the chair you are about to sit on is stable. Walk in front of the chair such that you are facing away from it. Move back a little at a time until the back of your  good  leg is nearly touching the seat. Keeping your weight on the  good  leg, move both crutches to one hand holding onto the handgrips. Lean forward, bend your  good  knee, and move your injured leg out forward. Sit down slowly while using your free hand to reach for the chair s armrest for support. Place the crutches where you can easily get them. Never pivot, even on your  good  leg. This could cause you to fall or injure your  good  leg.  Navigating Stairs, Curbs & Door Steps  Only attempt this once you are very comfortable with regular crutch walking and only when someone is there to steady you should you begin to lose your balance. Hold on to a  railing with one hand and both crutches in the other hand or have someone carry the loose crutch for you. It does not matter which side the handrail is on. If there is no handrail, you can use both crutches. Using only crutches is the same as the railing method but maintaining your balance can be difficult. The crutch-only method is not recommended until you have become very good at crutch walking. Be sure to only take one step at a time. A simple rule to remember for crutches when navigating stairs or curbs: up with the  good  leg and down with the  bad .  Going Up Stairs or Curbs  Walk close to the first step with the crutches slightly " "behind you. Push down on the handrail and the crutch and step up with the \"good\" leg. You may have to make a short hop to do this if you are not allowed to place any weight on the injured leg. Lean forward and bring the injured leg and the crutch up beside the \"good\" leg. Repeat this until you have climbed up all of the steps. Remember, the \"good\" leg goes up first and the crutches move with the injured leg. The person helping you should stand behind and to your side that is away from the railing.  Going Down Stairs or Curbs  Walk to the edge of the first step. While standing and balancing on the  good  leg, place both crutches in one hand and then down on the step below. Be sure to support your weight by leaning on the crutches and handrail. Bring the injured leg forward, then the \"good\" leg down to the same step as the crutches. Remember crutches go down first with the injured leg. The person helping you should  front and to your side that is away from the railing.  Sitting Method for Navigating Stairs  A safer way to get up and down stairs is to sit down. To go up steps, sit down on the second or third step. Push with your  good  leg below while pushing up with both arms on the step above to move your bottom to the next step. When you get to the top of the stairs you may need to use the  scooting  method described later to get back up. To go down steps you first need to lower yourself to the floor near the top of the steps. Once seated, support yourself with your  good  leg on the second to next step below, and push with both arms on the step where you are sitting to move your bottom down to the next step. When you reach the bottom, pull yourself up to standing position using your crutches. It is helpful if someone can move your crutches and assist you in getting up and down. With practice it may be possible to manage on your own.  If You Start To Fall  If you start to fall and cannot get your balance, " "throw the crutches away from you. Try to fall onto your  good  side away from your injury and then break your fall with your arms. If uninjured, you can usually get back up by moving into a sitting position and scooting to a chair. Push with your arms and hands on the chair seat while pushing with the \"good\" leg to get up into the chair. You should not attempt to get back up if you are having significant pain. Call for assistance or dial 911 for an ambulance if necessary.  Safety Tips for Crutch Walking   At first you may want to wear a training belt (or a strong regular belt) so others can assist you.   Do not use crutches if you feel dizzy or drowsy.   Be careful on slick or wet surfaces like a kitchen or bathroom floor, snow, ice, or rainy conditions.   Temporarily remove pets, throw rugs or other items from your home that might trip you.   Do not hop around while holding onto furniture.   Wear sneakers or rubber soled shoes that will not easily slip or come off.   Be careful on ramps or slopes as they can be harder to walk up or down   Do not remove any parts from your crutches especially the padding or rubber traction footings. Replace padding or footings that become damaged immediately.   It is important to use your crutches correctly. If you feel any numbness or tingling in your arms, you are probably using the crutches incorrectly.  Helpful Hints   A bedside toilet or toilet riser may be helpful.   Always allow for extra time to get around. Children in school should be given permission to leave class early to avoid crowds when changing classes.   Elevate the injured leg when sitting.   Use a backpack to carry books or waist pouch to carry other items so that both hands are free.   Call your healthcare provider if you have any questions or concerns.        [facility]      DISCHARGE INSTRUCTIONS FOLLOWING   REGIONAL BLOCK ANESTHESIA      Numbness or lack of feeling in the arm/leg that was operated " "may last up to 24 hours.  The average time is usually 10-15 hours.  You may not be able to lift or move the arm or leg where the operation was by itself during that time.  Long-acting local anesthetic medicines were used to give you long-lasting pain relief.    Wear a sling until your arm is completely \"awake\"    Avoid bumping your arm, leg or foot while it is numb    Avoid extremes of hot or cold while it is numb    Remain quiet and restful the day of surgery.  Resume normal activities gradually over the next day or so as advise by your surgeon.    Do not drive or operate  Any machinery until your extremity is full  \"awake\"    You will have a tingling and prickly sensation in your arm/leg as the feeling begins to return; you can also expect some discomfort. The amount of discomfort is unpredictable, but if you have more pain that can be controlled with the pain medication you received, you should contact your surgeon.  Start to take your pain pills as soon as you start to feel any discomfort or pain.  We strongly recommend starting your pain medication at bedtime if you haven't already done so.  This is in the event that the block wears off while you are asleep.        Information for Patients Discharging with a Transderm Scopolamine Patch       Dry mouth is a common side effect.    Drowsiness is another common side effect especially when combined with pain medication.  Please avoid activities that require mental alertness such as driving a car or making important legal decisions.    Since Scopolamine can cause temporary dilation of the pupils and blurred vision if it comes in contact with the eyes; be sure to wash your hands thoroughly with soap and water immediately after handling the patch.   When you remove your patch, please stick it to a tissue or paper towel for disposal.      Remove the patch immediately and contact a physician in the unlikely event that you experience symptoms of acute glaucoma (pain and " reddening of the eyes, accompanied by dilated pupils).    Remove the patch if you develop any difficulties urinating.  If you cannot urinate after removing your patch, please notify your surgeon.  Remove the patch 24 hours after surgery.                  [facility] Discharge Instructions for  Sedation and General Anesthesia       It's not unusual to feel dizzy, light-headed or faint for up to 24 hours after surgery or while taking pain medication.  If you have these symptoms: sit for a few minutes before standing and have someone assist you when you get up to walk or use the bathroom.      You should rest and relax for the next 24 hours. We recommend you make arrangements to have an adult stay with you for at least 24 hours after your discharge.  Avoid hazardous and strenuous activity.      DO NOT DRIVE any vehicle or operate mechanical equipment for 24 hours following the end of your surgery.  Even though you may feel normal, your reactions may be affected by the medication you have received.      Do not drink alcoholic beverages for 24 hours following surgery.       Slowly progress to your regular diet as you feel able. It's not unusual to feel nauseated and/or vomit after receiving anesthesia.  If you develop these symptoms, drink clear liquids (apple juice, ginger ale, broth, 7-up, etc. ) until you feel better.  If your nausea and vomiting persists for 24 hours, please notify your surgeon.        All narcotic pain medications, along with inactivity and anesthesia, can cause constipation. Drinking plenty of liquids and increasing fiber intake will help.      For any questions of a medical nature, call your surgeon.      Do not make important decisions for 24 hours.      If you had general anesthesia, you may have a sore throat for a couple of days related to the breathing tube used during surgery.  You may use Cepacol lozenges to help with this discomfort.  If it worsens or if you develop a fever, contact  your surgeon.       If you feel your pain is not well managed with the pain medications prescribed by your surgeon, please contact your surgeon's office to let them know so they can address your concerns.     Today you received Toradol, an antiinflammatory medication similar to Ibuprofen.  You should not take other antiinflammatory medication, such as Ibuprofen, Motrin, Advil, Aleve, Naprosyn, etc until 3:20 PM.     **If you have questions or concerns about your procedure, call Dr. Griffin at [phone]**

## 2019-01-15 NOTE — ANESTHESIA CARE TRANSFER NOTE
Patient: Barbi Tiwari    Procedure(s):  RIGHT FIRST METATARSAL PHALAGEAL JOINT ARTHRODESIS, RIGHT SECOND METATARSAL WEIL OSTEOTOMY  OSTEOTOMY FOOT    Diagnosis: RIGHT FOOT JOINT PAIN, HALLUX VALGUS  Diagnosis Additional Information: No value filed.    Anesthesia Type:   General, LMA, Periph. Nerve Block for postop pain     Note:  Airway :Face Mask  Patient transferred to:PACU  Comments: Awake and talking. VSSHandoff Report: Identifed the Patient, Identified the Reponsible Provider, Reviewed the pertinent medical history, Discussed the surgical course, Reviewed Intra-OP anesthesia mangement and issues during anesthesia, Set expectations for post-procedure period and Allowed opportunity for questions and acknowledgement of understanding      Vitals: (Last set prior to Anesthesia Care Transfer)    CRNA VITALS  1/15/2019 0857 - 1/15/2019 0936      1/15/2019             NIBP:  118/75    Pulse:  91    NIBP Mean:  94    SpO2:  100 %    Resp Rate (observed):  2  (Abnormal)     Resp Rate (set):  10                Electronically Signed By: CAMDEN Shafer CRNA  January 15, 2019  9:36 AM

## 2019-01-15 NOTE — ANESTHESIA PREPROCEDURE EVALUATION
Anesthesia Pre-Procedure Evaluation    Patient: Barbi Tiwari   MRN: [id number] : 1960          Preoperative Diagnosis: RIGHT FOOT JOINT PAIN, HALLUX VALGUS    Procedure(s):  RIGHT FIRST METATARSAL PHALAGEAL JOINT ARTHRODESIS, RIGHT SECOND METATARSAL WEIL OSTEOTOMY (SHORTENING) (SUPINE POSITION, PNUEW, MINI C-ARM, AR[employer] FIRST MTP FUSION SET, [employer] TWIST-OFF SCREWS) (GENERAL WITH POPLITEAL BLOCK^  OSTEOTOMY FOOT    Past Medical History:   Diagnosis Date     Allergic rhinitis 2009     Calculus of kidney 2011    Overview:  S/P LITHOTRIPSY 3/15/11      Esophageal reflux 10/22/2008     Hyperparathyroidism s/p surgery 2011     oligodendroglioma 2012     Osteoarthrosis 2009     Palpitations 2014     PVCs 2014     Seizure disorder (related to tumor) 2011     Past Surgical History:   Procedure Laterality Date     CRANIOTOMY      tumor resection     EXTRACORPOREAL SHOCK WAVE LITHOTRIPSY, CYSTOSCOPY, INSERT STENT URETER(S), COMBINED Bilateral 2017    Procedure: COMBINED EXTRACORPOREAL SHOCK WAVE LITHOTRIPSY, CYSTOSCOPY, INSERT STENT URETER(S);  CYSTO, URETHRAL DILATION, URETERAL LEFT STENT PLACEMENT, LEFT ESWL.;  Surgeon: Angel Del Rio MD;  Location:  OR     FOOT SURGERY      mulitple     HYSTERECTOMY, PAP NO LONGER INDICATED      lap hys     LITHOTRIPSY  2010     OPTICAL TRACKING SYSTEM CRANIOTOMY, EXCISE TUMOR WITH MAPPING, COMBINED Right 2018    Procedure: COMBINED OPTICAL TRACKING SYSTEM CRANIOTOMY, EXCISE TUMOR WITH MAPPING;  Right Stealth Assisted Craniotomy With Motor Mapping And Tumor Resection ;  Surgeon: Dustin Rodriguez MD;  Location: [facility] OR     ORTHOPEDIC SURGERY       PARATHYROIDECTOMY         Anesthesia Evaluation     . Pt has had prior anesthetic.     No history of anesthetic complications          ROS/MED HX    ENT/Pulmonary:      (-) tobacco use, asthma and sleep apnea   Neurologic: Comment: Hx of brain tumor s/p crani   "  (+)seizures    (-) CVA   Cardiovascular:     (+) Dyslipidemia, ----. : . . . :. .       METS/Exercise Tolerance:     Hematologic:         Musculoskeletal:         GI/Hepatic:     (+) GERD       Renal/Genitourinary:     (+) chronic renal disease,       Endo:         Psychiatric:         Infectious Disease:         Malignancy:         Other:                          Physical Exam  Normal systems: dental    Airway   Mallampati: II  TM distance: >3 FB  Neck ROM: full    Dental     Cardiovascular   Rhythm and rate: regular and normal      Pulmonary    breath sounds clear to auscultation            Lab Results   Component Value Date    WBC 10.6 05/31/2018    HGB 11.5 (L) 05/31/2018    HCT 35.9 05/31/2018     05/31/2018     (H) 05/31/2018    POTASSIUM 4.0 05/31/2018    CHLORIDE 112 (H) 05/31/2018    CO2 22 05/31/2018    BUN 11 05/31/2018    CR 0.48 (L) 05/31/2018     (H) 05/31/2018    KIAN 8.0 (L) 05/31/2018    ALBUMIN 3.3 (L) 05/11/2017    PROTTOTAL 7.3 05/11/2017    ALT 30 05/11/2017    AST 20 05/11/2017    ALKPHOS 122 05/11/2017    BILITOTAL 0.9 05/11/2017    LIPASE 176 09/25/2014    PTT 31 05/30/2018    INR 1.03 05/30/2018    TSH 0.66 03/24/2017       Preop Vitals  BP Readings from Last 3 Encounters:   01/15/19 123/68   01/09/19 128/82   01/07/19 112/80    Pulse Readings from Last 3 Encounters:   01/09/19 68   01/07/19 61   12/10/18 71      Resp Readings from Last 3 Encounters:   01/15/19 16   01/09/19 16   01/07/19 16    SpO2 Readings from Last 3 Encounters:   01/15/19 96%   01/09/19 93%   01/07/19 97%      Temp Readings from Last 1 Encounters:   01/15/19 36.1  C (97  F) (Temporal)    Ht Readings from Last 1 Encounters:   01/15/19 1.575 m (5' 2\")      Wt Readings from Last 1 Encounters:   01/15/19 67.1 kg (147 lb 14.4 oz)    Estimated body mass index is 27.05 kg/m  as calculated from the following:    Height as of this encounter: 1.575 m (5' 2\").    Weight as of this encounter: 67.1 kg (147 lb " 14.4 oz).       Anesthesia Plan      History & Physical Review  History and physical reviewed and following examination; no interval change.    ASA Status:  3 .        Plan for General, LMA and Periph. Nerve Block for postop pain with Intravenous induction. Maintenance will be Balanced.    PONV prophylaxis:  Ondansetron (or other 5HT-3) and Dexamethasone or Solumedrol  Plan for a peripheral nerve block at surgeons request for post op pain control as the best form of pain control for this patient      Postoperative Care  Postoperative pain management:  IV analgesics and Peripheral nerve block (Single Shot).      Consents  Anesthetic plan, risks, benefits and alternatives discussed with:  Patient..                 Rolanda Tomlin

## 2019-01-16 NOTE — OP NOTE
Procedure Date: 01/15/2019      SURGEON:  Benjamin Griffin DPM      :  Taylor Claros DPM.      PREOPERATIVE DIAGNOSES:     1.  Symptomatic hallux abductovalgus deformity, right foot.   2.  Right second metatarsophalangeal joint pain.      PROCEDURES:   1.  Right first metatarsophalangeal joint arthrodesis.   2.  Right second metatarsal Weil osteotomy.      ANESTHESIA:  General, she also received a preoperative popliteal block.      HEMOSTASIS:  Thigh pneumatic tourniquet at 300 mmHg for 80 minutes.      ESTIMATED BLOOD LOSS:  3 mL.      MATERIALS:  Absorbable and nonabsorbable suture material, one 6-hole contoured first MTP plate from [employer].  The plate was secured with two 3.0 locking screws and two 3.0 nonlocking screws, a 3.0 headless cannulated screw was placed across the arthrodesis site.  Two QuickFix 2.0 mm screws were used for the Weil osteotomy.      COMPLICATIONS:  None apparent.      INTRAOPERATIVE FINDINGS:  Degenerative cartilage was seen on both the first and second metatarsal heads.  The second metatarsal head had a dorsal spur.      INDICATIONS FOR SURGERY:  Barbi Tiwari is a 58-year-old female who saw me previously in clinic for ongoing and progressive pain in her right foot.  Pain was found to be related to a severe hallux abductovalgus deformity as well as pain in the right second metatarsophalangeal joint.  The latter was believed to be related to the significant length of the metatarsal.  She inquired about surgical intervention, as conservative cares were no longer providing relief.  She has a remote history of bunion surgery as well as second toe surgery.  Because her bunion deformity was severe and this is revision surgery, I suggested first metatarsophalangeal joint arthrodesis.  Both procedures were discussed in detail including the risks, the benefits, the postoperative cares, course and prognosis.  No guarantees were given.  For details regarding the most recent clinical  exam and discussion, please see the clinic note from 01/07/2019.      DESCRIPTION OF PROCEDURE:  Barbi Tiwari was transported to the Operating Room after receiving a right-sided popliteal block.  She was placed supine on the operating table.  General endotracheal anesthesia was initiated.  The right foot was then prepped and draped in the normal aseptic fashion.  The timeout was called.  The right lower extremity was exsanguinated, and the thigh tourniquet inflated.      Attention was first directed to the right first metatarsophalangeal joint.  A longitudinal incision was made over the scar from her previous surgery.  Layered dissection was performed.  Bleeding vessels were electrocauterized.  A medial and lateral capsular and periosteal reflection was performed to adequately expose the right first metatarsophalangeal joint as well as the distal half of the first metatarsal and the proximal aspect of the proximal phalanx.      Next, the joint was prepared for arthrodesis using a reaming system from [employer].  The head of the first metatarsal was reamed into a ball and the base of the proximal phalanx into the matching size cone.  The area was irrigated with a copious amount of normal sterile saline.  Subchondral drilling was then performed.  The hallux was placed in a rectus position on the first metatarsal and provisionally fixated with smooth K-wires.  Intraoperative imaging confirmed satisfactory positioning of the hallux.  Next, permanent fixation was placed.  This consisted of a 3.0 cannulated headless screw across the arthrodesis site.  The arthrodesis was further secured with a 6-hole contoured dorsal first MTP plate, as well as locking and nonlocking 3.0 screws.  Intraoperative imaging confirmed satisfactory placement of the hardware.  The wound was again irrigated with a copious amount of normal sterile saline.  Layered closure was performed.      A longitudinal incision was made over the right second  metatarsophalangeal joint.  Layered dissection was performed.  Bleeding vessels were electrocauterized.  Subcapsular and subperiosteal reflection was performed to adequately visualize the distal aspect of the right second metatarsal.      Using a sagittal saw, a Weil type osteotomy was made.  The capital fragment was transposed proximally 3-4 mm.  It was provisionally fixated.  Intraoperative imaging was used to assess for proper shortening of the bone.  Permanent fixation was then placed using two 2.0 QuickFix twist off screws from [employer].  The distal overhanging shelf of bone was removed with a bone rongeur.  The wound was irrigated with a copious amount of normal sterile saline.  Layered closure was performed.      Dylan Tiwari tolerated the anesthesia and procedure well.  She was transported to the Post-Anesthesia Care Unit in stable condition.         YOVANA SALAMANCA DPM             D: 01/15/2019   T: 2019   MT: [id number]      Name:     DYLAN TIWARI   MRN:      [id number]        Account:        [id number]   :      1960           Procedure Date: 01/15/2019      Document: [id number]

## 2019-01-18 ENCOUNTER — OFFICE VISIT (OUTPATIENT)
Dept: INTERNAL MEDICINE | Facility: CLINIC | Age: 59
End: 2019-01-18
Payer: COMMERCIAL

## 2019-01-18 VITALS
DIASTOLIC BLOOD PRESSURE: 80 MMHG | TEMPERATURE: 97.9 F | SYSTOLIC BLOOD PRESSURE: 122 MMHG | WEIGHT: 153 LBS | OXYGEN SATURATION: 96 % | BODY MASS INDEX: 27.98 KG/M2 | RESPIRATION RATE: 12 BRPM | HEART RATE: 70 BPM

## 2019-01-18 DIAGNOSIS — L72.3 SEBACEOUS CYST: Primary | ICD-10-CM

## 2019-01-18 DIAGNOSIS — N60.01 BREAST CYST, RIGHT: ICD-10-CM

## 2019-01-18 PROCEDURE — 99213 OFFICE O/P EST LOW 20 MIN: CPT | Performed by: INTERNAL MEDICINE

## 2019-01-18 NOTE — PROGRESS NOTES
SUBJECTIVE:   Barbi Tiwari is a 58 year old female who presents to clinic today for the following health issues:    ED/UC Followup:    Facility:  [facility]   Date of visit: 1/9/19  Reason for visit: mass under breast   Current Status: Still there and only slight improvement        Pt found mass under right breast last week.  Lesion at that time quite tender and erythematous.  It is consistent with a sebaceous cyst on clinical exam.  Rather than perform I&D she is placed on a course of antibiotics.  She states she is buttock 80% improvement in the redness though she can still palpate the underlying mass.  Since last seen she also underwent ankle/foot surgery which she is recuperating from.    Problem list and histories reviewed & adjusted, as indicated.  Additional history: as documented    Labs reviewed in EPIC    Reviewed and updated as needed this visit by clinical staff  Allergies  Meds       Reviewed and updated as needed this visit by Provider         ROS:  Constitutional, HEENT, cardiovascular, pulmonary, gi and gu systems are negative, except as otherwise noted.    OBJECTIVE:                                                    /80   Pulse 70   Temp 97.9  F (36.6  C) (Oral)   Resp 12   Wt 69.4 kg (153 lb)   SpO2 96%   BMI 27.98 kg/m    Body mass index is 27.98 kg/m .  GENERAL APPEARANCE: healthy, alert and no distress  BREAST: The right breast has a small 3-4 mm diameter subcutaneous but superficial soft smooth mass which is quite consistent with a sebaceous cyst.  It is a little bit of bruising but no warmth or erythema on the surface of the skin.    Diagnostic test results:  none      ASSESSMENT/PLAN:                                                    1. Sebaceous cyst  From a clinical perspective it can almost 100% that this is a sebaceous cyst that was likely inflamed.  But given its location within the breast itself I would proceed with diagnostic evaluation to exclude any other  possibilities.  - MA Diagnostic Digital Bilateral; Future  - US Breast Right Limited 1-3 Quadrants; Future    2. Breast cyst, right  - MA Diagnostic Digital Bilateral; Future  - US Breast Right Limited 1-3 Quadrants; Future      Dustin Choudhury MD  [facility]

## 2019-01-21 ENCOUNTER — OFFICE VISIT (OUTPATIENT)
Dept: PODIATRY | Facility: CLINIC | Age: 59
End: 2019-01-21
Payer: COMMERCIAL

## 2019-01-21 VITALS
WEIGHT: 147 LBS | HEIGHT: 62 IN | SYSTOLIC BLOOD PRESSURE: 112 MMHG | DIASTOLIC BLOOD PRESSURE: 72 MMHG | BODY MASS INDEX: 27.05 KG/M2

## 2019-01-21 DIAGNOSIS — Z09 SURGERY FOLLOW-UP EXAMINATION: Primary | ICD-10-CM

## 2019-01-21 PROCEDURE — 99024 POSTOP FOLLOW-UP VISIT: CPT | Performed by: PODIATRIST

## 2019-01-21 ASSESSMENT — MIFFLIN-ST. JEOR: SCORE: 1205.04

## 2019-01-21 NOTE — LETTER
1/21/2019         RE: Barbi Tiwari  [address]  [place] [address]        Dear Colleague,    Thank you for referring your patient, Barbi Tiwari, to the [facility]. Please see a copy of my visit note below.    S: Barbi Tiwari  presents 1 week  post op.      PROCEDURES:   1.  Right first metatarsophalangeal joint arthrodesis.   2.  Right second metatarsal Weil osteotomy.      Pain controlled.  Not feeling well today - nausea. She does not taking pain medication and says her foot feels fine.  No is elevating as instructed.    WB status: non - wheeled knee walker  No calf pain, chest pain, or shortness of breath.        O:   Vascular:  Pedal pulses are palpable.  Moderate right forefoot edema.    Neuro: Light touch sensation is intact distal to the incisions.    Derm: The incisions are well coapted.  Sutures are intact.  No significant sg-incisional erythema. Ecchymosis.     Musculoskeletal: Satisfactory post op surgical correction of the prior severe hallux abducto valgus deformity.       ASSESSMENT:  1) s/p above noted surgery.  No clinical signs of infection.  Pain is controlled.  Encounter Diagnosis   Name Primary?     Surgery follow-up examination Yes     PLAN:  1) sterile re-dress of right foot  2) continue non-weight bearing, elevation  3) Posterior splint for protection  4) ankle ROM exercises discussed.    We looked at the pre-op and post-op XR images together.    Follow up in 1 week for suture removal.     Benjamin Griffin DPM;      Again, thank you for allowing me to participate in the care of your patient.        Sincerely,        Benjamin Griffin DPM

## 2019-01-23 ENCOUNTER — MYC MEDICAL ADVICE (OUTPATIENT)
Dept: PODIATRY | Facility: CLINIC | Age: 59
End: 2019-01-23

## 2019-01-23 ENCOUNTER — TELEPHONE (OUTPATIENT)
Dept: PODIATRY | Facility: CLINIC | Age: 59
End: 2019-01-23

## 2019-01-23 NOTE — TELEPHONE ENCOUNTER
Patient called.      Needs paperwork for [employer] completed by 1/26/19.  Patient faxed paperwork to [facility].     Please complete and return.    Patient can be reached at [phone].      Dennis Alvares, Surgery Scheduler

## 2019-01-28 ENCOUNTER — OFFICE VISIT (OUTPATIENT)
Dept: PODIATRY | Facility: CLINIC | Age: 59
End: 2019-01-28
Payer: COMMERCIAL

## 2019-01-28 VITALS
BODY MASS INDEX: 27.05 KG/M2 | DIASTOLIC BLOOD PRESSURE: 82 MMHG | SYSTOLIC BLOOD PRESSURE: 116 MMHG | HEIGHT: 62 IN | WEIGHT: 147 LBS

## 2019-01-28 DIAGNOSIS — Z09 SURGERY FOLLOW-UP EXAMINATION: Primary | ICD-10-CM

## 2019-01-28 PROCEDURE — 99024 POSTOP FOLLOW-UP VISIT: CPT | Performed by: PODIATRIST

## 2019-01-28 ASSESSMENT — MIFFLIN-ST. JEOR: SCORE: 1205.04

## 2019-01-28 NOTE — LETTER
1/28/2019         RE: Barbi Tiwari  [address]  [place] [address]        Dear Colleague,    Thank you for referring your patient, Barbi Tiwari, to the [facility]. Please see a copy of my visit note below.    S: Barbi Tiwari  presents 2 weeks  post op.      PROCEDURES:   1.  Right first metatarsophalangeal joint arthrodesis.   2.  Right second metatarsal Weil osteotomy.      No complaints.   She asks when she can have surgery on her left foot.       O:   Vascular:  Pedal pulses are palpable.  Moderate right forefoot edema.    Neuro: Light touch sensation is intact distal to the incisions.    Derm: The incisions are well coapted.  Sutures are intact.  No sg-incisional erythema. Ecchymosis.     Musculoskeletal: Satisfactory post op surgical correction of the prior severe hallux abducto valgus deformity.       ASSESSMENT:  1) s/p above noted surgery.  No clinical signs of infection.  Pain is controlled.  Encounter Diagnosis   Name Primary?     Surgery follow-up examination Yes        PLAN:  1) Suture Removal    The incision was prepped with povodine iodine solution.  Using a sterile suture scissors and forceps, the sutures were removed w/o difficulty.  Incision cleansed with an alcohol wipe and a light dressing was applied.  Pt advised to keep foot dry for an additional 24 hours, and then washing the foot is okay.  Pt stated understanding.    2) continue non-weight bearing and posterior splint for one more week.    3)  CAM walker provided. She can start to ambulate, as tolerated, in the CAM walker at 3 weeks post op.     4) I showed her how to do some right 2nd toe stretching to help reduce scar contracture and dorsal drift of the toe.  It did this prior to surgery, but by shortening the second metatarsal, some of this deformity was reduced.    I told her that we can discuss surgery on her left foot, once she is convinced that it was worth it on the right. I told her  that the right foot needs to be completely healed, prior to surgery on the left.  Likely would wait 5 months.    Follow up in 1 month, sooner if any concerns.    Benjamin Griffin DPM, FACFAS, MS    [place] [facility] Podiatry/Foot & Ankle Surgery        Again, thank you for allowing me to participate in the care of your patient.        Sincerely,        Benjamin Griffin DPM

## 2019-02-21 ENCOUNTER — MYC MEDICAL ADVICE (OUTPATIENT)
Dept: INTERNAL MEDICINE | Facility: CLINIC | Age: 59
End: 2019-02-21

## 2019-02-21 ENCOUNTER — OFFICE VISIT (OUTPATIENT)
Dept: URGENT CARE | Facility: URGENT CARE | Age: 59
End: 2019-02-21
Payer: COMMERCIAL

## 2019-02-21 VITALS
RESPIRATION RATE: 16 BRPM | SYSTOLIC BLOOD PRESSURE: 108 MMHG | DIASTOLIC BLOOD PRESSURE: 68 MMHG | OXYGEN SATURATION: 93 % | TEMPERATURE: 98 F | HEART RATE: 88 BPM

## 2019-02-21 DIAGNOSIS — L72.3 SEBACEOUS CYST: Primary | ICD-10-CM

## 2019-02-21 PROCEDURE — 87077 CULTURE AEROBIC IDENTIFY: CPT | Performed by: FAMILY MEDICINE

## 2019-02-21 PROCEDURE — 10060 I&D ABSCESS SIMPLE/SINGLE: CPT | Performed by: FAMILY MEDICINE

## 2019-02-21 PROCEDURE — 87070 CULTURE OTHR SPECIMN AEROBIC: CPT | Performed by: FAMILY MEDICINE

## 2019-02-21 PROCEDURE — 99213 OFFICE O/P EST LOW 20 MIN: CPT | Mod: 25 | Performed by: FAMILY MEDICINE

## 2019-02-21 PROCEDURE — 87186 SC STD MICRODIL/AGAR DIL: CPT | Performed by: FAMILY MEDICINE

## 2019-02-21 RX ORDER — CLINDAMYCIN HCL 300 MG
300 CAPSULE ORAL 4 TIMES DAILY
Qty: 40 CAPSULE | Refills: 0 | Status: SHIPPED | OUTPATIENT
Start: 2019-02-21 | End: 2019-03-20

## 2019-02-21 NOTE — TELEPHONE ENCOUNTER
Huddled with PCP, patient should be directed to go to UC or schedule with Dr. Choudhury or Arianne tomorrow. May need lancing.

## 2019-02-22 NOTE — PROGRESS NOTES
Barbi Tiwari is a 58 year old female who presents to the clinic today concerned about a mass located rt breast.    It has been present for day(s).    Symptoms include pain, tenderness and swelling.    Past Medical History:   Diagnosis Date     Allergic rhinitis 12/9/2009     Calculus of kidney 1/12/2011    Overview:  S/P LITHOTRIPSY 3/15/11      Esophageal reflux 10/22/2008     Hyperparathyroidism 01/11/2011    had surgery for it; resulted in seizures     oligodendroglioma 7/23/2012     Osteoarthrosis 12/9/2009     Palpitations 6/5/2014     PONV (postoperative nausea and vomiting)      PVCs 6/5/2014     Seizure disorder (related to tumor) 08/17/2011       Past Surgical History:   Procedure Laterality Date     CRANIOTOMY  2011    tumor resection     EXTRACORPOREAL SHOCK WAVE LITHOTRIPSY, CYSTOSCOPY, INSERT STENT URETER(S), COMBINED Bilateral 5/5/2017    Procedure: COMBINED EXTRACORPOREAL SHOCK WAVE LITHOTRIPSY, CYSTOSCOPY, INSERT STENT URETER(S);  CYSTO, URETHRAL DILATION, URETERAL LEFT STENT PLACEMENT, LEFT ESWL.;  Surgeon: Angel Del Rio MD;  Location:  OR     FOOT SURGERY      mulitple     HYSTERECTOMY, PAP NO LONGER INDICATED  2008    lap hys     LITHOTRIPSY  2010 2017     OPTICAL TRACKING SYSTEM CRANIOTOMY, EXCISE TUMOR WITH MAPPING, COMBINED Right 5/30/2018    Procedure: COMBINED OPTICAL TRACKING SYSTEM CRANIOTOMY, EXCISE TUMOR WITH MAPPING;  Right Stealth Assisted Craniotomy With Motor Mapping And Tumor Resection ;  Surgeon: Dustin Rodriguez MD;  Location:  OR     ORTHOPEDIC SURGERY      feet, multiple on both     OSTEOTOMY FOOT Right 1/15/2019    Procedure: OSTEOTOMY FOOT;  Surgeon: Benjamin Griffin DPM;  Location:  OR     PARATHYROIDECTOMY  2011     RECONSTRUCT FOREFOOT WITH METATARSOPHALANGEAL (MTP) FUSION Right 1/15/2019    Procedure: RIGHT FIRST METATARSAL PHALAGEAL JOINT ARTHRODESIS, RIGHT SECOND METATARSAL WEIL OSTEOTOMY;  Surgeon: Benjamin Griffin DPM;  Location:  OR        Family History   Problem Relation Age of Onset     Arthritis Mother      Depression Mother      Respiratory Mother         COPD     LUNG DISEASE Mother      C.A.D. Father 58        heart attack     Heart Disease Father      Diabetes Brother 70     Depression Sister      Depression Son      Allergies Son      Depression Daughter      Allergies Daughter      Eczema Brother      Depression Sister      Depression Sister        Social History     Tobacco Use     Smoking status: Never Smoker     Smokeless tobacco: Never Used   Substance Use Topics     Alcohol use: Yes     Comment: 1-2 per month, only at social events        Allergies   Allergen Reactions     Sulfa Drugs Hives     Benoxinate Nausea and Vomiting     ROS: no fevers    OBJECTIVE:    Blood pressure 108/68, pulse 88, temperature 98  F (36.7  C), temperature source Oral, resp. rate 16, SpO2 93 %, not currently breastfeeding.  NAD  Exam:  fluctuant, erythematous, indurated, tender, 2 cm  x  2 cm and 3 cm  x  3 cm mass is seen located rt breast.    Preped, 1% lido 1cc with epi and I&D with #11 blade and pus return.    ASSESSMENT:        ICD-10-CM    1. Sebaceous cyst L72.3 clindamycin (CLEOCIN) 300 MG capsule     DRAIN SKIN ABSCESS SIMPLE/SINGLE     Wound Culture Aerobic Bacterial      Warm packs and soaks recommended.  Monitor for drainage.  Follow up in 1-2 weeks if not improving.

## 2019-02-25 ENCOUNTER — OFFICE VISIT (OUTPATIENT)
Dept: PODIATRY | Facility: CLINIC | Age: 59
End: 2019-02-25
Payer: COMMERCIAL

## 2019-02-25 ENCOUNTER — ANCILLARY PROCEDURE (OUTPATIENT)
Dept: GENERAL RADIOLOGY | Facility: CLINIC | Age: 59
End: 2019-02-25
Attending: PODIATRIST
Payer: COMMERCIAL

## 2019-02-25 VITALS
DIASTOLIC BLOOD PRESSURE: 82 MMHG | SYSTOLIC BLOOD PRESSURE: 104 MMHG | HEIGHT: 62 IN | BODY MASS INDEX: 27.05 KG/M2 | WEIGHT: 147 LBS

## 2019-02-25 DIAGNOSIS — Z09 SURGERY FOLLOW-UP EXAMINATION: Primary | ICD-10-CM

## 2019-02-25 LAB
BACTERIA SPEC CULT: ABNORMAL
BACTERIA SPEC CULT: ABNORMAL
Lab: ABNORMAL
SPECIMEN SOURCE: ABNORMAL

## 2019-02-25 PROCEDURE — 73630 X-RAY EXAM OF FOOT: CPT | Mod: RT

## 2019-02-25 PROCEDURE — 99024 POSTOP FOLLOW-UP VISIT: CPT | Performed by: PODIATRIST

## 2019-02-25 ASSESSMENT — MIFFLIN-ST. JEOR: SCORE: 1205.04

## 2019-02-25 NOTE — PROGRESS NOTES
S: Barbi Tiwari  presents 6 weeks  post op.      PROCEDURES:   1.  Right first metatarsophalangeal joint arthrodesis.   2.  Right second metatarsal Weil osteotomy.      She inquires about her right third toe and if it is becoming a hammer toe. She also has notice more of an angle of her right great toe (interphalagneal joint).       O:   Vascular:  Pedal pulses are palpable.  Minimal right forefoot edema.    Neuro: Light touch sensation is intact distal to the incisions.    Derm: The incisions are healing. Residual scab over the 1st metatarsophalangeal joint.     Musculoskeletal: Satisfactory post op surgical correction of the prior severe hallux abducto valgus deformity. With weight bearing, there tip of the right hallux sits just above the weight bearing surface (as intended).     XR Right Foot:  Surgical hardware intact. There appears to be bony healing across the arthrodesis site.      ASSESSMENT:  1) s/p above noted surgery.  No clinical signs of infection.  Pain is controlled.    Encounter Diagnosis   Name Primary?     Surgery follow-up examination Yes       PLAN:  We reviewed the XR images together.     Gradual wean from the CAM walker into regular shoes.  I suggested a supportive, stiffer soled shoe.  No high impact activity (running and jumping) for another 6 weeks.    Work plan:  Return 3/4/19, light duties x 2 weeks.    I explained that it is unlikely her right great toe has become more angled at the interphalangeal joint.  There was no surgery at this level.  The appearance, change, is likely due to the toe being rotated out of varus when fused (part of the deformity correction).  Her right 3rd toe is contracture, but mostly flexible.  I explained that it is not a concern.  It is not painful.     Follow up in 1 month, sooner if any concerns.    Bejnamin Griffin DPM, FACFAS, MS    [place] [facility] of Podiatry/Foot & Ankle Surgery

## 2019-02-25 NOTE — LETTER
2/25/2019         RE: Barbi Tiwari  [address]  [place] [address]        Dear Colleague,    Thank you for referring your patient, Barbi Tiwari, to the [facility]. Please see a copy of my visit note below.    S: Barbi Tiwari  presents 6 weeks  post op.      PROCEDURES:   1.  Right first metatarsophalangeal joint arthrodesis.   2.  Right second metatarsal Weil osteotomy.      She inquires about her right third toe and if it is becoming a hammer toe. She also has notice more of an angle of her right great toe (interphalagneal joint).       O:   Vascular:  Pedal pulses are palpable.  Minimal right forefoot edema.    Neuro: Light touch sensation is intact distal to the incisions.    Derm: The incisions are healing. Residual scab over the 1st metatarsophalangeal joint.     Musculoskeletal: Satisfactory post op surgical correction of the prior severe hallux abducto valgus deformity. With weight bearing, there tip of the right hallux sits just above the weight bearing surface (as intended).     XR Right Foot:  Surgical hardware intact. There appears to be bony healing across the arthrodesis site.      ASSESSMENT:  1) s/p above noted surgery.  No clinical signs of infection.  Pain is controlled.    Encounter Diagnosis   Name Primary?     Surgery follow-up examination Yes       PLAN:  We reviewed the XR images together.     Gradual wean from the CAM walker into regular shoes.  I suggested a supportive, stiffer soled shoe.  No high impact activity (running and jumping) for another 6 weeks.    Work plan:  Return 3/4/19, light duties x 2 weeks.    I explained that it is unlikely her right great toe has become more angled at the interphalangeal joint.  There was no surgery at this level.  The appearance, change, is likely due to the toe being rotated out of varus when fused (part of the deformity correction).  Her right 3rd toe is contracture, but mostly flexible.  I explained  that it is not a concern.  It is not painful.     Follow up in 1 month, sooner if any concerns.    Benjamin Griffin DPM, FACFAS, MS    [place] [facility] Podiatry/Foot & Ankle Surgery          Again, thank you for allowing me to participate in the care of your patient.        Sincerely,        Benjamin Griffin DPM

## 2019-03-11 ENCOUNTER — HOSPITAL ENCOUNTER (OUTPATIENT)
Dept: MAMMOGRAPHY | Facility: CLINIC | Age: 59
Discharge: HOME | End: 2019-03-11
Attending: INTERNAL MEDICINE | Admitting: INTERNAL MEDICINE
Payer: COMMERCIAL

## 2019-03-11 DIAGNOSIS — N60.01 BREAST CYST, RIGHT: ICD-10-CM

## 2019-03-11 DIAGNOSIS — L72.3 SEBACEOUS CYST: ICD-10-CM

## 2019-03-11 PROCEDURE — 77066 DX MAMMO INCL CAD BI: CPT

## 2019-03-14 ENCOUNTER — MYC MEDICAL ADVICE (OUTPATIENT)
Dept: PODIATRY | Facility: CLINIC | Age: 59
End: 2019-03-14

## 2019-03-18 NOTE — TELEPHONE ENCOUNTER
SR Guzman,     I tried to call the patient's cell phone.  No answer. Will you please try to contact her. I am happy to extend her work restrictions.  I need to know how long and what the restrictions are.  The forms need to be faxed to [facility]:  [phone].  I will try to complete today. I am out of the office tomorrow and not back until 4/1.    If we don't get the forms, a letter can be written.    Dr. Griffin.

## 2019-03-18 NOTE — TELEPHONE ENCOUNTER
Form received, completed and faxed.  The original form will be temporarily left in my mail basket at the [facility].     Dr. Griffin

## 2019-03-20 ENCOUNTER — OFFICE VISIT (OUTPATIENT)
Dept: OBGYN | Facility: CLINIC | Age: 59
End: 2019-03-20
Payer: COMMERCIAL

## 2019-03-20 VITALS — BODY MASS INDEX: 28.22 KG/M2 | WEIGHT: 154.3 LBS | DIASTOLIC BLOOD PRESSURE: 80 MMHG | SYSTOLIC BLOOD PRESSURE: 100 MMHG

## 2019-03-20 DIAGNOSIS — Z00.00 ENCOUNTER FOR ROUTINE ADULT HEALTH EXAMINATION WITHOUT ABNORMAL FINDINGS: Primary | ICD-10-CM

## 2019-03-20 DIAGNOSIS — N95.2 VAGINAL ATROPHY: ICD-10-CM

## 2019-03-20 PROCEDURE — 99396 PREV VISIT EST AGE 40-64: CPT | Mod: 24 | Performed by: OBSTETRICS & GYNECOLOGY

## 2019-03-20 NOTE — NURSING NOTE
"Chief Complaint   Patient presents with     Gyn Exam     breast and pelvic exam     Medication Question     vaginal estrogen       Initial /80 (BP Location: Left arm, Patient Position: Chair, Cuff Size: Adult Regular)   Wt 70 kg (154 lb 4.8 oz)   BMI 28.22 kg/m   Estimated body mass index is 28.22 kg/m  as calculated from the following:    Height as of 19: 1.575 m (5' 2\").    Weight as of this encounter: 70 kg (154 lb 4.8 oz).  BP completed using cuff size: regular    Questioned patient about current smoking habits.  Pt. has never smoked.          The following HM Due: NONE      Dennis Montenegro CMA                "

## 2019-03-20 NOTE — PROGRESS NOTES
Gynecology Clinic Visit:    SUBJECTIVE:     Barbi is a 56 year old  who presents for annual exam. She underwent total laparoscopic hysterectomy in  for fibroid uterus.   Reports increased vaginal dryness, not using vaginal estrogen any more but would like to restart.   Postmenopausal since , no further hot flashes and night sweats. No vaginal bleeding noted.     Sebaceous cyst under right breast s/p infection. Mammogram without any concern in the area.    Exercise: biking at the gym every other day, just returning after foot injury.   On calcium supplements    GYNECOLOGIC HISTORY:  She is sexually active with 1 male partner(s) and she is currently in monogamous relationship.    Estrogen replacement therapy: No    History of abnormal Pap smear: Status post benign hysterectomy. Health Maintenance and Surgical History updated.    HEALTH MAINTENANCE:  Last Mammogram: 3/11/19 History of abnormal Mammo: No    HISTORY:  Obstetric History       T2      L2     SAB0   TAB0   Ectopic0   Multiple0   Live Births0       # Outcome Date GA Lbr Isaias/2nd Weight Sex Delivery Anes PTL Lv   2 Term            1 Term                 Past Medical History:   Diagnosis Date     Allergic rhinitis 2009     Calculus of kidney 2011    Overview:  S/P LITHOTRIPSY 3/15/11      Esophageal reflux 10/22/2008     Hyperparathyroidism 2011    had surgery for it; resulted in seizures     oligodendroglioma 2012     Osteoarthrosis 2009     Palpitations 2014     PONV (postoperative nausea and vomiting)      PVCs 2014     Seizure disorder (related to tumor) 2011     Past Surgical History:   Procedure Laterality Date     CRANIOTOMY  2011    tumor resection     EXTRACORPOREAL SHOCK WAVE LITHOTRIPSY, CYSTOSCOPY, INSERT STENT URETER(S), COMBINED Bilateral 2017    Procedure: COMBINED EXTRACORPOREAL SHOCK WAVE LITHOTRIPSY, CYSTOSCOPY, INSERT STENT URETER(S);  CYSTO, URETHRAL DILATION, URETERAL  LEFT STENT PLACEMENT, LEFT ESWL.;  Surgeon: Angel Del Rio MD;  Location:  OR     FOOT SURGERY      mulitple     HYSTERECTOMY, PAP NO LONGER INDICATED  2008    lap hys     LITHOTRIPSY  2010 2017     OPTICAL TRACKING SYSTEM CRANIOTOMY, EXCISE TUMOR WITH MAPPING, COMBINED Right 5/30/2018    Procedure: COMBINED OPTICAL TRACKING SYSTEM CRANIOTOMY, EXCISE TUMOR WITH MAPPING;  Right Stealth Assisted Craniotomy With Motor Mapping And Tumor Resection ;  Surgeon: Dustin Rodriguez MD;  Location: [facility] OR     ORTHOPEDIC SURGERY      feet, multiple on both     OSTEOTOMY FOOT Right 1/15/2019    Procedure: OSTEOTOMY FOOT;  Surgeon: Benjamin Griffin DPM;  Location:  OR     PARATHYROIDECTOMY  2011     RECONSTRUCT FOREFOOT WITH METATARSOPHALANGEAL (MTP) FUSION Right 1/15/2019    Procedure: RIGHT FIRST METATARSAL PHALAGEAL JOINT ARTHRODESIS, RIGHT SECOND METATARSAL WEIL OSTEOTOMY;  Surgeon: Benjamin Griffin DPM;  Location:  OR     Family History   Problem Relation Age of Onset     Arthritis Mother      Depression Mother      Respiratory Mother         COPD     LUNG DISEASE Mother      C.A.D. Father 58        heart attack     Heart Disease Father      Diabetes Brother 70     Depression Sister      Depression Son      Allergies Son      Depression Daughter      Allergies Daughter      Eczema Brother      Depression Sister      Depression Sister      Social History     Social History     Marital Status:      Spouse Name: N/A     Number of Children: N/A     Years of Education: N/A     Social History Main Topics     Smoking status: Never Smoker      Smokeless tobacco: Never Used     Alcohol Use: Yes      Comment: 1-2 per month, only at social events     Drug Use: No     Sexual Activity:     Partners: Female     Birth Control/ Protection: Surgical      Comment: hysterectomy     Other Topics Concern     None     Social History Narrative       Current Outpatient Medications:      acetaminophen (TYLENOL) 500 MG  tablet, Take 2  tablets by mouth every 8 hours for post operative pain., Disp: 60 tablet, Rfl: 1     ascorbic acid 500 MG TABS, Take 500 mg by mouth 2 times daily, Disp: , Rfl:      Calcium Carb-Cholecalciferol (CALCIUM-VITAMIN D3) 600-400 MG-UNIT CAPS, Take 1 tablet by mouth 2 times daily , Disp: , Rfl:      calcium carbonate (TUMS) 500 MG chewable tablet, Take 1 chew tab by mouth daily as needed for heartburn, Disp: , Rfl:      ferrous sulfate (IRON) 325 (65 FE) MG tablet, Take 325 mg by mouth 2 times daily Reported on 5/15/2017, Disp: , Rfl:      fexofenadine (ALLEGRA) 180 MG tablet, Take 180 mg by mouth daily, Disp: , Rfl:      gabapentin (NEURONTIN) 100 MG capsule, 2 pills 1.5 hrs before bedtime for 7 nights, may increase to 3 pills if needed. (Patient taking differently: Take 200 mg by mouth At Bedtime (2 x 100 mg = 200 mg dose)), Disp: 90 capsule, Rfl: 1     ibuprofen (ADVIL/MOTRIN) 200 MG tablet, Take 800 mg by mouth daily as needed , Disp: , Rfl:      Lactase (LACTAID PO), Take 1-2 tablets by mouth daily as needed for indigestion , Disp: , Rfl:      lamoTRIgine (LAMICTAL) 100 MG tablet, Take 1 tablet (100 mg) by mouth 2 times daily, Disp: , Rfl:      lamoTRIgine (LAMICTAL) 200 MG tablet, Take 1 tablet (200 mg) by mouth 2 times daily, Disp: , Rfl:      levETIRAcetam (KEPPRA) 500 MG tablet, Take 1,500 mg by mouth 2 times daily (3 x 500 mg = 1,500 mg dose), Disp: , Rfl:      LORazepam (ATIVAN) 0.5 MG tablet, Take 1 tablet (0.5 mg) by mouth as needed for anxiety, Disp: , Rfl:      metoprolol tartrate (LOPRESSOR) 25 MG tablet, Take 1 tablet (25 mg) by mouth 2 times daily, Disp: 180 tablet, Rfl: 3     Multiple Vitamin (MULTI-VITAMINS) TABS, Take 1 chew tab by mouth 2 times daily , Disp: , Rfl:      Omeprazole Magnesium (PRILOSEC OTC PO), Take 20 mg by mouth as needed , Disp: , Rfl:      ranitidine (ZANTAC) 150 MG tablet, Take 1 tablet (150 mg) by mouth At Bedtime, Disp: , Rfl:      ranitidine (ZANTAC) 75 MG  tablet, Take 75 mg by mouth daily, Disp: , Rfl:      senna (SENOKOT) 8.6 MG tablet, Take 1 tablet by mouth daily as needed for constipation, Disp: , Rfl:      senna-docusate (SENOKOT-S;PERICOLACE) 8.6-50 MG per tablet, Take 2 tablets by mouth 2 times daily, Disp: 100 tablet, Rfl: 1     tretinoin (RETIN-A) 0.05 % cream, Spread a pea size amount into affected area topically at bedtime.  Use sunscreen SPF>20., Disp: 45 g, Rfl: 11     VITAMIN D, CHOLECALCIFEROL, PO, Take 1,000 Units by mouth 2 times daily Reported on 5/15/2017, Disp: , Rfl:      COMPOUNDED NON-CONTROLLED SUBSTANCE (CMPD RX) - PHARMACY TO MIX COMPOUNDED MEDICATION, as needed Apply 1g daily for 2 weeks, then twice weekly to vagina, Disp: , Rfl:   Allergies   Allergen Reactions     Sulfa Drugs Hives     Benoxinate Nausea and Vomiting       Past medical, surgical, social and family history were reviewed and updated in [facility].    OBJECTIVE:     EXAM:  /80 (BP Location: Left arm, Patient Position: Chair, Cuff Size: Adult Regular)   Wt 70 kg (154 lb 4.8 oz)   BMI 28.22 kg/m     Constitutional: healthy, alert and no distress  Head: Normocephalic. No masses, lesions, tenderness or abnormalities  Neck: Neck supple. Trachea midline. No adenopathy. Thyroid symmetric, normal size.   Cardiovascular: RRR.   Respiratory: Negative.   Breast: No nodularity, asymmetry or nipple discharge bilaterally.  Gastrointestinal: Abdomen soft, non-tender, non-distended. No masses, organomegaly  Vulva:  No external lesions, normal female hair distribution, no inguinal adenopathy. Atrophic.  Urethra:  Midline, non-tender, well supported, no discharge  Vagina:  Atrophic, no abnormal discharge, no lesions. Mild rectocele with valsalva  Cervix/Uterus surgically absent  Ovaries:  No masses appreciated, non-tender, mobile  Rectal Exam: deferred  Musculoskeletal: extremities normal  Skin: no suspicious lesions or rashes  Psychiatric: Affect appropriate, cooperative, mentation appears  normal.       ASSESSMENT/PLAN:                                                    Barbi Tiwari is a 56 year old female  with satisfactory annual exam and vaginal atrophy.    PLAN:  1. Vaginal atrophy  - COMPOUND (CMPD RX) - PHARMACY TO MIX COMPOUNDED MEDICATION; Apply 1g daily for 2 weeks, then twice weekly to vagina  Dispense: 30 g; Refill: 11   - recommend silicone based lubricant for intercourse    2. Mammogram: up to date        PE:  Reviewed health maintenance including diet, regular exercise   and periodic exams.    Return to clinic in 1 year for annual exam or sooner for any concerns.      COUNSELING:   Reviewed preventive health counseling, as reflected in patient instructions   reports that  has never smoked. she has never used smokeless tobacco.    FRAX Risk Assessment    Lluvia Vega MD   Obstetrics and Gynecology  2016

## 2019-03-20 NOTE — PATIENT INSTRUCTIONS
Vaginal Atrophy    Vaginal atrophy is a normal part of the aging process and is a reaction to decreased circulating estrogen, but this doesn't mean that there is nothing we can do about it. Symptoms often include vaginal dryness, itching, pain with intercourse, and narrowing of the vagina.     For dryness you can start by trying Replens, or another over the counter vaginal moisturizer.    Try a silicone or oil based lubricant for intercourse to make it more comfortable. If you want to talk with an expert on lubricants try visiting the [facility] on [facility] in [facility] where they have a large variety of lubricants to try.   You can purchase lubricants at www.[employer] or amazon.com if they are hard to find elsewhere.    Read about sexual issues after menopause at www.[employer]    Vaginal estrogen is a medication inserted into the vagina which can decrease dryness, itching, narrowing, and pain with intercourse. It is administered in very low doses and is locally active. The risks associated with vaginal estrogen are far fewer and less significant than those associated with systemic hormone replacement therapy.  You may need to contact your insurance company to determine which vaginal estrogen formulation is covered at the best rate. This is different for each insurance company and for each type of supplemental coverage. Options include the following:    - Vagifem tablet, inserted vaginally daily for 14 days then 2-3x weekly  - Estring, a ring placed inside the vagina that rests at the top of the vagina for 3 months  - Estrace cream, a cream that is applied daily for 14 days then 2x/week inside the vagina. Apply 0.5g.  - Premarin cream, a cream that is applied daily for 14 days then 2x/week inside the vagina. Apply 0.5g      [facility] can be a great resource for finding ways to cope with changing sexual desire and satisfaction.   - Address: [address],  [place] [address]     - Phone: [phone]    You may use the following products to ease constipation:    Start with daily high fiber cereal for breakfast.   - Raisin Bran  - Allbran Bran Buds (may want to mix with yogurt or other cereal, quite bland)    Switch white rice/bread/pasta to whole grain alternatives, including faro, quinoa, brown rice, and whole wheat bread/pasta.    Please be sure to keep adequately hydrated; 6-8 8oz glasses daily, more if needed to compensate for exercise, sweating, etc.

## 2019-03-24 ENCOUNTER — MYC MEDICAL ADVICE (OUTPATIENT)
Dept: PODIATRY | Facility: CLINIC | Age: 59
End: 2019-03-24

## 2019-03-27 ENCOUNTER — OFFICE VISIT (OUTPATIENT)
Dept: PODIATRY | Facility: CLINIC | Age: 59
End: 2019-03-27
Payer: COMMERCIAL

## 2019-03-27 ENCOUNTER — ANCILLARY PROCEDURE (OUTPATIENT)
Dept: GENERAL RADIOLOGY | Facility: CLINIC | Age: 59
End: 2019-03-27
Attending: PODIATRIST
Payer: COMMERCIAL

## 2019-03-27 VITALS
DIASTOLIC BLOOD PRESSURE: 74 MMHG | WEIGHT: 154.3 LBS | HEIGHT: 62 IN | BODY MASS INDEX: 28.39 KG/M2 | SYSTOLIC BLOOD PRESSURE: 102 MMHG

## 2019-03-27 DIAGNOSIS — Z98.890 POST-OPERATIVE STATE: Primary | ICD-10-CM

## 2019-03-27 DIAGNOSIS — Z98.890 POST-OPERATIVE STATE: ICD-10-CM

## 2019-03-27 PROCEDURE — 99024 POSTOP FOLLOW-UP VISIT: CPT | Performed by: PODIATRIST

## 2019-03-27 PROCEDURE — 73630 X-RAY EXAM OF FOOT: CPT | Mod: RT

## 2019-03-27 ASSESSMENT — MIFFLIN-ST. JEOR: SCORE: 1238.15

## 2019-03-27 NOTE — PROGRESS NOTES
"Podiatry / Foot and Ankle Surgery Progress Note    March 27, 2019    Subject: Patient was seen for follow up on 10 weeks s/p right foot bunion and hammertoe repair. Notes no pain. Doing well. Goes back to work tomorrow without restriction. Paperwork has already been faxed over. No concerns today.     Objective:  Vitals: /74   Ht 1.575 m (5' 2\")   Wt 70 kg (154 lb 4.8 oz)   BMI 28.22 kg/m    BMI= Body mass index is 28.22 kg/m .    General:  Patient is alert and orientated.  NAD  Incisions are well healed. Swelling wnl.     Imaging: I personally reviewed the xrays.  Hardware in good position. Bony consolidation across fusion sites. Some midfoot arthritis noted.     Assessment: Post-operative state    Plan:  At this point, patient can progress in shoes. Is going back to work without restriction. Will follow up as needed.     Martina Garsia DPM, Podiatry/Foot and Ankle Surgery    Weight management plan: Patient was referred to their PCP to discuss a diet and exercise plan.    "

## 2019-03-27 NOTE — PATIENT INSTRUCTIONS
Thank you for choosing [facility]!    DR. SALAMANCA'S CLINIC LOCATIONS     MONDAY - [place] WEDNESDAY - [place]   [address] [place] [address] [address] [place] [address] [place] [address]   [phone] / [phone] / [phone]       [facility]SD[facility] [phone]   [address] APPOINTMENTS: [phone]   [place] [address] BILLING QUESTIONS: [phone] / [phone]         DR. [facility]INGTON'S CLINIC SCHEDULE  MONDAY AM - [place] TUESDAY - [place]   [address] [facility] [address] [place] [address] [place] [address]   [phone] / [phone] / [phone]       WEDNESDAY - [place] FRIDAY AM - [facility]   [address]   [place] [address] [place] [address]   [phone] / [phone]       FRIDAY PM - [place] SCHEDULE SURGERY: [phone]   [address] [facility] [address] BILLING QUESTIONS: [phone]   [place] [address] AFTER HOURS: [phone]   [phone] / [phone] APPOINTMENTS: [phone]         BODY WEIGHT AND YOUR FEET  The following information is included in the after visit summary for all patients. Body weight can be a sensitive issue to discuss in clinic, but we think the following information is very important. Although we focus on the feet and ankles, we do support the overall health of our patients.     Many things can cause foot and ankle problems. Foot structure, activity level, foot mechanics and injuries are common causes of pain. One very important issue that often goes unmentioned, is body weight. Extra weight can cause increased stress on muscles, ligaments, bones and tendons. Sometimes just a few extra pounds is all it takes to put one over her/his threshold. Without reducing that stress, it can be difficult to alleviate pain. As Foot & Ankle specialists, our job is addressing the lower  extremity problem and possible causes. Regarding extra body weight, we encourage patients to discuss diet and weight management plans with their primary care doctors. It is this team approach that gives you the best opportunity for pain relief and getting you back on your feet.      [facility] has a Comprehensive Weight Management Program. This program includes counseling, education, non-surgical and surgical approaches to weight loss. If you are interested in learning more either talk to you primary care provider or call [phone].

## 2019-03-27 NOTE — Clinical Note
Neville cruz, foot looked good. Will cancel her appointment on Monday with you as I filled out her paperwork to go back to work without restriction.

## 2019-03-27 NOTE — LETTER
"    3/27/2019         RE: Barbi Tiwari  [address] [facility] [address]        Dear Colleague,    Thank you for referring your patient, Barbi Tiwari, to the [facility]. Please see a copy of my visit note below.    Podiatry / Foot and Ankle Surgery Progress Note    March 27, 2019    Subject: Patient was seen for follow up on 10 weeks s/p right foot bunion and hammertoe repair. Notes no pain. Doing well. Goes back to work tomorrow without restriction. Paperwork has already been faxed over. No concerns today.     Objective:  Vitals: /74   Ht 1.575 m (5' 2\")   Wt 70 kg (154 lb 4.8 oz)   BMI 28.22 kg/m     BMI= Body mass index is 28.22 kg/m .    General:  Patient is alert and orientated.  NAD  Incisions are well healed. Swelling wnl.     Imaging: I personally reviewed the xrays.  Hardware in good position. Bony consolidation across fusion sites. Some midfoot arthritis noted.     Assessment: Post-operative state    Plan:  At this point, patient can progress in shoes. Is going back to work without restriction. Will follow up as needed.     Martina Garsia DPM, Podiatry/Foot and Ankle Surgery    Weight management plan: Patient was referred to their PCP to discuss a diet and exercise plan.      Again, thank you for allowing me to participate in the care of your patient.        Sincerely,        Martina Garsia DPM, Podiatry/Foot and Ankle Surgery    "

## 2019-04-15 ENCOUNTER — ONCOLOGY VISIT (OUTPATIENT)
Dept: ONCOLOGY | Facility: CLINIC | Age: 59
End: 2019-04-15
Attending: PSYCHIATRY & NEUROLOGY
Payer: COMMERCIAL

## 2019-04-15 ENCOUNTER — TUMOR CONFERENCE (OUTPATIENT)
Dept: ONCOLOGY | Facility: CLINIC | Age: 59
End: 2019-04-15

## 2019-04-15 ENCOUNTER — ANCILLARY PROCEDURE (OUTPATIENT)
Dept: MRI IMAGING | Facility: CLINIC | Age: 59
End: 2019-04-15
Attending: PSYCHIATRY & NEUROLOGY
Payer: COMMERCIAL

## 2019-04-15 VITALS
HEIGHT: 62 IN | DIASTOLIC BLOOD PRESSURE: 73 MMHG | RESPIRATION RATE: 18 BRPM | SYSTOLIC BLOOD PRESSURE: 115 MMHG | OXYGEN SATURATION: 97 % | TEMPERATURE: 98.5 F | HEART RATE: 75 BPM | BODY MASS INDEX: 27.97 KG/M2 | WEIGHT: 152 LBS

## 2019-04-15 DIAGNOSIS — R56.9 SEIZURE (H): ICD-10-CM

## 2019-04-15 DIAGNOSIS — C71.9 OLIGODENDROGLIOMA (H): ICD-10-CM

## 2019-04-15 DIAGNOSIS — C71.9 OLIGODENDROGLIOMA, WHO GRADE II (H): Primary | ICD-10-CM

## 2019-04-15 PROCEDURE — 99214 OFFICE O/P EST MOD 30 MIN: CPT | Mod: ZP | Performed by: PSYCHIATRY & NEUROLOGY

## 2019-04-15 PROCEDURE — G0463 HOSPITAL OUTPT CLINIC VISIT: HCPCS | Mod: ZF

## 2019-04-15 RX ORDER — GADOBUTROL 604.72 MG/ML
7.5 INJECTION INTRAVENOUS ONCE
Status: COMPLETED | OUTPATIENT
Start: 2019-04-15 | End: 2019-04-15

## 2019-04-15 RX ORDER — GABAPENTIN 100 MG/1
200 CAPSULE ORAL AT BEDTIME
COMMUNITY
Start: 2019-04-15 | End: 2019-05-20

## 2019-04-15 RX ORDER — ESTRADIOL MICRONIZED 100 %
POWDER (GRAM) MISCELLANEOUS
COMMUNITY
Start: 2019-03-21 | End: 2019-04-15

## 2019-04-15 RX ADMIN — GADOBUTROL 7.5 ML: 604.72 INJECTION INTRAVENOUS at 11:50

## 2019-04-15 ASSESSMENT — PAIN SCALES - GENERAL: PAINLEVEL: NO PAIN (0)

## 2019-04-15 ASSESSMENT — MIFFLIN-ST. JEOR: SCORE: 1227.85

## 2019-04-15 NOTE — NURSING NOTE
"Oncology Rooming Note    April 15, 2019 2:58 PM   Barbi Tiwari is a 58 year old female who presents for:    Chief Complaint   Patient presents with     Oncology Clinic Visit     Return visit related to Oligodendroglioma     Initial Vitals: /73 (BP Location: Left arm, Patient Position: Sitting, Cuff Size: Adult Regular)   Pulse 75   Temp 98.5  F (36.9  C) (Tympanic)   Resp 18   Ht 1.575 m (5' 2.01\")   Wt 68.9 kg (152 lb)   SpO2 97%   BMI 27.79 kg/m   Estimated body mass index is 27.79 kg/m  as calculated from the following:    Height as of this encounter: 1.575 m (5' 2.01\").    Weight as of this encounter: 68.9 kg (152 lb). Body surface area is 1.74 meters squared.  No Pain (0) Comment: Data Unavailable   No LMP recorded. Patient has had a hysterectomy.  Allergies reviewed: Yes  Medications reviewed: Yes    Medications: Medication refills not needed today.  Pharmacy name entered into [facility]:    [facility] - [place], [place] - [address] PHARMACY [facility] - [place], [place] - [address] [facility] [address] - [place], [place] - [address] AT [address] [facility]ARMACY - [place], [place] - [address]    Clinical concerns: No new concerns. Provider was notified.      Greta Sorenson LPN            "

## 2019-04-15 NOTE — DISCHARGE INSTRUCTIONS
MRI Contrast Discharge Instructions    The IV contrast you received today will pass out of your body in your  urine. This will happen in the next 24 hours. You will not feel this process.  Your urine will not change color.    Drink at least 4 extra glasses of water or juice today (unless your doctor  has restricted your fluids). This reduces the stress on your kidneys.  You may take your regular medicines.    If you are on dialysis: It is best to have dialysis today.    If you have a reaction: Most reactions happen right away. If you have  any new symptoms after leaving the hospital (such as hives or swelling),  call your hospital at the correct number below. Or call your family doctor.  If you have breathing distress or wheezing, call 911.    Special instructions: ***    I have read and understand the above information.    Signature:______________________________________ Date:___________    Staff:__________________________________________ Date:___________     Time:__________    [facility] Departments:    ___[place]: [phone]  ___[facility]: [phone]  ___[place]: [phone] ___[facility]: [phone]  ___[facility]: [phone]  ___[facility]: [phone]  ___[phone]  ___[facility]: [phone]  ___[phone]

## 2019-04-15 NOTE — PROGRESS NOTES
NEURO-ONCOLOGY VISIT  04/15/2019    CHIEF COMPLAINT: Ms. Barbi Tiwari is a 58 year old right-handed woman with a right frontal diffuse oligodendroglioma (1p19q co-deleted), diagnosed following resection in 5/2011. She received 12 cycles of temozolomide, completed in 5/2012. Changes on imaging necessitated a repeat resection on 5/30/2018 and pathology was unchanged. No adjuvant cancer-directed therapy inititated. Currently, Barbi is managed on imaging surveillance.     She is presenting in follow-up for contiuned evaluation and recommendations on treatment. She is not accompanied.      HISTORY OF PRESENT ILLNESS  -She denies any new symptoms; no weakness, numbness, or headaches.  -Continued floater noted in visual field, has not been evaluated by ophtho.  -Fatigue remains an ongoing complaint. Thought to be related to her anti-seizure medications; Lamictal and Keppra.   -Increased gabapentin; helped with RLS and sleep initiation. Sleep is still poor. Seeing a doctor specializing in sleep medicine.   -Continued mild word-finding issues, otherwise no change in her cognitive function. 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  -No change in frequency, severity of seizure events.  -Foot pain resolved on the right after surgery. Pain on the left, pending foot surgery.     REVIEW OF SYSTEMS  A comprehensive ROS negative except as in HPI.      MEDICATIONS   Current Outpatient Medications   Medication     acetaminophen (TYLENOL) 500 MG tablet     ascorbic acid 500 MG TABS     Calcium Carb-Cholecalciferol (CALCIUM-VITAMIN D3) 600-400 MG-UNIT CAPS     calcium carbonate (TUMS) 500 MG chewable tablet     COMPOUNDED NON-CONTROLLED SUBSTANCE (CMPD RX) - PHARMACY TO MIX COMPOUNDED MEDICATION     ferrous sulfate (IRON) 325 (65 FE) MG tablet     fexofenadine (ALLEGRA) 180 MG tablet     gabapentin (NEURONTIN) 100 MG capsule     ibuprofen (ADVIL/MOTRIN) 200 MG tablet     Lactase (LACTAID PO)     lamoTRIgine (LAMICTAL) 100  MG tablet     lamoTRIgine (LAMICTAL) 200 MG tablet     levETIRAcetam (KEPPRA) 500 MG tablet     LORazepam (ATIVAN) 0.5 MG tablet     metoprolol tartrate (LOPRESSOR) 25 MG tablet     Multiple Vitamin (MULTI-VITAMINS) TABS     Omeprazole Magnesium (PRILOSEC OTC PO)     ranitidine (ZANTAC) 150 MG tablet     ranitidine (ZANTAC) 75 MG tablet     senna (SENOKOT) 8.6 MG tablet     senna-docusate (SENOKOT-S;PERICOLACE) 8.6-50 MG per tablet     tretinoin (RETIN-A) 0.05 % cream     VITAMIN D, CHOLECALCIFEROL, PO     No current facility-administered medications for this visit.      DRUG ALLERGIES   Allergies   Allergen Reactions     Sulfa Drugs Hives     Benoxinate Nausea and Vomiting       ONCOLOGIC HISTORY  -4/27/2011 PRESENTATION: New onset seizure, generalized event.   -5/2011 MRB with a non-contrast enhancing right frontal mass lesion.   -5/2011 SURGERY: Craniectomy for mass resection at [facility].   PATHOLOGY: Diffuse oligodendroglioma; WHO grade II, 1p/19q co-deleted.  -6/2011-5/2012 CHEMO: Adjuvant dosed temozolomide x 12 cycles.  -4/2/2018 MRB with possible disease recurrence with a new 1.2 cm non-enhancing nodule within the cortex of the right frontal lobe adjacent to the resection cavity.  -4/12/2018 NEURO-ONC: Recommending repeat resection, appointment scheduled with Dr. Dustin Rodriguez, [facility]surgery at the [facility].   -5/30/2018 SURGERY: Repeat resection with Dr. Rodriguez.   PATHOLOGY: Remains low grade, without concerning features.   -6/15/2018 NEURO-ONC: Start imaging surveillance.  -9/17/2018 NEURO-ONC/ MRB: Imaging stable, continue with surveillance.   -12/10/2018 NEURO-ONC/ MRB: Imaging stable, continue with surveillance.   -4/15/2019 NEURO-ONC/ MRB: Imaging stable, continue with surveillance.    SOCIAL HISTORY   Tobacco use: Never smoker.   Alcohol use: Social.   Drug use: Denies marijuana use.  Supplement, complimentary/ alternative medicine: None.   Employment: RN.   , 2 children      PHYSICAL EXAMINATION  BP  "115/73 (BP Location: Left arm, Patient Position: Sitting, Cuff Size: Adult Regular)   Pulse 75   Temp 98.5  F (36.9  C) (Tympanic)   Resp 18   Ht 1.575 m (5' 2.01\")   Wt 68.9 kg (152 lb)   SpO2 97%   BMI 27.79 kg/m     Wt Readings from Last 2 Encounters:   04/15/19 68.9 kg (152 lb)   03/27/19 70 kg (154 lb 4.8 oz)     Ht Readings from Last 2 Encounters:   04/15/19 1.575 m (5' 2.01\")   03/27/19 1.575 m (5' 2\")     KPS: 100    -Generally well appearing.  -Throat: No oral thrush.  -Respiratory: Normal breath sounds, no audible wheezing.   -Skin: No rashes. Healed head incision.  -Hematologic/ lymphatic: No abnormal bruising. No leg swelling.  -Psychiatric: Normal mood and affect. Pleasant, talkative.  -Neurologic:   MENTAL STATUS:     Alert, oriented to date.    Recall: Intact.    Speech fluent. Comprehension intact to multi-step commands.   Normal naming, repetition. Able to read.   Good right-left orientation.     CRANIAL NERVES:     Discs flat on fundoscopy.    Pupils are equal, round, reactive to light.     Extraocular movements full, patient denies diplopia.     Visual fields full.     Facial sensation intact to light touch.   Decreased activation with smiling/ talking on the left side of face, very subtle.    Hearing intact.   Palate moves symmetrically.     Sternocleidomastoid and trapezius strength intact.    Tongue midline.  MOTOR:    Normal and symmetric tone.   Grossly 5/5 throughout.    No pronation or drift. No orbiting.   Able to rise from a chair without use of arms.   On toe/ heel walk, equal distance from floor to heels/ toes.   SENSATION:    Intact to light touch throughout.  COORDINATION:   Intact finger-nose with eyes open and closed.   REFLEXES:    Slightly more brisk on the left arm.    Toes not tested. No clonus. No Hoffmans.   No grasp.    GAIT:  Walks without assistance. Not antalgic.    Good speed. Normal stride length and heel strike. Normal turns. Normal arm swing.   Did not test toe, " heel walk due to foot discomfort. Did not test tandem walk.       MEDICAL RECORDS  Obtained and personally reviewed all available outside medical records in addition to reviewing any records available in our electronic system.     LABS  Personally reviewed all available lab results.     IMAGING  Personally reviewed MR brain imaging from today. To my eye, there is no new contrast enhancement. Stable FLAIR hyperintensity about the cavity since 5/2018.     Imaging was shown to and results were reviewed with Barbi.     Case also discussed at Brain Tumor Conference.       IMPRESSION  For the 30 minute appointment, more than 50% of the encounter was spent discussing in detail the nature of this tumor in light of her repeat imaging from today. This is in addition to providing emotional support, answering questions pertaining to my recommendations, and devising the treatment plan as outlined below.     Imaging with no concerning changes. She remains grossly asymptomatic with no increase in severity or frequency of seizures. With regard to cancer-directed therapy, continue imaging surveillance at this time. Barbi is in agreement with this plan.    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   -Repeat imaging in 4 months.    -SEIZURE MANAGEMENT-  -Continue to follow with currently epileptologist.   -Currently on Keppra plus Lamictal.     -Quality of life/ MOOD/ FATIGUE-  -Endorses mild anxiety.   -Continue to monitor mood as untreated/ undertreated depression can worsen fatigue, dysorexia, and quality of life.  -Issues with sleep, on gabapentin.     -OTHER-  -Floater; encouraged to be evaluated by an ophthalmologist.     Return to clinic in 8/2019 for repeat evaluation and to review new imaging.     In the meantime, Barbi knows to  call with questions or concerns or to report new complaints and can be seen sooner if needed. Urgent evaluation is needed in the setting of acute onset of severe headache, abrupt change in mental status, on-going seizures, new focal deficits, or new leg swelling/ pain.    Myrtle Gallagher MD  Neuro-oncology

## 2019-04-15 NOTE — TUMOR CONFERENCE
Tumor Conference Information  Tumor Conference:    Specialties Present:  Medical Oncology, Radiation Oncology, Radiology, Pathology  Patient Status:  Current patient  Pathology:  Not Discussed  Treatment to Date:  Surgical intervention(s), Adjuvant chemotherapy  Clinical Trial Eligibility:  Not discussed  Recommended Plan:  Observation (see comment) (Comment: no change seen on imaging, continue regular follow up)  Did the review exceed 30 minutes?:  did not           Documentation / Disclaimer Cancer Tumor Board Note  Cancer tumor board recommendations do not override what is determined to be reasonable care and treatment, which is dependent on the circumstances of a patient's case; the patient's medical, social, and personal concerns; and the clinical judgment of the oncologist [physician].

## 2019-04-15 NOTE — LETTER
4/15/2019       RE: Barbi Tiwari  [address] [facility] [id number]     Dear Colleague,    Thank you for referring your patient, Barbi Tiwari, to the [facility]. Please see a copy of my visit note below.    NEURO-ONCOLOGY VISIT  04/15/2019    CHIEF COMPLAINT: Ms. Barbi Tiwari is a 58 year old right-handed woman with a right frontal diffuse oligodendroglioma (1p19q co-deleted), diagnosed following resection in 5/2011. She received 12 cycles of temozolomide, completed in 5/2012. Changes on imaging necessitated a repeat resection on 5/30/2018 and pathology was unchanged. No adjuvant cancer-directed therapy inititated. Currently, Barbi is managed on imaging surveillance.     She is presenting in follow-up for contiuned evaluation and recommendations on treatment. She is not accompanied.      HISTORY OF PRESENT ILLNESS  -She denies any new symptoms; no weakness, numbness, or headaches.  -Continued floater noted in visual field, has not been evaluated by ophtho.  -Fatigue remains an ongoing complaint. Thought to be related to her anti-seizure medications; Lamictal and Keppra.   -Increased gabapentin; helped with RLS and sleep initiation. Sleep is still poor. Seeing a doctor specializing in sleep medicine.   -Continued mild word-finding issues, otherwise no change in her cognitive function. 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  -No change in frequency, severity of seizure events.  -Foot pain resolved on the right after surgery. Pain on the left, pending foot surgery.     REVIEW OF SYSTEMS  A comprehensive ROS negative except as in HPI.      MEDICATIONS   Current Outpatient Medications   Medication     acetaminophen (TYLENOL) 500 MG tablet     ascorbic acid 500 MG TABS     Calcium Carb-Cholecalciferol (CALCIUM-VITAMIN D3) 600-400 MG-UNIT CAPS     calcium carbonate (TUMS) 500 MG chewable tablet     COMPOUNDED NON-CONTROLLED SUBSTANCE (CMPD RX) - PHARMACY TO MIX COMPOUNDED  MEDICATION     ferrous sulfate (IRON) 325 (65 FE) MG tablet     fexofenadine (ALLEGRA) 180 MG tablet     gabapentin (NEURONTIN) 100 MG capsule     ibuprofen (ADVIL/MOTRIN) 200 MG tablet     Lactase (LACTAID PO)     lamoTRIgine (LAMICTAL) 100 MG tablet     lamoTRIgine (LAMICTAL) 200 MG tablet     levETIRAcetam (KEPPRA) 500 MG tablet     LORazepam (ATIVAN) 0.5 MG tablet     metoprolol tartrate (LOPRESSOR) 25 MG tablet     Multiple Vitamin (MULTI-VITAMINS) TABS     Omeprazole Magnesium (PRILOSEC OTC PO)     ranitidine (ZANTAC) 150 MG tablet     ranitidine (ZANTAC) 75 MG tablet     senna (SENOKOT) 8.6 MG tablet     senna-docusate (SENOKOT-S;PERICOLACE) 8.6-50 MG per tablet     tretinoin (RETIN-A) 0.05 % cream     VITAMIN D, CHOLECALCIFEROL, PO     No current facility-administered medications for this visit.      DRUG ALLERGIES   Allergies   Allergen Reactions     Sulfa Drugs Hives     Benoxinate Nausea and Vomiting       ONCOLOGIC HISTORY  -4/27/2011 PRESENTATION: New onset seizure, generalized event.   -5/2011 MRB with a non-contrast enhancing right frontal mass lesion.   -5/2011 SURGERY: Craniectomy for mass resection at [facility].   PATHOLOGY: Diffuse oligodendroglioma; WHO grade II, 1p/19q co-deleted.  -6/2011-5/2012 CHEMO: Adjuvant dosed temozolomide x 12 cycles.  -4/2/2018 MRB with possible disease recurrence with a new 1.2 cm non-enhancing nodule within the cortex of the right frontal lobe adjacent to the resection cavity.  -4/12/2018 NEURO-ONC: Recommending repeat resection, appointment scheduled with Dr. Dustin Rodriguez, neurosurgery at the [facility].   -5/30/2018 SURGERY: Repeat resection with Dr. Rodriguez.   PATHOLOGY: Remains low grade, without concerning features.   -6/15/2018 NEURO-ONC: Start imaging surveillance.  -9/17/2018 NEURO-ONC/ MRB: Imaging stable, continue with surveillance.   -12/10/2018 NEURO-ONC/ MRB: Imaging stable, continue with surveillance.   -4/15/2019 NEURO-ONC/ MRB: Imaging stable, continue with  "surveillance.    SOCIAL HISTORY   Tobacco use: Never smoker.   Alcohol use: Social.   Drug use: Denies marijuana use.  Supplement, complimentary/ alternative medicine: None.   Employment: RN.   , 2 children      PHYSICAL EXAMINATION  /73 (BP Location: Left arm, Patient Position: Sitting, Cuff Size: Adult Regular)   Pulse 75   Temp 98.5  F (36.9  C) (Tympanic)   Resp 18   Ht 1.575 m (5' 2.01\")   Wt 68.9 kg (152 lb)   SpO2 97%   BMI 27.79 kg/m      Wt Readings from Last 2 Encounters:   04/15/19 68.9 kg (152 lb)   03/27/19 70 kg (154 lb 4.8 oz)     Ht Readings from Last 2 Encounters:   04/15/19 1.575 m (5' 2.01\")   03/27/19 1.575 m (5' 2\")     KPS: 100    -Generally well appearing.  -Throat: No oral thrush.  -Respiratory: Normal breath sounds, no audible wheezing.   -Skin: No rashes. Healed head incision.  -Hematologic/ lymphatic: No abnormal bruising. No leg swelling.  -Psychiatric: Normal mood and affect. Pleasant, talkative.  -Neurologic:   MENTAL STATUS:     Alert, oriented to date.    Recall: Intact.    Speech fluent. Comprehension intact to multi-step commands.   Normal naming, repetition. Able to read.   Good right-left orientation.     CRANIAL NERVES:     Discs flat on fundoscopy.    Pupils are equal, round, reactive to light.     Extraocular movements full, patient denies diplopia.     Visual fields full.     Facial sensation intact to light touch.   Decreased activation with smiling/ talking on the left side of face, very subtle.    Hearing intact.   Palate moves symmetrically.     Sternocleidomastoid and trapezius strength intact.    Tongue midline.  MOTOR:    Normal and symmetric tone.   Grossly 5/5 throughout.    No pronation or drift. No orbiting.   Able to rise from a chair without use of arms.   On toe/ heel walk, equal distance from floor to heels/ toes.   SENSATION:    Intact to light touch throughout.  COORDINATION:   Intact finger-nose with eyes open and closed.   REFLEXES: "    Slightly more brisk on the left arm.    Toes not tested. No clonus. No Hoffmans.   No grasp.    GAIT:  Walks without assistance. Not antalgic.    Good speed. Normal stride length and heel strike. Normal turns. Normal arm swing.   Did not test toe, heel walk due to foot discomfort. Did not test tandem walk.       MEDICAL RECORDS  Obtained and personally reviewed all available outside medical records in addition to reviewing any records available in our electronic system.     LABS  Personally reviewed all available lab results.     IMAGING  Personally reviewed MR brain imaging from today. To my eye, there is no new contrast enhancement. Stable FLAIR hyperintensity about the cavity since 5/2018.     Imaging was shown to and results were reviewed with Barbi.     Case also discussed at Brain Tumor Conference.       IMPRESSION  For the 30 minute appointment, more than 50% of the encounter was spent discussing in detail the nature of this tumor in light of her repeat imaging from today. This is in addition to providing emotional support, answering questions pertaining to my recommendations, and devising the treatment plan as outlined below.     Imaging with no concerning changes. She remains grossly asymptomatic with no increase in severity or frequency of seizures. With regard to cancer-directed therapy, continue imaging surveillance at this time. Barbi is in agreement with this plan.    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   -Repeat imaging in 4 months.    -SEIZURE MANAGEMENT-  -Continue to follow with currently epileptologist.   -Currently on Keppra plus Lamictal.     -Quality of life/ MOOD/ FATIGUE-  -Endorses mild anxiety.   -Continue to monitor mood as untreated/ undertreated depression can worsen fatigue,  dysorexia, and quality of life.  -Issues with sleep, on gabapentin.     -OTHER-  -Floater; encouraged to be evaluated by an ophthalmologist.     Return to clinic in 8/2019 for repeat evaluation and to review new imaging.     In the meantime, Barbi knows to call with questions or concerns or to report new complaints and can be seen sooner if needed. Urgent evaluation is needed in the setting of acute onset of severe headache, abrupt change in mental status, on-going seizures, new focal deficits, or new leg swelling/ pain.    Myrtle Gallagher MD  Neuro-oncology

## 2019-04-15 NOTE — PATIENT INSTRUCTIONS
Recommend follow-up with ophthalmology; Dr. Benjamin Alvares (neuro-ophthalmology).      Imaging reviewed, stable. No concerns.   Repeat in 4 months.      Return to clinic in 8/2019.     Myrtle Gallagher MD  Neuro-oncology  4/15/2019

## 2019-04-17 DIAGNOSIS — I49.3 SYMPTOMATIC PREMATURE VENTRICULAR CONTRACTIONS: ICD-10-CM

## 2019-04-17 RX ORDER — METOPROLOL TARTRATE 25 MG/1
25 TABLET, FILM COATED ORAL 2 TIMES DAILY
Qty: 180 TABLET | Refills: 2 | Status: SHIPPED | OUTPATIENT
Start: 2019-04-17 | End: 2020-01-06

## 2019-04-17 RX ORDER — GABAPENTIN 100 MG/1
200 CAPSULE ORAL AT BEDTIME
OUTPATIENT
Start: 2019-04-17

## 2019-04-17 NOTE — TELEPHONE ENCOUNTER
"Requested Prescriptions   Pending Prescriptions Disp Refills     metoprolol tartrate (LOPRESSOR) 25 MG tablet 180 tablet 3     Sig: Take 1 tablet (25 mg) by mouth 2 times daily       Beta-Blockers Protocol Passed - 4/17/2019 10:40 AM        Passed - Blood pressure under 140/90 in past 12 months     BP Readings from Last 3 Encounters:   04/15/19 115/73   03/27/19 102/74   03/20/19 100/80                 Passed - Patient is age 6 or older        Passed - Recent (12 mo) or future (30 days) visit within the authorizing provider's specialty     Patient had office visit in the last 12 months or has a visit in the next 30 days with authorizing provider or within the authorizing provider's specialty.  See \"Patient Info\" tab in inbasket, or \"Choose Columns\" in Meds & Orders section of the refill encounter.              Passed - Medication is active on med list          "

## 2019-04-17 NOTE — TELEPHONE ENCOUNTER
Gabapentin      Last Written Prescription Date:  4/15/2019  Last Fill Quantity: 0,   # refills: 0  Last Office Visit: 1/18/2019  Future Office visit:       Routing refill request to provider for review/approval because:  Drug not on the [facility], [facility] or  Health refill protocol or controlled substance  Historical med.      [place] reviewed:  reviewed- no concerns    [place]  https://[place].thesweetlink.[email or web address] [employer] [place]/    Last Filled:  2/21/2019, #90  1/10/2019, #90      Donya WHITTEN RN, BSN, PHN

## 2019-05-14 ENCOUNTER — DOCUMENTATION ONLY (OUTPATIENT)
Dept: INTERNAL MEDICINE | Facility: CLINIC | Age: 59
End: 2019-05-14

## 2019-05-14 ENCOUNTER — MEDICAL CORRESPONDENCE (OUTPATIENT)
Dept: HEALTH INFORMATION MANAGEMENT | Facility: CLINIC | Age: 59
End: 2019-05-14

## 2019-05-14 ENCOUNTER — TRANSFERRED RECORDS (OUTPATIENT)
Dept: HEALTH INFORMATION MANAGEMENT | Facility: CLINIC | Age: 59
End: 2019-05-14

## 2019-05-15 DIAGNOSIS — G40.119 LOCALIZATION-RELATED SYMPTOMATIC EPILEPSY AND EPILEPTIC SYNDROMES WITH SIMPLE PARTIAL SEIZURES, INTRACTABLE, WITHOUT STATUS EPILEPTICUS (H): Primary | ICD-10-CM

## 2019-05-15 PROCEDURE — 36415 COLL VENOUS BLD VENIPUNCTURE: CPT

## 2019-05-15 PROCEDURE — 99000 SPECIMEN HANDLING OFFICE-LAB: CPT

## 2019-05-15 PROCEDURE — 80175 DRUG SCREEN QUAN LAMOTRIGINE: CPT | Mod: 90

## 2019-05-15 PROCEDURE — 80177 DRUG SCRN QUAN LEVETIRACETAM: CPT | Mod: 90

## 2019-05-16 LAB
LAMOTRIGINE SERPL-MCNC: 10.3 UG/ML (ref 2.5–15)
LEVETIRACETAM SERPL-MCNC: 40 UG/ML (ref 12–46)

## 2019-06-07 ENCOUNTER — MYC REFILL (OUTPATIENT)
Dept: SLEEP MEDICINE | Facility: CLINIC | Age: 59
End: 2019-06-07

## 2019-06-10 RX ORDER — GABAPENTIN 100 MG/1
200 CAPSULE ORAL AT BEDTIME
Qty: 60 CAPSULE | Refills: 0 | Status: SHIPPED | OUTPATIENT
Start: 2019-06-10 | End: 2019-07-09

## 2019-06-10 NOTE — TELEPHONE ENCOUNTER
Pending Prescriptions:                       Disp   Refills    gabapentin (NEURONTIN) 100 MG capsule     60 cap*0            Sig: Take 2 capsules (200 mg) by mouth At Bedtime (2 x           100 mg = 200 mg dose)    Last Written Prescription Date:  5/20/19  Last Fill Quantity: 60,   # refills: 0  Last Office Visit with [facility], P or [facility] prescribing provider: 5/15/18  Future Office visit:   No follow up scheduled at this time.    RELL Moscoso

## 2019-07-08 NOTE — TELEPHONE ENCOUNTER
Pending Prescriptions:                       Disp   Refills    gabapentin (NEURONTIN) 100 MG capsule     60 cap*0            Sig: Take 2 capsules (200 mg) by mouth At Bedtime (2 x           100 mg = 200 mg dose)    Last Written Prescription Date:  6/10/18  Last Fill Quantity: 30,   # refills: 0  Last Office Visit with [facility], [facility] or [facility] prescribing provider: 5/18/18  Future Office visit:   No follow up scheduled at this time      RELL Moscoso

## 2019-07-09 DIAGNOSIS — G25.81 RESTLESS LEGS SYNDROME (RLS): Primary | ICD-10-CM

## 2019-07-09 RX ORDER — GABAPENTIN 100 MG/1
200 CAPSULE ORAL AT BEDTIME
Qty: 60 CAPSULE | Refills: 0 | Status: SHIPPED | OUTPATIENT
Start: 2019-07-09 | End: 2019-08-15

## 2019-07-11 ENCOUNTER — MYC REFILL (OUTPATIENT)
Dept: SLEEP MEDICINE | Facility: CLINIC | Age: 59
End: 2019-07-11

## 2019-07-15 RX ORDER — GABAPENTIN 100 MG/1
200 CAPSULE ORAL AT BEDTIME
Qty: 60 CAPSULE | Refills: 0 | Status: CANCELLED | OUTPATIENT
Start: 2019-07-15

## 2019-07-18 ENCOUNTER — OFFICE VISIT (OUTPATIENT)
Dept: INTERNAL MEDICINE | Facility: CLINIC | Age: 59
End: 2019-07-18
Payer: COMMERCIAL

## 2019-07-18 VITALS
OXYGEN SATURATION: 96 % | TEMPERATURE: 97.8 F | HEART RATE: 68 BPM | DIASTOLIC BLOOD PRESSURE: 62 MMHG | SYSTOLIC BLOOD PRESSURE: 104 MMHG | WEIGHT: 145 LBS | BODY MASS INDEX: 26.51 KG/M2 | RESPIRATION RATE: 16 BRPM

## 2019-07-18 DIAGNOSIS — L72.3 INFECTED SEBACEOUS CYST OF SKIN: Primary | ICD-10-CM

## 2019-07-18 DIAGNOSIS — L08.9 INFECTED SEBACEOUS CYST OF SKIN: Primary | ICD-10-CM

## 2019-07-18 DIAGNOSIS — G25.81 RESTLESS LEGS SYNDROME (RLS): ICD-10-CM

## 2019-07-18 LAB
FERRITIN SERPL-MCNC: 224 NG/ML (ref 8–252)
IRON SATN MFR SERPL: 34 % (ref 15–46)
IRON SERPL-MCNC: 99 UG/DL (ref 35–180)
TIBC SERPL-MCNC: 295 UG/DL (ref 240–430)

## 2019-07-18 PROCEDURE — 83550 IRON BINDING TEST: CPT | Performed by: INTERNAL MEDICINE

## 2019-07-18 PROCEDURE — 10060 I&D ABSCESS SIMPLE/SINGLE: CPT | Performed by: INTERNAL MEDICINE

## 2019-07-18 PROCEDURE — 36415 COLL VENOUS BLD VENIPUNCTURE: CPT | Performed by: INTERNAL MEDICINE

## 2019-07-18 PROCEDURE — 82728 ASSAY OF FERRITIN: CPT | Performed by: INTERNAL MEDICINE

## 2019-07-18 PROCEDURE — 83540 ASSAY OF IRON: CPT | Performed by: INTERNAL MEDICINE

## 2019-07-18 ASSESSMENT — ANXIETY QUESTIONNAIRES
3. WORRYING TOO MUCH ABOUT DIFFERENT THINGS: NOT AT ALL
1. FEELING NERVOUS, ANXIOUS, OR ON EDGE: NOT AT ALL
GAD7 TOTAL SCORE: 0
5. BEING SO RESTLESS THAT IT IS HARD TO SIT STILL: NOT AT ALL
2. NOT BEING ABLE TO STOP OR CONTROL WORRYING: NOT AT ALL
6. BECOMING EASILY ANNOYED OR IRRITABLE: NOT AT ALL
7. FEELING AFRAID AS IF SOMETHING AWFUL MIGHT HAPPEN: NOT AT ALL

## 2019-07-18 ASSESSMENT — PATIENT HEALTH QUESTIONNAIRE - PHQ9
5. POOR APPETITE OR OVEREATING: NOT AT ALL
SUM OF ALL RESPONSES TO PHQ QUESTIONS 1-9: 3

## 2019-07-18 NOTE — PROGRESS NOTES
"Subjective     Barbi Tiwari is a 58 year old female who presents to clinic today for the following health issues:    HPI   Lump on left breast      Duration: 24 hours    Description (location/character/radiation): left breast    Intensity:  Moderate to touch    Accompanying signs and symptoms: red pimple looking    History (similar episodes/previous evaluation): had one on right side 1/2019    Precipitating or alleviating factors: None    Therapies tried and outcome: None               Reviewed and updated as needed this visit by Provider         Review of Systems         Objective    /62   Pulse 68   Temp 97.8  F (36.6  C) (Oral)   Resp 16   Wt 65.8 kg (145 lb)   SpO2 96%   BMI 26.51 kg/m    Body mass index is 26.51 kg/m .  Physical Exam   GENERAL APPEARANCE: healthy, alert and no distress  BREAST: Inferior aspect of the left breast is a erythematous fluctuant subcutaneous mass consistent with infected sebaceous cyst.    PROCEDURE: Local anesthesia achieved with the use of lidocaine 2% with epi without complications, using an 11 blade, an incision was placed. The abcess was then drained and packed with idioform dressing and dressed/covered. Pt tolerated procedure well without complications. EBL was minimal.         Assessment & Plan     1. Infected sebaceous cyst of skin  See above procedure  If sx worsen in next several days despite I&D would recommend coverage for staph with clinda  - DRAIN SKIN ABSCESS SIMPLE/SINGLE     BMI:   Estimated body mass index is 26.51 kg/m  as calculated from the following:    Height as of 4/15/19: 1.575 m (5' 2.01\").    Weight as of this encounter: 65.8 kg (145 lb).           See Patient Instructions    No follow-ups on file.    Dustin Choudhury MD  [facility]      "

## 2019-07-18 NOTE — PATIENT INSTRUCTIONS
Patient Education     Abscess (Incision & Drainage)  An abscess is sometimes called a boil. It happens when bacteria get trapped under the skin and start to grow. Pus forms inside the abscess as the body responds to the bacteria. An abscess can happen with an insect bite, ingrown hair, blocked oil gland, pimple, cyst, or puncture wound.  Your healthcare provider has drained the pus from your abscess. If the abscess pocket was large, your healthcare provider may have put in gauze packing. Your provider will need to remove it on your next visit. He or she may also replace it at that time. You may not need antibiotics to treat a simple abscess, unless the infection is spreading into the skin around the wound (cellulitis).  The wound will take about 1 to 2 weeks to heal, depending on the size of the abscess. Healthy tissue will grow from the bottom and sides of the opening until it seals over.  Home care  These tips can help your wound heal:    The wound may drain for the first 2 days. Cover the wound with a clean dry dressing. Change the dressing if it becomes soaked with blood or pus.    If a gauze packing was placed inside the abscess pocket, you may be told to remove it yourself. You may do this in the shower. Once the packing is removed, you should wash the area in the shower, or clean the area as directed by your provider. Continue to do this until the skin opening has closed. Make sure you wash your hands after changing the packing or cleaning the wound.    If you were prescribed antibiotics, take them as directed until they are all gone.    You may use acetaminophen or ibuprofen to control pain, unless another pain medicine was prescribed. If you have liver disease or ever had a stomach ulcer, talk with your doctor before using these medicines.  Follow-up care  Follow up with your healthcare provider, or as advised. If a gauze packing was put in your wound, it should be removed in 1 to 2 days. Check your wound  every day for any signs that the infection is getting worse. The signs are listed below.  When to seek medical advice  Call your healthcare provider right away if any of these occur:    Increasing redness or swelling    Red streaks in the skin leading away from the wound    Increasing local pain or swelling    Continued pus draining from the wound 2 days after treatment    Fever of 100.4 F (38 C) or higher, or as directed by your healthcare provider    Boil returns when you are at home  Date Last Reviewed: 9/1/2016 2000-2018 [employer]. [address] [place], [place] [address]. All rights reserved. This information is not intended as a substitute for professional medical care. Always follow your healthcare professional's instructions.

## 2019-07-19 ASSESSMENT — ANXIETY QUESTIONNAIRES: GAD7 TOTAL SCORE: 0

## 2019-08-19 ENCOUNTER — TUMOR CONFERENCE (OUTPATIENT)
Dept: ONCOLOGY | Facility: CLINIC | Age: 59
End: 2019-08-19

## 2019-08-19 ENCOUNTER — ANCILLARY PROCEDURE (OUTPATIENT)
Dept: MRI IMAGING | Facility: CLINIC | Age: 59
End: 2019-08-19
Attending: PSYCHIATRY & NEUROLOGY
Payer: COMMERCIAL

## 2019-08-19 ENCOUNTER — ONCOLOGY VISIT (OUTPATIENT)
Dept: ONCOLOGY | Facility: CLINIC | Age: 59
End: 2019-08-19
Attending: PSYCHIATRY & NEUROLOGY
Payer: COMMERCIAL

## 2019-08-19 VITALS
DIASTOLIC BLOOD PRESSURE: 75 MMHG | TEMPERATURE: 98.4 F | OXYGEN SATURATION: 96 % | HEIGHT: 62 IN | BODY MASS INDEX: 26.87 KG/M2 | RESPIRATION RATE: 14 BRPM | HEART RATE: 71 BPM | SYSTOLIC BLOOD PRESSURE: 116 MMHG | WEIGHT: 146 LBS

## 2019-08-19 DIAGNOSIS — C71.9 OLIGODENDROGLIOMA, WHO GRADE II (H): Primary | ICD-10-CM

## 2019-08-19 DIAGNOSIS — C71.9 OLIGODENDROGLIOMA, WHO GRADE II (H): ICD-10-CM

## 2019-08-19 DIAGNOSIS — G40.909 SEIZURE DISORDER (H): ICD-10-CM

## 2019-08-19 PROCEDURE — G0463 HOSPITAL OUTPT CLINIC VISIT: HCPCS | Mod: ZF

## 2019-08-19 PROCEDURE — 99214 OFFICE O/P EST MOD 30 MIN: CPT | Mod: ZP | Performed by: PSYCHIATRY & NEUROLOGY

## 2019-08-19 RX ORDER — GADOBUTROL 604.72 MG/ML
7.5 INJECTION INTRAVENOUS ONCE
Status: COMPLETED | OUTPATIENT
Start: 2019-08-19 | End: 2019-08-19

## 2019-08-19 RX ADMIN — GADOBUTROL 7 ML: 604.72 INJECTION INTRAVENOUS at 11:16

## 2019-08-19 ASSESSMENT — PAIN SCALES - GENERAL: PAINLEVEL: NO PAIN (0)

## 2019-08-19 ASSESSMENT — MIFFLIN-ST. JEOR: SCORE: 1200.63

## 2019-08-19 NOTE — NURSING NOTE
"Oncology Rooming Note    August 19, 2019 2:41 PM   Barbi Tiwari is a 58 year old female who presents for:    Chief Complaint   Patient presents with     Oncology Clinic Visit     Return  Visit for Oligodendroglioma     Initial Vitals: /75 (BP Location: Right arm, Patient Position: Chair, Cuff Size: Adult Regular)   Pulse 71   Temp 98.4  F (36.9  C) (Oral)   Resp 14   Ht 1.575 m (5' 2.01\")   Wt 66.2 kg (146 lb)   SpO2 96%   BMI 26.70 kg/m   Estimated body mass index is 26.7 kg/m  as calculated from the following:    Height as of this encounter: 1.575 m (5' 2.01\").    Weight as of this encounter: 66.2 kg (146 lb). Body surface area is 1.7 meters squared.  No Pain (0) Comment: Data Unavailable   No LMP recorded. Patient has had a hysterectomy.  Allergies reviewed: Yes  Medications reviewed: No    Medications: Medication refills not needed today.  Pharmacy name entered into [facility]:    [facility] - [place], [place] - [address] [facility] - [place], [place] - [address] [id number] - [place], [place] - [address] [facility] - [place], [place] - [address]    Clinical concerns: Patient's epilepsy provider is leaving & she needs a new provider.  Patient would like Dr. Gallagher to assume this role.  If this is possible, she needs a new accurate Rx for lamictal as she currently gets too many tablets & they are stacking up at home.    Elliott was notified.      Cristy Jordan, RN, MSN              "

## 2019-08-19 NOTE — PROGRESS NOTES
NEURO-ONCOLOGY VISIT  08/19/2019    CHIEF COMPLAINT: Ms. Barbi Tiwari is a 58 year old right-handed woman with a right frontal diffuse oligodendroglioma (1p19q co-deleted), diagnosed following resection in 5/2011. She received 12 cycles of temozolomide, completed in 5/2012. Changes on imaging necessitated a repeat resection on 5/30/2018 and pathology was unchanged. No adjuvant cancer-directed therapy initiated at that time and managed conservatively on imaging surveillance.     She is presenting in follow-up for contiuned evaluation and recommendations on treatment. She is not accompanied.      HISTORY OF PRESENT ILLNESS  -She denies any new symptoms; no weakness, numbness, or headaches. However, there is a change in seizure frequency.   -Seizures;   On the left face; tightening of the cheek + closing of the eye and jerking of those muscles, difficulty swallowing + pause in breathing; lasting for 10-30 seconds, monthly.    Increase in frequency of RIGHT corner of mouth tensing and jerking; Lasting for 10-30 seconds, but now monthly (was preciously q 2-3 months). Looking for a new epileptologist.   -Continued floater noted in visual field, now following with ophtho.  -Fatigue remains an ongoing complaint. Thought to be related to her anti-seizure medications; Lamictal and Keppra. .   -Continued mild word-finding issues, otherwise no change in her cognitive function. 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  -Foot pain resolved on the right. Pain on the left, pending foot surgery.     REVIEW OF SYSTEMS  A comprehensive ROS negative except as in HPI.      MEDICATIONS   Current Outpatient Medications   Medication     acetaminophen (TYLENOL) 500 MG tablet     ascorbic acid 500 MG TABS     Calcium Carb-Cholecalciferol (CALCIUM-VITAMIN D3) 600-400 MG-UNIT CAPS     calcium carbonate (TUMS) 500 MG chewable tablet     COMPOUNDED NON-CONTROLLED SUBSTANCE (CMPD RX) - PHARMACY TO MIX COMPOUNDED MEDICATION      fexofenadine (ALLEGRA) 180 MG tablet     gabapentin (NEURONTIN) 100 MG capsule     ibuprofen (ADVIL/MOTRIN) 200 MG tablet     Lactase (LACTAID PO)     lamoTRIgine (LAMICTAL) 100 MG tablet     lamoTRIgine (LAMICTAL) 200 MG tablet     levETIRAcetam (KEPPRA) 500 MG tablet     LORazepam (ATIVAN) 0.5 MG tablet     metoprolol tartrate (LOPRESSOR) 25 MG tablet     Multiple Vitamin (MULTI-VITAMINS) TABS     ranitidine (ZANTAC) 150 MG tablet     ranitidine (ZANTAC) 75 MG tablet     senna (SENOKOT) 8.6 MG tablet     ferrous sulfate (IRON) 325 (65 FE) MG tablet     Omeprazole Magnesium (PRILOSEC OTC PO)     VITAMIN D, CHOLECALCIFEROL, PO     No current facility-administered medications for this visit.      DRUG ALLERGIES   Allergies   Allergen Reactions     Sulfa Drugs Hives     Benoxinate Nausea and Vomiting       ONCOLOGIC HISTORY  -4/27/2011 PRESENTATION: New onset seizure, generalized event.   -5/2011 MRB with a non-contrast enhancing right frontal mass lesion.   -5/2011 SURGERY: Craniectomy for mass resection at [facility].   PATHOLOGY: Diffuse oligodendroglioma; WHO grade II, 1p/19q co-deleted.  -6/2011-5/2012 CHEMO: Adjuvant dosed temozolomide x 12 cycles.  -4/2/2018 MRB with possible disease recurrence with a new 1.2 cm non-enhancing nodule within the cortex of the right frontal lobe adjacent to the resection cavity.  -4/12/2018 NEURO-ONC: Recommending repeat resection, appointment scheduled with Dr. Dustin Rodriguez, neurosurgery at the [facility].   -5/30/2018 SURGERY: Repeat resection with Dr. Rodriguez.   PATHOLOGY: Remains low grade, without concerning features.   -6/15/2018 NEURO-ONC: Start imaging surveillance.  -9/17/2018 NEURO-ONC/ MRB: Imaging stable, continue with surveillance.   -12/10/2018 NEURO-ONC/ MRB: Imaging stable, continue with surveillance.   -4/15/2019 NEURO-ONC/ MRB: Imaging stable, continue with surveillance.  -8/19/2019 NEURO-ONC/ MRB: Clinically stable. Imaging largely stable, subtle increases on T2 FLAIR;  "continue with surveillance.    SOCIAL HISTORY   Tobacco use: Never smoker.   Alcohol use: Social.   Drug use: Denies marijuana use.  Supplement, complimentary/ alternative medicine: None.   Employment: RN.   , 2 children      PHYSICAL EXAMINATION  /75 (BP Location: Right arm, Patient Position: Chair, Cuff Size: Adult Regular)   Pulse 71   Temp 98.4  F (36.9  C) (Oral)   Resp 14   Ht 1.575 m (5' 2.01\")   Wt 66.2 kg (146 lb)   SpO2 96%   BMI 26.70 kg/m     Wt Readings from Last 2 Encounters:   08/19/19 66.2 kg (146 lb)   07/18/19 65.8 kg (145 lb)     Ht Readings from Last 2 Encounters:   08/19/19 1.575 m (5' 2.01\")   04/15/19 1.575 m (5' 2.01\")     KPS: 100    -Generally well appearing.  -Throat: No oral thrush.  -Respiratory: Normal breath sounds, no audible wheezing.   -Skin: No rashes. Healed head incision.  -Hematologic/ lymphatic: No abnormal bruising. No leg swelling.  -Psychiatric: Normal mood and affect. Pleasant, talkative.  -Neurologic:   MENTAL STATUS:     Alert, oriented to date.    Recall: Intact.    Speech fluent. Comprehension intact to multi-step commands.   Normal naming, repetition. Able to read.   Good right-left orientation.     CRANIAL NERVES:     Discs flat on fundoscopy.    Pupils are equal, round, reactive to light.     Extraocular movements full, patient denies diplopia.     Visual fields full.     Facial sensation intact to light touch.   Really equal activation with smiling/ talking on the left side of face, very subtle.    Hearing intact.   Palate moves symmetrically.     Sternocleidomastoid and trapezius strength intact.    Tongue midline.  MOTOR:    Normal and symmetric tone.   Grossly 5/5 throughout.    No pronation or drift. No orbiting.   Able to rise from a chair without use of arms.   On toe/ heel walk, equal distance from floor to heels/ toes.   SENSATION:    Intact to light touch throughout.  COORDINATION:   Intact finger-nose with eyes open and closed. "   REFLEXES:    Slightly more brisk on the left arm.    Toes not tested. No clonus. No Hoffmans.   No grasp.    GAIT:  Walks without assistance. Antalgic with left foot pain, limping.    Good speed. Normal stride length and heel strike. Normal turns. Normal arm swing.   Did not test toe, heel walk due to foot discomfort. Did not test tandem walk.       MEDICAL RECORDS  Obtained and personally reviewed all available outside medical records in addition to reviewing any records available in our electronic system.     LABS  Personally reviewed all available lab results.     IMAGING  Personally reviewed MR brain imaging from today. To my eye, there is no new contrast enhancement. Overall stable FLAIR hyperintensity about the cavity since 5/2018, but possibly a slight increase in FLAIR in the far lateral aspect of the frontal lobe inferior to the cavity.     Imaging was shown to and results were reviewed with Barbi.     Case also discussed at Brain Tumor Conference.       IMPRESSION  Clinic time was spent discussing in detail the nature of this tumor in light of her repeat imaging from today. This is in addition to providing emotional support, answering questions pertaining to my recommendations, and devising the treatment plan as outlined below.     Imaging largely stable, though very subtle changes noted over the last year. She remains grossly asymptomatic, however, there is questionably an increase in frequency of seizures over the past 4 months. Barbi is looking for a new epileptologist. With regard to cancer-directed therapy, continue imaging surveillance at this time. Barbi is in agreement with this plan.    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   -Repeat imaging in 4 months.    -SEIZURE MANAGEMENT-  -Referral to   Flakita Clark; Neurology, epilepsy specialist at the [facility].   -Barbi questions an increase in seizure frequency over the past few months.   -Currently on Keppra plus Lamictal.     -Quality of life/ MOOD/ FATIGUE-  -Endorses mild anxiety.   -Continue to monitor mood as untreated/ undertreated depression can worsen fatigue, dysorexia, and quality of life.  -Issues with sleep, on gabapentin.     -OTHER-  -Floater; evaluated by an ophthalmologist. Nothing to do.     Return to clinic in 12/2019 for repeat evaluation and to review new imaging.     In the meantime, Barbi knows to call with questions or concerns or to report new complaints and can be seen sooner if needed. Urgent evaluation is needed in the setting of acute onset of severe headache, abrupt change in mental status, on-going seizures, new focal deficits, or new leg swelling/ pain.    Myrtle Gallagher MD  Neuro-oncology

## 2019-08-19 NOTE — LETTER
8/19/2019       RE: Barbi Tiwari  [address] [facility] [address]     Dear Colleague,    Thank you for referring your patient, Barbi Tiwari, to the [facility]. Please see a copy of my visit note below.    NEURO-ONCOLOGY VISIT  08/19/2019    CHIEF COMPLAINT: Ms. Barbi Tiwari is a 58 year old right-handed woman with a right frontal diffuse oligodendroglioma (1p19q co-deleted), diagnosed following resection in 5/2011. She received 12 cycles of temozolomide, completed in 5/2012. Changes on imaging necessitated a repeat resection on 5/30/2018 and pathology was unchanged. No adjuvant cancer-directed therapy initiated at that time and managed conservatively on imaging surveillance.     She is presenting in follow-up for contiuned evaluation and recommendations on treatment. She is not accompanied.      HISTORY OF PRESENT ILLNESS  -She denies any new symptoms; no weakness, numbness, or headaches. However, there is a change in seizure frequency.   -Seizures;   On the left face; tightening of the cheek + closing of the eye and jerking of those muscles, difficulty swallowing + pause in breathing; lasting for 10-30 seconds, monthly.    Increase in frequency of RIGHT corner of mouth tensing and jerking; Lasting for 10-30 seconds, but now monthly (was preciously q 2-3 months). Looking for a new epileptologist.   -Continued floater noted in visual field, now following with ophtho.  -Fatigue remains an ongoing complaint. Thought to be related to her anti-seizure medications; Lamictal and Keppra. .   -Continued mild word-finding issues, otherwise no change in her cognitive function. 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  -Foot pain resolved on the right. Pain on the left, pending foot surgery.     REVIEW OF SYSTEMS  A comprehensive ROS negative except as in HPI.      MEDICATIONS   Current Outpatient Medications   Medication     acetaminophen (TYLENOL) 500 MG tablet     ascorbic  acid 500 MG TABS     Calcium Carb-Cholecalciferol (CALCIUM-VITAMIN D3) 600-400 MG-UNIT CAPS     calcium carbonate (TUMS) 500 MG chewable tablet     COMPOUNDED NON-CONTROLLED SUBSTANCE (CMPD RX) - PHARMACY TO MIX COMPOUNDED MEDICATION     fexofenadine (ALLEGRA) 180 MG tablet     gabapentin (NEURONTIN) 100 MG capsule     ibuprofen (ADVIL/MOTRIN) 200 MG tablet     Lactase (LACTAID PO)     lamoTRIgine (LAMICTAL) 100 MG tablet     lamoTRIgine (LAMICTAL) 200 MG tablet     levETIRAcetam (KEPPRA) 500 MG tablet     LORazepam (ATIVAN) 0.5 MG tablet     metoprolol tartrate (LOPRESSOR) 25 MG tablet     Multiple Vitamin (MULTI-VITAMINS) TABS     ranitidine (ZANTAC) 150 MG tablet     ranitidine (ZANTAC) 75 MG tablet     senna (SENOKOT) 8.6 MG tablet     ferrous sulfate (IRON) 325 (65 FE) MG tablet     Omeprazole Magnesium (PRILOSEC OTC PO)     VITAMIN D, CHOLECALCIFEROL, PO     No current facility-administered medications for this visit.      DRUG ALLERGIES   Allergies   Allergen Reactions     Sulfa Drugs Hives     Benoxinate Nausea and Vomiting       ONCOLOGIC HISTORY  -4/27/2011 PRESENTATION: New onset seizure, generalized event.   -5/2011 MRB with a non-contrast enhancing right frontal mass lesion.   -5/2011 SURGERY: Craniectomy for mass resection at [facility].   PATHOLOGY: Diffuse oligodendroglioma; WHO grade II, 1p/19q co-deleted.  -6/2011-5/2012 CHEMO: Adjuvant dosed temozolomide x 12 cycles.  -4/2/2018 MRB with possible disease recurrence with a new 1.2 cm non-enhancing nodule within the cortex of the right frontal lobe adjacent to the resection cavity.  -4/12/2018 NEURO-ONC: Recommending repeat resection, appointment scheduled with Dr. Dustin Rodriguez, neurosurgery at the [facility].   -5/30/2018 SURGERY: Repeat resection with Dr. Rodriguez.   PATHOLOGY: Remains low grade, without concerning features.   -6/15/2018 NEURO-ONC: Start imaging surveillance.  -9/17/2018 NEURO-ONC/ MRB: Imaging stable, continue with surveillance.   -12/10/2018  "NEURO-ONC/ MRB: Imaging stable, continue with surveillance.   -4/15/2019 NEURO-ONC/ MRB: Imaging stable, continue with surveillance.  -8/19/2019 NEURO-ONC/ MRB: Clinically stable. Imaging largely stable, subtle increases on T2 FLAIR; continue with surveillance.    SOCIAL HISTORY   Tobacco use: Never smoker.   Alcohol use: Social.   Drug use: Denies marijuana use.  Supplement, complimentary/ alternative medicine: None.   Employment: RN.   , 2 children      PHYSICAL EXAMINATION  /75 (BP Location: Right arm, Patient Position: Chair, Cuff Size: Adult Regular)   Pulse 71   Temp 98.4  F (36.9  C) (Oral)   Resp 14   Ht 1.575 m (5' 2.01\")   Wt 66.2 kg (146 lb)   SpO2 96%   BMI 26.70 kg/m      Wt Readings from Last 2 Encounters:   08/19/19 66.2 kg (146 lb)   07/18/19 65.8 kg (145 lb)     Ht Readings from Last 2 Encounters:   08/19/19 1.575 m (5' 2.01\")   04/15/19 1.575 m (5' 2.01\")     KPS: 100    -Generally well appearing.  -Throat: No oral thrush.  -Respiratory: Normal breath sounds, no audible wheezing.   -Skin: No rashes. Healed head incision.  -Hematologic/ lymphatic: No abnormal bruising. No leg swelling.  -Psychiatric: Normal mood and affect. Pleasant, talkative.  -Neurologic:   MENTAL STATUS:     Alert, oriented to date.    Recall: Intact.    Speech fluent. Comprehension intact to multi-step commands.   Normal naming, repetition. Able to read.   Good right-left orientation.     CRANIAL NERVES:     Discs flat on fundoscopy.    Pupils are equal, round, reactive to light.     Extraocular movements full, patient denies diplopia.     Visual fields full.     Facial sensation intact to light touch.   Really equal activation with smiling/ talking on the left side of face, very subtle.    Hearing intact.   Palate moves symmetrically.     Sternocleidomastoid and trapezius strength intact.    Tongue midline.  MOTOR:    Normal and symmetric tone.   Grossly 5/5 throughout.    No pronation or drift. No " orbiting.   Able to rise from a chair without use of arms.   On toe/ heel walk, equal distance from floor to heels/ toes.   SENSATION:    Intact to light touch throughout.  COORDINATION:   Intact finger-nose with eyes open and closed.   REFLEXES:    Slightly more brisk on the left arm.    Toes not tested. No clonus. No Hoffmans.   No grasp.    GAIT:  Walks without assistance. Antalgic with left foot pain, limping.    Good speed. Normal stride length and heel strike. Normal turns. Normal arm swing.   Did not test toe, heel walk due to foot discomfort. Did not test tandem walk.       MEDICAL RECORDS  Obtained and personally reviewed all available outside medical records in addition to reviewing any records available in our electronic system.     LABS  Personally reviewed all available lab results.     IMAGING  Personally reviewed MR brain imaging from today. To my eye, there is no new contrast enhancement. Overall stable FLAIR hyperintensity about the cavity since 5/2018, but possibly a slight increase in FLAIR in the far lateral aspect of the frontal lobe inferior to the cavity.     Imaging was shown to and results were reviewed with Barbi.     Case also discussed at Brain Tumor Conference.       IMPRESSION  Clinic time was spent discussing in detail the nature of this tumor in light of her repeat imaging from today. This is in addition to providing emotional support, answering questions pertaining to my recommendations, and devising the treatment plan as outlined below.     Imaging largely stable, though very subtle changes noted over the last year. She remains grossly asymptomatic, however, there is questionably an increase in frequency of seizures over the past 4 months. Barbi is looking for a new epileptologist. With regard to cancer-directed therapy, continue imaging surveillance at this time. Barbi is in agreement with this plan.    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   -Repeat imaging in 4 months.    -SEIZURE MANAGEMENT-  -Referral to Dr. Flakita Clark; Neurology, epilepsy specialist at the [facility].   -Barbi questions an increase in seizure frequency over the past few months.   -Currently on Keppra plus Lamictal.     -Quality of life/ MOOD/ FATIGUE-  -Endorses mild anxiety.   -Continue to monitor mood as untreated/ undertreated depression can worsen fatigue, dysorexia, and quality of life.  -Issues with sleep, on gabapentin.     -OTHER-  -Floater; evaluated by an ophthalmologist. Nothing to do.     Return to clinic in 12/2019 for repeat evaluation and to review new imaging.     In the meantime, Barbi knows to call with questions or concerns or to report new complaints and can be seen sooner if needed. Urgent evaluation is needed in the setting of acute onset of severe headache, abrupt change in mental status, on-going seizures, new focal deficits, or new leg swelling/ pain.    Myrtle Gallagher MD  Neuro-oncology

## 2019-08-19 NOTE — TUMOR CONFERENCE
Tumor Conference Information  Tumor Conference:    Specialties Present:  Surgery, Medical Oncology, Radiation Oncology, Radiology, Pathology  Patient Status:  Current patient  Pathology:  Not Discussed  Treatment to Date:  Surgical intervention(s), Adjuvant chemotherapy  Clinical Trial Eligibility:  Not discussed  Recommended Plan:  Observation (see comment) (Comment: stable on recent imaging - continue with regular follow up. )  Did the review exceed 30 minutes?:  did not           Documentation / Disclaimer Cancer Tumor Board Note  Cancer tumor board recommendations do not override what is determined to be reasonable care and treatment, which is dependent on the circumstances of a patient's case; the patient's medical, social, and personal concerns; and the clinical judgment of the oncologist [physician].

## 2019-08-19 NOTE — PATIENT INSTRUCTIONS
Imaging reviewed, stable. No concerns.   Repeat in 4 months.     Referral to Dr. Flakita Clark; Neurology, epilepsy specialist at the [facility].      Return to clinic in 12/2019.     Myrtle Gallagher MD  Neuro-oncology  8/19/2019

## 2019-08-23 ENCOUNTER — OFFICE VISIT (OUTPATIENT)
Dept: INTERNAL MEDICINE | Facility: CLINIC | Age: 59
End: 2019-08-23
Payer: COMMERCIAL

## 2019-08-23 VITALS
DIASTOLIC BLOOD PRESSURE: 64 MMHG | SYSTOLIC BLOOD PRESSURE: 112 MMHG | HEART RATE: 62 BPM | TEMPERATURE: 97.5 F | BODY MASS INDEX: 26.51 KG/M2 | OXYGEN SATURATION: 94 % | RESPIRATION RATE: 14 BRPM | WEIGHT: 145 LBS

## 2019-08-23 DIAGNOSIS — M25.562 ACUTE PAIN OF LEFT KNEE: Primary | ICD-10-CM

## 2019-08-23 PROCEDURE — 99213 OFFICE O/P EST LOW 20 MIN: CPT | Performed by: INTERNAL MEDICINE

## 2019-08-23 NOTE — PROGRESS NOTES
"Subjective     Barbi Tiwari is a 58 year old female who presents to clinic today for the following health issues:    HPI     Joint Pain    Onset: couple weeks ago    Description:   Location: left knee  Character: Sharp and Dull ache    Intensity: 7/10    Progression of Symptoms: worse    Accompanying Signs & Symptoms:  Other symptoms: none    History:   Previous similar pain: YES- last winter did PT for knee      Precipitating factors:   Trauma or overuse: no     Alleviating factors:  Improved by: Percocet, Ibuprofen and tylenol    Therapies Tried and outcome: see above                   Reviewed and updated as needed this visit by Provider         Review of Systems   ROS COMP: Constitutional, HEENT, cardiovascular, pulmonary, gi and gu systems are negative, except as otherwise noted.      Objective    /64   Pulse 62   Temp 97.5  F (36.4  C) (Temporal)   Resp 14   Wt 65.8 kg (145 lb)   SpO2 94%   Breastfeeding? No   BMI 26.51 kg/m    Body mass index is 26.51 kg/m .  Physical Exam   GENERAL APPEARANCE: alert and no distress  MS: extremities normal- no gross deformities noted. L knee w/o effusion. ROM normal. Negative mcmurrays    Diagnostic Test Results:  none         Assessment & Plan     1. Acute pain of left knee  Negative exam. History/sx could be consistent with meniscus injry  - ORTHO  REFERRAL  - SPORTS MEDICINE REFERRAL     BMI:   Estimated body mass index is 26.51 kg/m  as calculated from the following:    Height as of 8/19/19: 1.575 m (5' 2.01\").    Weight as of this encounter: 65.8 kg (145 lb).           See Patient Instructions    No follow-ups on file.    Dustin Choudhury MD  [facility]      "

## 2019-08-26 ENCOUNTER — OFFICE VISIT (OUTPATIENT)
Dept: ORTHOPEDICS | Facility: CLINIC | Age: 59
End: 2019-08-26
Attending: INTERNAL MEDICINE
Payer: COMMERCIAL

## 2019-08-26 ENCOUNTER — ANCILLARY PROCEDURE (OUTPATIENT)
Dept: GENERAL RADIOLOGY | Facility: CLINIC | Age: 59
End: 2019-08-26
Attending: FAMILY MEDICINE
Payer: COMMERCIAL

## 2019-08-26 VITALS
HEIGHT: 62 IN | SYSTOLIC BLOOD PRESSURE: 119 MMHG | WEIGHT: 145 LBS | DIASTOLIC BLOOD PRESSURE: 69 MMHG | BODY MASS INDEX: 26.68 KG/M2

## 2019-08-26 DIAGNOSIS — M17.12 PRIMARY OSTEOARTHRITIS OF LEFT KNEE: Primary | ICD-10-CM

## 2019-08-26 DIAGNOSIS — G89.29 CHRONIC PAIN OF LEFT KNEE: ICD-10-CM

## 2019-08-26 DIAGNOSIS — M25.562 CHRONIC PAIN OF LEFT KNEE: ICD-10-CM

## 2019-08-26 PROCEDURE — 99213 OFFICE O/P EST LOW 20 MIN: CPT | Mod: 25 | Performed by: FAMILY MEDICINE

## 2019-08-26 PROCEDURE — 73562 X-RAY EXAM OF KNEE 3: CPT | Performed by: FAMILY MEDICINE

## 2019-08-26 PROCEDURE — 20610 DRAIN/INJ JOINT/BURSA W/O US: CPT | Mod: LT | Performed by: FAMILY MEDICINE

## 2019-08-26 RX ORDER — LIDOCAINE HYDROCHLORIDE 10 MG/ML
4 INJECTION, SOLUTION INFILTRATION; PERINEURAL
Status: DISCONTINUED | OUTPATIENT
Start: 2019-08-26 | End: 2020-09-28

## 2019-08-26 RX ORDER — TRIAMCINOLONE ACETONIDE 40 MG/ML
40 INJECTION, SUSPENSION INTRA-ARTICULAR; INTRAMUSCULAR
Status: DISCONTINUED | OUTPATIENT
Start: 2019-08-26 | End: 2020-09-28

## 2019-08-26 RX ADMIN — TRIAMCINOLONE ACETONIDE 40 MG: 40 INJECTION, SUSPENSION INTRA-ARTICULAR; INTRAMUSCULAR at 11:36

## 2019-08-26 RX ADMIN — LIDOCAINE HYDROCHLORIDE 4 ML: 10 INJECTION, SOLUTION INFILTRATION; PERINEURAL at 11:36

## 2019-08-26 ASSESSMENT — MIFFLIN-ST. JEOR: SCORE: 1195.97

## 2019-08-26 NOTE — LETTER
8/26/2019         RE: Barbi Tiwari  [address]  [facility] [address]        Dear Colleague,    Thank you for referring your patient, Barbi Tiwari, to the  SPORTS MEDICINE. Please see a copy of my visit note below.    Musculoskeletal Problem        [facility]   Follow-up Visit s Aug 26, 2019    PCP: Dustin Choudhury      Subjective:  Barbi is a 58 year old female who is seen in follow up for evaluation of   Chief Complaint   Patient presents with     Left Knee - Pain     Her last visit was on 10/15/18.  Since that time, symptoms have been worse than before. Barbi Tiwari is accompanied today by spouse.   Patient has noticed improved symptoms with physical therapy treatment. Symptoms worsened about a month or two ago without a specific mechanism of injury.   Patient has no swelling  Pain is located left knee, deep, getting progressively worse.  Patient is using no aids.    Patient denies any new injuries.    Patient's past medical, surgical, social and family histories are reviewed today.    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     Osteoarthrosis 12/9/2009     Palpitations 6/5/2014     PONV (postoperative nausea and vomiting)      PVCs 6/5/2014     Seizure disorder (related to tumor) 08/17/2011       Patient Active Problem List    Diagnosis Date Noted     Chronic pain of left knee 10/24/2018     Priority: Medium     Primary osteoarthritis of left knee 10/15/2018     Priority: Medium     S/P laparoscopic hysterectomy 06/15/2018     Priority: Medium     Oligodendroglioma (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     Priority: Medium     Nephrolithiasis  2017     Priority: Medium     Overview:   S/P LITHOTRIPSY 3/15/11       Advanced directives, counseling/discussion 2017     Priority: Medium     Lumbar radiculopathy 2016     Priority: Medium     Palpitations 2014     Priority: Medium     PVC's (premature ventricular contractions) 2014     Priority: Medium     Elevated cholesterol 2014     Priority: Medium     Abnormal screening cardiac CT 2014     Priority: Medium     Moderate major depression, single episode (H) 2013     Priority: Medium     Weakness of face muscles 2011     Priority: Medium     Seizure (H) 2011     Priority: Medium     Calculus of kidney 2011     Priority: Medium     Overview:   S/P LITHOTRIPSY 3/15/11       Hyperparathyroidism s/p surgery 2011     Priority: Medium     Allergic rhinitis 2009     Priority: Medium     Osteoarthritis 2009     Priority: Medium     GERD (gastroesophageal reflux disease) 10/22/2008     Priority: Medium       Family History   Problem Relation Age of Onset     Arthritis Mother      Depression Mother      Respiratory Mother         COPD     LUNG DISEASE Mother      C.A.D. Father 58        heart attack     Heart Disease Father      Coronary Artery Disease Father         MI  age 54     Diabetes Brother 70         age 70     Depression Sister      Depression Son      Allergies Son      Depression Daughter      Allergies Daughter      Eczema Brother      Depression Sister      Depression Sister      Anxiety Disorder Daughter        Social History     Social History     Marital status:      Spouse name: N/A     Number of children: N/A     Years of education: N/A     Occupational History     RN- [employer]      Social History Main Topics     Smoking status: Never Smoker     Smokeless tobacco: Never Used     Alcohol use Yes      Comment: 1-2 per month, only at social events     Drug use: No     Past Surgical History:  "  Procedure Laterality Date     CRANIOTOMY  2011    tumor resection     EXTRACORPOREAL SHOCK WAVE LITHOTRIPSY, CYSTOSCOPY, INSERT STENT URETER(S), COMBINED Bilateral 5/5/2017    Procedure: COMBINED EXTRACORPOREAL SHOCK WAVE LITHOTRIPSY, CYSTOSCOPY, INSERT STENT URETER(S);  CYSTO, URETHRAL DILATION, URETERAL LEFT STENT PLACEMENT, LEFT ESWL.;  Surgeon: Angel Del Rio MD;  Location:  OR     FOOT SURGERY      mulitple     HYSTERECTOMY, PAP NO LONGER INDICATED  2008    lap hys     LITHOTRIPSY  2010 2017     OPTICAL TRACKING SYSTEM CRANIOTOMY, EXCISE TUMOR WITH MAPPING, COMBINED Right 5/30/2018    Procedure: COMBINED OPTICAL TRACKING SYSTEM CRANIOTOMY, EXCISE TUMOR WITH MAPPING;  Right Stealth Assisted Craniotomy With Motor Mapping And Tumor Resection ;  Surgeon: Dustin Rodriguez MD;  Location:  OR     ORTHOPEDIC SURGERY      feet, multiple on both     OSTEOTOMY FOOT Right 1/15/2019    Procedure: OSTEOTOMY FOOT;  Surgeon: Benjaimn Griffin DPM;  Location:  OR     PARATHYROIDECTOMY  2011     RECONSTRUCT FOREFOOT WITH METATARSOPHALANGEAL (MTP) FUSION Right 1/15/2019    Procedure: RIGHT FIRST METATARSAL PHALAGEAL JOINT ARTHRODESIS, RIGHT SECOND METATARSAL WEIL OSTEOTOMY;  Surgeon: Benjamin Griffin DPM;  Location:  OR         Review of Systems   Musculoskeletal: Positive for joint pain.   All other systems reviewed and are negative.        Physical Exam  /69   Ht 1.575 m (5' 2\")   Wt 65.8 kg (145 lb)   BMI 26.52 kg/m     Constitutional:well-developed, well-nourished, and in no distress.   Cardiovascular: Intact distal pulses.    Neurological: alert. Gait Abnormal:   Antalgic gait  occasionally limping  Skin: Skin is warm and dry.   Psychiatric: Mood and affect normal.   Respiratory: unlabored, speaks in full sentences  Lymph: no LAD, no lymphangitis      Left Knee Exam   Swelling: None  Effusion: No    Tenderness   The patient is experiencing tenderness in the Patella facets.    Range of " Motion   Extension: Normal  Flexion:     Normal    Tests   McMurrays:  Medial - Positive      Lateral - Negative  Lachman:  Anterior - Negative    Posterior - Negative  Drawer:       Anterior - Negative     Posterior - Negative  Varus:  Negative  Valgus: Negative  Pivot Shift: Negative  Patellar Apprehension: No    Comments:  Mild patellofemoral crepitus with range of motion      Mild left calf atrophy noted.      X-ray images Ordered and independently reviewed by me in the office today with the patient. X-ray shows:   Recent Results (from the past 48 hour(s))   XR Knee Standing AP Bilat [place] Bilat Lat Left    Narrative    8/26/2019    Mild degenerative changes noted in the bilateral knees, there is lateral   compartment narrowing of the left knee, bilateral mild tibial spine   spurring, and trace narrowing of the medial compartment of the right knee.    No acute fractures, no soft tissue deformities.         ASSESSMENT/PLAN    ICD-10-CM    1. Primary osteoarthritis of left knee M17.12 [facility]     Large Joint Injection/Arthocentesis: L knee joint   2. Chronic pain of left knee M25.562 XR Knee Standing AP Bil[place] Bilat Lat Left    G89.29 [facility]     Acute flare of degenerative arthritis, mild radiographically, more evident with crepitus noted about the patellofemoral joint.  It is affecting her function and she has developed some limb atrophy.  Options discussed, and she opts to proceed with a cortisone injection today given her upcoming trip to [place] followed by physical therapy upon her return.    Large Joint Injection/Arthocentesis: L knee joint  Date/Time: 8/26/2019 11:36 AM  Performed by: Pool Tong MD  Authorized by: Pool Tong MD     Indications:  Pain and osteoarthritis  Needle Size:  25 G  Guidance: landmark guided    Approach:  Superolateral  Location:  Knee      Medications:  4 mL lidocaine 1 %; 40 mg  triamcinolone 40 MG/ML  Outcome:  Tolerated well, no immediate complications  Procedure discussed: discussed risks, benefits, and alternatives    Consent Given by:  Patient  Timeout: timeout called immediately prior to procedure    Prep: patient was prepped and draped in usual sterile fashion            Again, thank you for allowing me to participate in the care of your patient.        Sincerely,        Pool Tong MD

## 2019-08-28 ENCOUNTER — THERAPY VISIT (OUTPATIENT)
Dept: PHYSICAL THERAPY | Facility: CLINIC | Age: 59
End: 2019-08-28
Payer: COMMERCIAL

## 2019-08-28 DIAGNOSIS — M54.16 LUMBAR RADICULOPATHY: Primary | ICD-10-CM

## 2019-08-28 DIAGNOSIS — M25.562 ACUTE PAIN OF LEFT KNEE: ICD-10-CM

## 2019-08-28 PROCEDURE — 97110 THERAPEUTIC EXERCISES: CPT | Mod: GP | Performed by: PHYSICAL THERAPIST

## 2019-08-28 PROCEDURE — 97140 MANUAL THERAPY 1/> REGIONS: CPT | Mod: GP | Performed by: PHYSICAL THERAPIST

## 2019-08-28 PROCEDURE — 97161 PT EVAL LOW COMPLEX 20 MIN: CPT | Mod: GP | Performed by: PHYSICAL THERAPIST

## 2019-08-28 ASSESSMENT — ACTIVITIES OF DAILY LIVING (ADL)
GO DOWN STAIRS: ACTIVITY IS SOMEWHAT DIFFICULT
WALK: ACTIVITY IS MINIMALLY DIFFICULT
HOW_WOULD_YOU_RATE_THE_CURRENT_FUNCTION_OF_YOUR_KNEE_DURING_YOUR_USUAL_DAILY_ACTIVITIES_ON_A_SCALE_FROM_0_TO_100_WITH_100_BEING_YOUR_LEVEL_OF_KNEE_FUNCTION_PRIOR_TO_YOUR_INJURY_AND_0_BEING_THE_INABILITY_TO_PERFORM_ANY_OF_YOUR_USUAL_DAILY_ACTIVITIES?: 90
HOW_WOULD_YOU_RATE_THE_OVERALL_FUNCTION_OF_YOUR_KNEE_DURING_YOUR_USUAL_DAILY_ACTIVITIES?: ABNORMAL
RAW_SCORE: 44
KNEE_ACTIVITY_OF_DAILY_LIVING_SCORE: 62.86
GO UP STAIRS: ACTIVITY IS MINIMALLY DIFFICULT
WEAKNESS: THE SYMPTOM AFFECTS MY ACTIVITY MODERATELY
RISE FROM A CHAIR: ACTIVITY IS MINIMALLY DIFFICULT
STIFFNESS: THE SYMPTOM AFFECTS MY ACTIVITY SLIGHTLY
LIMPING: THE SYMPTOM AFFECTS MY ACTIVITY MODERATELY
KNEE_ACTIVITY_OF_DAILY_LIVING_SUM: 44
SQUAT: ACTIVITY IS VERY DIFFICULT
PAIN: THE SYMPTOM AFFECTS MY ACTIVITY MODERATELY
SIT WITH YOUR KNEE BENT: ACTIVITY IS NOT DIFFICULT
STAND: ACTIVITY IS NOT DIFFICULT
GIVING WAY, BUCKLING OR SHIFTING OF KNEE: THE SYMPTOM AFFECTS MY ACTIVITY SEVERELY
KNEEL ON THE FRONT OF YOUR KNEE: ACTIVITY IS SOMEWHAT DIFFICULT
SWELLING: I DO NOT HAVE THE SYMPTOM
AS_A_RESULT_OF_YOUR_KNEE_INJURY,_HOW_WOULD_YOU_RATE_YOUR_CURRENT_LEVEL_OF_DAILY_ACTIVITY?: ABNORMAL

## 2019-08-28 NOTE — PROGRESS NOTES
[facility] Initial Evaluation -- Lower Extremity    Evaluation Date: August 28, 2019  Barbi Tiwari is a 58 year old female with a L knee condition.   Referral: FSOC  Work mechanical stresses: computer work, lifting, carrying, sitting, standing, pulling, repetitive tasks   Employment status: RN  Leisure mechanical stresses: normal household activities  Functional disability score:   VAS score (0-10): 1/10  Patient goals/expectations:  To get the pain to go away, to get my L knee stronger for when I have R foot surgery    HISTORY:    Present symptoms: L posterior knee pain, lateral shin pain  Pain quality (sharp/shooting/stabbing/aching/burning/cramping):  Aching, sharp    Present since (onset date): 08/07/2019    Symptoms (improving/unchaning/worsening):  unchanging.      Symptoms commenced as a result of: unknown   Condition occurred in the following environment: unknown     Symptoms at onset: L posterior knee pain  Paresthesia (yes/no):  no  Spinal history: yes--intermittent LB issues in past   Cough/Sneeze (pos/neg):  neg    Constant symptoms: none  Intermittent symptoms: L posterior knee pain, L lateral shin pain    Symptoms are worse with the following: Always Standing, Always Walking, Always Stairs, Always Squatting, Always Kneeling, Always On the move and Time of day - Always as the day progresses   Symptoms are better with the following: Always Sitting    Continued use makes the pain (better/worse/no effect): worse    Disturbed night (yes/no): no      Pain at rest (yes/no):  no  Site (back/hip/knee/ankle/foot):  L knee, spine    Other questions (swelling/clicking/locking/giving way/falling):  neg     Previous episodes: had posterior L knee pain 08/2018--resolved with knee exercises  Previous treatments: cortisone injection L knee 08/26/2019--not helpful yet    Specific Questions:  General health (excellent/good/fair/poor):  good  Pertinent medical history includes: Cancer, Heart  problems, Menopausal, Overweight, Seizures, Sleep disorder/apnea and changes in bowel/bladder  Medications (nil/NSAIDS/analg/steroids/anticoag/other):  NSAIDS and Other - Cardiac, Anti-seizure, Sleep and pain  Medical allergies:  sulfa  Imaging (none/Xray/MRI/other):  X-rays L knee--normal  Recent or major surgery (yes/no):  No--hx of brain tumor, multiple foot surgeries  Night pain (yes/no):  no  Accidents (yes/no):  no  Unexplained weight loss (yes/no):  no  Barriers at home: no  Other red flags: no    Sites for physical examination (back/hip/knee/ankle/foot/other): L knee, spine    EXAMINATION    Posture:  Sitting (good/fair/poor): fair    Correction of Posture (better/worse/no effect/NA): NE  Standing (good/fair/poor): good  Other observations:  none    Neurological: (NA/motor/sensory/reflexes/dural): na    Baselines (pain or functional activity): L knee pain with squat and step-down    Extremities (Hip / Knee / Ankle / Foot): L knee    Movement Loss Osvaldo Mod Min Nil Pain   Flexion    x NE   Extension    x NE   Abduction        Adduction        Internal Rotation        External Rotation        Dorsiflexion        Plantarflexion        Inversion        Eversion          Passive Movement (+/- over pressure)/(PDM/ERP):  L knee flexion and extension WNL, no pain with overpressure  Resisted Test Response (pain): MMT 5/5 flexion and extension L knee  Other Tests: L knee pain produced with resisted L hip IR, no pain resisted hip ER or flexion    Spine:  Movement loss: flexion WNL, NE; extension 50%, NE, B SG WNL, NE  Effect of repeated movements: REIL x10 reps, 2 sets--NE L knee during, B after--slightly less pain squat and step-down after  Effect of static positioning: not assessed  Spine testing (not relevant/relevant/secondary problem): relevant    Baseline Symptoms:  Prone lying--0/10 L knee; concordant signs of L knee pain with squat and step-down  Repeated Tests Symptom Response Mechanical Response    Active/Passive movement, resisted test, functional test During -  Produce, Abolish, Increase, Decrease, NE After -  Better, Worse, NB, NW, NE Effect -   ? or ? ROM, strength or key functional test No   Effect   REIL NE Better Decreased pain L knee with squat and step-down                         Effect of static positioning                Lumbar extension mobs, prone--produces LBP, L knee better after--less pain squat, NE step-down    Provisional Classification (Extremity/Spine):  Spine - Derangement - Asymmetrical, unilateral, symptoms below knee      Princicple of Management:   Education:  Posture--use of lumbar roll in sitting, rationale for spine exercises    Equipment provided:  none  Exercise and dosage:  REIL x10 reps, every 2 hours, goal: 100 reps/day    ASSESSMENT/PLAN:    Patient is a 58 year old female with left side knee complaints.  Provisional classification of lumbar derangement with directional preference for extension.  She had decreased L knee pain with squat and step-down activities after repeated lumbar extension in lying and lumbar extension mobilizations.  She will try REIL at home to assess further.  Insidious onset and location being posterior and radiating into the lateral shin is suspicious of lumbar involvement.  She also has a history of lumbar issues in the past.  Treatment will focus on lumbar extension exercises and posture/body mechanics training and move to L knee if not progressing with lumbar.      Patient has the following significant findings with corresponding treatment plan.                Diagnosis 1:  L knee pain--assessing lumbar  Pain -  self management, education, directional preference exercise and home program  Decreased ROM/flexibility - manual therapy, therapeutic exercise and home program  Decreased function - therapeutic activities and home program  Impaired posture - neuro re-education and home program    Cumulative Therapy Evaluation is: Low  complexity.    Previous and current functional limitations:  (See Goal Flow Sheet for this information)    Short term and Long term goals: (See Goal Flow Sheet for this information)     Communication ability:  Patient appears to be able to clearly communicate and understand verbal and written communication and follow directions correctly.  Treatment Explanation - The following has been discussed with the patient:   RX ordered/plan of care  Anticipated outcomes  Possible risks and side effects  This patient would benefit from PT intervention to resume normal activities.   Rehab potential is good.    Frequency:  2 X week, once daily  Duration:  for 1 weeks tapering to 1 X a week over 4 weeks  Discharge Plan:  Achieve all LTG.  Independent in home treatment program.  Reach maximal therapeutic benefit.    Please refer to the daily flowsheet for treatment today, total treatment time and time spent performing 1:1 timed codes.

## 2019-08-29 ENCOUNTER — OFFICE VISIT (OUTPATIENT)
Dept: SLEEP MEDICINE | Facility: CLINIC | Age: 59
End: 2019-08-29
Payer: COMMERCIAL

## 2019-08-29 ENCOUNTER — THERAPY VISIT (OUTPATIENT)
Dept: PHYSICAL THERAPY | Facility: CLINIC | Age: 59
End: 2019-08-29
Payer: COMMERCIAL

## 2019-08-29 VITALS
SYSTOLIC BLOOD PRESSURE: 109 MMHG | BODY MASS INDEX: 26.68 KG/M2 | HEIGHT: 62 IN | DIASTOLIC BLOOD PRESSURE: 75 MMHG | HEART RATE: 54 BPM | OXYGEN SATURATION: 95 % | WEIGHT: 145 LBS | RESPIRATION RATE: 16 BRPM

## 2019-08-29 DIAGNOSIS — M54.16 LUMBAR RADICULOPATHY: Primary | ICD-10-CM

## 2019-08-29 DIAGNOSIS — G25.81 RESTLESS LEGS SYNDROME (RLS): Primary | ICD-10-CM

## 2019-08-29 DIAGNOSIS — M25.562 ACUTE PAIN OF LEFT KNEE: ICD-10-CM

## 2019-08-29 DIAGNOSIS — G47.26 CIRCADIAN RHYTHM SLEEP DISORDER, SHIFT WORK TYPE: ICD-10-CM

## 2019-08-29 PROCEDURE — 97110 THERAPEUTIC EXERCISES: CPT | Mod: GP | Performed by: PHYSICAL THERAPIST

## 2019-08-29 PROCEDURE — 99214 OFFICE O/P EST MOD 30 MIN: CPT | Performed by: NURSE PRACTITIONER

## 2019-08-29 PROCEDURE — 97140 MANUAL THERAPY 1/> REGIONS: CPT | Mod: GP | Performed by: PHYSICAL THERAPIST

## 2019-08-29 ASSESSMENT — MIFFLIN-ST. JEOR: SCORE: 1195.97

## 2019-08-29 NOTE — PATIENT INSTRUCTIONS
1. rls gabapentin 200mg 1.5 hrs before, andrea santiago    2. Circadian: recommend days, to keep rise time similar throughout the week=keep variance between rise times to <3 hrs difference and naps on timer < 40 mins  Your BMI is Body mass index is 26.52 kg/m .  Weight management is a personal decision.  If you are interested in exploring weight loss strategies, the following discussion covers the approaches that may be successful. Body mass index (BMI) is one way to tell whether you are at a healthy weight, overweight, or obese. It measures your weight in relation to your height.  A BMI of 18.5 to 24.9 is in the healthy range. A person with a BMI of 25 to 29.9 is considered overweight, and someone with a BMI of 30 or greater is considered obese. More than two-thirds of American adults are considered overweight or obese.  Being overweight or obese increases the risk for further weight gain. Excess weight may lead to heart disease and diabetes.  Creating and following plans for healthy eating and physical activity may help you improve your health.  Weight control is part of healthy lifestyle and includes exercise, emotional health, and healthy eating habits. Careful eating habits lifelong are the mainstay of weight control. Though there are significant health benefits from weight loss, long-term weight loss with diet alone may be very difficult to achieve- studies show long-term success with dietary management in less than 10% of people. Attaining a healthy weight may be especially difficult to achieve in those with severe obesity. In some cases, medications, devices and surgical management might be considered.  What can you do?  If you are overweight or obese and are interested in methods for weight loss, you should discuss this with your provider.     Consider reducing daily calorie intake by 500 calories.     Keep a food journal.     Avoiding skipping meals, consider cutting portions instead.    Diet combined with  exercise helps maintain muscle while optimizing fat loss. Strength training is particularly important for building and maintaining muscle mass. Exercise helps reduce stress, increase energy, and improves fitness. Increasing exercise without diet control, however, may not burn enough calories to loose weight.       Start walking three days a week 10-20 minutes at a time    Work towards walking thirty minutes five days a week     Eventually, increase the speed of your walking for 1-2 minutes at time    In addition, we recommend that you review healthy lifestyles and methods for weight loss available through the National Institutes of Health patient information sites:  http://win.niddk.nih.gov/publications/index.htm    And look into health and wellness programs that may be available through your health insurance provider, employer, local community center, or radha club.    Weight management plan: Patient was referred to their PCP to discuss a diet and exercise plan.

## 2019-08-29 NOTE — PROGRESS NOTES
CC: Returns to discuss treatment for RLS, and shiftwork.  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 with evening    Naps:  Afternoon 1-3PM     RLS: 1.5 months without gabapentin,         Results for DYLAN KAUFFMAN (MRN [id number]) as of 8/29/2019 09:29   Ref. Range 7/18/2019 10:01   Ferritin Latest Ref Range: 8 - 252 ng/mL 224   Iron Latest Ref Range: 35 - 180 ug/dL 99   Iron Binding Cap Latest Ref Range: 240 - 430 ug/dL 295   Iron Saturation Index Latest Ref Range: 15 - 46 % 34       Treatment options discussed she has stopped taking her iron, discussed effectiveness of her gabapentin and she prefers 200 mg    Barriers to treatment discussed nonpharmacologic therapy which includes warm bath or shower, stretching and massage to reduce symptoms of RLS    Preferences gabapentin 200, will consider nonpharmacologic measures, is working towards getting straight daytime work which would include a combination of 8 hours and 12-hour shifts but all starting at 7.    VS   Allergies:    Allergies   Allergen Reactions     Sulfa Drugs Hives     Benoxinate Nausea and Vomiting       Medications:    Current Outpatient Medications   Medication Sig Dispense Refill     acetaminophen (TYLENOL) 500 MG tablet Take 2  tablets by mouth every 8 hours for post operative pain. 60 tablet 1     ascorbic acid 500 MG TABS Take 500  mg by mouth 2 times daily       calcium carbonate (TUMS) 500 MG chewable tablet Take 1 chew tab by mouth daily as needed for heartburn       COMPOUNDED NON-CONTROLLED SUBSTANCE (CMPD RX) - PHARMACY TO MIX COMPOUNDED MEDICATION Place 1 g vaginally twice a week Apply 1g daily for 2 weeks, then twice weekly to vagina 30 g 11     fexofenadine (ALLEGRA) 180 MG tablet Take 180 mg by mouth daily       gabapentin (NEURONTIN) 100 MG capsule Take 2 capsules (200 mg) by mouth At Bedtime (2 x 100 mg = 200 mg dose) 60 capsule 0     ibuprofen (ADVIL/MOTRIN) 200 MG tablet Take 800 mg by mouth daily as needed        Lactase (LACTAID PO) Take 1-2 tablets by mouth daily as needed for indigestion        lamoTRIgine (LAMICTAL) 100 MG tablet Take 1 tablet (100 mg) by mouth 2 times daily       lamoTRIgine (LAMICTAL) 200 MG tablet Take 1 tablet (200 mg) by mouth 2 times daily       levETIRAcetam (KEPPRA) 500 MG tablet Take 1,500 mg by mouth 2 times daily (3 x 500 mg = 1,500 mg dose)       metoprolol tartrate (LOPRESSOR) 25 MG tablet Take 1 tablet (25 mg) by mouth 2 times daily 180 tablet 2     Multiple Vitamin (MULTI-VITAMINS) TABS Take 1 chew tab by mouth 2 times daily        ranitidine (ZANTAC) 150 MG tablet Take 1 tablet (150 mg) by mouth At Bedtime       ranitidine (ZANTAC) 75 MG tablet Take 75 mg by mouth daily       VITAMIN D, CHOLECALCIFEROL, PO Take 1,000 Units by mouth 2 times daily Reported on 5/15/2017         Problem List:  Patient Active Problem List    Diagnosis Date Noted     Acute pain of left knee 08/28/2019     Priority: Medium     Chronic pain of left knee 10/24/2018     Priority: Medium     Primary osteoarthritis of left knee 10/15/2018     Priority: Medium     S/P laparoscopic hysterectomy 06/15/2018     Priority: Medium     Oligodendroglioma (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     Priority: Medium     Nephrolithiasis 05/05/2017     Priority: Medium     Overview:   S/P LITHOTRIPSY 3/15/11       Advanced directives, counseling/discussion  03/24/2017     Priority: Medium     Lumbar radiculopathy 09/08/2016     Priority: Medium     Palpitations 06/05/2014     Priority: Medium     PVC's (premature ventricular contractions) 06/05/2014     Priority: Medium     Elevated cholesterol 06/05/2014     Priority: Medium     Abnormal screening cardiac CT 06/05/2014     Priority: Medium     Moderate major depression, single episode (H) 03/06/2013     Priority: Medium     Weakness of face muscles 08/18/2011     Priority: Medium     Seizure (H) 08/17/2011     Priority: Medium     Calculus of kidney 01/12/2011     Priority: Medium     Overview:   S/P LITHOTRIPSY 3/15/11       Hyperparathyroidism s/p surgery 01/11/2011     Priority: Medium     Allergic rhinitis 12/09/2009     Priority: Medium     Osteoarthritis 12/09/2009     Priority: Medium     GERD (gastroesophageal reflux disease) 10/22/2008     Priority: Medium        Past Medical/Surgical History:  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     Osteoarthrosis 12/9/2009     Palpitations 6/5/2014     PONV (postoperative nausea and vomiting)      PVCs 6/5/2014     Seizure disorder (related to tumor) 08/17/2011     Past Surgical History:   Procedure Laterality Date     CRANIOTOMY  2011    tumor resection     EXTRACORPOREAL SHOCK WAVE LITHOTRIPSY, CYSTOSCOPY, INSERT STENT URETER(S), COMBINED Bilateral 5/5/2017    Procedure: COMBINED EXTRACORPOREAL SHOCK WAVE LITHOTRIPSY, CYSTOSCOPY, INSERT STENT URETER(S);  CYSTO, URETHRAL DILATION, URETERAL LEFT STENT PLACEMENT, LEFT ESWL.;  Surgeon: Angel Del Rio MD;  Location:  OR     FOOT SURGERY      mulitple     HYSTERECTOMY, PAP NO LONGER INDICATED  2008    lap hys     LITHOTRIPSY  " 2010 2017     OPTICAL TRACKING SYSTEM CRANIOTOMY, EXCISE TUMOR WITH MAPPING, COMBINED Right 5/30/2018    Procedure: COMBINED OPTICAL TRACKING SYSTEM CRANIOTOMY, EXCISE TUMOR WITH MAPPING;  Right Stealth Assisted Craniotomy With Motor Mapping And Tumor Resection ;  Surgeon: Dustin Rodriguez MD;  Location: [facility] OR     ORTHOPEDIC SURGERY      feet, multiple on both     OSTEOTOMY FOOT Right 1/15/2019    Procedure: OSTEOTOMY FOOT;  Surgeon: Benjamin Griffin DPM;  Location:  OR     PARATHYROIDECTOMY  2011     RECONSTRUCT FOREFOOT WITH METATARSOPHALANGEAL (MTP) FUSION Right 1/15/2019    Procedure: RIGHT FIRST METATARSAL PHALAGEAL JOINT ARTHRODESIS, RIGHT SECOND METATARSAL WEIL OSTEOTOMY;  Surgeon: Benjamin Griffin DPM;  Location:  OR     Physical Examination:  Vitals: /75   Pulse 54   Resp 16   Ht 1.575 m (5' 2\")   Wt 65.8 kg (145 lb)   SpO2 95%   BMI 26.52 kg/m    BMI= Body mass index is 26.52 kg/m .         [facility] Total Score 8/29/2019   Total score - [facility] 8       GENERAL APPEARANCE: alert and no distress    ASSESSMENT/PLAN: Doing well since have seen her last, no current problems with insomnia, continues with shiftwork with an eye towards and and to rotating shifts as of the end of September.  We discussed alternatives that would be best for her circadian rhythm.  And the following plan of care has been developed:    1.  RLS: I will provide her with gabapentin 200 mg for 90-day count and 3 refills.  Encouraged use of gabapentin 1.5 hours before bedtime.  She will be seen again in 1 years time, sooner with any problems with her RLS    2.  Shiftwork: She has been discussing with her manager a possibility of working a combination of 8-hour days and 12-hour days on weekends.  She is quite eager to start this and I think that in her case this might be a good idea.  Currently she is having difficulties initiating sleep after she returns home from a 3-11 shift.  Until she does eliminate her last " few 3-11 shifts I have recommended that on days off that she not extend her time in bed by more than 3 hours.  This seems to push her sleep schedule back further.  Napping should be kept to less than 40 minutes if possible, difference and rise time to less than 3 hours throughout the week.      .Time spent with patient is 25 minutes, of which >50% was spent in counseling, education and coordination of care.    The above note was dictated using voice recognition software. Although reviewed after completion, some word and grammatical error may remain . Please contact the author for any clarifications.

## 2019-09-02 RX ORDER — GABAPENTIN 100 MG/1
CAPSULE ORAL
Qty: 180 CAPSULE | Refills: 3 | Status: SHIPPED | OUTPATIENT
Start: 2019-09-02 | End: 2019-10-31

## 2019-09-16 ENCOUNTER — TELEPHONE (OUTPATIENT)
Dept: FAMILY MEDICINE | Facility: CLINIC | Age: 59
End: 2019-09-16

## 2019-09-16 ENCOUNTER — OFFICE VISIT (OUTPATIENT)
Dept: DERMATOLOGY | Facility: CLINIC | Age: 59
End: 2019-09-16
Payer: COMMERCIAL

## 2019-09-16 VITALS — SYSTOLIC BLOOD PRESSURE: 111 MMHG | HEART RATE: 76 BPM | DIASTOLIC BLOOD PRESSURE: 71 MMHG | OXYGEN SATURATION: 95 %

## 2019-09-16 DIAGNOSIS — L73.8 SEBACEOUS GLAND HYPERPLASIA: ICD-10-CM

## 2019-09-16 DIAGNOSIS — B35.1 ONYCHOMYCOSIS: Primary | ICD-10-CM

## 2019-09-16 PROCEDURE — 99213 OFFICE O/P EST LOW 20 MIN: CPT | Performed by: PHYSICIAN ASSISTANT

## 2019-09-16 RX ORDER — TRETINOIN 0.25 MG/G
CREAM TOPICAL
Qty: 45 G | Refills: 3 | Status: SHIPPED | OUTPATIENT
Start: 2019-09-16 | End: 2021-07-09

## 2019-09-16 RX ORDER — CICLOPIROX 80 MG/ML
SOLUTION TOPICAL
Qty: 6.6 ML | Refills: 3 | Status: SHIPPED | OUTPATIENT
Start: 2019-09-16 | End: 2020-07-06

## 2019-09-16 NOTE — PATIENT INSTRUCTIONS
Proper skin care from [facility]:    -Eliminate harsh soaps as they strip the natural oils from the skin, often resulting in dry itchy skin ( i.e. Dial, Zest, Grecia Spring)  -Use mild soaps such as Cetaphil or Dove Sensitive Skin in the shower. You do not need to use soap on arms, legs, and trunk every time you shower unless visibly soiled.   -Avoid hot or cold showers.  -After showering, lightly dry off and apply moisturizing within 2-3 minutes. This will help trap moisture in the skin.   -Aggressive use of a moisturizer at least 1-2 times a day to the entire body (including -Vanicream, Cetaphil, Aquaphor or Cerave) and moisturize hands after every washing.  -We recommend using moisturizers that come in a tub that needs to be scooped out, not a pump. This has more of an oil base. It will hold moisture in your skin much better than a water base moisturizer. The above recommended are non-pore clogging.      Wear a sunscreen with at least SPF 30 on your face, ears, neck and V of the chest daily. Wear sunscreen on other areas of the body if those areas are exposed to the sun throughout the day. Sunscreens can contain physical and/or chemical blockers. Physical blockers are less likely to clog pores, these include zinc oxide and titanium dioxide. Reapply every two hour and after swimming. Sunscreen examples include Neutrogena, CeraVe, Blue Lizard, Elta MD and many others.    UV radiation  UVA radiation remains constant throughout the day and throughout the year. It is a longer wavelength than UVB and therefore penetrates deeper into the skin leading to immediate and delayed tanning, photoaging, and skin cancer. 70-80% of UVA and UVB radiation occurs between the hours of 10am-2pm.  UVB radiation  UVB radiation causes the most harmful effects and is more significant during the summer months. However, snow and ice can reflect UVB radiation leading to skin damage during the winter months as well. UVB radiation is  responsible for tanning, burning, inflammation, delayed erythema (pinkness), pigmentation (brown spots), and skin cancer.     [facility]   [address], [place] [address]  Phone: [phone]    [facility]   [address] [place], [place] [address] [phone]    [facility]  [address] [place] [address]   [phone]    Call and see if they have lasers to treat sebaceous gland hyperplasia( enlarged oil glands on face).    Begin ciclopirox to affected nails once a day

## 2019-09-16 NOTE — PROGRESS NOTES
HPI:  Barbi Tiwari is a 58 year old female patient here today for yellowing of left 3rd toenail .  Patient states this has been present for a while.  Patient reports the following symptoms: yellow and thick .  Patient reports the following previous treatments: otc with no improvement x months.  Patient reports the following modifying factors: none.  Associated symptoms: none. Also has some spots on face. No tx tried.  Patient has no other skin complaints today.  Remainder of the HPI, Meds, PMH, Allergies, FH, and SH was reviewed in chart.      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     Osteoarthrosis 12/9/2009     Palpitations 6/5/2014     PONV (postoperative nausea and vomiting)      PVCs 6/5/2014     Seizure disorder (related to tumor) 08/17/2011       Past Surgical History:   Procedure Laterality Date     CRANIOTOMY  2011    tumor resection     EXTRACORPOREAL SHOCK WAVE LITHOTRIPSY, CYSTOSCOPY, INSERT STENT URETER(S), COMBINED Bilateral 5/5/2017    Procedure: COMBINED EXTRACORPOREAL SHOCK WAVE LITHOTRIPSY, CYSTOSCOPY, INSERT STENT URETER(S);  CYSTO, URETHRAL DILATION, URETERAL LEFT STENT PLACEMENT, LEFT ESWL.;  Surgeon: Angel Del Rio MD;  Location:  OR     FOOT SURGERY      mulitple     HYSTERECTOMY, PAP NO LONGER INDICATED  2008    lap hys     LITHOTRIPSY  2010 2017     OPTICAL TRACKING SYSTEM CRANIOTOMY, EXCISE TUMOR WITH MAPPING, COMBINED Right 5/30/2018    Procedure: COMBINED OPTICAL TRACKING SYSTEM CRANIOTOMY, EXCISE TUMOR WITH MAPPING;  Right Stealth Assisted Craniotomy With Motor Mapping And Tumor Resection ;  Surgeon: Dustin Rodriguez MD;  Location: [facility] OR     ORTHOPEDIC SURGERY      feet,  multiple on both     OSTEOTOMY FOOT Right 1/15/2019    Procedure: OSTEOTOMY FOOT;  Surgeon: Benjamin Griffin DPM;  Location: [facility] OR     PARATHYROIDECTOMY  2011     RECONSTRUCT FOREFOOT WITH METATARSOPHALANGEAL (MTP) FUSION Right 1/15/2019    Procedure: RIGHT FIRST METATARSAL PHALAGEAL JOINT ARTHRODESIS, RIGHT SECOND METATARSAL WEIL OSTEOTOMY;  Surgeon: Benjamin Griffin DPM;  Location: [facility] OR        Family History   Problem Relation Age of Onset     Arthritis Mother      Depression Mother      Respiratory Mother         COPD     LUNG DISEASE Mother      C.A.D. Father 58        heart attack     Heart Disease Father      Coronary Artery Disease Father         MI  age 54     Diabetes Brother 70         age 70     Depression Sister      Depression Son      Allergies Son      Depression Daughter      Allergies Daughter      Eczema Brother      Depression Sister      Depression Sister      Anxiety Disorder Daughter        Social History     Socioeconomic History     Marital status:      Spouse name: Not on file     Number of children: Not on file     Years of education: Not on file     Highest education level: Not on file   Occupational History     Occupation: RN- [employer]   Social Needs     Financial resource strain: Not on file     Food insecurity:     Worry: Not on file     Inability: Not on file     Transportation needs:     Medical: Not on file     Non-medical: Not on file   Tobacco Use     Smoking status: Never Smoker     Smokeless tobacco: Never Used   Substance and Sexual Activity     Alcohol use: Yes     Comment: social     Drug use: No     Sexual activity: Yes     Partners: Male     Birth control/protection: Female Surgical     Comment: hysterectomy   Lifestyle     Physical activity:     Days per week: Not on file     Minutes per session: Not on file     Stress: Not on file   Relationships     Social connections:     Talks on phone: Not on file     Gets together: Not on file     Attends  Nondenominational service: Not on file     Active member of club or organization: Not on file     Attends meetings of clubs or organizations: Not on file     Relationship status: Not on file     Intimate partner violence:     Fear of current or ex partner: Not on file     Emotionally abused: Not on file     Physically abused: Not on file     Forced sexual activity: Not on file   Other Topics Concern     Parent/sibling w/ CABG, MI or angioplasty before 65F 55M? Yes     Comment: Father,  of MI age 54   Social History Narrative     Not on file       Outpatient Encounter Medications as of 2019   Medication Sig Dispense Refill     acetaminophen (TYLENOL) 500 MG tablet Take 2  tablets by mouth every 8 hours for post operative pain. 60 tablet 1     ascorbic acid 500 MG TABS Take 500 mg by mouth 2 times daily       calcium carbonate (TUMS) 500 MG chewable tablet Take 1 chew tab by mouth daily as needed for heartburn       ciclopirox (PENLAC) 8 % external solution Apply to adjacent skin and affected nails daily.  Remove with alcohol every 7 days, then repeat. 6.6 mL 3     COMPOUNDED NON-CONTROLLED SUBSTANCE (CMPD RX) - PHARMACY TO MIX COMPOUNDED MEDICATION Place 1 g vaginally twice a week Apply 1g daily for 2 weeks, then twice weekly to vagina 30 g 11     fexofenadine (ALLEGRA) 180 MG tablet Take 180 mg by mouth daily       gabapentin (NEURONTIN) 100 MG capsule 2 pills 1.5 hrs before bedtime 180 capsule 3     ibuprofen (ADVIL/MOTRIN) 200 MG tablet Take 800 mg by mouth daily as needed        Lactase (LACTAID PO) Take 1-2 tablets by mouth daily as needed for indigestion        lamoTRIgine (LAMICTAL) 100 MG tablet Take 1 tablet (100 mg) by mouth 2 times daily       lamoTRIgine (LAMICTAL) 200 MG tablet Take 1 tablet (200 mg) by mouth 2 times daily       levETIRAcetam (KEPPRA) 500 MG tablet Take 1,500 mg by mouth 2 times daily (3 x 500 mg = 1,500 mg dose)       metoprolol tartrate (LOPRESSOR) 25 MG tablet Take 1 tablet (25 mg)  by mouth 2 times daily 180 tablet 2     Multiple Vitamin (MULTI-VITAMINS) TABS Take 1 chew tab by mouth 2 times daily        ranitidine (ZANTAC) 150 MG tablet Take 1 tablet (150 mg) by mouth At Bedtime       ranitidine (ZANTAC) 75 MG tablet Take 75 mg by mouth daily       VITAMIN D, CHOLECALCIFEROL, PO Take 1,000 Units by mouth 2 times daily Reported on 5/15/2017       Facility-Administered Encounter Medications as of 9/16/2019   Medication Dose Route Frequency Provider Last Rate Last Dose     lidocaine 1 % injection 4 mL  4 mL   Pool Tong MD   4 mL at 08/26/19 1136     triamcinolone (KENALOG-40) injection 40 mg  40 mg   Pool Tong MD   40 mg at 08/26/19 1136       Review Of Systems:  Skin: As above  Eyes: negative  Ears/Nose/Throat: negative  Respiratory: No shortness of breath, dyspnea on exertion, cough, or hemoptysis  Cardiovascular: negative  Gastrointestinal: negative  Genitourinary: negative  Musculoskeletal: negative  Neurologic: negative  Psychiatric: negative  Hematologic/Lymphatic/Immunologic: negative  Endocrine: negative      Objective:     /71   Pulse 76   SpO2 95%   Breastfeeding? No   Eyes: Conjunctivae/lids: Normal   ENT: Lips:  Normal  MSK: Normal  Cardiovascular: Peripheral edema none  Pulm: Breathing Normal  Neuro/Psych: Orientation: Normal; Mood/Affect: Normal, NAD, WDWN  Pt accompanied by: self  Following areas examined: face, neck, left foot  Navarrete skin type:ii   Findings:  Yellow thickened nail on let 3rd toenail  Flesh colored papule/s with yellow lobules and central depression on face  Assessment and Plan:  1) onychomycosis  Discussed treatment options with OTC products including daily application of tea tree oil, Vicks vapor rub, and/or apple cider vinegar soaks. Disc oral agents and liver function tests required at baseline, 6 weeks after treatment begins, and then once treatment ends. Prescription strength topicals are available, can use up to  48 weeks. Fingernails take 3-6 months to regrow completely, and toenails up to 12-18 months.   Begin ciclopirox daily  Grow out toenail-consider cx at next OV      2) [facility] [place]  Treatment of these lesions would be purely cosmetic and not medically neccessary.  Lesion may recur and/or may not completely resolve. May need additional treatment.  Different removal options including excision, cryotherapy, cautery and /or laser.    Continue tretinoin.       Follow up in yearly FBE. 3 months to recheck nail.

## 2019-09-16 NOTE — LETTER
9/16/2019         RE: Barbi Tiwari  [address]  [facility] [address]        Dear Colleague,    Thank you for referring your patient, Barbi Tiwari, to the [facility]. Please see a copy of my visit note below.    HPI:  Barbi Tiwari is a 58 year old female patient here today for yellowing of left 3rd toenail .  Patient states this has been present for a while.  Patient reports the following symptoms: yellow and thick .  Patient reports the following previous treatments: otc with no improvement x months.  Patient reports the following modifying factors: none.  Associated symptoms: none. Also has some spots on face. No tx tried.  Patient has no other skin complaints today.  Remainder of the HPI, Meds, PMH, Allergies, FH, and SH was reviewed in chart.      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     Osteoarthrosis 12/9/2009     Palpitations 6/5/2014     PONV (postoperative nausea and vomiting)      PVCs 6/5/2014     Seizure disorder (related to tumor) 08/17/2011       Past Surgical History:   Procedure Laterality Date     CRANIOTOMY  2011    tumor resection     EXTRACORPOREAL SHOCK WAVE LITHOTRIPSY, CYSTOSCOPY, INSERT STENT URETER(S), COMBINED Bilateral 5/5/2017    Procedure: COMBINED EXTRACORPOREAL SHOCK WAVE LITHOTRIPSY, CYSTOSCOPY, INSERT STENT URETER(S);  CYSTO, URETHRAL DILATION, URETERAL LEFT STENT PLACEMENT, LEFT ESWL.;  Surgeon: Angel Del Rio MD;  Location:  OR     FOOT SURGERY      mulitple     HYSTERECTOMY, PAP NO LONGER INDICATED  2008    lap hys     LITHOTRIPSY  2010 2017     OPTICAL TRACKING SYSTEM CRANIOTOMY, EXCISE TUMOR WITH MAPPING, COMBINED Right  2018    Procedure: COMBINED OPTICAL TRACKING SYSTEM CRANIOTOMY, EXCISE TUMOR WITH MAPPING;  Right Stealth Assisted Craniotomy With Motor Mapping And Tumor Resection ;  Surgeon: Dustin Rodriguez MD;  Location: [facility] OR     ORTHOPEDIC SURGERY      feet, multiple on both     OSTEOTOMY FOOT Right 1/15/2019    Procedure: OSTEOTOMY FOOT;  Surgeon: Benjamin Griffin DPM;  Location: [facility] OR     PARATHYROIDECTOMY  2011     RECONSTRUCT FOREFOOT WITH METATARSOPHALANGEAL (MTP) FUSION Right 1/15/2019    Procedure: RIGHT FIRST METATARSAL PHALAGEAL JOINT ARTHRODESIS, RIGHT SECOND METATARSAL WEIL OSTEOTOMY;  Surgeon: Benjamin Griffin DPM;  Location: [facility] OR        Family History   Problem Relation Age of Onset     Arthritis Mother      Depression Mother      Respiratory Mother         COPD     LUNG DISEASE Mother      C.A.D. Father 58        heart attack     Heart Disease Father      Coronary Artery Disease Father         MI  age 54     Diabetes Brother 70         age 70     Depression Sister      Depression Son      Allergies Son      Depression Daughter      Allergies Daughter      Eczema Brother      Depression Sister      Depression Sister      Anxiety Disorder Daughter        Social History     Socioeconomic History     Marital status:      Spouse name: Not on file     Number of children: Not on file     Years of education: Not on file     Highest education level: Not on file   Occupational History     Occupation: RN- [employer]   Social Needs     Financial resource strain: Not on file     Food insecurity:     Worry: Not on file     Inability: Not on file     Transportation needs:     Medical: Not on file     Non-medical: Not on file   Tobacco Use     Smoking status: Never Smoker     Smokeless tobacco: Never Used   Substance and Sexual Activity     Alcohol use: Yes     Comment: social     Drug use: No     Sexual activity: Yes     Partners: Male     Birth control/protection: Female Surgical      Comment: hysterectomy   Lifestyle     Physical activity:     Days per week: Not on file     Minutes per session: Not on file     Stress: Not on file   Relationships     Social connections:     Talks on phone: Not on file     Gets together: Not on file     Attends Confucianist service: Not on file     Active member of club or organization: Not on file     Attends meetings of clubs or organizations: Not on file     Relationship status: Not on file     Intimate partner violence:     Fear of current or ex partner: Not on file     Emotionally abused: Not on file     Physically abused: Not on file     Forced sexual activity: Not on file   Other Topics Concern     Parent/sibling w/ CABG, MI or angioplasty before 65F 55M? Yes     Comment: Father,  of MI age 54   Social History Narrative     Not on file       Outpatient Encounter Medications as of 2019   Medication Sig Dispense Refill     acetaminophen (TYLENOL) 500 MG tablet Take 2  tablets by mouth every 8 hours for post operative pain. 60 tablet 1     ascorbic acid 500 MG TABS Take 500 mg by mouth 2 times daily       calcium carbonate (TUMS) 500 MG chewable tablet Take 1 chew tab by mouth daily as needed for heartburn       ciclopirox (PENLAC) 8 % external solution Apply to adjacent skin and affected nails daily.  Remove with alcohol every 7 days, then repeat. 6.6 mL 3     COMPOUNDED NON-CONTROLLED SUBSTANCE (CMPD RX) - PHARMACY TO MIX COMPOUNDED MEDICATION Place 1 g vaginally twice a week Apply 1g daily for 2 weeks, then twice weekly to vagina 30 g 11     fexofenadine (ALLEGRA) 180 MG tablet Take 180 mg by mouth daily       gabapentin (NEURONTIN) 100 MG capsule 2 pills 1.5 hrs before bedtime 180 capsule 3     ibuprofen (ADVIL/MOTRIN) 200 MG tablet Take 800 mg by mouth daily as needed        Lactase (LACTAID PO) Take 1-2 tablets by mouth daily as needed for indigestion        lamoTRIgine (LAMICTAL) 100 MG tablet Take 1 tablet (100 mg) by mouth 2 times daily        lamoTRIgine (LAMICTAL) 200 MG tablet Take 1 tablet (200 mg) by mouth 2 times daily       levETIRAcetam (KEPPRA) 500 MG tablet Take 1,500 mg by mouth 2 times daily (3 x 500 mg = 1,500 mg dose)       metoprolol tartrate (LOPRESSOR) 25 MG tablet Take 1 tablet (25 mg) by mouth 2 times daily 180 tablet 2     Multiple Vitamin (MULTI-VITAMINS) TABS Take 1 chew tab by mouth 2 times daily        ranitidine (ZANTAC) 150 MG tablet Take 1 tablet (150 mg) by mouth At Bedtime       ranitidine (ZANTAC) 75 MG tablet Take 75 mg by mouth daily       VITAMIN D, CHOLECALCIFEROL, PO Take 1,000 Units by mouth 2 times daily Reported on 5/15/2017       Facility-Administered Encounter Medications as of 9/16/2019   Medication Dose Route Frequency Provider Last Rate Last Dose     lidocaine 1 % injection 4 mL  4 mL   Pool Tong MD   4 mL at 08/26/19 1136     triamcinolone (KENALOG-40) injection 40 mg  40 mg   Pool Tong MD   40 mg at 08/26/19 1136       Review Of Systems:  Skin: As above  Eyes: negative  Ears/Nose/Throat: negative  Respiratory: No shortness of breath, dyspnea on exertion, cough, or hemoptysis  Cardiovascular: negative  Gastrointestinal: negative  Genitourinary: negative  Musculoskeletal: negative  Neurologic: negative  Psychiatric: negative  Hematologic/Lymphatic/Immunologic: negative  Endocrine: negative      Objective:     /71   Pulse 76   SpO2 95%   Breastfeeding? No   Eyes: Conjunctivae/lids: Normal   ENT: Lips:  Normal  MSK: Normal  Cardiovascular: Peripheral edema none  Pulm: Breathing Normal  Neuro/Psych: Orientation: Normal; Mood/Affect: Normal, NAD, WDWN  Pt accompanied by: self  Following areas examined: face, neck, left foot  Navarrete skin type:ii   Findings:  Yellow thickened nail on let 3rd toenail  Flesh colored papule/s with yellow lobules and central depression on face  Assessment and Plan:  1) onychomycosis  Discussed treatment options with OTC products including daily  application of tea tree oil, Vicks vapor rub, and/or apple cider vinegar soaks. Disc oral agents and liver function tests required at baseline, 6 weeks after treatment begins, and then once treatment ends. Prescription strength topicals are available, can use up to 48 weeks. Fingernails take 3-6 months to regrow completely, and toenails up to 12-18 months.   Begin ciclopirox daily  Grow out toenail-consider cx at next OV      2) [facility]  Treatment of these lesions would be purely cosmetic and not medically neccessary.  Lesion may recur and/or may not completely resolve. May need additional treatment.  Different removal options including excision, cryotherapy, cautery and /or laser.    Continue tretinoin.       Follow up in yearly FBE. 3 months to recheck nail.       Again, thank you for allowing me to participate in the care of your patient.        Sincerely,        Cary Camarillo PA-C

## 2019-09-17 ENCOUNTER — THERAPY VISIT (OUTPATIENT)
Dept: PHYSICAL THERAPY | Facility: CLINIC | Age: 59
End: 2019-09-17
Payer: COMMERCIAL

## 2019-09-17 ENCOUNTER — MYC REFILL (OUTPATIENT)
Dept: SLEEP MEDICINE | Facility: CLINIC | Age: 59
End: 2019-09-17

## 2019-09-17 ENCOUNTER — MYC REFILL (OUTPATIENT)
Dept: INTERNAL MEDICINE | Facility: CLINIC | Age: 59
End: 2019-09-17

## 2019-09-17 DIAGNOSIS — M54.16 LUMBAR RADICULOPATHY: Primary | ICD-10-CM

## 2019-09-17 DIAGNOSIS — M25.562 ACUTE PAIN OF LEFT KNEE: ICD-10-CM

## 2019-09-17 PROCEDURE — 97112 NEUROMUSCULAR REEDUCATION: CPT | Mod: GP | Performed by: PHYSICAL THERAPIST

## 2019-09-17 PROCEDURE — 97110 THERAPEUTIC EXERCISES: CPT | Mod: GP | Performed by: PHYSICAL THERAPIST

## 2019-09-17 ASSESSMENT — ACTIVITIES OF DAILY LIVING (ADL)
SQUAT: ACTIVITY IS NOT DIFFICULT
WALK: ACTIVITY IS NOT DIFFICULT
RISE FROM A CHAIR: ACTIVITY IS NOT DIFFICULT
SIT WITH YOUR KNEE BENT: ACTIVITY IS NOT DIFFICULT
GIVING WAY, BUCKLING OR SHIFTING OF KNEE: I DO NOT HAVE THE SYMPTOM
GO UP STAIRS: ACTIVITY IS NOT DIFFICULT
RAW_SCORE: 70
STAND: ACTIVITY IS NOT DIFFICULT
GO DOWN STAIRS: ACTIVITY IS NOT DIFFICULT
KNEE_ACTIVITY_OF_DAILY_LIVING_SUM: 70
KNEEL ON THE FRONT OF YOUR KNEE: ACTIVITY IS NOT DIFFICULT
SWELLING: I DO NOT HAVE THE SYMPTOM
STIFFNESS: I DO NOT HAVE THE SYMPTOM
PAIN: I DO NOT HAVE THE SYMPTOM
HOW_WOULD_YOU_RATE_THE_CURRENT_FUNCTION_OF_YOUR_KNEE_DURING_YOUR_USUAL_DAILY_ACTIVITIES_ON_A_SCALE_FROM_0_TO_100_WITH_100_BEING_YOUR_LEVEL_OF_KNEE_FUNCTION_PRIOR_TO_YOUR_INJURY_AND_0_BEING_THE_INABILITY_TO_PERFORM_ANY_OF_YOUR_USUAL_DAILY_ACTIVITIES?: 95
LIMPING: I DO NOT HAVE THE SYMPTOM
AS_A_RESULT_OF_YOUR_KNEE_INJURY,_HOW_WOULD_YOU_RATE_YOUR_CURRENT_LEVEL_OF_DAILY_ACTIVITY?: NORMAL
KNEE_ACTIVITY_OF_DAILY_LIVING_SCORE: 100
HOW_WOULD_YOU_RATE_THE_OVERALL_FUNCTION_OF_YOUR_KNEE_DURING_YOUR_USUAL_DAILY_ACTIVITIES?: NORMAL
WEAKNESS: I DO NOT HAVE THE SYMPTOM

## 2019-09-17 NOTE — PROGRESS NOTES
Subjective:  HPI                    Objective:  System    Physical Exam    General     ROS    Assessment/Plan:    DISCHARGE REPORT    Progress reporting period is from 008/28/2019 to 09/17/2019.       SUBJECTIVE  Subjective: Patient reports significant improvement with her L knee.  She no longer has pain with stairs and was able to descend stairs without a railing and was also able to carry something which is a significant improvement.  Squatting is not painful now.  She only notices the L posterior-medial knee pain with twisting and pivoting on the L knee at work, and this is not consistent.      Current Pain level: 0/10.     Initial Pain level: 1/10.   Changes in function:  Yes, no pain with descending stairs and does not railing, no pain squatting.    Adverse reaction to treatment or activity: None    OBJECTIVE  Objective: No L knee pain with trials of squatting and step-downs in clinic today.  Also unable to reproduce with pivoting/twisting on the L knee.  Patient is responding well to lumbar extension exercises to address L knee pain and will continue until fully resolved.  Patient interested in LE strengthening exercises to prepare for L foot surgery in future.  Able to perform squats without pain.  Attempted hip strenghening but unable to isolate to correct muscles and/or irritated L knee.  Pt to only do squats if painfree and focus only on lumbar exercises until L knee pain completely resolved.         ASSESSMENT/PLAN  Patient has been seen for 3 visits with treatment focusing on lumbar extension exercises as well as posture and body mechanics to address L knee pain.  She has responded well to treatment with significant improvements in pain and function as a result of performing lumbar extension exercises which confirms lumbar etiology of symptoms.  She has a good HEP and should be able to fully resolve her symptoms by continuing with this independently.    Updated problem list and treatment plan: Diagnosis  1:  L knee pain--lumbar  Pain -  self management, education, directional preference exercise and home program  Decreased function - home program  STG/LTGs have been met or progress has been made towards goals:  Yes (See Goal flow sheet completed today.)  Assessment of Progress: The patient's condition is improving.  Self Management Plans:  Patient has been instructed in a home treatment program.  Patient is independent in a home treatment program.  Patient  has been instructed in self management of symptoms.  Patient is independent in self management of symptoms.  Barbi continues to require the following intervention to meet STG and LTG's:  PT intervention is no longer required to meet STG/LTG.    Recommendations:  This patient is ready to be discharged from therapy and continue their home treatment program.    Please refer to the daily flowsheet for treatment today, total treatment time and time spent performing 1:1 timed codes.

## 2019-09-18 RX ORDER — GABAPENTIN 100 MG/1
CAPSULE ORAL
Qty: 180 CAPSULE | Refills: 3 | Status: CANCELLED | OUTPATIENT
Start: 2019-09-18

## 2019-09-18 RX ORDER — LAMOTRIGINE 200 MG/1
200 TABLET ORAL 2 TIMES DAILY
OUTPATIENT
Start: 2019-09-18

## 2019-09-18 RX ORDER — LAMOTRIGINE 100 MG/1
100 TABLET ORAL 2 TIMES DAILY
OUTPATIENT
Start: 2019-09-18

## 2019-09-18 NOTE — TELEPHONE ENCOUNTER
Have our pharmacy send request for med to Dr. Hopper - neuro [facility]. They should be providing this med from here on out.

## 2019-09-18 NOTE — TELEPHONE ENCOUNTER
"Requested Prescriptions   Pending Prescriptions Disp Refills     lamoTRIgine (LAMICTAL) 200 MG tablet       Sig: Take 1 tablet (200 mg) by mouth 2 times daily       Anti-Seizure Meds Protocol  Failed - 9/17/2019  8:25 PM        Failed - Review Authorizing provider's last note.      Refer to last progress notes: confirm request is for original authorizing provider (cannot be through other providers).          Failed - Normal CBC on file in past 26 months     Recent Labs   Lab Test 05/31/18  0357   WBC 10.6   RBC 3.90   HGB 11.5*   HCT 35.9                    Failed - Normal ALT or AST on file in past 26 months     Recent Labs   Lab Test 05/11/17  1827   ALT 30     Recent Labs   Lab Test 05/11/17  1827   AST 20             Passed - Recent (12 mo) or future (30 days) visit within the authorizing provider's specialty     Patient had office visit in the last 12 months or has a visit in the next 30 days with authorizing provider or within the authorizing provider's specialty.  See \"Patient Info\" tab in inbasket, or \"Choose Columns\" in Meds & Orders section of the refill encounter.              Passed - Normal platelet count on file in past 26 months     Recent Labs   Lab Test 05/31/18  0357                  Passed - Medication is active on med list        Passed - No active pregnancy on record        Passed - No positive pregnancy test in last 12 months        lamoTRIgine (LAMICTAL) 100 MG tablet       Sig: Take 1 tablet (100 mg) by mouth 2 times daily       Anti-Seizure Meds Protocol  Failed - 9/17/2019  8:25 PM        Failed - Review Authorizing provider's last note.      Refer to last progress notes: confirm request is for original authorizing provider (cannot be through other providers).          Failed - Normal CBC on file in past 26 months     Recent Labs   Lab Test 05/31/18  0357   WBC 10.6   RBC 3.90   HGB 11.5*   HCT 35.9                    Failed - Normal ALT or AST on file in past 26 months " "    Recent Labs   Lab Test 05/11/17  1827   ALT 30     Recent Labs   Lab Test 05/11/17  1827   AST 20             Passed - Recent (12 mo) or future (30 days) visit within the authorizing provider's specialty     Patient had office visit in the last 12 months or has a visit in the next 30 days with authorizing provider or within the authorizing provider's specialty.  See \"Patient Info\" tab in inbasket, or \"Choose Columns\" in Meds & Orders section of the refill encounter.              Passed - Normal platelet count on file in past 26 months     Recent Labs   Lab Test 05/31/18  0357                  Passed - Medication is active on med list        Passed - No active pregnancy on record        Passed - No positive pregnancy test in last 12 months        Routing refill request to provider for review/approval because:  Labs out of range:  cbc  Labs not current:  Alt, ast        "

## 2019-09-29 ENCOUNTER — HEALTH MAINTENANCE LETTER (OUTPATIENT)
Age: 59
End: 2019-09-29

## 2019-10-31 ENCOUNTER — OFFICE VISIT (OUTPATIENT)
Dept: NEUROLOGY | Facility: CLINIC | Age: 59
End: 2019-10-31
Payer: COMMERCIAL

## 2019-10-31 VITALS
WEIGHT: 145 LBS | SYSTOLIC BLOOD PRESSURE: 126 MMHG | BODY MASS INDEX: 26.68 KG/M2 | HEIGHT: 62 IN | DIASTOLIC BLOOD PRESSURE: 72 MMHG | HEART RATE: 57 BPM

## 2019-10-31 DIAGNOSIS — R56.9 SEIZURE (H): Primary | ICD-10-CM

## 2019-10-31 RX ORDER — LAMOTRIGINE 200 MG/1
TABLET ORAL
Qty: 180 TABLET | Refills: 3 | Status: SHIPPED | OUTPATIENT
Start: 2019-10-31 | End: 2020-02-24

## 2019-10-31 RX ORDER — LEVETIRACETAM 500 MG/1
TABLET ORAL
Qty: 540 TABLET | Refills: 3 | Status: SHIPPED | OUTPATIENT
Start: 2019-10-31 | End: 2020-02-24

## 2019-10-31 RX ORDER — GABAPENTIN 100 MG/1
CAPSULE ORAL
Qty: 270 CAPSULE | Refills: 3 | Status: SHIPPED | OUTPATIENT
Start: 2019-10-31 | End: 2020-02-24

## 2019-10-31 RX ORDER — LAMOTRIGINE 100 MG/1
TABLET ORAL
Qty: 180 TABLET | Refills: 3 | Status: SHIPPED | OUTPATIENT
Start: 2019-10-31 | End: 2020-02-24

## 2019-10-31 ASSESSMENT — PAIN SCALES - GENERAL: PAINLEVEL: NO PAIN (0)

## 2019-10-31 ASSESSMENT — MIFFLIN-ST. JEOR: SCORE: 1190.97

## 2019-10-31 NOTE — PATIENT INSTRUCTIONS
Medication Name   Tablet Size         7AM  (morning)   Noon  7PM (Night)   Notes     lamotrigine 100 mg and 200 mg   300 mg  300 mg  0 mg  change times     gabapentin  100 mg     300 mg  increase  gabapentin       levetiracetam 500 mg   1000 mg  0 mg  2000 mg         We may consider moving afternoon dose of lamotrigine to 4 pm and adding  gabapentin    We are moving lamotrigine into the afternoon to improve your sleep, higher dose of  gabapentin will also help your sleep. Take lamotrigine after food  If you have more seizures or side effects let us know    CBT-I-    For free [facility]  Sleep Tools  Sleep Education   Reminders with smart phone     This is a great alternative to CBT I        Please try this to improve your sleep. A good night rest is important in preventing seizures.      Flakita Clark MD       CONTINUE TAKING YOUR OTHER MEDICATIONS AS PREVIOUSLY DIRECTED.  IF YOU HAVE ANY SIDE EFFECTS OR CONCERNS CALL [phone].     Flakita Clark MD
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
## 2019-11-11 ENCOUNTER — TELEPHONE (OUTPATIENT)
Dept: NEUROLOGY | Facility: CLINIC | Age: 59
End: 2019-11-11

## 2019-11-15 NOTE — TELEPHONE ENCOUNTER
What is the concern that needs to be addressed by a nurse? Patient is returning call and  will be available  to talk next tuesday or Thursday anytime.    May a detailed message be left on voicemail?     Date of last office visit: 10/31/2019    Message routed to: [facility] RN POOL

## 2019-11-19 NOTE — TELEPHONE ENCOUNTER
Barbi has set her phone to alarm to remind her to take her medications.  She is taking lamictal 300 mg at 7 AM and 300 mg 7 PM.   She also takes Keppra 1000 AM - 2000 PM  She feels tired on 300  Mg of gabapentin so she has reduced this medication to 200 mg.   She would like Dr. Clark's romy to continue this dose.     PLAN: Discussed with Dr. Eduardo Beth to continue Gabapentin 200 mg

## 2019-12-16 ENCOUNTER — ONCOLOGY VISIT (OUTPATIENT)
Dept: ONCOLOGY | Facility: CLINIC | Age: 59
End: 2019-12-16
Attending: PSYCHIATRY & NEUROLOGY
Payer: COMMERCIAL

## 2019-12-16 ENCOUNTER — TUMOR CONFERENCE (OUTPATIENT)
Dept: ONCOLOGY | Facility: CLINIC | Age: 59
End: 2019-12-16

## 2019-12-16 ENCOUNTER — ANCILLARY PROCEDURE (OUTPATIENT)
Dept: MRI IMAGING | Facility: CLINIC | Age: 59
End: 2019-12-16
Attending: PSYCHIATRY & NEUROLOGY
Payer: COMMERCIAL

## 2019-12-16 VITALS
SYSTOLIC BLOOD PRESSURE: 119 MMHG | HEIGHT: 62 IN | RESPIRATION RATE: 14 BRPM | OXYGEN SATURATION: 98 % | HEART RATE: 65 BPM | DIASTOLIC BLOOD PRESSURE: 77 MMHG | TEMPERATURE: 97.9 F | WEIGHT: 147 LBS | BODY MASS INDEX: 27.05 KG/M2

## 2019-12-16 DIAGNOSIS — C71.9 OLIGODENDROGLIOMA, WHO GRADE II (H): Primary | ICD-10-CM

## 2019-12-16 DIAGNOSIS — R41.89 COGNITIVE CHANGES: ICD-10-CM

## 2019-12-16 DIAGNOSIS — C71.9 OLIGODENDROGLIOMA, WHO GRADE II (H): ICD-10-CM

## 2019-12-16 PROCEDURE — G0463 HOSPITAL OUTPT CLINIC VISIT: HCPCS | Mod: ZF

## 2019-12-16 PROCEDURE — 99214 OFFICE O/P EST MOD 30 MIN: CPT | Mod: ZP | Performed by: PSYCHIATRY & NEUROLOGY

## 2019-12-16 RX ORDER — GADOBUTROL 604.72 MG/ML
7.5 INJECTION INTRAVENOUS ONCE
Status: COMPLETED | OUTPATIENT
Start: 2019-12-16 | End: 2019-12-16

## 2019-12-16 RX ADMIN — GADOBUTROL 7.5 ML: 604.72 INJECTION INTRAVENOUS at 12:26

## 2019-12-16 ASSESSMENT — PAIN SCALES - GENERAL: PAINLEVEL: NO PAIN (0)

## 2019-12-16 ASSESSMENT — MIFFLIN-ST. JEOR: SCORE: 1200.17

## 2019-12-16 NOTE — LETTER
RE: Barbi Tiwari  [address] [facility] [address]     Dear Colleague,    Thank you for referring your patient, Barbi Tiwari, to the [facility]. Please see a copy of my visit note below.    NEURO-ONCOLOGY VISIT  12/16/2019    CHIEF COMPLAINT: Ms. Barbi Tiwari is a 59 year old right-handed woman with a right frontal diffuse oligodendroglioma (1p19q co-deleted), diagnosed following resection in 5/2011. She received 12 cycles of temozolomide, completed in 5/2012. Changes on imaging necessitated a repeat resection on 5/30/2018 and pathology was unchanged. No adjuvant cancer-directed therapy was initiated. Currently managed conservatively on imaging surveillance, however, imaging over the past 1.5 years has shown tumor regrowth.     She is presenting in follow-up for contiuned evaluation and recommendations on treatment. She is not accompanied.      HISTORY OF PRESENT ILLNESS  -She denies any new symptoms; no weakness, numbness, or headaches. However, there is a change in seizure frequency.   -Continued floater noted in visual field, now following with ophtho.  -Fatigue remains an ongoing complaint. Thought to be related to her anti-seizure medications.  -Continued mild word-finding issues, otherwise no change in her cognitive function. 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  -Foot pain on the left, pending foot surgery.   -Mood is positive, but distressed about results of imaging today.     REVIEW OF SYSTEMS  A comprehensive ROS negative except as in HPI.      MEDICATIONS   Current Outpatient Medications   Medication     acetaminophen (TYLENOL) 500 MG tablet     ascorbic acid 500 MG TABS     calcium carbonate (TUMS) 500 MG chewable tablet     ciclopirox (PENLAC) 8 % external solution     COMPOUNDED NON-CONTROLLED SUBSTANCE (CMPD RX) - PHARMACY TO MIX COMPOUNDED MEDICATION     fexofenadine (ALLEGRA) 180 MG tablet     gabapentin (NEURONTIN) 100 MG capsule      ibuprofen (ADVIL/MOTRIN) 200 MG tablet     Lactase (LACTAID PO)     lamoTRIgine (LAMICTAL) 100 MG tablet     lamoTRIgine (LAMICTAL) 200 MG tablet     levETIRAcetam (KEPPRA) 500 MG tablet     metoprolol tartrate (LOPRESSOR) 25 MG tablet     Multiple Vitamin (MULTI-VITAMINS) TABS     ranitidine (ZANTAC) 150 MG tablet     ranitidine (ZANTAC) 75 MG tablet     tretinoin (RETIN-A) 0.025 % external cream     VITAMIN D, CHOLECALCIFEROL, PO     Current Facility-Administered Medications   Medication     lidocaine 1 % injection 4 mL     triamcinolone (KENALOG-40) injection 40 mg     DRUG ALLERGIES   Allergies   Allergen Reactions     Sulfa Drugs Hives     Benoxinate Nausea and Vomiting       ONCOLOGIC HISTORY  -4/27/2011 PRESENTATION: New onset seizure, generalized event.   -5/2011 MRB with a non-contrast enhancing right frontal mass lesion.   -5/2011 SURGERY: Craniectomy for mass resection at [facility].   PATHOLOGY: Diffuse oligodendroglioma; WHO grade II, 1p/19q co-deleted.  -6/2011-5/2012 CHEMO: Adjuvant dosed temozolomide x 12 cycles.  -4/2/2018 MRB with possible disease recurrence with a new 1.2 cm non-enhancing nodule within the cortex of the right frontal lobe adjacent to the resection cavity.  -4/12/2018 NEURO-ONC: Recommending repeat resection, appointment scheduled with Dr. Dustin Rodriguez, neurosurgery at the [facility].   -5/30/2018 SURGERY: Repeat resection with Dr. Rodriguez.   PATHOLOGY: Remains low grade, without concerning features.   -6/15/2018 NEURO-ONC: Start imaging surveillance.  -9/17/2018 NEURO-ONC/ MRB: Imaging stable, continue with surveillance.   -12/10/2018 NEURO-ONC/ MRB: Imaging stable, continue with surveillance.   -4/15/2019 NEURO-ONC/ MRB: Imaging stable, continue with surveillance.  -8/19/2019 NEURO-ONC/ MRB: Clinically stable. Imaging largely stable, subtle increases on T2 FLAIR; continue with surveillance.  -12/16/2019 NEURO-ONC/ MRB: Clinically stable. Imaging with increased T2 FLAIR medially and laterally  "about the resection cavity. Referral to Dr. Figueroa with radiation oncology. Discussion with Dr. Rodriguez. Referral for repeat neuro-psychology testing.     SOCIAL HISTORY   Tobacco use: Never smoker.   Alcohol use: Social.   Drug use: Denies marijuana use.  Supplement, complimentary/ alternative medicine: None.   Employment: RN.   , 2 children      PHYSICAL EXAMINATION  /77   Pulse 65   Temp 97.9  F (36.6  C) (Oral)   Resp 14   Ht 1.575 m (5' 2.01\")   Wt 66.7 kg (147 lb)   SpO2 98%   BMI 26.88 kg/m      Wt Readings from Last 2 Encounters:   12/16/19 66.7 kg (147 lb)   10/31/19 65.8 kg (145 lb)     Ht Readings from Last 2 Encounters:   12/16/19 1.575 m (5' 2.01\")   10/31/19 1.575 m (5' 2\")     KPS: 100    -Generally well appearing. Saddened about today's in clinic discussion, appropriate.   -Respiratory: Normal breath sounds, no audible wheezing.   -Skin: No rashes. Healed head incision.  -Hematologic/ lymphatic: No abnormal bruising. No leg swelling.  -Psychiatric: Normal mood and affect. Pleasant, talkative.  -Neurologic:   MENTAL STATUS:     Alert, oriented to date.    Recall: Intact.    Speech fluent.    Comprehension intact to multi-step commands.   Normal naming.   Good right-left orientation.     CRANIAL NERVES:     Pupils are equal, round, reactive to light.     Extraocular movements full, patient denies diplopia.     Visual fields full.     Facial sensation intact to light touch.   Really equal activation with smiling/ talking on the left side of face, very subtle.    Hearing intact.   Palate moves symmetrically.     Tongue midline.  MOTOR:    No pronation or drift. No orbiting.   Able to rise from a chair without use of arms.   On toe/ heel walk, equal distance from floor to heels/ toes (but difficult to test due to foot pain).   SENSATION:    Intact to light touch throughout.  COORDINATION:   Intact finger-nose with eyes open and closed.   GAIT:  Walks without assistance. Antalgic with left " foot pain, limping.    Good speed. Normal stride length and heel strike. Normal turns. Normal arm swing.      MEDICAL RECORDS  Obtained and personally reviewed all available outside medical records in addition to reviewing any records available in our electronic system.     LABS  Personally reviewed all available lab results.     IMAGING  Personally reviewed MR brain imaging from today. To my eye, there is no new contrast enhancement. Overall stable FLAIR hyperintensity about the cavity since 5/2018, but clearly an increase in FLAIR in the lateral aspect of the frontal lobe inferior to the cavity and also medially about the superior region.     Imaging was shown to and results were reviewed with Barbi.     Case also discussed at Brain Tumor Conference.       IMPRESSION  Clinic time was spent discussing in detail the nature of this tumor in light of her repeat imaging from today. This is in addition to providing emotional support, answering questions pertaining to my recommendations, and devising the treatment plan as outlined below.     Clinically, Barbi is doing well with no new subjective or objective findings and no increase in frequency of seizures. However on review of her imaging from today, in comparison to imaging from 5/2018, there has been clear thickening of the gyrus lateral to the resection cavity and also some more confluent T2 hyperintense signal changes at the superior medial margin identified over the past 1.5 years. It was discussed with Barbi that while I don't see any urgent findings necessitating intervention now, I think that something will need to be initiated soon. So far, treatment has included resections in 2011 and 2018 plus temozolomide alone in 2012, but never any radiation.     I will discuss the role of another resection with Dr. Rodriguez. I will also refer to Dr. Figueroa with radiation oncology to discuss this treatment option, its use, and its side effects in greater detail. Finally, Barbi  had neuro-psych testing in 2011. Given subjective memory issues with the possibility of additional treatments, will refer to Dr. Meyer for repeat testing.     To review, while I expressed to Barbi that I am willing to plan for a repeat scan in 4 months, I think it is important for her to know her options. Additionally, it is critical that we do not miss our optimal window to treat while there she is experiencing no deficits, has limited risks, and stands to derive positive benefits.    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     -SEIZURE MANAGEMENT-  -Follows with Dr. Flakita Clark; Neurology, epilepsy specialist at the [facility].      -Quality of life/ MOOD/ FATIGUE-  -Endorses mild anxiety.   -Continue to monitor mood as untreated/ undertreated depression can worsen fatigue, dysorexia, and quality of life.  -Issues with sleep, on gabapentin.     -COGNITIVE CHANGES-  -Referral to Dr. Yoshi Meyer for neuro-psych testing.      Return to clinic in 4/2020 for repeat evaluation and to review new imaging unless Jensen Rodriguez and/ or Henry have other recommendations.     In the meantime, Barbi knows to call with questions or concerns or to report new complaints and can be seen sooner if needed. Urgent evaluation is needed in the setting of acute onset of severe headache, abrupt change in mental status, on-going seizures, new focal deficits, or new leg swelling/ pain.    Myrtle Gallagher MD  Neuro-oncology

## 2019-12-16 NOTE — PROGRESS NOTES
NEURO-ONCOLOGY VISIT  12/16/2019    CHIEF COMPLAINT: Ms. Barbi Tiwari is a 59 year old right-handed woman with a right frontal diffuse oligodendroglioma (1p19q co-deleted), diagnosed following resection in 5/2011. She received 12 cycles of temozolomide, completed in 5/2012. Changes on imaging necessitated a repeat resection on 5/30/2018 and pathology was unchanged. No adjuvant cancer-directed therapy was initiated. Currently managed conservatively on imaging surveillance, however, imaging over the past 1.5 years has shown tumor regrowth.     She is presenting in follow-up for contiuned evaluation and recommendations on treatment. She is not accompanied.      HISTORY OF PRESENT ILLNESS  -She denies any new symptoms; no weakness, numbness, or headaches. However, there is a change in seizure frequency.   -Continued floater noted in visual field, now following with ophtho.  -Fatigue remains an ongoing complaint. Thought to be related to her anti-seizure medications.  -Continued mild word-finding issues, otherwise no change in her cognitive function. 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  -Foot pain on the left, pending foot surgery.   -Mood is positive, but distressed about results of imaging today.     REVIEW OF SYSTEMS  A comprehensive ROS negative except as in HPI.      MEDICATIONS   Current Outpatient Medications   Medication     acetaminophen (TYLENOL) 500 MG tablet     ascorbic acid 500 MG TABS     calcium carbonate (TUMS) 500 MG chewable tablet     ciclopirox (PENLAC) 8 % external solution     COMPOUNDED NON-CONTROLLED SUBSTANCE (CMPD RX) - PHARMACY TO MIX COMPOUNDED MEDICATION     fexofenadine (ALLEGRA) 180 MG tablet     gabapentin (NEURONTIN) 100 MG capsule     ibuprofen (ADVIL/MOTRIN) 200 MG tablet     Lactase (LACTAID PO)     lamoTRIgine (LAMICTAL) 100 MG tablet     lamoTRIgine (LAMICTAL) 200 MG tablet     levETIRAcetam (KEPPRA) 500 MG tablet     metoprolol tartrate (LOPRESSOR) 25 MG tablet      Multiple Vitamin (MULTI-VITAMINS) TABS     ranitidine (ZANTAC) 150 MG tablet     ranitidine (ZANTAC) 75 MG tablet     tretinoin (RETIN-A) 0.025 % external cream     VITAMIN D, CHOLECALCIFEROL, PO     Current Facility-Administered Medications   Medication     lidocaine 1 % injection 4 mL     triamcinolone (KENALOG-40) injection 40 mg     DRUG ALLERGIES   Allergies   Allergen Reactions     Sulfa Drugs Hives     Benoxinate Nausea and Vomiting       ONCOLOGIC HISTORY  -4/27/2011 PRESENTATION: New onset seizure, generalized event.   -5/2011 MRB with a non-contrast enhancing right frontal mass lesion.   -5/2011 SURGERY: Craniectomy for mass resection at [facility].   PATHOLOGY: Diffuse oligodendroglioma; WHO grade II, 1p/19q co-deleted.  -6/2011-5/2012 CHEMO: Adjuvant dosed temozolomide x 12 cycles.  -4/2/2018 MRB with possible disease recurrence with a new 1.2 cm non-enhancing nodule within the cortex of the right frontal lobe adjacent to the resection cavity.  -4/12/2018 NEURO-ONC: Recommending repeat resection, appointment scheduled with Dr. Dustin Rodriguez, [facility]surgery at the [facility].   -5/30/2018 SURGERY: Repeat resection with Dr. Rodriguez.   PATHOLOGY: Remains low grade, without concerning features.   -6/15/2018 NEURO-ONC: Start imaging surveillance.  -9/17/2018 NEURO-ONC/ MRB: Imaging stable, continue with surveillance.   -12/10/2018 NEURO-ONC/ MRB: Imaging stable, continue with surveillance.   -4/15/2019 NEURO-ONC/ MRB: Imaging stable, continue with surveillance.  -8/19/2019 NEURO-ONC/ MRB: Clinically stable. Imaging largely stable, subtle increases on T2 FLAIR; continue with surveillance.  -12/16/2019 NEURO-ONC/ MRB: Clinically stable. Imaging with increased T2 FLAIR medially and laterally about the resection cavity. Referral to Dr. Figueroa with radiation oncology. Discussion with Dr. Rodriguez. Referral for repeat neuro-psychology testing.     SOCIAL HISTORY   Tobacco use: Never smoker.   Alcohol use: Social.   Drug use:  "Denies marijuana use.  Supplement, complimentary/ alternative medicine: None.   Employment: RN.   , 2 children      PHYSICAL EXAMINATION  /77   Pulse 65   Temp 97.9  F (36.6  C) (Oral)   Resp 14   Ht 1.575 m (5' 2.01\")   Wt 66.7 kg (147 lb)   SpO2 98%   BMI 26.88 kg/m     Wt Readings from Last 2 Encounters:   12/16/19 66.7 kg (147 lb)   10/31/19 65.8 kg (145 lb)     Ht Readings from Last 2 Encounters:   12/16/19 1.575 m (5' 2.01\")   10/31/19 1.575 m (5' 2\")     KPS: 100    -Generally well appearing. Saddened about today's in clinic discussion, appropriate.   -Respiratory: Normal breath sounds, no audible wheezing.   -Skin: No rashes. Healed head incision.  -Hematologic/ lymphatic: No abnormal bruising. No leg swelling.  -Psychiatric: Normal mood and affect. Pleasant, talkative.  -Neurologic:   MENTAL STATUS:     Alert, oriented to date.    Recall: Intact.    Speech fluent.    Comprehension intact to multi-step commands.   Normal naming.   Good right-left orientation.     CRANIAL NERVES:     Pupils are equal, round, reactive to light.     Extraocular movements full, patient denies diplopia.     Visual fields full.     Facial sensation intact to light touch.   Really equal activation with smiling/ talking on the left side of face, very subtle.    Hearing intact.   Palate moves symmetrically.     Tongue midline.  MOTOR:    No pronation or drift. No orbiting.   Able to rise from a chair without use of arms.   On toe/ heel walk, equal distance from floor to heels/ toes (but difficult to test due to foot pain).   SENSATION:    Intact to light touch throughout.  COORDINATION:   Intact finger-nose with eyes open and closed.   GAIT:  Walks without assistance. Antalgic with left foot pain, limping.    Good speed. Normal stride length and heel strike. Normal turns. Normal arm swing.      MEDICAL RECORDS  Obtained and personally reviewed all available outside medical records in addition to reviewing any " records available in our electronic system.     LABS  Personally reviewed all available lab results.     IMAGING  Personally reviewed MR brain imaging from today. To my eye, there is no new contrast enhancement. Overall stable FLAIR hyperintensity about the cavity since 5/2018, but clearly an increase in FLAIR in the lateral aspect of the frontal lobe inferior to the cavity and also medially about the superior region.     Imaging was shown to and results were reviewed with Barbi.     Case also discussed at Brain Tumor Conference.       IMPRESSION  Clinic time was spent discussing in detail the nature of this tumor in light of her repeat imaging from today. This is in addition to providing emotional support, answering questions pertaining to my recommendations, and devising the treatment plan as outlined below.     Clinically, Barbi is doing well with no new subjective or objective findings and no increase in frequency of seizures. However on review of her imaging from today, in comparison to imaging from 5/2018, there has been clear thickening of the gyrus lateral to the resection cavity and also some more confluent T2 hyperintense signal changes at the superior medial margin identified over the past 1.5 years. It was discussed with Barbi that while I don't see any urgent findings necessitating intervention now, I think that something will need to be initiated soon. So far, treatment has included resections in 2011 and 2018 plus temozolomide alone in 2012, but never any radiation.     I will discuss the role of another resection with Dr. Rodriguez. I will also refer to Dr. Figueroa with radiation oncology to discuss this treatment option, its use, and its side effects in greater detail. Finally, Barbi had neuro-psych testing in 2011. Given subjective memory issues with the possibility of additional treatments, will refer to Dr. Meyer for repeat testing.     To review, while I expressed to Barbi that I am willing to plan  for a repeat scan in 4 months, I think it is important for her to know her options. Additionally, it is critical that we do not miss our optimal window to treat while there she is experiencing no deficits, has limited risks, and stands to derive positive benefits.    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     -SEIZURE MANAGEMENT-  -Follows with Dr. Flakita Clark; Neurology, epilepsy specialist at the [facility].      -Quality of life/ MOOD/ FATIGUE-  -Endorses mild anxiety.   -Continue to monitor mood as untreated/ undertreated depression can worsen fatigue, dysorexia, and quality of life.  -Issues with sleep, on gabapentin.     -COGNITIVE CHANGES-  -Referral to Dr. Yoshi Meyer for neuro-psych testing.      Return to clinic in 4/2020 for repeat evaluation and to review new imaging unless Jensen Rodriguez and/ or Henry have other recommendations.     In the meantime, Barbi knows to call with questions or concerns or to report new complaints and can be seen sooner if needed. Urgent evaluation is needed in the setting of acute onset of severe headache, abrupt change in mental status, on-going seizures, new focal deficits, or new leg swelling/ pain.    Myrtle Gallagher MD  Neuro-oncology

## 2019-12-16 NOTE — NURSING NOTE
"Oncology Rooming Note    December 16, 2019 2:34 PM   Barbi Tiwari is a 59 year old female who presents for:    Chief Complaint   Patient presents with     Oncology Clinic Visit     Return; Oligodedroglioma     Initial Vitals: /77   Pulse 65   Temp 97.9  F (36.6  C) (Oral)   Resp 14   Ht 1.575 m (5' 2.01\")   Wt 66.7 kg (147 lb)   SpO2 98%   BMI 26.88 kg/m   Estimated body mass index is 26.88 kg/m  as calculated from the following:    Height as of this encounter: 1.575 m (5' 2.01\").    Weight as of this encounter: 66.7 kg (147 lb). Body surface area is 1.71 meters squared.  No Pain (0) Comment: Data Unavailable   No LMP recorded. Patient has had a hysterectomy.  Allergies reviewed: Yes  Medications reviewed: Yes    Medications: Medication refills not needed today.  Pharmacy name entered into EPIC:    [facility] - [place], [place] - [address] [facility] - [place], [place] - [address] [id number] - [place], [place] - [address] AT [facility] [address] [facility] - [place], [place] - [address]    Clinical concerns: No new concerns        Nicolle Muhammad CMA              "

## 2019-12-16 NOTE — TUMOR CONFERENCE
Tumor Conference Information  Tumor Conference:  Brain  Specialties Present:  Medical oncology, Pathology, Radiology, Surgery  Patient Status:  A current patient  Pathology:  Not Discussed  Treatment to Date:  Surgical intervention(s), Adjuvant chemotherapy  Clinical Trial Eligibility:  Not discussed  Recommended Plan:  Observation (see comment) (Comment: grossly stable on recent imaging; plan for follow up and repeat MRI in 6 months)  Did the review exceed 30 minutes?:  did not           Documentation / Disclaimer Cancer Tumor Board Note  Cancer tumor board recommendations do not override what is determined to be reasonable care and treatment, which is dependent on the circumstances of a patient's case; the patient's medical, social, and personal concerns; and the clinical judgment of the oncologist [physician].

## 2019-12-16 NOTE — PATIENT INSTRUCTIONS
Imaging reviewed, stable in the short term, no concerns. However, over the course of 1.5 years, subtle changes concerning for tumor growth.   For now, repeat imaging in 4 months.     Referral to Dr. Yoshi Meyer for repeat neuro-psych testing (first testing 2013 in [place]).  Referral to Dr. Devan Figueroa, radiation oncology at the .   I will discuss your imaging with Dr. Rodriguez. I will call you with an update.      Return to clinic in 4/2020 + repeat imaging.     Myrtle Gallagher MD  Neuro-oncology  12/16/2019

## 2020-01-06 ENCOUNTER — MYC REFILL (OUTPATIENT)
Dept: INTERNAL MEDICINE | Facility: CLINIC | Age: 60
End: 2020-01-06

## 2020-01-06 DIAGNOSIS — I49.3 SYMPTOMATIC PREMATURE VENTRICULAR CONTRACTIONS: ICD-10-CM

## 2020-01-06 RX ORDER — METOPROLOL TARTRATE 25 MG/1
25 TABLET, FILM COATED ORAL 2 TIMES DAILY
Qty: 180 TABLET | Refills: 1 | Status: SHIPPED | OUTPATIENT
Start: 2020-01-06 | End: 2020-07-06

## 2020-01-06 NOTE — TELEPHONE ENCOUNTER
"Requested Prescriptions   Pending Prescriptions Disp Refills     metoprolol tartrate (LOPRESSOR) 25 MG tablet 180 tablet 2     Sig: Take 1 tablet (25 mg) by mouth 2 times daily       Beta-Blockers Protocol Passed - 1/6/2020  4:21 PM        Passed - Blood pressure under 140/90 in past 12 months     BP Readings from Last 3 Encounters:   12/16/19 119/77   10/31/19 126/72   09/16/19 111/71                 Passed - Patient is age 6 or older        Passed - Recent (12 mo) or future (30 days) visit within the authorizing provider's specialty     Patient has had an office visit with the authorizing provider or a provider within the authorizing providers department within the previous 12 mos or has a future within next 30 days. See \"Patient Info\" tab in inbasket, or \"Choose Columns\" in Meds & Orders section of the refill encounter.              Passed - Medication is active on med list          "

## 2020-01-10 ENCOUNTER — OFFICE VISIT (OUTPATIENT)
Dept: RADIATION ONCOLOGY | Facility: CLINIC | Age: 60
End: 2020-01-10
Attending: PSYCHIATRY & NEUROLOGY
Payer: COMMERCIAL

## 2020-01-10 VITALS — DIASTOLIC BLOOD PRESSURE: 79 MMHG | WEIGHT: 146 LBS | SYSTOLIC BLOOD PRESSURE: 142 MMHG | BODY MASS INDEX: 26.7 KG/M2

## 2020-01-10 DIAGNOSIS — C71.9 OLIGODENDROGLIOMA (H): Primary | ICD-10-CM

## 2020-01-10 PROCEDURE — G0463 HOSPITAL OUTPT CLINIC VISIT: HCPCS | Performed by: RADIOLOGY

## 2020-01-10 ASSESSMENT — ENCOUNTER SYMPTOMS
DOUBLE VISION: 0
BLOOD IN STOOL: 0
MEMORY LOSS: 0
SPEECH CHANGE: 0
HEARTBURN: 1
WEIGHT LOSS: 0
HEMATURIA: 0
SHORTNESS OF BREATH: 0
EYE DISCHARGE: 0
FALLS: 0
LOSS OF CONSCIOUSNESS: 0
SINUS PAIN: 0
PND: 0
TREMORS: 0
POLYDIPSIA: 0
FEVER: 0
DEPRESSION: 1
SORE THROAT: 0
VOMITING: 0
SEIZURES: 1
HEMOPTYSIS: 0
NAUSEA: 0
EYE REDNESS: 0
STRIDOR: 0
WHEEZING: 0
EYE PAIN: 0
PHOTOPHOBIA: 0
FLANK PAIN: 0
CONSTIPATION: 0
DIARRHEA: 0
WEAKNESS: 0
BRUISES/BLEEDS EASILY: 0
BLURRED VISION: 0
DIZZINESS: 0
FOCAL WEAKNESS: 0
NECK PAIN: 0
DIAPHORESIS: 0
BACK PAIN: 0
PALPITATIONS: 0
CLAUDICATION: 0
CHILLS: 0
SENSORY CHANGE: 0
HEADACHES: 0
FREQUENCY: 0
NERVOUS/ANXIOUS: 1
DYSURIA: 0
MYALGIAS: 0
COUGH: 0
TINGLING: 0
ORTHOPNEA: 0
HALLUCINATIONS: 0
INSOMNIA: 0
ABDOMINAL PAIN: 0
SPUTUM PRODUCTION: 0

## 2020-01-10 ASSESSMENT — LIFESTYLE VARIABLES: SUBSTANCE_ABUSE: 0

## 2020-01-10 NOTE — PROGRESS NOTES
Attending addendum:   I saw and examined the patient with the resident and agree with the documented plan of care.    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 She has not previously received radiotherapy as part of her oncologic cares.    Ms. Tiwari now presents with radiographic evidence of continued slow disease progression. Review of her most recent imaging from 2019 demonstrates increased T2 hyperintensity adjacent to the resection cavity compared to her scans from mid- without any regions of contrast enhancement.    I discussed consideration of partial brain radiotherapy for improved control of Ms. Tiwari's progressive low-grade glioma. Given the slow rate of growth, I do not feel that there is an urgency in initiating radiotherapy presently however she will likely require treatment in the not too distant future. At this time she is planning to follow-up with repeat imaging with Dr. Gallagher on 2020.      Juan Figueroa MD/PhD    Dept of Radiation Oncology  [facility]             Department of Radiation Oncology  [address] [address]  [phone]       Consultation Note    Name: Barbi Tiwari MRN: [id number]   : 1960   Date of Service: Omid 10, 2020 Referring: Dr. Myrtle Gallagher / neuro-oncology     Reason for consultation: Consideration of radiotherapy for management of recurrent WHO grade II diffuse oligodendroglioma status post surgical resection x 2 and adjuvant temozolomide.    History of Present Illness   Ms. 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 Her most recent imaging showed evidence of tumor progression.    Her oncologic history started back in 4/2011 when she presented with a new onset seizure. She had an MRI of the brain which demonstrated an infiltrative right frontal lobe lesion which was resected at [facility] on 5/12/2011. The pathology was consistent with WHO grade II diffuse oligodendroglioma, 1p/19q co-deleted. The patient then completed 12 cycles of adjuvant chemotherapy temozolomide between 6/2011-5/2012.    She has been under surveillance with serial brain MRI scans since then. She remained disease-free until 4/2/2018 at which time her surveillance brain MRI scans showed evidence of a new 1.2 cm non-enhancing lesion within the cortex of the right frontal lobe adjacent to the resection cavity. She underwent a repeat resection by Dr. Rodriguez on 5/30/2018. 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 The patient's case was presented at neuro-oncology multidisciplinary tumor board on 6/15/2018 with recommendations for surveillance given persistence of the low-grade nature of her disease.    She continued on observation with serial surveillance brain MRI scans. Her most recent brain MRI imaging showed increased T2 FLAIR signal adjacent to the resection cavity, concerning for tumor recurrence. The patient was referred to us for consideration of radiotherapy.    On interview today, she was doing very well. She denies any new neurologic symptoms, weakness, numbness, headaches, visual or auditory changes, seizures, loss of bowel or urinary control, urinary or bowel symptoms, weight or appetite changes. 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    Past Medical History:  Seizures  Palpitation  Osteoarthritis  Hyperparathyroidism  Esophageal reflux  Kidney stones  Allergic rhinitis    Past Surgical History:  Foot reconstruction with metatarsal phalangeal  fusion  Parathyroidectomy  Hysterectomy  Craniotomy    Chemotherapy History:  12 cycles of adjuvant temozolomide    Radiation History:  None    Pregnant: Not Applicable     Implanted Cardiac Devices: No    Medications:  Current Outpatient Medications   Medication     acetaminophen (TYLENOL) 500 MG tablet     ascorbic acid 500 MG TABS     calcium carbonate (TUMS) 500 MG chewable tablet     ciclopirox (PENLAC) 8 % external solution     COMPOUNDED NON-CONTROLLED SUBSTANCE (CMPD RX) - PHARMACY TO MIX COMPOUNDED MEDICATION     fexofenadine (ALLEGRA) 180 MG tablet     gabapentin (NEURONTIN) 100 MG capsule     ibuprofen (ADVIL/MOTRIN) 200 MG tablet     Lactase (LACTAID PO)     lamoTRIgine (LAMICTAL) 100 MG tablet     lamoTRIgine (LAMICTAL) 200 MG tablet     levETIRAcetam (KEPPRA) 500 MG tablet     metoprolol tartrate (LOPRESSOR) 25 MG tablet     Multiple Vitamin (MULTI-VITAMINS) TABS     ranitidine (ZANTAC) 150 MG tablet     ranitidine (ZANTAC) 75 MG tablet     tretinoin (RETIN-A) 0.025 % external cream     VITAMIN D, CHOLECALCIFEROL, PO     Current Facility-Administered Medications   Medication     lidocaine 1 % injection 4 mL     triamcinolone (KENALOG-40) injection 40 mg     Allergies:  Allergies   Allergen Reactions     Sulfa Drugs Hives     Benoxinate Nausea and Vomiting     Social History:  Tobacco use: Never smoker.   Alcohol use: Social.   Supplement, complimentary/ alternative medicine: None.   Employment: RN  , 2 children    Family History:  No family history of cancer    Review of Systems   A 10-point review of systems was performed. Pertinent findings are noted in the HPI.    Physical Exam   ECOG Status: 0    BP (!) 142/79   Wt 66.2 kg (146 lb)   BMI 26.70 kg/m    Gen: Alert, in NAD  Head: NC/AT  Eyes: PERRL, EOMI, sclera anicteric  Ears: No external auricular lesions  Nose/sinus: No rhinorrhea or epistaxis  Oral cavity/oropharynx: MMM, no visible oral cavity lesions  CV: Extremities are warm and  well-perfused, no cyanosis, no pedal edema  Musculoskeletal: Normal bulk and tone  Skin: Normal color and turgor  Neuro: A/Ox3, CN II-XII intact, normal gait    Imaging/Path/Labs   Imaging:   MRI BRAIN 12/16/19  Impression:   In this patient with history of oligodendroglioma:  1. There is no change since comparison examination from 8/19/2018.  However, there is slightly increase in T2 hyperintense signal  progressing since 5/31/2018.  2. No abnormal intracranial enhancing disease.    Path ([id number]) : Oligodendroglioma, recurrent (WHO grade 2    Labs:   No recent labs      Assessment    Ms. 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 She was diagnosed with a WHO grade II oligodendroglioma (1p/19q co-deleted) in 2011 s/p resection and adjuvant Temozolomide x 12 cycles. She then developed a recurrence in 5/2018 and underwent a repeat resection with pathology consistent with Grade II oligodendroglioma. She proceeded with surveillance until her most recent imaging showed evidence of disease recurrence. She has no acute neurologic symptoms or deficits.     Plan   We recommend a course of radiation therapy for optimal local control outcomes. We discussed at length with the patient and her  the role of radiotherapy in management of low-grade gliomas including oligodendroglioma. The aim of our treatment is tumor control and prevention of further tumor growth. We discussed the logistics and side effects associated with the treatment including fatigue, headaches, exacerbation of the existing neurologic deficits, alopecia, nausea, cerebral edema, cognitive changes and radiation necrosis.    At end of discussion, the patient preferred to take some time to think about it and discuss with Dr. Gallagher about the systemic treatment options. She will contact us should she is interested in proceeding with radiotherapy.    The patient was seen and discussed with staff, Dr. Figueroa.     Mark  MD Lisha  PGY-3 Resident, Radiation Oncology  [facility]

## 2020-01-10 NOTE — LETTER
1/10/2020      RE: Barbi Tiwari  [address] [facility] [id number]       Attending addendum:   I saw and examined the patient with the resident and agree with the documented plan of care.    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 She has not previously received radiotherapy as part of her oncologic cares.    Ms. Tiwari now presents with radiographic evidence of continued slow disease progression. Review of her most recent imaging from 2019 demonstrates increased T2 hyperintensity adjacent to the resection cavity compared to her scans from mid- without any regions of contrast enhancement.    I discussed consideration of partial brain radiotherapy for improved control of Ms. Tiwari's progressive low-grade glioma. Given the slow rate of growth, I do not feel that there is an urgency in initiating radiotherapy presently however she will likely require treatment in the not too distant future. At this time she is planning to follow-up with repeat imaging with Dr. Gallagher on 2020.      Juan Figueroa MD/PhD    Dept of Radiation Oncology  [facility]             Department of Radiation Oncology  [address] [id number] [phone]       Consultation Note    Name: Barbi Tiwari MRN: [id number]   : 1960   Date of Service: Omid 10, 2020 Referring: Dr. Myrtle Gallagher / neuro-oncology     Reason for consultation: Consideration of radiotherapy for management of recurrent WHO grade II diffuse oligodendroglioma status post surgical resection x 2 and adjuvant temozolomide.    History of Present Illness   Ms. Tiwari is a 59 year old female with a diagnosis of recurrent  WHO grade II oligodendroglioma status post surgical resection X 2 and adjuvant chemotherapy temozolomide. Her most recent imaging showed evidence of tumor progression.    Her oncologic history started back in 4/2011 when she presented with a new onset seizure. She had an MRI of the brain which demonstrated an infiltrative right frontal lobe lesion which was resected at [facility] on 5/12/2011. The pathology was consistent with WHO grade II diffuse oligodendroglioma, 1p/19q co-deleted. The patient then completed 12 cycles of adjuvant chemotherapy temozolomide between 6/2011-5/2012.    She has been under surveillance with serial brain MRI scans since then. She remained disease-free until 4/2/2018 at which time her surveillance brain MRI scans showed evidence of a new 1.2 cm non-enhancing lesion within the cortex of the right frontal lobe adjacent to the resection cavity. She underwent a repeat resection by Dr. Rodriguez on 5/30/2018. 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 The patient's case was presented at neuro-oncology multidisciplinary tumor board on 6/15/2018 with recommendations for surveillance given persistence of the low-grade nature of her disease.    She continued on observation with serial surveillance brain MRI scans. Her most recent brain MRI imaging showed increased T2 FLAIR signal adjacent to the resection cavity, concerning for tumor recurrence. The patient was referred to us for consideration of radiotherapy.    On interview today, she was doing very well. She denies any new neurologic symptoms, weakness, numbness, headaches, visual or auditory changes, seizures, loss of bowel or urinary control, urinary or bowel symptoms, weight or appetite changes. 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    Past Medical History:  Seizures  Palpitation  Osteoarthritis  Hyperparathyroidism  Esophageal reflux  Kidney stones  Allergic  rhinitis    Past Surgical History:  Foot reconstruction with metatarsal phalangeal fusion  Parathyroidectomy  Hysterectomy  Craniotomy    Chemotherapy History:  12 cycles of adjuvant temozolomide    Radiation History:  None    Pregnant: Not Applicable     Implanted Cardiac Devices: No    Medications:  Current Outpatient Medications   Medication     acetaminophen (TYLENOL) 500 MG tablet     ascorbic acid 500 MG TABS     calcium carbonate (TUMS) 500 MG chewable tablet     ciclopirox (PENLAC) 8 % external solution     COMPOUNDED NON-CONTROLLED SUBSTANCE (CMPD RX) - PHARMACY TO MIX COMPOUNDED MEDICATION     fexofenadine (ALLEGRA) 180 MG tablet     gabapentin (NEURONTIN) 100 MG capsule     ibuprofen (ADVIL/MOTRIN) 200 MG tablet     Lactase (LACTAID PO)     lamoTRIgine (LAMICTAL) 100 MG tablet     lamoTRIgine (LAMICTAL) 200 MG tablet     levETIRAcetam (KEPPRA) 500 MG tablet     metoprolol tartrate (LOPRESSOR) 25 MG tablet     Multiple Vitamin (MULTI-VITAMINS) TABS     ranitidine (ZANTAC) 150 MG tablet     ranitidine (ZANTAC) 75 MG tablet     tretinoin (RETIN-A) 0.025 % external cream     VITAMIN D, CHOLECALCIFEROL, PO     Current Facility-Administered Medications   Medication     lidocaine 1 % injection 4 mL     triamcinolone (KENALOG-40) injection 40 mg     Allergies:  Allergies   Allergen Reactions     Sulfa Drugs Hives     Benoxinate Nausea and Vomiting     Social History:  Tobacco use: Never smoker.   Alcohol use: Social.   Supplement, complimentary/ alternative medicine: None.   Employment: RN  , 2 children    Family History:  No family history of cancer    Review of Systems   A 10-point review of systems was performed. Pertinent findings are noted in the HPI.    Physical Exam   ECOG Status: 0    BP (!) 142/79   Wt 66.2 kg (146 lb)   BMI 26.70 kg/m     Gen: Alert, in NAD  Head: NC/AT  Eyes: PERRL, EOMI, sclera anicteric  Ears: No external auricular lesions  Nose/sinus: No rhinorrhea or epistaxis  Oral  cavity/oropharynx: MMM, no visible oral cavity lesions  CV: Extremities are warm and well-perfused, no cyanosis, no pedal edema  Musculoskeletal: Normal bulk and tone  Skin: Normal color and turgor  Neuro: A/Ox3, CN II-XII intact, normal gait    Imaging/Path/Labs   Imaging:   MRI BRAIN 12/16/19  Impression:   In this patient with history of oligodendroglioma:  1. There is no change since comparison examination from 8/19/2018.  However, there is slightly increase in T2 hyperintense signal  progressing since 5/31/2018.  2. No abnormal intracranial enhancing disease.    Path ([id number]) : Oligodendroglioma, recurrent (WHO grade 2    Labs:   No recent labs      Assessment    Ms. 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 She was diagnosed with a WHO grade II oligodendroglioma (1p/19q co-deleted) in 2011 s/p resection and adjuvant Temozolomide x 12 cycles. She then developed a recurrence in 5/2018 and underwent a repeat resection with pathology consistent with Grade II oligodendroglioma. She proceeded with surveillance until her most recent imaging showed evidence of disease recurrence. She has no acute neurologic symptoms or deficits.     Plan   We recommend a course of radiation therapy for optimal local control outcomes. We discussed at length with the patient and her  the role of radiotherapy in management of low-grade gliomas including oligodendroglioma. The aim of our treatment is tumor control and prevention of further tumor growth. We discussed the logistics and side effects associated with the treatment including fatigue, headaches, exacerbation of the existing neurologic deficits, alopecia, nausea, cerebral edema, cognitive changes and radiation necrosis.    At end of discussion, the patient preferred to take some time to think about it and discuss with Dr. Gallagher about the systemic treatment options. She will contact us should she is interested in proceeding with  radiotherapy.    The patient was seen and discussed with staff, Dr. Figueroa.     Mark Husain MD  PGY-3 Resident, Radiation Oncology  [facility]              HPI  INITIAL PATIENT ASSESSMENT    Diagnosis: Oliodendroglioma    Prior radiation therapy: None    Prior chemotherapy: See records    Prior hormonal therapy:No    Pain Eval:  Denies    Psychosocial  Living arrangements: Lives with family  Fall Risk: independent   referral needs: Not needed    Advanced Directive: No  Implantable Cardiac Device? No      Nurse face-to-face time: Level 5:  over 15 min face to face time    Review of Systems   Constitutional: Negative for chills, diaphoresis, fever, malaise/fatigue and weight loss.   HENT: Negative for congestion, ear discharge, ear pain, hearing loss, nosebleeds, sinus pain, sore throat and tinnitus.    Eyes: Negative for blurred vision, double vision, photophobia, pain, discharge and redness.   Respiratory: Negative for cough, hemoptysis, sputum production, shortness of breath, wheezing and stridor.    Cardiovascular: Negative for chest pain, palpitations, orthopnea, claudication, leg swelling and PND.   Gastrointestinal: Positive for heartburn. Negative for abdominal pain, blood in stool, constipation, diarrhea, melena, nausea and vomiting.   Genitourinary: Negative for dysuria, flank pain, frequency, hematuria and urgency.   Musculoskeletal: Positive for joint pain. Negative for back pain, falls, myalgias and neck pain.        Left hip   Skin: Negative for itching and rash.   Neurological: Positive for seizures. Negative for dizziness, tingling, tremors, sensory change, speech change, focal weakness, loss of consciousness, weakness and headaches.   Endo/Heme/Allergies: Negative for environmental allergies and polydipsia. Does not bruise/bleed easily.   Psychiatric/Behavioral: Positive for depression. Negative for hallucinations, memory loss, substance abuse and suicidal  ideas. The patient is nervous/anxious. The patient does not have insomnia.        Juan Figueroa MD

## 2020-01-10 NOTE — PROGRESS NOTES
HPI  INITIAL PATIENT ASSESSMENT    Diagnosis: Oliodendroglioma    Prior radiation therapy: None    Prior chemotherapy: See records    Prior hormonal therapy:No    Pain Eval:  Denies    Psychosocial  Living arrangements: Lives with family  Fall Risk: independent   referral needs: Not needed    Advanced Directive: No  Implantable Cardiac Device? No      Nurse face-to-face time: Level 5:  over 15 min face to face time    Review of Systems   Constitutional: Negative for chills, diaphoresis, fever, malaise/fatigue and weight loss.   HENT: Negative for congestion, ear discharge, ear pain, hearing loss, nosebleeds, sinus pain, sore throat and tinnitus.    Eyes: Negative for blurred vision, double vision, photophobia, pain, discharge and redness.   Respiratory: Negative for cough, hemoptysis, sputum production, shortness of breath, wheezing and stridor.    Cardiovascular: Negative for chest pain, palpitations, orthopnea, claudication, leg swelling and PND.   Gastrointestinal: Positive for heartburn. Negative for abdominal pain, blood in stool, constipation, diarrhea, melena, nausea and vomiting.   Genitourinary: Negative for dysuria, flank pain, frequency, hematuria and urgency.   Musculoskeletal: Positive for joint pain. Negative for back pain, falls, myalgias and neck pain.        Left hip   Skin: Negative for itching and rash.   Neurological: Positive for seizures. Negative for dizziness, tingling, tremors, sensory change, speech change, focal weakness, loss of consciousness, weakness and headaches.   Endo/Heme/Allergies: Negative for environmental allergies and polydipsia. Does not bruise/bleed easily.   Psychiatric/Behavioral: Positive for depression. Negative for hallucinations, memory loss, substance abuse and suicidal ideas. The patient is nervous/anxious. The patient does not have insomnia.

## 2020-01-17 ENCOUNTER — ONCOLOGY VISIT (OUTPATIENT)
Dept: ONCOLOGY | Facility: CLINIC | Age: 60
End: 2020-01-17
Attending: PSYCHIATRY & NEUROLOGY
Payer: COMMERCIAL

## 2020-01-17 VITALS
OXYGEN SATURATION: 97 % | RESPIRATION RATE: 16 BRPM | DIASTOLIC BLOOD PRESSURE: 76 MMHG | HEART RATE: 80 BPM | WEIGHT: 141.6 LBS | BODY MASS INDEX: 26.06 KG/M2 | HEIGHT: 62 IN | SYSTOLIC BLOOD PRESSURE: 112 MMHG

## 2020-01-17 DIAGNOSIS — C71.9 OLIGODENDROGLIOMA, WHO GRADE II (H): Primary | ICD-10-CM

## 2020-01-17 PROCEDURE — G0463 HOSPITAL OUTPT CLINIC VISIT: HCPCS | Mod: ZF

## 2020-01-17 PROCEDURE — 99214 OFFICE O/P EST MOD 30 MIN: CPT | Mod: ZP | Performed by: PSYCHIATRY & NEUROLOGY

## 2020-01-17 ASSESSMENT — PAIN SCALES - GENERAL: PAINLEVEL: NO PAIN (0)

## 2020-01-17 ASSESSMENT — MIFFLIN-ST. JEOR: SCORE: 1175.7

## 2020-01-17 NOTE — PROGRESS NOTES
NEURO-ONCOLOGY VISIT  01/17/2020    CHIEF COMPLAINT: Ms. Barbi Tiwari is a 59 year old right-handed woman with a right frontal diffuse oligodendroglioma (1p19q co-deleted), diagnosed following resection in 5/2011. She received 12 cycles of temozolomide, completed in 5/2012. Changes on imaging necessitated a repeat resection on 5/30/2018 and pathology was unchanged. No adjuvant cancer-directed therapy was initiated. Currently managed conservatively on imaging surveillance, however, imaging over the past 1.5 years has shown tumor regrowth.     She is presenting in follow-up for contiuned evaluation and recommendations on treatment. She is accompanied by Kenneth ().      HISTORY OF PRESENT ILLNESS  -She denies any new symptoms; no weakness, numbness, or headaches.   -No change in seizure frequency.   -Fatigue remains an ongoing complaint. Thought to be related to her anti-seizure medications.  -Continued mild word-finding issues, otherwise no change in her cognitive function. 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  -Foot pain on the left, pending foot surgery in 5/2020.   -Mood is positive, but distressed about decisions regarding treatment. Evaluated by Dr. Figueroa and found the appt to be very helpful.     REVIEW OF SYSTEMS  A comprehensive ROS negative except as in HPI.      MEDICATIONS   Current Outpatient Medications   Medication     acetaminophen (TYLENOL) 500 MG tablet     ascorbic acid 500 MG TABS     calcium carbonate (TUMS) 500 MG chewable tablet     ciclopirox (PENLAC) 8 % external solution     COMPOUNDED NON-CONTROLLED SUBSTANCE (CMPD RX) - PHARMACY TO MIX COMPOUNDED MEDICATION     fexofenadine (ALLEGRA) 180 MG tablet     gabapentin (NEURONTIN) 100 MG capsule     ibuprofen (ADVIL/MOTRIN) 200 MG tablet     Lactase (LACTAID PO)     lamoTRIgine (LAMICTAL) 100 MG tablet     lamoTRIgine (LAMICTAL) 200 MG tablet     levETIRAcetam (KEPPRA) 500 MG tablet     metoprolol tartrate (LOPRESSOR) 25 MG  tablet     Multiple Vitamin (MULTI-VITAMINS) TABS     ranitidine (ZANTAC) 150 MG tablet     ranitidine (ZANTAC) 75 MG tablet     tretinoin (RETIN-A) 0.025 % external cream     VITAMIN D, CHOLECALCIFEROL, PO     Current Facility-Administered Medications   Medication     lidocaine 1 % injection 4 mL     triamcinolone (KENALOG-40) injection 40 mg     DRUG ALLERGIES   Allergies   Allergen Reactions     Sulfa Drugs Hives     Benoxinate Nausea and Vomiting       ONCOLOGIC HISTORY  -4/27/2011 PRESENTATION: New onset seizure, generalized event.   -5/2011 MRB with a non-contrast enhancing right frontal mass lesion.   -5/2011 SURGERY: Craniectomy for mass resection at [facility].   PATHOLOGY: Diffuse oligodendroglioma; WHO grade II, 1p/19q co-deleted.  -6/2011-5/2012 CHEMO: Adjuvant dosed temozolomide x 12 cycles.  -4/2/2018 MRB with possible disease recurrence with a new 1.2 cm non-enhancing nodule within the cortex of the right frontal lobe adjacent to the resection cavity.  -4/12/2018 NEURO-ONC: Recommending repeat resection, appointment scheduled with Dr. Dustin Rodriguez, neurosurgery at the [facility].   -5/30/2018 SURGERY: Repeat resection with Dr. Rodriguez.   PATHOLOGY: Remains low grade, without concerning features.   -6/15/2018 NEURO-ONC: Start imaging surveillance.  -9/17/2018 NEURO-ONC/ MRB: Imaging stable, continue with surveillance.   -12/10/2018 NEURO-ONC/ MRB: Imaging stable, continue with surveillance.   -4/15/2019 NEURO-ONC/ MRB: Imaging stable, continue with surveillance.  -8/19/2019 NEURO-ONC/ MRB: Clinically stable. Imaging largely stable, subtle increases on T2 FLAIR; continue with surveillance.  -12/16/2019 NEURO-ONC/ MRB: Clinically stable. Imaging with increased T2 FLAIR medially and laterally about the resection cavity. Referral to Dr. Figueroa with radiation oncology. Discussion with Dr. Rodriguez. Referral for repeat neuro-psychology testing.   -1/10/2020 Evaluated by Dr. Devan Figueroa.   -1/17/2020 NEURO-ONC: Tentative plan  "to initiate chemoradiotherapy in May-June 2020.     SOCIAL HISTORY   Tobacco use: Never smoker.   Alcohol use: Social.   Drug use: Denies marijuana use.  Supplement, complimentary/ alternative medicine: None.   Employment: RN.   , 2 children      PHYSICAL EXAMINATION  /76   Pulse 80   Resp 16   Ht 1.575 m (5' 2.01\")   Wt 64.2 kg (141 lb 9.6 oz)   SpO2 97%   BMI 25.89 kg/m     Wt Readings from Last 2 Encounters:   01/17/20 64.2 kg (141 lb 9.6 oz)   01/10/20 66.2 kg (146 lb)     Ht Readings from Last 2 Encounters:   01/17/20 1.575 m (5' 2.01\")   12/16/19 1.575 m (5' 2.01\")     KPS: 100    -Generally well appearing. Anxious.   -Respiratory: Normal breath sounds, no audible wheezing.   -Skin: No rashes. Healed head incision.  -Hematologic/ lymphatic: No abnormal bruising. No leg swelling.  -Psychiatric: Normal mood and affect. Pleasant, talkative.  -Neurologic:   MENTAL STATUS:     Alert, oriented to date.    Recall: Intact.    Speech fluent.    Comprehension intact to multi-step commands.   Good right-left orientation.     CRANIAL NERVES:     Pupils are equal, round, reactive to light.     Extraocular movements full, patient denies diplopia.     Visual fields full.     Facial sensation intact to light touch.   Really equal activation with smiling/ talking on the left side of face, very subtle.    Hearing intact.   Palate moves symmetrically.     Tongue midline.  MOTOR:    No pronation or drift. No orbiting.   Able to rise from a chair without use of arms.   On toe/ heel walk, equal distance from floor to heels/ toes (but difficult to test due to foot pain).   SENSATION:    Intact to light touch throughout.  COORDINATION:   Intact finger-nose with eyes open and closed.   GAIT:  Walks without assistance. Antalgic with left foot pain, less limping today though.    Good speed. Normal stride length and heel strike. Normal turns. Normal arm swing.      MEDICAL RECORDS  Obtained and personally reviewed all " available outside medical records in addition to reviewing any records available in our electronic system.     LABS  Personally reviewed all available lab results.     IMAGING  No new neuro-imaging to review.        IMPRESSION  Clinic time was spent discussing in detail the nature of this tumor and the potential future treatment plan. This is in addition to providing emotional support, answering questions pertaining to my recommendations, and devising the treatment plan as outlined below.     Clinically, Barbi is doing well with no new subjective or objective findings and no increase in seizure frequency.     During her appt with me in 12/2019, we reviewed her imaging, which showed a clear thickening of the gyrus lateral to the resection cavity and also some more confluent T2 hyperintense signal changes at the superior medial margin over the past 1.5 years. While Dr. Rodriguez had commented that a portion of the cancerous growth can be removed, given the chemo- and radio-sensitivity of oligodendrogliomas, I think it would be appropriate to forgo surgery at this time. Barbi has since met with Dr. Figueroa, radiation oncology and is in agreement to undergo radiation therapy. As far as the timing in initiating chemoradiotherapy, risk factors include age, incomplete resection, and by some measures size. I discussed that it would be appropriate to initiate therapy in the coming months and Barbi ordered a May or June 2020 start date. I agreed that that would be appropriate and I will update Dr. Figueroa. Barbi agreed that she would contact us if there are any new or concerning changes and this treatment plan can always be moved up in response.    Given subjective memory issues with the possibility of additional treatments, I previously referred Barbi to Dr. Meyer for repeat testing. She is scheduled for 2/17.     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     -SEIZURE MANAGEMENT-  -Follows with Dr. Flakita Clark; Neurology, epilepsy specialist at the [facility].      -Quality of life/ MOOD/ FATIGUE-  -Endorses mild anxiety.   -Continue to monitor mood as untreated/ undertreated depression can worsen fatigue, dysorexia, and quality of life.  -Issues with sleep, on gabapentin.     -COGNITIVE CHANGES-  -Neuro-psych testing scheduled for 2/17.      Return to clinic in May 2020 for chemotherapy teach and baseline labs.     In the meantime, Barbi knows to call with questions or concerns or to report new complaints and can be seen sooner if needed. Urgent evaluation is needed in the setting of acute onset of severe headache, abrupt change in mental status, on-going seizures, new focal deficits, or new leg swelling/ pain.    Myrtle Gallagher MD  Neuro-oncology

## 2020-01-17 NOTE — LETTER
RE: Barbi Tiwari  [address] [facility] [address]     Dear Colleague,    Thank you for referring your patient, Barbi Tiwari, to the [facility]. Please see a copy of my visit note below.    NEURO-ONCOLOGY VISIT  01/17/2020    CHIEF COMPLAINT: Ms. Barbi Tiwari is a 59 year old right-handed woman with a right frontal diffuse oligodendroglioma (1p19q co-deleted), diagnosed following resection in 5/2011. She received 12 cycles of temozolomide, completed in 5/2012. Changes on imaging necessitated a repeat resection on 5/30/2018 and pathology was unchanged. No adjuvant cancer-directed therapy was initiated. Currently managed conservatively on imaging surveillance, however, imaging over the past 1.5 years has shown tumor regrowth.     She is presenting in follow-up for contiuned evaluation and recommendations on treatment. She is accompanied by Kenneth ().      HISTORY OF PRESENT ILLNESS  -She denies any new symptoms; no weakness, numbness, or headaches.   -No change in seizure frequency.   -Fatigue remains an ongoing complaint. Thought to be related to her anti-seizure medications.  -Continued mild word-finding issues, otherwise no change in her cognitive function. 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  -Foot pain on the left, pending foot surgery in 5/2020.   -Mood is positive, but distressed about decisions regarding treatment. Evaluated by Dr. Figueroa and found the appt to be very helpful.     REVIEW OF SYSTEMS  A comprehensive ROS negative except as in HPI.      MEDICATIONS   Current Outpatient Medications   Medication     acetaminophen (TYLENOL) 500 MG tablet     ascorbic acid 500 MG TABS     calcium carbonate (TUMS) 500 MG chewable tablet     ciclopirox (PENLAC) 8 % external solution     COMPOUNDED NON-CONTROLLED SUBSTANCE (CMPD RX) - PHARMACY TO MIX COMPOUNDED MEDICATION     fexofenadine (ALLEGRA) 180 MG tablet     gabapentin (NEURONTIN) 100 MG capsule      ibuprofen (ADVIL/MOTRIN) 200 MG tablet     Lactase (LACTAID PO)     lamoTRIgine (LAMICTAL) 100 MG tablet     lamoTRIgine (LAMICTAL) 200 MG tablet     levETIRAcetam (KEPPRA) 500 MG tablet     metoprolol tartrate (LOPRESSOR) 25 MG tablet     Multiple Vitamin (MULTI-VITAMINS) TABS     ranitidine (ZANTAC) 150 MG tablet     ranitidine (ZANTAC) 75 MG tablet     tretinoin (RETIN-A) 0.025 % external cream     VITAMIN D, CHOLECALCIFEROL, PO     Current Facility-Administered Medications   Medication     lidocaine 1 % injection 4 mL     triamcinolone (KENALOG-40) injection 40 mg     DRUG ALLERGIES   Allergies   Allergen Reactions     Sulfa Drugs Hives     Benoxinate Nausea and Vomiting       ONCOLOGIC HISTORY  -4/27/2011 PRESENTATION: New onset seizure, generalized event.   -5/2011 MRB with a non-contrast enhancing right frontal mass lesion.   -5/2011 SURGERY: Craniectomy for mass resection at [facility].   PATHOLOGY: Diffuse oligodendroglioma; WHO grade II, 1p/19q co-deleted.  -6/2011-5/2012 CHEMO: Adjuvant dosed temozolomide x 12 cycles.  -4/2/2018 MRB with possible disease recurrence with a new 1.2 cm non-enhancing nodule within the cortex of the right frontal lobe adjacent to the resection cavity.  -4/12/2018 NEURO-ONC: Recommending repeat resection, appointment scheduled with Dr. Dustin Rodriguez, neurosurgery at the [facility].   -5/30/2018 SURGERY: Repeat resection with Dr. Rodriguez.   PATHOLOGY: Remains low grade, without concerning features.   -6/15/2018 NEURO-ONC: Start imaging surveillance.  -9/17/2018 NEURO-ONC/ MRB: Imaging stable, continue with surveillance.   -12/10/2018 NEURO-ONC/ MRB: Imaging stable, continue with surveillance.   -4/15/2019 NEURO-ONC/ MRB: Imaging stable, continue with surveillance.  -8/19/2019 NEURO-ONC/ MRB: Clinically stable. Imaging largely stable, subtle increases on T2 FLAIR; continue with surveillance.  -12/16/2019 NEURO-ONC/ MRB: Clinically stable. Imaging with increased T2 FLAIR medially and laterally  "about the resection cavity. Referral to Dr. Figueroa with radiation oncology. Discussion with Dr. Rodriguez. Referral for repeat neuro-psychology testing.   -1/10/2020 Evaluated by Dr. Devan Figueroa.   -1/17/2020 NEURO-ONC: Tentative plan to initiate chemoradiotherapy in May-June 2020.     SOCIAL HISTORY   Tobacco use: Never smoker.   Alcohol use: Social.   Drug use: Denies marijuana use.  Supplement, complimentary/ alternative medicine: None.   Employment: RN.   , 2 children      PHYSICAL EXAMINATION  /76   Pulse 80   Resp 16   Ht 1.575 m (5' 2.01\")   Wt 64.2 kg (141 lb 9.6 oz)   SpO2 97%   BMI 25.89 kg/m      Wt Readings from Last 2 Encounters:   01/17/20 64.2 kg (141 lb 9.6 oz)   01/10/20 66.2 kg (146 lb)     Ht Readings from Last 2 Encounters:   01/17/20 1.575 m (5' 2.01\")   12/16/19 1.575 m (5' 2.01\")     KPS: 100    -Generally well appearing. Anxious.   -Respiratory: Normal breath sounds, no audible wheezing.   -Skin: No rashes. Healed head incision.  -Hematologic/ lymphatic: No abnormal bruising. No leg swelling.  -Psychiatric: Normal mood and affect. Pleasant, talkative.  -Neurologic:   MENTAL STATUS:     Alert, oriented to date.    Recall: Intact.    Speech fluent.    Comprehension intact to multi-step commands.   Good right-left orientation.     CRANIAL NERVES:     Pupils are equal, round, reactive to light.     Extraocular movements full, patient denies diplopia.     Visual fields full.     Facial sensation intact to light touch.   Really equal activation with smiling/ talking on the left side of face, very subtle.    Hearing intact.   Palate moves symmetrically.     Tongue midline.  MOTOR:    No pronation or drift. No orbiting.   Able to rise from a chair without use of arms.   On toe/ heel walk, equal distance from floor to heels/ toes (but difficult to test due to foot pain).   SENSATION:    Intact to light touch throughout.  COORDINATION:   Intact finger-nose with eyes open and closed. "   GAIT:  Walks without assistance. Antalgic with left foot pain, less limping today though.    Good speed. Normal stride length and heel strike. Normal turns. Normal arm swing.      MEDICAL RECORDS  Obtained and personally reviewed all available outside medical records in addition to reviewing any records available in our electronic system.     LABS  Personally reviewed all available lab results.     IMAGING  No new neuro-imaging to review.        IMPRESSION  Clinic time was spent discussing in detail the nature of this tumor and the potential future treatment plan. This is in addition to providing emotional support, answering questions pertaining to my recommendations, and devising the treatment plan as outlined below.     Clinically, Barbi is doing well with no new subjective or objective findings and no increase in seizure frequency.     During her appt with me in 12/2019, we reviewed her imaging, which showed a clear thickening of the gyrus lateral to the resection cavity and also some more confluent T2 hyperintense signal changes at the superior medial margin over the past 1.5 years. While Dr. Rodriguez had commented that a portion of the cancerous growth can be removed, given the chemo- and radio-sensitivity of oligodendrogliomas, I think it would be appropriate to forgo surgery at this time. Barbi has since met with Dr. Figueroa, radiation oncology and is in agreement to undergo radiation therapy. As far as the timing in initiating chemoradiotherapy, risk factors include age, incomplete resection, and by some measures size. I discussed that it would be appropriate to initiate therapy in the coming months and Barbi ordered a May or June 2020 start date. I agreed that that would be appropriate and I will update Dr. Figueroa. Barbi agreed that she would contact us if there are any new or concerning changes and this treatment plan can always be moved up in response.    Given subjective memory issues with the possibility of  additional treatments, I previously referred Barbi to Dr. Meyer for repeat testing. She is scheduled for 2/17.     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     -SEIZURE MANAGEMENT-  -Follows with Dr. Flakita Clark; Neurology, epilepsy specialist at the [facility].      -Quality of life/ MOOD/ FATIGUE-  -Endorses mild anxiety.   -Continue to monitor mood as untreated/ undertreated depression can worsen fatigue, dysorexia, and quality of life.  -Issues with sleep, on gabapentin.     -COGNITIVE CHANGES-  -Neuro-psych testing scheduled for 2/17.      Return to clinic in May 2020 for chemotherapy teach and baseline labs.     In the meantime, Barbi knows to call with questions or concerns or to report new complaints and can be seen sooner if needed. Urgent evaluation is needed in the setting of acute onset of severe headache, abrupt change in mental status, on-going seizures, new focal deficits, or new leg swelling/ pain.    Myrtle Gallagher MD  Neuro-oncology

## 2020-01-17 NOTE — NURSING NOTE
"Oncology Rooming Note    January 17, 2020 4:23 PM   Barbi Tiwari is a 59 year old female who presents for:    Chief Complaint   Patient presents with     Oncology Clinic Visit     Return; Oligodendroglioma     Initial Vitals: /76   Pulse 80   Resp 16   Ht 1.575 m (5' 2.01\")   Wt 64.2 kg (141 lb 9.6 oz)   SpO2 97%   BMI 25.89 kg/m   Estimated body mass index is 25.89 kg/m  as calculated from the following:    Height as of this encounter: 1.575 m (5' 2.01\").    Weight as of this encounter: 64.2 kg (141 lb 9.6 oz). Body surface area is 1.68 meters squared.  No Pain (0) Comment: Data Unavailable   No LMP recorded. Patient has had a hysterectomy.  Allergies reviewed: Yes  Medications reviewed: Yes    Medications: Medication refills not needed today.  Pharmacy name entered into EPIC:    [facility] - [place], [place] - [address] [facility] - [place], [place] - [address] [id number] - [place], [place] - [address] AT [facility] [address] [facility] - [place], [place] - [address]    Clinical concerns: Pt has questions.       Belkys Brown CMA              "

## 2020-01-21 ENCOUNTER — OFFICE VISIT (OUTPATIENT)
Dept: INTERNAL MEDICINE | Facility: CLINIC | Age: 60
End: 2020-01-21
Payer: COMMERCIAL

## 2020-01-21 VITALS
OXYGEN SATURATION: 95 % | WEIGHT: 142 LBS | SYSTOLIC BLOOD PRESSURE: 96 MMHG | HEIGHT: 62 IN | BODY MASS INDEX: 26.13 KG/M2 | DIASTOLIC BLOOD PRESSURE: 64 MMHG | TEMPERATURE: 98.4 F | HEART RATE: 75 BPM | RESPIRATION RATE: 14 BRPM

## 2020-01-21 DIAGNOSIS — M25.552 HIP PAIN, LEFT: ICD-10-CM

## 2020-01-21 DIAGNOSIS — C71.9 OLIGODENDROGLIOMA (H): ICD-10-CM

## 2020-01-21 DIAGNOSIS — Z23 NEED FOR VACCINATION: ICD-10-CM

## 2020-01-21 DIAGNOSIS — R22.41 MASS OF RIGHT THIGH: Primary | ICD-10-CM

## 2020-01-21 DIAGNOSIS — M17.12 PRIMARY OSTEOARTHRITIS OF LEFT KNEE: ICD-10-CM

## 2020-01-21 PROCEDURE — 90472 IMMUNIZATION ADMIN EACH ADD: CPT | Performed by: INTERNAL MEDICINE

## 2020-01-21 PROCEDURE — 90471 IMMUNIZATION ADMIN: CPT | Performed by: INTERNAL MEDICINE

## 2020-01-21 PROCEDURE — 90732 PPSV23 VACC 2 YRS+ SUBQ/IM: CPT | Performed by: INTERNAL MEDICINE

## 2020-01-21 PROCEDURE — 99214 OFFICE O/P EST MOD 30 MIN: CPT | Mod: 25 | Performed by: INTERNAL MEDICINE

## 2020-01-21 PROCEDURE — 90750 HZV VACC RECOMBINANT IM: CPT | Performed by: INTERNAL MEDICINE

## 2020-01-21 ASSESSMENT — MIFFLIN-ST. JEOR: SCORE: 1177.36

## 2020-01-21 NOTE — PROGRESS NOTES
"Subjective     Barbi Tiwari is a 59 year old female who presents to clinic today for the following health issues:    HPI   Growth- R thigh      Duration: 1 yr    Description (location/character/radiation): right lower leg - anterior hip region, bulging ? mass    Intensity: She reports that it is slowly growing but otherwise is asymptomatic without causing any pain    Accompanying signs and symptoms: none    History (similar episodes/previous evaluation): None    Precipitating or alleviating factors: None    Therapies tried and outcome: None     She also complains of recurrent left knee pain which is due to osteoarthritis.  She did undergo a cortisone injection with some success in the past.   Additionally she notes some left buttock pain which she is gone to physical therapy for.  There is no radicular symptoms though at the time physical therapy was contemplating whether this was more hip or lumbar in etiology.  Lastly unfortunately she has had a recurrence of her glioma.  She will be undergoing radiation therapy and chemotherapy in the next several months.    Reviewed and updated as needed this visit by Provider       Review of Systems   ROS COMP: Constitutional, HEENT, cardiovascular, pulmonary, gi and gu systems are negative, except as otherwise noted.      Objective    BP 96/64   Pulse 75   Temp 98.4  F (36.9  C) (Oral)   Resp 14   Ht 1.575 m (5' 2\")   Wt 64.4 kg (142 lb)   SpO2 95%   Breastfeeding No   BMI 25.97 kg/m    Body mass index is 25.97 kg/m .  Physical Exam   GENERAL APPEARANCE: alert and no distress  MS: The anterior superior quadriceps on the right thigh appears to have circular smooth protrusion.  And cannot feel a distinct mass in the subcutaneous tissue.  With  contraction of the quads this area does tighten similar to the surrounding muscle tissue.    none         Assessment & Plan     1. Mass of right thigh  Likely mass is in the right quad musculature.  Get MRI to further define " this and then likely see orthopedics if needed  - MR Femur Thigh Right wo Contrast; Future    2. Oligodendroglioma (H)  Per oncology.  I did recommend getting her up-to-date on her vaccinations due to future immunosuppression.  Pneumovax and Shingrix given today    3. Primary osteoarthritis of left knee  - SPORTS MEDICINE REFERRAL    4. Hip pain, left  - [facility]RA[facility] REFERRAL; Future    5. Need for vaccination  - SHINGRIX [03101]  - Pneumococcal vaccine 23 valent PPSV23  (Pneumovax) [45422]  - ADMIN: Vaccine, Initial (11628)  - Each additional admin.  (Right click and add QUANTITY)  [88134]       See Patient Instructions    No follow-ups on file.    Dustin Choudhury MD  [facility]

## 2020-01-22 ENCOUNTER — THERAPY VISIT (OUTPATIENT)
Dept: PHYSICAL THERAPY | Facility: CLINIC | Age: 60
End: 2020-01-22
Payer: COMMERCIAL

## 2020-01-22 DIAGNOSIS — M25.552 HIP PAIN, LEFT: ICD-10-CM

## 2020-01-22 DIAGNOSIS — M54.50 ACUTE LEFT-SIDED LOW BACK PAIN WITHOUT SCIATICA: ICD-10-CM

## 2020-01-22 PROCEDURE — 97161 PT EVAL LOW COMPLEX 20 MIN: CPT | Mod: GP | Performed by: PHYSICAL THERAPIST

## 2020-01-22 PROCEDURE — 97110 THERAPEUTIC EXERCISES: CPT | Mod: GP | Performed by: PHYSICAL THERAPIST

## 2020-01-22 PROCEDURE — 97112 NEUROMUSCULAR REEDUCATION: CPT | Mod: GP | Performed by: PHYSICAL THERAPIST

## 2020-01-22 ASSESSMENT — ACTIVITIES OF DAILY LIVING (ADL)
ROLLING_OVER_IN_BED: MODERATE DIFFICULTY
GOING_DOWN_1_FLIGHT_OF_STAIRS: MODERATE DIFFICULTY
RECREATIONAL_ACTIVITIES: EXTREME DIFFICULTY
GETTING_INTO_AND_OUT_OF_AN_AVERAGE_CAR: MODERATE DIFFICULTY
HOS_ADL_COUNT: 17
LIGHT_TO_MODERATE_WORK: NO DIFFICULTY AT ALL
HOS_ADL_SCORE(%): 55.88
HOS_ADL_HIGHEST_POTENTIAL_SCORE: 68
HOS_ADL_ITEM_SCORE_TOTAL: 38
GETTING_INTO_AND_OUT_OF_A_BATHTUB: SLIGHT DIFFICULTY
WALKING_DOWN_STEEP_HILLS: MODERATE DIFFICULTY
HOW_WOULD_YOU_RATE_YOUR_CURRENT_LEVEL_OF_FUNCTION_DURING_YOUR_USUAL_ACTIVITIES_OF_DAILY_LIVING_FROM_0_TO_100_WITH_100_BEING_YOUR_LEVEL_OF_FUNCTION_PRIOR_TO_YOUR_HIP_PROBLEM_AND_0_BEING_THE_INABILITY_TO_PERFORM_ANY_OF_YOUR_USUAL_DAILY_ACTIVITIES?: 60
SITTING_FOR_15_MINUTES: SLIGHT DIFFICULTY
DEEP_SQUATTING: EXTREME DIFFICULTY
WALKING_UP_STEEP_HILLS: MODERATE DIFFICULTY
HEAVY_WORK: MODERATE DIFFICULTY
PUTTING_ON_SOCKS_AND_SHOES: NO DIFFICULTY AT ALL
GOING_UP_1_FLIGHT_OF_STAIRS: MODERATE DIFFICULTY
WALKING_APPROXIMATELY_10_MINUTES: MODERATE DIFFICULTY
WALKING_INITIALLY: SLIGHT DIFFICULTY
STANDING_FOR_15_MINUTES: SLIGHT DIFFICULTY
WALKING_15_MINUTES_OR_GREATER: MODERATE DIFFICULTY
TWISTING/PIVOTING_ON_INVOLVED_LEG: MODERATE DIFFICULTY
STEPPING_UP_AND_DOWN_CURBS: SLIGHT DIFFICULTY

## 2020-01-22 NOTE — PROGRESS NOTES
[facility] Initial Evaluation -- Lumbar    Date: January 22, 2020  Barbi Tiwari is a 59 year old female with a L buttock and anterior hip condition.   Referral: IM  Work mechanical stresses:    Employment status:  RN  Leisure mechanical stresses: normal daily activities  Functional disability score (BRYAN/STarT Back):    VAS score (0-10): 5/10  Patient goals/expectations:  To be able to walk and move without pain.     HISTORY:    Present symptoms: L buttock and anterior hip pain  Pain quality (sharp/shooting/stabbing/aching/burning/cramping):  Aching, sharp   Paresthesia (yes/no):  no    Present since (onset date): 12/22/2019.     Symptoms (improving/unchanging/worsening):  worsening.     Symptoms commenced as a result of: unknown   Condition occurred in the following environment:   unknown     Symptoms at onset (back/thigh/leg): L buttock and hip  Constant symptoms (back/thigh/leg): none  Intermittent symptoms (back/thigh/leg): L buttock, anterior hip, anterior thigh    Symptoms are made worse with the following: Always Sitting, Always Rising, Always Standing and Always Walking, always stairs, always lifting, always in the PM, always when on the move  Symptoms are made better with the following: Time of day - Always AM and Always When still    Disturbed sleep (yes/no):  ?--has sleep issues anyway and is not sure if L buttock/hip wakes her Sleeping postures (prone/sup/side R/L): R side, supine    Previous episodes (0/1-5/6-10/11+): 3 Year of first episode: ?    Previous history: previous issues with LB and L knee  Previous treatments: L knee--cortisone injection fall 2019--helpful; PT for LB--helpful      Specific Questions:  Cough/Sneeze/Strain (pos/neg): negative  Bowel/Bladder (normal/abnormal): normal  Gait (normal/abnormal): abnormal--antalgic and  Medications (nil/NSAIDS/analg/steroids/anticoag/other):  Other - Anti-seizure and anti-inflammatory, sleep, pain, cardiac  Medical allergies:   sulfa  General health (excellent/good/fair/poor):  good  Pertinent medical history:  Cancer, Heart problems, Osteoarthritis, Overweight and Seizures  Imaging (None/Xray/MRI/Other):  none  Recent or major surgery (yes/no):  No; history of cancer surgery, multiple foot surgeries, hysterectomy, parathyroid, kidney stones  Night pain (yes/no): no  Accidents (yes/no): no  Unexplained weight loss (yes/no): no  Barriers at home: no  Other red flags: no    EXAMINATION    Posture:   Sitting (good/fair/poor): fair  Standing (good/fair/poor):good  Lordosis (red/acc/normal): red  Correction of posture (better/worse/no effect): better--abolishes L anterior hip pain, NE L buttock, produces LBP    Lateral Shift (right/left/nil): nil  Relevant (yes/no):  na  Other Observations: na    Neurological:    Motor deficit:  normal  Reflexes:  Not assessed  Sensory deficit:  normal  Dural signs:  Not assessed    Movement Loss:   Osvaldo Mod Min Nil Pain   Flexion    x Decreases L buttock pain   Extension   x  Decreases L buttock pain   Side Gliding R    x Produces L anterior hip pain   Side Gliding L    x NE     Test Movements:   During: produces, abolishes, increases, decreases, no effect, centralizing, peripheralizing   After: better, worse, no better, no worse, no effect, centralized, peripheralized    Pretest symptoms standing: L buttock pain 2/10   Symptoms During Symptoms After ROM increased ROM decreased No Effect   FIS Decreases    No Better         Rep FIS Decreases    No Better      x   EIS Decreases    No Better         Rep EIS Decreases    No Better      x   Pretest symptoms lying: prone lying L buttock pain 2/10    Symptoms During Symptoms After ROM increased ROM decreased No Effect   TARA Decreases    No Better         Rep TARA Decreases    No Better      x   EIL Decreases    No Better         Rep EIL Decreases    No Better      X--less pain walking for 2-3 min, then L buttock pain returned   If required, pretest symptoms: L buttock  pain 3/10   Symptoms During Symptoms After ROM increased ROM decreased No Effect   SGIS - R        Rep SGIS - R        SGIS - L Produces    No Worse         Rep SGIS - L Produces LB, increases L buttock    Worse      x     Static Tests:  Sitting slouched:    Sitting erect:    Standing slouched   Standing erect:    Lying prone in extension:   Long sitting:      Other Tests: RFISit--NE during or after--increased stiffness walking after; lumbar extension mobs [id number]--[id number] L buttock pain, produced LBP, L buttock NB after, LB, NW    Provisional Classification:  Derangement - Asymmetrical, unilateral, symptoms above knee and Inconclusive/Other - Mechanically Inconclusive    Principle of Management:  Education:  Posture--use of lumbar roll in sitting, importance and impact of posture; POC, treatment rationale, expected response   Equipment provided:  Purchased lumbar roll  Mechanical therapy (Y/N):  Y   Extension principle:  REIL x10 reps, every 2 hours  Lateral Principle:    Flexion principle:    Other:      ASSESSMENT/PLAN:    Patient is a 59 year old female with L buttock/hip complaints.  Assessing for derangement at this point.  She had decreased pain with repeated motions in various directions, mostly in extension, but no lasting effects.  However, she was able to walk with less pain, improved stan and ease of movement after repeated extension in lying.  Results were short-lived, however, and L buttock pain returned after 2-3 minutes of being on her feet.  She will try repeated lumbar extension in lying to assess further as well as focusing on posture correction and avoidance of flexion.      Patient has the following significant findings with corresponding treatment plan.                Diagnosis 1:  L buttock/anterior hip pain  Pain -  self management, education, directional preference exercise and home program  Decreased ROM/flexibility - manual therapy, therapeutic exercise and home program  Decreased  function - therapeutic activities and home program  Impaired posture - neuro re-education and home program    Cumulative Therapy Evaluation is: Low complexity.    Previous and current functional limitations:  (See Goal Flow Sheet for this information)    Short term and Long term goals: (See Goal Flow Sheet for this information)     Communication ability:  Patient appears to be able to clearly communicate and understand verbal and written communication and follow directions correctly.  Treatment Explanation - The following has been discussed with the patient:   RX ordered/plan of care  Anticipated outcomes  Possible risks and side effects  This patient would benefit from PT intervention to resume normal activities.   Rehab potential is good.    Frequency:  1 X week, once daily  Duration:  for 4 weeks tapering to 2 X a month over 1 month  Discharge Plan:  Achieve all LTG.  Independent in home treatment program.  Reach maximal therapeutic benefit.    Please refer to the daily flowsheet for treatment today, total treatment time and time spent performing 1:1 timed codes.

## 2020-01-27 ENCOUNTER — HOSPITAL ENCOUNTER (OUTPATIENT)
Dept: MRI IMAGING | Facility: CLINIC | Age: 60
Discharge: HOME | End: 2020-01-27
Attending: INTERNAL MEDICINE | Admitting: INTERNAL MEDICINE
Payer: COMMERCIAL

## 2020-01-27 ENCOUNTER — OFFICE VISIT (OUTPATIENT)
Dept: DERMATOLOGY | Facility: CLINIC | Age: 60
End: 2020-01-27
Payer: COMMERCIAL

## 2020-01-27 ENCOUNTER — OFFICE VISIT (OUTPATIENT)
Dept: PODIATRY | Facility: CLINIC | Age: 60
End: 2020-01-27
Payer: COMMERCIAL

## 2020-01-27 ENCOUNTER — OFFICE VISIT (OUTPATIENT)
Dept: ORTHOPEDICS | Facility: CLINIC | Age: 60
End: 2020-01-27
Payer: COMMERCIAL

## 2020-01-27 VITALS — DIASTOLIC BLOOD PRESSURE: 81 MMHG | OXYGEN SATURATION: 96 % | HEART RATE: 62 BPM | SYSTOLIC BLOOD PRESSURE: 131 MMHG

## 2020-01-27 VITALS
HEIGHT: 62 IN | SYSTOLIC BLOOD PRESSURE: 131 MMHG | BODY MASS INDEX: 26.91 KG/M2 | DIASTOLIC BLOOD PRESSURE: 81 MMHG | WEIGHT: 146.2 LBS | HEART RATE: 62 BPM

## 2020-01-27 VITALS
BODY MASS INDEX: 26.13 KG/M2 | SYSTOLIC BLOOD PRESSURE: 121 MMHG | WEIGHT: 142 LBS | HEIGHT: 62 IN | DIASTOLIC BLOOD PRESSURE: 73 MMHG

## 2020-01-27 DIAGNOSIS — M54.50 ACUTE LEFT-SIDED LOW BACK PAIN WITHOUT SCIATICA: ICD-10-CM

## 2020-01-27 DIAGNOSIS — M20.12 HALLUX VALGUS OF LEFT FOOT: ICD-10-CM

## 2020-01-27 DIAGNOSIS — M79.672 LEFT FOOT PAIN: Primary | ICD-10-CM

## 2020-01-27 DIAGNOSIS — R22.41 MASS OF RIGHT THIGH: ICD-10-CM

## 2020-01-27 DIAGNOSIS — M20.62 ACQUIRED DEFORMITY OF LEFT TOE: ICD-10-CM

## 2020-01-27 DIAGNOSIS — M17.12 PRIMARY OSTEOARTHRITIS OF LEFT KNEE: Primary | ICD-10-CM

## 2020-01-27 DIAGNOSIS — B35.1 ONYCHOMYCOSIS: Primary | ICD-10-CM

## 2020-01-27 DIAGNOSIS — D48.5 NEOPLASM OF UNCERTAIN BEHAVIOR OF SKIN: ICD-10-CM

## 2020-01-27 LAB
ALBUMIN SERPL-MCNC: 3.9 G/DL (ref 3.4–5)
ALP SERPL-CCNC: 84 U/L (ref 40–150)
ALT SERPL W P-5'-P-CCNC: 20 U/L (ref 0–50)
AST SERPL W P-5'-P-CCNC: 13 U/L (ref 0–45)
BILIRUB DIRECT SERPL-MCNC: <0.1 MG/DL (ref 0–0.2)
BILIRUB SERPL-MCNC: 0.3 MG/DL (ref 0.2–1.3)
PROT SERPL-MCNC: 7.5 G/DL (ref 6.8–8.8)

## 2020-01-27 PROCEDURE — 11102 TANGNTL BX SKIN SINGLE LES: CPT | Performed by: PHYSICIAN ASSISTANT

## 2020-01-27 PROCEDURE — 80076 HEPATIC FUNCTION PANEL: CPT | Performed by: PHYSICIAN ASSISTANT

## 2020-01-27 PROCEDURE — 99214 OFFICE O/P EST MOD 30 MIN: CPT | Mod: 25 | Performed by: PHYSICIAN ASSISTANT

## 2020-01-27 PROCEDURE — 87101 SKIN FUNGI CULTURE: CPT | Performed by: PHYSICIAN ASSISTANT

## 2020-01-27 PROCEDURE — 88305 TISSUE EXAM BY PATHOLOGIST: CPT | Mod: TC | Performed by: PHYSICIAN ASSISTANT

## 2020-01-27 PROCEDURE — 20610 DRAIN/INJ JOINT/BURSA W/O US: CPT | Mod: LT | Performed by: FAMILY MEDICINE

## 2020-01-27 PROCEDURE — 99213 OFFICE O/P EST LOW 20 MIN: CPT | Performed by: PODIATRIST

## 2020-01-27 PROCEDURE — 99213 OFFICE O/P EST LOW 20 MIN: CPT | Mod: 25 | Performed by: FAMILY MEDICINE

## 2020-01-27 PROCEDURE — A9585 GADOBUTROL INJECTION: HCPCS | Performed by: INTERNAL MEDICINE

## 2020-01-27 PROCEDURE — 73718 MRI LOWER EXTREMITY W/O DYE: CPT | Mod: RT

## 2020-01-27 PROCEDURE — 25500064 ZZH RX 255 OP 636: Performed by: INTERNAL MEDICINE

## 2020-01-27 PROCEDURE — 36415 COLL VENOUS BLD VENIPUNCTURE: CPT | Performed by: PHYSICIAN ASSISTANT

## 2020-01-27 RX ORDER — GADOBUTROL 604.72 MG/ML
6 INJECTION INTRAVENOUS ONCE
Status: COMPLETED | OUTPATIENT
Start: 2020-01-27 | End: 2020-01-27

## 2020-01-27 RX ADMIN — TRIAMCINOLONE ACETONIDE 40 MG: 40 INJECTION, SUSPENSION INTRA-ARTICULAR; INTRAMUSCULAR at 10:52

## 2020-01-27 RX ADMIN — LIDOCAINE HYDROCHLORIDE 4 ML: 10 INJECTION, SOLUTION INFILTRATION; PERINEURAL at 10:52

## 2020-01-27 RX ADMIN — GADOBUTROL 6 ML: 604.72 INJECTION INTRAVENOUS at 18:36

## 2020-01-27 ASSESSMENT — MIFFLIN-ST. JEOR
SCORE: 1196.41
SCORE: 1177.36

## 2020-01-27 NOTE — LETTER
2020         RE: Barbi Tiwari  [address]  [facility] [address]        Dear Colleague,    Thank you for referring your patient, Barbi Tiwari, to the [facility]. Please see a copy of my visit note below.      ASSESSMENT/PLAN:    Encounter Diagnoses   Name Primary?     Left foot pain Yes     Hallux valgus of left foot      complext, Acquired deformity of left second toe      I think surgery is reasonable, given her pain and deformity. She might benefit from a 1st metatarsophalangeal joint arthrodesis, Weil osteotomy of the second metaetarsal and soft tissue re-balancing of the second metatarsophalangeal joint, and possible Weil osteotomy of the third metatarsal.     I explained that this is more complicated than what she had done on the right.  I advised her that the least risky approach is to wait until she has completed radiation therapy and chemotherapy.  She was in agreement.      We discussed appropriate shoes for her foot structure and toe spacers.       Body mass index is 26.74 kg/m .    Weight management plan: Patient was referred to their PCP to discuss a diet and exercise plan.      Benjamin Griffin DPM, FACFAS, MS    [facility]      ____________________________________________________________________    HPI:         Chief Complaint: inquiring about possible left foot bunion surgery  I performed a right first metatarsophalangeal joint fusion and right second metatarsal Weil osteotomy 1/15/19  Her left foot causes pain  Onset of problem: 40 years  Pain/ discomfort is described as:  Ache, shooting  Ratin/10 at worst   Frequency:  daily    The pain is made worse with moving toes, walking  She is interested in surgery and inquires about proper timing. She is considering at the end of May, after a trip and before she starts chemotherapy and radiation therapy for an oligodendroglioma.  *  Patient Active Problem List    Diagnosis     Calculus of kidney     Hyperparathyroidism s/p surgery     GERD (gastroesophageal reflux disease)     Allergic rhinitis     Palpitations     PVC's (premature ventricular contractions)     Seizure (H)     Osteoarthritis     Elevated cholesterol     Abnormal screening cardiac CT     Lumbar radiculopathy     Advanced directives, counseling/discussion     Nephrolithiasis     Oligodendroglioma (H)     Moderate major depression, single episode (H)     S/P laparoscopic hysterectomy     Weakness of face muscles     Primary osteoarthritis of left knee     Chronic pain of left knee     Acute pain of left knee     Acute left-sided low back pain without sciatica     Hip pain, left   *  *  Past Surgical History:   Procedure Laterality Date     CRANIOTOMY  2011    tumor resection     EXTRACORPOREAL SHOCK WAVE LITHOTRIPSY, CYSTOSCOPY, INSERT STENT URETER(S), COMBINED Bilateral 5/5/2017    Procedure: COMBINED EXTRACORPOREAL SHOCK WAVE LITHOTRIPSY, CYSTOSCOPY, INSERT STENT URETER(S);  CYSTO, URETHRAL DILATION, URETERAL LEFT STENT PLACEMENT, LEFT ESWL.;  Surgeon: Angel Del Rio MD;  Location:  OR     FOOT SURGERY      mulitple     HYSTERECTOMY, PAP NO LONGER INDICATED  2008    lap hys     LITHOTRIPSY  2010 2017     OPTICAL TRACKING SYSTEM CRANIOTOMY, EXCISE TUMOR WITH MAPPING, COMBINED Right 5/30/2018    Procedure: COMBINED OPTICAL TRACKING SYSTEM CRANIOTOMY, EXCISE TUMOR WITH MAPPING;  Right Stealth Assisted Craniotomy With Motor Mapping And Tumor Resection ;  Surgeon: Dustin Rodriguez MD;  Location: [facility]     ORTHOPEDIC SURGERY      feet, multiple on both     OSTEOTOMY FOOT Right 1/15/2019    Procedure: OSTEOTOMY FOOT;  Surgeon: Benjamin Griffin DPM;  Location:  OR     PARATHYROIDECTOMY  2011     RECONSTRUCT FOREFOOT WITH METATARSOPHALANGEAL (MTP) FUSION Right 1/15/2019    Procedure: RIGHT FIRST METATARSAL PHALAGEAL JOINT ARTHRODESIS, RIGHT SECOND METATARSAL WEIL OSTEOTOMY;  Surgeon: Benjamin Griffin  SANTO Vaughan;  Location:  OR   *  *  Current Outpatient Medications   Medication Sig Dispense Refill     acetaminophen (TYLENOL) 500 MG tablet Take 2  tablets by mouth every 8 hours for post operative pain. 60 tablet 1     ascorbic acid 500 MG TABS Take 500 mg by mouth 2 times daily       calcium carbonate (TUMS) 500 MG chewable tablet Take 1 chew tab by mouth daily as needed for heartburn       ciclopirox (PENLAC) 8 % external solution Apply to adjacent skin and affected nails daily.  Remove with alcohol every 7 days, then repeat. 6.6 mL 3     COMPOUNDED NON-CONTROLLED SUBSTANCE (CMPD RX) - PHARMACY TO MIX COMPOUNDED MEDICATION Place 1 g vaginally twice a week Apply 1g daily for 2 weeks, then twice weekly to vagina 30 g 11     fexofenadine (ALLEGRA) 180 MG tablet Take 180 mg by mouth daily       gabapentin (NEURONTIN) 100 MG capsule 3 capsules ( 300 mg) 7 pm 270 capsule 3     ibuprofen (ADVIL/MOTRIN) 200 MG tablet Take 800 mg by mouth daily as needed        Lactase (LACTAID PO) Take 1-2 tablets by mouth daily as needed for indigestion        lamoTRIgine (LAMICTAL) 100 MG tablet 100 mg am and noon (take along with 200 mg tablet for total of 300 mg twice a day). [facility] mft only 180 tablet 3     lamoTRIgine (LAMICTAL) 200 MG tablet 200 mg am and noon (take along with 100 mg tablet for total of 300 mg twice a day). [facility] mft only 180 tablet 3     levETIRAcetam (KEPPRA) 500 MG tablet 1000 mg am and 2000 mg pm 540 tablet 3     metoprolol tartrate (LOPRESSOR) 25 MG tablet Take 1 tablet (25 mg) by mouth 2 times daily 180 tablet 1     Multiple Vitamin (MULTI-VITAMINS) TABS Take 1 chew tab by mouth 2 times daily        ranitidine (ZANTAC) 150 MG tablet Take 1 tablet (150 mg) by mouth At Bedtime       ranitidine (ZANTAC) 75 MG tablet Take 75 mg by mouth daily       tretinoin (RETIN-A) 0.025 % external cream Apply a pea-sized amount to each area affected by acne. Start every 3-4 nights working up to every night. 45 g 3      VITAMIN D, CHOLECALCIFEROL, PO Take 1,000 Units by mouth 2 times daily Reported on 5/15/2017         ROS:     A 10-point review of systems was performed and is positive for that noted above in the HPI and as seen below.  All other areas are negative.     Numbness in feet?  no   Calf pain with walking? no  Recent foot/ankle injury? no  Weight change over past 12 months? loss  Self perception as overweight? hyes  Recent flu-like symptoms? no  Joint pain other than feet ? no    Social History: Employment:  no;  Exercise/Physical activity:  Back exercises;  Tobacco use:  no  Social History     Socioeconomic History     Marital status:      Spouse name: Not on file     Number of children: Not on file     Years of education: Not on file     Highest education level: Not on file   Occupational History     Occupation: RN- [employer]   Social Needs     Financial resource strain: Not on file     Food insecurity:     Worry: Not on file     Inability: Not on file     Transportation needs:     Medical: Not on file     Non-medical: Not on file   Tobacco Use     Smoking status: Never Smoker     Smokeless tobacco: Never Used   Substance and Sexual Activity     Alcohol use: Yes     Comment: social     Drug use: No     Sexual activity: Yes     Partners: Male     Birth control/protection: Female Surgical     Comment: hysterectomy   Lifestyle     Physical activity:     Days per week: Not on file     Minutes per session: Not on file     Stress: Not on file   Relationships     Social connections:     Talks on phone: Not on file     Gets together: Not on file     Attends Buddhism service: Not on file     Active member of club or organization: Not on file     Attends meetings of clubs or organizations: Not on file     Relationship status: Not on file     Intimate partner violence:     Fear of current or ex partner: Not on file     Emotionally abused: Not on file     Physically abused: Not on file     Forced sexual activity: Not on  "file   Other Topics Concern     Parent/sibling w/ CABG, MI or angioplasty before 65F 55M? Yes     Comment: Father,  of MI age 54   Social History Narrative     Not on file       Family history:  Family History   Problem Relation Age of Onset     Arthritis Mother      Depression Mother      Respiratory Mother         COPD     LUNG DISEASE Mother      C.A.D. Father 58        heart attack     Heart Disease Father      Coronary Artery Disease Father         MI  age 54     Diabetes Brother 70         age 70     Depression Sister      Depression Son      Allergies Son      Depression Daughter      Allergies Daughter      Eczema Brother      Skin Cancer Brother      Depression Sister      Depression Sister      Anxiety Disorder Daughter        Rheumatoid arthritis:  parent  Foot Problems: arthritis  Diabetes: parent      EXAM:    Vitals: /81   Pulse 62   Ht 1.575 m (5' 2\")   Wt 66.3 kg (146 lb 3.2 oz)   BMI 26.74 kg/m     BMI: Body mass index is 26.74 kg/m .  Height: 5' 2\"    Constitutional/ general:  Pt is in no apparent distress, appears well-nourished.  Cooperative with history and physical exam.     Vascular:  Pedal pulses are palpable bilaterally for both the DP and PT arteries.  CFT < 3 sec.  No edema.  Pedal hair growth noted.     Neuro:  Alert and oriented x 3. Coordinated gait.  Light touch sensation is intact to the L4, L5, S1 distributions. No obvious deficits.  No evidence of neurological-based weakness, spasticity, or contracture in the lower extremities.     Derm: Normal texture and turgor.  No erythema, ecchymosis, or cyanosis.  No open lesions.     Musculoskeletal:    Lower extremity muscle strength is normal.  Patient is ambulatory without an assistive device or brace . Decrease in medial longitudinal arch with weight bearing.  Severe, non reducible, hallux abducto valgus on the left. The 2nd toe has some contracture and lateral deviation.           Again, thank you for " allowing me to participate in the care of your patient.        Sincerely,        Benjamin Griffin, SANTO

## 2020-01-27 NOTE — LETTER
1/27/2020         RE: Barbi Tiwari  [address] [facility] [address]        Dear Colleague,    Thank you for referring your patient, Barbi Tiwari, to the  [facility]. Please see a copy of my visit note below.    Musculoskeletal Problem        [facility]   Follow-up Visit s Jan 27, 2020    PCP: Dustin Choudhury      Subjective:  Barbi is a 59 year old female who is seen in follow up for evaluation of   Chief Complaint   Patient presents with     Left Knee - Pain     Her last visit was on 8/26/2019.  Since that time, symptoms have been returning. Barbi Tiwari is accompanied today by self.     Patient had a left knee Cortisone injection on 8/26/2019 that provided 100% relief, lasting for 3.5 month(s).     Patient has noticed improved symptoms with injection treatment.  Patient has no swelling  Pain is located left knee, posterior  , getting progressively worse.  Patient is using no aids.    Patient denies any new injuries.    Patient's past medical, surgical, social and family histories are reviewed today.    History from previous visit on 8/26/2019  Her last visit was on 10/15/18.  Since that time, symptoms have been worse than before. Barbi Tiwari is accompanied today by spouse.   Patient has noticed improved symptoms with physical therapy treatment. Symptoms worsened about a month or two ago without a specific mechanism of injury.   Patient has no swelling  Pain is located left knee, deep, getting progressively worse.  Patient is using no aids.    Patient denies any new injuries.    Patient's past medical, surgical, social and family histories are reviewed today.    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     Osteoarthrosis 12/9/2009     Palpitations 6/5/2014     PONV (postoperative nausea and vomiting)      PVCs 6/5/2014     Seizure disorder (related to tumor) 08/17/2011       Patient Active Problem List    Diagnosis Date Noted     Acute left-sided low back pain without sciatica 01/22/2020     Priority: Medium     Hip pain, left 01/22/2020     Priority: Medium     Acute pain of left knee 08/28/2019     Priority: Medium     Chronic pain of left knee 10/24/2018     Priority: Medium     Primary osteoarthritis of left knee 10/15/2018     Priority: Medium     S/P laparoscopic hysterectomy 06/15/2018     Priority: Medium     Oligodendroglioma (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     Priority: Medium     Nephrolithiasis 05/05/2017     Priority: Medium     Overview:   S/P LITHOTRIPSY 3/15/11       Advanced directives, counseling/discussion 03/24/2017     Priority: Medium     Lumbar radiculopathy 09/08/2016     Priority: Medium     Palpitations 06/05/2014     Priority: Medium     PVC's (premature ventricular contractions) 06/05/2014     Priority: Medium     Elevated cholesterol 06/05/2014     Priority: Medium     Abnormal screening cardiac CT 06/05/2014     Priority: Medium     Moderate major depression, single episode (H) 03/06/2013     Priority: Medium     Weakness of face muscles 08/18/2011     Priority: Medium     Seizure (H) 08/17/2011     Priority: Medium     Calculus of kidney 01/12/2011     Priority: Medium     Overview:   S/P LITHOTRIPSY 3/15/11       Hyperparathyroidism s/p surgery 01/11/2011     Priority: Medium     Allergic rhinitis 12/09/2009     Priority: Medium     Osteoarthritis 12/09/2009     Priority: Medium     GERD (gastroesophageal reflux disease) 10/22/2008     Priority: Medium       Family History   Problem Relation Age of Onset     Arthritis Mother      Depression Mother      Respiratory Mother         COPD     LUNG DISEASE Mother       INGRID Father 58        heart attack     Heart Disease Father      Coronary Artery Disease Father         MI  age 54     Diabetes Brother 70         age 70     Depression Sister      Depression Son      Allergies Son      Depression Daughter      Allergies Daughter      Eczema Brother      Depression Sister      Depression Sister      Anxiety Disorder Daughter        Social History     Social History     Marital status:      Spouse name: N/A     Number of children: N/A     Years of education: N/A     Occupational History     RN- [employer]      Social History Main Topics     Smoking status: Never Smoker     Smokeless tobacco: Never Used     Alcohol use Yes      Comment: 1-2 per month, only at social events     Drug use: No     Past Surgical History:   Procedure Laterality Date     CRANIOTOMY  2011    tumor resection     EXTRACORPOREAL SHOCK WAVE LITHOTRIPSY, CYSTOSCOPY, INSERT STENT URETER(S), COMBINED Bilateral 2017    Procedure: COMBINED EXTRACORPOREAL SHOCK WAVE LITHOTRIPSY, CYSTOSCOPY, INSERT STENT URETER(S);  CYSTO, URETHRAL DILATION, URETERAL LEFT STENT PLACEMENT, LEFT ESWL.;  Surgeon: Angel Del Rio MD;  Location:  OR     FOOT SURGERY      mulitple     HYSTERECTOMY, PAP NO LONGER INDICATED      lap hys     LITHOTRIPSY  2010     OPTICAL TRACKING SYSTEM CRANIOTOMY, EXCISE TUMOR WITH MAPPING, COMBINED Right 2018    Procedure: COMBINED OPTICAL TRACKING SYSTEM CRANIOTOMY, EXCISE TUMOR WITH MAPPING;  Right Stealth Assisted Craniotomy With Motor Mapping And Tumor Resection ;  Surgeon: Dustin Rodriguez MD;  Location:  OR     ORTHOPEDIC SURGERY      feet, multiple on both     OSTEOTOMY FOOT Right 1/15/2019    Procedure: OSTEOTOMY FOOT;  Surgeon: Benjamin Griffin DPM;  Location:  OR     PARATHYROIDECTOMY       RECONSTRUCT FOREFOOT WITH METATARSOPHALANGEAL (MTP) FUSION Right 1/15/2019    Procedure: RIGHT FIRST METATARSAL PHALAGEAL JOINT ARTHRODESIS,  "RIGHT SECOND METATARSAL WEIL OSTEOTOMY;  Surgeon: Benjamin Griffin DPM;  Location: [facility] OR         Review of Systems   Musculoskeletal: Positive for joint pain.   All other systems reviewed and are negative.        Physical Exam  /73   Ht 1.575 m (5' 2\")   Wt 64.4 kg (142 lb)   BMI 25.97 kg/m     Constitutional:well-developed, well-nourished, and in no distress.   Cardiovascular: Intact distal pulses.    Neurological: alert. Gait Abnormal:   Antalgic gait  occasionally limping  Skin: Skin is warm and dry.   Psychiatric: Mood and affect normal.   Respiratory: unlabored, speaks in full sentences  Lymph: no LAD, no lymphangitis      Left Knee Exam   Swelling: None  Effusion: No    Tenderness   The patient is experiencing tenderness in the Patella facets.    Range of Motion   Extension: Normal  Flexion:     Normal    Tests   McMurrays:  Medial - Positive      Lateral - Negative  Lachman:  Anterior - Negative    Posterior - Negative  Drawer:       Anterior - Negative     Posterior - Negative  Varus:  Negative  Valgus: Negative  Pivot Shift: Negative  Patellar Apprehension: No    Comments:  Mild patellofemoral crepitus with range of motion        ASSESSMENT/PLAN    ICD-10-CM    1. Primary osteoarthritis of left knee M17.12 Large Joint Injection/Arthocentesis: L knee joint   2. Acute left-sided low back pain without sciatica M54.5      Recurrent osteoarthritis symptoms involving left knee, she does have sciatic-like pain from the posterior left hip but on reevaluation today does not appear to be directly related.  Okay to proceed with repeat cortisone injection for the knee today.  She will check with physical therapy about an unloading brace.    Lumbar spine evaluation fails to reveal acute radicular symptoms, but they are sciatic in nature.  She is comfortable continuing physical therapy, but further evaluation and possible MRI of the lumbar spine may be in order if symptoms persist or worsen.    We went on to " discuss other treatments than cortisone injections, such as Visco supplementation.  This was discussed at length and she will check on her health plan coverage for this agent.    Large Joint Injection/Arthocentesis: L knee joint  Date/Time: 1/27/2020 10:52 AM  Performed by: Pool Tong MD  Authorized by: Pool Tong MD     Indications:  Pain and osteoarthritis  Needle Size:  25 G  Guidance: landmark guided    Approach:  Superolateral  Location:  Knee      Medications:  4 mL lidocaine 1 %; 40 mg triamcinolone 40 MG/ML  Outcome:  Tolerated well, no immediate complications  Procedure discussed: discussed risks, benefits, and alternatives    Consent Given by:  Patient  Timeout: timeout called immediately prior to procedure    Prep: patient was prepped and draped in usual sterile fashion        Time spent in one-on-one evalution and discussion with patient regarding nature of problem, course, prior treatments, and therapeutic options, at least 50% of which was spent in counseling and coordination of care: 15 minutes, over and above time spent on injection.      Again, thank you for allowing me to participate in the care of your patient.        Sincerely,        Pool Tong MD

## 2020-01-27 NOTE — PATIENT INSTRUCTIONS
If you have any further questions for your physician or physician s care team you can call [phone] and use option 2 then 3 to leave a voice message.

## 2020-01-27 NOTE — PATIENT INSTRUCTIONS
Proper skin care from [facility]:    -Eliminate harsh soaps as they strip the natural oils from the skin, often resulting in dry itchy skin ( i.e. Dial, Zest, Grecia Spring)  -Use mild soaps such as Cetaphil or Dove Sensitive Skin in the shower. You do not need to use soap on arms, legs, and trunk every time you shower unless visibly soiled.   -Avoid hot or cold showers.  -After showering, lightly dry off and apply moisturizing within 2-3 minutes. This will help trap moisture in the skin.   -Aggressive use of a moisturizer at least 1-2 times a day to the entire body (including -Vanicream, Cetaphil, Aquaphor or Cerave) and moisturize hands after every washing.  -We recommend using moisturizers that come in a tub that needs to be scooped out, not a pump. This has more of an oil base. It will hold moisture in your skin much better than a water base moisturizer. The above recommended are non-pore clogging.      Wear a sunscreen with at least SPF 30 on your face, ears, neck and V of the chest daily. Wear sunscreen on other areas of the body if those areas are exposed to the sun throughout the day. Sunscreens can contain physical and/or chemical blockers. Physical blockers are less likely to clog pores, these include zinc oxide and titanium dioxide. Reapply every two hour and after swimming. Sunscreen examples include Neutrogena, CeraVe, Blue Lizard, Elta MD and many others.    UV radiation  UVA radiation remains constant throughout the day and throughout the year. It is a longer wavelength than UVB and therefore penetrates deeper into the skin leading to immediate and delayed tanning, photoaging, and skin cancer. 70-80% of UVA and UVB radiation occurs between the hours of 10am-2pm.  UVB radiation  UVB radiation causes the most harmful effects and is more significant during the summer months. However, snow and ice can reflect UVB radiation leading to skin damage during the winter months as well. UVB radiation is  responsible for tanning, burning, inflammation, delayed erythema (pinkness), pigmentation (brown spots), and skin cancer.                  Wound Care Instructions     FOR SUPERFICIAL WOUNDS     [facility] [phone]    [facility] [phone]          AFTER 24 HOURS YOU SHOULD REMOVE THE BANDAGE AND BEGIN DAILY DRESSING CHANGES AS FOLLOWS:     1) Remove Dressing.     2) Clean and dry the area with tap water using a Q-tip or sterile gauze pad.     3) Apply Vaseline, Aquaphor, Polysporin ointment or Bacitracin ointment over entire wound.  Do NOT use Neosporin ointment.     4) Cover the wound with a band-aid, or a sterile non-stick gauze pad and micropore paper tape      REPEAT THESE INSTRUCTIONS AT LEAST ONCE A DAY UNTIL THE WOUND HAS COMPLETELY HEALED.    It is an old wives tale that a wound heals better when it is exposed to air and allowed to dry out. The wound will heal faster with a better cosmetic result if it is kept moist with ointment and covered with a bandage.    **Do not let the wound dry out.**      Supplies Needed:      *Cotton tipped applicators (Q-tips)    *Polysporin Ointment or Bacitracin Ointment (NOT NEOSPORIN)    *Band-aids or non-stick gauze pads and micropore paper tape.      PATIENT INFORMATION:    During the healing process you will notice a number of changes. All wounds develop a small halo of redness surrounding the wound.  This means healing is occurring. Severe itching with extensive redness usually indicates sensitivity to the ointment or bandage tape used to dress the wound.  You should call our office if this develops.      Swelling  and/or discoloration around your surgical site is common, particularly when performed around the eye.    All wounds normally drain.  The larger the wound the more drainage there will be.  After 7-10 days, you will notice the wound beginning to shrink and new skin will begin to grow.  The wound is healed when you can see skin  has formed over the entire area.  A healed wound has a healthy, shiny look to the surface and is red to dark pink in color to normalize.  Wounds may take approximately 4-6 weeks to heal.  Larger wounds may take 6-8 weeks.  After the wound is healed you may discontinue dressing changes.    You may experience a sensation of tightness as your wound heals. This is normal and will gradually subside.    Your healed wound may be sensitive to temperature changes. This sensitivity improves with time, but if you re having a lot of discomfort, try to avoid temperature extremes.    Patients frequently experience itching after their wound appears to have healed because of the continue healing under the skin.  Plain Vaseline will help relieve the itching.        POSSIBLE COMPLICATIONS    BLEEDIN. Leave the bandage in place.  2. Use tightly rolled up gauze or a cloth to apply direct pressure over the bandage for 30  minutes.  3. Reapply pressure for an additional 30 minutes if necessary  4. Use additional gauze and tape to maintain pressure once the bleeding has stopped.  WOUND CARE INSTRUCTIONS  FOR CRYOSURGERY        This area treated with liquid nitrogen will form a blister. You do not need to bandage the area until after the blister forms and breaks (which may be a few days).  When the blister breaks, begin daily dressing changes as follows:    1) Clean and dry the area with tap water using clean Q-tip or sterile gauze pad.    2) Apply Polysporin ointment or Bacitracin ointment over entire wound.  Do NOT use Neosporin ointment.    3) Cover the wound with a band-aid or sterile non-stick gauze pad and micropore paper tape.      REPEAT THESE INSTRUCTIONS AT LEAST ONCE A DAY UNTIL THE WOUND HAS COMPLETELY HEALED.        It is an old wives tale that a wound heals better when it is exposed to air and allowed to dry out. The wound will heal faster with a better cosmetic result if it is kept moist with ointment and covered with  a bandage.  Do not let the wound dry out.      Supplies Needed:     *Cotton tipped applicators (Q-tips)   *Polysporin ointment or Bacitracin ointment (NOT NEOSPORIN)   *Band-aids, or non stick gauze pads and micropore paper tape    PATIENT INFORMATION    During the healing process you will notice a number of changes. All wounds develop a small halo of redness surrounding the wound.  This means healing is occurring. Severe itching with extensive redness usually indicates sensitivity to the ointment or bandage tape used to dress the wound.  You should call our office if this develops.      Swelling and/or discoloration around your surgical site is common, particularly when performed around the eye.    All wounds normally drain.  The larger the wound the more drainage there will be.  After 7-10 days, you will notice the wound beginning to shrink and new skin will begin to grow.  The wound is healed when you can see skin has formed over the entire area.  A healed wound has a healthy, shiny look to the surface and is red to dark pink in color to normalize.  Wounds may take approximately 4-6 weeks to heal.  Larger wounds may take 6-8 weeks.  After the wound is healed you may discontinue dressing changes.    You may experience a sensation of tightness as your wound heals. This is normal and will gradually subside.    Your healed wound may be sensitive to temperature changes. This sensitivity improves with time, but if you re having a lot of discomfort, try to avoid temperature extremes.    Patients frequently experience itching after their wound appears to have healed because of the continue healing under the skin.  Plain Vaseline will help relieve the itching.

## 2020-01-27 NOTE — Clinical Note
galina Persaud and I discussed unloading brace. She may ask you about it. Not sure how much it would help. Didn't seem like sciatic symptoms are causing knee pain, but if it progresses it might be worth looking at her again. G

## 2020-01-27 NOTE — PROGRESS NOTES
ASSESSMENT/PLAN:    Encounter Diagnoses   Name Primary?     Left foot pain Yes     Hallux valgus of left foot      complext, Acquired deformity of left second toe      I think surgery is reasonable, given her pain and deformity. She might benefit from a 1st metatarsophalangeal joint arthrodesis, Weil osteotomy of the second metaetarsal and soft tissue re-balancing of the second metatarsophalangeal joint, and possible Weil osteotomy of the third metatarsal.     I explained that this is more complicated than what she had done on the right.  I advised her that the least risky approach is to wait until she has completed radiation therapy and chemotherapy.  She was in agreement.      We discussed appropriate shoes for her foot structure and toe spacers.       Body mass index is 26.74 kg/m .    Weight management plan: Patient was referred to their PCP to discuss a diet and exercise plan.      Benjamin Griffin DPM, FACFAS, MS    [place] Department of Podiatry/Foot & Ankle Surgery      ____________________________________________________________________    HPI:         Chief Complaint: inquiring about possible left foot bunion surgery  I performed a right first metatarsophalangeal joint fusion and right second metatarsal Weil osteotomy 1/15/19  Her left foot causes pain  Onset of problem: 40 years  Pain/ discomfort is described as:  Ache, shooting  Ratin/10 at worst   Frequency:  daily    The pain is made worse with moving toes, walking  She is interested in surgery and inquires about proper timing. She is considering at the end of May, after a trip and before she starts chemotherapy and radiation therapy for an oligodendroglioma.  *  Patient Active Problem List   Diagnosis     Calculus of kidney     Hyperparathyroidism s/p surgery     GERD (gastroesophageal reflux disease)     Allergic rhinitis     Palpitations     PVC's (premature ventricular contractions)     Seizure (H)     Osteoarthritis     Elevated cholesterol      Abnormal screening cardiac CT     Lumbar radiculopathy     Advanced directives, counseling/discussion     Nephrolithiasis     Oligodendroglioma (H)     Moderate major depression, single episode (H)     S/P laparoscopic hysterectomy     Weakness of face muscles     Primary osteoarthritis of left knee     Chronic pain of left knee     Acute pain of left knee     Acute left-sided low back pain without sciatica     Hip pain, left   *  *  Past Surgical History:   Procedure Laterality Date     CRANIOTOMY  2011    tumor resection     EXTRACORPOREAL SHOCK WAVE LITHOTRIPSY, CYSTOSCOPY, INSERT STENT URETER(S), COMBINED Bilateral 5/5/2017    Procedure: COMBINED EXTRACORPOREAL SHOCK WAVE LITHOTRIPSY, CYSTOSCOPY, INSERT STENT URETER(S);  CYSTO, URETHRAL DILATION, URETERAL LEFT STENT PLACEMENT, LEFT ESWL.;  Surgeon: Angel Del Rio MD;  Location:  OR     FOOT SURGERY      mulitple     HYSTERECTOMY, PAP NO LONGER INDICATED  2008    lap hys     LITHOTRIPSY  2010 2017     OPTICAL TRACKING SYSTEM CRANIOTOMY, EXCISE TUMOR WITH MAPPING, COMBINED Right 5/30/2018    Procedure: COMBINED OPTICAL TRACKING SYSTEM CRANIOTOMY, EXCISE TUMOR WITH MAPPING;  Right Stealth Assisted Craniotomy With Motor Mapping And Tumor Resection ;  Surgeon: Dustin Rodriguez MD;  Location: [facility] OR     ORTHOPEDIC SURGERY      feet, multiple on both     OSTEOTOMY FOOT Right 1/15/2019    Procedure: OSTEOTOMY FOOT;  Surgeon: Benjamin Griffin DPM;  Location:  OR     PARATHYROIDECTOMY  2011     RECONSTRUCT FOREFOOT WITH METATARSOPHALANGEAL (MTP) FUSION Right 1/15/2019    Procedure: RIGHT FIRST METATARSAL PHALAGEAL JOINT ARTHRODESIS, RIGHT SECOND METATARSAL WEIL OSTEOTOMY;  Surgeon: Benjamin Griffin DPM;  Location:  OR   *  *  Current Outpatient Medications   Medication Sig Dispense Refill     acetaminophen (TYLENOL) 500 MG tablet Take 2  tablets by mouth every 8 hours for post operative pain. 60 tablet 1     ascorbic acid 500 MG TABS Take 500 mg  by mouth 2 times daily       calcium carbonate (TUMS) 500 MG chewable tablet Take 1 chew tab by mouth daily as needed for heartburn       ciclopirox (PENLAC) 8 % external solution Apply to adjacent skin and affected nails daily.  Remove with alcohol every 7 days, then repeat. 6.6 mL 3     COMPOUNDED NON-CONTROLLED SUBSTANCE (CMPD RX) - PHARMACY TO MIX COMPOUNDED MEDICATION Place 1 g vaginally twice a week Apply 1g daily for 2 weeks, then twice weekly to vagina 30 g 11     fexofenadine (ALLEGRA) 180 MG tablet Take 180 mg by mouth daily       gabapentin (NEURONTIN) 100 MG capsule 3 capsules ( 300 mg) 7 pm 270 capsule 3     ibuprofen (ADVIL/MOTRIN) 200 MG tablet Take 800 mg by mouth daily as needed        Lactase (LACTAID PO) Take 1-2 tablets by mouth daily as needed for indigestion        lamoTRIgine (LAMICTAL) 100 MG tablet 100 mg am and noon (take along with 200 mg tablet for total of 300 mg twice a day). [facility] only 180 tablet 3     lamoTRIgine (LAMICTAL) 200 MG tablet 200 mg am and noon (take along with 100 mg tablet for total of 300 mg twice a day). [facility] only 180 tablet 3     levETIRAcetam (KEPPRA) 500 MG tablet 1000 mg am and 2000 mg pm 540 tablet 3     metoprolol tartrate (LOPRESSOR) 25 MG tablet Take 1 tablet (25 mg) by mouth 2 times daily 180 tablet 1     Multiple Vitamin (MULTI-VITAMINS) TABS Take 1 chew tab by mouth 2 times daily        ranitidine (ZANTAC) 150 MG tablet Take 1 tablet (150 mg) by mouth At Bedtime       ranitidine (ZANTAC) 75 MG tablet Take 75 mg by mouth daily       tretinoin (RETIN-A) 0.025 % external cream Apply a pea-sized amount to each area affected by acne. Start every 3-4 nights working up to every night. 45 g 3     VITAMIN D, CHOLECALCIFEROL, PO Take 1,000 Units by mouth 2 times daily Reported on 5/15/2017         ROS:     A 10-point review of systems was performed and is positive for that noted above in the HPI and as seen below.  All other areas are negative.      Numbness in feet?  no   Calf pain with walking? no  Recent foot/ankle injury? no  Weight change over past 12 months? loss  Self perception as overweight? hyes  Recent flu-like symptoms? no  Joint pain other than feet ? no    Social History: Employment:  no;  Exercise/Physical activity:  Back exercises;  Tobacco use:  no  Social History     Socioeconomic History     Marital status:      Spouse name: Not on file     Number of children: Not on file     Years of education: Not on file     Highest education level: Not on file   Occupational History     Occupation: RN- [employer]   Social Needs     Financial resource strain: Not on file     Food insecurity:     Worry: Not on file     Inability: Not on file     Transportation needs:     Medical: Not on file     Non-medical: Not on file   Tobacco Use     Smoking status: Never Smoker     Smokeless tobacco: Never Used   Substance and Sexual Activity     Alcohol use: Yes     Comment: social     Drug use: No     Sexual activity: Yes     Partners: Male     Birth control/protection: Female Surgical     Comment: hysterectomy   Lifestyle     Physical activity:     Days per week: Not on file     Minutes per session: Not on file     Stress: Not on file   Relationships     Social connections:     Talks on phone: Not on file     Gets together: Not on file     Attends Rastafarian service: Not on file     Active member of club or organization: Not on file     Attends meetings of clubs or organizations: Not on file     Relationship status: Not on file     Intimate partner violence:     Fear of current or ex partner: Not on file     Emotionally abused: Not on file     Physically abused: Not on file     Forced sexual activity: Not on file   Other Topics Concern     Parent/sibling w/ CABG, MI or angioplasty before 65F 55M? Yes     Comment: Father,  of MI age 54   Social History Narrative     Not on file       Family history:  Family History   Problem Relation Age of Onset      "Arthritis Mother      Depression Mother      Respiratory Mother         COPD     LUNG DISEASE Mother      C.A.D. Father 58        heart attack     Heart Disease Father      Coronary Artery Disease Father         MI  age 54     Diabetes Brother 70         age 70     Depression Sister      Depression Son      Allergies Son      Depression Daughter      Allergies Daughter      Eczema Brother      Skin Cancer Brother      Depression Sister      Depression Sister      Anxiety Disorder Daughter        Rheumatoid arthritis:  parent  Foot Problems: arthritis  Diabetes: parent      EXAM:    Vitals: /81   Pulse 62   Ht 1.575 m (5' 2\")   Wt 66.3 kg (146 lb 3.2 oz)   BMI 26.74 kg/m    BMI: Body mass index is 26.74 kg/m .  Height: 5' 2\"    Constitutional/ general:  Pt is in no apparent distress, appears well-nourished.  Cooperative with history and physical exam.     Vascular:  Pedal pulses are palpable bilaterally for both the DP and PT arteries.  CFT < 3 sec.  No edema.  Pedal hair growth noted.     Neuro:  Alert and oriented x 3. Coordinated gait.  Light touch sensation is intact to the L4, L5, S1 distributions. No obvious deficits.  No evidence of neurological-based weakness, spasticity, or contracture in the lower extremities.     Derm: Normal texture and turgor.  No erythema, ecchymosis, or cyanosis.  No open lesions.     Musculoskeletal:    Lower extremity muscle strength is normal.  Patient is ambulatory without an assistive device or brace . Decrease in medial longitudinal arch with weight bearing.  Severe, non reducible, hallux abducto valgus on the left. The 2nd toe has some contracture and lateral deviation.         "

## 2020-01-27 NOTE — PROGRESS NOTES
HPI:  Barbi Tiwari is a 59 year old female patient here today for follow up onychomycosis .  Patient states this has been present for a while.  Patient reports the following symptoms: yellow thickened toenail .  Patient reports the following previous treatments: ciclopirox for a few months with minimal chnage.  Patient reports the following modifying factors: none.  Pt also has a new spot on left breast for a short while. No tx tried. Associated symptoms: none.  Patient has no other skin complaints today.  Remainder of the HPI, Meds, PMH, Allergies, FH, and SH was reviewed in chart.      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     Osteoarthrosis 12/9/2009     Palpitations 6/5/2014     PONV (postoperative nausea and vomiting)      PVCs 6/5/2014     Seizure disorder (related to tumor) 08/17/2011       Past Surgical History:   Procedure Laterality Date     CRANIOTOMY  2011    tumor resection     EXTRACORPOREAL SHOCK WAVE LITHOTRIPSY, CYSTOSCOPY, INSERT STENT URETER(S), COMBINED Bilateral 5/5/2017    Procedure: COMBINED EXTRACORPOREAL SHOCK WAVE LITHOTRIPSY, CYSTOSCOPY, INSERT STENT URETER(S);  CYSTO, URETHRAL DILATION, URETERAL LEFT STENT PLACEMENT, LEFT ESWL.;  Surgeon: Angel Del Rio MD;  Location:  OR     FOOT SURGERY      mulitple     HYSTERECTOMY, PAP NO LONGER INDICATED  2008    lap hys     LITHOTRIPSY  2010 2017     OPTICAL TRACKING SYSTEM CRANIOTOMY, EXCISE TUMOR WITH MAPPING, COMBINED Right 5/30/2018    Procedure: COMBINED OPTICAL TRACKING SYSTEM CRANIOTOMY, EXCISE TUMOR WITH MAPPING;  Right Stealth Assisted Craniotomy With Motor Mapping And Tumor Resection ;  Surgeon: Dustin Rodriguez MD;  Location:  [facility] OR     ORTHOPEDIC SURGERY      feet, multiple on both     OSTEOTOMY FOOT Right 1/15/2019    Procedure: OSTEOTOMY FOOT;  Surgeon: Benjamin Griffin DPM;  Location: [facility] OR     PARATHYROIDECTOMY  2011     RECONSTRUCT FOREFOOT WITH METATARSOPHALANGEAL (MTP) FUSION Right 1/15/2019    Procedure: RIGHT FIRST METATARSAL PHALAGEAL JOINT ARTHRODESIS, RIGHT SECOND METATARSAL WEIL OSTEOTOMY;  Surgeon: Benjamin Griffin DPM;  Location: [facility] OR        Family History   Problem Relation Age of Onset     Arthritis Mother      Depression Mother      Respiratory Mother         COPD     LUNG DISEASE Mother      C.A.D. Father 58        heart attack     Heart Disease Father      Coronary Artery Disease Father         MI  age 54     Diabetes Brother 70         age 70     Depression Sister      Depression Son      Allergies Son      Depression Daughter      Allergies Daughter      Eczema Brother      Skin Cancer Brother      Depression Sister      Depression Sister      Anxiety Disorder Daughter        Social History     Socioeconomic History     Marital status:      Spouse name: Not on file     Number of children: Not on file     Years of education: Not on file     Highest education level: Not on file   Occupational History     Occupation: RN- [employer]   Social Needs     Financial resource strain: Not on file     Food insecurity:     Worry: Not on file     Inability: Not on file     Transportation needs:     Medical: Not on file     Non-medical: Not on file   Tobacco Use     Smoking status: Never Smoker     Smokeless tobacco: Never Used   Substance and Sexual Activity     Alcohol use: Yes     Comment: social     Drug use: No     Sexual activity: Yes     Partners: Male     Birth control/protection: Female Surgical     Comment: hysterectomy   Lifestyle     Physical activity:     Days per week: Not on file     Minutes per session: Not on file     Stress: Not on file   Relationships     Social connections:     Talks  on phone: Not on file     Gets together: Not on file     Attends Buddhism service: Not on file     Active member of club or organization: Not on file     Attends meetings of clubs or organizations: Not on file     Relationship status: Not on file     Intimate partner violence:     Fear of current or ex partner: Not on file     Emotionally abused: Not on file     Physically abused: Not on file     Forced sexual activity: Not on file   Other Topics Concern     Parent/sibling w/ CABG, MI or angioplasty before 65F 55M? Yes     Comment: Father,  of MI age 54   Social History Narrative     Not on file       Outpatient Encounter Medications as of 2020   Medication Sig Dispense Refill     acetaminophen (TYLENOL) 500 MG tablet Take 2  tablets by mouth every 8 hours for post operative pain. 60 tablet 1     ascorbic acid 500 MG TABS Take 500 mg by mouth 2 times daily       calcium carbonate (TUMS) 500 MG chewable tablet Take 1 chew tab by mouth daily as needed for heartburn       ciclopirox (PENLAC) 8 % external solution Apply to adjacent skin and affected nails daily.  Remove with alcohol every 7 days, then repeat. 6.6 mL 3     COMPOUNDED NON-CONTROLLED SUBSTANCE (CMPD RX) - PHARMACY TO MIX COMPOUNDED MEDICATION Place 1 g vaginally twice a week Apply 1g daily for 2 weeks, then twice weekly to vagina 30 g 11     fexofenadine (ALLEGRA) 180 MG tablet Take 180 mg by mouth daily       gabapentin (NEURONTIN) 100 MG capsule 3 capsules ( 300 mg) 7 pm 270 capsule 3     ibuprofen (ADVIL/MOTRIN) 200 MG tablet Take 800 mg by mouth daily as needed        Lactase (LACTAID PO) Take 1-2 tablets by mouth daily as needed for indigestion        lamoTRIgine (LAMICTAL) 100 MG tablet 100 mg am and noon (take along with 200 mg tablet for total of 300 mg twice a day). [facility] only 180 tablet 3     lamoTRIgine (LAMICTAL) 200 MG tablet 200 mg am and noon (take along with 100 mg tablet for total of 300 mg twice a day). [facility]  only 180 tablet 3     levETIRAcetam (KEPPRA) 500 MG tablet 1000 mg am and 2000 mg pm 540 tablet 3     metoprolol tartrate (LOPRESSOR) 25 MG tablet Take 1 tablet (25 mg) by mouth 2 times daily 180 tablet 1     Multiple Vitamin (MULTI-VITAMINS) TABS Take 1 chew tab by mouth 2 times daily        ranitidine (ZANTAC) 150 MG tablet Take 1 tablet (150 mg) by mouth At Bedtime       ranitidine (ZANTAC) 75 MG tablet Take 75 mg by mouth daily       tretinoin (RETIN-A) 0.025 % external cream Apply a pea-sized amount to each area affected by acne. Start every 3-4 nights working up to every night. 45 g 3     VITAMIN D, CHOLECALCIFEROL, PO Take 1,000 Units by mouth 2 times daily Reported on 5/15/2017       Facility-Administered Encounter Medications as of 1/27/2020   Medication Dose Route Frequency Provider Last Rate Last Dose     lidocaine 1 % injection 4 mL  4 mL   Pool Tong MD   4 mL at 08/26/19 1136     triamcinolone (KENALOG-40) injection 40 mg  40 mg   Pool Tong MD   40 mg at 08/26/19 1136       Review Of Systems:  Skin: nail changes  Eyes: negative  Ears/Nose/Throat: negative  Respiratory: No shortness of breath, dyspnea on exertion, cough, or hemoptysis  Cardiovascular: negative  Gastrointestinal: negative  Genitourinary: negative  Musculoskeletal: negative  Neurologic: negative  Psychiatric: negative  Hematologic/Lymphatic/Immunologic: negative  Endocrine: negative      Objective:     /81   Pulse 62   SpO2 96%   Breastfeeding No   Eyes: Conjunctivae/lids: Normal   ENT: Lips:  Normal  MSK: Normal  Cardiovascular: Peripheral edema none  Pulm: Breathing Normal  Neuro/Psych: Orientation: A/O x 3 Normal; Mood/Affect: Normal, NAD, WDWN  Pt accompanied by: self  Following areas examined: face, hands, left foot  Navarrete skin type:ii   Findings:  Yellow thick nail on left 3rd toenail  Minimal yellowing on 1,2, and 4th toenail  Inflamed pink, stuck-on scaly appearing papules on left  superior breast 0.5cm  Assessment and Plan:  1) onychomycosis  Discussed treatment options with OTC products including daily application of tea tree oil, Vicks vapor rub, and/or apple cider vinegar soaks. Disc oral agents and liver function tests required at baseline, 6 weeks after treatment begins, and then once treatment ends. Prescription strength topicals are available, can use up to 48 weeks. Fingernails take 3-6 months to regrow completely, and toenails up to 12-18 months.   Nail culture on left 3rd toenail  Check hepatic panel tests today. Pt would like to start an oral if culture positive  Disc can remove nail too. Pt defers  Side effects of Terbinafine include but are not limited to elevated liver enzymes, nausea, vomiting, rash, taste changes, vision changes, photosensitivity, loss of smell, and headache.     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  No complications and routine wound care.  May grow back and will get a scar. Based on lesion type may need to completely remove lesion. Patient will be notified in 7-10 days of results. Wound care directions given.        Follow up in 3 months

## 2020-01-27 NOTE — PROGRESS NOTES
Musculoskeletal Problem        [facility]   Follow-up Visit s Jan 27, 2020    PCP: Dustin Choudhury      Subjective:  Barbi is a 59 year old female who is seen in follow up for evaluation of   Chief Complaint   Patient presents with     Left Knee - Pain     Her last visit was on 8/26/2019.  Since that time, symptoms have been returning. Barbi Tiwari is accompanied today by self.     Patient had a left knee Cortisone injection on 8/26/2019 that provided 100% relief, lasting for 3.5 month(s).     Patient has noticed improved symptoms with injection treatment.  Patient has no swelling  Pain is located left knee, posterior  , getting progressively worse.  Patient is using no aids.    Patient denies any new injuries.    Patient's past medical, surgical, social and family histories are reviewed today.    History from previous visit on 8/26/2019  Her last visit was on 10/15/18.  Since that time, symptoms have been worse than before. Barbi Tiwari is accompanied today by spouse.   Patient has noticed improved symptoms with physical therapy treatment. Symptoms worsened about a month or two ago without a specific mechanism of injury.   Patient has no swelling  Pain is located left knee, deep, getting progressively worse.  Patient is using no aids.    Patient denies any new injuries.    Patient's past medical, surgical, social and family histories are reviewed today.    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     Osteoarthrosis 12/9/2009     Palpitations 6/5/2014     PONV (postoperative nausea and vomiting)      PVCs 6/5/2014     Seizure disorder (related  to tumor) 2011       Patient Active Problem List    Diagnosis Date Noted     Acute left-sided low back pain without sciatica 2020     Priority: Medium     Hip pain, left 2020     Priority: Medium     Acute pain of left knee 2019     Priority: Medium     Chronic pain of left knee 10/24/2018     Priority: Medium     Primary osteoarthritis of left knee 10/15/2018     Priority: Medium     S/P laparoscopic hysterectomy 06/15/2018     Priority: Medium     Oligodendroglioma (H) 2018     Priority: Medium     Nephrolithiasis 2017     Priority: Medium     Overview:   S/P LITHOTRIPSY 3/15/11       Advanced directives, counseling/discussion 2017     Priority: Medium     Lumbar radiculopathy 2016     Priority: Medium     Palpitations 2014     Priority: Medium     PVC's (premature ventricular contractions) 2014     Priority: Medium     Elevated cholesterol 2014     Priority: Medium     Abnormal screening cardiac CT 2014     Priority: Medium     Moderate major depression, single episode (H) 2013     Priority: Medium     Weakness of face muscles 2011     Priority: Medium     Seizure (H) 2011     Priority: Medium     Calculus of kidney 2011     Priority: Medium     Overview:   S/P LITHOTRIPSY 3/15/11       Hyperparathyroidism s/p surgery 2011     Priority: Medium     Allergic rhinitis 2009     Priority: Medium     Osteoarthritis 2009     Priority: Medium     GERD (gastroesophageal reflux disease) 10/22/2008     Priority: Medium       Family History   Problem Relation Age of Onset     Arthritis Mother      Depression Mother      Respiratory Mother         COPD     LUNG DISEASE Mother      C.A.D. Father 58        heart attack     Heart Disease Father      Coronary Artery Disease Father         MI  age 54     Diabetes Brother 70         age 70     Depression Sister      Depression Son      Allergies Son       Depression Daughter      Allergies Daughter      Eczema Brother      Depression Sister      Depression Sister      Anxiety Disorder Daughter        Social History     Social History     Marital status:      Spouse name: N/A     Number of children: N/A     Years of education: N/A     Occupational History     RN- [employer]      Social History Main Topics     Smoking status: Never Smoker     Smokeless tobacco: Never Used     Alcohol use Yes      Comment: 1-2 per month, only at social events     Drug use: No     Past Surgical History:   Procedure Laterality Date     CRANIOTOMY  2011    tumor resection     EXTRACORPOREAL SHOCK WAVE LITHOTRIPSY, CYSTOSCOPY, INSERT STENT URETER(S), COMBINED Bilateral 5/5/2017    Procedure: COMBINED EXTRACORPOREAL SHOCK WAVE LITHOTRIPSY, CYSTOSCOPY, INSERT STENT URETER(S);  CYSTO, URETHRAL DILATION, URETERAL LEFT STENT PLACEMENT, LEFT ESWL.;  Surgeon: Angel Del Rio MD;  Location:  OR     FOOT SURGERY      mulitple     HYSTERECTOMY, PAP NO LONGER INDICATED  2008    lap hys     LITHOTRIPSY  2010 2017     OPTICAL TRACKING SYSTEM CRANIOTOMY, EXCISE TUMOR WITH MAPPING, COMBINED Right 5/30/2018    Procedure: COMBINED OPTICAL TRACKING SYSTEM CRANIOTOMY, EXCISE TUMOR WITH MAPPING;  Right Stealth Assisted Craniotomy With Motor Mapping And Tumor Resection ;  Surgeon: Dustin Rodriguez MD;  [facility]     ORTHOPEDIC SURGERY      feet, multiple on both     OSTEOTOMY FOOT Right 1/15/2019    Procedure: OSTEOTOMY FOOT;  Surgeon: Benjamin Griffin DPM;  Location:  OR     PARATHYROIDECTOMY  2011     RECONSTRUCT FOREFOOT WITH METATARSOPHALANGEAL (MTP) FUSION Right 1/15/2019    Procedure: RIGHT FIRST METATARSAL PHALAGEAL JOINT ARTHRODESIS, RIGHT SECOND METATARSAL WEIL OSTEOTOMY;  Surgeon: Benjamin Griffin DPM;  Location:  OR         Review of Systems   Musculoskeletal: Positive for joint pain.   All other systems reviewed and are negative.        Physical Exam  /73    "Ht 1.575 m (5' 2\")   Wt 64.4 kg (142 lb)   BMI 25.97 kg/m    Constitutional:well-developed, well-nourished, and in no distress.   Cardiovascular: Intact distal pulses.    Neurological: alert. Gait Abnormal:   Antalgic gait  occasionally limping  Skin: Skin is warm and dry.   Psychiatric: Mood and affect normal.   Respiratory: unlabored, speaks in full sentences  Lymph: no LAD, no lymphangitis      Left Knee Exam   Swelling: None  Effusion: No    Tenderness   The patient is experiencing tenderness in the Patella facets.    Range of Motion   Extension: Normal  Flexion:     Normal    Tests   McMurrays:  Medial - Positive      Lateral - Negative  Lachman:  Anterior - Negative    Posterior - Negative  Drawer:       Anterior - Negative     Posterior - Negative  Varus:  Negative  Valgus: Negative  Pivot Shift: Negative  Patellar Apprehension: No    Comments:  Mild patellofemoral crepitus with range of motion        ASSESSMENT/PLAN    ICD-10-CM    1. Primary osteoarthritis of left knee M17.12 Large Joint Injection/Arthocentesis: L knee joint   2. Acute left-sided low back pain without sciatica M54.5      Recurrent osteoarthritis symptoms involving left knee, she does have sciatic-like pain from the posterior left hip but on reevaluation today does not appear to be directly related.  Okay to proceed with repeat cortisone injection for the knee today.  She will check with physical therapy about an unloading brace.    Lumbar spine evaluation fails to reveal acute radicular symptoms, but they are sciatic in nature.  She is comfortable continuing physical therapy, but further evaluation and possible MRI of the lumbar spine may be in order if symptoms persist or worsen.    We went on to discuss other treatments than cortisone injections, such as Visco supplementation.  This was discussed at length and she will check on her health plan coverage for this agent.    Large Joint Injection/Arthocentesis: L knee joint  Date/Time: " 1/27/2020 10:52 AM  Performed by: Pool Tong MD  Authorized by: Pool Tong MD     Indications:  Pain and osteoarthritis  Needle Size:  25 G  Guidance: landmark guided    Approach:  Superolateral  Location:  Knee      Medications:  4 mL lidocaine 1 %; 40 mg triamcinolone 40 MG/ML  Outcome:  Tolerated well, no immediate complications  Procedure discussed: discussed risks, benefits, and alternatives    Consent Given by:  Patient  Timeout: timeout called immediately prior to procedure    Prep: patient was prepped and draped in usual sterile fashion        Time spent in one-on-one evalution and discussion with patient regarding nature of problem, course, prior treatments, and therapeutic options, at least 50% of which was spent in counseling and coordination of care: 15 minutes, over and above time spent on injection.

## 2020-01-27 NOTE — LETTER
1/27/2020         RE: Barbi Tiwari  [address]  [facility] [address]        Dear Colleague,    Thank you for referring your patient, Barbi Tiwari, to the [facility]. Please see a copy of my visit note below.    HPI:  Barbi Tiwari is a 59 year old female patient here today for follow up onychomycosis .  Patient states this has been present for a while.  Patient reports the following symptoms: yellow thickened toenail .  Patient reports the following previous treatments: ciclopirox for a few months with minimal chnage.  Patient reports the following modifying factors: none.  Pt also has a new spot on left breast for a short while. No tx tried. Associated symptoms: none.  Patient has no other skin complaints today.  Remainder of the HPI, Meds, PMH, Allergies, FH, and SH was reviewed in chart.      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     Osteoarthrosis 12/9/2009     Palpitations 6/5/2014     PONV (postoperative nausea and vomiting)      PVCs 6/5/2014     Seizure disorder (related to tumor) 08/17/2011       Past Surgical History:   Procedure Laterality Date     CRANIOTOMY  2011    tumor resection     EXTRACORPOREAL SHOCK WAVE LITHOTRIPSY, CYSTOSCOPY, INSERT STENT URETER(S), COMBINED Bilateral 5/5/2017    Procedure: COMBINED EXTRACORPOREAL SHOCK WAVE LITHOTRIPSY, CYSTOSCOPY, INSERT STENT URETER(S);  CYSTO, URETHRAL DILATION, URETERAL LEFT STENT PLACEMENT, LEFT ESWL.;  Surgeon: Angel Del Rio MD;  Location: [facility] OR     FOOT SURGERY      mulitple     HYSTERECTOMY, PAP NO LONGER INDICATED  2008    lap hys     LITHOTRIPSY  2010 2017     OPTICAL TRACKING SYSTEM CRANIOTOMY,  EXCISE TUMOR WITH MAPPING, COMBINED Right 2018    Procedure: COMBINED OPTICAL TRACKING SYSTEM CRANIOTOMY, EXCISE TUMOR WITH MAPPING;  Right Stealth Assisted Craniotomy With Motor Mapping And Tumor Resection ;  Surgeon: Dustin Rodriguez MD;  [facility]     ORTHOPEDIC SURGERY      feet, multiple on both     OSTEOTOMY FOOT Right 1/15/2019    Procedure: OSTEOTOMY FOOT;  Surgeon: Benjamin Griffin DPM;  Location: [facility] OR     PARATHYROIDECTOMY  2011     RECONSTRUCT FOREFOOT WITH METATARSOPHALANGEAL (MTP) FUSION Right 1/15/2019    Procedure: RIGHT FIRST METATARSAL PHALAGEAL JOINT ARTHRODESIS, RIGHT SECOND METATARSAL WEIL OSTEOTOMY;  Surgeon: Benjamin Griffin DPM;  Location: [facility] OR        Family History   Problem Relation Age of Onset     Arthritis Mother      Depression Mother      Respiratory Mother         COPD     LUNG DISEASE Mother      C.A.D. Father 58        heart attack     Heart Disease Father      Coronary Artery Disease Father         MI  age 54     Diabetes Brother 70         age 70     Depression Sister      Depression Son      Allergies Son      Depression Daughter      Allergies Daughter      Eczema Brother      Skin Cancer Brother      Depression Sister      Depression Sister      Anxiety Disorder Daughter        Social History     Socioeconomic History     Marital status:      Spouse name: Not on file     Number of children: Not on file     Years of education: Not on file     Highest education level: Not on file   Occupational History     Occupation: RN- [employer]   Social Needs     Financial resource strain: Not on file     Food insecurity:     Worry: Not on file     Inability: Not on file     Transportation needs:     Medical: Not on file     Non-medical: Not on file   Tobacco Use     Smoking status: Never Smoker     Smokeless tobacco: Never Used   Substance and Sexual Activity     Alcohol use: Yes     Comment: social     Drug use: No     Sexual activity: Yes      Partners: Male     Birth control/protection: Female Surgical     Comment: hysterectomy   Lifestyle     Physical activity:     Days per week: Not on file     Minutes per session: Not on file     Stress: Not on file   Relationships     Social connections:     Talks on phone: Not on file     Gets together: Not on file     Attends Restoration service: Not on file     Active member of club or organization: Not on file     Attends meetings of clubs or organizations: Not on file     Relationship status: Not on file     Intimate partner violence:     Fear of current or ex partner: Not on file     Emotionally abused: Not on file     Physically abused: Not on file     Forced sexual activity: Not on file   Other Topics Concern     Parent/sibling w/ CABG, MI or angioplasty before 65F 55M? Yes     Comment: Father,  of MI age 54   Social History Narrative     Not on file       Outpatient Encounter Medications as of 2020   Medication Sig Dispense Refill     acetaminophen (TYLENOL) 500 MG tablet Take 2  tablets by mouth every 8 hours for post operative pain. 60 tablet 1     ascorbic acid 500 MG TABS Take 500 mg by mouth 2 times daily       calcium carbonate (TUMS) 500 MG chewable tablet Take 1 chew tab by mouth daily as needed for heartburn       ciclopirox (PENLAC) 8 % external solution Apply to adjacent skin and affected nails daily.  Remove with alcohol every 7 days, then repeat. 6.6 mL 3     COMPOUNDED NON-CONTROLLED SUBSTANCE (CMPD RX) - PHARMACY TO MIX COMPOUNDED MEDICATION Place 1 g vaginally twice a week Apply 1g daily for 2 weeks, then twice weekly to vagina 30 g 11     fexofenadine (ALLEGRA) 180 MG tablet Take 180 mg by mouth daily       gabapentin (NEURONTIN) 100 MG capsule 3 capsules ( 300 mg) 7 pm 270 capsule 3     ibuprofen (ADVIL/MOTRIN) 200 MG tablet Take 800 mg by mouth daily as needed        Lactase (LACTAID PO) Take 1-2 tablets by mouth daily as needed for indigestion        lamoTRIgine (LAMICTAL) 100 MG  tablet 100 mg am and noon (take along with 200 mg tablet for total of 300 mg twice a day). [facility] mft only 180 tablet 3     lamoTRIgine (LAMICTAL) 200 MG tablet 200 mg am and noon (take along with 100 mg tablet for total of 300 mg twice a day). [facility] mft only 180 tablet 3     levETIRAcetam (KEPPRA) 500 MG tablet 1000 mg am and 2000 mg pm 540 tablet 3     metoprolol tartrate (LOPRESSOR) 25 MG tablet Take 1 tablet (25 mg) by mouth 2 times daily 180 tablet 1     Multiple Vitamin (MULTI-VITAMINS) TABS Take 1 chew tab by mouth 2 times daily        ranitidine (ZANTAC) 150 MG tablet Take 1 tablet (150 mg) by mouth At Bedtime       ranitidine (ZANTAC) 75 MG tablet Take 75 mg by mouth daily       tretinoin (RETIN-A) 0.025 % external cream Apply a pea-sized amount to each area affected by acne. Start every 3-4 nights working up to every night. 45 g 3     VITAMIN D, CHOLECALCIFEROL, PO Take 1,000 Units by mouth 2 times daily Reported on 5/15/2017       Facility-Administered Encounter Medications as of 1/27/2020   Medication Dose Route Frequency Provider Last Rate Last Dose     lidocaine 1 % injection 4 mL  4 mL   Pool Tong MD   4 mL at 08/26/19 1136     triamcinolone (KENALOG-40) injection 40 mg  40 mg   Pool Tong MD   40 mg at 08/26/19 1136       Review Of Systems:  Skin: nail changes  Eyes: negative  Ears/Nose/Throat: negative  Respiratory: No shortness of breath, dyspnea on exertion, cough, or hemoptysis  Cardiovascular: negative  Gastrointestinal: negative  Genitourinary: negative  Musculoskeletal: negative  Neurologic: negative  Psychiatric: negative  Hematologic/Lymphatic/Immunologic: negative  Endocrine: negative      Objective:     /81   Pulse 62   SpO2 96%   Breastfeeding No   Eyes: Conjunctivae/lids: Normal   ENT: Lips:  Normal  MSK: Normal  Cardiovascular: Peripheral edema none  Pulm: Breathing Normal  Neuro/Psych: Orientation: A/O x 3 Normal; Mood/Affect: Normal, NAD,  WDWN  Pt accompanied by: self  Following areas examined: face, hands, left foot  Navarrete skin type:ii   Findings:  Yellow thick nail on left 3rd toenail  Minimal yellowing on 1,2, and 4th toenail  Inflamed pink, stuck-on scaly appearing papules on left superior breast 0.5cm  Assessment and Plan:  1) onychomycosis  Discussed treatment options with OTC products including daily application of tea tree oil, Vicks vapor rub, and/or apple cider vinegar soaks. Disc oral agents and liver function tests required at baseline, 6 weeks after treatment begins, and then once treatment ends. Prescription strength topicals are available, can use up to 48 weeks. Fingernails take 3-6 months to regrow completely, and toenails up to 12-18 months.   Nail culture on left 3rd toenail  Check hepatic panel tests today. Pt would like to start an oral if culture positive  Disc can remove nail too. Pt defers  Side effects of Terbinafine include but are not limited to elevated liver enzymes, nausea, vomiting, rash, taste changes, vision changes, photosensitivity, loss of smell, and headache.     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  No complications and routine wound care.  May grow back and will get a scar. Based on lesion type may need to completely remove lesion. Patient will be notified in 7-10 days of results. Wound care directions given.        Follow up in 3 months      Again, thank you for allowing me to participate in the care of your patient.        Sincerely,        Cary Camarillo PA-C

## 2020-01-28 RX ORDER — TRIAMCINOLONE ACETONIDE 40 MG/ML
40 INJECTION, SUSPENSION INTRA-ARTICULAR; INTRAMUSCULAR
Status: DISCONTINUED | OUTPATIENT
Start: 2020-01-27 | End: 2020-09-28

## 2020-01-28 RX ORDER — LIDOCAINE HYDROCHLORIDE 10 MG/ML
4 INJECTION, SOLUTION INFILTRATION; PERINEURAL
Status: DISCONTINUED | OUTPATIENT
Start: 2020-01-27 | End: 2020-09-28

## 2020-01-31 DIAGNOSIS — C71.9 OLIGODENDROGLIOMA (H): ICD-10-CM

## 2020-01-31 LAB — COPATH REPORT: NORMAL

## 2020-02-03 ENCOUNTER — TELEPHONE (OUTPATIENT)
Dept: ONCOLOGY | Facility: CLINIC | Age: 60
End: 2020-02-03

## 2020-02-03 DIAGNOSIS — C71.9 OLIGODENDROGLIOMA (H): Primary | ICD-10-CM

## 2020-02-03 NOTE — TELEPHONE ENCOUNTER
PA Initiation    Medication: Temozolomide - Submitted  Insurance Company: [employer] - Phone [phone] Fax [phone]  Pharmacy Filling the Rx:    Filling Pharmacy Phone:    Filling Pharmacy Fax:    Start Date: 2/3/2020        Kaia Jo CPhT  [facility]  Oncology Pharmacy [email or web address]  [email or web address]  Phone: [phone]  Fax: [phone]

## 2020-02-04 ENCOUNTER — THERAPY VISIT (OUTPATIENT)
Dept: PHYSICAL THERAPY | Facility: CLINIC | Age: 60
End: 2020-02-04
Payer: COMMERCIAL

## 2020-02-04 DIAGNOSIS — M25.552 HIP PAIN, LEFT: ICD-10-CM

## 2020-02-04 DIAGNOSIS — M54.50 ACUTE LEFT-SIDED LOW BACK PAIN WITHOUT SCIATICA: ICD-10-CM

## 2020-02-04 PROCEDURE — 97140 MANUAL THERAPY 1/> REGIONS: CPT | Mod: GP | Performed by: PHYSICAL THERAPIST

## 2020-02-04 PROCEDURE — 97110 THERAPEUTIC EXERCISES: CPT | Mod: GP | Performed by: PHYSICAL THERAPIST

## 2020-02-04 NOTE — TELEPHONE ENCOUNTER
Prior Authorization Approval    Authorization Effective Date: 2/4/2020  Authorization Expiration Date: 2/3/2021  Medication: Temozolomide - APPROVED  Approved Dose/Quantity:   Reference #: [id number]   Insurance Company: [employer] - Phone [phone] Fax [phone]  Expected CoPay:       CoPay Card Available:      Foundation Assistance Needed:    Which Pharmacy is filling the prescription (Not needed for infusion/clinic administered):    Pharmacy Notified:    Patient Notified:          Kaia Jo CPhT  [facility]  Oncology Pharmacy [email or web address]  [email or web address]  Phone: [phone]  Fax: [phone]

## 2020-02-17 ENCOUNTER — OFFICE VISIT (OUTPATIENT)
Dept: NEUROPSYCHOLOGY | Facility: CLINIC | Age: 60
End: 2020-02-17
Attending: PSYCHIATRY & NEUROLOGY
Payer: COMMERCIAL

## 2020-02-17 DIAGNOSIS — F41.9 ANXIETY: ICD-10-CM

## 2020-02-17 DIAGNOSIS — C71.9 MALIGNANT NEOPLASM OF BRAIN, UNSPECIFIED LOCATION (H): Primary | ICD-10-CM

## 2020-02-17 DIAGNOSIS — F09 MENTAL DISORDER DUE TO GENERAL MEDICAL CONDITION: ICD-10-CM

## 2020-02-17 DIAGNOSIS — R41.844 FRONTAL LOBE AND EXECUTIVE FUNCTION DEFICIT: ICD-10-CM

## 2020-02-17 NOTE — NURSING NOTE
The patient was seen for neuropsychological evaluation at the request of Dr. Myrtle Gallagher, for the purposes of diagnostic clarification and treatment planning. 165 minutes of test administration and scoring were provided by this writer, Jesus Peraza. Please see Dr. Yoshi Meyer's report for a full interpretation of the findings.

## 2020-02-19 NOTE — PROGRESS NOTES
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 She was unaccompanied to the evaluation.     BACKGROUND INFORMATION / INTERVIEW FINDINGS    Records indicate that Ms. Tiwari was diagnosed with a right frontal diffuse oligodendroglioma (1p19q co-deleted) following a resection in May, 2011. She received 12 cycles of temozolomide, completed in May, 2012. She had a repeat resection on 05/30/2018 due to suspected tumor progression. Pathology was unchanged. She is currently on imaging surveillance, although imaging in the last 1.5 years has shown tumor regrowth. She is scheduled to initiate chemoradiotherapy in May or June, 2020. She has also had episodes of non-convulsive status epilepticus in July, 2011, and July, 2012. The most recent MRI of her brain on 12/16/2019 documented no change since the comparison examination from 08/19/2018, although there was a slight increase in T2 hyperintense signal progressing since 05/31/2018. The scan also noted no abnormal intracranial enhancing disease. The report described a resection cavity in the posterior lateral right frontal lobe with marginal hemosiderin staining. Her other medical history includes depression, left-sided low back pain, left hip pain, left knee pain, osteoarthritis of the left knee, laparoscopic hysterectomy, nephrolithiasis, lumbar radiculopathy, premature ventricular contractions, elevated cholesterol, calculus of kidney, hyperparathyroidism, allergic rhinitis, and gastroesophageal reflux disease. She has expressed concerns about her cognition, and with word finding in particular. The current evaluation was requested by Dr. Myrtle Gallagher, in this  context.    Of note, Ms. Tiawri reported that she completed a neuropsychology exam in the past. She was unable to recollect the name of the neuropsychologist with whom she had completed this exam. She stated that she had a copy of the report at home. She reported that she would fax a copy of this report to me. Unfortunately, I did not receive this report by the time the current report s posting. If I receive this information, I will place an addendum on the current report.    On interview, Ms. Tiwari confirmed the above history. She stated that she met with Dr. Juan Figueroa, and is planning on starting chemoradiotherapy with partial brain radiation in June, 2020.    Regarding cognition, Ms. Tiwari reported that she got very emotional and hyperactive after her first surgery. She stated that this was all in the context of finding the right medications for her seizures. She noted that she also began having troubles with some language skills after surgery. She stated that she has trouble retrieving words, especially when she is under stress or is having to work quickly. She stated that she uses circumlocution. She noted that she still has word finding problems. She denied other cognitive difficulties. She denied problems with visual processing.     With respect to mental health, Ms. Tiwari reported that her mood is okay. She reported that she has symptoms of depression that come and go. She denied any issues with mental health prior to the tumor. She is not currently receiving treatment, and stated that she has never had treatment for mental health concerns. She denied prior psychiatric hospitalization, hallucinations, or symptoms consistent with briseida. She denied current suicidal ideation.    With respect to other medical background, Ms. Tiwari denied prior TBI or stroke. She confirmed that she had two episodes of status epilepticus in the past. She reported that she still has simple partial seizures with  facial symptoms on the left side of her face. She is under the care of an epileptologist (Dr. Flakita Clark). She reported that her seizures tend to cluster. She reported that she can go months without having a seizure, and then can have four or five seizures over the course of two days. She indicated that she has not been able to identify a trigger for seizure onset. She reported that her last seizure occurred in December, 2019. She stated that she has not had any severe seizures since 2012. She indicated that her sleep is somewhat variable. She saw sleep specialist. She reported that she averages six hours of sleep per night, but slept seven hours the night before the exam. She acknowledged that she has chronic pain in her left leg and hip. She rated her pain at 1/10 at the time of the interview. Per records, her current medications include acetaminophen, ascorbic acid, calcium carbonate, ciclopirox, fexofenadine, gabapentin, ibuprofen, lactase, lamotrigine, levetiracetam, metoprolol, multivitamin, ranitidine, tretinoin, and vitamin D. She reported that she will occasionally consume an alcoholic drink, but otherwise denied substance use. She denied past substance abuse. She reported that she is right-handed and has always been right-handed. She reported that she does not use her left hand for many activities. She did note that her brother, nephew, and some cousins are also left-handed.    Ms. Tiwari lives at home with her  and her daughters. She manages her own basic daily activities, her own medications, and her own finances. The patient and her  share meal preparation responsibilities. She drives. By way of background, the patient and her  have been  for 29 years. Their two daughters are 24 and 23 years of age. Regarding educational background, she graduated from high school with good grades. She earned a bachelor s degree in nursing and a master s degree in nursing, both from the  [employer]. 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 She reported that she missed working with patients, so she transitioned back to working as a staff nurse. She has been in her current role since 2014. She reported that her work is going well.    BEHAVIORAL OBSERVATIONS  Ms. Tiwari was polite and cooperative with the exam. Her speech was normal. Her comprehension was normal. Her thought processes were notable for mild distractibility and mild slowing. Her mood was mildly depressed and anxious with flat affect. Her effort was good. The current results are felt to be an accurate representation of her cognitive functioning.     RESULTS OF EXAM  Her performances on standardized measures of neuropsychological functioning were as follows.    She was fully oriented to time, place, and various aspects of personal information. Performance on a measure of single word reading was high average. She obtained passing scores on stand-alone and embedded metrics of cognitive performance validity. Auditory attention for digits was superior. Visual attention for spatial sequences was high average. Mental calculations were average. Learning of words in a list format was performed within normal limits. Short delayed recall of the list words was performed in the average to high average range. 30 minute delayed recall of list words was high average. Delayed recognition of list words was performed without error. Immediate memory for simple geometric figures was performed slightly below expectation. Her drawing of a complicated geometric figure was normal. Short delayed recall of the figure was impaired. 30 minute delayed recall of the figure was borderline impaired. Delayed recognition of the figure s elements was low average. Visuospatial judgments for  variably oriented lines were superior. Visual problem-solving with blocks was high average. Nonverbal reasoning for incomplete matrices was high average. Comprehension of phrases and short stories was average. Verbal associative fluency was average. Semantic verbal fluency was high average. Naming to confrontation was average. Verbal abstract reasoning was average. Speeded visual sequencing under focused attention was low average. A similar measure with a divided attention component was borderline impaired, with two errors. Speeded word reading was performed in the low average to average range. Speeded color naming was average. Speeded inhibition of an over-learned response was performed in the low average to average range. Speeded visual motor coding was high average. Speeded fine motor dexterity was low average for the dominant, right hand, and average for the left hand.    She endorsed items consistent with minimal symptoms of depression, and mild symptoms of anxiety on self-report measures.    IMPRESSIONS  Assuming conventionally arranged functional neuroanatomy, Ms. Tiwari demonstrated a pattern of weaknesses that is consistent with subtle dysfunction of the bilateral frontal lobes. What complicates the interpretation of her profile, however, is that she is mildly anxious. This anxiety can produce some degree of variability in cognition. Mild medication toxicity could be playing a role as well. At face value, mild weaknesses were noted in aspects of verbal and nonverbal working memory, nonverbal recall, some aspects of executive functioning, and bilateral psychomotor speed. The remainder of her cognitive abilities were intact and performed in keeping with her above average range cognitive baseline. I did not see strong evidence to suggest that there is dysfunction of her language skills, although it could be the case that relative weaknesses in thinking speed could lead to troubles finding words at times. As  alluded to above, she is reporting mild symptoms of anxiety. She could note improvements in her thinking with a reduction in her anxiety.    RECOMMENDATIONS  Preliminary results and recommendations were provided to the patient on the date of the evaluation, and all questions were answered.    1. If medically indicated, consideration could be given to treatment of her anxiety.     2. Along similar lines, referral for psychotherapy services could be considered.    3. We had a discussion during the feedback session about things that she can do to preserve her cognition when she undergoes radiation treatments. I encouraged her to remain mentally and physically active as best as she is able. There is also some data that suggests that memantine can mitigate cognitive decline in patients who are undergoing whole brain radiation. However, my understanding is that the plan is for the patient have partial brain radiation.    4. Follow-up neuropsychological evaluation is recommended in one year in order to assess and update recommendations as appropriate. The current results can be used as a baseline at that time.    Yoshi Meyer, Ph.D., L.P., ABPP-CN   / Licensed Psychologist [id number]  Department of Rehabilitation Medicine  Division of Adult Neuropsychology  [facility]    Time spent:  One unit (40 minutes) neurobehavioral status exam including interview and clinical assessment by licensed and board-certified neuropsychologist (CPT 69336). One unit (60 minutes) neuropsychological testing evaluation by licensed and board-certified neuropsychologist, including integration of patient data, interpretation of standardized test results and clinical data, clinical decision-making, treatment planning, and report, first hour (CPT 13748). Two unit(s) (100 minutes) of neuropsychological testing evaluation by licensed and board-certified neuropsychologist, including integration of patient data,  interpretation of standardized test results and clinical data, clinical decision-making, treatment planning, and report, subsequent hours (CPT 69563). One unit (30 minutes) of psychological and neuropsychological test administration and scoring by technician (graduate practicum student), first 30 minutes (CPT 41242). Four units (135 minutes) psychological or neuropsychological test administration and scoring by technician, subsequent 30 minutes (CPT 80031). Diagnoses: C71.9, R41.844, F06.8, F41.9.

## 2020-02-19 NOTE — PROGRESS NOTES
Name: Barbi Tiwari MRN: [id number]  : 1960 [place]: 2020  Staff: SANTIAGO Tech: HUSSEIN Age: 59  Sex: Female Hand: Right Educ: 18  Vision: 20/20 ?with correction / ?without correction    ORIENTATION     Time  0     Place       Personal info          Presidents     WAIS-IV     VENECIA: (115)    WMI: 119         Raw SSa     Similarities  28 11     Block Design  45 12     Matrix Reasoning 20 13     Digit Span  37 16 RDS= 14     Arithmetic  15 11     Coding  74 13    AVLT      Trial 1 2 3 4 5 B 6             8 11 12 13 12 6 13      Raw %ile     Learning Over Trials (1-5) 16     Short Delay Recall  13 67-87     30  Recall   14 82-89     30  Recognition Hits  15      30  False Positives  0     ALICE-O    Raw  T %ile     Copy    34     >16     Short Delay Recall 8 28 1     Long Delay Recall 9 30 2     Recognition Total 18 37 10     Time to Copy  225        BVRT     Form C Copy E-10 Rating__/4     Raw expected   Correct  7 8   Incorrect  5 2    WRAT4   SS %ile Grade Equiv.     Word Reading  118 88 >12.9    COWAT (FAS)   Raw: 41  T: 45  Animals   Raw:  25 T-score:  58    BOSTON NAMING TEST   Raw: 58  %ile 51-71    COMPLEX IDEATIONAL MATERIAL   Raw:  T: 53    TRAILS  Raw  Err %ile    A 37  0 13-20   B 110  2 7    STROOP Raw %ile   Word 95 22-44   Color  70 28-39       Color/Word  34 18-26      RORO   Raw: 29 %ile: >90,<93    GROOVED PEGBOARD    Raw  T Drops   RH  81  38 0   LH 81  44 0    BDI-II  Raw:  4 Interpretation: Minimal    DEMIAN  Raw:  12 Interpretation: Mild      WMS-III  Raw SS / Z     Spatial Span 17 13    DCT E-score: 7

## 2020-02-24 ENCOUNTER — OFFICE VISIT (OUTPATIENT)
Dept: NEUROLOGY | Facility: CLINIC | Age: 60
End: 2020-02-24
Payer: COMMERCIAL

## 2020-02-24 ENCOUNTER — E-VISIT (OUTPATIENT)
Dept: INTERNAL MEDICINE | Facility: CLINIC | Age: 60
End: 2020-02-24
Payer: COMMERCIAL

## 2020-02-24 VITALS
SYSTOLIC BLOOD PRESSURE: 105 MMHG | DIASTOLIC BLOOD PRESSURE: 59 MMHG | HEART RATE: 50 BPM | WEIGHT: 139.4 LBS | BODY MASS INDEX: 25.5 KG/M2

## 2020-02-24 DIAGNOSIS — N30.90 BLADDER INFECTION: Primary | ICD-10-CM

## 2020-02-24 DIAGNOSIS — R56.9 SEIZURE (H): ICD-10-CM

## 2020-02-24 LAB
BACTERIA SPEC CULT: NORMAL
SPECIMEN SOURCE: NORMAL

## 2020-02-24 PROCEDURE — 99421 OL DIG E/M SVC 5-10 MIN: CPT | Performed by: INTERNAL MEDICINE

## 2020-02-24 RX ORDER — LAMOTRIGINE 100 MG/1
TABLET ORAL
Qty: 180 TABLET | Refills: 3 | Status: SHIPPED | OUTPATIENT
Start: 2020-02-24 | End: 2021-01-06

## 2020-02-24 RX ORDER — LEVETIRACETAM 500 MG/1
TABLET ORAL
Qty: 540 TABLET | Refills: 3 | Status: SHIPPED | OUTPATIENT
Start: 2020-02-24 | End: 2020-09-28

## 2020-02-24 RX ORDER — GABAPENTIN 100 MG/1
CAPSULE ORAL
Qty: 180 CAPSULE | Refills: 3 | Status: SHIPPED | OUTPATIENT
Start: 2020-02-24 | End: 2020-09-02

## 2020-02-24 RX ORDER — LAMOTRIGINE 200 MG/1
TABLET ORAL
Qty: 180 TABLET | Refills: 3 | Status: SHIPPED | OUTPATIENT
Start: 2020-02-24 | End: 2021-01-06

## 2020-02-24 ASSESSMENT — PATIENT HEALTH QUESTIONNAIRE - PHQ9: SUM OF ALL RESPONSES TO PHQ QUESTIONS 1-9: 0

## 2020-02-24 ASSESSMENT — PAIN SCALES - GENERAL: PAINLEVEL: NO PAIN (0)
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
## 2020-02-24 NOTE — PATIENT INSTRUCTIONS
Meet with primary about anxiety treatment  Make appointment psychiatry (you may cancel)  Consider psychologist (anxiety treatment like breathing, progressive muscle relaxation at [facility],mediation)    Consider EAP through work   [facility]   Head space haris for mediation with headphones during procedure (ask your radiation doctor)    If your seizure worsen we can increase your levetiracetam and add  Gabapentin in the afternoon. For seizure clusters we can use valium or ativan as needed.     [facility] if you start having seizure [facility] [phone]    Flakita Clark MD
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
## 2020-02-25 RX ORDER — NITROFURANTOIN 25; 75 MG/1; MG/1
100 CAPSULE ORAL 2 TIMES DAILY
Qty: 10 CAPSULE | Refills: 0 | Status: SHIPPED | OUTPATIENT
Start: 2020-02-25 | End: 2020-06-01

## 2020-02-25 NOTE — PATIENT INSTRUCTIONS
Thank you for choosing us for your care. I have placed an order for a prescription so that you can start treatment. View your full visit summary for details by clicking on the link below. Your pharmacist will able to address any questions you may have about the medication.     If you re not feeling better within 2-3 days, please schedule an appointment.  You can schedule an appointment right here in [employer], or call [phone]  If the visit is for the same symptoms as your e-visit, we ll refund the cost of your e-visit if seen within seven days.      Urinary Tract Infections in Women    Urinary tract infections (UTIs) are most often caused by bacteria. These bacteria enter the urinary tract. The bacteria may come from outside the body. Or they may travel from the skin outside the rectum or vagina into the urethra. Female anatomy makes it easy for bacteria from the bowel to enter a woman s urinary tract, which is the most common source of UTI. This means women develop UTIs more often than men. Pain in or around the urinary tract is a common UTI symptom. But the only way to know for sure if you have a UTI for the healthcare provider to test your urine. The two tests that may be done are the urinalysis and urine culture.  Types of UTIs    Cystitis. A bladder infection (cystitis) is the most common UTI in women. You may have urgent or frequent urination. You may also have pain, burning when you urinate, and bloody urine.    Urethritis. This is an inflamed urethra, which is the tube that carries urine from the bladder to outside the body. You may have lower stomach or back pain. You may also have urgent or frequent urination.    Pyelonephritis. This is a kidney infection. If not treated, it can be serious and damage your kidneys. In severe cases, you may need to stay in the hospital. You may have a fever and lower back pain.  Medicines to treat a UTI  Most UTIs are treated with antibiotics. These kill the bacteria.  The length of time you need to take them depends on the type of infection. It may be as short as 3 days. If you have repeated UTIs, you may need a low-dose antibiotic for several months. Take antibiotics exactly as directed. Don t stop taking them until all of the medicine is gone. If you stop taking the antibiotic too soon, the infection may not go away. You may also develop a resistance to the antibiotic. This can make it much harder to treat.  Lifestyle changes to treat and prevent UTIs  The lifestyle changes below will help get rid of your UTI. They may also help prevent future UTIs.    Drink plenty of fluids. This includes water, juice, or other caffeine-free drinks. Fluids help flush bacteria out of your body.    Empty your bladder. Always empty your bladder when you feel the urge to urinate. And always urinate before going to sleep. Urine that stays in your bladder can lead to infection. Try to urinate before and after sex as well.    Practice good personal hygiene. Wipe yourself from front to back after using the toilet. This helps keep bacteria from getting into the urethra.    Use condoms during sex. These help prevent UTIs caused by sexually transmitted bacteria. Also don't use spermicides during sex. These can increase the risk for UTIs. Choose other forms of birth control instead. For women who tend to get UTIs after sex, a low-dose of a preventive antibiotic may be used. Be sure to discuss this option with your healthcare provider.    Follow up with your healthcare provider as directed. He or she may test to make sure the infection has cleared. If needed, more treatment may be started.  Date Last Reviewed: 1/1/2017 2000-2019 [employer]. [address] [place] [address], [place] [address]. All rights reserved. This information is not intended as a substitute for professional medical care. Always follow your healthcare professional's instructions.

## 2020-02-26 ENCOUNTER — TELEPHONE (OUTPATIENT)
Dept: FAMILY MEDICINE | Facility: CLINIC | Age: 60
End: 2020-02-26

## 2020-02-26 NOTE — LETTER
February 26, 2020    Barbi Tiwari  [address] [facility] [id number]        Dear Barbi,    This is a letter regarding your completed lab results. The tested values are below and were normal.    No fungus seen after 4 weeks. At this point we could do another nail biopsy or try treating with an alternative topical antifungal. However, if it is not fungal and happens to be more so related to trauma ( running, walking, rubbing on shoes) then a wider shoe may help. There is also the option of having the toenails removed to let them grow out. Sometimes we do not know what causes nail changes. There are some skin issues that can cause nail changes and will sometimes only show up in the nails which can be difficult to treat.     Please let me know if you have questions.     Thank you,     Catherine Camarillo PA-C

## 2020-03-02 ENCOUNTER — THERAPY VISIT (OUTPATIENT)
Dept: PHYSICAL THERAPY | Facility: CLINIC | Age: 60
End: 2020-03-02
Payer: COMMERCIAL

## 2020-03-02 DIAGNOSIS — M25.552 HIP PAIN, LEFT: ICD-10-CM

## 2020-03-02 DIAGNOSIS — M54.50 ACUTE LEFT-SIDED LOW BACK PAIN WITHOUT SCIATICA: ICD-10-CM

## 2020-03-02 PROCEDURE — 97110 THERAPEUTIC EXERCISES: CPT | Mod: GP | Performed by: PHYSICAL THERAPIST

## 2020-03-09 ENCOUNTER — THERAPY VISIT (OUTPATIENT)
Dept: PHYSICAL THERAPY | Facility: CLINIC | Age: 60
End: 2020-03-09
Payer: COMMERCIAL

## 2020-03-09 DIAGNOSIS — M54.50 ACUTE LEFT-SIDED LOW BACK PAIN WITHOUT SCIATICA: ICD-10-CM

## 2020-03-09 DIAGNOSIS — M25.552 HIP PAIN, LEFT: ICD-10-CM

## 2020-03-09 PROCEDURE — 97110 THERAPEUTIC EXERCISES: CPT | Mod: GP | Performed by: PHYSICAL THERAPIST

## 2020-03-09 PROCEDURE — 97140 MANUAL THERAPY 1/> REGIONS: CPT | Mod: GP | Performed by: PHYSICAL THERAPIST

## 2020-03-09 PROCEDURE — 97530 THERAPEUTIC ACTIVITIES: CPT | Mod: GP | Performed by: PHYSICAL THERAPIST

## 2020-04-06 ENCOUNTER — TELEPHONE (OUTPATIENT)
Dept: PHYSICAL THERAPY | Facility: CLINIC | Age: 60
End: 2020-04-06

## 2020-04-06 NOTE — TELEPHONE ENCOUNTER
Pt and PT discussed plan with physical therapy at this time. Pt states she has had difficulty performing exercises consistently. Pt reports she will call us back if she needs PT care but feels she needs more time to catch up on current exercises and give those more time. Pt understands to call [phone] to participate with virtual follow-ups either via phone or video.

## 2020-04-08 NOTE — PROGRESS NOTES
Attending addendum:   I saw and examined the patient with the resident and agree with the documented plan of care.    Ms. 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 I proposed a course of partial brain radiotherapy for improved local disease control. I specifically discussed a treatment plan consisting of 54 Gy delivered in 30 once-daily fractions to the surgical cavity, areas of suspicious recurrent tumor and surrounding high risk brain parenchyma.  She has previously met with Dr. Gallagher in neuro-oncology who was planning to start concurrent temozolomide with her radiotherapy.    The side effects associated with chemoradiation as well as the expected outcomes with and without treatment were discussed with her at length and she ultimately consented to undergo radiotherapy. Following examination, she proceeded with a CT-simulation session as well as a treatment-planning MRI for radiotherapy planning purposes. I will have her return to clinic on 2020 to commence therapy and, in the interim, she was instructed to call or to return to clinic with any questions or concerns.    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 Tiwari is a 59 year old female with a diagnosis of recurrent WHO grade II oligodendroglioma. Her oncologic history started back in 2011 when she presented with a new onset seizure. She had an MRI of the brain which demonstrated an infiltrative right frontal lobe lesion which was resected at [facility] on 2011. The pathology was consistent with WHO grade II diffuse  oligodendroglioma, 1p/19q co-deleted. The patient then completed 12 cycles of adjuvant chemotherapy temozolomide between 6/2011-5/2012. She has been under surveillance with serial brain MRI scans since then. She remained disease-free until 4/2/2018 at which time her surveillance brain MRI scans showed evidence of a new 1.2 cm non-enhancing lesion within the cortex of the right frontal lobe adjacent to the resection cavity. She underwent a repeat resection by Dr. Rodriguez on 5/30/2018. The surgical pathology ([id number]) was consistent with recurrent WHO grade II oligodendroglioma. The patient's case was presented at neuro-oncology multidisciplinary tumor board on 6/15/2018 with recommendations for surveillance given persistence of the low-grade nature of her disease. Her most recent brain MRI imaging on 12/16/19 showed increased T2 FLAIR signal adjacent to the resection cavity, concerning for tumor recurrence. The patient was seen in our department on 1/10/20 at which time we discussed consideration of partial brain radiotherapy for improved control of Ms. Tiwari's progressive low-grade glioma. Given the slow rate of growth, it was felt that radiotherapy could be delayed for the time being. She returns to clinic today for follow up with plan for simulation and repeat brain imaging with MR.     On exam today, Ms. Tiwari reports that she continues to feel well.  She denies focal weakness, changes in sensation, headaches, nausea, vomiting, changes in hearing or vision, or other neurologic symptoms other than longstanding mild deficits in recall, word finding, and balance.     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 She presents for repeat discussion of radiotherapy for local control.     RECOMMENDATIONS:   We recommend a course of radiation therapy for optimal local control outcomes. We discussed at length with the patient and her  the role of radiotherapy in management of low-grade gliomas including oligodendroglioma. The aim of our treatment is tumor control and prevention of further tumor growth. We discussed the logistics and side effects associated with the treatment including fatigue, headaches, exacerbation of the existing neurologic deficits, alopecia, nausea, cerebral edema, cognitive changes and radiation necrosis. Her questions were answered to her stated satisfaction, and she consented for treatment.     Ms. Tiwari is undergoing CT simulation with MRI to follow for radiotherapy planning. We are planning to treat her right brain to 5400 cGy in 30 daily fractions using intensity modulated radiotherapy.    The patient was seen and discussed with staff, Dr. Figueroa.    Billy Mckinney MD  Resident  Radiation Oncology

## 2020-04-09 ENCOUNTER — ALLIED HEALTH/NURSE VISIT (OUTPATIENT)
Dept: RADIATION ONCOLOGY | Facility: CLINIC | Age: 60
End: 2020-04-09
Attending: RADIOLOGY
Payer: COMMERCIAL

## 2020-04-09 ENCOUNTER — HOSPITAL ENCOUNTER (OUTPATIENT)
Dept: MRI IMAGING | Facility: CLINIC | Age: 60
Discharge: HOME | End: 2020-04-09
Attending: RADIOLOGY | Admitting: RADIOLOGY
Payer: COMMERCIAL

## 2020-04-09 DIAGNOSIS — T45.1X5A CHEMOTHERAPY INDUCED NEUTROPENIA (H): ICD-10-CM

## 2020-04-09 DIAGNOSIS — C71.9 OLIGODENDROGLIOMA (H): Primary | ICD-10-CM

## 2020-04-09 DIAGNOSIS — Z91.89 HIGH RISK FOR CHEMOTHERAPY-INDUCED INFECTIOUS COMPLICATION: ICD-10-CM

## 2020-04-09 DIAGNOSIS — D70.1 CHEMOTHERAPY INDUCED NEUTROPENIA (H): ICD-10-CM

## 2020-04-09 DIAGNOSIS — Z51.11 CHEMOTHERAPY MANAGEMENT, ENCOUNTER FOR: Primary | ICD-10-CM

## 2020-04-09 DIAGNOSIS — R11.2 CHEMOTHERAPY-INDUCED NAUSEA AND VOMITING: ICD-10-CM

## 2020-04-09 DIAGNOSIS — T45.1X5A CHEMOTHERAPY-INDUCED NAUSEA AND VOMITING: ICD-10-CM

## 2020-04-09 DIAGNOSIS — F41.9 ANXIETY: ICD-10-CM

## 2020-04-09 DIAGNOSIS — C71.9 OLIGODENDROGLIOMA (H): ICD-10-CM

## 2020-04-09 PROCEDURE — 25500064 ZZH RX 255 OP 636: Performed by: RADIOLOGY

## 2020-04-09 PROCEDURE — A9585 GADOBUTROL INJECTION: HCPCS | Performed by: RADIOLOGY

## 2020-04-09 PROCEDURE — 25000132 ZZH RX MED GY IP 250 OP 250 PS 637: Mod: ZF | Performed by: RADIOLOGY

## 2020-04-09 PROCEDURE — 70552 MRI BRAIN STEM W/DYE: CPT

## 2020-04-09 PROCEDURE — 77334 RADIATION TREATMENT AID(S): CPT | Performed by: RADIOLOGY

## 2020-04-09 RX ORDER — LORAZEPAM 0.5 MG/1
0.5 TABLET ORAL ONCE
Status: COMPLETED | OUTPATIENT
Start: 2020-04-09 | End: 2020-04-09

## 2020-04-09 RX ORDER — PENTAMIDINE ISETHIONATE 300 MG/300MG
300 INHALANT RESPIRATORY (INHALATION) ONCE
Status: CANCELLED
Start: 2020-04-13

## 2020-04-09 RX ORDER — ALBUTEROL SULFATE 0.83 MG/ML
2.5 SOLUTION RESPIRATORY (INHALATION) ONCE
Status: CANCELLED
Start: 2020-04-13

## 2020-04-09 RX ORDER — GADOBUTROL 604.72 MG/ML
7.5 INJECTION INTRAVENOUS ONCE
Status: COMPLETED | OUTPATIENT
Start: 2020-04-09 | End: 2020-04-09

## 2020-04-09 RX ORDER — GADOBUTROL 604.72 MG/ML
7.5 INJECTION INTRAVENOUS ONCE
Status: DISCONTINUED | OUTPATIENT
Start: 2020-04-09 | End: 2020-04-10 | Stop reason: HOSPADM

## 2020-04-09 RX ORDER — ONDANSETRON 4 MG/1
4 TABLET, FILM COATED ORAL AT BEDTIME
Qty: 30 TABLET | Refills: 3 | Status: SHIPPED | OUTPATIENT
Start: 2020-04-09 | End: 2020-06-01

## 2020-04-09 RX ORDER — LORAZEPAM 0.5 MG/1
0.5 TABLET ORAL DAILY PRN
Qty: 30 TABLET | Refills: 0 | Status: SHIPPED | OUTPATIENT
Start: 2020-04-09 | End: 2020-04-15

## 2020-04-09 RX ADMIN — LORAZEPAM 0.5 MG: 0.5 TABLET ORAL at 14:04

## 2020-04-09 RX ADMIN — GADOBUTROL 7.5 ML: 604.72 INJECTION INTRAVENOUS at 15:17

## 2020-04-09 NOTE — PROGRESS NOTES
Radiation Therapy Patient Education    Person involved with teaching: Patient    Patient educational needs for self management of treatment-related side effects assessment completed.  [facility] Patient Ed tab contains Patient Learning Assessment    Education Materials Given  Radiation Therapy and You    Educational Topics Discussed  Side effects expected, Pain management, Nutrition and weight loss and When to call MD/RN    Response To Teaching  Verbalizes understanding    GYN Only  Vaginal Dilator-given and educated: N/A    Referrals sent: None    Chemotherapy?  Yes: Pt will start Temodar with radiation, medical oncology notified.

## 2020-04-09 NOTE — LETTER
2020      RE: Barbi Tiwari  [address] [facility] [address]       Attending addendum:   I saw and examined the patient with the resident and agree with the documented plan of care.    Ms. 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 I proposed a course of partial brain radiotherapy for improved local disease control. I specifically discussed a treatment plan consisting of 54 Gy delivered in 30 once-daily fractions to the surgical cavity, areas of suspicious recurrent tumor and surrounding high risk brain parenchyma.  She has previously met with Dr. Gallagher in neuro-oncology who was planning to start concurrent temozolomide with her radiotherapy.    The side effects associated with chemoradiation as well as the expected outcomes with and without treatment were discussed with her at length and she ultimately consented to undergo radiotherapy. Following examination, she proceeded with a CT-simulation session as well as a treatment-planning MRI for radiotherapy planning purposes. I will have her return to clinic on 2020 to commence therapy and, in the interim, she was instructed to call or to return to clinic with any questions or concerns.    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 Tiwrai is a 59 year old female with a diagnosis of recurrent WHO grade II oligodendroglioma. Her oncologic history started back in 2011 when she presented with a new onset seizure. She had an MRI of the brain which demonstrated an infiltrative right frontal lobe lesion which was resected at [facility] on 5/12/2011. The pathology was consistent with WHO grade II diffuse oligodendroglioma, 1p/19q co-deleted. The patient then completed 12 cycles of adjuvant chemotherapy temozolomide between 6/2011-5/2012. She has been under surveillance with serial brain MRI scans since then. She remained disease-free until 4/2/2018 at which time her surveillance brain MRI scans showed evidence of a new 1.2 cm non-enhancing lesion within the cortex of the right frontal lobe adjacent to the resection cavity. She underwent a repeat resection by Dr. Rodriguez on 5/30/2018. The surgical pathology ([id number]) was consistent with recurrent WHO grade II oligodendroglioma. The patient's case was presented at neuro-oncology multidisciplinary tumor board on 6/15/2018 with recommendations for surveillance given persistence of the low-grade nature of her disease. Her most recent brain MRI imaging on 12/16/19 showed increased T2 FLAIR signal adjacent to the resection cavity, concerning for tumor recurrence. The patient was seen in our department on 1/10/20 at which time we discussed consideration of partial brain radiotherapy for improved control of Ms. Tiwari's progressive low-grade glioma. Given the slow rate of growth, it was felt that radiotherapy could be delayed for the time being. She returns to clinic today for follow up with plan for simulation and repeat brain imaging with MR.     On exam today, Ms. Tiwari reports that she continues to feel well.  She denies focal weakness, changes in sensation, headaches, nausea, vomiting, changes in hearing or vision, or other neurologic symptoms other than longstanding mild deficits in recall, word finding, and balance.     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 She presents for repeat discussion of radiotherapy for local control.     RECOMMENDATIONS:   We recommend a course of radiation therapy for optimal local control outcomes. We discussed at length with the patient and her  the role of radiotherapy in management of low-grade gliomas including oligodendroglioma. The aim of our treatment is tumor control and prevention of further tumor growth. We discussed the logistics and side effects associated with the treatment including fatigue, headaches, exacerbation of the existing neurologic deficits, alopecia, nausea, cerebral edema, cognitive changes and radiation necrosis. Her questions were answered to her stated satisfaction, and she consented for treatment.     Ms. Tiwari is undergoing CT simulation with MRI to follow for radiotherapy planning. We are planning to treat her right brain to 5400 cGy in 30 daily fractions using intensity modulated radiotherapy.    The patient was seen and discussed with staff, Dr. Figueroa.    Billy Mckinney MD  Resident  Radiation Oncology      Juan Figueroa MD

## 2020-04-10 ENCOUNTER — TELEPHONE (OUTPATIENT)
Dept: ONCOLOGY | Facility: CLINIC | Age: 60
End: 2020-04-10

## 2020-04-10 ENCOUNTER — PATIENT OUTREACH (OUTPATIENT)
Dept: ONCOLOGY | Facility: CLINIC | Age: 60
End: 2020-04-10

## 2020-04-10 DIAGNOSIS — Z51.11 CHEMOTHERAPY MANAGEMENT, ENCOUNTER FOR: ICD-10-CM

## 2020-04-10 DIAGNOSIS — Z91.89 HIGH RISK FOR CHEMOTHERAPY-INDUCED INFECTIOUS COMPLICATION: ICD-10-CM

## 2020-04-10 DIAGNOSIS — T45.1X5A CHEMOTHERAPY INDUCED NEUTROPENIA (H): Primary | ICD-10-CM

## 2020-04-10 DIAGNOSIS — D70.1 CHEMOTHERAPY INDUCED NEUTROPENIA (H): Primary | ICD-10-CM

## 2020-04-10 RX ORDER — DAPSONE 100 MG/1
100 TABLET ORAL DAILY
Qty: 42 TABLET | Refills: 0 | Status: SHIPPED | OUTPATIENT
Start: 2020-04-10 | End: 2020-06-01

## 2020-04-10 NOTE — PROGRESS NOTES
RN Care Coordination Note  Outgoing Message:   Consulted with Dr. Gallagher and pharmacy on the best substitute for pentamadine inhalation, which is not being used because of COVID-19. Dr. Gallagher will prescribed dapsone for concurrent chemotherapy and radiation.    Essence Rodriguez RN, BSN  Care Coordinator   [facility]

## 2020-04-10 NOTE — TELEPHONE ENCOUNTER
"Oral Chemotherapy Monitoring Program    Primary Oncologist: Dr. Gallagher  Primary Oncology Clinic: [facility]   Cancer Diagnosis: Oligodendroglioma    Drug: Temodar 140mg (75mg/m2) daily during XRT  Start Date: 4/19/2020  Dose is appropriate for patients: 1.66 BSA   Expected duration of therapy: during XRT therapy    Drug Interaction Assessment: no clinically significant drug interactions identified.   Medication list checked (4/10): -Temodar -Acetaminophen -Ascorbic Acid -Calcium Carbonate -Ciclopirox -Fexofenadine -Gabapentin -Ibuprofen -Lactase -LamoTRIgine -LevETIRAcetam -LORazepam -Metoprolol -Multivitamins/Minerals -Nitrofurantoin -Ondansetron -Famotidine -Tretinoin -Cholecalciferol -Pentamidine     Lab Monitoring Plan  CBC weekly and CMP monthly to be completed at radiation therapy appointments    Subjective/Objective:  Barbi Tiwari is a 59 year old female contacted by phone for an initial visit for oral chemotherapy education.     ORAL CHEMOTHERAPY 4/10/2020   Drug Name Temodar (Temozolomide)   Current Dosage 140mg   Current Schedule Daily   Cycle Details Continuous for 42 days during XRT   Start Date of Last Cycle 4/19/2020   Any new drug interactions? No   Is the dose as ordered appropriate for the patient? Yes       Last PHQ-2 Score on record:   PHQ-2 ( 1999 [employer]) 2/24/2020 10/31/2019   Q1: Little interest or pleasure in doing things 0 0   Q2: Feeling down, depressed or hopeless 0 0   PHQ-2 Score 0 0       Vitals:  BP:   BP Readings from Last 1 Encounters:   02/24/20 105/59     Wt Readings from Last 1 Encounters:   02/24/20 63.2 kg (139 lb 6.4 oz)     Estimated body surface area is 1.66 meters squared as calculated from the following:    Height as of 1/27/20: 1.575 m (5' 2\").    Weight as of 2/24/20: 63.2 kg (139 lb 6.4 oz).      Labs:  No results found for NA within last 30 days.     No results found for K within last 30 days.     No results found for CA within last 30 days.     No results " found for Mag within last 30 days.     No results found for Phos within last 30 days.     No results found for ALBUMIN within last 30 days.     No results found for BUN within last 30 days.     No results found for CR within last 30 days.     No results found for AST within last 30 days.     No results found for ALT within last 30 days.     No results found for BILITOTAL within last 30 days.     No results found for WBC within last 30 days.     No results found for HGB within last 30 days.     No results found for PLT within last 30 days.     No results found for ANC within last 30 days.       Assessment:  Patient is appropriate to start therapy pending baseline labs to be completed on 4/14.    Plan:  Basic chemotherapy teaching was reviewed with the patient including indication, start date of therapy, dose, administration, adverse effects, missed doses, food and drug interactions, monitoring, side effect management, office contact information, and safe handling. Written materials were provided and all questions answered.    Follow-Up:  1 week following initiation of Temodar therapy       Nya Toledo PharmD  Oral Chemotherapy Monitoring Program  [facility]  [phone]

## 2020-04-13 ENCOUNTER — DOCUMENTATION ONLY (OUTPATIENT)
Dept: LAB | Facility: CLINIC | Age: 60
End: 2020-04-13

## 2020-04-13 NOTE — PROGRESS NOTES
Patient has lab only appointment scheduled for 04/14/20.   Due to current coronavirus pandemic, please consider whether these lab tests can be deferred.     Please open ORDER REVIEW, review orders, and then confirm or defer orders ASAP.  Enter <dot>keep or <dot>defer smart phrase into NOTES, complete documentation, and route encounter.  [facility]: /team  [facility]: individual provider pool  [facility] primary care:   [facility] specialty: scheduling pool

## 2020-04-14 ENCOUNTER — TELEPHONE (OUTPATIENT)
Dept: ONCOLOGY | Facility: CLINIC | Age: 60
End: 2020-04-14

## 2020-04-14 DIAGNOSIS — T45.1X5A CHEMOTHERAPY-INDUCED NAUSEA AND VOMITING: ICD-10-CM

## 2020-04-14 DIAGNOSIS — R11.2 CHEMOTHERAPY-INDUCED NAUSEA AND VOMITING: ICD-10-CM

## 2020-04-14 DIAGNOSIS — D70.1 CHEMOTHERAPY INDUCED NEUTROPENIA (H): ICD-10-CM

## 2020-04-14 DIAGNOSIS — T45.1X5A CHEMOTHERAPY INDUCED NEUTROPENIA (H): ICD-10-CM

## 2020-04-14 DIAGNOSIS — C71.9 OLIGODENDROGLIOMA (H): Primary | ICD-10-CM

## 2020-04-14 DIAGNOSIS — Z91.89 HIGH RISK FOR CHEMOTHERAPY-INDUCED INFECTIOUS COMPLICATION: ICD-10-CM

## 2020-04-14 DIAGNOSIS — Z51.11 CHEMOTHERAPY MANAGEMENT, ENCOUNTER FOR: ICD-10-CM

## 2020-04-14 LAB
ALBUMIN SERPL-MCNC: 3.6 G/DL (ref 3.4–5)
ALP SERPL-CCNC: 82 U/L (ref 40–150)
ALT SERPL W P-5'-P-CCNC: 22 U/L (ref 0–50)
ANION GAP SERPL CALCULATED.3IONS-SCNC: 5 MMOL/L (ref 3–14)
AST SERPL W P-5'-P-CCNC: 12 U/L (ref 0–45)
BASOPHILS # BLD AUTO: 0 10E9/L (ref 0–0.2)
BASOPHILS NFR BLD AUTO: 0.3 %
BILIRUB SERPL-MCNC: 0.3 MG/DL (ref 0.2–1.3)
BUN SERPL-MCNC: 17 MG/DL (ref 7–30)
CALCIUM SERPL-MCNC: 9.1 MG/DL (ref 8.5–10.1)
CHLORIDE SERPL-SCNC: 106 MMOL/L (ref 94–109)
CO2 SERPL-SCNC: 29 MMOL/L (ref 20–32)
CREAT SERPL-MCNC: 0.63 MG/DL (ref 0.52–1.04)
DIFFERENTIAL METHOD BLD: NORMAL
EOSINOPHIL # BLD AUTO: 0.1 10E9/L (ref 0–0.7)
EOSINOPHIL NFR BLD AUTO: 1.9 %
ERYTHROCYTE [DISTWIDTH] IN BLOOD BY AUTOMATED COUNT: 12.9 % (ref 10–15)
GFR SERPL CREATININE-BSD FRML MDRD: >90 ML/MIN/{1.73_M2}
GLUCOSE SERPL-MCNC: 97 MG/DL (ref 70–99)
HBV CORE AB SERPL QL IA: NONREACTIVE
HBV SURFACE AG SERPL QL IA: NONREACTIVE
HCT VFR BLD AUTO: 43.1 % (ref 35–47)
HGB BLD-MCNC: 14 G/DL (ref 11.7–15.7)
LYMPHOCYTES # BLD AUTO: 1.6 10E9/L (ref 0.8–5.3)
LYMPHOCYTES NFR BLD AUTO: 24.9 %
MCH RBC QN AUTO: 29.9 PG (ref 26.5–33)
MCHC RBC AUTO-ENTMCNC: 32.5 G/DL (ref 31.5–36.5)
MCV RBC AUTO: 92 FL (ref 78–100)
MONOCYTES # BLD AUTO: 0.5 10E9/L (ref 0–1.3)
MONOCYTES NFR BLD AUTO: 8.5 %
NEUTROPHILS # BLD AUTO: 4 10E9/L (ref 1.6–8.3)
NEUTROPHILS NFR BLD AUTO: 64.4 %
PLATELET # BLD AUTO: 259 10E9/L (ref 150–450)
POTASSIUM SERPL-SCNC: 4.1 MMOL/L (ref 3.4–5.3)
PROT SERPL-MCNC: 7.2 G/DL (ref 6.8–8.8)
RBC # BLD AUTO: 4.69 10E12/L (ref 3.8–5.2)
SODIUM SERPL-SCNC: 140 MMOL/L (ref 133–144)
WBC # BLD AUTO: 6.2 10E9/L (ref 4–11)

## 2020-04-14 PROCEDURE — 85025 COMPLETE CBC W/AUTO DIFF WBC: CPT | Performed by: PSYCHIATRY & NEUROLOGY

## 2020-04-14 PROCEDURE — 80053 COMPREHEN METABOLIC PANEL: CPT | Performed by: PSYCHIATRY & NEUROLOGY

## 2020-04-14 PROCEDURE — 36415 COLL VENOUS BLD VENIPUNCTURE: CPT | Performed by: PSYCHIATRY & NEUROLOGY

## 2020-04-14 PROCEDURE — 87340 HEPATITIS B SURFACE AG IA: CPT | Performed by: PSYCHIATRY & NEUROLOGY

## 2020-04-14 PROCEDURE — 86704 HEP B CORE ANTIBODY TOTAL: CPT | Performed by: PSYCHIATRY & NEUROLOGY

## 2020-04-14 RX ORDER — TEMOZOLOMIDE 140 MG/1
75 CAPSULE ORAL AT BEDTIME
Qty: 42 CAPSULE | Refills: 0 | Status: SHIPPED | OUTPATIENT
Start: 2020-04-14 | End: 2020-06-01

## 2020-04-14 RX ORDER — TEMOZOLOMIDE 140 MG/1
75 CAPSULE ORAL AT BEDTIME
Qty: 42 CAPSULE | Refills: 0 | Status: SHIPPED | OUTPATIENT
Start: 2020-04-14 | End: 2020-04-14

## 2020-04-14 NOTE — TELEPHONE ENCOUNTER
Oral Chemotherapy Monitoring Program.    Patient will start Temodar treatment on 4/19 Sunday (prior to start of XRT 4/20).    Called to discuss lab results from today 4/14/20.    No concerning abnormalities.    Questions answered to patient's satisfaction.    Will follow up about a week after starting medication and with weekly labs.    Briseida Rubio, PharmD, BCPS, [facility]  Oncology Clinical Pharmacy Specialist  [facility]  [phone]

## 2020-04-15 DIAGNOSIS — C71.9 OLIGODENDROGLIOMA (H): ICD-10-CM

## 2020-04-15 RX ORDER — LORAZEPAM 0.5 MG/1
0.5 TABLET ORAL DAILY PRN
Qty: 30 TABLET | Refills: 0 | Status: SHIPPED | OUTPATIENT
Start: 2020-04-15 | End: 2020-07-28

## 2020-04-20 ENCOUNTER — APPOINTMENT (OUTPATIENT)
Dept: RADIATION ONCOLOGY | Facility: CLINIC | Age: 60
End: 2020-04-20
Attending: RADIOLOGY
Payer: COMMERCIAL

## 2020-04-20 PROCEDURE — 77386 ZZH IMRT TREATMENT DELIVERY, COMPLEX: CPT | Performed by: RADIOLOGY

## 2020-04-21 ENCOUNTER — APPOINTMENT (OUTPATIENT)
Dept: RADIATION ONCOLOGY | Facility: CLINIC | Age: 60
End: 2020-04-21
Attending: RADIOLOGY
Payer: COMMERCIAL

## 2020-04-21 PROCEDURE — 77386 ZZH IMRT TREATMENT DELIVERY, COMPLEX: CPT | Performed by: RADIOLOGY

## 2020-04-22 ENCOUNTER — TELEPHONE (OUTPATIENT)
Dept: ONCOLOGY | Facility: CLINIC | Age: 60
End: 2020-04-22

## 2020-04-22 ENCOUNTER — APPOINTMENT (OUTPATIENT)
Dept: RADIATION ONCOLOGY | Facility: CLINIC | Age: 60
End: 2020-04-22
Attending: RADIOLOGY
Payer: COMMERCIAL

## 2020-04-22 PROCEDURE — 77386 ZZH IMRT TREATMENT DELIVERY, COMPLEX: CPT | Performed by: RADIOLOGY

## 2020-04-22 NOTE — TELEPHONE ENCOUNTER
Central Prior Authorization Team   Phone: [phone]      PA Initiation    Medication: Dapsone 100 mg tabs  Insurance Company: [employer] - Phone [phone] Fax [phone]  Pharmacy Filling the Rx: [id number] [place], [place] - [address] AT [facility] [address]  Filling Pharmacy Phone: [phone]  Filling Pharmacy Fax:    Start Date: 4/22/2020

## 2020-04-22 NOTE — TELEPHONE ENCOUNTER
Prior Authorization Retail Medication Request    Medication/Dose: 100 mg  ICD code (if different than what is on RX):    Chemotherapy induced neutropenia (H) [D70.1, T45.1X5A]  - Primary        Chemotherapy management, encounter for [Z51.11]        High risk for chemotherapy-induced infectious complication [Z91.89]           Previously Tried and Failed:    Rationale:      Insurance Name: [facility]    Insurance ID:  [id number]       Pharmacy Information (if different than what is on RX)  Name:    Phone:

## 2020-04-23 ENCOUNTER — APPOINTMENT (OUTPATIENT)
Dept: RADIATION ONCOLOGY | Facility: CLINIC | Age: 60
End: 2020-04-23
Attending: RADIOLOGY
Payer: COMMERCIAL

## 2020-04-23 VITALS — WEIGHT: 149.9 LBS | BODY MASS INDEX: 27.42 KG/M2 | TEMPERATURE: 97.4 F

## 2020-04-23 DIAGNOSIS — C71.9 OLIGODENDROGLIOMA (H): Primary | ICD-10-CM

## 2020-04-23 PROCEDURE — 77386 ZZH IMRT TREATMENT DELIVERY, COMPLEX: CPT | Performed by: RADIOLOGY

## 2020-04-23 NOTE — PROGRESS NOTES
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 Current dose of 720 cGy. Tolerating treatment well.    ED visits/Hospitalizations:  None    Missed Treatments:  None    Subjective: Ms. Tiwari presents to clinic today for her weekly on-treatment visit. She has tolerated initiation of chemoradiotherapy very well and has no pressing concerns or complaints on examination today outside of reports of occasional episodes of minor pain over the vertex and right lateral scalp.    ROS:   Constitutional  Pain (0-10): 0  Fatigue: None    Nutrition  Diet: Regular    CNS  Headaches: None    Cardiopulmonary  Dyspnea: None  Chest pain: None    GI  Nausea/vomiting: None    Integumentary  Dermatitis: None    Objective:   Weight: 68.0 kg  Temp: 36.3  C    General: 59-year-old female seated comfortably in a chair in no acute distress  HEENT: NC/AT. No alopecia. EOMI.  Neuro: A/O x3. Normal gait.  Skin: No erythema    Treatment-related toxicities (CTCAE v5.0):  None    Assessment:    Ms. 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 She is receiving salvage chemoradiotherapy for improved disease control and is tolerating treatment well with no significant acute radiation-induced toxicities.    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

## 2020-04-23 NOTE — TELEPHONE ENCOUNTER
Prior Authorization Not Needed per Insurance    Medication: Dapsone 100 mg tabs-PA Not Needed  Insurance Company: [employer] - Phone [phone] Fax [phone]  Expected CoPay:      Pharmacy Filling the Rx: [id number] - [place], [place] - [address] AT [facility] [address]  Pharmacy Notified: Yes  Patient Notified: No    Pharmacy will call for override.

## 2020-04-24 ENCOUNTER — APPOINTMENT (OUTPATIENT)
Dept: RADIATION ONCOLOGY | Facility: CLINIC | Age: 60
End: 2020-04-24
Attending: RADIOLOGY
Payer: COMMERCIAL

## 2020-04-24 PROCEDURE — 77336 RADIATION PHYSICS CONSULT: CPT | Performed by: RADIOLOGY

## 2020-04-24 PROCEDURE — 77386 ZZH IMRT TREATMENT DELIVERY, COMPLEX: CPT | Performed by: RADIOLOGY

## 2020-04-27 ENCOUNTER — MYC MEDICAL ADVICE (OUTPATIENT)
Dept: ONCOLOGY | Facility: CLINIC | Age: 60
End: 2020-04-27

## 2020-04-27 ENCOUNTER — APPOINTMENT (OUTPATIENT)
Dept: RADIATION ONCOLOGY | Facility: CLINIC | Age: 60
End: 2020-04-27
Attending: RADIOLOGY
Payer: COMMERCIAL

## 2020-04-27 ENCOUNTER — DOCUMENTATION ONLY (OUTPATIENT)
Dept: ONCOLOGY | Facility: CLINIC | Age: 60
End: 2020-04-27

## 2020-04-27 PROCEDURE — 77386 ZZH IMRT TREATMENT DELIVERY, COMPLEX: CPT | Performed by: RADIOLOGY

## 2020-04-27 NOTE — PROGRESS NOTES
FMLA and STD forms received via [employer] and [employer].    Pt requesting to be off through July for chemo.      Forms to be completed and put in folder for provider to approve.      Krystina Fonseca CMA ([facility])

## 2020-04-28 ENCOUNTER — APPOINTMENT (OUTPATIENT)
Dept: RADIATION ONCOLOGY | Facility: CLINIC | Age: 60
End: 2020-04-28
Attending: RADIOLOGY
Payer: COMMERCIAL

## 2020-04-28 PROCEDURE — 77386 ZZH IMRT TREATMENT DELIVERY, COMPLEX: CPT | Performed by: RADIOLOGY

## 2020-04-29 ENCOUNTER — APPOINTMENT (OUTPATIENT)
Dept: RADIATION ONCOLOGY | Facility: CLINIC | Age: 60
End: 2020-04-29
Attending: RADIOLOGY
Payer: COMMERCIAL

## 2020-04-29 PROCEDURE — 77386 ZZH IMRT TREATMENT DELIVERY, COMPLEX: CPT | Performed by: RADIOLOGY

## 2020-04-29 NOTE — PROGRESS NOTES
FMLA and STD forms filled out and put in providers folder for review and signature.      Krystina Fonseca CMA ([facility])

## 2020-04-30 ENCOUNTER — TELEPHONE (OUTPATIENT)
Dept: ONCOLOGY | Facility: CLINIC | Age: 60
End: 2020-04-30

## 2020-04-30 ENCOUNTER — ALLIED HEALTH/NURSE VISIT (OUTPATIENT)
Dept: RADIATION ONCOLOGY | Facility: CLINIC | Age: 60
End: 2020-04-30
Attending: RADIOLOGY
Payer: COMMERCIAL

## 2020-04-30 VITALS — TEMPERATURE: 97.2 F | BODY MASS INDEX: 26.16 KG/M2 | WEIGHT: 143 LBS

## 2020-04-30 DIAGNOSIS — D70.1 CHEMOTHERAPY INDUCED NEUTROPENIA (H): ICD-10-CM

## 2020-04-30 DIAGNOSIS — R11.2 CHEMOTHERAPY-INDUCED NAUSEA AND VOMITING: ICD-10-CM

## 2020-04-30 DIAGNOSIS — Z51.11 CHEMOTHERAPY MANAGEMENT, ENCOUNTER FOR: ICD-10-CM

## 2020-04-30 DIAGNOSIS — C71.9 OLIGODENDROGLIOMA (H): Primary | ICD-10-CM

## 2020-04-30 DIAGNOSIS — T45.1X5A CHEMOTHERAPY-INDUCED NAUSEA AND VOMITING: ICD-10-CM

## 2020-04-30 DIAGNOSIS — T45.1X5A CHEMOTHERAPY INDUCED NEUTROPENIA (H): ICD-10-CM

## 2020-04-30 LAB
ALBUMIN SERPL-MCNC: 3.6 G/DL (ref 3.4–5)
ALP SERPL-CCNC: 76 U/L (ref 40–150)
ALT SERPL W P-5'-P-CCNC: 37 U/L (ref 0–50)
ANION GAP SERPL CALCULATED.3IONS-SCNC: 5 MMOL/L (ref 3–14)
AST SERPL W P-5'-P-CCNC: 28 U/L (ref 0–45)
BASOPHILS # BLD AUTO: 0 10E9/L (ref 0–0.2)
BASOPHILS NFR BLD AUTO: 0.6 %
BILIRUB SERPL-MCNC: 0.4 MG/DL (ref 0.2–1.3)
BUN SERPL-MCNC: 12 MG/DL (ref 7–30)
CALCIUM SERPL-MCNC: 9.1 MG/DL (ref 8.5–10.1)
CHLORIDE SERPL-SCNC: 109 MMOL/L (ref 94–109)
CO2 SERPL-SCNC: 28 MMOL/L (ref 20–32)
CREAT SERPL-MCNC: 0.75 MG/DL (ref 0.52–1.04)
DIFFERENTIAL METHOD BLD: NORMAL
EOSINOPHIL # BLD AUTO: 0.1 10E9/L (ref 0–0.7)
EOSINOPHIL NFR BLD AUTO: 2.4 %
ERYTHROCYTE [DISTWIDTH] IN BLOOD BY AUTOMATED COUNT: 12.8 % (ref 10–15)
GFR SERPL CREATININE-BSD FRML MDRD: 88 ML/MIN/{1.73_M2}
GLUCOSE SERPL-MCNC: 89 MG/DL (ref 70–99)
HCT VFR BLD AUTO: 40.2 % (ref 35–47)
HGB BLD-MCNC: 12.7 G/DL (ref 11.7–15.7)
IMM GRANULOCYTES # BLD: 0 10E9/L (ref 0–0.4)
IMM GRANULOCYTES NFR BLD: 0.4 %
LYMPHOCYTES # BLD AUTO: 1.3 10E9/L (ref 0.8–5.3)
LYMPHOCYTES NFR BLD AUTO: 25.4 %
MCH RBC QN AUTO: 30 PG (ref 26.5–33)
MCHC RBC AUTO-ENTMCNC: 31.6 G/DL (ref 31.5–36.5)
MCV RBC AUTO: 95 FL (ref 78–100)
MONOCYTES # BLD AUTO: 0.4 10E9/L (ref 0–1.3)
MONOCYTES NFR BLD AUTO: 8.3 %
NEUTROPHILS # BLD AUTO: 3.1 10E9/L (ref 1.6–8.3)
NEUTROPHILS NFR BLD AUTO: 62.9 %
NRBC # BLD AUTO: 0 10*3/UL
NRBC BLD AUTO-RTO: 0 /100
PLATELET # BLD AUTO: 214 10E9/L (ref 150–450)
POTASSIUM SERPL-SCNC: 4 MMOL/L (ref 3.4–5.3)
PROT SERPL-MCNC: 6.6 G/DL (ref 6.8–8.8)
RBC # BLD AUTO: 4.23 10E12/L (ref 3.8–5.2)
SODIUM SERPL-SCNC: 142 MMOL/L (ref 133–144)
WBC # BLD AUTO: 4.9 10E9/L (ref 4–11)

## 2020-04-30 PROCEDURE — 36415 COLL VENOUS BLD VENIPUNCTURE: CPT | Performed by: PSYCHIATRY & NEUROLOGY

## 2020-04-30 PROCEDURE — 77386 ZZH IMRT TREATMENT DELIVERY, COMPLEX: CPT | Performed by: RADIOLOGY

## 2020-04-30 PROCEDURE — 85025 COMPLETE CBC W/AUTO DIFF WBC: CPT | Performed by: PSYCHIATRY & NEUROLOGY

## 2020-04-30 PROCEDURE — 80053 COMPREHEN METABOLIC PANEL: CPT | Performed by: PSYCHIATRY & NEUROLOGY

## 2020-04-30 NOTE — ORAL ONC MGMT
Oral Chemotherapy Monitoring Program     Primary Oncologist: Dr. Gallagher  Primary Oncology Clinic: [facility]   Cancer Diagnosis: Oligodendroglioma     Drug: Temodar 140mg (75mg/m2) daily during XRT  Start Date: 4/19/2020  Dose is appropriate for patients: 1.66 BSA      Expected duration of therapy: during XRT therapy     Drug Interaction Assessment: no clinically significant drug interactions identified.   Medication list checked (4/10): -Temodar -Acetaminophen -Ascorbic Acid -Calcium Carbonate -Ciclopirox -Fexofenadine -Gabapentin -Ibuprofen -Lactase -LamoTRIgine -LevETIRAcetam -LORazepam -Metoprolol -Multivitamins/Minerals -Nitrofurantoin -Ondansetron -Famotidine -Tretinoin -Cholecalciferol -Pentamidine      Lab Monitoring Plan  CBC weekly and CMP monthly to be completed at radiation therapy appointments     Subjective/Objective:  Barbi Tiwari is a 59 year old female contacted by phone for a follow up visit for oral chemotherapy and review of labs. I released labs to her MyChart and reviewed them over the phone. She reports no missed doses and side effects (headache, nausea, and fatigue as well as sleeplessness). She states these are tolerable for now and indicates she takes the zofran before her Temodar as directed.     ORAL CHEMOTHERAPY 4/10/2020 4/30/2020   Drug Name Temodar (Temozolomide) Temodar (Temozolomide)   Current Dosage 140mg 140mg   Current Schedule Daily Daily   Cycle Details Continuous for 42 days during XRT Continuous for 42 days during XRT   Start Date of Last Cycle 4/19/2020 4/19/2020   Doses missed in last 2 weeks - 0   Adherence Assessment - Adherent   Adverse Effects - Nausea;Fatigue;Other (see note for details)   Any new drug interactions? No -   Is the dose as ordered appropriate for the patient? Yes -       Last PHQ-2 Score on record:   PHQ-2 ( 1999 [employer]) 2/24/2020 10/31/2019   Q1: Little interest or pleasure in doing things 0 0   Q2: Feeling down, depressed or hopeless 0 0  "  PHQ-2 Score 0 0     Vitals:  BP:   BP Readings from Last 1 Encounters:   02/24/20 105/59     Wt Readings from Last 1 Encounters:   04/30/20 64.9 kg (143 lb)     Estimated body surface area is 1.68 meters squared as calculated from the following:    Height as of 1/27/20: 1.575 m (5' 2\").    Weight as of an earlier encounter on 4/30/20: 64.9 kg (143 lb).    Labs:  _  Result Component Current Result Ref Range   Sodium 142 (4/30/2020) 133 - 144 mmol/L     _  Result Component Current Result Ref Range   Potassium 4.0 (4/30/2020) 3.4 - 5.3 mmol/L     _  Result Component Current Result Ref Range   Calcium 9.1 (4/30/2020) 8.5 - 10.1 mg/dL     No results found for Mag within last 30 days.     No results found for Phos within last 30 days.     _  Result Component Current Result Ref Range   Albumin 3.6 (4/30/2020) 3.4 - 5.0 g/dL     _  Result Component Current Result Ref Range   Urea Nitrogen 12 (4/30/2020) 7 - 30 mg/dL     _  Result Component Current Result Ref Range   Creatinine 0.75 (4/30/2020) 0.52 - 1.04 mg/dL       _  Result Component Current Result Ref Range   AST 28 (4/30/2020) 0 - 45 U/L     _  Result Component Current Result Ref Range   ALT 37 (4/30/2020) 0 - 50 U/L     _  Result Component Current Result Ref Range   Bilirubin Total 0.4 (4/30/2020) 0.2 - 1.3 mg/dL       _  Result Component Current Result Ref Range   WBC 4.9 (4/30/2020) 4.0 - 11.0 10e9/L     _  Result Component Current Result Ref Range   Hemoglobin 12.7 (4/30/2020) 11.7 - 15.7 g/dL     _  Result Component Current Result Ref Range   Platelet Count 214 (4/30/2020) 150 - 450 10e9/L     _  Result Component Current Result Ref Range   Absolute Neutrophil 3.1 (4/30/2020) 1.6 - 8.3 10e9/L       Assessment:  Barbi is tolerating therapy pretty well. She expressed that the nausea, fatigue as well as sleeplessness, and headaches are tolerable for now. She has follow up with Dr. Gallagher in about a week (5/4) so she will discuss these further at that time. "     Plan:  Continue Temodar 140 mg daily x 42 days with XRT.     Follow-Up:  Patient has Rad appts and follow up with Dr. Gallagher in about a week 5/4.     Oral Chemotherapy Monitoring Program  Elliott Crisostomo PharmD  [facility]  [phone]  April 30, 2020

## 2020-04-30 NOTE — PROGRESS NOTES
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 Current dose of 720 cGy. Tolerating treatment well.    2020: Weekly RT visit. Current dose of 1620 cGy.  Mild nausea and fatigue.    ED visits/Hospitalizations:  None    Missed Treatments:  None    Subjective: Ms. Tiwari presents to clinic today for her weekly on-treatment visit. She reports that she is tolerating treatment well with her ROS positive for the development of mild fatigue, mild headaches and mild nausea without vomiting which is well-controlled with PRN Zofran.      ROS:   Constitutional  Pain (0-10): 1 (left knee)  Fatigue: Mild    Nutrition  Diet: Regular    CNS  Headaches: Mild    Cardiopulmonary  Dyspnea: None  Chest pain: None    GI  Nausea/vomiting: Mild nausea without vomiting    Integumentary  Dermatitis: None    Objective:   Weight: 64.9 kg  Temp: 36.2  C    General: 59-year-old female seated comfortably in a chair in no acute distress  HEENT: NC/AT. No alopecia. EOMI.  Neuro: A/O x3. Normal gait.  Skin: No erythema    Treatment-related toxicities (CTCAE v5.0):  Fatigue: Grade 1  Headaches: Grade 1  Nausea: Grade 1    Assessment:    Ms. 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 She is receiving salvage chemoradiotherapy for improved disease control. She is tolerating treatment well with mild acute radiation-induced toxicities.    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

## 2020-05-01 ENCOUNTER — APPOINTMENT (OUTPATIENT)
Dept: RADIATION ONCOLOGY | Facility: CLINIC | Age: 60
End: 2020-05-01
Attending: RADIOLOGY
Payer: COMMERCIAL

## 2020-05-01 PROCEDURE — 77336 RADIATION PHYSICS CONSULT: CPT | Performed by: RADIOLOGY

## 2020-05-01 PROCEDURE — 77386 ZZH IMRT TREATMENT DELIVERY, COMPLEX: CPT | Performed by: RADIOLOGY

## 2020-05-01 NOTE — PROGRESS NOTES
"Barbi Tiwari is a 59 year old female who is being evaluated via a billable video visit.      The patient has been notified of following:     \"This video visit will be conducted via a call between you and your physician/provider. We have found that certain health care needs can be provided without the need for an in-person physical exam.  This service lets us provide the care you need with a video conversation.  If a prescription is necessary we can send it directly to your pharmacy.  If lab work is needed we can place an order for that and you can then stop by our lab to have the test done at a later time.    Video visits are billed at different rates depending on your insurance coverage.  Please reach out to your insurance provider with any questions.    If during the course of the call the physician/provider feels a video visit is not appropriate, you will not be charged for this service.\"    Patient has given verbal consent for Video visit? Yes    How would you like to obtain your AVS? MyChart    Patient would like the video invitation sent by: Send to e-mail at: [email or web address]    Will anyone else be joining your video visit? No     I have reviewed and updated the patient's allergies and medication list.    Concerns: Has questions about chemotherapy.   Refills: None needed.      Sri Tinoco, [facility]        ________________________________________________________________________    NEURO-ONCOLOGY VISIT  05/04/2020    CHIEF COMPLAINT: Ms. Barbi Tiwari is a 59 year old right-handed woman with a right frontal diffuse oligodendroglioma (1p19q co-deleted), diagnosed following resection in 5/2011. She received 12 cycles of temozolomide, completed in 5/2012. Changes on imaging necessitated a repeat resection on 5/30/2018 and pathology was unchanged. No adjuvant cancer-directed therapy was initiated. Imaging over the next 1.5 years showed tumor progression and treatment was recommended. She is currently " undergoing chemoradiotherapy.     I met with Barbi and Kenneth () for this follow-up visit.      HISTORY OF PRESENT ILLNESS  -Overall, tolerating chemoradiotherapy well. Mild nausea, but tolerated on supportive medications. Mild issues with constipation on current bowel regimen. Hair is starting to thin.   -Concern for oral thrush.   -No longer taking benadryl with dapsone with no issues.   -She denies any new symptoms; no weakness, numbness, or headaches.   -No change in seizure frequency.   -Fatigue remains an ongoing complaint and is slightly worse since starting radiation. Taking Ativan with radiation.   -Continued mild word-finding issues, otherwise no change in her cognitive function.   -Continued foot pain on the left, pending foot surgery this summer.   -Wanting to get back to work come July.   -Mood is positive.     REVIEW OF SYSTEMS  A comprehensive ROS negative except as in HPI.      MEDICATIONS   Current Outpatient Medications   Medication     clotrimazole (MYCELEX) 10 MG LOZG lozenge     acetaminophen (TYLENOL) 500 MG tablet     ascorbic acid 500 MG TABS     calcium carbonate (TUMS) 500 MG chewable tablet     ciclopirox (PENLAC) 8 % external solution     COMPOUNDED NON-CONTROLLED SUBSTANCE (CMPD RX) - PHARMACY TO MIX COMPOUNDED MEDICATION     dapsone (ACZONE) 100 MG tablet     fexofenadine (ALLEGRA) 180 MG tablet     gabapentin (NEURONTIN) 100 MG capsule     ibuprofen (ADVIL/MOTRIN) 200 MG tablet     Lactase (LACTAID PO)     lamoTRIgine (LAMICTAL) 100 MG tablet     lamoTRIgine (LAMICTAL) 200 MG tablet     levETIRAcetam (KEPPRA) 500 MG tablet     LORazepam (ATIVAN) 0.5 MG tablet     metoprolol tartrate (LOPRESSOR) 25 MG tablet     Multiple Vitamin (MULTI-VITAMINS) TABS     ondansetron (ZOFRAN) 4 MG tablet     temozolomide (TEMODAR) 140 MG capsule     tretinoin (RETIN-A) 0.025 % external cream     VITAMIN D, CHOLECALCIFEROL, PO     Current Facility-Administered Medications   Medication     lidocaine 1  % injection 4 mL     lidocaine 1 % injection 4 mL     triamcinolone (KENALOG-40) injection 40 mg     triamcinolone (KENALOG-40) injection 40 mg     DRUG ALLERGIES   Allergies   Allergen Reactions     Sulfa Drugs Hives     Benoxinate Nausea and Vomiting       ONCOLOGIC HISTORY  -4/27/2011 PRESENTATION: New onset seizure, generalized event.   -5/2011 MRB with a non-contrast enhancing right frontal mass lesion.   -5/2011 SURGERY: Craniectomy for mass resection at [facility].   PATHOLOGY: Diffuse oligodendroglioma; WHO grade II, 1p/19q co-deleted.  -6/2011-5/2012 CHEMO: Adjuvant dosed temozolomide x 12 cycles.  -4/2/2018 MRB with possible disease recurrence with a new 1.2 cm non-enhancing nodule within the cortex of the right frontal lobe adjacent to the resection cavity.  -4/12/2018 NEURO-ONC: Recommending repeat resection, appointment scheduled with Dr. Dustin Rodriguez, neurosurgery at the [facility].   -5/30/2018 SURGERY: Repeat resection with Dr. Rodriguez.   PATHOLOGY: Remains low grade, without concerning features.   -6/15/2018 NEURO-ONC: Start imaging surveillance.  -9/17/2018 NEURO-ONC/ MRB: Imaging stable, continue with surveillance.   -12/10/2018 NEURO-ONC/ MRB: Imaging stable, continue with surveillance.   -4/15/2019 NEURO-ONC/ MRB: Imaging stable, continue with surveillance.  -8/19/2019 NEURO-ONC/ MRB: Clinically stable. Imaging largely stable, subtle increases on T2 FLAIR; continue with surveillance.  -12/16/2019 NEURO-ONC/ MRB: Clinically stable. Imaging with increased T2 FLAIR medially and laterally about the resection cavity. Referral to Dr. Figueroa with radiation oncology. Discussion with Dr. Rodriguez. Referral for repeat neuro-psychology testing.   -1/10/2020 Evaluated by Dr. Devan Figueroa.   -1/17/2020 NEURO-ONC: Tentative plan to initiate chemoradiotherapy in May-June 2020.   -2/17/2020 NEURO-PSYCH; Mild weaknesses were noted in aspects of verbal and nonverbal working memory, nonverbal recall, some aspects of executive  functioning, and bilateral psychomotor speed. The remainder of her cognitive abilities were intact and performed in keeping with her above average range cognitive baseline.   -4/20 - 6/1/2020 CHEMORADS: 54 Gy in 30 fractions with concurrent temozolomide 75mg/m2 (140mg).   -5/4/2020 NEURO-ONC: Continue treatment as planned. Prescribing Clotrimazole lozenges for oral thrush.     SOCIAL HISTORY   Tobacco use: Never smoker.   Alcohol use: Social.   Drug use: Denies marijuana use.  Supplement, complimentary/ alternative medicine: None.   Employment: RN.   , 2 children      PHYSICAL EXAMINATION  -Generally well appearing.  -Respiratory: No audible wheezing.   -Mouth: White plaques in back of tongue.   -Skin: No facial rashes. Healed head incision.  -Psychiatric: Normal mood and affect. Pleasant, talkative.  -Neurologic:   MENTAL STATUS:     Alert, oriented to date.    Recall: Intact.    Speech fluent.    Comprehension intact to multi-step commands.     CRANIAL NERVES:     Pupils are equal, round, reactive to light.     Extraocular movements full, patient denies diplopia.     Equal activation with smiling/ talking on the left side of face.    Hearing intact.   With normal phonation, no dysfunction of the palate or tongue.   MOTOR:    Antigravity in arms.     The rest of a comprehensive physical examination is deferred due to PHE (public health emergency) video visit restrictions.        MEDICAL RECORDS  Obtained and personally reviewed all available outside medical records in addition to reviewing any records available in our electronic system.     LABS  Personally reviewed all available lab results.     IMAGING  No new neuro-imaging to review.        IMPRESSION  Clinic time was spent discussing in detail the nature of this tumor in light of her current treatment plan. This is in addition to providing emotional support, answering questions pertaining to my recommendations, and devising the treatment plan as outlined  below.     Clinically, Barbi is doing well with no new subjective neurological complaints or increase in seizure frequency while on chemoradiotherapy. She is endorsing concerns for chemotherapy-induced/ antibiotic-induced oral thrush; prescribing Clotrimazole lozenges 5x a day x 2 weeks. In addition, she is having mild nausea in the setting of mild constipation. We discussed how ongoing constipation can lead to nausea and Barbi is agreeable to starting Miralax to help with more consistent bowel movements. She has increased fatigue and is trying to wean down on Ativan taken for claustrophobia with radiation treatments. Finally, we discussed safe sex practices while on chemotherapy.    The plan is to continue this treatment as scheduled.     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  -Continue weekly CBC.  -Continue supportive medications; Zofran, Dapsone +/- Benadryl as needed, and Miralax.    -After completion of radiation, there will be a 4 week break off all treatment. She will then have a MR brain scan prior to starting adjuvant temozolomide.    -ORAL THRUSH-  -Prescribing Clotrimazole lozenges 5x a day x 2 weeks.     -SEIZURE MANAGEMENT-  -Follows with Dr. Flakita Clark; Neurology, epilepsy specialist at the [facility].      -Quality of life/ MOOD/ FATIGUE-  -Endorses mild anxiety.   -Continue to monitor mood as untreated/ undertreated depression can worsen fatigue, dysorexia, and quality of life.  -Issues with sleep, on gabapentin.     -COGNITIVE CHANGES-  -Neuro-psych testing completed with mild deficits notes. Repeat testing recommended in 2/2021.    Return to clinic at end of chemoradiotherapy on 6/1.     In the  meantime, Barbi knows to call with questions or concerns or to report new complaints and can be seen sooner if needed.    Myrtle Gallagher MD  Neuro-oncology      Video-Visit Details    Type of service:  Video Visit    Video Start Time: 9:39 AM  Video End Time: 10:03 AM    Originating Location (pt. Location): Home    Distant Location (provider location):  [facility]     Platform used for Video Visit: [facility]    Myrtle Gallagher MD

## 2020-05-04 ENCOUNTER — APPOINTMENT (OUTPATIENT)
Dept: RADIATION ONCOLOGY | Facility: CLINIC | Age: 60
End: 2020-05-04
Attending: RADIOLOGY
Payer: COMMERCIAL

## 2020-05-04 ENCOUNTER — VIRTUAL VISIT (OUTPATIENT)
Dept: ONCOLOGY | Facility: CLINIC | Age: 60
End: 2020-05-04
Attending: PSYCHIATRY & NEUROLOGY
Payer: COMMERCIAL

## 2020-05-04 DIAGNOSIS — C71.9 OLIGODENDROGLIOMA (H): Primary | ICD-10-CM

## 2020-05-04 DIAGNOSIS — B37.0 ORAL THRUSH: ICD-10-CM

## 2020-05-04 PROCEDURE — 77386 ZZH IMRT TREATMENT DELIVERY, COMPLEX: CPT | Performed by: RADIOLOGY

## 2020-05-04 PROCEDURE — 99214 OFFICE O/P EST MOD 30 MIN: CPT | Mod: 95 | Performed by: PSYCHIATRY & NEUROLOGY

## 2020-05-04 NOTE — PROGRESS NOTES
STD paperwork completed, signed and faxed to [facility] @ [phone] and [facility] @ [phone]. A copy was made, sent to scanning and original mailed to patient at home address.    Successful transmission verified in Right Fax.      Krystina Fonseca CMA

## 2020-05-04 NOTE — PATIENT INSTRUCTIONS
Prescribing Clotrimazole lozenges 5x a day x 2 weeks.   Start taking Miralax daily for more consistent bowel movements.   Continue weekly labs at radiation oncology.     Return to clinic on 6/1.     Myrtle Gallagher MD  Neuro-oncology  5/4/2020

## 2020-05-05 ENCOUNTER — APPOINTMENT (OUTPATIENT)
Dept: RADIATION ONCOLOGY | Facility: CLINIC | Age: 60
End: 2020-05-05
Attending: RADIOLOGY
Payer: COMMERCIAL

## 2020-05-05 PROCEDURE — 77386 ZZH IMRT TREATMENT DELIVERY, COMPLEX: CPT | Performed by: RADIOLOGY

## 2020-05-06 ENCOUNTER — APPOINTMENT (OUTPATIENT)
Dept: RADIATION ONCOLOGY | Facility: CLINIC | Age: 60
End: 2020-05-06
Attending: RADIOLOGY
Payer: COMMERCIAL

## 2020-05-06 PROCEDURE — 77386 ZZH IMRT TREATMENT DELIVERY, COMPLEX: CPT | Performed by: RADIOLOGY

## 2020-05-07 ENCOUNTER — TELEPHONE (OUTPATIENT)
Dept: ONCOLOGY | Facility: CLINIC | Age: 60
End: 2020-05-07

## 2020-05-07 ENCOUNTER — APPOINTMENT (OUTPATIENT)
Dept: RADIATION ONCOLOGY | Facility: CLINIC | Age: 60
End: 2020-05-07
Attending: RADIOLOGY
Payer: COMMERCIAL

## 2020-05-07 VITALS — BODY MASS INDEX: 26.16 KG/M2 | WEIGHT: 143 LBS | TEMPERATURE: 97.9 F

## 2020-05-07 DIAGNOSIS — C71.9 OLIGODENDROGLIOMA (H): Primary | ICD-10-CM

## 2020-05-07 DIAGNOSIS — R11.2 CHEMOTHERAPY-INDUCED NAUSEA AND VOMITING: ICD-10-CM

## 2020-05-07 DIAGNOSIS — Z51.11 CHEMOTHERAPY MANAGEMENT, ENCOUNTER FOR: ICD-10-CM

## 2020-05-07 DIAGNOSIS — T45.1X5A CHEMOTHERAPY-INDUCED NAUSEA AND VOMITING: ICD-10-CM

## 2020-05-07 DIAGNOSIS — D70.1 CHEMOTHERAPY INDUCED NEUTROPENIA (H): ICD-10-CM

## 2020-05-07 DIAGNOSIS — T45.1X5A CHEMOTHERAPY INDUCED NEUTROPENIA (H): ICD-10-CM

## 2020-05-07 LAB
BASOPHILS # BLD AUTO: 0 10E9/L (ref 0–0.2)
BASOPHILS NFR BLD AUTO: 0.4 %
DIFFERENTIAL METHOD BLD: ABNORMAL
EOSINOPHIL # BLD AUTO: 0.1 10E9/L (ref 0–0.7)
EOSINOPHIL NFR BLD AUTO: 2.3 %
ERYTHROCYTE [DISTWIDTH] IN BLOOD BY AUTOMATED COUNT: 13.1 % (ref 10–15)
HCT VFR BLD AUTO: 41.9 % (ref 35–47)
HGB BLD-MCNC: 12.4 G/DL (ref 11.7–15.7)
IMM GRANULOCYTES # BLD: 0 10E9/L (ref 0–0.4)
IMM GRANULOCYTES NFR BLD: 0.6 %
LYMPHOCYTES # BLD AUTO: 1.1 10E9/L (ref 0.8–5.3)
LYMPHOCYTES NFR BLD AUTO: 20.5 %
MCH RBC QN AUTO: 29.2 PG (ref 26.5–33)
MCHC RBC AUTO-ENTMCNC: 29.6 G/DL (ref 31.5–36.5)
MCV RBC AUTO: 99 FL (ref 78–100)
MONOCYTES # BLD AUTO: 0.5 10E9/L (ref 0–1.3)
MONOCYTES NFR BLD AUTO: 9.5 %
NEUTROPHILS # BLD AUTO: 3.5 10E9/L (ref 1.6–8.3)
NEUTROPHILS NFR BLD AUTO: 66.7 %
NRBC # BLD AUTO: 0 10*3/UL
NRBC BLD AUTO-RTO: 0 /100
PLATELET # BLD AUTO: 246 10E9/L (ref 150–450)
RBC # BLD AUTO: 4.25 10E12/L (ref 3.8–5.2)
WBC # BLD AUTO: 5.3 10E9/L (ref 4–11)

## 2020-05-07 PROCEDURE — 85025 COMPLETE CBC W/AUTO DIFF WBC: CPT | Performed by: PSYCHIATRY & NEUROLOGY

## 2020-05-07 PROCEDURE — 77386 ZZH IMRT TREATMENT DELIVERY, COMPLEX: CPT | Performed by: RADIOLOGY

## 2020-05-07 PROCEDURE — 36415 COLL VENOUS BLD VENIPUNCTURE: CPT | Performed by: PSYCHIATRY & NEUROLOGY

## 2020-05-07 RX ORDER — PROCHLORPERAZINE MALEATE 10 MG
10 TABLET ORAL EVERY 6 HOURS PRN
Qty: 30 TABLET | Refills: 1 | Status: SHIPPED | OUTPATIENT
Start: 2020-05-07 | End: 2020-06-01

## 2020-05-07 NOTE — TELEPHONE ENCOUNTER
Oral Chemotherapy Monitoring Program   Lab Follow-Up    Placed call to Barbi in follow up of lab work completed today for Temodar oral chemotherapy. Additionally, I also discussed her use of Miralax/senna as it was mentioned in her appt with Dr. Gallagher on 5/4 to start use of Miralax for constipation prevention. Barbi reports continuing to only use senna tablets as this has provided her with adequate relief and therefore she has not needed to use Miralax.    Labs:  CBC RESULTS:   Recent Labs   Lab Test 05/07/20  0835   WBC 5.3   RBC 4.25   HGB 12.4   HCT 41.9   MCV 99   MCH 29.2   MCHC 29.6*   RDW 13.1      ANC 3.5     Assessment/Plan:  I detailed to Barbi that there were no concerning abnormalities noted in her labs that were completed today. She is to continue to take Temodar 140mg daily at bedtime. I encouraged her to use Miralax daily should she start to notice a decreased frequency in her bowel movements despite use of Senna. She was in agreement with this plan.    Follow-Up:  5/14: labs at radiation       Nya Toledo PharmD  Oral Chemotherapy Monitoring Program  [facility]  [phone]  May 7, 2020

## 2020-05-07 NOTE — PROGRESS NOTES
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 Current dose of 720 cGy. Tolerating treatment well.    2020: Weekly RT visit. Current dose of 1620 cGy. Mild nausea and fatigue.    2020: Weekly RT visit. Current dose of 2520 cGy. Persistent mild to moderate nausea. Mild alopecia.    ED visits/Hospitalizations:  None    Missed Treatments:  None    Subjective: Ms. Tiwari presents to clinic today for her weekly on-treatment visit.  She reports mild to moderate ongoing nausea which remains well-controlled with PRN Zofran. The Zofran, however, reportedly gives her headaches and she has only been using it prior to bedtime and intermittently as needed. Her remaining ROS are positive for moderate to severe fatigue and increased alopecia involving the right temporal scalp.    ROS:   Constitutional  Pain (0-10): 0  Fatigue: Moderate to severe    Nutrition  Diet: Regular    CNS  Headaches: Mild to moderate    Cardiopulmonary  Dyspnea: None  Chest pain: None    GI  Nausea/vomiting: Mild to moderate nausea    Integumentary  Dermatitis: None    Objective:   Weight: 64.9 kg  Temp: 36.6  C    General: 59-year-old female seated comfortably in a chair in no acute distress  HEENT: NC/AT. Mild alopecia over the right temporoparietal scalp. No dermatitis.  Neuro: A/O x3. Normal gait.  Skin: No erythema    Treatment-related toxicities (CTCAE v5.0):  Fatigue: Grade 2  Headaches: Grade 1  Nausea: Grade 1  Alopecia: Grade  1    Assessment:    Ms. 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 She is receiving salvage chemoradiotherapy for improved disease control. She is tolerating treatment well with the anticipated acute radiation-induced toxicities.    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

## 2020-05-08 ENCOUNTER — APPOINTMENT (OUTPATIENT)
Dept: RADIATION ONCOLOGY | Facility: CLINIC | Age: 60
End: 2020-05-08
Attending: RADIOLOGY
Payer: COMMERCIAL

## 2020-05-08 PROCEDURE — 77386 ZZH IMRT TREATMENT DELIVERY, COMPLEX: CPT | Performed by: RADIOLOGY

## 2020-05-08 PROCEDURE — 77336 RADIATION PHYSICS CONSULT: CPT | Performed by: RADIOLOGY

## 2020-05-11 ENCOUNTER — APPOINTMENT (OUTPATIENT)
Dept: RADIATION ONCOLOGY | Facility: CLINIC | Age: 60
End: 2020-05-11
Attending: RADIOLOGY
Payer: COMMERCIAL

## 2020-05-11 PROCEDURE — 77386 ZZH IMRT TREATMENT DELIVERY, COMPLEX: CPT | Performed by: RADIOLOGY

## 2020-05-12 ENCOUNTER — APPOINTMENT (OUTPATIENT)
Dept: RADIATION ONCOLOGY | Facility: CLINIC | Age: 60
End: 2020-05-12
Attending: RADIOLOGY
Payer: COMMERCIAL

## 2020-05-12 PROCEDURE — 77386 ZZH IMRT TREATMENT DELIVERY, COMPLEX: CPT | Performed by: RADIOLOGY

## 2020-05-13 ENCOUNTER — APPOINTMENT (OUTPATIENT)
Dept: RADIATION ONCOLOGY | Facility: CLINIC | Age: 60
End: 2020-05-13
Attending: RADIOLOGY
Payer: COMMERCIAL

## 2020-05-13 PROCEDURE — 77386 ZZH IMRT TREATMENT DELIVERY, COMPLEX: CPT | Performed by: RADIOLOGY

## 2020-05-14 ENCOUNTER — APPOINTMENT (OUTPATIENT)
Dept: RADIATION ONCOLOGY | Facility: CLINIC | Age: 60
End: 2020-05-14
Attending: RADIOLOGY
Payer: COMMERCIAL

## 2020-05-14 ENCOUNTER — TELEPHONE (OUTPATIENT)
Dept: PHARMACY | Facility: CLINIC | Age: 60
End: 2020-05-14

## 2020-05-14 VITALS — TEMPERATURE: 97.2 F | BODY MASS INDEX: 25.79 KG/M2 | WEIGHT: 141 LBS

## 2020-05-14 DIAGNOSIS — T45.1X5A CHEMOTHERAPY INDUCED NEUTROPENIA (H): ICD-10-CM

## 2020-05-14 DIAGNOSIS — C71.9 OLIGODENDROGLIOMA (H): Primary | ICD-10-CM

## 2020-05-14 DIAGNOSIS — Z51.11 CHEMOTHERAPY MANAGEMENT, ENCOUNTER FOR: ICD-10-CM

## 2020-05-14 DIAGNOSIS — D70.1 CHEMOTHERAPY INDUCED NEUTROPENIA (H): ICD-10-CM

## 2020-05-14 LAB
BASOPHILS # BLD AUTO: 0 10E9/L (ref 0–0.2)
BASOPHILS NFR BLD AUTO: 0.4 %
DIFFERENTIAL METHOD BLD: ABNORMAL
EOSINOPHIL # BLD AUTO: 0.1 10E9/L (ref 0–0.7)
EOSINOPHIL NFR BLD AUTO: 2.6 %
ERYTHROCYTE [DISTWIDTH] IN BLOOD BY AUTOMATED COUNT: 13.8 % (ref 10–15)
HCT VFR BLD AUTO: 41.2 % (ref 35–47)
HGB BLD-MCNC: 12.3 G/DL (ref 11.7–15.7)
IMM GRANULOCYTES # BLD: 0 10E9/L (ref 0–0.4)
IMM GRANULOCYTES NFR BLD: 0.6 %
LYMPHOCYTES # BLD AUTO: 0.9 10E9/L (ref 0.8–5.3)
LYMPHOCYTES NFR BLD AUTO: 18.1 %
MCH RBC QN AUTO: 29.6 PG (ref 26.5–33)
MCHC RBC AUTO-ENTMCNC: 29.9 G/DL (ref 31.5–36.5)
MCV RBC AUTO: 99 FL (ref 78–100)
MONOCYTES # BLD AUTO: 0.5 10E9/L (ref 0–1.3)
MONOCYTES NFR BLD AUTO: 10.5 %
NEUTROPHILS # BLD AUTO: 3.4 10E9/L (ref 1.6–8.3)
NEUTROPHILS NFR BLD AUTO: 67.8 %
NRBC # BLD AUTO: 0 10*3/UL
NRBC BLD AUTO-RTO: 0 /100
PLATELET # BLD AUTO: 262 10E9/L (ref 150–450)
RBC # BLD AUTO: 4.15 10E12/L (ref 3.8–5.2)
WBC # BLD AUTO: 5.1 10E9/L (ref 4–11)

## 2020-05-14 PROCEDURE — 77386 ZZH IMRT TREATMENT DELIVERY, COMPLEX: CPT | Performed by: RADIOLOGY

## 2020-05-14 PROCEDURE — 36415 COLL VENOUS BLD VENIPUNCTURE: CPT | Performed by: PSYCHIATRY & NEUROLOGY

## 2020-05-14 PROCEDURE — 85025 COMPLETE CBC W/AUTO DIFF WBC: CPT | Performed by: PSYCHIATRY & NEUROLOGY

## 2020-05-14 NOTE — ORAL ONC MGMT
Oral Chemotherapy Monitoring Program     Placed call to patient in follow up of labs.         Left message that lab results look stable and to call back with any questions or concerns.    Emmanuel Smiley Pharmacy Intern  [facility]   [phone]   5/14/2020

## 2020-05-14 NOTE — PROGRESS NOTES
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 Current dose of 720 cGy. Tolerating treatment well.    2020: Weekly RT visit. Current dose of 1620 cGy. Mild nausea and fatigue.    2020: Weekly RT visit. Current dose of 2520 cGy. Persistent mild to moderate nausea. Mild alopecia.    2020: Weekly RT visit. Current dose of 3420 cGy. Nausea resolved with switching to PRN Compazine. Mildly increased fatigue.    ED visits/Hospitalizations:  None    Missed Treatments:  None    Subjective:   Ms. Tiwari presents to clinic today for her weekly on-treatment visit. Her nausea is improved following switching from PRN Zofran to Compazine. Her ROS otherwise continue to be positive for moderate fatigue and stable to slightly increased alopecia.     ROS:   Constitutional  Pain (0-10): 0  Fatigue: Moderate to severe    Nutrition  Diet: Regular    CNS  Headaches: None    Cardiopulmonary  Dyspnea: None  Chest pain: None    GI  Nausea/vomiting: None    Integumentary  Dermatitis: None    Objective:   Weight: 64.0 kg  Temp: 36.2  C    General: 59-year-old female seated comfortably in a chair in no acute distress  HEENT: NC/AT. Mild alopecia over the right temporoparietal scalp. No dermatitis.  Neuro: A/Ox3.   Skin: No erythema    Treatment-related toxicities (CTCAE v5.0):  Fatigue: Grade 2  Headaches: Grade 1  Nausea: Grade 1  Alopecia: Grade 1    Assessment:    Ms. 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 She is receiving salvage chemoradiotherapy for improved disease control. She is tolerating treatment well with mild acute treatment-related toxicities.    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

## 2020-05-15 ENCOUNTER — APPOINTMENT (OUTPATIENT)
Dept: RADIATION ONCOLOGY | Facility: CLINIC | Age: 60
End: 2020-05-15
Attending: RADIOLOGY
Payer: COMMERCIAL

## 2020-05-15 PROCEDURE — 77336 RADIATION PHYSICS CONSULT: CPT | Performed by: RADIOLOGY

## 2020-05-15 PROCEDURE — 77386 ZZH IMRT TREATMENT DELIVERY, COMPLEX: CPT | Performed by: RADIOLOGY

## 2020-05-17 ENCOUNTER — MYC REFILL (OUTPATIENT)
Dept: NEUROLOGY | Facility: CLINIC | Age: 60
End: 2020-05-17

## 2020-05-17 DIAGNOSIS — R56.9 SEIZURE (H): ICD-10-CM

## 2020-05-17 RX ORDER — LAMOTRIGINE 100 MG/1
TABLET ORAL
Qty: 180 TABLET | Refills: 3 | Status: CANCELLED | OUTPATIENT
Start: 2020-05-17

## 2020-05-17 RX ORDER — LAMOTRIGINE 200 MG/1
TABLET ORAL
Qty: 180 TABLET | Refills: 3 | Status: CANCELLED | OUTPATIENT
Start: 2020-05-17

## 2020-05-17 RX ORDER — LEVETIRACETAM 500 MG/1
TABLET ORAL
Qty: 540 TABLET | Refills: 3 | Status: CANCELLED | OUTPATIENT
Start: 2020-05-17

## 2020-05-18 ENCOUNTER — APPOINTMENT (OUTPATIENT)
Dept: RADIATION ONCOLOGY | Facility: CLINIC | Age: 60
End: 2020-05-18
Attending: RADIOLOGY
Payer: COMMERCIAL

## 2020-05-18 PROCEDURE — 77386 ZZH IMRT TREATMENT DELIVERY, COMPLEX: CPT | Performed by: RADIOLOGY

## 2020-05-19 ENCOUNTER — APPOINTMENT (OUTPATIENT)
Dept: RADIATION ONCOLOGY | Facility: CLINIC | Age: 60
End: 2020-05-19
Attending: RADIOLOGY
Payer: COMMERCIAL

## 2020-05-19 PROCEDURE — 77386 ZZH IMRT TREATMENT DELIVERY, COMPLEX: CPT | Performed by: RADIOLOGY

## 2020-05-20 ENCOUNTER — APPOINTMENT (OUTPATIENT)
Dept: RADIATION ONCOLOGY | Facility: CLINIC | Age: 60
End: 2020-05-20
Attending: RADIOLOGY
Payer: COMMERCIAL

## 2020-05-20 PROCEDURE — 77386 ZZH IMRT TREATMENT DELIVERY, COMPLEX: CPT | Performed by: RADIOLOGY

## 2020-05-21 ENCOUNTER — TELEPHONE (OUTPATIENT)
Dept: ONCOLOGY | Facility: CLINIC | Age: 60
End: 2020-05-21

## 2020-05-21 ENCOUNTER — APPOINTMENT (OUTPATIENT)
Dept: RADIATION ONCOLOGY | Facility: CLINIC | Age: 60
End: 2020-05-21
Attending: RADIOLOGY
Payer: COMMERCIAL

## 2020-05-21 VITALS — WEIGHT: 142 LBS | TEMPERATURE: 98 F | BODY MASS INDEX: 25.97 KG/M2

## 2020-05-21 DIAGNOSIS — Z51.11 CHEMOTHERAPY MANAGEMENT, ENCOUNTER FOR: ICD-10-CM

## 2020-05-21 DIAGNOSIS — C71.9 OLIGODENDROGLIOMA (H): Primary | ICD-10-CM

## 2020-05-21 DIAGNOSIS — D70.1 CHEMOTHERAPY INDUCED NEUTROPENIA (H): ICD-10-CM

## 2020-05-21 DIAGNOSIS — T45.1X5A CHEMOTHERAPY INDUCED NEUTROPENIA (H): ICD-10-CM

## 2020-05-21 LAB
BASOPHILS # BLD AUTO: 0 10E9/L (ref 0–0.2)
BASOPHILS NFR BLD AUTO: 0.4 %
DIFFERENTIAL METHOD BLD: ABNORMAL
EOSINOPHIL # BLD AUTO: 0.2 10E9/L (ref 0–0.7)
EOSINOPHIL NFR BLD AUTO: 3.7 %
ERYTHROCYTE [DISTWIDTH] IN BLOOD BY AUTOMATED COUNT: 15.7 % (ref 10–15)
HCT VFR BLD AUTO: 34.9 % (ref 35–47)
HGB BLD-MCNC: 10.6 G/DL (ref 11.7–15.7)
IMM GRANULOCYTES # BLD: 0.1 10E9/L (ref 0–0.4)
IMM GRANULOCYTES NFR BLD: 1 %
LYMPHOCYTES # BLD AUTO: 0.8 10E9/L (ref 0.8–5.3)
LYMPHOCYTES NFR BLD AUTO: 15.1 %
MCH RBC QN AUTO: 30.6 PG (ref 26.5–33)
MCHC RBC AUTO-ENTMCNC: 30.4 G/DL (ref 31.5–36.5)
MCV RBC AUTO: 101 FL (ref 78–100)
MONOCYTES # BLD AUTO: 0.6 10E9/L (ref 0–1.3)
MONOCYTES NFR BLD AUTO: 11.2 %
NEUTROPHILS # BLD AUTO: 3.5 10E9/L (ref 1.6–8.3)
NEUTROPHILS NFR BLD AUTO: 68.6 %
NRBC # BLD AUTO: 0 10*3/UL
NRBC BLD AUTO-RTO: 0 /100
PLATELET # BLD AUTO: 218 10E9/L (ref 150–450)
RBC # BLD AUTO: 3.46 10E12/L (ref 3.8–5.2)
WBC # BLD AUTO: 5.1 10E9/L (ref 4–11)

## 2020-05-21 PROCEDURE — 77386 ZZH IMRT TREATMENT DELIVERY, COMPLEX: CPT | Performed by: RADIOLOGY

## 2020-05-21 PROCEDURE — 36415 COLL VENOUS BLD VENIPUNCTURE: CPT | Performed by: PSYCHIATRY & NEUROLOGY

## 2020-05-21 PROCEDURE — 85025 COMPLETE CBC W/AUTO DIFF WBC: CPT | Performed by: PSYCHIATRY & NEUROLOGY

## 2020-05-21 RX ORDER — TEMOZOLOMIDE 140 MG/1
75 CAPSULE ORAL AT BEDTIME
Qty: 1 CAPSULE | Refills: 0 | Status: SHIPPED | OUTPATIENT
Start: 2020-05-21 | End: 2020-06-01

## 2020-05-21 NOTE — PROGRESS NOTES
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 Current dose of 720 cGy. Tolerating treatment well.    2020: Weekly RT visit. Current dose of 1620 cGy. Mild nausea and fatigue.    2020: Weekly RT visit. Current dose of 2520 cGy. Persistent mild to moderate nausea. Mild alopecia.    2020: Weekly RT visit. Current dose of 3420 cGy. Nausea resolved with switching to PRN Compazine. Mildly increased fatigue.    2020: Weekly RT visit. Current dose of 4320 cGy. Persistent mild to moderate fatigue. Nausea remains well-controlled with PRN Compazine. Moderate alopecia.    ED visits/Hospitalizations:  None    Missed Treatments:  None    Subjective:   Ms. Tiwari presents to clinic today for her weekly on-treatment visit. She continues to have moderate fatigue which is well-controlled with PRN Compazine. She has had increased alopecia over the midline right frontal scalp as well as mild pruritis of the skin within this region. Her remaining ROS are negative.    ROS:   Constitutional  Pain (0-10): 0  Fatigue: Moderate     Nutrition  Diet: Regular    CNS  Headaches: None    Cardiopulmonary  Dyspnea: None  Chest pain: None    GI  Nausea/vomiting: Mild nausea which is well-controlled with PRN Compazine    Integumentary  Dermatitis: Mild    Objective:   Weight: 64.4 kg  Temp: 36.7  C    General: 59-year-old female seated comfortably in a chair in no acute distress  HEENT: NC/AT. Moderate alopecia over the  right temporoparietal scalp. No dermatitis.  Neuro: A/Ox3.   Skin: No erythema    Treatment-related toxicities (CTCAE v5.0):  Fatigue: Grade 2  Headaches: Grade 1  Nausea: Grade 1  Alopecia: Grade 2  Dermatitis: Grade 1    Assessment:    Ms. 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 She is receiving salvage chemoradiotherapy for improved disease control. She is tolerating treatment well with mild acute treatment-related toxicities.    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

## 2020-05-21 NOTE — ORAL ONC MGMT
Oral Chemotherapy Monitoring Program     Primary Oncologist: Dr. Gallagher  Primary Oncology Clinic: [facility]  Cancer Diagnosis: Glioma     Therapy: Temodar (temozolomide) 140mg daily (continuously w/ XRT)    Contacted patient to review 5/21 lab results.    WBC = 5.1  HGB = 10.6  PLT = 218  ANC = 3.    Hemoglobin and other RBC indices are mildly decreased from previous weekly labs. Spoke with Barbi, she feels well aside from a headache and indicated no new complaints within the past week. Agreed that we would draw labs again in 1 week and see if anemia resolves or requires further management by care team.    Timothy Caceres  Oncology Pharmacy Intern  [facility]  [phone]

## 2020-05-22 ENCOUNTER — APPOINTMENT (OUTPATIENT)
Dept: RADIATION ONCOLOGY | Facility: CLINIC | Age: 60
End: 2020-05-22
Attending: RADIOLOGY
Payer: COMMERCIAL

## 2020-05-22 PROCEDURE — 77386 ZZH IMRT TREATMENT DELIVERY, COMPLEX: CPT | Performed by: RADIOLOGY

## 2020-05-22 PROCEDURE — 77336 RADIATION PHYSICS CONSULT: CPT | Performed by: RADIOLOGY

## 2020-05-26 ENCOUNTER — APPOINTMENT (OUTPATIENT)
Dept: RADIATION ONCOLOGY | Facility: CLINIC | Age: 60
End: 2020-05-26
Attending: RADIOLOGY
Payer: COMMERCIAL

## 2020-05-26 PROCEDURE — 77386 ZZH IMRT TREATMENT DELIVERY, COMPLEX: CPT | Performed by: RADIOLOGY

## 2020-05-27 ENCOUNTER — APPOINTMENT (OUTPATIENT)
Dept: RADIATION ONCOLOGY | Facility: CLINIC | Age: 60
End: 2020-05-27
Attending: RADIOLOGY
Payer: COMMERCIAL

## 2020-05-27 PROCEDURE — 77386 ZZH IMRT TREATMENT DELIVERY, COMPLEX: CPT | Performed by: RADIOLOGY

## 2020-05-28 ENCOUNTER — APPOINTMENT (OUTPATIENT)
Dept: RADIATION ONCOLOGY | Facility: CLINIC | Age: 60
End: 2020-05-28
Attending: RADIOLOGY
Payer: COMMERCIAL

## 2020-05-28 VITALS — WEIGHT: 140.2 LBS | BODY MASS INDEX: 25.64 KG/M2

## 2020-05-28 DIAGNOSIS — T45.1X5A CHEMOTHERAPY INDUCED NEUTROPENIA (H): ICD-10-CM

## 2020-05-28 DIAGNOSIS — D70.1 CHEMOTHERAPY INDUCED NEUTROPENIA (H): ICD-10-CM

## 2020-05-28 DIAGNOSIS — T45.1X5A CHEMOTHERAPY-INDUCED NAUSEA AND VOMITING: ICD-10-CM

## 2020-05-28 DIAGNOSIS — C71.9 OLIGODENDROGLIOMA (H): Primary | ICD-10-CM

## 2020-05-28 DIAGNOSIS — Z51.11 CHEMOTHERAPY MANAGEMENT, ENCOUNTER FOR: ICD-10-CM

## 2020-05-28 DIAGNOSIS — R11.2 CHEMOTHERAPY-INDUCED NAUSEA AND VOMITING: ICD-10-CM

## 2020-05-28 LAB
ALBUMIN SERPL-MCNC: 3.8 G/DL (ref 3.4–5)
ALP SERPL-CCNC: 88 U/L (ref 40–150)
ALT SERPL W P-5'-P-CCNC: 32 U/L (ref 0–50)
ANION GAP SERPL CALCULATED.3IONS-SCNC: 4 MMOL/L (ref 3–14)
AST SERPL W P-5'-P-CCNC: 26 U/L (ref 0–45)
BASOPHILS # BLD AUTO: 0 10E9/L (ref 0–0.2)
BASOPHILS NFR BLD AUTO: 0.3 %
BILIRUB SERPL-MCNC: 1 MG/DL (ref 0.2–1.3)
BUN SERPL-MCNC: 18 MG/DL (ref 7–30)
CALCIUM SERPL-MCNC: 8.8 MG/DL (ref 8.5–10.1)
CHLORIDE SERPL-SCNC: 108 MMOL/L (ref 94–109)
CO2 SERPL-SCNC: 26 MMOL/L (ref 20–32)
CREAT SERPL-MCNC: 0.74 MG/DL (ref 0.52–1.04)
DIFFERENTIAL METHOD BLD: ABNORMAL
EOSINOPHIL # BLD AUTO: 0.2 10E9/L (ref 0–0.7)
EOSINOPHIL NFR BLD AUTO: 3.2 %
ERYTHROCYTE [DISTWIDTH] IN BLOOD BY AUTOMATED COUNT: 18.6 % (ref 10–15)
GFR SERPL CREATININE-BSD FRML MDRD: 90 ML/MIN/{1.73_M2}
GLUCOSE SERPL-MCNC: 97 MG/DL (ref 70–99)
HCT VFR BLD AUTO: 34.4 % (ref 35–47)
HGB BLD-MCNC: 10.5 G/DL (ref 11.7–15.7)
IMM GRANULOCYTES # BLD: 0.1 10E9/L (ref 0–0.4)
IMM GRANULOCYTES NFR BLD: 1 %
LYMPHOCYTES # BLD AUTO: 0.8 10E9/L (ref 0.8–5.3)
LYMPHOCYTES NFR BLD AUTO: 13.3 %
MCH RBC QN AUTO: 31.3 PG (ref 26.5–33)
MCHC RBC AUTO-ENTMCNC: 30.5 G/DL (ref 31.5–36.5)
MCV RBC AUTO: 103 FL (ref 78–100)
MONOCYTES # BLD AUTO: 0.6 10E9/L (ref 0–1.3)
MONOCYTES NFR BLD AUTO: 9.5 %
NEUTROPHILS # BLD AUTO: 4.4 10E9/L (ref 1.6–8.3)
NEUTROPHILS NFR BLD AUTO: 72.7 %
NRBC # BLD AUTO: 0 10*3/UL
NRBC BLD AUTO-RTO: 0 /100
PLATELET # BLD AUTO: 163 10E9/L (ref 150–450)
POTASSIUM SERPL-SCNC: 4 MMOL/L (ref 3.4–5.3)
PROT SERPL-MCNC: 7 G/DL (ref 6.8–8.8)
RBC # BLD AUTO: 3.35 10E12/L (ref 3.8–5.2)
SODIUM SERPL-SCNC: 139 MMOL/L (ref 133–144)
WBC # BLD AUTO: 6 10E9/L (ref 4–11)

## 2020-05-28 PROCEDURE — 77386 ZZH IMRT TREATMENT DELIVERY, COMPLEX: CPT | Performed by: RADIOLOGY

## 2020-05-28 PROCEDURE — 85025 COMPLETE CBC W/AUTO DIFF WBC: CPT | Performed by: PSYCHIATRY & NEUROLOGY

## 2020-05-28 PROCEDURE — 36415 COLL VENOUS BLD VENIPUNCTURE: CPT | Performed by: PSYCHIATRY & NEUROLOGY

## 2020-05-28 PROCEDURE — 80053 COMPREHEN METABOLIC PANEL: CPT | Performed by: PSYCHIATRY & NEUROLOGY

## 2020-05-28 NOTE — PROGRESS NOTES
"Barbi Tiwari is a 59 year old female who is being evaluated via a billable video visit.      The patient has been notified of following:     \"This video visit will be conducted via a call between you and your physician/provider. We have found that certain health care needs can be provided without the need for an in-person physical exam.  This service lets us provide the care you need with a video conversation.  If a prescription is necessary we can send it directly to your pharmacy.  If lab work is needed we can place an order for that and you can then stop by our lab to have the test done at a later time.    Video visits are billed at different rates depending on your insurance coverage.  Please reach out to your insurance provider with any questions.    If during the course of the call the physician/provider feels a video visit is not appropriate, you will not be charged for this service.\"    Patient has given verbal consent for Video visit? Yes    How would you like to obtain your AVS? MyChart    Video-Visit Details    Type of service:  Video Visit    Originating Location (pt. 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 Current dose of 720 cGy. Tolerating treatment well.    2020: Weekly RT visit. Current dose of 1620 cGy. Mild nausea and fatigue.    2020: Weekly RT visit. Current dose of 2520 cGy. " Persistent mild to moderate nausea. Mild alopecia.    5/14/2020: Weekly RT visit. Current dose of 3420 cGy. Nausea resolved with switching to PRN Compazine. Mildly increased fatigue.    5/21/2020: Weekly RT visit. Current dose of 4320 cGy. Persistent mild to moderate fatigue. Nausea remains well-controlled with PRN Compazine. Moderate alopecia.    5/28/2020: Weekly RT visit. Current dose of 5040 cGy. Stable fatigue and alopecia.    ED visits/Hospitalizations:  None    Missed Treatments:  5/25/2020: 1-day break between fractions 25-26 secondary to the Memorial Day holiday    Subjective:   Ms. Tiwari presents to clinic today for her weekly on-treatment visit. Her ROS are positive for stable mild to moderate fatigue and alopecia of the midline right frontal scalp. Her remaining ROS are otherwise negative.    ROS:   Constitutional  Pain (0-10): 0  Fatigue: Moderate     Nutrition  Diet: Regular    CNS  Headaches: None    Cardiopulmonary  Dyspnea: None  Chest pain: None    GI  Nausea/vomiting: Mild nausea which is well-controlled with PRN Compazine    Integumentary  Dermatitis: Mild    Objective:   Weight: 63.6 kg    General: 59-year-old female seated comfortably in a chair in no acute distress  HEENT: NC/AT. Moderate alopecia over the right temporoparietal scalp.   Neuro: A/Ox3.   Skin: Mild erythema    Treatment-related toxicities (CTCAE v5.0):  Fatigue: Grade 2  Headaches: Grade 1  Nausea: Grade 1  Alopecia: Grade 2  Dermatitis: Grade 1    Assessment:    Ms. 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 She is receiving salvage chemoradiotherapy for improved disease control. She is tolerating treatment well with mild acute treatment-related toxicities.    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:    Complete chemoradiotherapy on 5/30/2020    Nursing phone call in 1-2 weeks  for symptom assessment    Follow-up in radiation oncology clinic with NP in 1 month in coordination with neuro-oncology follow-up    Pain management:    Continue PRN acetaminophen for mild to moderate pain    Fluids/Electrolytes/Nutrition:    Continue diet as tolerated    Nausea:    Continue PRN Compazine for nausea    Dermatitis:    Continue daily/BID moisturizer use to the right frontal scalp    Mosaiq chart and setup information reviewed  IGRT images reviewed    Medication Review  Med list reviewed with patient?: Yes    Juan Figueroa MD/PhD    Dept of Radiation Oncology  [facility]

## 2020-05-29 ENCOUNTER — APPOINTMENT (OUTPATIENT)
Dept: RADIATION ONCOLOGY | Facility: CLINIC | Age: 60
End: 2020-05-29
Attending: RADIOLOGY
Payer: COMMERCIAL

## 2020-05-29 PROCEDURE — 77386 ZZH IMRT TREATMENT DELIVERY, COMPLEX: CPT | Performed by: RADIOLOGY

## 2020-05-30 ENCOUNTER — APPOINTMENT (OUTPATIENT)
Dept: RADIATION ONCOLOGY | Facility: CLINIC | Age: 60
End: 2020-05-30
Attending: RADIOLOGY
Payer: COMMERCIAL

## 2020-05-30 PROCEDURE — 77336 RADIATION PHYSICS CONSULT: CPT | Performed by: RADIOLOGY

## 2020-05-30 PROCEDURE — 77386 ZZH IMRT TREATMENT DELIVERY, COMPLEX: CPT | Performed by: RADIOLOGY

## 2020-05-31 NOTE — PROGRESS NOTES
"Barbi Tiwari is a 59 year old female who is being evaluated via a billable video visit.      The patient has been notified of following:     \"This video visit will be conducted via a call between you and your physician/provider. We have found that certain health care needs can be provided without the need for an in-person physical exam.  This service lets us provide the care you need with a video conversation.  If a prescription is necessary we can send it directly to your pharmacy.  If lab work is needed we can place an order for that and you can then stop by our lab to have the test done at a later time.    Video visits are billed at different rates depending on your insurance coverage.  Please reach out to your insurance provider with any questions.    If during the course of the call the physician/provider feels a video visit is not appropriate, you will not be charged for this service.\"    Patient has given verbal consent for Video visit? Yes    How would you like to obtain your AVS? MyChart    Patient would like the video invitation sent by: Text to cell phone: [phone]    Will anyone else be joining your video visit? No     I have reviewed and updated the patient's allergies and medication list.    Concerns: questions    Refills: compazine    Secondary Video Option (Doximity), send text message to: [phone]    Ines Jefferson LPN    ________________________________________________________________________    NEURO-ONCOLOGY VISIT  Jun 1, 2020    CHIEF COMPLAINT: Ms. Barbi Tiwari is a 59 year old right-handed woman with a right frontal diffuse oligodendroglioma (1p19q co-deleted), diagnosed following resection in 5/2011. She received 12 cycles of temozolomide, completed in 5/2012. Changes on imaging necessitated a repeat resection on 5/30/2018 and pathology was unchanged. No adjuvant cancer-directed therapy was initiated. Imaging over the next 1.5 years showed tumor progression and treatment was " recommended. She completed chemoradiotherapy on 5/30/2020.     I met with Barbi and Kenneth () for this follow-up visit.      HISTORY OF PRESENT ILLNESS  -Overall, tolerated chemoradiotherapy well. Mild nausea, but tolerated on supportive medications. Mild issues with constipation on current bowel regimen. Hair is starting to thin.   -Oral thrush well managed with lozenges.   -She denies any new symptoms; no weakness, numbness, or headaches.   -No change in seizure frequency.   -Fatigue remains an ongoing complaint and is slightly worse since starting radiation. Plus, taking Ativan with radiation.   -Continued mild word-finding issues, otherwise no change in her cognitive function.   -Wanting to get back to work come July.   -Mood is positive.     REVIEW OF SYSTEMS  A comprehensive ROS negative except as in HPI.      MEDICATIONS   Current Outpatient Medications   Medication     acetaminophen (TYLENOL) 500 MG tablet     ascorbic acid 500 MG TABS     calcium carbonate (TUMS) 500 MG chewable tablet     ciclopirox (PENLAC) 8 % external solution     COMPOUNDED NON-CONTROLLED SUBSTANCE (CMPD RX) - PHARMACY TO MIX COMPOUNDED MEDICATION     fexofenadine (ALLEGRA) 180 MG tablet     gabapentin (NEURONTIN) 100 MG capsule     ibuprofen (ADVIL/MOTRIN) 200 MG tablet     Lactase (LACTAID PO)     lamoTRIgine (LAMICTAL) 100 MG tablet     lamoTRIgine (LAMICTAL) 200 MG tablet     levETIRAcetam (KEPPRA) 500 MG tablet     LORazepam (ATIVAN) 0.5 MG tablet     metoprolol tartrate (LOPRESSOR) 25 MG tablet     Multiple Vitamin (MULTI-VITAMINS) TABS     prochlorperazine (COMPAZINE) 10 MG tablet     tretinoin (RETIN-A) 0.025 % external cream     VITAMIN D, CHOLECALCIFEROL, PO     Current Facility-Administered Medications   Medication     lidocaine 1 % injection 4 mL     lidocaine 1 % injection 4 mL     triamcinolone (KENALOG-40) injection 40 mg     triamcinolone (KENALOG-40) injection 40 mg     DRUG ALLERGIES   Allergies   Allergen  Reactions     Sulfa Drugs Hives     Benoxinate Nausea and Vomiting       ONCOLOGIC HISTORY  -4/27/2011 PRESENTATION: New onset seizure, generalized event.   -5/2011 MRB with a non-contrast enhancing right frontal mass lesion.   -5/2011 SURGERY: Craniectomy for mass resection at [facility].   PATHOLOGY: Diffuse oligodendroglioma; WHO grade II, 1p/19q co-deleted.  -6/2011-5/2012 CHEMO: Adjuvant dosed temozolomide x 12 cycles.  -4/2/2018 MRB with possible disease recurrence with a new 1.2 cm non-enhancing nodule within the cortex of the right frontal lobe adjacent to the resection cavity.  -4/12/2018 NEURO-ONC: Recommending repeat resection, appointment scheduled with Dr. Dustin Rodriguez, neurosurgery at the [facility].   -5/30/2018 SURGERY: Repeat resection with Dr. Rodriguez.   PATHOLOGY: Remains low grade, without concerning features.   -6/15/2018 NEURO-ONC: Start imaging surveillance.  -9/17/2018 NEURO-ONC/ MRB: Imaging stable, continue with surveillance.   -12/10/2018 NEURO-ONC/ MRB: Imaging stable, continue with surveillance.   -4/15/2019 NEURO-ONC/ MRB: Imaging stable, continue with surveillance.  -8/19/2019 NEURO-ONC/ MRB: Clinically stable. Imaging largely stable, subtle increases on T2 FLAIR; continue with surveillance.  -12/16/2019 NEURO-ONC/ MRB: Clinically stable. Imaging with increased T2 FLAIR medially and laterally about the resection cavity. Referral to Dr. Figueroa with radiation oncology. Discussion with Dr. Rodriguez. Referral for repeat neuro-psychology testing.   -1/10/2020 Evaluated by Dr. Devan Figueroa.   -1/17/2020 NEURO-ONC: Tentative plan to initiate chemoradiotherapy in May-June 2020.   -2/17/2020 NEURO-PSYCH; Mild weaknesses were noted in aspects of verbal and nonverbal working memory, nonverbal recall, some aspects of executive functioning, and bilateral psychomotor speed. The remainder of her cognitive abilities were intact and performed in keeping with her above average range cognitive baseline.   -4/20 -  5/30/2020 CHEMORADS: 54 Gy in 30 fractions with concurrent temozolomide 75mg/m2 (140mg).   -5/4/2020 NEURO-ONC: Continue treatment as planned. Prescribing Clotrimazole lozenges for oral thrush.   -6/1/2020 NEURO-ONC: Completed treatment as planned.    SOCIAL HISTORY   Tobacco use: Never smoker.   Alcohol use: Social.   Drug use: Denies marijuana use.  Supplement, complimentary/ alternative medicine: None.   Employment: RN.   , 2 children      PHYSICAL EXAMINATION  -Generally well appearing.  -Respiratory: No audible wheezing.   -Mouth: White plaques in back of tongue.   -Skin: No facial rashes. Healed head incision.  -Psychiatric: Normal mood and affect. Pleasant, talkative.  -Neurologic:   MENTAL STATUS:     Alert, oriented to date.    Recall: Intact.    Speech fluent.    Comprehension intact to multi-step commands.     CRANIAL NERVES:     Pupils are equal, round, reactive to light.     Extraocular movements full, patient denies diplopia.     Equal activation with smiling/ talking on the left side of face.    Hearing intact.   With normal phonation, no dysfunction of the palate or tongue.   MOTOR: Antigravity in arms. No pronation or drift.   COORDINATION: Intact to finger-nose with eyes closed.   SENSATION: Intact to light touch.     The rest of a comprehensive physical examination is deferred due to PHE (public health emergency) video visit restrictions.        MEDICAL RECORDS  Obtained and personally reviewed all available outside medical records in addition to reviewing any records available in our electronic system.     LABS  Personally reviewed all available lab results.     IMAGING  No new neuro-imaging to review.        IMPRESSION  Clinic time was spent discussing in detail the nature of this tumor in light of her current treatment plan. This is in addition to providing emotional support, answering questions pertaining to my recommendations, and devising the treatment plan as outlined below.  "    Clinically, Barbi is doing well with no new subjective neurological complaints or increase in seizure frequency while on chemoradiotherapy. Oral thrush well controlled with Clotrimazole lozenges. Constipation and nausea better managed. Continued ongoing fatigue.     Saturday was her last day of radiation. Now, there will be a 1 month period without treatment and a \"baseline\" MR brain scan will be performed in 1 month to evaluate treatment response.     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 Use Compazine and bowel regimen as needed.  -Labs from last week with no concerns, repeat in 1 month.  -Will repeat MR brain scan in 1 month prior to starting adjuvant temozolomide.    -ORAL THRUSH-  -None currently.     -SEIZURE MANAGEMENT-  -Follows with Dr. Flakita Clark; Neurology, epilepsy specialist at the [facility].      -Quality of life/ MOOD/ FATIGUE-  -Endorses mild anxiety.   -Continue to monitor mood as untreated/ undertreated depression can worsen fatigue, dysorexia, and quality of life.  -Issues with sleep, on gabapentin.     -COGNITIVE CHANGES-  -Neuro-psych testing completed with mild deficits notes. Repeat testing recommended in 2/2021.    Return to clinic on 6/30 at [facility] + labs + imaging + appt with me.     In the meantime, Barbi knows to call with questions or concerns or to report new complaints and can be seen sooner if needed.    Myrtle Gallagher MD  Neuro-oncology      Video-Visit Details  Type of service:  Video Visit    Video Start Time: 10:44 AM  Video End Time: 11:05 AM    Originating Location (pt. Location): Home    Distant Location (provider location):  [facility]     Platform used for Video Visit: [facility]    Myrtle Gallagher MD    "

## 2020-05-31 NOTE — PATIENT INSTRUCTIONS
"End of chemoradiotherapy;  -Done with radiation   Done with temozolomide  -Stop taking Dapsone  -Use Compazine as needed  -Use bowel regimen as needed  -Labs from last week with no concerns, repeat in 1 month.    Now, there will be a 1 month period without treatment.  A \"baseline\" MR brain scan will be performed in 1 month + an appointment with me.   At this follow-up appointment, we will discuss starting adjuvant temozolomide cycle 1 dosed for 5 days of a 28 day cycle.     Return to clinic on 6/30 at [facility] + labs + imaging + appt with me.     Myrtle Gallagher MD  Neuro-oncology  06/01/20   "

## 2020-06-01 ENCOUNTER — VIRTUAL VISIT (OUTPATIENT)
Dept: ONCOLOGY | Facility: CLINIC | Age: 60
End: 2020-06-01
Attending: PSYCHIATRY & NEUROLOGY
Payer: COMMERCIAL

## 2020-06-01 DIAGNOSIS — T45.1X5A CHEMOTHERAPY-INDUCED NAUSEA AND VOMITING: ICD-10-CM

## 2020-06-01 DIAGNOSIS — C71.9 OLIGODENDROGLIOMA (H): Primary | ICD-10-CM

## 2020-06-01 DIAGNOSIS — R11.2 CHEMOTHERAPY-INDUCED NAUSEA AND VOMITING: ICD-10-CM

## 2020-06-01 PROCEDURE — 99214 OFFICE O/P EST MOD 30 MIN: CPT | Mod: 95 | Performed by: PSYCHIATRY & NEUROLOGY

## 2020-06-01 PROCEDURE — 40000114 ZZH STATISTIC NO CHARGE CLINIC VISIT

## 2020-06-01 RX ORDER — PROCHLORPERAZINE MALEATE 10 MG
10 TABLET ORAL AT BEDTIME
Qty: 30 TABLET | Refills: 1 | Status: SHIPPED | OUTPATIENT
Start: 2020-06-01 | End: 2020-09-27

## 2020-06-01 NOTE — LETTER
"    6/1/2020         RE: Barbi Tiwari  [address] [facility] [address]        Dear Colleague,    Thank you for referring your patient, Barbi Tiwari, to the [facility]. Please see a copy of my visit note below.    Barbi Tiwari is a 59 year old female who is being evaluated via a billable video visit.      The patient has been notified of following:     \"This video visit will be conducted via a call between you and your physician/provider. We have found that certain health care needs can be provided without the need for an in-person physical exam.  This service lets us provide the care you need with a video conversation.  If a prescription is necessary we can send it directly to your pharmacy.  If lab work is needed we can place an order for that and you can then stop by our lab to have the test done at a later time.    Video visits are billed at different rates depending on your insurance coverage.  Please reach out to your insurance provider with any questions.    If during the course of the call the physician/provider feels a video visit is not appropriate, you will not be charged for this service.\"    Patient has given verbal consent for Video visit? Yes    How would you like to obtain your AVS? MyChart    Patient would like the video invitation sent by: Text to cell phone: [phone]    Will anyone else be joining your video visit? No     I have reviewed and updated the patient's allergies and medication list.    Concerns: questions    Refills: compazine    Secondary Video Option (Doximity), send text message to: [phone]    Ines Jefferson LPN    ________________________________________________________________________    NEURO-ONCOLOGY VISIT  Jun 1, 2020    CHIEF COMPLAINT: Ms. Barbi Tiwari is a 59 year old right-handed woman with a right frontal diffuse oligodendroglioma (1p19q co-deleted), diagnosed following resection in 5/2011. She received 12 cycles of " temozolomide, completed in 5/2012. Changes on imaging necessitated a repeat resection on 5/30/2018 and pathology was unchanged. No adjuvant cancer-directed therapy was initiated. Imaging over the next 1.5 years showed tumor progression and treatment was recommended. She completed chemoradiotherapy on 5/30/2020.     I met with Barbi and Kenneth () for this follow-up visit.      HISTORY OF PRESENT ILLNESS  -Overall, tolerated chemoradiotherapy well. Mild nausea, but tolerated on supportive medications. Mild issues with constipation on current bowel regimen. Hair is starting to thin.   -Oral thrush well managed with lozenges.   -She denies any new symptoms; no weakness, numbness, or headaches.   -No change in seizure frequency.   -Fatigue remains an ongoing complaint and is slightly worse since starting radiation. Plus, taking Ativan with radiation.   -Continued mild word-finding issues, otherwise no change in her cognitive function.   -Wanting to get back to work come July.   -Mood is positive.     REVIEW OF SYSTEMS  A comprehensive ROS negative except as in HPI.      MEDICATIONS   Current Outpatient Medications   Medication     acetaminophen (TYLENOL) 500 MG tablet     ascorbic acid 500 MG TABS     calcium carbonate (TUMS) 500 MG chewable tablet     ciclopirox (PENLAC) 8 % external solution     COMPOUNDED NON-CONTROLLED SUBSTANCE (CMPD RX) - PHARMACY TO MIX COMPOUNDED MEDICATION     fexofenadine (ALLEGRA) 180 MG tablet     gabapentin (NEURONTIN) 100 MG capsule     ibuprofen (ADVIL/MOTRIN) 200 MG tablet     Lactase (LACTAID PO)     lamoTRIgine (LAMICTAL) 100 MG tablet     lamoTRIgine (LAMICTAL) 200 MG tablet     levETIRAcetam (KEPPRA) 500 MG tablet     LORazepam (ATIVAN) 0.5 MG tablet     metoprolol tartrate (LOPRESSOR) 25 MG tablet     Multiple Vitamin (MULTI-VITAMINS) TABS     prochlorperazine (COMPAZINE) 10 MG tablet     tretinoin (RETIN-A) 0.025 % external cream     VITAMIN D, CHOLECALCIFEROL, PO     Current  Facility-Administered Medications   Medication     lidocaine 1 % injection 4 mL     lidocaine 1 % injection 4 mL     triamcinolone (KENALOG-40) injection 40 mg     triamcinolone (KENALOG-40) injection 40 mg     DRUG ALLERGIES   Allergies   Allergen Reactions     Sulfa Drugs Hives     Benoxinate Nausea and Vomiting       ONCOLOGIC HISTORY  -4/27/2011 PRESENTATION: New onset seizure, generalized event.   -5/2011 MRB with a non-contrast enhancing right frontal mass lesion.   -5/2011 SURGERY: Craniectomy for mass resection at [facility].   PATHOLOGY: Diffuse oligodendroglioma; WHO grade II, 1p/19q co-deleted.  -6/2011-5/2012 CHEMO: Adjuvant dosed temozolomide x 12 cycles.  -4/2/2018 MRB with possible disease recurrence with a new 1.2 cm non-enhancing nodule within the cortex of the right frontal lobe adjacent to the resection cavity.  -4/12/2018 NEURO-ONC: Recommending repeat resection, appointment scheduled with Dr. Dustin Rodriguez, neurosurgery at the [facility].   -5/30/2018 SURGERY: Repeat resection with Dr. Rodriguez.   PATHOLOGY: Remains low grade, without concerning features.   -6/15/2018 NEURO-ONC: Start imaging surveillance.  -9/17/2018 NEURO-ONC/ MRB: Imaging stable, continue with surveillance.   -12/10/2018 NEURO-ONC/ MRB: Imaging stable, continue with surveillance.   -4/15/2019 NEURO-ONC/ MRB: Imaging stable, continue with surveillance.  -8/19/2019 NEURO-ONC/ MRB: Clinically stable. Imaging largely stable, subtle increases on T2 FLAIR; continue with surveillance.  -12/16/2019 NEURO-ONC/ MRB: Clinically stable. Imaging with increased T2 FLAIR medially and laterally about the resection cavity. Referral to Dr. Figueroa with radiation oncology. Discussion with Dr. Rodriguez. Referral for repeat neuro-psychology testing.   -1/10/2020 Evaluated by Dr. Devan Figueroa.   -1/17/2020 NEURO-ONC: Tentative plan to initiate chemoradiotherapy in May-June 2020.   -2/17/2020 NEURO-PSYCH; Mild weaknesses were noted in aspects of verbal and nonverbal  working memory, nonverbal recall, some aspects of executive functioning, and bilateral psychomotor speed. The remainder of her cognitive abilities were intact and performed in keeping with her above average range cognitive baseline.   -4/20 - 5/30/2020 CHEMORADS: 54 Gy in 30 fractions with concurrent temozolomide 75mg/m2 (140mg).   -5/4/2020 NEURO-ONC: Continue treatment as planned. Prescribing Clotrimazole lozenges for oral thrush.   -6/1/2020 NEURO-ONC: Completed treatment as planned.    SOCIAL HISTORY   Tobacco use: Never smoker.   Alcohol use: Social.   Drug use: Denies marijuana use.  Supplement, complimentary/ alternative medicine: None.   Employment: RN.   , 2 children      PHYSICAL EXAMINATION  -Generally well appearing.  -Respiratory: No audible wheezing.   -Mouth: White plaques in back of tongue.   -Skin: No facial rashes. Healed head incision.  -Psychiatric: Normal mood and affect. Pleasant, talkative.  -Neurologic:   MENTAL STATUS:     Alert, oriented to date.    Recall: Intact.    Speech fluent.    Comprehension intact to multi-step commands.     CRANIAL NERVES:     Pupils are equal, round, reactive to light.     Extraocular movements full, patient denies diplopia.     Equal activation with smiling/ talking on the left side of face.    Hearing intact.   With normal phonation, no dysfunction of the palate or tongue.   MOTOR: Antigravity in arms. No pronation or drift.   COORDINATION: Intact to finger-nose with eyes closed.   SENSATION: Intact to light touch.     The rest of a comprehensive physical examination is deferred due to PHE (public health emergency) video visit restrictions.        MEDICAL RECORDS  Obtained and personally reviewed all available outside medical records in addition to reviewing any records available in our electronic system.     LABS  Personally reviewed all available lab results.     IMAGING  No new neuro-imaging to review.        IMPRESSION  Clinic time was spent  "discussing in detail the nature of this tumor in light of her current treatment plan. This is in addition to providing emotional support, answering questions pertaining to my recommendations, and devising the treatment plan as outlined below.     Clinically, Barbi is doing well with no new subjective neurological complaints or increase in seizure frequency while on chemoradiotherapy. Oral thrush well controlled with Clotrimazole lozenges. Constipation and nausea better managed. Continued ongoing fatigue.     Saturday was her last day of radiation. Now, there will be a 1 month period without treatment and a \"baseline\" MR brain scan will be performed in 1 month to evaluate treatment response.     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 Use Compazine and bowel regimen as needed.  -Labs from last week with no concerns, repeat in 1 month.  -Will repeat MR brain scan in 1 month prior to starting adjuvant temozolomide.    -ORAL THRUSH-  -None currently.     -SEIZURE MANAGEMENT-  -Follows with Dr. Flakita Clark; Neurology, epilepsy specialist at the [facility].      -Quality of life/ MOOD/ FATIGUE-  -Endorses mild anxiety.   -Continue to monitor mood as untreated/ undertreated depression can worsen fatigue, dysorexia, and quality of life.  -Issues with sleep, on gabapentin.     -COGNITIVE CHANGES-  -Neuro-psych testing completed with mild deficits notes. Repeat testing recommended in 2/2021.    Return to clinic on 6/30 at [facility] + labs + imaging + appt with me.     In the meantime, Barbi knows to call with questions or concerns or to report new complaints and can be seen sooner if needed.    Myrtle Gallagher MD  Neuro-oncology      Video-Visit Details  Type of service:  Video " Visit    Video Start Time: 10:44 AM  Video End Time: 11:05 AM    Originating Location (pt. Location): Home    Distant Location (provider location):  [facility] CANCER [facility]     Platform used for Video Visit: [facility]    Myrtle Gallagher MD      Again, thank you for allowing me to participate in the care of your patient.        Sincerely,        Myrtle Gallagher MD

## 2020-06-02 ENCOUNTER — ONCOLOGY VISIT (OUTPATIENT)
Dept: RADIATION ONCOLOGY | Facility: CLINIC | Age: 60
End: 2020-06-02

## 2020-06-05 NOTE — PROCEDURES
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     Treatment Summary:  Treatment Site Dose  Modality From  To  Days Fx.  Partial brain   5,400 cGy 06 X   4/20/2020  2020  40 30          Dose per Fraction: 180 cGy     COMMENTS:  Ms. 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 Serial surveillance imaging demonstrated progressive T2 FLAIR changes adjacent to the surgical cavity concerning for disease progression and she received salvage chemoradiotherapy as described above for improved intracranial disease control.      The surgical cavity and surrounding T2 FLAIR changes were treated to a total dose of 54 Gy in 30 once-daily fractions using 6 MV photons delivered via a 2-arc volumetric modulated arc therapy technique. Radiation was delivered with concurrent daily temozolomide (75 mg/m2).     Ms. Tiwari tolerated radiotherapy very well with the development of the anticipated mild acute treatment-related toxicities. She did have mild nausea with initiation of her chemotherapy although developed headaches with use of her prescribed PRN ondansetron. Her antiemetic regimen was changed to as-needed Compazine with good control of her nausea and resolution of her headaches. She had a 1-day break in therapy  between fractions 25-26 secondary to the Memorial Day Holiday. She otherwise did not require any unplanned breaks in therapy.     Acute Toxicity Profile (CTCAE v5.0)  Fatigue: Grade 2  Headaches: Grade 1  Nausea: Grade 1  Alopecia: Grade 2  Dermatitis: Grade 1     PAIN MANAGEMENT:   No symptoms requiring medical management      FOLLOW UP PLAN:   Nursing phone call in 1-2 weeks for symptom assessment  Follow-up in radiation oncology clinic with NP in 1 month in coordination with neuro-oncology follow-up     Resident Physician: Randy Mckinley MD  Staff Physician: Juan Figueroa MD, PhD  Physicist: Berny Brock, PhD     CC:   Myrtle Rodriguez RN                                 Radiation Oncology:  [facility] [address] [place] [address]

## 2020-06-11 ENCOUNTER — DOCUMENTATION ONLY (OUTPATIENT)
Dept: ONCOLOGY | Facility: CLINIC | Age: 60
End: 2020-06-11

## 2020-06-11 NOTE — PROGRESS NOTES
STD restrictions and limitations forms received via Fax from [facility].      Forms to be completed and put in folder for provider to approve.    Fax #:  [phone]  Claim:     Sri Stephen CMA

## 2020-06-12 NOTE — PROGRESS NOTES
Form completed and requested medical records have been printed. Placed in provider folder to be signed.     Nicolle Muhammad CMA ([facility])

## 2020-06-15 NOTE — PROGRESS NOTES
STD Restrictions paperwork completed, signed and faxed to [employer] @ [phone]. A copy was made, sent to scanning and original mailed to patient at home address.    Successful transmission verified in Right Fax.      Sir Stephen, CMA

## 2020-06-25 ENCOUNTER — VIRTUAL VISIT (OUTPATIENT)
Dept: RADIATION ONCOLOGY | Facility: CLINIC | Age: 60
End: 2020-06-25
Attending: RADIOLOGY
Payer: COMMERCIAL

## 2020-06-25 DIAGNOSIS — C71.9 OLIGODENDROGLIOMA (H): Primary | ICD-10-CM

## 2020-06-25 NOTE — LETTER
"    2020         RE: Barbi Tiwari  [address] [facility] [address]        Dear Colleague,    Thank you for referring your patient, Barbi Tiwari, to the RADIATION ONCOLOGY CLINIC. Please see a copy of my visit note below.    Radiation Oncology Follow-up Visit  2020  Barbi Tiwari is a 59 year old female who is being evaluated via a billable telephone visit.      The patient has been notified of following:     \"This telephone visit will be conducted via a call between you and your physician/provider. We have found that certain health care needs can be provided without the need for a physical exam.  This service lets us provide the care you need with a short phone conversation.  If a prescription is necessary we can send it directly to your pharmacy.  If lab work is needed we can place an order for that and you can then stop by our lab to have the test done at a later time.    Telephone visits are billed at different rates depending on your insurance coverage. During this emergency period, for some insurers they may be billed the same as an in-person visit.  Please reach out to your insurance provider with any questions.    If during the course of the call the physician/provider feels a telephone visit is not appropriate, you will not be charged for this service.\"    Patient has given verbal consent for Telephone visit?  Yes    What phone number would you like to be contacted at? cell    How would you like to obtain your AVS? [employer]    Phone call duration: 10 minutes        Barbi Tiwari  MRN: [id number]   : 1960     DISEASE TREATED:   Recurrent 1p 19q deleted WHO grade II oligodendroglioma of the right frontal lobe    RADIATION THERAPY DELIVERED:   5,400 cGy to partial brain completed 2020    INTERVAL SINCE COMPLETION OF RADIATION THERAPY:   3 weeks    SUBJECTIVE:   Barbi Tiwari is a 59 year old female who is here today for routine 1 month follow up after completing " radiation therapy. She is doing well overall.  She had nausea during treatment and that has improved and she is no longer taking compazine. She still has fatigue but is starting to feel like that is lifting.  She does have a lot of hair loss, more than she expected.  She is walking about 1 mile everyday.  She feels like her mood is down.  She has been told to contact her primary care physician for management previously.  Encouraged to do so and discuss with Dr. Gallagher next week.    ROS:  Complete review of systems is negative except for symptoms discussed in subjective above.    Current Outpatient Medications   Medication     acetaminophen (TYLENOL) 500 MG tablet     ascorbic acid 500 MG TABS     calcium carbonate (TUMS) 500 MG chewable tablet     ciclopirox (PENLAC) 8 % external solution     COMPOUNDED NON-CONTROLLED SUBSTANCE (CMPD RX) - PHARMACY TO MIX COMPOUNDED MEDICATION     fexofenadine (ALLEGRA) 180 MG tablet     gabapentin (NEURONTIN) 100 MG capsule     ibuprofen (ADVIL/MOTRIN) 200 MG tablet     Lactase (LACTAID PO)     lamoTRIgine (LAMICTAL) 100 MG tablet     lamoTRIgine (LAMICTAL) 200 MG tablet     levETIRAcetam (KEPPRA) 500 MG tablet     LORazepam (ATIVAN) 0.5 MG tablet     metoprolol tartrate (LOPRESSOR) 25 MG tablet     Multiple Vitamin (MULTI-VITAMINS) TABS     prochlorperazine (COMPAZINE) 10 MG tablet     tretinoin (RETIN-A) 0.025 % external cream     VITAMIN D, CHOLECALCIFEROL, PO     Current Facility-Administered Medications   Medication     lidocaine 1 % injection 4 mL     lidocaine 1 % injection 4 mL     triamcinolone (KENALOG-40) injection 40 mg     triamcinolone (KENALOG-40) injection 40 mg          Allergies   Allergen Reactions     Sulfa Drugs Hives     Benoxinate Nausea and Vomiting       Past Medical History:   Diagnosis Date     Allergic rhinitis 12/9/2009     Calculus of kidney 1/12/2011    Overview:  S/P LITHOTRIPSY 3/15/11      Esophageal reflux 10/22/2008     Hyperparathyroidism  01/11/2011    had surgery for it; resulted in seizures     Moderate major depression, single episode (H)      oligodendroglioma 7/23/2012     Osteoarthrosis 12/9/2009     Palpitations 6/5/2014     PONV (postoperative nausea and vomiting)      PVCs 6/5/2014     Seizure disorder (related to tumor) 08/17/2011         PHYSICAL EXAM:  Gen: Alert, in NAD      LABS AND IMAGING:  Reviewed.    IMPRESSION:   Ms. Tiwari is a 59 year old female with a re-current oligodendroglioma s/p multiple resections and now 1 month out from radiation therapy.  She is doing well and recovering from acute side effects.     PLAN:   Patient has recovered nicely from acute side effects of radiation therapy. Fatigue should continue to improve and discussed exercising.  She will discuss antidepressants with her primary and she is not interested in counseling.      MRI and appointment to see Dr. Gallagher next week.  She will follow up here PRN and will continue close follow up and management by Dr. Gallagher.    Valentine Stapleton, SUNIL  Radiation Oncology  [facility]        Again, thank you for allowing me to participate in the care of your patient.        Sincerely,        CAMDEN Leo CNP

## 2020-06-25 NOTE — PROGRESS NOTES
"Radiation Oncology Follow-up Visit  2020  Barbi Tiwari is a 59 year old female who is being evaluated via a billable telephone visit.      The patient has been notified of following:     \"This telephone visit will be conducted via a call between you and your physician/provider. We have found that certain health care needs can be provided without the need for a physical exam.  This service lets us provide the care you need with a short phone conversation.  If a prescription is necessary we can send it directly to your pharmacy.  If lab work is needed we can place an order for that and you can then stop by our lab to have the test done at a later time.    Telephone visits are billed at different rates depending on your insurance coverage. During this emergency period, for some insurers they may be billed the same as an in-person visit.  Please reach out to your insurance provider with any questions.    If during the course of the call the physician/provider feels a telephone visit is not appropriate, you will not be charged for this service.\"    Patient has given verbal consent for Telephone visit?  Yes    What phone number would you like to be contacted at? cell    How would you like to obtain your AVS? [employer]    Phone call duration: 10 minutes        Barbi Tiwari  MRN: [id number]   : 1960     DISEASE TREATED:   Recurrent 1p 19q deleted WHO grade II oligodendroglioma of the right frontal lobe    RADIATION THERAPY DELIVERED:   5,400 cGy to partial brain completed 2020    INTERVAL SINCE COMPLETION OF RADIATION THERAPY:   3 weeks    SUBJECTIVE:   Barbi Tiwari is a 59 year old female who is here today for routine 1 month follow up after completing radiation therapy. She is doing well overall.  She had nausea during treatment and that has improved and she is no longer taking compazine. She still has fatigue but is starting to feel like that is lifting.  She does have a lot of hair loss, " more than she expected.  She is walking about 1 mile everyday.  She feels like her mood is down.  She has been told to contact her primary care physician for management previously.  Encouraged to do so and discuss with Dr. Gallagher next week.    ROS:  Complete review of systems is negative except for symptoms discussed in subjective above.    Current Outpatient Medications   Medication     acetaminophen (TYLENOL) 500 MG tablet     ascorbic acid 500 MG TABS     calcium carbonate (TUMS) 500 MG chewable tablet     ciclopirox (PENLAC) 8 % external solution     COMPOUNDED NON-CONTROLLED SUBSTANCE (CMPD RX) - PHARMACY TO MIX COMPOUNDED MEDICATION     fexofenadine (ALLEGRA) 180 MG tablet     gabapentin (NEURONTIN) 100 MG capsule     ibuprofen (ADVIL/MOTRIN) 200 MG tablet     Lactase (LACTAID PO)     lamoTRIgine (LAMICTAL) 100 MG tablet     lamoTRIgine (LAMICTAL) 200 MG tablet     levETIRAcetam (KEPPRA) 500 MG tablet     LORazepam (ATIVAN) 0.5 MG tablet     metoprolol tartrate (LOPRESSOR) 25 MG tablet     Multiple Vitamin (MULTI-VITAMINS) TABS     prochlorperazine (COMPAZINE) 10 MG tablet     tretinoin (RETIN-A) 0.025 % external cream     VITAMIN D, CHOLECALCIFEROL, PO     Current Facility-Administered Medications   Medication     lidocaine 1 % injection 4 mL     lidocaine 1 % injection 4 mL     triamcinolone (KENALOG-40) injection 40 mg     triamcinolone (KENALOG-40) injection 40 mg          Allergies   Allergen Reactions     Sulfa Drugs Hives     Benoxinate Nausea and Vomiting       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     Osteoarthrosis 12/9/2009     Palpitations 6/5/2014     PONV (postoperative nausea and vomiting)      PVCs  6/5/2014     Seizure disorder (related to tumor) 08/17/2011         PHYSICAL EXAM:  Gen: Alert, in NAD      LABS AND IMAGING:  Reviewed.    IMPRESSION:   Ms. Tiwari is a 59 year old female with a re-current oligodendroglioma s/p multiple resections and now 1 month out from radiation therapy.  She is doing well and recovering from acute side effects.     PLAN:   Patient has recovered nicely from acute side effects of radiation therapy. Fatigue should continue to improve and discussed exercising.  She will discuss antidepressants with her primary and she is not interested in counseling.      MRI and appointment to see Dr. Gallagher next week.  She will follow up here PRN and will continue close follow up and management by Dr. Gallagher.    Valentine Stapleton, SUNIL  Radiation Oncology  [facility]

## 2020-06-26 NOTE — PATIENT INSTRUCTIONS
Starting adjuvant-dosed chemotherapy with temozolomide  -5 consectutive nights (In a 28 day cycle; 5 days on treatment and 23 days off.)  -Dose: 250 mg  -Start date: As soon as you get the chemotherapy  -Take at bedtime on an empty stomach  -Take 30 minutes after Compazine dosing  -Continue bowel regimen  -No antibiotics needed at this time    -Labs the week of 7/13 at [facility] and on 7/27.    Imaging with no concerns, very positive response to treatment.   Repeat in 3 months.     Return to clinic in 4 weeks on 7/28 to review cycle 1 and discuss dose escalation for cycle 2.    Myrtle Gallagher MD  Neuro-oncology  6/25/2020

## 2020-06-26 NOTE — PROGRESS NOTES
"Barbi Tiwari is a 59 year old female who is being evaluated via a billable video visit.      The patient has been notified of following:     \"This video visit will be conducted via a call between you and your physician/provider. We have found that certain health care needs can be provided without the need for an in-person physical exam.  This service lets us provide the care you need with a video conversation.  If a prescription is necessary we can send it directly to your pharmacy.  If lab work is needed we can place an order for that and you can then stop by our lab to have the test done at a later time.    Video visits are billed at different rates depending on your insurance coverage.  Please reach out to your insurance provider with any questions.    If during the course of the call the physician/provider feels a video visit is not appropriate, you will not be charged for this service.\"    Patient has given verbal consent for Video visit? Yes    Will anyone else be joining your video visit? Yes:  River. How would they like to receive their invitation? Send to e-mail at: [email or web address]      _________________________________________    NEURO-ONCOLOGY VISIT  Jun 30, 2020    CHIEF COMPLAINT: Ms. Barbi Tiwari is a 59 year old right-handed woman with a right frontal diffuse oligodendroglioma (1p19q co-deleted), diagnosed following resection in 5/2011. She received 12 cycles of temozolomide, completed in 5/2012. Changes on imaging necessitated a repeat resection on 5/30/2018 and pathology was unchanged. No adjuvant cancer-directed therapy was initiated. Imaging over the next 1.5 years showed tumor progression and treatment was recommended. She completed chemoradiotherapy on 5/30/2020. The plan is to initiate adjuvant-dosed temozolomide.     I met with Melvin () for this follow-up visit.      HISTORY OF PRESENT ILLNESS  -Enjoyed her break from treatment. Doing well today with no " new complaints. Spent her time reading, watching TV, and walking the neighborhood.   -She denies any new symptoms; no weakness, numbness, or headaches.   -No change in seizure frequency.   -Fatigue remains an ongoing complaint and is slightly better since stopping radiation.  -Continued mild word-finding issues, otherwise no change in her cognitive function.   -Wanting to get back to work come July.   -Mood is positive.     REVIEW OF SYSTEMS  A comprehensive ROS negative except as in HPI.      MEDICATIONS   Current Outpatient Medications   Medication     acetaminophen (TYLENOL) 500 MG tablet     ascorbic acid 500 MG TABS     calcium carbonate (TUMS) 500 MG chewable tablet     ciclopirox (PENLAC) 8 % external solution     COMPOUNDED NON-CONTROLLED SUBSTANCE (CMPD RX) - PHARMACY TO MIX COMPOUNDED MEDICATION     fexofenadine (ALLEGRA) 180 MG tablet     gabapentin (NEURONTIN) 100 MG capsule     ibuprofen (ADVIL/MOTRIN) 200 MG tablet     Lactase (LACTAID PO)     lamoTRIgine (LAMICTAL) 100 MG tablet     lamoTRIgine (LAMICTAL) 200 MG tablet     levETIRAcetam (KEPPRA) 500 MG tablet     LORazepam (ATIVAN) 0.5 MG tablet     metoprolol tartrate (LOPRESSOR) 25 MG tablet     Multiple Vitamin (MULTI-VITAMINS) TABS     prochlorperazine (COMPAZINE) 10 MG tablet     tretinoin (RETIN-A) 0.025 % external cream     VITAMIN D, CHOLECALCIFEROL, PO     Current Facility-Administered Medications   Medication     lidocaine 1 % injection 4 mL     lidocaine 1 % injection 4 mL     triamcinolone (KENALOG-40) injection 40 mg     triamcinolone (KENALOG-40) injection 40 mg     DRUG ALLERGIES   Allergies   Allergen Reactions     Sulfa Drugs Hives     Benoxinate Nausea and Vomiting       ONCOLOGIC HISTORY  -4/27/2011 PRESENTATION: New onset seizure, generalized event.   -5/2011 MRB with a non-contrast enhancing right frontal mass lesion.   -5/2011 SURGERY: Craniectomy for mass resection at [facility].   PATHOLOGY: Diffuse oligodendroglioma; WHO grade II,  1p/19q co-deleted.  -6/2011-5/2012 CHEMO: Adjuvant dosed temozolomide x 12 cycles.  -4/2/2018 MRB with possible disease recurrence with a new 1.2 cm non-enhancing nodule within the cortex of the right frontal lobe adjacent to the resection cavity.  -4/12/2018 NEURO-ONC: Recommending repeat resection, appointment scheduled with Dr. Dustin Rodriguez, neurosurgery at the [facility].   -5/30/2018 SURGERY: Repeat resection with Dr. Rodriguez.   PATHOLOGY: Remains low grade, without concerning features.   -6/15/2018 NEURO-ONC: Start imaging surveillance.  -9/17/2018 NEURO-ONC/ MRB: Imaging stable, continue with surveillance.   -12/10/2018 NEURO-ONC/ MRB: Imaging stable, continue with surveillance.   -4/15/2019 NEURO-ONC/ MRB: Imaging stable, continue with surveillance.  -8/19/2019 NEURO-ONC/ MRB: Clinically stable. Imaging largely stable, subtle increases on T2 FLAIR; continue with surveillance.  -12/16/2019 NEURO-ONC/ MRB: Clinically stable. Imaging with increased T2 FLAIR medially and laterally about the resection cavity. Referral to Dr. Figueroa with radiation oncology. Discussion with Dr. Rodriguez. Referral for repeat neuro-psychology testing.   -1/10/2020 Evaluated by Dr. Devan Figueroa.   -1/17/2020 NEURO-ONC: Tentative plan to initiate chemoradiotherapy in May-June 2020.   -2/17/2020 NEURO-PSYCH; Mild weaknesses were noted in aspects of verbal and nonverbal working memory, nonverbal recall, some aspects of executive functioning, and bilateral psychomotor speed. The remainder of her cognitive abilities were intact and performed in keeping with her above average range cognitive baseline.   -4/20 - 5/30/2020 CHEMORADS: 54 Gy in 30 fractions with concurrent temozolomide 75mg/m2 (140mg).   -5/4/2020 NEURO-ONC: Continue treatment as planned. Prescribing Clotrimazole lozenges for oral thrush.   -6/1/2020 NEURO-ONC: Completed treatment as planned.  -6/30/2020 NEURO-ONC/ MRB/ CHEMO: Clinically stable. Imaging with positive treatment effect.  Starting adjuvant temozolomide 150mg/m2 (250mg), cycle 1 (start date as soon as she receives the chemotherapy).     SOCIAL HISTORY   Tobacco use: Never smoker.   Alcohol use: Social.   Drug use: Denies marijuana use.  Supplement, complimentary/ alternative medicine: None.   Employment: RN.   , 2 children      PHYSICAL EXAMINATION  -Generally well appearing.  -Respiratory: No audible wheezing.   -Skin: No facial rashes.  -Psychiatric: Normal mood and affect. Pleasant, talkative.  -Neurologic:   MENTAL STATUS:     Alert, oriented to date.    Recall: Intact.    Speech fluent.    Comprehension intact to multi-step commands.     CRANIAL NERVES:     Pupils are equal, round, reactive to light.     Extraocular movements full, patient denies diplopia.     Equal activation with smiling/ talking on the left side of face.    Hearing intact.   With normal phonation, no dysfunction of the palate or tongue.   MOTOR: Antigravity in arms. No pronation or drift.   COORDINATION: Intact to finger-nose with eyes closed.   SENSATION: Intact to light touch.     The rest of a comprehensive physical examination is deferred due to PHE (public health emergency) video visit restrictions.        MEDICAL RECORDS  Obtained and personally reviewed all available outside medical records in addition to reviewing any records available in our electronic system.     LABS  Personally reviewed all available lab results.     IMAGING  Personally reviewed MR brain imaging from today and compared to pre-radiation imaging. To my eye, there is no contrast enhancement and a reduction in T2 FLAIR.     Imaging was shown to and results were reviewed with Barbi and Kenneth.        IMPRESSION  Clinic time was spent discussing in detail the nature of this tumor in light of her current treatment plan. This is in addition to providing emotional support, answering questions pertaining to my recommendations, and devising the treatment plan as outlined below.      Clinically, Barbi is doing well with no new subjective neurological complaints or increase in seizure frequency. Continued ongoing fatigue that has improved since the stop of radiation.    Imaging with positive treatment effect.     With regard to cancer-directed therapy, the plan is to initiate adjuvant temozolomide. In the adjuvant phase, temozolomide is dosed at 150mg/m2 for days 1-5 of a 28 day cycle for the first cycle, and then increased to 200mg/m2 if tolerated. The previously reviewed common side effects of temozolomide can still be anticipated and were discussed as including, but not limited to, fatigue, nausea, and constipation. Any AST/ ALT elevations are typically reversible and any rash is manageable with antihistaminics and steroid premedication. Bone marrow suppression can result in leukopenia and thrombocytopenia.     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   -If this dose is well tolerated, will increase to 200mg/m2 for cycle 2.  -Instructed to take temozolomide in the evening on an empty stomach, 30 minutes after Compazine dosing.  -Supportive medications; Compazine and bowel regimen.   -Repeat 28 day cycle if WBC >= 3, ANC >= 1.5, HgB >= 10, and platelets >= 100.  -Surveillance labs reviewed, at goal for chemotherapy.   -Will repeat CBC in 2 weeks at [facility] and repeat CMP every 4 weeks.  -Post-radiation imaging without any concerns. Repeat imaging in 3 months, prior to cycle 4.     -SEIZURE MANAGEMENT-  -Follows with Dr. Flakita Clark; Neurology, epilepsy specialist at the [facility].      -Quality of life/ MOOD/ FATIGUE-  -History of mild anxiety.   -Continue to monitor mood  as untreated/ undertreated depression can worsen fatigue, dysorexia, and quality of life.  -Issues with sleep, on gabapentin.     -COGNITIVE CHANGES-  -Neuro-psych testing completed with mild deficits notes. Repeat testing recommended in 2/2021.    Return to clinic in 4 weeks + labs.     In the meantime, Barbi knows to call with questions or concerns or to report new complaints and can be seen sooner if needed.    Myrtle Gallagher MD  Neuro-oncology      Video-Visit Details  Type of service:  Video Visit    Video Start Time: 3:31 PM  Video End Time: 3:43 PM    Originating Location (pt. Location): Home    Distant Location (provider location):  [facility]     Platform used for Video Visit: [facility]    Myrtle Gallagher MD

## 2020-06-30 ENCOUNTER — HOSPITAL ENCOUNTER (OUTPATIENT)
Dept: LAB | Facility: CLINIC | Age: 60
End: 2020-06-30
Attending: PSYCHIATRY & NEUROLOGY
Payer: COMMERCIAL

## 2020-06-30 ENCOUNTER — HOSPITAL ENCOUNTER (OUTPATIENT)
Dept: MRI IMAGING | Facility: CLINIC | Age: 60
End: 2020-06-30
Attending: PSYCHIATRY & NEUROLOGY
Payer: COMMERCIAL

## 2020-06-30 ENCOUNTER — VIRTUAL VISIT (OUTPATIENT)
Dept: ONCOLOGY | Facility: CLINIC | Age: 60
End: 2020-06-30
Attending: PSYCHIATRY & NEUROLOGY
Payer: COMMERCIAL

## 2020-06-30 ENCOUNTER — TELEPHONE (OUTPATIENT)
Dept: ONCOLOGY | Facility: CLINIC | Age: 60
End: 2020-06-30

## 2020-06-30 VITALS — BODY MASS INDEX: 25.61 KG/M2 | WEIGHT: 140 LBS

## 2020-06-30 DIAGNOSIS — C71.9 OLIGODENDROGLIOMA (H): ICD-10-CM

## 2020-06-30 DIAGNOSIS — C71.9 OLIGODENDROGLIOMA, WHO GRADE II (H): ICD-10-CM

## 2020-06-30 DIAGNOSIS — C71.9 OLIGODENDROGLIOMA (H): Primary | ICD-10-CM

## 2020-06-30 DIAGNOSIS — Z79.899 ENCOUNTER FOR LONG-TERM (CURRENT) USE OF MEDICATIONS: ICD-10-CM

## 2020-06-30 LAB
ALBUMIN SERPL-MCNC: 4.1 G/DL (ref 3.4–5)
ALP SERPL-CCNC: 75 U/L (ref 40–150)
ALT SERPL W P-5'-P-CCNC: 24 U/L (ref 0–50)
ANION GAP SERPL CALCULATED.3IONS-SCNC: 4 MMOL/L (ref 3–14)
AST SERPL W P-5'-P-CCNC: 19 U/L (ref 0–45)
BASOPHILS # BLD AUTO: 0 10E9/L (ref 0–0.2)
BASOPHILS NFR BLD AUTO: 0.5 %
BILIRUB SERPL-MCNC: 0.3 MG/DL (ref 0.2–1.3)
BUN SERPL-MCNC: 18 MG/DL (ref 7–30)
CALCIUM SERPL-MCNC: 9.6 MG/DL (ref 8.5–10.1)
CHLORIDE SERPL-SCNC: 105 MMOL/L (ref 94–109)
CO2 SERPL-SCNC: 30 MMOL/L (ref 20–32)
CREAT SERPL-MCNC: 0.69 MG/DL (ref 0.52–1.04)
DIFFERENTIAL METHOD BLD: ABNORMAL
EOSINOPHIL # BLD AUTO: 0.1 10E9/L (ref 0–0.7)
EOSINOPHIL NFR BLD AUTO: 2.6 %
ERYTHROCYTE [DISTWIDTH] IN BLOOD BY AUTOMATED COUNT: 12 % (ref 10–15)
GFR SERPL CREATININE-BSD FRML MDRD: >90 ML/MIN/{1.73_M2}
GLUCOSE SERPL-MCNC: 93 MG/DL (ref 70–99)
HCT VFR BLD AUTO: 43.9 % (ref 35–47)
HGB BLD-MCNC: 14 G/DL (ref 11.7–15.7)
IMM GRANULOCYTES # BLD: 0 10E9/L (ref 0–0.4)
IMM GRANULOCYTES NFR BLD: 0 %
LYMPHOCYTES # BLD AUTO: 1 10E9/L (ref 0.8–5.3)
LYMPHOCYTES NFR BLD AUTO: 23.9 %
MCH RBC QN AUTO: 32 PG (ref 26.5–33)
MCHC RBC AUTO-ENTMCNC: 31.9 G/DL (ref 31.5–36.5)
MCV RBC AUTO: 101 FL (ref 78–100)
MONOCYTES # BLD AUTO: 0.3 10E9/L (ref 0–1.3)
MONOCYTES NFR BLD AUTO: 7.6 %
NEUTROPHILS # BLD AUTO: 2.7 10E9/L (ref 1.6–8.3)
NEUTROPHILS NFR BLD AUTO: 65.4 %
NRBC # BLD AUTO: 0 10*3/UL
NRBC BLD AUTO-RTO: 0 /100
PLATELET # BLD AUTO: 193 10E9/L (ref 150–450)
POTASSIUM SERPL-SCNC: 3.9 MMOL/L (ref 3.4–5.3)
PROT SERPL-MCNC: 7.4 G/DL (ref 6.8–8.8)
RBC # BLD AUTO: 4.37 10E12/L (ref 3.8–5.2)
SODIUM SERPL-SCNC: 139 MMOL/L (ref 133–144)
WBC # BLD AUTO: 4.2 10E9/L (ref 4–11)

## 2020-06-30 PROCEDURE — A9585 GADOBUTROL INJECTION: HCPCS | Performed by: PSYCHIATRY & NEUROLOGY

## 2020-06-30 PROCEDURE — 40001009 ZZH VIDEO/TELEPHONE VISIT; NO CHARGE

## 2020-06-30 PROCEDURE — 25500064 ZZH RX 255 OP 636: Performed by: PSYCHIATRY & NEUROLOGY

## 2020-06-30 PROCEDURE — 99215 OFFICE O/P EST HI 40 MIN: CPT | Mod: 95 | Performed by: PSYCHIATRY & NEUROLOGY

## 2020-06-30 PROCEDURE — 70553 MRI BRAIN STEM W/O & W/DYE: CPT

## 2020-06-30 PROCEDURE — 85025 COMPLETE CBC W/AUTO DIFF WBC: CPT | Performed by: PSYCHIATRY & NEUROLOGY

## 2020-06-30 PROCEDURE — 80053 COMPREHEN METABOLIC PANEL: CPT | Performed by: PSYCHIATRY & NEUROLOGY

## 2020-06-30 RX ORDER — TEMOZOLOMIDE 250 MG/1
150 CAPSULE ORAL DAILY
Qty: 5 CAPSULE | Refills: 0 | Status: SHIPPED | OUTPATIENT
Start: 2020-06-30 | End: 2020-07-28

## 2020-06-30 RX ORDER — GADOBUTROL 604.72 MG/ML
6 INJECTION INTRAVENOUS ONCE
Status: COMPLETED | OUTPATIENT
Start: 2020-06-30 | End: 2020-06-30

## 2020-06-30 RX ADMIN — GADOBUTROL 6 ML: 604.72 INJECTION INTRAVENOUS at 09:51

## 2020-06-30 ASSESSMENT — PAIN SCALES - GENERAL: PAINLEVEL: NO PAIN (0)

## 2020-06-30 NOTE — LETTER
"    6/30/2020         RE: Barbi Tiwari  [address] [facility] [address]        Dear Colleague,    Thank you for referring your patient, Barbi Tiwari, to the [facility] CANCER [facility]. Please see a copy of my visit note below.    Barbi Tiwari is a 59 year old female who is being evaluated via a billable video visit.      The patient has been notified of following:     \"This video visit will be conducted via a call between you and your physician/provider. We have found that certain health care needs can be provided without the need for an in-person physical exam.  This service lets us provide the care you need with a video conversation.  If a prescription is necessary we can send it directly to your pharmacy.  If lab work is needed we can place an order for that and you can then stop by our lab to have the test done at a later time.    Video visits are billed at different rates depending on your insurance coverage.  Please reach out to your insurance provider with any questions.    If during the course of the call the physician/provider feels a video visit is not appropriate, you will not be charged for this service.\"    Patient has given verbal consent for Video visit? Yes    Will anyone else be joining your video visit? Yes:  River. How would they like to receive their invitation? Send to e-mail at: [email or web address]      _________________________________________    NEURO-ONCOLOGY VISIT  Jun 30, 2020    CHIEF COMPLAINT: Ms. Barbi Tiwari is a 59 year old right-handed woman with a right frontal diffuse oligodendroglioma (1p19q co-deleted), diagnosed following resection in 5/2011. She received 12 cycles of temozolomide, completed in 5/2012. Changes on imaging necessitated a repeat resection on 5/30/2018 and pathology was unchanged. No adjuvant cancer-directed therapy was initiated. Imaging over the next 1.5 years showed tumor progression and treatment was recommended. She completed " chemoradiotherapy on 5/30/2020. The plan is to initiate adjuvant-dosed temozolomide.     I met with Barbi and Kenneth () for this follow-up visit.      HISTORY OF PRESENT ILLNESS  -Enjoyed her break from treatment. Doing well today with no new complaints. Spent her time reading, watching TV, and walking the neighborhood.   -She denies any new symptoms; no weakness, numbness, or headaches.   -No change in seizure frequency.   -Fatigue remains an ongoing complaint and is slightly better since stopping radiation.  -Continued mild word-finding issues, otherwise no change in her cognitive function.   -Wanting to get back to work come July.   -Mood is positive.     REVIEW OF SYSTEMS  A comprehensive ROS negative except as in HPI.      MEDICATIONS   Current Outpatient Medications   Medication     acetaminophen (TYLENOL) 500 MG tablet     ascorbic acid 500 MG TABS     calcium carbonate (TUMS) 500 MG chewable tablet     ciclopirox (PENLAC) 8 % external solution     COMPOUNDED NON-CONTROLLED SUBSTANCE (CMPD RX) - PHARMACY TO MIX COMPOUNDED MEDICATION     fexofenadine (ALLEGRA) 180 MG tablet     gabapentin (NEURONTIN) 100 MG capsule     ibuprofen (ADVIL/MOTRIN) 200 MG tablet     Lactase (LACTAID PO)     lamoTRIgine (LAMICTAL) 100 MG tablet     lamoTRIgine (LAMICTAL) 200 MG tablet     levETIRAcetam (KEPPRA) 500 MG tablet     LORazepam (ATIVAN) 0.5 MG tablet     metoprolol tartrate (LOPRESSOR) 25 MG tablet     Multiple Vitamin (MULTI-VITAMINS) TABS     prochlorperazine (COMPAZINE) 10 MG tablet     tretinoin (RETIN-A) 0.025 % external cream     VITAMIN D, CHOLECALCIFEROL, PO     Current Facility-Administered Medications   Medication     lidocaine 1 % injection 4 mL     lidocaine 1 % injection 4 mL     triamcinolone (KENALOG-40) injection 40 mg     triamcinolone (KENALOG-40) injection 40 mg     DRUG ALLERGIES   Allergies   Allergen Reactions     Sulfa Drugs Hives     Benoxinate Nausea and Vomiting       ONCOLOGIC  HISTORY  -4/27/2011 PRESENTATION: New onset seizure, generalized event.   -5/2011 MRB with a non-contrast enhancing right frontal mass lesion.   -5/2011 SURGERY: Craniectomy for mass resection at [facility].   PATHOLOGY: Diffuse oligodendroglioma; WHO grade II, 1p/19q co-deleted.  -6/2011-5/2012 CHEMO: Adjuvant dosed temozolomide x 12 cycles.  -4/2/2018 MRB with possible disease recurrence with a new 1.2 cm non-enhancing nodule within the cortex of the right frontal lobe adjacent to the resection cavity.  -4/12/2018 NEURO-ONC: Recommending repeat resection, appointment scheduled with Dr. Dustin Rodriguez, neurosurgery at the [facility].   -5/30/2018 SURGERY: Repeat resection with Dr. Rodriguez.   PATHOLOGY: Remains low grade, without concerning features.   -6/15/2018 NEURO-ONC: Start imaging surveillance.  -9/17/2018 NEURO-ONC/ MRB: Imaging stable, continue with surveillance.   -12/10/2018 NEURO-ONC/ MRB: Imaging stable, continue with surveillance.   -4/15/2019 NEURO-ONC/ MRB: Imaging stable, continue with surveillance.  -8/19/2019 NEURO-ONC/ MRB: Clinically stable. Imaging largely stable, subtle increases on T2 FLAIR; continue with surveillance.  -12/16/2019 NEURO-ONC/ MRB: Clinically stable. Imaging with increased T2 FLAIR medially and laterally about the resection cavity. Referral to Dr. Figueroa with radiation oncology. Discussion with Dr. Rodriguez. Referral for repeat neuro-psychology testing.   -1/10/2020 Evaluated by Dr. Devan Figueroa.   -1/17/2020 NEURO-ONC: Tentative plan to initiate chemoradiotherapy in May-June 2020.   -2/17/2020 NEURO-PSYCH; Mild weaknesses were noted in aspects of verbal and nonverbal working memory, nonverbal recall, some aspects of executive functioning, and bilateral psychomotor speed. The remainder of her cognitive abilities were intact and performed in keeping with her above average range cognitive baseline.   -4/20 - 5/30/2020 CHEMORADS: 54 Gy in 30 fractions with concurrent temozolomide 75mg/m2 (140mg).    -5/4/2020 NEURO-ONC: Continue treatment as planned. Prescribing Clotrimazole lozenges for oral thrush.   -6/1/2020 NEURO-ONC: Completed treatment as planned.  -6/30/2020 NEURO-ONC/ MRB/ CHEMO: Clinically stable. Imaging with positive treatment effect. Starting adjuvant temozolomide 150mg/m2 (250mg), cycle 1 (start date as soon as she receives the chemotherapy).     SOCIAL HISTORY   Tobacco use: Never smoker.   Alcohol use: Social.   Drug use: Denies marijuana use.  Supplement, complimentary/ alternative medicine: None.   Employment: RN.   , 2 children      PHYSICAL EXAMINATION  -Generally well appearing.  -Respiratory: No audible wheezing.   -Skin: No facial rashes.  -Psychiatric: Normal mood and affect. Pleasant, talkative.  -Neurologic:   MENTAL STATUS:     Alert, oriented to date.    Recall: Intact.    Speech fluent.    Comprehension intact to multi-step commands.     CRANIAL NERVES:     Pupils are equal, round, reactive to light.     Extraocular movements full, patient denies diplopia.     Equal activation with smiling/ talking on the left side of face.    Hearing intact.   With normal phonation, no dysfunction of the palate or tongue.   MOTOR: Antigravity in arms. No pronation or drift.   COORDINATION: Intact to finger-nose with eyes closed.   SENSATION: Intact to light touch.     The rest of a comprehensive physical examination is deferred due to PHE (public health emergency) video visit restrictions.        MEDICAL RECORDS  Obtained and personally reviewed all available outside medical records in addition to reviewing any records available in our electronic system.     LABS  Personally reviewed all available lab results.     IMAGING  Personally reviewed MR brain imaging from today and compared to pre-radiation imaging. To my eye, there is no contrast enhancement and a reduction in T2 FLAIR.     Imaging was shown to and results were reviewed with Barbi and Kenneth.        IMPRESSION  Clinic time was  spent discussing in detail the nature of this tumor in light of her current treatment plan. This is in addition to providing emotional support, answering questions pertaining to my recommendations, and devising the treatment plan as outlined below.     Clinically, Barbi is doing well with no new subjective neurological complaints or increase in seizure frequency. Continued ongoing fatigue that has improved since the stop of radiation.    Imaging with positive treatment effect.     With regard to cancer-directed therapy, the plan is to initiate adjuvant temozolomide. In the adjuvant phase, temozolomide is dosed at 150mg/m2 for days 1-5 of a 28 day cycle for the first cycle, and then increased to 200mg/m2 if tolerated. The previously reviewed common side effects of temozolomide can still be anticipated and were discussed as including, but not limited to, fatigue, nausea, and constipation. Any AST/ ALT elevations are typically reversible and any rash is manageable with antihistaminics and steroid premedication. Bone marrow suppression can result in leukopenia and thrombocytopenia.     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   -If this dose is well tolerated, will increase to 200mg/m2 for cycle 2.  -Instructed to take temozolomide in the evening on an empty stomach, 30 minutes after Compazine dosing.  -Supportive medications; Compazine and bowel regimen.   -Repeat 28 day cycle if WBC >= 3, ANC >= 1.5, HgB >= 10, and platelets >= 100.  -Surveillance labs reviewed, at goal for chemotherapy.   -Will repeat CBC in 2 weeks at [facility] and repeat CMP every 4 weeks.  -Post-radiation imaging without any  concerns. Repeat imaging in 3 months, prior to cycle 4.     -SEIZURE MANAGEMENT-  -Follows with Dr. Flakita Clark; Neurology, epilepsy specialist at the [facility].      -Quality of life/ MOOD/ FATIGUE-  -History of mild anxiety.   -Continue to monitor mood as untreated/ undertreated depression can worsen fatigue, dysorexia, and quality of life.  -Issues with sleep, on gabapentin.     -COGNITIVE CHANGES-  -Neuro-psych testing completed with mild deficits notes. Repeat testing recommended in 2/2021.    Return to clinic in 4 weeks + labs.     In the meantime, Barbi knows to call with questions or concerns or to report new complaints and can be seen sooner if needed.    Myrtle Gallagher MD  Neuro-oncology      Video-Visit Details  Type of service:  Video Visit    Video Start Time: 3:31 PM  Video End Time: 3:43 PM    Originating Location (pt. Location): Home    Distant Location (provider location):  [facility]     Platform used for Video Visit: [employer]    Myrtle Gallagher MD      Again, thank you for allowing me to participate in the care of your patient.        Sincerely,        Myrtle Gallagher MD

## 2020-06-30 NOTE — LETTER
"    6/30/2020         RE: Barbi Tiwari  [address] [facility] [address]        Dear Colleague,    Thank you for referring your patient, Barbi Tiwari, to the [facility] CANCER [facility]. Please see a copy of my visit note below.    Barbi Tiwari is a 59 year old female who is being evaluated via a billable video visit.      The patient has been notified of following:     \"This video visit will be conducted via a call between you and your physician/provider. We have found that certain health care needs can be provided without the need for an in-person physical exam.  This service lets us provide the care you need with a video conversation.  If a prescription is necessary we can send it directly to your pharmacy.  If lab work is needed we can place an order for that and you can then stop by our lab to have the test done at a later time.    Video visits are billed at different rates depending on your insurance coverage.  Please reach out to your insurance provider with any questions.    If during the course of the call the physician/provider feels a video visit is not appropriate, you will not be charged for this service.\"    Patient has given verbal consent for Video visit? Yes    Will anyone else be joining your video visit? Yes:  River. How would they like to receive their invitation? Send to e-mail at: [email or web address]      _________________________________________    NEURO-ONCOLOGY VISIT  Jun 30, 2020    CHIEF COMPLAINT: Ms. Barbi Tiwari is a 59 year old right-handed woman with a right frontal diffuse oligodendroglioma (1p19q co-deleted), diagnosed following resection in 5/2011. She received 12 cycles of temozolomide, completed in 5/2012. Changes on imaging necessitated a repeat resection on 5/30/2018 and pathology was unchanged. No adjuvant cancer-directed therapy was initiated. Imaging over the next 1.5 years showed tumor progression and treatment was recommended. She completed " chemoradiotherapy on 5/30/2020. The plan is to initiate adjuvant-dosed temozolomide.     I met with Barbi and Kenneth () for this follow-up visit.      HISTORY OF PRESENT ILLNESS  -Enjoyed her break from treatment. Doing well today with no new complaints. Spent her time reading, watching TV, and walking the neighborhood.   -She denies any new symptoms; no weakness, numbness, or headaches.   -No change in seizure frequency.   -Fatigue remains an ongoing complaint and is slightly better since stopping radiation.  -Continued mild word-finding issues, otherwise no change in her cognitive function.   -Wanting to get back to work come July.   -Mood is positive.     REVIEW OF SYSTEMS  A comprehensive ROS negative except as in HPI.      MEDICATIONS   Current Outpatient Medications   Medication     acetaminophen (TYLENOL) 500 MG tablet     ascorbic acid 500 MG TABS     calcium carbonate (TUMS) 500 MG chewable tablet     ciclopirox (PENLAC) 8 % external solution     COMPOUNDED NON-CONTROLLED SUBSTANCE (CMPD RX) - PHARMACY TO MIX COMPOUNDED MEDICATION     fexofenadine (ALLEGRA) 180 MG tablet     gabapentin (NEURONTIN) 100 MG capsule     ibuprofen (ADVIL/MOTRIN) 200 MG tablet     Lactase (LACTAID PO)     lamoTRIgine (LAMICTAL) 100 MG tablet     lamoTRIgine (LAMICTAL) 200 MG tablet     levETIRAcetam (KEPPRA) 500 MG tablet     LORazepam (ATIVAN) 0.5 MG tablet     metoprolol tartrate (LOPRESSOR) 25 MG tablet     Multiple Vitamin (MULTI-VITAMINS) TABS     prochlorperazine (COMPAZINE) 10 MG tablet     tretinoin (RETIN-A) 0.025 % external cream     VITAMIN D, CHOLECALCIFEROL, PO     Current Facility-Administered Medications   Medication     lidocaine 1 % injection 4 mL     lidocaine 1 % injection 4 mL     triamcinolone (KENALOG-40) injection 40 mg     triamcinolone (KENALOG-40) injection 40 mg     DRUG ALLERGIES   Allergies   Allergen Reactions     Sulfa Drugs Hives     Benoxinate Nausea and Vomiting       ONCOLOGIC  HISTORY  -4/27/2011 PRESENTATION: New onset seizure, generalized event.   -5/2011 MRB with a non-contrast enhancing right frontal mass lesion.   -5/2011 SURGERY: Craniectomy for mass resection at [facility].   PATHOLOGY: Diffuse oligodendroglioma; WHO grade II, 1p/19q co-deleted.  -6/2011-5/2012 CHEMO: Adjuvant dosed temozolomide x 12 cycles.  -4/2/2018 MRB with possible disease recurrence with a new 1.2 cm non-enhancing nodule within the cortex of the right frontal lobe adjacent to the resection cavity.  -4/12/2018 NEURO-ONC: Recommending repeat resection, appointment scheduled with Dr. Dustin Rodriguez, neurosurgery at the [facility].   -5/30/2018 SURGERY: Repeat resection with Dr. Rodriguez.   PATHOLOGY: Remains low grade, without concerning features.   -6/15/2018 NEURO-ONC: Start imaging surveillance.  -9/17/2018 NEURO-ONC/ MRB: Imaging stable, continue with surveillance.   -12/10/2018 NEURO-ONC/ MRB: Imaging stable, continue with surveillance.   -4/15/2019 NEURO-ONC/ MRB: Imaging stable, continue with surveillance.  -8/19/2019 NEURO-ONC/ MRB: Clinically stable. Imaging largely stable, subtle increases on T2 FLAIR; continue with surveillance.  -12/16/2019 NEURO-ONC/ MRB: Clinically stable. Imaging with increased T2 FLAIR medially and laterally about the resection cavity. Referral to Dr. Figueroa with radiation oncology. Discussion with Dr. Rodriguez. Referral for repeat neuro-psychology testing.   -1/10/2020 Evaluated by Dr. Dvean Figueroa.   -1/17/2020 NEURO-ONC: Tentative plan to initiate chemoradiotherapy in May-June 2020.   -2/17/2020 NEURO-PSYCH; Mild weaknesses were noted in aspects of verbal and nonverbal working memory, nonverbal recall, some aspects of executive functioning, and bilateral psychomotor speed. The remainder of her cognitive abilities were intact and performed in keeping with her above average range cognitive baseline.   -4/20 - 5/30/2020 CHEMORADS: 54 Gy in 30 fractions with concurrent temozolomide 75mg/m2 (140mg).    -5/4/2020 NEURO-ONC: Continue treatment as planned. Prescribing Clotrimazole lozenges for oral thrush.   -6/1/2020 NEURO-ONC: Completed treatment as planned.  -6/30/2020 NEURO-ONC/ MRB/ CHEMO: Clinically stable. Imaging with positive treatment effect. Starting adjuvant temozolomide 150mg/m2 (250mg), cycle 1 (start date as soon as she receives the chemotherapy).     SOCIAL HISTORY   Tobacco use: Never smoker.   Alcohol use: Social.   Drug use: Denies marijuana use.  Supplement, complimentary/ alternative medicine: None.   Employment: RN.   , 2 children      PHYSICAL EXAMINATION  -Generally well appearing.  -Respiratory: No audible wheezing.   -Skin: No facial rashes.  -Psychiatric: Normal mood and affect. Pleasant, talkative.  -Neurologic:   MENTAL STATUS:     Alert, oriented to date.    Recall: Intact.    Speech fluent.    Comprehension intact to multi-step commands.     CRANIAL NERVES:     Pupils are equal, round, reactive to light.     Extraocular movements full, patient denies diplopia.     Equal activation with smiling/ talking on the left side of face.    Hearing intact.   With normal phonation, no dysfunction of the palate or tongue.   MOTOR: Antigravity in arms. No pronation or drift.   COORDINATION: Intact to finger-nose with eyes closed.   SENSATION: Intact to light touch.     The rest of a comprehensive physical examination is deferred due to PHE (public health emergency) video visit restrictions.        MEDICAL RECORDS  Obtained and personally reviewed all available outside medical records in addition to reviewing any records available in our electronic system.     LABS  Personally reviewed all available lab results.     IMAGING  Personally reviewed MR brain imaging from today and compared to pre-radiation imaging. To my eye, there is no contrast enhancement and a reduction in T2 FLAIR.     Imaging was shown to and results were reviewed with Barbi and Kenneth.        IMPRESSION  Clinic time was  spent discussing in detail the nature of this tumor in light of her current treatment plan. This is in addition to providing emotional support, answering questions pertaining to my recommendations, and devising the treatment plan as outlined below.     Clinically, Barbi is doing well with no new subjective neurological complaints or increase in seizure frequency. Continued ongoing fatigue that has improved since the stop of radiation.    Imaging with positive treatment effect.     With regard to cancer-directed therapy, the plan is to initiate adjuvant temozolomide. In the adjuvant phase, temozolomide is dosed at 150mg/m2 for days 1-5 of a 28 day cycle for the first cycle, and then increased to 200mg/m2 if tolerated. The previously reviewed common side effects of temozolomide can still be anticipated and were discussed as including, but not limited to, fatigue, nausea, and constipation. Any AST/ ALT elevations are typically reversible and any rash is manageable with antihistaminics and steroid premedication. Bone marrow suppression can result in leukopenia and thrombocytopenia.     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   -If this dose is well tolerated, will increase to 200mg/m2 for cycle 2.  -Instructed to take temozolomide in the evening on an empty stomach, 30 minutes after Compazine dosing.  -Supportive medications; Compazine and bowel regimen.   -Repeat 28 day cycle if WBC >= 3, ANC >= 1.5, HgB >= 10, and platelets >= 100.  -Surveillance labs reviewed, at goal for chemotherapy.   -Will repeat CBC in 2 weeks at [facility] and repeat CMP every 4 weeks.  -Post-radiation imaging without any  concerns. Repeat imaging in 3 months, prior to cycle 4.     -SEIZURE MANAGEMENT-  -Follows with Dr. Flakita Clark; Neurology, epilepsy specialist at the [facility].      -Quality of life/ MOOD/ FATIGUE-  -History of mild anxiety.   -Continue to monitor mood as untreated/ undertreated depression can worsen fatigue, dysorexia, and quality of life.  -Issues with sleep, on gabapentin.     -COGNITIVE CHANGES-  -Neuro-psych testing completed with mild deficits notes. Repeat testing recommended in 2/2021.    Return to clinic in 4 weeks + labs.     In the meantime, Barbi knows to call with questions or concerns or to report new complaints and can be seen sooner if needed.    Myrtle Gallagher MD  Neuro-oncology      Video-Visit Details  Type of service:  Video Visit    Video Start Time: 3:31 PM  Video End Time: 3:43 PM    Originating Location (pt. Location): Home    Distant Location (provider location):  [facility] [place]     Platform used for Video Visit: [employer]    Myrtle Gallagher MD      Again, thank you for allowing me to participate in the care of your patient.        Sincerely,        Myrtle Gallagher MD

## 2020-06-30 NOTE — ORAL ONC MGMT
Oral Chemotherapy Monitoring Program    Primary Oncologist: Dr. Gallagher  Primary Oncology Clinic: [facility]  Cancer Diagnosis: Glioma    Drug: Temodar  Start Date: as soon as available  Dose is appropriate for patients: 1.67m2 BSA   Expected duration of therapy: Until disease progression or unacceptable toxicity    Drug Interaction Assessment: There were no significant drug interactions identified upon review of medication list with chemotherapy agents.    Drugs checked 6/30/2020 include: Temodar -Acetaminophen -Ascorbic Acid -Calcium Carbonate -Penlac Fexofenadine -Gabapentin -Ibuprofen -Lactase -LamoTRIgine -LevETIRAcetam -LORazepam -Metoprolol -Multivitamins/Fluoride -Prochlorperazine -Tretinoin -Cholecalciferol      Lab Monitoring Plan  CBC and CMP every 2 weeks and as needed  Subjective/Objective:  Barbi Tiwari is a 59 year old female contacted by phone for an initial visit for oral chemotherapy education.    ORAL CHEMOTHERAPY 4/10/2020 4/30/2020 6/30/2020   Drug Name Temodar (Temozolomide) Temodar (Temozolomide) Temodar (Temozolomide)   Current Dosage 140mg 140mg 250mg   Current Schedule Daily Daily QHS   Cycle Details Continuous for 42 days during XRT Continuous for 42 days during XRT 5 days on, then 23 days off   Start Date of Last Cycle 4/19/2020 4/19/2020 -   Doses missed in last 2 weeks - 0 -   Adherence Assessment - Adherent -   Adverse Effects - Nausea;Fatigue;Other (see note for details) -   Nausea - (No Data) -   Other (see note for details) - (No Data) -   Any new drug interactions? No No No   Is the dose as ordered appropriate for the patient? Yes Yes Yes       Last PHQ-2 Score on record:   PHQ-2 ( 1999 [employer]) 2/24/2020 10/31/2019   Q1: Little interest or pleasure in doing things 0 0   Q2: Feeling down, depressed or hopeless 0 0   PHQ-2 Score 0 0       Vitals:  BP:   BP Readings from Last 1 Encounters:   02/24/20 105/59     Wt Readings from Last 1 Encounters:   06/30/20 63.5 kg (140 lb)  "    Estimated body surface area is 1.67 meters squared as calculated from the following:    Height as of 1/27/20: 1.575 m (5' 2\").    Weight as of an earlier encounter on 6/30/20: 63.5 kg (140 lb).      Labs:  _  Result Component Current Result Ref Range   Sodium 139 (6/30/2020) 133 - 144 mmol/L     _  Result Component Current Result Ref Range   Potassium 3.9 (6/30/2020) 3.4 - 5.3 mmol/L     _  Result Component Current Result Ref Range   Calcium 9.6 (6/30/2020) 8.5 - 10.1 mg/dL     No results found for Mag within last 30 days.     No results found for Phos within last 30 days.     _  Result Component Current Result Ref Range   Albumin 4.1 (6/30/2020) 3.4 - 5.0 g/dL     _  Result Component Current Result Ref Range   Urea Nitrogen 18 (6/30/2020) 7 - 30 mg/dL     _  Result Component Current Result Ref Range   Creatinine 0.69 (6/30/2020) 0.52 - 1.04 mg/dL       _  Result Component Current Result Ref Range   AST 19 (6/30/2020) 0 - 45 U/L     _  Result Component Current Result Ref Range   ALT 24 (6/30/2020) 0 - 50 U/L     _  Result Component Current Result Ref Range   Bilirubin Total 0.3 (6/30/2020) 0.2 - 1.3 mg/dL       _  Result Component Current Result Ref Range   WBC 4.2 (6/30/2020) 4.0 - 11.0 10e9/L     _  Result Component Current Result Ref Range   Hemoglobin 14.0 (6/30/2020) 11.7 - 15.7 g/dL     _  Result Component Current Result Ref Range   Platelet Count 193 (6/30/2020) 150 - 450 10e9/L     _  Result Component Current Result Ref Range   Absolute Neutrophil 2.7 (6/30/2020) 1.6 - 8.3 10e9/L     Assessment:  Patient is appropriate to start therapy.    Plan:  Basic chemotherapy teaching was reviewed with the patient including indication, start date of therapy, dose, administration, adverse effects, missed doses, food and drug interactions, monitoring, side effect management, office contact information, and safe handling. Written materials were provided and all questions answered to Barbi's stated " satisfaction.    Follow-Up:  About one week after start of Adjuvant Temodar.     Lopez Weber PharmD  [facility]  [phone]  June 30, 2020

## 2020-07-02 ENCOUNTER — DOCUMENTATION ONLY (OUTPATIENT)
Dept: ONCOLOGY | Facility: CLINIC | Age: 60
End: 2020-07-02

## 2020-07-02 ENCOUNTER — TELEPHONE (OUTPATIENT)
Dept: ONCOLOGY | Facility: CLINIC | Age: 60
End: 2020-07-02

## 2020-07-02 NOTE — PROGRESS NOTES
Called patient to discuss disability forms received and what time frame she is looking for to continue on disability. Stated she is wanting to return to work on 7/20/2020, barring any medical complications from new chemo cycle started on 6/30/2020. Form updated with this information and placed in provider folder to be checked for accuracy and signature.     Nicolle Muhammad CMA (AAMA)

## 2020-07-02 NOTE — PROGRESS NOTES
Received STD/FMLA request via fax. Form to be completed, approved and signed by provider, and faxed once all is complete.    [employer]  Fax to: [phone]  Claim #: [id number]    Nicolle Muhammad CMA ([facility])

## 2020-07-06 ENCOUNTER — MYC REFILL (OUTPATIENT)
Dept: DERMATOLOGY | Facility: CLINIC | Age: 60
End: 2020-07-06

## 2020-07-06 DIAGNOSIS — B35.1 ONYCHOMYCOSIS: ICD-10-CM

## 2020-07-06 DIAGNOSIS — I49.3 SYMPTOMATIC PREMATURE VENTRICULAR CONTRACTIONS: ICD-10-CM

## 2020-07-06 RX ORDER — METOPROLOL TARTRATE 25 MG/1
25 TABLET, FILM COATED ORAL 2 TIMES DAILY
Qty: 180 TABLET | Refills: 1 | Status: SHIPPED | OUTPATIENT
Start: 2020-07-06 | End: 2021-01-09

## 2020-07-07 NOTE — PROGRESS NOTES
STD paperwork completed, checked for accuracy, signed and faxed to [employer] @ [phone]. A copy was made, sent to scanning and original mailed to patient at home address.    Successful transmission verified in Right Fax.      Krystina Fonseca CMA

## 2020-07-08 ENCOUNTER — TELEPHONE (OUTPATIENT)
Dept: ONCOLOGY | Facility: CLINIC | Age: 60
End: 2020-07-08

## 2020-07-08 RX ORDER — CICLOPIROX 80 MG/ML
SOLUTION TOPICAL
Qty: 6.6 ML | Refills: 3 | Status: ON HOLD | OUTPATIENT
Start: 2020-07-08 | End: 2020-10-24

## 2020-07-08 NOTE — ORAL ONC MGMT
Oral Chemotherapy Monitoring Program    Primary Oncologist: Dr. Gallagher  Primary Oncology Clinic: [facility]  Cancer Diagnosis: Low grade giloma    Therapy History:  C1D1: 7/1/2020  Adjuvant Temodar 250mg daily on days 1-5 of 28 day cycle    Lab Monitoring Plan  CBC and CMP q2 weeks  Subjective/Objective:  Barbi Tiwari is a 59 year old female by phone for a follow-up visit for oral chemotherapy. She did well with the first cycle of adjuvant temodar. She had a little nausea and vomited/dry heaved on the second night but started taking ativan along compazine which helped a lot. She had a little fatigue but did not impacted her much.    ORAL CHEMOTHERAPY 4/10/2020 4/30/2020 6/30/2020 7/8/2020   Drug Name Temodar (Temozolomide) Temodar (Temozolomide) Temodar (Temozolomide) Temodar (Temozolomide)   Current Dosage 140mg 140mg 250mg 250mg   Current Schedule Daily Daily QHS QHS   Cycle Details Continuous for 42 days during XRT Continuous for 42 days during XRT 5 days on, then 23 days off 5 days on, then 23 days off   Start Date of Last Cycle 4/19/2020 4/19/2020 - 7/1/2020   Planned next cycle start date - - - 7/29/2020   Doses missed in last 2 weeks - 0 - 0   Adherence Assessment - Adherent - Adherent   Adverse Effects - Nausea;Fatigue;Other (see note for details) - Fatigue   Nausea - (No Data) - -   Fatigue - - - Grade 1   Other (see note for details) - (No Data) - -   Any new drug interactions? No No No -   Is the dose as ordered appropriate for the patient? Yes Yes Yes -       Assessment:  Barbi did well on adjuvant temodar with only a little fatigue and some nausea that was solved with ativan and compazine.    Plan:  Continue on therapy.    Follow-Up:  Review labs on 7/13    Refill Due:  7/28    Dejuan Henry  Pharmacy Intern  Oral Chemotherapy Monitoring Program  [facility]  [phone]

## 2020-07-13 ENCOUNTER — DOCUMENTATION ONLY (OUTPATIENT)
Dept: ONCOLOGY | Facility: CLINIC | Age: 60
End: 2020-07-13

## 2020-07-13 DIAGNOSIS — Z79.899 ENCOUNTER FOR LONG-TERM (CURRENT) USE OF MEDICATIONS: ICD-10-CM

## 2020-07-13 DIAGNOSIS — C71.9 OLIGODENDROGLIOMA (H): ICD-10-CM

## 2020-07-13 LAB
ALBUMIN SERPL-MCNC: 3.7 G/DL (ref 3.4–5)
ALP SERPL-CCNC: 82 U/L (ref 40–150)
ALT SERPL W P-5'-P-CCNC: 32 U/L (ref 0–50)
ANION GAP SERPL CALCULATED.3IONS-SCNC: 3 MMOL/L (ref 3–14)
AST SERPL W P-5'-P-CCNC: 15 U/L (ref 0–45)
BASOPHILS # BLD AUTO: 0 10E9/L (ref 0–0.2)
BASOPHILS NFR BLD AUTO: 0.3 %
BILIRUB SERPL-MCNC: 0.1 MG/DL (ref 0.2–1.3)
BUN SERPL-MCNC: 16 MG/DL (ref 7–30)
CALCIUM SERPL-MCNC: 9.1 MG/DL (ref 8.5–10.1)
CHLORIDE SERPL-SCNC: 108 MMOL/L (ref 94–109)
CO2 SERPL-SCNC: 29 MMOL/L (ref 20–32)
CREAT SERPL-MCNC: 0.72 MG/DL (ref 0.52–1.04)
DIFFERENTIAL METHOD BLD: ABNORMAL
EOSINOPHIL # BLD AUTO: 0.1 10E9/L (ref 0–0.7)
EOSINOPHIL NFR BLD AUTO: 3.1 %
ERYTHROCYTE [DISTWIDTH] IN BLOOD BY AUTOMATED COUNT: 11.9 % (ref 10–15)
GFR SERPL CREATININE-BSD FRML MDRD: >90 ML/MIN/{1.73_M2}
GLUCOSE SERPL-MCNC: 98 MG/DL (ref 70–99)
HCT VFR BLD AUTO: 44.2 % (ref 35–47)
HGB BLD-MCNC: 14.1 G/DL (ref 11.7–15.7)
LYMPHOCYTES # BLD AUTO: 0.7 10E9/L (ref 0.8–5.3)
LYMPHOCYTES NFR BLD AUTO: 17.9 %
MCH RBC QN AUTO: 32 PG (ref 26.5–33)
MCHC RBC AUTO-ENTMCNC: 31.9 G/DL (ref 31.5–36.5)
MCV RBC AUTO: 101 FL (ref 78–100)
MONOCYTES # BLD AUTO: 0.4 10E9/L (ref 0–1.3)
MONOCYTES NFR BLD AUTO: 10.6 %
NEUTROPHILS # BLD AUTO: 2.6 10E9/L (ref 1.6–8.3)
NEUTROPHILS NFR BLD AUTO: 68.1 %
PLATELET # BLD AUTO: 175 10E9/L (ref 150–450)
POTASSIUM SERPL-SCNC: 3.8 MMOL/L (ref 3.4–5.3)
PROT SERPL-MCNC: 7 G/DL (ref 6.8–8.8)
RBC # BLD AUTO: 4.4 10E12/L (ref 3.8–5.2)
SODIUM SERPL-SCNC: 140 MMOL/L (ref 133–144)
WBC # BLD AUTO: 3.9 10E9/L (ref 4–11)

## 2020-07-13 PROCEDURE — 36415 COLL VENOUS BLD VENIPUNCTURE: CPT | Performed by: PSYCHIATRY & NEUROLOGY

## 2020-07-13 PROCEDURE — 85025 COMPLETE CBC W/AUTO DIFF WBC: CPT | Performed by: PSYCHIATRY & NEUROLOGY

## 2020-07-13 PROCEDURE — 80053 COMPREHEN METABOLIC PANEL: CPT | Performed by: PSYCHIATRY & NEUROLOGY

## 2020-07-13 NOTE — PROGRESS NOTES
Workability paperwork received via fax from [employer].   Paperwork was partially completed - rest of form filled out and placed in provider's folder for approval.    Fax: [phone]    Sri Tinoco CMA

## 2020-07-14 ENCOUNTER — MYC MEDICAL ADVICE (OUTPATIENT)
Dept: PHYSICAL THERAPY | Facility: CLINIC | Age: 60
End: 2020-07-14

## 2020-07-14 DIAGNOSIS — M25.562 ACUTE PAIN OF LEFT KNEE: Primary | ICD-10-CM

## 2020-07-17 NOTE — PROGRESS NOTES
Workability paperwork completed, checked for accuracy, signed and faxed to [employer] @ [phone]. A copy was made, sent to scanning and original mailed to patient at home address.    Successful transmission verified in Right Fax.      Sri Stephen, CMA

## 2020-07-22 ENCOUNTER — DOCUMENTATION ONLY (OUTPATIENT)
Dept: ONCOLOGY | Facility: CLINIC | Age: 60
End: 2020-07-22

## 2020-07-22 NOTE — PROGRESS NOTES
STD paperwork received via fax from [facility]. Will be placed in provider folder for signature upon completion.       Fax: [phone]  Case #: [id number]      Sri Tinoco CMA

## 2020-07-27 DIAGNOSIS — Z79.899 ENCOUNTER FOR LONG-TERM (CURRENT) USE OF MEDICATIONS: ICD-10-CM

## 2020-07-27 DIAGNOSIS — C71.9 OLIGODENDROGLIOMA (H): ICD-10-CM

## 2020-07-27 LAB
ALBUMIN SERPL-MCNC: 3.6 G/DL (ref 3.4–5)
ALP SERPL-CCNC: 79 U/L (ref 40–150)
ALT SERPL W P-5'-P-CCNC: 20 U/L (ref 0–50)
ANION GAP SERPL CALCULATED.3IONS-SCNC: 4 MMOL/L (ref 3–14)
AST SERPL W P-5'-P-CCNC: 11 U/L (ref 0–45)
BASOPHILS # BLD AUTO: 0 10E9/L (ref 0–0.2)
BASOPHILS NFR BLD AUTO: 0.3 %
BILIRUB SERPL-MCNC: 0.2 MG/DL (ref 0.2–1.3)
BUN SERPL-MCNC: 22 MG/DL (ref 7–30)
CALCIUM SERPL-MCNC: 8.9 MG/DL (ref 8.5–10.1)
CHLORIDE SERPL-SCNC: 108 MMOL/L (ref 94–109)
CO2 SERPL-SCNC: 29 MMOL/L (ref 20–32)
CREAT SERPL-MCNC: 0.74 MG/DL (ref 0.52–1.04)
DIFFERENTIAL METHOD BLD: ABNORMAL
EOSINOPHIL # BLD AUTO: 0.1 10E9/L (ref 0–0.7)
EOSINOPHIL NFR BLD AUTO: 3.1 %
ERYTHROCYTE [DISTWIDTH] IN BLOOD BY AUTOMATED COUNT: 12 % (ref 10–15)
GFR SERPL CREATININE-BSD FRML MDRD: 89 ML/MIN/{1.73_M2}
GLUCOSE SERPL-MCNC: 126 MG/DL (ref 70–99)
HCT VFR BLD AUTO: 43.9 % (ref 35–47)
HGB BLD-MCNC: 14.1 G/DL (ref 11.7–15.7)
LYMPHOCYTES # BLD AUTO: 0.8 10E9/L (ref 0.8–5.3)
LYMPHOCYTES NFR BLD AUTO: 21.5 %
MCH RBC QN AUTO: 31.6 PG (ref 26.5–33)
MCHC RBC AUTO-ENTMCNC: 32.1 G/DL (ref 31.5–36.5)
MCV RBC AUTO: 98 FL (ref 78–100)
MONOCYTES # BLD AUTO: 0.4 10E9/L (ref 0–1.3)
MONOCYTES NFR BLD AUTO: 10.9 %
NEUTROPHILS # BLD AUTO: 2.3 10E9/L (ref 1.6–8.3)
NEUTROPHILS NFR BLD AUTO: 64.2 %
PLATELET # BLD AUTO: 131 10E9/L (ref 150–450)
POTASSIUM SERPL-SCNC: 3.9 MMOL/L (ref 3.4–5.3)
PROT SERPL-MCNC: 6.9 G/DL (ref 6.8–8.8)
RBC # BLD AUTO: 4.46 10E12/L (ref 3.8–5.2)
SODIUM SERPL-SCNC: 141 MMOL/L (ref 133–144)
WBC # BLD AUTO: 3.6 10E9/L (ref 4–11)

## 2020-07-27 PROCEDURE — 36415 COLL VENOUS BLD VENIPUNCTURE: CPT | Performed by: PSYCHIATRY & NEUROLOGY

## 2020-07-27 PROCEDURE — 85025 COMPLETE CBC W/AUTO DIFF WBC: CPT | Performed by: PSYCHIATRY & NEUROLOGY

## 2020-07-27 PROCEDURE — 80053 COMPREHEN METABOLIC PANEL: CPT | Performed by: PSYCHIATRY & NEUROLOGY

## 2020-07-27 NOTE — PATIENT INSTRUCTIONS
Adjuvant-dosed chemotherapy with temozolomide  -5 consectutive nights (In a 28 day cycle; 5 days on treatment and 23 days off.)  -Dose: 320 mg (dose increase)  -Start date: 7/29  -Take at bedtime on an empty stomach  -Take 30 minutes after Compazine + Ativan (refilled today) dosing  -Continue bowel regimen    -Labs the week of 8/10 at [facility] and on 8/24.    Repeat imaging in September (to be scheduled later).     Return to clinic in 4 weeks on 8/24 at the [facility] virtually.    Myrtle Gallagher MD  Neuro-oncology  7/28/2020      Patient will call back when she knows her schedule better. She did schedule two appointments. Patient did not want to do a Virtual VISIT ON 8/24.

## 2020-07-27 NOTE — PROGRESS NOTES
STD paperwork completed, checked for accuracy, signed and faxed to [employer] @ [phone]. A copy was made, sent to scanning and original mailed to patient at home address.    Successful transmission verified in Right Fax.      Krystina Fonseca CMA

## 2020-07-27 NOTE — PROGRESS NOTES
"Barbi Tiwari is a 59 year old female who is being evaluated via a billable video visit.      The patient has been notified of following:     \"This video visit will be conducted via a call between you and your physician/provider. We have found that certain health care needs can be provided without the need for an in-person physical exam.  This service lets us provide the care you need with a video conversation.  If a prescription is necessary we can send it directly to your pharmacy.  If lab work is needed we can place an order for that and you can then stop by our lab to have the test done at a later time.    Video visits are billed at different rates depending on your insurance coverage.  Please reach out to your insurance provider with any questions.    If during the course of the call the physician/provider feels a video visit is not appropriate, you will not be charged for this service.\"    Patient has given verbal consent for Video visit? Yes  How would you like to obtain your AVS? MyChart  If you are dropped from the video visit, the video invite should be resent to: Text to cell phone: [phone]  Will anyone else be joining your video visit? No  Medication Refill ATIVAN      Video-Visit Details  Type of service:  Video Visit    Video Start Time: 09:03 AM  Video End Time: 9:22 AM    Originating Location (pt. Location): Home    Distant Location (provider location):  [facility]     Platform used for Video Visit: Prosper Gallagher MD    _________________________________________    NEURO-ONCOLOGY VISIT  Jul 28, 2020    CHIEF COMPLAINT: Ms. Barbi Tiwari is a 59 year old right-handed woman with a right frontal diffuse oligodendroglioma (1p19q co-deleted), diagnosed following resection in 5/2011. She received 12 cycles of temozolomide, completed in 5/2012. Changes on imaging necessitated a repeat resection on 5/30/2018 and pathology was unchanged. No adjuvant cancer-directed therapy " was initiated. Imaging over the next 1.5 years showed tumor progression and treatment was recommended. She completed chemoradiotherapy on 5/30/2020. Barbi is currently managed on adjuvant-dosed temozolomide.     I met with Barbi and Kenneth () for this follow-up visit.      HISTORY OF PRESENT ILLNESS  -Overall, tolerating chemotherapy well. Nausea is well controlled on supportive medications with Compazine and Ativan, denies issues with constipation on current bowel regimen.  -Discussed a dose increase for cycle 2 and Barbi is wanting to do this.   -She denies any new symptoms; no weakness, numbness, changes in vision, or headaches.   -No change in seizure frequency.   -Better energy.  -Continued mild word-finding issues, otherwise no change in her cognitive function.   -Went back to work on Friday in a part-time capacity. It was tiring, but she did well.       -Mood is positive.     REVIEW OF SYSTEMS  A comprehensive ROS negative except as in HPI.      MEDICATIONS   Current Outpatient Medications   Medication     acetaminophen (TYLENOL) 500 MG tablet     ascorbic acid 500 MG TABS     calcium carbonate (TUMS) 500 MG chewable tablet     ciclopirox (PENLAC) 8 % external solution     COMPOUNDED NON-CONTROLLED SUBSTANCE (CMPD RX) - PHARMACY TO MIX COMPOUNDED MEDICATION     fexofenadine (ALLEGRA) 180 MG tablet     gabapentin (NEURONTIN) 100 MG capsule     ibuprofen (ADVIL/MOTRIN) 200 MG tablet     Lactase (LACTAID PO)     lamoTRIgine (LAMICTAL) 100 MG tablet     lamoTRIgine (LAMICTAL) 200 MG tablet     levETIRAcetam (KEPPRA) 500 MG tablet     LORazepam (ATIVAN) 0.5 MG tablet     metoprolol tartrate (LOPRESSOR) 25 MG tablet     Multiple Vitamin (MULTI-VITAMINS) TABS     prochlorperazine (COMPAZINE) 10 MG tablet     tretinoin (RETIN-A) 0.025 % external cream     VITAMIN D, CHOLECALCIFEROL, PO     Current Facility-Administered Medications   Medication     lidocaine 1 % injection 4 mL     lidocaine 1 % injection 4 mL      triamcinolone (KENALOG-40) injection 40 mg     triamcinolone (KENALOG-40) injection 40 mg     DRUG ALLERGIES   Allergies   Allergen Reactions     Sulfa Drugs Hives     Benoxinate Nausea and Vomiting       ONCOLOGIC HISTORY  -4/27/2011 PRESENTATION: New onset seizure, generalized event.   -5/2011 MRB with a non-contrast enhancing right frontal mass lesion.   -5/2011 SURGERY: Craniectomy for mass resection at [facility].   PATHOLOGY: Diffuse oligodendroglioma; WHO grade II, 1p/19q co-deleted.  -6/2011-5/2012 CHEMO: Adjuvant dosed temozolomide x 12 cycles.  -4/2/2018 MRB with possible disease recurrence with a new 1.2 cm non-enhancing nodule within the cortex of the right frontal lobe adjacent to the resection cavity.  -4/12/2018 NEURO-ONC: Recommending repeat resection, appointment scheduled with Dr. Dustin Rodriguez, neurosurgery at the [facility].   -5/30/2018 SURGERY: Repeat resection with Dr. Rodriguez.   PATHOLOGY: Remains low grade, without concerning features.   -6/15/2018 NEURO-ONC: Start imaging surveillance.  -9/17/2018 NEURO-ONC/ MRB: Imaging stable, continue with surveillance.   -12/10/2018 NEURO-ONC/ MRB: Imaging stable, continue with surveillance.   -4/15/2019 NEURO-ONC/ MRB: Imaging stable, continue with surveillance.  -8/19/2019 NEURO-ONC/ MRB: Clinically stable. Imaging largely stable, subtle increases on T2 FLAIR; continue with surveillance.  -12/16/2019 NEURO-ONC/ MRB: Clinically stable. Imaging with increased T2 FLAIR medially and laterally about the resection cavity. Referral to Dr. Figueroa with radiation oncology. Discussion with Dr. Rodriguez. Referral for repeat neuro-psychology testing.   -1/10/2020 Evaluated by Dr. Devan Figueroa.   -1/17/2020 NEURO-ONC: Tentative plan to initiate chemoradiotherapy in May-June 2020.   -2/17/2020 NEURO-PSYCH; Mild weaknesses were noted in aspects of verbal and nonverbal working memory, nonverbal recall, some aspects of executive functioning, and bilateral psychomotor speed. The remainder  of her cognitive abilities were intact and performed in keeping with her above average range cognitive baseline.   -4/20 - 5/30/2020 CHEMORADS: 54 Gy in 30 fractions with concurrent temozolomide 75mg/m2 (140mg).   -5/4/2020 NEURO-ONC: Continue treatment as planned. Prescribing Clotrimazole lozenges for oral thrush.   -6/1/2020 NEURO-ONC: Completed treatment as planned.  -6/30/2020 NEURO-ONC/ MRB/ CHEMO: Clinically stable. Imaging with positive treatment effect. Starting adjuvant temozolomide 150mg/m2 (250mg), cycle 1 (start date was 7/1).   -7/28/2020 NEURO-ONC/ CHEMO: Clinically stable. Adjuvant temozolomide 200mg/m2 (320mg), cycle 2 (start date was 7/29).     SOCIAL HISTORY   Tobacco use: Never smoker.   Alcohol use: Social.   Drug use: Denies marijuana use.  Supplement, complimentary/ alternative medicine: None.   Employment: RN.   , 2 children      PHYSICAL EXAMINATION  -Generally well appearing.  -Respiratory: No audible wheezing.   -Skin: No facial rashes.  -Psychiatric: Normal mood and affect. Pleasant, talkative.  -Neurologic:   MENTAL STATUS:     Alert, oriented to date.    Recall: Intact.    Speech fluent.    Comprehension intact.     CRANIAL NERVES:     Pupils are equal, round, reactive to light.     Extraocular movements full, patient denies diplopia.     Equal activation with smiling/ talking on the left side of face.    Hearing intact.   With normal phonation, no dysfunction of the palate or tongue.   MOTOR: Antigravity in arms.  COORDINATION: Intact with using the computer/ phone.   SENSATION: Intact to light touch.     The rest of a comprehensive physical examination is deferred due to PHE (public health emergency) video visit restrictions.        MEDICAL RECORDS  Obtained and personally reviewed all available outside medical records in addition to reviewing any records available in our electronic system.     LABS  Personally reviewed all available lab results.     IMAGING  No new  neuro-imaging to review.        IMPRESSION  Clinic time was spent discussing in detail the nature of this tumor in light of her current treatment plan. This is in addition to providing emotional support, answering questions pertaining to my recommendations, and devising the treatment plan as outlined below.     Clinically, Barbi is doing well with no new subjective neurological complaints or increase in seizure frequency. Continued ongoing fatigue that has been improving. She has returned to work on a part-time basis and this is going well. The first cycle of adjuvant temozolomide went well and she is wanting to dose increase to 200mg/m2 for cycle 2.     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   -Supportive medications; Compazine + Ativan and bowel regimen.   -Repeat 28 day cycle if WBC >= 3, ANC >= 1.5, HgB >= 10, and platelets >= 100.  -Surveillance labs reviewed, at goal for chemotherapy.  -Will repeat CBC in 2 and 4 weeks at [facility] and repeat CMP every 4 weeks.  -Repeat imaging in September, prior to cycle 4.     -SEIZURE MANAGEMENT-  -Follows with Dr. Flakita Clark; Neurology, epilepsy specialist at the [facility].      -Quality of life/ MOOD/ FATIGUE-  -History of mild anxiety.   -Continue to monitor mood as untreated/ undertreated depression can worsen fatigue, dysorexia, and quality of life.  -Issues with sleep, on gabapentin.     -COGNITIVE CHANGES-  -Neuro-psych testing completed with mild deficits notes. Repeat testing recommended in 2/2021.    Return to clinic virtually at the [facility] on 8/24.     In the meantime, Barbi knows to call with questions or concerns or to report new complaints and can be seen sooner if  needed.    Myrtle Gallagher MD  Neuro-oncology

## 2020-07-28 ENCOUNTER — TELEPHONE (OUTPATIENT)
Dept: ONCOLOGY | Facility: CLINIC | Age: 60
End: 2020-07-28

## 2020-07-28 ENCOUNTER — THERAPY VISIT (OUTPATIENT)
Dept: PHYSICAL THERAPY | Facility: CLINIC | Age: 60
End: 2020-07-28
Payer: COMMERCIAL

## 2020-07-28 ENCOUNTER — VIRTUAL VISIT (OUTPATIENT)
Dept: ONCOLOGY | Facility: CLINIC | Age: 60
End: 2020-07-28
Attending: PSYCHIATRY & NEUROLOGY
Payer: COMMERCIAL

## 2020-07-28 VITALS — BODY MASS INDEX: 25.61 KG/M2 | WEIGHT: 140 LBS

## 2020-07-28 DIAGNOSIS — M25.552 HIP PAIN, LEFT: ICD-10-CM

## 2020-07-28 DIAGNOSIS — T45.1X5A CHEMOTHERAPY-INDUCED NAUSEA AND VOMITING: ICD-10-CM

## 2020-07-28 DIAGNOSIS — R11.2 CHEMOTHERAPY-INDUCED NAUSEA AND VOMITING: ICD-10-CM

## 2020-07-28 DIAGNOSIS — M54.50 ACUTE LEFT-SIDED LOW BACK PAIN WITHOUT SCIATICA: ICD-10-CM

## 2020-07-28 DIAGNOSIS — G89.29 CHRONIC PAIN OF LEFT KNEE: Primary | ICD-10-CM

## 2020-07-28 DIAGNOSIS — M25.562 CHRONIC PAIN OF LEFT KNEE: Primary | ICD-10-CM

## 2020-07-28 DIAGNOSIS — C71.9 OLIGODENDROGLIOMA (H): Primary | ICD-10-CM

## 2020-07-28 PROCEDURE — 97110 THERAPEUTIC EXERCISES: CPT | Mod: GP | Performed by: PHYSICAL THERAPIST

## 2020-07-28 PROCEDURE — 99214 OFFICE O/P EST MOD 30 MIN: CPT | Mod: 95 | Performed by: PSYCHIATRY & NEUROLOGY

## 2020-07-28 PROCEDURE — 97161 PT EVAL LOW COMPLEX 20 MIN: CPT | Mod: GP | Performed by: PHYSICAL THERAPIST

## 2020-07-28 PROCEDURE — 40001009 ZZH VIDEO/TELEPHONE VISIT; NO CHARGE

## 2020-07-28 RX ORDER — LORAZEPAM 0.5 MG/1
0.5 TABLET ORAL AT BEDTIME
Qty: 30 TABLET | Refills: 0 | Status: SHIPPED | OUTPATIENT
Start: 2020-07-28 | End: 2020-09-16

## 2020-07-28 RX ORDER — TEMOZOLOMIDE 140 MG/1
140 CAPSULE ORAL DAILY
Qty: 5 CAPSULE | Refills: 0 | Status: SHIPPED | OUTPATIENT
Start: 2020-07-28 | End: 2020-08-24

## 2020-07-28 RX ORDER — TEMOZOLOMIDE 140 MG/1
140 CAPSULE ORAL DAILY
Qty: 5 CAPSULE | Refills: 0 | Status: SHIPPED | OUTPATIENT
Start: 2020-07-28 | End: 2020-07-28

## 2020-07-28 RX ORDER — TEMOZOLOMIDE 180 MG/1
180 CAPSULE ORAL DAILY
Qty: 5 CAPSULE | Refills: 0 | Status: SHIPPED | OUTPATIENT
Start: 2020-07-28 | End: 2020-07-28

## 2020-07-28 RX ORDER — TEMOZOLOMIDE 180 MG/1
180 CAPSULE ORAL DAILY
Qty: 5 CAPSULE | Refills: 0 | Status: SHIPPED | OUTPATIENT
Start: 2020-07-28 | End: 2020-08-24

## 2020-07-28 ASSESSMENT — ACTIVITIES OF DAILY LIVING (ADL)
HOW_WOULD_YOU_RATE_THE_OVERALL_FUNCTION_OF_YOUR_KNEE_DURING_YOUR_USUAL_DAILY_ACTIVITIES?: ABNORMAL
WEAKNESS: THE SYMPTOM AFFECTS MY ACTIVITY SLIGHTLY
HOW_WOULD_YOU_RATE_THE_CURRENT_FUNCTION_OF_YOUR_KNEE_DURING_YOUR_USUAL_DAILY_ACTIVITIES_ON_A_SCALE_FROM_0_TO_100_WITH_100_BEING_YOUR_LEVEL_OF_KNEE_FUNCTION_PRIOR_TO_YOUR_INJURY_AND_0_BEING_THE_INABILITY_TO_PERFORM_ANY_OF_YOUR_USUAL_DAILY_ACTIVITIES?: 30
GIVING WAY, BUCKLING OR SHIFTING OF KNEE: THE SYMPTOM AFFECTS MY ACTIVITY SLIGHTLY
SQUAT: ACTIVITY IS VERY DIFFICULT
GO UP STAIRS: ACTIVITY IS MINIMALLY DIFFICULT
SWELLING: I DO NOT HAVE THE SYMPTOM
PAIN: THE SYMPTOM AFFECTS MY ACTIVITY SLIGHTLY
RAW_SCORE: 48
STIFFNESS: THE SYMPTOM AFFECTS MY ACTIVITY MODERATELY
WALK: ACTIVITY IS NOT DIFFICULT
GO DOWN STAIRS: ACTIVITY IS FAIRLY DIFFICULT
KNEEL ON THE FRONT OF YOUR KNEE: ACTIVITY IS FAIRLY DIFFICULT
AS_A_RESULT_OF_YOUR_KNEE_INJURY,_HOW_WOULD_YOU_RATE_YOUR_CURRENT_LEVEL_OF_DAILY_ACTIVITY?: NEARLY NORMAL
STAND: ACTIVITY IS NOT DIFFICULT
SIT WITH YOUR KNEE BENT: ACTIVITY IS NOT DIFFICULT
RISE FROM A CHAIR: ACTIVITY IS SOMEWHAT DIFFICULT
KNEE_ACTIVITY_OF_DAILY_LIVING_SUM: 48
LIMPING: I DO NOT HAVE THE SYMPTOM
KNEE_ACTIVITY_OF_DAILY_LIVING_SCORE: 68.57

## 2020-07-28 ASSESSMENT — PAIN SCALES - GENERAL: PAINLEVEL: NO PAIN (0)

## 2020-07-28 NOTE — PROGRESS NOTES
[facility] Initial Evaluation -- Lower Extremity    Evaluation Date: July 28, 2020  Barbi Tiwari is a 59 year old female with a L knee condition.   Referral: IM  Work mechanical stresses: standing, bending, sitting, walking   Employment status: RN at [employer]  Leisure mechanical stresses: normal daily activities  Functional disability score:   VAS score (0-10): 4/10  Patient goals/expectations:  To get the pain to go away.    **Patient is currently doing a second round of chemo and just completed radiation.    HISTORY:    Present symptoms: L medial, lateral, anterior knee pain  Pain quality (sharp/shooting/stabbing/aching/burning/cramping):  Sharp, aching    Present since (onset date): 01/28/2019    Symptoms (improving/unchaning/worsening):  unchanging.      Symptoms commenced as a result of: unknown   Condition occurred in the following environment: unknown     Symptoms at onset: L medial, lateral, anterior knee  Paresthesia (yes/no):  no  Spinal history: yes--intermittent LBP over the years   Cough/Sneeze (pos/neg):  neg    Constant symptoms: none  Intermittent symptoms: L knee    Symptoms are worse with the following: kneeling, squatting, sitting back on heels, catches with standing and turning, has to use railing to pull up when doing stairs  Symptoms are better with the following: rest    Continued use makes the pain (better/worse/no effect): unsure, depends on activity    Disturbed night (yes/no): no      Pain at rest (yes/no):  no  Site (back/hip/knee/ankle/foot):  na    Other questions (swelling/clicking/locking/giving way/falling):  Catches with pivoting on L LE       Previous episodes: yes  Previous treatments: PT helped last year    Specific Questions:  General health (excellent/good/fair/poor):  Fair to good  Pertinent medical history includes: Cancer, Heart problems, Osteoarthritis, Overweight and Seizures  Medications  (nil/NSAIDS/analg/steroids/anticoag/other):  Other - Anti-seizure, Sleep and pain, cardiac, anti-inflammatories  Medical allergies:  sulfa  Imaging (none/Xray/MRI/other):  none  Recent or major surgery (yes/no):  No; history of cancer surgery, multiple foot surgeries, hysterectomy, parathyroid, kidney stones  Night pain (yes/no):  no  Accidents (yes/no):  no  Unexplained weight loss (yes/no):  no  Barriers at home: no  Other red flags: no    Sites for physical examination (back/hip/knee/ankle/foot/other): L knee    EXAMINATION    Posture:  Sitting (good/fair/poor): poor    Correction of Posture (better/worse/no effect/NA): na  Standing (good/fair/poor): good  Other observations:  none    Neurological: (NA/motor/sensory/reflexes/dural): na    Baselines (pain or functional activity): L knee pain with 50% squat    Extremities (Hip / Knee / Ankle / Foot): L knee    Movement Loss Osvaldo Mod Min Nil Pain   Flexion    x NE   Extension    x NE   Abduction        Adduction        Internal Rotation        External Rotation        Dorsiflexion        Plantarflexion        Inversion        Eversion          Passive Movement (+/- over pressure)/(PDM/ERP):  Normal PROM L knee, mild pain overpressure L knee extension  Resisted Test Response (pain): no pain MMT and 5/5 flex and extension L knee  Other Tests: none    Spine:  Movement loss:   Effect of repeated movements:   Effect of static positioning:   Spine testing (not relevant/relevant/secondary problem): not relevant?    Baseline Symptoms: L knee pain 4/10 with 50% squat  Repeated Tests Symptom Response Mechanical Response   Active/Passive movement, resisted test, functional test During -  Produce, Abolish, Increase, Decrease, NE After -  Better, Worse, NB, NW, NE Effect -   ? or ? ROM, strength or key functional test No   Effect   Repeated L knee extension in sitting with self-OP Increase    No Worse     x   Repeated L knee extension in standing with self-OP Increase    No  "Worse     x   Repeated L knee extension in sitting, AROM Increase    No Worse    Less pain squat           Effect of static positioning              ** also tried repeated knee extension in sitting, heel on floor, use quad set to extend--increases during, NW after; trial standing quad set for extension--increases, NW, NE    Provisional Classification (Extremity/Spine):  Extremity - Derangement      Princicple of Management:   Education:  POC, treatment rationale, expected response    Equipment provided:  none  Exercise and dosage:  Repeated L knee extension in sitting, AROM 3\"x10 reps, 3-4x/day    ASSESSMENT/PLAN:    Patient is a 59 year old female with left side knee complaints.  Assessing for knee joint derangement.  She had less pain with squatting after repeated L knee extension in sitting, AROM and will try at home to assess further.  Aggravating factors suggest a directional preference for extension.       Patient has the following significant findings with corresponding treatment plan.                Diagnosis 1:  L knee pain  Pain -  self management, education, directional preference exercise and home program  Decreased function - therapeutic activities and home program      Cumulative Therapy Evaluation is: Low complexity.    Previous and current functional limitations:  (See Goal Flow Sheet for this information)    Short term and Long term goals: (See Goal Flow Sheet for this information)     Communication ability:  Patient appears to be able to clearly communicate and understand verbal and written communication and follow directions correctly.  Treatment Explanation - The following has been discussed with the patient:   RX ordered/plan of care  Anticipated outcomes  Possible risks and side effects  This patient would benefit from PT intervention to resume normal activities.   Rehab potential is good.    Frequency:  2 X a month, once daily  Duration:  for 2 months  Discharge Plan:  Achieve all " LTG.  Independent in home treatment program.  Reach maximal therapeutic benefit.    Please refer to the daily flowsheet for treatment today, total treatment time and time spent performing 1:1 timed codes.

## 2020-07-28 NOTE — LETTER
"    7/28/2020         RE: Barbi Tiwari  [address] [facility] [address]        Dear Colleague,    Thank you for referring your patient, Barbi Tiwari, to the [facility]. Please see a copy of my visit note below.    Barbi Tiwari is a 59 year old female who is being evaluated via a billable video visit.      The patient has been notified of following:     \"This video visit will be conducted via a call between you and your physician/provider. We have found that certain health care needs can be provided without the need for an in-person physical exam.  This service lets us provide the care you need with a video conversation.  If a prescription is necessary we can send it directly to your pharmacy.  If lab work is needed we can place an order for that and you can then stop by our lab to have the test done at a later time.    Video visits are billed at different rates depending on your insurance coverage.  Please reach out to your insurance provider with any questions.    If during the course of the call the physician/provider feels a video visit is not appropriate, you will not be charged for this service.\"    Patient has given verbal consent for Video visit? Yes  How would you like to obtain your AVS? MyChart  If you are dropped from the video visit, the video invite should be resent to: Text to cell phone: [phone]  Will anyone else be joining your video visit? No  Medication Refill ATIVAN      Video-Visit Details  Type of service:  Video Visit    Video Start Time: 09:03 AM  Video End Time: 9:22 AM    Originating Location (pt. Location): Home    Distant Location (provider location):  [facility]     Platform used for Video Visit: Prosper Gallagher MD    _________________________________________    NEURO-ONCOLOGY VISIT  Jul 28, 2020    CHIEF COMPLAINT: Ms. 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 She received 12 cycles of temozolomide, completed in 5/2012. Changes on imaging necessitated a repeat resection on 5/30/2018 and pathology was unchanged. No adjuvant cancer-directed therapy was initiated. Imaging over the next 1.5 years showed tumor progression and treatment was recommended. She completed chemoradiotherapy on 5/30/2020. Barbi is currently managed on adjuvant-dosed temozolomide.     I met with Barbi and Kenneth () for this follow-up visit.      HISTORY OF PRESENT ILLNESS  -Overall, tolerating chemotherapy well. Nausea is well controlled on supportive medications with Compazine and Ativan, denies issues with constipation on current bowel regimen.  -Discussed a dose increase for cycle 2 and Barbi is wanting to do this.   -She denies any new symptoms; no weakness, numbness, changes in vision, or headaches.   -No change in seizure frequency.   -Better energy.  -Continued mild word-finding issues, otherwise no change in her cognitive function.   -Went back to work on Friday in a part-time capacity. It was tiring, but she did well.       -Mood is positive.     REVIEW OF SYSTEMS  A comprehensive ROS negative except as in HPI.      MEDICATIONS   Current Outpatient Medications   Medication     acetaminophen (TYLENOL) 500 MG tablet     ascorbic acid 500 MG TABS     calcium carbonate (TUMS) 500 MG chewable tablet     ciclopirox (PENLAC) 8 % external solution     COMPOUNDED NON-CONTROLLED SUBSTANCE (CMPD RX) - PHARMACY TO MIX COMPOUNDED MEDICATION     fexofenadine (ALLEGRA) 180 MG tablet     gabapentin (NEURONTIN) 100 MG capsule     ibuprofen (ADVIL/MOTRIN) 200 MG tablet     Lactase (LACTAID PO)     lamoTRIgine (LAMICTAL) 100 MG tablet     lamoTRIgine (LAMICTAL) 200 MG tablet     levETIRAcetam (KEPPRA) 500 MG tablet     LORazepam (ATIVAN) 0.5 MG tablet     metoprolol tartrate (LOPRESSOR) 25 MG tablet     Multiple Vitamin (MULTI-VITAMINS) TABS      prochlorperazine (COMPAZINE) 10 MG tablet     tretinoin (RETIN-A) 0.025 % external cream     VITAMIN D, CHOLECALCIFEROL, PO     Current Facility-Administered Medications   Medication     lidocaine 1 % injection 4 mL     lidocaine 1 % injection 4 mL     triamcinolone (KENALOG-40) injection 40 mg     triamcinolone (KENALOG-40) injection 40 mg     DRUG ALLERGIES   Allergies   Allergen Reactions     Sulfa Drugs Hives     Benoxinate Nausea and Vomiting       ONCOLOGIC HISTORY  -4/27/2011 PRESENTATION: New onset seizure, generalized event.   -5/2011 MRB with a non-contrast enhancing right frontal mass lesion.   -5/2011 SURGERY: Craniectomy for mass resection at [facility].   PATHOLOGY: Diffuse oligodendroglioma; WHO grade II, 1p/19q co-deleted.  -6/2011-5/2012 CHEMO: Adjuvant dosed temozolomide x 12 cycles.  -4/2/2018 MRB with possible disease recurrence with a new 1.2 cm non-enhancing nodule within the cortex of the right frontal lobe adjacent to the resection cavity.  -4/12/2018 NEURO-ONC: Recommending repeat resection, appointment scheduled with Dr. Dustin Rodriguez, neurosurgery at the [facility].   -5/30/2018 SURGERY: Repeat resection with Dr. Rodriguez.   PATHOLOGY: Remains low grade, without concerning features.   -6/15/2018 NEURO-ONC: Start imaging surveillance.  -9/17/2018 NEURO-ONC/ MRB: Imaging stable, continue with surveillance.   -12/10/2018 NEURO-ONC/ MRB: Imaging stable, continue with surveillance.   -4/15/2019 NEURO-ONC/ MRB: Imaging stable, continue with surveillance.  -8/19/2019 NEURO-ONC/ MRB: Clinically stable. Imaging largely stable, subtle increases on T2 FLAIR; continue with surveillance.  -12/16/2019 NEURO-ONC/ MRB: Clinically stable. Imaging with increased T2 FLAIR medially and laterally about the resection cavity. Referral to Dr. Figueora with radiation oncology. Discussion with Dr. Rodriguez. Referral for repeat neuro-psychology testing.   -1/10/2020 Evaluated by Dr. Devan Figueroa.   -1/17/2020 NEURO-ONC: Tentative plan to  initiate chemoradiotherapy in May-June 2020.   -2/17/2020 NEURO-PSYCH; Mild weaknesses were noted in aspects of verbal and nonverbal working memory, nonverbal recall, some aspects of executive functioning, and bilateral psychomotor speed. The remainder of her cognitive abilities were intact and performed in keeping with her above average range cognitive baseline.   -4/20 - 5/30/2020 CHEMORADS: 54 Gy in 30 fractions with concurrent temozolomide 75mg/m2 (140mg).   -5/4/2020 NEURO-ONC: Continue treatment as planned. Prescribing Clotrimazole lozenges for oral thrush.   -6/1/2020 NEURO-ONC: Completed treatment as planned.  -6/30/2020 NEURO-ONC/ MRB/ CHEMO: Clinically stable. Imaging with positive treatment effect. Starting adjuvant temozolomide 150mg/m2 (250mg), cycle 1 (start date was 7/1).   -7/28/2020 NEURO-ONC/ CHEMO: Clinically stable. Adjuvant temozolomide 200mg/m2 (320mg), cycle 2 (start date was 7/29).     SOCIAL HISTORY   Tobacco use: Never smoker.   Alcohol use: Social.   Drug use: Denies marijuana use.  Supplement, complimentary/ alternative medicine: None.   Employment: RN.   , 2 children      PHYSICAL EXAMINATION  -Generally well appearing.  -Respiratory: No audible wheezing.   -Skin: No facial rashes.  -Psychiatric: Normal mood and affect. Pleasant, talkative.  -Neurologic:   MENTAL STATUS:     Alert, oriented to date.    Recall: Intact.    Speech fluent.    Comprehension intact.     CRANIAL NERVES:     Pupils are equal, round, reactive to light.     Extraocular movements full, patient denies diplopia.     Equal activation with smiling/ talking on the left side of face.    Hearing intact.   With normal phonation, no dysfunction of the palate or tongue.   MOTOR: Antigravity in arms.  COORDINATION: Intact with using the computer/ phone.   SENSATION: Intact to light touch.     The rest of a comprehensive physical examination is deferred due to PHE (public health emergency) video visit restrictions.         MEDICAL RECORDS  Obtained and personally reviewed all available outside medical records in addition to reviewing any records available in our electronic system.     LABS  Personally reviewed all available lab results.     IMAGING  No new neuro-imaging to review.        IMPRESSION  Clinic time was spent discussing in detail the nature of this tumor in light of her current treatment plan. This is in addition to providing emotional support, answering questions pertaining to my recommendations, and devising the treatment plan as outlined below.     Clinically, Barbi is doing well with no new subjective neurological complaints or increase in seizure frequency. Continued ongoing fatigue that has been improving. She has returned to work on a part-time basis and this is going well. The first cycle of adjuvant temozolomide went well and she is wanting to dose increase to 200mg/m2 for cycle 2.     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   -Supportive medications; Compazine + Ativan and bowel regimen.   -Repeat 28 day cycle if WBC >= 3, ANC >= 1.5, HgB >= 10, and platelets >= 100.  -Surveillance labs reviewed, at goal for chemotherapy.  -Will repeat CBC in 2 and 4 weeks at [facility] and repeat CMP every 4 weeks.  -Repeat imaging in September, prior to cycle 4.     -SEIZURE MANAGEMENT-  -Follows with Dr. Flakita Clark; Neurology, epilepsy specialist at the [place].      -Quality of life/ MOOD/ FATIGUE-  -History of mild anxiety.   -Continue to monitor mood as untreated/ undertreated depression can worsen fatigue, dysorexia, and quality of life.  -Issues with sleep, on gabapentin.     -COGNITIVE CHANGES-  -Neuro-psych testing completed with  mild deficits notes. Repeat testing recommended in 2/2021.    Return to clinic virtually at the [facility] on 8/24.     In the meantime, Barbi knows to call with questions or concerns or to report new complaints and can be seen sooner if needed.    Myrtle Gallagher MD  Neuro-oncology      Again, thank you for allowing me to participate in the care of your patient.        Sincerely,        Myrtle Gallagher MD

## 2020-07-28 NOTE — LETTER
"    7/28/2020         RE: Barbi Tiwari  [address] [facility] [address]        Dear Colleague,    Thank you for referring your patient, Barbi Tiwari, to the [facility]. Please see a copy of my visit note below.    Barbi Tiwari is a 59 year old female who is being evaluated via a billable video visit.      The patient has been notified of following:     \"This video visit will be conducted via a call between you and your physician/provider. We have found that certain health care needs can be provided without the need for an in-person physical exam.  This service lets us provide the care you need with a video conversation.  If a prescription is necessary we can send it directly to your pharmacy.  If lab work is needed we can place an order for that and you can then stop by our lab to have the test done at a later time.    Video visits are billed at different rates depending on your insurance coverage.  Please reach out to your insurance provider with any questions.    If during the course of the call the physician/provider feels a video visit is not appropriate, you will not be charged for this service.\"    Patient has given verbal consent for Video visit? Yes  How would you like to obtain your AVS? MyChart  If you are dropped from the video visit, the video invite should be resent to: Text to cell phone: [phone]  Will anyone else be joining your video visit? No  Medication Refill ATIVAN      Video-Visit Details  Type of service:  Video Visit    Video Start Time: 09:03 AM  Video End Time: 9:22 AM    Originating Location (pt. Location): Home    Distant Location (provider location):  [facility]     Platform used for Video Visit: Prosper Gallagher MD    _________________________________________    NEURO-ONCOLOGY VISIT  Jul 28, 2020    CHIEF COMPLAINT: Ms. 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 She received 12 cycles of temozolomide, completed in 5/2012. Changes on imaging necessitated a repeat resection on 5/30/2018 and pathology was unchanged. No adjuvant cancer-directed therapy was initiated. Imaging over the next 1.5 years showed tumor progression and treatment was recommended. She completed chemoradiotherapy on 5/30/2020. Barbi is currently managed on adjuvant-dosed temozolomide.     I met with Barbi and Kenneth () for this follow-up visit.      HISTORY OF PRESENT ILLNESS  -Overall, tolerating chemotherapy well. Nausea is well controlled on supportive medications with Compazine and Ativan, denies issues with constipation on current bowel regimen.  -Discussed a dose increase for cycle 2 and Barbi is wanting to do this.   -She denies any new symptoms; no weakness, numbness, changes in vision, or headaches.   -No change in seizure frequency.   -Better energy.  -Continued mild word-finding issues, otherwise no change in her cognitive function.   -Went back to work on Friday in a part-time capacity. It was tiring, but she did well.       -Mood is positive.     REVIEW OF SYSTEMS  A comprehensive ROS negative except as in HPI.      MEDICATIONS   Current Outpatient Medications   Medication     acetaminophen (TYLENOL) 500 MG tablet     ascorbic acid 500 MG TABS     calcium carbonate (TUMS) 500 MG chewable tablet     ciclopirox (PENLAC) 8 % external solution     COMPOUNDED NON-CONTROLLED SUBSTANCE (CMPD RX) - PHARMACY TO MIX COMPOUNDED MEDICATION     fexofenadine (ALLEGRA) 180 MG tablet     gabapentin (NEURONTIN) 100 MG capsule     ibuprofen (ADVIL/MOTRIN) 200 MG tablet     Lactase (LACTAID PO)     lamoTRIgine (LAMICTAL) 100 MG tablet     lamoTRIgine (LAMICTAL) 200 MG tablet     levETIRAcetam (KEPPRA) 500 MG tablet     LORazepam (ATIVAN) 0.5 MG tablet     metoprolol tartrate (LOPRESSOR) 25 MG tablet     Multiple Vitamin (MULTI-VITAMINS) TABS      prochlorperazine (COMPAZINE) 10 MG tablet     tretinoin (RETIN-A) 0.025 % external cream     VITAMIN D, CHOLECALCIFEROL, PO     Current Facility-Administered Medications   Medication     lidocaine 1 % injection 4 mL     lidocaine 1 % injection 4 mL     triamcinolone (KENALOG-40) injection 40 mg     triamcinolone (KENALOG-40) injection 40 mg     DRUG ALLERGIES   Allergies   Allergen Reactions     Sulfa Drugs Hives     Benoxinate Nausea and Vomiting       ONCOLOGIC HISTORY  -4/27/2011 PRESENTATION: New onset seizure, generalized event.   -5/2011 MRB with a non-contrast enhancing right frontal mass lesion.   -5/2011 SURGERY: Craniectomy for mass resection at [facility].   PATHOLOGY: Diffuse oligodendroglioma; WHO grade II, 1p/19q co-deleted.  -6/2011-5/2012 CHEMO: Adjuvant dosed temozolomide x 12 cycles.  -4/2/2018 MRB with possible disease recurrence with a new 1.2 cm non-enhancing nodule within the cortex of the right frontal lobe adjacent to the resection cavity.  -4/12/2018 NEURO-ONC: Recommending repeat resection, appointment scheduled with Dr. Dustin Rodriguez, neurosurgery at the [facility].   -5/30/2018 SURGERY: Repeat resection with Dr. Rodriguez.   PATHOLOGY: Remains low grade, without concerning features.   -6/15/2018 NEURO-ONC: Start imaging surveillance.  -9/17/2018 NEURO-ONC/ MRB: Imaging stable, continue with surveillance.   -12/10/2018 NEURO-ONC/ MRB: Imaging stable, continue with surveillance.   -4/15/2019 NEURO-ONC/ MRB: Imaging stable, continue with surveillance.  -8/19/2019 NEURO-ONC/ MRB: Clinically stable. Imaging largely stable, subtle increases on T2 FLAIR; continue with surveillance.  -12/16/2019 NEURO-ONC/ MRB: Clinically stable. Imaging with increased T2 FLAIR medially and laterally about the resection cavity. Referral to Dr. Figueroa with radiation oncology. Discussion with Dr. Rodriguez. Referral for repeat neuro-psychology testing.   -1/10/2020 Evaluated by Dr. Devan Figueroa.   -1/17/2020 NEURO-ONC: Tentative plan to  initiate chemoradiotherapy in May-June 2020.   -2/17/2020 NEURO-PSYCH; Mild weaknesses were noted in aspects of verbal and nonverbal working memory, nonverbal recall, some aspects of executive functioning, and bilateral psychomotor speed. The remainder of her cognitive abilities were intact and performed in keeping with her above average range cognitive baseline.   -4/20 - 5/30/2020 CHEMORADS: 54 Gy in 30 fractions with concurrent temozolomide 75mg/m2 (140mg).   -5/4/2020 NEURO-ONC: Continue treatment as planned. Prescribing Clotrimazole lozenges for oral thrush.   -6/1/2020 NEURO-ONC: Completed treatment as planned.  -6/30/2020 NEURO-ONC/ MRB/ CHEMO: Clinically stable. Imaging with positive treatment effect. Starting adjuvant temozolomide 150mg/m2 (250mg), cycle 1 (start date was 7/1).   -7/28/2020 NEURO-ONC/ CHEMO: Clinically stable. Adjuvant temozolomide 200mg/m2 (320mg), cycle 2 (start date was 7/29).     SOCIAL HISTORY   Tobacco use: Never smoker.   Alcohol use: Social.   Drug use: Denies marijuana use.  Supplement, complimentary/ alternative medicine: None.   Employment: RN.   , 2 children      PHYSICAL EXAMINATION  -Generally well appearing.  -Respiratory: No audible wheezing.   -Skin: No facial rashes.  -Psychiatric: Normal mood and affect. Pleasant, talkative.  -Neurologic:   MENTAL STATUS:     Alert, oriented to date.    Recall: Intact.    Speech fluent.    Comprehension intact.     CRANIAL NERVES:     Pupils are equal, round, reactive to light.     Extraocular movements full, patient denies diplopia.     Equal activation with smiling/ talking on the left side of face.    Hearing intact.   With normal phonation, no dysfunction of the palate or tongue.   MOTOR: Antigravity in arms.  COORDINATION: Intact with using the computer/ phone.   SENSATION: Intact to light touch.     The rest of a comprehensive physical examination is deferred due to PHE (public health emergency) video visit restrictions.         MEDICAL RECORDS  Obtained and personally reviewed all available outside medical records in addition to reviewing any records available in our electronic system.     LABS  Personally reviewed all available lab results.     IMAGING  No new neuro-imaging to review.        IMPRESSION  Clinic time was spent discussing in detail the nature of this tumor in light of her current treatment plan. This is in addition to providing emotional support, answering questions pertaining to my recommendations, and devising the treatment plan as outlined below.     Clinically, Barbi is doing well with no new subjective neurological complaints or increase in seizure frequency. Continued ongoing fatigue that has been improving. She has returned to work on a part-time basis and this is going well. The first cycle of adjuvant temozolomide went well and she is wanting to dose increase to 200mg/m2 for cycle 2.     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   -Supportive medications; Compazine + Ativan and bowel regimen.   -Repeat 28 day cycle if WBC >= 3, ANC >= 1.5, HgB >= 10, and platelets >= 100.  -Surveillance labs reviewed, at goal for chemotherapy.  -Will repeat CBC in 2 and 4 weeks at [facility] and repeat CMP every 4 weeks.  -Repeat imaging in September, prior to cycle 4.     -SEIZURE MANAGEMENT-  -Follows with Dr. Flakita Clark; Neurology, epilepsy specialist at the [facility].      -Quality of life/ MOOD/ FATIGUE-  -History of mild anxiety.   -Continue to monitor mood as untreated/ undertreated depression can worsen fatigue, dysorexia, and quality of life.  -Issues with sleep, on gabapentin.     -COGNITIVE CHANGES-  -Neuro-psych testing completed with  mild deficits notes. Repeat testing recommended in 2/2021.    Return to clinic virtually at the [facility] on 8/24.     In the meantime, Barbi knows to call with questions or concerns or to report new complaints and can be seen sooner if needed.    Myrtle Gallagher MD  Neuro-oncology      Again, thank you for allowing me to participate in the care of your patient.        Sincerely,        Myrtle Gallagher MD

## 2020-08-05 ENCOUNTER — TELEPHONE (OUTPATIENT)
Dept: ONCOLOGY | Facility: CLINIC | Age: 60
End: 2020-08-05

## 2020-08-05 NOTE — ORAL ONC MGMT
Oral Chemotherapy Monitoring Program    Primary Oncologist: Dr. Gallagher  Primary Oncology Clinic: [facility]  Cancer Diagnosis: Low grade giloma     Therapy History:  C1D1: 7/1/2020  Adjuvant Temodar 250mg daily on days 1-5 of 28 day cycle  7/29/20: increase dose for C2 to temozolomide 320 mg daily on days 1-5 of 28 day cycle    Drug Interaction Assessment: no new drug interactions    Lab Monitoring Plan  CMP/CBC q2w  Subjective/Objective:  Barbi Tiwari is a 59 year old female contacted by phone for a follow-up visit for oral chemotherapy. Barbi began her last cycle of temozolomide on 7/29. She states that it did not go well and that she had significant nausea and vomiting. She took 320 mg of temozolomide on days 1-5 of this cycle. She was feeling better by today 8/5; however, we discussed the possibility of adding a different antiemetic to her next cycle. She has been using prochlorperazine and lorazepam for nausea (is not using ondansetron because this caused headaches in the past). A message was sent to Dr. Gallagher regarding adding a different antiemetic next month. No other recent medication changes or missed doses of temozolomide. Next cycle to start 8/26. Labs scheduled for 8/10.    ORAL CHEMOTHERAPY 4/10/2020 4/30/2020 6/30/2020 7/8/2020   Drug Name Temodar (Temozolomide) Temodar (Temozolomide) Temodar (Temozolomide) Temodar (Temozolomide)   Current Dosage 140mg 140mg 250mg 250mg   Current Schedule Daily Daily QHS QHS   Cycle Details Continuous for 42 days during XRT Continuous for 42 days during XRT 5 days on, then 23 days off 5 days on, then 23 days off   Start Date of Last Cycle 4/19/2020 4/19/2020 - 7/1/2020   Planned next cycle start date - - - 7/29/2020   Doses missed in last 2 weeks - 0 - 0   Adherence Assessment - Adherent - Adherent   Adverse Effects - Nausea;Fatigue;Other (see note for details) - Fatigue   Nausea - (No Data) - -   Fatigue - - - Grade 1   Other (see note for details) - (No  "Data) - -   Any new drug interactions? No No No -   Is the dose as ordered appropriate for the patient? Yes Yes Yes -       Last PHQ-2 Score on record:   PHQ-2 ( 1999 [employer]) 2/24/2020 10/31/2019   Q1: Little interest or pleasure in doing things 0 0   Q2: Feeling down, depressed or hopeless 0 0   PHQ-2 Score 0 0           Vitals:  BP:   BP Readings from Last 1 Encounters:   02/24/20 105/59     Wt Readings from Last 1 Encounters:   07/28/20 63.5 kg (140 lb)     Estimated body surface area is 1.67 meters squared as calculated from the following:    Height as of 1/27/20: 1.575 m (5' 2\").    Weight as of 7/28/20: 63.5 kg (140 lb).    Labs:  _  Result Component Current Result Ref Range   Sodium 141 (7/27/2020) 133 - 144 mmol/L     _  Result Component Current Result Ref Range   Potassium 3.9 (7/27/2020) 3.4 - 5.3 mmol/L     _  Result Component Current Result Ref Range   Calcium 8.9 (7/27/2020) 8.5 - 10.1 mg/dL     No results found for Mag within last 30 days.     No results found for Phos within last 30 days.     _  Result Component Current Result Ref Range   Albumin 3.6 (7/27/2020) 3.4 - 5.0 g/dL     _  Result Component Current Result Ref Range   Urea Nitrogen 22 (7/27/2020) 7 - 30 mg/dL     _  Result Component Current Result Ref Range   Creatinine 0.74 (7/27/2020) 0.52 - 1.04 mg/dL       _  Result Component Current Result Ref Range   AST 11 (7/27/2020) 0 - 45 U/L     _  Result Component Current Result Ref Range   ALT 20 (7/27/2020) 0 - 50 U/L     _  Result Component Current Result Ref Range   Bilirubin Total 0.2 (7/27/2020) 0.2 - 1.3 mg/dL       _  Result Component Current Result Ref Range   WBC 3.6 (L) (7/27/2020) 4.0 - 11.0 10e9/L     _  Result Component Current Result Ref Range   Hemoglobin 14.1 (7/27/2020) 11.7 - 15.7 g/dL     _  Result Component Current Result Ref Range   Platelet Count 131 (L) (7/27/2020) 150 - 450 10e9/L     _  Result Component Current Result Ref Range   Absolute Neutrophil 2.3 (7/27/2020) 1.6 - " 8.3 10e9/L       Assessment:  Barbi started her last cycle of temozolomide on 7/29 and experienced significant nausea and vomiting    Plan:  Next cycle planned for 8/26. Have reached out to Dr. Gallagher to discuss alternative antiemetics for this upcoming cycle.    Follow-Up:  8/10: labs  8/25: labs and Dr. Gallagher appt    Refill Due:  Next cycle starts 8/26    Catherine Jack PharmD, BCOP  Hematology/Oncology Clinical Pharmacist  [facility]  [facility]  [phone]

## 2020-08-05 NOTE — ORAL ONC MGMT
Oral Chemotherapy Monitoring Program     Placed call to patient in follow up of oral chemotherapy. Left message requesting call back. No drug names were mentioned. Will update when response received.     Catherine Jack, PharmD, BCOP  Hematology/Oncology Clinical Pharmacist  [facility]  [facility]

## 2020-08-12 ENCOUNTER — TELEPHONE (OUTPATIENT)
Dept: ONCOLOGY | Facility: CLINIC | Age: 60
End: 2020-08-12

## 2020-08-12 NOTE — TELEPHONE ENCOUNTER
Oral Chemotherapy Program- call to patient    Barbi missed her weekly labs appt on 8/10/2020 because she over slept.  I spoke to her  River and he stated that he will let Barbi know that she still has to go in for her labs this week..  She initially was going to delay until her lab appt on 8/25/2020.    Uziel Mckeon, PharmD, BCOP  August 12, 2020

## 2020-08-13 ENCOUNTER — THERAPY VISIT (OUTPATIENT)
Dept: PHYSICAL THERAPY | Facility: CLINIC | Age: 60
End: 2020-08-13
Payer: COMMERCIAL

## 2020-08-13 DIAGNOSIS — Z79.899 ENCOUNTER FOR LONG-TERM (CURRENT) USE OF MEDICATIONS: ICD-10-CM

## 2020-08-13 DIAGNOSIS — C71.9 OLIGODENDROGLIOMA (H): ICD-10-CM

## 2020-08-13 DIAGNOSIS — M25.562 CHRONIC PAIN OF LEFT KNEE: ICD-10-CM

## 2020-08-13 DIAGNOSIS — G89.29 CHRONIC PAIN OF LEFT KNEE: ICD-10-CM

## 2020-08-13 LAB
BASOPHILS # BLD AUTO: 0 10E9/L (ref 0–0.2)
BASOPHILS NFR BLD AUTO: 0.2 %
DIFFERENTIAL METHOD BLD: ABNORMAL
EOSINOPHIL # BLD AUTO: 0.1 10E9/L (ref 0–0.7)
EOSINOPHIL NFR BLD AUTO: 2.8 %
ERYTHROCYTE [DISTWIDTH] IN BLOOD BY AUTOMATED COUNT: 12.3 % (ref 10–15)
HCT VFR BLD AUTO: 43.3 % (ref 35–47)
HGB BLD-MCNC: 13.5 G/DL (ref 11.7–15.7)
LYMPHOCYTES # BLD AUTO: 0.8 10E9/L (ref 0.8–5.3)
LYMPHOCYTES NFR BLD AUTO: 18.5 %
MCH RBC QN AUTO: 31.1 PG (ref 26.5–33)
MCHC RBC AUTO-ENTMCNC: 31.2 G/DL (ref 31.5–36.5)
MCV RBC AUTO: 100 FL (ref 78–100)
MONOCYTES # BLD AUTO: 0.5 10E9/L (ref 0–1.3)
MONOCYTES NFR BLD AUTO: 11.3 %
NEUTROPHILS # BLD AUTO: 2.9 10E9/L (ref 1.6–8.3)
NEUTROPHILS NFR BLD AUTO: 67.2 %
PLATELET # BLD AUTO: 206 10E9/L (ref 150–450)
RBC # BLD AUTO: 4.34 10E12/L (ref 3.8–5.2)
WBC # BLD AUTO: 4.3 10E9/L (ref 4–11)

## 2020-08-13 PROCEDURE — 85025 COMPLETE CBC W/AUTO DIFF WBC: CPT | Performed by: PSYCHIATRY & NEUROLOGY

## 2020-08-13 PROCEDURE — 97140 MANUAL THERAPY 1/> REGIONS: CPT | Mod: GP | Performed by: PHYSICAL THERAPIST

## 2020-08-13 PROCEDURE — 36415 COLL VENOUS BLD VENIPUNCTURE: CPT | Performed by: PSYCHIATRY & NEUROLOGY

## 2020-08-13 PROCEDURE — 97110 THERAPEUTIC EXERCISES: CPT | Mod: GP | Performed by: PHYSICAL THERAPIST

## 2020-08-13 PROCEDURE — 80053 COMPREHEN METABOLIC PANEL: CPT | Performed by: PSYCHIATRY & NEUROLOGY

## 2020-08-15 LAB
ALBUMIN SERPL-MCNC: 3.8 G/DL (ref 3.4–5)
ALP SERPL-CCNC: 80 U/L (ref 40–150)
ALT SERPL W P-5'-P-CCNC: 22 U/L (ref 0–50)
ANION GAP SERPL CALCULATED.3IONS-SCNC: 7 MMOL/L (ref 3–14)
AST SERPL W P-5'-P-CCNC: 15 U/L (ref 0–45)
BILIRUB SERPL-MCNC: 0.2 MG/DL (ref 0.2–1.3)
BUN SERPL-MCNC: 21 MG/DL (ref 7–30)
CALCIUM SERPL-MCNC: 9.5 MG/DL (ref 8.5–10.1)
CHLORIDE SERPL-SCNC: 109 MMOL/L (ref 94–109)
CO2 SERPL-SCNC: 28 MMOL/L (ref 20–32)
CREAT SERPL-MCNC: 0.75 MG/DL (ref 0.52–1.04)
GFR SERPL CREATININE-BSD FRML MDRD: 88 ML/MIN/{1.73_M2}
GLUCOSE SERPL-MCNC: 104 MG/DL (ref 70–99)
POTASSIUM SERPL-SCNC: 4.4 MMOL/L (ref 3.4–5.3)
PROT SERPL-MCNC: 7.2 G/DL (ref 6.8–8.8)
SODIUM SERPL-SCNC: 144 MMOL/L (ref 133–144)

## 2020-08-25 ENCOUNTER — VIRTUAL VISIT (OUTPATIENT)
Dept: ONCOLOGY | Facility: CLINIC | Age: 60
End: 2020-08-25
Attending: PSYCHIATRY & NEUROLOGY
Payer: COMMERCIAL

## 2020-08-25 VITALS — WEIGHT: 140 LBS | BODY MASS INDEX: 25.61 KG/M2

## 2020-08-25 DIAGNOSIS — Z79.899 ENCOUNTER FOR LONG-TERM (CURRENT) USE OF MEDICATIONS: ICD-10-CM

## 2020-08-25 DIAGNOSIS — C71.9 OLIGODENDROGLIOMA (H): Primary | ICD-10-CM

## 2020-08-25 DIAGNOSIS — T45.1X5A CHEMOTHERAPY-INDUCED NAUSEA AND VOMITING: ICD-10-CM

## 2020-08-25 DIAGNOSIS — C71.9 OLIGODENDROGLIOMA (H): ICD-10-CM

## 2020-08-25 DIAGNOSIS — R11.2 CHEMOTHERAPY-INDUCED NAUSEA AND VOMITING: ICD-10-CM

## 2020-08-25 LAB
ALBUMIN SERPL-MCNC: 3.9 G/DL (ref 3.4–5)
ALP SERPL-CCNC: 79 U/L (ref 40–150)
ALT SERPL W P-5'-P-CCNC: 19 U/L (ref 0–50)
ANION GAP SERPL CALCULATED.3IONS-SCNC: <1 MMOL/L (ref 3–14)
AST SERPL W P-5'-P-CCNC: 15 U/L (ref 0–45)
BASOPHILS # BLD AUTO: 0 10E9/L (ref 0–0.2)
BASOPHILS NFR BLD AUTO: 0.3 %
BILIRUB SERPL-MCNC: 0.3 MG/DL (ref 0.2–1.3)
BUN SERPL-MCNC: 23 MG/DL (ref 7–30)
CALCIUM SERPL-MCNC: 9.4 MG/DL (ref 8.5–10.1)
CHLORIDE SERPL-SCNC: 107 MMOL/L (ref 94–109)
CO2 SERPL-SCNC: 32 MMOL/L (ref 20–32)
CREAT SERPL-MCNC: 0.7 MG/DL (ref 0.52–1.04)
DIFFERENTIAL METHOD BLD: ABNORMAL
EOSINOPHIL # BLD AUTO: 0.1 10E9/L (ref 0–0.7)
EOSINOPHIL NFR BLD AUTO: 1.4 %
ERYTHROCYTE [DISTWIDTH] IN BLOOD BY AUTOMATED COUNT: 12.1 % (ref 10–15)
GFR SERPL CREATININE-BSD FRML MDRD: >90 ML/MIN/{1.73_M2}
GLUCOSE SERPL-MCNC: 97 MG/DL (ref 70–99)
HCT VFR BLD AUTO: 42.3 % (ref 35–47)
HGB BLD-MCNC: 13.4 G/DL (ref 11.7–15.7)
LYMPHOCYTES # BLD AUTO: 0.8 10E9/L (ref 0.8–5.3)
LYMPHOCYTES NFR BLD AUTO: 23.1 %
MCH RBC QN AUTO: 30.9 PG (ref 26.5–33)
MCHC RBC AUTO-ENTMCNC: 31.7 G/DL (ref 31.5–36.5)
MCV RBC AUTO: 98 FL (ref 78–100)
MONOCYTES # BLD AUTO: 0.4 10E9/L (ref 0–1.3)
MONOCYTES NFR BLD AUTO: 11.5 %
NEUTROPHILS # BLD AUTO: 2.2 10E9/L (ref 1.6–8.3)
NEUTROPHILS NFR BLD AUTO: 63.7 %
PLATELET # BLD AUTO: 101 10E9/L (ref 150–450)
POTASSIUM SERPL-SCNC: 3.9 MMOL/L (ref 3.4–5.3)
PROT SERPL-MCNC: 6.9 G/DL (ref 6.8–8.8)
RBC # BLD AUTO: 4.33 10E12/L (ref 3.8–5.2)
SODIUM SERPL-SCNC: 139 MMOL/L (ref 133–144)
WBC # BLD AUTO: 3.5 10E9/L (ref 4–11)

## 2020-08-25 PROCEDURE — 85025 COMPLETE CBC W/AUTO DIFF WBC: CPT | Performed by: PSYCHIATRY & NEUROLOGY

## 2020-08-25 PROCEDURE — 99215 OFFICE O/P EST HI 40 MIN: CPT | Mod: GT | Performed by: PSYCHIATRY & NEUROLOGY

## 2020-08-25 PROCEDURE — 36415 COLL VENOUS BLD VENIPUNCTURE: CPT | Performed by: PSYCHIATRY & NEUROLOGY

## 2020-08-25 PROCEDURE — 40001009 ZZH VIDEO/TELEPHONE VISIT; NO CHARGE

## 2020-08-25 PROCEDURE — 80053 COMPREHEN METABOLIC PANEL: CPT | Performed by: PSYCHIATRY & NEUROLOGY

## 2020-08-25 RX ORDER — TEMOZOLOMIDE 250 MG/1
150 CAPSULE ORAL DAILY
Qty: 5 CAPSULE | Refills: 0 | Status: SHIPPED | OUTPATIENT
Start: 2020-08-25 | End: 2020-09-20

## 2020-08-25 RX ORDER — APREPITANT 125MG-80MG
KIT ORAL
Qty: 1 EACH | Refills: 3 | Status: SHIPPED | OUTPATIENT
Start: 2020-08-25 | End: 2020-09-22

## 2020-08-25 ASSESSMENT — PAIN SCALES - GENERAL: PAINLEVEL: NO PAIN (0)

## 2020-08-25 NOTE — PATIENT INSTRUCTIONS
Adjuvant-dosed chemotherapy with temozolomide (cycle 3)  -5 consectutive nights (In a 28 day cycle; 5 days on treatment and 23 days off.)  -Dose: 250 mg  -Start date: 8/26  -Take at bedtime on an empty stomach  -Take 30 minutes after Compazine + Ativan dosing. Ordering Emend tri-pack.   -Continue bowel regimen    -Labs from today at [facility].   -Labs the week of 9/7 at [facility].    Repeat imaging on September 9/22 + labs at [facility]. Then see me, virtually, later that day.     Myrtle Gallagher MD  Neuro-oncology  08/25/20

## 2020-08-25 NOTE — PROGRESS NOTES
"Barbi Tiwari is a 59 year old female who is being evaluated via a billable video visit.      The patient has been notified of following:     \"This video visit will be conducted via a call between you and your physician/provider. We have found that certain health care needs can be provided without the need for an in-person physical exam.  This service lets us provide the care you need with a video conversation.  If a prescription is necessary we can send it directly to your pharmacy.  If lab work is needed we can place an order for that and you can then stop by our lab to have the test done at a later time.    Video visits are billed at different rates depending on your insurance coverage.  Please reach out to your insurance provider with any questions.    If during the course of the call the physician/provider feels a video visit is not appropriate, you will not be charged for this service.\"    Patient has given verbal consent for Video visit? Yes  How would you like to obtain your AVS? MyChart  If you are dropped from the video visit, the video invite should be resent to: Send to e-mail at: [email or web address]@"[email or web address]  Will anyone else be joining your video visit? Yes:  Kenneth. How would they like to receive their invitation? Send to e-mail at: [email or web address]@"[email or web address]      Video-Visit Details  Type of service:  Video Visit    Video Start Time: 3:51 PM  Video End Time: 4:21 PM    Originating Location (pt. Location): Home    Distant Location (provider location):  [facility]     Platform used for Video Visit: Prosper Gallagher MD    _________________________________________    NEURO-ONCOLOGY VISIT  Aug 25, 2020    CHIEF COMPLAINT: Ms. Barbi Tiwari is a 59 year old right-handed woman with a right frontal diffuse oligodendroglioma (1p19q co-deleted), diagnosed following resection in 5/2011. She received 12 cycles of temozolomide, completed in 5/2012. Changes on imaging necessitated a " repeat resection on 5/30/2018 and pathology was unchanged. No adjuvant cancer-directed therapy was initiated. Imaging over the next 1.5 years showed tumor progression and treatment was recommended. She completed chemoradiotherapy on 5/30/2020. Barbi is currently managed on adjuvant-dosed temozolomide. Dose escalation to 200mg/m2 was complicated by intolerable nausea.     I met with Barbi and Kenneth () for this follow-up visit.      HISTORY OF PRESENT ILLNESS  -Most recent cycle of temozolomide went very poorly. Chemotherapy dosing has been complicated by nausea and vomiting. The last cycle was dose increased and she was taking Compazine and Ativan. Requesting the addition of Emend. Denies issues with constipation on current bowel regimen.  -Discussed a dose reduction for cycle 3 and Barbi is wanting to do this.   -She denies any new symptoms; no weakness, numbness, changes in vision, or headaches.   -Balance is off; chronic and stable.  -No change in seizure frequency.   -Low energy. Not able to exercise.   -Continued mild word-finding issues, otherwise no change in her cognitive function.   -Working part-time she going well, just tiring.       -Mood was more down with the issues experienced during this last cycle. Tearful in our visit today when recounting her experience.     REVIEW OF SYSTEMS  A comprehensive ROS negative except as in HPI.      MEDICATIONS   Current Outpatient Medications   Medication     acetaminophen (TYLENOL) 500 MG tablet     aprepitant (EMEND TRI-PACK) 80 & 125 MG MISC     ascorbic acid 500 MG TABS     calcium carbonate (TUMS) 500 MG chewable tablet     ciclopirox (PENLAC) 8 % external solution     COMPOUNDED NON-CONTROLLED SUBSTANCE (CMPD RX) - PHARMACY TO MIX COMPOUNDED MEDICATION     fexofenadine (ALLEGRA) 180 MG tablet     gabapentin (NEURONTIN) 100 MG capsule     ibuprofen (ADVIL/MOTRIN) 200 MG tablet     Lactase (LACTAID PO)     lamoTRIgine (LAMICTAL) 100 MG tablet     lamoTRIgine  (LAMICTAL) 200 MG tablet     levETIRAcetam (KEPPRA) 500 MG tablet     LORazepam (ATIVAN) 0.5 MG tablet     metoprolol tartrate (LOPRESSOR) 25 MG tablet     Multiple Vitamin (MULTI-VITAMINS) TABS     prochlorperazine (COMPAZINE) 10 MG tablet     tretinoin (RETIN-A) 0.025 % external cream     VITAMIN D, CHOLECALCIFEROL, PO     temozolomide (TEMODAR) 250 MG capsule     Current Facility-Administered Medications   Medication     lidocaine 1 % injection 4 mL     lidocaine 1 % injection 4 mL     triamcinolone (KENALOG-40) injection 40 mg     triamcinolone (KENALOG-40) injection 40 mg     DRUG ALLERGIES   Allergies   Allergen Reactions     Sulfa Drugs Hives     Benoxinate Nausea and Vomiting       ONCOLOGIC HISTORY  -4/27/2011 PRESENTATION: New onset seizure, generalized event.   -5/2011 MRB with a non-contrast enhancing right frontal mass lesion.   -5/2011 SURGERY: Craniectomy for mass resection at [facility].   PATHOLOGY: Diffuse oligodendroglioma; WHO grade II, 1p/19q co-deleted.  -6/2011-5/2012 CHEMO: Adjuvant dosed temozolomide x 12 cycles.  -4/2/2018 MRB with possible disease recurrence with a new 1.2 cm non-enhancing nodule within the cortex of the right frontal lobe adjacent to the resection cavity.  -4/12/2018 NEURO-ONC: Recommending repeat resection, appointment scheduled with Dr. Dustin Rodriguez, neurosurgery at the [facility].   -5/30/2018 SURGERY: Repeat resection with Dr. Rodriguez.   PATHOLOGY: Remains low grade, without concerning features.   -6/15/2018 NEURO-ONC: Start imaging surveillance.  -9/17/2018 NEURO-ONC/ MRB: Imaging stable, continue with surveillance.   -12/10/2018 NEURO-ONC/ MRB: Imaging stable, continue with surveillance.   -4/15/2019 NEURO-ONC/ MRB: Imaging stable, continue with surveillance.  -8/19/2019 NEURO-ONC/ MRB: Clinically stable. Imaging largely stable, subtle increases on T2 FLAIR; continue with surveillance.  -12/16/2019 NEURO-ONC/ MRB: Clinically stable. Imaging with increased T2 FLAIR medially and  laterally about the resection cavity. Referral to Dr. Figueroa with radiation oncology. Discussion with Dr. Rodriguez. Referral for repeat neuro-psychology testing.   -1/10/2020 Evaluated by Dr. Devan Figueroa.   -1/17/2020 NEURO-ONC: Tentative plan to initiate chemoradiotherapy in May-June 2020.   -2/17/2020 NEURO-PSYCH; Mild weaknesses were noted in aspects of verbal and nonverbal working memory, nonverbal recall, some aspects of executive functioning, and bilateral psychomotor speed. The remainder of her cognitive abilities were intact and performed in keeping with her above average range cognitive baseline.   -4/20 - 5/30/2020 CHEMORADS: 54 Gy in 30 fractions with concurrent temozolomide 75mg/m2 (140mg).   -5/4/2020 NEURO-ONC: Continue treatment as planned. Prescribing Clotrimazole lozenges for oral thrush.   -6/1/2020 NEURO-ONC: Completed treatment as planned.  -6/30/2020 NEURO-ONC/ MRB/ CHEMO: Clinically stable. Imaging with positive treatment effect. Starting adjuvant temozolomide 150mg/m2 (250mg), cycle 1 (start date was 7/1).   -7/28/2020 NEURO-ONC/ CHEMO: Clinically stable. Adjuvant temozolomide 200mg/m2 (320mg), cycle 2 (start date was 7/29).   -8/25/2020 NEURO-ONC/ CHEMO: Clinically stable; significant nausea/ vomiting with 200mg/m2 dosing. Adding Emend for this next cycle. Adjuvant temozolomide 150mg/m2 (250mg), cycle 3 (start date of 8/26).     SOCIAL HISTORY   Tobacco use: Never smoker.   Alcohol use: Social.   Drug use: Denies marijuana use.  Supplement, complimentary/ alternative medicine: None.   Employment: RN.   , 2 children      PHYSICAL EXAMINATION  -Generally well appearing.  -Respiratory: No audible wheezing.   -Skin: No facial rashes.  -Psychiatric: Normal mood and affect. Pleasant, talkative.  -Neurologic:   MENTAL STATUS:     Alert, oriented to date.    Recall: Intact.    Speech fluent.    Comprehension intact.     CRANIAL NERVES:     Pupils are equal, round, reactive to light.     Extraocular  movements full, patient denies diplopia.     Equal activation with smiling/ talking on the left side of face.    Hearing intact.   With normal phonation, no dysfunction of the palate or tongue.   MOTOR: Antigravity in arms.  COORDINATION: Intact with using the computer/ phone.   SENSATION: Intact to light touch.     The rest of a comprehensive physical examination is deferred due to PHE (public health emergency) video visit restrictions.        MEDICAL RECORDS  Obtained and personally reviewed all available outside medical records in addition to reviewing any records available in our electronic system.     LABS  Personally reviewed all available lab results.     IMAGING  No new neuro-imaging to review.        IMPRESSION  Clinic time was spent discussing in detail the nature of this tumor in light of her current treatment plan. This is in addition to providing emotional support, answering questions pertaining to my recommendations, and devising the treatment plan as outlined below.     Clinically, Barbi is doing fairly well with no new subjective neurological complaints or increase in seizure frequency, however, the last cycle of chemotherapy was very poorly tolerated due to much nausea and vomiting. Also experiencing continued ongoing fatigue and all of this is leading to depression and a decreased quality of life. As a result, will dose reduce back to 150mg/m2 for cycle 3. Will add on Emend tri-pack for improved control of nausea. Will repeat imaging next month.     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   -Did not tolerate 200mg/m2 dosing.   -Supportive medications; Compazine + Ativan + Emend tri-pack and bowel regimen.     Dexamethasone or medical marijuana can be alternative options and/ or since Emend is only D1-3, there is a potential of breakthrough nausea on D4-5.  -Repeat 28 day cycle if WBC >= 3, ANC >= 1.5, HgB >= 10, and platelets >= 100.  -Surveillance labs reviewed, at goal for chemotherapy.  -Will repeat CBC in 2 (at [facility]) and 4 weeks and repeat CMP every 4 weeks.  -Repeat imaging next month.     -SEIZURE MANAGEMENT-  -Follows with Dr. Flakita Clark; Neurology, epilepsy specialist at the [facility].      -Quality of life/ MOOD/ FATIGUE-  -History of mild anxiety and depression.   -Continue to monitor mood as untreated/ undertreated depression can worsen fatigue, dysorexia, and quality of life.  -Will need to readdress at next visit, particularly if nausea is under better control.   -Issues with sleep, on gabapentin.     -COGNITIVE CHANGES-  -Neuro-psych testing completed with mild deficits notes. Repeat testing recommended in 2/2021.    Return to clinic virtually on 9/22 with repeat imaging + labs earlier that same day.     In the meantime, Barbi knows to call with questions or concerns or to report new complaints and can be seen sooner if needed.    Myrtle Gallagher MD  Neuro-oncology

## 2020-08-25 NOTE — LETTER
"    8/25/2020         RE: Barbi Tiwari  [address] [place] [facility] [place] [address]        Dear Colleague,    Thank you for referring your patient, Barbi Tiwari, to the [facility]. Please see a copy of my visit note below.    Barbi Tiwari is a 59 year old female who is being evaluated via a billable video visit.      The patient has been notified of following:     \"This video visit will be conducted via a call between you and your physician/provider. We have found that certain health care needs can be provided without the need for an in-person physical exam.  This service lets us provide the care you need with a video conversation.  If a prescription is necessary we can send it directly to your pharmacy.  If lab work is needed we can place an order for that and you can then stop by our lab to have the test done at a later time.    Video visits are billed at different rates depending on your insurance coverage.  Please reach out to your insurance provider with any questions.    If during the course of the call the physician/provider feels a video visit is not appropriate, you will not be charged for this service.\"    Patient has given verbal consent for Video visit? Yes  How would you like to obtain your AVS? MyChart  If you are dropped from the video visit, the video invite should be resent to: Send to e-mail at: [email or web address]  Will anyone else be joining your video visit? Yes:  Kenneth. How would they like to receive their invitation? Send to e-mail at: [email or web address]      Video-Visit Details  Type of service:  Video Visit    Video Start Time: 3:51 PM  Video End Time: 4:21 PM    Originating Location (pt. Location): Home    Distant Location (provider location):  [facility]     Platform used for Video Visit: Prosper Gallagher MD    _________________________________________    NEURO-ONCOLOGY VISIT  Aug 25, 2020    CHIEF COMPLAINT: Ms. Barbi Tiwari " is a 59 year old right-handed woman with a right frontal diffuse oligodendroglioma (1p19q co-deleted), diagnosed following resection in 5/2011. She received 12 cycles of temozolomide, completed in 5/2012. Changes on imaging necessitated a repeat resection on 5/30/2018 and pathology was unchanged. No adjuvant cancer-directed therapy was initiated. Imaging over the next 1.5 years showed tumor progression and treatment was recommended. She completed chemoradiotherapy on 5/30/2020. Barbi is currently managed on adjuvant-dosed temozolomide. Dose escalation to 200mg/m2 was complicated by intolerable nausea.     I met with Barbi and Kenneth () for this follow-up visit.      HISTORY OF PRESENT ILLNESS  -Most recent cycle of temozolomide went very poorly. Chemotherapy dosing has been complicated by nausea and vomiting. The last cycle was dose increased and she was taking Compazine and Ativan. Requesting the addition of Emend. Denies issues with constipation on current bowel regimen.  -Discussed a dose reduction for cycle 3 and Barbi is wanting to do this.   -She denies any new symptoms; no weakness, numbness, changes in vision, or headaches.   -Balance is off; chronic and stable.  -No change in seizure frequency.   -Low energy. Not able to exercise.   -Continued mild word-finding issues, otherwise no change in her cognitive function.   -Working part-time she going well, just tiring.       -Mood was more down with the issues experienced during this last cycle. Tearful in our visit today when recounting her experience.     REVIEW OF SYSTEMS  A comprehensive ROS negative except as in HPI.      MEDICATIONS   Current Outpatient Medications   Medication     acetaminophen (TYLENOL) 500 MG tablet     aprepitant (EMEND TRI-PACK) 80 & 125 MG MISC     ascorbic acid 500 MG TABS     calcium carbonate (TUMS) 500 MG chewable tablet     ciclopirox (PENLAC) 8 % external solution     COMPOUNDED NON-CONTROLLED SUBSTANCE (CMPD RX) -  PHARMACY TO MIX COMPOUNDED MEDICATION     fexofenadine (ALLEGRA) 180 MG tablet     gabapentin (NEURONTIN) 100 MG capsule     ibuprofen (ADVIL/MOTRIN) 200 MG tablet     Lactase (LACTAID PO)     lamoTRIgine (LAMICTAL) 100 MG tablet     lamoTRIgine (LAMICTAL) 200 MG tablet     levETIRAcetam (KEPPRA) 500 MG tablet     LORazepam (ATIVAN) 0.5 MG tablet     metoprolol tartrate (LOPRESSOR) 25 MG tablet     Multiple Vitamin (MULTI-VITAMINS) TABS     prochlorperazine (COMPAZINE) 10 MG tablet     tretinoin (RETIN-A) 0.025 % external cream     VITAMIN D, CHOLECALCIFEROL, PO     temozolomide (TEMODAR) 250 MG capsule     Current Facility-Administered Medications   Medication     lidocaine 1 % injection 4 mL     lidocaine 1 % injection 4 mL     triamcinolone (KENALOG-40) injection 40 mg     triamcinolone (KENALOG-40) injection 40 mg     DRUG ALLERGIES   Allergies   Allergen Reactions     Sulfa Drugs Hives     Benoxinate Nausea and Vomiting       ONCOLOGIC HISTORY  -4/27/2011 PRESENTATION: New onset seizure, generalized event.   -5/2011 MRB with a non-contrast enhancing right frontal mass lesion.   -5/2011 SURGERY: Craniectomy for mass resection at [facility].   PATHOLOGY: Diffuse oligodendroglioma; WHO grade II, 1p/19q co-deleted.  -6/2011-5/2012 CHEMO: Adjuvant dosed temozolomide x 12 cycles.  -4/2/2018 MRB with possible disease recurrence with a new 1.2 cm non-enhancing nodule within the cortex of the right frontal lobe adjacent to the resection cavity.  -4/12/2018 NEURO-ONC: Recommending repeat resection, appointment scheduled with Dr. Dustin Rodriguez, neurosurgery at the [facility].   -5/30/2018 SURGERY: Repeat resection with Dr. Rodriguez.   PATHOLOGY: Remains low grade, without concerning features.   -6/15/2018 NEURO-ONC: Start imaging surveillance.  -9/17/2018 NEURO-ONC/ MRB: Imaging stable, continue with surveillance.   -12/10/2018 NEURO-ONC/ MRB: Imaging stable, continue with surveillance.   -4/15/2019 NEURO-ONC/ MRB: Imaging stable,  continue with surveillance.  -8/19/2019 NEURO-ONC/ MRB: Clinically stable. Imaging largely stable, subtle increases on T2 FLAIR; continue with surveillance.  -12/16/2019 NEURO-ONC/ MRB: Clinically stable. Imaging with increased T2 FLAIR medially and laterally about the resection cavity. Referral to Dr. Figueroa with radiation oncology. Discussion with Dr. Rodriguez. Referral for repeat neuro-psychology testing.   -1/10/2020 Evaluated by Dr. Devan Figueroa.   -1/17/2020 NEURO-ONC: Tentative plan to initiate chemoradiotherapy in May-June 2020.   -2/17/2020 NEURO-PSYCH; Mild weaknesses were noted in aspects of verbal and nonverbal working memory, nonverbal recall, some aspects of executive functioning, and bilateral psychomotor speed. The remainder of her cognitive abilities were intact and performed in keeping with her above average range cognitive baseline.   -4/20 - 5/30/2020 CHEMORADS: 54 Gy in 30 fractions with concurrent temozolomide 75mg/m2 (140mg).   -5/4/2020 NEURO-ONC: Continue treatment as planned. Prescribing Clotrimazole lozenges for oral thrush.   -6/1/2020 NEURO-ONC: Completed treatment as planned.  -6/30/2020 NEURO-ONC/ MRB/ CHEMO: Clinically stable. Imaging with positive treatment effect. Starting adjuvant temozolomide 150mg/m2 (250mg), cycle 1 (start date was 7/1).   -7/28/2020 NEURO-ONC/ CHEMO: Clinically stable. Adjuvant temozolomide 200mg/m2 (320mg), cycle 2 (start date was 7/29).   -8/25/2020 NEURO-ONC/ CHEMO: Clinically stable; significant nausea/ vomiting with 200mg/m2 dosing. Adding Emend for this next cycle. Adjuvant temozolomide 150mg/m2 (250mg), cycle 3 (start date of 8/26).     SOCIAL HISTORY   Tobacco use: Never smoker.   Alcohol use: Social.   Drug use: Denies marijuana use.  Supplement, complimentary/ alternative medicine: None.   Employment: RN.   , 2 children      PHYSICAL EXAMINATION  -Generally well appearing.  -Respiratory: No audible wheezing.   -Skin: No facial rashes.  -Psychiatric:  Normal mood and affect. Pleasant, talkative.  -Neurologic:   MENTAL STATUS:     Alert, oriented to date.    Recall: Intact.    Speech fluent.    Comprehension intact.     CRANIAL NERVES:     Pupils are equal, round, reactive to light.     Extraocular movements full, patient denies diplopia.     Equal activation with smiling/ talking on the left side of face.    Hearing intact.   With normal phonation, no dysfunction of the palate or tongue.   MOTOR: Antigravity in arms.  COORDINATION: Intact with using the computer/ phone.   SENSATION: Intact to light touch.     The rest of a comprehensive physical examination is deferred due to PHE (public health emergency) video visit restrictions.        MEDICAL RECORDS  Obtained and personally reviewed all available outside medical records in addition to reviewing any records available in our electronic system.     LABS  Personally reviewed all available lab results.     IMAGING  No new neuro-imaging to review.        IMPRESSION  Clinic time was spent discussing in detail the nature of this tumor in light of her current treatment plan. This is in addition to providing emotional support, answering questions pertaining to my recommendations, and devising the treatment plan as outlined below.     Clinically, Barbi is doing fairly well with no new subjective neurological complaints or increase in seizure frequency, however, the last cycle of chemotherapy was very poorly tolerated due to much nausea and vomiting. Also experiencing continued ongoing fatigue and all of this is leading to depression and a decreased quality of life. As a result, will dose reduce back to 150mg/m2 for cycle 3. Will add on Emend tri-pack for improved control of nausea. Will repeat imaging next month.     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   -Did not tolerate 200mg/m2 dosing.   -Supportive medications; Compazine + Ativan + Emend tri-pack and bowel regimen.    Dexamethasone or medical marijuana can be alternative options and/ or since Emend is only D1-3, there is a potential of breakthrough nausea on D4-5.  -Repeat 28 day cycle if WBC >= 3, ANC >= 1.5, HgB >= 10, and platelets >= 100.  -Surveillance labs reviewed, at goal for chemotherapy.  -Will repeat CBC in 2 (at [facility]) and 4 weeks and repeat CMP every 4 weeks.  -Repeat imaging next month.     -SEIZURE MANAGEMENT-  -Follows with Dr. Flakita Clark; Neurology, epilepsy specialist at the [facility].      -Quality of life/ MOOD/ FATIGUE-  -History of mild anxiety and depression.   -Continue to monitor mood as untreated/ undertreated depression can worsen fatigue, dysorexia, and quality of life.  -Will need to readdress at next visit, particularly if nausea is under better control.   -Issues with sleep, on gabapentin.     -COGNITIVE CHANGES-  -Neuro-psych testing completed with mild deficits notes. Repeat testing recommended in 2/2021.    Return to clinic virtually on 9/22 with repeat imaging + labs earlier that same day.     In the meantime, Barbi knows to call with questions or concerns or to report new complaints and can be seen sooner if needed.    Myrtle Gallagher MD  Neuro-oncology    Again, thank you for allowing me to participate in the care of your patient.        Sincerely,        Myrtle Gallagher MD

## 2020-09-01 ENCOUNTER — MYC MEDICAL ADVICE (OUTPATIENT)
Dept: ONCOLOGY | Facility: CLINIC | Age: 60
End: 2020-09-01

## 2020-09-01 DIAGNOSIS — B37.0 THRUSH: Primary | ICD-10-CM

## 2020-09-01 RX ORDER — CLOTRIMAZOLE 10 MG/1
10 LOZENGE ORAL
Qty: 70 LOZENGE | Refills: 0 | Status: SHIPPED | OUTPATIENT
Start: 2020-09-01 | End: 2020-09-15

## 2020-09-02 ENCOUNTER — TELEPHONE (OUTPATIENT)
Dept: ONCOLOGY | Facility: CLINIC | Age: 60
End: 2020-09-02

## 2020-09-02 ENCOUNTER — MYC REFILL (OUTPATIENT)
Dept: NEUROLOGY | Facility: CLINIC | Age: 60
End: 2020-09-02

## 2020-09-02 DIAGNOSIS — R56.9 SEIZURE (H): ICD-10-CM

## 2020-09-02 RX ORDER — GABAPENTIN 100 MG/1
CAPSULE ORAL
Qty: 180 CAPSULE | Refills: 0 | Status: SHIPPED | OUTPATIENT
Start: 2020-09-02 | End: 2021-01-06

## 2020-09-02 NOTE — ORAL ONC MGMT
Oral Chemotherapy Monitoring Program     Placed call to patient in follow up of Temozolomide therapy. Barbi still reports breakthrough nausea on days 4 - 8. She did report that the Emend on days 1-3 worked better but still concerned about days 4 - 8. She usually starts feeling relieved of the nausea on day 7/8 but this time she is still struggling on day 8. She does not report vomiting but just nausea. She has used prochlorperazine as needed over these past few days. She was wondering about the possibility to add something on the end of therapy to help with the breakthrough nausea on days 4-8. Will reach out to Dr. Gallagher to give her an update on Barbi and she if she wants to make any changes to the anti-nausea regimen or the dose of Temozolomide. Next lab and provider visit is on 9/22. Next cycle to start 9/23.     Elliott Crisostomo PharmD  [facility]  [phone]  September 2, 2020

## 2020-09-03 ENCOUNTER — PATIENT OUTREACH (OUTPATIENT)
Dept: ONCOLOGY | Facility: CLINIC | Age: 60
End: 2020-09-03

## 2020-09-03 NOTE — PROGRESS NOTES
Left message for patient with different options to control nausea. Waiting for a return call.    Patient did not return call.

## 2020-09-08 DIAGNOSIS — R11.2 CHEMOTHERAPY-INDUCED NAUSEA AND VOMITING: ICD-10-CM

## 2020-09-08 DIAGNOSIS — C71.9 OLIGODENDROGLIOMA (H): ICD-10-CM

## 2020-09-08 DIAGNOSIS — T45.1X5A CHEMOTHERAPY INDUCED NEUTROPENIA (H): ICD-10-CM

## 2020-09-08 DIAGNOSIS — T45.1X5A CHEMOTHERAPY-INDUCED NAUSEA AND VOMITING: ICD-10-CM

## 2020-09-08 DIAGNOSIS — Z51.11 CHEMOTHERAPY MANAGEMENT, ENCOUNTER FOR: ICD-10-CM

## 2020-09-08 DIAGNOSIS — Z79.899 ENCOUNTER FOR LONG-TERM (CURRENT) USE OF MEDICATIONS: ICD-10-CM

## 2020-09-08 DIAGNOSIS — D70.1 CHEMOTHERAPY INDUCED NEUTROPENIA (H): ICD-10-CM

## 2020-09-08 LAB
ALBUMIN SERPL-MCNC: 3.6 G/DL (ref 3.4–5)
ALP SERPL-CCNC: 84 U/L (ref 40–150)
ALT SERPL W P-5'-P-CCNC: 21 U/L (ref 0–50)
ANION GAP SERPL CALCULATED.3IONS-SCNC: 4 MMOL/L (ref 3–14)
AST SERPL W P-5'-P-CCNC: 14 U/L (ref 0–45)
BASOPHILS # BLD AUTO: 0 10E9/L (ref 0–0.2)
BASOPHILS NFR BLD AUTO: 0.3 %
BILIRUB SERPL-MCNC: 0.2 MG/DL (ref 0.2–1.3)
BUN SERPL-MCNC: 20 MG/DL (ref 7–30)
CALCIUM SERPL-MCNC: 9.3 MG/DL (ref 8.5–10.1)
CHLORIDE SERPL-SCNC: 108 MMOL/L (ref 94–109)
CO2 SERPL-SCNC: 29 MMOL/L (ref 20–32)
CREAT SERPL-MCNC: 0.72 MG/DL (ref 0.52–1.04)
DIFFERENTIAL METHOD BLD: ABNORMAL
EOSINOPHIL # BLD AUTO: 0.1 10E9/L (ref 0–0.7)
EOSINOPHIL NFR BLD AUTO: 1.8 %
ERYTHROCYTE [DISTWIDTH] IN BLOOD BY AUTOMATED COUNT: 12.4 % (ref 10–15)
GFR SERPL CREATININE-BSD FRML MDRD: >90 ML/MIN/{1.73_M2}
GLUCOSE SERPL-MCNC: 96 MG/DL (ref 70–99)
HCT VFR BLD AUTO: 42.5 % (ref 35–47)
HGB BLD-MCNC: 13.5 G/DL (ref 11.7–15.7)
LYMPHOCYTES # BLD AUTO: 0.6 10E9/L (ref 0.8–5.3)
LYMPHOCYTES NFR BLD AUTO: 15.7 %
MCH RBC QN AUTO: 31.2 PG (ref 26.5–33)
MCHC RBC AUTO-ENTMCNC: 31.8 G/DL (ref 31.5–36.5)
MCV RBC AUTO: 98 FL (ref 78–100)
MONOCYTES # BLD AUTO: 0.5 10E9/L (ref 0–1.3)
MONOCYTES NFR BLD AUTO: 12.4 %
NEUTROPHILS # BLD AUTO: 2.8 10E9/L (ref 1.6–8.3)
NEUTROPHILS NFR BLD AUTO: 69.8 %
PLATELET # BLD AUTO: 182 10E9/L (ref 150–450)
POTASSIUM SERPL-SCNC: 4.1 MMOL/L (ref 3.4–5.3)
PROT SERPL-MCNC: 6.9 G/DL (ref 6.8–8.8)
RBC # BLD AUTO: 4.33 10E12/L (ref 3.8–5.2)
SODIUM SERPL-SCNC: 141 MMOL/L (ref 133–144)
WBC # BLD AUTO: 3.9 10E9/L (ref 4–11)

## 2020-09-08 PROCEDURE — 80053 COMPREHEN METABOLIC PANEL: CPT | Performed by: INTERNAL MEDICINE

## 2020-09-08 PROCEDURE — 85025 COMPLETE CBC W/AUTO DIFF WBC: CPT | Performed by: INTERNAL MEDICINE

## 2020-09-10 NOTE — PROGRESS NOTES
Patient called today asking for information on acupuncture sites. I sent patient a list of sites via [employer].    Essence Rodriguez RN, BSN  Neuro-Oncology Care Coordinator  [facility]

## 2020-09-16 DIAGNOSIS — R11.2 CHEMOTHERAPY-INDUCED NAUSEA AND VOMITING: ICD-10-CM

## 2020-09-16 DIAGNOSIS — T45.1X5A CHEMOTHERAPY-INDUCED NAUSEA AND VOMITING: ICD-10-CM

## 2020-09-16 DIAGNOSIS — C71.9 OLIGODENDROGLIOMA (H): Primary | ICD-10-CM

## 2020-09-16 RX ORDER — TEMOZOLOMIDE 250 MG/1
150 CAPSULE ORAL DAILY
Qty: 5 CAPSULE | Refills: 0 | Status: SHIPPED | OUTPATIENT
Start: 2020-09-16 | End: 2020-10-14

## 2020-09-16 RX ORDER — LORAZEPAM 0.5 MG/1
0.5 TABLET ORAL AT BEDTIME
Qty: 30 TABLET | Refills: 0 | Status: SHIPPED | OUTPATIENT
Start: 2020-09-16 | End: 2020-11-17

## 2020-09-16 RX ORDER — LORAZEPAM 0.5 MG/1
0.5 TABLET ORAL AT BEDTIME
Qty: 30 TABLET | Refills: 0 | Status: SHIPPED | OUTPATIENT
Start: 2020-09-16 | End: 2020-09-16

## 2020-09-18 ENCOUNTER — DOCUMENTATION ONLY (OUTPATIENT)
Dept: ONCOLOGY | Facility: CLINIC | Age: 60
End: 2020-09-18

## 2020-09-18 NOTE — PROGRESS NOTES
STD paperwork received via fax from [facility]. Will be placed in provider folder for signature upon completion.       Fax: [phone]      Sri Tinoco CMA

## 2020-09-20 ENCOUNTER — OFFICE VISIT (OUTPATIENT)
Dept: URGENT CARE | Facility: URGENT CARE | Age: 60
End: 2020-09-20
Payer: COMMERCIAL

## 2020-09-20 VITALS
BODY MASS INDEX: 26.48 KG/M2 | DIASTOLIC BLOOD PRESSURE: 72 MMHG | TEMPERATURE: 97.8 F | RESPIRATION RATE: 16 BRPM | HEART RATE: 59 BPM | WEIGHT: 144.8 LBS | SYSTOLIC BLOOD PRESSURE: 118 MMHG | OXYGEN SATURATION: 95 %

## 2020-09-20 DIAGNOSIS — M53.3 SACROILIAC JOINT PAIN: Primary | ICD-10-CM

## 2020-09-20 PROCEDURE — 99213 OFFICE O/P EST LOW 20 MIN: CPT | Performed by: PHYSICIAN ASSISTANT

## 2020-09-20 RX ORDER — CYCLOBENZAPRINE HCL 5 MG
5 TABLET ORAL 3 TIMES DAILY PRN
Qty: 30 TABLET | Refills: 0 | Status: SHIPPED | OUTPATIENT
Start: 2020-09-20 | End: 2021-07-12

## 2020-09-20 NOTE — NURSING NOTE
"Vital signs:  Temp: 97.8  F (36.6  C) Temp src: Oral BP: 118/72 Pulse: 59   Resp: 16 SpO2: 95 %       Weight: 65.7 kg (144 lb 12.8 oz)  Estimated body mass index is 26.48 kg/m  as calculated from the following:    Height as of 1/27/20: 1.575 m (5' 2\").    Weight as of this encounter: 65.7 kg (144 lb 12.8 oz).          "

## 2020-09-20 NOTE — PROGRESS NOTES
Patient presents with:  Musculoskeletal Problem: Left hip pain- chronic, this current episode a few weeks ago. Exercises makes it worse.     SUBJECTIVE:  Chief Complaint   Patient presents with     Musculoskeletal Problem     Left hip pain- chronic, this current episode a few weeks ago. Exercises makes it worse.      Barbi Tiwari is a 59 year old female presents with a chief complaint of left hip pain for the past few weeks.  Denies any trauma.  Does have a h/o low back pain, left knee pain and hip pain.    She is seeing PT for her knee pain.      Has tried tylenol and ibuprofen without relief.  Denies any radiation of her symptoms.    Movement worsens pain.     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     Osteoarthrosis 12/9/2009     Palpitations 6/5/2014     PONV (postoperative nausea and vomiting)      PVCs 6/5/2014     Seizure disorder (related to tumor) 08/17/2011     Patient Active Problem List   Diagnosis     Calculus of kidney     Hyperparathyroidism s/p surgery     GERD (gastroesophageal reflux disease)     Allergic rhinitis     Palpitations     PVC's (premature ventricular contractions)     Seizure (H)     Osteoarthritis     Elevated cholesterol     Abnormal screening cardiac CT     Lumbar radiculopathy     Advanced directives, counseling/discussion     Nephrolithiasis     Oligodendroglioma (H)     Moderate major depression, single episode (H)     S/P laparoscopic hysterectomy     Weakness of face muscles     Primary osteoarthritis of left knee     Chronic pain of left knee     Acute pain of left knee     Acute left-sided low back pain without sciatica     Hip pain, left      Chemotherapy management, encounter for     Chemotherapy induced neutropenia (H)     High risk for chemotherapy-induced infectious complication     Social History     Tobacco Use     Smoking status: Never Smoker     Smokeless tobacco: Never Used   Substance Use Topics     Alcohol use: Yes     Comment: social       ROS:  CONSTITUTIONAL:NEGATIVE for fever, chills, change in weight  INTEGUMENTARY/SKIN: NEGATIVE for worrisome rashes, moles or lesions  EYES: NEGATIVE for vision changes or irritation  ENT/MOUTH: NEGATIVE for ear, mouth and throat problems  RESP:NEGATIVE for significant cough or SOB  CV: NEGATIVE for chest pain, palpitations or peripheral edema  GI: NEGATIVE for nausea, abdominal pain, heartburn, or change in bowel habits  MUSCULOSKELETAL: as per HPI  NEURO: NEGATIVE for weakness, dizziness or paresthesias  HEME/ALLERGY/IMMUNE: NEGATIVE for bleeding problems  Review of systems negative except as stated above.    EXAM:   /72   Pulse 59   Temp 97.8  F (36.6  C) (Oral)   Resp 16   Wt 65.7 kg (144 lb 12.8 oz)   SpO2 95%   BMI 26.48 kg/m    Gen: healthy,alert,no distress  Extremity: tenderness over SI joint.  No vertebral tenderness.      M53.3) Sacroiliac joint pain  (primary encounter diagnosis)  Comment: with left knee pain  Plan: cyclobenzaprine (FLEXERIL) 5 MG tablet        Advise follow up with Ortho and then PT  Continue ibuprofen.

## 2020-09-20 NOTE — PATIENT INSTRUCTIONS
(M53.3) Sacroiliac joint pain  (primary encounter diagnosis)  Comment: with left knee pain  Plan: cyclobenzaprine (FLEXERIL) 5 MG tablet        Advise follow up with Ortho and then PT

## 2020-09-21 NOTE — PATIENT INSTRUCTIONS
Imaging reviewed, no concerns, continued positive treatment response.   Repeat in 3 months.     Adjuvant-dosed chemotherapy with temozolomide (cycle 4)  -5 consectutive nights (In a 28 day cycle; 5 days on treatment and 23 days off.)  -Dose: 250 mg  -Start date: 9/23  -Take at bedtime on an empty stomach  -Take 30 minutes after Compazine + Ativan dosing plus Emend tri-pack.   -Acupuncture.   -Continue bowel regimen    -Labs from today at goal.   -Labs on 10/5 and 10/20 at [facility].    Return to clinic on 10/20.     Myrtle Gallagher MD  Neuro-oncology  09/22/20

## 2020-09-21 NOTE — PROGRESS NOTES
Form has been completed and placed in provider folder to be checked for accuracy and signed.    Nicolle Muhammad, CMA

## 2020-09-21 NOTE — PROGRESS NOTES
"Barbi Tiwari is a 59 year old female who is being evaluated via a billable video visit.      The patient has been notified of following:     \"This video visit will be conducted via a call between you and your physician/provider. We have found that certain health care needs can be provided without the need for an in-person physical exam.  This service lets us provide the care you need with a video conversation.  If a prescription is necessary we can send it directly to your pharmacy.  If lab work is needed we can place an order for that and you can then stop by our lab to have the test done at a later time.    Video visits are billed at different rates depending on your insurance coverage.  Please reach out to your insurance provider with any questions.    If during the course of the call the physician/provider feels a video visit is not appropriate, you will not be charged for this service.\"    Patient has given verbal consent for Video visit? Yes  How would you like to obtain your AVS? MyChart  If you are dropped from the video visit, the video invite should be resent to: Send to e-mail at: [email or web address]  Will anyone else be joining your video visit? Yes:  Kenneth. How would they like to receive their invitation? Send to e-mail at: [email or web address]        Video-Visit Details  Type of service:  Video Visit    Video Start Time: 3:48 PM  Video End Time: 4:04 PM    Originating Location (pt. Location): Home    Distant Location (provider location):  [facility]     Platform used for Video Visit: Prosper Gallagher MD    _____________________________________________________________    NEURO-ONCOLOGY VISIT  Sep 22, 2020    CHIEF COMPLAINT: Ms. Barbi Tiwari is a 59 year old right-handed woman with a right frontal diffuse oligodendroglioma (1p19q co-deleted), diagnosed following resection in 5/2011. She received 12 cycles of temozolomide, completed in 5/2012. Changes on " imaging necessitated a repeat resection on 5/30/2018 and pathology was unchanged. No adjuvant cancer-directed therapy was initiated. Imaging over the next 1.5 years showed tumor progression and treatment was recommended. She completed chemoradiotherapy on 5/30/2020. Barbi is currently managed on adjuvant-dosed temozolomide. Dose escalation to 200mg/m2 was complicated by intolerable nausea.     I met with Barbi and Kenneth () for this follow-up visit.      HISTORY OF PRESENT ILLNESS  -Most recent cycle of temozolomide went much better. She experienced significantly less nausea and vomiting with lower dosing plus taking Compazine and Ativan plus Emend. Started acupuncture.   -Denies issues with constipation on current bowel regimen.  -She denies any new symptoms; no weakness, numbness, changes in vision, or headaches.   -Balance is off; chronic and stable.  -No change in seizure frequency.   -Better energy. Exercising more; walking in the evening.   -Continued mild word-finding issues, otherwise no change in her cognitive function.   -Working part-time she going well, just tiring and having a flair of chronic hip pain.       -Mood is better today.     REVIEW OF SYSTEMS  A comprehensive ROS negative except as in HPI.      MEDICATIONS   Current Outpatient Medications   Medication     acetaminophen (TYLENOL) 500 MG tablet     aprepitant (EMEND TRI-PACK) 80 & 125 MG MISC     ascorbic acid 500 MG TABS     calcium carbonate (TUMS) 500 MG chewable tablet     ciclopirox (PENLAC) 8 % external solution     COMPOUNDED NON-CONTROLLED SUBSTANCE (CMPD RX) - PHARMACY TO MIX COMPOUNDED MEDICATION     cyclobenzaprine (FLEXERIL) 5 MG tablet     fexofenadine (ALLEGRA) 180 MG tablet     gabapentin (NEURONTIN) 100 MG capsule     ibuprofen (ADVIL/MOTRIN) 200 MG tablet     Lactase (LACTAID PO)     lamoTRIgine (LAMICTAL) 100 MG tablet     lamoTRIgine (LAMICTAL) 200 MG tablet     levETIRAcetam (KEPPRA) 500 MG tablet     LORazepam (ATIVAN)  0.5 MG tablet     metoprolol tartrate (LOPRESSOR) 25 MG tablet     Multiple Vitamin (MULTI-VITAMINS) TABS     prochlorperazine (COMPAZINE) 10 MG tablet     tretinoin (RETIN-A) 0.025 % external cream     VITAMIN D, CHOLECALCIFEROL, PO     temozolomide (TEMODAR) 250 MG capsule     Current Facility-Administered Medications   Medication     lidocaine 1 % injection 4 mL     lidocaine 1 % injection 4 mL     triamcinolone (KENALOG-40) injection 40 mg     triamcinolone (KENALOG-40) injection 40 mg     DRUG ALLERGIES   Allergies   Allergen Reactions     Sulfa Drugs Hives     Benoxinate Nausea and Vomiting       ONCOLOGIC HISTORY  -4/27/2011 PRESENTATION: New onset seizure, generalized event.   -5/2011 MRB with a non-contrast enhancing right frontal mass lesion.   -5/2011 SURGERY: Craniectomy for mass resection at [facility].   PATHOLOGY: Diffuse oligodendroglioma; WHO grade II, 1p/19q co-deleted.  -6/2011-5/2012 CHEMO: Adjuvant dosed temozolomide x 12 cycles.  -4/2/2018 MRB with possible disease recurrence with a new 1.2 cm non-enhancing nodule within the cortex of the right frontal lobe adjacent to the resection cavity.  -4/12/2018 NEURO-ONC: Recommending repeat resection, appointment scheduled with Dr. Dustin Rodriguez, neurosurgery at the [facility].   -5/30/2018 SURGERY: Repeat resection with Dr. Rodriguez.   PATHOLOGY: Remains low grade, without concerning features.   -6/15/2018 NEURO-ONC: Start imaging surveillance.  -9/17/2018 NEURO-ONC/ MRB: Imaging stable, continue with surveillance.   -12/10/2018 NEURO-ONC/ MRB: Imaging stable, continue with surveillance.   -4/15/2019 NEURO-ONC/ MRB: Imaging stable, continue with surveillance.  -8/19/2019 NEURO-ONC/ MRB: Clinically stable. Imaging largely stable, subtle increases on T2 FLAIR; continue with surveillance.  -12/16/2019 NEURO-ONC/ MRB: Clinically stable. Imaging with increased T2 FLAIR medially and laterally about the resection cavity. Referral to Dr. Figueroa with radiation oncology.  Discussion with Dr. Rodriguez. Referral for repeat neuro-psychology testing.   -1/10/2020 Evaluated by Dr. Devan Figueroa.   -1/17/2020 NEURO-ONC: Tentative plan to initiate chemoradiotherapy in May-June 2020.   -2/17/2020 NEURO-PSYCH; Mild weaknesses were noted in aspects of verbal and nonverbal working memory, nonverbal recall, some aspects of executive functioning, and bilateral psychomotor speed. The remainder of her cognitive abilities were intact and performed in keeping with her above average range cognitive baseline.   -4/20 - 5/30/2020 CHEMORADS: 54 Gy in 30 fractions with concurrent temozolomide 75mg/m2 (140mg).   -5/4/2020 NEURO-ONC: Continue treatment as planned. Prescribing Clotrimazole lozenges for oral thrush.   -6/1/2020 NEURO-ONC: Completed treatment as planned.  -6/30/2020 NEURO-ONC/ MRB/ CHEMO: Clinically stable. Imaging with positive treatment effect. Starting adjuvant temozolomide 150mg/m2 (250mg), cycle 1 (start date was 7/1).   -7/28/2020 NEURO-ONC/ CHEMO: Clinically stable. Adjuvant temozolomide 200mg/m2 (320mg), cycle 2 (start date was 7/29).   -8/25/2020 NEURO-ONC/ CHEMO: Clinically stable; significant nausea/ vomiting with 200mg/m2 dosing. Adding Emend for this next cycle. Adjuvant temozolomide 150mg/m2 (250mg), cycle 3 (start date of 8/26).   -9/22/2020 NEURO-ONC/ MRB/ CHEMO: Clinically stable; less nausea/ vomiting with lowered chemotherapy dosing plus adding Emend. Imaging with no concerns, repeat in 3 months. Adjuvant temozolomide 150mg/m2 (250mg), cycle 4 (start date of 9/23).     SOCIAL HISTORY   Tobacco use: Never smoker.   Alcohol use: Social.   Drug use: Denies marijuana use.  Supplement, complimentary/ alternative medicine: None.   Employment: RN.   , 2 children      PHYSICAL EXAMINATION      -Generally well appearing.  -Respiratory: No audible wheezing.   -Skin: No facial rashes.  -Psychiatric: Normal mood and affect. Pleasant, talkative.  -Neurologic:   MENTAL STATUS:      Alert, oriented to date.    Recall: Intact.    Speech fluent.    Comprehension intact.     CRANIAL NERVES:     Pupils are equal, round, reactive to light.     Extraocular movements full, patient denies diplopia.     Equal activation with smiling/ talking on the left side of face.    Hearing intact.   With normal phonation, no dysfunction of the palate or tongue.   MOTOR: Antigravity in arms.   Mild positional tremor in the right >> left hand.   COORDINATION: Intact on finger nose with eyes closed.   SENSATION: Intact to light touch.   GAIT: Antalgic due to hip pain.    Slower speed. Good balance. Walking without assistance.     The rest of a comprehensive physical examination is deferred due to [facility] (public health emergency) video visit restrictions.      MEDICAL RECORDS  Obtained and personally reviewed all available outside medical records in addition to reviewing any records available in our electronic system.     LABS  Personally reviewed all available lab results.     IMAGING  Personally reviewed MR brain imaging from today and compared to prior imaging. To my eye, there is no evidence of disease recurrence with continued slight reduction in T2 FLAIR about the right frontal resection cavity.     Imaging was shown to and results were reviewed with Barbi and Kenneth.       IMPRESSION  Clinic time was spent discussing in detail the nature of this tumor in light of her current treatment plan and repeat imaging from today. This is in addition to providing emotional support, answering questions pertaining to my recommendations, and devising the plan as outlined below.     Clinically, Barbi is doing fairly well with no new subjective neurological complaints or increase in seizure frequency. The last cycle of chemotherapy was much better tolerated with less nausea and vomiting in the setting of a temozolomide dose reduction while using Ativan, Compazine, and Emend. She also recently started acupuncture. Continued  ongoing fatigue, but energy is better in the setting of more activity/ exercise. Currently experiencing a flair of chronic hip pain. Mood is better due to improvement in quality of life from better chemotherapy side effect management. Will continue temozolomide at 150mg/m2 for cycle 4. Labs from today are at goal.    Repeat imaging with no concerns. Will repeat in 3 months.     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   -Did not tolerate 200mg/m2 dosing.   -Supportive medications; Compazine + Ativan + Emend tri-pack and bowel regimen.    Acupuncture.    Dexamethasone or medical marijuana can be alternative options and/ or since Emend is only D1-3, there is a potential of breakthrough nausea on D4-5.  -Repeat 28 day cycle if WBC >= 3, ANC >= 1.5, HgB >= 10, and platelets >= 100.  -Surveillance labs reviewed, at goal for chemotherapy.  -Will repeat CBC in 2 (at [facility]) and 4 weeks and repeat CMP every 4 weeks.  -Repeat imaging due in 12/2020.     -SEIZURE MANAGEMENT-  -Follows with Dr. Flakita Clark; Neurology, epilepsy specialist at the [place].      -Quality of life/ MOOD/ FATIGUE-  -History of mild anxiety and depression.   -Continue to monitor mood as untreated/ undertreated depression can worsen fatigue, dysorexia, and quality of life.  -Issues with sleep, on gabapentin.     -COGNITIVE CHANGES-  -Neuro-psych testing completed with mild deficits notes. Repeat testing recommended in 2/2021.    Return to clinic virtually on 10/20.     In the meantime, Barbi knows to call with questions or concerns or to report new complaints and can be seen sooner if needed.    Myrtle Gallagher MD  Neuro-oncology

## 2020-09-22 ENCOUNTER — VIRTUAL VISIT (OUTPATIENT)
Dept: ONCOLOGY | Facility: CLINIC | Age: 60
End: 2020-09-22
Attending: PSYCHIATRY & NEUROLOGY
Payer: COMMERCIAL

## 2020-09-22 ENCOUNTER — HOSPITAL ENCOUNTER (OUTPATIENT)
Dept: LAB | Facility: CLINIC | Age: 60
End: 2020-09-22
Attending: PSYCHIATRY & NEUROLOGY
Payer: COMMERCIAL

## 2020-09-22 ENCOUNTER — HOSPITAL ENCOUNTER (OUTPATIENT)
Dept: MRI IMAGING | Facility: CLINIC | Age: 60
End: 2020-09-22
Attending: PSYCHIATRY & NEUROLOGY
Payer: COMMERCIAL

## 2020-09-22 VITALS — WEIGHT: 140 LBS | BODY MASS INDEX: 25.61 KG/M2

## 2020-09-22 DIAGNOSIS — T45.1X5A CHEMOTHERAPY-INDUCED NAUSEA AND VOMITING: ICD-10-CM

## 2020-09-22 DIAGNOSIS — R11.2 CHEMOTHERAPY-INDUCED NAUSEA AND VOMITING: ICD-10-CM

## 2020-09-22 DIAGNOSIS — C71.9 OLIGODENDROGLIOMA (H): Primary | ICD-10-CM

## 2020-09-22 DIAGNOSIS — C71.9 OLIGODENDROGLIOMA (H): ICD-10-CM

## 2020-09-22 DIAGNOSIS — Z79.899 ENCOUNTER FOR LONG-TERM (CURRENT) USE OF MEDICATIONS: ICD-10-CM

## 2020-09-22 LAB
ALBUMIN SERPL-MCNC: 3.8 G/DL (ref 3.4–5)
ALP SERPL-CCNC: 87 U/L (ref 40–150)
ALT SERPL W P-5'-P-CCNC: 20 U/L (ref 0–50)
ANION GAP SERPL CALCULATED.3IONS-SCNC: 3 MMOL/L (ref 3–14)
AST SERPL W P-5'-P-CCNC: 15 U/L (ref 0–45)
BASOPHILS # BLD AUTO: 0 10E9/L (ref 0–0.2)
BASOPHILS NFR BLD AUTO: 0.2 %
BILIRUB SERPL-MCNC: 0.3 MG/DL (ref 0.2–1.3)
BUN SERPL-MCNC: 18 MG/DL (ref 7–30)
CALCIUM SERPL-MCNC: 9.3 MG/DL (ref 8.5–10.1)
CHLORIDE SERPL-SCNC: 107 MMOL/L (ref 94–109)
CO2 SERPL-SCNC: 30 MMOL/L (ref 20–32)
CREAT SERPL-MCNC: 0.63 MG/DL (ref 0.52–1.04)
DIFFERENTIAL METHOD BLD: NORMAL
EOSINOPHIL # BLD AUTO: 0.1 10E9/L (ref 0–0.7)
EOSINOPHIL NFR BLD AUTO: 2.4 %
ERYTHROCYTE [DISTWIDTH] IN BLOOD BY AUTOMATED COUNT: 12.8 % (ref 10–15)
GFR SERPL CREATININE-BSD FRML MDRD: >90 ML/MIN/{1.73_M2}
GLUCOSE SERPL-MCNC: 83 MG/DL (ref 70–99)
HCT VFR BLD AUTO: 41.5 % (ref 35–47)
HGB BLD-MCNC: 13.5 G/DL (ref 11.7–15.7)
IMM GRANULOCYTES # BLD: 0 10E9/L (ref 0–0.4)
IMM GRANULOCYTES NFR BLD: 0.4 %
LYMPHOCYTES # BLD AUTO: 0.9 10E9/L (ref 0.8–5.3)
LYMPHOCYTES NFR BLD AUTO: 18.8 %
MCH RBC QN AUTO: 30.9 PG (ref 26.5–33)
MCHC RBC AUTO-ENTMCNC: 32.5 G/DL (ref 31.5–36.5)
MCV RBC AUTO: 95 FL (ref 78–100)
MONOCYTES # BLD AUTO: 0.4 10E9/L (ref 0–1.3)
MONOCYTES NFR BLD AUTO: 8.1 %
NEUTROPHILS # BLD AUTO: 3.3 10E9/L (ref 1.6–8.3)
NEUTROPHILS NFR BLD AUTO: 70.1 %
NRBC # BLD AUTO: 0 10*3/UL
NRBC BLD AUTO-RTO: 0 /100
PLATELET # BLD AUTO: 179 10E9/L (ref 150–450)
POTASSIUM SERPL-SCNC: 3.9 MMOL/L (ref 3.4–5.3)
PROT SERPL-MCNC: 7 G/DL (ref 6.8–8.8)
RBC # BLD AUTO: 4.37 10E12/L (ref 3.8–5.2)
SODIUM SERPL-SCNC: 140 MMOL/L (ref 133–144)
WBC # BLD AUTO: 4.7 10E9/L (ref 4–11)

## 2020-09-22 PROCEDURE — 40001009 ZZH VIDEO/TELEPHONE VISIT; NO CHARGE

## 2020-09-22 PROCEDURE — A9585 GADOBUTROL INJECTION: HCPCS | Performed by: PSYCHIATRY & NEUROLOGY

## 2020-09-22 PROCEDURE — 25500064 ZZH RX 255 OP 636: Performed by: PSYCHIATRY & NEUROLOGY

## 2020-09-22 PROCEDURE — 85025 COMPLETE CBC W/AUTO DIFF WBC: CPT | Performed by: PSYCHIATRY & NEUROLOGY

## 2020-09-22 PROCEDURE — 80053 COMPREHEN METABOLIC PANEL: CPT | Performed by: PSYCHIATRY & NEUROLOGY

## 2020-09-22 PROCEDURE — 99215 OFFICE O/P EST HI 40 MIN: CPT | Mod: 95 | Performed by: PSYCHIATRY & NEUROLOGY

## 2020-09-22 PROCEDURE — 70553 MRI BRAIN STEM W/O & W/DYE: CPT

## 2020-09-22 RX ORDER — APREPITANT 125MG-80MG
KIT ORAL
Qty: 1 EACH | Refills: 3 | Status: SHIPPED | OUTPATIENT
Start: 2020-09-22 | End: 2020-09-22

## 2020-09-22 RX ORDER — APREPITANT 125MG-80MG
KIT ORAL
Qty: 1 EACH | Refills: 3 | Status: SHIPPED | OUTPATIENT
Start: 2020-09-22 | End: 2020-12-15

## 2020-09-22 RX ORDER — GADOBUTROL 604.72 MG/ML
6 INJECTION INTRAVENOUS ONCE
Status: COMPLETED | OUTPATIENT
Start: 2020-09-22 | End: 2020-09-22

## 2020-09-22 RX ADMIN — GADOBUTROL 6 ML: 604.72 INJECTION INTRAVENOUS at 12:25

## 2020-09-22 ASSESSMENT — PAIN SCALES - GENERAL: PAINLEVEL: MODERATE PAIN (5)

## 2020-09-22 NOTE — LETTER
"    9/22/2020         RE: Barbi Tiwari  [address] [facility] [address]        Dear Colleague,    Thank you for referring your patient, Barbi Tiwari, to the [facility]. Please see a copy of my visit note below.    Barbi Tiwari is a 59 year old female who is being evaluated via a billable video visit.      The patient has been notified of following:     \"This video visit will be conducted via a call between you and your physician/provider. We have found that certain health care needs can be provided without the need for an in-person physical exam.  This service lets us provide the care you need with a video conversation.  If a prescription is necessary we can send it directly to your pharmacy.  If lab work is needed we can place an order for that and you can then stop by our lab to have the test done at a later time.    Video visits are billed at different rates depending on your insurance coverage.  Please reach out to your insurance provider with any questions.    If during the course of the call the physician/provider feels a video visit is not appropriate, you will not be charged for this service.\"    Patient has given verbal consent for Video visit? Yes  How would you like to obtain your AVS? MyChart  If you are dropped from the video visit, the video invite should be resent to: Send to e-mail at: [email or web address]  Will anyone else be joining your video visit? Yes:  Kenneth. How would they like to receive their invitation? Send to e-mail at: [email or web address]        Video-Visit Details  Type of service:  Video Visit    Video Start Time: 3:48 PM  Video End Time: 4:04 PM    Originating Location (pt. Location): Home    Distant Location (provider location):  [facility], [place], operated by [facility]     Platform used for Video Visit: Prosper Gallagher MD    _____________________________________________________________    NEURO-ONCOLOGY VISIT  Sep 22, 2020    CHIEF " COMPLAINT: Ms. Barbi Tiwari is a 59 year old right-handed woman with a right frontal diffuse oligodendroglioma (1p19q co-deleted), diagnosed following resection in 5/2011. She received 12 cycles of temozolomide, completed in 5/2012. Changes on imaging necessitated a repeat resection on 5/30/2018 and pathology was unchanged. No adjuvant cancer-directed therapy was initiated. Imaging over the next 1.5 years showed tumor progression and treatment was recommended. She completed chemoradiotherapy on 5/30/2020. Barbi is currently managed on adjuvant-dosed temozolomide. Dose escalation to 200mg/m2 was complicated by intolerable nausea.     I met with Barbi and Kenneth () for this follow-up visit.      HISTORY OF PRESENT ILLNESS  -Most recent cycle of temozolomide went much better. She experienced significantly less nausea and vomiting with lower dosing plus taking Compazine and Ativan plus Emend. Started acupuncture.   -Denies issues with constipation on current bowel regimen.  -She denies any new symptoms; no weakness, numbness, changes in vision, or headaches.   -Balance is off; chronic and stable.  -No change in seizure frequency.   -Better energy. Exercising more; walking in the evening.   -Continued mild word-finding issues, otherwise no change in her cognitive function.   -Working part-time she going well, just tiring and having a flair of chronic hip pain.       -Mood is better today.     REVIEW OF SYSTEMS  A comprehensive ROS negative except as in HPI.      MEDICATIONS   Current Outpatient Medications   Medication     acetaminophen (TYLENOL) 500 MG tablet     aprepitant (EMEND TRI-PACK) 80 & 125 MG MISC     ascorbic acid 500 MG TABS     calcium carbonate (TUMS) 500 MG chewable tablet     ciclopirox (PENLAC) 8 % external solution     COMPOUNDED NON-CONTROLLED SUBSTANCE (CMPD RX) - PHARMACY TO MIX COMPOUNDED MEDICATION     cyclobenzaprine (FLEXERIL) 5 MG tablet     fexofenadine (ALLEGRA) 180 MG tablet      gabapentin (NEURONTIN) 100 MG capsule     ibuprofen (ADVIL/MOTRIN) 200 MG tablet     Lactase (LACTAID PO)     lamoTRIgine (LAMICTAL) 100 MG tablet     lamoTRIgine (LAMICTAL) 200 MG tablet     levETIRAcetam (KEPPRA) 500 MG tablet     LORazepam (ATIVAN) 0.5 MG tablet     metoprolol tartrate (LOPRESSOR) 25 MG tablet     Multiple Vitamin (MULTI-VITAMINS) TABS     prochlorperazine (COMPAZINE) 10 MG tablet     tretinoin (RETIN-A) 0.025 % external cream     VITAMIN D, CHOLECALCIFEROL, PO     temozolomide (TEMODAR) 250 MG capsule     Current Facility-Administered Medications   Medication     lidocaine 1 % injection 4 mL     lidocaine 1 % injection 4 mL     triamcinolone (KENALOG-40) injection 40 mg     triamcinolone (KENALOG-40) injection 40 mg     DRUG ALLERGIES   Allergies   Allergen Reactions     Sulfa Drugs Hives     Benoxinate Nausea and Vomiting       ONCOLOGIC HISTORY  -4/27/2011 PRESENTATION: New onset seizure, generalized event.   -5/2011 MRB with a non-contrast enhancing right frontal mass lesion.   -5/2011 SURGERY: Craniectomy for mass resection at [facility].   PATHOLOGY: Diffuse oligodendroglioma; WHO grade II, 1p/19q co-deleted.  -6/2011-5/2012 CHEMO: Adjuvant dosed temozolomide x 12 cycles.  -4/2/2018 MRB with possible disease recurrence with a new 1.2 cm non-enhancing nodule within the cortex of the right frontal lobe adjacent to the resection cavity.  -4/12/2018 NEURO-ONC: Recommending repeat resection, appointment scheduled with Dr. Dustin Rodriguez, neurosurgery at the [facility].   -5/30/2018 SURGERY: Repeat resection with Dr. Rodriguez.   PATHOLOGY: Remains low grade, without concerning features.   -6/15/2018 NEURO-ONC: Start imaging surveillance.  -9/17/2018 NEURO-ONC/ MRB: Imaging stable, continue with surveillance.   -12/10/2018 NEURO-ONC/ MRB: Imaging stable, continue with surveillance.   -4/15/2019 NEURO-ONC/ MRB: Imaging stable, continue with surveillance.  -8/19/2019 NEURO-ONC/ MRB: Clinically stable. Imaging  largely stable, subtle increases on T2 FLAIR; continue with surveillance.  -12/16/2019 NEURO-ONC/ MRB: Clinically stable. Imaging with increased T2 FLAIR medially and laterally about the resection cavity. Referral to Dr. Figueroa with radiation oncology. Discussion with Dr. Rodriguez. Referral for repeat neuro-psychology testing.   -1/10/2020 Evaluated by Dr. Devan Figueroa.   -1/17/2020 NEURO-ONC: Tentative plan to initiate chemoradiotherapy in May-June 2020.   -2/17/2020 NEURO-PSYCH; Mild weaknesses were noted in aspects of verbal and nonverbal working memory, nonverbal recall, some aspects of executive functioning, and bilateral psychomotor speed. The remainder of her cognitive abilities were intact and performed in keeping with her above average range cognitive baseline.   -4/20 - 5/30/2020 CHEMORADS: 54 Gy in 30 fractions with concurrent temozolomide 75mg/m2 (140mg).   -5/4/2020 NEURO-ONC: Continue treatment as planned. Prescribing Clotrimazole lozenges for oral thrush.   -6/1/2020 NEURO-ONC: Completed treatment as planned.  -6/30/2020 NEURO-ONC/ MRB/ CHEMO: Clinically stable. Imaging with positive treatment effect. Starting adjuvant temozolomide 150mg/m2 (250mg), cycle 1 (start date was 7/1).   -7/28/2020 NEURO-ONC/ CHEMO: Clinically stable. Adjuvant temozolomide 200mg/m2 (320mg), cycle 2 (start date was 7/29).   -8/25/2020 NEURO-ONC/ CHEMO: Clinically stable; significant nausea/ vomiting with 200mg/m2 dosing. Adding Emend for this next cycle. Adjuvant temozolomide 150mg/m2 (250mg), cycle 3 (start date of 8/26).   -9/22/2020 NEURO-ONC/ MRB/ CHEMO: Clinically stable; less nausea/ vomiting with lowered chemotherapy dosing plus adding Emend. Imaging with no concerns, repeat in 3 months. Adjuvant temozolomide 150mg/m2 (250mg), cycle 4 (start date of 9/23).     SOCIAL HISTORY   Tobacco use: Never smoker.   Alcohol use: Social.   Drug use: Denies marijuana use.  Supplement, complimentary/ alternative medicine: None.    Employment: RN.   , 2 children      PHYSICAL EXAMINATION      -Generally well appearing.  -Respiratory: No audible wheezing.   -Skin: No facial rashes.  -Psychiatric: Normal mood and affect. Pleasant, talkative.  -Neurologic:   MENTAL STATUS:     Alert, oriented to date.    Recall: Intact.    Speech fluent.    Comprehension intact.     CRANIAL NERVES:     Pupils are equal, round, reactive to light.     Extraocular movements full, patient denies diplopia.     Equal activation with smiling/ talking on the left side of face.    Hearing intact.   With normal phonation, no dysfunction of the palate or tongue.   MOTOR: Antigravity in arms.   Mild positional tremor in the right >> left hand.   COORDINATION: Intact on finger nose with eyes closed.   SENSATION: Intact to light touch.   GAIT: Antalgic due to hip pain.    Slower speed. Good balance. Walking without assistance.     The rest of a comprehensive physical examination is deferred due to PHE (public health emergency) video visit restrictions.      MEDICAL RECORDS  Obtained and personally reviewed all available outside medical records in addition to reviewing any records available in our electronic system.     LABS  Personally reviewed all available lab results.     IMAGING  Personally reviewed MR brain imaging from today and compared to prior imaging. To my eye, there is no evidence of disease recurrence with continued slight reduction in T2 FLAIR about the right frontal resection cavity.     Imaging was shown to and results were reviewed with Barbi and Kenneth.       IMPRESSION  Clinic time was spent discussing in detail the nature of this tumor in light of her current treatment plan and repeat imaging from today. This is in addition to providing emotional support, answering questions pertaining to my recommendations, and devising the plan as outlined below.     Clinically, Barbi is doing fairly well with no new subjective neurological complaints or  increase in seizure frequency. The last cycle of chemotherapy was much better tolerated with less nausea and vomiting in the setting of a temozolomide dose reduction while using Ativan, Compazine, and Emend. She also recently started acupuncture. Continued ongoing fatigue, but energy is better in the setting of more activity/ exercise. Currently experiencing a flair of chronic hip pain. Mood is better due to improvement in quality of life from better chemotherapy side effect management. Will continue temozolomide at 150mg/m2 for cycle 4. Labs from today are at goal.    Repeat imaging with no concerns. Will repeat in 3 months.     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   -Did not tolerate 200mg/m2 dosing.   -Supportive medications; Compazine + Ativan + Emend tri-pack and bowel regimen.    Acupuncture.    Dexamethasone or medical marijuana can be alternative options and/ or since Emend is only D1-3, there is a potential of breakthrough nausea on D4-5.  -Repeat 28 day cycle if WBC >= 3, ANC >= 1.5, HgB >= 10, and platelets >= 100.  -Surveillance labs reviewed, at goal for chemotherapy.  -Will repeat CBC in 2 (at [facility]) and 4 weeks and repeat CMP every 4 weeks.  -Repeat imaging due in 12/2020.     -SEIZURE MANAGEMENT-  -Follows with Dr. Flakita Clark; Neurology, epilepsy specialist at the [facility].      -Quality of life/ MOOD/ FATIGUE-  -History of mild anxiety and depression.   -Continue to monitor mood as untreated/ undertreated depression can worsen fatigue, dysorexia, and quality of life.  -Issues with sleep, on gabapentin.     -COGNITIVE CHANGES-  -Neuro-psych testing completed with mild deficits notes. Repeat testing recommended in  2/2021.    Return to clinic virtually on 10/20.     In the meantime, Barbi knows to call with questions or concerns or to report new complaints and can be seen sooner if needed.    Myrtle Gallagher MD  Neuro-oncology    Again, thank you for allowing me to participate in the care of your patient.        Sincerely,        Myrtle Gallagher MD

## 2020-09-22 NOTE — LETTER
"    9/22/2020         RE: Barbi Tiwari  [address] [facility] [address]        Dear Colleague,    Thank you for referring your patient, Barbi Tiwari, to the [facility]. Please see a copy of my visit note below.    Barbi Tiwari is a 59 year old female who is being evaluated via a billable video visit.      The patient has been notified of following:     \"This video visit will be conducted via a call between you and your physician/provider. We have found that certain health care needs can be provided without the need for an in-person physical exam.  This service lets us provide the care you need with a video conversation.  If a prescription is necessary we can send it directly to your pharmacy.  If lab work is needed we can place an order for that and you can then stop by our lab to have the test done at a later time.    Video visits are billed at different rates depending on your insurance coverage.  Please reach out to your insurance provider with any questions.    If during the course of the call the physician/provider feels a video visit is not appropriate, you will not be charged for this service.\"    Patient has given verbal consent for Video visit? Yes  How would you like to obtain your AVS? MyChart  If you are dropped from the video visit, the video invite should be resent to: Send to e-mail at: [email or web address]  Will anyone else be joining your video visit? Yes:  Kenneth. How would they like to receive their invitation? Send to e-mail at: [email or web address]        Video-Visit Details  Type of service:  Video Visit    Video Start Time: 3:48 PM  Video End Time: 4:04 PM    Originating Location (pt. Location): Home    Distant Location (provider location):  [facility]     Platform used for Video Visit: Prosper Gallagher MD    _____________________________________________________________    NEURO-ONCOLOGY VISIT  Sep 22, 2020    CHIEF " COMPLAINT: Ms. Barbi Tiwari is a 59 year old right-handed woman with a right frontal diffuse oligodendroglioma (1p19q co-deleted), diagnosed following resection in 5/2011. She received 12 cycles of temozolomide, completed in 5/2012. Changes on imaging necessitated a repeat resection on 5/30/2018 and pathology was unchanged. No adjuvant cancer-directed therapy was initiated. Imaging over the next 1.5 years showed tumor progression and treatment was recommended. She completed chemoradiotherapy on 5/30/2020. Barbi is currently managed on adjuvant-dosed temozolomide. Dose escalation to 200mg/m2 was complicated by intolerable nausea.     I met with Barbi and Kenneth () for this follow-up visit.      HISTORY OF PRESENT ILLNESS  -Most recent cycle of temozolomide went much better. She experienced significantly less nausea and vomiting with lower dosing plus taking Compazine and Ativan plus Emend. Started acupuncture.   -Denies issues with constipation on current bowel regimen.  -She denies any new symptoms; no weakness, numbness, changes in vision, or headaches.   -Balance is off; chronic and stable.  -No change in seizure frequency.   -Better energy. Exercising more; walking in the evening.   -Continued mild word-finding issues, otherwise no change in her cognitive function.   -Working part-time she going well, just tiring and having a flair of chronic hip pain.       -Mood is better today.     REVIEW OF SYSTEMS  A comprehensive ROS negative except as in HPI.      MEDICATIONS   Current Outpatient Medications   Medication     acetaminophen (TYLENOL) 500 MG tablet     aprepitant (EMEND TRI-PACK) 80 & 125 MG MISC     ascorbic acid 500 MG TABS     calcium carbonate (TUMS) 500 MG chewable tablet     ciclopirox (PENLAC) 8 % external solution     COMPOUNDED NON-CONTROLLED SUBSTANCE (CMPD RX) - PHARMACY TO MIX COMPOUNDED MEDICATION     cyclobenzaprine (FLEXERIL) 5 MG tablet     fexofenadine (ALLEGRA) 180 MG tablet      gabapentin (NEURONTIN) 100 MG capsule     ibuprofen (ADVIL/MOTRIN) 200 MG tablet     Lactase (LACTAID PO)     lamoTRIgine (LAMICTAL) 100 MG tablet     lamoTRIgine (LAMICTAL) 200 MG tablet     levETIRAcetam (KEPPRA) 500 MG tablet     LORazepam (ATIVAN) 0.5 MG tablet     metoprolol tartrate (LOPRESSOR) 25 MG tablet     Multiple Vitamin (MULTI-VITAMINS) TABS     prochlorperazine (COMPAZINE) 10 MG tablet     tretinoin (RETIN-A) 0.025 % external cream     VITAMIN D, CHOLECALCIFEROL, PO     temozolomide (TEMODAR) 250 MG capsule     Current Facility-Administered Medications   Medication     lidocaine 1 % injection 4 mL     lidocaine 1 % injection 4 mL     triamcinolone (KENALOG-40) injection 40 mg     triamcinolone (KENALOG-40) injection 40 mg     DRUG ALLERGIES   Allergies   Allergen Reactions     Sulfa Drugs Hives     Benoxinate Nausea and Vomiting       ONCOLOGIC HISTORY  -4/27/2011 PRESENTATION: New onset seizure, generalized event.   -5/2011 MRB with a non-contrast enhancing right frontal mass lesion.   -5/2011 SURGERY: Craniectomy for mass resection at [facility].   PATHOLOGY: Diffuse oligodendroglioma; WHO grade II, 1p/19q co-deleted.  -6/2011-5/2012 CHEMO: Adjuvant dosed temozolomide x 12 cycles.  -4/2/2018 MRB with possible disease recurrence with a new 1.2 cm non-enhancing nodule within the cortex of the right frontal lobe adjacent to the resection cavity.  -4/12/2018 NEURO-ONC: Recommending repeat resection, appointment scheduled with Dr. Dustin Rodriguez, neurosurgery at the [facility].   -5/30/2018 SURGERY: Repeat resection with Dr. Rodriguez.   PATHOLOGY: Remains low grade, without concerning features.   -6/15/2018 NEURO-ONC: Start imaging surveillance.  -9/17/2018 NEURO-ONC/ MRB: Imaging stable, continue with surveillance.   -12/10/2018 NEURO-ONC/ MRB: Imaging stable, continue with surveillance.   -4/15/2019 NEURO-ONC/ MRB: Imaging stable, continue with surveillance.  -8/19/2019 NEURO-ONC/ MRB: Clinically stable. Imaging  largely stable, subtle increases on T2 FLAIR; continue with surveillance.  -12/16/2019 NEURO-ONC/ MRB: Clinically stable. Imaging with increased T2 FLAIR medially and laterally about the resection cavity. Referral to Dr. Figueroa with radiation oncology. Discussion with Dr. Rodriguez. Referral for repeat neuro-psychology testing.   -1/10/2020 Evaluated by Dr. Devan Figueroa.   -1/17/2020 NEURO-ONC: Tentative plan to initiate chemoradiotherapy in May-June 2020.   -2/17/2020 NEURO-PSYCH; Mild weaknesses were noted in aspects of verbal and nonverbal working memory, nonverbal recall, some aspects of executive functioning, and bilateral psychomotor speed. The remainder of her cognitive abilities were intact and performed in keeping with her above average range cognitive baseline.   -4/20 - 5/30/2020 CHEMORADS: 54 Gy in 30 fractions with concurrent temozolomide 75mg/m2 (140mg).   -5/4/2020 NEURO-ONC: Continue treatment as planned. Prescribing Clotrimazole lozenges for oral thrush.   -6/1/2020 NEURO-ONC: Completed treatment as planned.  -6/30/2020 NEURO-ONC/ MRB/ CHEMO: Clinically stable. Imaging with positive treatment effect. Starting adjuvant temozolomide 150mg/m2 (250mg), cycle 1 (start date was 7/1).   -7/28/2020 NEURO-ONC/ CHEMO: Clinically stable. Adjuvant temozolomide 200mg/m2 (320mg), cycle 2 (start date was 7/29).   -8/25/2020 NEURO-ONC/ CHEMO: Clinically stable; significant nausea/ vomiting with 200mg/m2 dosing. Adding Emend for this next cycle. Adjuvant temozolomide 150mg/m2 (250mg), cycle 3 (start date of 8/26).   -9/22/2020 NEURO-ONC/ MRB/ CHEMO: Clinically stable; less nausea/ vomiting with lowered chemotherapy dosing plus adding Emend. Imaging with no concerns, repeat in 3 months. Adjuvant temozolomide 150mg/m2 (250mg), cycle 4 (start date of 9/23).     SOCIAL HISTORY   Tobacco use: Never smoker.   Alcohol use: Social.   Drug use: Denies marijuana use.  Supplement, complimentary/ alternative medicine: None.    Employment: RN.   , 2 children      PHYSICAL EXAMINATION      -Generally well appearing.  -Respiratory: No audible wheezing.   -Skin: No facial rashes.  -Psychiatric: Normal mood and affect. Pleasant, talkative.  -Neurologic:   MENTAL STATUS:     Alert, oriented to date.    Recall: Intact.    Speech fluent.    Comprehension intact.     CRANIAL NERVES:     Pupils are equal, round, reactive to light.     Extraocular movements full, patient denies diplopia.     Equal activation with smiling/ talking on the left side of face.    Hearing intact.   With normal phonation, no dysfunction of the palate or tongue.   MOTOR: Antigravity in arms.   Mild positional tremor in the right >> left hand.   COORDINATION: Intact on finger nose with eyes closed.   SENSATION: Intact to light touch.   GAIT: Antalgic due to hip pain.    Slower speed. Good balance. Walking without assistance.     The rest of a comprehensive physical examination is deferred due to PHE (public health emergency) video visit restrictions.      MEDICAL RECORDS  Obtained and personally reviewed all available outside medical records in addition to reviewing any records available in our electronic system.     LABS  Personally reviewed all available lab results.     IMAGING  Personally reviewed MR brain imaging from today and compared to prior imaging. To my eye, there is no evidence of disease recurrence with continued slight reduction in T2 FLAIR about the right frontal resection cavity.     Imaging was shown to and results were reviewed with Barbi and Kenneth.       IMPRESSION  Clinic time was spent discussing in detail the nature of this tumor in light of her current treatment plan and repeat imaging from today. This is in addition to providing emotional support, answering questions pertaining to my recommendations, and devising the plan as outlined below.     Clinically, Barbi is doing fairly well with no new subjective neurological complaints or  increase in seizure frequency. The last cycle of chemotherapy was much better tolerated with less nausea and vomiting in the setting of a temozolomide dose reduction while using Ativan, Compazine, and Emend. She also recently started acupuncture. Continued ongoing fatigue, but energy is better in the setting of more activity/ exercise. Currently experiencing a flair of chronic hip pain. Mood is better due to improvement in quality of life from better chemotherapy side effect management. Will continue temozolomide at 150mg/m2 for cycle 4. Labs from today are at goal.    Repeat imaging with no concerns. Will repeat in 3 months.     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   -Did not tolerate 200mg/m2 dosing.   -Supportive medications; Compazine + Ativan + Emend tri-pack and bowel regimen.    Acupuncture.    Dexamethasone or medical marijuana can be alternative options and/ or since Emend is only D1-3, there is a potential of breakthrough nausea on D4-5.  -Repeat 28 day cycle if WBC >= 3, ANC >= 1.5, HgB >= 10, and platelets >= 100.  -Surveillance labs reviewed, at goal for chemotherapy.  -Will repeat CBC in 2 (at [facility]) and 4 weeks and repeat CMP every 4 weeks.  -Repeat imaging due in 12/2020.     -SEIZURE MANAGEMENT-  -Follows with Dr. Flakita Clark; Neurology, epilepsy specialist at the [facility].      -Quality of life/ MOOD/ FATIGUE-  -History of mild anxiety and depression.   -Continue to monitor mood as untreated/ undertreated depression can worsen fatigue, dysorexia, and quality of life.  -Issues with sleep, on gabapentin.     -COGNITIVE CHANGES-  -Neuro-psych testing completed with mild deficits notes. Repeat testing recommended in  2/2021.    Return to clinic virtually on 10/20.     In the meantime, Barbi knows to call with questions or concerns or to report new complaints and can be seen sooner if needed.    Myrtle Gallagher MD  Neuro-oncology    Again, thank you for allowing me to participate in the care of your patient.        Sincerely,        Myrtle Gallagher MD

## 2020-09-27 ENCOUNTER — MYC REFILL (OUTPATIENT)
Dept: ONCOLOGY | Facility: CLINIC | Age: 60
End: 2020-09-27

## 2020-09-27 DIAGNOSIS — T45.1X5A CHEMOTHERAPY-INDUCED NAUSEA AND VOMITING: ICD-10-CM

## 2020-09-27 DIAGNOSIS — R11.2 CHEMOTHERAPY-INDUCED NAUSEA AND VOMITING: ICD-10-CM

## 2020-09-28 ENCOUNTER — OFFICE VISIT (OUTPATIENT)
Dept: INTERNAL MEDICINE | Facility: CLINIC | Age: 60
End: 2020-09-28
Payer: COMMERCIAL

## 2020-09-28 VITALS
DIASTOLIC BLOOD PRESSURE: 64 MMHG | WEIGHT: 144.1 LBS | TEMPERATURE: 97.9 F | HEIGHT: 62 IN | BODY MASS INDEX: 26.52 KG/M2 | HEART RATE: 57 BPM | SYSTOLIC BLOOD PRESSURE: 104 MMHG | OXYGEN SATURATION: 97 %

## 2020-09-28 DIAGNOSIS — C71.9 OLIGODENDROGLIOMA (H): ICD-10-CM

## 2020-09-28 DIAGNOSIS — R56.9 SEIZURE (H): ICD-10-CM

## 2020-09-28 DIAGNOSIS — F32.1 MODERATE MAJOR DEPRESSION, SINGLE EPISODE (H): Primary | ICD-10-CM

## 2020-09-28 PROCEDURE — 99214 OFFICE O/P EST MOD 30 MIN: CPT | Mod: 25 | Performed by: INTERNAL MEDICINE

## 2020-09-28 PROCEDURE — 90750 HZV VACC RECOMBINANT IM: CPT | Performed by: INTERNAL MEDICINE

## 2020-09-28 PROCEDURE — 90682 RIV4 VACC RECOMBINANT DNA IM: CPT | Performed by: INTERNAL MEDICINE

## 2020-09-28 PROCEDURE — 96127 BRIEF EMOTIONAL/BEHAV ASSMT: CPT | Performed by: INTERNAL MEDICINE

## 2020-09-28 PROCEDURE — 90472 IMMUNIZATION ADMIN EACH ADD: CPT | Performed by: INTERNAL MEDICINE

## 2020-09-28 PROCEDURE — 90471 IMMUNIZATION ADMIN: CPT | Performed by: INTERNAL MEDICINE

## 2020-09-28 RX ORDER — PROCHLORPERAZINE MALEATE 10 MG
10 TABLET ORAL AT BEDTIME
Qty: 30 TABLET | Refills: 1 | Status: SHIPPED | OUTPATIENT
Start: 2020-09-28 | End: 2020-10-20

## 2020-09-28 RX ORDER — LEVETIRACETAM 500 MG/1
TABLET ORAL
COMMUNITY
Start: 2020-09-28 | End: 2021-01-06

## 2020-09-28 RX ORDER — CITALOPRAM HYDROBROMIDE 20 MG/1
TABLET ORAL
Qty: 30 TABLET | Refills: 0 | Status: SHIPPED | OUTPATIENT
Start: 2020-09-28 | End: 2020-10-27

## 2020-09-28 ASSESSMENT — ANXIETY QUESTIONNAIRES
GAD7 TOTAL SCORE: 6
6. BECOMING EASILY ANNOYED OR IRRITABLE: NOT AT ALL
IF YOU CHECKED OFF ANY PROBLEMS ON THIS QUESTIONNAIRE, HOW DIFFICULT HAVE THESE PROBLEMS MADE IT FOR YOU TO DO YOUR WORK, TAKE CARE OF THINGS AT HOME, OR GET ALONG WITH OTHER PEOPLE: SOMEWHAT DIFFICULT
7. FEELING AFRAID AS IF SOMETHING AWFUL MIGHT HAPPEN: SEVERAL DAYS
1. FEELING NERVOUS, ANXIOUS, OR ON EDGE: SEVERAL DAYS
5. BEING SO RESTLESS THAT IT IS HARD TO SIT STILL: NOT AT ALL
3. WORRYING TOO MUCH ABOUT DIFFERENT THINGS: MORE THAN HALF THE DAYS
2. NOT BEING ABLE TO STOP OR CONTROL WORRYING: SEVERAL DAYS

## 2020-09-28 ASSESSMENT — MIFFLIN-ST. JEOR: SCORE: 1186.88

## 2020-09-28 ASSESSMENT — PATIENT HEALTH QUESTIONNAIRE - PHQ9
5. POOR APPETITE OR OVEREATING: SEVERAL DAYS
SUM OF ALL RESPONSES TO PHQ QUESTIONS 1-9: 12

## 2020-09-28 NOTE — NURSING NOTE
"Chief Complaint   Patient presents with     Depression     /64   Pulse 57   Temp 97.9  F (36.6  C) (Temporal)   Ht 1.575 m (5' 2\")   Wt 65.4 kg (144 lb 1.6 oz)   SpO2 97%   BMI 26.36 kg/m   Estimated body mass index is 26.36 kg/m  as calculated from the following:    Height as of this encounter: 1.575 m (5' 2\").    Weight as of this encounter: 65.4 kg (144 lb 1.6 oz).            Judy Adams CMA  "

## 2020-09-28 NOTE — PROGRESS NOTES
"Subjective     Barbi Tiwari is a 59 year old female who presents to clinic today for the following health issues:    HPI       Abnormal Mood Symptoms  Onset/Duration: years  Description: did have psych assesment 02/2020  Depression (if yes, do PHQ-9): YES  Anxiety (if yes, do ELBERT-7): YES  Accompanying Signs & Symptoms:  Still participating in activities that you used to enjoy: YES  Fatigue: YES  Irritability: YES  Difficulty concentrating: YES  Changes in appetite: YES  Problems with sleep: YES  Heart racing/beating fast: no  Abnormally elevated, expansive, or irritable mood: no  Persistently increased activity or energy: no  Thoughts of hurting yourself or others: no  History:  Recent stress or major life event: YES  Prior depression or anxiety: YES, but not treated  Family history of depression or anxiety: YES  Alcohol/drug use: no  Difficulty sleeping: YES  Precipitating or alleviating factors: going through chemo  Therapies tried and outcome: none  PHQ 7/18/2019 2/24/2020 9/28/2020   PHQ-9 Total Score 3 0 12   Q9: Thoughts of better off dead/self-harm past 2 weeks Not at all Not at all Not at all     ELBERT-7 SCORE 7/18/2019 9/28/2020   Total Score 0 6     She currently is undergoing chemotherapy for recurrent brain cancer.        Review of Systems   Constitutional, HEENT, cardiovascular, pulmonary, gi and gu systems are negative, except as otherwise noted.      Objective    /64   Pulse 57   Temp 97.9  F (36.6  C) (Temporal)   Ht 1.575 m (5' 2\")   Wt 65.4 kg (144 lb 1.6 oz)   SpO2 97%   BMI 26.36 kg/m    Body mass index is 26.36 kg/m .  Physical Exam   GENERAL: alert and no distress  PSYCH: mentation appears normal, affect normal/bright, judgement and insight intact and appearance well groomed          Assessment & Plan     Moderate major depression, single episode (H)  Oligodendroglioma (H)  Think much of her mood is centered around her cancer-recurrence, dealing with the inevitability that this is " "not a curable disease, stress related to this.  I recommended she try an antidepressant but also that she consider seeing 1 of the therapist or psychologists at the oncology clinic was maintained focus of practice is cancer based.  F/u 4 wks virtually   - citalopram (CELEXA) 20 MG tablet; Take 0.5 tablets (10 mg) by mouth daily for 6 days, THEN 1 tablet (20 mg) daily.  - Oncology/Hematology Adult Referral; Future    BMI:   Estimated body mass index is 26.36 kg/m  as calculated from the following:    Height as of this encounter: 1.575 m (5' 2\").    Weight as of this encounter: 65.4 kg (144 lb 1.6 oz).           See Patient Instructions    Return in about 3 weeks (around 10/19/2020) for Tele Visit, Mental Health follow up.    Dustin Choudhury MD  [facility]      "

## 2020-09-29 ASSESSMENT — ANXIETY QUESTIONNAIRES: GAD7 TOTAL SCORE: 6

## 2020-09-29 NOTE — TELEPHONE ENCOUNTER
ONCOLOGY INTAKE: Records Information      APPT INFORMATION:  Referring provider:  Dr. Dustin Choudhury MD  Referring provider s clinic:  [facility]  Reason for visit/diagnosis:  Moderate major depression, single episode (H) [F32.1]  Oligodendroglioma (H) [C71.9]  Has patient been notified of appointment date and time?: Yes    RECORDS INFORMATION:  Were the records received with the referral (via [employer])? No    Has patient been seen for any external appt for this diagnosis? No    If yes, where? NA      ADDITIONAL INFORMATION:  None

## 2020-09-29 NOTE — PROGRESS NOTES
STD paperwork completed, checked for accuracy, signed and faxed to [employer] @ [phone]. A copy was made, sent to scanning and original mailed to patient at home address.    Successful transmission verified in [employer].      Krystina Fonseca CMA

## 2020-10-06 ENCOUNTER — DOCUMENTATION ONLY (OUTPATIENT)
Dept: PHARMACY | Facility: CLINIC | Age: 60
End: 2020-10-06

## 2020-10-06 DIAGNOSIS — C71.9 OLIGODENDROGLIOMA (H): ICD-10-CM

## 2020-10-06 DIAGNOSIS — Z79.899 ENCOUNTER FOR LONG-TERM (CURRENT) USE OF MEDICATIONS: ICD-10-CM

## 2020-10-06 LAB
ALBUMIN SERPL-MCNC: 3.6 G/DL (ref 3.4–5)
ALP SERPL-CCNC: 85 U/L (ref 40–150)
ALT SERPL W P-5'-P-CCNC: 18 U/L (ref 0–50)
ANION GAP SERPL CALCULATED.3IONS-SCNC: 6 MMOL/L (ref 3–14)
AST SERPL W P-5'-P-CCNC: 12 U/L (ref 0–45)
BASOPHILS # BLD AUTO: 0 10E9/L (ref 0–0.2)
BASOPHILS NFR BLD AUTO: 0.2 %
BILIRUB SERPL-MCNC: 0.3 MG/DL (ref 0.2–1.3)
BUN SERPL-MCNC: 28 MG/DL (ref 7–30)
CALCIUM SERPL-MCNC: 9.5 MG/DL (ref 8.5–10.1)
CHLORIDE SERPL-SCNC: 107 MMOL/L (ref 94–109)
CO2 SERPL-SCNC: 28 MMOL/L (ref 20–32)
CREAT SERPL-MCNC: 0.81 MG/DL (ref 0.52–1.04)
DIFFERENTIAL METHOD BLD: NORMAL
EOSINOPHIL # BLD AUTO: 0.3 10E9/L (ref 0–0.7)
EOSINOPHIL NFR BLD AUTO: 6 %
ERYTHROCYTE [DISTWIDTH] IN BLOOD BY AUTOMATED COUNT: 13.6 % (ref 10–15)
GFR SERPL CREATININE-BSD FRML MDRD: 79 ML/MIN/{1.73_M2}
GLUCOSE SERPL-MCNC: 107 MG/DL (ref 70–99)
HCT VFR BLD AUTO: 38.5 % (ref 35–47)
HGB BLD-MCNC: 12.3 G/DL (ref 11.7–15.7)
LYMPHOCYTES # BLD AUTO: 0.8 10E9/L (ref 0.8–5.3)
LYMPHOCYTES NFR BLD AUTO: 19.4 %
MCH RBC QN AUTO: 31.1 PG (ref 26.5–33)
MCHC RBC AUTO-ENTMCNC: 31.9 G/DL (ref 31.5–36.5)
MCV RBC AUTO: 98 FL (ref 78–100)
MONOCYTES # BLD AUTO: 0.4 10E9/L (ref 0–1.3)
MONOCYTES NFR BLD AUTO: 8.6 %
NEUTROPHILS # BLD AUTO: 2.7 10E9/L (ref 1.6–8.3)
NEUTROPHILS NFR BLD AUTO: 65.8 %
PLATELET # BLD AUTO: 218 10E9/L (ref 150–450)
POTASSIUM SERPL-SCNC: 3.6 MMOL/L (ref 3.4–5.3)
PROT SERPL-MCNC: 6.9 G/DL (ref 6.8–8.8)
RBC # BLD AUTO: 3.95 10E12/L (ref 3.8–5.2)
SODIUM SERPL-SCNC: 141 MMOL/L (ref 133–144)
WBC # BLD AUTO: 4.2 10E9/L (ref 4–11)

## 2020-10-06 PROCEDURE — 85025 COMPLETE CBC W/AUTO DIFF WBC: CPT | Performed by: PSYCHIATRY & NEUROLOGY

## 2020-10-06 PROCEDURE — 36415 COLL VENOUS BLD VENIPUNCTURE: CPT | Performed by: PSYCHIATRY & NEUROLOGY

## 2020-10-06 PROCEDURE — 80053 COMPREHEN METABOLIC PANEL: CPT | Performed by: PSYCHIATRY & NEUROLOGY

## 2020-10-06 NOTE — PROGRESS NOTES
Oral Chemotherapy Monitoring Program  Lab Follow Up     Patient currently on temozolomide  Reviewed lab results from today.     No concerning abnormalities.    Assessment & Plan:  Contacted patient by [facility] to review labs.  Instructed patient to call with any questions.      Follow-Up:  10/20 Dr. Elliott Arizmendi.    Bandar Wagner, PharmD.  Oral Chemotherapy Monitoring Program  [phone]

## 2020-10-10 ENCOUNTER — MYC MEDICAL ADVICE (OUTPATIENT)
Dept: INTERNAL MEDICINE | Facility: CLINIC | Age: 60
End: 2020-10-10

## 2020-10-13 ENCOUNTER — PRE VISIT (OUTPATIENT)
Dept: ONCOLOGY | Facility: CLINIC | Age: 60
End: 2020-10-13

## 2020-10-13 ENCOUNTER — DOCUMENTATION ONLY (OUTPATIENT)
Dept: ONCOLOGY | Facility: CLINIC | Age: 60
End: 2020-10-13

## 2020-10-13 NOTE — PROGRESS NOTES
STD update forms received via fax from [employer]      Forms to be completed and put in folder for provider to approve.        Krystina Fonseca CMA ([facility])

## 2020-10-14 DIAGNOSIS — C71.9 OLIGODENDROGLIOMA (H): Primary | ICD-10-CM

## 2020-10-14 RX ORDER — TEMOZOLOMIDE 250 MG/1
150 CAPSULE ORAL DAILY
Qty: 5 CAPSULE | Refills: 0 | Status: SHIPPED | OUTPATIENT
Start: 2020-10-14 | End: 2020-10-27

## 2020-10-16 NOTE — PROGRESS NOTES
STD forms filled out and put in providers folder for review and signature.      Krystina Fonseca CMA ([facility])

## 2020-10-17 NOTE — PATIENT INSTRUCTIONS
Adjuvant-dosed chemotherapy with temozolomide (cycle 5)  -5 consectutive nights (In a 28 day cycle; 5 days on treatment and 23 days off.)  -Dose: 250 mg  -Start date: 10/21  -Take at bedtime on an empty stomach  -Take 30 minutes after Compazine + Ativan dosing plus Emend tri-pack.   -Acupuncture.   -Continue bowel regimen    -Labs from today at goal.   -Labs the weeks of 11/2 and 11/16 at [facility].    Repeat imaging in 2 months.     Return to clinic in 4 weeks.     Myrtle Gallagher MD  Neuro-oncology  10/20/20

## 2020-10-17 NOTE — PROGRESS NOTES
"Barbi Tiwari is a 60 year old female who is being evaluated via a billable video visit.      The patient has been notified of following:     \"This video visit will be conducted via a call between you and your physician/provider. We have found that certain health care needs can be provided without the need for an in-person physical exam.  This service lets us provide the care you need with a video conversation.  If a prescription is necessary we can send it directly to your pharmacy.  If lab work is needed we can place an order for that and you can then stop by our lab to have the test done at a later time.    Video visits are billed at different rates depending on your insurance coverage.  Please reach out to your insurance provider with any questions.    If during the course of the call the physician/provider feels a video visit is not appropriate, you will not be charged for this service.\"    Patient has given verbal consent for Video visit? Yes  How would you like to obtain your AVS? MyChart  If you are dropped from the video visit, the video invite should be resent to: Send to e-mail at: [email or web address]  Will anyone else be joining your video visit? Yes:  Kenneth. How would they like to receive their invitation? Send to e-mail at: [email or web address]       Video-Visit Details  Type of service:  Video Visit    Video Start Time: 3:17 PM  Video End Time: 3:34 PM    Originating Location (pt. Location): Home    Distant Location (provider location):  [facility]     Platform used for Video Visit: Prosper Gallagher MD    _____________________________________________________________    NEURO-ONCOLOGY VISIT  Oct 20, 2020    CHIEF COMPLAINT: Ms. Barbi Tiwari is a 60 year old right-handed woman with a right frontal diffuse oligodendroglioma (1p19q co-deleted), diagnosed following resection in 5/2011. She received 12 cycles of temozolomide, completed in 5/2012. " Changes on imaging necessitated a repeat resection on 5/30/2018 and pathology was unchanged. No adjuvant cancer-directed therapy was initiated. Imaging over the next 1.5 years showed tumor progression and treatment was recommended. She completed chemoradiotherapy on 5/30/2020. Barbi is currently managed on adjuvant-dosed temozolomide. Dose escalation to 200mg/m2 was complicated by intolerable nausea.     I met with Barbi and Kenneth () for this follow-up visit.      HISTORY OF PRESENT ILLNESS  -Most recent cycle of temozolomide again went well. Mild nausea noted and no vomiting with taking Compazine and Ativan plus Emend. Utilizing acupuncture.   -Denies issues with constipation on current bowel regimen. Endorsing diarrhea in the setting drinking milk. No fevers.  -Changes in taste; cannot drink Coke anymore. Salty foods taste more salty.   -She denies any new symptoms; no weakness, numbness, changes in vision, or headaches.   -Balance is off; chronic and stable.  -No change in seizure frequency.   -Fatigued. Tries very easily lately.   -Continued mild word-finding issues, otherwise no change in her cognitive function.   -Working part-time, shifts are doable, just tiring.   -With seeing a chiropractor, hip pain has resolved, however, now having a flair of piriformis syndrome.        -Mood is depressed, primary care started her on Celexa. She has not noticed an improvement in her mood yet.     REVIEW OF SYSTEMS  A comprehensive ROS negative except as in HPI.      MEDICATIONS   Current Outpatient Medications   Medication     acetaminophen (TYLENOL) 500 MG tablet     aprepitant (EMEND TRI-PACK) 80 & 125 MG MISC     ascorbic acid 500 MG TABS     calcium carbonate (TUMS) 500 MG chewable tablet     ciclopirox (PENLAC) 8 % external solution     citalopram (CELEXA) 20 MG tablet     COMPOUNDED NON-CONTROLLED SUBSTANCE (CMPD RX) - PHARMACY TO MIX COMPOUNDED MEDICATION     cyclobenzaprine (FLEXERIL) 5 MG tablet      fexofenadine (ALLEGRA) 180 MG tablet     gabapentin (NEURONTIN) 100 MG capsule     ibuprofen (ADVIL/MOTRIN) 200 MG tablet     Lactase (LACTAID PO)     lamoTRIgine (LAMICTAL) 100 MG tablet     lamoTRIgine (LAMICTAL) 200 MG tablet     levETIRAcetam (KEPPRA) 500 MG tablet     LORazepam (ATIVAN) 0.5 MG tablet     metoprolol tartrate (LOPRESSOR) 25 MG tablet     Multiple Vitamin (MULTI-VITAMINS) TABS     prochlorperazine (COMPAZINE) 10 MG tablet     temozolomide (TEMODAR) 250 MG capsule     tretinoin (RETIN-A) 0.025 % external cream     VITAMIN D, CHOLECALCIFEROL, PO     No current facility-administered medications for this visit.      DRUG ALLERGIES   Allergies   Allergen Reactions     Sulfa Drugs Hives     Benoxinate Nausea and Vomiting       ONCOLOGIC HISTORY  -4/27/2011 PRESENTATION: New onset seizure, generalized event.   -5/2011 MRB with a non-contrast enhancing right frontal mass lesion.   -5/2011 SURGERY: Craniectomy for mass resection at [facility].   PATHOLOGY: Diffuse oligodendroglioma; WHO grade II, 1p/19q co-deleted.  -6/2011-5/2012 CHEMO: Adjuvant dosed temozolomide x 12 cycles.  -4/2/2018 MRB with possible disease recurrence with a new 1.2 cm non-enhancing nodule within the cortex of the right frontal lobe adjacent to the resection cavity.  -4/12/2018 NEURO-ONC: Recommending repeat resection, appointment scheduled with Dr. Dustin Rodriguez, neurosurgery at the [facility].   -5/30/2018 SURGERY: Repeat resection with Dr. Rodriguez.   PATHOLOGY: Remains low grade, without concerning features.   -6/15/2018 NEURO-ONC: Start imaging surveillance.  -9/17/2018 NEURO-ONC/ MRB: Imaging stable, continue with surveillance.   -12/10/2018 NEURO-ONC/ MRB: Imaging stable, continue with surveillance.   -4/15/2019 NEURO-ONC/ MRB: Imaging stable, continue with surveillance.  -8/19/2019 NEURO-ONC/ MRB: Clinically stable. Imaging largely stable, subtle increases on T2 FLAIR; continue with surveillance.  -12/16/2019 NEURO-ONC/ MRB: Clinically  stable. Imaging with increased T2 FLAIR medially and laterally about the resection cavity. Referral to Dr. Figueroa with radiation oncology. Discussion with Dr. Rodriguez. Referral for repeat neuro-psychology testing.   -1/10/2020 Evaluated by Dr. Devan Figueroa.   -1/17/2020 NEURO-ONC: Tentative plan to initiate chemoradiotherapy in May-June 2020.   -2/17/2020 NEURO-PSYCH; Mild weaknesses were noted in aspects of verbal and nonverbal working memory, nonverbal recall, some aspects of executive functioning, and bilateral psychomotor speed. The remainder of her cognitive abilities were intact and performed in keeping with her above average range cognitive baseline.   -4/20 - 5/30/2020 CHEMORADS: 54 Gy in 30 fractions with concurrent temozolomide 75mg/m2 (140mg).   -5/4/2020 NEURO-ONC: Continue treatment as planned. Prescribing Clotrimazole lozenges for oral thrush.   -6/1/2020 NEURO-ONC: Completed treatment as planned.  -6/30/2020 NEURO-ONC/ MRB/ CHEMO: Clinically stable. Imaging with positive treatment effect. Starting adjuvant temozolomide 150mg/m2 (250mg), cycle 1 (start date was 7/1).   -7/28/2020 NEURO-ONC/ CHEMO: Clinically stable. Adjuvant temozolomide 200mg/m2 (320mg), cycle 2 (start date was 7/29).   -8/25/2020 NEURO-ONC/ CHEMO: Clinically stable; significant nausea/ vomiting with 200mg/m2 dosing. Adding Emend for this next cycle. Adjuvant temozolomide 150mg/m2 (250mg), cycle 3 (start date of 8/26).   -9/22/2020 NEURO-ONC/ MRB/ CHEMO: Clinically stable; less nausea/ vomiting with lowered chemotherapy dosing plus adding Emend. Imaging with no concerns, repeat in 3 months. Adjuvant temozolomide 150mg/m2 (250mg), cycle 4 (start date of 9/23).   -9/22/2020 NEURO-ONC/ CHEMO: Clinically stable; no new/ worsening issues. Experiencing pain related to piriformis syndrome. Adjuvant temozolomide 150mg/m2 (250mg), cycle 5 (start date of 10/21).     SOCIAL HISTORY   Tobacco use: Never smoker.   Alcohol use: Social.   Drug use:  Denies marijuana use.  Supplement, complimentary/ alternative medicine: None.   Employment: RN.   , 2 children      PHYSICAL EXAMINATION    -Generally well appearing.  -Respiratory: No audible wheezing.   -Skin: No facial rashes.  -Psychiatric: Normal mood and affect. Pleasant, talkative.  -Neurologic:   MENTAL STATUS:     Alert, oriented to date.    Recall: Intact.    Speech fluent.    Comprehension intact.     CRANIAL NERVES:     Pupils are equal, round, reactive to light.     Extraocular movements full, patient denies diplopia.     Equal activation with smiling/ talking on the left side of face.    Hearing intact.   With normal phonation, no dysfunction of the palate or tongue.   MOTOR: Antigravity in arms. No pronation and no drift.   Mild positional tremor in the right >> left hand.   COORDINATION: Intact on finger nose with eyes closed.   SENSATION: Intact to light touch.     The rest of a comprehensive physical examination is deferred due to [facility] (public health emergency) video visit restrictions.      MEDICAL RECORDS  Obtained and personally reviewed all available outside medical records in addition to reviewing any records available in our electronic system.     LABS  Personally reviewed all available lab results.     IMAGING  No new neuro-imaging to review.        IMPRESSION  Clinic time was spent discussing in detail the nature of her cancer in light of her current treatment plan. This is in addition to providing emotional support, answering questions pertaining to my recommendations, and devising the plan as outlined below.     Clinically, Barbi is doing well with no new subjective neurological complaints or increase in seizure frequency. However, she has been feeling more fatigued lately and is seeing a chiropractor for help in managing pain related to piriformis syndrome. For depression, she has started on Celexa, but has not yet noted an improvement in mood. For both management of  neuropathic pain + mood, I prefer an SNRI (ie Cymbalta or Effexor). I also prefer Zoloft over other SSRIs. As depression can confound feelings of fatigued, hopefully with medication, both well start to improve for Barbi. Examination today is unchanged.     The last cycle of chemotherapy again went well with a tolerable degree of chemotherapy-associated nausea, no vomiting with use of Ativan, Compazine, and Emend plus acupuncture. Continued ongoing fatigue as stated above. Will continue temozolomide at 150mg/m2 for cycle 5. Labs from today are at goal.    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   -Did not tolerate 200mg/m2 dosing.   -Supportive medications; Compazine + Ativan + Emend tri-pack and bowel regimen.    Acupuncture.    Dexamethasone or medical marijuana can be alternative options and/ or since Emend is only D1-3, there is a potential of breakthrough nausea on D4-5.  -Repeat 28 day cycle if WBC >= 3, ANC >= 1.5, HgB >= 10, and platelets >= 100.  -Surveillance labs reviewed, at goal for chemotherapy.  -Will repeat CBC in 2 (at [facility]) and 4 weeks and repeat CMP every 4 weeks.  -Repeat imaging due in 12/2020.     -SEIZURE MANAGEMENT-  -Follows with Dr. Flakita Clark; Neurology, epilepsy specialist at the [facility].      -Quality of life/ MOOD/ FATIGUE-  -History of mild anxiety and depression.   -On Celexa.   -Untreated/ undertreated depression can worsen fatigue, dysorexia, and quality of life.  -Issues with sleep and neuropathic pain, on gabapentin.     -COGNITIVE CHANGES-  -Neuro-psych testing completed with mild deficits notes. Repeat testing recommended in 2/2021.    Return to clinic virtually on 11/17.     In the meantime,  Barbi knows to call with questions or concerns or to report new complaints and can be seen sooner if needed.    Myrtle Gallagher MD  Neuro-oncology

## 2020-10-20 ENCOUNTER — VIRTUAL VISIT (OUTPATIENT)
Dept: ONCOLOGY | Facility: CLINIC | Age: 60
End: 2020-10-20
Attending: PSYCHIATRY & NEUROLOGY
Payer: COMMERCIAL

## 2020-10-20 ENCOUNTER — MYC REFILL (OUTPATIENT)
Dept: ONCOLOGY | Facility: CLINIC | Age: 60
End: 2020-10-20

## 2020-10-20 VITALS — BODY MASS INDEX: 25.61 KG/M2 | WEIGHT: 140 LBS

## 2020-10-20 DIAGNOSIS — T45.1X5A CHEMOTHERAPY-INDUCED NAUSEA AND VOMITING: ICD-10-CM

## 2020-10-20 DIAGNOSIS — Z79.899 ENCOUNTER FOR LONG-TERM (CURRENT) USE OF MEDICATIONS: ICD-10-CM

## 2020-10-20 DIAGNOSIS — C71.9 OLIGODENDROGLIOMA (H): Primary | ICD-10-CM

## 2020-10-20 DIAGNOSIS — C71.9 OLIGODENDROGLIOMA (H): ICD-10-CM

## 2020-10-20 DIAGNOSIS — R11.2 CHEMOTHERAPY-INDUCED NAUSEA AND VOMITING: ICD-10-CM

## 2020-10-20 LAB
ALBUMIN SERPL-MCNC: 3.7 G/DL (ref 3.4–5)
ALP SERPL-CCNC: 71 U/L (ref 40–150)
ALT SERPL W P-5'-P-CCNC: 17 U/L (ref 0–50)
ANION GAP SERPL CALCULATED.3IONS-SCNC: 4 MMOL/L (ref 3–14)
AST SERPL W P-5'-P-CCNC: 14 U/L (ref 0–45)
BASOPHILS # BLD AUTO: 0 10E9/L (ref 0–0.2)
BASOPHILS NFR BLD AUTO: 0.3 %
BILIRUB SERPL-MCNC: 0.4 MG/DL (ref 0.2–1.3)
BUN SERPL-MCNC: 21 MG/DL (ref 7–30)
CALCIUM SERPL-MCNC: 9.7 MG/DL (ref 8.5–10.1)
CAPILLARY BLOOD COLLECTION: NORMAL
CHLORIDE SERPL-SCNC: 109 MMOL/L (ref 94–109)
CO2 SERPL-SCNC: 27 MMOL/L (ref 20–32)
CREAT SERPL-MCNC: 0.74 MG/DL (ref 0.52–1.04)
DIFFERENTIAL METHOD BLD: ABNORMAL
EOSINOPHIL # BLD AUTO: 0.1 10E9/L (ref 0–0.7)
EOSINOPHIL NFR BLD AUTO: 4 %
ERYTHROCYTE [DISTWIDTH] IN BLOOD BY AUTOMATED COUNT: 14.1 % (ref 10–15)
GFR SERPL CREATININE-BSD FRML MDRD: 88 ML/MIN/{1.73_M2}
GLUCOSE SERPL-MCNC: 149 MG/DL (ref 70–99)
HCT VFR BLD AUTO: 37.2 % (ref 35–47)
HGB BLD-MCNC: 11.9 G/DL (ref 11.7–15.7)
LYMPHOCYTES # BLD AUTO: 0.6 10E9/L (ref 0.8–5.3)
LYMPHOCYTES NFR BLD AUTO: 20.9 %
MCH RBC QN AUTO: 31.8 PG (ref 26.5–33)
MCHC RBC AUTO-ENTMCNC: 32 G/DL (ref 31.5–36.5)
MCV RBC AUTO: 100 FL (ref 78–100)
MONOCYTES # BLD AUTO: 0.3 10E9/L (ref 0–1.3)
MONOCYTES NFR BLD AUTO: 10.1 %
NEUTROPHILS # BLD AUTO: 1.9 10E9/L (ref 1.6–8.3)
NEUTROPHILS NFR BLD AUTO: 64.7 %
PLATELET # BLD AUTO: 144 10E9/L (ref 150–450)
POTASSIUM SERPL-SCNC: 3.9 MMOL/L (ref 3.4–5.3)
PROT SERPL-MCNC: 6.6 G/DL (ref 6.8–8.8)
RBC # BLD AUTO: 3.74 10E12/L (ref 3.8–5.2)
SODIUM SERPL-SCNC: 140 MMOL/L (ref 133–144)
WBC # BLD AUTO: 3 10E9/L (ref 4–11)

## 2020-10-20 PROCEDURE — 80053 COMPREHEN METABOLIC PANEL: CPT | Performed by: PSYCHIATRY & NEUROLOGY

## 2020-10-20 PROCEDURE — 85025 COMPLETE CBC W/AUTO DIFF WBC: CPT | Performed by: PSYCHIATRY & NEUROLOGY

## 2020-10-20 PROCEDURE — 999N001193 HC VIDEO/TELEPHONE VISIT; NO CHARGE

## 2020-10-20 PROCEDURE — 36416 COLLJ CAPILLARY BLOOD SPEC: CPT | Performed by: PSYCHIATRY & NEUROLOGY

## 2020-10-20 PROCEDURE — 99214 OFFICE O/P EST MOD 30 MIN: CPT | Mod: 95 | Performed by: PSYCHIATRY & NEUROLOGY

## 2020-10-20 RX ORDER — PROCHLORPERAZINE MALEATE 10 MG
10 TABLET ORAL AT BEDTIME
Qty: 30 TABLET | Refills: 1 | Status: SHIPPED | OUTPATIENT
Start: 2020-10-20 | End: 2020-12-15

## 2020-10-20 ASSESSMENT — PAIN SCALES - GENERAL: PAINLEVEL: NO PAIN (0)

## 2020-10-20 NOTE — LETTER
"    10/20/2020         RE: Barbi Tiwari  [address] [facility] [id number]        Dear Colleague,    Thank you for referring your patient, Barbi Tiwari, to the [facility]. Please see a copy of my visit note below.    Barbi Tiwari is a 60 year old female who is being evaluated via a billable video visit.      The patient has been notified of following:     \"This video visit will be conducted via a call between you and your physician/provider. We have found that certain health care needs can be provided without the need for an in-person physical exam.  This service lets us provide the care you need with a video conversation.  If a prescription is necessary we can send it directly to your pharmacy.  If lab work is needed we can place an order for that and you can then stop by our lab to have the test done at a later time.    Video visits are billed at different rates depending on your insurance coverage.  Please reach out to your insurance provider with any questions.    If during the course of the call the physician/provider feels a video visit is not appropriate, you will not be charged for this service.\"    Patient has given verbal consent for Video visit? Yes  How would you like to obtain your AVS? MyChart  If you are dropped from the video visit, the video invite should be resent to: Send to e-mail at: [email or web address]  Will anyone else be joining your video visit? Yes:  Kenneth. How would they like to receive their invitation? Send to e-mail at: [email or web address]       Video-Visit Details  Type of service:  Video Visit    Video Start Time: 3:17 PM  Video End Time: 3:34 PM    Originating Location (pt. Location): Home    Distant Location (provider location):  [facility]     Platform used for Video Visit: Prosper Gallagher MD    _____________________________________________________________    NEURO-ONCOLOGY VISIT  Oct " 20, 2020    CHIEF COMPLAINT: Ms. Barbi Tiwari is a 60 year old right-handed woman with a right frontal diffuse oligodendroglioma (1p19q co-deleted), diagnosed following resection in 5/2011. She received 12 cycles of temozolomide, completed in 5/2012. Changes on imaging necessitated a repeat resection on 5/30/2018 and pathology was unchanged. No adjuvant cancer-directed therapy was initiated. Imaging over the next 1.5 years showed tumor progression and treatment was recommended. She completed chemoradiotherapy on 5/30/2020. Barbi is currently managed on adjuvant-dosed temozolomide. Dose escalation to 200mg/m2 was complicated by intolerable nausea.     I met with Barbi and Kenneth () for this follow-up visit.      HISTORY OF PRESENT ILLNESS  -Most recent cycle of temozolomide again went well. Mild nausea noted and no vomiting with taking Compazine and Ativan plus Emend. Utilizing acupuncture.   -Denies issues with constipation on current bowel regimen. Endorsing diarrhea in the setting drinking milk. No fevers.  -Changes in taste; cannot drink Coke anymore. Salty foods taste more salty.   -She denies any new symptoms; no weakness, numbness, changes in vision, or headaches.   -Balance is off; chronic and stable.  -No change in seizure frequency.   -Fatigued. Tries very easily lately.   -Continued mild word-finding issues, otherwise no change in her cognitive function.   -Working part-time, shifts are doable, just tiring.   -With seeing a chiropractor, hip pain has resolved, however, now having a flair of piriformis syndrome.        -Mood is depressed, primary care started her on Celexa. She has not noticed an improvement in her mood yet.     REVIEW OF SYSTEMS  A comprehensive ROS negative except as in HPI.      MEDICATIONS   Current Outpatient Medications   Medication     acetaminophen (TYLENOL) 500 MG tablet     aprepitant (EMEND TRI-PACK) 80 & 125 MG MISC     ascorbic acid 500 MG TABS     calcium carbonate  (TUMS) 500 MG chewable tablet     ciclopirox (PENLAC) 8 % external solution     citalopram (CELEXA) 20 MG tablet     COMPOUNDED NON-CONTROLLED SUBSTANCE (CMPD RX) - PHARMACY TO MIX COMPOUNDED MEDICATION     cyclobenzaprine (FLEXERIL) 5 MG tablet     fexofenadine (ALLEGRA) 180 MG tablet     gabapentin (NEURONTIN) 100 MG capsule     ibuprofen (ADVIL/MOTRIN) 200 MG tablet     Lactase (LACTAID PO)     lamoTRIgine (LAMICTAL) 100 MG tablet     lamoTRIgine (LAMICTAL) 200 MG tablet     levETIRAcetam (KEPPRA) 500 MG tablet     LORazepam (ATIVAN) 0.5 MG tablet     metoprolol tartrate (LOPRESSOR) 25 MG tablet     Multiple Vitamin (MULTI-VITAMINS) TABS     prochlorperazine (COMPAZINE) 10 MG tablet     temozolomide (TEMODAR) 250 MG capsule     tretinoin (RETIN-A) 0.025 % external cream     VITAMIN D, CHOLECALCIFEROL, PO     No current facility-administered medications for this visit.      DRUG ALLERGIES   Allergies   Allergen Reactions     Sulfa Drugs Hives     Benoxinate Nausea and Vomiting       ONCOLOGIC HISTORY  -4/27/2011 PRESENTATION: New onset seizure, generalized event.   -5/2011 MRB with a non-contrast enhancing right frontal mass lesion.   -5/2011 SURGERY: Craniectomy for mass resection at [facility].   PATHOLOGY: Diffuse oligodendroglioma; WHO grade II, 1p/19q co-deleted.  -6/2011-5/2012 CHEMO: Adjuvant dosed temozolomide x 12 cycles.  -4/2/2018 MRB with possible disease recurrence with a new 1.2 cm non-enhancing nodule within the cortex of the right frontal lobe adjacent to the resection cavity.  -4/12/2018 NEURO-ONC: Recommending repeat resection, appointment scheduled with Dr. Dustin Rodriguez, neurosurgery at the [facility].   -5/30/2018 SURGERY: Repeat resection with Dr. Rodriguez.   PATHOLOGY: Remains low grade, without concerning features.   -6/15/2018 NEURO-ONC: Start imaging surveillance.  -9/17/2018 NEURO-ONC/ MRB: Imaging stable, continue with surveillance.   -12/10/2018 NEURO-ONC/ MRB: Imaging stable, continue with  surveillance.   -4/15/2019 NEURO-ONC/ MRB: Imaging stable, continue with surveillance.  -8/19/2019 NEURO-ONC/ MRB: Clinically stable. Imaging largely stable, subtle increases on T2 FLAIR; continue with surveillance.  -12/16/2019 NEURO-ONC/ MRB: Clinically stable. Imaging with increased T2 FLAIR medially and laterally about the resection cavity. Referral to Dr. Figueroa with radiation oncology. Discussion with Dr. Rodriguez. Referral for repeat neuro-psychology testing.   -1/10/2020 Evaluated by Dr. Devan Figueroa.   -1/17/2020 NEURO-ONC: Tentative plan to initiate chemoradiotherapy in May-June 2020.   -2/17/2020 NEURO-PSYCH; Mild weaknesses were noted in aspects of verbal and nonverbal working memory, nonverbal recall, some aspects of executive functioning, and bilateral psychomotor speed. The remainder of her cognitive abilities were intact and performed in keeping with her above average range cognitive baseline.   -4/20 - 5/30/2020 CHEMORADS: 54 Gy in 30 fractions with concurrent temozolomide 75mg/m2 (140mg).   -5/4/2020 NEURO-ONC: Continue treatment as planned. Prescribing Clotrimazole lozenges for oral thrush.   -6/1/2020 NEURO-ONC: Completed treatment as planned.  -6/30/2020 NEURO-ONC/ MRB/ CHEMO: Clinically stable. Imaging with positive treatment effect. Starting adjuvant temozolomide 150mg/m2 (250mg), cycle 1 (start date was 7/1).   -7/28/2020 NEURO-ONC/ CHEMO: Clinically stable. Adjuvant temozolomide 200mg/m2 (320mg), cycle 2 (start date was 7/29).   -8/25/2020 NEURO-ONC/ CHEMO: Clinically stable; significant nausea/ vomiting with 200mg/m2 dosing. Adding Emend for this next cycle. Adjuvant temozolomide 150mg/m2 (250mg), cycle 3 (start date of 8/26).   -9/22/2020 NEURO-ONC/ MRB/ CHEMO: Clinically stable; less nausea/ vomiting with lowered chemotherapy dosing plus adding Emend. Imaging with no concerns, repeat in 3 months. Adjuvant temozolomide 150mg/m2 (250mg), cycle 4 (start date of 9/23).   -9/22/2020 NEURO-ONC/  CHEMO: Clinically stable; no new/ worsening issues. Experiencing pain related to piriformis syndrome. Adjuvant temozolomide 150mg/m2 (250mg), cycle 5 (start date of 10/21).     SOCIAL HISTORY   Tobacco use: Never smoker.   Alcohol use: Social.   Drug use: Denies marijuana use.  Supplement, complimentary/ alternative medicine: None.   Employment: RN.   , 2 children      PHYSICAL EXAMINATION    -Generally well appearing.  -Respiratory: No audible wheezing.   -Skin: No facial rashes.  -Psychiatric: Normal mood and affect. Pleasant, talkative.  -Neurologic:   MENTAL STATUS:     Alert, oriented to date.    Recall: Intact.    Speech fluent.    Comprehension intact.     CRANIAL NERVES:     Pupils are equal, round, reactive to light.     Extraocular movements full, patient denies diplopia.     Equal activation with smiling/ talking on the left side of face.    Hearing intact.   With normal phonation, no dysfunction of the palate or tongue.   MOTOR: Antigravity in arms. No pronation and no drift.   Mild positional tremor in the right >> left hand.   COORDINATION: Intact on finger nose with eyes closed.   SENSATION: Intact to light touch.     The rest of a comprehensive physical examination is deferred due to [facility] (public health emergency) video visit restrictions.      MEDICAL RECORDS  Obtained and personally reviewed all available outside medical records in addition to reviewing any records available in our electronic system.     LABS  Personally reviewed all available lab results.     IMAGING  No new neuro-imaging to review.        IMPRESSION  Clinic time was spent discussing in detail the nature of her cancer in light of her current treatment plan. This is in addition to providing emotional support, answering questions pertaining to my recommendations, and devising the plan as outlined below.     Clinically, Barbi is doing well with no new subjective neurological complaints or increase in seizure frequency.  However, she has been feeling more fatigued lately and is seeing a chiropractor for help in managing pain related to piriformis syndrome. For depression, she has started on Celexa, but has not yet noted an improvement in mood. For both management of neuropathic pain + mood, I prefer an SNRI (ie Cymbalta or Effexor). I also prefer Zoloft over other SSRIs. As depression can confound feelings of fatigued, hopefully with medication, both well start to improve for Barbi. Examination today is unchanged.     The last cycle of chemotherapy again went well with a tolerable degree of chemotherapy-associated nausea, no vomiting with use of Ativan, Compazine, and Emend plus acupuncture. Continued ongoing fatigue as stated above. Will continue temozolomide at 150mg/m2 for cycle 5. Labs from today are at goal.    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   -Did not tolerate 200mg/m2 dosing.   -Supportive medications; Compazine + Ativan + Emend tri-pack and bowel regimen.    Acupuncture.    Dexamethasone or medical marijuana can be alternative options and/ or since Emend is only D1-3, there is a potential of breakthrough nausea on D4-5.  -Repeat 28 day cycle if WBC >= 3, ANC >= 1.5, HgB >= 10, and platelets >= 100.  -Surveillance labs reviewed, at goal for chemotherapy.  -Will repeat CBC in 2 (at [facility]) and 4 weeks and repeat CMP every 4 weeks.  -Repeat imaging due in 12/2020.     -SEIZURE MANAGEMENT-  -Follows with Dr. Flakita Clark; Neurology, epilepsy specialist at the [facility].      -Quality of life/ MOOD/ FATIGUE-  -History of mild anxiety and depression.   -On Celexa.   -Untreated/ undertreated depression can worsen fatigue, dysorexia, and  quality of life.  -Issues with sleep and neuropathic pain, on gabapentin.     -COGNITIVE CHANGES-  -Neuro-psych testing completed with mild deficits notes. Repeat testing recommended in 2/2021.    Return to clinic virtually on 11/17.     In the meantime, Barbi knows to call with questions or concerns or to report new complaints and can be seen sooner if needed.    Myrtle Gallagher MD  Neuro-oncology      Again, thank you for allowing me to participate in the care of your patient.        Sincerely,        Myrtle Gallagher MD

## 2020-10-20 NOTE — PROGRESS NOTES
STD paperwork completed, checked for accuracy, signed and faxed to [employer] @ [phone]. A copy was made, sent to scanning and original mailed to patient at home address.    Successful transmission verified in Right Fax.      Krystina Fonseca CMA

## 2020-10-20 NOTE — LETTER
"    10/20/2020         RE: Barbi Tiwari  [address] [facility] [id number]        Dear Colleague,    Thank you for referring your patient, Barbi Tiwari, to the [facility]. Please see a copy of my visit note below.    Barbi Tiwari is a 60 year old female who is being evaluated via a billable video visit.      The patient has been notified of following:     \"This video visit will be conducted via a call between you and your physician/provider. We have found that certain health care needs can be provided without the need for an in-person physical exam.  This service lets us provide the care you need with a video conversation.  If a prescription is necessary we can send it directly to your pharmacy.  If lab work is needed we can place an order for that and you can then stop by our lab to have the test done at a later time.    Video visits are billed at different rates depending on your insurance coverage.  Please reach out to your insurance provider with any questions.    If during the course of the call the physician/provider feels a video visit is not appropriate, you will not be charged for this service.\"    Patient has given verbal consent for Video visit? Yes  How would you like to obtain your AVS? MyChart  If you are dropped from the video visit, the video invite should be resent to: Send to e-mail at: [email or web address]  Will anyone else be joining your video visit? Yes:  Kenneth. How would they like to receive their invitation? Send to e-mail at: [email or web address]       Video-Visit Details  Type of service:  Video Visit    Video Start Time: 3:17 PM  Video End Time: 3:34 PM    Originating Location (pt. Location): Home    Distant Location (provider location):  [facility]     Platform used for Video Visit: Prosper Gallagher MD    _____________________________________________________________    NEURO-ONCOLOGY VISIT  Oct " 20, 2020    CHIEF COMPLAINT: Ms. Barbi Tiwari is a 60 year old right-handed woman with a right frontal diffuse oligodendroglioma (1p19q co-deleted), diagnosed following resection in 5/2011. She received 12 cycles of temozolomide, completed in 5/2012. Changes on imaging necessitated a repeat resection on 5/30/2018 and pathology was unchanged. No adjuvant cancer-directed therapy was initiated. Imaging over the next 1.5 years showed tumor progression and treatment was recommended. She completed chemoradiotherapy on 5/30/2020. Barbi is currently managed on adjuvant-dosed temozolomide. Dose escalation to 200mg/m2 was complicated by intolerable nausea.     I met with Barbi and Kenneth () for this follow-up visit.      HISTORY OF PRESENT ILLNESS  -Most recent cycle of temozolomide again went well. Mild nausea noted and no vomiting with taking Compazine and Ativan plus Emend. Utilizing acupuncture.   -Denies issues with constipation on current bowel regimen. Endorsing diarrhea in the setting drinking milk. No fevers.  -Changes in taste; cannot drink Coke anymore. Salty foods taste more salty.   -She denies any new symptoms; no weakness, numbness, changes in vision, or headaches.   -Balance is off; chronic and stable.  -No change in seizure frequency.   -Fatigued. Tries very easily lately.   -Continued mild word-finding issues, otherwise no change in her cognitive function.   -Working part-time, shifts are doable, just tiring.   -With seeing a chiropractor, hip pain has resolved, however, now having a flair of piriformis syndrome.        -Mood is depressed, primary care started her on Celexa. She has not noticed an improvement in her mood yet.     REVIEW OF SYSTEMS  A comprehensive ROS negative except as in HPI.      MEDICATIONS   Current Outpatient Medications   Medication     acetaminophen (TYLENOL) 500 MG tablet     aprepitant (EMEND TRI-PACK) 80 & 125 MG MISC     ascorbic acid 500 MG TABS     calcium carbonate  (TUMS) 500 MG chewable tablet     ciclopirox (PENLAC) 8 % external solution     citalopram (CELEXA) 20 MG tablet     COMPOUNDED NON-CONTROLLED SUBSTANCE (CMPD RX) - PHARMACY TO MIX COMPOUNDED MEDICATION     cyclobenzaprine (FLEXERIL) 5 MG tablet     fexofenadine (ALLEGRA) 180 MG tablet     gabapentin (NEURONTIN) 100 MG capsule     ibuprofen (ADVIL/MOTRIN) 200 MG tablet     Lactase (LACTAID PO)     lamoTRIgine (LAMICTAL) 100 MG tablet     lamoTRIgine (LAMICTAL) 200 MG tablet     levETIRAcetam (KEPPRA) 500 MG tablet     LORazepam (ATIVAN) 0.5 MG tablet     metoprolol tartrate (LOPRESSOR) 25 MG tablet     Multiple Vitamin (MULTI-VITAMINS) TABS     prochlorperazine (COMPAZINE) 10 MG tablet     temozolomide (TEMODAR) 250 MG capsule     tretinoin (RETIN-A) 0.025 % external cream     VITAMIN D, CHOLECALCIFEROL, PO     No current facility-administered medications for this visit.      DRUG ALLERGIES   Allergies   Allergen Reactions     Sulfa Drugs Hives     Benoxinate Nausea and Vomiting       ONCOLOGIC HISTORY  -4/27/2011 PRESENTATION: New onset seizure, generalized event.   -5/2011 MRB with a non-contrast enhancing right frontal mass lesion.   -5/2011 SURGERY: Craniectomy for mass resection at [facility].   PATHOLOGY: Diffuse oligodendroglioma; WHO grade II, 1p/19q co-deleted.  -6/2011-5/2012 CHEMO: Adjuvant dosed temozolomide x 12 cycles.  -4/2/2018 MRB with possible disease recurrence with a new 1.2 cm non-enhancing nodule within the cortex of the right frontal lobe adjacent to the resection cavity.  -4/12/2018 NEURO-ONC: Recommending repeat resection, appointment scheduled with Dr. Dustin Rodriguez, neurosurgery at [facility].   -5/30/2018 SURGERY: Repeat resection with Dr. Rodriguez.   PATHOLOGY: Remains low grade, without concerning features.   -6/15/2018 NEURO-ONC: Start imaging surveillance.  -9/17/2018 NEURO-ONC/ MRB: Imaging stable, continue with surveillance.   -12/10/2018 NEURO-ONC/ MRB: Imaging stable, continue with  surveillance.   -4/15/2019 NEURO-ONC/ MRB: Imaging stable, continue with surveillance.  -8/19/2019 NEURO-ONC/ MRB: Clinically stable. Imaging largely stable, subtle increases on T2 FLAIR; continue with surveillance.  -12/16/2019 NEURO-ONC/ MRB: Clinically stable. Imaging with increased T2 FLAIR medially and laterally about the resection cavity. Referral to Dr. Figueroa with radiation oncology. Discussion with Dr. Rodriguez. Referral for repeat neuro-psychology testing.   -1/10/2020 Evaluated by Dr. Devan Figueroa.   -1/17/2020 NEURO-ONC: Tentative plan to initiate chemoradiotherapy in May-June 2020.   -2/17/2020 NEURO-PSYCH; Mild weaknesses were noted in aspects of verbal and nonverbal working memory, nonverbal recall, some aspects of executive functioning, and bilateral psychomotor speed. The remainder of her cognitive abilities were intact and performed in keeping with her above average range cognitive baseline.   -4/20 - 5/30/2020 CHEMORADS: 54 Gy in 30 fractions with concurrent temozolomide 75mg/m2 (140mg).   -5/4/2020 NEURO-ONC: Continue treatment as planned. Prescribing Clotrimazole lozenges for oral thrush.   -6/1/2020 NEURO-ONC: Completed treatment as planned.  -6/30/2020 NEURO-ONC/ MRB/ CHEMO: Clinically stable. Imaging with positive treatment effect. Starting adjuvant temozolomide 150mg/m2 (250mg), cycle 1 (start date was 7/1).   -7/28/2020 NEURO-ONC/ CHEMO: Clinically stable. Adjuvant temozolomide 200mg/m2 (320mg), cycle 2 (start date was 7/29).   -8/25/2020 NEURO-ONC/ CHEMO: Clinically stable; significant nausea/ vomiting with 200mg/m2 dosing. Adding Emend for this next cycle. Adjuvant temozolomide 150mg/m2 (250mg), cycle 3 (start date of 8/26).   -9/22/2020 NEURO-ONC/ MRB/ CHEMO: Clinically stable; less nausea/ vomiting with lowered chemotherapy dosing plus adding Emend. Imaging with no concerns, repeat in 3 months. Adjuvant temozolomide 150mg/m2 (250mg), cycle 4 (start date of 9/23).   -9/22/2020 NEURO-ONC/  CHEMO: Clinically stable; no new/ worsening issues. Experiencing pain related to piriformis syndrome. Adjuvant temozolomide 150mg/m2 (250mg), cycle 5 (start date of 10/21).     SOCIAL HISTORY   Tobacco use: Never smoker.   Alcohol use: Social.   Drug use: Denies marijuana use.  Supplement, complimentary/ alternative medicine: None.   Employment: RN.   , 2 children      PHYSICAL EXAMINATION    -Generally well appearing.  -Respiratory: No audible wheezing.   -Skin: No facial rashes.  -Psychiatric: Normal mood and affect. Pleasant, talkative.  -Neurologic:   MENTAL STATUS:     Alert, oriented to date.    Recall: Intact.    Speech fluent.    Comprehension intact.     CRANIAL NERVES:     Pupils are equal, round, reactive to light.     Extraocular movements full, patient denies diplopia.     Equal activation with smiling/ talking on the left side of face.    Hearing intact.   With normal phonation, no dysfunction of the palate or tongue.   MOTOR: Antigravity in arms. No pronation and no drift.   Mild positional tremor in the right >> left hand.   COORDINATION: Intact on finger nose with eyes closed.   SENSATION: Intact to light touch.     The rest of a comprehensive physical examination is deferred due to [facility] (public health emergency) video visit restrictions.      MEDICAL RECORDS  Obtained and personally reviewed all available outside medical records in addition to reviewing any records available in our electronic system.     LABS  Personally reviewed all available lab results.     IMAGING  No new neuro-imaging to review.        IMPRESSION  Clinic time was spent discussing in detail the nature of her cancer in light of her current treatment plan. This is in addition to providing emotional support, answering questions pertaining to my recommendations, and devising the plan as outlined below.     Clinically, Barbi is doing well with no new subjective neurological complaints or increase in seizure frequency.  However, she has been feeling more fatigued lately and is seeing a chiropractor for help in managing pain related to piriformis syndrome. For depression, she has started on Celexa, but has not yet noted an improvement in mood. For both management of neuropathic pain + mood, I prefer an SNRI (ie Cymbalta or Effexor). I also prefer Zoloft over other SSRIs. As depression can confound feelings of fatigued, hopefully with medication, both well start to improve for Barbi. Examination today is unchanged.     The last cycle of chemotherapy again went well with a tolerable degree of chemotherapy-associated nausea, no vomiting with use of Ativan, Compazine, and Emend plus acupuncture. Continued ongoing fatigue as stated above. Will continue temozolomide at 150mg/m2 for cycle 5. Labs from today are at goal.    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   -Did not tolerate 200mg/m2 dosing.   -Supportive medications; Compazine + Ativan + Emend tri-pack and bowel regimen.    Acupuncture.    Dexamethasone or medical marijuana can be alternative options and/ or since Emend is only D1-3, there is a potential of breakthrough nausea on D4-5.  -Repeat 28 day cycle if WBC >= 3, ANC >= 1.5, HgB >= 10, and platelets >= 100.  -Surveillance labs reviewed, at goal for chemotherapy.  -Will repeat CBC in 2 (at [facility]) and 4 weeks and repeat CMP every 4 weeks.  -Repeat imaging due in 12/2020.     -SEIZURE MANAGEMENT-  -Follows with Dr. Flakita Clark; Neurology, epilepsy specialist at the [facility].      -Quality of life/ MOOD/ FATIGUE-  -History of mild anxiety and depression.   -On Celexa.   -Untreated/ undertreated depression can worsen fatigue, dysorexia, and  quality of life.  -Issues with sleep and neuropathic pain, on gabapentin.     -COGNITIVE CHANGES-  -Neuro-psych testing completed with mild deficits notes. Repeat testing recommended in 2/2021.    Return to clinic virtually on 11/17.     In the meantime, Barbi knows to call with questions or concerns or to report new complaints and can be seen sooner if needed.    Myrtle Gallagher MD  Neuro-oncology      Again, thank you for allowing me to participate in the care of your patient.        Sincerely,        Myrtle Gallagher MD

## 2020-10-24 ENCOUNTER — HOSPITAL ENCOUNTER (OUTPATIENT)
Facility: CLINIC | Age: 60
Setting detail: OBSERVATION
Discharge: HOME | End: 2020-10-25
Attending: EMERGENCY MEDICINE | Admitting: HOSPITALIST
Payer: COMMERCIAL

## 2020-10-24 DIAGNOSIS — C71.9 MALIGNANT NEOPLASM OF BRAIN, UNSPECIFIED LOCATION (H): ICD-10-CM

## 2020-10-24 DIAGNOSIS — C71.9 OLIGODENDROGLIOMA (H): Primary | ICD-10-CM

## 2020-10-24 DIAGNOSIS — N30.01 ACUTE CYSTITIS WITH HEMATURIA: ICD-10-CM
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
## 2020-10-24 LAB
ALBUMIN SERPL-MCNC: 3.2 G/DL (ref 3.4–5)
ALBUMIN SERPL-MCNC: 4 G/DL (ref 3.4–5)
ALBUMIN UR-MCNC: 10 MG/DL
ALP SERPL-CCNC: 63 U/L (ref 40–150)
ALP SERPL-CCNC: 79 U/L (ref 40–150)
ALT SERPL W P-5'-P-CCNC: 14 U/L (ref 0–50)
ALT SERPL W P-5'-P-CCNC: 21 U/L (ref 0–50)
ANION GAP SERPL CALCULATED.3IONS-SCNC: 4 MMOL/L (ref 3–14)
ANION GAP SERPL CALCULATED.3IONS-SCNC: 5 MMOL/L (ref 3–14)
APPEARANCE UR: ABNORMAL
AST SERPL W P-5'-P-CCNC: 11 U/L (ref 0–45)
AST SERPL W P-5'-P-CCNC: 22 U/L (ref 0–45)
BACTERIA #/AREA URNS HPF: ABNORMAL /HPF
BASOPHILS # BLD AUTO: 0 10E9/L (ref 0–0.2)
BASOPHILS # BLD AUTO: 0 10E9/L (ref 0–0.2)
BASOPHILS NFR BLD AUTO: 0.3 %
BASOPHILS NFR BLD AUTO: 0.3 %
BILIRUB SERPL-MCNC: 0.2 MG/DL (ref 0.2–1.3)
BILIRUB SERPL-MCNC: 0.3 MG/DL (ref 0.2–1.3)
BILIRUB UR QL STRIP: NEGATIVE
BUN SERPL-MCNC: 19 MG/DL (ref 7–30)
BUN SERPL-MCNC: 21 MG/DL (ref 7–30)
CALCIUM SERPL-MCNC: 8.1 MG/DL (ref 8.5–10.1)
CALCIUM SERPL-MCNC: 8.9 MG/DL (ref 8.5–10.1)
CHLORIDE SERPL-SCNC: 106 MMOL/L (ref 94–109)
CHLORIDE SERPL-SCNC: 110 MMOL/L (ref 94–109)
CO2 SERPL-SCNC: 25 MMOL/L (ref 20–32)
CO2 SERPL-SCNC: 28 MMOL/L (ref 20–32)
COLOR UR AUTO: YELLOW
CREAT SERPL-MCNC: 0.71 MG/DL (ref 0.52–1.04)
CREAT SERPL-MCNC: 0.77 MG/DL (ref 0.52–1.04)
DIFFERENTIAL METHOD BLD: ABNORMAL
DIFFERENTIAL METHOD BLD: ABNORMAL
EOSINOPHIL # BLD AUTO: 0.1 10E9/L (ref 0–0.7)
EOSINOPHIL # BLD AUTO: 0.1 10E9/L (ref 0–0.7)
EOSINOPHIL NFR BLD AUTO: 1.8 %
EOSINOPHIL NFR BLD AUTO: 2.6 %
ERYTHROCYTE [DISTWIDTH] IN BLOOD BY AUTOMATED COUNT: 14 % (ref 10–15)
ERYTHROCYTE [DISTWIDTH] IN BLOOD BY AUTOMATED COUNT: 14 % (ref 10–15)
GFR SERPL CREATININE-BSD FRML MDRD: 84 ML/MIN/{1.73_M2}
GFR SERPL CREATININE-BSD FRML MDRD: >90 ML/MIN/{1.73_M2}
GLUCOSE SERPL-MCNC: 85 MG/DL (ref 70–99)
GLUCOSE SERPL-MCNC: 95 MG/DL (ref 70–99)
GLUCOSE UR STRIP-MCNC: NEGATIVE MG/DL
HCT VFR BLD AUTO: 32.8 % (ref 35–47)
HCT VFR BLD AUTO: 39.3 % (ref 35–47)
HGB BLD-MCNC: 10.8 G/DL (ref 11.7–15.7)
HGB BLD-MCNC: 13 G/DL (ref 11.7–15.7)
HGB UR QL STRIP: ABNORMAL
IMM GRANULOCYTES # BLD: 0 10E9/L (ref 0–0.4)
IMM GRANULOCYTES # BLD: 0 10E9/L (ref 0–0.4)
IMM GRANULOCYTES NFR BLD: 0 %
IMM GRANULOCYTES NFR BLD: 0.3 %
KETONES UR STRIP-MCNC: NEGATIVE MG/DL
LEUKOCYTE ESTERASE UR QL STRIP: ABNORMAL
LYMPHOCYTES # BLD AUTO: 0.6 10E9/L (ref 0.8–5.3)
LYMPHOCYTES # BLD AUTO: 0.7 10E9/L (ref 0.8–5.3)
LYMPHOCYTES NFR BLD AUTO: 17.6 %
LYMPHOCYTES NFR BLD AUTO: 18.6 %
MCH RBC QN AUTO: 32.5 PG (ref 26.5–33)
MCH RBC QN AUTO: 32.5 PG (ref 26.5–33)
MCHC RBC AUTO-ENTMCNC: 32.9 G/DL (ref 31.5–36.5)
MCHC RBC AUTO-ENTMCNC: 33.1 G/DL (ref 31.5–36.5)
MCV RBC AUTO: 98 FL (ref 78–100)
MCV RBC AUTO: 99 FL (ref 78–100)
MONOCYTES # BLD AUTO: 0.3 10E9/L (ref 0–1.3)
MONOCYTES # BLD AUTO: 0.3 10E9/L (ref 0–1.3)
MONOCYTES NFR BLD AUTO: 10.1 %
MONOCYTES NFR BLD AUTO: 7.2 %
MUCOUS THREADS #/AREA URNS LPF: PRESENT /LPF
NEUTROPHILS # BLD AUTO: 2.3 10E9/L (ref 1.6–8.3)
NEUTROPHILS # BLD AUTO: 2.8 10E9/L (ref 1.6–8.3)
NEUTROPHILS NFR BLD AUTO: 69.2 %
NEUTROPHILS NFR BLD AUTO: 72 %
NITRATE UR QL: POSITIVE
NRBC # BLD AUTO: 0 10*3/UL
NRBC # BLD AUTO: 0 10*3/UL
NRBC BLD AUTO-RTO: 0 /100
NRBC BLD AUTO-RTO: 0 /100
PH UR STRIP: 5.5 PH (ref 5–7)
PLATELET # BLD AUTO: 157 10E9/L (ref 150–450)
PLATELET # BLD AUTO: 209 10E9/L (ref 150–450)
POTASSIUM SERPL-SCNC: 3.7 MMOL/L (ref 3.4–5.3)
POTASSIUM SERPL-SCNC: 3.7 MMOL/L (ref 3.4–5.3)
PROT SERPL-MCNC: 5.7 G/DL (ref 6.8–8.8)
PROT SERPL-MCNC: 7.1 G/DL (ref 6.8–8.8)
RBC # BLD AUTO: 3.32 10E12/L (ref 3.8–5.2)
RBC # BLD AUTO: 4 10E12/L (ref 3.8–5.2)
RBC #/AREA URNS AUTO: 102 /HPF (ref 0–2)
SARS-COV-2 RNA SPEC QL NAA+PROBE: NOT DETECTED
SODIUM SERPL-SCNC: 138 MMOL/L (ref 133–144)
SODIUM SERPL-SCNC: 140 MMOL/L (ref 133–144)
SOURCE: ABNORMAL
SP GR UR STRIP: 1.02 (ref 1–1.03)
SPECIMEN SOURCE: NORMAL
UROBILINOGEN UR STRIP-MCNC: NORMAL MG/DL (ref 0–2)
WBC # BLD AUTO: 3.4 10E9/L (ref 4–11)
WBC # BLD AUTO: 3.9 10E9/L (ref 4–11)
WBC #/AREA URNS AUTO: >182 /HPF (ref 0–5)
WBC CLUMPS #/AREA URNS HPF: PRESENT /HPF

## 2020-10-24 PROCEDURE — 250N000011 HC RX IP 250 OP 636: Performed by: HOSPITALIST

## 2020-10-24 PROCEDURE — 87088 URINE BACTERIA CULTURE: CPT | Performed by: EMERGENCY MEDICINE

## 2020-10-24 PROCEDURE — 87186 SC STD MICRODIL/AGAR DIL: CPT | Performed by: EMERGENCY MEDICINE

## 2020-10-24 PROCEDURE — 36415 COLL VENOUS BLD VENIPUNCTURE: CPT | Performed by: HOSPITALIST

## 2020-10-24 PROCEDURE — G0378 HOSPITAL OBSERVATION PER HR: HCPCS

## 2020-10-24 PROCEDURE — 250N000013 HC RX MED GY IP 250 OP 250 PS 637: Performed by: INTERNAL MEDICINE

## 2020-10-24 PROCEDURE — 80053 COMPREHEN METABOLIC PANEL: CPT | Performed by: EMERGENCY MEDICINE

## 2020-10-24 PROCEDURE — 99214 OFFICE O/P EST MOD 30 MIN: CPT | Performed by: INTERNAL MEDICINE

## 2020-10-24 PROCEDURE — C9803 HOPD COVID-19 SPEC COLLECT: HCPCS

## 2020-10-24 PROCEDURE — 87086 URINE CULTURE/COLONY COUNT: CPT | Performed by: EMERGENCY MEDICINE

## 2020-10-24 PROCEDURE — 96376 TX/PRO/DX INJ SAME DRUG ADON: CPT

## 2020-10-24 PROCEDURE — 96365 THER/PROPH/DIAG IV INF INIT: CPT

## 2020-10-24 PROCEDURE — U0003 INFECTIOUS AGENT DETECTION BY NUCLEIC ACID (DNA OR RNA); SEVERE ACUTE RESPIRATORY SYNDROME CORONAVIRUS 2 (SARS-COV-2) (CORONAVIRUS DISEASE [COVID-19]), AMPLIFIED PROBE TECHNIQUE, MAKING USE OF HIGH THROUGHPUT TECHNOLOGIES AS DESCRIBED BY CMS-2020-01-R: HCPCS | Performed by: EMERGENCY MEDICINE

## 2020-10-24 PROCEDURE — 99285 EMERGENCY DEPT VISIT HI MDM: CPT | Mod: 25

## 2020-10-24 PROCEDURE — 96360 HYDRATION IV INFUSION INIT: CPT

## 2020-10-24 PROCEDURE — 80053 COMPREHEN METABOLIC PANEL: CPT | Performed by: HOSPITALIST

## 2020-10-24 PROCEDURE — 85025 COMPLETE CBC W/AUTO DIFF WBC: CPT | Performed by: HOSPITALIST

## 2020-10-24 PROCEDURE — 81001 URINALYSIS AUTO W/SCOPE: CPT | Performed by: EMERGENCY MEDICINE

## 2020-10-24 PROCEDURE — 258N000003 HC RX IP 258 OP 636: Performed by: EMERGENCY MEDICINE

## 2020-10-24 PROCEDURE — 99219 PR INITIAL OBSERVATION CARE,LEVEL II: CPT | Performed by: HOSPITALIST

## 2020-10-24 PROCEDURE — 258N000003 HC RX IP 258 OP 636: Performed by: HOSPITALIST

## 2020-10-24 PROCEDURE — 250N000011 HC RX IP 250 OP 636: Performed by: EMERGENCY MEDICINE

## 2020-10-24 PROCEDURE — 85025 COMPLETE CBC W/AUTO DIFF WBC: CPT | Mod: 91 | Performed by: EMERGENCY MEDICINE

## 2020-10-24 RX ORDER — ONDANSETRON 4 MG/1
4 TABLET, ORALLY DISINTEGRATING ORAL EVERY 6 HOURS PRN
Status: DISCONTINUED | OUTPATIENT
Start: 2020-10-24 | End: 2020-10-25 | Stop reason: HOSPADM

## 2020-10-24 RX ORDER — ONDANSETRON 2 MG/ML
4 INJECTION INTRAMUSCULAR; INTRAVENOUS EVERY 6 HOURS PRN
Status: DISCONTINUED | OUTPATIENT
Start: 2020-10-24 | End: 2020-10-25 | Stop reason: HOSPADM

## 2020-10-24 RX ORDER — CEFTRIAXONE 1 G/1
1 INJECTION, POWDER, FOR SOLUTION INTRAMUSCULAR; INTRAVENOUS ONCE
Status: COMPLETED | OUTPATIENT
Start: 2020-10-24 | End: 2020-10-24

## 2020-10-24 RX ORDER — PROCHLORPERAZINE 25 MG
25 SUPPOSITORY, RECTAL RECTAL EVERY 12 HOURS PRN
Status: DISCONTINUED | OUTPATIENT
Start: 2020-10-24 | End: 2020-10-25 | Stop reason: HOSPADM

## 2020-10-24 RX ORDER — SODIUM CHLORIDE 9 MG/ML
INJECTION, SOLUTION INTRAVENOUS CONTINUOUS
Status: DISCONTINUED | OUTPATIENT
Start: 2020-10-24 | End: 2020-10-25 | Stop reason: HOSPADM

## 2020-10-24 RX ORDER — LEVETIRACETAM 750 MG/1
1500 TABLET ORAL DAILY
Status: DISCONTINUED | OUTPATIENT
Start: 2020-10-24 | End: 2020-10-24

## 2020-10-24 RX ORDER — CALCIUM CARBONATE 500 MG/1
500 TABLET, CHEWABLE ORAL DAILY PRN
Status: DISCONTINUED | OUTPATIENT
Start: 2020-10-24 | End: 2020-10-25 | Stop reason: HOSPADM

## 2020-10-24 RX ORDER — AMOXICILLIN 250 MG
2 CAPSULE ORAL 2 TIMES DAILY PRN
Status: DISCONTINUED | OUTPATIENT
Start: 2020-10-24 | End: 2020-10-25 | Stop reason: HOSPADM

## 2020-10-24 RX ORDER — LAMOTRIGINE 150 MG/1
300 TABLET ORAL ONCE
Status: DISCONTINUED | OUTPATIENT
Start: 2020-10-24 | End: 2020-10-24 | Stop reason: ALTCHOICE

## 2020-10-24 RX ORDER — PROCHLORPERAZINE MALEATE 10 MG
10 TABLET ORAL EVERY 6 HOURS PRN
Status: DISCONTINUED | OUTPATIENT
Start: 2020-10-24 | End: 2020-10-24

## 2020-10-24 RX ORDER — LORAZEPAM 0.5 MG/1
0.5 TABLET ORAL AT BEDTIME
Status: DISCONTINUED | OUTPATIENT
Start: 2020-10-24 | End: 2020-10-24

## 2020-10-24 RX ORDER — LIDOCAINE 40 MG/G
CREAM TOPICAL
Status: DISCONTINUED | OUTPATIENT
Start: 2020-10-24 | End: 2020-10-25 | Stop reason: HOSPADM

## 2020-10-24 RX ORDER — ACETAMINOPHEN 325 MG/1
650 TABLET ORAL EVERY 4 HOURS PRN
Status: DISCONTINUED | OUTPATIENT
Start: 2020-10-24 | End: 2020-10-25 | Stop reason: HOSPADM

## 2020-10-24 RX ORDER — AMOXICILLIN 250 MG
1 CAPSULE ORAL 2 TIMES DAILY PRN
Status: DISCONTINUED | OUTPATIENT
Start: 2020-10-24 | End: 2020-10-25 | Stop reason: HOSPADM

## 2020-10-24 RX ORDER — CITALOPRAM HYDROBROMIDE 20 MG/1
20 TABLET ORAL DAILY
Status: DISCONTINUED | OUTPATIENT
Start: 2020-10-24 | End: 2020-10-25 | Stop reason: HOSPADM

## 2020-10-24 RX ORDER — GABAPENTIN 100 MG/1
200 CAPSULE ORAL DAILY
Status: DISCONTINUED | OUTPATIENT
Start: 2020-10-24 | End: 2020-10-25 | Stop reason: HOSPADM

## 2020-10-24 RX ORDER — NALOXONE HYDROCHLORIDE 0.4 MG/ML
.1-.4 INJECTION, SOLUTION INTRAMUSCULAR; INTRAVENOUS; SUBCUTANEOUS
Status: DISCONTINUED | OUTPATIENT
Start: 2020-10-24 | End: 2020-10-25 | Stop reason: HOSPADM

## 2020-10-24 RX ORDER — SODIUM CHLORIDE 9 MG/ML
INJECTION, SOLUTION INTRAVENOUS CONTINUOUS
Status: DISCONTINUED | OUTPATIENT
Start: 2020-10-24 | End: 2020-10-24

## 2020-10-24 RX ORDER — LAMOTRIGINE 150 MG/1
300 TABLET ORAL DAILY
Status: DISCONTINUED | OUTPATIENT
Start: 2020-10-24 | End: 2020-10-24

## 2020-10-24 RX ORDER — LANOLIN ALCOHOL/MO/W.PET/CERES
3 CREAM (GRAM) TOPICAL
Status: DISCONTINUED | OUTPATIENT
Start: 2020-10-24 | End: 2020-10-25 | Stop reason: HOSPADM

## 2020-10-24 RX ORDER — METOPROLOL TARTRATE 25 MG/1
25 TABLET, FILM COATED ORAL 2 TIMES DAILY
Status: DISCONTINUED | OUTPATIENT
Start: 2020-10-24 | End: 2020-10-25 | Stop reason: HOSPADM

## 2020-10-24 RX ORDER — PROCHLORPERAZINE 25 MG
25 SUPPOSITORY, RECTAL RECTAL EVERY 12 HOURS PRN
Status: DISCONTINUED | OUTPATIENT
Start: 2020-10-24 | End: 2020-10-24

## 2020-10-24 RX ORDER — PROCHLORPERAZINE MALEATE 10 MG
10 TABLET ORAL EVERY 6 HOURS PRN
Status: DISCONTINUED | OUTPATIENT
Start: 2020-10-24 | End: 2020-10-25 | Stop reason: HOSPADM

## 2020-10-24 RX ORDER — CHOLECALCIFEROL (VITAMIN D3) 125 MCG
3000 CAPSULE ORAL DAILY PRN
Status: DISCONTINUED | OUTPATIENT
Start: 2020-10-24 | End: 2020-10-25 | Stop reason: HOSPADM

## 2020-10-24 RX ORDER — BISACODYL 10 MG
10 SUPPOSITORY, RECTAL RECTAL DAILY PRN
Status: DISCONTINUED | OUTPATIENT
Start: 2020-10-24 | End: 2020-10-24

## 2020-10-24 RX ORDER — CITALOPRAM HYDROBROMIDE 10 MG/1
10 TABLET ORAL DAILY
Status: DISCONTINUED | OUTPATIENT
Start: 2020-10-24 | End: 2020-10-24

## 2020-10-24 RX ORDER — ACETAMINOPHEN 650 MG/1
650 SUPPOSITORY RECTAL EVERY 4 HOURS PRN
Status: DISCONTINUED | OUTPATIENT
Start: 2020-10-24 | End: 2020-10-25 | Stop reason: HOSPADM

## 2020-10-24 RX ORDER — FEXOFENADINE HCL 180 MG/1
180 TABLET ORAL DAILY
Status: DISCONTINUED | OUTPATIENT
Start: 2020-10-24 | End: 2020-10-25 | Stop reason: HOSPADM

## 2020-10-24 RX ORDER — CEFTRIAXONE 2 G/1
2 INJECTION, POWDER, FOR SOLUTION INTRAMUSCULAR; INTRAVENOUS EVERY 24 HOURS
Status: DISCONTINUED | OUTPATIENT
Start: 2020-10-24 | End: 2020-10-25 | Stop reason: HOSPADM

## 2020-10-24 RX ORDER — LAMOTRIGINE 100 MG/1
300 TABLET ORAL 2 TIMES DAILY
Status: DISCONTINUED | OUTPATIENT
Start: 2020-10-24 | End: 2020-10-24

## 2020-10-24 RX ORDER — LORAZEPAM 0.5 MG/1
1 TABLET ORAL AT BEDTIME
Status: DISCONTINUED | OUTPATIENT
Start: 2020-10-24 | End: 2020-10-25 | Stop reason: HOSPADM

## 2020-10-24 RX ORDER — TEMOZOLOMIDE 250 MG/1
250 CAPSULE ORAL DAILY
Status: DISCONTINUED | OUTPATIENT
Start: 2020-10-24 | End: 2020-10-25 | Stop reason: HOSPADM

## 2020-10-24 RX ORDER — POLYETHYLENE GLYCOL 3350 17 G/17G
17 POWDER, FOR SOLUTION ORAL DAILY PRN
Status: DISCONTINUED | OUTPATIENT
Start: 2020-10-24 | End: 2020-10-25 | Stop reason: HOSPADM

## 2020-10-24 RX ORDER — LEVETIRACETAM 500 MG/1
1500 TABLET ORAL 2 TIMES DAILY
Status: DISCONTINUED | OUTPATIENT
Start: 2020-10-24 | End: 2020-10-25 | Stop reason: HOSPADM

## 2020-10-24 RX ORDER — LAMOTRIGINE 100 MG/1
300 TABLET ORAL 2 TIMES DAILY
Status: DISCONTINUED | OUTPATIENT
Start: 2020-10-24 | End: 2020-10-25 | Stop reason: HOSPADM

## 2020-10-24 RX ADMIN — PROCHLORPERAZINE MALEATE 10 MG: 10 TABLET ORAL at 21:34

## 2020-10-24 RX ADMIN — FEXOFENADINE HYDROCHLORIDE 180 MG: 180 TABLET, FILM COATED ORAL at 12:13

## 2020-10-24 RX ADMIN — TEMOZOLOMIDE 250 MG: 250 CAPSULE ORAL at 22:39

## 2020-10-24 RX ADMIN — CITALOPRAM HYDROBROMIDE 20 MG: 20 TABLET ORAL at 20:18

## 2020-10-24 RX ADMIN — SODIUM CHLORIDE 1000 ML: 9 INJECTION, SOLUTION INTRAVENOUS at 03:28

## 2020-10-24 RX ADMIN — SODIUM CHLORIDE: 9 INJECTION, SOLUTION INTRAVENOUS at 15:19

## 2020-10-24 RX ADMIN — LAMOTRIGINE 300 MG: 100 TABLET ORAL at 20:19

## 2020-10-24 RX ADMIN — SODIUM CHLORIDE: 9 INJECTION, SOLUTION INTRAVENOUS at 06:38

## 2020-10-24 RX ADMIN — GABAPENTIN 200 MG: 100 CAPSULE ORAL at 20:18

## 2020-10-24 RX ADMIN — LAMOTRIGINE 300 MG: 100 TABLET ORAL at 12:13

## 2020-10-24 RX ADMIN — CEFTRIAXONE SODIUM 1 G: 1 INJECTION, POWDER, FOR SOLUTION INTRAMUSCULAR; INTRAVENOUS at 04:55

## 2020-10-24 RX ADMIN — LEVETIRACETAM 1500 MG: 500 TABLET ORAL at 11:05

## 2020-10-24 RX ADMIN — LORAZEPAM 1 MG: 0.5 TABLET ORAL at 21:34

## 2020-10-24 RX ADMIN — CEFTRIAXONE SODIUM 2 G: 2 INJECTION, POWDER, FOR SOLUTION INTRAMUSCULAR; INTRAVENOUS at 18:26

## 2020-10-24 RX ADMIN — LEVETIRACETAM 1500 MG: 500 TABLET ORAL at 20:19

## 2020-10-24 RX ADMIN — METOPROLOL TARTRATE 25 MG: 25 TABLET, FILM COATED ORAL at 20:18

## 2020-10-24 ASSESSMENT — ENCOUNTER SYMPTOMS
SHORTNESS OF BREATH: 0
HEADACHES: 0
FEVER: 0
LIGHT-HEADEDNESS: 0
WEAKNESS: 1
COUGH: 0

## 2020-10-24 ASSESSMENT — MIFFLIN-ST. JEOR: SCORE: 1164.28

## 2020-10-24 NOTE — PROGRESS NOTES
Pharmacy Medication History  Admission medication history interview status for the 10/24/2020  admission is complete. See EPIC admission navigator for prior to admission medications       Medication history sources: Patient  Location of interview: Phone  Medication history source reliability: Good  Adherence assessment: Good    Significant changes made to the medication list:  - Deleted: ascorbic acid 500 mg, ciclopirox 8% external solution  - Added: calcium-vitamin D (unkwon strength OTC)  - Changed:      cyclobenzaprine 5 mg (5 mg TID PRN--> 5 mg BID PRN)     multivitamin (1 chew tablet BID --> 1 chew tablet every day)  - citalopram: pt is taking 20 mg every day    Additional medication history information:   none    Medication reconciliation completed by provider prior to medication history? No    Time spent in this activity: 30 minutes    Prior to Admission medications    Medication Sig Last Dose Taking? Auth Provider   acetaminophen (TYLENOL) 500 MG tablet Take 2  tablets by mouth every 8 hours for post operative pain. PRN Yes Benjamin Griffin DPM   aprepitant (EMEND TRI-PACK) 80 & 125 MG MISC Take 125mg tablet 1 hour prior to temozolomide on day 1. Then take 80mg 1 hour prior to temozolomide on day 2 and day 3. 10/23/2020 at PM Yes Myrtle Gallagher MD   calcium carbonate (TUMS) 500 MG chewable tablet Take 1 chew tab by mouth daily as needed for heartburn Past Week at Unknown time Yes Reported, Patient   CALCIUM-VITAMIN D PO Take 1 tablet by mouth daily 10/23/2020 at AM Yes Unknown, Entered By History   citalopram (CELEXA) 20 MG tablet Take 0.5 tablets (10 mg) by mouth daily for 6 days, THEN 1 tablet (20 mg) daily.  Patient taking differently: 20 mg by mouth daily 10/23/2020 at PM Yes Dustin Choudhury MD   COMPOUNDED NON-CONTROLLED SUBSTANCE (CMPD RX) - PHARMACY TO MIX COMPOUNDED MEDICATION Place 1 g vaginally twice a week Apply 1g daily for 2 weeks, then twice weekly to vagina PRN Yes Gary  Lluvia PETERSEN MD   cyclobenzaprine (FLEXERIL) 5 MG tablet Take 1 tablet (5 mg) by mouth 3 times daily as needed for muscle spasms  Patient taking differently: Take 5 mg by mouth 2 times daily as needed for muscle spasms   Yes Mary Rodriguez PA-C   fexofenadine (ALLEGRA) 180 MG tablet Take 180 mg by mouth daily 10/23/2020 at AM Yes Reported, Patient   gabapentin (NEURONTIN) 100 MG capsule 2 capsules ( 200 mg) 7 pm, Make appointment with Dr. payton for future refills 10/23/2020 at PM Yes Noemi Srinivasan PA   ibuprofen (ADVIL/MOTRIN) 200 MG tablet Take 800 mg by mouth daily as needed  PRN Yes Reported, Patient   Lactase (LACTAID PO) Take 1-2 tablets by mouth daily as needed for indigestion  PRN Yes Reported, Patient   lamoTRIgine (LAMICTAL) 100 MG tablet 100 mg am and noon (take along with 200 mg tablet for total of 300 mg twice a day). [place] mft only 10/23/2020 at PM Yes Flakita Payton MD   lamoTRIgine (LAMICTAL) 200 MG tablet 200 mg am and noon (take along with 100 mg tablet for total of 300 mg twice a day). [place] mft only 10/23/2020 at PM Yes Flakita Payton MD   levETIRAcetam (KEPPRA) 500 MG tablet 1500 mg am and 1500 mg pm 10/23/2020 at PM Yes Dustin Choudhury MD   LORazepam (ATIVAN) 0.5 MG tablet Take 1 tablet (0.5 mg) by mouth At Bedtime 30 minutes prior to chemotherapy to prevent nausea. 10/23/2020 at PM Yes Myrtle Gallagher MD   metoprolol tartrate (LOPRESSOR) 25 MG tablet Take 1 tablet (25 mg) by mouth 2 times daily 10/23/2020 at PM Yes Dustin Choudhury MD   Multiple Vitamin (MULTI-VITAMINS) TABS Take 1 chew tab by mouth daily  10/23/2020 at AM Yes Reported, Patient   prochlorperazine (COMPAZINE) 10 MG tablet Take 1 tablet (10 mg) by mouth At Bedtime 30 minutes prior to chemotherapy dosing. Can repeat every 8 hours as needed for nausea. 10/23/2020 at PM Yes Myrtle Gallagher MD   temozolomide (TEMODAR) 250 MG capsule Take 1 capsule (250 mg) by mouth daily for  5 days Take ondansetron 30-60 min before temozolomide. Take at bedtime on an empty stomach. 10/23/2020 at PM Yes Myrtle Gallagher MD   tretinoin (RETIN-A) 0.025 % external cream Apply a pea-sized amount to each area affected by acne. Start every 3-4 nights working up to every night. 10/23/2020 at AM Yes Cary Camarillo PA-C   VITAMIN D, CHOLECALCIFEROL, PO Take 1,000 Units by mouth daily  10/23/2020 at AM Yes Unknown, Entered By History

## 2020-10-24 NOTE — H&P
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    PLAN    1) Suspected UTI: She is followed by Dr. Glover of  Neuro Oncology. In 2011 she underwent craniotomy with resection of tumor found to be oligodendroma, and underwent chemotherapy. She required repeat resection in 2018 with further chemoradiation then and again most recently earlier this year. She is currently on Temozolamide. Most recent brain MRI 9/2020 reportedly showed stable findings. She presents for the above symptoms and is found to have pyuria and hematuria on UA concerning for UTI, in the setting of suspected dehydration and chemotherapy-induced nausea. She has a benign abdomen and no other localizing signs or symptoms at present, and is fully alert and oriented.    -- Observation. ADAT. Oncology consulted. IV  ml/hour. Empiric Ceftriaxone continued as we follow up cultures. Anti-emetics ordered as needed.    -- She very politely declines further radiology images for now, and wishes to focus on the above plan first to see if she is feeling better, and would like to go home as soon as she can    -- If chemotherapy agent is to be continued here, would resume her concomitant home anti-emetic regimen as well    2) Prior nephrolithiasis: She underwent lithotripsy with left ureteral stent placement for this in 2017; the UA does show microscopic hematuria but she denies any other symptoms consistent with recurrent obstructive nephrolithiasis at  present.    -- Consider CT A/P if she gets pain or if she gets sicker    3) Seizure disorder: Resume Keppra, Lamictal when verified  4) Hypertension: Resume Lopresor when verified  5) Chronic depression: Resume Celexa when verified    Rule Out COVID-19 infection  This patient was evaluated during a global COVID-19 pandemic, which necessitated consideration that the patient might be at risk for infection with the SARS-CoV-2 virus that causes COVID-19. Applicable protocols for evaluation were followed during the patient's care. LOW suspicion for infection.   -follow-up COVID-19 PCR test result; no current indication for precautions    Chief Complaint   Dizziness    History is obtained from the patient, her  at the bedside, and the ED physician whom I have spoken with    History of Present Illness   Barbi Tiwari is a markedly pleasant 60 year old woman who presents with dizziness. This is generalized dizziness that often comes on when she is standing or walking. For the past several days, she has also had associated nausea while taking her chemotherapy. Two days ago she had an episode of vomiting, and more recently, she has had some intermittent watery stool for the past few days. In general she has felt weaker recently. Tonight, she got up in the middle of the night to go to the bathroom and was in the dark, and felt dizzy, and reached her leg over to get back in her bed, and missed th bed and fell backward, striking her buttock and the back of her head, without loss of consciousness. Thus she has come in for further evaluation. She denies any focal weakness, numbness or tingling of arms or legs, or speech, vision or heating changes; or fever, chills, or sweats, or dysuria, hematuria, or flank pain; or cough, cold, runny nose, sore throat, chest pain, pain in her back, head, or buttocks, or any other acute complaints.    In the ED, Blood pressure 107/63, pulse 54, temperature 97.9  F (36.6  C), temperature  source Oral, resp. rate 19, SpO2 97 %, not currently breastfeeding.    CBC and CMP were notable for WBC 3.9, otherwise were within the normal reference range (ANC normal at 2.8). UA showed greater than 182 WBC, 102 RBC, positive nitrite and LE, and many bacteria. She was given Ceftriaxone and IV fluid in the ED.    PHYSICAL EXAM  Blood pressure 107/63, pulse 54, temperature 97.9  F (36.6  C), temperature source Oral, resp. rate 19, SpO2 97 %, not currently breastfeeding.  Constitutional: Alert and oriented to person, place and time; no apparent distress; very thin and frail  HEENT: prior craniotomy, dry mucus membranes  Respiratory: lungs clear to auscultation bilaterally  Cardiovascular: regular S1 S2   GI: abdomen soft non tender non distended bowel sounds positive  Lymph/Hematologic: pale, no cervical lymphadenopathy  Skin: no rash, good turgor  Musculoskeletal: no clubbing, cyanosis or edema  Neurologic: extra-ocular muscles intact; moves all four extremities  Psychiatric: appropriate affect, insight and judgment     DVT Prophylaxis: Pneumatic Compression Devices  Code Status: Full Code, confirmed with her and her     Disposition: Expected discharge in 0-3 days    Yobany Braxton MD    Past Medical History    I have reviewed this patient's medical history and updated it with pertinent information if needed.   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     Osteoarthrosis 12/9/2009     Palpitations 6/5/2014     PONV (postoperative nausea and vomiting)      PVCs 6/5/2014     Seizure disorder (related to tumor) 08/17/2011       Past Surgical History   I have reviewed this patient's surgical history and updated it  with pertinent information if needed.  Past Surgical History:   Procedure Laterality Date     CRANIOTOMY  2011    tumor resection     EXTRACORPOREAL SHOCK WAVE LITHOTRIPSY, CYSTOSCOPY, INSERT STENT URETER(S), COMBINED Bilateral 5/5/2017    Procedure: COMBINED EXTRACORPOREAL SHOCK WAVE LITHOTRIPSY, CYSTOSCOPY, INSERT STENT URETER(S);  CYSTO, URETHRAL DILATION, URETERAL LEFT STENT PLACEMENT, LEFT ESWL.;  Surgeon: Angel Del Rio MD;  Location:  OR     FOOT SURGERY      mulitple     HYSTERECTOMY, PAP NO LONGER INDICATED  2008    lap hys     LITHOTRIPSY  2010 2017     OPTICAL TRACKING SYSTEM CRANIOTOMY, EXCISE TUMOR WITH MAPPING, COMBINED Right 5/30/2018    Procedure: COMBINED OPTICAL TRACKING SYSTEM CRANIOTOMY, EXCISE TUMOR WITH MAPPING;  Right Stealth Assisted Craniotomy With Motor Mapping And Tumor Resection ;  Surgeon: Dustin Rodriguez MD;  Location: [facility] OR     ORTHOPEDIC SURGERY      feet, multiple on both     OSTEOTOMY FOOT Right 1/15/2019    Procedure: OSTEOTOMY FOOT;  Surgeon: Benjamin Griffin DPM;  Location:  OR     PARATHYROIDECTOMY  2011     RECONSTRUCT FOREFOOT WITH METATARSOPHALANGEAL (MTP) FUSION Right 1/15/2019    Procedure: RIGHT FIRST METATARSAL PHALAGEAL JOINT ARTHRODESIS, RIGHT SECOND METATARSAL WEIL OSTEOTOMY;  Surgeon: Benjamin Griffin DPM;  Location:  OR       Prior to Admission Medications   Prior to Admission Medications   Prescriptions Last Dose Informant Patient Reported? Taking?   COMPOUNDED NON-CONTROLLED SUBSTANCE (CMPD RX) - PHARMACY TO MIX COMPOUNDED MEDICATION   No No   Sig: Place 1 g vaginally twice a week Apply 1g daily for 2 weeks, then twice weekly to vagina   LORazepam (ATIVAN) 0.5 MG tablet   No No   Sig: Take 1 tablet (0.5 mg) by mouth At Bedtime 30 minutes prior to chemotherapy to prevent nausea.   Lactase (LACTAID PO)   Yes No   Sig: Take 1-2 tablets by mouth daily as needed for indigestion    Multiple Vitamin (MULTI-VITAMINS) TABS  Self Yes No   Sig: Take 1  chew tab by mouth 2 times daily    VITAMIN D, CHOLECALCIFEROL, PO  Self Yes No   Sig: Take 1,000 Units by mouth 2 times daily Reported on 5/15/2017   acetaminophen (TYLENOL) 500 MG tablet   No No   Sig: Take 2  tablets by mouth every 8 hours for post operative pain.   aprepitant (EMEND TRI-PACK) 80 & 125 MG MISC   No No   Sig: Take 125mg tablet 1 hour prior to temozolomide on day 1. Then take 80mg 1 hour prior to temozolomide on day 2 and day 3.   ascorbic acid 500 MG TABS  Self Yes No   Sig: Take 500 mg by mouth 2 times daily   calcium carbonate (TUMS) 500 MG chewable tablet   Yes No   Sig: Take 1 chew tab by mouth daily as needed for heartburn   ciclopirox (PENLAC) 8 % external solution   No No   Sig: Apply to adjacent skin and affected nails daily.  Remove with alcohol every 7 days, then repeat.   citalopram (CELEXA) 20 MG tablet   No No   Sig: Take 0.5 tablets (10 mg) by mouth daily for 6 days, THEN 1 tablet (20 mg) daily.   cyclobenzaprine (FLEXERIL) 5 MG tablet   No No   Sig: Take 1 tablet (5 mg) by mouth 3 times daily as needed for muscle spasms   fexofenadine (ALLEGRA) 180 MG tablet   Yes No   Sig: Take 180 mg by mouth daily   gabapentin (NEURONTIN) 100 MG capsule   No No   Si capsules ( 200 mg) 7 pm, Make appointment with Dr. payton for future refills   ibuprofen (ADVIL/MOTRIN) 200 MG tablet   Yes No   Sig: Take 800 mg by mouth daily as needed    lamoTRIgine (LAMICTAL) 100 MG tablet   No No   Si mg am and noon (take along with 200 mg tablet for total of 300 mg twice a day). [facility]   lamoTRIgine (LAMICTAL) 200 MG tablet   No No   Si mg am and noon (take along with 100 mg tablet for total of 300 mg twice a day). [facility]   levETIRAcetam (KEPPRA) 500 MG tablet   Yes No   Si mg am and 1500 mg pm   metoprolol tartrate (LOPRESSOR) 25 MG tablet   No No   Sig: Take 1 tablet (25 mg) by mouth 2 times daily   prochlorperazine (COMPAZINE) 10 MG tablet   No No   Sig: Take 1  tablet (10 mg) by mouth At Bedtime 30 minutes prior to chemotherapy dosing. Can repeat every 8 hours as needed for nausea.   temozolomide (TEMODAR) 250 MG capsule   No No   Sig: Take 1 capsule (250 mg) by mouth daily for 5 days Take ondansetron 30-60 min before temozolomide. Take at bedtime on an empty stomach.   tretinoin (RETIN-A) 0.025 % external cream   No No   Sig: Apply a pea-sized amount to each area affected by acne. Start every 3-4 nights working up to every night.      Facility-Administered Medications: None     Allergies   Allergies   Allergen Reactions     Sulfa Drugs Hives     Benoxinate Nausea and Vomiting       Social History   I have reviewed this patient's social history and updated it with pertinent information if needed. Barbi MISAEL FinkTe  reports that she has never smoked. She has never used smokeless tobacco. She reports current alcohol use. She reports that she does not use drugs.    Family History   Family history assessed and, except as above, is non-contributory.    Family History   Problem Relation Age of Onset     Arthritis Mother      Depression Mother      Respiratory Mother         COPD     LUNG DISEASE Mother      C.A.D. Father 58        heart attack     Heart Disease Father      Coronary Artery Disease Father         MI  age 54     Diabetes Brother 70         age 70     Depression Sister      Depression Son      Allergies Son      Depression Daughter      Allergies Daughter      Eczema Brother      Skin Cancer Brother      Depression Sister      Depression Sister      Anxiety Disorder Daughter        Review of Systems   The 10 point Review of Systems is negative other than noted in the HPI or here.     Primary Care Physician   Dustin Choudhury    Data   Labs Ordered and Resulted from Time of ED Arrival Up to the Time of Departure from the ED   CBC WITH PLATELETS DIFFERENTIAL - Abnormal; Notable for the following components:       Result Value    WBC 3.9 (*)      Absolute Lymphocytes 0.7 (*)     All other components within normal limits   ROUTINE UA WITH MICROSCOPIC - Abnormal; Notable for the following components:    Blood Urine Moderate (*)     Protein Albumin Urine 10 (*)     Nitrite Urine Positive (*)     Leukocyte Esterase Urine Large (*)     WBC Urine >182 (*)     RBC Urine 102 (*)     WBC Clumps Present (*)     Bacteria Urine Many (*)     Mucous Urine Present (*)     All other components within normal limits   COMPREHENSIVE METABOLIC PANEL   PERIPHERAL IV CATHETER   ORTHOSTATIC BLOOD PRESSURE AND PULSE   URINE CULTURE AEROBIC BACTERIAL       Data reviewed today:  I personally reviewed no images or EKG's today.    No results found for this or any previous visit (from the past 24 hour(s)).

## 2020-10-24 NOTE — PROVIDER NOTIFICATION
MD Notification    Notified Person: MD    Notified Person Name:  Darlin    Notification Date/Time: 10/24/2020 at 0918    Notification Interaction: pager    Purpose of Notification: Pt would like to resume her home meds such as Keppra , and Lamictal. Please advise. Thanks    Orders Received: Keppra and Lamictal prescribed    Comments:

## 2020-10-24 NOTE — ED PROVIDER NOTES
History     Chief Complaint:  Weakness     The history is provided by the patient and the spouse.      Barbi Tiwari is a 60 year old female with a history of brain cancer who presents with weakness. She reports that she is currently on day three of five of oral chemo, Temodar, and has been struggling with nausea during treatment. For that reason she was sleeping on the couch last night (10/23/2020) in order to sleep in an upright position to aid in her nausea and woke up feeling improved and felt that she could make it to the bedroom. She reports that when she arrived in the bedroom, she felt increased leg weakness, which she states she felt throughout the day. When she tried to get on the bed, she slipped, hitting her head on the bedside table. After the incident, she complains of double vision. The patient reports difficulty with balance at baseline, but an increase today. Her  notes hearing slurred speech, but the patient attributes it to a dry mouth. She denies any dizziness, lightheadedness, headache, fever, cough, or shortness of breath. She states that she was put on Celexa a few weeks ago with a dosage increase on (10/16/2020). The patient reports that she is also taking oral THC, but notes that she took that during her cancer treatment in 2011 without any of her current symptoms.     Allergies:  Sulfa   Benoxinate     Medications:    Zantac   Celexa  Gabapentin   Lamictal   Atival   Metoprolol   Allegra  Temodar  Compazine   Temozolomide     Past Medical History:    Calculus of kidney   GERD  Hyperparathyroidism   Depression   OA  PVC   Seizure disorder  Lumbar radiculopathy   Brain tumor   Uterine fibroid     Past Surgical History:    Craniotomy   Extracorporeal shock wave lithotripsy   Foot surgery   Hysterectomy   Lithotripsy   Optical tracking system craniotomy excise tumor with mapping   Osteotomy foot   Parathyroidectomy   Reconstruct forefoot with metatarsophalangeal fusion     Family  History:    Mother:  Arthritis, depression, COPD  Father: coronary artery disease, heart disease, coronary artery disease  Brother: diabetes   Sister: depression     Social History:  Smoking status: no  Alcohol use: yes  Drug use: no  The patient presents to the emergency department with    Dustin Choudhury   Marital Status:   [2]    Review of Systems   Constitutional: Negative for fever.   Eyes: Positive for visual disturbance.   Respiratory: Negative for cough and shortness of breath.    Musculoskeletal: Positive for gait problem.   Neurological: Positive for weakness. Negative for light-headedness and headaches.   All other systems reviewed and are negative.      Physical Exam     Patient Vitals for the past 24 hrs:   BP Temp Temp src Pulse Resp SpO2   10/24/20 0426 -- -- -- 54 19 97 %   10/24/20 0422 -- -- -- 55 14 95 %   10/24/20 0410 -- -- -- 54 22 95 %   10/24/20 0241 107/63 97.9  F (36.6  C) Oral 71 18 95 %       Physical Exam  GENERAL: well developed, pleasant  HEAD: atraumatic  EYES: pupils reactive, extraocular muscles intact, conjunctivae normal  ENT:  mucus membranes moist  NECK:  trachea midline, normal range of motion  RESPIRATORY: no tachypnea, breath sounds clear to auscultation   CVS: normal S1/S2, no murmurs, intact distal pulses  ABDOMEN: soft, nontender, nondistention  MUSCULOSKELETAL: no deformities  SKIN: warm and dry, no acute rashes or ulceration  NEURO: GCS 15, cranial nerves intact, alert and oriented x3. Slightly slow speech otherwise normal finger to nose, equal  strength, normal leg raise.   PSYCH:  Mood/affect normal    Emergency Department Course   Laboratory:  Laboratory findings were communicated with the patient and family who voiced understanding of the findings.    UA: blood: moderate, protein albumin: 10, nitrite: positive, leukocyte esterase: large, WBC: >182, RBC: 102 (H), WBC Clumps: present, Bacteria: many, mucous: present o/w Negative  Urinalysis and  culture obtained and sent for evaluation.    CMP: AWNL (Creatinine 0.77)    CBC: WNL (WBC 3.9 (L), HGB 13.0, )    Asymptomatic COVID-19 Virus by PCR Nasopharyngeal swab: pending    Interventions:  0328 NS 1L IV Bolus  0455 Rocephin 1g IV    Emergency Department Course:  Past medical records, nursing notes, and vitals reviewed.  0309: I performed an exam of the patient and obtained history, as documented above.     IV inserted and blood drawn.    Urinalysis and culture obtained and sent for evaluation.    0429: I rechecked the patient. I reviewed the results with the Patient and spouse and answered all related questions prior to admission.     0439: I discussed the patient with Dr. Braxton, of hospitalist.    Findings and plan explained to the Patient and spouse who consents to admission. Discussed the patient with Dr. Roe, who will admit the patient to a observation bed for further monitoring, evaluation, and treatment.  Impression & Plan   Covid-19  Barbi Tiwari was evaluated during a global COVID-19 pandemic, which necessitated consideration that the patient might be at risk for infection with the SARS-CoV-2 virus that causes COVID-19.   Applicable protocols for evaluation were followed during the patient's care.   COVID-19 was considered as part of the patient's evaluation. The plan for testing is:  a test was obtained during this visit.    Medical Decision Making:  Patient presents with increased weakness today in the setting of ongoing chemotherapy for brain cancer as well as a fall.  Symptoms sounded more generalized in nature suggesting possible medication electrolyte abnormality or possible infectious etiology.  Not detecting any focal deficits.  Urinalysis looks consistent with UTI.  She was unsteady on her feet.  Discussed getting an MRI with her but she declined which I think is reasonable given the generalized symptoms.  She was given Rocephin and spoke with the hospitalist regarding  admission.    Diagnosis:    ICD-10-CM    1. Acute cystitis with hematuria  N30.01    2. Fall, initial encounter  W19.XXXA    3. Malignant neoplasm of brain, unspecified location (H)  C71.9        Disposition:  Admitted to observation bed.     Amrita Choudhary  10/24/2020   [facility] EMERGENCY DEPT  Scribe Disclosure:  Amirta HEIN, am serving as a scribe at 3:09 AM on 10/24/2020 to document services personally performed by Garcia De La Cruz MD based on my observations and the provider's statements to me.        Garcia De La Cruz MD  10/24/20 0642

## 2020-10-24 NOTE — ED NOTES
[facility]  ED Nurse Handoff Report    ED Chief complaint: Cerebrovascular Accident      ED Diagnosis:   Final diagnoses:   None       Code Status: Full Code    Allergies:   Allergies   Allergen Reactions     Sulfa Drugs Hives     Benoxinate Nausea and Vomiting       Patient Story: Reports difficulty speaking and tremors in hands and legs feel wobbly. Having nausea. Pt takes oral chemo. Pt is a nurse.   Focused Assessment:  Generalized weakness. Hx of  Brain CA on chemo    Treatments and/or interventions provided: 1 liter NS, rocephin  Patient's response to treatments and/or interventions: pt unsteady when up needs help to bedside commode    To be done/followed up on inpatient unit:      Does this patient have any cognitive concerns?: alert and oriented    Activity level - Baseline/Home:  Independent  Activity Level - Current:   Stand with Assist and Stand with assist x2    Patient's Preferred language: English   Needed?: No    Isolation: None  Infection: Not Applicable  Patient tested for COVID 19 prior to admission: YES  Bariatric?: No    Vital Signs:   Vitals:    10/24/20 0241 10/24/20 0410 10/24/20 0422 10/24/20 0426   BP: 107/63      Pulse: 71 54 55 54   Resp: 18 22 14 19   Temp: 97.9  F (36.6  C)      TempSrc: Oral      SpO2: 95% 95% 95% 97%       Cardiac Rhythm:Cardiac Rhythm: Normal sinus rhythm    Was the PSS-3 completed:   Yes  What interventions are required if any?               Family Comments:   OBS brochure/video discussed/provided to patient/family: Yes              Name of person given brochure if not patient:               Relationship to patient:     For the majority of the shift this patient's behavior was Green.   Behavioral interventions performed were .    ED NURSE PHONE NUMBER: [phone]

## 2020-10-24 NOTE — CONSULTS
I am seeing via the video visit Barbi Tiwari in regards to ongoing management of her chemotherapy for oligodendroglioma during her hospitalization.    She is a 60-year-old woman originally diagnosed with an oligodendroglioma in 2011 with a repeat resection in 2018 followed by slow tumor progression.  She was treated with chemoradiotherapy in May of this year and now is on adjuvant temozolomide.  Her dose has been limited by intractable nausea so that she is currently on a dose of 250 mg for 5 days each cycle.  With this lower dose she tolerated her previous cycles quite well with the addition of more antiemetics, acupuncture and CBD oil.  In the last week she started a new cycle and has completed 3 days.  Yesterday she started to have worsening of her chronic imbalance/unsteadiness as well as urinary symptoms including hematuria.She was admitted with dehydration and an apparent urinary tract infection.  She tells me over the course of the day today she is already feeling better and has been able to keep down some clear liquids and is looking forward to having some broth this evening.  She tells me her typical pattern is that her nausea is the most severe in the first couple days of her cycle.  She is not had any fevers or respiratory symptoms.  The remainder of her complete review of systems is otherwise negative.    GENERAL: Healthy, alert and no distress, complete alopecia.  EYES: Eyes grossly normal to inspection.  No discharge or erythema, or obvious scleral/conjunctival abnormalities.  RESP: No audible wheeze, cough, or visible cyanosis.  No visible retractions or increased work of breathing.    SKIN: Visible skin clear. No significant rash, abnormal pigmentation or lesions.  NEURO: Cranial nerves grossly intact.  Mentation and speech appropriate for age.  PSYCH: Mentation appears normal, affect normal/bright, judgement and insight intact, normal speech and appearance well-groomed.    Her lab work from this  morning shows an elevated chloride but otherwise normal electrolytes and renal function.  Her albumin is a bit below her baseline at 3.2 and the rest of her liver enzymes are unremarkable.  She has mild cytopenias consistent with prior values with a total white count of 3.4 and absolute neutrophil count of 2.3.    Assessment/plan: Nausea and vomiting resulting in dehydration related to chemotherapy for her oligodendroglioma.  With her IV hydration I think she is already improving.  I increased her Ativan to 1 mg for tonight's dose and otherwise she did not think she needed additional antiemetics beyond her usual planned regimen given the usual timing of her nausea.  She wants to try avoid any additional steroids which seems reasonable.  Her UTI does not seem a major enough issue to withhold her chemo at this point I think she can go ahead and finish out her last 2 doses today and tomorrow.  We will check in with her again tomorrow but given her prior history anticipate that her nausea will be well enough controlled that she should be able to manage at home once again.

## 2020-10-24 NOTE — PLAN OF CARE
Pt on chemo precautions. A and O X 4. VSS on RA. Denies pain or nausea today. Lungs clear. Bowels active. No BM. PIV NS at 100+ intermittent Rocephin. Full liquid diet, tolerating well.  SBA+IV pole. Hem/Onc following. Chemo :Temozolomide 250 mg ( Day 4/5) to be started tonight at 2215:  Pt to be premedicated at 2100 with Ativan 1 mg, and Compazine 10 mg.   Discharge pending. Continue to monitor.

## 2020-10-24 NOTE — PROGRESS NOTES
Brief Hospitalist note.    Ms Barbi Tiwari was admitted earlier this morning. Admission H&P, diagnostics & treatment plan reviewed.  Pt feeling better. Tolerating liq.   /66 (BP Location: Right arm)   Pulse 57   Temp 96.8  F (36  C) (Oral)   Resp 16   SpO2 95%     UC pending. Discussed with pt & her  at her bedside.Requested compazine to be available for her intermittent chronic nausea.  Continue current plan    Madison Saeed MD.  Hospitalist [phone] (7am -6 pm)

## 2020-10-24 NOTE — PROGRESS NOTES
RECEIVING UNIT ED HANDOFF REVIEW    ED Nurse Handoff Report was reviewed by: Catherine Villarreal RN on October 24, 2020 at 5:05 AM

## 2020-10-24 NOTE — PROVIDER NOTIFICATION
MD Notification    Notified Person: MD    Notified Person Name: [facility]. Dr. Leal. Ed    Notification Date/Time: 10/24/2020 at 1458    Notification Interaction: pager, phone with      Purpose of Notification: Pt asking if she can continue her Chemotherapy while in the hospital? No order at this time.       Orders Received: order to continue with chemo    Comments:

## 2020-10-24 NOTE — ED TRIAGE NOTES
Reports difficulty speaking and tremors in hands and legs feel wobbly. Reports last normal 1 week ago.  Having nausea.

## 2020-10-24 NOTE — PLAN OF CARE
ED admission.Suspected UTI.Alert and oriented.Denies pain.Tolerating diet.Assist  x 1 ,going to bathroom.IVF running 100 ml/hr.On chemo precaution. Will monitor.

## 2020-10-24 NOTE — PROVIDER NOTIFICATION
Prescriber Notification Note    The pharmacist has communicated with this patient's provider regarding a concern or therapy recommendation.    Notified Person: Darlin  Date/Time of Notification: 10/24/2020 1000  Interaction: text page  Concern/Recommendation:informed her that the celexa, keppra, and lamictal home doses differed from that ordered.      Comments/Additional Details:orders changed to match home meds.

## 2020-10-25 VITALS
HEART RATE: 56 BPM | OXYGEN SATURATION: 95 % | DIASTOLIC BLOOD PRESSURE: 74 MMHG | SYSTOLIC BLOOD PRESSURE: 112 MMHG | HEIGHT: 62 IN | RESPIRATION RATE: 16 BRPM | TEMPERATURE: 96 F | BODY MASS INDEX: 26.35 KG/M2 | WEIGHT: 143.2 LBS

## 2020-10-25 PROCEDURE — 258N000003 HC RX IP 258 OP 636: Performed by: HOSPITALIST

## 2020-10-25 PROCEDURE — 99217 PR OBSERVATION CARE DISCHARGE: CPT | Performed by: INTERNAL MEDICINE

## 2020-10-25 PROCEDURE — G0378 HOSPITAL OBSERVATION PER HR: HCPCS

## 2020-10-25 PROCEDURE — 250N000013 HC RX MED GY IP 250 OP 250 PS 637: Performed by: INTERNAL MEDICINE

## 2020-10-25 RX ORDER — PROCHLORPERAZINE MALEATE 10 MG
10 TABLET ORAL EVERY 6 HOURS PRN
Qty: 20 TABLET | Refills: 1 | Status: SHIPPED | OUTPATIENT
Start: 2020-10-25 | End: 2020-10-25

## 2020-10-25 RX ORDER — CEFUROXIME AXETIL 500 MG/1
500 TABLET ORAL 2 TIMES DAILY
Qty: 12 TABLET | Refills: 0 | Status: SHIPPED | OUTPATIENT
Start: 2020-10-25 | End: 2020-10-31

## 2020-10-25 RX ADMIN — METOPROLOL TARTRATE 25 MG: 25 TABLET, FILM COATED ORAL at 08:07

## 2020-10-25 RX ADMIN — FEXOFENADINE HYDROCHLORIDE 180 MG: 180 TABLET, FILM COATED ORAL at 08:06

## 2020-10-25 RX ADMIN — SODIUM CHLORIDE: 9 INJECTION, SOLUTION INTRAVENOUS at 01:54

## 2020-10-25 RX ADMIN — LEVETIRACETAM 1500 MG: 500 TABLET ORAL at 08:06

## 2020-10-25 RX ADMIN — PROCHLORPERAZINE MALEATE 10 MG: 10 TABLET ORAL at 10:58

## 2020-10-25 RX ADMIN — LAMOTRIGINE 300 MG: 100 TABLET ORAL at 08:06

## 2020-10-25 ASSESSMENT — MIFFLIN-ST. JEOR: SCORE: 1177.93

## 2020-10-25 NOTE — DISCHARGE SUMMARY
[facility]    Discharge Summary  Hospitalist    Date of Admission:  10/24/2020  Date of Discharge:  10/25/2020  Discharging Provider: Tonny Lopez  Date of Service (when I saw the patient): 10/25/20    Discharge Diagnoses    1. Acute cystitis  2. Generalized weakness, nausea, dizziness  3. Known oligodendroglioma  4. History of nephrolithiasis  5. Seizure disorder  6. Hypertension  7. Chronic depression    History of Present Illness   Barbi Tiwari is a markedly pleasant 60 year old woman with past medical history that is most notable for oligodendroma, currently on chemotherapy, as well as seizure disorder, prior nephrolithiasis, chronic depression, and restless leg syndrome, among others; who presents with progressive nausea, diarrhea, dizziness, and weakness with a fall, and is found to have a UTI.    Hospital Course   Acute cystitis with hematuria  Generalized weakness, nausea, dizziness  Oligodendroglioma  Followed by Dr. Glover of  Neuro Oncology. In 2011 she underwent craniotomy with resection of tumor found to be oligodendroma, and underwent chemotherapy. She required repeat resection in 2018 with further chemoradiation then and again most recently earlier this year. She is currently on Temozolamide. Most recent brain MRI 9/2020 reportedly showed stable findings. She presents for the above symptoms and is found to have pyuria and hematuria on UA concerning for UTI, in the setting of suspected dehydration and chemotherapy-induced nausea.   --Patient registered to observation.  Her diet was advanced as tolerated.  IV fluids provided, and now been discontinued.  --Oncology consulted, input appreciated  --Urine culture growing  K mixed urogenital bin.  Microbiology lab has been called to run ID and sensitivities on uro-pathogens.  --Patient had good response to IV ceftriaxone while hospitalized, therefore we will transition to Ceftin 500 mg twice daily for a full  7-day course of antibiotics.  --Anti-emetics ordered as needed.  Patient has Compazine at home, which has been most helpful.  --Follow-up with primary care provider within 7 days    Prior nephrolithiasis: She underwent lithotripsy with left ureteral stent placement for this in 2017; the UA does show microscopic hematuria but she denies any other symptoms consistent with recurrent obstructive nephrolithiasis at present.  --Pt politely declines further radiology images for now, and wishes to continue treating the UTI as she is feeling better, and would like to go home as soon as she can.     Seizure disorder: Resume Keppra, Lamictal      Hypertension: Resume Lopresor     Chronic depression: Resume Celexa       Tonny Lopez PA-C    Patient seen and examined.  Agree with impression and plan.     Phil Payne MD  Pager: [phone]  Cell Phone:  [phone]      Significant Results and Procedures   As documented.  No procedures.    Pending Results   Urine culture growing  K mixed urogenital bin.  Microbiology lab called to run ID and sensitivities on uro-pathogens.  Results still pending.    Code Status   Full Code       Primary Care Physician   Dustin Choudhury    Physical Exam   Temp: 96  F (35.6  C) Temp src: Oral BP: 112/74 Pulse: 56   Resp: 16 SpO2: 95 % O2 Device: None (Room air)    Vitals:    10/24/20 1800 10/25/20 0328   Weight: 63.6 kg (140 lb 3.1 oz) 65 kg (143 lb 3.2 oz)     Vital Signs with Ranges  Temp:  [95.6  F (35.3  C)-97.9  F (36.6  C)] 96  F (35.6  C)  Pulse:  [56-84] 56  Resp:  [16] 16  BP: (112-134)/(71-78) 112/74  SpO2:  [94 %-96 %] 95 %  I/O last 3 completed shifts:  In: 476 [P.O.:476]  Out: -     Constitutional: Alert, oriented to person, place, date, situation.  Cooperative, reclining in the chair in NAD.   Respiratory:  Lungs CTAB.  No crackles, wheezes, or labored breathing.  Cardiovascular:  Heart RRR, no murmur.  GI:  Abdomen soft, NT/ND and with normoactive  BS  Skin/Integumen:   Dry, no rashes on exposed skin.  MSK: CMS x4 grossly intact.       Discharge Disposition   Discharged to home  Condition at discharge: Stable    Consultations This Hospital Stay   HEMATOLOGY & ONCOLOGY IP CONSULT      Discharge Orders      Reason for your hospital stay    Urinary tract infection resulting in generalized weakness, nausea, dizziness, and fall.     Follow-up and recommended labs and tests     Follow up with primary care provider, Dustin Choudhury, within 7 days for hospital follow- up.     Activity    Your activity upon discharge: activity as tolerated     Diet    Follow this diet upon discharge: Regular     Discharge Medications   Current Discharge Medication List      START taking these medications    Details   cefuroxime (CEFTIN) 500 MG tablet Take 1 tablet (500 mg) by mouth 2 times daily for 6 days  Qty: 12 tablet, Refills: 0    Associated Diagnoses: Acute cystitis with hematuria         CONTINUE these medications which have NOT CHANGED    Details   acetaminophen (TYLENOL) 500 MG tablet Take 2  tablets by mouth every 8 hours for post operative pain.  Qty: 60 tablet, Refills: 1    Associated Diagnoses: Post-operative pain      aprepitant (EMEND TRI-PACK) 80 & 125 MG MISC Take 125mg tablet 1 hour prior to temozolomide on day 1. Then take 80mg 1 hour prior to temozolomide on day 2 and day 3.  Qty: 1 each, Refills: 3    Associated Diagnoses: Chemotherapy-induced nausea and vomiting      calcium carbonate (TUMS) 500 MG chewable tablet Take 1 chew tab by mouth daily as needed for heartburn      CALCIUM-VITAMIN D PO Take 1 tablet by mouth daily      citalopram (CELEXA) 20 MG tablet Take 0.5 tablets (10 mg) by mouth daily for 6 days, THEN 1 tablet (20 mg) daily.  Qty: 30 tablet, Refills: 0    Associated Diagnoses: Moderate major depression, single episode (H)      COMPOUNDED NON-CONTROLLED SUBSTANCE (CMPD RX) - PHARMACY TO MIX COMPOUNDED MEDICATION Place 1 g vaginally twice a week  Apply 1g daily for 2 weeks, then twice weekly to vagina  Qty: 30 g, Refills: 11    Comments: 0.1mg/gm estradiol in paraben free vaginal cream  Associated Diagnoses: Vaginal atrophy      cyclobenzaprine (FLEXERIL) 5 MG tablet Take 1 tablet (5 mg) by mouth 3 times daily as needed for muscle spasms  Qty: 30 tablet, Refills: 0    Associated Diagnoses: Sacroiliac joint pain      fexofenadine (ALLEGRA) 180 MG tablet Take 180 mg by mouth daily      gabapentin (NEURONTIN) 100 MG capsule 2 capsules ( 200 mg) 7 pm, Make appointment with Dr. payton for future refills  Qty: 180 capsule, Refills: 0    Associated Diagnoses: Seizure (H)      ibuprofen (ADVIL/MOTRIN) 200 MG tablet Take 800 mg by mouth daily as needed       Lactase (LACTAID PO) Take 1-2 tablets by mouth daily as needed for indigestion       !! lamoTRIgine (LAMICTAL) 100 MG tablet 100 mg am and noon (take along with 200 mg tablet for total of 300 mg twice a day). [facility] only  Qty: 180 tablet, Refills: 3    Comments: Keep on file  Associated Diagnoses: Seizure (H)      !! lamoTRIgine (LAMICTAL) 200 MG tablet 200 mg am and noon (take along with 100 mg tablet for total of 300 mg twice a day). [facility] only  Qty: 180 tablet, Refills: 3    Comments: Keep on file  Associated Diagnoses: Seizure (H)      levETIRAcetam (KEPPRA) 500 MG tablet 1500 mg am and 1500 mg pm    Associated Diagnoses: Seizure (H)      LORazepam (ATIVAN) 0.5 MG tablet Take 1 tablet (0.5 mg) by mouth At Bedtime 30 minutes prior to chemotherapy to prevent nausea.  Qty: 30 tablet, Refills: 0    Associated Diagnoses: Chemotherapy-induced nausea and vomiting      metoprolol tartrate (LOPRESSOR) 25 MG tablet Take 1 tablet (25 mg) by mouth 2 times daily  Qty: 180 tablet, Refills: 1    Associated Diagnoses: Symptomatic premature ventricular contractions      Multiple Vitamin (MULTI-VITAMINS) TABS Take 1 chew tab by mouth daily       prochlorperazine (COMPAZINE) 10 MG tablet Take 1 tablet (10 mg) by  mouth At Bedtime 30 minutes prior to chemotherapy dosing. Can repeat every 8 hours as needed for nausea.  Qty: 30 tablet, Refills: 1    Associated Diagnoses: Chemotherapy-induced nausea and vomiting      temozolomide (TEMODAR) 250 MG capsule Take 1 capsule (250 mg) by mouth daily for 5 days Take ondansetron 30-60 min before temozolomide. Take at bedtime on an empty stomach.  Qty: 5 capsule, Refills: 0    Associated Diagnoses: Oligodendroglioma (H)      tretinoin (RETIN-A) 0.025 % external cream Apply a pea-sized amount to each area affected by acne. Start every 3-4 nights working up to every night.  Qty: 45 g, Refills: 3    Associated Diagnoses: Sebaceous gland hyperplasia      VITAMIN D, CHOLECALCIFEROL, PO Take 1,000 Units by mouth daily        !! - Potential duplicate medications found. Please discuss with provider.        Allergies   Allergies   Allergen Reactions     Sulfa Drugs Hives     Benoxinate Nausea and Vomiting     Data   Most Recent 3 CBC's:  Recent Labs   Lab Test 10/24/20  0639 10/24/20  0256 10/20/20  1006   WBC 3.4* 3.9* 3.0*   HGB 10.8* 13.0 11.9   MCV 99 98 100    209 144*      Most Recent 3 BMP's:  Recent Labs   Lab Test 10/24/20  0639 10/24/20  0256 10/20/20  1006    138 140   POTASSIUM 3.7 3.7 3.9   CHLORIDE 110* 106 109   CO2 25 28 27   BUN 19 21 21   CR 0.71 0.77 0.74   ANIONGAP 5 4 4   KIAN 8.1* 8.9 9.7   GLC 85 95 149*     Most Recent 2 LFT's:  Recent Labs   Lab Test 10/24/20  0639 10/24/20  0256   AST 11 22   ALT 14 21   ALKPHOS 63 79   BILITOTAL 0.2 0.3     Most Recent INR's and Anticoagulation Dosing History:  Anticoagulation Dose History     Recent Dosing and Labs Latest Ref Rng & Units 5/23/2018 5/30/2018    INR 0.86 - 1.14 1.00 1.03        Most Recent 3 Troponin's:No lab results found.  Most Recent Cholesterol Panel:  Recent Labs   Lab Test 03/24/17  1135   CHOL 246*   *   HDL 54   TRIG 123     Most Recent 6 Bacteria Isolates From Any Culture (See EPIC Reports for  Culture Details):  Recent Labs   Lab Test 10/24/20  0410 01/27/20  1311 02/21/19  1927 05/11/17  1928 05/11/17  1822 05/10/17  1230   CULT 50,000 to 100,000 colonies/mL  mixed urogenital bin  Susceptibility testing not routinely done   No growth after 4 weeks Moderate growth  Staphylococcus lugdunensis  *  Plus  Light growth  Normal skin bin   No growth No growth >100,000 colonies/mL Pseudomonas aeruginosa  >100,000 colonies/mL Strain 2 Pseudomonas aeruginosa  *     Most Recent TSH, T4 and A1c Labs:  Recent Labs   Lab Test 03/24/17  1135   TSH 0.66     Results for orders placed or performed during the hospital encounter of 09/22/20   MR Brain w/o & w Contrast    Narrative    MRI BRAIN WITHOUT AND WITH CONTRAST September 22, 2020 1:15 PM    HISTORY: Astrocytoma/oligodendroglioma, assess treatment response.  Oligodendroglioma (H).     TECHNIQUE: Multiplanar, multisequence MRI of the brain without and  with 6mL Gadavist.    COMPARISON: 6/30/2020.    FINDINGS: There has been prior right frontal craniotomy. There is a  nonenhancing cystic cavity at the craniotomy site without any  significant mass effect. This is stable in appearance. There is some  very minimal surrounding T2 signal abnormality which is also stable in  appearance. Brain parenchyma is otherwise normal. Ventricles are  normal in size, shape, and configuration. There is no evidence for  acute infarct or acute hemorrhage.      Impression    IMPRESSION: Stable exam. No evidence for tumor progression.    JACK LEE MD

## 2020-10-25 NOTE — PLAN OF CARE
Pt rested comfortably overnight. Vitals stable. A&O, independent/SBA. C/o nausea before bed, improved w/ compazine/ativan prior to chemo. Full liquid diet. Continuing IVF/Rocephin, plan pending.

## 2020-10-25 NOTE — PROVIDER NOTIFICATION
MD Notification    Notified Person: MD    Notified Person Name: Dr. Mae    Notification Date/Time: 10/25/2020 at 1253    Notification Interaction: pager    Purpose of Notification: Urine cultures are back. Pt wondering about discharge timing.     Orders Received: Antimitotics prescribed    Comments:

## 2020-10-25 NOTE — PLAN OF CARE
Pt on chemo precautions. A and O X 4. VSS on RA. Denies pain or nausea. Lungs clear. Bowels active. PIV NS at 100+ intermittent Rocephin. Advanced to regular diet,  tolerating well. Ind in the room. Adequate urine output.  Chemo :  Day 5/5 of Temozolomide 250 mg ( Day 4/5) due  tonight at 2215- to be taken at home after discharge, pt informed and expressing understating. UA cultures back. Md notified, and okay to discharge patient pert MD order. All discharge educations completed and all questions answered. Pt discharged with oral Abx, e-script sent to [facility] on [address] per pt's request, along with paper script as a back up. Instructions on how to take meds given and all questions answered. Pt expressed understanding. All belongings with pt at discharge. Pt discharged in stable conditions, denying pain and nausea at time of discharge. Transported by NA to door number 2 via wheelchair. Pt's  giving pt ride to home. Pt eager to go home.

## 2020-10-26 ENCOUNTER — TELEPHONE (OUTPATIENT)
Dept: INTERNAL MEDICINE | Facility: CLINIC | Age: 60
End: 2020-10-26

## 2020-10-26 ENCOUNTER — PATIENT OUTREACH (OUTPATIENT)
Dept: ONCOLOGY | Facility: CLINIC | Age: 60
End: 2020-10-26

## 2020-10-26 NOTE — TELEPHONE ENCOUNTER
Reason for your hospital stay   Urinary tract infection resulting in generalized weakness, nausea, dizziness, and fall.     Follow up with primary care provider, Dustin Choudhury, within 7 days for hospital follow- up.       ED / Discharge Outreach Protocol    Patient Contact    Attempt # 1    Was call answered?  No.  Left message on voicemail with information to call me back.    Donya BRUNO, RN, PHN

## 2020-10-27 ENCOUNTER — VIRTUAL VISIT (OUTPATIENT)
Dept: INTERNAL MEDICINE | Facility: CLINIC | Age: 60
End: 2020-10-27
Payer: COMMERCIAL

## 2020-10-27 DIAGNOSIS — F32.1 MODERATE MAJOR DEPRESSION, SINGLE EPISODE (H): ICD-10-CM

## 2020-10-27 DIAGNOSIS — N30.01 ACUTE CYSTITIS WITH HEMATURIA: Primary | ICD-10-CM

## 2020-10-27 DIAGNOSIS — C71.9 MALIGNANT NEOPLASM OF BRAIN, UNSPECIFIED LOCATION (H): ICD-10-CM

## 2020-10-27 PROBLEM — G89.29 CHRONIC PAIN OF LEFT KNEE: Status: RESOLVED | Noted: 2018-10-24 | Resolved: 2020-10-27

## 2020-10-27 PROBLEM — M25.562 ACUTE PAIN OF LEFT KNEE: Status: RESOLVED | Noted: 2019-08-28 | Resolved: 2020-10-27
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
## 2020-10-27 PROBLEM — M25.562 CHRONIC PAIN OF LEFT KNEE: Status: RESOLVED | Noted: 2018-10-24 | Resolved: 2020-10-27

## 2020-10-27 LAB
BACTERIA SPEC CULT: ABNORMAL
Lab: ABNORMAL
SPECIMEN SOURCE: ABNORMAL

## 2020-10-27 PROCEDURE — 99495 TRANSJ CARE MGMT MOD F2F 14D: CPT | Mod: GT | Performed by: INTERNAL MEDICINE

## 2020-10-27 RX ORDER — CITALOPRAM HYDROBROMIDE 20 MG/1
30 TABLET ORAL DAILY
Qty: 45 TABLET | Refills: 1 | Status: SHIPPED | OUTPATIENT
Start: 2020-10-27 | End: 2020-12-15

## 2020-10-27 NOTE — PROGRESS NOTES
"Barbi Tiwari is a 60 year old female who is being evaluated via a billable video visit.      The patient has been notified of following:     \"This video visit will be conducted via a call between you and your physician/provider. We have found that certain health care needs can be provided without the need for an in-person physical exam.  This service lets us provide the care you need with a video conversation.  If a prescription is necessary we can send it directly to your pharmacy.  If lab work is needed we can place an order for that and you can then stop by our lab to have the test done at a later time.    Video visits are billed at different rates depending on your insurance coverage.  Please reach out to your insurance provider with any questions.    If during the course of the call the physician/provider feels a video visit is not appropriate, you will not be charged for this service.\"    Patient has given verbal consent for Video visit? Yes  How would you like to obtain your AVS? MyChart  If you are dropped from the video visit, the video invite should be resent to: Send to e-mail at: [email or web address]  Will anyone else be joining your video visit? No    Subjective     Barbi Tiwari is a 60 year old female who presents today via video visit for the following health issues:    [facility]       Hospital Follow-up Visit:    Hospital/Nursing Home/IP Rehab Facility: [facility]  Date of Admission: 10/24  Date of Discharge: 10/25  Reason(s) for Admission: Fall, UTI      Was your hospitalization related to COVID-19? No   Problems taking medications regularly:  None  Medication changes since discharge: None  Problems adhering to non-medication therapy:  None    Summary of hospitalization:  [facility] discharge summary reviewed  Diagnostic Tests/Treatments reviewed.  Follow up needed: u cx reviewed  Other Healthcare Providers Involved in Patient s Care:         Specialist appointment - " oncology  Update since discharge: improved. Post Discharge Medication Reconciliation: discharge medications reconciled and changed, per note/orders.  Plan of care communicated with patient            Acute cystitis with hematuria  Generalized weakness, nausea, dizziness  Oligodendroglioma  Followed by Dr. Glover of  Neuro Oncology. In 2011 she underwent craniotomy with resection of tumor found to be oligodendroma, and underwent chemotherapy. She required repeat resection in 2018 with further chemoradiation then and again most recently earlier this year. She is currently on Temozolamide. Most recent brain MRI 9/2020 reportedly showed stable findings. She presents for the above symptoms and is found to have pyuria and hematuria on UA concerning for UTI, in the setting of suspected dehydration and chemotherapy-induced nausea.   --Patient registered to observation.  Her diet was advanced as tolerated.  IV fluids provided, and now been discontinued.  --Oncology consulted, input appreciated  --Urine culture growing  K mixed urogenital bin.  Microbiology lab has been called to run ID and sensitivities on uro-pathogens.  --Patient had good response to IV ceftriaxone while hospitalized, therefore we will transition to Ceftin 500 mg twice daily for a full 7-day course of antibiotics.  --Anti-emetics ordered as needed.  Patient has Compazine at home, which has been most helpful.  --Follow-up with primary care provider within 7 days    Initially scheduled as a follow-up to the addition of citalopram due to her depressed mood.  Since then she was hospitalized after a fall.  ER evaluation was notable for pyuria and subsequent positive cultures for E. coli, Proteus Pseudomonas.  She never had any dysuria or other  symptoms and is completing a 7-day course Ceftin.    Terms of her depression she does not feel that he noted considerable improvement with escitalopram as of yet.  She is still undergoing chemo for her  "recurrent brain cancer.  She did visit briefly with the psychologist at the oncology office but not a formal session and is not interested in continuing this at this time.    Video Start Time: 1106        Review of Systems   Constitutional, HEENT, cardiovascular, pulmonary, gi and gu systems are negative, except as otherwise noted.      Objective       Vitals:  No vitals were obtained today due to virtual visit.    Physical Exam   GENERAL: alert and no distress  RESP: No audible wheeze, cough, or visible cyanosis.  No visible retractions or increased work of breathing.    SKIN: Visible skin clear. No significant rash, abnormal pigmentation or lesions.  NEURO: Cranial nerves grossly intact.  Mentation and speech appropriate for age.  PSYCH: mentation appears normal and affect normal/bright        Assessment & Plan     Acute cystitis with hematuria  Finish off antibiotic course.    Moderate major depression, single episode (H)  Try inc to 30 mg . F/u 4 wks  - citalopram (CELEXA) 20 MG tablet; Take 1.5 tablets (30 mg) by mouth daily    Malignant neoplasm of brain, unspecified location (H)  Per onc.        BMI:   Estimated body mass index is 26.18 kg/m  as calculated from the following:    Height as of 10/24/20: 1.575 m (5' 2.01\").    Weight as of 10/25/20: 65 kg (143 lb 3.2 oz).            See Patient Instructions    No follow-ups on file.    Dustin Choudhury MD  [facility]      Video-Visit Details    Type of service:  Video Visit    Video End Time:11:29 AM    Originating Location (pt. Location): Home    Distant Location (provider location):  [facility]     Platform used for Video Visit: Prosper          "

## 2020-10-29 ENCOUNTER — TELEPHONE (OUTPATIENT)
Dept: INTERNAL MEDICINE | Facility: CLINIC | Age: 60
End: 2020-10-29

## 2020-11-03 DIAGNOSIS — R11.2 CHEMOTHERAPY-INDUCED NAUSEA AND VOMITING: ICD-10-CM

## 2020-11-03 DIAGNOSIS — Z51.11 CHEMOTHERAPY MANAGEMENT, ENCOUNTER FOR: ICD-10-CM

## 2020-11-03 DIAGNOSIS — T45.1X5A CHEMOTHERAPY-INDUCED NAUSEA AND VOMITING: ICD-10-CM

## 2020-11-03 DIAGNOSIS — Z79.899 ENCOUNTER FOR LONG-TERM (CURRENT) USE OF MEDICATIONS: ICD-10-CM

## 2020-11-03 DIAGNOSIS — T45.1X5A CHEMOTHERAPY INDUCED NEUTROPENIA (H): ICD-10-CM

## 2020-11-03 DIAGNOSIS — D70.1 CHEMOTHERAPY INDUCED NEUTROPENIA (H): ICD-10-CM

## 2020-11-03 DIAGNOSIS — C71.9 OLIGODENDROGLIOMA (H): ICD-10-CM

## 2020-11-03 LAB
ALBUMIN SERPL-MCNC: 3.4 G/DL (ref 3.4–5)
ALP SERPL-CCNC: 79 U/L (ref 40–150)
ALT SERPL W P-5'-P-CCNC: 18 U/L (ref 0–50)
ANION GAP SERPL CALCULATED.3IONS-SCNC: <1 MMOL/L (ref 3–14)
AST SERPL W P-5'-P-CCNC: 13 U/L (ref 0–45)
BASOPHILS # BLD AUTO: 0 10E9/L (ref 0–0.2)
BASOPHILS NFR BLD AUTO: 0.2 %
BILIRUB SERPL-MCNC: 0.3 MG/DL (ref 0.2–1.3)
BUN SERPL-MCNC: 11 MG/DL (ref 7–30)
CALCIUM SERPL-MCNC: 8.9 MG/DL (ref 8.5–10.1)
CHLORIDE SERPL-SCNC: 109 MMOL/L (ref 94–109)
CO2 SERPL-SCNC: 34 MMOL/L (ref 20–32)
CREAT SERPL-MCNC: 0.69 MG/DL (ref 0.52–1.04)
DIFFERENTIAL METHOD BLD: ABNORMAL
EOSINOPHIL # BLD AUTO: 0.2 10E9/L (ref 0–0.7)
EOSINOPHIL NFR BLD AUTO: 3.7 %
ERYTHROCYTE [DISTWIDTH] IN BLOOD BY AUTOMATED COUNT: 13.2 % (ref 10–15)
GFR SERPL CREATININE-BSD FRML MDRD: >90 ML/MIN/{1.73_M2}
GLUCOSE SERPL-MCNC: 83 MG/DL (ref 70–99)
HCT VFR BLD AUTO: 37.2 % (ref 35–47)
HGB BLD-MCNC: 12.1 G/DL (ref 11.7–15.7)
LYMPHOCYTES # BLD AUTO: 0.7 10E9/L (ref 0.8–5.3)
LYMPHOCYTES NFR BLD AUTO: 16 %
MCH RBC QN AUTO: 32.7 PG (ref 26.5–33)
MCHC RBC AUTO-ENTMCNC: 32.5 G/DL (ref 31.5–36.5)
MCV RBC AUTO: 101 FL (ref 78–100)
MONOCYTES # BLD AUTO: 0.4 10E9/L (ref 0–1.3)
MONOCYTES NFR BLD AUTO: 10.9 %
NEUTROPHILS # BLD AUTO: 2.8 10E9/L (ref 1.6–8.3)
NEUTROPHILS NFR BLD AUTO: 69.2 %
PLATELET # BLD AUTO: 152 10E9/L (ref 150–450)
POTASSIUM SERPL-SCNC: 4.5 MMOL/L (ref 3.4–5.3)
PROT SERPL-MCNC: 6.5 G/DL (ref 6.8–8.8)
RBC # BLD AUTO: 3.7 10E12/L (ref 3.8–5.2)
SODIUM SERPL-SCNC: 141 MMOL/L (ref 133–144)
WBC # BLD AUTO: 4.1 10E9/L (ref 4–11)

## 2020-11-03 PROCEDURE — 80053 COMPREHEN METABOLIC PANEL: CPT | Performed by: PSYCHIATRY & NEUROLOGY

## 2020-11-03 PROCEDURE — 85025 COMPLETE CBC W/AUTO DIFF WBC: CPT | Performed by: PSYCHIATRY & NEUROLOGY

## 2020-11-03 PROCEDURE — 36415 COLL VENOUS BLD VENIPUNCTURE: CPT | Performed by: PSYCHIATRY & NEUROLOGY

## 2020-11-11 DIAGNOSIS — C71.9 OLIGODENDROGLIOMA (H): Primary | ICD-10-CM

## 2020-11-11 RX ORDER — TEMOZOLOMIDE 250 MG/1
150 CAPSULE ORAL DAILY
Qty: 5 CAPSULE | Refills: 0 | Status: SHIPPED | OUTPATIENT
Start: 2020-11-11 | End: 2021-03-01

## 2020-11-16 ENCOUNTER — MYC MEDICAL ADVICE (OUTPATIENT)
Dept: ONCOLOGY | Facility: CLINIC | Age: 60
End: 2020-11-16

## 2020-11-16 DIAGNOSIS — Z79.899 ENCOUNTER FOR LONG-TERM (CURRENT) USE OF MEDICATIONS: ICD-10-CM

## 2020-11-16 DIAGNOSIS — C71.9 OLIGODENDROGLIOMA (H): ICD-10-CM

## 2020-11-16 DIAGNOSIS — M25.562 ACUTE PAIN OF LEFT KNEE: ICD-10-CM

## 2020-11-16 LAB
ALBUMIN SERPL-MCNC: 3.7 G/DL (ref 3.4–5)
ALP SERPL-CCNC: 87 U/L (ref 40–150)
ALT SERPL W P-5'-P-CCNC: 19 U/L (ref 0–50)
ANION GAP SERPL CALCULATED.3IONS-SCNC: 1 MMOL/L (ref 3–14)
AST SERPL W P-5'-P-CCNC: 12 U/L (ref 0–45)
BASOPHILS # BLD AUTO: 0 10E9/L (ref 0–0.2)
BASOPHILS NFR BLD AUTO: 0.2 %
BILIRUB SERPL-MCNC: 0.2 MG/DL (ref 0.2–1.3)
BUN SERPL-MCNC: 16 MG/DL (ref 7–30)
CALCIUM SERPL-MCNC: 9.6 MG/DL (ref 8.5–10.1)
CHLORIDE SERPL-SCNC: 106 MMOL/L (ref 94–109)
CO2 SERPL-SCNC: 33 MMOL/L (ref 20–32)
CREAT SERPL-MCNC: 0.76 MG/DL (ref 0.52–1.04)
DIFFERENTIAL METHOD BLD: ABNORMAL
EOSINOPHIL # BLD AUTO: 0.1 10E9/L (ref 0–0.7)
EOSINOPHIL NFR BLD AUTO: 2.6 %
ERYTHROCYTE [DISTWIDTH] IN BLOOD BY AUTOMATED COUNT: 12.4 % (ref 10–15)
GFR SERPL CREATININE-BSD FRML MDRD: 85 ML/MIN/{1.73_M2}
GLUCOSE SERPL-MCNC: 100 MG/DL (ref 70–99)
HCT VFR BLD AUTO: 42.8 % (ref 35–47)
HGB BLD-MCNC: 13.7 G/DL (ref 11.7–15.7)
LYMPHOCYTES # BLD AUTO: 0.7 10E9/L (ref 0.8–5.3)
LYMPHOCYTES NFR BLD AUTO: 17.4 %
MCH RBC QN AUTO: 32 PG (ref 26.5–33)
MCHC RBC AUTO-ENTMCNC: 32 G/DL (ref 31.5–36.5)
MCV RBC AUTO: 100 FL (ref 78–100)
MONOCYTES # BLD AUTO: 0.4 10E9/L (ref 0–1.3)
MONOCYTES NFR BLD AUTO: 8.8 %
NEUTROPHILS # BLD AUTO: 3 10E9/L (ref 1.6–8.3)
NEUTROPHILS NFR BLD AUTO: 71 %
PLATELET # BLD AUTO: 220 10E9/L (ref 150–450)
POTASSIUM SERPL-SCNC: 3.7 MMOL/L (ref 3.4–5.3)
PROT SERPL-MCNC: 7.4 G/DL (ref 6.8–8.8)
RBC # BLD AUTO: 4.28 10E12/L (ref 3.8–5.2)
SODIUM SERPL-SCNC: 140 MMOL/L (ref 133–144)
WBC # BLD AUTO: 4.2 10E9/L (ref 4–11)

## 2020-11-16 PROCEDURE — 80053 COMPREHEN METABOLIC PANEL: CPT | Performed by: INTERNAL MEDICINE

## 2020-11-16 PROCEDURE — 36415 COLL VENOUS BLD VENIPUNCTURE: CPT | Performed by: INTERNAL MEDICINE

## 2020-11-16 PROCEDURE — 85025 COMPLETE CBC W/AUTO DIFF WBC: CPT | Performed by: INTERNAL MEDICINE

## 2020-11-17 ENCOUNTER — VIRTUAL VISIT (OUTPATIENT)
Dept: ONCOLOGY | Facility: CLINIC | Age: 60
End: 2020-11-17
Attending: PSYCHIATRY & NEUROLOGY
Payer: COMMERCIAL

## 2020-11-17 DIAGNOSIS — R11.2 CHEMOTHERAPY-INDUCED NAUSEA AND VOMITING: ICD-10-CM

## 2020-11-17 DIAGNOSIS — C71.9 OLIGODENDROGLIOMA (H): Primary | ICD-10-CM

## 2020-11-17 DIAGNOSIS — T45.1X5A CHEMOTHERAPY-INDUCED NAUSEA AND VOMITING: ICD-10-CM

## 2020-11-17 PROCEDURE — 99214 OFFICE O/P EST MOD 30 MIN: CPT | Mod: 95 | Performed by: PSYCHIATRY & NEUROLOGY

## 2020-11-17 PROCEDURE — 999N001193 HC VIDEO/TELEPHONE VISIT; NO CHARGE

## 2020-11-17 RX ORDER — LORAZEPAM 1 MG/1
1 TABLET ORAL AT BEDTIME
Qty: 30 TABLET | Refills: 0 | Status: SHIPPED | OUTPATIENT
Start: 2020-11-17 | End: 2020-12-15

## 2020-11-17 NOTE — LETTER
"    11/17/2020         RE: Barbi Tiwari  [address] [facility] [id number]        Dear Colleague,    Thank you for referring your patient, Barbi Tiwari, to the [facility]. Please see a copy of my visit note below.    Barbi Tiwari is a 60 year old female who is being evaluated via a billable video visit.      The patient has been notified of following:     \"This video visit will be conducted via a call between you and your physician/provider. We have found that certain health care needs can be provided without the need for an in-person physical exam.  This service lets us provide the care you need with a video conversation.  If a prescription is necessary we can send it directly to your pharmacy.  If lab work is needed we can place an order for that and you can then stop by our lab to have the test done at a later time.    Video visits are billed at different rates depending on your insurance coverage.  Please reach out to your insurance provider with any questions.    If during the course of the call the physician/provider feels a video visit is not appropriate, you will not be charged for this service.\"    Patient has given verbal consent for Video visit? Yes  How would you like to obtain your AVS? MyChart     Patient in virtual lobby    If you are dropped from the video visit, the video invite should be resent to: Send to e-mail at: [email or web address]       Video-Visit Details  Type of service:  Video Visit    Video Start Time: 10:34 AM  Video End Time: 11:01 AM    Originating Location (pt. Location): Home    Distant Location (provider location):  [facility]     Platform used for Video Visit: Prosper Gallagher MD    _____________________________________________________________    NEURO-ONCOLOGY VISIT  Nov 17, 2020    CHIEF COMPLAINT: Ms. 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 She received 12 cycles of temozolomide, completed in 5/2012. Changes on imaging necessitated a repeat resection on 5/30/2018 and pathology was unchanged. No adjuvant cancer-directed therapy was initiated. Imaging over the next 1.5 years showed tumor progression and treatment was recommended. She completed chemoradiotherapy on 5/30/2020. Barbi is currently managed on adjuvant-dosed temozolomide. Dose escalation to 200mg/m2 was complicated by intolerable nausea.     I met with Barbi and Kenneth () for this follow-up visit.    HISTORY OF PRESENT ILLNESS  -On 10/24, Barbi was briefly hospitalized with dehydration due to nausea/ vomiting plus a UTI. Improved quickly with fluids, abx, antiemetics. This was in the middle of cycle 5.   -She was experiencing diarrhea following starting Celexa. After stopping Celexa, the diarrhea stopped too. Mood is \"ok\".  -Taste remains off; cannot drink Coke anymore. Salty/ sweet foods taste more salty/ sweet.   -She denies any new symptoms; no weakness, numbness, changes in vision, or headaches.   -Balance is off; chronic and stable.  -No change in seizure frequency.   -Fatigued. Tries very easily. Takes naps.    -Continued mild word-finding issues, otherwise no change in her cognitive function.   -Working part-time; shifts are doable, just tiring.   -Stopped working with acupuncture and chiropractor. Pain related to piriformis syndrome is better as well. Trying to remain active.     REVIEW OF SYSTEMS  A comprehensive ROS negative except as in HPI.      MEDICATIONS   Current Outpatient Medications   Medication     acetaminophen (TYLENOL) 500 MG tablet     aprepitant (EMEND TRI-PACK) 80 & 125 MG MISC     calcium carbonate (TUMS) 500 MG chewable tablet     CALCIUM-VITAMIN D PO     citalopram (CELEXA) 20 MG tablet     COMPOUNDED NON-CONTROLLED SUBSTANCE (CMPD RX) - PHARMACY TO MIX COMPOUNDED MEDICATION     " cyclobenzaprine (FLEXERIL) 5 MG tablet     fexofenadine (ALLEGRA) 180 MG tablet     gabapentin (NEURONTIN) 100 MG capsule     ibuprofen (ADVIL/MOTRIN) 200 MG tablet     Lactase (LACTAID PO)     lamoTRIgine (LAMICTAL) 100 MG tablet     lamoTRIgine (LAMICTAL) 200 MG tablet     levETIRAcetam (KEPPRA) 500 MG tablet     LORazepam (ATIVAN) 0.5 MG tablet     metoprolol tartrate (LOPRESSOR) 25 MG tablet     Multiple Vitamin (MULTI-VITAMINS) TABS     prochlorperazine (COMPAZINE) 10 MG tablet     temozolomide (TEMODAR) 250 MG capsule     tretinoin (RETIN-A) 0.025 % external cream     VITAMIN D, CHOLECALCIFEROL, PO     No current facility-administered medications for this visit.      DRUG ALLERGIES   Allergies   Allergen Reactions     Sulfa Drugs Hives     Benoxinate Nausea and Vomiting       ONCOLOGIC HISTORY  -4/27/2011 PRESENTATION: New onset seizure, generalized event.   -5/2011 MRB with a non-contrast enhancing right frontal mass lesion.   -5/2011 SURGERY: Craniectomy for mass resection at [facility].   PATHOLOGY: Diffuse oligodendroglioma; WHO grade II, 1p/19q co-deleted.  -6/2011-5/2012 CHEMO: Adjuvant dosed temozolomide x 12 cycles.  -4/2/2018 MRB with possible disease recurrence with a new 1.2 cm non-enhancing nodule within the cortex of the right frontal lobe adjacent to the resection cavity.  -4/12/2018 NEURO-ONC: Recommending repeat resection, appointment scheduled with Dr. Dustin Rodriguez, neurosurgery at the [facility].   -5/30/2018 SURGERY: Repeat resection with Dr. Rodriguez.   PATHOLOGY: Remains low grade, without concerning features.   -6/15/2018 NEURO-ONC: Start imaging surveillance.  -9/17/2018 NEURO-ONC/ MRB: Imaging stable, continue with surveillance.   -12/10/2018 NEURO-ONC/ MRB: Imaging stable, continue with surveillance.   -4/15/2019 NEURO-ONC/ MRB: Imaging stable, continue with surveillance.  -8/19/2019 NEURO-ONC/ MRB: Clinically stable. Imaging largely stable, subtle increases on T2 FLAIR; continue with  surveillance.  -12/16/2019 NEURO-ONC/ MRB: Clinically stable. Imaging with increased T2 FLAIR medially and laterally about the resection cavity. Referral to Dr. Figueroa with radiation oncology. Discussion with Dr. Rodriguez. Referral for repeat neuro-psychology testing.   -1/10/2020 Evaluated by Dr. Devan Figueroa.   -1/17/2020 NEURO-ONC: Tentative plan to initiate chemoradiotherapy in May-June 2020.   -2/17/2020 NEURO-PSYCH; Mild weaknesses were noted in aspects of verbal and nonverbal working memory, nonverbal recall, some aspects of executive functioning, and bilateral psychomotor speed. The remainder of her cognitive abilities were intact and performed in keeping with her above average range cognitive baseline.   -4/20 - 5/30/2020 CHEMORADS: 54 Gy in 30 fractions with concurrent temozolomide 75mg/m2 (140mg).   -5/4/2020 NEURO-ONC: Continue treatment as planned. Prescribing Clotrimazole lozenges for oral thrush.   -6/1/2020 NEURO-ONC: Completed treatment as planned.  -6/30/2020 NEURO-ONC/ MRB/ CHEMO: Clinically stable. Imaging with positive treatment effect. Starting adjuvant temozolomide 150mg/m2 (250mg), cycle 1 (start date was 7/1).   -7/28/2020 NEURO-ONC/ CHEMO: Clinically stable. Adjuvant temozolomide 200mg/m2 (320mg), cycle 2 (start date was 7/29).   -8/25/2020 NEURO-ONC/ CHEMO: Clinically stable; significant nausea/ vomiting with 200mg/m2 dosing. Adding Emend for this next cycle. Adjuvant temozolomide 150mg/m2 (250mg), cycle 3 (start date of 8/26).   -9/22/2020 NEURO-ONC/ MRB/ CHEMO: Clinically stable; less nausea/ vomiting with lowered chemotherapy dosing plus adding Emend. Imaging with no concerns, repeat in 3 months. Adjuvant temozolomide 150mg/m2 (250mg), cycle 4 (start date of 9/23).   -10/20/2020 NEURO-ONC/ CHEMO: Clinically stable; no new/ worsening issues. Experiencing pain related to piriformis syndrome. Adjuvant temozolomide 150mg/m2 (250mg), cycle 5 (start date of 10/21).   -11/172020 NEURO-ONC/ CHEMO:  Clinically stable. Adjuvant temozolomide 150mg/m2 (250mg), cycle 6 (start date of 11/18).     SOCIAL HISTORY   Tobacco use: Never smoker.   Alcohol use: Social.   Drug use: Denies.  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   , 2 children      PHYSICAL EXAMINATION    -Generally well appearing.  -Respiratory: No audible wheezing.   -Skin: No facial rashes.  -Psychiatric: Normal mood and affect. Pleasant, talkative.  -Neurologic:   MENTAL STATUS:     Alert, oriented to date.    Recall: Intact.    Speech fluent.    Comprehension intact.     CRANIAL NERVES:     Pupils are equal, round, reactive to light.     Extraocular movements full, patient denies diplopia.     Equal activation with smiling/ talking on the left side of face.    Hearing intact.   With normal phonation, no dysfunction of the palate or tongue.   MOTOR: Antigravity in arms. No pronation and no drift.   Mild positional tremor in the right >> left hand.   COORDINATION: Intact on finger nose with eyes closed.   SENSATION: Intact to light touch.     The rest of a comprehensive physical examination is deferred due to PHE (public health emergency) video visit restrictions.      MEDICAL RECORDS  Obtained and personally reviewed all available outside medical records in addition to reviewing any records available in our electronic system.     LABS  Personally reviewed all available lab results.     IMAGING  No new neuro-imaging to review.        IMPRESSION  Clinic time was spent discussing in detail the nature of her cancer in light of her current treatment plan. This is in addition to providing emotional support, answering questions pertaining to my recommendations, and devising the plan as outlined below.     Clinically, Barbi is doing fairly well with no new subjective neurological complaints or increase in seizure frequency. However, she was briefly admitted to the hospital half-way through cycle 5 for management of nausea/ vomiting and  dehydration plus found to have a UTI. She improved quickly with IVF and abx. She was also noting diarrhea that started around the time that she started Celexa. With stopping the Celexa, per primary care MD recommendations, the diarrhea has resolved. CBC and CMP without notable concerns from yesterday. She is eating well and remaining well hydrated. Her mood is 'ok' even with stopping Celexa and she remains fatigued. The pain related to her piriformis syndrome has improved some. Of note, for both management of neuropathic pain + mood, I prefer an SNRI (ie Cymbalta or Effexor). I also prefer Zoloft over other SSRIs. As depression can confound feelings of fatigued, hopefully another medication can be tried.     Examination today is without any concerns.     Will continue temozolomide at 150mg/m2 for the 6th and final cycle as detailed below.     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   -Did not tolerate 200mg/m2 dosing.   -Supportive medications; Compazine + Ativan + Emend tri-pack and bowel regimen.    Holding on acupuncture at this time.    Dexamethasone or medical marijuana can be alternative options and/ or since Emend is only D1-3, there is a potential of breakthrough nausea on D4-5.  -Surveillance labs reviewed, at goal for chemotherapy.  -Will repeat CBC in 2 (at [facility]) and 4 weeks and repeat CMP every 4 weeks.  -Repeat imaging due next month.      -SEIZURE MANAGEMENT-  -Follows with Dr. Flakita Clark; Neurology, epilepsy specialist at the [facility].      -Quality of life/ MOOD/ FATIGUE-  -History of mild anxiety and depression.   -Failed Celexa due to side effect of diarrhea.   -Untreated/ undertreated depression can worsen  fatigue, dysorexia, and quality of life.  -Issues with sleep and neuropathic pain, on gabapentin.     -COGNITIVE CHANGES-  -Neuro-psych testing completed with mild deficits notes. Repeat testing recommended in 2/2021.    Return to clinic virtually in 4 weeks + imaging + labs.     In the meantime, Barbi knows to call with questions or concerns or to report new complaints and can be seen sooner if needed.    Myrtle Gallagher MD  Neuro-oncology        Again, thank you for allowing me to participate in the care of your patient.        Sincerely,        Myrtle Gallagher MD

## 2020-11-17 NOTE — PROGRESS NOTES
"Barbi Tiwari is a 60 year old female who is being evaluated via a billable video visit.      The patient has been notified of following:     \"This video visit will be conducted via a call between you and your physician/provider. We have found that certain health care needs can be provided without the need for an in-person physical exam.  This service lets us provide the care you need with a video conversation.  If a prescription is necessary we can send it directly to your pharmacy.  If lab work is needed we can place an order for that and you can then stop by our lab to have the test done at a later time.    Video visits are billed at different rates depending on your insurance coverage.  Please reach out to your insurance provider with any questions.    If during the course of the call the physician/provider feels a video visit is not appropriate, you will not be charged for this service.\"    Patient has given verbal consent for Video visit? Yes  How would you like to obtain your AVS? MyChart     Patient in virtual lobby    If you are dropped from the video visit, the video invite should be resent to: Send to e-mail at: [email or web address]       Video-Visit Details  Type of service:  Video Visit    Video Start Time: 10:34 AM  Video End Time: 11:01 AM    Originating Location (pt. Location): Home    Distant Location (provider location):  [facility]     Platform used for Video Visit: Prosper Gallagher MD    _____________________________________________________________    NEURO-ONCOLOGY VISIT  Nov 17, 2020    CHIEF COMPLAINT: Ms. Barbi Tiwari is a 60 year old right-handed woman with a right frontal diffuse oligodendroglioma (1p19q co-deleted), diagnosed following resection in 5/2011. She received 12 cycles of temozolomide, completed in 5/2012. Changes on imaging necessitated a repeat resection on 5/30/2018 and pathology was unchanged. No adjuvant cancer-directed therapy " "was initiated. Imaging over the next 1.5 years showed tumor progression and treatment was recommended. She completed chemoradiotherapy on 5/30/2020. Barbi is currently managed on adjuvant-dosed temozolomide. Dose escalation to 200mg/m2 was complicated by intolerable nausea.     I met with Barbi and Kenneth () for this follow-up visit.    HISTORY OF PRESENT ILLNESS  -On 10/24, Barbi was briefly hospitalized with dehydration due to nausea/ vomiting plus a UTI. Improved quickly with fluids, abx, antiemetics. This was in the middle of cycle 5.   -She was experiencing diarrhea following starting Celexa. After stopping Celexa, the diarrhea stopped too. Mood is \"ok\".  -Taste remains off; cannot drink Coke anymore. Salty/ sweet foods taste more salty/ sweet.   -She denies any new symptoms; no weakness, numbness, changes in vision, or headaches.   -Balance is off; chronic and stable.  -No change in seizure frequency.   -Fatigued. Tries very easily. Takes naps.    -Continued mild word-finding issues, otherwise no change in her cognitive function.   -Working part-time; shifts are doable, just tiring.   -Stopped working with acupuncture and chiropractor. Pain related to piriformis syndrome is better as well. Trying to remain active.     REVIEW OF SYSTEMS  A comprehensive ROS negative except as in HPI.      MEDICATIONS   Current Outpatient Medications   Medication     acetaminophen (TYLENOL) 500 MG tablet     aprepitant (EMEND TRI-PACK) 80 & 125 MG MISC     calcium carbonate (TUMS) 500 MG chewable tablet     CALCIUM-VITAMIN D PO     citalopram (CELEXA) 20 MG tablet     COMPOUNDED NON-CONTROLLED SUBSTANCE (CMPD RX) - PHARMACY TO MIX COMPOUNDED MEDICATION     cyclobenzaprine (FLEXERIL) 5 MG tablet     fexofenadine (ALLEGRA) 180 MG tablet     gabapentin (NEURONTIN) 100 MG capsule     ibuprofen (ADVIL/MOTRIN) 200 MG tablet     Lactase (LACTAID PO)     lamoTRIgine (LAMICTAL) 100 MG tablet     lamoTRIgine (LAMICTAL) 200 MG tablet "     levETIRAcetam (KEPPRA) 500 MG tablet     LORazepam (ATIVAN) 0.5 MG tablet     metoprolol tartrate (LOPRESSOR) 25 MG tablet     Multiple Vitamin (MULTI-VITAMINS) TABS     prochlorperazine (COMPAZINE) 10 MG tablet     temozolomide (TEMODAR) 250 MG capsule     tretinoin (RETIN-A) 0.025 % external cream     VITAMIN D, CHOLECALCIFEROL, PO     No current facility-administered medications for this visit.      DRUG ALLERGIES   Allergies   Allergen Reactions     Sulfa Drugs Hives     Benoxinate Nausea and Vomiting       ONCOLOGIC HISTORY  -4/27/2011 PRESENTATION: New onset seizure, generalized event.   -5/2011 MRB with a non-contrast enhancing right frontal mass lesion.   -5/2011 SURGERY: Craniectomy for mass resection at [facility].   PATHOLOGY: Diffuse oligodendroglioma; WHO grade II, 1p/19q co-deleted.  -6/2011-5/2012 CHEMO: Adjuvant dosed temozolomide x 12 cycles.  -4/2/2018 MRB with possible disease recurrence with a new 1.2 cm non-enhancing nodule within the cortex of the right frontal lobe adjacent to the resection cavity.  -4/12/2018 NEURO-ONC: Recommending repeat resection, appointment scheduled with Dr. Dustin Rodriguez, neurosurgery at the [facility].   -5/30/2018 SURGERY: Repeat resection with Dr. Rodriguez.   PATHOLOGY: Remains low grade, without concerning features.   -6/15/2018 NEURO-ONC: Start imaging surveillance.  -9/17/2018 NEURO-ONC/ MRB: Imaging stable, continue with surveillance.   -12/10/2018 NEURO-ONC/ MRB: Imaging stable, continue with surveillance.   -4/15/2019 NEURO-ONC/ MRB: Imaging stable, continue with surveillance.  -8/19/2019 NEURO-ONC/ MRB: Clinically stable. Imaging largely stable, subtle increases on T2 FLAIR; continue with surveillance.  -12/16/2019 NEURO-ONC/ MRB: Clinically stable. Imaging with increased T2 FLAIR medially and laterally about the resection cavity. Referral to Dr. Figueroa with radiation oncology. Discussion with Dr. Rodriguez. Referral for repeat neuro-psychology testing.   -1/10/2020  Evaluated by Dr. Devan Figueroa.   -1/17/2020 NEURO-ONC: Tentative plan to initiate chemoradiotherapy in May-June 2020.   -2/17/2020 NEURO-PSYCH; Mild weaknesses were noted in aspects of verbal and nonverbal working memory, nonverbal recall, some aspects of executive functioning, and bilateral psychomotor speed. The remainder of her cognitive abilities were intact and performed in keeping with her above average range cognitive baseline.   -4/20 - 5/30/2020 CHEMORADS: 54 Gy in 30 fractions with concurrent temozolomide 75mg/m2 (140mg).   -5/4/2020 NEURO-ONC: Continue treatment as planned. Prescribing Clotrimazole lozenges for oral thrush.   -6/1/2020 NEURO-ONC: Completed treatment as planned.  -6/30/2020 NEURO-ONC/ MRB/ CHEMO: Clinically stable. Imaging with positive treatment effect. Starting adjuvant temozolomide 150mg/m2 (250mg), cycle 1 (start date was 7/1).   -7/28/2020 NEURO-ONC/ CHEMO: Clinically stable. Adjuvant temozolomide 200mg/m2 (320mg), cycle 2 (start date was 7/29).   -8/25/2020 NEURO-ONC/ CHEMO: Clinically stable; significant nausea/ vomiting with 200mg/m2 dosing. Adding Emend for this next cycle. Adjuvant temozolomide 150mg/m2 (250mg), cycle 3 (start date of 8/26).   -9/22/2020 NEURO-ONC/ MRB/ CHEMO: Clinically stable; less nausea/ vomiting with lowered chemotherapy dosing plus adding Emend. Imaging with no concerns, repeat in 3 months. Adjuvant temozolomide 150mg/m2 (250mg), cycle 4 (start date of 9/23).   -10/20/2020 NEURO-ONC/ CHEMO: Clinically stable; no new/ worsening issues. Experiencing pain related to piriformis syndrome. Adjuvant temozolomide 150mg/m2 (250mg), cycle 5 (start date of 10/21).   -11/172020 NEURO-ONC/ CHEMO: Clinically stable. Adjuvant temozolomide 150mg/m2 (250mg), cycle 6 (start date of 11/18).     SOCIAL HISTORY   Tobacco use: Never smoker.   Alcohol use: Social.   Drug use: Denies.  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   , 2 children      PHYSICAL EXAMINATION  KPS  100  -Generally well appearing.  -Respiratory: No audible wheezing.   -Skin: No facial rashes.  -Psychiatric: Normal mood and affect. Pleasant, talkative.  -Neurologic:   MENTAL STATUS:     Alert, oriented to date.    Recall: Intact.    Speech fluent.    Comprehension intact.     CRANIAL NERVES:     Pupils are equal, round, reactive to light.     Extraocular movements full, patient denies diplopia.     Equal activation with smiling/ talking on the left side of face.    Hearing intact.   With normal phonation, no dysfunction of the palate or tongue.   MOTOR: Antigravity in arms. No pronation and no drift.   Mild positional tremor in the right >> left hand.   COORDINATION: Intact on finger nose with eyes closed.   SENSATION: Intact to light touch.     The rest of a comprehensive physical examination is deferred due to PHE (public health emergency) video visit restrictions.      MEDICAL RECORDS  Obtained and personally reviewed all available outside medical records in addition to reviewing any records available in our electronic system.     LABS  Personally reviewed all available lab results.     IMAGING  No new neuro-imaging to review.        IMPRESSION  Clinic time was spent discussing in detail the nature of her cancer in light of her current treatment plan. This is in addition to providing emotional support, answering questions pertaining to my recommendations, and devising the plan as outlined below.     Clinically, Barbi is doing fairly well with no new subjective neurological complaints or increase in seizure frequency. However, she was briefly admitted to the hospital half-way through cycle 5 for management of nausea/ vomiting and dehydration plus found to have a UTI. She improved quickly with IVF and abx. She was also noting diarrhea that started around the time that she started Celexa. With stopping the Celexa, per primary care MD recommendations, the diarrhea has resolved. CBC and CMP without notable  concerns from yesterday. She is eating well and remaining well hydrated. Her mood is 'ok' even with stopping Celexa and she remains fatigued. The pain related to her piriformis syndrome has improved some. Of note, for both management of neuropathic pain + mood, I prefer an SNRI (ie Cymbalta or Effexor). I also prefer Zoloft over other SSRIs. As depression can confound feelings of fatigued, hopefully another medication can be tried.     Examination today is without any concerns.     Will continue temozolomide at 150mg/m2 for the 6th and final cycle as detailed below.     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   -Did not tolerate 200mg/m2 dosing.   -Supportive medications; Compazine + Ativan + Emend tri-pack and bowel regimen.    Holding on acupuncture at this time.    Dexamethasone or medical marijuana can be alternative options and/ or since Emend is only D1-3, there is a potential of breakthrough nausea on D4-5.  -Surveillance labs reviewed, at goal for chemotherapy.  -Will repeat CBC in 2 (at [facility]) and 4 weeks and repeat CMP every 4 weeks.  -Repeat imaging due next month.      -SEIZURE MANAGEMENT-  -Follows with Dr. Flakita Clark; Neurology, epilepsy specialist at the [facility].      -Quality of life/ MOOD/ FATIGUE-  -History of mild anxiety and depression.   -Failed Celexa due to side effect of diarrhea.   -Untreated/ undertreated depression can worsen fatigue, dysorexia, and quality of life.  -Issues with sleep and neuropathic pain, on gabapentin.     -COGNITIVE CHANGES-  -Neuro-psych testing completed with mild deficits notes. Repeat testing recommended in 2/2021.    Return to clinic virtually in 4 weeks + imaging + labs.      In the meantime, Barbi knows to call with questions or concerns or to report new complaints and can be seen sooner if needed.    Myrtle Gallagher MD  Neuro-oncology

## 2020-11-17 NOTE — PATIENT INSTRUCTIONS
Adjuvant-dosed chemotherapy with temozolomide (cycle 6)  FINAL CYCLE!  -5 consectutive nights (In a 28 day cycle; 5 days on treatment and 23 days off.)  -Dose: 250 mg  -Start date: 11/18  -Take at bedtime on an empty stomach  -Take 30 minutes after Compazine + Ativan (refilled today) dosing plus Emend tri-pack.   -Continue bowel regimen    -Labs from yesterday at goal.   -Repeat labs in 2 and 4 weeks at [facility].    Repeat imaging next month.     Return to clinic in 4 weeks.     Myrtle Gallagher MD  Neuro-oncology  11/17/20

## 2020-11-17 NOTE — LETTER
"    11/17/2020         RE: Barbi Tiwari  [address] [facility] [id number]        Dear Colleague,    Thank you for referring your patient, Barbi Tiwari, to the [facility]. Please see a copy of my visit note below.    Barbi Tiwari is a 60 year old female who is being evaluated via a billable video visit.      The patient has been notified of following:     \"This video visit will be conducted via a call between you and your physician/provider. We have found that certain health care needs can be provided without the need for an in-person physical exam.  This service lets us provide the care you need with a video conversation.  If a prescription is necessary we can send it directly to your pharmacy.  If lab work is needed we can place an order for that and you can then stop by our lab to have the test done at a later time.    Video visits are billed at different rates depending on your insurance coverage.  Please reach out to your insurance provider with any questions.    If during the course of the call the physician/provider feels a video visit is not appropriate, you will not be charged for this service.\"    Patient has given verbal consent for Video visit? Yes  How would you like to obtain your AVS? MyChart     Patient in virtual lobby    If you are dropped from the video visit, the video invite should be resent to: Send to e-mail at: [email or web address]       Video-Visit Details  Type of service:  Video Visit    Video Start Time: 10:34 AM  Video End Time: 11:01 AM    Originating Location (pt. Location): Home    Distant Location (provider location):  [facility]     Platform used for Video Visit: Prosper Gallagher MD    _____________________________________________________________    NEURO-ONCOLOGY VISIT  Nov 17, 2020    CHIEF COMPLAINT: Ms. 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 She received 12 cycles of temozolomide, completed in 5/2012. Changes on imaging necessitated a repeat resection on 5/30/2018 and pathology was unchanged. No adjuvant cancer-directed therapy was initiated. Imaging over the next 1.5 years showed tumor progression and treatment was recommended. She completed chemoradiotherapy on 5/30/2020. Barbi is currently managed on adjuvant-dosed temozolomide. Dose escalation to 200mg/m2 was complicated by intolerable nausea.     I met with Barbi and Kenneth () for this follow-up visit.    HISTORY OF PRESENT ILLNESS  -On 10/24, Barbi was briefly hospitalized with dehydration due to nausea/ vomiting plus a UTI. Improved quickly with fluids, abx, antiemetics. This was in the middle of cycle 5.   -She was experiencing diarrhea following starting Celexa. After stopping Celexa, the diarrhea stopped too. Mood is \"ok\".  -Taste remains off; cannot drink Coke anymore. Salty/ sweet foods taste more salty/ sweet.   -She denies any new symptoms; no weakness, numbness, changes in vision, or headaches.   -Balance is off; chronic and stable.  -No change in seizure frequency.   -Fatigued. Tries very easily. Takes naps.    -Continued mild word-finding issues, otherwise no change in her cognitive function.   -Working part-time; shifts are doable, just tiring.   -Stopped working with acupuncture and chiropractor. Pain related to piriformis syndrome is better as well. Trying to remain active.     REVIEW OF SYSTEMS  A comprehensive ROS negative except as in HPI.      MEDICATIONS   Current Outpatient Medications   Medication     acetaminophen (TYLENOL) 500 MG tablet     aprepitant (EMEND TRI-PACK) 80 & 125 MG MISC     calcium carbonate (TUMS) 500 MG chewable tablet     CALCIUM-VITAMIN D PO     citalopram (CELEXA) 20 MG tablet     COMPOUNDED NON-CONTROLLED SUBSTANCE (CMPD RX) - PHARMACY TO MIX COMPOUNDED MEDICATION     " cyclobenzaprine (FLEXERIL) 5 MG tablet     fexofenadine (ALLEGRA) 180 MG tablet     gabapentin (NEURONTIN) 100 MG capsule     ibuprofen (ADVIL/MOTRIN) 200 MG tablet     Lactase (LACTAID PO)     lamoTRIgine (LAMICTAL) 100 MG tablet     lamoTRIgine (LAMICTAL) 200 MG tablet     levETIRAcetam (KEPPRA) 500 MG tablet     LORazepam (ATIVAN) 0.5 MG tablet     metoprolol tartrate (LOPRESSOR) 25 MG tablet     Multiple Vitamin (MULTI-VITAMINS) TABS     prochlorperazine (COMPAZINE) 10 MG tablet     temozolomide (TEMODAR) 250 MG capsule     tretinoin (RETIN-A) 0.025 % external cream     VITAMIN D, CHOLECALCIFEROL, PO     No current facility-administered medications for this visit.      DRUG ALLERGIES   Allergies   Allergen Reactions     Sulfa Drugs Hives     Benoxinate Nausea and Vomiting       ONCOLOGIC HISTORY  -4/27/2011 PRESENTATION: New onset seizure, generalized event.   -5/2011 MRB with a non-contrast enhancing right frontal mass lesion.   -5/2011 SURGERY: Craniectomy for mass resection at [facility].   PATHOLOGY: Diffuse oligodendroglioma; WHO grade II, 1p/19q co-deleted.  -6/2011-5/2012 CHEMO: Adjuvant dosed temozolomide x 12 cycles.  -4/2/2018 MRB with possible disease recurrence with a new 1.2 cm non-enhancing nodule within the cortex of the right frontal lobe adjacent to the resection cavity.  -4/12/2018 NEURO-ONC: Recommending repeat resection, appointment scheduled with Dr. Dustin Rodriguez, neurosurgery at the [facility].   -5/30/2018 SURGERY: Repeat resection with Dr. Rodriguez.   PATHOLOGY: Remains low grade, without concerning features.   -6/15/2018 NEURO-ONC: Start imaging surveillance.  -9/17/2018 NEURO-ONC/ MRB: Imaging stable, continue with surveillance.   -12/10/2018 NEURO-ONC/ MRB: Imaging stable, continue with surveillance.   -4/15/2019 NEURO-ONC/ MRB: Imaging stable, continue with surveillance.  -8/19/2019 NEURO-ONC/ MRB: Clinically stable. Imaging largely stable, subtle increases on T2 FLAIR; continue with  surveillance.  -12/16/2019 NEURO-ONC/ MRB: Clinically stable. Imaging with increased T2 FLAIR medially and laterally about the resection cavity. Referral to Dr. Figueroa with radiation oncology. Discussion with Dr. Rodriguez. Referral for repeat neuro-psychology testing.   -1/10/2020 Evaluated by Dr. Devan Figueroa.   -1/17/2020 NEURO-ONC: Tentative plan to initiate chemoradiotherapy in May-June 2020.   -2/17/2020 NEURO-PSYCH; Mild weaknesses were noted in aspects of verbal and nonverbal working memory, nonverbal recall, some aspects of executive functioning, and bilateral psychomotor speed. The remainder of her cognitive abilities were intact and performed in keeping with her above average range cognitive baseline.   -4/20 - 5/30/2020 CHEMORADS: 54 Gy in 30 fractions with concurrent temozolomide 75mg/m2 (140mg).   -5/4/2020 NEURO-ONC: Continue treatment as planned. Prescribing Clotrimazole lozenges for oral thrush.   -6/1/2020 NEURO-ONC: Completed treatment as planned.  -6/30/2020 NEURO-ONC/ MRB/ CHEMO: Clinically stable. Imaging with positive treatment effect. Starting adjuvant temozolomide 150mg/m2 (250mg), cycle 1 (start date was 7/1).   -7/28/2020 NEURO-ONC/ CHEMO: Clinically stable. Adjuvant temozolomide 200mg/m2 (320mg), cycle 2 (start date was 7/29).   -8/25/2020 NEURO-ONC/ CHEMO: Clinically stable; significant nausea/ vomiting with 200mg/m2 dosing. Adding Emend for this next cycle. Adjuvant temozolomide 150mg/m2 (250mg), cycle 3 (start date of 8/26).   -9/22/2020 NEURO-ONC/ MRB/ CHEMO: Clinically stable; less nausea/ vomiting with lowered chemotherapy dosing plus adding Emend. Imaging with no concerns, repeat in 3 months. Adjuvant temozolomide 150mg/m2 (250mg), cycle 4 (start date of 9/23).   -10/20/2020 NEURO-ONC/ CHEMO: Clinically stable; no new/ worsening issues. Experiencing pain related to piriformis syndrome. Adjuvant temozolomide 150mg/m2 (250mg), cycle 5 (start date of 10/21).   -11/172020 NEURO-ONC/ CHEMO:  Clinically stable. Adjuvant temozolomide 150mg/m2 (250mg), cycle 6 (start date of 11/18).     SOCIAL HISTORY   Tobacco use: Never smoker.   Alcohol use: Social.   Drug use: Denies.  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   , 2 children      PHYSICAL EXAMINATION    -Generally well appearing.  -Respiratory: No audible wheezing.   -Skin: No facial rashes.  -Psychiatric: Normal mood and affect. Pleasant, talkative.  -Neurologic:   MENTAL STATUS:     Alert, oriented to date.    Recall: Intact.    Speech fluent.    Comprehension intact.     CRANIAL NERVES:     Pupils are equal, round, reactive to light.     Extraocular movements full, patient denies diplopia.     Equal activation with smiling/ talking on the left side of face.    Hearing intact.   With normal phonation, no dysfunction of the palate or tongue.   MOTOR: Antigravity in arms. No pronation and no drift.   Mild positional tremor in the right >> left hand.   COORDINATION: Intact on finger nose with eyes closed.   SENSATION: Intact to light touch.     The rest of a comprehensive physical examination is deferred due to PHE (public health emergency) video visit restrictions.      MEDICAL RECORDS  Obtained and personally reviewed all available outside medical records in addition to reviewing any records available in our electronic system.     LABS  Personally reviewed all available lab results.     IMAGING  No new neuro-imaging to review.        IMPRESSION  Clinic time was spent discussing in detail the nature of her cancer in light of her current treatment plan. This is in addition to providing emotional support, answering questions pertaining to my recommendations, and devising the plan as outlined below.     Clinically, Barbi is doing fairly well with no new subjective neurological complaints or increase in seizure frequency. However, she was briefly admitted to the hospital half-way through cycle 5 for management of nausea/ vomiting and  dehydration plus found to have a UTI. She improved quickly with IVF and abx. She was also noting diarrhea that started around the time that she started Celexa. With stopping the Celexa, per primary care MD recommendations, the diarrhea has resolved. CBC and CMP without notable concerns from yesterday. She is eating well and remaining well hydrated. Her mood is 'ok' even with stopping Celexa and she remains fatigued. The pain related to her piriformis syndrome has improved some. Of note, for both management of neuropathic pain + mood, I prefer an SNRI (ie Cymbalta or Effexor). I also prefer Zoloft over other SSRIs. As depression can confound feelings of fatigued, hopefully another medication can be tried.     Examination today is without any concerns.     Will continue temozolomide at 150mg/m2 for the 6th and final cycle as detailed below.     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   -Did not tolerate 200mg/m2 dosing.   -Supportive medications; Compazine + Ativan + Emend tri-pack and bowel regimen.    Holding on acupuncture at this time.    Dexamethasone or medical marijuana can be alternative options and/ or since Emend is only D1-3, there is a potential of breakthrough nausea on D4-5.  -Surveillance labs reviewed, at goal for chemotherapy.  -Will repeat CBC in 2 (at [facility]) and 4 weeks and repeat CMP every 4 weeks.  -Repeat imaging due next month.      -SEIZURE MANAGEMENT-  -Follows with Dr. Flakita Clark; Neurology, epilepsy specialist at the [facility].      -Quality of life/ MOOD/ FATIGUE-  -History of mild anxiety and depression.   -Failed Celexa due to side effect of diarrhea.   -Untreated/ undertreated depression can worsen  fatigue, dysorexia, and quality of life.  -Issues with sleep and neuropathic pain, on gabapentin.     -COGNITIVE CHANGES-  -Neuro-psych testing completed with mild deficits notes. Repeat testing recommended in 2/2021.    Return to clinic virtually in 4 weeks + imaging + labs.     In the meantime, Barbi knows to call with questions or concerns or to report new complaints and can be seen sooner if needed.    Myrtle Gallagher MD  Neuro-oncology        Again, thank you for allowing me to participate in the care of your patient.        Sincerely,        Myrtle Gallagher MD

## 2020-12-01 ENCOUNTER — MYC MEDICAL ADVICE (OUTPATIENT)
Dept: ONCOLOGY | Facility: CLINIC | Age: 60
End: 2020-12-01

## 2020-12-01 DIAGNOSIS — Z79.899 ENCOUNTER FOR LONG-TERM (CURRENT) USE OF MEDICATIONS: ICD-10-CM

## 2020-12-01 DIAGNOSIS — C71.9 OLIGODENDROGLIOMA (H): ICD-10-CM

## 2020-12-01 LAB
ALBUMIN SERPL-MCNC: 3.3 G/DL (ref 3.4–5)
ALP SERPL-CCNC: 97 U/L (ref 40–150)
ALT SERPL W P-5'-P-CCNC: 22 U/L (ref 0–50)
ANION GAP SERPL CALCULATED.3IONS-SCNC: 2 MMOL/L (ref 3–14)
AST SERPL W P-5'-P-CCNC: 17 U/L (ref 0–45)
BASOPHILS # BLD AUTO: 0 10E9/L (ref 0–0.2)
BASOPHILS NFR BLD AUTO: 0.3 %
BILIRUB SERPL-MCNC: 0.2 MG/DL (ref 0.2–1.3)
BUN SERPL-MCNC: 15 MG/DL (ref 7–30)
CALCIUM SERPL-MCNC: 8.9 MG/DL (ref 8.5–10.1)
CHLORIDE SERPL-SCNC: 109 MMOL/L (ref 94–109)
CO2 SERPL-SCNC: 31 MMOL/L (ref 20–32)
CREAT SERPL-MCNC: 0.67 MG/DL (ref 0.52–1.04)
DIFFERENTIAL METHOD BLD: ABNORMAL
EOSINOPHIL # BLD AUTO: 0.1 10E9/L (ref 0–0.7)
EOSINOPHIL NFR BLD AUTO: 3.9 %
ERYTHROCYTE [DISTWIDTH] IN BLOOD BY AUTOMATED COUNT: 12 % (ref 10–15)
GFR SERPL CREATININE-BSD FRML MDRD: >90 ML/MIN/{1.73_M2}
GLUCOSE SERPL-MCNC: 104 MG/DL (ref 70–99)
HCT VFR BLD AUTO: 41.3 % (ref 35–47)
HGB BLD-MCNC: 13 G/DL (ref 11.7–15.7)
LYMPHOCYTES # BLD AUTO: 0.6 10E9/L (ref 0.8–5.3)
LYMPHOCYTES NFR BLD AUTO: 16.2 %
MCH RBC QN AUTO: 31.5 PG (ref 26.5–33)
MCHC RBC AUTO-ENTMCNC: 31.5 G/DL (ref 31.5–36.5)
MCV RBC AUTO: 100 FL (ref 78–100)
MONOCYTES # BLD AUTO: 0.4 10E9/L (ref 0–1.3)
MONOCYTES NFR BLD AUTO: 11.8 %
NEUTROPHILS # BLD AUTO: 2.4 10E9/L (ref 1.6–8.3)
NEUTROPHILS NFR BLD AUTO: 67.8 %
PLATELET # BLD AUTO: 176 10E9/L (ref 150–450)
POTASSIUM SERPL-SCNC: 3.8 MMOL/L (ref 3.4–5.3)
PROT SERPL-MCNC: 6.5 G/DL (ref 6.8–8.8)
RBC # BLD AUTO: 4.13 10E12/L (ref 3.8–5.2)
SODIUM SERPL-SCNC: 142 MMOL/L (ref 133–144)
WBC # BLD AUTO: 3.6 10E9/L (ref 4–11)

## 2020-12-01 PROCEDURE — 36415 COLL VENOUS BLD VENIPUNCTURE: CPT | Performed by: PSYCHIATRY & NEUROLOGY

## 2020-12-01 PROCEDURE — 85025 COMPLETE CBC W/AUTO DIFF WBC: CPT | Performed by: PSYCHIATRY & NEUROLOGY

## 2020-12-01 PROCEDURE — 80053 COMPREHEN METABOLIC PANEL: CPT | Performed by: PSYCHIATRY & NEUROLOGY

## 2020-12-04 ENCOUNTER — DOCUMENTATION ONLY (OUTPATIENT)
Dept: ONCOLOGY | Facility: CLINIC | Age: 60
End: 2020-12-04

## 2020-12-04 NOTE — PROGRESS NOTES
Signed form received and faxed to [facility], fax # [phone]. Successful transmission verified via rightfax. Copy of forms sent to scanning, original forms mailed to patient's home address on file.    rSi Tinoco CMA

## 2020-12-04 NOTE — PROGRESS NOTES
Partially filled out workability paperwork received via fax from patient. Remainder of form filled out and placed in provider folder for signature upon completion.     Fax: [phone]    Sri Tinoco CMA

## 2020-12-14 ENCOUNTER — HOSPITAL ENCOUNTER (OUTPATIENT)
Dept: LAB | Facility: CLINIC | Age: 60
End: 2020-12-14
Attending: PSYCHIATRY & NEUROLOGY
Payer: COMMERCIAL

## 2020-12-14 ENCOUNTER — TUMOR CONFERENCE (OUTPATIENT)
Dept: ONCOLOGY | Facility: CLINIC | Age: 60
End: 2020-12-14

## 2020-12-14 ENCOUNTER — HOSPITAL ENCOUNTER (OUTPATIENT)
Dept: MRI IMAGING | Facility: CLINIC | Age: 60
End: 2020-12-14
Attending: PSYCHIATRY & NEUROLOGY
Payer: COMMERCIAL

## 2020-12-14 DIAGNOSIS — C71.9 OLIGODENDROGLIOMA (H): ICD-10-CM

## 2020-12-14 DIAGNOSIS — Z79.899 ENCOUNTER FOR LONG-TERM (CURRENT) USE OF MEDICATIONS: ICD-10-CM

## 2020-12-14 LAB
ALBUMIN SERPL-MCNC: 3.8 G/DL (ref 3.4–5)
ALP SERPL-CCNC: 99 U/L (ref 40–150)
ALT SERPL W P-5'-P-CCNC: 21 U/L (ref 0–50)
ANION GAP SERPL CALCULATED.3IONS-SCNC: 4 MMOL/L (ref 3–14)
AST SERPL W P-5'-P-CCNC: 12 U/L (ref 0–45)
BASOPHILS # BLD AUTO: 0 10E9/L (ref 0–0.2)
BASOPHILS NFR BLD AUTO: 0.4 %
BILIRUB SERPL-MCNC: 0.2 MG/DL (ref 0.2–1.3)
BUN SERPL-MCNC: 24 MG/DL (ref 7–30)
CALCIUM SERPL-MCNC: 9.1 MG/DL (ref 8.5–10.1)
CHLORIDE SERPL-SCNC: 105 MMOL/L (ref 94–109)
CO2 SERPL-SCNC: 30 MMOL/L (ref 20–32)
CREAT SERPL-MCNC: 0.72 MG/DL (ref 0.52–1.04)
DIFFERENTIAL METHOD BLD: ABNORMAL
EOSINOPHIL # BLD AUTO: 0.1 10E9/L (ref 0–0.7)
EOSINOPHIL NFR BLD AUTO: 1.8 %
ERYTHROCYTE [DISTWIDTH] IN BLOOD BY AUTOMATED COUNT: 11.9 % (ref 10–15)
GFR SERPL CREATININE-BSD FRML MDRD: >90 ML/MIN/{1.73_M2}
GLUCOSE SERPL-MCNC: 97 MG/DL (ref 70–99)
HCT VFR BLD AUTO: 42.3 % (ref 35–47)
HGB BLD-MCNC: 13.5 G/DL (ref 11.7–15.7)
IMM GRANULOCYTES # BLD: 0 10E9/L (ref 0–0.4)
IMM GRANULOCYTES NFR BLD: 0.4 %
LYMPHOCYTES # BLD AUTO: 0.6 10E9/L (ref 0.8–5.3)
LYMPHOCYTES NFR BLD AUTO: 12.2 %
MCH RBC QN AUTO: 30.9 PG (ref 26.5–33)
MCHC RBC AUTO-ENTMCNC: 31.9 G/DL (ref 31.5–36.5)
MCV RBC AUTO: 97 FL (ref 78–100)
MONOCYTES # BLD AUTO: 0.6 10E9/L (ref 0–1.3)
MONOCYTES NFR BLD AUTO: 11.4 %
NEUTROPHILS # BLD AUTO: 3.7 10E9/L (ref 1.6–8.3)
NEUTROPHILS NFR BLD AUTO: 73.8 %
NRBC # BLD AUTO: 0 10*3/UL
NRBC BLD AUTO-RTO: 0 /100
PLATELET # BLD AUTO: 201 10E9/L (ref 150–450)
POTASSIUM SERPL-SCNC: 4.1 MMOL/L (ref 3.4–5.3)
PROT SERPL-MCNC: 7.6 G/DL (ref 6.8–8.8)
RBC # BLD AUTO: 4.37 10E12/L (ref 3.8–5.2)
SODIUM SERPL-SCNC: 139 MMOL/L (ref 133–144)
WBC # BLD AUTO: 5 10E9/L (ref 4–11)

## 2020-12-14 PROCEDURE — 80053 COMPREHEN METABOLIC PANEL: CPT | Performed by: PSYCHIATRY & NEUROLOGY

## 2020-12-14 PROCEDURE — 70553 MRI BRAIN STEM W/O & W/DYE: CPT

## 2020-12-14 PROCEDURE — 255N000002 HC RX 255 OP 636: Performed by: PSYCHIATRY & NEUROLOGY

## 2020-12-14 PROCEDURE — A9585 GADOBUTROL INJECTION: HCPCS | Performed by: PSYCHIATRY & NEUROLOGY

## 2020-12-14 PROCEDURE — 85025 COMPLETE CBC W/AUTO DIFF WBC: CPT | Performed by: PSYCHIATRY & NEUROLOGY

## 2020-12-14 RX ORDER — GADOBUTROL 604.72 MG/ML
7 INJECTION INTRAVENOUS ONCE
Status: COMPLETED | OUTPATIENT
Start: 2020-12-14 | End: 2020-12-14

## 2020-12-14 RX ADMIN — GADOBUTROL 7 ML: 604.72 INJECTION INTRAVENOUS at 11:43

## 2020-12-14 NOTE — TUMOR CONFERENCE
Tumor Conference Information  Tumor Conference: Brain  Specialties Present: Medical oncology, Pathology, Radiology, Radiation oncology, Surgery  Patient Status: A current patient  Pathology: Not Discussed  Treatment to Date: Surgical intervention(s), Chemoradiation, Adjuvant chemotherapy  Clinical Trial Eligibility: Not discussed  Recommended Plan: Continue with current treatment plan (Comment: reviewed imaging - stable; plan to continue chemo and follow up with imaging in 3 months)  Did the review exceed 30 minutes?: did not           Documentation / Disclaimer Cancer Tumor Board Note  Cancer tumor board recommendations do not override what is determined to be reasonable care and treatment, which is dependent on the circumstances of a patient's case; the patient's medical, social, and personal concerns; and the clinical judgment of the oncologist [physician].

## 2020-12-14 NOTE — PROGRESS NOTES
"Barbi Tiwari is a 60 year old female who is being evaluated via a billable video visit.      The patient has been notified of following:     \"This video visit will be conducted via a call between you and your physician/provider. We have found that certain health care needs can be provided without the need for an in-person physical exam.  This service lets us provide the care you need with a video conversation.  If a prescription is necessary we can send it directly to your pharmacy.  If lab work is needed we can place an order for that and you can then stop by our lab to have the test done at a later time.    Video visits are billed at different rates depending on your insurance coverage.  Please reach out to your insurance provider with any questions.    If during the course of the call the physician/provider feels a video visit is not appropriate, you will not be charged for this service.\"    Patient has given verbal consent for Video visit? Yes  How would you like to obtain your AVS? MyChart  If you are dropped from the video visit, the video invite should be resent to: Send to e-mail at: [email or web address]  Will anyone else be joining your video visit? No      Video-Visit Details  Type of service:  Video Visit    Video Start Time: 4:15 PM  Video End Time: 4:31 PM    Originating Location (pt. Location): Home    Distant Location (provider location):  [facility]     Platform used for Video Visit: Prosper Gallagher MD    _____________________________________________________________    NEURO-ONCOLOGY VISIT  Dec 15, 2020    CHIEF COMPLAINT: Ms. Barbi Tiwari is a 60 year old right-handed woman with a right frontal diffuse oligodendroglioma (1p19q co-deleted), diagnosed following resection in 5/2011. She received 12 cycles of temozolomide, completed in 5/2012. Changes on imaging necessitated a repeat resection on 5/30/2018 and pathology was unchanged. No adjuvant " "cancer-directed therapy was initiated. Imaging over the next 1.5 years showed tumor progression and treatment was recommended.     She completed chemoradiotherapy on 5/30/2020. Barbi has now completed 6 cycles of adjuvant-dosed temozolomide as of 11/2020. Dose escalation to 200mg/m2 was complicated by intolerable nausea.     I met with Barbi and Kenneth () for this follow-up visit.    HISTORY OF PRESENT ILLNESS  -Barbi is doing well. Glad to be done with chemotherapy. Remaining healthy.  -Mood is \"ok\". Did not start Zoloft.  -Taste is back to normal.   -She denies any new symptoms; no weakness, numbness, changes in vision, or headaches.   -Balance is off; chronic and stable.  -No change in seizure frequency.   -Fatigued. Tries very easily. Takes naps.    -Continued mild word-finding issues, otherwise no change in her cognitive function.   -Working part-time; shifts are doable, just tiring.     REVIEW OF SYSTEMS  A comprehensive ROS negative except as in HPI.      MEDICATIONS   Current Outpatient Medications   Medication     acetaminophen (TYLENOL) 500 MG tablet     calcium carbonate (TUMS) 500 MG chewable tablet     CALCIUM-VITAMIN D PO     COMPOUNDED NON-CONTROLLED SUBSTANCE (CMPD RX) - PHARMACY TO MIX COMPOUNDED MEDICATION     cyclobenzaprine (FLEXERIL) 5 MG tablet     fexofenadine (ALLEGRA) 180 MG tablet     gabapentin (NEURONTIN) 100 MG capsule     ibuprofen (ADVIL/MOTRIN) 200 MG tablet     Lactase (LACTAID PO)     lamoTRIgine (LAMICTAL) 100 MG tablet     lamoTRIgine (LAMICTAL) 200 MG tablet     levETIRAcetam (KEPPRA) 500 MG tablet     metoprolol tartrate (LOPRESSOR) 25 MG tablet     Multiple Vitamin (MULTI-VITAMINS) TABS     tretinoin (RETIN-A) 0.025 % external cream     VITAMIN D, CHOLECALCIFEROL, PO     temozolomide (TEMODAR) 250 MG capsule     No current facility-administered medications for this visit.      DRUG ALLERGIES   Allergies   Allergen Reactions     Sulfa Drugs Hives     Benoxinate Nausea and " Vomiting       ONCOLOGIC HISTORY  -4/27/2011 PRESENTATION: New onset seizure, generalized event.   -5/2011 MRB with a non-contrast enhancing right frontal mass lesion.   -5/2011 SURGERY: Craniectomy for mass resection at [facility].   PATHOLOGY: Diffuse oligodendroglioma; WHO grade II, 1p/19q co-deleted.  -6/2011-5/2012 CHEMO: Adjuvant dosed temozolomide x 12 cycles.  -4/2/2018 MRB with possible disease recurrence with a new 1.2 cm non-enhancing nodule within the cortex of the right frontal lobe adjacent to the resection cavity.  -4/12/2018 NEURO-ONC: Recommending repeat resection, appointment scheduled with Dr. Dustin Rodriguez, neurosurgery at the [facility].   -5/30/2018 SURGERY: Repeat resection with Dr. Rodriguez.   PATHOLOGY: Remains low grade, without concerning features.   -6/15/2018 NEURO-ONC: Start imaging surveillance.  -9/17/2018 NEURO-ONC/ MRB: Imaging stable, continue with surveillance.   -12/10/2018 NEURO-ONC/ MRB: Imaging stable, continue with surveillance.   -4/15/2019 NEURO-ONC/ MRB: Imaging stable, continue with surveillance.  -8/19/2019 NEURO-ONC/ MRB: Clinically stable. Imaging largely stable, subtle increases on T2 FLAIR; continue with surveillance.  -12/16/2019 NEURO-ONC/ MRB: Clinically stable. Imaging with increased T2 FLAIR medially and laterally about the resection cavity. Referral to Dr. Figueroa with radiation oncology. Discussion with Dr. Rodriguez. Referral for repeat neuro-psychology testing.   -1/10/2020 Evaluated by Dr. Devan Figueroa.   -1/17/2020 NEURO-ONC: Tentative plan to initiate chemoradiotherapy in May-June 2020.   -2/17/2020 NEURO-PSYCH; Mild weaknesses were noted in aspects of verbal and nonverbal working memory, nonverbal recall, some aspects of executive functioning, and bilateral psychomotor speed. The remainder of her cognitive abilities were intact and performed in keeping with her above average range cognitive baseline.   -4/20 - 5/30/2020 CHEMORADS: 54 Gy in 30 fractions with concurrent  temozolomide 75mg/m2 (140mg).   -5/4/2020 NEURO-ONC: Continue treatment as planned. Prescribing Clotrimazole lozenges for oral thrush.   -6/1/2020 NEURO-ONC: Completed treatment as planned.  -6/30/2020 NEURO-ONC/ MRB/ CHEMO: Clinically stable. Imaging with positive treatment effect. Starting adjuvant temozolomide 150mg/m2 (250mg), cycle 1 (start date was 7/1).   -7/28/2020 NEURO-ONC/ CHEMO: Clinically stable. Adjuvant temozolomide 200mg/m2 (320mg), cycle 2 (start date was 7/29).   -8/25/2020 NEURO-ONC/ CHEMO: Clinically stable; significant nausea/ vomiting with 200mg/m2 dosing. Adding Emend for this next cycle. Adjuvant temozolomide 150mg/m2 (250mg), cycle 3 (start date of 8/26).   -9/22/2020 NEURO-ONC/ MRB/ CHEMO: Clinically stable; less nausea/ vomiting with lowered chemotherapy dosing plus adding Emend. Imaging with no concerns, repeat in 3 months. Adjuvant temozolomide 150mg/m2 (250mg), cycle 4 (start date of 9/23).   -10/20/2020 NEURO-ONC/ CHEMO: Clinically stable; no new/ worsening issues. Experiencing pain related to piriformis syndrome. Adjuvant temozolomide 150mg/m2 (250mg), cycle 5 (start date of 10/21).   -11/172020 NEURO-ONC/ CHEMO: Clinically stable. Adjuvant temozolomide 150mg/m2 (250mg), cycle 6 (start date of 11/18).   -12/15/2020 NEURO-ONC/ MRB: Clinically well. Imaging with no concerns. Starting imaging surveillance.     SOCIAL HISTORY   Tobacco use: Never smoker.   Alcohol use: Social.   Drug use: Denies.  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   , 2 children      PHYSICAL EXAMINATION    -Generally well appearing.  -Respiratory: No audible wheezing.   -Skin: No facial rashes.  -Psychiatric: Normal mood and affect. Pleasant, talkative.  -Neurologic:   MENTAL STATUS:     Alert, oriented to date.    Recall: Intact.    Speech fluent.    Comprehension intact.     CRANIAL NERVES:     Pupils are equal, round, reactive to light.     Extraocular movements full, patient denies diplopia.      Equal activation with smiling/ talking on the left side of face.    Hearing intact.   With normal phonation, no dysfunction of the palate or tongue.   MOTOR: Antigravity in arms. No pronation and no drift.   Less of a positional tremor in the right >> left hand.   COORDINATION: Intact on finger nose with eyes closed.   SENSATION: Intact to light touch.     The rest of a comprehensive physical examination is deferred due to PHE (public health emergency) video visit restrictions.      MEDICAL RECORDS  Obtained and personally reviewed all available outside medical records in addition to reviewing any records available in our electronic system.     LABS  Personally reviewed all available lab results.     IMAGING  Personally reviewed MR brain imaging from yesterday and compared to prior imaging. To my eye, there T2 FLAIR about the right frontal resection cavity has been stable since June 2020 and has significantly decreased since 12/2019. No enhancement seen.     Imaging was shown to and results were reviewed with Barbi and Kenneth.     Imaging and case was reviewed at Brain Tumor Conference on 12/14.        IMPRESSION  Clinic time was spent discussing in detail the nature of her cancer in light of her recent imaging. This is in addition to answering questions pertaining to my recommendations and devising the plan as outlined below.     Clinically, Barbi is doing well with no new subjective neurological complaints or increase in seizure frequency. She is glad to be done with chemotherapy and has no lingering chemotherapy-related side effects. She continues to have chronic fatigue, but hopes this will improve off chemotherapy. 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 While she denies any overt cognitive or memory issues, she is open to repeat neuro-psych testing as previously recommended. Examination today was without any concerns. Less of a positional tremor was noted today.     Imaging with no concerns. Now that  she has completed 6 cycles of adjuvant temozolomide, will enter a planned hold on additional chemotherapy. Will start management on imaging surveillance.     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   -Labs reviewed from yesterday; no concerns. Repeat CBC and CMP in 1 month (at [facility]) and in 3 months at return visit. If stable, no need to continue to monitor while off chemotherapy.      -SEIZURE MANAGEMENT-  -Follows with Dr. Flakita Clark; Neurology, epilepsy specialist at the [facility].      -Quality of life/ MOOD/ FATIGUE-  -History of mild anxiety and depression.   -Failed Celexa due to side effect of diarrhea.   -Untreated/ undertreated depression can worsen fatigue, dysorexia, and quality of life.  -Issues with sleep and neuropathic pain, on gabapentin.     -COGNITIVE CHANGES-  -Neuro-psych testing completed with mild deficits notes. Repeat testing recommended in 2/2021; placing referral.     Return to clinic virtually in 3 months + imaging + labs.     In the meantime, Barbi knows to call with questions or concerns or to report new complaints and can be seen sooner if needed.    Myrtle Gallagher MD  Neuro-oncology

## 2020-12-14 NOTE — PATIENT INSTRUCTIONS
Imaging with no concerns, repeat in 3 months.     DONE with chemotherapy!  -Labs from yesterday without concern.   -Repeat labs in 1 month and in 3 months.    Neuro-psych testing recommended to be repeated in 2/2021; placing referral.     Return to clinic in 3/2021 + imaging + labs.    HAPPY HOLIDAYS!    Myrtle Gallagher MD  Neuro-oncology  12/15/20

## 2020-12-15 ENCOUNTER — VIRTUAL VISIT (OUTPATIENT)
Dept: ONCOLOGY | Facility: CLINIC | Age: 60
End: 2020-12-15
Attending: PSYCHIATRY & NEUROLOGY
Payer: COMMERCIAL

## 2020-12-15 VITALS — WEIGHT: 135 LBS | BODY MASS INDEX: 24.69 KG/M2

## 2020-12-15 DIAGNOSIS — C71.9 OLIGODENDROGLIOMA (H): Primary | ICD-10-CM

## 2020-12-15 DIAGNOSIS — R41.3 MEMORY CHANGES: ICD-10-CM

## 2020-12-15 DIAGNOSIS — R41.89 COGNITIVE CHANGES: ICD-10-CM

## 2020-12-15 PROCEDURE — 999N001193 HC VIDEO/TELEPHONE VISIT; NO CHARGE

## 2020-12-15 PROCEDURE — 99215 OFFICE O/P EST HI 40 MIN: CPT | Mod: 95 | Performed by: PSYCHIATRY & NEUROLOGY

## 2020-12-15 ASSESSMENT — PAIN SCALES - GENERAL: PAINLEVEL: NO PAIN (0)

## 2020-12-15 NOTE — LETTER
"    12/15/2020         RE: Barbi Tiwari  [address] [place] [facility] [address]        Dear Colleague,    Thank you for referring your patient, Barbi Tiwari, to the [facility]. Please see a copy of my visit note below.    Barbi Tiwari is a 60 year old female who is being evaluated via a billable video visit.      The patient has been notified of following:     \"This video visit will be conducted via a call between you and your physician/provider. We have found that certain health care needs can be provided without the need for an in-person physical exam.  This service lets us provide the care you need with a video conversation.  If a prescription is necessary we can send it directly to your pharmacy.  If lab work is needed we can place an order for that and you can then stop by our lab to have the test done at a later time.    Video visits are billed at different rates depending on your insurance coverage.  Please reach out to your insurance provider with any questions.    If during the course of the call the physician/provider feels a video visit is not appropriate, you will not be charged for this service.\"    Patient has given verbal consent for Video visit? Yes  How would you like to obtain your AVS? MyChart  If you are dropped from the video visit, the video invite should be resent to: Send to e-mail at: [email or web address]  Will anyone else be joining your video visit? No      Video-Visit Details  Type of service:  Video Visit    Video Start Time: 4:15 PM  Video End Time: 4:31 PM    Originating Location (pt. Location): Home    Distant Location (provider location):  [facility]     Platform used for Video Visit: Prosper Gallagher MD    _____________________________________________________________    NEURO-ONCOLOGY VISIT  Dec 15, 2020    CHIEF COMPLAINT: Ms. 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 She received 12 cycles of temozolomide, completed in 5/2012. Changes on imaging necessitated a repeat resection on 5/30/2018 and pathology was unchanged. No adjuvant cancer-directed therapy was initiated. Imaging over the next 1.5 years showed tumor progression and treatment was recommended.     She completed chemoradiotherapy on 5/30/2020. Barbi has now completed 6 cycles of adjuvant-dosed temozolomide as of 11/2020. Dose escalation to 200mg/m2 was complicated by intolerable nausea.     I met with Barbi and Kenneth () for this follow-up visit.    HISTORY OF PRESENT ILLNESS  -Barbi is doing well. Glad to be done with chemotherapy. Remaining healthy.  -Mood is \"ok\". Did not start Zoloft.  -Taste is back to normal.   -She denies any new symptoms; no weakness, numbness, changes in vision, or headaches.   -Balance is off; chronic and stable.  -No change in seizure frequency.   -Fatigued. Tries very easily. Takes naps.    -Continued mild word-finding issues, otherwise no change in her cognitive function.   -Working part-time; shifts are doable, just tiring.     REVIEW OF SYSTEMS  A comprehensive ROS negative except as in HPI.      MEDICATIONS   Current Outpatient Medications   Medication     acetaminophen (TYLENOL) 500 MG tablet     calcium carbonate (TUMS) 500 MG chewable tablet     CALCIUM-VITAMIN D PO     COMPOUNDED NON-CONTROLLED SUBSTANCE (CMPD RX) - PHARMACY TO MIX COMPOUNDED MEDICATION     cyclobenzaprine (FLEXERIL) 5 MG tablet     fexofenadine (ALLEGRA) 180 MG tablet     gabapentin (NEURONTIN) 100 MG capsule     ibuprofen (ADVIL/MOTRIN) 200 MG tablet     Lactase (LACTAID PO)     lamoTRIgine (LAMICTAL) 100 MG tablet     lamoTRIgine (LAMICTAL) 200 MG tablet     levETIRAcetam (KEPPRA) 500 MG tablet     metoprolol tartrate (LOPRESSOR) 25 MG tablet     Multiple Vitamin (MULTI-VITAMINS) TABS     tretinoin (RETIN-A) 0.025 % " external cream     VITAMIN D, CHOLECALCIFEROL, PO     temozolomide (TEMODAR) 250 MG capsule     No current facility-administered medications for this visit.      DRUG ALLERGIES   Allergies   Allergen Reactions     Sulfa Drugs Hives     Benoxinate Nausea and Vomiting       ONCOLOGIC HISTORY  -4/27/2011 PRESENTATION: New onset seizure, generalized event.   -5/2011 MRB with a non-contrast enhancing right frontal mass lesion.   -5/2011 SURGERY: Craniectomy for mass resection at [facility].   PATHOLOGY: Diffuse oligodendroglioma; WHO grade II, 1p/19q co-deleted.  -6/2011-5/2012 CHEMO: Adjuvant dosed temozolomide x 12 cycles.  -4/2/2018 MRB with possible disease recurrence with a new 1.2 cm non-enhancing nodule within the cortex of the right frontal lobe adjacent to the resection cavity.  -4/12/2018 NEURO-ONC: Recommending repeat resection, appointment scheduled with Dr. Dustin Rodriguez, neurosurgery at the [facility].   -5/30/2018 SURGERY: Repeat resection with Dr. Rodriguez.   PATHOLOGY: Remains low grade, without concerning features.   -6/15/2018 NEURO-ONC: Start imaging surveillance.  -9/17/2018 NEURO-ONC/ MRB: Imaging stable, continue with surveillance.   -12/10/2018 NEURO-ONC/ MRB: Imaging stable, continue with surveillance.   -4/15/2019 NEURO-ONC/ MRB: Imaging stable, continue with surveillance.  -8/19/2019 NEURO-ONC/ MRB: Clinically stable. Imaging largely stable, subtle increases on T2 FLAIR; continue with surveillance.  -12/16/2019 NEURO-ONC/ MRB: Clinically stable. Imaging with increased T2 FLAIR medially and laterally about the resection cavity. Referral to Dr. Figueroa with radiation oncology. Discussion with Dr. Rodriguez. Referral for repeat neuro-psychology testing.   -1/10/2020 Evaluated by Dr. Devan Figueroa.   -1/17/2020 NEURO-ONC: Tentative plan to initiate chemoradiotherapy in May-June 2020.   -2/17/2020 NEURO-PSYCH; Mild weaknesses were noted in aspects of verbal and nonverbal working memory, nonverbal recall, some aspects of  executive functioning, and bilateral psychomotor speed. The remainder of her cognitive abilities were intact and performed in keeping with her above average range cognitive baseline.   -4/20 - 5/30/2020 CHEMORADS: 54 Gy in 30 fractions with concurrent temozolomide 75mg/m2 (140mg).   -5/4/2020 NEURO-ONC: Continue treatment as planned. Prescribing Clotrimazole lozenges for oral thrush.   -6/1/2020 NEURO-ONC: Completed treatment as planned.  -6/30/2020 NEURO-ONC/ MRB/ CHEMO: Clinically stable. Imaging with positive treatment effect. Starting adjuvant temozolomide 150mg/m2 (250mg), cycle 1 (start date was 7/1).   -7/28/2020 NEURO-ONC/ CHEMO: Clinically stable. Adjuvant temozolomide 200mg/m2 (320mg), cycle 2 (start date was 7/29).   -8/25/2020 NEURO-ONC/ CHEMO: Clinically stable; significant nausea/ vomiting with 200mg/m2 dosing. Adding Emend for this next cycle. Adjuvant temozolomide 150mg/m2 (250mg), cycle 3 (start date of 8/26).   -9/22/2020 NEURO-ONC/ MRB/ CHEMO: Clinically stable; less nausea/ vomiting with lowered chemotherapy dosing plus adding Emend. Imaging with no concerns, repeat in 3 months. Adjuvant temozolomide 150mg/m2 (250mg), cycle 4 (start date of 9/23).   -10/20/2020 NEURO-ONC/ CHEMO: Clinically stable; no new/ worsening issues. Experiencing pain related to piriformis syndrome. Adjuvant temozolomide 150mg/m2 (250mg), cycle 5 (start date of 10/21).   -11/172020 NEURO-ONC/ CHEMO: Clinically stable. Adjuvant temozolomide 150mg/m2 (250mg), cycle 6 (start date of 11/18).   -12/15/2020 NEURO-ONC/ MRB: Clinically well. Imaging with no concerns. Starting imaging surveillance.     SOCIAL HISTORY   Tobacco use: Never smoker.   Alcohol use: Social.   Drug use: Denies.  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   , 2 children      PHYSICAL EXAMINATION    -Generally well appearing.  -Respiratory: No audible wheezing.   -Skin: No facial rashes.  -Psychiatric: Normal mood and affect. Pleasant,  talkative.  -Neurologic:   MENTAL STATUS:     Alert, oriented to date.    Recall: Intact.    Speech fluent.    Comprehension intact.     CRANIAL NERVES:     Pupils are equal, round, reactive to light.     Extraocular movements full, patient denies diplopia.     Equal activation with smiling/ talking on the left side of face.    Hearing intact.   With normal phonation, no dysfunction of the palate or tongue.   MOTOR: Antigravity in arms. No pronation and no drift.   Less of a positional tremor in the right >> left hand.   COORDINATION: Intact on finger nose with eyes closed.   SENSATION: Intact to light touch.     The rest of a comprehensive physical examination is deferred due to PHE (public health emergency) video visit restrictions.      MEDICAL RECORDS  Obtained and personally reviewed all available outside medical records in addition to reviewing any records available in our electronic system.     LABS  Personally reviewed all available lab results.     IMAGING  Personally reviewed MR brain imaging from yesterday and compared to prior imaging. To my eye, there T2 FLAIR about the right frontal resection cavity has been stable since June 2020 and has significantly decreased since 12/2019. No enhancement seen.     Imaging was shown to and results were reviewed with Barbi and Kenneth.     Imaging and case was reviewed at Brain Tumor Conference on 12/14.        IMPRESSION  Clinic time was spent discussing in detail the nature of her cancer in light of her recent imaging. This is in addition to answering questions pertaining to my recommendations and devising the plan as outlined below.     Clinically, Barbi is doing well with no new subjective neurological complaints or increase in seizure frequency. She is glad to be done with chemotherapy and has no lingering chemotherapy-related side effects. She continues to have chronic fatigue, but hopes this will improve off chemotherapy. She continues to perform well working as  a RN. While she denies any overt cognitive or memory issues, she is open to repeat neuro-psych testing as previously recommended. Examination today was without any concerns. Less of a positional tremor was noted today.     Imaging with no concerns. Now that she has completed 6 cycles of adjuvant temozolomide, will enter a planned hold on additional chemotherapy. Will start management on imaging surveillance.     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   -Labs reviewed from yesterday; no concerns. Repeat CBC and CMP in 1 month (at [facility]) and in 3 months at return visit. If stable, no need to continue to monitor while off chemotherapy.      -SEIZURE MANAGEMENT-  -Follows with Dr. Flakita Clark; Neurology, epilepsy specialist at the [facility].      -Quality of life/ MOOD/ FATIGUE-  -History of mild anxiety and depression.   -Failed Celexa due to side effect of diarrhea.   -Untreated/ undertreated depression can worsen fatigue, dysorexia, and quality of life.  -Issues with sleep and neuropathic pain, on gabapentin.     -COGNITIVE CHANGES-  -Neuro-psych testing completed with mild deficits notes. Repeat testing recommended in 2/2021; placing referral.     Return to clinic virtually in 3 months + imaging + labs.     In the meantime, Barbi knows to call with questions or concerns or to report new complaints and can be seen sooner if needed.    Myrtle Gallagher MD  Neuro-oncology      Again, thank you for allowing me to participate in the care of your patient.        Sincerely,        Myrtle Gallagher MD

## 2020-12-15 NOTE — LETTER
"    12/15/2020         RE: Barbi Tiwari  [address] [facility] [address]        Dear Colleague,    Thank you for referring your patient, Barbi Tiwari, to the [facility]. Please see a copy of my visit note below.    Barbi Tiwari is a 60 year old female who is being evaluated via a billable video visit.      The patient has been notified of following:     \"This video visit will be conducted via a call between you and your physician/provider. We have found that certain health care needs can be provided without the need for an in-person physical exam.  This service lets us provide the care you need with a video conversation.  If a prescription is necessary we can send it directly to your pharmacy.  If lab work is needed we can place an order for that and you can then stop by our lab to have the test done at a later time.    Video visits are billed at different rates depending on your insurance coverage.  Please reach out to your insurance provider with any questions.    If during the course of the call the physician/provider feels a video visit is not appropriate, you will not be charged for this service.\"    Patient has given verbal consent for Video visit? Yes  How would you like to obtain your AVS? MyChart  If you are dropped from the video visit, the video invite should be resent to: Send to e-mail at: [email or web address]  Will anyone else be joining your video visit? No      Video-Visit Details  Type of service:  Video Visit    Video Start Time: 4:15 PM  Video End Time: 4:31 PM    Originating Location (pt. Location): Home    Distant Location (provider location):  [facility]     Platform used for Video Visit: Prosper Gallagher MD    _____________________________________________________________    NEURO-ONCOLOGY VISIT  Dec 15, 2020    CHIEF COMPLAINT: Ms. 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 She received 12 cycles of temozolomide, completed in 5/2012. Changes on imaging necessitated a repeat resection on 5/30/2018 and pathology was unchanged. No adjuvant cancer-directed therapy was initiated. Imaging over the next 1.5 years showed tumor progression and treatment was recommended.     She completed chemoradiotherapy on 5/30/2020. Barbi has now completed 6 cycles of adjuvant-dosed temozolomide as of 11/2020. Dose escalation to 200mg/m2 was complicated by intolerable nausea.     I met with Barbi and Kenneth () for this follow-up visit.    HISTORY OF PRESENT ILLNESS  -Barbi is doing well. Glad to be done with chemotherapy. Remaining healthy.  -Mood is \"ok\". Did not start Zoloft.  -Taste is back to normal.   -She denies any new symptoms; no weakness, numbness, changes in vision, or headaches.   -Balance is off; chronic and stable.  -No change in seizure frequency.   -Fatigued. Tries very easily. Takes naps.    -Continued mild word-finding issues, otherwise no change in her cognitive function.   -Working part-time; shifts are doable, just tiring.     REVIEW OF SYSTEMS  A comprehensive ROS negative except as in HPI.      MEDICATIONS   Current Outpatient Medications   Medication     acetaminophen (TYLENOL) 500 MG tablet     calcium carbonate (TUMS) 500 MG chewable tablet     CALCIUM-VITAMIN D PO     COMPOUNDED NON-CONTROLLED SUBSTANCE (CMPD RX) - PHARMACY TO MIX COMPOUNDED MEDICATION     cyclobenzaprine (FLEXERIL) 5 MG tablet     fexofenadine (ALLEGRA) 180 MG tablet     gabapentin (NEURONTIN) 100 MG capsule     ibuprofen (ADVIL/MOTRIN) 200 MG tablet     Lactase (LACTAID PO)     lamoTRIgine (LAMICTAL) 100 MG tablet     lamoTRIgine (LAMICTAL) 200 MG tablet     levETIRAcetam (KEPPRA) 500 MG tablet     metoprolol tartrate (LOPRESSOR) 25 MG tablet     Multiple Vitamin (MULTI-VITAMINS) TABS     tretinoin (RETIN-A) 0.025 % " external cream     VITAMIN D, CHOLECALCIFEROL, PO     temozolomide (TEMODAR) 250 MG capsule     No current facility-administered medications for this visit.      DRUG ALLERGIES   Allergies   Allergen Reactions     Sulfa Drugs Hives     Benoxinate Nausea and Vomiting       ONCOLOGIC HISTORY  -4/27/2011 PRESENTATION: New onset seizure, generalized event.   -5/2011 MRB with a non-contrast enhancing right frontal mass lesion.   -5/2011 SURGERY: Craniectomy for mass resection at [facility].   PATHOLOGY: Diffuse oligodendroglioma; WHO grade II, 1p/19q co-deleted.  -6/2011-5/2012 CHEMO: Adjuvant dosed temozolomide x 12 cycles.  -4/2/2018 MRB with possible disease recurrence with a new 1.2 cm non-enhancing nodule within the cortex of the right frontal lobe adjacent to the resection cavity.  -4/12/2018 NEURO-ONC: Recommending repeat resection, appointment scheduled with Dr. Dustin Rodriguez, neurosurgery at the [place].   -5/30/2018 SURGERY: Repeat resection with Dr. Rodriguez.   PATHOLOGY: Remains low grade, without concerning features.   -6/15/2018 NEURO-ONC: Start imaging surveillance.  -9/17/2018 NEURO-ONC/ MRB: Imaging stable, continue with surveillance.   -12/10/2018 NEURO-ONC/ MRB: Imaging stable, continue with surveillance.   -4/15/2019 NEURO-ONC/ MRB: Imaging stable, continue with surveillance.  -8/19/2019 NEURO-ONC/ MRB: Clinically stable. Imaging largely stable, subtle increases on T2 FLAIR; continue with surveillance.  -12/16/2019 NEURO-ONC/ MRB: Clinically stable. Imaging with increased T2 FLAIR medially and laterally about the resection cavity. Referral to Dr. Figueroa with radiation oncology. Discussion with Dr. Rodriguez. Referral for repeat neuro-psychology testing.   -1/10/2020 Evaluated by Dr. Devan Figueroa.   -1/17/2020 NEURO-ONC: Tentative plan to initiate chemoradiotherapy in May-June 2020.   -2/17/2020 NEURO-PSYCH; Mild weaknesses were noted in aspects of verbal and nonverbal working memory, nonverbal recall, some aspects of  executive functioning, and bilateral psychomotor speed. The remainder of her cognitive abilities were intact and performed in keeping with her above average range cognitive baseline.   -4/20 - 5/30/2020 CHEMORADS: 54 Gy in 30 fractions with concurrent temozolomide 75mg/m2 (140mg).   -5/4/2020 NEURO-ONC: Continue treatment as planned. Prescribing Clotrimazole lozenges for oral thrush.   -6/1/2020 NEURO-ONC: Completed treatment as planned.  -6/30/2020 NEURO-ONC/ MRB/ CHEMO: Clinically stable. Imaging with positive treatment effect. Starting adjuvant temozolomide 150mg/m2 (250mg), cycle 1 (start date was 7/1).   -7/28/2020 NEURO-ONC/ CHEMO: Clinically stable. Adjuvant temozolomide 200mg/m2 (320mg), cycle 2 (start date was 7/29).   -8/25/2020 NEURO-ONC/ CHEMO: Clinically stable; significant nausea/ vomiting with 200mg/m2 dosing. Adding Emend for this next cycle. Adjuvant temozolomide 150mg/m2 (250mg), cycle 3 (start date of 8/26).   -9/22/2020 NEURO-ONC/ MRB/ CHEMO: Clinically stable; less nausea/ vomiting with lowered chemotherapy dosing plus adding Emend. Imaging with no concerns, repeat in 3 months. Adjuvant temozolomide 150mg/m2 (250mg), cycle 4 (start date of 9/23).   -10/20/2020 NEURO-ONC/ CHEMO: Clinically stable; no new/ worsening issues. Experiencing pain related to piriformis syndrome. Adjuvant temozolomide 150mg/m2 (250mg), cycle 5 (start date of 10/21).   -11/172020 NEURO-ONC/ CHEMO: Clinically stable. Adjuvant temozolomide 150mg/m2 (250mg), cycle 6 (start date of 11/18).   -12/15/2020 NEURO-ONC/ MRB: Clinically well. Imaging with no concerns. Starting imaging surveillance.     SOCIAL HISTORY   Tobacco use: Never smoker.   Alcohol use: Social.   Drug use: Denies.  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   , 2 children      PHYSICAL EXAMINATION    -Generally well appearing.  -Respiratory: No audible wheezing.   -Skin: No facial rashes.  -Psychiatric: Normal mood and affect. Pleasant,  talkative.  -Neurologic:   MENTAL STATUS:     Alert, oriented to date.    Recall: Intact.    Speech fluent.    Comprehension intact.     CRANIAL NERVES:     Pupils are equal, round, reactive to light.     Extraocular movements full, patient denies diplopia.     Equal activation with smiling/ talking on the left side of face.    Hearing intact.   With normal phonation, no dysfunction of the palate or tongue.   MOTOR: Antigravity in arms. No pronation and no drift.   Less of a positional tremor in the right >> left hand.   COORDINATION: Intact on finger nose with eyes closed.   SENSATION: Intact to light touch.     The rest of a comprehensive physical examination is deferred due to PHE (public health emergency) video visit restrictions.      MEDICAL RECORDS  Obtained and personally reviewed all available outside medical records in addition to reviewing any records available in our electronic system.     LABS  Personally reviewed all available lab results.     IMAGING  Personally reviewed MR brain imaging from yesterday and compared to prior imaging. To my eye, there T2 FLAIR about the right frontal resection cavity has been stable since June 2020 and has significantly decreased since 12/2019. No enhancement seen.     Imaging was shown to and results were reviewed with Barbi and Kenneth.     Imaging and case was reviewed at Brain Tumor Conference on 12/14.        IMPRESSION  Clinic time was spent discussing in detail the nature of her cancer in light of her recent imaging. This is in addition to answering questions pertaining to my recommendations and devising the plan as outlined below.     Clinically, Barbi is doing well with no new subjective neurological complaints or increase in seizure frequency. She is glad to be done with chemotherapy and has no lingering chemotherapy-related side effects. She continues to have chronic fatigue, but hopes this will improve off chemotherapy. She continues to perform well working as  a RN. While she denies any overt cognitive or memory issues, she is open to repeat neuro-psych testing as previously recommended. Examination today was without any concerns. Less of a positional tremor was noted today.     Imaging with no concerns. Now that she has completed 6 cycles of adjuvant temozolomide, will enter a planned hold on additional chemotherapy. Will start management on imaging surveillance.     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   -Labs reviewed from yesterday; no concerns. Repeat CBC and CMP in 1 month (at [facility]) and in 3 months at return visit. If stable, no need to continue to monitor while off chemotherapy.      -SEIZURE MANAGEMENT-  -Follows with Dr. Flakita Clark; Neurology, epilepsy specialist at the [facility].      -Quality of life/ MOOD/ FATIGUE-  -History of mild anxiety and depression.   -Failed Celexa due to side effect of diarrhea.   -Untreated/ undertreated depression can worsen fatigue, dysorexia, and quality of life.  -Issues with sleep and neuropathic pain, on gabapentin.     -COGNITIVE CHANGES-  -Neuro-psych testing completed with mild deficits notes. Repeat testing recommended in 2/2021; placing referral.     Return to clinic virtually in 3 months + imaging + labs.     In the meantime, Barbi knows to call with questions or concerns or to report new complaints and can be seen sooner if needed.    Myrtle Gallagher MD  Neuro-oncology      Again, thank you for allowing me to participate in the care of your patient.        Sincerely,        Myrtle Gallagher MD

## 2021-01-03 ENCOUNTER — DOCUMENTATION ONLY (OUTPATIENT)
Dept: ONCOLOGY | Facility: CLINIC | Age: 61
End: 2021-01-03

## 2021-01-06 ENCOUNTER — VIRTUAL VISIT (OUTPATIENT)
Dept: NEUROLOGY | Facility: CLINIC | Age: 61
End: 2021-01-06
Payer: COMMERCIAL

## 2021-01-06 DIAGNOSIS — R56.9 SEIZURE (H): Primary | ICD-10-CM

## 2021-01-06 RX ORDER — LEVETIRACETAM 500 MG/1
TABLET ORAL
Qty: 495 TABLET | Refills: 3 | Status: SHIPPED | OUTPATIENT
Start: 2021-01-06 | End: 2022-02-09

## 2021-01-06 RX ORDER — LAMOTRIGINE 200 MG/1
TABLET ORAL
Qty: 180 TABLET | Refills: 3 | Status: SHIPPED | OUTPATIENT
Start: 2021-01-06 | End: 2022-02-06

## 2021-01-06 RX ORDER — LAMOTRIGINE 100 MG/1
TABLET ORAL
Qty: 180 TABLET | Refills: 3 | Status: SHIPPED | OUTPATIENT
Start: 2021-01-06 | End: 2022-02-06

## 2021-01-06 RX ORDER — GABAPENTIN 100 MG/1
CAPSULE ORAL
Qty: 180 CAPSULE | Refills: 0 | Status: SHIPPED | OUTPATIENT
Start: 2021-01-06 | End: 2021-04-19
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
## 2021-01-08 DIAGNOSIS — R30.0 DYSURIA: ICD-10-CM

## 2021-01-08 LAB
ALBUMIN UR-MCNC: NEGATIVE MG/DL
APPEARANCE UR: CLEAR
BACTERIA #/AREA URNS HPF: ABNORMAL /HPF
BILIRUB UR QL STRIP: NEGATIVE
COLOR UR AUTO: YELLOW
GLUCOSE UR STRIP-MCNC: NEGATIVE MG/DL
HGB UR QL STRIP: ABNORMAL
KETONES UR STRIP-MCNC: NEGATIVE MG/DL
LEUKOCYTE ESTERASE UR QL STRIP: ABNORMAL
NITRATE UR QL: POSITIVE
PH UR STRIP: 7.5 PH (ref 5–7)
RBC #/AREA URNS AUTO: ABNORMAL /HPF
SOURCE: ABNORMAL
SP GR UR STRIP: 1.01 (ref 1–1.03)
UROBILINOGEN UR STRIP-ACNC: 0.2 EU/DL (ref 0.2–1)
WBC #/AREA URNS AUTO: >100 /HPF

## 2021-01-08 PROCEDURE — 87088 URINE BACTERIA CULTURE: CPT | Performed by: INTERNAL MEDICINE

## 2021-01-08 PROCEDURE — 81001 URINALYSIS AUTO W/SCOPE: CPT | Performed by: INTERNAL MEDICINE

## 2021-01-08 PROCEDURE — 87186 SC STD MICRODIL/AGAR DIL: CPT | Performed by: INTERNAL MEDICINE

## 2021-01-08 PROCEDURE — 87086 URINE CULTURE/COLONY COUNT: CPT | Performed by: INTERNAL MEDICINE

## 2021-01-09 ENCOUNTER — TELEPHONE (OUTPATIENT)
Dept: ONCOLOGY | Facility: CLINIC | Age: 61
End: 2021-01-09

## 2021-01-09 DIAGNOSIS — I49.3 SYMPTOMATIC PREMATURE VENTRICULAR CONTRACTIONS: ICD-10-CM

## 2021-01-09 RX ORDER — METOPROLOL TARTRATE 25 MG/1
TABLET, FILM COATED ORAL
Qty: 180 TABLET | Refills: 3 | Status: SHIPPED | OUTPATIENT
Start: 2021-01-09 | End: 2021-12-09

## 2021-01-09 NOTE — ORAL ONC MGMT
[facility] Oral Chemotherapy Monitoring Program    Thank you for the opportunity to be a part in the care of this patient's oral chemotherapy. The oncology pharmacy will no longer be following this patient for oral chemotherapy. If there are any questions or the plan changes, feel free to contact us.    ORAL CHEMOTHERAPY 6/30/2020 7/8/2020 11/3/2020 11/11/2020 11/16/2020 12/1/2020 1/9/2021   Assessment Type - - Lab Monitoring Refill Lab Monitoring Lab Monitoring Discontinuation   Stop Date - - - - - - 12/15/2020   Reason for Discontinuation - - - - - - Therapy complete   Diagnosis Code - - Brain Cancer - Glioblastoma Brain Cancer - Glioblastoma Brain Cancer - Glioblastoma Brain Cancer - Glioblastoma Brain Cancer - Glioblastoma   Providers - - Dr. Elliott Gallagher   Clinic Name/Location - - [facility]   Drug Name Temodar (Temozolomide) Temodar (Temozolomide) Temodar (Temozolomide) Temodar (Temozolomide) Temodar (Temozolomide) Temodar (Temozolomide) Temodar (Temozolomide)   Dose - - 250 mg 250 mg 250 mg 250 mg 250 mg   Current Schedule QHS QHS Daily Daily Daily Daily Daily   Cycle Details 5 days on, then 23 days off 5 days on, then 23 days off 5 days on, 23 days off 5 days on, 23 days off 5 days on, 23 days off 5 days on, 23 days off 5 days on, 23 days off   Start Date of Last Cycle - 7/1/2020 - - - - -   Planned next cycle start date - 7/29/2020 - - - - -   Doses missed in last 2 weeks - 0 - - - - -   Adherence Assessment - Adherent - - - - -   Adverse Effects - Fatigue - - - - -   Nausea - - - - - - -   Fatigue - Grade 1 - - - - -   Other (See Note for Details) - - - - - - -   Any new drug interactions? No - - - - - -   Is the dose as ordered appropriate for the patient? Yes - - - - - -       Sarah Arechiga  Pharmacy Intern  [facility]  [phone]

## 2021-01-10 LAB
BACTERIA SPEC CULT: ABNORMAL
BACTERIA SPEC CULT: ABNORMAL
SPECIMEN SOURCE: ABNORMAL

## 2021-01-12 DIAGNOSIS — Z79.899 ENCOUNTER FOR LONG-TERM (CURRENT) USE OF MEDICATIONS: ICD-10-CM

## 2021-01-12 DIAGNOSIS — R56.9 SEIZURE (H): ICD-10-CM

## 2021-01-12 DIAGNOSIS — C71.9 OLIGODENDROGLIOMA (H): ICD-10-CM

## 2021-01-12 LAB
ALBUMIN SERPL-MCNC: 3.7 G/DL (ref 3.4–5)
ALP SERPL-CCNC: 107 U/L (ref 40–150)
ALT SERPL W P-5'-P-CCNC: 18 U/L (ref 0–50)
ANION GAP SERPL CALCULATED.3IONS-SCNC: 3 MMOL/L (ref 3–14)
AST SERPL W P-5'-P-CCNC: 15 U/L (ref 0–45)
BASOPHILS # BLD AUTO: 0 10E9/L (ref 0–0.2)
BASOPHILS NFR BLD AUTO: 0.3 %
BILIRUB SERPL-MCNC: 0.3 MG/DL (ref 0.2–1.3)
BUN SERPL-MCNC: 21 MG/DL (ref 7–30)
CALCIUM SERPL-MCNC: 9.4 MG/DL (ref 8.5–10.1)
CHLORIDE SERPL-SCNC: 106 MMOL/L (ref 94–109)
CO2 SERPL-SCNC: 28 MMOL/L (ref 20–32)
CREAT SERPL-MCNC: 0.67 MG/DL (ref 0.52–1.04)
DIFFERENTIAL METHOD BLD: ABNORMAL
EOSINOPHIL # BLD AUTO: 0.1 10E9/L (ref 0–0.7)
EOSINOPHIL NFR BLD AUTO: 1.8 %
ERYTHROCYTE [DISTWIDTH] IN BLOOD BY AUTOMATED COUNT: 12 % (ref 10–15)
GFR SERPL CREATININE-BSD FRML MDRD: >90 ML/MIN/{1.73_M2}
GLUCOSE SERPL-MCNC: 103 MG/DL (ref 70–99)
HCT VFR BLD AUTO: 42.5 % (ref 35–47)
HGB BLD-MCNC: 13.4 G/DL (ref 11.7–15.7)
LYMPHOCYTES # BLD AUTO: 0.5 10E9/L (ref 0.8–5.3)
LYMPHOCYTES NFR BLD AUTO: 11.6 %
MCH RBC QN AUTO: 30.5 PG (ref 26.5–33)
MCHC RBC AUTO-ENTMCNC: 31.5 G/DL (ref 31.5–36.5)
MCV RBC AUTO: 97 FL (ref 78–100)
MONOCYTES # BLD AUTO: 0.4 10E9/L (ref 0–1.3)
MONOCYTES NFR BLD AUTO: 9.5 %
NEUTROPHILS # BLD AUTO: 3 10E9/L (ref 1.6–8.3)
NEUTROPHILS NFR BLD AUTO: 76.8 %
PLATELET # BLD AUTO: 166 10E9/L (ref 150–450)
POTASSIUM SERPL-SCNC: 3.9 MMOL/L (ref 3.4–5.3)
PROT SERPL-MCNC: 7.4 G/DL (ref 6.8–8.8)
RBC # BLD AUTO: 4.39 10E12/L (ref 3.8–5.2)
SODIUM SERPL-SCNC: 137 MMOL/L (ref 133–144)
WBC # BLD AUTO: 3.9 10E9/L (ref 4–11)

## 2021-01-12 PROCEDURE — 85025 COMPLETE CBC W/AUTO DIFF WBC: CPT | Performed by: PSYCHIATRY & NEUROLOGY

## 2021-01-12 PROCEDURE — 80175 DRUG SCREEN QUAN LAMOTRIGINE: CPT | Mod: 90 | Performed by: PSYCHIATRY & NEUROLOGY

## 2021-01-12 PROCEDURE — 99000 SPECIMEN HANDLING OFFICE-LAB: CPT | Performed by: PSYCHIATRY & NEUROLOGY

## 2021-01-12 PROCEDURE — 80053 COMPREHEN METABOLIC PANEL: CPT | Performed by: PSYCHIATRY & NEUROLOGY

## 2021-01-12 PROCEDURE — 80177 DRUG SCRN QUAN LEVETIRACETAM: CPT | Mod: 90 | Performed by: PSYCHIATRY & NEUROLOGY

## 2021-01-12 PROCEDURE — 36415 COLL VENOUS BLD VENIPUNCTURE: CPT | Performed by: PSYCHIATRY & NEUROLOGY

## 2021-01-13 LAB
LAMOTRIGINE SERPL-MCNC: 13.4 UG/ML (ref 2.5–15)
LEVETIRACETAM SERPL-MCNC: 36 UG/ML (ref 12–46)

## 2021-01-14 ENCOUNTER — HEALTH MAINTENANCE LETTER (OUTPATIENT)
Age: 61
End: 2021-01-14

## 2021-03-01 ENCOUNTER — OFFICE VISIT (OUTPATIENT)
Dept: INTERNAL MEDICINE | Facility: CLINIC | Age: 61
End: 2021-03-01
Payer: COMMERCIAL

## 2021-03-01 VITALS
HEART RATE: 58 BPM | OXYGEN SATURATION: 97 % | WEIGHT: 140.2 LBS | HEIGHT: 62 IN | SYSTOLIC BLOOD PRESSURE: 114 MMHG | DIASTOLIC BLOOD PRESSURE: 62 MMHG | BODY MASS INDEX: 25.8 KG/M2 | TEMPERATURE: 98.5 F

## 2021-03-01 DIAGNOSIS — L72.0 MILIAL CYST: Primary | ICD-10-CM

## 2021-03-01 PROCEDURE — 10060 I&D ABSCESS SIMPLE/SINGLE: CPT | Performed by: INTERNAL MEDICINE

## 2021-03-01 ASSESSMENT — MIFFLIN-ST. JEOR: SCORE: 1164.19

## 2021-03-01 NOTE — PROGRESS NOTES
"    Assessment & Plan     Milial cyst  Cyst contents removed. symptoms resolved                    No follow-ups on file.    Dustin Choudhury MD  [facility]    Scotty Landon is a 60 year old who presents for the following health issues     HPI       Concern - cyst on R palm  Onset: few weeks/month  Description: small bead size cyst under skin on R palm  Intensity: moderate  Progression of Symptoms:  worsening  Accompanying Signs & Symptoms: pain when pressured applied  Previous history of similar problem: yes  Precipitating factors:        Worsened by: pressure applied  To area  Alleviating factors:        Improved by: na  Therapies tried and outcome: na        Review of Systems         Objective    /62   Pulse 58   Temp 98.5  F (36.9  C) (Temporal)   Ht 1.575 m (5' 2\")   Wt 63.6 kg (140 lb 3.2 oz)   SpO2 97%   BMI 25.64 kg/m    Body mass index is 25.64 kg/m .  Physical Exam   GENERAL APPEARANCE: healthy, alert and no distress  MS: R hand with small milia base of thumb medially on palm                  "

## 2021-03-13 ENCOUNTER — HEALTH MAINTENANCE LETTER (OUTPATIENT)
Age: 61
End: 2021-03-13

## 2021-03-15 ENCOUNTER — HOSPITAL ENCOUNTER (OUTPATIENT)
Dept: MRI IMAGING | Facility: CLINIC | Age: 61
End: 2021-03-15
Attending: PSYCHIATRY & NEUROLOGY
Payer: COMMERCIAL

## 2021-03-15 ENCOUNTER — TUMOR CONFERENCE (OUTPATIENT)
Dept: ONCOLOGY | Facility: CLINIC | Age: 61
End: 2021-03-15

## 2021-03-15 ENCOUNTER — HOSPITAL ENCOUNTER (OUTPATIENT)
Dept: LAB | Facility: CLINIC | Age: 61
End: 2021-03-15
Attending: PSYCHIATRY & NEUROLOGY
Payer: COMMERCIAL

## 2021-03-15 DIAGNOSIS — C71.9 OLIGODENDROGLIOMA (H): ICD-10-CM

## 2021-03-15 DIAGNOSIS — Z79.899 ENCOUNTER FOR LONG-TERM (CURRENT) USE OF MEDICATIONS: ICD-10-CM

## 2021-03-15 LAB
ALBUMIN SERPL-MCNC: 3.6 G/DL (ref 3.4–5)
ALP SERPL-CCNC: 86 U/L (ref 40–150)
ALT SERPL W P-5'-P-CCNC: 25 U/L (ref 0–50)
ANION GAP SERPL CALCULATED.3IONS-SCNC: 2 MMOL/L (ref 3–14)
AST SERPL W P-5'-P-CCNC: 15 U/L (ref 0–45)
BASOPHILS # BLD AUTO: 0 10E9/L (ref 0–0.2)
BASOPHILS NFR BLD AUTO: 0.5 %
BILIRUB SERPL-MCNC: 0.2 MG/DL (ref 0.2–1.3)
BUN SERPL-MCNC: 18 MG/DL (ref 7–30)
CALCIUM SERPL-MCNC: 9.2 MG/DL (ref 8.5–10.1)
CHLORIDE SERPL-SCNC: 109 MMOL/L (ref 94–109)
CO2 SERPL-SCNC: 32 MMOL/L (ref 20–32)
CREAT SERPL-MCNC: 0.66 MG/DL (ref 0.52–1.04)
DIFFERENTIAL METHOD BLD: ABNORMAL
EOSINOPHIL # BLD AUTO: 0.1 10E9/L (ref 0–0.7)
EOSINOPHIL NFR BLD AUTO: 2.2 %
ERYTHROCYTE [DISTWIDTH] IN BLOOD BY AUTOMATED COUNT: 12.6 % (ref 10–15)
GFR SERPL CREATININE-BSD FRML MDRD: >90 ML/MIN/{1.73_M2}
GLUCOSE SERPL-MCNC: 84 MG/DL (ref 70–99)
HCT VFR BLD AUTO: 41.1 % (ref 35–47)
HGB BLD-MCNC: 13.1 G/DL (ref 11.7–15.7)
IMM GRANULOCYTES # BLD: 0 10E9/L (ref 0–0.4)
IMM GRANULOCYTES NFR BLD: 0.5 %
LYMPHOCYTES # BLD AUTO: 0.7 10E9/L (ref 0.8–5.3)
LYMPHOCYTES NFR BLD AUTO: 16.3 %
MCH RBC QN AUTO: 29.8 PG (ref 26.5–33)
MCHC RBC AUTO-ENTMCNC: 31.9 G/DL (ref 31.5–36.5)
MCV RBC AUTO: 94 FL (ref 78–100)
MONOCYTES # BLD AUTO: 0.4 10E9/L (ref 0–1.3)
MONOCYTES NFR BLD AUTO: 9.5 %
NEUTROPHILS # BLD AUTO: 2.9 10E9/L (ref 1.6–8.3)
NEUTROPHILS NFR BLD AUTO: 71 %
NRBC # BLD AUTO: 0 10*3/UL
NRBC BLD AUTO-RTO: 0 /100
PLATELET # BLD AUTO: 188 10E9/L (ref 150–450)
POTASSIUM SERPL-SCNC: 3.6 MMOL/L (ref 3.4–5.3)
PROT SERPL-MCNC: 6.7 G/DL (ref 6.8–8.8)
RBC # BLD AUTO: 4.39 10E12/L (ref 3.8–5.2)
SODIUM SERPL-SCNC: 143 MMOL/L (ref 133–144)
WBC # BLD AUTO: 4.1 10E9/L (ref 4–11)

## 2021-03-15 PROCEDURE — 70553 MRI BRAIN STEM W/O & W/DYE: CPT

## 2021-03-15 PROCEDURE — 255N000002 HC RX 255 OP 636: Performed by: PSYCHIATRY & NEUROLOGY

## 2021-03-15 PROCEDURE — A9585 GADOBUTROL INJECTION: HCPCS | Performed by: PSYCHIATRY & NEUROLOGY

## 2021-03-15 PROCEDURE — 85025 COMPLETE CBC W/AUTO DIFF WBC: CPT | Performed by: PSYCHIATRY & NEUROLOGY

## 2021-03-15 PROCEDURE — 80053 COMPREHEN METABOLIC PANEL: CPT | Performed by: PSYCHIATRY & NEUROLOGY

## 2021-03-15 RX ORDER — GADOBUTROL 604.72 MG/ML
6 INJECTION INTRAVENOUS ONCE
Status: COMPLETED | OUTPATIENT
Start: 2021-03-15 | End: 2021-03-15

## 2021-03-15 RX ADMIN — GADOBUTROL 6 ML: 604.72 INJECTION INTRAVENOUS at 11:35

## 2021-03-15 NOTE — TUMOR CONFERENCE
Tumor Conference Information  Tumor Conference: Brain  Specialties Present: Medical oncology, Pathology, Radiology, Radiation oncology, Surgery  Patient Status: A current patient  Pathology: Not Discussed  Treatment to Date: Surgical intervention(s), Adjuvant chemotherapy  Clinical Trial Eligibility: Not discussed  Recommended Plan: Observation (see comment) (Comment: reviewed imaging - stable; follow up in 3-4 months with repeat MRi)  Did the review exceed 30 minutes?: did not           Documentation / Disclaimer Cancer Tumor Board Note  Cancer tumor board recommendations do not override what is determined to be reasonable care and treatment, which is dependent on the circumstances of a patient's case; the patient's medical, social, and personal concerns; and the clinical judgment of the oncologist [physician].

## 2021-03-16 PROBLEM — T45.1X5A CHEMOTHERAPY INDUCED NEUTROPENIA (H): Status: RESOLVED | Noted: 2020-04-09 | Resolved: 2021-03-16

## 2021-03-16 PROBLEM — D70.1 CHEMOTHERAPY INDUCED NEUTROPENIA (H): Status: RESOLVED | Noted: 2020-04-09 | Resolved: 2021-03-16

## 2021-04-16 DIAGNOSIS — R56.9 SEIZURE (H): ICD-10-CM

## 2021-04-19 RX ORDER — GABAPENTIN 100 MG/1
CAPSULE ORAL
Qty: 180 CAPSULE | Refills: 1 | Status: SHIPPED | OUTPATIENT
Start: 2021-04-19 | End: 2021-07-09

## 2021-04-19 NOTE — TELEPHONE ENCOUNTER
" gabapentin (NEURONTIN) 100 MG capsule  Last Written Prescription Date: 1/6/21  Last Fill Quantity: 180,   # refills: 0  Last Office Visit : 1/6/21  Future Office visit:  none    Routing refill request to provider for review/approval because: not on protocol. 12/8/20  , 12/15/20,  12/22/20  appt cancelled. Per last refill notation \" Make appointment with Dr. payton for future refills\".     "

## 2021-04-30 ENCOUNTER — OFFICE VISIT (OUTPATIENT)
Dept: NEUROPSYCHOLOGY | Facility: CLINIC | Age: 61
End: 2021-04-30
Attending: PSYCHIATRY & NEUROLOGY
Payer: COMMERCIAL

## 2021-04-30 DIAGNOSIS — F06.8 OTHER SPECIFIED MENTAL DISORDERS DUE TO KNOWN PHYSIOLOGICAL CONDITION: ICD-10-CM

## 2021-04-30 DIAGNOSIS — R41.844 FRONTAL LOBE AND EXECUTIVE FUNCTION DEFICIT: ICD-10-CM

## 2021-04-30 DIAGNOSIS — C71.9 OLIGODENDROGLIOMA (H): Primary | ICD-10-CM

## 2021-04-30 DIAGNOSIS — R41.89 COGNITIVE CHANGES: ICD-10-CM

## 2021-04-30 DIAGNOSIS — R41.3 MEMORY CHANGES: ICD-10-CM

## 2021-04-30 PROCEDURE — 96138 PSYCL/NRPSYC TECH 1ST: CPT | Performed by: CLINICAL NEUROPSYCHOLOGIST

## 2021-04-30 PROCEDURE — 96139 PSYCL/NRPSYC TST TECH EA: CPT | Performed by: CLINICAL NEUROPSYCHOLOGIST

## 2021-04-30 PROCEDURE — 96116 NUBHVL XM PHYS/QHP 1ST HR: CPT | Performed by: CLINICAL NEUROPSYCHOLOGIST

## 2021-04-30 PROCEDURE — 96133 NRPSYC TST EVAL PHYS/QHP EA: CPT | Performed by: CLINICAL NEUROPSYCHOLOGIST

## 2021-04-30 PROCEDURE — 96132 NRPSYC TST EVAL PHYS/QHP 1ST: CPT | Performed by: CLINICAL NEUROPSYCHOLOGIST

## 2021-04-30 NOTE — PROGRESS NOTES
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 She was unaccompanied to the evaluation.    The patient was in-clinic for the entirety of this evaluation. The interview for this evaluation was completed via a Zoom meeting. Testing was completed face-to-face.     BACKGROUND INFORMATION / INTERVIEW FINDINGS    Records indicate that Ms. Tiwari was diagnosed with a right frontal diffuse oligodendroglioma (1p19q co-deleted) following a resection in May, 2011. She received 12 cycles of temozolomide, completed in May, 2012. She had a repeat resection on 05/30/2018 due to suspected tumor progression. Pathology was unchanged.  She was on imaging surveillance.  Scans in 2019 and early 2020 documented tumor regrowth.  I saw her for a neuropsychology exam on February 17, 2020.  My exam documented a pattern of weaknesses that was felt to be consistent with subtle dysfunction of the bilateral frontal lobes.  Complicating the interpretation of her profile, however, was that she was mildly anxious.  I felt that mild medication toxicity could have been playing a role as well.  In the exam, mild weaknesses were noted in aspects of verbal and nonverbal working memory, nonverbal recall, some aspects of executive functioning, and bilateral psychomotor speed.  She then initiated chemoradiotherapy, which ended on May 30, 2020.  She received 5400 cGy to the surgical cavity and areas of T2 FLAIR hyperintensity.  She then received 6 cycles of adjuvant temozolomide through November, 2020.  The most recent MRI of her brain on March 15, 2021 documented \"1. Postoperative change to the right frontoparietal skull and right frontal lobe again noted. No evidence for tumor progression. 2. Otherwise, normal brain MRI.\" Her other medical history " "includes depression, left-sided low back pain, left hip pain, left knee pain, osteoarthritis of the left knee, laparoscopic hysterectomy, nephrolithiasis, lumbar radiculopathy, premature ventricular contractions, elevated cholesterol, calculus of kidney, hyperparathyroidism, allergic rhinitis, and gastroesophageal reflux disease.  She also has simple partial seizures, and follows with Dr. Flakita Clark.  Ongoing concerns have been expressed about her cognition.  The current follow-up evaluation was requested by Dr. Myrtle Gallagher, in this context.    On interview, Ms. Tiwari reported that from a neuro-oncology perspective, things have been pretty stable.  She noted some ongoing difficulties with balance.  She otherwise denied changes.  She stated that she has been having seizures rarely in the last year.  She indicated that she has seizures every couple of months.  She noted that she has \"small ones\" that consist of facial twitching.  She stated that her last seizure occurred in March, 2021.    Regarding cognition, Ms. Tiwari stated that her thinking skills are fine.  She denied having identified changes.  She denied noticing troubles with her thinking.  She did acknowledge that her thinking is somewhat slower in the mornings, which she attributed to her medications.  She denied troubles with her work.  When I asked her about if any issues have been brought to her attention, she stated that she has communicated with her supervisor, and no significant concerns were raised.  She did note that her supervisor told her that she is too detail oriented, and it takes her longer to complete her charting than her peers.  She denied that her  had pointed out changes in her cognition to her.    With respect to mental health, Ms. Tiwari reported that her mood is fine.  She stated that she was prescribed Celexa in October, 2020, but stopped taking this medication.  She denied other significant concerns or changes in her " mental health status in the last year.  She denied prior psychiatric hospitalization, hallucinations, or symptoms consistent with severe mental illness.  She has not seen a counselor or therapist.  She denied suicidal ideation or suicide attempts.    With regard to other medical background, Ms. Tiwari denied interval head injury or stroke.  She stated that her sleep is okay.  She noted that she sleeps better now that she is prescribed gabapentin.  She sleeps 6 or 7 hours per night on average.  She slept normally the night before the exam.  She denied pain.  Per records, her current medications include acetaminophen, calcium carbonate, calcium-vitamin D, cyclobenzaprine, fexofenadine, gabapentin, ibuprofen, Lactase, lamotrigine, levetiracetam, metoprolol, multivitamin, tretinoin cream, and vitamin D.  She denied alcohol, tobacco, or illicit drug use.    Ms. Tiwari continues to live at home with her .  She manages her own basic and instrumental daily activities.  She and her  share management of the finances.  She drives.    By way of background, Ms. Tiwari and her  remain .  She has an adult daughter and an adult son.  Her educational background is unchanged.  She has a master s degree.  Professionally, her work history is unchanged as well.  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    BEHAVIORAL OBSERVATIONS  Ms. Tiwari was pleasant and cooperative with the exam.  She did not bring her reading glasses with her to the exam.  Her speech was notable for subtle difficulties with word finding. Her comprehension was normal. Her thought processes were notable for mild to moderate distractibility.  She worked quickly on one task, which may have contributed to a lower score.  She was reminded to take her time on this particular measure.  Her insight is reduced, as indicated by her limited appreciation of her cognitive  weaknesses and the decline. Her mood was mildly anxious with congruent affect. Her effort was good.  The current results are felt to be an accurate depiction of her cognitive functioning.      RESULTS OF EXAM  Her performances on standardized measures of neuropsychological functioning were as follows.     She was fully oriented to time, place, and various aspects of personal information.  She was able to state the names of the 6 most recent presidents.  She obtained passing scores on stand-alone and embedded metrics of cognitive performance validity.  Auditory attention for digits was average.  Visual attention for spatial sequences was high average.  Mental calculations were average.  Learning of words in a list format was normal.  Short delayed recall of list words was superior.  30-minute delayed recall of list words was average.  Delayed recognition of list words was average.  Immediate memory for simple geometric figures was impaired.  Her drawing of a complicated geometric figure was borderline impaired, and notable for an initially hurried approach.  She was instructed by the examiner to take her time.  Her drawing was notable for inattention to the figure's details.  Short delayed recall of the figure was average.  30-minute delayed recall of the figure was average.  Delayed recognition of the figure's elements was average.  Visuospatial judgments for variably oriented lines were average.  Visual problem-solving with blocks was high average.  Nonverbal reasoning for incomplete matrices was superior.  Comprehension of phrases and short stories was impaired.  Verbal associative fluency was average.  Animal fluency was low average.  Naming to confrontation was average.  Verbal abstract reasoning was high average.  Speeded visual sequencing under focused attention was low average.  A similar measure with a divided attention component was borderline impaired.  Speeded word reading was performed in the low average  to average range.  Speeded color naming was performed in the borderline impaired to low average range.  Speeded inhibition of an over-learned response was performed in the low average to average range.  Speeded visual motor coding was high average.  Speeded fine motor dexterity was low average for the dominant, right hand, and borderline impaired for the left hand.    She endorsed items consistent with minimal symptoms of depression, and minimal symptoms of anxiety on self-report measures.    IMPRESSIONS  Ms. Tiwari demonstrated weaknesses that are consistent with dysfunction of the bilateral frontal regions, but the right frontal region in particular.  Relative to her exam from February, 2020, there has been a mild decline in her thinking.  I suspect that this decline is attributable to her interval radiation treatments, although it remains possible that some of this decline could be due to disease progression as well.  In this exam, weaknesses were identified in executive functioning, attention, and both verbal and nonverbal working memory.  Some aspects of cognitive speed were relative weaknesses as well.  Bilateral fine motor dexterity is slowed, and more so for the left hand.  There has also been a mild decline in her basic visuospatial judgments.  Insight into these weaknesses is limited. Other cognitive abilities were generally normal and performed in keeping with her well above average range cognitive baseline.  She is not reporting elevated symptoms of depression or anxiety.    RECOMMENDATIONS  Preliminary results and recommendations were provided to the patient over the telephone on May 4, 2021, and all questions were answered.     1.  Given her weaknesses in processing speed, working memory, and executive functioning, she will benefit from the use of a memory notebook or other assistive device to keep track of important information at home and at work.    2.  Along similar lines, extra time to complete  her work tasks and/or a reduced workload could be of benefit.     3.  Some degree of oversight and supervision of her work is indicated.    4. Her age, communication skills, and lack of insight belie her cognitive weaknesses. It will be important for her to be cognizant of areas of possible cognitive weakness in the work setting and make arrangements to compensate for and adapt to these weaknesses.     5. Follow-up neuropsychological evaluation is recommended in 1 year in order to assess and update recommendations as appropriate.  The current results can be used as a baseline at that time.    Yoshi Meyer, Ph.D., L.P., ABPP  Board Certified in Clinical Neuropsychology   / Licensed Psychologist [id number]    Time spent:  One unit (40 minutes) neurobehavioral status exam including interview and clinical assessment by licensed and board-certified neuropsychologist (CPT 97153). One unit (60 minutes) neuropsychological testing evaluation by licensed and board-certified neuropsychologist, including integration of patient data, interpretation of standardized test results and clinical data, clinical decision-making, treatment planning, and report, first hour (CPT 14094). One unit(s) (80 minutes) of neuropsychological testing evaluation by licensed and board-certified neuropsychologist, including integration of patient data, interpretation of standardized test results and clinical data, clinical decision-making, treatment planning, consulting with a colleague, and report, subsequent hours (CPT 21228). One unit (30 minutes) of psychological and neuropsychological test administration and scoring by technician, first 30 minutes (CPT 48147). Five units (155 minutes) psychological or neuropsychological test administration and scoring by technician, subsequent 30 minutes (CPT 20080). Diagnoses: C71.9, R41.844, F06.8.

## 2021-04-30 NOTE — PROGRESS NOTES
The patient was seen for neuropsychological evaluation at the request of Myrtle Gallagher MD for the purposes of diagnostic clarification and treatment planning.  185 minutes of test administration and scoring were provided by this writer.  Please see Dr. Yoshi Meyer's report for a full interpretation of the findings.    Ines Dickinson  Psychometrist

## 2021-05-04 NOTE — PROGRESS NOTES
NAME  Barbi Tiwari   [place]  [birth year]           MRN  [id number]   PROVIDER  SANTIAGO             61    TECH  NN           AGE  60    STATION  OP           SEX  F                 HANDEDNESS RH                 EDUCATION 18                                    ORIENTATION     COWAT FAS      JoLO   V   Time  0    Raw 48     Raw 24      Personal Info.    SS 12     %ile 48-52      Place  2 /2   T 50            Presidents  6 /6         GROOVED PEGBOARD            ANIMAL FLUENCY      Raw Drops SS T   WAIS-IV      Raw 18     RH 76 0 7 37   WMI: 100     SS 9      0 6 35   est VENECIA: 127     T 39            RDS: 10           BDI-II         Raw SS    BOSTON NAMING TEST   Raw 9      Similarities 31 13   Raw 57 /60    Interp. Minimal      Block Design 48 13   SS 12            Matrix Reasoning 23 16   %ile 31-49     DEMIAN       Digit Span  28 11         Raw 1      Arithmetic  13 9   COMPLEX IDEATIONAL MATERIAL   Interp. Minimal      Coding  69 12   Raw 10                  SS 7     WMS-III        AVLT   2  T 27      Raw SS     T1 T2 T3 T4 T5        Spat. Span 18 13     8 11 14 14 14  TRAIL MAKING TEST            TB T6      Time Errors SSa %ile  DCT       7 15     A 44 0 7 9-11  E-Score 9        Raw %ile   B 109 1 7 7         Learning  21                 Short Delay Recall 15 100   STROOP             30' Delay Recall 12 58-67    Raw %ile           Recognition Hits 14 26-55   Word 74 22-44           Recognition FP 0    Color 61 7-15                 C/W 24 18-26           JONATAN COMPLEX FIGURE                   Raw T %ile               Copy  30  2-5               Short Delay Recall 15.5 45 31               Long Delay Recall 19.5 54 66               Recognition Total 20 47 38               Time to Copy 254  >16                                   BVRT   D                 Raw expected                Correct  5 8                Incorrect  10 2

## 2021-05-06 ENCOUNTER — OFFICE VISIT (OUTPATIENT)
Dept: URGENT CARE | Facility: URGENT CARE | Age: 61
End: 2021-05-06
Payer: COMMERCIAL

## 2021-05-06 VITALS
RESPIRATION RATE: 16 BRPM | WEIGHT: 140 LBS | DIASTOLIC BLOOD PRESSURE: 60 MMHG | SYSTOLIC BLOOD PRESSURE: 98 MMHG | HEART RATE: 65 BPM | OXYGEN SATURATION: 98 % | BODY MASS INDEX: 25.61 KG/M2 | TEMPERATURE: 98.4 F
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
## 2021-05-06 PROCEDURE — 99213 OFFICE O/P EST LOW 20 MIN: CPT | Performed by: FAMILY MEDICINE

## 2021-05-06 RX ORDER — METHYLPREDNISOLONE 4 MG
TABLET, DOSE PACK ORAL
Qty: 21 TABLET | Refills: 0 | Status: SHIPPED | OUTPATIENT
Start: 2021-05-06 | End: 2021-07-12

## 2021-05-06 NOTE — PROGRESS NOTES
SUBJECTIVE: Barbi Tiwari is a 60 year old female presenting with a chief complaint of scalp itching after gnat bites.  Onset of symptoms was hour(s) ago.  Current and Associated symptoms: none  Treatment measures tried include None tried.  Predisposing factors include bug bites.    Past Medical History:   Diagnosis Date     Acute cystitis with hematuria 10/24/2020     Allergic rhinitis 12/9/2009     Calculus of kidney 1/12/2011    Overview:  S/P LITHOTRIPSY 3/15/11      Esophageal reflux 10/22/2008     Hyperparathyroidism 01/11/2011    had surgery for it; resulted in seizures     Moderate major depression, single episode (H)      oligodendroglioma 7/23/2012     Osteoarthrosis 12/9/2009     Palpitations 6/5/2014     PONV (postoperative nausea and vomiting)      PVCs 6/5/2014     Seizure disorder (related to tumor) 08/17/2011     Allergies   Allergen Reactions     Sulfa Drugs Hives     Benoxinate Nausea and Vomiting     Social History     Tobacco Use     Smoking status: Never Smoker     Smokeless tobacco: Never Used   Substance Use Topics     Alcohol use: Yes     Comment: social       ROS:  SKIN: no rash  GI: no vomiting    OBJECTIVE:  BP 98/60   Pulse 65   Temp 98.4  F (36.9  C) (Tympanic)   Resp 16   Wt 63.5 kg (140 lb)   SpO2 98%   BMI 25.61 kg/m  GENERAL APPEARANCE: healthy, alert and no distress  SKIN: red papules scalp diffuse      ICD-10-CM    1. Bug bite, initial encounter  W57.XXXA methylPREDNISolone (MEDROL DOSEPAK) 4 MG tablet therapy pack     OTC antihistamines  Fluids/Rest, f/u if worse/not any better

## 2021-06-23 ENCOUNTER — OFFICE VISIT (OUTPATIENT)
Dept: PEDIATRICS | Facility: CLINIC | Age: 61
End: 2021-06-23
Attending: PHYSICIAN ASSISTANT
Payer: COMMERCIAL

## 2021-06-23 ENCOUNTER — HOSPITAL ENCOUNTER (OUTPATIENT)
Dept: CT IMAGING | Facility: CLINIC | Age: 61
Discharge: HOME | End: 2021-06-23
Attending: NURSE PRACTITIONER | Admitting: NURSE PRACTITIONER
Payer: COMMERCIAL

## 2021-06-23 ENCOUNTER — OFFICE VISIT (OUTPATIENT)
Dept: URGENT CARE | Facility: URGENT CARE | Age: 61
End: 2021-06-23
Payer: COMMERCIAL

## 2021-06-23 VITALS
TEMPERATURE: 101 F | BODY MASS INDEX: 26.87 KG/M2 | DIASTOLIC BLOOD PRESSURE: 83 MMHG | SYSTOLIC BLOOD PRESSURE: 125 MMHG | RESPIRATION RATE: 16 BRPM | WEIGHT: 146 LBS | HEART RATE: 107 BPM | HEIGHT: 62 IN | OXYGEN SATURATION: 96 %

## 2021-06-23 VITALS
SYSTOLIC BLOOD PRESSURE: 135 MMHG | DIASTOLIC BLOOD PRESSURE: 84 MMHG | TEMPERATURE: 99.7 F | HEART RATE: 129 BPM | OXYGEN SATURATION: 95 %

## 2021-06-23 DIAGNOSIS — Z20.822 SUSPECTED COVID-19 VIRUS INFECTION: ICD-10-CM

## 2021-06-23 DIAGNOSIS — N20.0 CALCULUS OF KIDNEY: ICD-10-CM

## 2021-06-23 DIAGNOSIS — R10.9 ACUTE RIGHT FLANK PAIN: ICD-10-CM

## 2021-06-23 DIAGNOSIS — R82.90 NONSPECIFIC FINDING ON EXAMINATION OF URINE: Primary | ICD-10-CM

## 2021-06-23 DIAGNOSIS — R10.9 ACUTE RIGHT FLANK PAIN: Primary | ICD-10-CM

## 2021-06-23 LAB
ALBUMIN UR-MCNC: NEGATIVE MG/DL
APPEARANCE UR: CLEAR
BACTERIA #/AREA URNS HPF: ABNORMAL /HPF
BILIRUB UR QL STRIP: NEGATIVE
COLOR UR AUTO: YELLOW
GLUCOSE UR STRIP-MCNC: NEGATIVE MG/DL
HGB UR QL STRIP: ABNORMAL
KETONES UR STRIP-MCNC: 15 MG/DL
LABORATORY COMMENT REPORT: NORMAL
LEUKOCYTE ESTERASE UR QL STRIP: ABNORMAL
NITRATE UR QL: POSITIVE
NON-SQ EPI CELLS #/AREA URNS LPF: ABNORMAL /LPF
PH UR STRIP: 6 PH (ref 5–7)
RBC #/AREA URNS AUTO: ABNORMAL /HPF
SARS-COV-2 RNA RESP QL NAA+PROBE: NEGATIVE
SARS-COV-2 RNA RESP QL NAA+PROBE: NORMAL
SOURCE: ABNORMAL
SP GR UR STRIP: 1.02 (ref 1–1.03)
SPECIMEN SOURCE: NORMAL
SPECIMEN SOURCE: NORMAL
UROBILINOGEN UR STRIP-ACNC: 0.2 EU/DL (ref 0.2–1)
WBC #/AREA URNS AUTO: >100 /HPF

## 2021-06-23 PROCEDURE — 99215 OFFICE O/P EST HI 40 MIN: CPT | Performed by: NURSE PRACTITIONER

## 2021-06-23 PROCEDURE — 99207 REFERRAL TO ACUTE AND DIAGNOSTIC SERVICES: CPT | Performed by: PHYSICIAN ASSISTANT

## 2021-06-23 PROCEDURE — 87086 URINE CULTURE/COLONY COUNT: CPT | Performed by: PHYSICIAN ASSISTANT

## 2021-06-23 PROCEDURE — U0003 INFECTIOUS AGENT DETECTION BY NUCLEIC ACID (DNA OR RNA); SEVERE ACUTE RESPIRATORY SYNDROME CORONAVIRUS 2 (SARS-COV-2) (CORONAVIRUS DISEASE [COVID-19]), AMPLIFIED PROBE TECHNIQUE, MAKING USE OF HIGH THROUGHPUT TECHNOLOGIES AS DESCRIBED BY CMS-2020-01-R: HCPCS | Performed by: RADIOLOGY

## 2021-06-23 PROCEDURE — 81001 URINALYSIS AUTO W/SCOPE: CPT | Performed by: RADIOLOGY

## 2021-06-23 PROCEDURE — 87186 SC STD MICRODIL/AGAR DIL: CPT | Performed by: PHYSICIAN ASSISTANT

## 2021-06-23 PROCEDURE — 87088 URINE BACTERIA CULTURE: CPT | Performed by: PHYSICIAN ASSISTANT

## 2021-06-23 PROCEDURE — 74176 CT ABD & PELVIS W/O CONTRAST: CPT

## 2021-06-23 PROCEDURE — U0005 INFEC AGEN DETEC AMPLI PROBE: HCPCS | Performed by: RADIOLOGY

## 2021-06-23 RX ORDER — TAMSULOSIN HYDROCHLORIDE 0.4 MG/1
0.4 CAPSULE ORAL DAILY
Qty: 14 CAPSULE | Refills: 0 | Status: SHIPPED | OUTPATIENT
Start: 2021-06-23 | End: 2021-07-06

## 2021-06-23 RX ORDER — HYDROCODONE BITARTRATE AND ACETAMINOPHEN 5; 325 MG/1; MG/1
1 TABLET ORAL EVERY 6 HOURS PRN
Qty: 10 TABLET | Refills: 0 | Status: SHIPPED | OUTPATIENT
Start: 2021-06-23 | End: 2021-06-26

## 2021-06-23 RX ORDER — CIPROFLOXACIN 500 MG/1
500 TABLET, FILM COATED ORAL 2 TIMES DAILY
Qty: 14 TABLET | Refills: 0 | Status: SHIPPED | OUTPATIENT
Start: 2021-06-23 | End: 2021-07-12

## 2021-06-23 ASSESSMENT — MIFFLIN-ST. JEOR: SCORE: 1190.5

## 2021-06-23 NOTE — PROGRESS NOTES
"    Assessment & Plan     Acute right flank pain    - Referral to Acute and Diagnostic Services (Day of diagnostic / First order acute)  - CT Abdomen Pelvis w/o Contrast; Future  - tamsulosin (FLOMAX) 0.4 MG capsule; Take 1 capsule (0.4 mg) by mouth daily  - HYDROcodone-acetaminophen (NORCO) 5-325 MG tablet; Take 1 tablet by mouth every 6 hours as needed for severe pain  - ciprofloxacin (CIPRO) 500 MG tablet; Take 1 tablet (500 mg) by mouth 2 times daily  - Adult Urology Referral; Future    Calculus of kidney    - CT Abdomen Pelvis w/o Contrast; Future  - tamsulosin (FLOMAX) 0.4 MG capsule; Take 1 capsule (0.4 mg) by mouth daily  - HYDROcodone-acetaminophen (NORCO) 5-325 MG tablet; Take 1 tablet by mouth every 6 hours as needed for severe pain  - ciprofloxacin (CIPRO) 500 MG tablet; Take 1 tablet (500 mg) by mouth 2 times daily  - Adult Urology Referral; Future    Discussed options including observation or admit and f/u with uro inpt.    Pt not interested in this wants to take flomax, and antibiotics and go home.   Encouraged to push fluids and f/u with uro soon.    Discussed if pains worsening or fevers increase she needs to go to ER or possible admission.         BMI:   Estimated body mass index is 26.7 kg/m  as calculated from the following:    Height as of an earlier encounter on 6/23/21: 1.575 m (5' 2\").    Weight as of an earlier encounter on 6/23/21: 66.2 kg (146 lb).       No follow-ups on file.    [facility]    Scotty Landon is a 60 year old who presents  urgently to [facility] by referral from [facility] WOOTEN  to rule out kidney stone  for the following health issues  accompanied by her sister:    HPI     Abdominal/Flank Pain  Onset/Duration: 6/19/21  Description:   Character: Cramping  Location: right lower quadrant right flank left flank  Radiation: None and Back  Intensity: mild  Progression of Symptoms:  With Tylenol and Ibuprofen pain " improves  Accompanying Signs & Symptoms:  Fever/Chills: YES  Gas/Bloating: no  Nausea: no  Vomitting: no  Diarrhea: no  Constipation: no  Dysuria or Hematuria: no  History:   Trauma: no  Previous similar pain: YES with kidney stones  Previous tests done: none  Precipitating factors:   Does the pain change with:     Food: no    Bowel Movement: no    Urination: no   Other factors:  no  Therapies tried and outcome: Tylenol and Ibuprofen   No LMP recorded. Patient has had a hysterectomy.    Last renal calculi 2017 per pt.    Was dehydrated last weekend when this started.   Reports she was hiking in [place] and there was no where to go to the bathroom so she didn't drink enough.    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    Rating pain now +3/10, at worst +8/10.    Has NOT noticed any blood in the urine.        Review of Systems   Constitutional, HEENT, cardiovascular, pulmonary, GI, , musculoskeletal, neuro, skin, endocrine and psych systems are negative, except as otherwise noted.      Objective    /84 (BP Location: Left arm)   Pulse 129   Temp 99.7  F (37.6  C) (Oral)   SpO2 95%   There is no height or weight on file to calculate BMI.  Physical Exam   GENERAL: healthy, alert and no distress  EYES: Eyes grossly normal to inspection, PERRL and conjunctivae and sclerae normal  RESP: lungs clear to auscultation - no rales, rhonchi or wheezes  CV: regular rate and rhythm, normal S1 S2, no S3 or S4, no murmur, click or rub, no peripheral edema and peripheral pulses strong  ABDOMEN: soft, RLQ tender, no hepatosplenomegaly, no masses and bowel sounds normal  MS: NO CVA tenderness.  no gross musculoskeletal defects noted, no edema  SKIN: no suspicious lesions or rashes  PSYCH: mentation appears normal, affect normal/bright    Results for orders placed or performed during the hospital encounter of 06/23/21   CT Abdomen Pelvis w/o Contrast     Status:  None    Narrative    EXAM: CT ABDOMEN PELVIS W/O CONTRAST  LOCATION: [facility]  DATE/TIME: 6/23/2021 4:58 PM    INDICATION: Flank pain, kidney stone suspected.  COMPARISON: 05/05/2017.  TECHNIQUE: CT scan of the abdomen and pelvis was performed without IV contrast. Multiplanar reformats were obtained. Dose reduction techniques were used.  CONTRAST: None.    FINDINGS:   LOWER CHEST: Evidence for remote granulomatous disease. Mild fibroatelectasis. Coronary artery calcification.    HEPATOBILIARY: Stable benign hepatic cyst. Hepatic granuloma. Gallbladder unremarkable.    PANCREAS: Mild atrophy.    SPLEEN: Multiple granulomata.    ADRENAL GLANDS: Normal.    KIDNEYS/BLADDER: Obstructing 3 x 4 x 7 mm right ureteral pelvic junction stone with Hounsfield units of 1098. Moderate hydronephrosis. At least 3 vague calcifications lower pole right kidney, nonobstructing. Right renal and perirenal edema.   Nonobstructive 2 x 3 mm stone mid left kidney. 1 mm nonobstructing stone upper pole left kidney. Left renal scarring from remote insults. Urinary bladder unremarkable.    BOWEL: Diverticulosis without diverticulitis. Normal appendix. No bowel obstruction.    LYMPH NODES: Normal.    VASCULATURE: Unremarkable.    PELVIC ORGANS: Hysterectomy.    MUSCULOSKELETAL: Tiny fat-containing paraumbilical hernia. Degenerative disease.      Impression    IMPRESSION:   1.  Obstructing 3 x 4 x 7 mm right ureteropelvic junction stone with moderate hydronephrosis. Bilateral nonobstructive nephrolithiasis. Left renal scarring.  2.  Diverticulosis without diverticulitis.  3.  Remainder not significantly changed.     Results for orders placed or performed in visit on 06/23/21   UA reflex to Microscopic and Culture     Status: Abnormal    Specimen: Midstream Urine   Result Value Ref Range    Color Urine Yellow     Appearance Urine Clear     Glucose Urine Negative NEG^Negative mg/dL    Bilirubin Urine Negative NEG^Negative    Ketones  Urine 15 (A) NEG^Negative mg/dL    Specific Gravity Urine 1.020 1.003 - 1.035    Blood Urine Moderate (A) NEG^Negative    pH Urine 6.0 5.0 - 7.0 pH    Protein Albumin Urine Negative NEG^Negative mg/dL    Urobilinogen Urine 0.2 0.2 - 1.0 EU/dL    Nitrite Urine Positive (A) NEG^Negative    Leukocyte Esterase Urine Large (A) NEG^Negative    Source Midstream Urine    Asymptomatic COVID-19 Virus (Coronavirus) by PCR     Status: None    Specimen: Nasopharyngeal   Result Value Ref Range    COVID-19 Virus PCR to [facility] - Source Nasopharyngeal     COVID-19 Virus PCR to [facility] - Result       Test received-See reflex to IDDL test SARS CoV2 (COVID-19) Virus RT-PCR   Urine Microscopic     Status: Abnormal   Result Value Ref Range    WBC Urine >100 (A) OTO5^0 - 5 /HPF    RBC Urine 10-25 (A) OTO2^O - 2 /HPF    Squamous Epithelial /LPF Urine Few FEW^Few /LPF    Bacteria Urine Moderate (A) NEG^Negative /HPF

## 2021-06-23 NOTE — PATIENT INSTRUCTIONS
Results for orders placed or performed during the hospital encounter of 06/23/21   CT Abdomen Pelvis w/o Contrast     Status: None    Narrative    EXAM: CT ABDOMEN PELVIS W/O CONTRAST  LOCATION: [facility]  DATE/TIME: 6/23/2021 4:58 PM    INDICATION: Flank pain, kidney stone suspected.  COMPARISON: 05/05/2017.  TECHNIQUE: CT scan of the abdomen and pelvis was performed without IV contrast. Multiplanar reformats were obtained. Dose reduction techniques were used.  CONTRAST: None.    FINDINGS:   LOWER CHEST: Evidence for remote granulomatous disease. Mild fibroatelectasis. Coronary artery calcification.    HEPATOBILIARY: Stable benign hepatic cyst. Hepatic granuloma. Gallbladder unremarkable.    PANCREAS: Mild atrophy.    SPLEEN: Multiple granulomata.    ADRENAL GLANDS: Normal.    KIDNEYS/BLADDER: Obstructing 3 x 4 x 7 mm right ureteral pelvic junction stone with Hounsfield units of 1098. Moderate hydronephrosis. At least 3 vague calcifications lower pole right kidney, nonobstructing. Right renal and perirenal edema.   Nonobstructive 2 x 3 mm stone mid left kidney. 1 mm nonobstructing stone upper pole left kidney. Left renal scarring from remote insults. Urinary bladder unremarkable.    BOWEL: Diverticulosis without diverticulitis. Normal appendix. No bowel obstruction.    LYMPH NODES: Normal.    VASCULATURE: Unremarkable.    PELVIC ORGANS: Hysterectomy.    MUSCULOSKELETAL: Tiny fat-containing paraumbilical hernia. Degenerative disease.      Impression    IMPRESSION:   1.  Obstructing 3 x 4 x 7 mm right ureteropelvic junction stone with moderate hydronephrosis. Bilateral nonobstructive nephrolithiasis. Left renal scarring.  2.  Diverticulosis without diverticulitis.  3.  Remainder not significantly changed.     Results for orders placed or performed in visit on 06/23/21   UA reflex to Microscopic and Culture     Status: Abnormal    Specimen: Midstream Urine   Result Value Ref Range    Color Urine Yellow      Appearance Urine Clear     Glucose Urine Negative NEG^Negative mg/dL    Bilirubin Urine Negative NEG^Negative    Ketones Urine 15 (A) NEG^Negative mg/dL    Specific Gravity Urine 1.020 1.003 - 1.035    Blood Urine Moderate (A) NEG^Negative    pH Urine 6.0 5.0 - 7.0 pH    Protein Albumin Urine Negative NEG^Negative mg/dL    Urobilinogen Urine 0.2 0.2 - 1.0 EU/dL    Nitrite Urine Positive (A) NEG^Negative    Leukocyte Esterase Urine Large (A) NEG^Negative    Source Midstream Urine    Asymptomatic COVID-19 Virus (Coronavirus) by PCR     Status: None    Specimen: Nasopharyngeal   Result Value Ref Range    COVID-19 Virus PCR to [facility] - Source Nasopharyngeal     COVID-19 Virus PCR to [facility] - Result       Test received-See reflex to IDDL test SARS CoV2 (COVID-19) Virus RT-PCR   Urine Microscopic     Status: Abnormal   Result Value Ref Range    WBC Urine >100 (A) OTO5^0 - 5 /HPF    RBC Urine 10-25 (A) OTO2^O - 2 /HPF    Squamous Epithelial /LPF Urine Few FEW^Few /LPF    Bacteria Urine Moderate (A) NEG^Negative /HPF

## 2021-06-23 NOTE — PROGRESS NOTES
SUBJECTIVE:  Chief Complaint   Patient presents with     Urgent Care     Consult     right sided pain, possible kidney stone      Barbi Tiwari is a 60 year old female presents with a chief complaint of right hip pain. In area where kidney stones have been.  Unable to find position of comfort. Pain is severe and colicky     Past Medical History:   Diagnosis Date     Acute cystitis with hematuria 10/24/2020     Allergic rhinitis 12/9/2009     Calculus of kidney 1/12/2011    Overview:  S/P LITHOTRIPSY 3/15/11      Esophageal reflux 10/22/2008     Hyperparathyroidism 01/11/2011    had surgery for it; resulted in seizures     Moderate major depression, single episode (H)      oligodendroglioma 7/23/2012     Osteoarthrosis 12/9/2009     Palpitations 6/5/2014     PONV (postoperative nausea and vomiting)      PVCs 6/5/2014     Seizure disorder (related to tumor) 08/17/2011     Current Outpatient Medications   Medication Sig Dispense Refill     acetaminophen (TYLENOL) 500 MG tablet Take 2  tablets by mouth every 8 hours for post operative pain. 60 tablet 1     calcium carbonate (TUMS) 500 MG chewable tablet Take 1 chew tab by mouth daily as needed for heartburn       CALCIUM-VITAMIN D PO Take 1 tablet by mouth daily       COMPOUNDED NON-CONTROLLED SUBSTANCE (CMPD RX) - PHARMACY TO MIX COMPOUNDED MEDICATION Place 1 g vaginally twice a week Apply 1g daily for 2 weeks, then twice weekly to vagina 30 g 11     cyclobenzaprine (FLEXERIL) 5 MG tablet Take 1 tablet (5 mg) by mouth 3 times daily as needed for muscle spasms (Patient taking differently: Take 5 mg by mouth 2 times daily as needed for muscle spasms ) 30 tablet 0     fexofenadine (ALLEGRA) 180 MG tablet Take 180 mg by mouth daily       gabapentin (NEURONTIN) 100 MG capsule TAKE TWO CAPSULES BY MOUTH EVERY EVENING AT 7 PM. MAKE APPOINTMENT WITH DR. PALMA FOR FUTURE REFILLS 180 capsule 1     ibuprofen (ADVIL/MOTRIN) 200 MG tablet Take 800 mg by mouth daily as needed     "    Lactase (LACTAID PO) Take 1-2 tablets by mouth daily as needed for indigestion        lamoTRIgine (LAMICTAL) 100 MG tablet 100 mg am and pm (take along with 200 mg tablet for total of 300 mg twice a day). [facility] mft only 180 tablet 3     lamoTRIgine (LAMICTAL) 200 MG tablet 200 mg twice a day (take along with 100 mg tablet for total of 300 mg twice a day). [facility] mft only 180 tablet 3     levETIRAcetam (KEPPRA) 500 MG tablet 1500 mg am and 1250 mg pm 495 tablet 3     methylPREDNISolone (MEDROL DOSEPAK) 4 MG tablet therapy pack Follow Package Directions 21 tablet 0     metoprolol tartrate (LOPRESSOR) 25 MG tablet TAKE 1 TABLET BY MOUTH 2 TIMES DAILY 180 tablet 3     Multiple Vitamin (MULTI-VITAMINS) TABS Take 1 chew tab by mouth daily        tretinoin (RETIN-A) 0.025 % external cream Apply a pea-sized amount to each area affected by acne. Start every 3-4 nights working up to every night. 45 g 3     VITAMIN D, CHOLECALCIFEROL, PO Take 1,000 Units by mouth daily        Social History     Tobacco Use     Smoking status: Never Smoker     Smokeless tobacco: Never Used   Substance Use Topics     Alcohol use: Yes     Comment: social       ROS:  10 point ROS negative except as listed above      EXAM:   /83   Pulse 107   Temp 101  F (38.3  C)   Resp 16   Ht 1.575 m (5' 2\")   Wt 66.2 kg (146 lb)   SpO2 96%   BMI 26.70 kg/m    Gen: moderate distress  NEURO: Normal strength and tone, sensory exam grossly normal, mentation intact and speech normal    Results for orders placed or performed during the hospital encounter of 06/23/21   CT Abdomen Pelvis w/o Contrast     Status: None    Narrative    EXAM: CT ABDOMEN PELVIS W/O CONTRAST  LOCATION: [facility]  DATE/TIME: 6/23/2021 4:58 PM    INDICATION: Flank pain, kidney stone suspected.  COMPARISON: 05/05/2017.  TECHNIQUE: CT scan of the abdomen and pelvis was performed without IV contrast. Multiplanar reformats were obtained. Dose reduction " techniques were used.  CONTRAST: None.    FINDINGS:   LOWER CHEST: Evidence for remote granulomatous disease. Mild fibroatelectasis. Coronary artery calcification.    HEPATOBILIARY: Stable benign hepatic cyst. Hepatic granuloma. Gallbladder unremarkable.    PANCREAS: Mild atrophy.    SPLEEN: Multiple granulomata.    ADRENAL GLANDS: Normal.    KIDNEYS/BLADDER: Obstructing 3 x 4 x 7 mm right ureteral pelvic junction stone with Hounsfield units of 1098. Moderate hydronephrosis. At least 3 vague calcifications lower pole right kidney, nonobstructing. Right renal and perirenal edema.   Nonobstructive 2 x 3 mm stone mid left kidney. 1 mm nonobstructing stone upper pole left kidney. Left renal scarring from remote insults. Urinary bladder unremarkable.    BOWEL: Diverticulosis without diverticulitis. Normal appendix. No bowel obstruction.    LYMPH NODES: Normal.    VASCULATURE: Unremarkable.    PELVIC ORGANS: Hysterectomy.    MUSCULOSKELETAL: Tiny fat-containing paraumbilical hernia. Degenerative disease.      Impression    IMPRESSION:   1.  Obstructing 3 x 4 x 7 mm right ureteropelvic junction stone with moderate hydronephrosis. Bilateral nonobstructive nephrolithiasis. Left renal scarring.  2.  Diverticulosis without diverticulitis.  3.  Remainder not significantly changed.     Results for orders placed or performed in visit on 06/23/21   UA reflex to Microscopic and Culture     Status: Abnormal    Specimen: Midstream Urine   Result Value Ref Range    Color Urine Yellow     Appearance Urine Clear     Glucose Urine Negative NEG^Negative mg/dL    Bilirubin Urine Negative NEG^Negative    Ketones Urine 15 (A) NEG^Negative mg/dL    Specific Gravity Urine 1.020 1.003 - 1.035    Blood Urine Moderate (A) NEG^Negative    pH Urine 6.0 5.0 - 7.0 pH    Protein Albumin Urine Negative NEG^Negative mg/dL    Urobilinogen Urine 0.2 0.2 - 1.0 EU/dL    Nitrite Urine Positive (A) NEG^Negative    Leukocyte Esterase Urine Large (A)  NEG^Negative    Source Midstream Urine    Asymptomatic COVID-19 Virus (Coronavirus) by PCR     Status: None    Specimen: Nasopharyngeal   Result Value Ref Range    COVID-19 Virus PCR to [facility] - Source Nasopharyngeal     COVID-19 Virus PCR to [facility] - Result       Test received-See reflex to IDDL test SARS CoV2 (COVID-19) Virus RT-PCR   Urine Microscopic     Status: Abnormal   Result Value Ref Range    WBC Urine >100 (A) OTO5^0 - 5 /HPF    RBC Urine 10-25 (A) OTO2^O - 2 /HPF    Squamous Epithelial /LPF Urine Few FEW^Few /LPF    Bacteria Urine Moderate (A) NEG^Negative /HPF   SARS-CoV-2 COVID-19 Virus (Coronavirus) by PCR     Status: None    Specimen: Nasopharyngeal   Result Value Ref Range    SARS-CoV-2 Virus Specimen Source Nasopharyngeal     SARS-CoV-2 PCR Result NEGATIVE     SARS-CoV-2 PCR Comment       Testing was performed using the Simplexa COVID-19 Direct Assay on the Provade MDX   instrument. Additional information about this Emergency Use Authorization (EUA) assay can   be found via the Lab Guide.     Urine Culture Aerobic Bacterial     Status: Abnormal    Specimen: Midstream Urine   Result Value Ref Range    Specimen Description Midstream Urine     Culture Micro >100,000 colonies/mL  Citrobacter koseri   (A)        Susceptibility    Citrobacter koseri - ADRIEN     CEFAZOLIN* <=4 Sensitive ug/mL      * Cefazolin ADRIEN breakpoints are for the treatment of uncomplicated urinary tract infections.  For the treatment of systemic infections, please contact the laboratory for additional testing.     CEFOXITIN <=4 Sensitive ug/mL     CEFTAZIDIME <=1 Sensitive ug/mL     CEFTRIAXONE <=1 Sensitive ug/mL     CIPROFLOXACIN <=0.25 Sensitive ug/mL     GENTAMICIN <=1 Sensitive ug/mL     LEVOFLOXACIN <=0.12 Sensitive ug/mL     NITROFURANTOIN 32 Sensitive ug/mL     TOBRAMYCIN <=1 Sensitive ug/mL     Trimethoprim/Sulfa <=1/19 Sensitive ug/mL     Piperacillin/Tazo <=4 Sensitive ug/mL     CEFEPIME <=1 Sensitive ug/mL        ASSESSMENT:   (R82.90) Nonspecific finding on examination of urine  (primary encounter diagnosis)  Plan: Urine Culture Aerobic Bacterial, SARS-CoV-2         COVID-19 Virus (Coronavirus) by PCR      (Z20.822) Suspected COVID-19 virus infection  Plan: UA reflex to Microscopic and Culture,         Asymptomatic COVID-19 Virus (Coronavirus) by         PCR      (R10.9) Acute right flank pain  Plan: XR KUB, Referral to Acute and Diagnostic         Services (Day of diagnostic / First order         acute), Urine Microscopic, CANCELED: CBC with         platelets and differential    SENT TO HUB for assessment

## 2021-06-24 ENCOUNTER — MYC MEDICAL ADVICE (OUTPATIENT)
Dept: ONCOLOGY | Facility: CLINIC | Age: 61
End: 2021-06-24

## 2021-06-24 LAB
BACTERIA SPEC CULT: ABNORMAL
SPECIMEN SOURCE: ABNORMAL

## 2021-06-24 NOTE — TELEPHONE ENCOUNTER
[facility] Triage    Called patient to follow up on balance issues sent through [employer].    Patient reports she has ongoing balance issues, but noticeably worse in the last couple of weeks. She states she has to grab on to walls to get around. Denies visual changes or dizziness.    Also reports she has kidney stones and has taken two doses of cipro, also taking flomax. Wondering if this can be causing the balance issues. She was told it may be unrelated since the balance issues have been ongoing for a couple of weeks now.     Encounter routed to Dr. Gallagher and [facility], [facility].    Recommendations:  Tumor is not in a location in a location that would be associated with balance issues. Balance issues may be related to dehydration confounded by medications. Dr. Gallagher is in support of seeing patient sooner and moving brain MRI to sooner date if patient wants.    Patient was notified. She does not want to come in sooner, she is fine waiting till July for  Her MRI and follow up. She will remain hydrated and take fluids.

## 2021-07-06 ENCOUNTER — VIRTUAL VISIT (OUTPATIENT)
Dept: UROLOGY | Facility: CLINIC | Age: 61
End: 2021-07-06
Attending: NURSE PRACTITIONER
Payer: COMMERCIAL

## 2021-07-06 VITALS — HEIGHT: 62 IN | WEIGHT: 140 LBS | BODY MASS INDEX: 25.76 KG/M2

## 2021-07-06 DIAGNOSIS — N20.0 CALCULUS OF KIDNEY: ICD-10-CM

## 2021-07-06 DIAGNOSIS — R10.9 ACUTE RIGHT FLANK PAIN: ICD-10-CM

## 2021-07-06 PROCEDURE — 99203 OFFICE O/P NEW LOW 30 MIN: CPT | Mod: GT | Performed by: PHYSICIAN ASSISTANT

## 2021-07-06 RX ORDER — TAMSULOSIN HYDROCHLORIDE 0.4 MG/1
0.4 CAPSULE ORAL DAILY
Qty: 30 CAPSULE | Refills: 0 | Status: SHIPPED | OUTPATIENT
Start: 2021-07-06 | End: 2021-10-25

## 2021-07-06 ASSESSMENT — MIFFLIN-ST. JEOR: SCORE: 1163.29

## 2021-07-06 ASSESSMENT — PAIN SCALES - GENERAL: PAINLEVEL: NO PAIN (0)

## 2021-07-06 NOTE — PROGRESS NOTES
*PATIENT WILL HAVE VIDEO VISIT ON MY CHART *      Barbi is a 60 year old who is being evaluated via a billable video visit.      How would you like to obtain your AVS? MyChart  If the video visit is dropped, the invitation should be resent by:   MY CHART  Will anyone else be joining your video visit? No      Video-Visit Details    Type of service:  Video Visit    Video Start Time: 1301    Video End Time:1320    Originating Location (pt. Location): Home    Distant Location (provider location):  [facility] [place]     Platform used for Video Visit: Prosper Fajardo      REQUESTING PROVIDER   Lyudmila Corona     REASON FOR CONSULT   Ureteral stone    HISTORY OF PRESENT ILLNESS   Ms. Tiwari is a very pleasant 60-year-old female, who presents today for further evaluation recommendations regarding a right UPJ stone measuring 7 mm and causing moderate hydronephrosis.  This was detected on recent CT imaging on 06/23/2021.  She was initially seen in urgent care that day for right hip pain.  This is in the area of previous kidney stone pain.  She was unable to find a position of comfort and it was severe colicky pain.  Urinalysis was also obtained at that time which was concerning for infection with positive nitrate, large leukocyte esterase, and moderate blood.  Urine culture showed greater than 100,000 CFU per mL Citrobacter koseri.  Patient was placed on 7 days of ciprofloxacin which she has completed.    Patient went home for a trial of passage.  She has been taking Flomax.  She has been trying to increase water.  She denies severe pain and has been managing with Tylenol and ibuprofen.  She notes that the pain is achy and comes and goes.  She strained her urine for a week, but stopped and has not seen or caught a stone.  At baseline, she has urgency and frequency of urination.  She also endorsed some fever and chills initially when she presented for evaluation.  This is resolved.   She currently denies any hematuria or dysuria.    Her last kidney stone was diagnosed in 2017.  She has undergone 2 surgical interventions.  One of these she believes is ESWL.  She is uncertain of the other one was ureteroscopy laser lithotripsy or ESWL with a stent.  She did remember having a lot of urinary frequency and wanting the stent pulled out before she left for an [place] vacation.  Family history of nephrolithiasis in her daughter.    The following portions of the patient's history were reviewed and updated as appropriate: allergies, current medications, past family history, past medical history, past social history, past surgical history and problem list.     REVIEW OF SYSTEMS   Review of Systems   Constitutional: Negative for chills and fever.   Respiratory: Negative for shortness of breath.    Cardiovascular: Negative for chest pain.   Gastrointestinal: Negative for nausea and vomiting.   Genitourinary: Positive for flank pain and frequency. Negative for dysuria and hematuria.      Per HPI.     Patient Active Problem List   Diagnosis     Calculus of kidney     Hyperparathyroidism s/p surgery     GERD (gastroesophageal reflux disease)     Allergic rhinitis     Palpitations     PVC's (premature ventricular contractions)     Seizure (H)     Osteoarthritis     Elevated cholesterol     Abnormal screening cardiac CT     Lumbar radiculopathy     Advanced directives, counseling/discussion     Nephrolithiasis     Oligodendroglioma (H)     Moderate major depression, single episode (H)     S/P laparoscopic hysterectomy     Primary osteoarthritis of left knee     Acute left-sided low back pain without sciatica     Hip pain, left     Chemotherapy management, encounter for     High risk for chemotherapy-induced infectious complication      Past Medical History:   Diagnosis Date     Acute cystitis with hematuria 10/24/2020     Allergic rhinitis 12/9/2009     Calculus of kidney 1/12/2011    Overview:  S/P LITHOTRIPSY  3/15/11      Esophageal reflux 10/22/2008     Hyperparathyroidism 01/11/2011    had surgery for it; resulted in seizures     Moderate major depression, single episode (H)      oligodendroglioma 7/23/2012     Osteoarthrosis 12/9/2009     Palpitations 6/5/2014     PONV (postoperative nausea and vomiting)      PVCs 6/5/2014     Seizure disorder (related to tumor) 08/17/2011      Past Surgical History:   Procedure Laterality Date     CRANIOTOMY  2011    tumor resection     EXTRACORPOREAL SHOCK WAVE LITHOTRIPSY, CYSTOSCOPY, INSERT STENT URETER(S), COMBINED Bilateral 5/5/2017    Procedure: COMBINED EXTRACORPOREAL SHOCK WAVE LITHOTRIPSY, CYSTOSCOPY, INSERT STENT URETER(S);  CYSTO, URETHRAL DILATION, URETERAL LEFT STENT PLACEMENT, LEFT ESWL.;  Surgeon: Angel Del Rio MD;  Location:  OR     FOOT SURGERY      mulitple     HYSTERECTOMY, PAP NO LONGER INDICATED  2008    lap hys     LITHOTRIPSY  2010 2017     OPTICAL TRACKING SYSTEM CRANIOTOMY, EXCISE TUMOR WITH MAPPING, COMBINED Right 5/30/2018    Procedure: COMBINED OPTICAL TRACKING SYSTEM CRANIOTOMY, EXCISE TUMOR WITH MAPPING;  Right Stealth Assisted Craniotomy With Motor Mapping And Tumor Resection ;  Surgeon: Dustin Rodriguez MD;  Location:  OR     ORTHOPEDIC SURGERY      feet, multiple on both     OSTEOTOMY FOOT Right 1/15/2019    Procedure: OSTEOTOMY FOOT;  Surgeon: Benjamin Griffin DPM;  Location:  OR     PARATHYROIDECTOMY  2011     RECONSTRUCT FOREFOOT WITH METATARSOPHALANGEAL (MTP) FUSION Right 1/15/2019    Procedure: RIGHT FIRST METATARSAL PHALAGEAL JOINT ARTHRODESIS, RIGHT SECOND METATARSAL WEIL OSTEOTOMY;  Surgeon: Benjamin Griffin DPM;  Location:  OR      Social History:   Never smoker.  Patient is .    Family History:   Family history of nephrolithiasis in her daughter.    Objective      PHYSICAL EXAM   GENERAL: Healthy, alert and no distress  EYES: Eyes grossly normal to inspection.  No discharge or erythema, or obvious  scleral/conjunctival abnormalities.  HENT: Head covered.  External ears, nose and mouth without ulcers or lesions.  No nasal drainage visible.  NECK: No asymmetry, visible masses or scars  RESP: No audible wheeze, cough, or visible cyanosis.  No visible retractions or increased work of breathing.    MS: No gross musculoskeletal defects noted.  Normal range of motion.  No visible edema.  SKIN: Visible skin clear. No significant rash, abnormal pigmentation or lesions.  NEURO: Cranial nerves grossly intact.  Mentation and speech appropriate for age.  PSYCH: Mentation appears normal, judgement and insight intact, normal speech and appearance well-groomed.     LABORATORY   Lab Results   Component Value Date    CR 0.66 03/15/2021      Recent Labs   Lab Test 06/23/21  1531   COLOR Yellow   APPEARANCE Clear   URINEGLC Negative   URINEBILI Negative   URINEKETONE 15*   SG 1.020   UBLD Moderate*   URINEPH 6.0   PROTEIN Negative   UROBILINOGEN 0.2   NITRITE Positive*   LEUKEST Large*   RBCU 10-25*   WBCU >100*     Urine culture: >100,000 CFU's per mL Citrobacter koseri    IMAGING     I personally reviewed the images.     Ct Abdomen Pelvis W/o Contrast    Result Date: 6/23/2021  EXAM: CT ABDOMEN PELVIS W/O CONTRAST LOCATION: [facility] DATE/TIME: 6/23/2021 4:58 PM INDICATION: Flank pain, kidney stone suspected. COMPARISON: 05/05/2017. TECHNIQUE: CT scan of the abdomen and pelvis was performed without IV contrast. Multiplanar reformats were obtained. Dose reduction techniques were used. CONTRAST: None. FINDINGS: LOWER CHEST: Evidence for remote granulomatous disease. Mild fibroatelectasis. Coronary artery calcification. HEPATOBILIARY: Stable benign hepatic cyst. Hepatic granuloma. Gallbladder unremarkable. PANCREAS: Mild atrophy. SPLEEN: Multiple granulomata. ADRENAL GLANDS: Normal. KIDNEYS/BLADDER: Obstructing 3 x 4 x 7 mm right ureteral pelvic junction stone with Hounsfield units of 1098. Moderate hydronephrosis.  At least 3 vague calcifications lower pole right kidney, nonobstructing. Right renal and perirenal edema. Nonobstructive 2 x 3 mm stone mid left kidney. 1 mm nonobstructing stone upper pole left kidney. Left renal scarring from remote insults. Urinary bladder unremarkable. BOWEL: Diverticulosis without diverticulitis. Normal appendix. No bowel obstruction. LYMPH NODES: Normal. VASCULATURE: Unremarkable. PELVIC ORGANS: Hysterectomy. MUSCULOSKELETAL: Tiny fat-containing paraumbilical hernia. Degenerative disease.     IMPRESSION: 1.  Obstructing 3 x 4 x 7 mm right ureteropelvic junction stone with moderate hydronephrosis. Bilateral nonobstructive nephrolithiasis. Left renal scarring. 2.  Diverticulosis without diverticulitis. 3.  Remainder not significantly changed.      Assessment & Plan    1. Acute right flank pain    2. Calculus of kidney       I had pleasure today meeting with Ms. Tiwari to discuss her nephrolithiasis.  On CT imaging approximately 2 weeks ago, she had a 7 mm stone in the proximal right ureter.  We discussed that the odds of being able to pass this with medical expulsion therapy are rather low.  At 5 mm, there is approximately a 50% chance of passing this on her own.    I discussed with the patient that she may need to be set up for procedure.  She would like to continue to try the passes in the interim.  We will plan on obtaining a CT without contrast prior to her procedure to ensure that the stone is still there due to concern with risk of general anesthesia that would be unnecessary.    I had the pleasure today of meeting with Ms. Tiwari to discussed her a right-sided calculus with associated hydronephrosis. Symptoms currently controlled. Continue with trial of medical expulsive therapy at this time. Will tentatively set up for a procedure due to size of stone.  - Continue tamsulosin 0.4 mg daily. Refills provided.  - Continue to push fluids. Strain all urine and submit any captured stones for  analysis.  - Ibuprofen, Tylenol, and narcotic for pain.  - CT prior to procedure to ensure that the stone is still there.  -Plan will be for cystoscopy, right ureteroscopy with right laser lithotripsy and basketing and right ureteral stent placement.  -We discussed possible side effects with an indwelling ureteral stent such as urgency and frequency of urination, dysuria, hematuria, symptoms of urine reflux, and some achiness in the side. Indwelling ureteral stents need to be exchanged every three months or removed by three months.   - Warning signs discussed including fevers, chills, gross hematuria, severe pain that is refractory to medications or uncontrolled nausea and vomiting, which should prompt more urgent evaluation. Patient understands to proceed to the ER should these warning signs occur outside of clinic hours.    During counseling for this visit, we covered the natural history of kidney stones, the risk of progression to symptomatic pain/infection, and the possibility of renal failure/kidney damage.  We covered treatment options including medical expulsive therapy, ureteroscopy/laser/stent, extracorporeal shock wave lithotripsy (ESWL), and percutaneous nephrolithotomy ([facility]).      Standard recommendations on kidney stone prevention were provided.    Signed by:     Conchita Espinoza PA-C 7/7/2021 11:00 AM

## 2021-07-06 NOTE — NURSING NOTE
"Chief Complaint   Patient presents with     Kidney Stone(s) Followup     Patient will have Video visit to discuss history of her stone       Height 1.575 m (5' 2\"), weight 63.5 kg (140 lb), not currently breastfeeding. Body mass index is 25.61 kg/m .    Patient Active Problem List   Diagnosis     Calculus of kidney     Hyperparathyroidism s/p surgery     GERD (gastroesophageal reflux disease)     Allergic rhinitis     Palpitations     PVC's (premature ventricular contractions)     Seizure (H)     Osteoarthritis     Elevated cholesterol     Abnormal screening cardiac CT     Lumbar radiculopathy     Advanced directives, counseling/discussion     Nephrolithiasis     Oligodendroglioma (H)     Moderate major depression, single episode (H)     S/P laparoscopic hysterectomy     Primary osteoarthritis of left knee     Acute left-sided low back pain without sciatica     Hip pain, left     Chemotherapy management, encounter for     High risk for chemotherapy-induced infectious complication       Allergies   Allergen Reactions     Sulfa Drugs Hives     Benoxinate Nausea and Vomiting       Current Outpatient Medications   Medication Sig Dispense Refill     cyclobenzaprine (FLEXERIL) 5 MG tablet Take 1 tablet (5 mg) by mouth 3 times daily as needed for muscle spasms (Patient taking differently: Take 5 mg by mouth 2 times daily as needed for muscle spasms ) 30 tablet 0     gabapentin (NEURONTIN) 100 MG capsule TAKE TWO CAPSULES BY MOUTH EVERY EVENING AT 7 PM. MAKE APPOINTMENT WITH DR. PALMA FOR FUTURE REFILLS 180 capsule 1     lamoTRIgine (LAMICTAL) 200 MG tablet 200 mg twice a day (take along with 100 mg tablet for total of 300 mg twice a day). [facility] only 180 tablet 3     levETIRAcetam (KEPPRA) 500 MG tablet 1500 mg am and 1250 mg pm 495 tablet 3     metoprolol tartrate (LOPRESSOR) 25 MG tablet TAKE 1 TABLET BY MOUTH 2 TIMES DAILY 180 tablet 3     tamsulosin (FLOMAX) 0.4 MG capsule Take 1 capsule (0.4 mg) by mouth daily " 30 capsule 0     VITAMIN D, CHOLECALCIFEROL, PO Take 1,000 Units by mouth daily        acetaminophen (TYLENOL) 500 MG tablet Take 2  tablets by mouth every 8 hours for post operative pain. (Patient not taking: Reported on 7/6/2021) 60 tablet 1     calcium carbonate (TUMS) 500 MG chewable tablet Take 1 chew tab by mouth daily as needed for heartburn       CALCIUM-VITAMIN D PO Take 1 tablet by mouth daily       ciprofloxacin (CIPRO) 500 MG tablet Take 1 tablet (500 mg) by mouth 2 times daily (Patient not taking: Reported on 7/6/2021) 14 tablet 0     COMPOUNDED NON-CONTROLLED SUBSTANCE (CMPD RX) - PHARMACY TO MIX COMPOUNDED MEDICATION Place 1 g vaginally twice a week Apply 1g daily for 2 weeks, then twice weekly to vagina (Patient not taking: Reported on 7/6/2021) 30 g 11     fexofenadine (ALLEGRA) 180 MG tablet Take 180 mg by mouth daily       ibuprofen (ADVIL/MOTRIN) 200 MG tablet Take 800 mg by mouth daily as needed        Lactase (LACTAID PO) Take 1-2 tablets by mouth daily as needed for indigestion        lamoTRIgine (LAMICTAL) 100 MG tablet 100 mg am and pm (take along with 200 mg tablet for total of 300 mg twice a day). [facility] 3     methylPREDNISolone (MEDROL DOSEPAK) 4 MG tablet therapy pack Follow Package Directions (Patient not taking: Reported on 7/6/2021) 21 tablet 0     Multiple Vitamin (MULTI-VITAMINS) TABS Take 1 chew tab by mouth daily        tretinoin (RETIN-A) 0.025 % external cream Apply a pea-sized amount to each area affected by acne. Start every 3-4 nights working up to every night. (Patient not taking: Reported on 7/6/2021) 45 g 3       Social History     Tobacco Use     Smoking status: Never Smoker     Smokeless tobacco: Never Used   Substance Use Topics     Alcohol use: Yes     Comment: social     Drug use: No       RELL Lawler  7/6/2021  1:16 PM

## 2021-07-06 NOTE — NURSING NOTE
"Chief Complaint   Patient presents with     Kidney Stone(s) Followup       Height 1.575 m (5' 2\"), weight 63.5 kg (140 lb), not currently breastfeeding. Body mass index is 25.61 kg/m .    Patient Active Problem List   Diagnosis     Calculus of kidney     Hyperparathyroidism s/p surgery     GERD (gastroesophageal reflux disease)     Allergic rhinitis     Palpitations     PVC's (premature ventricular contractions)     Seizure (H)     Osteoarthritis     Elevated cholesterol     Abnormal screening cardiac CT     Lumbar radiculopathy     Advanced directives, counseling/discussion     Nephrolithiasis     Oligodendroglioma (H)     Moderate major depression, single episode (H)     S/P laparoscopic hysterectomy     Primary osteoarthritis of left knee     Acute left-sided low back pain without sciatica     Hip pain, left     Chemotherapy management, encounter for     High risk for chemotherapy-induced infectious complication       Allergies   Allergen Reactions     Sulfa Drugs Hives     Benoxinate Nausea and Vomiting       Current Outpatient Medications   Medication Sig Dispense Refill     cyclobenzaprine (FLEXERIL) 5 MG tablet Take 1 tablet (5 mg) by mouth 3 times daily as needed for muscle spasms (Patient taking differently: Take 5 mg by mouth 2 times daily as needed for muscle spasms ) 30 tablet 0     gabapentin (NEURONTIN) 100 MG capsule TAKE TWO CAPSULES BY MOUTH EVERY EVENING AT 7 PM. MAKE APPOINTMENT WITH DR. PALMA FOR FUTURE REFILLS 180 capsule 1     lamoTRIgine (LAMICTAL) 200 MG tablet 200 mg twice a day (take along with 100 mg tablet for total of 300 mg twice a day). [facility] only 180 tablet 3     levETIRAcetam (KEPPRA) 500 MG tablet 1500 mg am and 1250 mg pm 495 tablet 3     metoprolol tartrate (LOPRESSOR) 25 MG tablet TAKE 1 TABLET BY MOUTH 2 TIMES DAILY 180 tablet 3     tamsulosin (FLOMAX) 0.4 MG capsule Take 1 capsule (0.4 mg) by mouth daily 14 capsule 0     VITAMIN D, CHOLECALCIFEROL, PO Take 1,000 Units " by mouth daily        acetaminophen (TYLENOL) 500 MG tablet Take 2  tablets by mouth every 8 hours for post operative pain. (Patient not taking: Reported on 7/6/2021) 60 tablet 1     calcium carbonate (TUMS) 500 MG chewable tablet Take 1 chew tab by mouth daily as needed for heartburn       CALCIUM-VITAMIN D PO Take 1 tablet by mouth daily       ciprofloxacin (CIPRO) 500 MG tablet Take 1 tablet (500 mg) by mouth 2 times daily (Patient not taking: Reported on 7/6/2021) 14 tablet 0     COMPOUNDED NON-CONTROLLED SUBSTANCE (CMPD RX) - PHARMACY TO MIX COMPOUNDED MEDICATION Place 1 g vaginally twice a week Apply 1g daily for 2 weeks, then twice weekly to vagina (Patient not taking: Reported on 7/6/2021) 30 g 11     fexofenadine (ALLEGRA) 180 MG tablet Take 180 mg by mouth daily       ibuprofen (ADVIL/MOTRIN) 200 MG tablet Take 800 mg by mouth daily as needed        Lactase (LACTAID PO) Take 1-2 tablets by mouth daily as needed for indigestion        lamoTRIgine (LAMICTAL) 100 MG tablet 100 mg am and pm (take along with 200 mg tablet for total of 300 mg twice a day). [facility] only 180 tablet 3     methylPREDNISolone (MEDROL DOSEPAK) 4 MG tablet therapy pack Follow Package Directions (Patient not taking: Reported on 7/6/2021) 21 tablet 0     Multiple Vitamin (MULTI-VITAMINS) TABS Take 1 chew tab by mouth daily        tretinoin (RETIN-A) 0.025 % external cream Apply a pea-sized amount to each area affected by acne. Start every 3-4 nights working up to every night. (Patient not taking: Reported on 7/6/2021) 45 g 3       Social History     Tobacco Use     Smoking status: Never Smoker     Smokeless tobacco: Never Used   Substance Use Topics     Alcohol use: Yes     Comment: social     Drug use: No       Shauna Garcia JAMES  7/6/2021  12:57 PM

## 2021-07-06 NOTE — LETTER
7/6/2021       RE: Barbi Tiwari  [address] [facility] [address]     Dear Colleague,    Thank you for referring your patient, Barbi Tiwari, to the [facility] UROLOGY [facility] [place] [facility]. Please see a copy of my visit note below.               *PATIENT WILL HAVE VIDEO VISIT ON MY CHART *      Barbi is a 60 year old who is being evaluated via a billable video visit.      How would you like to obtain your AVS? MyChart  If the video visit is dropped, the invitation should be resent by:   MY CHART  Will anyone else be joining your video visit? No      Video-Visit Details    Type of service:  Video Visit    Video Start Time: 1301    Video End Time:1320    Originating Location (pt. Location): Home    Distant Location (provider location):  [facility] UROLOGY [facility]     Platform used for Video Visit: Prosper Fajardo      REQUESTING PROVIDER   Lyudmila Corona     REASON FOR CONSULT   Ureteral stone    HISTORY OF PRESENT ILLNESS   Ms. Tiwari is a very pleasant 60-year-old female, who presents today for further evaluation recommendations regarding a right UPJ stone measuring 7 mm and causing moderate hydronephrosis.  This was detected on recent CT imaging on 06/23/2021.  She was initially seen in urgent care that day for right hip pain.  This is in the area of previous kidney stone pain.  She was unable to find a position of comfort and it was severe colicky pain.  Urinalysis was also obtained at that time which was concerning for infection with positive nitrate, large leukocyte esterase, and moderate blood.  Urine culture showed greater than 100,000 CFU per mL Citrobacter koseri.  Patient was placed on 7 days of ciprofloxacin which she has completed.    Patient went home for a trial of passage.  She has been taking Flomax.  She has been trying to increase water.  She denies severe pain and has been managing with Tylenol  and ibuprofen.  She notes that the pain is achy and comes and goes.  She strained her urine for a week, but stopped and has not seen or caught a stone.  At baseline, she has urgency and frequency of urination.  She also endorsed some fever and chills initially when she presented for evaluation.  This is resolved.  She currently denies any hematuria or dysuria.    Her last kidney stone was diagnosed in 2017.  She has undergone 2 surgical interventions.  One of these she believes is ESWL.  She is uncertain of the other one was ureteroscopy laser lithotripsy or ESWL with a stent.  She did remember having a lot of urinary frequency and wanting the stent pulled out before she left for an [place] vacation.  Family history of nephrolithiasis in her daughter.    The following portions of the patient's history were reviewed and updated as appropriate: allergies, current medications, past family history, past medical history, past social history, past surgical history and problem list.     REVIEW OF SYSTEMS   Review of Systems   Constitutional: Negative for chills and fever.   Respiratory: Negative for shortness of breath.    Cardiovascular: Negative for chest pain.   Gastrointestinal: Negative for nausea and vomiting.   Genitourinary: Positive for flank pain and frequency. Negative for dysuria and hematuria.      Per HPI.     Patient Active Problem List   Diagnosis     Calculus of kidney     Hyperparathyroidism s/p surgery     GERD (gastroesophageal reflux disease)     Allergic rhinitis     Palpitations     PVC's (premature ventricular contractions)     Seizure (H)     Osteoarthritis     Elevated cholesterol     Abnormal screening cardiac CT     Lumbar radiculopathy     Advanced directives, counseling/discussion     Nephrolithiasis     Oligodendroglioma (H)     Moderate major depression, single episode (H)     S/P laparoscopic hysterectomy     Primary osteoarthritis of left knee     Acute left-sided low back pain without  sciatica     Hip pain, left     Chemotherapy management, encounter for     High risk for chemotherapy-induced infectious complication      Past Medical History:   Diagnosis Date     Acute cystitis with hematuria 10/24/2020     Allergic rhinitis 12/9/2009     Calculus of kidney 1/12/2011    Overview:  S/P LITHOTRIPSY 3/15/11      Esophageal reflux 10/22/2008     Hyperparathyroidism 01/11/2011    had surgery for it; resulted in seizures     Moderate major depression, single episode (H)      oligodendroglioma 7/23/2012     Osteoarthrosis 12/9/2009     Palpitations 6/5/2014     PONV (postoperative nausea and vomiting)      PVCs 6/5/2014     Seizure disorder (related to tumor) 08/17/2011      Past Surgical History:   Procedure Laterality Date     CRANIOTOMY  2011    tumor resection     EXTRACORPOREAL SHOCK WAVE LITHOTRIPSY, CYSTOSCOPY, INSERT STENT URETER(S), COMBINED Bilateral 5/5/2017    Procedure: COMBINED EXTRACORPOREAL SHOCK WAVE LITHOTRIPSY, CYSTOSCOPY, INSERT STENT URETER(S);  CYSTO, URETHRAL DILATION, URETERAL LEFT STENT PLACEMENT, LEFT ESWL.;  Surgeon: Angel Del Rio MD;  Location:  OR     FOOT SURGERY      mulitple     HYSTERECTOMY, PAP NO LONGER INDICATED  2008    lap hys     LITHOTRIPSY  2010 2017     OPTICAL TRACKING SYSTEM CRANIOTOMY, EXCISE TUMOR WITH MAPPING, COMBINED Right 5/30/2018    Procedure: COMBINED OPTICAL TRACKING SYSTEM CRANIOTOMY, EXCISE TUMOR WITH MAPPING;  Right Stealth Assisted Craniotomy With Motor Mapping And Tumor Resection ;  Surgeon: Dustin Rodriguez MD;  Location: [facility] OR     ORTHOPEDIC SURGERY      feet, multiple on both     OSTEOTOMY FOOT Right 1/15/2019    Procedure: OSTEOTOMY FOOT;  Surgeon: Benjamin Griffin DPM;  Location:  OR     PARATHYROIDECTOMY  2011     RECONSTRUCT FOREFOOT WITH METATARSOPHALANGEAL (MTP) FUSION Right 1/15/2019    Procedure: RIGHT FIRST METATARSAL PHALAGEAL JOINT ARTHRODESIS, RIGHT SECOND METATARSAL WEIL OSTEOTOMY;  Surgeon: Benjamin Griffin  SANTO Vaughan;  Location:  OR      Social History:   Never smoker.  Patient is .    Family History:   Family history of nephrolithiasis in her daughter.    Objective      PHYSICAL EXAM   GENERAL: Healthy, alert and no distress  EYES: Eyes grossly normal to inspection.  No discharge or erythema, or obvious scleral/conjunctival abnormalities.  HENT: Head covered.  External ears, nose and mouth without ulcers or lesions.  No nasal drainage visible.  NECK: No asymmetry, visible masses or scars  RESP: No audible wheeze, cough, or visible cyanosis.  No visible retractions or increased work of breathing.    MS: No gross musculoskeletal defects noted.  Normal range of motion.  No visible edema.  SKIN: Visible skin clear. No significant rash, abnormal pigmentation or lesions.  NEURO: Cranial nerves grossly intact.  Mentation and speech appropriate for age.  PSYCH: Mentation appears normal, judgement and insight intact, normal speech and appearance well-groomed.     LABORATORY   Lab Results   Component Value Date    CR 0.66 03/15/2021      Recent Labs   Lab Test 06/23/21  1531   COLOR Yellow   APPEARANCE Clear   URINEGLC Negative   URINEBILI Negative   URINEKETONE 15*   SG 1.020   UBLD Moderate*   URINEPH 6.0   PROTEIN Negative   UROBILINOGEN 0.2   NITRITE Positive*   LEUKEST Large*   RBCU 10-25*   WBCU >100*     Urine culture: >100,000 CFU's per mL Citrobacter koseri    IMAGING     I personally reviewed the images.     Ct Abdomen Pelvis W/o Contrast    Result Date: 6/23/2021  EXAM: CT ABDOMEN PELVIS W/O CONTRAST LOCATION: [facility] DATE/TIME: 6/23/2021 4:58 PM INDICATION: Flank pain, kidney stone suspected. COMPARISON: 05/05/2017. TECHNIQUE: CT scan of the abdomen and pelvis was performed without IV contrast. Multiplanar reformats were obtained. Dose reduction techniques were used. CONTRAST: None. FINDINGS: LOWER CHEST: Evidence for remote granulomatous disease. Mild fibroatelectasis. Coronary artery  calcification. HEPATOBILIARY: Stable benign hepatic cyst. Hepatic granuloma. Gallbladder unremarkable. PANCREAS: Mild atrophy. SPLEEN: Multiple granulomata. ADRENAL GLANDS: Normal. KIDNEYS/BLADDER: Obstructing 3 x 4 x 7 mm right ureteral pelvic junction stone with Hounsfield units of 1098. Moderate hydronephrosis. At least 3 vague calcifications lower pole right kidney, nonobstructing. Right renal and perirenal edema. Nonobstructive 2 x 3 mm stone mid left kidney. 1 mm nonobstructing stone upper pole left kidney. Left renal scarring from remote insults. Urinary bladder unremarkable. BOWEL: Diverticulosis without diverticulitis. Normal appendix. No bowel obstruction. LYMPH NODES: Normal. VASCULATURE: Unremarkable. PELVIC ORGANS: Hysterectomy. MUSCULOSKELETAL: Tiny fat-containing paraumbilical hernia. Degenerative disease.     IMPRESSION: 1.  Obstructing 3 x 4 x 7 mm right ureteropelvic junction stone with moderate hydronephrosis. Bilateral nonobstructive nephrolithiasis. Left renal scarring. 2.  Diverticulosis without diverticulitis. 3.  Remainder not significantly changed.      Assessment & Plan    1. Acute right flank pain    2. Calculus of kidney       I had pleasure today meeting with Ms. Tiwari to discuss her nephrolithiasis.  On CT imaging approximately 2 weeks ago, she had a 7 mm stone in the proximal right ureter.  We discussed that the odds of being able to pass this with medical expulsion therapy are rather low.  At 5 mm, there is approximately a 50% chance of passing this on her own.    I discussed with the patient that she may need to be set up for procedure.  She would like to continue to try the passes in the interim.  We will plan on obtaining a CT without contrast prior to her procedure to ensure that the stone is still there due to concern with risk of general anesthesia that would be unnecessary.    I had the pleasure today of meeting with Ms. Tiwari to discussed her a right-sided calculus with  associated hydronephrosis. Symptoms currently controlled. Continue with trial of medical expulsive therapy at this time. Will tentatively set up for a procedure due to size of stone.  - Continue tamsulosin 0.4 mg daily. Refills provided.  - Continue to push fluids. Strain all urine and submit any captured stones for analysis.  - Ibuprofen, Tylenol, and narcotic for pain.  - CT prior to procedure to ensure that the stone is still there.  -Plan will be for cystoscopy, right ureteroscopy with right laser lithotripsy and basketing and right ureteral stent placement.  -We discussed possible side effects with an indwelling ureteral stent such as urgency and frequency of urination, dysuria, hematuria, symptoms of urine reflux, and some achiness in the side. Indwelling ureteral stents need to be exchanged every three months or removed by three months.   - Warning signs discussed including fevers, chills, gross hematuria, severe pain that is refractory to medications or uncontrolled nausea and vomiting, which should prompt more urgent evaluation. Patient understands to proceed to the ER should these warning signs occur outside of clinic hours.    During counseling for this visit, we covered the natural history of kidney stones, the risk of progression to symptomatic pain/infection, and the possibility of renal failure/kidney damage.  We covered treatment options including medical expulsive therapy, ureteroscopy/laser/stent, extracorporeal shock wave lithotripsy (ESWL), and percutaneous nephrolithotomy (PCNL).      Standard recommendations on kidney stone prevention were provided.    Signed by:     Conchita Espinoza PA-C 7/7/2021 11:00 AM

## 2021-07-07 ENCOUNTER — PREP FOR PROCEDURE (OUTPATIENT)
Dept: UROLOGY | Facility: CLINIC | Age: 61
End: 2021-07-07

## 2021-07-07 DIAGNOSIS — N20.1 OBSTRUCTION OF RIGHT URETEROPELVIC JUNCTION (UPJ) DUE TO STONE: Primary | ICD-10-CM

## 2021-07-07 RX ORDER — CEFAZOLIN SODIUM 2 G/50ML
2 SOLUTION INTRAVENOUS SEE ADMIN INSTRUCTIONS
Status: CANCELLED | OUTPATIENT
Start: 2021-07-07

## 2021-07-07 RX ORDER — CEFAZOLIN SODIUM 2 G/50ML
2 SOLUTION INTRAVENOUS
Status: CANCELLED | OUTPATIENT
Start: 2021-07-07

## 2021-07-07 ASSESSMENT — ENCOUNTER SYMPTOMS
FREQUENCY: 1
VOMITING: 0
DYSURIA: 0
HEMATURIA: 0
SHORTNESS OF BREATH: 0
CHILLS: 0
FEVER: 0
NAUSEA: 0
FLANK PAIN: 1

## 2021-07-07 NOTE — PATIENT INSTRUCTIONS
- Continue tamsulosin 0.4 mg daily. Refills provided.  - Continue to push fluids. Strain all urine and submit any captured stones for analysis.  - Ibuprofen, Tylenol, and narcotic for pain.  - CT prior to procedure to ensure that the stone is still there.  -Plan will be for cystoscopy, right ureteroscopy with right laser lithotripsy and basketing and right ureteral stent placement.  -We discussed possible side effects with an indwelling ureteral stent such as urgency and frequency of urination, dysuria, hematuria, symptoms of urine reflux, and some achiness in the side. Indwelling ureteral stents need to be exchanged every three months or removed by three months.   - Warning signs discussed including fevers, chills, gross hematuria, severe pain that is refractory to medications or uncontrolled nausea and vomiting, which should prompt more urgent evaluation. Patient understands to proceed to the ER should these warning signs occur outside of clinic hours.    Standard recommendations on kidney stone prevention:  -These include maintaining fluid intake of 3 liters per day or more with a goal of making 2 or more liters of urine per day.  If alcoholic or caffeinated beverages are consumed, you need to drink water along with these beverages to maintain hydration.    -A few ounces of lemon juice concentrate a day diluted in water can help prevent stones (citrate is a stone inhibitor).    -Sodium influences calcium excretion in the urine. Limit intake of red meat, salt, and salty processed foods.    -Uric acid-high levels in the blood can lead to kidney stones and gout, especially if the urine pH is low.  Reduce her protein intake, especially red meats.  -Weight loss, if overweight, can reduce the recurrence of kidney stones.  -Diabetes-if diabetic, you are at a greater risk of having kidney stones.  -Maintain calcium intake in diet through continued consumption of dairy products etc.    -Limit foods that are high in  oxalate such as spinach, sweet potatoes, dark chocolate, soy products, and some nuts such as peanuts.   -Medications that can increase risk of kidney stones: Ephedrine, Guaifenesin, Triamterene, Lasix, Diamox, Topamax, Zonegran, laxatives.     -Additional/different recommendations pending any stone analysis.

## 2021-07-08 DIAGNOSIS — Z11.59 ENCOUNTER FOR SCREENING FOR OTHER VIRAL DISEASES: ICD-10-CM

## 2021-07-08 PROBLEM — N20.1 OBSTRUCTION OF RIGHT URETEROPELVIC JUNCTION (UPJ) DUE TO STONE: Status: ACTIVE | Noted: 2021-07-08

## 2021-07-09 ENCOUNTER — MYC MEDICAL ADVICE (OUTPATIENT)
Dept: DERMATOLOGY | Facility: CLINIC | Age: 61
End: 2021-07-09

## 2021-07-09 DIAGNOSIS — L73.8 SEBACEOUS GLAND HYPERPLASIA: ICD-10-CM

## 2021-07-09 RX ORDER — TRETINOIN 0.25 MG/G
CREAM TOPICAL
Qty: 45 G | Refills: 0 | Status: SHIPPED | OUTPATIENT
Start: 2021-07-09 | End: 2023-03-06

## 2021-07-09 NOTE — TELEPHONE ENCOUNTER
[employer] message sent:    Hi Barbi-    Prescriptions  after 1 year and annual office visits are required for all refills.    I can ask Catherine to give you a roscoe refill, but you will need to schedule an office visit for further refills.    Thanks,    Courtney RN-BSN-PHN  [facility]  [phone]

## 2021-07-09 NOTE — TELEPHONE ENCOUNTER
[employer] message sent:    Kishan Alexander sent you a roscoe refill of your tretinoin.     It was sent to [facility] [place], [place] - [address] [place] [address]    Have a great weekend!    Courtney RN-BSN-PHN  [facility]  [phone]

## 2021-07-09 NOTE — TELEPHONE ENCOUNTER
Please let pt know that we require yearly follow ups for rx refills. I can send a roscoe refill if she would like.

## 2021-07-12 ENCOUNTER — HOSPITAL ENCOUNTER (OUTPATIENT)
Dept: MRI IMAGING | Facility: CLINIC | Age: 61
Discharge: HOME | End: 2021-07-12
Attending: PSYCHIATRY & NEUROLOGY | Admitting: PSYCHIATRY & NEUROLOGY
Payer: COMMERCIAL

## 2021-07-12 ENCOUNTER — TUMOR CONFERENCE (OUTPATIENT)
Dept: ONCOLOGY | Facility: CLINIC | Age: 61
End: 2021-07-12

## 2021-07-12 DIAGNOSIS — C71.9 OLIGODENDROGLIOMA (H): ICD-10-CM

## 2021-07-12 PROCEDURE — 70553 MRI BRAIN STEM W/O & W/DYE: CPT

## 2021-07-12 PROCEDURE — A9585 GADOBUTROL INJECTION: HCPCS | Performed by: PSYCHIATRY & NEUROLOGY

## 2021-07-12 PROCEDURE — 255N000002 HC RX 255 OP 636: Performed by: PSYCHIATRY & NEUROLOGY

## 2021-07-12 RX ORDER — GADOBUTROL 604.72 MG/ML
5.5 INJECTION INTRAVENOUS ONCE
Status: COMPLETED | OUTPATIENT
Start: 2021-07-12 | End: 2021-07-12

## 2021-07-12 RX ADMIN — GADOBUTROL 5.5 ML: 604.72 INJECTION INTRAVENOUS at 12:30

## 2021-07-12 NOTE — TUMOR CONFERENCE
Tumor Conference Information  Tumor Conference: Brain  Specialties Present: Medical oncology, Pathology, Radiology, Radiation oncology, Surgery  Patient Status: A current patient  Pathology: Not Discussed  Treatment to Date: Surgical intervention(s), Adjuvant chemotherapy, Chemoradiation  Clinical Trial Eligibility: Not discussed  Recommended Plan: Observation (see comment) (Comment: reviewed imaging - continued stable disease; follow up in 6 months with repeat MRI)  Did the review exceed 30 minutes?: did not           Documentation / Disclaimer Cancer Tumor Board Note  Cancer tumor board recommendations do not override what is determined to be reasonable care and treatment, which is dependent on the circumstances of a patient's case; the patient's medical, social, and personal concerns; and the clinical judgment of the oncologist [physician].

## 2021-07-13 ENCOUNTER — VIRTUAL VISIT (OUTPATIENT)
Dept: ONCOLOGY | Facility: CLINIC | Age: 61
End: 2021-07-13
Attending: PSYCHIATRY & NEUROLOGY
Payer: COMMERCIAL

## 2021-07-13 VITALS — BODY MASS INDEX: 27.44 KG/M2 | WEIGHT: 150 LBS

## 2021-07-13 DIAGNOSIS — C71.9 OLIGODENDROGLIOMA (H): Primary | ICD-10-CM

## 2021-07-13 PROCEDURE — 99215 OFFICE O/P EST HI 40 MIN: CPT | Mod: 95 | Performed by: PSYCHIATRY & NEUROLOGY

## 2021-07-13 ASSESSMENT — PAIN SCALES - GENERAL: PAINLEVEL: NO PAIN (0)

## 2021-07-13 NOTE — LETTER
"    7/13/2021         RE: Barbi Tiwari  [address] [place] [address]        Dear Colleague,    Thank you for referring your patient, Barbi Tiwari, to the [facility]. Please see a copy of my visit note below.    Video-Visit Details  Type of service:  Video Visit    Video Start Time: 10:25 AM  Video End Time: 11:19 AM    Originating Location (pt. Location): Home    Distant Location (provider location):  [facility]     Platform used for Video Visit: Prosper Gallagher MD    _____________________________________________________________    NEURO-ONCOLOGY VISIT  Jul 13, 2021    CHIEF COMPLAINT: Ms. Barbi Tiwari is a 60 year old right-handed woman with a right frontal diffuse oligodendroglioma (1p19q co-deleted), diagnosed following resection in 5/2011. She received 12 cycles of temozolomide, completed in 5/2012. Changes on imaging necessitated a repeat resection on 5/30/2018 and pathology was unchanged. No adjuvant cancer-directed therapy was initiated. Imaging over the next 1.5 years showed slow tumor progression and treatment was then initiated. She completed chemoradiotherapy on 5/30/2020. Barbi has now completed 6 cycles of adjuvant-dosed temozolomide as of 11/2020. Dose escalation to 200mg/m2 was complicated by intolerable nausea.     She is now managed on imaging surveillance.     I met with Barbi and Kenneth () for this follow-up visit.    HISTORY OF PRESENT ILLNESS  -Barbi is doing well. Diagnosed with kidney stones, which affected her gait, symptoms have since improved some.   -Chronic fatigue. No headaches, vision changes, dizziness, weakness, numbness.  -Mood is \"ok\", feeling depressed at times. She feels that her mood has improved since she has been off of the chemotherapy. She plans to discuss a trial of Zoloft with her PCP.   -She denies any new symptoms; no weakness, numbness, changes in vision, or headaches. Balance " is off; chronic and stable.  -Reports a recent focal seizure (cheek twitching) that lasted 5-10 seconds and self-resolved, occured about 4-5 days ago. She has been seeing Dr. Clark for seizure management and they have been making medication  adjustments.  -Continued mild word-finding issues. Underwent repeat neuro-psych testing. Working as a RN on the cardiology unit. Dr. Meyer reviewed these results with her and she was advised to have more supervision at work. She has been in contact with her supervisor about this.     REVIEW OF SYSTEMS  A comprehensive ROS negative except as in HPI.      MEDICATIONS   Current Outpatient Medications   Medication     calcium carbonate (TUMS) 500 MG chewable tablet     CALCIUM-VITAMIN D PO     fexofenadine (ALLEGRA) 180 MG tablet     gabapentin (NEURONTIN) 100 MG capsule     ibuprofen (ADVIL/MOTRIN) 200 MG tablet     Lactase (LACTAID PO)     lamoTRIgine (LAMICTAL) 100 MG tablet     lamoTRIgine (LAMICTAL) 200 MG tablet     levETIRAcetam (KEPPRA) 500 MG tablet     metoprolol tartrate (LOPRESSOR) 25 MG tablet     Multiple Vitamin (MULTI-VITAMINS) TABS     tamsulosin (FLOMAX) 0.4 MG capsule     tretinoin (RETIN-A) 0.025 % external cream     VITAMIN D, CHOLECALCIFEROL, PO     No current facility-administered medications for this visit.     DRUG ALLERGIES   Allergies   Allergen Reactions     Sulfa Drugs Hives     Benoxinate Nausea and Vomiting       ONCOLOGIC HISTORY  -4/27/2011 PRESENTATION: New onset seizure, generalized event.   -5/2011 MRB with a non-contrast enhancing right frontal mass lesion.   -5/2011 SURGERY: Craniectomy for mass resection at [facility].   PATHOLOGY: Diffuse oligodendroglioma; WHO grade II, 1p/19q co-deleted.  -6/2011-5/2012 CHEMO: Adjuvant dosed temozolomide x 12 cycles.  -4/2/2018 MRB with possible disease recurrence with a new 1.2 cm non-enhancing nodule within the cortex of the right frontal lobe adjacent to the resection cavity.  -4/12/2018 NEURO-ONC:  Recommending repeat resection, appointment scheduled with Dr. Dustin Rodriguez, neurosurgery at the [facility].   -5/30/2018 SURGERY: Repeat resection with Dr. Rodriguez.   PATHOLOGY: Remains low grade, without concerning features.   -6/15/2018 NEURO-ONC: Start imaging surveillance.  -9/17/2018 NEURO-ONC/ MRB: Imaging stable, continue with surveillance.   -12/10/2018 NEURO-ONC/ MRB: Imaging stable, continue with surveillance.   -4/15/2019 NEURO-ONC/ MRB: Imaging stable, continue with surveillance.  -8/19/2019 NEURO-ONC/ MRB: Clinically stable. Imaging largely stable, subtle increases on T2 FLAIR; continue with surveillance.  -12/16/2019 NEURO-ONC/ MRB: Clinically stable. Imaging with increased T2 FLAIR medially and laterally about the resection cavity. Referral to Dr. Figueroa with radiation oncology. Discussion with Dr. Rodriguez. Referral for repeat neuro-psychology testing.   -1/10/2020 Evaluated by Dr. Devan Figueroa.   -1/17/2020 NEURO-ONC: Tentative plan to initiate chemoradiotherapy in May-June 2020.   -2/17/2020 NEURO-PSYCH; Mild weaknesses were noted in aspects of verbal and nonverbal working memory, nonverbal recall, some aspects of executive functioning, and bilateral psychomotor speed. The remainder of her cognitive abilities were intact and performed in keeping with her above average range cognitive baseline.   -4/20 - 5/30/2020 CHEMORADS: 54 Gy in 30 fractions with concurrent temozolomide 75mg/m2 (140mg).   -5/4/2020 NEURO-ONC: Continue treatment as planned. Prescribing Clotrimazole lozenges for oral thrush.   -6/1/2020 NEURO-ONC: Completed treatment as planned.  -6/30/2020 NEURO-ONC/ MRB/ CHEMO: Clinically stable. Imaging with positive treatment effect. Starting adjuvant temozolomide 150mg/m2 (250mg), cycle 1 (start date was 7/1).   -7/28/2020 NEURO-ONC/ CHEMO: Clinically stable. Adjuvant temozolomide 200mg/m2 (320mg), cycle 2 (start date was 7/29).   -8/25/2020 NEURO-ONC/ CHEMO: Clinically stable; significant nausea/  vomiting with 200mg/m2 dosing. Adding Emend for this next cycle. Adjuvant temozolomide 150mg/m2 (250mg), cycle 3 (start date of 8/26).   -9/22/2020 NEURO-ONC/ MRB/ CHEMO: Clinically stable; less nausea/ vomiting with lowered chemotherapy dosing plus adding Emend. Imaging with no concerns, repeat in 3 months. Adjuvant temozolomide 150mg/m2 (250mg), cycle 4 (start date of 9/23).   -10/20/2020 NEURO-ONC/ CHEMO: Clinically stable; no new/ worsening issues. Experiencing pain related to piriformis syndrome. Adjuvant temozolomide 150mg/m2 (250mg), cycle 5 (start date of 10/21).   -11/172020 NEURO-ONC/ CHEMO: Clinically stable. Adjuvant temozolomide 150mg/m2 (250mg), cycle 6 (start date of 11/18).   -12/15/2020 NEURO-ONC/ MRB: Clinically well. Imaging with no concerns. Starting imaging surveillance.   -3/16/2021 NEURO-ONC/ MRB: Clinically well. Imaging with no concerns.   -4/30/2021 Neuro-psych evaluation demonstrated weaknesses that are consistent with dysfunction of the bilateral frontal regions, but the right frontal region in particular.  Relative to her exam from February, 2020, there has been a mild decline in her thinking. In this exam, weaknesses were identified in executive functioning, attention, and both verbal and nonverbal working memory. Some aspects of cognitive speed were relative weaknesses as well. Insight into these weaknesses was limited.   -7/13/2021 NEURO-ONC/ MRB: Clinically well. Imaging stable.     SOCIAL HISTORY   Tobacco use: Never smoker.   Alcohol use: Social.   Drug use: Denies.  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   , 2 children      PHYSICAL EXAMINATION    -Generally well appearing.  -Respiratory: No audible wheezing.   -Skin: No facial rashes.  -Psychiatric: Normal mood and affect. Pleasant, talkative.  -Neurologic:   MENTAL STATUS:     Alert, oriented to self and date.    Follows simple command and two step commands.   Recall: immediate 3/3, delayed 3/3.   Speech fluent.     Comprehension intact.     Right-left differentiation is intact.   CRANIAL NERVES:     Pupils are equal, round.     Extraocular movements full, patient denies diplopia.     Equal activation with smiling/ talking.    Hearing intact.   With normal phonation, no dysfunction of the palate or tongue.   MOTOR: Antigravity in arms. No pronation and no drift.   No positional tremor seen today.   COORDINATION: Intact on finger nose with eyes closed.   SENSATION: Unable to formally assess.    The rest of a comprehensive physical examination is deferred due to PHE (public health emergency) video visit restrictions.      MEDICAL RECORDS  Obtained and personally reviewed all available outside medical records in addition to reviewing any records available in our electronic system.     LABS  Personally reviewed all available lab results.     IMAGING  Personally reviewed MR brain imaging from yesterday and compared to prior imaging. To my eye, the T2 FLAIR about the right frontal resection cavity remains largely stable. No enhancement seen.     Imaging was shown to and results were reviewed with Barbi and Kenneth.     Imaging and case was reviewed at Brain Tumor Conference yesterday.        IMPRESSION  Clinic time was spent discussing in detail the nature of her cancer in light of her recent imaging. This is in addition to answering questions pertaining to my recommendations and devising the plan as outlined below.     Clinically, Barbi is doing well with no new subjective neurological complaints or increase in seizure frequency. She continues to have some chronic fatigue and subjective improvement in mood. Examination today was without any new findings. Imaging largely stable with no concerns. If Barbi remains clinically and radiographically stable come November, can extend the imaging interval to every 6 months.     Barbi had repeat neuro-psych testing in April and results demonstrated weaknesses that are consistent with  dysfunction of the bilateral frontal regions with a mild decline in her thinking since her prior testing. In this exam, weaknesses were identified in executive functioning, attention, and both verbal and nonverbal working memory. Insight into these weaknesses were limited and as a result, a recommendation was for some degree of oversight/ supervision of her work. Galina notes that she has been in contact with her supervisor at work regarding these recommendations.    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   -No need to monitor labs while off chemotherapy.      -SEIZURE MANAGEMENT-  -Follows with Dr. Flakita Clark; Neurology, epilepsy specialist at the [facility].      -Quality of life/ MOOD/ FATIGUE-  -History of mild anxiety and depression.   -Failed Celexa due to side effect of diarrhea.   -Untreated/ undertreated depression can worsen fatigue, dysorexia, and quality of life.  -Issues with sleep and neuropathic pain; on gabapentin.   -Consideration for a trial of Zoloft.     -COGNITIVE CHANGES-  -Neuro-psych testing completed in 4/2021 with worsening deficits.  -Repeat testing recommended in 4/2022.     Return to clinic virtually in 4 months + imaging.     In the meantime, Galina knows to call with questions or concerns or to report new complaints and can be seen sooner if needed.    The patient was seen and evaluated with Dr. Snowden, PGY-3 neurology resident.    Myrtle Gallagher MD  Neuro-oncology      Galina is a 60 year old who is being evaluated via a billable video visit.      How would you like to obtain your AVS? MyChart  If the video visit is dropped, the invitation should be resent by: Send to e-mail at: [email or web address]  Will  anyone else be joining your video visit? Yes: . How would they like to receive their invitation? Send to e-mail at: [email or web address]    Alana Cole CMA        Again, thank you for allowing me to participate in the care of your patient.        Sincerely,        Myrtle Gallagher MD

## 2021-07-13 NOTE — LETTER
"    7/13/2021         RE: Barbi Tiwari  [address] [facility] [address]        Dear Colleague,    Thank you for referring your patient, Barbi Tiwari, to the [facility]. Please see a copy of my visit note below.    Video-Visit Details  Type of service:  Video Visit    Video Start Time: 10:25 AM  Video End Time: 11:19 AM    Originating Location (pt. Location): Home    Distant Location (provider location):  [facility]     Platform used for Video Visit: Prosper Gallagher MD    _____________________________________________________________    NEURO-ONCOLOGY VISIT  Jul 13, 2021    CHIEF COMPLAINT: Ms. Barbi Tiwari is a 60 year old right-handed woman with a right frontal diffuse oligodendroglioma (1p19q co-deleted), diagnosed following resection in 5/2011. She received 12 cycles of temozolomide, completed in 5/2012. Changes on imaging necessitated a repeat resection on 5/30/2018 and pathology was unchanged. No adjuvant cancer-directed therapy was initiated. Imaging over the next 1.5 years showed slow tumor progression and treatment was then initiated. She completed chemoradiotherapy on 5/30/2020. Barbi has now completed 6 cycles of adjuvant-dosed temozolomide as of 11/2020. Dose escalation to 200mg/m2 was complicated by intolerable nausea.     She is now managed on imaging surveillance.     I met with Barbi and Kenneth () for this follow-up visit.    HISTORY OF PRESENT ILLNESS  -Barbi is doing well. Diagnosed with kidney stones, which affected her gait, symptoms have since improved some.   -Chronic fatigue. No headaches, vision changes, dizziness, weakness, numbness.  -Mood is \"ok\", feeling depressed at times. She feels that her mood has improved since she has been off of the chemotherapy. She plans to discuss a trial of Zoloft with her PCP.   -She denies any new symptoms; no weakness, numbness, changes in vision, or headaches. Balance " is off; chronic and stable.  -Reports a recent focal seizure (cheek twitching) that lasted 5-10 seconds and self-resolved, occured about 4-5 days ago. She has been seeing Dr. Clark for seizure management and they have been making medication  adjustments.  -Continued mild word-finding issues. Underwent repeat neuro-psych testing. Working as a RN on the cardiology unit. Dr. Meyer reviewed these results with her and she was advised to have more supervision at work. She has been in contact with her supervisor about this.     REVIEW OF SYSTEMS  A comprehensive ROS negative except as in HPI.      MEDICATIONS   Current Outpatient Medications   Medication     calcium carbonate (TUMS) 500 MG chewable tablet     CALCIUM-VITAMIN D PO     fexofenadine (ALLEGRA) 180 MG tablet     gabapentin (NEURONTIN) 100 MG capsule     ibuprofen (ADVIL/MOTRIN) 200 MG tablet     Lactase (LACTAID PO)     lamoTRIgine (LAMICTAL) 100 MG tablet     lamoTRIgine (LAMICTAL) 200 MG tablet     levETIRAcetam (KEPPRA) 500 MG tablet     metoprolol tartrate (LOPRESSOR) 25 MG tablet     Multiple Vitamin (MULTI-VITAMINS) TABS     tamsulosin (FLOMAX) 0.4 MG capsule     tretinoin (RETIN-A) 0.025 % external cream     VITAMIN D, CHOLECALCIFEROL, PO     No current facility-administered medications for this visit.     DRUG ALLERGIES   Allergies   Allergen Reactions     Sulfa Drugs Hives     Benoxinate Nausea and Vomiting       ONCOLOGIC HISTORY  -4/27/2011 PRESENTATION: New onset seizure, generalized event.   -5/2011 MRB with a non-contrast enhancing right frontal mass lesion.   -5/2011 SURGERY: Craniectomy for mass resection at [facility].   PATHOLOGY: Diffuse oligodendroglioma; WHO grade II, 1p/19q co-deleted.  -6/2011-5/2012 CHEMO: Adjuvant dosed temozolomide x 12 cycles.  -4/2/2018 MRB with possible disease recurrence with a new 1.2 cm non-enhancing nodule within the cortex of the right frontal lobe adjacent to the resection cavity.  -4/12/2018 NEURO-ONC:  Recommending repeat resection, appointment scheduled with Dr. Dustin Rodriguez, neurosurgery at the [facility].   -5/30/2018 SURGERY: Repeat resection with Dr. Rodriguez.   PATHOLOGY: Remains low grade, without concerning features.   -6/15/2018 NEURO-ONC: Start imaging surveillance.  -9/17/2018 NEURO-ONC/ MRB: Imaging stable, continue with surveillance.   -12/10/2018 NEURO-ONC/ MRB: Imaging stable, continue with surveillance.   -4/15/2019 NEURO-ONC/ MRB: Imaging stable, continue with surveillance.  -8/19/2019 NEURO-ONC/ MRB: Clinically stable. Imaging largely stable, subtle increases on T2 FLAIR; continue with surveillance.  -12/16/2019 NEURO-ONC/ MRB: Clinically stable. Imaging with increased T2 FLAIR medially and laterally about the resection cavity. Referral to Dr. Figueroa with radiation oncology. Discussion with Dr. Rodriguez. Referral for repeat neuro-psychology testing.   -1/10/2020 Evaluated by Dr. Devan Figueroa.   -1/17/2020 NEURO-ONC: Tentative plan to initiate chemoradiotherapy in May-June 2020.   -2/17/2020 NEURO-PSYCH; Mild weaknesses were noted in aspects of verbal and nonverbal working memory, nonverbal recall, some aspects of executive functioning, and bilateral psychomotor speed. The remainder of her cognitive abilities were intact and performed in keeping with her above average range cognitive baseline.   -4/20 - 5/30/2020 CHEMORADS: 54 Gy in 30 fractions with concurrent temozolomide 75mg/m2 (140mg).   -5/4/2020 NEURO-ONC: Continue treatment as planned. Prescribing Clotrimazole lozenges for oral thrush.   -6/1/2020 NEURO-ONC: Completed treatment as planned.  -6/30/2020 NEURO-ONC/ MRB/ CHEMO: Clinically stable. Imaging with positive treatment effect. Starting adjuvant temozolomide 150mg/m2 (250mg), cycle 1 (start date was 7/1).   -7/28/2020 NEURO-ONC/ CHEMO: Clinically stable. Adjuvant temozolomide 200mg/m2 (320mg), cycle 2 (start date was 7/29).   -8/25/2020 NEURO-ONC/ CHEMO: Clinically stable; significant nausea/  vomiting with 200mg/m2 dosing. Adding Emend for this next cycle. Adjuvant temozolomide 150mg/m2 (250mg), cycle 3 (start date of 8/26).   -9/22/2020 NEURO-ONC/ MRB/ CHEMO: Clinically stable; less nausea/ vomiting with lowered chemotherapy dosing plus adding Emend. Imaging with no concerns, repeat in 3 months. Adjuvant temozolomide 150mg/m2 (250mg), cycle 4 (start date of 9/23).   -10/20/2020 NEURO-ONC/ CHEMO: Clinically stable; no new/ worsening issues. Experiencing pain related to piriformis syndrome. Adjuvant temozolomide 150mg/m2 (250mg), cycle 5 (start date of 10/21).   -11/172020 NEURO-ONC/ CHEMO: Clinically stable. Adjuvant temozolomide 150mg/m2 (250mg), cycle 6 (start date of 11/18).   -12/15/2020 NEURO-ONC/ MRB: Clinically well. Imaging with no concerns. Starting imaging surveillance.   -3/16/2021 NEURO-ONC/ MRB: Clinically well. Imaging with no concerns.   -4/30/2021 Neuro-psych evaluation demonstrated weaknesses that are consistent with dysfunction of the bilateral frontal regions, but the right frontal region in particular.  Relative to her exam from February, 2020, there has been a mild decline in her thinking. In this exam, weaknesses were identified in executive functioning, attention, and both verbal and nonverbal working memory. Some aspects of cognitive speed were relative weaknesses as well. Insight into these weaknesses was limited.   -7/13/2021 NEURO-ONC/ MRB: Clinically well. Imaging stable.     SOCIAL HISTORY   Tobacco use: Never smoker.   Alcohol use: Social.   Drug use: Denies.  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   , 2 children      PHYSICAL EXAMINATION    -Generally well appearing.  -Respiratory: No audible wheezing.   -Skin: No facial rashes.  -Psychiatric: Normal mood and affect. Pleasant, talkative.  -Neurologic:   MENTAL STATUS:     Alert, oriented to self and date.    Follows simple command and two step commands.   Recall: immediate 3/3, delayed 3/3.   Speech fluent.     Comprehension intact.     Right-left differentiation is intact.   CRANIAL NERVES:     Pupils are equal, round.     Extraocular movements full, patient denies diplopia.     Equal activation with smiling/ talking.    Hearing intact.   With normal phonation, no dysfunction of the palate or tongue.   MOTOR: Antigravity in arms. No pronation and no drift.   No positional tremor seen today.   COORDINATION: Intact on finger nose with eyes closed.   SENSATION: Unable to formally assess.    The rest of a comprehensive physical examination is deferred due to PHE (public health emergency) video visit restrictions.      MEDICAL RECORDS  Obtained and personally reviewed all available outside medical records in addition to reviewing any records available in our electronic system.     LABS  Personally reviewed all available lab results.     IMAGING  Personally reviewed MR brain imaging from yesterday and compared to prior imaging. To my eye, the T2 FLAIR about the right frontal resection cavity remains largely stable. No enhancement seen.     Imaging was shown to and results were reviewed with Barbi and Kenneth.     Imaging and case was reviewed at Brain Tumor Conference yesterday.        IMPRESSION  Clinic time was spent discussing in detail the nature of her cancer in light of her recent imaging. This is in addition to answering questions pertaining to my recommendations and devising the plan as outlined below.     Clinically, Barbi is doing well with no new subjective neurological complaints or increase in seizure frequency. She continues to have some chronic fatigue and subjective improvement in mood. Examination today was without any new findings. Imaging largely stable with no concerns. If Barbi remains clinically and radiographically stable come November, can extend the imaging interval to every 6 months.     Barbi had repeat neuro-psych testing in April and results demonstrated weaknesses that are consistent with  dysfunction of the bilateral frontal regions with a mild decline in her thinking since her prior testing. In this exam, weaknesses were identified in executive functioning, attention, and both verbal and nonverbal working memory. Insight into these weaknesses were limited and as a result, a recommendation was for some degree of oversight/ supervision of her work. Galina notes that she has been in contact with her supervisor at work regarding these recommendations.    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   -No need to monitor labs while off chemotherapy.      -SEIZURE MANAGEMENT-  -Follows with Dr. Flakita Clark; Neurology, epilepsy specialist at the [facility].      -Quality of life/ MOOD/ FATIGUE-  -History of mild anxiety and depression.   -Failed Celexa due to side effect of diarrhea.   -Untreated/ undertreated depression can worsen fatigue, dysorexia, and quality of life.  -Issues with sleep and neuropathic pain; on gabapentin.   -Consideration for a trial of Zoloft.     -COGNITIVE CHANGES-  -Neuro-psych testing completed in 4/2021 with worsening deficits.  -Repeat testing recommended in 4/2022.     Return to clinic virtually in 4 months + imaging.     In the meantime, Galina knows to call with questions or concerns or to report new complaints and can be seen sooner if needed.    The patient was seen and evaluated with Dr. Snowden, PGY-3 neurology resident.    Myrtle Gallagher MD  Neuro-oncology      Galina is a 60 year old who is being evaluated via a billable video visit.      How would you like to obtain your AVS? MyChart  If the video visit is dropped, the invitation should be resent by: Send to e-mail at: [email or web address]  Will  anyone else be joining your video visit? Yes: . How would they like to receive their invitation? Send to e-mail at: [email or web address]    Alana Cole CMA        Again, thank you for allowing me to participate in the care of your patient.        Sincerely,        Myrtle Gallagher MD

## 2021-07-13 NOTE — PROGRESS NOTES
Galina is a 60 year old who is being evaluated via a billable video visit.      How would you like to obtain your AVS? MyChart  If the video visit is dropped, the invitation should be resent by: Send to e-mail at: [email or web address]  Will anyone else be joining your video visit? Yes: . How would they like to receive their invitation? Send to e-mail at: [email or web address]    Alana Cole CMA

## 2021-07-13 NOTE — PROGRESS NOTES
"Video-Visit Details  Type of service:  Video Visit    Video Start Time: 10:25 AM  Video End Time: 11:19 AM    Originating Location (pt. Location): Home    Distant Location (provider location):  [facility]     Platform used for Video Visit: Prosper Gallagher MD    _____________________________________________________________    NEURO-ONCOLOGY VISIT  Jul 13, 2021    CHIEF COMPLAINT: Ms. Barbi Tiwari is a 60 year old right-handed woman with a right frontal diffuse oligodendroglioma (1p19q co-deleted), diagnosed following resection in 5/2011. She received 12 cycles of temozolomide, completed in 5/2012. Changes on imaging necessitated a repeat resection on 5/30/2018 and pathology was unchanged. No adjuvant cancer-directed therapy was initiated. Imaging over the next 1.5 years showed slow tumor progression and treatment was then initiated. She completed chemoradiotherapy on 5/30/2020. Barbi has now completed 6 cycles of adjuvant-dosed temozolomide as of 11/2020. Dose escalation to 200mg/m2 was complicated by intolerable nausea.     She is now managed on imaging surveillance.     I met with Barbi and Kenneth () for this follow-up visit.    HISTORY OF PRESENT ILLNESS  -Barbi is doing well. Diagnosed with kidney stones, which affected her gait, symptoms have since improved some.   -Chronic fatigue. No headaches, vision changes, dizziness, weakness, numbness.  -Mood is \"ok\", feeling depressed at times. She feels that her mood has improved since she has been off of the chemotherapy. She plans to discuss a trial of Zoloft with her PCP.   -She denies any new symptoms; no weakness, numbness, changes in vision, or headaches. Balance is off; chronic and stable.  -Reports a recent focal seizure (cheek twitching) that lasted 5-10 seconds and self-resolved, occured about 4-5 days ago. She has been seeing Dr. Clark for seizure management and they have been making medication  " adjustments.  -Continued mild word-finding issues. Underwent repeat neuro-psych testing. Working as a RN on the cardiology unit. Dr. Meyer reviewed these results with her and she was advised to have more supervision at work. She has been in contact with her supervisor about this.     REVIEW OF SYSTEMS  A comprehensive ROS negative except as in HPI.      MEDICATIONS   Current Outpatient Medications   Medication     calcium carbonate (TUMS) 500 MG chewable tablet     CALCIUM-VITAMIN D PO     fexofenadine (ALLEGRA) 180 MG tablet     gabapentin (NEURONTIN) 100 MG capsule     ibuprofen (ADVIL/MOTRIN) 200 MG tablet     Lactase (LACTAID PO)     lamoTRIgine (LAMICTAL) 100 MG tablet     lamoTRIgine (LAMICTAL) 200 MG tablet     levETIRAcetam (KEPPRA) 500 MG tablet     metoprolol tartrate (LOPRESSOR) 25 MG tablet     Multiple Vitamin (MULTI-VITAMINS) TABS     tamsulosin (FLOMAX) 0.4 MG capsule     tretinoin (RETIN-A) 0.025 % external cream     VITAMIN D, CHOLECALCIFEROL, PO     No current facility-administered medications for this visit.     DRUG ALLERGIES   Allergies   Allergen Reactions     Sulfa Drugs Hives     Benoxinate Nausea and Vomiting       ONCOLOGIC HISTORY  -4/27/2011 PRESENTATION: New onset seizure, generalized event.   -5/2011 MRB with a non-contrast enhancing right frontal mass lesion.   -5/2011 SURGERY: Craniectomy for mass resection at [facility].   PATHOLOGY: Diffuse oligodendroglioma; WHO grade II, 1p/19q co-deleted.  -6/2011-5/2012 CHEMO: Adjuvant dosed temozolomide x 12 cycles.  -4/2/2018 MRB with possible disease recurrence with a new 1.2 cm non-enhancing nodule within the cortex of the right frontal lobe adjacent to the resection cavity.  -4/12/2018 NEURO-ONC: Recommending repeat resection, appointment scheduled with Dr. Dustin Rodriguez, neurosurgery at the [facility].   -5/30/2018 SURGERY: Repeat resection with Dr. Rodriguez.   PATHOLOGY: Remains low grade, without concerning features.   -6/15/2018 NEURO-ONC: Start  imaging surveillance.  -9/17/2018 NEURO-ONC/ MRB: Imaging stable, continue with surveillance.   -12/10/2018 NEURO-ONC/ MRB: Imaging stable, continue with surveillance.   -4/15/2019 NEURO-ONC/ MRB: Imaging stable, continue with surveillance.  -8/19/2019 NEURO-ONC/ MRB: Clinically stable. Imaging largely stable, subtle increases on T2 FLAIR; continue with surveillance.  -12/16/2019 NEURO-ONC/ MRB: Clinically stable. Imaging with increased T2 FLAIR medially and laterally about the resection cavity. Referral to Dr. Figueroa with radiation oncology. Discussion with Dr. Rodriguez. Referral for repeat neuro-psychology testing.   -1/10/2020 Evaluated by Dr. Devan Figueroa.   -1/17/2020 NEURO-ONC: Tentative plan to initiate chemoradiotherapy in May-June 2020.   -2/17/2020 NEURO-PSYCH; Mild weaknesses were noted in aspects of verbal and nonverbal working memory, nonverbal recall, some aspects of executive functioning, and bilateral psychomotor speed. The remainder of her cognitive abilities were intact and performed in keeping with her above average range cognitive baseline.   -4/20 - 5/30/2020 CHEMORADS: 54 Gy in 30 fractions with concurrent temozolomide 75mg/m2 (140mg).   -5/4/2020 NEURO-ONC: Continue treatment as planned. Prescribing Clotrimazole lozenges for oral thrush.   -6/1/2020 NEURO-ONC: Completed treatment as planned.  -6/30/2020 NEURO-ONC/ MRB/ CHEMO: Clinically stable. Imaging with positive treatment effect. Starting adjuvant temozolomide 150mg/m2 (250mg), cycle 1 (start date was 7/1).   -7/28/2020 NEURO-ONC/ CHEMO: Clinically stable. Adjuvant temozolomide 200mg/m2 (320mg), cycle 2 (start date was 7/29).   -8/25/2020 NEURO-ONC/ CHEMO: Clinically stable; significant nausea/ vomiting with 200mg/m2 dosing. Adding Emend for this next cycle. Adjuvant temozolomide 150mg/m2 (250mg), cycle 3 (start date of 8/26).   -9/22/2020 NEURO-ONC/ MRB/ CHEMO: Clinically stable; less nausea/ vomiting with lowered chemotherapy dosing plus  adding Emend. Imaging with no concerns, repeat in 3 months. Adjuvant temozolomide 150mg/m2 (250mg), cycle 4 (start date of 9/23).   -10/20/2020 NEURO-ONC/ CHEMO: Clinically stable; no new/ worsening issues. Experiencing pain related to piriformis syndrome. Adjuvant temozolomide 150mg/m2 (250mg), cycle 5 (start date of 10/21).   -11/172020 NEURO-ONC/ CHEMO: Clinically stable. Adjuvant temozolomide 150mg/m2 (250mg), cycle 6 (start date of 11/18).   -12/15/2020 NEURO-ONC/ MRB: Clinically well. Imaging with no concerns. Starting imaging surveillance.   -3/16/2021 NEURO-ONC/ MRB: Clinically well. Imaging with no concerns.   -4/30/2021 Neuro-psych evaluation demonstrated weaknesses that are consistent with dysfunction of the bilateral frontal regions, but the right frontal region in particular.  Relative to her exam from February, 2020, there has been a mild decline in her thinking. In this exam, weaknesses were identified in executive functioning, attention, and both verbal and nonverbal working memory. Some aspects of cognitive speed were relative weaknesses as well. Insight into these weaknesses was limited.   -7/13/2021 NEURO-ONC/ MRB: Clinically well. Imaging stable.     SOCIAL HISTORY   Tobacco use: Never smoker.   Alcohol use: Social.   Drug use: Denies.  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   , 2 children      PHYSICAL EXAMINATION    -Generally well appearing.  -Respiratory: No audible wheezing.   -Skin: No facial rashes.  -Psychiatric: Normal mood and affect. Pleasant, talkative.  -Neurologic:   MENTAL STATUS:     Alert, oriented to self and date.    Follows simple command and two step commands.   Recall: immediate 3/3, delayed 3/3.   Speech fluent.    Comprehension intact.     Right-left differentiation is intact.   CRANIAL NERVES:     Pupils are equal, round.     Extraocular movements full, patient denies diplopia.     Equal activation with smiling/ talking.    Hearing intact.   With normal  phonation, no dysfunction of the palate or tongue.   MOTOR: Antigravity in arms. No pronation and no drift.   No positional tremor seen today.   COORDINATION: Intact on finger nose with eyes closed.   SENSATION: Unable to formally assess.    The rest of a comprehensive physical examination is deferred due to PHE (public health emergency) video visit restrictions.      MEDICAL RECORDS  Obtained and personally reviewed all available outside medical records in addition to reviewing any records available in our electronic system.     LABS  Personally reviewed all available lab results.     IMAGING  Personally reviewed MR brain imaging from yesterday and compared to prior imaging. To my eye, the T2 FLAIR about the right frontal resection cavity remains largely stable. No enhancement seen.     Imaging was shown to and results were reviewed with Barbi and Kenneth.     Imaging and case was reviewed at Brain Tumor Conference yesterday.        IMPRESSION  Clinic time was spent discussing in detail the nature of her cancer in light of her recent imaging. This is in addition to answering questions pertaining to my recommendations and devising the plan as outlined below.     Clinically, Barbi is doing well with no new subjective neurological complaints or increase in seizure frequency. She continues to have some chronic fatigue and subjective improvement in mood. Examination today was without any new findings. Imaging largely stable with no concerns. If Barbi remains clinically and radiographically stable come November, can extend the imaging interval to every 6 months.     Barbi had repeat neuro-psych testing in April and results demonstrated weaknesses that are consistent with dysfunction of the bilateral frontal regions with a mild decline in her thinking since her prior testing. In this exam, weaknesses were identified in executive functioning, attention, and both verbal and nonverbal working memory. Insight into these  weaknesses were limited and as a result, a recommendation was for some degree of oversight/ supervision of her work. Barbi notes that she has been in contact with her supervisor at work regarding these recommendations.    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   -No need to monitor labs while off chemotherapy.      -SEIZURE MANAGEMENT-  -Follows with Dr. Flakita Clark; Neurology, epilepsy specialist at the [facility].      -Quality of life/ MOOD/ FATIGUE-  -History of mild anxiety and depression.   -Failed Celexa due to side effect of diarrhea.   -Untreated/ undertreated depression can worsen fatigue, dysorexia, and quality of life.  -Issues with sleep and neuropathic pain; on gabapentin.   -Consideration for a trial of Zoloft.     -COGNITIVE CHANGES-  -Neuro-psych testing completed in 4/2021 with worsening deficits.  -Repeat testing recommended in 4/2022.     Return to clinic virtually in 4 months + imaging.     In the meantime, Barbi knows to call with questions or concerns or to report new complaints and can be seen sooner if needed.    The patient was seen and evaluated with Dr. Snowden, PGY-3 neurology resident.    Myrtle Gallagher MD  Neuro-oncology

## 2021-07-14 ENCOUNTER — OFFICE VISIT (OUTPATIENT)
Dept: INTERNAL MEDICINE | Facility: CLINIC | Age: 61
End: 2021-07-14
Payer: COMMERCIAL

## 2021-07-14 VITALS
BODY MASS INDEX: 27.05 KG/M2 | HEIGHT: 62 IN | OXYGEN SATURATION: 100 % | HEART RATE: 62 BPM | TEMPERATURE: 98.6 F | SYSTOLIC BLOOD PRESSURE: 110 MMHG | WEIGHT: 147 LBS | DIASTOLIC BLOOD PRESSURE: 66 MMHG

## 2021-07-14 DIAGNOSIS — I49.3 PVC'S (PREMATURE VENTRICULAR CONTRACTIONS): ICD-10-CM

## 2021-07-14 DIAGNOSIS — N20.1 OBSTRUCTION OF RIGHT URETEROPELVIC JUNCTION (UPJ) DUE TO STONE: ICD-10-CM

## 2021-07-14 DIAGNOSIS — R56.9 SEIZURE (H): ICD-10-CM

## 2021-07-14 DIAGNOSIS — E21.3 HYPERPARATHYROIDISM (H): ICD-10-CM

## 2021-07-14 DIAGNOSIS — Z01.818 PREOP GENERAL PHYSICAL EXAM: Primary | ICD-10-CM

## 2021-07-14 LAB
ANION GAP SERPL CALCULATED.3IONS-SCNC: 6 MMOL/L (ref 3–14)
BUN SERPL-MCNC: 17 MG/DL (ref 7–30)
CALCIUM SERPL-MCNC: 8.9 MG/DL (ref 8.5–10.1)
CHLORIDE BLD-SCNC: 107 MMOL/L
CO2 SERPL-SCNC: 27 MMOL/L (ref 20–32)
CREAT SERPL-MCNC: 0.71 MG/DL
GFR SERPL CREATININE-BSD FRML MDRD: >90 ML/MIN/1.73M2
GLUCOSE BLD-MCNC: 121 MG/DL (ref 70–99)
POTASSIUM BLD-SCNC: 4 MMOL/L (ref 3.4–5.3)
SODIUM SERPL-SCNC: 140 MMOL/L (ref 133–144)

## 2021-07-14 PROCEDURE — 80048 BASIC METABOLIC PNL TOTAL CA: CPT | Performed by: PHYSICIAN ASSISTANT

## 2021-07-14 PROCEDURE — 36415 COLL VENOUS BLD VENIPUNCTURE: CPT | Performed by: PHYSICIAN ASSISTANT

## 2021-07-14 PROCEDURE — 99214 OFFICE O/P EST MOD 30 MIN: CPT | Performed by: PHYSICIAN ASSISTANT

## 2021-07-14 RX ORDER — ACETAMINOPHEN 500 MG
500-1000 TABLET ORAL EVERY 6 HOURS PRN
Status: ON HOLD | COMMUNITY
End: 2022-02-01

## 2021-07-14 ASSESSMENT — MIFFLIN-ST. JEOR: SCORE: 1195.04

## 2021-07-14 NOTE — PATIENT INSTRUCTIONS

## 2021-07-14 NOTE — PROGRESS NOTES
[address] [address]  Phone: [phone]  Primary Provider: Dustin Choudhury  Pre-op Performing Provider: ELY SAINI      PREOPERATIVE EVALUATION:  Today's date: 7/14/2021    Barbi Tiwari is a 60 year old female who presents for a preoperative evaluation.    Surgical Information:  Surgery/Procedure:   Cystoureteroscopy with right retrograde pyelogram, holmium laser lithotripsy of right ureteral calculus and right ureteral stent insertion Right   Surgery Location: [facility]  Surgeon: Felix Cano MD  Surgery Date: 7-20-21  Time of Surgery: 3:15 pm  Where patient plans to recover: At home with family  Fax number for surgical facility: Note does not need to be faxed, will be available electronically in Epic.    Type of Anesthesia Anticipated: General    Assessment & Plan     The proposed surgical procedure is considered INTERMEDIATE risk.    Preop general physical exam    - Basic metabolic panel  (Ca, Cl, CO2, Creat, Gluc, K, Na, BUN); Future    Obstruction of right ureteropelvic junction (UPJ) due to stone    - Basic metabolic panel  (Ca, Cl, CO2, Creat, Gluc, K, Na, BUN); Future    Seizure (H)    - Basic metabolic panel  (Ca, Cl, CO2, Creat, Gluc, K, Na, BUN); Future    Hyperparathyroidism s/p surgery      PVC's (premature ventricular contractions)    - Basic metabolic panel  (Ca, Cl, CO2, Creat, Gluc, K, Na, BUN); Future         Risks and Recommendations:  The patient has the following additional risks and recommendations for perioperative complications:   - No identified additional risk factors other than previously addressed    Medication Instructions:  Patient is to take all scheduled medications on the day of surgery EXCEPT for modifications listed below:   - ibuprofen (Advil, Motrin): HOLD 1 day before surgery.    - naproxen (Aleve, Naprosyn): HOLD 4 days before surgery.     RECOMMENDATION:  APPROVAL GIVEN to  proceed with proposed procedure, without further diagnostic evaluation.                      Subjective     HPI related to upcoming procedure: right urterer stone     Preop Questions 7/14/2021   1. Have you ever had a heart attack or stroke? No   2. Have you ever had surgery on your heart or blood vessels, such as a stent placement, a coronary artery bypass, or surgery on an artery in your head, neck, heart, or legs? No   3. Do you have chest pain with activity? No   4. Do you have a history of  heart failure? No   5. Do you currently have a cold, bronchitis or symptoms of other infection? No   6. Do you have a cough, shortness of breath, or wheezing? No   7. Do you or anyone in your family have previous history of blood clots? No   8. Do you or does anyone in your family have a serious bleeding problem such as prolonged bleeding following surgeries or cuts? No   9. Have you ever had problems with anemia or been told to take iron pills? No   10. Have you had any abnormal blood loss such as black, tarry or bloody stools, or abnormal vaginal bleeding? No   11. Have you ever had a blood transfusion? No   12. Are you willing to have a blood transfusion if it is medically needed before, during, or after your surgery? Yes   13. Have you or any of your relatives ever had problems with anesthesia? No   14. Do you have sleep apnea, excessive snoring or daytime drowsiness? No   15. Do you have any artifical heart valves or other implanted medical devices like a pacemaker, defibrillator, or continuous glucose monitor? No   16. Do you have artificial joints? No   17. Are you allergic to latex? No   18. Is there any chance that you may be pregnant? No       Health Care Directive:  Patient does not have a Health Care Directive or Living Will:     Preoperative Review of :   reviewed - controlled substances reflected in medication list.      Status of Chronic Conditions:  See problem list for active medical problems.   Problems all longstanding and stable, except as noted/documented.  See ROS for pertinent symptoms related to these conditions.      Review of Systems  CONSTITUTIONAL: NEGATIVE for fever, chills, change in weight  INTEGUMENTARY/SKIN: NEGATIVE for worrisome rashes, moles or lesions  EYES: NEGATIVE for vision changes or irritation  ENT/MOUTH: NEGATIVE for ear, mouth and throat problems  RESP: NEGATIVE for significant cough or SOB  CV: NEGATIVE for chest pain, palpitations or peripheral edema  GI: NEGATIVE for nausea, abdominal pain, heartburn, or change in bowel habits  PSYCHIATRIC: NEGATIVE for changes in mood or affect  ROS otherwise negative    Patient Active Problem List    Diagnosis Date Noted     Obstruction of right ureteropelvic junction (UPJ) due to stone 07/08/2021     Priority: Medium     Added automatically from request for surgery [id number]       Chemotherapy management, encounter for 04/09/2020     Priority: Medium     High risk for chemotherapy-induced infectious complication 04/09/2020     Priority: Medium     Acute left-sided low back pain without sciatica 01/22/2020     Priority: Medium     Hip pain, left 01/22/2020     Priority: Medium     Primary osteoarthritis of left knee 10/15/2018     Priority: Medium     S/P laparoscopic hysterectomy 06/15/2018     Priority: Medium     Oligodendroglioma (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     Priority: Medium     Nephrolithiasis 05/05/2017     Priority: Medium     Overview:   S/P LITHOTRIPSY 3/15/11       Advanced directives, counseling/discussion 03/24/2017     Priority: Medium     Lumbar radiculopathy 09/08/2016     Priority: Medium     Palpitations 06/05/2014     Priority: Medium     PVC's (premature ventricular contractions) 06/05/2014     Priority: Medium     Elevated cholesterol 06/05/2014     Priority: Medium     Abnormal screening cardiac CT 06/05/2014     Priority: Medium     Moderate major depression, single episode (H) 03/06/2013     Priority: Medium     Seizure (H)  08/17/2011     Priority: Medium     Calculus of kidney 01/12/2011     Priority: Medium     Overview:   S/P LITHOTRIPSY 3/15/11       Hyperparathyroidism s/p surgery 01/11/2011     Priority: Medium     Allergic rhinitis 12/09/2009     Priority: Medium     Osteoarthritis 12/09/2009     Priority: Medium     GERD (gastroesophageal reflux disease) 10/22/2008     Priority: Medium      Past Medical History:   Diagnosis Date     Acute cystitis with hematuria 10/24/2020     Allergic rhinitis 12/9/2009     Calculus of kidney 1/12/2011    Overview:  S/P LITHOTRIPSY 3/15/11      Esophageal reflux 10/22/2008     Hyperparathyroidism 01/11/2011    had surgery for it; resulted in seizures     Moderate major depression, single episode (H)      oligodendroglioma 7/23/2012     Osteoarthrosis 12/9/2009     Palpitations 6/5/2014     PONV (postoperative nausea and vomiting)      PVCs 6/5/2014     Seizure disorder (related to tumor) 08/17/2011     Past Surgical History:   Procedure Laterality Date     CRANIOTOMY  2011    tumor resection     EXTRACORPOREAL SHOCK WAVE LITHOTRIPSY, CYSTOSCOPY, INSERT STENT URETER(S), COMBINED Bilateral 5/5/2017    Procedure: COMBINED EXTRACORPOREAL SHOCK WAVE LITHOTRIPSY, CYSTOSCOPY, INSERT STENT URETER(S);  CYSTO, URETHRAL DILATION, URETERAL LEFT STENT PLACEMENT, LEFT ESWL.;  Surgeon: Angel Del Rio MD;  Location:  OR     FOOT SURGERY      mulitple     HYSTERECTOMY, PAP NO LONGER INDICATED  2008    lap hys     LITHOTRIPSY  2010 2017     OPTICAL TRACKING SYSTEM CRANIOTOMY, EXCISE TUMOR WITH MAPPING, COMBINED Right 5/30/2018    Procedure: COMBINED OPTICAL TRACKING SYSTEM CRANIOTOMY, EXCISE TUMOR WITH MAPPING;  Right Stealth Assisted Craniotomy With Motor Mapping And Tumor Resection ;  Surgeon: Dustin Rodriguez MD;  [facility]     ORTHOPEDIC SURGERY      feet, multiple on both     OSTEOTOMY FOOT Right 1/15/2019    Procedure: OSTEOTOMY FOOT;  Surgeon: Benjamin Griffin DPM;  Location:  OR      PARATHYROIDECTOMY  2011     RECONSTRUCT FOREFOOT WITH METATARSOPHALANGEAL (MTP) FUSION Right 1/15/2019    Procedure: RIGHT FIRST METATARSAL PHALAGEAL JOINT ARTHRODESIS, RIGHT SECOND METATARSAL WEIL OSTEOTOMY;  Surgeon: Benjamin Griffin DPM;  Location:  OR     Current Outpatient Medications   Medication Sig Dispense Refill     fexofenadine (ALLEGRA) 180 MG tablet Take 180 mg by mouth daily       gabapentin (NEURONTIN) 100 MG capsule Take 3 capsules (300 mg) by mouth every evening 90 capsule 0     ibuprofen (ADVIL/MOTRIN) 200 MG tablet Take 800 mg by mouth daily as needed        Lactase (LACTAID PO) Take 1-2 tablets by mouth daily as needed for indigestion        lamoTRIgine (LAMICTAL) 100 MG tablet 100 mg am and pm (take along with 200 mg tablet for total of 300 mg twice a day). [facility] mft only 180 tablet 3     lamoTRIgine (LAMICTAL) 200 MG tablet 200 mg twice a day (take along with 100 mg tablet for total of 300 mg twice a day). [facility] mft only 180 tablet 3     levETIRAcetam (KEPPRA) 500 MG tablet 1500 mg am and 1250 mg pm 495 tablet 3     metoprolol tartrate (LOPRESSOR) 25 MG tablet TAKE 1 TABLET BY MOUTH 2 TIMES DAILY 180 tablet 3     Multiple Vitamin (MULTI-VITAMINS) TABS Take 1 chew tab by mouth daily        tamsulosin (FLOMAX) 0.4 MG capsule Take 1 capsule (0.4 mg) by mouth daily 30 capsule 0     tretinoin (RETIN-A) 0.025 % external cream Apply a pea-sized amount to each area affected by acne. Start every 3-4 nights working up to every night. 45 g 0     VITAMIN D, CHOLECALCIFEROL, PO Take 1,000 Units by mouth daily          Allergies   Allergen Reactions     Sulfa Drugs Hives     Benoxinate Nausea and Vomiting        Social History     Tobacco Use     Smoking status: Never Smoker     Smokeless tobacco: Never Used   Substance Use Topics     Alcohol use: Yes     Comment: social       History   Drug Use No         Objective     /66   Pulse 62   Temp 98.6  F (37  C) (Tympanic)   Ht 1.575 m  "(5' 2\")   Wt 66.7 kg (147 lb)   LMP  (LMP Unknown)   SpO2 100%   Breastfeeding No   BMI 26.89 kg/m      Physical Exam  GENERAL APPEARANCE: healthy, alert and no distress  HENT: ear canals and TM's normal and nose and mouth without ulcers or lesions  RESP: lungs clear to auscultation - no rales, rhonchi or wheezes  CV: regular rate and rhythm, normal S1 S2, no S3 or S4 and no murmur, click or rub   MS: extremities normal- no gross deformities noted  SKIN: no suspicious lesions or rashes  PSYCH: mentation appears normal and affect normal/bright    Recent Labs   Lab Test 03/15/21  1140 01/12/21  1142   HGB 13.1 13.4    166    137   POTASSIUM 3.6 3.9   CR 0.66 0.67        Diagnostics:  Labs pending at this time.  Results will be reviewed when available.   No EKG required, no history of coronary heart disease, significant arrhythmia, peripheral arterial disease or other structural heart disease.    Revised Cardiac Risk Index (RCRI):  The patient has the following serious cardiovascular risks for perioperative complications:   - No serious cardiac risks = 0 points     RCRI Interpretation: 0 points: Class I (very low risk - 0.4% complication rate)           Signed Electronically by: Arianne Haas PA-C  Copy of this evaluation report is provided to requesting physician.      "

## 2021-07-14 NOTE — H&P (VIEW-ONLY)
[address] [address]  Phone: [phone]  Primary Provider: Dustin Choudhury  Pre-op Performing Provider: ELY SAINI      PREOPERATIVE EVALUATION:  Today's date: 7/14/2021    Barbi Tiwari is a 60 year old female who presents for a preoperative evaluation.    Surgical Information:  Surgery/Procedure:   Cystoureteroscopy with right retrograde pyelogram, holmium laser lithotripsy of right ureteral calculus and right ureteral stent insertion Right   Surgery Location: [facility]  Surgeon: Felix Cano MD  Surgery Date: 7-20-21  Time of Surgery: 3:15 pm  Where patient plans to recover: At home with family  Fax number for surgical facility: Note does not need to be faxed, will be available electronically in Epic.    Type of Anesthesia Anticipated: General    Assessment & Plan     The proposed surgical procedure is considered INTERMEDIATE risk.    Preop general physical exam    - Basic metabolic panel  (Ca, Cl, CO2, Creat, Gluc, K, Na, BUN); Future    Obstruction of right ureteropelvic junction (UPJ) due to stone    - Basic metabolic panel  (Ca, Cl, CO2, Creat, Gluc, K, Na, BUN); Future    Seizure (H)    - Basic metabolic panel  (Ca, Cl, CO2, Creat, Gluc, K, Na, BUN); Future    Hyperparathyroidism s/p surgery      PVC's (premature ventricular contractions)    - Basic metabolic panel  (Ca, Cl, CO2, Creat, Gluc, K, Na, BUN); Future         Risks and Recommendations:  The patient has the following additional risks and recommendations for perioperative complications:   - No identified additional risk factors other than previously addressed    Medication Instructions:  Patient is to take all scheduled medications on the day of surgery EXCEPT for modifications listed below:   - ibuprofen (Advil, Motrin): HOLD 1 day before surgery.    - naproxen (Aleve, Naprosyn): HOLD 4 days before surgery.     RECOMMENDATION:  APPROVAL GIVEN to  proceed with proposed procedure, without further diagnostic evaluation.                      Subjective     HPI related to upcoming procedure: right urterer stone     Preop Questions 7/14/2021   1. Have you ever had a heart attack or stroke? No   2. Have you ever had surgery on your heart or blood vessels, such as a stent placement, a coronary artery bypass, or surgery on an artery in your head, neck, heart, or legs? No   3. Do you have chest pain with activity? No   4. Do you have a history of  heart failure? No   5. Do you currently have a cold, bronchitis or symptoms of other infection? No   6. Do you have a cough, shortness of breath, or wheezing? No   7. Do you or anyone in your family have previous history of blood clots? No   8. Do you or does anyone in your family have a serious bleeding problem such as prolonged bleeding following surgeries or cuts? No   9. Have you ever had problems with anemia or been told to take iron pills? No   10. Have you had any abnormal blood loss such as black, tarry or bloody stools, or abnormal vaginal bleeding? No   11. Have you ever had a blood transfusion? No   12. Are you willing to have a blood transfusion if it is medically needed before, during, or after your surgery? Yes   13. Have you or any of your relatives ever had problems with anesthesia? No   14. Do you have sleep apnea, excessive snoring or daytime drowsiness? No   15. Do you have any artifical heart valves or other implanted medical devices like a pacemaker, defibrillator, or continuous glucose monitor? No   16. Do you have artificial joints? No   17. Are you allergic to latex? No   18. Is there any chance that you may be pregnant? No       Health Care Directive:  Patient does not have a Health Care Directive or Living Will:     Preoperative Review of :   reviewed - controlled substances reflected in medication list.      Status of Chronic Conditions:  See problem list for active medical problems.   Problems all longstanding and stable, except as noted/documented.  See ROS for pertinent symptoms related to these conditions.      Review of Systems  CONSTITUTIONAL: NEGATIVE for fever, chills, change in weight  INTEGUMENTARY/SKIN: NEGATIVE for worrisome rashes, moles or lesions  EYES: NEGATIVE for vision changes or irritation  ENT/MOUTH: NEGATIVE for ear, mouth and throat problems  RESP: NEGATIVE for significant cough or SOB  CV: NEGATIVE for chest pain, palpitations or peripheral edema  GI: NEGATIVE for nausea, abdominal pain, heartburn, or change in bowel habits  PSYCHIATRIC: NEGATIVE for changes in mood or affect  ROS otherwise negative    Patient Active Problem List    Diagnosis Date Noted     Obstruction of right ureteropelvic junction (UPJ) due to stone 07/08/2021     Priority: Medium     Added automatically from request for surgery [id number]       Chemotherapy management, encounter for 04/09/2020     Priority: Medium     High risk for chemotherapy-induced infectious complication 04/09/2020     Priority: Medium     Acute left-sided low back pain without sciatica 01/22/2020     Priority: Medium     Hip pain, left 01/22/2020     Priority: Medium     Primary osteoarthritis of left knee 10/15/2018     Priority: Medium     S/P laparoscopic hysterectomy 06/15/2018     Priority: Medium     Oligodendroglioma (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     Priority: Medium     Nephrolithiasis 05/05/2017     Priority: Medium     Overview:   S/P LITHOTRIPSY 3/15/11       Advanced directives, counseling/discussion 03/24/2017     Priority: Medium     Lumbar radiculopathy 09/08/2016     Priority: Medium     Palpitations 06/05/2014     Priority: Medium     PVC's (premature ventricular contractions) 06/05/2014     Priority: Medium     Elevated cholesterol 06/05/2014     Priority: Medium     Abnormal screening cardiac CT 06/05/2014     Priority: Medium     Moderate major depression, single episode (H) 03/06/2013     Priority: Medium     Seizure (H)  08/17/2011     Priority: Medium     Calculus of kidney 01/12/2011     Priority: Medium     Overview:   S/P LITHOTRIPSY 3/15/11       Hyperparathyroidism s/p surgery 01/11/2011     Priority: Medium     Allergic rhinitis 12/09/2009     Priority: Medium     Osteoarthritis 12/09/2009     Priority: Medium     GERD (gastroesophageal reflux disease) 10/22/2008     Priority: Medium      Past Medical History:   Diagnosis Date     Acute cystitis with hematuria 10/24/2020     Allergic rhinitis 12/9/2009     Calculus of kidney 1/12/2011    Overview:  S/P LITHOTRIPSY 3/15/11      Esophageal reflux 10/22/2008     Hyperparathyroidism 01/11/2011    had surgery for it; resulted in seizures     Moderate major depression, single episode (H)      oligodendroglioma 7/23/2012     Osteoarthrosis 12/9/2009     Palpitations 6/5/2014     PONV (postoperative nausea and vomiting)      PVCs 6/5/2014     Seizure disorder (related to tumor) 08/17/2011     Past Surgical History:   Procedure Laterality Date     CRANIOTOMY  2011    tumor resection     EXTRACORPOREAL SHOCK WAVE LITHOTRIPSY, CYSTOSCOPY, INSERT STENT URETER(S), COMBINED Bilateral 5/5/2017    Procedure: COMBINED EXTRACORPOREAL SHOCK WAVE LITHOTRIPSY, CYSTOSCOPY, INSERT STENT URETER(S);  CYSTO, URETHRAL DILATION, URETERAL LEFT STENT PLACEMENT, LEFT ESWL.;  Surgeon: Angel Del Rio MD;  Location:  OR     FOOT SURGERY      mulitple     HYSTERECTOMY, PAP NO LONGER INDICATED  2008    lap hys     LITHOTRIPSY  2010 2017     OPTICAL TRACKING SYSTEM CRANIOTOMY, EXCISE TUMOR WITH MAPPING, COMBINED Right 5/30/2018    Procedure: COMBINED OPTICAL TRACKING SYSTEM CRANIOTOMY, EXCISE TUMOR WITH MAPPING;  Right Stealth Assisted Craniotomy With Motor Mapping And Tumor Resection ;  Surgeon: Dustin Rodriguez MD;  [facility]     ORTHOPEDIC SURGERY      feet, multiple on both     OSTEOTOMY FOOT Right 1/15/2019    Procedure: OSTEOTOMY FOOT;  Surgeon: Benjamin Griffin DPM;  Location:  OR      PARATHYROIDECTOMY  2011     RECONSTRUCT FOREFOOT WITH METATARSOPHALANGEAL (MTP) FUSION Right 1/15/2019    Procedure: RIGHT FIRST METATARSAL PHALAGEAL JOINT ARTHRODESIS, RIGHT SECOND METATARSAL WEIL OSTEOTOMY;  Surgeon: Benjamin Griffin DPM;  Location:  OR     Current Outpatient Medications   Medication Sig Dispense Refill     fexofenadine (ALLEGRA) 180 MG tablet Take 180 mg by mouth daily       gabapentin (NEURONTIN) 100 MG capsule Take 3 capsules (300 mg) by mouth every evening 90 capsule 0     ibuprofen (ADVIL/MOTRIN) 200 MG tablet Take 800 mg by mouth daily as needed        Lactase (LACTAID PO) Take 1-2 tablets by mouth daily as needed for indigestion        lamoTRIgine (LAMICTAL) 100 MG tablet 100 mg am and pm (take along with 200 mg tablet for total of 300 mg twice a day). [facility] mft only 180 tablet 3     lamoTRIgine (LAMICTAL) 200 MG tablet 200 mg twice a day (take along with 100 mg tablet for total of 300 mg twice a day). [facility] mft only 180 tablet 3     levETIRAcetam (KEPPRA) 500 MG tablet 1500 mg am and 1250 mg pm 495 tablet 3     metoprolol tartrate (LOPRESSOR) 25 MG tablet TAKE 1 TABLET BY MOUTH 2 TIMES DAILY 180 tablet 3     Multiple Vitamin (MULTI-VITAMINS) TABS Take 1 chew tab by mouth daily        tamsulosin (FLOMAX) 0.4 MG capsule Take 1 capsule (0.4 mg) by mouth daily 30 capsule 0     tretinoin (RETIN-A) 0.025 % external cream Apply a pea-sized amount to each area affected by acne. Start every 3-4 nights working up to every night. 45 g 0     VITAMIN D, CHOLECALCIFEROL, PO Take 1,000 Units by mouth daily          Allergies   Allergen Reactions     Sulfa Drugs Hives     Benoxinate Nausea and Vomiting        Social History     Tobacco Use     Smoking status: Never Smoker     Smokeless tobacco: Never Used   Substance Use Topics     Alcohol use: Yes     Comment: social       History   Drug Use No         Objective     /66   Pulse 62   Temp 98.6  F (37  C) (Tympanic)   Ht 1.575 m  "(5' 2\")   Wt 66.7 kg (147 lb)   LMP  (LMP Unknown)   SpO2 100%   Breastfeeding No   BMI 26.89 kg/m      Physical Exam  GENERAL APPEARANCE: healthy, alert and no distress  HENT: ear canals and TM's normal and nose and mouth without ulcers or lesions  RESP: lungs clear to auscultation - no rales, rhonchi or wheezes  CV: regular rate and rhythm, normal S1 S2, no S3 or S4 and no murmur, click or rub   MS: extremities normal- no gross deformities noted  SKIN: no suspicious lesions or rashes  PSYCH: mentation appears normal and affect normal/bright    Recent Labs   Lab Test 03/15/21  1140 01/12/21  1142   HGB 13.1 13.4    166    137   POTASSIUM 3.6 3.9   CR 0.66 0.67        Diagnostics:  Labs pending at this time.  Results will be reviewed when available.   No EKG required, no history of coronary heart disease, significant arrhythmia, peripheral arterial disease or other structural heart disease.    Revised Cardiac Risk Index (RCRI):  The patient has the following serious cardiovascular risks for perioperative complications:   - No serious cardiac risks = 0 points     RCRI Interpretation: 0 points: Class I (very low risk - 0.4% complication rate)           Signed Electronically by: Arianne Haas PA-C  Copy of this evaluation report is provided to requesting physician.      "

## 2021-07-16 ENCOUNTER — LAB (OUTPATIENT)
Dept: LAB | Facility: CLINIC | Age: 61
End: 2021-07-16
Payer: COMMERCIAL

## 2021-07-16 DIAGNOSIS — Z11.59 ENCOUNTER FOR SCREENING FOR OTHER VIRAL DISEASES: ICD-10-CM

## 2021-07-16 PROCEDURE — U0005 INFEC AGEN DETEC AMPLI PROBE: HCPCS | Mod: 90 | Performed by: PATHOLOGY

## 2021-07-16 PROCEDURE — U0003 INFECTIOUS AGENT DETECTION BY NUCLEIC ACID (DNA OR RNA); SEVERE ACUTE RESPIRATORY SYNDROME CORONAVIRUS 2 (SARS-COV-2) (CORONAVIRUS DISEASE [COVID-19]), AMPLIFIED PROBE TECHNIQUE, MAKING USE OF HIGH THROUGHPUT TECHNOLOGIES AS DESCRIBED BY CMS-2020-01-R: HCPCS | Mod: 90 | Performed by: PATHOLOGY

## 2021-07-17 LAB — SARS-COV-2 RNA RESP QL NAA+PROBE: NEGATIVE

## 2021-07-19 ENCOUNTER — HOSPITAL ENCOUNTER (OUTPATIENT)
Dept: CT IMAGING | Facility: CLINIC | Age: 61
Discharge: HOME | End: 2021-07-19
Attending: PHYSICIAN ASSISTANT | Admitting: PHYSICIAN ASSISTANT
Payer: COMMERCIAL

## 2021-07-19 DIAGNOSIS — R10.9 ACUTE RIGHT FLANK PAIN: ICD-10-CM

## 2021-07-19 DIAGNOSIS — N20.0 CALCULUS OF KIDNEY: ICD-10-CM

## 2021-07-19 PROCEDURE — 74176 CT ABD & PELVIS W/O CONTRAST: CPT

## 2021-07-20 ENCOUNTER — ANESTHESIA EVENT (OUTPATIENT)
Dept: SURGERY | Facility: CLINIC | Age: 61
End: 2021-07-20
Payer: COMMERCIAL

## 2021-07-20 ENCOUNTER — HOSPITAL ENCOUNTER (OUTPATIENT)
Facility: CLINIC | Age: 61
Discharge: HOME | End: 2021-07-20
Attending: STUDENT IN AN ORGANIZED HEALTH CARE EDUCATION/TRAINING PROGRAM | Admitting: STUDENT IN AN ORGANIZED HEALTH CARE EDUCATION/TRAINING PROGRAM
Payer: COMMERCIAL

## 2021-07-20 ENCOUNTER — ANESTHESIA (OUTPATIENT)
Dept: SURGERY | Facility: CLINIC | Age: 61
End: 2021-07-20
Payer: COMMERCIAL

## 2021-07-20 ENCOUNTER — APPOINTMENT (OUTPATIENT)
Dept: GENERAL RADIOLOGY | Facility: CLINIC | Age: 61
End: 2021-07-20
Attending: STUDENT IN AN ORGANIZED HEALTH CARE EDUCATION/TRAINING PROGRAM
Payer: COMMERCIAL

## 2021-07-20 ENCOUNTER — TELEPHONE (OUTPATIENT)
Dept: UROLOGY | Facility: CLINIC | Age: 61
End: 2021-07-20

## 2021-07-20 ENCOUNTER — PREP FOR PROCEDURE (OUTPATIENT)
Dept: UROLOGY | Facility: CLINIC | Age: 61
End: 2021-07-20

## 2021-07-20 VITALS
HEART RATE: 70 BPM | DIASTOLIC BLOOD PRESSURE: 82 MMHG | SYSTOLIC BLOOD PRESSURE: 136 MMHG | HEIGHT: 62 IN | TEMPERATURE: 96.1 F | WEIGHT: 145.8 LBS | RESPIRATION RATE: 16 BRPM | OXYGEN SATURATION: 96 % | BODY MASS INDEX: 26.83 KG/M2

## 2021-07-20 DIAGNOSIS — N20.1 OBSTRUCTION OF RIGHT URETEROPELVIC JUNCTION (UPJ) DUE TO STONE: ICD-10-CM

## 2021-07-20 DIAGNOSIS — N20.0 RIGHT RENAL STONE: Primary | ICD-10-CM

## 2021-07-20 DIAGNOSIS — N20.0 CALCULUS OF KIDNEY: Primary | ICD-10-CM

## 2021-07-20 PROCEDURE — 710N000009 HC RECOVERY PHASE 1, LEVEL 1, PER MIN: Performed by: STUDENT IN AN ORGANIZED HEALTH CARE EDUCATION/TRAINING PROGRAM

## 2021-07-20 PROCEDURE — 370N000017 HC ANESTHESIA TECHNICAL FEE, PER MIN: Performed by: STUDENT IN AN ORGANIZED HEALTH CARE EDUCATION/TRAINING PROGRAM

## 2021-07-20 PROCEDURE — 52341 CYSTO W/URETER STRICTURE TX: CPT | Mod: RT | Performed by: STUDENT IN AN ORGANIZED HEALTH CARE EDUCATION/TRAINING PROGRAM

## 2021-07-20 PROCEDURE — 272N000001 HC OR GENERAL SUPPLY STERILE: Performed by: STUDENT IN AN ORGANIZED HEALTH CARE EDUCATION/TRAINING PROGRAM

## 2021-07-20 PROCEDURE — C1769 GUIDE WIRE: HCPCS | Performed by: STUDENT IN AN ORGANIZED HEALTH CARE EDUCATION/TRAINING PROGRAM

## 2021-07-20 PROCEDURE — C1894 INTRO/SHEATH, NON-LASER: HCPCS | Performed by: STUDENT IN AN ORGANIZED HEALTH CARE EDUCATION/TRAINING PROGRAM

## 2021-07-20 PROCEDURE — C2617 STENT, NON-COR, TEM W/O DEL: HCPCS | Performed by: STUDENT IN AN ORGANIZED HEALTH CARE EDUCATION/TRAINING PROGRAM

## 2021-07-20 PROCEDURE — 52332 CYSTOSCOPY AND TREATMENT: CPT | Mod: RT | Performed by: STUDENT IN AN ORGANIZED HEALTH CARE EDUCATION/TRAINING PROGRAM

## 2021-07-20 PROCEDURE — C1758 CATHETER, URETERAL: HCPCS | Performed by: STUDENT IN AN ORGANIZED HEALTH CARE EDUCATION/TRAINING PROGRAM

## 2021-07-20 PROCEDURE — C1726 CATH, BAL DIL, NON-VASCULAR: HCPCS | Performed by: STUDENT IN AN ORGANIZED HEALTH CARE EDUCATION/TRAINING PROGRAM

## 2021-07-20 PROCEDURE — 250N000009 HC RX 250: Performed by: NURSE ANESTHETIST, CERTIFIED REGISTERED

## 2021-07-20 PROCEDURE — 255N000002 HC RX 255 OP 636: Performed by: STUDENT IN AN ORGANIZED HEALTH CARE EDUCATION/TRAINING PROGRAM

## 2021-07-20 PROCEDURE — 272N000002 HC OR SUPPLY OTHER OPNP: Performed by: STUDENT IN AN ORGANIZED HEALTH CARE EDUCATION/TRAINING PROGRAM

## 2021-07-20 PROCEDURE — 360N000083 HC SURGERY LEVEL 3 W/ FLUORO, PER MIN: Performed by: STUDENT IN AN ORGANIZED HEALTH CARE EDUCATION/TRAINING PROGRAM

## 2021-07-20 PROCEDURE — 710N000012 HC RECOVERY PHASE 2, PER MINUTE: Performed by: STUDENT IN AN ORGANIZED HEALTH CARE EDUCATION/TRAINING PROGRAM

## 2021-07-20 PROCEDURE — 258N000001 HC RX 258: Performed by: STUDENT IN AN ORGANIZED HEALTH CARE EDUCATION/TRAINING PROGRAM

## 2021-07-20 PROCEDURE — 74420 UROGRAPHY RTRGR +-KUB: CPT | Mod: 26 | Performed by: STUDENT IN AN ORGANIZED HEALTH CARE EDUCATION/TRAINING PROGRAM

## 2021-07-20 PROCEDURE — 258N000003 HC RX IP 258 OP 636: Performed by: NURSE ANESTHETIST, CERTIFIED REGISTERED

## 2021-07-20 PROCEDURE — 999N000141 HC STATISTIC PRE-PROCEDURE NURSING ASSESSMENT: Performed by: STUDENT IN AN ORGANIZED HEALTH CARE EDUCATION/TRAINING PROGRAM

## 2021-07-20 PROCEDURE — 250N000011 HC RX IP 250 OP 636: Performed by: NURSE ANESTHETIST, CERTIFIED REGISTERED

## 2021-07-20 PROCEDURE — 250N000011 HC RX IP 250 OP 636: Performed by: STUDENT IN AN ORGANIZED HEALTH CARE EDUCATION/TRAINING PROGRAM

## 2021-07-20 PROCEDURE — 999N000179 XR SURGERY CARM FLUORO LESS THAN 5 MIN W STILLS: Mod: TC

## 2021-07-20 PROCEDURE — 250N000009 HC RX 250: Performed by: STUDENT IN AN ORGANIZED HEALTH CARE EDUCATION/TRAINING PROGRAM

## 2021-07-20 DEVICE — STENT URETERAL POLARIS ULTRA 6FRX22CM M0061921310
Type: IMPLANTABLE DEVICE | Site: URETER | Status: NON-FUNCTIONAL
Removed: 2021-08-03

## 2021-07-20 RX ORDER — ONDANSETRON 4 MG/1
4 TABLET, ORALLY DISINTEGRATING ORAL EVERY 30 MIN PRN
Status: DISCONTINUED | OUTPATIENT
Start: 2021-07-20 | End: 2021-07-20 | Stop reason: HOSPADM

## 2021-07-20 RX ORDER — ONDANSETRON 2 MG/ML
INJECTION INTRAMUSCULAR; INTRAVENOUS PRN
Status: DISCONTINUED | OUTPATIENT
Start: 2021-07-20 | End: 2021-07-20

## 2021-07-20 RX ORDER — EPHEDRINE SULFATE 50 MG/ML
INJECTION, SOLUTION INTRAMUSCULAR; INTRAVENOUS; SUBCUTANEOUS PRN
Status: DISCONTINUED | OUTPATIENT
Start: 2021-07-20 | End: 2021-07-20

## 2021-07-20 RX ORDER — HYDROMORPHONE HCL IN WATER/PF 6 MG/30 ML
0.2 PATIENT CONTROLLED ANALGESIA SYRINGE INTRAVENOUS EVERY 5 MIN PRN
Status: DISCONTINUED | OUTPATIENT
Start: 2021-07-20 | End: 2021-07-20 | Stop reason: HOSPADM

## 2021-07-20 RX ORDER — OXYBUTYNIN CHLORIDE 5 MG/1
5 TABLET, EXTENDED RELEASE ORAL DAILY
Qty: 21 TABLET | Refills: 0 | Status: SHIPPED | OUTPATIENT
Start: 2021-07-20 | End: 2021-08-10

## 2021-07-20 RX ORDER — LABETALOL HYDROCHLORIDE 5 MG/ML
10 INJECTION, SOLUTION INTRAVENOUS
Status: DISCONTINUED | OUTPATIENT
Start: 2021-07-20 | End: 2021-07-20 | Stop reason: HOSPADM

## 2021-07-20 RX ORDER — GLYCOPYRROLATE 0.2 MG/ML
INJECTION, SOLUTION INTRAMUSCULAR; INTRAVENOUS PRN
Status: DISCONTINUED | OUTPATIENT
Start: 2021-07-20 | End: 2021-07-20

## 2021-07-20 RX ORDER — CEFAZOLIN SODIUM 2 G/50ML
2 SOLUTION INTRAVENOUS SEE ADMIN INSTRUCTIONS
Status: CANCELLED | OUTPATIENT
Start: 2021-07-20

## 2021-07-20 RX ORDER — CEFAZOLIN SODIUM 2 G/100ML
2 INJECTION, SOLUTION INTRAVENOUS SEE ADMIN INSTRUCTIONS
Status: DISCONTINUED | OUTPATIENT
Start: 2021-07-20 | End: 2021-07-20 | Stop reason: HOSPADM

## 2021-07-20 RX ORDER — DEXAMETHASONE SODIUM PHOSPHATE 4 MG/ML
INJECTION, SOLUTION INTRA-ARTICULAR; INTRALESIONAL; INTRAMUSCULAR; INTRAVENOUS; SOFT TISSUE PRN
Status: DISCONTINUED | OUTPATIENT
Start: 2021-07-20 | End: 2021-07-20

## 2021-07-20 RX ORDER — CEFAZOLIN SODIUM 2 G/100ML
2 INJECTION, SOLUTION INTRAVENOUS
Status: COMPLETED | OUTPATIENT
Start: 2021-07-20 | End: 2021-07-20

## 2021-07-20 RX ORDER — FENTANYL CITRATE 50 UG/ML
INJECTION, SOLUTION INTRAMUSCULAR; INTRAVENOUS PRN
Status: DISCONTINUED | OUTPATIENT
Start: 2021-07-20 | End: 2021-07-20

## 2021-07-20 RX ORDER — DEXAMETHASONE SODIUM PHOSPHATE 10 MG/ML
INJECTION, SOLUTION INTRAMUSCULAR; INTRAVENOUS PRN
Status: DISCONTINUED | OUTPATIENT
Start: 2021-07-20 | End: 2021-07-20

## 2021-07-20 RX ORDER — CEFAZOLIN SODIUM 2 G/50ML
2 SOLUTION INTRAVENOUS
Status: CANCELLED | OUTPATIENT
Start: 2021-07-20

## 2021-07-20 RX ORDER — ONDANSETRON 2 MG/ML
4 INJECTION INTRAMUSCULAR; INTRAVENOUS EVERY 30 MIN PRN
Status: DISCONTINUED | OUTPATIENT
Start: 2021-07-20 | End: 2021-07-20 | Stop reason: HOSPADM

## 2021-07-20 RX ORDER — SODIUM CHLORIDE, SODIUM LACTATE, POTASSIUM CHLORIDE, CALCIUM CHLORIDE 600; 310; 30; 20 MG/100ML; MG/100ML; MG/100ML; MG/100ML
INJECTION, SOLUTION INTRAVENOUS CONTINUOUS PRN
Status: DISCONTINUED | OUTPATIENT
Start: 2021-07-20 | End: 2021-07-20

## 2021-07-20 RX ORDER — SODIUM CHLORIDE, SODIUM LACTATE, POTASSIUM CHLORIDE, CALCIUM CHLORIDE 600; 310; 30; 20 MG/100ML; MG/100ML; MG/100ML; MG/100ML
INJECTION, SOLUTION INTRAVENOUS CONTINUOUS
Status: DISCONTINUED | OUTPATIENT
Start: 2021-07-20 | End: 2021-07-20 | Stop reason: HOSPADM

## 2021-07-20 RX ORDER — LIDOCAINE HYDROCHLORIDE 10 MG/ML
INJECTION, SOLUTION INFILTRATION; PERINEURAL PRN
Status: DISCONTINUED | OUTPATIENT
Start: 2021-07-20 | End: 2021-07-20

## 2021-07-20 RX ORDER — HYDRALAZINE HYDROCHLORIDE 20 MG/ML
2.5-5 INJECTION INTRAMUSCULAR; INTRAVENOUS EVERY 10 MIN PRN
Status: DISCONTINUED | OUTPATIENT
Start: 2021-07-20 | End: 2021-07-20 | Stop reason: HOSPADM

## 2021-07-20 RX ORDER — PROPOFOL 10 MG/ML
INJECTION, EMULSION INTRAVENOUS PRN
Status: DISCONTINUED | OUTPATIENT
Start: 2021-07-20 | End: 2021-07-20

## 2021-07-20 RX ORDER — FENTANYL CITRATE 50 UG/ML
50 INJECTION, SOLUTION INTRAMUSCULAR; INTRAVENOUS EVERY 5 MIN PRN
Status: DISCONTINUED | OUTPATIENT
Start: 2021-07-20 | End: 2021-07-20 | Stop reason: HOSPADM

## 2021-07-20 RX ADMIN — SODIUM CHLORIDE, POTASSIUM CHLORIDE, SODIUM LACTATE AND CALCIUM CHLORIDE: 600; 310; 30; 20 INJECTION, SOLUTION INTRAVENOUS at 16:13

## 2021-07-20 RX ADMIN — FENTANYL CITRATE 100 MCG: 50 INJECTION, SOLUTION INTRAMUSCULAR; INTRAVENOUS at 16:20

## 2021-07-20 RX ADMIN — GLYCOPYRROLATE 0.2 MG: 0.2 INJECTION, SOLUTION INTRAMUSCULAR; INTRAVENOUS at 16:21

## 2021-07-20 RX ADMIN — PROPOFOL 160 MG: 10 INJECTION, EMULSION INTRAVENOUS at 16:21

## 2021-07-20 RX ADMIN — Medication 5 MG: at 16:36

## 2021-07-20 RX ADMIN — ONDANSETRON HYDROCHLORIDE 4 MG: 2 INJECTION, SOLUTION INTRAVENOUS at 16:27

## 2021-07-20 RX ADMIN — CEFAZOLIN SODIUM 2 G: 2 INJECTION, SOLUTION INTRAVENOUS at 16:28

## 2021-07-20 RX ADMIN — DEXAMETHASONE SODIUM PHOSPHATE 4 MG: 4 INJECTION, SOLUTION INTRA-ARTICULAR; INTRALESIONAL; INTRAMUSCULAR; INTRAVENOUS; SOFT TISSUE at 16:21

## 2021-07-20 RX ADMIN — FENTANYL CITRATE 50 MCG: 50 INJECTION, SOLUTION INTRAMUSCULAR; INTRAVENOUS at 17:11

## 2021-07-20 RX ADMIN — LIDOCAINE HYDROCHLORIDE 50 MG: 10 INJECTION, SOLUTION INFILTRATION; PERINEURAL at 16:21

## 2021-07-20 RX ADMIN — MIDAZOLAM 1 MG: 1 INJECTION INTRAMUSCULAR; INTRAVENOUS at 16:14

## 2021-07-20 ASSESSMENT — ENCOUNTER SYMPTOMS
DYSRHYTHMIAS: 0
SEIZURES: 1

## 2021-07-20 ASSESSMENT — MIFFLIN-ST. JEOR: SCORE: 1189.59

## 2021-07-20 NOTE — DISCHARGE INSTRUCTIONS
" Activity  - Do not strain with bowel movements.  - Do not drive until you can press the brake pedal quickly and fully without pain.   - Do not operate a motor vehicle while taking narcotic pain medications.     Stents  - You have a right ureteral stent in place. Stents can cause some discomfort, including some blood in your urine (which is normal), the feeling or urge to urinate frequently, burning with urination and pressure/discomfort in your back while voiding. Stay well hydrated to keep your urine as clear as possible.    Medications  - Flomax - to decrease stent discomfort   - Transition from narcotic pain medications to tylenol (acetaminophen) as you are able.  Wean yourself off all pain medications as you are able.  - Some pain medications contain both tylenol (acetaminophen) and a narcotic (Norco, vicodin, percocet), do not take more than 4,000mg of Tylenol (acetaminophen) from all sources in any 24 hour period.  - Narcotics can make you constipated.  Take over the counter fiber (metamucil or benefiber) and stool softeners (miralax, docusate or senna) while taking narcotic pain medications, but stop if you develop diarrhea.  - No driving or operating machinery while taking narcotic pain medications     Follow-Up:  - We will schedule a follow up procedure for the stone and the clinic will get in touch with you regarding a new date.  - Call your primary care provider to touch base regarding your recent procedure.  - Call or return sooner than your regularly scheduled visit if you develop any of the following: fever (greater than 101.5), uncontrolled pain, uncontrolled nausea or vomiting, as well as increased redness, swelling, or drainage from your wound.     Phone numbers:   - Monday through Friday 8am to 4:30pm: Call [phone] with questions or to schedule or confirm appointment.    - Nights or weekends: call the after hours emergency at [facility] and tell the  \"I would like to page the " "Urology  on call.\"  - For emergencies, call 911      CYSTOSCOPY DISCHARGE INSTRUCTIONS  [facility]  [address], [address]  [phone]    YOU MAY GO BACK TO YOUR NORMAL DIET AND ACTIVITY, UNLESS YOUR DOCTOR TELLS YOU NOT TO.    FOR THE NEXT TWO DAYS, YOU MAY NOTICE:    SOME BLOOD IN YOUR URINE.  SOME BURNING WHEN YOU URINATE.  AN URGE TO URINATE MORE OFTEN.  BLADDER SPASMS.    THESE ARE NORMAL AFTER THE PROCEDURE.  THEY SHOULD GO AWAY AFTER A DAY OR TWO.  TO RELIEVE THESE PROBLEMS:     DRINK 6 TO 8 LARGE GLASSES OF WATER EACH DAY (INCLUDES DRINKS AT MEALS).  THIS WILL HELP CLEAR THE URINE.    TAKE WARM BATHS TO RELIEVE PAIN AND BLADDER SPASMS.  DO NOT ADD ANYTHING TO THE BATH WATER.    YOUR DOCTOR MAY PRESCRIBE PAIN MEDICINE.  YOU MAY ALSO TAKE TYLENOL (ACETAMINOPHEN) FOR PAIN.    CALL YOUR SURGEON IF YOU HAVE:    A FEVER OVER 101 DEGREES.  CHECK YOUR TEMPERATURE UNDER YOUR TONGUE.    CHILLS.    FAILURE TO URINATE (NO URINE COMES OUT WHEN YOU TRY TO USE THE TOILET).  TRY SOAKING IN A BATHTUB FULL OF WARM WATER.  IF STILL NO URINE, CALL YOUR DOCTOR.    A LOT OF BLOOD IN THE URINE, OR BLOOD CLOTS LARGER THAN A NICKEL.      PAIN IN THE BACK OR BELLY AREA (ABDOMEN).    PAIN OR SPASMS THAT ARE NOT RELIEVED BY WARM TUB BATHS AND PAIN MEDICINE.      SEVERE PAIN, BURNING OR OTHER PROBLEMS WHILE PASSING URINE.    PAIN THAT GETS WORSE AFTER TWO DAYS.        STENT INFORMATION/DISCHARGE INSTRUCTIONS  [facility]  [address]  [phone]    During surgery, a stent may be placed in the ureter.  The ureter is the tube that drains urine from the kidney to the bladder.  The stent is placed to dilate (open) the ureter so stone fragments can pass easily through the ureter or to decrease ureteral swelling after surgery or to relieve an obstruction.      The stent is made of silicone.  The upper end of the stent curls in the kidney while the lower end " rests in the bladder.    While the stent is in place you may experience the following symptoms:  Blood and/or small blood clots in the urine  Bladder spasms (frequency and urgency of urination)  Discomfort or aching in the back or side where the stent is  Burning or discomfort at the end of urine stream    To decrease these symptoms you should:  Take antispasmodic medication as prescribed (Detrol, Ditropan, etc.)  Drink plenty of fluids but avoid caffeine and citrus (include cranberry)  If you are having discomfort in back or side, decrease activity    Please call your physician or the physician on call if you experience:  Fever greater than 101 degrees  Severe pain not relieved by pain medication or rest    Please make an appointment for the removal of the stent according to your physician's instructions.        GENERAL ANESTHESIA OR SEDATION ADULT DISCHARGE INSTRUCTIONS   SPECIAL PRECAUTIONS FOR 24 HOURS AFTER SURGERY    IT IS NOT UNUSUAL TO FEEL LIGHT-HEADED OR FAINT, UP TO 24 HOURS AFTER SURGERY OR WHILE TAKING PAIN MEDICATION.  IF YOU HAVE THESE SYMPTOMS; SIT FOR A FEW MINUTES BEFORE STANDING AND HAVE SOMEONE ASSIST YOU WHEN YOU GET UP TO WALK OR USE THE BATHROOM.    YOU SHOULD REST AND RELAX FOR THE NEXT 24 HOURS AND YOU MUST MAKE ARRANGEMENTS TO HAVE SOMEONE STAY WITH YOU FOR AT LEAST 24 HOURS AFTER YOUR DISCHARGE.  AVOID HAZARDOUS AND STRENUOUS ACTIVITIES.  DO NOT MAKE IMPORTANT DECISIONS FOR 24 HOURS.    DO NOT DRIVE ANY VEHICLE OR OPERATE MECHANICAL EQUIPMENT FOR 24 HOURS FOLLOWING THE END OF YOUR SURGERY.  EVEN THOUGH YOU MAY FEEL NORMAL, YOUR REACTIONS MAY BE AFFECTED BY THE MEDICATION YOU HAVE RECEIVED.    DO NOT DRINK ALCOHOLIC BEVERAGES FOR 24 HOURS FOLLOWING YOUR SURGERY.    DRINK CLEAR LIQUIDS (APPLE JUICE, GINGER ALE, 7-UP, BROTH, ETC.).  PROGRESS TO YOUR REGULAR DIET AS YOU FEEL ABLE.    YOU MAY HAVE A DRY MOUTH, A SORE THROAT, MUSCLES ACHES OR TROUBLE SLEEPING.  THESE SHOULD GO AWAY AFTER 24  HOURS.    CALL YOUR DOCTOR FOR ANY OF THE FOLLOWING:  SIGNS OF INFECTION (FEVER, GROWING TENDERNESS AT THE SURGERY SITE, A LARGE AMOUNT OF DRAINAGE OR BLEEDING, SEVERE PAIN, FOUL-SMELLING DRAINAGE, REDNESS OR SWELLING.    IT HAS BEEN OVER 8 TO 10 HOURS SINCE SURGERY AND YOU ARE STILL NOT ABLE TO URINATE (PASS WATER).

## 2021-07-20 NOTE — ANESTHESIA PREPROCEDURE EVALUATION
Anesthesia Pre-Procedure Evaluation    Patient: Barbi Tiwari   MRN: [id number] : 1960        Preoperative Diagnosis: Obstruction of right ureteropelvic junction (UPJ) due to stone [N20.1]   Procedure : Procedure(s):  Cystoureteroscopy with right retrograde pyelogram, holmium laser lithotripsy of right ureteral calculus and right ureteral stent insertion     Past Medical History:   Diagnosis Date     Acute cystitis with hematuria 10/24/2020     Allergic rhinitis 2009     Calculus of kidney 2011    Overview:  S/P LITHOTRIPSY 3/15/11      Esophageal reflux 10/22/2008     Hyperparathyroidism 2011    had surgery for it; resulted in seizures     Moderate major depression, single episode (H)      oligodendroglioma 2012     Osteoarthrosis 2009     Palpitations 2014     PONV (postoperative nausea and vomiting)      PVCs 2014     Seizure disorder (related to tumor) 2011    last seizure 3/2021      Past Surgical History:   Procedure Laterality Date     CRANIOTOMY      tumor resection     EXTRACORPOREAL SHOCK WAVE LITHOTRIPSY, CYSTOSCOPY, INSERT STENT URETER(S), COMBINED Bilateral 2017    Procedure: COMBINED EXTRACORPOREAL SHOCK WAVE LITHOTRIPSY, CYSTOSCOPY, INSERT STENT URETER(S);  CYSTO, URETHRAL DILATION, URETERAL LEFT STENT PLACEMENT, LEFT ESWL.;  Surgeon: Angel Del Rio MD;  Location:  OR     FOOT SURGERY      mulitple     HYSTERECTOMY, PAP NO LONGER INDICATED      lap hys     LITHOTRIPSY  2010     OPTICAL TRACKING SYSTEM CRANIOTOMY, EXCISE TUMOR WITH MAPPING, COMBINED Right 2018    Procedure: COMBINED OPTICAL TRACKING SYSTEM CRANIOTOMY, EXCISE TUMOR WITH MAPPING;  Right Stealth Assisted Craniotomy With Motor Mapping And Tumor Resection ;  Surgeon: Dustin Rodriguez MD;  Location: [facility] OR     ORTHOPEDIC SURGERY      feet, multiple on both     OSTEOTOMY FOOT Right 1/15/2019    Procedure: OSTEOTOMY FOOT;  Surgeon: Benjamin Griffin DPM;   Location: [facility] OR     PARATHYROIDECTOMY  2011     RECONSTRUCT FOREFOOT WITH METATARSOPHALANGEAL (MTP) FUSION Right 1/15/2019    Procedure: RIGHT FIRST METATARSAL PHALAGEAL JOINT ARTHRODESIS, RIGHT SECOND METATARSAL WEIL OSTEOTOMY;  Surgeon: Benjamin Griffin DPM;  Location: [facility] OR      Allergies   Allergen Reactions     Sulfa Drugs Hives     Benoxinate Nausea and Vomiting      Social History     Tobacco Use     Smoking status: Never Smoker     Smokeless tobacco: Never Used   Substance Use Topics     Alcohol use: Yes     Comment: social      Wt Readings from Last 1 Encounters:   07/20/21 66.1 kg (145 lb 12.8 oz)        Anesthesia Evaluation   Pt has had prior anesthetic. Type: General.    History of anesthetic complications  - PONV.      ROS/MED HX  ENT/Pulmonary:  - neg pulmonary ROS     Neurologic:     (+) seizures,     Cardiovascular:     (+) -----Irregular Heartbeat/Palpitations,  (-) arrhythmias   METS/Exercise Tolerance:     Hematologic: Comments: Lab Test        03/15/21     01/12/21     12/14/20     05/30/18     05/23/18     05/23/18                       1140          1142          1150          0626          0913          0913          WBC          4.1          3.9*         5.0           --            < >        5.9           HGB          13.1         13.4         13.5         14.4          --          15.0          MCV          94           97           97            --            < >        92            PLT          188          166          201           --            < >        225           INR           --           --           --          1.03          --          1.00           < > = values in this interval not displayed.                  Lab Test        07/14/21     03/15/21     01/12/21                       1502          1140          1142          NA           140          143          137           POTASSIUM    4.0          3.6          3.9           CHLORIDE     107          109          106            CO2          27           32           28            BUN          17           18           21            CR           0.71         0.66         0.67          ANIONGAP     6            2*           3             KIAN          8.9          9.2          9.4           GLC          121*         84           103*                Musculoskeletal:   (+) arthritis,     GI/Hepatic:     (+) GERD, Asymptomatic on medication,     Renal/Genitourinary: Comment: Obstruction of right ureteropelvic junction (UPJ) due to stone    (+) renal disease, type: ARF, Nephrolithiasis ,     Endo:       Psychiatric/Substance Use:     (+) psychiatric history depression     Infectious Disease:       Malignancy: Comment: oligodendroglioma      Other:            Physical Exam    Airway        Mallampati: I   TM distance: > 3 FB   Neck ROM: full   Mouth opening: > 3 cm    Respiratory Devices and Support         Dental  no notable dental history         Cardiovascular   cardiovascular exam normal          Pulmonary   pulmonary exam normal                OUTSIDE LABS:  CBC:   Lab Results   Component Value Date    WBC 4.1 03/15/2021    WBC 3.9 (L) 01/12/2021    HGB 13.1 03/15/2021    HGB 13.4 01/12/2021    HCT 41.1 03/15/2021    HCT 42.5 01/12/2021     03/15/2021     01/12/2021     BMP:   Lab Results   Component Value Date     07/14/2021     03/15/2021    POTASSIUM 4.0 07/14/2021    POTASSIUM 3.6 03/15/2021    CHLORIDE 107 07/14/2021    CHLORIDE 109 03/15/2021    CO2 27 07/14/2021    CO2 32 03/15/2021    BUN 17 07/14/2021    BUN 18 03/15/2021    CR 0.71 07/14/2021    CR 0.66 03/15/2021     (H) 07/14/2021    GLC 84 03/15/2021     COAGS:   Lab Results   Component Value Date    PTT 31 05/30/2018    INR 1.03 05/30/2018     POC:   Lab Results   Component Value Date    BGM 98 05/30/2018     HEPATIC:   Lab Results   Component Value Date    ALBUMIN 3.6 03/15/2021    PROTTOTAL 6.7 (L) 03/15/2021    ALT 25 03/15/2021     AST 15 03/15/2021    ALKPHOS 86 03/15/2021    BILITOTAL 0.2 03/15/2021     OTHER:   Lab Results   Component Value Date    LACT 0.9 05/11/2017    KIAN 8.9 07/14/2021    LIPASE 176 09/25/2014    TSH 0.66 03/24/2017       Anesthesia Plan    ASA Status:  3      Anesthesia Type: General.     - Airway: LMA   Induction: Intravenous, Propofol.   Maintenance: Balanced.        Consents    Anesthesia Plan(s) and associated risks, benefits, and realistic alternatives discussed. Questions answered and patient/representative(s) expressed understanding.     - Discussed with:  Patient      - Extended Intubation/Ventilatory Support Discussed: No.      - Patient is DNR/DNI Status: No    Use of blood products discussed: Yes.     - Discussed with: Patient.     - Consented: consented to blood products            Reason for refusal: other.     Postoperative Care    Pain management: IV analgesics.   PONV prophylaxis: Ondansetron (or other 5HT-3), Dexamethasone or Solumedrol     Comments:                Tonny Miles MD

## 2021-07-20 NOTE — ANESTHESIA CARE TRANSFER NOTE
Patient: Barbi Tiwari    Procedure(s):  Cystoscopy with right retrograde pyelogram, ureteral dilation, attempted ureteroscopy, holmium laser Stand-by and right ureteral stent insertion    Diagnosis: Obstruction of right ureteropelvic junction (UPJ) due to stone [N20.1]  Diagnosis Additional Information: No value filed.    Anesthesia Type:   General     Note:    Oropharynx: spontaneously breathing  Level of Consciousness: awake  Oxygen Supplementation: face mask    Independent Airway: airway patency satisfactory and stable  Dentition: dentition unchanged  Vital Signs Stable: post-procedure vital signs reviewed and stable  Report to RN Given: handoff report given  Patient transferred to: PACU  Comments: To PACU, report to RN, oxygen per face mask.        Vitals:  Vitals Value Taken Time   /67 07/20/21 1735   Temp 97  F (36.1  C) 07/20/21 1731   Pulse 80 07/20/21 1735   Resp 16 07/20/21 1735   SpO2 96 % 07/20/21 1736   Vitals shown include unvalidated device data.    Electronically Signed By: CAMDEN Gallardo CRNA  July 20, 2021  5:38 PM

## 2021-07-20 NOTE — OP NOTE
OPERATIVE NOTE    PREOPERATIVE DIAGNOSIS:  Right-sided Magalie; stone    POSTOPERATIVE DIAGNOSIS:  Same    PROCEDURES PERFORMED:   1. Cystourethroscopy  2. Attempted right ureteroscopy  3. Right  retrograde pyelogram with interpretation of intraoperative fluoroscopic imaging  4.Rt ureteral dilatation  5. Right ureteral stent placement      STAFF SURGEON:  Dr. Feilx Cano MD, present for the entire case.     ANESTHESIA:  General    ESTIMATED BLOOD LOSS: 1 cc  DRAINS/TUBES:  6 Bulgarian x 22cm double-J ureteral stent         IV FLUIDS:   Please see dictated anesthesia record  COMPLICATIONS:  None.   SPECIMEN:   none    SIGNIFICANT FINDINGS: narrow distal ureter. Did not allow anything beyond a 11 Fr inner sheath.Flexible ureteroscope was buckling with attempts at passage.Therefore ureteroscopy was not done and a decision was made to put in the stent.  Proximal curl of the ureteral stent seen in the renal pelvis under fluoroscopy and distal curl seen in the bladder fluoroscopically and under direct vision.     BRIEF OPERATIVE INDICATIONS:  Barbi Tiwari is a(n) 60 year old female with right renal stone.  After a discussion of all risks, benefits, and alternatives, the patient elected to proceed with definitive stone management. The patient understands the potential need for more than one procedure to eliminate all stone burden.     DESCRIPTION OF PROCEDURE:  After informed consent was obtained, the patient was transported to the operating room & placed supine on the table. Pneumoboots were applied.  After adequate anesthesia was induced, she was placed in lithotomy and prepped and draped in the usual sterile fashion. A timeout was taken to confirm correct patient, procedure and laterality. Pre-operative IV antibiotics were administered.     A 22-Bulgarian rigid cystoscope was inserted into a well-lubricated urethra. The anterior urethra was unremarkable and the right ureteral orifice was identified and cannulated with a  Sensor wire and 5 Chilean open-ended catheter. The wire passed without resistance into the upper pole and the 5-Chilean open ended catheter was removed after a retrograde pyelogram A semirigid ureteroscope was used and the ureteral orifice dilated passively. We then placed a second guide wire and tried to pass the Ureteral access sheath but it only allowed the inner sheath(11Fr) we then calibrated the Uretral opening upto 10 Fr but the 12 Fr calibrator could not be passed. Therefore we placed a balloon dilator and dilated it to 16 Fr with 30psi.Despite doing this the access sheath was not being able to be negotiated beyond the lower ureter. We then tried to pass the flexible ureteroscope but it was getting kinked in the same location. Therefore,  6 Fr 22 cm double-J stent was advanced over the Sensor wire, and a good proximal curl was seen in the renal pelvis fluoroscopically and the distal curl was seen in the bladder fluoroscopically and under direct vision. The bladder was drained.  The patient tolerated the procedure well and there were no apparent complications. The patient  was transported to the postanesthesia care unit in stable condition.     POST-OPERATIVE PLAN: Following recovery in the PACU, the patient will be discharged.  SHe will be rescheduled for a second procedure in a couple of weeks.    Felix Cano MD

## 2021-07-21 ENCOUNTER — TELEPHONE (OUTPATIENT)
Dept: UROLOGY | Facility: CLINIC | Age: 61
End: 2021-07-21

## 2021-07-21 ENCOUNTER — MYC MEDICAL ADVICE (OUTPATIENT)
Dept: UROLOGY | Facility: CLINIC | Age: 61
End: 2021-07-21

## 2021-07-21 DIAGNOSIS — Z11.59 ENCOUNTER FOR SCREENING FOR OTHER VIRAL DISEASES: ICD-10-CM

## 2021-07-21 NOTE — ANESTHESIA POSTPROCEDURE EVALUATION
Patient: Barbi Tiwari    Procedure(s):  Cystoscopy with right retrograde pyelogram, ureteral dilation, attempted ureteroscopy, holmium laser Stand-by and right ureteral stent insertion    Diagnosis:Obstruction of right ureteropelvic junction (UPJ) due to stone [N20.1]  Diagnosis Additional Information: No value filed.    Anesthesia Type:  General    Note:  Disposition: Inpatient   Postop Pain Control: Uneventful            Sign Out: Well controlled pain   PONV: No   Neuro/Psych: Uneventful            Sign Out: Acceptable/Baseline neuro status   Airway/Respiratory: Uneventful            Sign Out: Acceptable/Baseline resp. status   CV/Hemodynamics: Uneventful            Sign Out: Acceptable CV status; No obvious hypovolemia; No obvious fluid overload   Other NRE: NONE   DID A NON-ROUTINE EVENT OCCUR? No           Last vitals:  Vitals Value Taken Time   /83 07/20/21 1815   Temp 96.7  F (35.9  C) 07/20/21 1800   Pulse 81 07/20/21 1816   Resp 22 07/20/21 1816   SpO2 96 % 07/20/21 1816   Vitals shown include unvalidated device data.    Electronically Signed By: Dereck Hall MD  July 20, 2021  8:46 PM

## 2021-07-21 NOTE — TELEPHONE ENCOUNTER
[facility] Call Center    Phone Message    May a detailed message be left on voicemail: yes     Reason for Call: Symptoms or Concerns     If patient has red-flag symptoms, warm transfer to triage line    Current symptom or concern: right & abdominal pain - difficulty urinating - nausea    Symptoms have been present for: 10 hour(s)    Has patient previously been seen for this? No    By :     Date:     Are there any new or worsening symptoms? Yes: New      Action Taken: Message routed to:  Other:  Clinic    Travel Screening: Not Applicable

## 2021-07-21 NOTE — TELEPHONE ENCOUNTER
7-26-21  I called and LM for pt to call me back if needed.  I was following up with stent pain.  [facility]    ------------------------------------------------------------------  I CALLED AND SPOKE TO PT.  I REMINDED HER TO STAY AWAY FROM BLADDER IRRITANTS AND TO KEEP HER BOWELS MOVING.  PT SAYS SHE IS STAYING AWAY FROM THAT STUFF.  PT STATES SHE IS ON OXYBUTYNIN AND ITS NOT HELPING.  PT WANTS SOMETHING MORE FOR PAIN.  I FORWARDED THE MESSAGE TO SONNY.  [facility]      =====================================    [facility]  Phone Number: [phone]   Kishan Arango. I had my cysto Stent placement yesterday. I thought I could go without pain meds other than Tylenol so I didn't get a pre scription for some. I had severe pain overnight. I've taken all the meds that I had from urgent care and finished those this morning. Can I get a prescription for yesterday's procedure please? I would prefer dilaudid and oxycodone does not work for me. I've left a message for Dr Cano but he hasn't gotten back to me yet.

## 2021-07-21 NOTE — TELEPHONE ENCOUNTER
She is urinating ok but having stent discomfort. Taking OTC pain meds with relief . On Ditropan  Is wanting oxycodone sent  in  To her  [facility]

## 2021-07-21 NOTE — TELEPHONE ENCOUNTER
Recently had URS, HLL, stent placement.  reports wife in a lot of pain since surgery from stent, tolerated stent very poorly in past as well requiring removal after 3 days. Pain is predominantly in lower abdomen with dysuria. Minimal flank pain, no fevers, chills, any other issues.    Described this encounter is not a clinical encounter and no physical exam present, and that if any concern should come to ED. However, from conversation, findings do not seem acutely concerning. Could come into ED if pain uncontrolled (already taken oxybutynin, flomax, and hydrocodone). Also advised to come to ED if worsening pain, fevers, chills. They thanked me for call, and will come to ED if symptoms get worse.    Samaria Hernandez MD  PGY-3 Urology

## 2021-07-22 NOTE — TELEPHONE ENCOUNTER
Called her this am relayed katt's message that we don't prescribe narcotics for stent pain as they typically do not help. She reports doing better today . Advised to continue with  OTC pain pills stay on  Oxybutynin, Can also use AZO. Reviewed  Dietary irritants to avoid .Cary Nicholson, MARJN

## 2021-07-30 ENCOUNTER — LAB (OUTPATIENT)
Dept: LAB | Facility: CLINIC | Age: 61
End: 2021-07-30
Attending: STUDENT IN AN ORGANIZED HEALTH CARE EDUCATION/TRAINING PROGRAM
Payer: COMMERCIAL

## 2021-07-30 DIAGNOSIS — Z11.59 ENCOUNTER FOR SCREENING FOR OTHER VIRAL DISEASES: ICD-10-CM

## 2021-07-30 PROCEDURE — U0003 INFECTIOUS AGENT DETECTION BY NUCLEIC ACID (DNA OR RNA); SEVERE ACUTE RESPIRATORY SYNDROME CORONAVIRUS 2 (SARS-COV-2) (CORONAVIRUS DISEASE [COVID-19]), AMPLIFIED PROBE TECHNIQUE, MAKING USE OF HIGH THROUGHPUT TECHNOLOGIES AS DESCRIBED BY CMS-2020-01-R: HCPCS | Mod: 90 | Performed by: PATHOLOGY

## 2021-07-30 PROCEDURE — U0005 INFEC AGEN DETEC AMPLI PROBE: HCPCS | Mod: 90 | Performed by: PATHOLOGY

## 2021-07-31 LAB — SARS-COV-2 RNA RESP QL NAA+PROBE: NEGATIVE

## 2021-08-03 ENCOUNTER — APPOINTMENT (OUTPATIENT)
Dept: GENERAL RADIOLOGY | Facility: CLINIC | Age: 61
End: 2021-08-03
Attending: STUDENT IN AN ORGANIZED HEALTH CARE EDUCATION/TRAINING PROGRAM
Payer: COMMERCIAL

## 2021-08-03 ENCOUNTER — HOSPITAL ENCOUNTER (OUTPATIENT)
Facility: CLINIC | Age: 61
Discharge: HOME | End: 2021-08-03
Attending: STUDENT IN AN ORGANIZED HEALTH CARE EDUCATION/TRAINING PROGRAM | Admitting: STUDENT IN AN ORGANIZED HEALTH CARE EDUCATION/TRAINING PROGRAM
Payer: COMMERCIAL

## 2021-08-03 ENCOUNTER — ANESTHESIA EVENT (OUTPATIENT)
Dept: SURGERY | Facility: CLINIC | Age: 61
End: 2021-08-03
Payer: COMMERCIAL

## 2021-08-03 ENCOUNTER — ANESTHESIA (OUTPATIENT)
Dept: SURGERY | Facility: CLINIC | Age: 61
End: 2021-08-03
Payer: COMMERCIAL

## 2021-08-03 VITALS
HEIGHT: 62 IN | BODY MASS INDEX: 26.68 KG/M2 | TEMPERATURE: 97.4 F | WEIGHT: 145 LBS | HEART RATE: 52 BPM | DIASTOLIC BLOOD PRESSURE: 65 MMHG | SYSTOLIC BLOOD PRESSURE: 109 MMHG | OXYGEN SATURATION: 96 % | RESPIRATION RATE: 16 BRPM

## 2021-08-03 DIAGNOSIS — N20.0 CALCULUS OF KIDNEY: Primary | ICD-10-CM

## 2021-08-03 PROCEDURE — 258N000003 HC RX IP 258 OP 636: Performed by: ANESTHESIOLOGY

## 2021-08-03 PROCEDURE — 250N000009 HC RX 250: Performed by: STUDENT IN AN ORGANIZED HEALTH CARE EDUCATION/TRAINING PROGRAM

## 2021-08-03 PROCEDURE — 370N000017 HC ANESTHESIA TECHNICAL FEE, PER MIN: Performed by: STUDENT IN AN ORGANIZED HEALTH CARE EDUCATION/TRAINING PROGRAM

## 2021-08-03 PROCEDURE — C1769 GUIDE WIRE: HCPCS | Performed by: STUDENT IN AN ORGANIZED HEALTH CARE EDUCATION/TRAINING PROGRAM

## 2021-08-03 PROCEDURE — 999N000141 HC STATISTIC PRE-PROCEDURE NURSING ASSESSMENT: Performed by: STUDENT IN AN ORGANIZED HEALTH CARE EDUCATION/TRAINING PROGRAM

## 2021-08-03 PROCEDURE — 272N000002 HC OR SUPPLY OTHER OPNP: Performed by: STUDENT IN AN ORGANIZED HEALTH CARE EDUCATION/TRAINING PROGRAM

## 2021-08-03 PROCEDURE — 250N000011 HC RX IP 250 OP 636: Performed by: NURSE ANESTHETIST, CERTIFIED REGISTERED

## 2021-08-03 PROCEDURE — 255N000002 HC RX 255 OP 636: Performed by: STUDENT IN AN ORGANIZED HEALTH CARE EDUCATION/TRAINING PROGRAM

## 2021-08-03 PROCEDURE — C1894 INTRO/SHEATH, NON-LASER: HCPCS | Performed by: STUDENT IN AN ORGANIZED HEALTH CARE EDUCATION/TRAINING PROGRAM

## 2021-08-03 PROCEDURE — 272N000001 HC OR GENERAL SUPPLY STERILE: Performed by: STUDENT IN AN ORGANIZED HEALTH CARE EDUCATION/TRAINING PROGRAM

## 2021-08-03 PROCEDURE — 52356 CYSTO/URETERO W/LITHOTRIPSY: CPT | Mod: RT | Performed by: STUDENT IN AN ORGANIZED HEALTH CARE EDUCATION/TRAINING PROGRAM

## 2021-08-03 PROCEDURE — 258N000001 HC RX 258: Performed by: STUDENT IN AN ORGANIZED HEALTH CARE EDUCATION/TRAINING PROGRAM

## 2021-08-03 PROCEDURE — 74420 UROGRAPHY RTRGR +-KUB: CPT | Mod: 26 | Performed by: STUDENT IN AN ORGANIZED HEALTH CARE EDUCATION/TRAINING PROGRAM

## 2021-08-03 PROCEDURE — 250N000009 HC RX 250: Performed by: NURSE ANESTHETIST, CERTIFIED REGISTERED

## 2021-08-03 PROCEDURE — 258N000003 HC RX IP 258 OP 636: Performed by: NURSE ANESTHETIST, CERTIFIED REGISTERED

## 2021-08-03 PROCEDURE — 710N000012 HC RECOVERY PHASE 2, PER MINUTE: Performed by: STUDENT IN AN ORGANIZED HEALTH CARE EDUCATION/TRAINING PROGRAM

## 2021-08-03 PROCEDURE — 710N000009 HC RECOVERY PHASE 1, LEVEL 1, PER MIN: Performed by: STUDENT IN AN ORGANIZED HEALTH CARE EDUCATION/TRAINING PROGRAM

## 2021-08-03 PROCEDURE — 999N000179 XR SURGERY CARM FLUORO LESS THAN 5 MIN W STILLS: Mod: TC

## 2021-08-03 PROCEDURE — C2617 STENT, NON-COR, TEM W/O DEL: HCPCS | Performed by: STUDENT IN AN ORGANIZED HEALTH CARE EDUCATION/TRAINING PROGRAM

## 2021-08-03 PROCEDURE — 360N000084 HC SURGERY LEVEL 4 W/ FLUORO, PER MIN: Performed by: STUDENT IN AN ORGANIZED HEALTH CARE EDUCATION/TRAINING PROGRAM

## 2021-08-03 PROCEDURE — 82365 CALCULUS SPECTROSCOPY: CPT | Performed by: STUDENT IN AN ORGANIZED HEALTH CARE EDUCATION/TRAINING PROGRAM

## 2021-08-03 PROCEDURE — 250N000013 HC RX MED GY IP 250 OP 250 PS 637: Performed by: ANESTHESIOLOGY

## 2021-08-03 DEVICE — STENT URETERAL POLARIS ULTRA 6FRX22CM M0061921310: Type: IMPLANTABLE DEVICE | Site: URETER | Status: FUNCTIONAL

## 2021-08-03 RX ORDER — ONDANSETRON 2 MG/ML
INJECTION INTRAMUSCULAR; INTRAVENOUS PRN
Status: DISCONTINUED | OUTPATIENT
Start: 2021-08-03 | End: 2021-08-03

## 2021-08-03 RX ORDER — OXYCODONE HYDROCHLORIDE 5 MG/1
5 TABLET ORAL EVERY 6 HOURS PRN
Qty: 6 TABLET | Refills: 0 | Status: SHIPPED | OUTPATIENT
Start: 2021-08-03 | End: 2021-08-06

## 2021-08-03 RX ORDER — ACETAMINOPHEN 325 MG/1
975 TABLET ORAL ONCE
Status: COMPLETED | OUTPATIENT
Start: 2021-08-03 | End: 2021-08-03

## 2021-08-03 RX ORDER — CEFAZOLIN SODIUM 2 G/100ML
2 INJECTION, SOLUTION INTRAVENOUS SEE ADMIN INSTRUCTIONS
Status: DISCONTINUED | OUTPATIENT
Start: 2021-08-03 | End: 2021-08-03 | Stop reason: HOSPADM

## 2021-08-03 RX ORDER — HYDROMORPHONE HCL IN WATER/PF 6 MG/30 ML
0.2 PATIENT CONTROLLED ANALGESIA SYRINGE INTRAVENOUS EVERY 5 MIN PRN
Status: DISCONTINUED | OUTPATIENT
Start: 2021-08-03 | End: 2021-08-03 | Stop reason: HOSPADM

## 2021-08-03 RX ORDER — EPHEDRINE SULFATE 50 MG/ML
INJECTION, SOLUTION INTRAMUSCULAR; INTRAVENOUS; SUBCUTANEOUS PRN
Status: DISCONTINUED | OUTPATIENT
Start: 2021-08-03 | End: 2021-08-03

## 2021-08-03 RX ORDER — SODIUM CHLORIDE, SODIUM LACTATE, POTASSIUM CHLORIDE, CALCIUM CHLORIDE 600; 310; 30; 20 MG/100ML; MG/100ML; MG/100ML; MG/100ML
INJECTION, SOLUTION INTRAVENOUS CONTINUOUS
Status: DISCONTINUED | OUTPATIENT
Start: 2021-08-03 | End: 2021-08-03 | Stop reason: HOSPADM

## 2021-08-03 RX ORDER — FENTANYL CITRATE 50 UG/ML
INJECTION, SOLUTION INTRAMUSCULAR; INTRAVENOUS PRN
Status: DISCONTINUED | OUTPATIENT
Start: 2021-08-03 | End: 2021-08-03

## 2021-08-03 RX ORDER — ALBUTEROL SULFATE 0.83 MG/ML
2.5 SOLUTION RESPIRATORY (INHALATION) EVERY 4 HOURS PRN
Status: DISCONTINUED | OUTPATIENT
Start: 2021-08-03 | End: 2021-08-03 | Stop reason: HOSPADM

## 2021-08-03 RX ORDER — CIPROFLOXACIN 500 MG/1
500 TABLET, FILM COATED ORAL ONCE
Qty: 1 TABLET | Refills: 0 | Status: SHIPPED | OUTPATIENT
Start: 2021-08-03 | End: 2021-08-03

## 2021-08-03 RX ORDER — ONDANSETRON 2 MG/ML
4 INJECTION INTRAMUSCULAR; INTRAVENOUS EVERY 30 MIN PRN
Status: DISCONTINUED | OUTPATIENT
Start: 2021-08-03 | End: 2021-08-03 | Stop reason: HOSPADM

## 2021-08-03 RX ORDER — FENTANYL CITRATE 50 UG/ML
50 INJECTION, SOLUTION INTRAMUSCULAR; INTRAVENOUS
Status: DISCONTINUED | OUTPATIENT
Start: 2021-08-03 | End: 2021-08-03 | Stop reason: HOSPADM

## 2021-08-03 RX ORDER — LABETALOL HYDROCHLORIDE 5 MG/ML
10 INJECTION, SOLUTION INTRAVENOUS EVERY 10 MIN PRN
Status: DISCONTINUED | OUTPATIENT
Start: 2021-08-03 | End: 2021-08-03 | Stop reason: HOSPADM

## 2021-08-03 RX ORDER — DEXAMETHASONE SODIUM PHOSPHATE 4 MG/ML
INJECTION, SOLUTION INTRA-ARTICULAR; INTRALESIONAL; INTRAMUSCULAR; INTRAVENOUS; SOFT TISSUE PRN
Status: DISCONTINUED | OUTPATIENT
Start: 2021-08-03 | End: 2021-08-03

## 2021-08-03 RX ORDER — TAMSULOSIN HYDROCHLORIDE 0.4 MG/1
0.4 CAPSULE ORAL DAILY
Qty: 10 CAPSULE | Refills: 0 | Status: SHIPPED | OUTPATIENT
Start: 2021-08-03 | End: 2021-08-13

## 2021-08-03 RX ORDER — ONDANSETRON 4 MG/1
4 TABLET, ORALLY DISINTEGRATING ORAL EVERY 30 MIN PRN
Status: DISCONTINUED | OUTPATIENT
Start: 2021-08-03 | End: 2021-08-03 | Stop reason: HOSPADM

## 2021-08-03 RX ORDER — OXYCODONE HYDROCHLORIDE 5 MG/1
5 TABLET ORAL EVERY 4 HOURS PRN
Status: DISCONTINUED | OUTPATIENT
Start: 2021-08-03 | End: 2021-08-03 | Stop reason: HOSPADM

## 2021-08-03 RX ORDER — FENTANYL CITRATE 50 UG/ML
25 INJECTION, SOLUTION INTRAMUSCULAR; INTRAVENOUS EVERY 5 MIN PRN
Status: DISCONTINUED | OUTPATIENT
Start: 2021-08-03 | End: 2021-08-03 | Stop reason: HOSPADM

## 2021-08-03 RX ORDER — LIDOCAINE HYDROCHLORIDE 10 MG/ML
INJECTION, SOLUTION INFILTRATION; PERINEURAL PRN
Status: DISCONTINUED | OUTPATIENT
Start: 2021-08-03 | End: 2021-08-03

## 2021-08-03 RX ORDER — CEFAZOLIN SODIUM 2 G/100ML
2 INJECTION, SOLUTION INTRAVENOUS
Status: DISCONTINUED | OUTPATIENT
Start: 2021-08-03 | End: 2021-08-03 | Stop reason: HOSPADM

## 2021-08-03 RX ORDER — MEPERIDINE HYDROCHLORIDE 25 MG/ML
12.5 INJECTION INTRAMUSCULAR; INTRAVENOUS; SUBCUTANEOUS
Status: DISCONTINUED | OUTPATIENT
Start: 2021-08-03 | End: 2021-08-03 | Stop reason: HOSPADM

## 2021-08-03 RX ORDER — PROPOFOL 10 MG/ML
INJECTION, EMULSION INTRAVENOUS PRN
Status: DISCONTINUED | OUTPATIENT
Start: 2021-08-03 | End: 2021-08-03

## 2021-08-03 RX ORDER — LIDOCAINE 40 MG/G
CREAM TOPICAL
Status: DISCONTINUED | OUTPATIENT
Start: 2021-08-03 | End: 2021-08-03 | Stop reason: HOSPADM

## 2021-08-03 RX ADMIN — PHENYLEPHRINE HYDROCHLORIDE 50 MCG: 10 INJECTION INTRAVENOUS at 10:39

## 2021-08-03 RX ADMIN — DEXAMETHASONE SODIUM PHOSPHATE 4 MG: 4 INJECTION, SOLUTION INTRA-ARTICULAR; INTRALESIONAL; INTRAMUSCULAR; INTRAVENOUS; SOFT TISSUE at 10:23

## 2021-08-03 RX ADMIN — FENTANYL CITRATE 50 MCG: 50 INJECTION, SOLUTION INTRAMUSCULAR; INTRAVENOUS at 10:23

## 2021-08-03 RX ADMIN — PROPOFOL 160 MG: 10 INJECTION, EMULSION INTRAVENOUS at 10:23

## 2021-08-03 RX ADMIN — MIDAZOLAM 2 MG: 1 INJECTION INTRAMUSCULAR; INTRAVENOUS at 10:15

## 2021-08-03 RX ADMIN — Medication 5 MG: at 10:31

## 2021-08-03 RX ADMIN — SODIUM CHLORIDE, POTASSIUM CHLORIDE, SODIUM LACTATE AND CALCIUM CHLORIDE: 600; 310; 30; 20 INJECTION, SOLUTION INTRAVENOUS at 09:33

## 2021-08-03 RX ADMIN — FENTANYL CITRATE 50 MCG: 50 INJECTION, SOLUTION INTRAMUSCULAR; INTRAVENOUS at 10:41

## 2021-08-03 RX ADMIN — Medication 10 MG: at 10:34

## 2021-08-03 RX ADMIN — ACETAMINOPHEN 975 MG: 325 TABLET, FILM COATED ORAL at 12:11

## 2021-08-03 RX ADMIN — SODIUM CHLORIDE, POTASSIUM CHLORIDE, SODIUM LACTATE AND CALCIUM CHLORIDE: 600; 310; 30; 20 INJECTION, SOLUTION INTRAVENOUS at 11:41

## 2021-08-03 RX ADMIN — ONDANSETRON HYDROCHLORIDE 4 MG: 2 INJECTION, SOLUTION INTRAVENOUS at 10:33

## 2021-08-03 RX ADMIN — Medication 7.5 MG: at 10:25

## 2021-08-03 RX ADMIN — LIDOCAINE HYDROCHLORIDE 50 MG: 10 INJECTION, SOLUTION INFILTRATION; PERINEURAL at 10:23

## 2021-08-03 ASSESSMENT — ENCOUNTER SYMPTOMS: SEIZURES: 1

## 2021-08-03 ASSESSMENT — MIFFLIN-ST. JEOR: SCORE: 1185.97

## 2021-08-03 NOTE — ANESTHESIA CARE TRANSFER NOTE
Patient: Barbi Tiwari    Procedure(s):  Cystoureteroscopy, right retrograde pyelogram, holmium laser lithotripsy of right ureteral calculus and right ureteral stent exchange    Diagnosis: Right renal stone [N20.0]  Diagnosis Additional Information: No value filed.    Anesthesia Type:   General     Note:    Oropharynx: oropharynx clear of all foreign objects  Level of Consciousness: drowsy  Oxygen Supplementation: face mask  Level of Supplemental Oxygen (L/min / FiO2): 6  Independent Airway: airway patency satisfactory and stable  Dentition: dentition unchanged  Vital Signs Stable: post-procedure vital signs reviewed and stable  Report to RN Given: handoff report given  Patient transferred to: PACU    Handoff Report: Identifed the Patient, Identified the Reponsible Provider, Reviewed the pertinent medical history, Discussed the surgical course, Reviewed Intra-OP anesthesia mangement and issues during anesthesia, Set expectations for post-procedure period and Allowed opportunity for questions and acknowledgement of understanding      Vitals:  Vitals Value Taken Time   /76 08/03/21 1150   Temp     Pulse 70 08/03/21 1153   Resp 16 08/03/21 1153   SpO2 99 % 08/03/21 1153   Vitals shown include unvalidated device data.    Electronically Signed By: CAMDEN Palacios CRNA  August 3, 2021  11:54 AM

## 2021-08-03 NOTE — ANESTHESIA POSTPROCEDURE EVALUATION
Patient: Barbi Tiwari    Procedure(s):  Cystoureteroscopy, right retrograde pyelogram, holmium laser lithotripsy of right ureteral calculus and right ureteral stent exchange    Diagnosis:Right renal stone [N20.0]  Diagnosis Additional Information: No value filed.    Anesthesia Type:  General    Note:  Disposition: Outpatient   Postop Pain Control: Uneventful            Sign Out: Well controlled pain   PONV: No   Neuro/Psych: Uneventful            Sign Out: Acceptable/Baseline neuro status   Airway/Respiratory: Uneventful            Sign Out: Acceptable/Baseline resp. status   CV/Hemodynamics: Uneventful            Sign Out: Acceptable CV status   Other NRE: NONE   DID A NON-ROUTINE EVENT OCCUR? No           Last vitals:  Vitals Value Taken Time   /65 08/03/21 1248   Temp 97.4  F (36.3  C) 08/03/21 1240   Pulse 52 08/03/21 1240   Resp 16 08/03/21 1248   SpO2 96 % 08/03/21 1240       Electronically Signed By: Elias Reyna MD  August 3, 2021  1:41 PM

## 2021-08-03 NOTE — OP NOTE
OPERATIVE NOTE    PREOPERATIVE DIAGNOSIS:  Right-sided Renal stone    POSTOPERATIVE DIAGNOSIS:  Same    PROCEDURES PERFORMED:   1. Cystourethroscopy  2. Right ureteroscopy  3. Right retrograde pyelogram with interpretation of intraoperative fluoroscopic imaging  4. Holmium laser lithotripsy with basket stone extraction  5. Right ureteral stent placement      STAFF SURGEON:  Dr. Felix Cano MD, present for the entire case.     ANESTHESIA:  General    ESTIMATED BLOOD LOSS: 1 cc  DRAINS/TUBES:  6 Dominican x 22 cm double-J ureteral stent         IV FLUIDS:   Please see dictated anesthesia record  COMPLICATIONS:  None.   SPECIMEN:   Stones for analysis    SIGNIFICANT FINDINGS: 6-7 mm stone in the lower pole calyx repositioned in the upper calyx prior to fragmentation.  Proximal curl of the ureteral stent seen in the renal pelvis under fluoroscopy and distal curl seen in the bladder fluoroscopically and under direct vision.     BRIEF OPERATIVE INDICATIONS:  Barbi Tiwari is a(n) 60 year old female with right renal stone. She is here for a follow up procedure after stent placement 2 weeks ago  After a discussion of all risks, benefits, and alternatives, the patient elected to proceed with definitive stone management. The patient understands the potential need for more than one procedure to eliminate all stone burden.     DESCRIPTION OF PROCEDURE:  After informed consent was obtained, the patient was transported to the operating room & placed supine on the table. Pneumoboots were applied.  After adequate anesthesia was induced, she was placed in lithotomy and prepped and draped in the usual sterile fashion. A timeout was taken to confirm correct patient, procedure and laterality. Pre-operative IV antibiotics were administered.     A 22-Dominican rigid cystoscope was inserted into a well-lubricated urethra. The anterior urethra was unremarkable, and the right ureteral orifice was identified and the old stent partially  pulled out and cannulated with sensor wire and the stent removed. We then cannulated it again  a Sensor wire and 5 [place] Islander open-ended catheter. The wire passed without resistance into the upper pole and the 5-[place] Islander open ended catheter was removed. A 36-cm ureteral access sheath was advanced up to the upper ureter and a retrograde pyelogram was performed to serve as a roadmap.     The flexible ureteroscope was used to identify the stone which was repositioned in the upper pole for fragmentation. A 200 micron laser fiber was used at a setting of 0.8 J and 8 Hz and lithotripsy was performed. A Halo basket was used to remove all fragments greater than 1 mm. Pullback ureteroscopy was performed and showed no retained stone fragments or ureteral injury. A  6  Fr 22-cm double-J stent was advanced over the Sensor wire, and a good proximal curl was seen in the renal pelvis fluoroscopically and the distal curl was seen in the bladder fluoroscopically and under direct vision. The bladder was drained.  The patient tolerated the procedure well and there were no apparent complications. The patient  was transported to the postanesthesia care unit in stable condition.     POST-OPERATIVE PLAN: Following recovery in the PACU, the patient can be discharged.    Follow up for stent removal as planned  .      Felix Cano MD  Urology

## 2021-08-03 NOTE — ANESTHESIA PROCEDURE NOTES
Airway       Patient location during procedure: OR  Staff -        CRNA: Magali Mcleod APRN CRNA       Performed By: CRNA  Consent for Airway        Urgency: elective  Indications and Patient Condition       Indications for airway management: sg-procedural       Induction type:intravenous       Mask difficulty assessment: 1 - vent by mask    Final Airway Details       Final airway type: supraglottic airway    Supraglottic Airway Details        Type: LMA       Brand: I-Gel       LMA size: 4    Post intubation assessment        Placement verified by: capnometry, equal breath sounds and chest rise        Number of attempts at approach: 1       Number of other approaches attempted: 0       Secured with: plastic tape       Ease of procedure: easy       Dentition: Intact and Unchanged

## 2021-08-03 NOTE — DISCHARGE INSTRUCTIONS
Activity  - Do not strain with bowel movements.  - Do not drive until you can press the brake pedal quickly and fully without pain.   - Do not operate a motor vehicle while taking narcotic pain medications.     Stents  - You have a right ureteral stent in place. Stents can cause some discomfort, including some blood in your urine (which is normal), the feeling or urge to urinate frequently, burning with urination and pressure/discomfort in your back while voiding. Stay well hydrated to keep your urine as clear as possible.    Medications  - Flomax - to decrease stent discomfort   - Transition from narcotic pain medications to tylenol (acetaminophen) as you are able.  Wean yourself off all pain medications as you are able.  - Some pain medications contain both tylenol (acetaminophen) and a narcotic (Norco, vicodin, percocet), do not take more than 4,000mg of Tylenol (acetaminophen) from all sources in any 24 hour period.  - Narcotics can make you constipated.  Take over the counter fiber (metamucil or benefiber) and stool softeners (miralax, docusate or senna) while taking narcotic pain medications, but stop if you develop diarrhea.  - No driving or operating machinery while taking narcotic pain medications     Follow-Up:  - Follow up with Dr. Cano as planned.Take Ciprofloxacin 500mg on the day of your follow up appointment about 1-2 hours prior to the time of the stent removal  - Call your primary care provider to touch base regarding your recent procedure.  - Call or return sooner than your regularly scheduled visit if you develop any of the following: fever (greater than 101.5), uncontrolled pain, uncontrolled nausea or vomiting, as well as increased redness, swelling, or drainage from your wound.     Phone numbers:   - Monday through Friday 8am to 4:30pm: Call [phone] with questions or to schedule or confirm appointment.    - Nights or weekends: call the after hours emergency at [facility] and tell  "the  \"I would like to page the Urology  on call.\"  - For emergencies, call 911    STENT INFORMATION/DISCHARGE INSTRUCTIONS  [facility]  PETE JONES, [address], [address]  [phone]    During surgery, a stent may be placed in the ureter.  The ureter is the tube that drains urine from the kidney to the bladder.  The stent is placed to dilate (open) the ureter so stone fragments can pass easily through the ureter or to decrease ureteral swelling after surgery or to relieve an obstruction.      The stent is made of silicone.  The upper end of the stent curls in the kidney while the lower end rests in the bladder.    While the stent is in place you may experience the following symptoms:  Blood and/or small blood clots in the urine  Bladder spasms (frequency and urgency of urination)  Discomfort or aching in the back or side where the stent is  Burning or discomfort at the end of urine stream    To decrease these symptoms you should:  Take antispasmodic medication as prescribed (Detrol, Ditropan, etc.)  Drink plenty of fluids but avoid caffeine and citrus (include cranberry)  If you are having discomfort in back or side, decrease activity    Please call your physician or the physician on call if you experience:  Fever greater than 101 degrees  Severe pain not relieved by pain medication or rest    Please make an appointment for the removal of the stent according to your physician's instructions.        GENERAL ANESTHESIA OR SEDATION ADULT DISCHARGE INSTRUCTIONS   SPECIAL PRECAUTIONS FOR 24 HOURS AFTER SURGERY    IT IS NOT UNUSUAL TO FEEL LIGHT-HEADED OR FAINT, UP TO 24 HOURS AFTER SURGERY OR WHILE TAKING PAIN MEDICATION.  IF YOU HAVE THESE SYMPTOMS; SIT FOR A FEW MINUTES BEFORE STANDING AND HAVE SOMEONE ASSIST YOU WHEN YOU GET UP TO WALK OR USE THE BATHROOM.    YOU SHOULD REST AND RELAX FOR THE NEXT 24 HOURS AND YOU MUST MAKE ARRANGEMENTS TO HAVE SOMEONE STAY WITH YOU FOR AT LEAST 24 " HOURS AFTER YOUR DISCHARGE.  AVOID HAZARDOUS AND STRENUOUS ACTIVITIES.  DO NOT MAKE IMPORTANT DECISIONS FOR 24 HOURS.    DO NOT DRIVE ANY VEHICLE OR OPERATE MECHANICAL EQUIPMENT FOR 24 HOURS FOLLOWING THE END OF YOUR SURGERY.  EVEN THOUGH YOU MAY FEEL NORMAL, YOUR REACTIONS MAY BE AFFECTED BY THE MEDICATION YOU HAVE RECEIVED.    DO NOT DRINK ALCOHOLIC BEVERAGES FOR 24 HOURS FOLLOWING YOUR SURGERY.    DRINK CLEAR LIQUIDS (APPLE JUICE, GINGER ALE, 7-UP, BROTH, ETC.).  PROGRESS TO YOUR REGULAR DIET AS YOU FEEL ABLE.    YOU MAY HAVE A DRY MOUTH, A SORE THROAT, MUSCLES ACHES OR TROUBLE SLEEPING.  THESE SHOULD GO AWAY AFTER 24 HOURS.    CALL YOUR DOCTOR FOR ANY OF THE FOLLOWING:  SIGNS OF INFECTION (FEVER, GROWING TENDERNESS AT THE SURGERY SITE, A LARGE AMOUNT OF DRAINAGE OR BLEEDING, SEVERE PAIN, FOUL-SMELLING DRAINAGE, REDNESS OR SWELLING.    IT HAS BEEN OVER 8 TO 10 HOURS SINCE SURGERY AND YOU ARE STILL NOT ABLE TO URINATE (PASS WATER).

## 2021-08-03 NOTE — ANESTHESIA PREPROCEDURE EVALUATION
Anesthesia Pre-Procedure Evaluation    Patient: Barbi Tiwari   MRN: [id number] : 1960        Preoperative Diagnosis: Right renal stone [N20.0]   Procedure : Procedure(s):  Cystoureteroscopy, right retrograde pyelogram, holmium laser lithotripsy of right ureteral calculus and right ureteral stent insertion     Past Medical History:   Diagnosis Date     Acute cystitis with hematuria 10/24/2020     Allergic rhinitis 2009     Calculus of kidney 2011    Overview:  S/P LITHOTRIPSY 3/15/11      Esophageal reflux 10/22/2008     Hyperparathyroidism 2011    had surgery for it; resulted in seizures     Moderate major depression, single episode (H)      oligodendroglioma 2012     Osteoarthrosis 2009     Palpitations 2014     PONV (postoperative nausea and vomiting)      PVCs 2014     Seizure disorder (related to tumor) 2011    last seizure 3/2021      Past Surgical History:   Procedure Laterality Date     CRANIOTOMY  2011    tumor resection     CYSTOSCOPY, RETROGRADES, INSERT STENT URETER(S), COMBINED Right 2021    Procedure: 1.  Cystourethroscopy 2.  Attempted right ureteroscopy 3.  Right  retrograde pyelogram with interpretation of intraoperative fluoroscopic imaging 4.  Right ureteral dilatation 5.  Right ureteral stent placement 6.  Laser on stand-by;  Surgeon: Felix Cano MD;  Location:  OR     EXTRACORPOREAL SHOCK WAVE LITHOTRIPSY, CYSTOSCOPY, INSERT STENT URETER(S), COMBINED Bilateral 2017    Procedure: COMBINED EXTRACORPOREAL SHOCK WAVE LITHOTRIPSY, CYSTOSCOPY, INSERT STENT URETER(S);  CYSTO, URETHRAL DILATION, URETERAL LEFT STENT PLACEMENT, LEFT ESWL.;  Surgeon: Angel Del Rio MD;  Location:  OR     FOOT SURGERY      mulitple     HYSTERECTOMY, PAP NO LONGER INDICATED      lap hys     LITHOTRIPSY  2010     OPTICAL TRACKING SYSTEM CRANIOTOMY, EXCISE TUMOR WITH MAPPING, COMBINED Right 2018    Procedure: COMBINED OPTICAL TRACKING  SYSTEM CRANIOTOMY, EXCISE TUMOR WITH MAPPING;  Right Stealth Assisted Craniotomy With Motor Mapping And Tumor Resection ;  Surgeon: Dustin Rodriguez MD;  Location: [facility] OR     ORTHOPEDIC SURGERY      feet, multiple on both     OSTEOTOMY FOOT Right 1/15/2019    Procedure: OSTEOTOMY FOOT;  Surgeon: Benjamin Griffin DPM;  Location: [facility] OR     PARATHYROIDECTOMY  2011     RECONSTRUCT FOREFOOT WITH METATARSOPHALANGEAL (MTP) FUSION Right 1/15/2019    Procedure: RIGHT FIRST METATARSAL PHALAGEAL JOINT ARTHRODESIS, RIGHT SECOND METATARSAL WEIL OSTEOTOMY;  Surgeon: Benjamin Griffin DPM;  Location: [facility] OR      Allergies   Allergen Reactions     Sulfa Drugs Hives     Benoxinate Nausea and Vomiting      Social History     Tobacco Use     Smoking status: Never Smoker     Smokeless tobacco: Never Used   Substance Use Topics     Alcohol use: Yes     Comment: social      Wt Readings from Last 1 Encounters:   08/03/21 65.8 kg (145 lb)        Anesthesia Evaluation   Pt has had prior anesthetic. Type: General.    No history of anesthetic complications       ROS/MED HX  ENT/Pulmonary:  - neg pulmonary ROS     Neurologic: Comment: Post surgery seizures    (+) seizures,     Cardiovascular:  - neg cardiovascular ROS     METS/Exercise Tolerance:     Hematologic:  - neg hematologic  ROS     Musculoskeletal:  - neg musculoskeletal ROS     GI/Hepatic:     (+) GERD, Asymptomatic on medication,     Renal/Genitourinary:     (+) Nephrolithiasis ,     Endo:  - neg endo ROS     Psychiatric/Substance Use:  - neg psychiatric ROS     Infectious Disease:  - neg infectious disease ROS     Malignancy:  - neg malignancy ROS     Other:  - neg other ROS          Physical Exam    Airway        Mallampati: I   TM distance: > 3 FB   Neck ROM: full     Respiratory Devices and Support         Dental  no notable dental history         Cardiovascular   cardiovascular exam normal          Pulmonary   pulmonary exam normal                OUTSIDE LABS:  CBC:    Lab Results   Component Value Date    WBC 4.1 03/15/2021    WBC 3.9 (L) 01/12/2021    HGB 13.1 03/15/2021    HGB 13.4 01/12/2021    HCT 41.1 03/15/2021    HCT 42.5 01/12/2021     03/15/2021     01/12/2021     BMP:   Lab Results   Component Value Date     07/14/2021     03/15/2021    POTASSIUM 4.0 07/14/2021    POTASSIUM 3.6 03/15/2021    CHLORIDE 107 07/14/2021    CHLORIDE 109 03/15/2021    CO2 27 07/14/2021    CO2 32 03/15/2021    BUN 17 07/14/2021    BUN 18 03/15/2021    CR 0.71 07/14/2021    CR 0.66 03/15/2021     (H) 07/14/2021    GLC 84 03/15/2021     COAGS:   Lab Results   Component Value Date    PTT 31 05/30/2018    INR 1.03 05/30/2018     POC:   Lab Results   Component Value Date    BGM 98 05/30/2018     HEPATIC:   Lab Results   Component Value Date    ALBUMIN 3.6 03/15/2021    PROTTOTAL 6.7 (L) 03/15/2021    ALT 25 03/15/2021    AST 15 03/15/2021    ALKPHOS 86 03/15/2021    BILITOTAL 0.2 03/15/2021     OTHER:   Lab Results   Component Value Date    LACT 0.9 05/11/2017    KIAN 8.9 07/14/2021    LIPASE 176 09/25/2014    TSH 0.66 03/24/2017       Anesthesia Plan    ASA Status:  3   NPO Status:  NPO Appropriate    Anesthesia Type: General.     - Airway: LMA   Induction: Intravenous, Propofol.   Maintenance: Balanced.        Consents    Anesthesia Plan(s) and associated risks, benefits, and realistic alternatives discussed. Questions answered and patient/representative(s) expressed understanding.     - Discussed with:  Patient         Postoperative Care    Pain management: IV analgesics, Oral pain medications, Multi-modal analgesia.   PONV prophylaxis: Ondansetron (or other 5HT-3), Dexamethasone or Solumedrol     Comments:                Jace Townsend MD

## 2021-08-05 ENCOUNTER — TELEPHONE (OUTPATIENT)
Dept: UROLOGY | Facility: CLINIC | Age: 61
End: 2021-08-05

## 2021-08-05 DIAGNOSIS — N32.89 BLADDER SPASM: Primary | ICD-10-CM

## 2021-08-05 LAB
APPEARANCE STONE: NORMAL
COMPN STONE: NORMAL
NUMBER STONE: 1
SIZE STONE: NORMAL MM
SPECIMEN WT: 21 MG

## 2021-08-05 RX ORDER — OXYBUTYNIN CHLORIDE 5 MG/1
5 TABLET, EXTENDED RELEASE ORAL DAILY
Qty: 9 TABLET | Refills: 0 | Status: SHIPPED | OUTPATIENT
Start: 2021-08-05 | End: 2021-10-25

## 2021-08-05 NOTE — TELEPHONE ENCOUNTER
[facility]    Phone Message    May a detailed message be left on voicemail: yes     Reason for Call: Medication Question or concern regarding medication   Prescription Clarification  Name of Medication: oxybutynin ER (DITROPAN-XL) 5 MG 24 hr tablet  Prescribing Provider: Dr. Cano   Pharmacy: [address]   What on the order needs clarification? Barbi calling to report that she is starting to have bladder spasms again and is wondering if she can get more Oxybutynin to get her through until her stent removal on 8/12.  Please call her back to discuss          Action Taken: Message routed to:  Other: [facility]    Travel Screening: Not Applicable

## 2021-08-10 NOTE — TELEPHONE ENCOUNTER
"Hospital/TCU/ED for chronic condition Discharge Protocol    \"Hi, my name is Joselyn Way, a registered nurse, and I am calling from [facility].  I am calling to follow up and see how things are going for you after your recent emergency visit/hospital/TCU stay.\"    Tell me how you are doing now that you are home?\" Patient stated, \"I'm feeling much better.\"        Discharge Instructions    \"Let's review your discharge instructions.  What is/are the follow-up recommendations?  Pt. Response: Follow up with Dr. Choudhury    \"Has an appointment with your primary care provider been scheduled?\"   No (schedule appointment)   Appointment scheduled for 5/10/17    \"When you see the provider, I would recommend that you bring your medications with you.\"    Medications    \"Tell me what changed about your medicines when you discharged?\"    Changes to chronic meds?    0-1    \"What questions do you have about your medications?\"    None     New diagnoses of heart failure, COPD, diabetes, or MI?    No              Medication reconciliation completed? Yes  Was MTM referral placed (*Make sure to put transitions as reason for referral)?   No    Call Summary    \"What questions or concerns do you have about your recent visit and your follow-up care?\"     none    \"If you have questions or things don't continue to improve, we encourage you contact us through the main clinic number (give number).  Even if the clinic is not open, triage nurses are available 24/7 to help you.     We would like you to know that our clinic has extended hours (provide information).  We also have urgent care (provide details on closest location and hours/contact info)\"      \"Thank you for your time and take care!\"             " Bactrim Pregnancy And Lactation Text: This medication is Pregnancy Category D and is known to cause fetal risk.  It is also excreted in breast milk. Benzoyl Peroxide Pregnancy And Lactation Text: This medication is Pregnancy Category C. It is unknown if benzoyl peroxide is excreted in breast milk. Spironolactone Counseling: Patient advised regarding risks of diarrhea, abdominal pain, hyperkalemia, birth defects (for female patients), liver toxicity and renal toxicity. The patient may need blood work to monitor liver and kidney function and potassium levels while on therapy. The patient verbalized understanding of the proper use and possible adverse effects of spironolactone.  All of the patient's questions and concerns were addressed. Topical Sulfur Applications Counseling: Topical Sulfur Counseling: Patient counseled that this medication may cause skin irritation or allergic reactions.  In the event of skin irritation, the patient was advised to reduce the amount of the drug applied or use it less frequently.   The patient verbalized understanding of the proper use and possible adverse effects of topical sulfur application.  All of the patient's questions and concerns were addressed. Erythromycin Counseling:  I discussed with the patient the risks of erythromycin including but not limited to GI upset, allergic reaction, drug rash, diarrhea, increase in liver enzymes, and yeast infections. Dapsone Counseling: I discussed with the patient the risks of dapsone including but not limited to hemolytic anemia, agranulocytosis, rashes, methemoglobinemia, kidney failure, peripheral neuropathy, headaches, GI upset, and liver toxicity.  Patients who start dapsone require monitoring including baseline LFTs and weekly CBCs for the first month, then every month thereafter.  The patient verbalized understanding of the proper use and possible adverse effects of dapsone.  All of the patient's questions and concerns were addressed. Isotretinoin Pregnancy And Lactation Text: This medication is Pregnancy Category X and is considered extremely dangerous during pregnancy. It is unknown if it is excreted in breast milk. Isotretinoin Counseling: Patient should get monthly blood tests, not donate blood, not drive at night if vision affected, not share medication, and not undergo elective surgery for 6 months after tx completed. Side effects reviewed, pt to contact office should one occur. Benzoyl Peroxide Counseling: Patient counseled that medicine may cause skin irritation and bleach clothing.  In the event of skin irritation, the patient was advised to reduce the amount of the drug applied or use it less frequently.   The patient verbalized understanding of the proper use and possible adverse effects of benzoyl peroxide.  All of the patient's questions and concerns were addressed. Use Enhanced Medication Counseling?: No Minocycline Pregnancy And Lactation Text: This medication is Pregnancy Category D and not consider safe during pregnancy. It is also excreted in breast milk. High Dose Vitamin A Counseling: Side effects reviewed, pt to contact office should one occur. Topical Retinoid counseling:  Patient advised to apply a pea-sized amount only at bedtime and wait 30 minutes after washing their face before applying.  If too drying, patient may add a non-comedogenic moisturizer. The patient verbalized understanding of the proper use and possible adverse effects of retinoids.  All of the patient's questions and concerns were addressed. Topical Clindamycin Pregnancy And Lactation Text: This medication is Pregnancy Category B and is considered safe during pregnancy. It is unknown if it is excreted in breast milk. Birth Control Pills Pregnancy And Lactation Text: This medication should be avoided if pregnant and for the first 30 days post-partum. Doxycycline Pregnancy And Lactation Text: This medication is Pregnancy Category D and not consider safe during pregnancy. It is also excreted in breast milk but is considered safe for shorter treatment courses. Azithromycin Counseling:  I discussed with the patient the risks of azithromycin including but not limited to GI upset, allergic reaction, drug rash, diarrhea, and yeast infections. Minocycline Counseling: Patient advised regarding possible photosensitivity and discoloration of the teeth, skin, lips, tongue and gums.  Patient instructed to avoid sunlight, if possible.  When exposed to sunlight, patients should wear protective clothing, sunglasses, and sunscreen.  The patient was instructed to call the office immediately if the following severe adverse effects occur:  hearing changes, easy bruising/bleeding, severe headache, or vision changes.  The patient verbalized understanding of the proper use and possible adverse effects of minocycline.  All of the patient's questions and concerns were addressed. Erythromycin Pregnancy And Lactation Text: This medication is Pregnancy Category B and is considered safe during pregnancy. It is also excreted in breast milk. Detail Level: Zone Sarecycline Counseling: Patient advised regarding possible photosensitivity and discoloration of the teeth, skin, lips, tongue and gums.  Patient instructed to avoid sunlight, if possible.  When exposed to sunlight, patients should wear protective clothing, sunglasses, and sunscreen.  The patient was instructed to call the office immediately if the following severe adverse effects occur:  hearing changes, easy bruising/bleeding, severe headache, or vision changes.  The patient verbalized understanding of the proper use and possible adverse effects of sarecycline.  All of the patient's questions and concerns were addressed. Spironolactone Pregnancy And Lactation Text: This medication can cause feminization of the male fetus and should be avoided during pregnancy. The active metabolite is also found in breast milk. Tazorac Pregnancy And Lactation Text: This medication is not safe during pregnancy. It is unknown if this medication is excreted in breast milk. Tazorac Counseling:  Patient advised that medication is irritating and drying.  Patient may need to apply sparingly and wash off after an hour before eventually leaving it on overnight.  The patient verbalized understanding of the proper use and possible adverse effects of tazorac.  All of the patient's questions and concerns were addressed. Bactrim Counseling:  I discussed with the patient the risks of sulfa antibiotics including but not limited to GI upset, allergic reaction, drug rash, diarrhea, dizziness, photosensitivity, and yeast infections.  Rarely, more serious reactions can occur including but not limited to aplastic anemia, agranulocytosis, methemoglobinemia, blood dyscrasias, liver or kidney failure, lung infiltrates or desquamative/blistering drug rashes. Doxycycline Counseling:  Patient counseled regarding possible photosensitivity and increased risk for sunburn.  Patient instructed to avoid sunlight, if possible.  When exposed to sunlight, patients should wear protective clothing, sunglasses, and sunscreen.  The patient was instructed to call the office immediately if the following severe adverse effects occur:  hearing changes, easy bruising/bleeding, severe headache, or vision changes.  The patient verbalized understanding of the proper use and possible adverse effects of doxycycline.  All of the patient's questions and concerns were addressed. High Dose Vitamin A Pregnancy And Lactation Text: High dose vitamin A therapy is contraindicated during pregnancy and breast feeding. Topical Retinoid Pregnancy And Lactation Text: This medication is Pregnancy Category C. It is unknown if this medication is excreted in breast milk. Topical Sulfur Applications Pregnancy And Lactation Text: This medication is Pregnancy Category C and has an unknown safety profile during pregnancy. It is unknown if this topical medication is excreted in breast milk. Tetracycline Counseling: Patient counseled regarding possible photosensitivity and increased risk for sunburn.  Patient instructed to avoid sunlight, if possible.  When exposed to sunlight, patients should wear protective clothing, sunglasses, and sunscreen.  The patient was instructed to call the office immediately if the following severe adverse effects occur:  hearing changes, easy bruising/bleeding, severe headache, or vision changes.  The patient verbalized understanding of the proper use and possible adverse effects of tetracycline.  All of the patient's questions and concerns were addressed. Patient understands to avoid pregnancy while on therapy due to potential birth defects. Birth Control Pills Counseling: Birth Control Pill Counseling: I discussed with the patient the potential side effects of OCPs including but not limited to increased risk of stroke, heart attack, thrombophlebitis, deep venous thrombosis, hepatic adenomas, breast changes, GI upset, headaches, and depression.  The patient verbalized understanding of the proper use and possible adverse effects of OCPs. All of the patient's questions and concerns were addressed. Topical Clindamycin Counseling: Patient counseled that this medication may cause skin irritation or allergic reactions.  In the event of skin irritation, the patient was advised to reduce the amount of the drug applied or use it less frequently.   The patient verbalized understanding of the proper use and possible adverse effects of clindamycin.  All of the patient's questions and concerns were addressed. Dapsone Pregnancy And Lactation Text: This medication is Pregnancy Category C and is not considered safe during pregnancy or breast feeding. Azithromycin Pregnancy And Lactation Text: This medication is considered safe during pregnancy and is also secreted in breast milk.

## 2021-08-12 ENCOUNTER — OFFICE VISIT (OUTPATIENT)
Dept: UROLOGY | Facility: CLINIC | Age: 61
End: 2021-08-12
Payer: COMMERCIAL

## 2021-08-12 VITALS
WEIGHT: 150 LBS | SYSTOLIC BLOOD PRESSURE: 90 MMHG | BODY MASS INDEX: 27.6 KG/M2 | DIASTOLIC BLOOD PRESSURE: 60 MMHG | HEIGHT: 62 IN

## 2021-08-12 DIAGNOSIS — N20.0 RIGHT RENAL STONE: Primary | ICD-10-CM

## 2021-08-12 PROCEDURE — 52310 CYSTOSCOPY AND TREATMENT: CPT | Performed by: STUDENT IN AN ORGANIZED HEALTH CARE EDUCATION/TRAINING PROGRAM

## 2021-08-12 ASSESSMENT — MIFFLIN-ST. JEOR: SCORE: 1208.65

## 2021-08-12 ASSESSMENT — PAIN SCALES - GENERAL: PAINLEVEL: MILD PAIN (2)

## 2021-08-12 NOTE — PATIENT INSTRUCTIONS
"AFTER YOUR CYSTOSCOPY  ?  ?  You have just completed a cystoscopy, or \"cysto\", which allowed your physician to learn more about your bladder (or to remove a stent placed after surgery). We suggest that you continue to avoid caffeine, fruit juice, and alcohol for the next 24 hours, however, you are encouraged to return to your normal activities.  ?  ?  A few things that are considered normal after your cystoscopy:  ?  * small amount of bleeding (or spotting) that clears within the next 24 hours  ?  * slight burning sensation with urination  ?  * sensation of needing to void (urinate) more frequently  ?  * the feeling of \"air\" in your urine  ?  * mild discomfort that is relieved with Tylenol    * bladder spasms  ?  ?  ?  Please contact our office promptly if you:  ?  * develop a fever above 101 degrees  ?  * are unable to urinate  ?  * develop bright red blood that does not stop  ?  * experience severe pain or swelling  ?  ?  ?  And of course, please contact our office with any concerns or questions [phone]  ?    AFTER YOUR CYSTOSCOPY        You have just completed a cystoscopy, or \"cysto\", which allowed your physician to learn more about your bladder (or to remove a stent placed after surgery). We suggest that you continue to avoid caffeine, fruit juice, and alcohol for the next 24 hours, however, you are encouraged to return to your normal activities.         A few things that are considered normal after your cystoscopy:     * Small amount of bleeding (or spotting) that clears within the next 24 hours     * Slight burning sensation with urination     * Sensation to of needing to avoid more frequently     * The feeling of \"air\" in your urine     * Mild discomfort that is relieved with Tylenol        Please contact our office promptly if you:     * Develop a fever above 101 degrees     * Are unable to urinate     * Develop bright red blood that does not stop     * Severe pain or swelling         Please contact " our office with any concerns or questions @[facility].

## 2021-08-12 NOTE — LETTER
8/12/2021       RE: Barbi Tiwari  [address] [place] [address]     Dear Colleague,    Thank you for referring your patient, Barbi Tiwari, to the [facility] [place] [facility]. Please see a copy of my visit note below.    re-procedure diagnosis: Stent In Situ  Post procedure diagnosis: normal cystoscopy  Procedure performed: cystoscopy and Stent Removal  Surgeon: Felix Cano MD  Anesthesia: local    Indications for procedure: Patient is a 60 year old year old female with a history of Ureteroscopy.  Here today for cysto and ureteral stent removal    Description of procedure: After fully informed voluntary consent was obtained patient was brought into the procedure room, identified and placed in a supine position on the cysto table.  The vagina/intraoitus were prepped and draped in a sterile fashion with betadine.  A 15F flexible cystoscope was inserted into the urethra and the bladder and urethra examined in a systematic manner.  There were no tumor stones or diverticula.  Ureteric orifices were normal in position and number and effluxing clear urine and the stent was visualized.   Distal urethra was normal.  The Stent was removed with a grasping forceps. The patient tolerated the procedure well and there were no complications.      Assessment/Plan: Patient with a history of ureteroscopy for stent removal.  Follow up in 6 months for follow up with Renal US and Litholink X2.

## 2021-08-12 NOTE — PROGRESS NOTES
re-procedure diagnosis: Stent In Situ  Post procedure diagnosis: normal cystoscopy  Procedure performed: cystoscopy and Stent Removal  Surgeon: Feilx Cano MD  Anesthesia: local    Indications for procedure: Patient is a 60 year old year old female with a history of Ureteroscopy.  Here today for cysto and ureteral stent removal    Description of procedure: After fully informed voluntary consent was obtained patient was brought into the procedure room, identified and placed in a supine position on the cysto table.  The vagina/intraoitus were prepped and draped in a sterile fashion with betadine.  A 15F flexible cystoscope was inserted into the urethra and the bladder and urethra examined in a systematic manner.  There were no tumor stones or diverticula.  Ureteric orifices were normal in position and number and effluxing clear urine and the stent was visualized.   Distal urethra was normal.  The Stent was removed with a grasping forceps. The patient tolerated the procedure well and there were no complications.      Assessment/Plan: Patient with a history of ureteroscopy for stent removal.  Follow up in 6 months for follow up with Renal US and Litholink X2.

## 2021-08-12 NOTE — NURSING NOTE
Prior to the start of the procedure and with procedural staff participation, I verbally confirmed the patient s identity using two indicators, relevant allergies, that the procedure was appropriate and matched the consent or emergent situation, and that the correct equipment/implants were available. Immediately prior to starting the procedure I conducted the Time Out with the procedural staff and re-confirmed the patient s name, procedure, and site/side. I have wiped the patient off with the povidone-Iodine solution, draped them and instilled sterile water into the bladder. (The Joint Commission universal protocol was followed.)  Yes    Sedation (Moderate or Deep): None  Sara Diaz LPN

## 2021-08-24 ENCOUNTER — MYC MEDICAL ADVICE (OUTPATIENT)
Dept: INTERNAL MEDICINE | Facility: CLINIC | Age: 61
End: 2021-08-24

## 2021-08-24 ASSESSMENT — PATIENT HEALTH QUESTIONNAIRE - PHQ9
SUM OF ALL RESPONSES TO PHQ QUESTIONS 1-9: 4
SUM OF ALL RESPONSES TO PHQ QUESTIONS 1-9: 4
10. IF YOU CHECKED OFF ANY PROBLEMS, HOW DIFFICULT HAVE THESE PROBLEMS MADE IT FOR YOU TO DO YOUR WORK, TAKE CARE OF THINGS AT HOME, OR GET ALONG WITH OTHER PEOPLE: NOT DIFFICULT AT ALL

## 2021-08-24 NOTE — TELEPHONE ENCOUNTER
Patient Quality Outreach      Summary:    Patient has the following on her problem list/HM:   Depression / Dysthymia review      PHQ-9 SCORE 7/18/2019 2/24/2020 9/28/2020   PHQ-9 Total Score 3 0 12       If PHQ-9 recheck is 5 or more, route to provider for next steps.    Patient is due/failing the following:   PHQ-9 Needed    Type of outreach:    Sent Independent Spacet message.    Questions for provider review:    None                                                                                                                                     Judy Adams CMA       Chart routed to .

## 2021-09-01 ENCOUNTER — MYC MEDICAL ADVICE (OUTPATIENT)
Dept: NEUROLOGY | Facility: CLINIC | Age: 61
End: 2021-09-01

## 2021-09-01 DIAGNOSIS — R56.9 SEIZURE (H): ICD-10-CM

## 2021-09-03 NOTE — PROGRESS NOTES
2155- Patient being transferred from [facility] ED to [facility] CCU for routine progression of care. Verbal SBAR report received from Mychal English RN.     0849- Pt arrived to  [phone] accompanied by EMS. 2350- /60, pt reports acute epigastric pain & nausea. Levophed paused & NP notified; new orders placed for prn zofran & fentanyl. 0000- Admission assessment complete; see flowsheets for details. EKG complete. 0025- NP at bedside to assess pt.     0100- BP 84/38; Levophed restarted at [address] 1340- Lab called to report critical lactic 3.0. NP notified. 0330- Attempted to complete admission database with virtual  (#[id number]). Unable to complete entire database. 0510- Oral temp 102.9. NP notified; order placed for paired blood cultures. 0550- 650mg oral tylenol given. 9650- AM labs & blood culture sent. 0710- Change of shift report given to Verónica Young RN (oncoming nurse). 0715- Lab called to report critical lactic; 2.1. S: Barbi Tiwari  presents 2 weeks  post op.      PROCEDURES:   1.  Right first metatarsophalangeal joint arthrodesis.   2.  Right second metatarsal Weil osteotomy.      No complaints.   She asks when she can have surgery on her left foot.       O:   Vascular:  Pedal pulses are palpable.  Moderate right forefoot edema.    Neuro: Light touch sensation is intact distal to the incisions.    Derm: The incisions are well coapted.  Sutures are intact.  No sg-incisional erythema. Ecchymosis.     Musculoskeletal: Satisfactory post op surgical correction of the prior severe hallux abducto valgus deformity.       ASSESSMENT:  1) s/p above noted surgery.  No clinical signs of infection.  Pain is controlled.  Encounter Diagnosis   Name Primary?     Surgery follow-up examination Yes        PLAN:  1) Suture Removal    The incision was prepped with povodine iodine solution.  Using a sterile suture scissors and forceps, the sutures were removed w/o difficulty.  Incision cleansed with an alcohol wipe and a light dressing was applied.  Pt advised to keep foot dry for an additional 24 hours, and then washing the foot is okay.  Pt stated understanding.    2) continue non-weight bearing and posterior splint for one more week.    3)  CAM walker provided. She can start to ambulate, as tolerated, in the CAM walker at 3 weeks post op.     4) I showed her how to do some right 2nd toe stretching to help reduce scar contracture and dorsal drift of the toe.  It did this prior to surgery, but by shortening the second metatarsal, some of this deformity was reduced.    I told her that we can discuss surgery on her left foot, once she is convinced that it was worth it on the right. I told her that the right foot needs to be completely healed, prior to surgery on the left.  Likely would wait 5 months.    Follow up in 1 month, sooner if any concerns.    Benjamin Griffin DPM, FACFAS, MS    [place] [facility] & [facility]le [facility]

## 2021-09-05 NOTE — TELEPHONE ENCOUNTER
GABAPENTIN 100MG CAPS      Last Written Prescription Date:  7/9/21  Last Fill Quantity: 90,   # refills: 0  Last Office Visit : 1/6/21 recommended 6 month follow up  Future Office visit:  None scheduled    Routing refill request to provider for review/approval because:  Drug not on [facility] refill protocol  How taking?  Gap in therapy?

## 2021-09-07 RX ORDER — GABAPENTIN 100 MG/1
300 CAPSULE ORAL EVERY EVENING
Qty: 90 CAPSULE | Refills: 3 | Status: SHIPPED | OUTPATIENT
Start: 2021-09-07 | End: 2022-02-06

## 2021-10-12 ASSESSMENT — PATIENT HEALTH QUESTIONNAIRE - PHQ9: SUM OF ALL RESPONSES TO PHQ QUESTIONS 1-9: 4

## 2021-10-12 NOTE — TELEPHONE ENCOUNTER
phq9 filed,       Patient Quality Outreach 2nd Attempt      Summary:    Type of outreach:    none, pt completed phq    Next Steps:  Reach out within 90 days via n/a.    Max number of attempts reached: n/a. Will try again in 90 days if patient still on fail list.    Questions for provider review:    None                                                                                                                    Judy Adams, MELY       Chart routed to .

## 2021-10-20 ENCOUNTER — OFFICE VISIT (OUTPATIENT)
Dept: DERMATOLOGY | Facility: CLINIC | Age: 61
End: 2021-10-20
Payer: COMMERCIAL

## 2021-10-20 VITALS — OXYGEN SATURATION: 95 % | HEART RATE: 64 BPM | DIASTOLIC BLOOD PRESSURE: 76 MMHG | SYSTOLIC BLOOD PRESSURE: 123 MMHG

## 2021-10-20 DIAGNOSIS — B35.1 ONYCHOMYCOSIS: ICD-10-CM

## 2021-10-20 DIAGNOSIS — L82.0 INFLAMED SEBORRHEIC KERATOSIS: Primary | ICD-10-CM

## 2021-10-20 DIAGNOSIS — L29.9 LOCALIZED PRURITUS: ICD-10-CM

## 2021-10-20 DIAGNOSIS — L30.9 DERMATITIS: ICD-10-CM

## 2021-10-20 DIAGNOSIS — L60.0 ONYCHOCRYPTOSIS: ICD-10-CM

## 2021-10-20 DIAGNOSIS — L82.1 SEBORRHEIC KERATOSES: ICD-10-CM

## 2021-10-20 PROCEDURE — 17110 DESTRUCTION B9 LES UP TO 14: CPT | Performed by: PHYSICIAN ASSISTANT

## 2021-10-20 PROCEDURE — 99214 OFFICE O/P EST MOD 30 MIN: CPT | Mod: 25 | Performed by: PHYSICIAN ASSISTANT

## 2021-10-20 RX ORDER — TRIAMCINOLONE ACETONIDE 1 MG/G
CREAM TOPICAL 2 TIMES DAILY
Qty: 60 G | Refills: 2 | Status: SHIPPED | OUTPATIENT
Start: 2021-10-20

## 2021-10-20 NOTE — LETTER
10/20/2021         RE: Barbi Tiwari  [address]  [facility] [address]        Dear Colleague,    Thank you for referring your patient, Barbi Tiwari, to the [facility]. Please see a copy of my visit note below.    HPI:  Barbi Tiwari is a 61 year old female patient here today for bothersome spots on trunk .  Patient states this has been present for a while.  Patient reports the following symptoms: itch, rubs on bra .  Patient reports the following previous treatments: ln2 to simliar with improvement. Also has onychomycosis on left 3rd toenail. Thickened toenail, bothersome, and painful. Has a history of ingrown toenails on left great toe. Has a rash in between breasts. Using otc HC 1% with minimal improvement.  Patient reports the following modifying factors: none.  Associated symptoms: none.  Patient has no other skin complaints today.  Remainder of the HPI, Meds, PMH, Allergies, FH, and SH was reviewed in chart.      Past Medical History:   Diagnosis Date     Acute cystitis with hematuria 10/24/2020     Allergic rhinitis 12/9/2009     Calculus of kidney 1/12/2011    Overview:  S/P LITHOTRIPSY 3/15/11      Esophageal reflux 10/22/2008     Hyperparathyroidism 01/11/2011    had surgery for it; resulted in seizures     Moderate major depression, single episode (H)      oligodendroglioma 7/23/2012     Osteoarthrosis 12/9/2009     Palpitations 6/5/2014     PONV (postoperative nausea and vomiting)      PVCs 6/5/2014     Seizure disorder (related to tumor) 08/17/2011    last seizure 3/2021       Past Surgical History:   Procedure Laterality Date     COMBINED CYSTOSCOPY, RETROGRADES, URETEROSCOPY, LASER HOLMIUM LITHOTRIPSY URETER(S), INSERT STENT Right 8/3/2021    Procedure: 1. Cystourethroscopy 2. Right ureteroscopy 3. Right retrograde pyelogram with interpretation of intraoperative fluoroscopic imaging 4. Holmium laser lithotripsy with basket stone extraction 5. Right  ureteral stent placement;  Surgeon: Felix Cano MD;  Location:  OR     CRANIOTOMY  2011    tumor resection     CYSTOSCOPY, RETROGRADES, INSERT STENT URETER(S), COMBINED Right 7/20/2021    Procedure: 1.  Cystourethroscopy 2.  Attempted right ureteroscopy 3.  Right  retrograde pyelogram with interpretation of intraoperative fluoroscopic imaging 4.  Right ureteral dilatation 5.  Right ureteral stent placement 6.  Laser on stand-by;  Surgeon: Felix Cano MD;  Location:  OR     EXTRACORPOREAL SHOCK WAVE LITHOTRIPSY, CYSTOSCOPY, INSERT STENT URETER(S), COMBINED Bilateral 5/5/2017    Procedure: COMBINED EXTRACORPOREAL SHOCK WAVE LITHOTRIPSY, CYSTOSCOPY, INSERT STENT URETER(S);  CYSTO, URETHRAL DILATION, URETERAL LEFT STENT PLACEMENT, LEFT ESWL.;  Surgeon: Angel Del Rio MD;  Location:  OR     FOOT SURGERY      mulitple     HYSTERECTOMY, PAP NO LONGER INDICATED  2008    lap hys     LITHOTRIPSY  2010 2017     OPTICAL TRACKING SYSTEM CRANIOTOMY, EXCISE TUMOR WITH MAPPING, COMBINED Right 5/30/2018    Procedure: COMBINED OPTICAL TRACKING SYSTEM CRANIOTOMY, EXCISE TUMOR WITH MAPPING;  Right Stealth Assisted Craniotomy With Motor Mapping And Tumor Resection ;  Surgeon: Dustin Rodriguez MD;  [facility]     ORTHOPEDIC SURGERY      feet, multiple on both     OSTEOTOMY FOOT Right 1/15/2019    Procedure: OSTEOTOMY FOOT;  Surgeon: Benjamin Griffin DPM;  Location:  OR     PARATHYROIDECTOMY  2011     RECONSTRUCT FOREFOOT WITH METATARSOPHALANGEAL (MTP) FUSION Right 1/15/2019    Procedure: RIGHT FIRST METATARSAL PHALAGEAL JOINT ARTHRODESIS, RIGHT SECOND METATARSAL WEIL OSTEOTOMY;  Surgeon: Benjamin Griffin DPM;  Location:  OR        Family History   Problem Relation Age of Onset     Arthritis Mother      Depression Mother      Respiratory Mother         COPD     LUNG DISEASE Mother      C.A.D. Father 58        heart attack     Heart Disease Father      Coronary Artery Disease Father          MI  age 54     Diabetes Brother 70         age 70     Depression Sister      Depression Son      Allergies Son      Depression Daughter      Allergies Daughter      Eczema Brother      Skin Cancer Brother      Depression Sister      Depression Sister      Anxiety Disorder Daughter        Social History     Socioeconomic History     Marital status:      Spouse name: Not on file     Number of children: Not on file     Years of education: Not on file     Highest education level: Not on file   Occupational History     Occupation: RN- [employer]   Tobacco Use     Smoking status: Never Smoker     Smokeless tobacco: Never Used   Substance and Sexual Activity     Alcohol use: Yes     Comment: social     Drug use: No     Sexual activity: Yes     Partners: Male     Birth control/protection: Female Surgical     Comment: hysterectomy   Other Topics Concern     Parent/sibling w/ CABG, MI or angioplasty before 65F 55M? Yes     Comment: Father,  of MI age 54   Social History Narrative     Not on file     Social Determinants of Health     Financial Resource Strain:      Difficulty of Paying Living Expenses:    Food Insecurity:      Worried About Running Out of Food in the Last Year:      Ran Out of Food in the Last Year:    Transportation Needs:      Lack of Transportation (Medical):      Lack of Transportation (Non-Medical):    Physical Activity:      Days of Exercise per Week:      Minutes of Exercise per Session:    Stress:      Feeling of Stress :    Social Connections:      Frequency of Communication with Friends and Family:      Frequency of Social Gatherings with Friends and Family:      Attends Congregation Services:      Active Member of Clubs or Organizations:      Attends Club or Organization Meetings:      Marital Status:    Intimate Partner Violence:      Fear of Current or Ex-Partner:      Emotionally Abused:      Physically Abused:      Sexually Abused:        Outpatient Encounter Medications  as of 10/20/2021   Medication Sig Dispense Refill     acetaminophen (TYLENOL) 500 MG tablet Take 500-1,000 mg by mouth every 6 hours as needed for mild pain       fexofenadine (ALLEGRA) 180 MG tablet Take 180 mg by mouth daily       gabapentin (NEURONTIN) 100 MG capsule Take 3 capsules (300 mg) by mouth every evening For additional refills, please schedule a follow-up appointment at [phone] 90 capsule 3     ibuprofen (ADVIL/MOTRIN) 200 MG tablet Take 800 mg by mouth daily as needed        Lactase (LACTAID PO) Take 1-2 tablets by mouth daily as needed for indigestion        lamoTRIgine (LAMICTAL) 100 MG tablet 100 mg am and pm (take along with 200 mg tablet for total of 300 mg twice a day). [facility] mft only 180 tablet 3     lamoTRIgine (LAMICTAL) 200 MG tablet 200 mg twice a day (take along with 100 mg tablet for total of 300 mg twice a day). [facility] mft only 180 tablet 3     levETIRAcetam (KEPPRA) 500 MG tablet 1500 mg am and 1250 mg pm 495 tablet 3     metoprolol tartrate (LOPRESSOR) 25 MG tablet TAKE 1 TABLET BY MOUTH 2 TIMES DAILY 180 tablet 3     Multiple Vitamin (MULTI-VITAMINS) TABS Take 1 chew tab by mouth daily        tretinoin (RETIN-A) 0.025 % external cream Apply a pea-sized amount to each area affected by acne. Start every 3-4 nights working up to every night. 45 g 0     VITAMIN D, CHOLECALCIFEROL, PO Take 1,000 Units by mouth daily        oxybutynin ER (DITROPAN-XL) 5 MG 24 hr tablet Take 1 tablet (5 mg) by mouth daily 9 tablet 0     tamsulosin (FLOMAX) 0.4 MG capsule Take 1 capsule (0.4 mg) by mouth daily (Patient not taking: Reported on 8/12/2021) 30 capsule 0     No facility-administered encounter medications on file as of 10/20/2021.       Review Of Systems:  Skin: spots  Eyes: negative  Ears/Nose/Throat: negative  Respiratory: No shortness of breath, dyspnea on exertion, cough, or hemoptysis  Cardiovascular: negative  Gastrointestinal: negative  Genitourinary: negative  Musculoskeletal:  negative  Neurologic: negative  Psychiatric: negative  Hematologic/Lymphatic/Immunologic: negative  Endocrine: negative      Objective:     /76   Pulse 64   LMP  (LMP Unknown)   SpO2 95%   Breastfeeding No   Eyes: Conjunctivae/lids: Normal   ENT: Lips:  Normal  MSK: Normal  Cardiovascular: Peripheral edema none  Pulm: Breathing Normal  Neuro/Psych: Orientation: A/O x 3 Normal; Mood/Affect: Normal, NAD, WDWN  Pt accompanied by: self  Following areas examined: face, neck, back, chest, sternum, left foot  Navarrete skin type:i   Findings:  Yellow thickened toenail on left 3rd toenail  Inflamed brown, stuck-on scaly appearing papules on braline, flanks x 6  Brown, stuck-on scaly appearing papules on back  Ingrown toenails on left great toe  Assessment and Plan:  1) onychomycosis  Pt would like nail removed. painful  Follow up with Dr. Forbes    2) onychocryptosis  Follow up with Dr Forbes of podiatry for removal    3) ISK x 6  Benign etiology and course of lesion.  LN2: Treated with LN2 for 5s for 1-2 cycles. Warned risks of blistering, pain, pigment change, scarring, and incomplete resolution.  Advised patient to return if lesions do not completely resolve within 2-3 months.  Wound care sheet given.    4) Seborrheic keratoses    Treatment of these lesions would be purely cosmetic and not medically neccessary.  Lesion may recur and/or may not completely resolve. May need additional treatment.  Different removal options including excision, cryotherapy, cautery and /or laser.      5) ICD, localized pruritis  Apply a thin layer of TAC 1% to affected area 2x a day for 2 weeks. Tapering with improvement. Do not use on face or body folds.  Discussed side effects of topical steroids including but not limited to atrophy (skin thinning), striae (stretch marks) telangiectasias, steroid acne, and others. Do not apply to normal skin. Do not apply to discolored skin that does not have rash present. Educated patient on  post inflammatory hyperpigmentation.       It was a pleasure speaking with Barbi Tiwari today.   Follow up in prn        Again, thank you for allowing me to participate in the care of your patient.        Sincerely,        Cary Camarillo PA-C

## 2021-10-20 NOTE — PROGRESS NOTES
HPI:  Barbi Tiwari is a 61 year old female patient here today for bothersome spots on trunk .  Patient states this has been present for a while.  Patient reports the following symptoms: itch, rubs on bra .  Patient reports the following previous treatments: ln2 to simliar with improvement. Also has onychomycosis on left 3rd toenail. Thickened toenail, bothersome, and painful. Has a history of ingrown toenails on left great toe. Has a rash in between breasts. Using otc HC 1% with minimal improvement.  Patient reports the following modifying factors: none.  Associated symptoms: none.  Patient has no other skin complaints today.  Remainder of the HPI, Meds, PMH, Allergies, FH, and SH was reviewed in chart.      Past Medical History:   Diagnosis Date     Acute cystitis with hematuria 10/24/2020     Allergic rhinitis 12/9/2009     Calculus of kidney 1/12/2011    Overview:  S/P LITHOTRIPSY 3/15/11      Esophageal reflux 10/22/2008     Hyperparathyroidism 01/11/2011    had surgery for it; resulted in seizures     Moderate major depression, single episode (H)      oligodendroglioma 7/23/2012     Osteoarthrosis 12/9/2009     Palpitations 6/5/2014     PONV (postoperative nausea and vomiting)      PVCs 6/5/2014     Seizure disorder (related to tumor) 08/17/2011    last seizure 3/2021       Past Surgical History:   Procedure Laterality Date     COMBINED CYSTOSCOPY, RETROGRADES, URETEROSCOPY, LASER HOLMIUM LITHOTRIPSY URETER(S), INSERT STENT Right 8/3/2021    Procedure: 1. Cystourethroscopy 2. Right ureteroscopy 3. Right retrograde pyelogram with interpretation of intraoperative fluoroscopic imaging 4. Holmium laser lithotripsy with basket stone extraction 5. Right ureteral stent placement;  Surgeon: Felix Cano MD;  Location: [facility] OR     CRANIOTOMY  2011    tumor resection     CYSTOSCOPY, RETROGRADES, INSERT STENT URETER(S), COMBINED Right 7/20/2021    Procedure: 1.  Cystourethroscopy 2.  Attempted right ureteroscopy  3.  Right  retrograde pyelogram with interpretation of intraoperative fluoroscopic imaging 4.  Right ureteral dilatation 5.  Right ureteral stent placement 6.  Laser on stand-by;  Surgeon: Felix Cano MD;  Location:  OR     EXTRACORPOREAL SHOCK WAVE LITHOTRIPSY, CYSTOSCOPY, INSERT STENT URETER(S), COMBINED Bilateral 2017    Procedure: COMBINED EXTRACORPOREAL SHOCK WAVE LITHOTRIPSY, CYSTOSCOPY, INSERT STENT URETER(S);  CYSTO, URETHRAL DILATION, URETERAL LEFT STENT PLACEMENT, LEFT ESWL.;  Surgeon: Angel Del Rio MD;  Location:  OR     FOOT SURGERY      mulitple     HYSTERECTOMY, PAP NO LONGER INDICATED      lap hys     LITHOTRIPSY  2010     OPTICAL TRACKING SYSTEM CRANIOTOMY, EXCISE TUMOR WITH MAPPING, COMBINED Right 2018    Procedure: COMBINED OPTICAL TRACKING SYSTEM CRANIOTOMY, EXCISE TUMOR WITH MAPPING;  Right Stealth Assisted Craniotomy With Motor Mapping And Tumor Resection ;  Surgeon: Dustin Rodriguez MD;  [facility]     ORTHOPEDIC SURGERY      feet, multiple on both     OSTEOTOMY FOOT Right 1/15/2019    Procedure: OSTEOTOMY FOOT;  Surgeon: Benjamin Griffin DPM;  Location:  OR     PARATHYROIDECTOMY       RECONSTRUCT FOREFOOT WITH METATARSOPHALANGEAL (MTP) FUSION Right 1/15/2019    Procedure: RIGHT FIRST METATARSAL PHALAGEAL JOINT ARTHRODESIS, RIGHT SECOND METATARSAL WEIL OSTEOTOMY;  Surgeon: Benjamin Griffin DPM;  Location:  OR        Family History   Problem Relation Age of Onset     Arthritis Mother      Depression Mother      Respiratory Mother         COPD     LUNG DISEASE Mother      C.A.D. Father 58        heart attack     Heart Disease Father      Coronary Artery Disease Father         MI  age 54     Diabetes Brother 70         age 70     Depression Sister      Depression Son      Allergies Son      Depression Daughter      Allergies Daughter      Eczema Brother      Skin Cancer Brother      Depression Sister      Depression  Sister      Anxiety Disorder Daughter        Social History     Socioeconomic History     Marital status:      Spouse name: Not on file     Number of children: Not on file     Years of education: Not on file     Highest education level: Not on file   Occupational History     Occupation: RN- [employer]   Tobacco Use     Smoking status: Never Smoker     Smokeless tobacco: Never Used   Substance and Sexual Activity     Alcohol use: Yes     Comment: social     Drug use: No     Sexual activity: Yes     Partners: Male     Birth control/protection: Female Surgical     Comment: hysterectomy   Other Topics Concern     Parent/sibling w/ CABG, MI or angioplasty before 65F 55M? Yes     Comment: Father,  of MI age 54   Social History Narrative     Not on file     Social Determinants of Health     Financial Resource Strain:      Difficulty of Paying Living Expenses:    Food Insecurity:      Worried About Running Out of Food in the Last Year:      Ran Out of Food in the Last Year:    Transportation Needs:      Lack of Transportation (Medical):      Lack of Transportation (Non-Medical):    Physical Activity:      Days of Exercise per Week:      Minutes of Exercise per Session:    Stress:      Feeling of Stress :    Social Connections:      Frequency of Communication with Friends and Family:      Frequency of Social Gatherings with Friends and Family:      Attends Church Services:      Active Member of Clubs or Organizations:      Attends Club or Organization Meetings:      Marital Status:    Intimate Partner Violence:      Fear of Current or Ex-Partner:      Emotionally Abused:      Physically Abused:      Sexually Abused:        Outpatient Encounter Medications as of 10/20/2021   Medication Sig Dispense Refill     acetaminophen (TYLENOL) 500 MG tablet Take 500-1,000 mg by mouth every 6 hours as needed for mild pain       fexofenadine (ALLEGRA) 180 MG tablet Take 180 mg by mouth daily       gabapentin (NEURONTIN) 100 MG  capsule Take 3 capsules (300 mg) by mouth every evening For additional refills, please schedule a follow-up appointment at [phone] 90 capsule 3     ibuprofen (ADVIL/MOTRIN) 200 MG tablet Take 800 mg by mouth daily as needed        Lactase (LACTAID PO) Take 1-2 tablets by mouth daily as needed for indigestion        lamoTRIgine (LAMICTAL) 100 MG tablet 100 mg am and pm (take along with 200 mg tablet for total of 300 mg twice a day). [facility] mft only 180 tablet 3     lamoTRIgine (LAMICTAL) 200 MG tablet 200 mg twice a day (take along with 100 mg tablet for total of 300 mg twice a day). [facility] mft only 180 tablet 3     levETIRAcetam (KEPPRA) 500 MG tablet 1500 mg am and 1250 mg pm 495 tablet 3     metoprolol tartrate (LOPRESSOR) 25 MG tablet TAKE 1 TABLET BY MOUTH 2 TIMES DAILY 180 tablet 3     Multiple Vitamin (MULTI-VITAMINS) TABS Take 1 chew tab by mouth daily        tretinoin (RETIN-A) 0.025 % external cream Apply a pea-sized amount to each area affected by acne. Start every 3-4 nights working up to every night. 45 g 0     VITAMIN D, CHOLECALCIFEROL, PO Take 1,000 Units by mouth daily        oxybutynin ER (DITROPAN-XL) 5 MG 24 hr tablet Take 1 tablet (5 mg) by mouth daily 9 tablet 0     tamsulosin (FLOMAX) 0.4 MG capsule Take 1 capsule (0.4 mg) by mouth daily (Patient not taking: Reported on 8/12/2021) 30 capsule 0     No facility-administered encounter medications on file as of 10/20/2021.       Review Of Systems:  Skin: spots  Eyes: negative  Ears/Nose/Throat: negative  Respiratory: No shortness of breath, dyspnea on exertion, cough, or hemoptysis  Cardiovascular: negative  Gastrointestinal: negative  Genitourinary: negative  Musculoskeletal: negative  Neurologic: negative  Psychiatric: negative  Hematologic/Lymphatic/Immunologic: negative  Endocrine: negative      Objective:     /76   Pulse 64   LMP  (LMP Unknown)   SpO2 95%   Breastfeeding No   Eyes: Conjunctivae/lids: Normal   ENT: Lips:   Normal  MSK: Normal  Cardiovascular: Peripheral edema none  Pulm: Breathing Normal  Neuro/Psych: Orientation: A/O x 3 Normal; Mood/Affect: Normal, NAD, WDWN  Pt accompanied by: self  Following areas examined: face, neck, back, chest, sternum, left foot  Navarrete skin type:i   Findings:  Yellow thickened toenail on left 3rd toenail  Inflamed brown, stuck-on scaly appearing papules on braline, flanks x 6  Brown, stuck-on scaly appearing papules on back  Ingrown toenails on left great toe  Assessment and Plan:  1) onychomycosis  Pt would like nail removed. painful  Follow up with Dr. Forbes    2) onychocryptosis  Follow up with Dr Forbes of podiatry for removal    3) ISK x 6  Benign etiology and course of lesion.  LN2: Treated with LN2 for 5s for 1-2 cycles. Warned risks of blistering, pain, pigment change, scarring, and incomplete resolution.  Advised patient to return if lesions do not completely resolve within 2-3 months.  Wound care sheet given.    4) Seborrheic keratoses    Treatment of these lesions would be purely cosmetic and not medically neccessary.  Lesion may recur and/or may not completely resolve. May need additional treatment.  Different removal options including excision, cryotherapy, cautery and /or laser.      5) ICD, localized pruritis  Apply a thin layer of TAC 1% to affected area 2x a day for 2 weeks. Tapering with improvement. Do not use on face or body folds.  Discussed side effects of topical steroids including but not limited to atrophy (skin thinning), striae (stretch marks) telangiectasias, steroid acne, and others. Do not apply to normal skin. Do not apply to discolored skin that does not have rash present. Educated patient on post inflammatory hyperpigmentation.       It was a pleasure speaking with Barbi Tiwari today.   Follow up in prn

## 2021-10-20 NOTE — PATIENT INSTRUCTIONS
Proper skin care from [facility]:    -Eliminate harsh soaps as they strip the natural oils from the skin, often resulting in dry itchy skin ( i.e. Dial, Zest, Grecia Spring)  -Use mild soaps such as Cetaphil or Dove Sensitive Skin in the shower. You do not need to use soap on arms, legs, and trunk every time you shower unless visibly soiled.   -Avoid hot or cold showers.  -After showering, lightly dry off and apply moisturizing within 2-3 minutes. This will help trap moisture in the skin.   -Aggressive use of a moisturizer at least 1-2 times a day to the entire body (including -Vanicream, Cetaphil, Aquaphor or Cerave) and moisturize hands after every washing.  -We recommend using moisturizers that come in a tub that needs to be scooped out, not a pump. This has more of an oil base. It will hold moisture in your skin much better than a water base moisturizer. The above recommended are non-pore clogging.      Wear a sunscreen with at least SPF 30 on your face, ears, neck and V of the chest daily. Wear sunscreen on other areas of the body if those areas are exposed to the sun throughout the day. Sunscreens can contain physical and/or chemical blockers. Physical blockers are less likely to clog pores, these include zinc oxide and titanium dioxide. Reapply every two hour and after swimming.     Sunscreen examples: https://www.ewg.org/sunscreen/    UV radiation  UVA radiation remains constant throughout the day and throughout the year. It is a longer wavelength than UVB and therefore penetrates deeper into the skin leading to immediate and delayed tanning, photoaging, and skin cancer. 70-80% of UVA and UVB radiation occurs between the hours of 10am-2pm.  UVB radiation  UVB radiation causes the most harmful effects and is more significant during the summer months. However, snow and ice can reflect UVB radiation leading to skin damage during the winter months as well. UVB radiation is responsible for tanning,  burning, inflammation, delayed erythema (pinkness), pigmentation (brown spots), and skin cancer.     I recommend self monthly full body exams and yearly full body exams with a dermatology provider. If you develop a new or changing lesion please follow up for examination. Most skin cancers are pink and scaly or pink and pearly. However, we do see blue/brown/black skin cancers.  Consider the ABCDEs of melanoma when giving yourself your monthly full body exam ( don't forget the groin, buttocks, feet, toes, etc). A-asymmetry, B-borders, C-color, D-diameter, E-elevation or evolving. If you see any of these changes please follow up in clinic. If you cannot see your back I recommend purchasing a hand held mirror to use with a larger wall mirror.          WOUND CARE INSTRUCTIONS  FOR CRYOSURGERY  For questions please call [phone]        This area treated with liquid nitrogen will form a blister. You do not need to bandage the area until after the blister forms and breaks (which may be a few days).  When the blister breaks, begin daily dressing changes as follows:    1) Clean and dry the area with tap water using clean Q-tip or sterile gauze pad.    2) Apply Vaseline or Aquaphor over entire wound. Other options include Polysporin ointment or Bacitracin ointment over entire wound.  Do NOT use Neosporin ointment.    3) Cover the wound with a band-aid or sterile non-stick gauze pad and micropore paper tape.      REPEAT THESE INSTRUCTIONS AT LEAST ONCE A DAY UNTIL THE WOUND HAS COMPLETELY HEALED.        It is an old wives tale that a wound heals better when it is exposed to air and allowed to dry out. The wound will heal faster with a better cosmetic result if it is kept moist with ointment and covered with a bandage.  Do not let the wound dry out.      Supplies Needed:     *Cotton tipped applicators (Q-tips)   *Polysporin ointment or Bacitracin ointment (NOT NEOSPORIN)   *Band-aids, or non stick gauze pads and micropore  paper tape    PATIENT INFORMATION    During the healing process you will notice a number of changes. All wounds develop a small halo of redness surrounding the wound.  This means healing is occurring. Severe itching with extensive redness usually indicates sensitivity to the ointment or bandage tape used to dress the wound.  You should call our office if this develops.      Swelling and/or discoloration around your surgical site is common, particularly when performed around the eye.    All wounds normally drain.  The larger the wound the more drainage there will be.  After 7-10 days, you will notice the wound beginning to shrink and new skin will begin to grow.  The wound is healed when you can see skin has formed over the entire area.  A healed wound has a healthy, shiny look to the surface and is red to dark pink in color to normalize.  Wounds may take approximately 4-6 weeks to heal.  Larger wounds may take 6-8 weeks.  After the wound is healed you may discontinue dressing changes.    You may experience a sensation of tightness as your wound heals. This is normal and will gradually subside.    Your healed wound may be sensitive to temperature changes. This sensitivity improves with time, but if you re having a lot of discomfort, try to avoid temperature extremes.    Patients frequently experience itching after their wound appears to have healed because of the continue healing under the skin.  Plain Vaseline will help relieve the itching.

## 2021-10-25 ENCOUNTER — TELEPHONE (OUTPATIENT)
Dept: FAMILY MEDICINE | Facility: CLINIC | Age: 61
End: 2021-10-25

## 2021-10-25 NOTE — TELEPHONE ENCOUNTER
Called and LM for patient to call back.    Courtney RN-BSN-N  [facility]  [phone]

## 2021-10-25 NOTE — TELEPHONE ENCOUNTER
[facility] Call Center    Phone Message    May a detailed message be left on voicemail: yes     Reason for Call: Appointment Intake    Referring Provider Name: NA  Diagnosis and/or Symptoms: onychomycosis on left 3rd toenail - Pt missed call and returning call. I tried back line and no answer. Please call back, thank you.    Action Taken: Message routed to:  [facility] ([facility] Derm    Travel Screening: Not Applicable

## 2021-10-25 NOTE — TELEPHONE ENCOUNTER
Pt has onychomycosis on left 3rd toenail-please schedule removal in surgical slot with Dr. Forbes    Pt has ingrown toenails of medial and lateral nail of left great toenail she would like removed-please schedule in surgical slot with Dr. Forbes

## 2021-10-25 NOTE — TELEPHONE ENCOUNTER
Called and LM w/ patients  for patient to call back.    Courtney RN-BSN-PHN  [facility]  [phone]

## 2021-10-25 NOTE — TELEPHONE ENCOUNTER
Called and spoke to patient.    Scheduled next available procedure appointment for removal of left 3rd toenail (onychomycosis) and removal of ingrown medial/lateral nail of left great toenail.    Patient voiced understanding.    EDISON Valdez-BSN-N  [facility]  [phone]

## 2021-11-08 ENCOUNTER — HOSPITAL ENCOUNTER (OUTPATIENT)
Dept: MRI IMAGING | Facility: CLINIC | Age: 61
Discharge: HOME | End: 2021-11-08
Attending: PSYCHIATRY & NEUROLOGY | Admitting: PSYCHIATRY & NEUROLOGY
Payer: COMMERCIAL

## 2021-11-08 ENCOUNTER — TUMOR CONFERENCE (OUTPATIENT)
Dept: ONCOLOGY | Facility: CLINIC | Age: 61
End: 2021-11-08
Payer: COMMERCIAL

## 2021-11-08 DIAGNOSIS — C71.9 OLIGODENDROGLIOMA (H): ICD-10-CM

## 2021-11-08 PROCEDURE — 70553 MRI BRAIN STEM W/O & W/DYE: CPT

## 2021-11-08 PROCEDURE — 255N000002 HC RX 255 OP 636: Performed by: PSYCHIATRY & NEUROLOGY

## 2021-11-08 PROCEDURE — A9585 GADOBUTROL INJECTION: HCPCS | Performed by: PSYCHIATRY & NEUROLOGY

## 2021-11-08 RX ORDER — GADOBUTROL 604.72 MG/ML
10 INJECTION INTRAVENOUS ONCE
Status: COMPLETED | OUTPATIENT
Start: 2021-11-08 | End: 2021-11-08

## 2021-11-08 RX ADMIN — GADOBUTROL 10 ML: 604.72 INJECTION INTRAVENOUS at 12:14

## 2021-11-08 NOTE — TUMOR CONFERENCE
Tumor Conference Information  Tumor Conference: Brain  Specialties Present: Medical oncology, Pathology, Radiology, Radiation oncology, Surgery  Patient Status: A current patient  Pathology: Not Discussed  Treatment to Date: Surgical intervention(s), Adjuvant chemotherapy, Chemoradiation  Clinical Trial Eligibility: Not discussed  Recommended Plan: Observation (see comment) (Comment: reviewed imaging - stable on recent scans; follow up in 6 months with repeat MRI)  Did the review exceed 30 minutes?: did not           Documentation / Disclaimer Cancer Tumor Board Note  Cancer tumor board recommendations do not override what is determined to be reasonable care and treatment, which is dependent on the circumstances of a patient's case; the patient's medical, social, and personal concerns; and the clinical judgment of the oncologist [physician].

## 2021-11-08 NOTE — PROGRESS NOTES
"Barbi is a 61 year old who is being evaluated via a billable video visit.      How would you like to obtain your AVS? MyChart  If the video visit is dropped, the invitation should be resent by: Text to cell phone: [phone]  Will anyone else be joining your video visit? No  Video-Visit Details  Type of service:  Video Visit    Video Start Time: 3:19 PM  Video End Time: 3:30 PM    Originating Location (pt. Location): Home    Distant Location (provider location):  [facility]     Platform used for Video Visit: Prosper Gallagher MD    _____________________________________________________________    NEURO-ONCOLOGY VISIT  Nov 9, 2021    CHIEF COMPLAINT: Ms. Barbi Tiwari is a 61 year old right-handed woman with a right frontal diffuse oligodendroglioma (1p19q co-deleted), diagnosed following resection in 5/2011. She received 12 cycles of temozolomide, completed in 5/2012. Changes on imaging necessitated a repeat resection on 5/30/2018 and pathology was unchanged. No adjuvant cancer-directed therapy was initiated. Imaging over the next 1.5 years showed slow tumor progression and treatment was then initiated. She completed chemoradiotherapy on 5/30/2020. Barbi then completed 6 cycles of adjuvant-dosed temozolomide as of 11/2020. Dose escalation to 200mg/m2 was complicated by intolerable nausea.     She is now managed on imaging surveillance. Her most recent scan is without concern for cancer recurrence.     I met with Barbi and Kenneth () for this follow-up visit.    HISTORY OF PRESENT ILLNESS  -Barbi is doing well.   -Chronic fatigue. Mood is \"ok\", but she is thinking that her mood/ energy/ motivation/ interests may benefit from a trial of Zoloft. Barbi will be discussing this with her primary care provider.   -She denies any new symptoms; no weakness, numbness, changes in vision, or headaches. Balance is off; chronic and stable.  -Reports no recent seizures.  -Continued mild " word-finding issues. Able to multi-task. 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     REVIEW OF SYSTEMS  A comprehensive ROS negative except as in HPI.      MEDICATIONS   Current Outpatient Medications   Medication     acetaminophen (TYLENOL) 500 MG tablet     fexofenadine (ALLEGRA) 180 MG tablet     gabapentin (NEURONTIN) 100 MG capsule     ibuprofen (ADVIL/MOTRIN) 200 MG tablet     Lactase (LACTAID PO)     lamoTRIgine (LAMICTAL) 100 MG tablet     lamoTRIgine (LAMICTAL) 200 MG tablet     levETIRAcetam (KEPPRA) 500 MG tablet     metoprolol tartrate (LOPRESSOR) 25 MG tablet     Multiple Vitamin (MULTI-VITAMINS) TABS     tretinoin (RETIN-A) 0.025 % external cream     triamcinolone (KENALOG) 0.1 % external cream     VITAMIN D, CHOLECALCIFEROL, PO     No current facility-administered medications for this visit.     DRUG ALLERGIES   Allergies   Allergen Reactions     Sulfa Drugs Hives     Benoxinate Nausea and Vomiting       ONCOLOGIC HISTORY  -4/27/2011 PRESENTATION: New onset seizure, generalized event.   -5/2011 MRB with a non-contrast enhancing right frontal mass lesion.   -5/2011 SURGERY: Craniectomy for mass resection at [facility].   PATHOLOGY: Diffuse oligodendroglioma; WHO grade II, 1p/19q co-deleted.  -6/2011-5/2012 CHEMO: Adjuvant dosed temozolomide x 12 cycles.  -4/2/2018 MRB with possible disease recurrence with a new 1.2 cm non-enhancing nodule within the cortex of the right frontal lobe adjacent to the resection cavity.  -4/12/2018 NEURO-ONC: Recommending repeat resection, appointment scheduled with Dr. Dustin Rodriguez, neurosurgery at the [facility].   -5/30/2018 SURGERY: Repeat resection with Dr. Rodriguez.   PATHOLOGY: Remains low grade, without concerning features.   -6/15/2018 NEURO-ONC: Start imaging surveillance.  -9/17/2018 NEURO-ONC/ MRB: Imaging stable, continue with surveillance.   -12/10/2018 NEURO-ONC/ MRB: Imaging stable, continue with surveillance.   -4/15/2019 NEURO-ONC/ MRB: Imaging  stable, continue with surveillance.  -8/19/2019 NEURO-ONC/ MRB: Clinically stable. Imaging largely stable, subtle increases on T2 FLAIR; continue with surveillance.  -12/16/2019 NEURO-ONC/ MRB: Clinically stable. Imaging with increased T2 FLAIR medially and laterally about the resection cavity. Referral to Dr. Figueroa with radiation oncology. Discussion with Dr. Rodriguez. Referral for repeat neuro-psychology testing.   -1/10/2020 Evaluated by Dr. Devan Figueroa.   -1/17/2020 NEURO-ONC: Tentative plan to initiate chemoradiotherapy in May-June 2020.   -2/17/2020 NEURO-PSYCH; Mild weaknesses were noted in aspects of verbal and nonverbal working memory, nonverbal recall, some aspects of executive functioning, and bilateral psychomotor speed. The remainder of her cognitive abilities were intact and performed in keeping with her above average range cognitive baseline.   -4/20 - 5/30/2020 CHEMORADS: 54 Gy in 30 fractions with concurrent temozolomide 75mg/m2 (140mg).   -5/4/2020 NEURO-ONC: Continue treatment as planned. Prescribing Clotrimazole lozenges for oral thrush.   -6/1/2020 NEURO-ONC: Completed treatment as planned.  -6/30/2020 NEURO-ONC/ MRB/ CHEMO: Clinically stable. Imaging with positive treatment effect. Starting adjuvant temozolomide 150mg/m2 (250mg), cycle 1 (start date was 7/1).   -7/28/2020 NEURO-ONC/ CHEMO: Clinically stable. Adjuvant temozolomide 200mg/m2 (320mg), cycle 2 (start date was 7/29).   -8/25/2020 NEURO-ONC/ CHEMO: Clinically stable; significant nausea/ vomiting with 200mg/m2 dosing. Adding Emend for this next cycle. Adjuvant temozolomide 150mg/m2 (250mg), cycle 3 (start date of 8/26).   -9/22/2020 NEURO-ONC/ MRB/ CHEMO: Clinically stable; less nausea/ vomiting with lowered chemotherapy dosing plus adding Emend. Imaging with no concerns, repeat in 3 months. Adjuvant temozolomide 150mg/m2 (250mg), cycle 4 (start date of 9/23).   -10/20/2020 NEURO-ONC/ CHEMO: Clinically stable; no new/ worsening issues.  Experiencing pain related to piriformis syndrome. Adjuvant temozolomide 150mg/m2 (250mg), cycle 5 (start date of 10/21).   -11/172020 NEURO-ONC/ CHEMO: Clinically stable. Adjuvant temozolomide 150mg/m2 (250mg), cycle 6 (start date of 11/18).   -12/15/2020 NEURO-ONC/ MRB: Clinically well. Imaging with no concerns. Starting imaging surveillance.   -3/16/2021 NEURO-ONC/ MRB: Clinically well. Imaging with no concerns.   -4/30/2021 Neuro-psych evaluation demonstrated weaknesses that are consistent with dysfunction of the bilateral frontal regions, but the right frontal region in particular.  Relative to her exam from February, 2020, there has been a mild decline in her thinking. In this exam, weaknesses were identified in executive functioning, attention, and both verbal and nonverbal working memory. Some aspects of cognitive speed were relative weaknesses as well. Insight into these weaknesses was limited.   -7/13/2021 NEURO-ONC/ MRB: Clinically well. Imaging stable.   -11/9/2021 NEURO-ONC/ MRB: Clinically well. Considering a trial of Zoloft. Imaging stable.     SOCIAL HISTORY   Tobacco use: Never smoker.   Alcohol use: Social.   Drug use: Denies.  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   , 2 children      PHYSICAL EXAMINATION    -Generally well appearing.  -Respiratory: No audible wheezing.   -Skin: No facial rashes.  -Psychiatric: Normal mood and affect. Pleasant, talkative.  -Neurologic:   MENTAL STATUS:     Alert, oriented.    Recall: Intact.   Speech fluent.    Comprehension intact.     CRANIAL NERVES:     Pupils are equal, round.     Extraocular movements full, denies diplopia.     Equal activation with smiling/ talking.    Hearing intact.   With normal phonation, no dysfunction of the palate or tongue.   MOTOR: Antigravity in arms.  SENSATION: Denies numbness.  GAIT: Steady, unassisted.     The rest of a comprehensive physical examination is deferred due to PHE (public health emergency) video  visit restrictions.      MEDICAL RECORDS  Obtained and personally reviewed all available outside medical records in addition to reviewing any records available in our electronic system.     LABS  Personally reviewed all available lab results.     IMAGING  Personally reviewed MR brain imaging from yesterday and compared to prior imaging. To my eye, the T2 FLAIR about the right frontal resection cavity remains largely stable over the past 11 months. No enhancement seen.     Imaging was shown to and results were reviewed with Barbi.     Imaging and case was reviewed at Brain Tumor Conference yesterday.        IMPRESSION  Clinic time was spent discussing in detail the nature of her cancer in light of her recent imaging. This is in addition to answering questions pertaining to my recommendations and devising the plan as outlined below.     Clinically, Barbi is doing well with no new subjective neurological complaints or recent recurrent seizure events. She continues to have chronic fatigue and lower mood for which she is going to discuss a selective serotonin reuptake inhibitor trial with her primary care provider. I am in agreement with a trial of Zoloft as I feel this medication will greatly benefit Barbi.     Imaging largely stable over the past year with no concerns. Since Barbi has remained clinically and radiographically stable off chemotherapy for the past year, offered extending the imaging interval to every 4-6 months per NCCN guidelines. Barbi is in agreement with repeat imaging in 4 months. However, Barbi knows to call with any concerns or to report new complaints and appts can be moved sooner if needed. Will repeat imaging every 4-6 months through 11/2025 and at that time, can consider annual imaging.     At follow-up, will need to discuss with Barbi the option of scheduling the recommended repeat neuro-psych testing.    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   -No need to monitor labs while off chemotherapy.      -SEIZURE MANAGEMENT-  -Follows with Dr. Flakita Clark; Neurology, epilepsy specialist at the [facility].      -Quality of life/ MOOD/ FATIGUE-  -History of mild anxiety and depression.   -Failed Celexa due to side effect of diarrhea.   -Untreated/ undertreated depression can worsen fatigue, dysorexia, and quality of life.  -Agree with consideration for a trial of Zoloft.     -COGNITIVE CHANGES-  -Neuro-psych testing completed in 4/2021 with worsening deficits.  -Repeat testing recommended in ~4/2022.     Return to clinic in 3/2022 + imaging.     Myrtle Gallagher MD  Neuro-oncology

## 2021-11-09 ENCOUNTER — MYC MEDICAL ADVICE (OUTPATIENT)
Dept: INTERNAL MEDICINE | Facility: CLINIC | Age: 61
End: 2021-11-09

## 2021-11-09 ENCOUNTER — VIRTUAL VISIT (OUTPATIENT)
Dept: ONCOLOGY | Facility: CLINIC | Age: 61
End: 2021-11-09
Attending: PSYCHIATRY & NEUROLOGY
Payer: COMMERCIAL

## 2021-11-09 DIAGNOSIS — C71.9 OLIGODENDROGLIOMA (H): Primary | ICD-10-CM

## 2021-11-09 PROCEDURE — 99214 OFFICE O/P EST MOD 30 MIN: CPT | Mod: 95 | Performed by: PSYCHIATRY & NEUROLOGY

## 2021-11-09 NOTE — LETTER
"    11/9/2021         RE: Barbi Tiwari  [address] [facility] [address]        Dear Colleague,    Thank you for referring your patient, Barbi Tiwari, to the [facility]. Please see a copy of my visit note below.    Barbi is a 61 year old who is being evaluated via a billable video visit.      How would you like to obtain your AVS? MyChart  If the video visit is dropped, the invitation should be resent by: Text to cell phone: [phone]  Will anyone else be joining your video visit? No  Video-Visit Details  Type of service:  Video Visit    Video Start Time: 3:19 PM  Video End Time: 3:30 PM    Originating Location (pt. Location): Home    Distant Location (provider location):  [facility]     Platform used for Video Visit: Prosper Gallagher MD    _____________________________________________________________    NEURO-ONCOLOGY VISIT  Nov 9, 2021    CHIEF COMPLAINT: Ms. Barbi Tiwari is a 61 year old right-handed woman with a right frontal diffuse oligodendroglioma (1p19q co-deleted), diagnosed following resection in 5/2011. She received 12 cycles of temozolomide, completed in 5/2012. Changes on imaging necessitated a repeat resection on 5/30/2018 and pathology was unchanged. No adjuvant cancer-directed therapy was initiated. Imaging over the next 1.5 years showed slow tumor progression and treatment was then initiated. She completed chemoradiotherapy on 5/30/2020. Barbi then completed 6 cycles of adjuvant-dosed temozolomide as of 11/2020. Dose escalation to 200mg/m2 was complicated by intolerable nausea.     She is now managed on imaging surveillance. Her most recent scan is without concern for cancer recurrence.     I met with Barbi and Kenneth () for this follow-up visit.    HISTORY OF PRESENT ILLNESS  -Barbi is doing well.   -Chronic fatigue. Mood is \"ok\", but she is thinking that her mood/ energy/ motivation/ interests may benefit " from a trial of Zoloft. Barbi will be discussing this with her primary care provider.   -She denies any new symptoms; no weakness, numbness, changes in vision, or headaches. Balance is off; chronic and stable.  -Reports no recent seizures.  -Continued mild word-finding issues. Able to multi-task. 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     REVIEW OF SYSTEMS  A comprehensive ROS negative except as in HPI.      MEDICATIONS   Current Outpatient Medications   Medication     acetaminophen (TYLENOL) 500 MG tablet     fexofenadine (ALLEGRA) 180 MG tablet     gabapentin (NEURONTIN) 100 MG capsule     ibuprofen (ADVIL/MOTRIN) 200 MG tablet     Lactase (LACTAID PO)     lamoTRIgine (LAMICTAL) 100 MG tablet     lamoTRIgine (LAMICTAL) 200 MG tablet     levETIRAcetam (KEPPRA) 500 MG tablet     metoprolol tartrate (LOPRESSOR) 25 MG tablet     Multiple Vitamin (MULTI-VITAMINS) TABS     tretinoin (RETIN-A) 0.025 % external cream     triamcinolone (KENALOG) 0.1 % external cream     VITAMIN D, CHOLECALCIFEROL, PO     No current facility-administered medications for this visit.     DRUG ALLERGIES   Allergies   Allergen Reactions     Sulfa Drugs Hives     Benoxinate Nausea and Vomiting       ONCOLOGIC HISTORY  -4/27/2011 PRESENTATION: New onset seizure, generalized event.   -5/2011 MRB with a non-contrast enhancing right frontal mass lesion.   -5/2011 SURGERY: Craniectomy for mass resection at [facility].   PATHOLOGY: Diffuse oligodendroglioma; WHO grade II, 1p/19q co-deleted.  -6/2011-5/2012 CHEMO: Adjuvant dosed temozolomide x 12 cycles.  -4/2/2018 MRB with possible disease recurrence with a new 1.2 cm non-enhancing nodule within the cortex of the right frontal lobe adjacent to the resection cavity.  -4/12/2018 NEURO-ONC: Recommending repeat resection, appointment scheduled with Dr. Dustin Rodriguez, neurosurgery at the [facility].   -5/30/2018 SURGERY: Repeat resection with Dr. Rodriguez.   PATHOLOGY: Remains low grade, without  concerning features.   -6/15/2018 NEURO-ONC: Start imaging surveillance.  -9/17/2018 NEURO-ONC/ MRB: Imaging stable, continue with surveillance.   -12/10/2018 NEURO-ONC/ MRB: Imaging stable, continue with surveillance.   -4/15/2019 NEURO-ONC/ MRB: Imaging stable, continue with surveillance.  -8/19/2019 NEURO-ONC/ MRB: Clinically stable. Imaging largely stable, subtle increases on T2 FLAIR; continue with surveillance.  -12/16/2019 NEURO-ONC/ MRB: Clinically stable. Imaging with increased T2 FLAIR medially and laterally about the resection cavity. Referral to Dr. Figueroa with radiation oncology. Discussion with Dr. Rodriguez. Referral for repeat neuro-psychology testing.   -1/10/2020 Evaluated by Dr. Devan Figueroa.   -1/17/2020 NEURO-ONC: Tentative plan to initiate chemoradiotherapy in May-June 2020.   -2/17/2020 NEURO-PSYCH; Mild weaknesses were noted in aspects of verbal and nonverbal working memory, nonverbal recall, some aspects of executive functioning, and bilateral psychomotor speed. The remainder of her cognitive abilities were intact and performed in keeping with her above average range cognitive baseline.   -4/20 - 5/30/2020 CHEMORADS: 54 Gy in 30 fractions with concurrent temozolomide 75mg/m2 (140mg).   -5/4/2020 NEURO-ONC: Continue treatment as planned. Prescribing Clotrimazole lozenges for oral thrush.   -6/1/2020 NEURO-ONC: Completed treatment as planned.  -6/30/2020 NEURO-ONC/ MRB/ CHEMO: Clinically stable. Imaging with positive treatment effect. Starting adjuvant temozolomide 150mg/m2 (250mg), cycle 1 (start date was 7/1).   -7/28/2020 NEURO-ONC/ CHEMO: Clinically stable. Adjuvant temozolomide 200mg/m2 (320mg), cycle 2 (start date was 7/29).   -8/25/2020 NEURO-ONC/ CHEMO: Clinically stable; significant nausea/ vomiting with 200mg/m2 dosing. Adding Emend for this next cycle. Adjuvant temozolomide 150mg/m2 (250mg), cycle 3 (start date of 8/26).   -9/22/2020 NEURO-ONC/ MRB/ CHEMO: Clinically stable; less nausea/  vomiting with lowered chemotherapy dosing plus adding Emend. Imaging with no concerns, repeat in 3 months. Adjuvant temozolomide 150mg/m2 (250mg), cycle 4 (start date of 9/23).   -10/20/2020 NEURO-ONC/ CHEMO: Clinically stable; no new/ worsening issues. Experiencing pain related to piriformis syndrome. Adjuvant temozolomide 150mg/m2 (250mg), cycle 5 (start date of 10/21).   -11/172020 NEURO-ONC/ CHEMO: Clinically stable. Adjuvant temozolomide 150mg/m2 (250mg), cycle 6 (start date of 11/18).   -12/15/2020 NEURO-ONC/ MRB: Clinically well. Imaging with no concerns. Starting imaging surveillance.   -3/16/2021 NEURO-ONC/ MRB: Clinically well. Imaging with no concerns.   -4/30/2021 Neuro-psych evaluation demonstrated weaknesses that are consistent with dysfunction of the bilateral frontal regions, but the right frontal region in particular.  Relative to her exam from February, 2020, there has been a mild decline in her thinking. In this exam, weaknesses were identified in executive functioning, attention, and both verbal and nonverbal working memory. Some aspects of cognitive speed were relative weaknesses as well. Insight into these weaknesses was limited.   -7/13/2021 NEURO-ONC/ MRB: Clinically well. Imaging stable.   -11/9/2021 NEURO-ONC/ MRB: Clinically well. Considering a trial of Zoloft. Imaging stable.     SOCIAL HISTORY   Tobacco use: Never smoker.   Alcohol use: Social.   Drug use: Denies.  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   , 2 children      PHYSICAL EXAMINATION    -Generally well appearing.  -Respiratory: No audible wheezing.   -Skin: No facial rashes.  -Psychiatric: Normal mood and affect. Pleasant, talkative.  -Neurologic:   MENTAL STATUS:     Alert, oriented.    Recall: Intact.   Speech fluent.    Comprehension intact.     CRANIAL NERVES:     Pupils are equal, round.     Extraocular movements full, denies diplopia.     Equal activation with smiling/ talking.    Hearing intact.   With  normal phonation, no dysfunction of the palate or tongue.   MOTOR: Antigravity in arms.  SENSATION: Denies numbness.  GAIT: Steady, unassisted.     The rest of a comprehensive physical examination is deferred due to PHE (public health emergency) video visit restrictions.      MEDICAL RECORDS  Obtained and personally reviewed all available outside medical records in addition to reviewing any records available in our electronic system.     LABS  Personally reviewed all available lab results.     IMAGING  Personally reviewed MR brain imaging from yesterday and compared to prior imaging. To my eye, the T2 FLAIR about the right frontal resection cavity remains largely stable over the past 11 months. No enhancement seen.     Imaging was shown to and results were reviewed with Barbi.     Imaging and case was reviewed at Brain Tumor Conference yesterday.        IMPRESSION  Clinic time was spent discussing in detail the nature of her cancer in light of her recent imaging. This is in addition to answering questions pertaining to my recommendations and devising the plan as outlined below.     Clinically, Barbi is doing well with no new subjective neurological complaints or recent recurrent seizure events. She continues to have chronic fatigue and lower mood for which she is going to discuss a selective serotonin reuptake inhibitor trial with her primary care provider. I am in agreement with a trial of Zoloft as I feel this medication will greatly benefit Barbi.     Imaging largely stable over the past year with no concerns. Since Barbi has remained clinically and radiographically stable off chemotherapy for the past year, offered extending the imaging interval to every 4-6 months per NCCN guidelines. Barbi is in agreement with repeat imaging in 4 months. However, Barbi knows to call with any concerns or to report new complaints and appts can be moved sooner if needed. Will repeat imaging every 4-6 months through 11/2025 and at  that time, can consider annual imaging.     At follow-up, will need to discuss with Barbi the option of scheduling the recommended repeat neuro-psych testing.    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   -No need to monitor labs while off chemotherapy.      -SEIZURE MANAGEMENT-  -Follows with Dr. Flakita Clark; Neurology, epilepsy specialist at the [facility].      -Quality of life/ MOOD/ FATIGUE-  -History of mild anxiety and depression.   -Failed Celexa due to side effect of diarrhea.   -Untreated/ undertreated depression can worsen fatigue, dysorexia, and quality of life.  -Agree with consideration for a trial of Zoloft.     -COGNITIVE CHANGES-  -Neuro-psych testing completed in 4/2021 with worsening deficits.  -Repeat testing recommended in ~4/2022.     Return to clinic in 3/2022 + imaging.     Myrtle Gallagher MD  Neuro-oncology      Again, thank you for allowing me to participate in the care of your patient.        Sincerely,        Myrtle Gallagher MD

## 2021-11-09 NOTE — TELEPHONE ENCOUNTER
Dr. Choudhury,     Please see  message.    Would you like pt to schedule a visit for this?    Paula Husain RN

## 2021-11-09 NOTE — LETTER
"    11/9/2021         RE: Barbi Tiwari  [address] [facility] [address]        Dear Colleague,    Thank you for referring your patient, Barbi Tiwari, to the [facility]. Please see a copy of my visit note below.    Barbi is a 61 year old who is being evaluated via a billable video visit.      How would you like to obtain your AVS? MyChart  If the video visit is dropped, the invitation should be resent by: Text to cell phone: [phone]  Will anyone else be joining your video visit? No  Video-Visit Details  Type of service:  Video Visit    Video Start Time: 3:19 PM  Video End Time: 3:30 PM    Originating Location (pt. Location): Home    Distant Location (provider location):  [facility]     Platform used for Video Visit: Prosper Gallagher MD    _____________________________________________________________    NEURO-ONCOLOGY VISIT  Nov 9, 2021    CHIEF COMPLAINT: Ms. Barbi Tiwari is a 61 year old right-handed woman with a right frontal diffuse oligodendroglioma (1p19q co-deleted), diagnosed following resection in 5/2011. She received 12 cycles of temozolomide, completed in 5/2012. Changes on imaging necessitated a repeat resection on 5/30/2018 and pathology was unchanged. No adjuvant cancer-directed therapy was initiated. Imaging over the next 1.5 years showed slow tumor progression and treatment was then initiated. She completed chemoradiotherapy on 5/30/2020. Barbi then completed 6 cycles of adjuvant-dosed temozolomide as of 11/2020. Dose escalation to 200mg/m2 was complicated by intolerable nausea.     She is now managed on imaging surveillance. Her most recent scan is without concern for cancer recurrence.     I met with Barbi and Kenneth () for this follow-up visit.    HISTORY OF PRESENT ILLNESS  -Barbi is doing well.   -Chronic fatigue. Mood is \"ok\", but she is thinking that her mood/ energy/ motivation/ interests may benefit " from a trial of Zoloft. Barbi will be discussing this with her primary care provider.   -She denies any new symptoms; no weakness, numbness, changes in vision, or headaches. Balance is off; chronic and stable.  -Reports no recent seizures.  -Continued mild word-finding issues. Able to multi-task. 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     REVIEW OF SYSTEMS  A comprehensive ROS negative except as in HPI.      MEDICATIONS   Current Outpatient Medications   Medication     acetaminophen (TYLENOL) 500 MG tablet     fexofenadine (ALLEGRA) 180 MG tablet     gabapentin (NEURONTIN) 100 MG capsule     ibuprofen (ADVIL/MOTRIN) 200 MG tablet     Lactase (LACTAID PO)     lamoTRIgine (LAMICTAL) 100 MG tablet     lamoTRIgine (LAMICTAL) 200 MG tablet     levETIRAcetam (KEPPRA) 500 MG tablet     metoprolol tartrate (LOPRESSOR) 25 MG tablet     Multiple Vitamin (MULTI-VITAMINS) TABS     tretinoin (RETIN-A) 0.025 % external cream     triamcinolone (KENALOG) 0.1 % external cream     VITAMIN D, CHOLECALCIFEROL, PO     No current facility-administered medications for this visit.     DRUG ALLERGIES   Allergies   Allergen Reactions     Sulfa Drugs Hives     Benoxinate Nausea and Vomiting       ONCOLOGIC HISTORY  -4/27/2011 PRESENTATION: New onset seizure, generalized event.   -5/2011 MRB with a non-contrast enhancing right frontal mass lesion.   -5/2011 SURGERY: Craniectomy for mass resection at [facility].   PATHOLOGY: Diffuse oligodendroglioma; WHO grade II, 1p/19q co-deleted.  -6/2011-5/2012 CHEMO: Adjuvant dosed temozolomide x 12 cycles.  -4/2/2018 MRB with possible disease recurrence with a new 1.2 cm non-enhancing nodule within the cortex of the right frontal lobe adjacent to the resection cavity.  -4/12/2018 NEURO-ONC: Recommending repeat resection, appointment scheduled with Dr. Dustin Rodriguez, neurosurgery at the [facility].   -5/30/2018 SURGERY: Repeat resection with Dr. Rodriguez.   PATHOLOGY: Remains low grade, without  concerning features.   -6/15/2018 NEURO-ONC: Start imaging surveillance.  -9/17/2018 NEURO-ONC/ MRB: Imaging stable, continue with surveillance.   -12/10/2018 NEURO-ONC/ MRB: Imaging stable, continue with surveillance.   -4/15/2019 NEURO-ONC/ MRB: Imaging stable, continue with surveillance.  -8/19/2019 NEURO-ONC/ MRB: Clinically stable. Imaging largely stable, subtle increases on T2 FLAIR; continue with surveillance.  -12/16/2019 NEURO-ONC/ MRB: Clinically stable. Imaging with increased T2 FLAIR medially and laterally about the resection cavity. Referral to Dr. Figueroa with radiation oncology. Discussion with Dr. Rodriguez. Referral for repeat neuro-psychology testing.   -1/10/2020 Evaluated by Dr. Devan Figueroa.   -1/17/2020 NEURO-ONC: Tentative plan to initiate chemoradiotherapy in May-June 2020.   -2/17/2020 NEURO-PSYCH; Mild weaknesses were noted in aspects of verbal and nonverbal working memory, nonverbal recall, some aspects of executive functioning, and bilateral psychomotor speed. The remainder of her cognitive abilities were intact and performed in keeping with her above average range cognitive baseline.   -4/20 - 5/30/2020 CHEMORADS: 54 Gy in 30 fractions with concurrent temozolomide 75mg/m2 (140mg).   -5/4/2020 NEURO-ONC: Continue treatment as planned. Prescribing Clotrimazole lozenges for oral thrush.   -6/1/2020 NEURO-ONC: Completed treatment as planned.  -6/30/2020 NEURO-ONC/ MRB/ CHEMO: Clinically stable. Imaging with positive treatment effect. Starting adjuvant temozolomide 150mg/m2 (250mg), cycle 1 (start date was 7/1).   -7/28/2020 NEURO-ONC/ CHEMO: Clinically stable. Adjuvant temozolomide 200mg/m2 (320mg), cycle 2 (start date was 7/29).   -8/25/2020 NEURO-ONC/ CHEMO: Clinically stable; significant nausea/ vomiting with 200mg/m2 dosing. Adding Emend for this next cycle. Adjuvant temozolomide 150mg/m2 (250mg), cycle 3 (start date of 8/26).   -9/22/2020 NEURO-ONC/ MRB/ CHEMO: Clinically stable; less nausea/  vomiting with lowered chemotherapy dosing plus adding Emend. Imaging with no concerns, repeat in 3 months. Adjuvant temozolomide 150mg/m2 (250mg), cycle 4 (start date of 9/23).   -10/20/2020 NEURO-ONC/ CHEMO: Clinically stable; no new/ worsening issues. Experiencing pain related to piriformis syndrome. Adjuvant temozolomide 150mg/m2 (250mg), cycle 5 (start date of 10/21).   -11/172020 NEURO-ONC/ CHEMO: Clinically stable. Adjuvant temozolomide 150mg/m2 (250mg), cycle 6 (start date of 11/18).   -12/15/2020 NEURO-ONC/ MRB: Clinically well. Imaging with no concerns. Starting imaging surveillance.   -3/16/2021 NEURO-ONC/ MRB: Clinically well. Imaging with no concerns.   -4/30/2021 Neuro-psych evaluation demonstrated weaknesses that are consistent with dysfunction of the bilateral frontal regions, but the right frontal region in particular.  Relative to her exam from February, 2020, there has been a mild decline in her thinking. In this exam, weaknesses were identified in executive functioning, attention, and both verbal and nonverbal working memory. Some aspects of cognitive speed were relative weaknesses as well. Insight into these weaknesses was limited.   -7/13/2021 NEURO-ONC/ MRB: Clinically well. Imaging stable.   -11/9/2021 NEURO-ONC/ MRB: Clinically well. Considering a trial of Zoloft. Imaging stable.     SOCIAL HISTORY   Tobacco use: Never smoker.   Alcohol use: Social.   Drug use: Denies.  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   , 2 children      PHYSICAL EXAMINATION    -Generally well appearing.  -Respiratory: No audible wheezing.   -Skin: No facial rashes.  -Psychiatric: Normal mood and affect. Pleasant, talkative.  -Neurologic:   MENTAL STATUS:     Alert, oriented.    Recall: Intact.   Speech fluent.    Comprehension intact.     CRANIAL NERVES:     Pupils are equal, round.     Extraocular movements full, denies diplopia.     Equal activation with smiling/ talking.    Hearing intact.   With  normal phonation, no dysfunction of the palate or tongue.   MOTOR: Antigravity in arms.  SENSATION: Denies numbness.  GAIT: Steady, unassisted.     The rest of a comprehensive physical examination is deferred due to PHE (public health emergency) video visit restrictions.      MEDICAL RECORDS  Obtained and personally reviewed all available outside medical records in addition to reviewing any records available in our electronic system.     LABS  Personally reviewed all available lab results.     IMAGING  Personally reviewed MR brain imaging from yesterday and compared to prior imaging. To my eye, the T2 FLAIR about the right frontal resection cavity remains largely stable over the past 11 months. No enhancement seen.     Imaging was shown to and results were reviewed with Barbi.     Imaging and case was reviewed at Brain Tumor Conference yesterday.        IMPRESSION  Clinic time was spent discussing in detail the nature of her cancer in light of her recent imaging. This is in addition to answering questions pertaining to my recommendations and devising the plan as outlined below.     Clinically, Barbi is doing well with no new subjective neurological complaints or recent recurrent seizure events. She continues to have chronic fatigue and lower mood for which she is going to discuss a selective serotonin reuptake inhibitor trial with her primary care provider. I am in agreement with a trial of Zoloft as I feel this medication will greatly benefit Barbi.     Imaging largely stable over the past year with no concerns. Since Barbi has remained clinically and radiographically stable off chemotherapy for the past year, offered extending the imaging interval to every 4-6 months per NCCN guidelines. Barbi is in agreement with repeat imaging in 4 months. However, Barbi knows to call with any concerns or to report new complaints and appts can be moved sooner if needed. Will repeat imaging every 4-6 months through 11/2025 and at  that time, can consider annual imaging.     At follow-up, will need to discuss with Barbi the option of scheduling the recommended repeat neuro-psych testing.    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   -No need to monitor labs while off chemotherapy.      -SEIZURE MANAGEMENT-  -Follows with Dr. Flakita Clark; Neurology, epilepsy specialist at the [facility].      -Quality of life/ MOOD/ FATIGUE-  -History of mild anxiety and depression.   -Failed Celexa due to side effect of diarrhea.   -Untreated/ undertreated depression can worsen fatigue, dysorexia, and quality of life.  -Agree with consideration for a trial of Zoloft.     -COGNITIVE CHANGES-  -Neuro-psych testing completed in 4/2021 with worsening deficits.  -Repeat testing recommended in ~4/2022.     Return to clinic in 3/2022 + imaging.     Myrtle Gallagher MD  Neuro-oncology      Again, thank you for allowing me to participate in the care of your patient.        Sincerely,        Myrtle Gallagher MD

## 2021-11-09 NOTE — NURSING NOTE
Patient verified meds and allergies are correct via patients echeck in.    Cary Agosto, Virtual Facilitator/LPN

## 2021-11-09 NOTE — TELEPHONE ENCOUNTER
Telephone or e-visit - depending on her preference  I'll let her know.     Can you send her a PHQ9 questionnaire to complete? I always forget how to do this correctly.

## 2021-11-10 ENCOUNTER — TELEPHONE (OUTPATIENT)
Dept: LAB | Facility: CLINIC | Age: 61
End: 2021-11-10

## 2021-11-10 ENCOUNTER — E-VISIT (OUTPATIENT)
Dept: INTERNAL MEDICINE | Facility: CLINIC | Age: 61
End: 2021-11-10
Payer: COMMERCIAL

## 2021-11-10 DIAGNOSIS — F32.1 MODERATE MAJOR DEPRESSION (H): ICD-10-CM

## 2021-11-10 DIAGNOSIS — F33.1 MAJOR DEPRESSIVE DISORDER, RECURRENT EPISODE, MODERATE (H): ICD-10-CM

## 2021-11-10 PROCEDURE — 99421 OL DIG E/M SVC 5-10 MIN: CPT | Performed by: INTERNAL MEDICINE

## 2021-11-10 ASSESSMENT — PATIENT HEALTH QUESTIONNAIRE - PHQ9
SUM OF ALL RESPONSES TO PHQ QUESTIONS 1-9: 13
10. IF YOU CHECKED OFF ANY PROBLEMS, HOW DIFFICULT HAVE THESE PROBLEMS MADE IT FOR YOU TO DO YOUR WORK, TAKE CARE OF THINGS AT HOME, OR GET ALONG WITH OTHER PEOPLE: SOMEWHAT DIFFICULT
SUM OF ALL RESPONSES TO PHQ QUESTIONS 1-9: 13

## 2021-11-10 NOTE — TELEPHONE ENCOUNTER
Where are you finding this interaction?  I have looked up several interaction databases and do not see anything.

## 2021-11-10 NOTE — PATIENT INSTRUCTIONS
Using Antidepressants  Depression is a mood disorder that affects the way you think and feel. The most common symptom is a feeling of deep sadness. This feeling doesn't go away or get better on its own. But most types of depression can be helped with therapy and antidepressant medicines. (Note: This covers antidepressant use in adults only.)     What do antidepressants do?  Antidepressants restore the balance of certain chemicals in your brain to help ease your depression. You will likely feel better in 4 to 6 weeks. But you may continue taking antidepressants for a year or more to keep your symptoms from coming back. Some people with depression need to take antidepressants for life. There are several types of antidepressants. The main types are described below.   Selective serotonin reuptake inhibitors (SSRIs)  SSRIs are effective medicines for the treatment of depression. They tend to have fewer side effects than other antidepressants. Possible side effects include anxiety, trouble sleeping, nausea, diarrhea, sexual dysfunction, and headaches. In rare cases, they may make you more depressed. SSRIs shouldn t be mixed with certain other medicines. Talk with your healthcare provider about all the medicines, herbs, and supplements you are taking.   Tricyclic antidepressants  Tricyclics help severe or long-term depression. They have been used for many years with good results. Possible side effects include blurred vision, dry mouth, and constipation.   Monoamine oxidase inhibitors (MAOIs)  If you don t respond to tricyclics or SSRIs, your healthcare provider may prescribe MAOIs. These medicines can be very effective. But people taking MAOIs must stay away from certain foods and medicines. Your healthcare provider can tell you more.   Lithium  If you have bipolar disorder, you may take a medicine called lithium. This medicine helps even out your mood. Possible side effects are weight gain, trembling, loose stool, and  nausea. Lithium is also used:     For people who have unipolar depression and have not responded to other antidepressants    For people who have a sudden (acute) episode of unipolar depression    As a maintenance medicine to prevent unipolar depression from happening again  Things to stay away from if you are taking MAOIs   If you are taking MAOIs, don t have any of the following:     Beans    Aged cheese    Chocolate    Red wine    Most cold medicines    Certain medicines (ask your healthcare provider)  To reduce the risk of lithium poisoning  You can reduce the risk of lithium poisoning by following this advice:    Take only the prescribed amount of lithium. If your depressive symptoms get worse, contact your healthcare provider. Never increase or decrease your medicine on your own.    Drink plenty of fluids other than coffee, tea, and soda.    Limit salt in your diet.    Before using other prescribed medicines or over-the-counter medicines, check with your pharmacist. This is to be sure the medicines won t interact with the lithium.    Never share your medicines or use another person's medicines, even if it is the same medicine and dose.    Keep follow-up appointments.    Have your lithium blood level checked as advised. You will need blood work more often when symptoms are not under control.  If you have side effects  The side effects of antidepressants are usually mild. But if you have troubling side effects, call your healthcare provider. Changing the dosage or type of medicine may help. Never stop taking medicines on your own.   [employer] last reviewed this educational content on 9/1/2019 2000-2021 [employer]. All rights reserved. This information is not intended as a substitute for professional medical care. Always follow your healthcare professional's instructions.          Depression: Tips to Help Yourself    As your healthcare providers help treat your depression, you can also help  yourself. Keep in mind that your illness affects you emotionally, physically, mentally, and socially. So full recovery will take time. Take care of your body and your soul, and be patient with yourself as you get better.  Self-care    Educate yourself. Read about treatment and medicine options. If you have the energy, attend local conferences or support groups. Keep a list of useful websites and helpful books and use them as needed. This illness is not your fault. Don t blame yourself for your depression.    Manage early symptoms. If you notice symptoms returning, experience triggers, or identify other factors that may lead to a depressive episode, get help as soon as possible. Ask trusted friends and family to monitor your behavior and let you know if they see anything of concern.    Work with your provider. Find a provider you can trust. Communicate honestly with that person and share information on your treatment for depression and your reaction to medicines.    Be prepared for a crisis. Know what to do if you experience a crisis. Keep the phone number of a crisis hotline and know the location of your community's urgent care centers and the closest emergency department.    Hold off on big decisions. Depression can cloud your judgment. So wait until you feel better before making major life decisions, such as changing jobs, moving, or getting  or .    Be patient. Recovering from depression is a process. Don t be discouraged if it takes some time to feel better.    Keep it simple. Depression saps your energy and concentration. So you won t be able to do all the things you used to do. Set small goals and do what you can.    Be with others. Don t isolate yourself--you ll only feel worse. Try to be with other people. And take part in fun activities when you can. Go to a movie, ballgame, [employer] service, or social event. Talk openly with people you can trust. And accept help when it s offered.    Take  care of your body  People with depression often lose the desire to take care of themselves. That only makes their problems worse. During treatment and afterward, make a point to:    Exercise. It s a great way to take care of your body. And studies have shown that exercise helps fight depression. Aim for 30 minutes of moderate activity a day. Walking in small blocks of time (5-10 minutes) is a good way to start, but anything that gets you moving (gardening, house cleaning) counts.    Don't use drugs and alcohol. These may ease the pain in the short term. But they ll only make your problems worse in the long run.    Get relief from stress. Ask your healthcare provider for relaxation exercises and techniques to help relieve stress. Consider activities like meditation, yoga, or Jam Chi.    Eat right. A balanced and healthy diet helps keep your body healthy.    Get adequate sleep. Aim for 8 hours per night. Too much or too little sleep can cause other physical and emotional problems.  [employer] last reviewed this educational content on 12/1/2019 2000-2021 [employer]. All rights reserved. This information is not intended as a substitute for professional medical care. Always follow your healthcare professional's instructions.

## 2021-11-10 NOTE — TELEPHONE ENCOUNTER
Dr. Choudhury,    Patient is taking lamotrigine and the sertraline can increase concentrations of the lamotrigine and cause increased risk of side effects from lamotrigine.    The two can be used in combination, but it is recommended to have lamotrigine levels measured to make sure patient's levels are not going supra therapeutic.    Other SSRIs that can be used that don't have this interaction are fluoxetine and citalopram (I saw patient maybe had side effects to citalopram so that may not be the best option).    If you want to switch patient to fluoxetine, please send new Rx to [facility].    If you want patient to stay on sertraline, please let them know to follow-up with their neurologist to make sure their levels of lamotrigine don't get too high.    Thank you,    Bean Mooney, PharmD  [facility]  [phone]

## 2021-11-11 ENCOUNTER — MYC MEDICAL ADVICE (OUTPATIENT)
Dept: INTERNAL MEDICINE | Facility: CLINIC | Age: 61
End: 2021-11-11
Payer: COMMERCIAL

## 2021-11-11 DIAGNOSIS — F32.1 MODERATE MAJOR DEPRESSION (H): Primary | ICD-10-CM

## 2021-11-11 ASSESSMENT — PATIENT HEALTH QUESTIONNAIRE - PHQ9: SUM OF ALL RESPONSES TO PHQ QUESTIONS 1-9: 13

## 2021-11-12 RX ORDER — FLUOXETINE 10 MG/1
TABLET, FILM COATED ORAL
Qty: 60 TABLET | Refills: 1 | Status: SHIPPED | OUTPATIENT
Start: 2021-11-12 | End: 2021-12-09 | Stop reason: ALTCHOICE

## 2021-11-12 NOTE — TELEPHONE ENCOUNTER
Please see mychart from patient and advise appropriate course of action.      Jhonny Piña RN  [facility] Triage Nurse

## 2021-12-03 ENCOUNTER — IMMUNIZATION (OUTPATIENT)
Dept: NURSING | Facility: CLINIC | Age: 61
End: 2021-12-03
Payer: COMMERCIAL

## 2021-12-03 PROCEDURE — 0064A COVID-19,PF,MODERNA (18+ YRS BOOSTER .25ML): CPT

## 2021-12-03 PROCEDURE — 91306 COVID-19,PF,MODERNA (18+ YRS BOOSTER .25ML): CPT

## 2021-12-07 ASSESSMENT — PATIENT HEALTH QUESTIONNAIRE - PHQ9
SUM OF ALL RESPONSES TO PHQ QUESTIONS 1-9: 11
SUM OF ALL RESPONSES TO PHQ QUESTIONS 1-9: 11
10. IF YOU CHECKED OFF ANY PROBLEMS, HOW DIFFICULT HAVE THESE PROBLEMS MADE IT FOR YOU TO DO YOUR WORK, TAKE CARE OF THINGS AT HOME, OR GET ALONG WITH OTHER PEOPLE: SOMEWHAT DIFFICULT

## 2021-12-09 ENCOUNTER — OFFICE VISIT (OUTPATIENT)
Dept: INTERNAL MEDICINE | Facility: CLINIC | Age: 61
End: 2021-12-09
Payer: COMMERCIAL

## 2021-12-09 VITALS
BODY MASS INDEX: 26.57 KG/M2 | WEIGHT: 144.4 LBS | TEMPERATURE: 97.9 F | OXYGEN SATURATION: 96 % | SYSTOLIC BLOOD PRESSURE: 96 MMHG | HEART RATE: 48 BPM | HEIGHT: 62 IN | DIASTOLIC BLOOD PRESSURE: 64 MMHG

## 2021-12-09 DIAGNOSIS — I49.3 SYMPTOMATIC PREMATURE VENTRICULAR CONTRACTIONS: ICD-10-CM

## 2021-12-09 DIAGNOSIS — F32.1 MODERATE MAJOR DEPRESSION (H): Primary | ICD-10-CM

## 2021-12-09 PROCEDURE — 99214 OFFICE O/P EST MOD 30 MIN: CPT | Performed by: INTERNAL MEDICINE

## 2021-12-09 RX ORDER — METOPROLOL TARTRATE 25 MG/1
12.5 TABLET, FILM COATED ORAL 2 TIMES DAILY
Start: 2021-12-09 | End: 2022-02-03

## 2021-12-09 RX ORDER — ESCITALOPRAM OXALATE 10 MG/1
TABLET ORAL
Qty: 42 TABLET | Refills: 1 | Status: SHIPPED | OUTPATIENT
Start: 2021-12-09 | End: 2022-01-03

## 2021-12-09 ASSESSMENT — MIFFLIN-ST. JEOR: SCORE: 1178.24

## 2021-12-09 NOTE — PROGRESS NOTES
"  Assessment & Plan     Moderate major depression (H)  She reports a considerable somnolence on the fluoxetine.  This is unexpected-we could try another SSRI switch to Escitalopram.  - escitalopram (LEXAPRO) 10 MG tablet; Take 1 tablet (10 mg) by mouth daily for 14 days, THEN 2 tablets (20 mg) daily.    Symptomatic premature ventricular contractions  Her heart rates in the 40s and BP is on the low end.  Reduce her metoprolol dose  - metoprolol tartrate (LOPRESSOR) 25 MG tablet; Take 0.5 tablets (12.5 mg) by mouth 2 times daily    Prescription drug management         BMI:   Estimated body mass index is 26.41 kg/m  as calculated from the following:    Height as of this encounter: 1.575 m (5' 2\").    Weight as of this encounter: 65.5 kg (144 lb 6.4 oz).           Return in about 4 weeks (around 1/6/2022) for Via Tele Visit.    Dustin Choudhury MD  [facility] is a 61 year old who presents for the following health issues     History of Present Illness       Mental Health Follow-up:  Patient presents to follow-up on Depression.Patient's depression since last visit has been:  No change  The patient is having other symptoms associated with depression.      Any significant life events: No  Patient is not feeling anxious or having panic attacks.  Patient has no concerns about alcohol or drug use.     Social History  Tobacco Use    Smoking status: Never Smoker    Smokeless tobacco: Never Used  Alcohol use: Yes    Comment: social  Drug use: No      Today's PHQ-9         PHQ-9 Total Score:     (P) 11   PHQ-9 Q9 Thoughts of better off dead/self-harm past 2 weeks :   (P) Not at all   Thoughts of suicide or self harm:      Self-harm Plan:        Self-harm Action:          Safety concerns for self or others:           She eats 2-3 servings of fruits and vegetables daily.She consumes 0 sweetened beverage(s) daily.She exercises with enough effort to increase her heart rate 10 " "to 19 minutes per day.  She exercises with enough effort to increase her heart rate 3 or less days per week.   She is taking medications regularly.             Review of Systems         Objective    BP 96/64   Pulse (!) 48   Temp 97.9  F (36.6  C) (Temporal)   Ht 1.575 m (5' 2\")   Wt 65.5 kg (144 lb 6.4 oz)   LMP  (LMP Unknown)   SpO2 96%   BMI 26.41 kg/m    Body mass index is 26.41 kg/m .  Physical Exam   GENERAL: alert  CV: bradycardiac  Psych: tearful at times.                 "

## 2021-12-09 NOTE — NURSING NOTE
"Chief Complaint   Patient presents with     Depression     BP 96/64   Pulse (!) 48   Temp 97.9  F (36.6  C) (Temporal)   Ht 1.575 m (5' 2\")   Wt 65.5 kg (144 lb 6.4 oz)   LMP  (LMP Unknown)   SpO2 96%   BMI 26.41 kg/m   Estimated body mass index is 26.41 kg/m  as calculated from the following:    Height as of this encounter: 1.575 m (5' 2\").    Weight as of this encounter: 65.5 kg (144 lb 6.4 oz).        Health Maintenance due pending provider review:  Mammogram    Aware due, declines to complete today, will call to schedule    Judy Adams CMA  "

## 2021-12-10 ASSESSMENT — PATIENT HEALTH QUESTIONNAIRE - PHQ9: SUM OF ALL RESPONSES TO PHQ QUESTIONS 1-9: 11

## 2021-12-17 ENCOUNTER — E-VISIT (OUTPATIENT)
Dept: URGENT CARE | Facility: URGENT CARE | Age: 61
End: 2021-12-17
Payer: COMMERCIAL

## 2021-12-17 DIAGNOSIS — Z20.822 SUSPECTED COVID-19 VIRUS INFECTION: Primary | ICD-10-CM

## 2021-12-17 PROCEDURE — 99421 OL DIG E/M SVC 5-10 MIN: CPT | Performed by: PHYSICIAN ASSISTANT

## 2021-12-17 NOTE — PATIENT INSTRUCTIONS
Dear Barbi Tiwari,    Your symptoms show that you may have coronavirus (COVID-19). This illness can cause fever, cough and trouble breathing. Many people get a mild case and get better on their own. Some people can get very sick.    Will I be tested for COVID-19?  We would like to test you for Covid-19 virus. I have placed orders for this test.     For all employees or close contacts (except [place] and [employer] - see below), go to your [employer] home page and scroll down to the section that says  You have an appointment that needs to be scheduled  and click the large green button that says  Schedule Now  and follow the steps to find the next available opening.     If you are unable to complete these steps or if you cannot find any available times, please call [phone] to schedule employee testing.     [place] employees or close contacts, please call [phone].   [place] ([employer]) employees or close contacts call [phone].    Return to work/school/ guidance:  Please let your workplace manager and staffing office know when your quarantine ends     We can t give you an exact date as it depends on the above. You can calculate this on your own or work with your manager/staffing office to calculate this. (For example if you were exposed on 10/4, you would have to quarantine for 14 full days. That would be through 10/18. You could return on 10/19.)      If you receive a positive COVID-19 test result, follow the guidance of the those who are giving you the results. Usually the return to work is 10 (or in some cases 20 days from symptom onset.) If you work at [employer] [place], you must also be cleared by Employee Occupational Health and Safety to return to work.        If you receive a negative COVID-19 test result and did not have a high risk exposure to someone with a known positive COVID-19 test, you can return to work once you're free of fever for 24 hours without fever-reducing medication and  your symptoms are improving or resolved.      If you receive a negative COVID-19 test and If you had a high risk exposure to someone who has tested positive for COVID-19 then you can return to work 14 days after your last contact with the positive individual    Note: If you have ongoing exposure to the covid positive person, this quarantine period may be more than 14 days. (For example, if you are continued to be exposed to your child who tested positive and cannot isolate from them, then the quarantine of 7-14 days can't start until your child is no longer contagious. This is typically 10 days from onset of the child's symptoms. So the total duration may be 17-24 days in this case.)    Sign up for [email or web address].   We know it's scary to hear that you might have COVID-19. We want to track your symptoms to make sure you're okay over the next 2 weeks. Please look for an email from [email or web address]--this is a free, online program that we'll use to keep in touch. To sign up, follow the link in the email you will receive. Learn more at [email or web address]    How can I take care of myself?    Get lots of rest. Drink extra fluids (unless a doctor has told you not to)    Take Tylenol (acetaminophen) or ibuprofen for fever or pain. If you have liver or kidney problems, ask your family doctor if it's okay to take Tylenol o ibuprofen    If you have other health problems (like cancer, heart failure, an organ transplant or severe kidney disease): Call your specialty clinic if you don't feel better in the next 2 days.    Know when to call 911. Emergency warning signs include:  o Trouble breathing or shortness of breath  o Pain or pressure in the chest that doesn't go away  o Feeling confused like you haven't felt before, or not being able to wake up  o Bluish-colored lips or face    Where can I get more information?   [employer] [place] - About COVID-19:   [email or web address]    CDC - What to Do If You're Sick:    www.cdc.gov/coronavirus/2019-ncov/about/steps-when-sick.html

## 2022-01-02 ENCOUNTER — MYC MEDICAL ADVICE (OUTPATIENT)
Dept: INTERNAL MEDICINE | Facility: CLINIC | Age: 62
End: 2022-01-02
Payer: COMMERCIAL

## 2022-01-02 DIAGNOSIS — F32.1 MODERATE MAJOR DEPRESSION (H): ICD-10-CM

## 2022-01-03 RX ORDER — ESCITALOPRAM OXALATE 20 MG/1
20 TABLET ORAL DAILY
Qty: 90 TABLET | Refills: 0 | Status: SHIPPED | OUTPATIENT
Start: 2022-01-03 | End: 2022-01-12

## 2022-01-04 ENCOUNTER — MYC MEDICAL ADVICE (OUTPATIENT)
Dept: INTERNAL MEDICINE | Facility: CLINIC | Age: 62
End: 2022-01-04
Payer: COMMERCIAL

## 2022-01-06 ENCOUNTER — OFFICE VISIT (OUTPATIENT)
Dept: DERMATOLOGY | Facility: CLINIC | Age: 62
End: 2022-01-06
Payer: COMMERCIAL

## 2022-01-06 ENCOUNTER — TELEPHONE (OUTPATIENT)
Dept: PODIATRY | Facility: CLINIC | Age: 62
End: 2022-01-06

## 2022-01-06 ENCOUNTER — OFFICE VISIT (OUTPATIENT)
Dept: PODIATRY | Facility: CLINIC | Age: 62
End: 2022-01-06
Payer: COMMERCIAL

## 2022-01-06 ENCOUNTER — ANCILLARY PROCEDURE (OUTPATIENT)
Dept: GENERAL RADIOLOGY | Facility: CLINIC | Age: 62
End: 2022-01-06
Attending: PODIATRIST
Payer: COMMERCIAL

## 2022-01-06 VITALS
WEIGHT: 144 LBS | DIASTOLIC BLOOD PRESSURE: 79 MMHG | HEIGHT: 62 IN | BODY MASS INDEX: 26.5 KG/M2 | SYSTOLIC BLOOD PRESSURE: 128 MMHG

## 2022-01-06 VITALS — SYSTOLIC BLOOD PRESSURE: 125 MMHG | HEART RATE: 61 BPM | OXYGEN SATURATION: 100 % | DIASTOLIC BLOOD PRESSURE: 73 MMHG

## 2022-01-06 DIAGNOSIS — M20.12 HALLUX ABDUCTO VALGUS, LEFT: Primary | ICD-10-CM

## 2022-01-06 DIAGNOSIS — M79.672 LEFT FOOT PAIN: ICD-10-CM

## 2022-01-06 DIAGNOSIS — Z11.59 ENCOUNTER FOR SCREENING FOR OTHER VIRAL DISEASES: Primary | ICD-10-CM

## 2022-01-06 DIAGNOSIS — B35.1 ONYCHOMYCOSIS: Primary | ICD-10-CM

## 2022-01-06 DIAGNOSIS — M20.62 DEFORMITY OF TOE OF LEFT FOOT: ICD-10-CM

## 2022-01-06 PROCEDURE — 73630 X-RAY EXAM OF FOOT: CPT | Mod: LT | Performed by: RADIOLOGY

## 2022-01-06 PROCEDURE — 99207 PR NO CHARGE LOS: CPT | Performed by: DERMATOLOGY

## 2022-01-06 PROCEDURE — 11750 EXCISION NAIL&NAIL MATRIX: CPT | Mod: T2 | Performed by: DERMATOLOGY

## 2022-01-06 PROCEDURE — 99214 OFFICE O/P EST MOD 30 MIN: CPT | Performed by: PODIATRIST

## 2022-01-06 ASSESSMENT — MIFFLIN-ST. JEOR: SCORE: 1176.43

## 2022-01-06 NOTE — PROGRESS NOTES
Barbi Tiwari is an extremely pleasant 61 year old year old female patient here today for avulsion of left 3rd toenail due to fungus.  Patient has no other skin complaints today.  Remainder of the HPI, Meds, PMH, Allergies, FH, and SH was reviewed in chart.      Past Medical History:   Diagnosis Date     Acute cystitis with hematuria 10/24/2020     Allergic rhinitis 12/9/2009     Calculus of kidney 1/12/2011    Overview:  S/P LITHOTRIPSY 3/15/11      Esophageal reflux 10/22/2008     Hyperparathyroidism 01/11/2011    had surgery for it; resulted in seizures     Moderate major depression, single episode (H)      oligodendroglioma 7/23/2012     Osteoarthrosis 12/9/2009     Palpitations 6/5/2014     PONV (postoperative nausea and vomiting)      PVCs 6/5/2014     Seizure disorder (related to tumor) 08/17/2011    last seizure 3/2021       Past Surgical History:   Procedure Laterality Date     COMBINED CYSTOSCOPY, RETROGRADES, URETEROSCOPY, LASER HOLMIUM LITHOTRIPSY URETER(S), INSERT STENT Right 8/3/2021    Procedure: 1. Cystourethroscopy 2. Right ureteroscopy 3. Right retrograde pyelogram with interpretation of intraoperative fluoroscopic imaging 4. Holmium laser lithotripsy with basket stone extraction 5. Right ureteral stent placement;  Surgeon: Felix Cano MD;  Location:  OR     CRANIOTOMY  2011    tumor resection     CYSTOSCOPY, RETROGRADES, INSERT STENT URETER(S), COMBINED Right 7/20/2021    Procedure: 1.  Cystourethroscopy 2.  Attempted right ureteroscopy 3.  Right  retrograde pyelogram with interpretation of intraoperative fluoroscopic imaging 4.  Right ureteral dilatation 5.  Right ureteral stent placement 6.  Laser on stand-by;  Surgeon: Felix Cano MD;  Location:  OR     EXTRACORPOREAL SHOCK WAVE LITHOTRIPSY, CYSTOSCOPY, INSERT STENT URETER(S), COMBINED Bilateral 5/5/2017    Procedure: COMBINED EXTRACORPOREAL SHOCK WAVE LITHOTRIPSY, CYSTOSCOPY, INSERT STENT URETER(S);  CYSTO, URETHRAL  DILATION, URETERAL LEFT STENT PLACEMENT, LEFT ESWL.;  Surgeon: Angel Del Rio MD;  Location:  OR     FOOT SURGERY      mulitple     HYSTERECTOMY, PAP NO LONGER INDICATED      lap hys     LITHOTRIPSY  2010     OPTICAL TRACKING SYSTEM CRANIOTOMY, EXCISE TUMOR WITH MAPPING, COMBINED Right 2018    Procedure: COMBINED OPTICAL TRACKING SYSTEM CRANIOTOMY, EXCISE TUMOR WITH MAPPING;  Right Stealth Assisted Craniotomy With Motor Mapping And Tumor Resection ;  Surgeon: Dustin Rodriguez MD;  Location: [facility] OR     ORTHOPEDIC SURGERY      feet, multiple on both     OSTEOTOMY FOOT Right 1/15/2019    Procedure: OSTEOTOMY FOOT;  Surgeon: Benjamin Griffin DPM;  Location:  OR     PARATHYROIDECTOMY       RECONSTRUCT FOREFOOT WITH METATARSOPHALANGEAL (MTP) FUSION Right 1/15/2019    Procedure: RIGHT FIRST METATARSAL PHALAGEAL JOINT ARTHRODESIS, RIGHT SECOND METATARSAL WEIL OSTEOTOMY;  Surgeon: Benjamin Griffin DPM;  Location:  OR        Family History   Problem Relation Age of Onset     Arthritis Mother      Depression Mother      Respiratory Mother         COPD     LUNG DISEASE Mother      C.A.D. Father 58        heart attack     Heart Disease Father      Coronary Artery Disease Father         MI  age 54     Diabetes Brother 70         age 70     Depression Sister      Depression Son      Allergies Son      Depression Daughter      Allergies Daughter      Eczema Brother      Skin Cancer Brother      Depression Sister      Depression Sister      Anxiety Disorder Daughter        Social History     Socioeconomic History     Marital status:      Spouse name: Not on file     Number of children: Not on file     Years of education: Not on file     Highest education level: Not on file   Occupational History     Occupation: RN- [employer]   Tobacco Use     Smoking status: Never Smoker     Smokeless tobacco: Never Used   Substance and Sexual Activity     Alcohol use: Yes     Comment:  social     Drug use: No     Sexual activity: Yes     Partners: Male     Birth control/protection: Female Surgical     Comment: hysterectomy   Other Topics Concern     Parent/sibling w/ CABG, MI or angioplasty before 65F 55M? Yes     Comment: Father,  of MI age 54   Social History Narrative     Not on file     Social Determinants of Health     Financial Resource Strain: Not on file   Food Insecurity: Not on file   Transportation Needs: Not on file   Physical Activity: Not on file   Stress: Not on file   Social Connections: Not on file   Intimate Partner Violence: Not on file   Housing Stability: Not on file       Outpatient Encounter Medications as of 2022   Medication Sig Dispense Refill     acetaminophen (TYLENOL) 500 MG tablet Take 500-1,000 mg by mouth every 6 hours as needed for mild pain       escitalopram (LEXAPRO) 20 MG tablet Take 1 tablet (20 mg) by mouth daily 90 tablet 0     fexofenadine (ALLEGRA) 180 MG tablet Take 180 mg by mouth daily       gabapentin (NEURONTIN) 100 MG capsule Take 3 capsules (300 mg) by mouth every evening For additional refills, please schedule a follow-up appointment at [phone] 90 capsule 3     ibuprofen (ADVIL/MOTRIN) 200 MG tablet Take 800 mg by mouth daily as needed        Lactase (LACTAID PO) Take 1-2 tablets by mouth daily as needed for indigestion        lamoTRIgine (LAMICTAL) 100 MG tablet 100 mg am and pm (take along with 200 mg tablet for total of 300 mg twice a day). [facility] mft only 180 tablet 3     lamoTRIgine (LAMICTAL) 200 MG tablet 200 mg twice a day (take along with 100 mg tablet for total of 300 mg twice a day). [facility] mft only 180 tablet 3     levETIRAcetam (KEPPRA) 500 MG tablet 1500 mg am and 1250 mg pm 495 tablet 3     metoprolol tartrate (LOPRESSOR) 25 MG tablet Take 0.5 tablets (12.5 mg) by mouth 2 times daily       Multiple Vitamin (MULTI-VITAMINS) TABS Take 1 chew tab by mouth daily        tretinoin (RETIN-A) 0.025 % external cream Apply a  pea-sized amount to each area affected by acne. Start every 3-4 nights working up to every night. 45 g 0     triamcinolone (KENALOG) 0.1 % external cream Apply topically 2 times daily To affected area in between breasts for 1-2 weeks. Tapering with improvement and restarting with flares. 60 g 2     VITAMIN D, CHOLECALCIFEROL, PO Take 1,000 Units by mouth daily        No facility-administered encounter medications on file as of 1/6/2022.             O:   NAD, WDWN, Alert & Oriented, Mood & Affect wnl, Vitals stable   Here today alone   /73   Pulse 61   LMP  (LMP Unknown)   SpO2 100%    General appearance normal   Vitals stable   Alert, oriented and in no acute distress     Onychomycosis of 3rd l toenail      Eyes: Conjunctivae/lids:Normal     ENT: Lips, buccal mucosa, tongue: normal    MSK:Normal    Cardiovascular: peripheral edema none    Pulm: Breathing Normal    Neuro/Psych: Orientation:Alert and Orientedx3 ; Mood/Affect:normal       A/P:  1. Onychomycosis   Recurrence of fungus discussed with patient  Dystrophy discussed with patient   TOE NAIL AVULSION WITH LATERAL MATRICECTOMY:  After consent, anesthesia with 1% lidocaine, and prep, the nail was elevated from the nail bed, using a periosteal elevator,     The site was dressed in the usual fashion,   Management: Home Instructions   Keep foot elevated for first 24 hours   Change dressing in 24 hours   Consider daily antibiotic ointment (e.g. Bacitracin) until heeled   Water exposure is controversial   Some recommend only showering, but no soakings   Others soak foot in warm soapy water 2-4 times daily for 4-7 days   Avoid trauma to toe for first 2 weeks   Wear loose-fitting shoes   Avoid Running, jumping or other potential injury   Observe for signs of infection   It was a pleasure speaking to Barbi Tiwari today.  Return to clinic 3 months  lamisil daily

## 2022-01-06 NOTE — TELEPHONE ENCOUNTER
Attempt to reach patient to schedule surgery for left foot 1st mtpj fusion.  Left message for patient to call the surgery scheduling line at [phone].     Dennis Alvares, Surgery Scheduler

## 2022-01-06 NOTE — LETTER
1/6/2022         RE: Barbi Tiwari  [address] [facility] [id number]        Dear Colleague,    Thank you for referring your patient, Barbi Tiwari, to the [facility]. Please see a copy of my visit note below.    ASSESSMENT:  Encounter Diagnoses   Name Primary?     Hallux abducto valgus, left Yes     Left foot pain      Deformity of toe of left foot      MEDICAL DECISION MAKING:  I personally reviewed the x-ray images with Barbi.    Similar to last year, I do think surgical intervention is reasonable, given her degree of deformity and pain.    I think she will benefit most from a left first metatarsophalangeal joint arthrodesis, left second metatarsal medializing Weil osteotomy, left second metatarsophalangeal joint soft tissue rebalancing, and left second hammertoe correction.  An extensor tenotomy of the left third toe is also considered.  I explained that transverse plane deformities are fairly unpredictable, in terms of long-term success of surgical correction.  Reminded her that this foot is more complicated than her right foot was.    I personally reviewed the x-ray images with needed today.  She was provided the information for scheduling surgery.  Case request was placed.    I provided the surgical risks and postoperative course handout and would like to meet with her closer to the timing of surgery for more detailed discussion.  She has  With this.  Face-to-face or phone visit will suffice.    Total time spent on Barbi's care today, date of service 1/6/2022, was 30 minutes.  This included review of the relevant history, physical exam, review of the x-ray images, discussion regarding surgical procedures and documentation of today's visit.    Disclaimer: This note consists of symbols derived from keyboarding, dictation and/or voice recognition software. As a result, there may be errors in the script that have gone undetected. Please consider this when  interpreting information found in this chart.    Benjamin Griffin, SANTO, FACFAS, MS    [facility] Department of Podiatry/Foot & Ankle Surgery      ____________________________________________________________________    HPI:       Barbi presents today inquiring about surgery for her left foot pain.  I evaluated her last 1/27/2020.  At that time we discussed surgical intervention.  We decided it was best that she complete her radiation therapy and chemotherapy prior to considering surgery.    I performed a right first metatarsophalangeal joint fusion and right second metatarsal Weil osteotomy on 1/15/2019.  This was successful and reduced her pain.    Her current pain on the left can be rated an 8 out of 10 at worst.  She experiences pain daily.  She reports that it is affecting her quality of life.  She has attempted to wear accommodative and supportive shoes.      Past Medical History:   Diagnosis Date     Acute cystitis with hematuria 10/24/2020     Allergic rhinitis 12/9/2009     Calculus of kidney 1/12/2011    Overview:  S/P LITHOTRIPSY 3/15/11      Esophageal reflux 10/22/2008     Hyperparathyroidism 01/11/2011    had surgery for it; resulted in seizures     Moderate major depression, single episode (H)      oligodendroglioma 7/23/2012     Osteoarthrosis 12/9/2009     Palpitations 6/5/2014     PONV (postoperative nausea and vomiting)      PVCs 6/5/2014     Seizure disorder (related to tumor) 08/17/2011    last seizure 3/2021   *  *  Past Surgical History:   Procedure Laterality Date     COMBINED CYSTOSCOPY, RETROGRADES, URETEROSCOPY, LASER HOLMIUM LITHOTRIPSY URETER(S), INSERT STENT Right 8/3/2021    Procedure: 1. Cystourethroscopy 2. Right ureteroscopy 3. Right retrograde pyelogram with interpretation of intraoperative fluoroscopic imaging 4. Holmium laser lithotripsy with basket stone extraction 5. Right ureteral stent placement;  Surgeon: Felix Cano MD;  Location: [facility] OR     CRANIOTOMY  2011    tumor  resection     CYSTOSCOPY, RETROGRADES, INSERT STENT URETER(S), COMBINED Right 7/20/2021    Procedure: 1.  Cystourethroscopy 2.  Attempted right ureteroscopy 3.  Right  retrograde pyelogram with interpretation of intraoperative fluoroscopic imaging 4.  Right ureteral dilatation 5.  Right ureteral stent placement 6.  Laser on stand-by;  Surgeon: Felix Cano MD;  Location:  OR     EXTRACORPOREAL SHOCK WAVE LITHOTRIPSY, CYSTOSCOPY, INSERT STENT URETER(S), COMBINED Bilateral 5/5/2017    Procedure: COMBINED EXTRACORPOREAL SHOCK WAVE LITHOTRIPSY, CYSTOSCOPY, INSERT STENT URETER(S);  CYSTO, URETHRAL DILATION, URETERAL LEFT STENT PLACEMENT, LEFT ESWL.;  Surgeon: Angel Del Rio MD;  Location:  OR     FOOT SURGERY      mulitple     HYSTERECTOMY, PAP NO LONGER INDICATED  2008    lap hys     LITHOTRIPSY  2010 2017     OPTICAL TRACKING SYSTEM CRANIOTOMY, EXCISE TUMOR WITH MAPPING, COMBINED Right 5/30/2018    Procedure: COMBINED OPTICAL TRACKING SYSTEM CRANIOTOMY, EXCISE TUMOR WITH MAPPING;  Right Stealth Assisted Craniotomy With Motor Mapping And Tumor Resection ;  Surgeon: Dustin Rodriguez MD;  Location: [facility] OR     ORTHOPEDIC SURGERY      feet, multiple on both     OSTEOTOMY FOOT Right 1/15/2019    Procedure: OSTEOTOMY FOOT;  Surgeon: Benjamin Griffin DPM;  Location:  OR     PARATHYROIDECTOMY  2011     RECONSTRUCT FOREFOOT WITH METATARSOPHALANGEAL (MTP) FUSION Right 1/15/2019    Procedure: RIGHT FIRST METATARSAL PHALAGEAL JOINT ARTHRODESIS, RIGHT SECOND METATARSAL WEIL OSTEOTOMY;  Surgeon: Benjamin Griffin DPM;  Location:  OR   *  *  Current Outpatient Medications   Medication Sig Dispense Refill     acetaminophen (TYLENOL) 500 MG tablet Take 500-1,000 mg by mouth every 6 hours as needed for mild pain       escitalopram (LEXAPRO) 20 MG tablet Take 1 tablet (20 mg) by mouth daily 90 tablet 0     fexofenadine (ALLEGRA) 180 MG tablet Take 180 mg by mouth daily       gabapentin (NEURONTIN) 100 MG  "capsule Take 3 capsules (300 mg) by mouth every evening For additional refills, please schedule a follow-up appointment at [phone] 90 capsule 3     ibuprofen (ADVIL/MOTRIN) 200 MG tablet Take 800 mg by mouth daily as needed        Lactase (LACTAID PO) Take 1-2 tablets by mouth daily as needed for indigestion        lamoTRIgine (LAMICTAL) 100 MG tablet 100 mg am and pm (take along with 200 mg tablet for total of 300 mg twice a day). [facility] mft only 180 tablet 3     lamoTRIgine (LAMICTAL) 200 MG tablet 200 mg twice a day (take along with 100 mg tablet for total of 300 mg twice a day). [facility] mft only 180 tablet 3     levETIRAcetam (KEPPRA) 500 MG tablet 1500 mg am and 1250 mg pm 495 tablet 3     metoprolol tartrate (LOPRESSOR) 25 MG tablet Take 0.5 tablets (12.5 mg) by mouth 2 times daily       Multiple Vitamin (MULTI-VITAMINS) TABS Take 1 chew tab by mouth daily        tretinoin (RETIN-A) 0.025 % external cream Apply a pea-sized amount to each area affected by acne. Start every 3-4 nights working up to every night. 45 g 0     triamcinolone (KENALOG) 0.1 % external cream Apply topically 2 times daily To affected area in between breasts for 1-2 weeks. Tapering with improvement and restarting with flares. 60 g 2     VITAMIN D, CHOLECALCIFEROL, PO Take 1,000 Units by mouth daily            EXAM:    Vitals: /79   Ht 1.575 m (5' 2\")   Wt 65.3 kg (144 lb)   LMP  (LMP Unknown)   BMI 26.34 kg/m    BMI: Body mass index is 26.34 kg/m .    Constitutional/ general:  Pt is in no apparent distress, appears well-nourished.  Cooperative with history and physical exam.      Vascular:  Pedal pulses are palpable bilaterally for both the DP and PT arteries.  CFT < 3 sec.  No edema.  Pedal hair growth noted.      Neuro:  Alert and oriented x 3. Coordinated gait.  Light touch sensation is intact to the L4, L5, S1 distributions. No obvious deficits.  No evidence of neurological-based weakness, spasticity, or contracture " in the lower extremities.      Derm: Normal texture and turgor.  No erythema, ecchymosis, or cyanosis.    Open wound left third toe nailbed secondary to nail avulsion procedure this morning in dermatology.    Musculoskeletal:    Lower extremity muscle strength is normal.  Patient is ambulatory without an assistive device or brace . Decrease in medial longitudinal arch with weight bearing.  Severe, non reducible, hallux abducto valgus on the left. The 2nd toe has contracture and lateral deviation.     X-Ray Findings:  I personally reviewed the left foot images.  Metatarsus adductus involving the first second and third metatarsals.  Severe hallux abductovalgus.  The hallux is subluxing off of the first metatarsal head with degenerative changes.  Lateral transverse plane deformity of the left second toe with sagittal plane contracture.  Pes planus with midfoot degenerative changes.            Again, thank you for allowing me to participate in the care of your patient.        Sincerely,        Benjamin Griffin DPM

## 2022-01-06 NOTE — PROGRESS NOTES
ASSESSMENT:  Encounter Diagnoses   Name Primary?     Hallux abducto valgus, left Yes     Left foot pain      Deformity of toe of left foot      MEDICAL DECISION MAKING:  I personally reviewed the x-ray images with Barbi.    Similar to last year, I do think surgical intervention is reasonable, given her degree of deformity and pain.    I think she will benefit most from a left first metatarsophalangeal joint arthrodesis, left second metatarsal medializing Weil osteotomy, left second metatarsophalangeal joint soft tissue rebalancing, and left second hammertoe correction.  An extensor tenotomy of the left third toe is also considered.  I explained that transverse plane deformities are fairly unpredictable, in terms of long-term success of surgical correction.  Reminded her that this foot is more complicated than her right foot was.    I personally reviewed the x-ray images with needed today.  She was provided the information for scheduling surgery.  Case request was placed.    I provided the surgical risks and postoperative course handout and would like to meet with her closer to the timing of surgery for more detailed discussion.  She has  With this.  Face-to-face or phone visit will suffice.    Total time spent on Barbi's care today, date of service 1/6/2022, was 30 minutes.  This included review of the relevant history, physical exam, review of the x-ray images, discussion regarding surgical procedures and documentation of today's visit.    Disclaimer: This note consists of symbols derived from keyboarding, dictation and/or voice recognition software. As a result, there may be errors in the script that have gone undetected. Please consider this when interpreting information found in this chart.    Benjamin Griffin, SANTO, FACFAS, MS    [facility] Department of Podiatry/Foot & Ankle Surgery      ____________________________________________________________________    HPI:       Barbi presents today inquiring about surgery for  her left foot pain.  I evaluated her last 1/27/2020.  At that time we discussed surgical intervention.  We decided it was best that she complete her radiation therapy and chemotherapy prior to considering surgery.    I performed a right first metatarsophalangeal joint fusion and right second metatarsal Weil osteotomy on 1/15/2019.  This was successful and reduced her pain.    Her current pain on the left can be rated an 8 out of 10 at worst.  She experiences pain daily.  She reports that it is affecting her quality of life.  She has attempted to wear accommodative and supportive shoes.      Past Medical History:   Diagnosis Date     Acute cystitis with hematuria 10/24/2020     Allergic rhinitis 12/9/2009     Calculus of kidney 1/12/2011    Overview:  S/P LITHOTRIPSY 3/15/11      Esophageal reflux 10/22/2008     Hyperparathyroidism 01/11/2011    had surgery for it; resulted in seizures     Moderate major depression, single episode (H)      oligodendroglioma 7/23/2012     Osteoarthrosis 12/9/2009     Palpitations 6/5/2014     PONV (postoperative nausea and vomiting)      PVCs 6/5/2014     Seizure disorder (related to tumor) 08/17/2011    last seizure 3/2021   *  *  Past Surgical History:   Procedure Laterality Date     COMBINED CYSTOSCOPY, RETROGRADES, URETEROSCOPY, LASER HOLMIUM LITHOTRIPSY URETER(S), INSERT STENT Right 8/3/2021    Procedure: 1. Cystourethroscopy 2. Right ureteroscopy 3. Right retrograde pyelogram with interpretation of intraoperative fluoroscopic imaging 4. Holmium laser lithotripsy with basket stone extraction 5. Right ureteral stent placement;  Surgeon: Felix Cano MD;  Location: [facility] OR     CRANIOTOMY  2011    tumor resection     CYSTOSCOPY, RETROGRADES, INSERT STENT URETER(S), COMBINED Right 7/20/2021    Procedure: 1.  Cystourethroscopy 2.  Attempted right ureteroscopy 3.  Right  retrograde pyelogram with interpretation of intraoperative fluoroscopic imaging 4.  Right ureteral dilatation  5.  Right ureteral stent placement 6.  Laser on stand-by;  Surgeon: Felix Cano MD;  Location: [facility] OR     EXTRACORPOREAL SHOCK WAVE LITHOTRIPSY, CYSTOSCOPY, INSERT STENT URETER(S), COMBINED Bilateral 5/5/2017    Procedure: COMBINED EXTRACORPOREAL SHOCK WAVE LITHOTRIPSY, CYSTOSCOPY, INSERT STENT URETER(S);  CYSTO, URETHRAL DILATION, URETERAL LEFT STENT PLACEMENT, LEFT ESWL.;  Surgeon: Angel Del Rio MD;  Location:  OR     FOOT SURGERY      mulitple     HYSTERECTOMY, PAP NO LONGER INDICATED  2008    lap hys     LITHOTRIPSY  2010 2017     OPTICAL TRACKING SYSTEM CRANIOTOMY, EXCISE TUMOR WITH MAPPING, COMBINED Right 5/30/2018    Procedure: COMBINED OPTICAL TRACKING SYSTEM CRANIOTOMY, EXCISE TUMOR WITH MAPPING;  Right Stealth Assisted Craniotomy With Motor Mapping And Tumor Resection ;  Surgeon: Dustin Rodriguez MD;  Location: [facility] OR     ORTHOPEDIC SURGERY      feet, multiple on both     OSTEOTOMY FOOT Right 1/15/2019    Procedure: OSTEOTOMY FOOT;  Surgeon: Benjamin Griffin DPM;  Location:  OR     PARATHYROIDECTOMY  2011     RECONSTRUCT FOREFOOT WITH METATARSOPHALANGEAL (MTP) FUSION Right 1/15/2019    Procedure: RIGHT FIRST METATARSAL PHALAGEAL JOINT ARTHRODESIS, RIGHT SECOND METATARSAL WEIL OSTEOTOMY;  Surgeon: Benjamin Griffin DPM;  Location:  OR   *  *  Current Outpatient Medications   Medication Sig Dispense Refill     acetaminophen (TYLENOL) 500 MG tablet Take 500-1,000 mg by mouth every 6 hours as needed for mild pain       escitalopram (LEXAPRO) 20 MG tablet Take 1 tablet (20 mg) by mouth daily 90 tablet 0     fexofenadine (ALLEGRA) 180 MG tablet Take 180 mg by mouth daily       gabapentin (NEURONTIN) 100 MG capsule Take 3 capsules (300 mg) by mouth every evening For additional refills, please schedule a follow-up appointment at [phone] 90 capsule 3     ibuprofen (ADVIL/MOTRIN) 200 MG tablet Take 800 mg by mouth daily as needed        Lactase (LACTAID PO) Take 1-2 tablets by  "mouth daily as needed for indigestion        lamoTRIgine (LAMICTAL) 100 MG tablet 100 mg am and pm (take along with 200 mg tablet for total of 300 mg twice a day). [facility] mft only 180 tablet 3     lamoTRIgine (LAMICTAL) 200 MG tablet 200 mg twice a day (take along with 100 mg tablet for total of 300 mg twice a day). [facility] mft only 180 tablet 3     levETIRAcetam (KEPPRA) 500 MG tablet 1500 mg am and 1250 mg pm 495 tablet 3     metoprolol tartrate (LOPRESSOR) 25 MG tablet Take 0.5 tablets (12.5 mg) by mouth 2 times daily       Multiple Vitamin (MULTI-VITAMINS) TABS Take 1 chew tab by mouth daily        tretinoin (RETIN-A) 0.025 % external cream Apply a pea-sized amount to each area affected by acne. Start every 3-4 nights working up to every night. 45 g 0     triamcinolone (KENALOG) 0.1 % external cream Apply topically 2 times daily To affected area in between breasts for 1-2 weeks. Tapering with improvement and restarting with flares. 60 g 2     VITAMIN D, CHOLECALCIFEROL, PO Take 1,000 Units by mouth daily            EXAM:    Vitals: /79   Ht 1.575 m (5' 2\")   Wt 65.3 kg (144 lb)   LMP  (LMP Unknown)   BMI 26.34 kg/m    BMI: Body mass index is 26.34 kg/m .    Constitutional/ general:  Pt is in no apparent distress, appears well-nourished.  Cooperative with history and physical exam.      Vascular:  Pedal pulses are palpable bilaterally for both the DP and PT arteries.  CFT < 3 sec.  No edema.  Pedal hair growth noted.      Neuro:  Alert and oriented x 3. Coordinated gait.  Light touch sensation is intact to the L4, L5, S1 distributions. No obvious deficits.  No evidence of neurological-based weakness, spasticity, or contracture in the lower extremities.      Derm: Normal texture and turgor.  No erythema, ecchymosis, or cyanosis.    Open wound left third toe nailbed secondary to nail avulsion procedure this morning in dermatology.    Musculoskeletal:    Lower extremity muscle strength is normal.  " Patient is ambulatory without an assistive device or brace . Decrease in medial longitudinal arch with weight bearing.  Severe, non reducible, hallux abducto valgus on the left. The 2nd toe has contracture and lateral deviation.     X-Ray Findings:  I personally reviewed the left foot images.  Metatarsus adductus involving the first second and third metatarsals.  Severe hallux abductovalgus.  The hallux is subluxing off of the first metatarsal head with degenerative changes.  Lateral transverse plane deformity of the left second toe with sagittal plane contracture.  Pes planus with midfoot degenerative changes.

## 2022-01-06 NOTE — LETTER
1/6/2022         RE: Barbi Tiwari  [address]  [facility] [address]        Dear Colleague,    Thank you for referring your patient, Barbi Tiwari, to the [facility]. Please see a copy of my visit note below.    Barbi Tiwari is an extremely pleasant 61 year old year old female patient here today for avulsion of left 3rd toenail due to fungus.  Patient has no other skin complaints today.  Remainder of the HPI, Meds, PMH, Allergies, FH, and SH was reviewed in chart.      Past Medical History:   Diagnosis Date     Acute cystitis with hematuria 10/24/2020     Allergic rhinitis 12/9/2009     Calculus of kidney 1/12/2011    Overview:  S/P LITHOTRIPSY 3/15/11      Esophageal reflux 10/22/2008     Hyperparathyroidism 01/11/2011    had surgery for it; resulted in seizures     Moderate major depression, single episode (H)      oligodendroglioma 7/23/2012     Osteoarthrosis 12/9/2009     Palpitations 6/5/2014     PONV (postoperative nausea and vomiting)      PVCs 6/5/2014     Seizure disorder (related to tumor) 08/17/2011    last seizure 3/2021       Past Surgical History:   Procedure Laterality Date     COMBINED CYSTOSCOPY, RETROGRADES, URETEROSCOPY, LASER HOLMIUM LITHOTRIPSY URETER(S), INSERT STENT Right 8/3/2021    Procedure: 1. Cystourethroscopy 2. Right ureteroscopy 3. Right retrograde pyelogram with interpretation of intraoperative fluoroscopic imaging 4. Holmium laser lithotripsy with basket stone extraction 5. Right ureteral stent placement;  Surgeon: Felix Cano MD;  Location: [facility] OR     CRANIOTOMY  2011    tumor resection     CYSTOSCOPY, RETROGRADES, INSERT STENT URETER(S), COMBINED Right 7/20/2021    Procedure: 1.  Cystourethroscopy 2.  Attempted right ureteroscopy 3.  Right  retrograde pyelogram with interpretation of intraoperative fluoroscopic imaging 4.  Right ureteral dilatation 5.  Right ureteral stent placement 6.  Laser on stand-by;  Surgeon:  Felix Cano MD;  Location: [facility] OR     EXTRACORPOREAL SHOCK WAVE LITHOTRIPSY, CYSTOSCOPY, INSERT STENT URETER(S), COMBINED Bilateral 2017    Procedure: COMBINED EXTRACORPOREAL SHOCK WAVE LITHOTRIPSY, CYSTOSCOPY, INSERT STENT URETER(S);  CYSTO, URETHRAL DILATION, URETERAL LEFT STENT PLACEMENT, LEFT ESWL.;  Surgeon: Angel Del Rio MD;  Location:  OR     FOOT SURGERY      mulitple     HYSTERECTOMY, PAP NO LONGER INDICATED      lap hys     LITHOTRIPSY  2010     OPTICAL TRACKING SYSTEM CRANIOTOMY, EXCISE TUMOR WITH MAPPING, COMBINED Right 2018    Procedure: COMBINED OPTICAL TRACKING SYSTEM CRANIOTOMY, EXCISE TUMOR WITH MAPPING;  Right Stealth Assisted Craniotomy With Motor Mapping And Tumor Resection ;  Surgeon: Dustin Rodriguez MD;  Location: [facility] OR     ORTHOPEDIC SURGERY      feet, multiple on both     OSTEOTOMY FOOT Right 1/15/2019    Procedure: OSTEOTOMY FOOT;  Surgeon: Benjamin Griffin DPM;  Location:  OR     PARATHYROIDECTOMY       RECONSTRUCT FOREFOOT WITH METATARSOPHALANGEAL (MTP) FUSION Right 1/15/2019    Procedure: RIGHT FIRST METATARSAL PHALAGEAL JOINT ARTHRODESIS, RIGHT SECOND METATARSAL WEIL OSTEOTOMY;  Surgeon: Benjamin Griffin DPM;  Location:  OR        Family History   Problem Relation Age of Onset     Arthritis Mother      Depression Mother      Respiratory Mother         COPD     LUNG DISEASE Mother      C.A.D. Father 58        heart attack     Heart Disease Father      Coronary Artery Disease Father         MI  age 54     Diabetes Brother 70         age 70     Depression Sister      Depression Son      Allergies Son      Depression Daughter      Allergies Daughter      Eczema Brother      Skin Cancer Brother      Depression Sister      Depression Sister      Anxiety Disorder Daughter        Social History     Socioeconomic History     Marital status:      Spouse name: Not on file     Number of children: Not on file     Years of  education: Not on file     Highest education level: Not on file   Occupational History     Occupation: RN- [employer]   Tobacco Use     Smoking status: Never Smoker     Smokeless tobacco: Never Used   Substance and Sexual Activity     Alcohol use: Yes     Comment: social     Drug use: No     Sexual activity: Yes     Partners: Male     Birth control/protection: Female Surgical     Comment: hysterectomy   Other Topics Concern     Parent/sibling w/ CABG, MI or angioplasty before 65F 55M? Yes     Comment: Father,  of MI age 54   Social History Narrative     Not on file     Social Determinants of Health     Financial Resource Strain: Not on file   Food Insecurity: Not on file   Transportation Needs: Not on file   Physical Activity: Not on file   Stress: Not on file   Social Connections: Not on file   Intimate Partner Violence: Not on file   Housing Stability: Not on file       Outpatient Encounter Medications as of 2022   Medication Sig Dispense Refill     acetaminophen (TYLENOL) 500 MG tablet Take 500-1,000 mg by mouth every 6 hours as needed for mild pain       escitalopram (LEXAPRO) 20 MG tablet Take 1 tablet (20 mg) by mouth daily 90 tablet 0     fexofenadine (ALLEGRA) 180 MG tablet Take 180 mg by mouth daily       gabapentin (NEURONTIN) 100 MG capsule Take 3 capsules (300 mg) by mouth every evening For additional refills, please schedule a follow-up appointment at [phone] 90 capsule 3     ibuprofen (ADVIL/MOTRIN) 200 MG tablet Take 800 mg by mouth daily as needed        Lactase (LACTAID PO) Take 1-2 tablets by mouth daily as needed for indigestion        lamoTRIgine (LAMICTAL) 100 MG tablet 100 mg am and pm (take along with 200 mg tablet for total of 300 mg twice a day). [facility] 3     lamoTRIgine (LAMICTAL) 200 MG tablet 200 mg twice a day (take along with 100 mg tablet for total of 300 mg twice a day). [facility] tablet 3     levETIRAcetam (KEPPRA) 500 MG tablet 1500 mg  am and 1250 mg pm 495 tablet 3     metoprolol tartrate (LOPRESSOR) 25 MG tablet Take 0.5 tablets (12.5 mg) by mouth 2 times daily       Multiple Vitamin (MULTI-VITAMINS) TABS Take 1 chew tab by mouth daily        tretinoin (RETIN-A) 0.025 % external cream Apply a pea-sized amount to each area affected by acne. Start every 3-4 nights working up to every night. 45 g 0     triamcinolone (KENALOG) 0.1 % external cream Apply topically 2 times daily To affected area in between breasts for 1-2 weeks. Tapering with improvement and restarting with flares. 60 g 2     VITAMIN D, CHOLECALCIFEROL, PO Take 1,000 Units by mouth daily        No facility-administered encounter medications on file as of 1/6/2022.             O:   NAD, WDWN, Alert & Oriented, Mood & Affect wnl, Vitals stable   Here today alone   /73   Pulse 61   LMP  (LMP Unknown)   SpO2 100%    General appearance normal   Vitals stable   Alert, oriented and in no acute distress     Onychomycosis of 3rd l toenail      Eyes: Conjunctivae/lids:Normal     ENT: Lips, buccal mucosa, tongue: normal    MSK:Normal    Cardiovascular: peripheral edema none    Pulm: Breathing Normal    Neuro/Psych: Orientation:Alert and Orientedx3 ; Mood/Affect:normal       A/P:  1. Onychomycosis   Recurrence of fungus discussed with patient  Dystrophy discussed with patient   TOE NAIL AVULSION WITH LATERAL MATRICECTOMY:  After consent, anesthesia with 1% lidocaine, and prep, the nail was elevated from the nail bed, using a periosteal elevator,     The site was dressed in the usual fashion,   Management: Home Instructions   Keep foot elevated for first 24 hours   Change dressing in 24 hours   Consider daily antibiotic ointment (e.g. Bacitracin) until heeled   Water exposure is controversial   Some recommend only showering, but no soakings   Others soak foot in warm soapy water 2-4 times daily for 4-7 days   Avoid trauma to toe for first 2 weeks   Wear loose-fitting shoes   Avoid  Running, jumping or other potential injury   Observe for signs of infection   It was a pleasure speaking to Barbi Tiwari today.  Return to clinic 3 months  lamisil daily        Again, thank you for allowing me to participate in the care of your patient.        Sincerely,        Benjamin Forbes MD

## 2022-01-06 NOTE — PATIENT INSTRUCTIONS
REVIEW OF SURGICAL RISKS  The following is a list of possible risks associated with foot and ankle surgery. This is not all-inclusive. Please realize that risks associated with elective foot surgery are low and there are ways to deal with them when they occur.     1) Infection:  Probably the most concerning and discussed risk of surgery, the chance of infection is about 1% with elective surgery. Often it involves the skin around the incision and resolves with oral antibiotics. It is rare that infection will be deep and require surgical intervention. You will receive an antibiotic prior to surgery.    2)  Pain: Surgery is trauma to the foot. Therefore a certain amount of pain is to be expected. This will be most bothersome during the first 1-2 days. Taking your pain medication, elevating the extremity above heart level, and using ice are all very important for adequate pain management.     3)  Swelling: This is due to the trauma of surgery. A certain degree of swelling is normal. However, if there is too much, it can impair healing, increase the risk of infection, add to your pain, and linger for several months after surgery making return to normal shoes difficult. Elevating the limb is extremely important.    4)  Healing Complications: There are many factors that can impair healing. There is no way to speed up the biological rate of healing. People tend to find ways to slow it down. Proper nutrition, not smoking, following the instructions provided by your surgeon are ways to help with normal healing.    Sometimes the skin is slow to heal, and an open area along the incision develops.  If this happens, it cannot be stitched closed. It then needs to heal from the inside out. Rarely there will be an issue with bone healing, which might require a special device called a bone stimulator and/or a revision surgery.    5)  Temporary and Permanent Numbness: Numbness beyond the area of surgery is to be expected. Initially it  "is from the local anesthetic that was injected into your foot. This wears off within 24 hours. Continued numbness or tingling is usually from swelling that compresses the nerves. Numbness that lasts for weeks is from nerve injury/irritation. This might eventually resolve or be permanent.      6)  Blood Clot:  Although rare, this is potentially the most dangerous risk associated with foot surgery. A blood clot in the leg can lead to varicose veins and chronic swelling. A blood clot that travels to the lungs is a very serious condition requiring hospitalization. Increased risk is related to trauma of surgery and degree of immobilization. There are many other risk factors that are not related directly to surgery (smoking, family history, personal history of varicose veins and/or blood clots, cancer, high fat cholesterol and lipids). Your surgeon will discuss this with you and devise an appropriate plan to help prevent this complication.    7)  Hypertrophic Scar: Some people have skin that is prone to forming exaggerated scar tissue. This can be unsightly and uncomfortable. There are ways to treat it.        8)  Complex Regional Pain Syndrome:  Rarely some people have pain that is out of proportion to the surgical procedure and harder to get control of. This pain can linger and cause changes in skin temperature and appearance. If this occurs, physical therapy and/or a pain specialist might be needed.      9) There are also intra-operative challenges and complications that can occur.   Intra-operative challenges can involve finding poor bone quality, difficulty with the internal fixation (when used), and even inadvertent injury to neighboring structures that would then need to be repaired.     Please remember that surgical complications are rare. Your surgeon has a plan to deal with them, should one occur. The primary goal of surgery is pain reduction. There are no guarantees. An \"abnormal\" foot prior to surgery, is not " "necessarily a \"normal\" foot afterwards. Hopefully it is a less painful one.      If you have any questions, please discuss with Dr. Griffin prior to surgery.        POST-OPERATIVE CARE RECOMENDATIONS    ACTIVITY:    1)  Weight bearing status: __________________________    2)  Keep your surgical lower extremity elevated 23/24 hours above heart level. You will want to keep your foot elevated as much as possible for the first week after surgery.     3)  It is recommended that you get up and move around (walk, crutch, roll) for short periods each hour while you are awake. It is okay to wiggle your toes. If you are not in a splint or cast, move your ankle joint. Motion helps lower risk of a blood clot in your leg.      4)  If you bathe or shower, the dressing must be kept dry. Safety is a concern and sponge bathing might be best. You can purchase a water proof cast protector.     5)  You are not to drive while you are taking pain medications. No driving, if surgery was done on the right foot/ ankle or if you drive a stick shift.     SPECIFIC CARES:    1)  Keep the dressing clean, dry, and intact. If your dressing gets wet, comes apart, or falls off, please call your clinic and notify Dr. Griffin. Some bleeding through the dressing is okay. But if it causes a spot bigger than 2 inches in diameter, please notify Dr. Griffin.     2)  Place an ice pack behind the knee of the surgical side for up to 20min/hour. It can also be placed on the front of the ankle.     4)  Call your clinic if you experience calf pain, chest pain, or problems breathing.    5)  Call your clinic if fever, increasing pain that you can't control or a large amount of bleeding.      6) What to do if your pain seems out of control:   1)  Make sure you are truly elevating the foot/ankle above heart level.   2)  Make sure you are using a cold pack/ ice   3)  Check the pain medication instructions:     Usually you can take two pain pills every four hours, if " needed.   4)  If the above are not adequate, then you need to remove the brace/ boot and   remove the compression wrap. The wrap might be too tight. After you do   this, elevate the foot for an hour and then re-wrap the foot lightly and   replace the splint / boot.      Follow up in clinic one week after surgery. This appointment should already be set up.      MEDICATIONS:    IMPORTANT:  Take your pain medication when you get home, even if you are not having pain. You want it in your system before the local anesthetic (foot numbness from the shot) wears off.      1)  Take your pain medication with food. This will help prevent any nausea side effects.  If hydroxyzine was prescribed, it is for itching, leg spasms, and makes the other pain medication work better.    2)  Anti blood clot plan: To help prevent a blood clot in your legs, it is important that you wiggle your toes and ankles frequently. Obviously, this might be limited on the surgical side. Get up and move around a few minutes every hour while you are awake.  Keep the white sock on the non-surgical side and the compression wraps to the knee on the surgical side. If Dr. Griffin thinks that you are at high risk for a blood clot, he might put you on a blood thinner called enoxaparin.    Please call the clinic if you have any pain or swelling in either calf.        SHOWERING BEFORE SURGERY  You must wash with the soap (Hibiclens - 4% CHG) twice before coming to the hospital for your surgery. This will decrease bacteria (germs) on your skin. It will also help reduce your chance of infection (illness caused by germs) after surgery.  Read the directions and safety tips on the bottle of soap. Wash once the evening before surgery and once the morning of surgery. Use 4 ounces of soap each time. When showering, it is best to use two fresh washcloths and a fresh towel.   Items you will need for showerin newly washed washcloths    2 newly washed towels    8 ounces of  one of the soap  FOLLOW THESE INSTRUCTIONS:  The evening before surgery   1. Shower or bathe as you normally would, use your regular soap and a clean washcloth. Give special attention to places where your incision (surgical cut) or catheters will be. This includes your groin area. Rinse well. You may wash your hair with your regular shampoo.  2. Next, wash your entire body from your chin down with the antiseptic soap. See the next page for directions about how to do this.  3. Rinse well and dry off using a newly washed towel.  The morning of surgery  Repeat steps 1, 2 and 3.   Other suggestions:    Wear freshly washed pajamas or clothing after your evening shower.    Wear freshly washed clothes the day of surgery.    Wash and change your bed sheets the day before surgery to have clean bed sheets after you shower and when you get home from surgery.    If you have trouble washing all areas, make sure someone helps you.    Don t use any deodorant, lotion or powder after your shower.    Women who are menstruating should wear a fresh sanitary pad to the hospital.       **If you have questions or concerns after surgery, please call: Podiatry/Foot & Ankle Surgery Triage Team at the [facility] at [phone] (option 3 for triage).      Thank you for choosing [facility] Podiatry / Foot & Ankle Surgery!    DR. SALAMANCA'S CLINIC LOCATIONS     [facility] SCHEDULE SURGERY: [phone]   [address] [place] [address] APPOINTMENTS: [phone]   [place] [address] BILLING QUESTIONS: [phone]  [phone] RADIOLOGY: [phone]       [place]    [phone]    [place] [address]    [phone]  [phone]      Flu vaccines are now available at all [facility] clinics and retail pharmacies across the [facility]. Appointments are required for clinic locations. To schedule an appointment online, please log into [employer] or create an account if you are a  new user. You can also call [phone], or simply walk in at one of the [facility] retail pharmacy locations.

## 2022-01-06 NOTE — PATIENT INSTRUCTIONS
Wound Care Instructions     FOR SUPERFICIAL WOUNDS     [facility] [phone]                       AFTER 24 HOURS YOU SHOULD REMOVE THE BANDAGE AND BEGIN DAILY DRESSING CHANGES AS FOLLOWS:     1) Remove Dressing.     2) Clean and dry the area with tap water using a Q-tip or sterile gauze pad.     3) Apply Vaseline, Aquaphor, Polysporin ointment or Bacitracin ointment over entire wound.  Do NOT use Neosporin ointment.     4) Cover the wound with a band-aid, or a sterile non-stick gauze pad and micropore paper tape      REPEAT THESE INSTRUCTIONS AT LEAST ONCE A DAY UNTIL THE WOUND HAS COMPLETELY HEALED.    It is an old wives tale that a wound heals better when it is exposed to air and allowed to dry out. The wound will heal faster with a better cosmetic result if it is kept moist with ointment and covered with a bandage.    **Do not let the wound dry out.**      Supplies Needed:      *Cotton tipped applicators (Q-tips)    *Polysporin Ointment or Bacitracin Ointment (NOT NEOSPORIN)    *Band-aids or non-stick gauze pads and micropore paper tape.      PATIENT INFORMATION:    During the healing process you will notice a number of changes. All wounds develop a small halo of redness surrounding the wound.  This means healing is occurring. Severe itching with extensive redness usually indicates sensitivity to the ointment or bandage tape used to dress the wound.  You should call our office if this develops.      Swelling  and/or discoloration around your surgical site is common, particularly when performed around the eye.    All wounds normally drain.  The larger the wound the more drainage there will be.  After 7-10 days, you will notice the wound beginning to shrink and new skin will begin to grow.  The wound is healed when you can see skin has formed over the entire area.  A healed wound has a healthy, shiny look to the surface and is red to dark pink in color to normalize.  Wounds may take  approximately 4-6 weeks to heal.  Larger wounds may take 6-8 weeks.  After the wound is healed you may discontinue dressing changes.    You may experience a sensation of tightness as your wound heals. This is normal and will gradually subside.    Your healed wound may be sensitive to temperature changes. This sensitivity improves with time, but if you re having a lot of discomfort, try to avoid temperature extremes.    Patients frequently experience itching after their wound appears to have healed because of the continue healing under the skin.  Plain Vaseline will help relieve the itching.        POSSIBLE COMPLICATIONS    BLEEDIN. Leave the bandage in place.  2. Use tightly rolled up gauze or a cloth to apply direct pressure over the bandage for 30  minutes.  3. Reapply pressure for an additional 30 minutes if necessary  4. Use additional gauze and tape to maintain pressure once the bleeding has stopped.

## 2022-01-07 NOTE — TELEPHONE ENCOUNTER
Scheduled surgery.     ATC: please place covid order. Patient will call to schedule appt on her own.     Type of surgery: left 1st MTPJ fusion, left 2nd metatarsal weil osteotomy, left 2nd hammertoe correction  Location of surgery: [facility]  Date and time of surgery: 2/1/22 @ 09:10am   Surgeon: Elias  Pre-Op Appt Date: patient to schedule with PCP  Post-Op Appt Date: 2/8/22   Packet sent out: Yes  Pre-cert/Authorization completed:  No  Date: 1/7/22      Dennis Alvares, Surgery Scheduler

## 2022-01-12 ENCOUNTER — OFFICE VISIT (OUTPATIENT)
Dept: INTERNAL MEDICINE | Facility: CLINIC | Age: 62
End: 2022-01-12
Payer: COMMERCIAL

## 2022-01-12 ENCOUNTER — TELEPHONE (OUTPATIENT)
Dept: NEUROLOGY | Facility: CLINIC | Age: 62
End: 2022-01-12
Payer: COMMERCIAL

## 2022-01-12 VITALS
HEIGHT: 62 IN | BODY MASS INDEX: 26.68 KG/M2 | OXYGEN SATURATION: 96 % | SYSTOLIC BLOOD PRESSURE: 114 MMHG | RESPIRATION RATE: 18 BRPM | WEIGHT: 145 LBS | HEART RATE: 55 BPM | TEMPERATURE: 98 F | DIASTOLIC BLOOD PRESSURE: 62 MMHG

## 2022-01-12 DIAGNOSIS — E21.3 HYPERPARATHYROIDISM (H): ICD-10-CM

## 2022-01-12 DIAGNOSIS — Z01.818 PREOP GENERAL PHYSICAL EXAM: Primary | ICD-10-CM

## 2022-01-12 DIAGNOSIS — M21.612 BUNION, LEFT: ICD-10-CM

## 2022-01-12 DIAGNOSIS — C71.9 OLIGODENDROGLIOMA (H): ICD-10-CM

## 2022-01-12 DIAGNOSIS — F32.1 MODERATE MAJOR DEPRESSION (H): ICD-10-CM

## 2022-01-12 DIAGNOSIS — R56.9 CONVULSIONS, UNSPECIFIED CONVULSION TYPE (H): ICD-10-CM

## 2022-01-12 LAB
BASOPHILS # BLD AUTO: 0 10E3/UL (ref 0–0.2)
BASOPHILS NFR BLD AUTO: 1 %
EOSINOPHIL # BLD AUTO: 0.1 10E3/UL (ref 0–0.7)
EOSINOPHIL NFR BLD AUTO: 2 %
ERYTHROCYTE [DISTWIDTH] IN BLOOD BY AUTOMATED COUNT: 12.8 % (ref 10–15)
HCT VFR BLD AUTO: 43.5 % (ref 35–47)
HGB BLD-MCNC: 13.6 G/DL (ref 11.7–15.7)
LYMPHOCYTES # BLD AUTO: 1.1 10E3/UL (ref 0.8–5.3)
LYMPHOCYTES NFR BLD AUTO: 24 %
MCH RBC QN AUTO: 30.2 PG (ref 26.5–33)
MCHC RBC AUTO-ENTMCNC: 31.3 G/DL (ref 31.5–36.5)
MCV RBC AUTO: 97 FL (ref 78–100)
MONOCYTES # BLD AUTO: 0.4 10E3/UL (ref 0–1.3)
MONOCYTES NFR BLD AUTO: 10 %
NEUTROPHILS # BLD AUTO: 2.8 10E3/UL (ref 1.6–8.3)
NEUTROPHILS NFR BLD AUTO: 64 %
PLATELET # BLD AUTO: 176 10E3/UL (ref 150–450)
RBC # BLD AUTO: 4.51 10E6/UL (ref 3.8–5.2)
WBC # BLD AUTO: 4.4 10E3/UL (ref 4–11)

## 2022-01-12 PROCEDURE — 93000 ELECTROCARDIOGRAM COMPLETE: CPT | Performed by: INTERNAL MEDICINE

## 2022-01-12 PROCEDURE — 36415 COLL VENOUS BLD VENIPUNCTURE: CPT | Performed by: INTERNAL MEDICINE

## 2022-01-12 PROCEDURE — 80053 COMPREHEN METABOLIC PANEL: CPT | Performed by: INTERNAL MEDICINE

## 2022-01-12 PROCEDURE — 85025 COMPLETE CBC W/AUTO DIFF WBC: CPT | Performed by: INTERNAL MEDICINE

## 2022-01-12 PROCEDURE — 99214 OFFICE O/P EST MOD 30 MIN: CPT | Performed by: INTERNAL MEDICINE

## 2022-01-12 RX ORDER — ESCITALOPRAM OXALATE 10 MG/1
10 TABLET ORAL DAILY
Qty: 90 TABLET | Refills: 1 | Status: SHIPPED | OUTPATIENT
Start: 2022-01-12 | End: 2023-01-12

## 2022-01-12 ASSESSMENT — MIFFLIN-ST. JEOR: SCORE: 1180.97

## 2022-01-12 NOTE — PROGRESS NOTES
There is mild anemia, can be seen with decreased kidney function, however please schedule iron and b12 and folate levels. Labs ordered for [facility], can add to lab draw for Dr. Snell, let me know if he wants them to go to [facility]   [place] [address]  [facility] [address]  Phone: [phone]  Primary Provider: Dustin Choudhury  Pre-op Performing Provider: NEVA COREA      PREOPERATIVE EVALUATION:  Today's date: 1/12/2022    Barbi Tiwari is a 61 year old female who presents for a preoperative evaluation.    Surgical Information:  Surgery/Procedure:   left first metatarsophalangeal joint arthrodesis Left Combined General with Popliteal Block   left second metatarsal Weil osteotomy Left Combined General with Popliteal Block   CORRECTION, left second HAMMER TOE       Surgery Location: [facility]  Surgeon: Dr. MARTA Griffin  Surgery Date: 2-1-2022  Time of Surgery: 9:10  Where patient plans to recover: At home with family  Fax number for surgical facility: Note does not need to be faxed, will be available electronically in Epic.    Type of Anesthesia Anticipated: General and popliteal block    Assessment & Plan     The proposed surgical procedure is considered INTERMEDIATE risk.    Preop general physical exam     - EKG 12-lead complete w/read - Clinics  - Asymptomatic COVID-19 Virus (Coronavirus) by PCR; Future  - Comprehensive metabolic panel (BMP + Alb, Alk Phos, ALT, AST, Total. Bili, TP); Future  - CBC with platelets and differential; Future    Bunion, left     - Asymptomatic COVID-19 Virus (Coronavirus) by PCR; Future  - Comprehensive metabolic panel (BMP + Alb, Alk Phos, ALT, AST, Total. Bili, TP); Future  - CBC with platelets and differential; Future    Oligodendroglioma (H)       Moderate major depression (H)     - escitalopram (LEXAPRO) 10 MG tablet; Take 1 tablet (10 mg) by mouth daily    Convulsions, unspecified convulsion type (H)       Hyperparathyroidism (H)              Risks and Recommendations:  The patient has the following additional risks and recommendations for perioperative complications:   - history is seizures none for over a year    Medication Instructions:  Patient is to take  all scheduled medications on the day of surgery    RECOMMENDATION:  APPROVAL GIVEN to proceed with proposed procedure, without further diagnostic evaluation.          Subjective     HPI related to upcoming procedure: she has ongoing problems with bunion and deformed toes on the left foot. She has had seral surgeries on the foot in the past. Going in for another revision.     Preop Questions 1/8/2022   1. Have you ever had a heart attack or stroke? No   2. Have you ever had surgery on your heart or blood vessels, such as a stent placement, a coronary artery bypass, or surgery on an artery in your head, neck, heart, or legs? No   3. Do you have chest pain with activity? No   4. Do you have a history of  heart failure? No   5. Do you currently have a cold, bronchitis or symptoms of other infection? No   6. Do you have a cough, shortness of breath, or wheezing? No   7. Do you or anyone in your family have previous history of blood clots? No   8. Do you or does anyone in your family have a serious bleeding problem such as prolonged bleeding following surgeries or cuts? No   9. Have you ever had problems with anemia or been told to take iron pills? No   10. Have you had any abnormal blood loss such as black, tarry or bloody stools, or abnormal vaginal bleeding? No   11. Have you ever had a blood transfusion? No   12. Are you willing to have a blood transfusion if it is medically needed before, during, or after your surgery? Yes   13. Have you or any of your relatives ever had problems with anesthesia? No   14. Do you have sleep apnea, excessive snoring or daytime drowsiness? No   15. Do you have any artifical heart valves or other implanted medical devices like a pacemaker, defibrillator, or continuous glucose monitor? No   16. Do you have artificial joints? No   17. Are you allergic to latex? No   18. Is there any chance that you may be pregnant? No       Health Care Directive:  Patient does not have a Health Care  Directive or Living Will: Discussed advance care planning with patient; however, patient declined at this time.    Preoperative Review of :   reviewed - controlled substances reflected in medication list.      Status of Chronic Conditions:  See problem list for active medical problems.  Problems all longstanding and stable, except as noted/documented.  See ROS for pertinent symptoms related to these conditions.      Review of Systems  CONSTITUTIONAL: NEGATIVE for fever, chills, change in weight  INTEGUMENTARY/SKIN: NEGATIVE for worrisome rashes, moles or lesions  EYES: NEGATIVE for vision changes or irritation  ENT/MOUTH: NEGATIVE for ear, mouth and throat problems  RESP: NEGATIVE for significant cough or SOB  CV: NEGATIVE for chest pain, palpitations or peripheral edema  GI: NEGATIVE for nausea, abdominal pain, heartburn, or change in bowel habits  : NEGATIVE for frequency, dysuria, or hematuria  MUSCULOSKELETAL: NEGATIVE for significant arthralgias or myalgia  NEURO: NEGATIVE for weakness, dizziness or paresthesias  ENDOCRINE: NEGATIVE for temperature intolerance, skin/hair changes  HEME: NEGATIVE for bleeding problems  PSYCHIATRIC: NEGATIVE for changes in mood or affect    Patient Active Problem List    Diagnosis Date Noted     Obstruction of right ureteropelvic junction (UPJ) due to stone 07/08/2021     Priority: Medium     Added automatically from request for surgery [id number]       Chemotherapy management, encounter for 04/09/2020     Priority: Medium     High risk for chemotherapy-induced infectious complication 04/09/2020     Priority: Medium     Acute left-sided low back pain without sciatica 01/22/2020     Priority: Medium     Hip pain, left 01/22/2020     Priority: Medium     Primary osteoarthritis of left knee 10/15/2018     Priority: Medium     S/P laparoscopic hysterectomy 06/15/2018     Priority: Medium     Oligodendroglioma (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     Priority: Medium     Nephrolithiasis 05/05/2017      Priority: Medium     Overview:   S/P LITHOTRIPSY 3/15/11       Advanced directives, counseling/discussion 03/24/2017     Priority: Medium     Lumbar radiculopathy 09/08/2016     Priority: Medium     Palpitations 06/05/2014     Priority: Medium     PVC's (premature ventricular contractions) 06/05/2014     Priority: Medium     Elevated cholesterol 06/05/2014     Priority: Medium     Abnormal screening cardiac CT 06/05/2014     Priority: Medium     Moderate major depression, single episode (H) 03/06/2013     Priority: Medium     Seizure (H) 08/17/2011     Priority: Medium     Calculus of kidney 01/12/2011     Priority: Medium     Overview:   S/P LITHOTRIPSY 3/15/11       Hyperparathyroidism s/p surgery 01/11/2011     Priority: Medium     Allergic rhinitis 12/09/2009     Priority: Medium     Osteoarthritis 12/09/2009     Priority: Medium     GERD (gastroesophageal reflux disease) 10/22/2008     Priority: Medium      Past Medical History:   Diagnosis Date     Acute cystitis with hematuria 10/24/2020     Allergic rhinitis 12/9/2009     Calculus of kidney 1/12/2011    Overview:  S/P LITHOTRIPSY 3/15/11      Esophageal reflux 10/22/2008     Hyperparathyroidism 01/11/2011    had surgery for it; resulted in seizures     Moderate major depression, single episode (H)      oligodendroglioma 7/23/2012     Osteoarthrosis 12/9/2009     Palpitations 6/5/2014     PONV (postoperative nausea and vomiting)      PVCs 6/5/2014     Seizure disorder (related to tumor) 08/17/2011    last seizure 3/2021     Past Surgical History:   Procedure Laterality Date     COMBINED CYSTOSCOPY, RETROGRADES, URETEROSCOPY, LASER HOLMIUM LITHOTRIPSY URETER(S), INSERT STENT Right 8/3/2021    Procedure: 1. Cystourethroscopy 2. Right ureteroscopy 3. Right retrograde pyelogram with interpretation of intraoperative fluoroscopic imaging 4. Holmium laser lithotripsy with basket stone extraction 5. Right ureteral stent placement;  Surgeon: Felix Cano MD;   Location:  OR     CRANIOTOMY  2011    tumor resection     CYSTOSCOPY, RETROGRADES, INSERT STENT URETER(S), COMBINED Right 7/20/2021    Procedure: 1.  Cystourethroscopy 2.  Attempted right ureteroscopy 3.  Right  retrograde pyelogram with interpretation of intraoperative fluoroscopic imaging 4.  Right ureteral dilatation 5.  Right ureteral stent placement 6.  Laser on stand-by;  Surgeon: Felix Cano MD;  Location: [facility] OR     EXTRACORPOREAL SHOCK WAVE LITHOTRIPSY, CYSTOSCOPY, INSERT STENT URETER(S), COMBINED Bilateral 5/5/2017    Procedure: COMBINED EXTRACORPOREAL SHOCK WAVE LITHOTRIPSY, CYSTOSCOPY, INSERT STENT URETER(S);  CYSTO, URETHRAL DILATION, URETERAL LEFT STENT PLACEMENT, LEFT ESWL.;  Surgeon: Angel Del Rio MD;  Location:  OR     FOOT SURGERY      mulitple     HYSTERECTOMY, PAP NO LONGER INDICATED  2008    lap hys     LITHOTRIPSY  2010 2017     OPTICAL TRACKING SYSTEM CRANIOTOMY, EXCISE TUMOR WITH MAPPING, COMBINED Right 5/30/2018    Procedure: COMBINED OPTICAL TRACKING SYSTEM CRANIOTOMY, EXCISE TUMOR WITH MAPPING;  Right Stealth Assisted Craniotomy With Motor Mapping And Tumor Resection ;  Surgeon: Dustin Rodriguez MD;  Location: [facility] OR     ORTHOPEDIC SURGERY      feet, multiple on both     OSTEOTOMY FOOT Right 1/15/2019    Procedure: OSTEOTOMY FOOT;  Surgeon: Benjamin Griffin DPM;  Location:  OR     PARATHYROIDECTOMY  2011     RECONSTRUCT FOREFOOT WITH METATARSOPHALANGEAL (MTP) FUSION Right 1/15/2019    Procedure: RIGHT FIRST METATARSAL PHALAGEAL JOINT ARTHRODESIS, RIGHT SECOND METATARSAL WEIL OSTEOTOMY;  Surgeon: Benjamin Griffin DPM;  Location:  OR     Current Outpatient Medications   Medication Sig Dispense Refill     acetaminophen (TYLENOL) 500 MG tablet Take 500-1,000 mg by mouth every 6 hours as needed for mild pain       escitalopram (LEXAPRO) 20 MG tablet Take 1 tablet (20 mg) by mouth daily 90 tablet 0     fexofenadine (ALLEGRA) 180 MG tablet Take 180 mg by mouth  daily       gabapentin (NEURONTIN) 100 MG capsule Take 3 capsules (300 mg) by mouth every evening For additional refills, please schedule a follow-up appointment at [phone] 90 capsule 3     ibuprofen (ADVIL/MOTRIN) 200 MG tablet Take 800 mg by mouth daily as needed        Lactase (LACTAID PO) Take 1-2 tablets by mouth daily as needed for indigestion        lamoTRIgine (LAMICTAL) 100 MG tablet 100 mg am and pm (take along with 200 mg tablet for total of 300 mg twice a day). [facility] mft only 180 tablet 3     lamoTRIgine (LAMICTAL) 200 MG tablet 200 mg twice a day (take along with 100 mg tablet for total of 300 mg twice a day). [facility] mft only 180 tablet 3     levETIRAcetam (KEPPRA) 500 MG tablet 1500 mg am and 1250 mg pm 495 tablet 3     metoprolol tartrate (LOPRESSOR) 25 MG tablet Take 0.5 tablets (12.5 mg) by mouth 2 times daily       Multiple Vitamin (MULTI-VITAMINS) TABS Take 1 chew tab by mouth daily        tretinoin (RETIN-A) 0.025 % external cream Apply a pea-sized amount to each area affected by acne. Start every 3-4 nights working up to every night. 45 g 0     triamcinolone (KENALOG) 0.1 % external cream Apply topically 2 times daily To affected area in between breasts for 1-2 weeks. Tapering with improvement and restarting with flares. 60 g 2     VITAMIN D, CHOLECALCIFEROL, PO Take 1,000 Units by mouth daily          Allergies   Allergen Reactions     Sulfa Drugs Hives     Benoxinate Nausea and Vomiting        Social History     Tobacco Use     Smoking status: Never Smoker     Smokeless tobacco: Never Used   Substance Use Topics     Alcohol use: Yes     Comment: social     Family History   Problem Relation Age of Onset     Arthritis Mother      Depression Mother      Respiratory Mother         COPD     LUNG DISEASE Mother      C.A.D. Father 58        heart attack     Heart Disease Father      Coronary Artery Disease Father         MI  age 54     Diabetes Brother 70         age 70      Depression Sister      Depression Son      Allergies Son      Depression Daughter      Allergies Daughter      Eczema Brother      Skin Cancer Brother      Depression Sister      Depression Sister      Anxiety Disorder Daughter      History   Drug Use No         Objective     LMP  (LMP Unknown)     Physical Exam    GENERAL APPEARANCE: healthy, alert and no distress     EYES: EOMI, PERRL     NECK: no adenopathy, no asymmetry, masses, or scars and thyroid normal to palpation     RESP: lungs clear to auscultation - no rales, rhonchi or wheezes     CV: regular rates and rhythm, normal S1 S2, no S3 or S4 and no murmur, click or rub     ABDOMEN:  soft, nontender, no HSM or masses and bowel sounds normal     MS: extremities normal- no gross deformities noted, no evidence of inflammation in joints, FROM in all extremities.     SKIN: no suspicious lesions or rashes     NEURO: Normal strength and tone, sensory exam grossly normal, mentation intact and speech normal     PSYCH: mentation appears normal. and affect normal/bright     LYMPHATICS: No cervical adenopathy    Recent Labs   Lab Test 07/14/21  1502 03/15/21  1140 01/12/21  1142   HGB  --  13.1 13.4   PLT  --  188 166    143 137   POTASSIUM 4.0 3.6 3.9   CR 0.71 0.66 0.67        Diagnostics:  Labs pending at this time.  Results will be reviewed when available.   EKG: sinus bradycardia, normal axis, normal intervals, no acute ST/T changes c/w ischemia, no LVH by voltage criteria, unchanged from previous tracings    Revised Cardiac Risk Index (RCRI):  The patient has the following serious cardiovascular risks for perioperative complications:   - No serious cardiac risks = 0 points     RCRI Interpretation: 0 points: Class I (very low risk - 0.4% complication rate)           Signed Electronically by: Radha Erazo MD  Copy of this evaluation report is provided to requesting physician.

## 2022-01-12 NOTE — TELEPHONE ENCOUNTER
Please see mychart from patient and advise appropriate course of action.      Would you like virtual visit to discuss?     hJonny Piña RN  [facility] Triage Nurse

## 2022-01-12 NOTE — H&P (VIEW-ONLY)
[place] [address]  [facility] [address]  Phone: [phone]  Primary Provider: Dustin Choudhury  Pre-op Performing Provider: NEVA COREA      PREOPERATIVE EVALUATION:  Today's date: 1/12/2022    Barbi Tiwari is a 61 year old female who presents for a preoperative evaluation.    Surgical Information:  Surgery/Procedure:   left first metatarsophalangeal joint arthrodesis Left Combined General with Popliteal Block   left second metatarsal Weil osteotomy Left Combined General with Popliteal Block   CORRECTION, left second HAMMER TOE       Surgery Location: [facility]  Surgeon: Dr. MARTA Griffin  Surgery Date: 2-1-2022  Time of Surgery: 9:10  Where patient plans to recover: At home with family  Fax number for surgical facility: Note does not need to be faxed, will be available electronically in Epic.    Type of Anesthesia Anticipated: General and popliteal block    Assessment & Plan     The proposed surgical procedure is considered INTERMEDIATE risk.    Preop general physical exam     - EKG 12-lead complete w/read - Clinics  - Asymptomatic COVID-19 Virus (Coronavirus) by PCR; Future  - Comprehensive metabolic panel (BMP + Alb, Alk Phos, ALT, AST, Total. Bili, TP); Future  - CBC with platelets and differential; Future    Bunion, left     - Asymptomatic COVID-19 Virus (Coronavirus) by PCR; Future  - Comprehensive metabolic panel (BMP + Alb, Alk Phos, ALT, AST, Total. Bili, TP); Future  - CBC with platelets and differential; Future    Oligodendroglioma (H)       Moderate major depression (H)     - escitalopram (LEXAPRO) 10 MG tablet; Take 1 tablet (10 mg) by mouth daily    Convulsions, unspecified convulsion type (H)       Hyperparathyroidism (H)              Risks and Recommendations:  The patient has the following additional risks and recommendations for perioperative complications:   - history is seizures none for over a year    Medication Instructions:  Patient is to take  all scheduled medications on the day of surgery    RECOMMENDATION:  APPROVAL GIVEN to proceed with proposed procedure, without further diagnostic evaluation.          Subjective     HPI related to upcoming procedure: she has ongoing problems with bunion and deformed toes on the left foot. She has had seral surgeries on the foot in the past. Going in for another revision.     Preop Questions 1/8/2022   1. Have you ever had a heart attack or stroke? No   2. Have you ever had surgery on your heart or blood vessels, such as a stent placement, a coronary artery bypass, or surgery on an artery in your head, neck, heart, or legs? No   3. Do you have chest pain with activity? No   4. Do you have a history of  heart failure? No   5. Do you currently have a cold, bronchitis or symptoms of other infection? No   6. Do you have a cough, shortness of breath, or wheezing? No   7. Do you or anyone in your family have previous history of blood clots? No   8. Do you or does anyone in your family have a serious bleeding problem such as prolonged bleeding following surgeries or cuts? No   9. Have you ever had problems with anemia or been told to take iron pills? No   10. Have you had any abnormal blood loss such as black, tarry or bloody stools, or abnormal vaginal bleeding? No   11. Have you ever had a blood transfusion? No   12. Are you willing to have a blood transfusion if it is medically needed before, during, or after your surgery? Yes   13. Have you or any of your relatives ever had problems with anesthesia? No   14. Do you have sleep apnea, excessive snoring or daytime drowsiness? No   15. Do you have any artifical heart valves or other implanted medical devices like a pacemaker, defibrillator, or continuous glucose monitor? No   16. Do you have artificial joints? No   17. Are you allergic to latex? No   18. Is there any chance that you may be pregnant? No       Health Care Directive:  Patient does not have a Health Care  Directive or Living Will: Discussed advance care planning with patient; however, patient declined at this time.    Preoperative Review of :   reviewed - controlled substances reflected in medication list.      Status of Chronic Conditions:  See problem list for active medical problems.  Problems all longstanding and stable, except as noted/documented.  See ROS for pertinent symptoms related to these conditions.      Review of Systems  CONSTITUTIONAL: NEGATIVE for fever, chills, change in weight  INTEGUMENTARY/SKIN: NEGATIVE for worrisome rashes, moles or lesions  EYES: NEGATIVE for vision changes or irritation  ENT/MOUTH: NEGATIVE for ear, mouth and throat problems  RESP: NEGATIVE for significant cough or SOB  CV: NEGATIVE for chest pain, palpitations or peripheral edema  GI: NEGATIVE for nausea, abdominal pain, heartburn, or change in bowel habits  : NEGATIVE for frequency, dysuria, or hematuria  MUSCULOSKELETAL: NEGATIVE for significant arthralgias or myalgia  NEURO: NEGATIVE for weakness, dizziness or paresthesias  ENDOCRINE: NEGATIVE for temperature intolerance, skin/hair changes  HEME: NEGATIVE for bleeding problems  PSYCHIATRIC: NEGATIVE for changes in mood or affect    Patient Active Problem List    Diagnosis Date Noted     Obstruction of right ureteropelvic junction (UPJ) due to stone 07/08/2021     Priority: Medium     Added automatically from request for surgery [id number]       Chemotherapy management, encounter for 04/09/2020     Priority: Medium     High risk for chemotherapy-induced infectious complication 04/09/2020     Priority: Medium     Acute left-sided low back pain without sciatica 01/22/2020     Priority: Medium     Hip pain, left 01/22/2020     Priority: Medium     Primary osteoarthritis of left knee 10/15/2018     Priority: Medium     S/P laparoscopic hysterectomy 06/15/2018     Priority: Medium     Oligodendroglioma (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     Priority: Medium     Nephrolithiasis 05/05/2017      Priority: Medium     Overview:   S/P LITHOTRIPSY 3/15/11       Advanced directives, counseling/discussion 03/24/2017     Priority: Medium     Lumbar radiculopathy 09/08/2016     Priority: Medium     Palpitations 06/05/2014     Priority: Medium     PVC's (premature ventricular contractions) 06/05/2014     Priority: Medium     Elevated cholesterol 06/05/2014     Priority: Medium     Abnormal screening cardiac CT 06/05/2014     Priority: Medium     Moderate major depression, single episode (H) 03/06/2013     Priority: Medium     Seizure (H) 08/17/2011     Priority: Medium     Calculus of kidney 01/12/2011     Priority: Medium     Overview:   S/P LITHOTRIPSY 3/15/11       Hyperparathyroidism s/p surgery 01/11/2011     Priority: Medium     Allergic rhinitis 12/09/2009     Priority: Medium     Osteoarthritis 12/09/2009     Priority: Medium     GERD (gastroesophageal reflux disease) 10/22/2008     Priority: Medium      Past Medical History:   Diagnosis Date     Acute cystitis with hematuria 10/24/2020     Allergic rhinitis 12/9/2009     Calculus of kidney 1/12/2011    Overview:  S/P LITHOTRIPSY 3/15/11      Esophageal reflux 10/22/2008     Hyperparathyroidism 01/11/2011    had surgery for it; resulted in seizures     Moderate major depression, single episode (H)      oligodendroglioma 7/23/2012     Osteoarthrosis 12/9/2009     Palpitations 6/5/2014     PONV (postoperative nausea and vomiting)      PVCs 6/5/2014     Seizure disorder (related to tumor) 08/17/2011    last seizure 3/2021     Past Surgical History:   Procedure Laterality Date     COMBINED CYSTOSCOPY, RETROGRADES, URETEROSCOPY, LASER HOLMIUM LITHOTRIPSY URETER(S), INSERT STENT Right 8/3/2021    Procedure: 1. Cystourethroscopy 2. Right ureteroscopy 3. Right retrograde pyelogram with interpretation of intraoperative fluoroscopic imaging 4. Holmium laser lithotripsy with basket stone extraction 5. Right ureteral stent placement;  Surgeon: Felix Cano MD;   Location:  OR     CRANIOTOMY  2011    tumor resection     CYSTOSCOPY, RETROGRADES, INSERT STENT URETER(S), COMBINED Right 7/20/2021    Procedure: 1.  Cystourethroscopy 2.  Attempted right ureteroscopy 3.  Right  retrograde pyelogram with interpretation of intraoperative fluoroscopic imaging 4.  Right ureteral dilatation 5.  Right ureteral stent placement 6.  Laser on stand-by;  Surgeon: Felix Cano MD;  Location: [facility] OR     EXTRACORPOREAL SHOCK WAVE LITHOTRIPSY, CYSTOSCOPY, INSERT STENT URETER(S), COMBINED Bilateral 5/5/2017    Procedure: COMBINED EXTRACORPOREAL SHOCK WAVE LITHOTRIPSY, CYSTOSCOPY, INSERT STENT URETER(S);  CYSTO, URETHRAL DILATION, URETERAL LEFT STENT PLACEMENT, LEFT ESWL.;  Surgeon: Angel Del Rio MD;  Location:  OR     FOOT SURGERY      mulitple     HYSTERECTOMY, PAP NO LONGER INDICATED  2008    lap hys     LITHOTRIPSY  2010 2017     OPTICAL TRACKING SYSTEM CRANIOTOMY, EXCISE TUMOR WITH MAPPING, COMBINED Right 5/30/2018    Procedure: COMBINED OPTICAL TRACKING SYSTEM CRANIOTOMY, EXCISE TUMOR WITH MAPPING;  Right Stealth Assisted Craniotomy With Motor Mapping And Tumor Resection ;  Surgeon: Dustin Rodriguez MD;  Location: [facility] OR     ORTHOPEDIC SURGERY      feet, multiple on both     OSTEOTOMY FOOT Right 1/15/2019    Procedure: OSTEOTOMY FOOT;  Surgeon: Benjamin Griffin DPM;  Location:  OR     PARATHYROIDECTOMY  2011     RECONSTRUCT FOREFOOT WITH METATARSOPHALANGEAL (MTP) FUSION Right 1/15/2019    Procedure: RIGHT FIRST METATARSAL PHALAGEAL JOINT ARTHRODESIS, RIGHT SECOND METATARSAL WEIL OSTEOTOMY;  Surgeon: Benjamin Griffin DPM;  Location:  OR     Current Outpatient Medications   Medication Sig Dispense Refill     acetaminophen (TYLENOL) 500 MG tablet Take 500-1,000 mg by mouth every 6 hours as needed for mild pain       escitalopram (LEXAPRO) 20 MG tablet Take 1 tablet (20 mg) by mouth daily 90 tablet 0     fexofenadine (ALLEGRA) 180 MG tablet Take 180 mg by mouth  daily       gabapentin (NEURONTIN) 100 MG capsule Take 3 capsules (300 mg) by mouth every evening For additional refills, please schedule a follow-up appointment at [phone] 90 capsule 3     ibuprofen (ADVIL/MOTRIN) 200 MG tablet Take 800 mg by mouth daily as needed        Lactase (LACTAID PO) Take 1-2 tablets by mouth daily as needed for indigestion        lamoTRIgine (LAMICTAL) 100 MG tablet 100 mg am and pm (take along with 200 mg tablet for total of 300 mg twice a day). [facility] mft only 180 tablet 3     lamoTRIgine (LAMICTAL) 200 MG tablet 200 mg twice a day (take along with 100 mg tablet for total of 300 mg twice a day). [facility] mft only 180 tablet 3     levETIRAcetam (KEPPRA) 500 MG tablet 1500 mg am and 1250 mg pm 495 tablet 3     metoprolol tartrate (LOPRESSOR) 25 MG tablet Take 0.5 tablets (12.5 mg) by mouth 2 times daily       Multiple Vitamin (MULTI-VITAMINS) TABS Take 1 chew tab by mouth daily        tretinoin (RETIN-A) 0.025 % external cream Apply a pea-sized amount to each area affected by acne. Start every 3-4 nights working up to every night. 45 g 0     triamcinolone (KENALOG) 0.1 % external cream Apply topically 2 times daily To affected area in between breasts for 1-2 weeks. Tapering with improvement and restarting with flares. 60 g 2     VITAMIN D, CHOLECALCIFEROL, PO Take 1,000 Units by mouth daily          Allergies   Allergen Reactions     Sulfa Drugs Hives     Benoxinate Nausea and Vomiting        Social History     Tobacco Use     Smoking status: Never Smoker     Smokeless tobacco: Never Used   Substance Use Topics     Alcohol use: Yes     Comment: social     Family History   Problem Relation Age of Onset     Arthritis Mother      Depression Mother      Respiratory Mother         COPD     LUNG DISEASE Mother      C.A.D. Father 58        heart attack     Heart Disease Father      Coronary Artery Disease Father         MI  age 54     Diabetes Brother 70         age 70      Depression Sister      Depression Son      Allergies Son      Depression Daughter      Allergies Daughter      Eczema Brother      Skin Cancer Brother      Depression Sister      Depression Sister      Anxiety Disorder Daughter      History   Drug Use No         Objective     LMP  (LMP Unknown)     Physical Exam    GENERAL APPEARANCE: healthy, alert and no distress     EYES: EOMI, PERRL     NECK: no adenopathy, no asymmetry, masses, or scars and thyroid normal to palpation     RESP: lungs clear to auscultation - no rales, rhonchi or wheezes     CV: regular rates and rhythm, normal S1 S2, no S3 or S4 and no murmur, click or rub     ABDOMEN:  soft, nontender, no HSM or masses and bowel sounds normal     MS: extremities normal- no gross deformities noted, no evidence of inflammation in joints, FROM in all extremities.     SKIN: no suspicious lesions or rashes     NEURO: Normal strength and tone, sensory exam grossly normal, mentation intact and speech normal     PSYCH: mentation appears normal. and affect normal/bright     LYMPHATICS: No cervical adenopathy    Recent Labs   Lab Test 07/14/21  1502 03/15/21  1140 01/12/21  1142   HGB  --  13.1 13.4   PLT  --  188 166    143 137   POTASSIUM 4.0 3.6 3.9   CR 0.71 0.66 0.67        Diagnostics:  Labs pending at this time.  Results will be reviewed when available.   EKG: sinus bradycardia, normal axis, normal intervals, no acute ST/T changes c/w ischemia, no LVH by voltage criteria, unchanged from previous tracings    Revised Cardiac Risk Index (RCRI):  The patient has the following serious cardiovascular risks for perioperative complications:   - No serious cardiac risks = 0 points     RCRI Interpretation: 0 points: Class I (very low risk - 0.4% complication rate)           Signed Electronically by: Radha Erazo MD  Copy of this evaluation report is provided to requesting physician.

## 2022-01-12 NOTE — NURSING NOTE
"Chief Complaint   Patient presents with     Pre-Op Exam     initial /62   Pulse 55   Temp 98  F (36.7  C) (Oral)   Resp 18   Ht 1.575 m (5' 2\")   Wt 65.8 kg (145 lb)   LMP  (LMP Unknown)   SpO2 96%   BMI 26.52 kg/m   Estimated body mass index is 26.52 kg/m  as calculated from the following:    Height as of this encounter: 1.575 m (5' 2\").    Weight as of this encounter: 65.8 kg (145 lb)..  bp completed using cuff size regular  BREEZY MCNALLY LPN  "

## 2022-01-12 NOTE — TELEPHONE ENCOUNTER
LVM to schedule return appt w Dr. Clark, per pt mychart request, provided clinic number for call back.

## 2022-01-13 LAB
ALBUMIN SERPL-MCNC: 3.8 G/DL (ref 3.4–5)
ALP SERPL-CCNC: 86 U/L (ref 40–150)
ALT SERPL W P-5'-P-CCNC: 23 U/L (ref 0–50)
ANION GAP SERPL CALCULATED.3IONS-SCNC: 2 MMOL/L (ref 3–14)
AST SERPL W P-5'-P-CCNC: 14 U/L (ref 0–45)
BILIRUB SERPL-MCNC: 0.3 MG/DL (ref 0.2–1.3)
BUN SERPL-MCNC: 15 MG/DL (ref 7–30)
CALCIUM SERPL-MCNC: 9 MG/DL (ref 8.5–10.1)
CHLORIDE BLD-SCNC: 107 MMOL/L (ref 94–109)
CO2 SERPL-SCNC: 30 MMOL/L (ref 20–32)
CREAT SERPL-MCNC: 0.77 MG/DL (ref 0.52–1.04)
GFR SERPL CREATININE-BSD FRML MDRD: 88 ML/MIN/1.73M2
GLUCOSE BLD-MCNC: 97 MG/DL (ref 70–99)
POTASSIUM BLD-SCNC: 4.3 MMOL/L (ref 3.4–5.3)
PROT SERPL-MCNC: 7.1 G/DL (ref 6.8–8.8)
SODIUM SERPL-SCNC: 139 MMOL/L (ref 133–144)

## 2022-01-13 ASSESSMENT — PATIENT HEALTH QUESTIONNAIRE - PHQ9
SUM OF ALL RESPONSES TO PHQ QUESTIONS 1-9: 6
SUM OF ALL RESPONSES TO PHQ QUESTIONS 1-9: 6
10. IF YOU CHECKED OFF ANY PROBLEMS, HOW DIFFICULT HAVE THESE PROBLEMS MADE IT FOR YOU TO DO YOUR WORK, TAKE CARE OF THINGS AT HOME, OR GET ALONG WITH OTHER PEOPLE: SOMEWHAT DIFFICULT

## 2022-01-21 ASSESSMENT — PATIENT HEALTH QUESTIONNAIRE - PHQ9: SUM OF ALL RESPONSES TO PHQ QUESTIONS 1-9: 6

## 2022-01-28 ENCOUNTER — VIRTUAL VISIT (OUTPATIENT)
Dept: PODIATRY | Facility: CLINIC | Age: 62
End: 2022-01-28
Payer: COMMERCIAL

## 2022-01-28 ENCOUNTER — LAB (OUTPATIENT)
Dept: URGENT CARE | Facility: URGENT CARE | Age: 62
End: 2022-01-28
Attending: PHYSICIAN ASSISTANT
Payer: COMMERCIAL

## 2022-01-28 DIAGNOSIS — Z01.818 PREOP GENERAL PHYSICAL EXAM: ICD-10-CM

## 2022-01-28 DIAGNOSIS — M21.612 BUNION, LEFT: ICD-10-CM

## 2022-01-28 DIAGNOSIS — M79.672 LEFT FOOT PAIN: Primary | ICD-10-CM

## 2022-01-28 DIAGNOSIS — M20.62 DEFORMITY OF TOE OF LEFT FOOT: ICD-10-CM

## 2022-01-28 DIAGNOSIS — M20.12 HALLUX ABDUCTO VALGUS, LEFT: ICD-10-CM

## 2022-01-28 DIAGNOSIS — M21.962 ACQUIRED DEFORMITY OF JOINT OF LEFT FOOT: ICD-10-CM

## 2022-01-28 LAB — SARS-COV-2 RNA RESP QL NAA+PROBE: NEGATIVE

## 2022-01-28 PROCEDURE — U0003 INFECTIOUS AGENT DETECTION BY NUCLEIC ACID (DNA OR RNA); SEVERE ACUTE RESPIRATORY SYNDROME CORONAVIRUS 2 (SARS-COV-2) (CORONAVIRUS DISEASE [COVID-19]), AMPLIFIED PROBE TECHNIQUE, MAKING USE OF HIGH THROUGHPUT TECHNOLOGIES AS DESCRIBED BY CMS-2020-01-R: HCPCS

## 2022-01-28 PROCEDURE — 99207 PR PREOP VISIT IN GLOBAL PKG: CPT | Performed by: PODIATRIST

## 2022-01-28 PROCEDURE — U0005 INFEC AGEN DETEC AMPLI PROBE: HCPCS

## 2022-01-28 NOTE — PROGRESS NOTES
Phone visit:  [phone] (30 minutes)    Note: She is requesting dilaudid rather than oxycodone  She reminded me that we might consider a procedure for the left third toe    Subjective:    Barbi Tiwari is a 61 year old female who presents to clinic today for surgical planning and discussion.   She is scheduled to undergo left foot surgery on 2/1/22: first metatarsophalangeal joint arthrodesis, second metatarsal Weil osteotomy, left second metatarsophalangeal joint soft tissue re-balancing,  second hammer toe deformity correction and possible left third hammer toe correction.  Conservative measures do not provide adequate relief.  Pain is progressing.    For details regarding the last foot exam and discussion please refer to the clinic note from 1/6/2022.    Patient Active Problem List   Diagnosis     Calculus of kidney     Hyperparathyroidism s/p surgery     GERD (gastroesophageal reflux disease)     Allergic rhinitis     Palpitations     PVC's (premature ventricular contractions)     Seizure (H)     Osteoarthritis     Elevated cholesterol     Abnormal screening cardiac CT     Lumbar radiculopathy     Advanced directives, counseling/discussion     Nephrolithiasis     Oligodendroglioma (H)     Moderate major depression, single episode (H)     S/P laparoscopic hysterectomy     Primary osteoarthritis of left knee     Acute left-sided low back pain without sciatica     Hip pain, left     Chemotherapy management, encounter for     High risk for chemotherapy-induced infectious complication     Obstruction of right ureteropelvic junction (UPJ) due to stone       Current Outpatient Medications   Medication     acetaminophen (TYLENOL) 500 MG tablet     escitalopram (LEXAPRO) 10 MG tablet     fexofenadine (ALLEGRA) 180 MG tablet     gabapentin (NEURONTIN) 100 MG capsule     ibuprofen (ADVIL/MOTRIN) 200 MG tablet     Lactase (LACTAID PO)     lamoTRIgine (LAMICTAL) 100 MG tablet     lamoTRIgine (LAMICTAL) 200 MG tablet      levETIRAcetam (KEPPRA) 500 MG tablet     metoprolol tartrate (LOPRESSOR) 25 MG tablet     Multiple Vitamin (MULTI-VITAMINS) TABS     tretinoin (RETIN-A) 0.025 % external cream     triamcinolone (KENALOG) 0.1 % external cream     VITAMIN D, CHOLECALCIFEROL, PO     No current facility-administered medications for this visit.      Objective:   The following is a historic examination from the 1/6/2022 clinic visit    Vascular:  Pedal pulses are palpable bilaterally for both the DP and PT arteries.  CFT < 3 sec.  No edema.  Pedal hair growth noted.      Neuro:  Alert and oriented x 3. Coordinated gait.  Light touch sensation is intact to the L4, L5, S1 distributions. No obvious deficits.  No evidence of neurological-based weakness, spasticity, or contracture in the lower extremities.      Derm: Normal texture and turgor.  No erythema, ecchymosis, or cyanosis.    Open wound left third toe nailbed secondary to nail avulsion procedure this morning in dermatology.     Musculoskeletal:    Lower extremity muscle strength is normal.  Patient is ambulatory without an assistive device or brace . Decrease in medial longitudinal arch with weight bearing.  Severe, non reducible, hallux abducto valgus on the left. The 2nd toe has contracture and lateral deviation.      X-Ray Findings:  I personally reviewed the left foot images.  Metatarsus adductus involving the first second and third metatarsals.  Severe hallux abductovalgus.  The hallux is subluxing off of the first metatarsal head with degenerative changes.  Lateral transverse plane deformity of the left second toe with sagittal plane contracture.  Pes planus with midfoot degenerative changes.    Assessment:  Encounter Diagnoses   Name Primary?     Left foot pain Yes     Hallux abducto valgus, left      Deformity of toe of left foot      Acquired deformity of joint of left foot      Plan:  The surgical procedures were discussed in detail, including risks, benefits, post operative  course and cares, and prognosis.  Risks discussed include, but are not limited to,  postoperative pain, swelling,  infection, healing complications, temporary or permanent numbness,  DVT, hypertropohic scar formation, complex regional pain syndrome and need for additional surgery.       I explained that although infrequent, there are intra-operative and post-operative challenges and complications that can occur.  Some of the latter are listed above.  Intra-operative challenges can involve finding poor bone quality, difficulty with the internal fixation (when used), and even inadvertent injury to neighboring structures that would then need to be repaired.     No guarantees were given.     Although the surgical hardware typically is left in, it was explained that if there are hardware-related complications, future removal might be needed.     The patient was also informed that a  might be present on the day of surgery.     Benjamin Griffin DPM, FACJONATHAN, MS [place] Department of Podiatry/Foot & Ankle Surgery

## 2022-01-28 NOTE — PATIENT INSTRUCTIONS
REVIEW OF SURGICAL RISKS  The following is a list of possible risks associated with foot and ankle surgery. This is not all-inclusive. Please realize that risks associated with elective foot surgery are low and there are ways to deal with them when they occur.     1) Infection:  Probably the most concerning and discussed risk of surgery, the chance of infection is about 1% with elective surgery. Often it involves the skin around the incision and resolves with oral antibiotics. It is rare that infection will be deep and require surgical intervention. You will receive an antibiotic prior to surgery.    2)  Pain: Surgery is trauma to the foot. Therefore a certain amount of pain is to be expected. This will be most bothersome during the first 1-2 days. Taking your pain medication, elevating the extremity above heart level, and using ice are all very important for adequate pain management.     3)  Swelling: This is due to the trauma of surgery. A certain degree of swelling is normal. However, if there is too much, it can impair healing, increase the risk of infection, add to your pain, and linger for several months after surgery making return to normal shoes difficult. Elevating the limb is extremely important.    4)  Healing Complications: There are many factors that can impair healing. There is no way to speed up the biological rate of healing. People tend to find ways to slow it down. Proper nutrition, not smoking, following the instructions provided by your surgeon are ways to help with normal healing.    Sometimes the skin is slow to heal, and an open area along the incision develops.  If this happens, it cannot be stitched closed. It then needs to heal from the inside out. Rarely there will be an issue with bone healing, which might require a special device called a bone stimulator and/or a revision surgery.    5)  Temporary and Permanent Numbness: Numbness beyond the area of surgery is to be expected. Initially it  "is from the local anesthetic that was injected into your foot. This wears off within 24 hours. Continued numbness or tingling is usually from swelling that compresses the nerves. Numbness that lasts for weeks is from nerve injury/irritation. This might eventually resolve or be permanent.      6)  Blood Clot:  Although rare, this is potentially the most dangerous risk associated with foot surgery. A blood clot in the leg can lead to varicose veins and chronic swelling. A blood clot that travels to the lungs is a very serious condition requiring hospitalization. Increased risk is related to trauma of surgery and degree of immobilization. There are many other risk factors that are not related directly to surgery (smoking, family history, personal history of varicose veins and/or blood clots, cancer, high fat cholesterol and lipids). Your surgeon will discuss this with you and devise an appropriate plan to help prevent this complication.    7)  Hypertrophic Scar: Some people have skin that is prone to forming exaggerated scar tissue. This can be unsightly and uncomfortable. There are ways to treat it.        8)  Complex Regional Pain Syndrome:  Rarely some people have pain that is out of proportion to the surgical procedure and harder to get control of. This pain can linger and cause changes in skin temperature and appearance. If this occurs, physical therapy and/or a pain specialist might be needed.      9) There are also intra-operative challenges and complications that can occur.   Intra-operative challenges can involve finding poor bone quality, difficulty with the internal fixation (when used), and even inadvertent injury to neighboring structures that would then need to be repaired.     Please remember that surgical complications are rare. Your surgeon has a plan to deal with them, should one occur. The primary goal of surgery is pain reduction. There are no guarantees. An \"abnormal\" foot prior to surgery, is not " "necessarily a \"normal\" foot afterwards. Hopefully it is a less painful one.      If you have any questions, please discuss with Dr. Griffin prior to surgery.        POST-OPERATIVE CARE RECOMENDATIONS    ACTIVITY:    1)  Weight bearing status: __________________________    2)  Keep your surgical lower extremity elevated 23/24 hours above heart level. You will want to keep your foot elevated as much as possible for the first week after surgery.     3)  It is recommended that you get up and move around (walk, crutch, roll) for short periods each hour while you are awake. It is okay to wiggle your toes. If you are not in a splint or cast, move your ankle joint. Motion helps lower risk of a blood clot in your leg.      4)  If you bathe or shower, the dressing must be kept dry. Safety is a concern and sponge bathing might be best. You can purchase a water proof cast protector.     5)  You are not to drive while you are taking pain medications. No driving, if surgery was done on the right foot/ ankle or if you drive a stick shift.     SPECIFIC CARES:    1)  Keep the dressing clean, dry, and intact. If your dressing gets wet, comes apart, or falls off, please call your clinic and notify Dr. Griffin. Some bleeding through the dressing is okay. But if it causes a spot bigger than 2 inches in diameter, please notify Dr. Griffin.     2)  Place an ice pack behind the knee of the surgical side for up to 20min/hour. It can also be placed on the front of the ankle.     4)  Call your clinic if you experience calf pain, chest pain, or problems breathing.    5)  Call your clinic if fever, increasing pain that you can't control or a large amount of bleeding.      6) What to do if your pain seems out of control:   1)  Make sure you are truly elevating the foot/ankle above heart level.   2)  Make sure you are using a cold pack/ ice   3)  Check the pain medication instructions:     Usually you can take two pain pills every four hours, if " needed.   4)  If the above are not adequate, then you need to remove the brace/ boot and   remove the compression wrap. The wrap might be too tight. After you do   this, elevate the foot for an hour and then re-wrap the foot lightly and   replace the splint / boot.      Follow up in clinic one week after surgery. This appointment should already be set up.      MEDICATIONS:    IMPORTANT:  Take your pain medication when you get home, even if you are not having pain. You want it in your system before the local anesthetic (foot numbness from the shot) wears off.      1)  Take your pain medication with food. This will help prevent any nausea side effects.  If hydroxyzine was prescribed, it is for itching, leg spasms, and makes the other pain medication work better.    2)  Anti blood clot plan: To help prevent a blood clot in your legs, it is important that you wiggle your toes and ankles frequently. Obviously, this might be limited on the surgical side. Get up and move around a few minutes every hour while you are awake.  Keep the white sock on the non-surgical side and the compression wraps to the knee on the surgical side. If Dr. Griffin thinks that you are at high risk for a blood clot, he might put you on a blood thinner called enoxaparin.    Please call the clinic if you have any pain or swelling in either calf.        SHOWERING BEFORE SURGERY  You must wash with the soap (Hibiclens - 4% CHG) twice before coming to the hospital for your surgery. This will decrease bacteria (germs) on your skin. It will also help reduce your chance of infection (illness caused by germs) after surgery.  Read the directions and safety tips on the bottle of soap. Wash once the evening before surgery and once the morning of surgery. Use 4 ounces of soap each time. When showering, it is best to use two fresh washcloths and a fresh towel.   Items you will need for showerin newly washed washcloths    2 newly washed towels    8 ounces of  one of the soap  FOLLOW THESE INSTRUCTIONS:  The evening before surgery   1. Shower or bathe as you normally would, use your regular soap and a clean washcloth. Give special attention to places where your incision (surgical cut) or catheters will be. This includes your groin area. Rinse well. You may wash your hair with your regular shampoo.  2. Next, wash your entire body from your chin down with the antiseptic soap. See the next page for directions about how to do this.  3. Rinse well and dry off using a newly washed towel.  The morning of surgery  Repeat steps 1, 2 and 3.   Other suggestions:    Wear freshly washed pajamas or clothing after your evening shower.    Wear freshly washed clothes the day of surgery.    Wash and change your bed sheets the day before surgery to have clean bed sheets after you shower and when you get home from surgery.    If you have trouble washing all areas, make sure someone helps you.    Don t use any deodorant, lotion or powder after your shower.    Women who are menstruating should wear a fresh sanitary pad to the hospital.       **If you have questions or concerns after surgery, please call: Podiatry/Foot & Ankle Surgery Triage Team at the [facility] at [phone] (option 3 for triage).

## 2022-01-28 NOTE — LETTER
1/28/2022         RE: Barbi Tiwari  [address] [facility][1] [id number]        Dear Colleague,    Thank you for referring your patient, Barbi Tiwari, to the [facility]. Please see a copy of my visit note below.    Phone visit:  [phone] (30 minutes)    Note: She is requesting dilaudid rather than oxycodone  She reminded me that we might consider a procedure for the left third toe    Subjective:    Barbi Tiwari is a 61 year old female who presents to clinic today for surgical planning and discussion.   She is scheduled to undergo left foot surgery on 2/1/22: first metatarsophalangeal joint arthrodesis, second metatarsal Weil osteotomy, left second metatarsophalangeal joint soft tissue re-balancing,  second hammer toe deformity correction and possible left third hammer toe correction.  Conservative measures do not provide adequate relief.  Pain is progressing.    For details regarding the last foot exam and discussion please refer to the clinic note from 1/6/2022.    Patient Active Problem List   Diagnosis     Calculus of kidney     Hyperparathyroidism s/p surgery     GERD (gastroesophageal reflux disease)     Allergic rhinitis     Palpitations     PVC's (premature ventricular contractions)     Seizure (H)     Osteoarthritis     Elevated cholesterol     Abnormal screening cardiac CT     Lumbar radiculopathy     Advanced directives, counseling/discussion     Nephrolithiasis     Oligodendroglioma (H)     Moderate major depression, single episode (H)     S/P laparoscopic hysterectomy     Primary osteoarthritis of left knee     Acute left-sided low back pain without sciatica     Hip pain, left     Chemotherapy management, encounter for     High risk for chemotherapy-induced infectious complication     Obstruction of right ureteropelvic junction (UPJ) due to stone       Current Outpatient Medications   Medication     acetaminophen (TYLENOL) 500 MG tablet      escitalopram (LEXAPRO) 10 MG tablet     fexofenadine (ALLEGRA) 180 MG tablet     gabapentin (NEURONTIN) 100 MG capsule     ibuprofen (ADVIL/MOTRIN) 200 MG tablet     Lactase (LACTAID PO)     lamoTRIgine (LAMICTAL) 100 MG tablet     lamoTRIgine (LAMICTAL) 200 MG tablet     levETIRAcetam (KEPPRA) 500 MG tablet     metoprolol tartrate (LOPRESSOR) 25 MG tablet     Multiple Vitamin (MULTI-VITAMINS) TABS     tretinoin (RETIN-A) 0.025 % external cream     triamcinolone (KENALOG) 0.1 % external cream     VITAMIN D, CHOLECALCIFEROL, PO     No current facility-administered medications for this visit.      Objective:   The following is a historic examination from the 1/6/2022 clinic visit    Vascular:  Pedal pulses are palpable bilaterally for both the DP and PT arteries.  CFT < 3 sec.  No edema.  Pedal hair growth noted.      Neuro:  Alert and oriented x 3. Coordinated gait.  Light touch sensation is intact to the L4, L5, S1 distributions. No obvious deficits.  No evidence of neurological-based weakness, spasticity, or contracture in the lower extremities.      Derm: Normal texture and turgor.  No erythema, ecchymosis, or cyanosis.    Open wound left third toe nailbed secondary to nail avulsion procedure this morning in dermatology.     Musculoskeletal:    Lower extremity muscle strength is normal.  Patient is ambulatory without an assistive device or brace . Decrease in medial longitudinal arch with weight bearing.  Severe, non reducible, hallux abducto valgus on the left. The 2nd toe has contracture and lateral deviation.      X-Ray Findings:  I personally reviewed the left foot images.  Metatarsus adductus involving the first second and third metatarsals.  Severe hallux abductovalgus.  The hallux is subluxing off of the first metatarsal head with degenerative changes.  Lateral transverse plane deformity of the left second toe with sagittal plane contracture.  Pes planus with midfoot degenerative  changes.    Assessment:  Encounter Diagnoses   Name Primary?     Left foot pain Yes     Hallux abducto valgus, left      Deformity of toe of left foot      Acquired deformity of joint of left foot      Plan:  The surgical procedures were discussed in detail, including risks, benefits, post operative course and cares, and prognosis.  Risks discussed include, but are not limited to,  postoperative pain, swelling,  infection, healing complications, temporary or permanent numbness,  DVT, hypertropohic scar formation, complex regional pain syndrome and need for additional surgery.       I explained that although infrequent, there are intra-operative and post-operative challenges and complications that can occur.  Some of the latter are listed above.  Intra-operative challenges can involve finding poor bone quality, difficulty with the internal fixation (when used), and even inadvertent injury to neighboring structures that would then need to be repaired.     No guarantees were given.     Although the surgical hardware typically is left in, it was explained that if there are hardware-related complications, future removal might be needed.     The patient was also informed that a  might be present on the day of surgery.     Benjamin Griffin DPM, FACFAS, MS    [place] [facility]le [facility]        Again, thank you for allowing me to participate in the care of your patient.        Sincerely,        Benjamin Griffin DPM

## 2022-01-30 ENCOUNTER — ANESTHESIA EVENT (OUTPATIENT)
Dept: SURGERY | Facility: CLINIC | Age: 62
End: 2022-01-30
Payer: COMMERCIAL

## 2022-01-31 ASSESSMENT — ENCOUNTER SYMPTOMS: SEIZURES: 1

## 2022-02-01 ENCOUNTER — HOSPITAL ENCOUNTER (OUTPATIENT)
Facility: CLINIC | Age: 62
Discharge: HOME | End: 2022-02-01
Attending: PODIATRIST | Admitting: PODIATRIST
Payer: COMMERCIAL

## 2022-02-01 ENCOUNTER — APPOINTMENT (OUTPATIENT)
Dept: GENERAL RADIOLOGY | Facility: CLINIC | Age: 62
End: 2022-02-01
Attending: PODIATRIST
Payer: COMMERCIAL

## 2022-02-01 ENCOUNTER — NURSE TRIAGE (OUTPATIENT)
Dept: NURSING | Facility: CLINIC | Age: 62
End: 2022-02-01

## 2022-02-01 ENCOUNTER — ANESTHESIA (OUTPATIENT)
Dept: SURGERY | Facility: CLINIC | Age: 62
End: 2022-02-01
Payer: COMMERCIAL

## 2022-02-01 VITALS
WEIGHT: 142.6 LBS | HEART RATE: 87 BPM | HEIGHT: 62 IN | SYSTOLIC BLOOD PRESSURE: 103 MMHG | BODY MASS INDEX: 26.24 KG/M2 | OXYGEN SATURATION: 93 % | DIASTOLIC BLOOD PRESSURE: 60 MMHG | TEMPERATURE: 98.1 F | RESPIRATION RATE: 12 BRPM

## 2022-02-01 DIAGNOSIS — G89.18 POST-OPERATIVE PAIN: Primary | ICD-10-CM

## 2022-02-01 DIAGNOSIS — Z98.890 POST-OPERATIVE STATE: ICD-10-CM

## 2022-02-01 DIAGNOSIS — L29.89 ITCHING DUE TO DRUG: ICD-10-CM

## 2022-02-01 DIAGNOSIS — T50.905A ITCHING DUE TO DRUG: ICD-10-CM

## 2022-02-01 PROCEDURE — 999N000141 HC STATISTIC PRE-PROCEDURE NURSING ASSESSMENT: Performed by: PODIATRIST

## 2022-02-01 PROCEDURE — 370N000017 HC ANESTHESIA TECHNICAL FEE, PER MIN: Performed by: PODIATRIST

## 2022-02-01 PROCEDURE — 360N000084 HC SURGERY LEVEL 4 W/ FLUORO, PER MIN: Performed by: PODIATRIST

## 2022-02-01 PROCEDURE — 258N000003 HC RX IP 258 OP 636: Performed by: ANESTHESIOLOGY

## 2022-02-01 PROCEDURE — 999N000180 XR SURGERY CARM FLUORO LESS THAN 5 MIN

## 2022-02-01 PROCEDURE — 28285 REPAIR OF HAMMERTOE: CPT | Mod: 51 | Performed by: PODIATRIST

## 2022-02-01 PROCEDURE — 272N000001 HC OR GENERAL SUPPLY STERILE: Performed by: PODIATRIST

## 2022-02-01 PROCEDURE — 710N000012 HC RECOVERY PHASE 2, PER MINUTE: Performed by: PODIATRIST

## 2022-02-01 PROCEDURE — C1713 ANCHOR/SCREW BN/BN,TIS/BN: HCPCS | Performed by: PODIATRIST

## 2022-02-01 PROCEDURE — 250N000011 HC RX IP 250 OP 636: Performed by: NURSE ANESTHETIST, CERTIFIED REGISTERED

## 2022-02-01 PROCEDURE — 250N000011 HC RX IP 250 OP 636: Performed by: PODIATRIST

## 2022-02-01 PROCEDURE — 250N000009 HC RX 250: Performed by: PODIATRIST

## 2022-02-01 PROCEDURE — 28308 INCISION OF METATARSAL: CPT | Mod: 51 | Performed by: PODIATRIST

## 2022-02-01 PROCEDURE — 250N000011 HC RX IP 250 OP 636: Performed by: ANESTHESIOLOGY

## 2022-02-01 PROCEDURE — C1769 GUIDE WIRE: HCPCS | Performed by: PODIATRIST

## 2022-02-01 PROCEDURE — 250N000009 HC RX 250: Performed by: ANESTHESIOLOGY

## 2022-02-01 PROCEDURE — 258N000003 HC RX IP 258 OP 636: Performed by: NURSE ANESTHETIST, CERTIFIED REGISTERED

## 2022-02-01 PROCEDURE — 999N000063 XR FOOT PORT LEFT 3 VIEWS: Mod: LT

## 2022-02-01 PROCEDURE — 28270 RELEASE OF FOOT CONTRACTURE: CPT | Mod: 51 | Performed by: PODIATRIST

## 2022-02-01 PROCEDURE — 710N000009 HC RECOVERY PHASE 1, LEVEL 1, PER MIN: Performed by: PODIATRIST

## 2022-02-01 PROCEDURE — 28750 FUSION OF BIG TOE JOINT: CPT | Mod: TA | Performed by: PODIATRIST

## 2022-02-01 PROCEDURE — 250N000009 HC RX 250: Performed by: NURSE ANESTHETIST, CERTIFIED REGISTERED

## 2022-02-01 PROCEDURE — 271N000001 HC OR GENERAL SUPPLY NON-STERILE: Performed by: PODIATRIST

## 2022-02-01 PROCEDURE — 250N000025 HC SEVOFLURANE, PER MIN: Performed by: PODIATRIST

## 2022-02-01 DEVICE — QUICKFIX SCREW, 2 X 11 MM
Type: IMPLANTABLE DEVICE | Site: FOOT | Status: FUNCTIONAL
Brand: ARTHREX®

## 2022-02-01 DEVICE — 3.0 X 12MM VAL SCREW, TI
Type: IMPLANTABLE DEVICE | Site: FOOT | Status: FUNCTIONAL
Brand: ARTHREX®

## 2022-02-01 DEVICE — IMP SCR ARTHREX QUICKFIX TI 2X13MM AR-8930-13: Type: IMPLANTABLE DEVICE | Site: FOOT | Status: FUNCTIONAL

## 2022-02-01 DEVICE — MAXFORCE MTP PLATE, STD, 5°-5°, LEFT
Type: IMPLANTABLE DEVICE | Site: FOOT | Status: FUNCTIONAL
Brand: ARTHREX®

## 2022-02-01 DEVICE — 3.0 X 14MM VAL SCREW, TI
Type: IMPLANTABLE DEVICE | Site: FOOT | Status: FUNCTIONAL
Brand: ARTHREX®

## 2022-02-01 DEVICE — 3.0 X 16MM VAL SCREW, TI
Type: IMPLANTABLE DEVICE | Site: FOOT | Status: FUNCTIONAL
Brand: ARTHREX®

## 2022-02-01 RX ORDER — EPHEDRINE SULFATE 50 MG/ML
INJECTION, SOLUTION INTRAMUSCULAR; INTRAVENOUS; SUBCUTANEOUS PRN
Status: DISCONTINUED | OUTPATIENT
Start: 2022-02-01 | End: 2022-02-01

## 2022-02-01 RX ORDER — ONDANSETRON 4 MG/1
4 TABLET, ORALLY DISINTEGRATING ORAL EVERY 30 MIN PRN
Status: DISCONTINUED | OUTPATIENT
Start: 2022-02-01 | End: 2022-02-01 | Stop reason: HOSPADM

## 2022-02-01 RX ORDER — MAGNESIUM HYDROXIDE 1200 MG/15ML
LIQUID ORAL PRN
Status: DISCONTINUED | OUTPATIENT
Start: 2022-02-01 | End: 2022-02-01 | Stop reason: HOSPADM

## 2022-02-01 RX ORDER — SODIUM CHLORIDE, SODIUM LACTATE, POTASSIUM CHLORIDE, CALCIUM CHLORIDE 600; 310; 30; 20 MG/100ML; MG/100ML; MG/100ML; MG/100ML
INJECTION, SOLUTION INTRAVENOUS CONTINUOUS
Status: DISCONTINUED | OUTPATIENT
Start: 2022-02-01 | End: 2022-02-01 | Stop reason: HOSPADM

## 2022-02-01 RX ORDER — ACETAMINOPHEN 500 MG
1000 TABLET ORAL EVERY 8 HOURS PRN
Qty: 42 TABLET | Refills: 1 | Status: SHIPPED | OUTPATIENT
Start: 2022-02-01

## 2022-02-01 RX ORDER — LIDOCAINE HYDROCHLORIDE 20 MG/ML
INJECTION, SOLUTION INFILTRATION; PERINEURAL PRN
Status: DISCONTINUED | OUTPATIENT
Start: 2022-02-01 | End: 2022-02-01

## 2022-02-01 RX ORDER — PROPOFOL 10 MG/ML
INJECTION, EMULSION INTRAVENOUS PRN
Status: DISCONTINUED | OUTPATIENT
Start: 2022-02-01 | End: 2022-02-01

## 2022-02-01 RX ORDER — DEXAMETHASONE SODIUM PHOSPHATE 4 MG/ML
INJECTION, SOLUTION INTRA-ARTICULAR; INTRALESIONAL; INTRAMUSCULAR; INTRAVENOUS; SOFT TISSUE PRN
Status: DISCONTINUED | OUTPATIENT
Start: 2022-02-01 | End: 2022-02-01

## 2022-02-01 RX ORDER — FENTANYL CITRATE 0.05 MG/ML
50 INJECTION, SOLUTION INTRAMUSCULAR; INTRAVENOUS EVERY 5 MIN PRN
Status: DISCONTINUED | OUTPATIENT
Start: 2022-02-01 | End: 2022-02-01 | Stop reason: HOSPADM

## 2022-02-01 RX ORDER — HYDROXYZINE HYDROCHLORIDE 25 MG/1
25 TABLET, FILM COATED ORAL EVERY 6 HOURS PRN
Qty: 28 TABLET | Refills: 1 | Status: SHIPPED | OUTPATIENT
Start: 2022-02-01 | End: 2022-03-09

## 2022-02-01 RX ORDER — ONDANSETRON 2 MG/ML
INJECTION INTRAMUSCULAR; INTRAVENOUS PRN
Status: DISCONTINUED | OUTPATIENT
Start: 2022-02-01 | End: 2022-02-01

## 2022-02-01 RX ORDER — HYDROMORPHONE HYDROCHLORIDE 2 MG/1
2 TABLET ORAL EVERY 6 HOURS PRN
Qty: 20 TABLET | Refills: 0 | Status: SHIPPED | OUTPATIENT
Start: 2022-02-01 | End: 2022-02-06

## 2022-02-01 RX ORDER — PROPOFOL 10 MG/ML
INJECTION, EMULSION INTRAVENOUS CONTINUOUS PRN
Status: DISCONTINUED | OUTPATIENT
Start: 2022-02-01 | End: 2022-02-01

## 2022-02-01 RX ORDER — ONDANSETRON 2 MG/ML
4 INJECTION INTRAMUSCULAR; INTRAVENOUS
Status: DISCONTINUED | OUTPATIENT
Start: 2022-02-01 | End: 2022-02-01 | Stop reason: HOSPADM

## 2022-02-01 RX ORDER — ONDANSETRON 2 MG/ML
4 INJECTION INTRAMUSCULAR; INTRAVENOUS EVERY 30 MIN PRN
Status: DISCONTINUED | OUTPATIENT
Start: 2022-02-01 | End: 2022-02-01 | Stop reason: HOSPADM

## 2022-02-01 RX ORDER — FENTANYL CITRATE 50 UG/ML
INJECTION, SOLUTION INTRAMUSCULAR; INTRAVENOUS PRN
Status: DISCONTINUED | OUTPATIENT
Start: 2022-02-01 | End: 2022-02-01

## 2022-02-01 RX ORDER — FENTANYL CITRATE 0.05 MG/ML
25 INJECTION, SOLUTION INTRAMUSCULAR; INTRAVENOUS
Status: DISCONTINUED | OUTPATIENT
Start: 2022-02-01 | End: 2022-02-01 | Stop reason: HOSPADM

## 2022-02-01 RX ORDER — HYDROMORPHONE HCL IN WATER/PF 6 MG/30 ML
0.2 PATIENT CONTROLLED ANALGESIA SYRINGE INTRAVENOUS EVERY 5 MIN PRN
Status: DISCONTINUED | OUTPATIENT
Start: 2022-02-01 | End: 2022-02-01 | Stop reason: HOSPADM

## 2022-02-01 RX ORDER — OXYCODONE HYDROCHLORIDE 5 MG/1
5 TABLET ORAL EVERY 4 HOURS PRN
Status: DISCONTINUED | OUTPATIENT
Start: 2022-02-01 | End: 2022-02-01 | Stop reason: HOSPADM

## 2022-02-01 RX ORDER — FENTANYL CITRATE 0.05 MG/ML
50 INJECTION, SOLUTION INTRAMUSCULAR; INTRAVENOUS
Status: DISCONTINUED | OUTPATIENT
Start: 2022-02-01 | End: 2022-02-01 | Stop reason: HOSPADM

## 2022-02-01 RX ORDER — CEFAZOLIN SODIUM/WATER 2 G/20 ML
2 SYRINGE (ML) INTRAVENOUS SEE ADMIN INSTRUCTIONS
Status: DISCONTINUED | OUTPATIENT
Start: 2022-02-01 | End: 2022-02-01 | Stop reason: HOSPADM

## 2022-02-01 RX ORDER — CEFAZOLIN SODIUM/WATER 2 G/20 ML
2 SYRINGE (ML) INTRAVENOUS
Status: DISCONTINUED | OUTPATIENT
Start: 2022-02-01 | End: 2022-02-01 | Stop reason: HOSPADM

## 2022-02-01 RX ORDER — BUPIVACAINE HYDROCHLORIDE 5 MG/ML
INJECTION, SOLUTION PERINEURAL PRN
Status: DISCONTINUED | OUTPATIENT
Start: 2022-02-01 | End: 2022-02-01 | Stop reason: HOSPADM

## 2022-02-01 RX ORDER — MEPERIDINE HYDROCHLORIDE 25 MG/ML
12.5 INJECTION INTRAMUSCULAR; INTRAVENOUS; SUBCUTANEOUS
Status: DISCONTINUED | OUTPATIENT
Start: 2022-02-01 | End: 2022-02-01 | Stop reason: HOSPADM

## 2022-02-01 RX ORDER — IBUPROFEN 600 MG/1
600 TABLET, FILM COATED ORAL EVERY 6 HOURS PRN
Qty: 28 TABLET | Refills: 1 | Status: SHIPPED | OUTPATIENT
Start: 2022-02-01

## 2022-02-01 RX ADMIN — LIDOCAINE HYDROCHLORIDE 100 MG: 20 INJECTION, SOLUTION INFILTRATION; PERINEURAL at 07:45

## 2022-02-01 RX ADMIN — PHENYLEPHRINE HYDROCHLORIDE 50 MCG: 10 INJECTION INTRAVENOUS at 07:55

## 2022-02-01 RX ADMIN — PHENYLEPHRINE HYDROCHLORIDE 50 MCG: 10 INJECTION INTRAVENOUS at 08:17

## 2022-02-01 RX ADMIN — Medication 2 G: at 07:36

## 2022-02-01 RX ADMIN — PHENYLEPHRINE HYDROCHLORIDE 50 MCG: 10 INJECTION INTRAVENOUS at 08:10

## 2022-02-01 RX ADMIN — SODIUM CHLORIDE, POTASSIUM CHLORIDE, SODIUM LACTATE AND CALCIUM CHLORIDE: 600; 310; 30; 20 INJECTION, SOLUTION INTRAVENOUS at 07:36

## 2022-02-01 RX ADMIN — PHENYLEPHRINE HYDROCHLORIDE 100 MCG: 10 INJECTION INTRAVENOUS at 09:08

## 2022-02-01 RX ADMIN — MIDAZOLAM 2 MG: 1 INJECTION INTRAMUSCULAR; INTRAVENOUS at 07:25

## 2022-02-01 RX ADMIN — PHENYLEPHRINE HYDROCHLORIDE 50 MCG: 10 INJECTION INTRAVENOUS at 07:52

## 2022-02-01 RX ADMIN — PROPOFOL 100 MCG/KG/MIN: 10 INJECTION, EMULSION INTRAVENOUS at 07:45

## 2022-02-01 RX ADMIN — BUPIVACAINE HYDROCHLORIDE 7 ML: 5 INJECTION, SOLUTION EPIDURAL; INTRACAUDAL at 07:28

## 2022-02-01 RX ADMIN — PHENYLEPHRINE HYDROCHLORIDE 50 MCG: 10 INJECTION INTRAVENOUS at 08:02

## 2022-02-01 RX ADMIN — PROPOFOL 140 MG: 10 INJECTION, EMULSION INTRAVENOUS at 07:45

## 2022-02-01 RX ADMIN — FENTANYL CITRATE 50 MCG: 50 INJECTION, SOLUTION INTRAMUSCULAR; INTRAVENOUS at 09:26

## 2022-02-01 RX ADMIN — DEXAMETHASONE SODIUM PHOSPHATE 4 MG: 4 INJECTION, SOLUTION INTRA-ARTICULAR; INTRALESIONAL; INTRAMUSCULAR; INTRAVENOUS; SOFT TISSUE at 07:45

## 2022-02-01 RX ADMIN — Medication 10 MG: at 09:46

## 2022-02-01 RX ADMIN — FENTANYL CITRATE 50 MCG: 50 INJECTION, SOLUTION INTRAMUSCULAR; INTRAVENOUS at 07:45

## 2022-02-01 RX ADMIN — PHENYLEPHRINE HYDROCHLORIDE 100 MCG: 10 INJECTION INTRAVENOUS at 08:46

## 2022-02-01 RX ADMIN — ONDANSETRON 8 MG: 2 INJECTION INTRAMUSCULAR; INTRAVENOUS at 09:42

## 2022-02-01 RX ADMIN — PHENYLEPHRINE HYDROCHLORIDE 100 MCG: 10 INJECTION INTRAVENOUS at 08:30

## 2022-02-01 RX ADMIN — SODIUM CHLORIDE, POTASSIUM CHLORIDE, SODIUM LACTATE AND CALCIUM CHLORIDE: 600; 310; 30; 20 INJECTION, SOLUTION INTRAVENOUS at 06:59

## 2022-02-01 ASSESSMENT — MIFFLIN-ST. JEOR: SCORE: 1170.08

## 2022-02-01 ASSESSMENT — COPD QUESTIONNAIRES: COPD: 0

## 2022-02-01 ASSESSMENT — ENCOUNTER SYMPTOMS: ORTHOPNEA: 0

## 2022-02-01 NOTE — DISCHARGE INSTRUCTIONS
Same Day Surgery Discharge Instructions for  Sedation and General Anesthesia       It's not unusual to feel dizzy, light-headed or faint for up to 24 hours after surgery or while taking pain medication.  If you have these symptoms: sit for a few minutes before standing and have someone assist you when you get up to walk or use the bathroom.      You should rest and relax for the next 24 hours. We recommend you make arrangements to have an adult stay with you for at least 24 hours after your discharge.  Avoid hazardous and strenuous activity.      DO NOT DRIVE any vehicle or operate mechanical equipment for 24 hours following the end of your surgery.  Even though you may feel normal, your reactions may be affected by the medication you have received.      Do not drink alcoholic beverages for 24 hours following surgery.       Slowly progress to your regular diet as you feel able. It's not unusual to feel nauseated and/or vomit after receiving anesthesia.  If you develop these symptoms, drink clear liquids (apple juice, ginger ale, broth, 7-up, etc. ) until you feel better.  If your nausea and vomiting persists for 24 hours, please notify your surgeon.        All narcotic pain medications, along with inactivity and anesthesia, can cause constipation. Drinking plenty of liquids and increasing fiber intake will help.      For any questions of a medical nature, call your surgeon.      Do not make important decisions for 24 hours.      If you had general anesthesia, you may have a sore throat for a couple of days related to the breathing tube used during surgery.  You may use Cepacol lozenges to help with this discomfort.  If it worsens or if you develop a fever, contact your surgeon.       If you feel your pain is not well managed with the pain medications prescribed by your surgeon, please contact your surgeon's office to let them know so they can address your concerns.       CoVid 19 Information    We want to give you  information regarding Covid. Please consult your primary care provider with any questions you might have.     Patient who have symptoms (cough, fever, or shortness of breath), need to isolate for 7 days from when symptoms started OR 72 hours after fever resolves (without fever reducing medications) AND improvement of respiratory symptoms (whichever is longer).      Isolate yourself at home (in own room/own bathroom if possible)    Do Not allow any visitors    Do Not go to work or school    Do Not go to [employer],  centers, shopping, or other public places.    Do Not shake hands.    Avoid close and intimate contact with others (hugging, kissing).    Follow CDC recommendations for household cleaning of frequently touched services.     After the initial 7 days, continue to isolate yourself from household members as much as possible. To continue decrease the risk of community spread and exposure, you and any members of your household should limit activities in public for 14 days after starting home isolation.     You can reference the following CDC link for helpful home isolation/care tips:  https://www.cdc.gov/coronavirus/2019-ncov/downloads/10Things.pdf    Protect Others:    Cover Your Mouth and Nose with a mask, disposable tissue or wash cloth to avoid spreading germs to others.    Wash your hands and face frequently with soap and water    Call Your Primary Doctor If: Breathing difficulty develops or you become worse.    For more information about COVID19 and options for caring for yourself at home, please visit the CDC website at https://www.cdc.gov/coronavirus/2019-ncov/about/steps-when-sick.html  For more options for care at [facility], please visit our website at [email or web address]        [facility]      DISCHARGE INSTRUCTIONS FOLLOWING   REGIONAL BLOCK ANESTHESIA      Numbness or lack of feeling in the arm/leg that was operated may last up to 24 hours.   "The average time is usually 10-15 hours.  You may not be able to lift or move the arm or leg where the operation was by itself during that time.  Long-acting local anesthetic medicines were used to give you long-lasting pain relief.    Wear a sling until your arm is completely \"awake\"    Avoid bumping your arm, leg or foot while it is numb    Avoid extremes of hot or cold while it is numb    Remain quiet and restful the day of surgery.  Resume normal activities gradually over the next day or so as advise by your surgeon.    Do not drive or operate  Any machinery until your extremity is full  \"awake\"        You will have a tingling and prickly sensation in your arm/leg as the feeling begins to return; you can also expect some discomfort. The amount of discomfort is unpredictable, but if you have more pain that can be controlled with the pain medication you received, you should contact your surgeon.  Start to take your pain pills as soon as you start to feel any discomfort or pain.                    "

## 2022-02-01 NOTE — ANESTHESIA PREPROCEDURE EVALUATION
Anesthesia Pre-Procedure Evaluation    Patient: Barbi Tiwari   MRN: [id number] : 1960        Preoperative Diagnosis: Hallux abducto valgus, left [M20.12]  Left foot pain [M79.672]  Deformity of toe of left foot [M20.62]    Procedure : Procedure(s):  left first metatarsophalangeal joint arthrodesis  left second metatarsal Weil osteotomy  CORRECTION, left second HAMMER TOE          Past Medical History:   Diagnosis Date     Acute cystitis with hematuria 10/24/2020     Allergic rhinitis 2009     Calculus of kidney 2011    Overview:  S/P LITHOTRIPSY 3/15/11      Esophageal reflux 10/22/2008     Hyperparathyroidism 2011    had surgery for it; resulted in seizures     Moderate major depression, single episode (H)      oligodendroglioma 2012     Osteoarthrosis 2009     Palpitations 2014     PONV (postoperative nausea and vomiting)      PVCs 2014     Seizure disorder (related to tumor) 2011    last seizure 3/2021      Past Surgical History:   Procedure Laterality Date     COMBINED CYSTOSCOPY, RETROGRADES, URETEROSCOPY, LASER HOLMIUM LITHOTRIPSY URETER(S), INSERT STENT Right 8/3/2021    Procedure: 1. Cystourethroscopy 2. Right ureteroscopy 3. Right retrograde pyelogram with interpretation of intraoperative fluoroscopic imaging 4. Holmium laser lithotripsy with basket stone extraction 5. Right ureteral stent placement;  Surgeon: Felix Cano MD;  Location:  OR     CRANIOTOMY      tumor resection     CYSTOSCOPY, RETROGRADES, INSERT STENT URETER(S), COMBINED Right 2021    Procedure: 1.  Cystourethroscopy 2.  Attempted right ureteroscopy 3.  Right  retrograde pyelogram with interpretation of intraoperative fluoroscopic imaging 4.  Right ureteral dilatation 5.  Right ureteral stent placement 6.  Laser on stand-by;  Surgeon: Felix Cano MD;  Location:  OR     EXTRACORPOREAL SHOCK WAVE LITHOTRIPSY, CYSTOSCOPY, INSERT STENT URETER(S), COMBINED Bilateral  5/5/2017    Procedure: COMBINED EXTRACORPOREAL SHOCK WAVE LITHOTRIPSY, CYSTOSCOPY, INSERT STENT URETER(S);  CYSTO, URETHRAL DILATION, URETERAL LEFT STENT PLACEMENT, LEFT ESWL.;  Surgeon: Angel Del Rio MD;  Location:  OR     FOOT SURGERY      mulitple     HYSTERECTOMY, PAP NO LONGER INDICATED  2008    lap hys     LITHOTRIPSY  2010 2017     OPTICAL TRACKING SYSTEM CRANIOTOMY, EXCISE TUMOR WITH MAPPING, COMBINED Right 5/30/2018    Procedure: COMBINED OPTICAL TRACKING SYSTEM CRANIOTOMY, EXCISE TUMOR WITH MAPPING;  Right Stealth Assisted Craniotomy With Motor Mapping And Tumor Resection ;  Surgeon: Dustin Rordiguez MD;  Location: [facility] OR     ORTHOPEDIC SURGERY      feet, multiple on both     OSTEOTOMY FOOT Right 1/15/2019    Procedure: OSTEOTOMY FOOT;  Surgeon: Benjamin Griffin DPM;  Location:  OR     PARATHYROIDECTOMY  2011     RECONSTRUCT FOREFOOT WITH METATARSOPHALANGEAL (MTP) FUSION Right 1/15/2019    Procedure: RIGHT FIRST METATARSAL PHALAGEAL JOINT ARTHRODESIS, RIGHT SECOND METATARSAL WEIL OSTEOTOMY;  Surgeon: Benjamin Griffin DPM;  Location:  OR      Allergies   Allergen Reactions     Sulfa Drugs Hives     Benoxinate Nausea and Vomiting      Social History     Tobacco Use     Smoking status: Never Smoker     Smokeless tobacco: Never Used   Substance Use Topics     Alcohol use: Yes     Comment: social      Wt Readings from Last 1 Encounters:   01/12/22 65.8 kg (145 lb)        Prior to Admission medications    Medication Sig Start Date End Date Taking? Authorizing Provider   acetaminophen (TYLENOL) 500 MG tablet Take 500-1,000 mg by mouth every 6 hours as needed for mild pain    Reported, Patient   escitalopram (LEXAPRO) 10 MG tablet Take 1 tablet (10 mg) by mouth daily 1/12/22   Radha Erazo MD   fexofenadine (ALLEGRA) 180 MG tablet Take 180 mg by mouth daily    Reported, Patient   gabapentin (NEURONTIN) 100 MG capsule Take 3 capsules (300 mg) by mouth every evening For additional  refills, please schedule a follow-up appointment at [phone] 9/7/21   Flakita Clark MD   ibuprofen (ADVIL/MOTRIN) 200 MG tablet Take 800 mg by mouth daily as needed  5/10/12   Reported, Patient   Lactase (LACTAID PO) Take 1-2 tablets by mouth daily as needed for indigestion     Reported, Patient   lamoTRIgine (LAMICTAL) 100 MG tablet 100 mg am and pm (take along with 200 mg tablet for total of 300 mg twice a day). [facility] mft only 1/6/21   Flakita Clark MD   lamoTRIgine (LAMICTAL) 200 MG tablet 200 mg twice a day (take along with 100 mg tablet for total of 300 mg twice a day). [facility] mft only 1/6/21   Flakita Clark MD   levETIRAcetam (KEPPRA) 500 MG tablet 1500 mg am and 1250 mg pm 1/6/21   Flakita Clark MD   metoprolol tartrate (LOPRESSOR) 25 MG tablet Take 0.5 tablets (12.5 mg) by mouth 2 times daily 12/9/21   Dustin Choudhury MD   Multiple Vitamin (MULTI-VITAMINS) TABS Take 1 chew tab by mouth daily  12/10/09   Reported, Patient   tretinoin (RETIN-A) 0.025 % external cream Apply a pea-sized amount to each area affected by acne. Start every 3-4 nights working up to every night. 7/9/21   Cary Camarillo PA-C   triamcinolone (KENALOG) 0.1 % external cream Apply topically 2 times daily To affected area in between breasts for 1-2 weeks. Tapering with improvement and restarting with flares. 10/20/21   Cary Camarillo PA-C   VITAMIN D, CHOLECALCIFEROL, PO Take 1,000 Units by mouth daily     Unknown, Entered By History     Recent Labs   Lab Test 05/23/18  0913   ABO O   RH Pos     Recent Results (from the past 744 hour(s))   XR Foot Left G/E 3 Views    Narrative    FOOT LEFT THREE OR MORE VIEWS 1/6/2022 1:42 PM     HISTORY: Preoperative planning. Hallux abducto valgus, left. Left foot  pain. Deformity of toe of left foot.    COMPARISON: None available      Impression    IMPRESSION: Pes cavovarus. Pronounced hallux valgus deformity with  bunion. Chronic healed deformities at the distal  aspects of the second  and third metatarsals near the head/neck junctions. Mild first and  second MTP joint osteoarthritis with scattered interphalangeal joint  osteoarthritis of the toes. Polyarticular midfoot osteoarthritis. No  acute left foot fracture or dislocation. Forefoot malalignment  deformities of several of the lesser toes.     AINSLEY HUITRON MD         SYSTEM ID:  [id number]       Anesthesia Evaluation   Pt has had prior anesthetic. Type: General.    No history of anesthetic complications       ROS/MED HX  ENT/Pulmonary:     (+) allergic rhinitis,  (-) asthma, COPD and sleep apnea   Neurologic: Comment: Post surgery seizures s/p Oligodendroma Resection    (+) seizures, last seizure: 1 week ago - first in a long time,     Cardiovascular:     (+) -----Irregular Heartbeat/Palpitations, Previous cardiac testing   Echo: Date: Results:    Stress Test: Date: Results:    ECG Reviewed: Date: 1/2022 Results:  Sinus lenny @ 48 - o/w WNL  Cath: Date: Results:   (-) angina, hypertension, CAD, CHF, orthopnea/PND, angina, murmur and wheezes   METS/Exercise Tolerance:     Hematologic:       Musculoskeletal:       GI/Hepatic:     (+) GERD, Asymptomatic on medication,  (-) liver disease   Renal/Genitourinary:     (+) Nephrolithiasis ,  (-) renal disease   Endo:    (-) Type II DM and thyroid disease   Psychiatric/Substance Use:       Infectious Disease:       Malignancy:  - neg malignancy ROS     Other:  - neg other ROS          Physical Exam    Airway        Mallampati: I   TM distance: > 3 FB   Neck ROM: full   Mouth opening: > 3 cm    Respiratory Devices and Support         Dental  no notable dental history         Cardiovascular          Rhythm and rate: regular and normal (-) no murmur    Pulmonary           breath sounds clear to auscultation   (-) no rhonchi and no wheezes        OUTSIDE LABS:  CBC:   Lab Results   Component Value Date    WBC 4.4 01/12/2022    WBC 4.1 03/15/2021    HGB 13.6 01/12/2022    HGB 13.1  03/15/2021    HCT 43.5 01/12/2022    HCT 41.1 03/15/2021     01/12/2022     03/15/2021     BMP:   Lab Results   Component Value Date     01/12/2022     07/14/2021    POTASSIUM 4.3 01/12/2022    POTASSIUM 4.0 07/14/2021    CHLORIDE 107 01/12/2022    CHLORIDE 107 07/14/2021    CO2 30 01/12/2022    CO2 27 07/14/2021    BUN 15 01/12/2022    BUN 17 07/14/2021    CR 0.77 01/12/2022    CR 0.71 07/14/2021    GLC 97 01/12/2022     (H) 07/14/2021     COAGS:   Lab Results   Component Value Date    PTT 31 05/30/2018    INR 1.03 05/30/2018     POC:   Lab Results   Component Value Date    BGM 98 05/30/2018     HEPATIC:   Lab Results   Component Value Date    ALBUMIN 3.8 01/12/2022    PROTTOTAL 7.1 01/12/2022    ALT 23 01/12/2022    AST 14 01/12/2022    ALKPHOS 86 01/12/2022    BILITOTAL 0.3 01/12/2022     OTHER:   Lab Results   Component Value Date    LACT 0.9 05/11/2017    KIAN 9.0 01/12/2022    LIPASE 176 09/25/2014    TSH 0.66 03/24/2017       Anesthesia Plan    ASA Status:  3   NPO Status:  NPO Appropriate    Anesthesia Type: General.     - Airway: LMA   Induction: Propofol, Intravenous.   Maintenance: Balanced.        Consents    Anesthesia Plan(s) and associated risks, benefits, and realistic alternatives discussed. Questions answered and patient/representative(s) expressed understanding.    - Discussed:     - Discussed with:  Patient         Postoperative Care    Pain management: Multi-modal analgesia, Peripheral nerve block (Single Shot).   PONV prophylaxis: Ondansetron (or other 5HT-3), Dexamethasone or Solumedrol, Background Propofol Infusion     Comments:    Other Comments: Propofol 100 mcg/kg/min or >   Zofran 8 mg (divided over last 30 minutes of case)             Adrian Escobedo MD

## 2022-02-01 NOTE — ANESTHESIA POSTPROCEDURE EVALUATION
Patient: Barbi Tiwari    Procedure: Procedure(s):  left first metatarsophalangeal joint arthrodesis, Tenotomy and capusulotomy 3rd metatarsalphalangeal joint  left second metatarsal Weil osteotomy  CORRECTION, left second HAMMER TOE       Diagnosis:Hallux abducto valgus, left [M20.12]  Left foot pain [M79.672]  Deformity of toe of left foot [M20.62]  Diagnosis Additional Information: No value filed.    Anesthesia Type:  General    Note:  Disposition: Outpatient   Postop Pain Control: Uneventful            Sign Out: Well controlled pain   PONV: No   Neuro/Psych: Uneventful            Sign Out: Acceptable/Baseline neuro status   Airway/Respiratory: Uneventful            Sign Out: Acceptable/Baseline resp. status   CV/Hemodynamics: Uneventful            Sign Out: Acceptable CV status   Other NRE: NONE   DID A NON-ROUTINE EVENT OCCUR? No    Event details/Postop Comments:  Pt doing well prior to discharge home.             Last vitals:  Vitals Value Taken Time   BP 94/61 02/01/22 1120   Temp 36.1  C (96.9  F) 02/01/22 1011   Pulse 92 02/01/22 1122   Resp 13 02/01/22 1122   SpO2 92 % 02/01/22 1124   Vitals shown include unvalidated device data.    Electronically Signed By: Adrian Escobedo MD  February 1, 2022  2:25 PM

## 2022-02-01 NOTE — ANESTHESIA PROCEDURE NOTES
"Popliteal and Sciatic (Sciatic in the Popliteal Fossa) Procedure Note    Pre-Procedure   Staff -        Anesthesiologist:  Adrian Escobedo MD       Performed By: anesthesiologist       Location: pre-op       Pre-Anesthestic Checklist: patient identified, IV checked, site marked, risks and benefits discussed, informed consent, monitors and equipment checked, pre-op evaluation, at physician/surgeon's request and post-op pain management  Timeout:       Correct Patient: Yes        Correct Procedure: Yes        Correct Site: Yes        Correct Position: Yes        Correct Laterality: Yes        Site Marked: Yes  Procedure Documentation  Procedure: Popliteal, Sciatic (Sciatic in the Popliteal Fossa)       Patient Position: supine (Lower leg elevated on pillows)       Patient Prep/Sterile Barriers: mask, \"No-touch\" technique       Skin prep: Chloraprep       Local skin infiltrated with 3 mL of 1% lidocaine.        Needle Type: insulated and short bevel (Arrow)       Needle Gauge: 22.        Needle Length (millimeters): 90        Ultrasound guided (permanent image entered into patient's record)       1. Ultrasound was used to identify targeted nerve, plexus, vascular marker, or fascial plane and place a needle adjacent to it in real-time.       2. Ultrasound was used to visualize the spread of anesthetic in close proximity to the above referenced structure.       3. A permanent image is entered into the patient's record.       4. The visualized anatomic structures appeared normal.       5. There were no apparent abnormal pathologic findings.    Assessment/Narrative         The placement was negative for: blood aspirated, painful injection and site bleeding       Paresthesias: No.    Test dose of mL at.         Test dose negative, 3 minutes after injection, for signs of intravascular, subdural, or intrathecal injection.     Bolus given via needle..        Secured via.        Insertion/Infusion Method: Single Shot      "  Complications: none       Injection made incrementally with aspirations every 3 mL.    Medication(s) Administered   Bupivacaine 0.5% w/ 1:400K Epi (Injection), 25 mL  Medication Administration Time: 2/1/2022 7:24 AM     Comments:  Sciatic Nerve Block in the Popliteal Fossa via lateral thigh approach.    25 cc 0.5% Bupivacaine + 1:400K Epi - no paresthesias or s/s IV injection.  Good spread confirmed with U/S and trace-down technique.      Pt tolerated well.    No complications.        The surgeon has given a verbal order transferring care of this patient to me for the performance of a regional analgesia block for post-op pain control. It is requested of me because I am uniquely trained and qualified to perform this block and the surgeon is neither trained nor qualified to perform this procedure.

## 2022-02-01 NOTE — BRIEF OP NOTE
[facility] Brief Operative Note    Pre-operative diagnosis: Hallux abducto valgus, left [M20.12]  Left foot pain [M79.672]  Deformity of toe of left foot [M20.62]   Post-operative diagnosis Same     Procedure: Procedure(s):  left first metatarsophalangeal joint arthrodesis, Tenotomy and capusulotomy 3rd metatarsalphalangeal joint  left second metatarsal Weil osteotomy  CORRECTION, left second HAMMER TOE   Surgeon(s): Surgeon(s) and Role:     * Benjamin Griffin DPM - Primary   Estimated blood loss: 2 mL    Specimens: * No specimens in log *   Findings: First metatarsophalangeal joint had thinning cartilage on the metatarsal head.  There second metatarsal head had very little articular cartilage. The surface appeared smooth. Surround capsular adhesions.

## 2022-02-01 NOTE — OP NOTE
Procedure Date: 02/01/2022    SURGEON:  Benjamin Griffin DPM    :  Suhail Taylor DPM    PREOPERATIVE DIAGNOSES:  1.  Left foot pain, severe hallux abductovalgus deformity with coexisting degenerative joint disease.  2.  Left second metatarsophalangeal joint pain.  3.  Left second hammertoe deformity.  4.  Extension deformity of the left third metatarsophalangeal joint.    POSTOPERATIVE DIAGNOSES:  1.  Left foot pain, severe hallux abductovalgus deformity with coexisting degenerative joint disease.  2.  Left second metatarsophalangeal joint pain.  3.  Left second hammertoe deformity.  4.  Extension deformity of the left third metatarsophalangeal joint.    PROCEDURES PERFORMED:  1.  Left first metatarsophalangeal joint arthrodesis.  2.  Left second metatarsal Weil osteotomy.  3.  Left second hammertoe correction via arthrodesis.  4.  Left third metatarsophalangeal joint tenotomy and capsulotomy.    ANESTHESIA:  Preoperative popliteal block and general endotracheal anesthesia.    HEMOSTASIS:  Thigh pneumatic tourniquet at 300 mmHg.    ESTIMATED BLOOD LOSS:  2 mL.    MATERIALS:  Absorbable and nonabsorbable suture material, one 6-hole MaxForce plate with 5 degrees valgus and 5 degrees dorsiflexion from Arthrex.  The plate was secured using five 3.0 mm locking screws and one 3.0 mm cortical screw, 2 QuickFix Snap-Off screws were used and one 0.062 flexible nitinol wire.    INJECTABLES:  Additional 0.5% Marcaine plain was injected to block the sural nerve.    COMPLICATIONS:  None apparent.    INTRAOPERATIVE FINDINGS:  The cartilage on the head of the left first metatarsal was thinning.  The head of the left second metatarsal was thickened with some flattening.  There was minimal intact or viable articular cartilage.  With loading of the forefoot after the initial 3 procedures, the left third toe appeared to sit higher in the sagittal plane with some extension deformity at the metatarsophalangeal  joint.    INDICATIONS FOR PROCEDURE:  Barbi Tiwari is a 61-year-old female who saw me previously in clinic for ongoing and progressive pain related to her left foot deformities as mentioned above.  She inquired about surgical intervention, finding conservative cares no longer helping to relieve her pain.  She had a right first metatarsophalangeal joint fusion with right second metatarsal Weil osteotomy on 01/15/2019.  This surgery was considered successful and reduced her pain.  She is hoping for a similar experience on the left.  Procedures recommended and discussed included a left first metatarsophalangeal joint arthrodesis, a left second metatarsal Weil osteotomy, a left second hammertoe deformity correction, and possible left third metatarsophalangeal joint soft tissue rebalancing.  The latter was something we discussed that would be determined intraoperatively.  All procedures were discussed in detail.  No guarantees were given.  The postoperative course was reviewed.  For details regarding the most recent preoperative discussion, please see the virtual visit from 01/28/2022.    DESCRIPTION OF PROCEDURE:  Barbi Tiwari received a preoperative popliteal block and then was transferred to the operating room and placed supine on the operating table.  General endotracheal anesthesia was initiated.  A left-sided thigh tourniquet was placed.  The left lower extremity was prepped and draped in the normal aseptic fashion.  A timeout was called.  The left lower extremity was then exsanguinated and the tourniquet inflated.    PROCEDURE #1:  The standard longitudinal incision was made overlying the left first metatarsophalangeal joint.  Layered dissection was performed.  Bleeding vessels were electrocauterized.  Dissection was carried down to the level of bone.  Subcapsular and subperiosteal reflection was performed, including release of the medial and lateral collateral ligaments and plantar soft tissue adhesions.    The  joint was then prepared for fusion.  The head of the first metatarsal was fashioned into a ball via an Arthrex reaming system.  The base of the proximal phalanx was reamed into a matching cone.  Subchondral drilling was performed.  The hallux was then placed in a desired position onthe first metatarsal and provisionally fixated with a smooth K-wire.    Permanent fixation was then placed, which included a 6-hole MaxForce plate as mentioned above, secured via five 3.0 mm locking screws and one 3.0 mm cortical screw.  Intraoperative imaging was used to confirm satisfactory placement of the hardware and deformity reduction.    The medial prominent bone of the first metatarsal head and base of the proximal phalanx was resected with a sagittal saw.  The area was irrigated with a copious amount of normal sterile saline.  Layered closure was performed.    PROCEDURE #2:  Attention was now directed to the left second metatarsophalangeal joint.  A longitudinal incision was made overlying this joint.  Dissection was carried out down to the level the joint capsule.  Bleeding vessels were electrocauterized.  A longitudinal incision was made through the capsular and periosteal tissues.  Medial and lateral soft tissue reflection was performed.  Once the head of the second metatarsal was adequately exposed, a Weil osteotomy was performed.  The capital fragment was transposed proximally and laterally.  Provisional fixation via a single K-wire.  The overlying shelf of bone was resected with a bone rongeur.  Permanent fixation was then placed after confirming satisfactory shortening and lateral displacement of the metatarsal head.  Permanent fixation consisted of 2 QuickFix Snap-Off screws from Arthrex.      Soft tissues involving the lateral aspect of the joint were released (capsule, ligament, scar tissue) to help reduce the lateral deviation of the toe.  The area was irrigated with a copious amount of normal sterile saline.  Layered  closure was performed.    PROCEDURE #3:  A longitudinal incision was made over the left second toe proximal interphalangeal joint.  Layered dissection was performed.  A transverse tenotomy and capsulotomy was performed.  The medial and lateral collateral ligaments were released.  Soft tissue reflection was performed to adequately expose the head of the proximal phalanx and base of the middle phalanx.    Using a sagittal saw, osteotomies were made just behind the articular surfaces of both bones.  Some subchondral drilling was performed.  Holding both bones against one another, a 0.062 flexible nitinol wire was used for fixation.  This was also driven into the head of the second metatarsal to help correct the transverse plane deformity of this toe.  It had a significant lateral deviation prior to surgery.  The area was irrigated with a copious amount of normal sterile saline.  Layered closure was performed.    PROCEDURE #4:  At this point, the left third toe was reevaluated.  Despite the medial repositioning of the second toe, there maintained some extension deformity at the metatarsophalangeal joint.  Therefore, soft tissue rebalancing was considered medically necessary.  This was something discussed preoperatively with the patient and her .  A short longitudinal incision was made overlying the left third metatarsophalangeal joint.  The extensor tendons were identified and transected.  Dissection was carried down to the underlying joint capsule.  A lateral capsulotomy was performed to help release the lateral drift of this toe.  At this point, with loading of the forefoot, the left third toe sat more rectus in the sagittal and transverse plane.  The wound was irrigated with a copious amount of normal sterile saline and layered closure performed.    Dylan Tiwari tolerated the anesthesia and procedure well.    Benjamin Griffin DPM        D: 02/01/2022   T: 02/01/2022   MT: [id number]    Name:     DYLAN TIWARI  CONSTANCE  MRN:      [id number]        Account:        [id number]   :      1960           Procedure Date: 2022     Document: [id number]

## 2022-02-01 NOTE — ANESTHESIA CARE TRANSFER NOTE
Patient: Barbi Tiwari    Procedure: Procedure(s):  left first metatarsophalangeal joint arthrodesis, Tenotomy and capusulotomy 3rd metatarsalphalangeal joint  left second metatarsal Weil osteotomy  CORRECTION, left second HAMMER TOE       Diagnosis: Hallux abducto valgus, left [M20.12]  Left foot pain [M79.672]  Deformity of toe of left foot [M20.62]  Diagnosis Additional Information: No value filed.    Anesthesia Type:   General     Note:    Oropharynx: oropharynx clear of all foreign objects and spontaneously breathing  Level of Consciousness: drowsy  Oxygen Supplementation: face mask  Level of Supplemental Oxygen (L/min / FiO2): 8 LPM  Independent Airway: airway patency satisfactory and stable  Dentition: dentition unchanged  Vital Signs Stable: post-procedure vital signs reviewed and stable  Report to RN Given: handoff report given  Patient transferred to: PACU    Handoff Report: Identifed the Patient, Identified the Reponsible Provider, Reviewed the pertinent medical history, Discussed the surgical course, Reviewed Intra-OP anesthesia mangement and issues during anesthesia, Set expectations for post-procedure period and Allowed opportunity for questions and acknowledgement of understanding      Vitals:  Vitals Value Taken Time   /70 02/01/22 1011   Temp 36.1  C (96.9  F) 02/01/22 1011   Pulse 89 02/01/22 1013   Resp 14 02/01/22 1013   SpO2 98 % 02/01/22 1013   Vitals shown include unvalidated device data.    Electronically Signed By: CAMDEN Grijalva CRNA  February 1, 2022  10:15 AM

## 2022-02-01 NOTE — ANESTHESIA PROCEDURE NOTES
"Saphenous Procedure Note    Pre-Procedure   Staff -        Anesthesiologist:  Adrian Escobedo MD       Performed By: Anesthesiologist       Location: pre-op       Pre-Anesthestic Checklist: patient identified, IV checked, site marked, risks and benefits discussed, informed consent, monitors and equipment checked, pre-op evaluation, at physician/surgeon's request and post-op pain management  Timeout:       Correct Patient: Yes        Correct Procedure: Yes        Correct Site: Yes        Correct Position: Yes        Correct Laterality: Yes        Site Marked: Yes  Procedure Documentation  Procedure: Saphenous       Patient Position: supine       Patient Prep/Sterile Barriers: sterile gloves, mask, \"No-touch\" technique       Skin prep: Chloraprep       Local skin infiltrated with 1 mL of 1% lidocaine.        Insertion site: below-the knee perivenous technique.       Needle Type: insulated and short bevel       Needle Gauge: 21.        Needle Length (millimeters): 100        Ultrasound guided       1. Ultrasound was used to identify targeted nerve, plexus, vascular marker, or fascial plane and place a needle adjacent to it in real-time.       2. Ultrasound was used to visualize the spread of anesthetic in close proximity to the above referenced structure.       3. A permanent image is entered into the patient's record.       4. The visualized anatomic structures appeared normal.       5. There were no apparent abnormal pathologic findings.    Assessment/Narrative         The placement was negative for: blood aspirated, painful injection and site bleeding       : only with nerve stimulation at 1.5 mA - none after turned off prior to injection or during injection.     Bolus given via needle..        Secured via.        Insertion/Infusion Method: Single Shot       Complications: none       Injection made incrementally with aspirations every 3 mL.    Medication(s) Administered   Bupivacaine 0.5% w/ 1:400K Epi " (Injection), 7 mL  Medication Administration Time: 2/1/2022 7:28 AM     Comments:  Saphenous Nerve via Below-The-Knee Marisa-venous technique.   0.5% Bupivacaine with 1:400K Epi - no paresthesias or s/s of IV injection.   Pt tolerated well.  No complications.     The surgeon has given a verbal order transferring care of this patient to me for the performance of a regional analgesia block for post-op pain control. It is requested of me because I am uniquely trained and qualified to perform this block and the surgeon is neither trained nor qualified to perform this procedure.

## 2022-02-02 ENCOUNTER — OFFICE VISIT (OUTPATIENT)
Dept: PODIATRY | Facility: CLINIC | Age: 62
End: 2022-02-02
Payer: COMMERCIAL

## 2022-02-02 ENCOUNTER — TELEPHONE (OUTPATIENT)
Dept: PODIATRY | Facility: CLINIC | Age: 62
End: 2022-02-02
Payer: COMMERCIAL

## 2022-02-02 VITALS
BODY MASS INDEX: 26.13 KG/M2 | WEIGHT: 142 LBS | DIASTOLIC BLOOD PRESSURE: 68 MMHG | SYSTOLIC BLOOD PRESSURE: 110 MMHG | HEIGHT: 62 IN

## 2022-02-02 DIAGNOSIS — Z98.890 POST-OPERATIVE STATE: ICD-10-CM

## 2022-02-02 DIAGNOSIS — I49.3 SYMPTOMATIC PREMATURE VENTRICULAR CONTRACTIONS: ICD-10-CM

## 2022-02-02 DIAGNOSIS — Z09 SURGERY FOLLOW-UP EXAMINATION: Primary | ICD-10-CM

## 2022-02-02 DIAGNOSIS — R56.9 SEIZURE (H): ICD-10-CM

## 2022-02-02 PROCEDURE — 99024 POSTOP FOLLOW-UP VISIT: CPT | Performed by: PODIATRIST

## 2022-02-02 ASSESSMENT — MIFFLIN-ST. JEOR: SCORE: 1167.36

## 2022-02-02 NOTE — TELEPHONE ENCOUNTER
Pt called in states she had surgery today at 10 am.  The surgery was on her left foot.  The Pt states the surgery site is bleeding.  The bleeding is more than 2 inch.  No fever.  The Pt states she just see it now.  No fever.  No chills  No pain.  Pt want to speak the on call provider.  Dr. Garsia was paged for the Pt .  Advised the Pt to call after 20 min is the provider didn't call.   verbalized understand, no other concern at this time.      Clay Menendez Waller Nurse Advisor 2/1/2022 7:22 PM        Reason for Disposition    [1] Raised bruise and [2] size > 2 inches (5 cm) and expanding    Additional Information    Negative: [1] Major abdominal surgical incision AND [2] wound gaping open AND [3] visible internal organs    Negative: Sounds like a life-threatening emergency to the triager    Negative: [1] Bleeding from incision AND [2] won't stop after 10 minutes of direct pressure    Negative: [1] Widespread rash AND [2] bright red, sunburn-like    Negative: Severe pain in the incision    Negative: [1] Incision gaping open AND [2] < 48 hours since wound re-opened    Negative: [1] Incision gaping open AND [2] length of opening > 2 inches (5 cm)    Negative: Patient sounds very sick or weak to the triager    Negative: Sounds like a serious complication to the triager    Negative: Fever > 100.4 F (38.0 C)    Negative: [1] Incision looks infected (spreading redness, pain) AND [2] fever > 99.5 F (37.5 C)    Negative: [1] Incision looks infected (spreading redness, pain) AND [2] large red area (> 2 in. or 5 cm)    Negative: [1] Incision looks infected (spreading redness, pain) AND [2] face wound    Negative: [1] Red streak runs from the incision AND [2] longer than 1 inch (2.5 cm)    Negative: [1] Pus or bad-smelling fluid draining from incision AND [2] no fever    Negative: [1] Post-op pain AND [2] not controlled with pain medications    Negative: Dressing soaked with blood or body fluid (e.g., drainage)    Protocols  used: POST-OP INCISION SYMPTOMS AND DEAQAGONU-R-FO

## 2022-02-02 NOTE — TELEPHONE ENCOUNTER
Spoke with patient and scheduled her for 9am today at [facility] with Dr Griffin.

## 2022-02-02 NOTE — TELEPHONE ENCOUNTER
Patient called the nurse line.  Spoke with her and she notes that there is bleeding pro about 2 x 6 inches to the bottom of her bandage.  Instructed patient to put some 4 x 4 gauze and another Ace wrap over the area.  We will call her in the morning to have her follow-up for dressing change.        Martina Garsia DPM

## 2022-02-02 NOTE — PROGRESS NOTES
ASSESSMENT:  Encounter Diagnosis   Name Primary?     Surgery follow-up examination Yes     MEDICAL DECISION MAKING:  The dressing applied yesterday postoperatively was carefully removed.  A fresh dressing was applied in a sterile fashion.    I personally reviewed the postoperative x-ray images with Barbi and her .    We will discontinue the use of her old posterior splint.  She was fit in a short cam walker.    Barbi is to continue to be nonweightbearing, continue elevating and icing as needed.    She asked if she can take the Dilaudid every 4 hours, rather than every 6.  This is fine.    Follow-up next week as scheduled.    Disclaimer: This note consists of symbols derived from keyboarding, dictation and/or voice recognition software. As a result, there may be errors in the script that have gone undetected. Please consider this when interpreting information found in this chart.    Benjamin Griffin DPM, TYREL, [facility] Department of Podiatry/Foot & Ankle Surgery      ____________________________________________________________________    HPI:       She presents today, 1 day postop, due to bleeding on the plantar aspect of the dressing.  Pain is controlled.  The popliteal block is worn off.  She is also inquiring about a new boot, as the posterior splint that she has is not fitting right.  Her  accompanies her today.    POSTOPERATIVE DIAGNOSES:  1.  Left foot pain, severe hallux abductovalgus deformity with coexisting degenerative joint disease.  2.  Left second metatarsophalangeal joint pain.  3.  Left second hammertoe deformity.  4.  Extension deformity of the left third metatarsophalangeal joint.     PROCEDURES PERFORMED:  1.  Left first metatarsophalangeal joint arthrodesis.  2.  Left second metatarsal Weil osteotomy.  3.  Left second hammertoe correction via arthrodesis.  4.  Left third metatarsophalangeal joint tenotomy and capsulotomy.    *  Past Medical History:   Diagnosis Date     Acute  cystitis with hematuria 10/24/2020     Allergic rhinitis 12/9/2009     Calculus of kidney 1/12/2011    Overview:  S/P LITHOTRIPSY 3/15/11      Esophageal reflux 10/22/2008     Hyperparathyroidism 01/11/2011    had surgery for it; resulted in seizures     Moderate major depression, single episode (H)      oligodendroglioma 7/23/2012     Osteoarthrosis 12/9/2009     Palpitations 6/5/2014     PONV (postoperative nausea and vomiting)      PVCs 6/5/2014     Seizure disorder (related to tumor) 08/17/2011    last seizure 3/2021       EXAM:    Vitals: /68   LMP  (LMP Unknown)   BMI: There is no height or weight on file to calculate BMI.    Constitutional:  Barbi Tiwari is in no apparent distress, appears well-nourished.  Cooperative with history and physical exam.    Vascular: There is some serosanguineous drainage along the incisions.  No active bleeding.  Expected moderate forefoot edema.    Derm: All 3 incisions remain coapted.  Sutures are intact.       Musculoskeletal:    The hallux, second toe, and third toe are in a more rectus position.  The second toe K wire remains intact/in place.

## 2022-02-02 NOTE — LETTER
2/2/2022         RE: Barbi Tiwari  [address] [facility] [address]        Dear Colleague,    Thank you for referring your patient, Barbi Tiwari, to the [facility]. Please see a copy of my visit note below.    ASSESSMENT:  Encounter Diagnosis   Name Primary?     Surgery follow-up examination Yes     MEDICAL DECISION MAKING:  The dressing applied yesterday postoperatively was carefully removed.  A fresh dressing was applied in a sterile fashion.    I personally reviewed the postoperative x-ray images with Barbi and her .    We will discontinue the use of her old posterior splint.  She was fit in a short cam walker.    Barbi is to continue to be nonweightbearing, continue elevating and icing as needed.    She asked if she can take the Dilaudid every 4 hours, rather than every 6.  This is fine.    Follow-up next week as scheduled.    Disclaimer: This note consists of symbols derived from keyboarding, dictation and/or voice recognition software. As a result, there may be errors in the script that have gone undetected. Please consider this when interpreting information found in this chart.    Benjamin Griffin DPM, FACFAS, [facility] Department of Podiatry/Foot & Ankle Surgery      ____________________________________________________________________    HPI:       She presents today, 1 day postop, due to bleeding on the plantar aspect of the dressing.  Pain is controlled.  The popliteal block is worn off.  She is also inquiring about a new boot, as the posterior splint that she has is not fitting right.  Her  accompanies her today.    POSTOPERATIVE DIAGNOSES:  1.  Left foot pain, severe hallux abductovalgus deformity with coexisting degenerative joint disease.  2.  Left second metatarsophalangeal joint pain.  3.  Left second hammertoe deformity.  4.  Extension deformity of the left third metatarsophalangeal joint.     PROCEDURES PERFORMED:  1.  Left first  metatarsophalangeal joint arthrodesis.  2.  Left second metatarsal Weil osteotomy.  3.  Left second hammertoe correction via arthrodesis.  4.  Left third metatarsophalangeal joint tenotomy and capsulotomy.    *  Past Medical History:   Diagnosis Date     Acute cystitis with hematuria 10/24/2020     Allergic rhinitis 12/9/2009     Calculus of kidney 1/12/2011    Overview:  S/P LITHOTRIPSY 3/15/11      Esophageal reflux 10/22/2008     Hyperparathyroidism 01/11/2011    had surgery for it; resulted in seizures     Moderate major depression, single episode (H)      oligodendroglioma 7/23/2012     Osteoarthrosis 12/9/2009     Palpitations 6/5/2014     PONV (postoperative nausea and vomiting)      PVCs 6/5/2014     Seizure disorder (related to tumor) 08/17/2011    last seizure 3/2021       EXAM:    Vitals: /68   LMP  (LMP Unknown)   BMI: There is no height or weight on file to calculate BMI.    Constitutional:  Barbi Tiwari is in no apparent distress, appears well-nourished.  Cooperative with history and physical exam.    Vascular: There is some serosanguineous drainage along the incisions.  No active bleeding.  Expected moderate forefoot edema.    Derm: All 3 incisions remain coapted.  Sutures are intact.       Musculoskeletal:    The hallux, second toe, and third toe are in a more rectus position.  The second toe K wire remains intact/in place.            Again, thank you for allowing me to participate in the care of your patient.        Sincerely,        Benjamin Griffin DPM

## 2022-02-03 RX ORDER — METOPROLOL TARTRATE 25 MG/1
12.5 TABLET, FILM COATED ORAL 2 TIMES DAILY
Qty: 90 TABLET | Refills: 3 | Status: SHIPPED | OUTPATIENT
Start: 2022-02-03 | End: 2023-01-16

## 2022-02-03 RX ORDER — METOPROLOL TARTRATE 25 MG/1
12.5 TABLET, FILM COATED ORAL 2 TIMES DAILY
Qty: 90 TABLET | Refills: 3 | OUTPATIENT
Start: 2022-02-03

## 2022-02-03 NOTE — TELEPHONE ENCOUNTER
"metoprolol tartrate (LOPRESSOR) 25 MG tablet   12/9/2021  No   Sig - Route: Take 0.5 tablets (12.5 mg) by mouth 2 times daily - Oral   Class: No Print Out   Order: [id number]     Med is listed as \"no print out\"     Routing refill request to provider for review/approval because:  Drug not active on patient's medication list       Appointments in Next Year    Feb 08, 2022  1:00 PM  Return Visit with Benjamin Griffin DPM  [facility] ([facility] ) [phone]   Feb 10, 2022  1:20 PM  (Arrive by 1:05 PM)  US RENAL COMPLETE with [email or web address]  [facility] ([facility] ) [phone]   Feb 10, 2022  2:00 PM  Return Visit with Felix Cano MD  [facility] ([facility] ) [phone]   Feb 15, 2022  7:45 AM  Return Visit with Sharda Marley DPM  [facility] ([facility] ) [phone]   Mar 08, 2022  1:30 PM  MR BRAIN W/O & W CONTRAST with [email or web address]  [facility] ([facility] ) [phone]   Mar 08, 2022  3:30 PM  Return Visit with Myrtle Gallagher MD  [facility] [place] ([facility] ) [phone]            "

## 2022-02-06 RX ORDER — LAMOTRIGINE 200 MG/1
TABLET ORAL
Qty: 60 TABLET | Refills: 0 | Status: SHIPPED | OUTPATIENT
Start: 2022-02-06 | End: 2022-02-23

## 2022-02-06 RX ORDER — LAMOTRIGINE 100 MG/1
TABLET ORAL
Qty: 60 TABLET | Refills: 0 | Status: SHIPPED | OUTPATIENT
Start: 2022-02-06 | End: 2022-02-23

## 2022-02-06 NOTE — TELEPHONE ENCOUNTER
GABAPENTIN 100MG CAPS      Last Written Prescription Date:  9/7/21  Last Fill Quantity: 90,   # refills: 3  Last Office Visit : 1/6/21 recommended 6 month follow up  Future Office visit:  2/23/22    Routing refill request to provider for review/approval because:  Drug not on [facility] refill protocol          LAMOTRIGINE 200MG TABS and LAMOTRIGINE 100MG TABS  Filling per [facility] medication refill protocols - seizure medications.  Not all labs required.

## 2022-02-07 DIAGNOSIS — R56.9 SEIZURE (H): ICD-10-CM

## 2022-02-07 RX ORDER — GABAPENTIN 100 MG/1
300 CAPSULE ORAL EVERY EVENING
Qty: 90 CAPSULE | Refills: 0 | Status: SHIPPED | OUTPATIENT
Start: 2022-02-07 | End: 2022-02-23

## 2022-02-08 ENCOUNTER — OFFICE VISIT (OUTPATIENT)
Dept: PODIATRY | Facility: CLINIC | Age: 62
End: 2022-02-08
Payer: COMMERCIAL

## 2022-02-08 VITALS — HEIGHT: 62 IN | BODY MASS INDEX: 25.97 KG/M2 | DIASTOLIC BLOOD PRESSURE: 64 MMHG | SYSTOLIC BLOOD PRESSURE: 112 MMHG

## 2022-02-08 DIAGNOSIS — Z09 SURGERY FOLLOW-UP EXAMINATION: Primary | ICD-10-CM

## 2022-02-08 PROCEDURE — 99024 POSTOP FOLLOW-UP VISIT: CPT | Performed by: PODIATRIST

## 2022-02-08 NOTE — PATIENT INSTRUCTIONS
Thank you for choosing [facility] / [facility]le [facility]!    DR WHITTINGTON'S CLINIC:  [facility]   [address]   [place] [address]      TRIAGE LINE: [phone] - Opt[address]  APPOINTMENTS: [phone]  RADIOLOGY: [phone]  SET UP SURGERY: [phone]  FAX NUMBER: [phone]  BILLING QUESTIONS: [phone]       Follow up: as needed  CALLUS / CORNS / IPKs  When there is excessive friction or pressure on the skin, the body responds by making the skin thicker to protect the deeper structures from becoming exposed. While this works well to protect the deeper structures, the thickened skin can increase pressure and pain.    CALLUS: Flat, diffuse thickening are simple calluses and they are usually caused by friction. Often these are the result of rubbing on a shoe or going barefoot.    CORNS: Calluses with a central core between the toes are called corns. These result from prominent joints on adjacent toes rubbing together. Theses are a symptom of bone malalignment and will always recur unless the underlying bones are addressed surgically.    IPKs: Calluses with a central core on the ball of the foot are usually IPKs (intractable plantar keratosis). These are caused by excessive pressure from the metatarsals, the bones that make up the ball of the foot. Often one of these bones is too long or too prominent.  Again, these will always recur unless the underlying bone issue is addressed. There is no cure for these. They will either go away by themselves, recur, or more could develop.    ROUTINE MAINTENANCE  1. File them down with a pumice stone or callus file a couple times a week.   2. An electric callus removing device. Amope Pedi Perfect Electronic Pedicure Foot File and Callus Remover can be a good option.   3. Lotion can be applied to soften the callus. A urea based cream such as Kersal or Vanicream or thicker cream with shea butter are good options.  4. Toe spacers or toe  covers can be used for corns, gel pads can be used for other lesions on the bottom of the foot.   If there is a surgical pathology noted, such as a prominent bone, often this needs to be addressed surgically to minimize recurrence. However, sometimes the lesion simply migrates to another spot after surgery, so it is not a guaranteed cure.     **If you come back to clinic for treatment, insurance does not cover it, and you would be billed. This charge could range from $100 - $160**    ROUTINE FOOT CARE (NAIL TRIMMING / CALLUSES)    Go to [email or web address].[email or web address] ([employer]) and search for providers near you.  Otherwise, this is a list we have gathered of  recommended locations/providers in [place].    [facility]   [phone]   [facility]  [phone]  [email or web address]   [facility]  [phone]  [place] [facility]  [phone]  [email or web address]   [facility]  [address] [place] [address]  [phone] Bean Galicia DPM  [address] [place] [address] [phone]   [facility]  [phone] [address] [place] [address]  [facility]  Dr. Zac Cruz  [phone]  [facility]  [phone]  [place] [facility] [place] Locations  (does not take [employer]) FYI:  *Some providers accept insurance while others are out of pocket. Please contact them for details*     [email or web address]   [email or web address]

## 2022-02-08 NOTE — PROGRESS NOTES
ASSESSMENT:  Encounter Diagnosis   Name Primary?     Surgery follow-up examination Yes     MEDICAL DECISION MAKING:  Stable postoperative findings clinically.  No evidence of K wire movement  No clinical signs of infection  Sterile redress of the left foot  She is to remain nonweightbearing for at least 3 weeks  Follow-up scheduled with my partner Dr. Marley next week.  Sutures will likely be removed.  I plan on leaving the K wire in for at least 4 weeks, if no complications.  Barbi is reminded to call us at any time, should concerns develop or she have questions.    Disclaimer: This note consists of symbols derived from keyboarding, dictation and/or voice recognition software. As a result, there may be errors in the script that have gone undetected. Please consider this when interpreting information found in this chart.    Benjamin Griffin DPM, TYREL, [facility] Department of Podiatry/Foot & Ankle Surgery      ____________________________________________________________________    HPI:       Barbi presents today 1 week postop.  She is accompanied by her .  Her sister assisted with a dressing change, as the previous dressing fell off.    POSTOPERATIVE DIAGNOSES:  1.  Left foot pain, severe hallux abductovalgus deformity with coexisting degenerative joint disease.  2.  Left second metatarsophalangeal joint pain.  3.  Left second hammertoe deformity.  4.  Extension deformity of the left third metatarsophalangeal joint.     PROCEDURES PERFORMED:  1.  Left first metatarsophalangeal joint arthrodesis.  2.  Left second metatarsal Weil osteotomy.  3.  Left second hammertoe correction via arthrodesis.  4.  Left third metatarsophalangeal joint tenotomy and capsulotomy.     *  Past Medical History:   Diagnosis Date     Acute cystitis with hematuria 10/24/2020     Allergic rhinitis 12/9/2009     Calculus of kidney 1/12/2011    Overview:  S/P LITHOTRIPSY 3/15/11      Esophageal reflux 10/22/2008      Hyperparathyroidism 01/11/2011    had surgery for it; resulted in seizures     Moderate major depression, single episode (H)      oligodendroglioma 7/23/2012     Osteoarthrosis 12/9/2009     Palpitations 6/5/2014     PONV (postoperative nausea and vomiting)      PVCs 6/5/2014     Seizure disorder (related to tumor) 08/17/2011    last seizure 3/2021   *  *  Past Surgical History:   Procedure Laterality Date     ARTHRODESIS FOOT Left 2/1/2022    Procedure: left first metatarsophalangeal joint arthrodesis, Tenotomy and capusulotomy 3rd metatarsalphalangeal joint;  Surgeon: Benjamin Griffin DPM;  Location:  OR     COMBINED CYSTOSCOPY, RETROGRADES, URETEROSCOPY, LASER HOLMIUM LITHOTRIPSY URETER(S), INSERT STENT Right 8/3/2021    Procedure: 1. Cystourethroscopy 2. Right ureteroscopy 3. Right retrograde pyelogram with interpretation of intraoperative fluoroscopic imaging 4. Holmium laser lithotripsy with basket stone extraction 5. Right ureteral stent placement;  Surgeon: Felix Cano MD;  Location:  OR     CRANIOTOMY  2011    tumor resection     CYSTOSCOPY, RETROGRADES, INSERT STENT URETER(S), COMBINED Right 7/20/2021    Procedure: 1.  Cystourethroscopy 2.  Attempted right ureteroscopy 3.  Right  retrograde pyelogram with interpretation of intraoperative fluoroscopic imaging 4.  Right ureteral dilatation 5.  Right ureteral stent placement 6.  Laser on stand-by;  Surgeon: Felix Cano MD;  Location:  OR     EXTRACORPOREAL SHOCK WAVE LITHOTRIPSY, CYSTOSCOPY, INSERT STENT URETER(S), COMBINED Bilateral 5/5/2017    Procedure: COMBINED EXTRACORPOREAL SHOCK WAVE LITHOTRIPSY, CYSTOSCOPY, INSERT STENT URETER(S);  CYSTO, URETHRAL DILATION, URETERAL LEFT STENT PLACEMENT, LEFT ESWL.;  Surgeon: Angel Del Rio MD;  Location:  OR     FOOT SURGERY      mulitple     HYSTERECTOMY, PAP NO LONGER INDICATED  2008    lap hys     LITHOTRIPSY  2010 2017     OPTICAL TRACKING SYSTEM CRANIOTOMY, EXCISE TUMOR WITH  MAPPING, COMBINED Right 5/30/2018    Procedure: COMBINED OPTICAL TRACKING SYSTEM CRANIOTOMY, EXCISE TUMOR WITH MAPPING;  Right Stealth Assisted Craniotomy With Motor Mapping And Tumor Resection ;  Surgeon: Dustin Rodriguez MD;  [facility]     ORTHOPEDIC SURGERY      feet, multiple on both     OSTEOTOMY FOOT Right 1/15/2019    Procedure: OSTEOTOMY FOOT;  Surgeon: Benjamin Griffin DPM;  [facility]     OSTEOTOMY FOOT Left 2/1/2022    Procedure: left second metatarsal Weil osteotomy;  Surgeon: Benjamin Griffin DPM;  [facility]     PARATHYROIDECTOMY  2011     RECONSTRUCT FOREFOOT WITH METATARSOPHALANGEAL (MTP) FUSION Right 1/15/2019    Procedure: RIGHT FIRST METATARSAL PHALAGEAL JOINT ARTHRODESIS, RIGHT SECOND METATARSAL WEIL OSTEOTOMY;  Surgeon: Benjamin Griffin DPM;  [facility]: [facility]     REPAIR HAMMER TOE Left 2/1/2022    Procedure: CORRECTION, left second HAMMER TOE;  Surgeon: Benjamin Griffin DPM;  Location: [facility]   *  *  Current Outpatient Medications   Medication Sig Dispense Refill     acetaminophen (TYLENOL) 500 MG tablet Take 2 tablets (1,000 mg) by mouth every 8 hours as needed for mild pain 42 tablet 1     enoxaparin ANTICOAGULANT (LOVENOX) 40 MG/0.4ML syringe Inject 0.4 mLs (40 mg) Subcutaneous daily for 10 days 4 mL 0     escitalopram (LEXAPRO) 10 MG tablet Take 1 tablet (10 mg) by mouth daily 90 tablet 1     fexofenadine (ALLEGRA) 180 MG tablet Take 180 mg by mouth daily       gabapentin (NEURONTIN) 100 MG capsule Take 3 capsules (300 mg) by mouth every evening Please keep appointment 2/23/22 90 capsule 0     hydrOXYzine (ATARAX) 25 MG tablet Take 1 tablet (25 mg) by mouth every 6 hours as needed for itching 28 tablet 1     ibuprofen (ADVIL/MOTRIN) 600 MG tablet Take 1 tablet (600 mg) by mouth every 6 hours as needed for moderate pain 28 tablet 1     Lactase (LACTAID PO) Take 1-2 tablets by mouth daily as needed for indigestion        lamoTRIgine (LAMICTAL) 100 MG tablet TAKE  "ONE TABLET BY MOUTH EVERY MORNING AND EVENING (TAKE ALONG WITH 200 MG TABLET FOR A TOTAL  MG TWO TIMES A DAY ) Please keep appointment 2/23/22 60 tablet 0     lamoTRIgine (LAMICTAL) 200 MG tablet TAKE ONE TABLET BY MOUTH TWICE A DAY (TAKE ALONG WITH 100 MG TABLET FOR TOTAL  MG BID).  Please keep appointment 2/23/22 60 tablet 0     levETIRAcetam (KEPPRA) 500 MG tablet 1500 mg am and 1250 mg pm 495 tablet 3     metoprolol tartrate (LOPRESSOR) 25 MG tablet Take 0.5 tablets (12.5 mg) by mouth 2 times daily 90 tablet 3     Multiple Vitamin (MULTI-VITAMINS) TABS Take 1 chew tab by mouth daily        tretinoin (RETIN-A) 0.025 % external cream Apply a pea-sized amount to each area affected by acne. Start every 3-4 nights working up to every night. 45 g 0     triamcinolone (KENALOG) 0.1 % external cream Apply topically 2 times daily To affected area in between breasts for 1-2 weeks. Tapering with improvement and restarting with flares. 60 g 2     VITAMIN D, CHOLECALCIFEROL, PO Take 1,000 Units by mouth daily            EXAM:    Vitals: /64   Ht 1.575 m (5' 2\")   LMP  (LMP Unknown)   BMI 25.97 kg/m    BMI: Body mass index is 25.97 kg/m .      All incisions remain coapted  Sutures are intact  The second toe K wire remains intact and in original position  The expected amount of edema and ecchymosis is seen  Light touch sensation is intact  No active bleeding      "

## 2022-02-08 NOTE — LETTER
2/8/2022         RE: Barbi Tiwari  [address]  [facility] [address]        Dear Colleague,    Thank you for referring your patient, Barbi Tiwari, to the [facility]. Please see a copy of my visit note below.    ASSESSMENT:  Encounter Diagnosis   Name Primary?     Surgery follow-up examination Yes     MEDICAL DECISION MAKING:  Stable postoperative findings clinically.  No evidence of K wire movement  No clinical signs of infection  Sterile redress of the left foot  She is to remain nonweightbearing for at least 3 weeks  Follow-up scheduled with my partner Dr. Marley next week.  Sutures will likely be removed.  I plan on leaving the K wire in for at least 4 weeks, if no complications.  Barbi is reminded to call us at any time, should concerns develop or she have questions.    Disclaimer: This note consists of symbols derived from keyboarding, dictation and/or voice recognition software. As a result, there may be errors in the script that have gone undetected. Please consider this when interpreting information found in this chart.    Benjamin Griffin DPM, FACFAS, [facility] Department of Podiatry/Foot & Ankle Surgery      ____________________________________________________________________    HPI:       Barbi presents today 1 week postop.  She is accompanied by her .  Her sister assisted with a dressing change, as the previous dressing fell off.    POSTOPERATIVE DIAGNOSES:  1.  Left foot pain, severe hallux abductovalgus deformity with coexisting degenerative joint disease.  2.  Left second metatarsophalangeal joint pain.  3.  Left second hammertoe deformity.  4.  Extension deformity of the left third metatarsophalangeal joint.     PROCEDURES PERFORMED:  1.  Left first metatarsophalangeal joint arthrodesis.  2.  Left second metatarsal Weil osteotomy.  3.  Left second hammertoe correction via arthrodesis.  4.  Left third metatarsophalangeal joint tenotomy and  capsulotomy.     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 Cystourethroscopy 2. Right ureteroscopy 3. Right retrograde pyelogram with interpretation of intraoperative fluoroscopic imaging 4. Holmium laser lithotripsy with basket stone extraction 5. Right ureteral stent placement;  Surgeon: Felix Cano MD;  Location:  OR     CRANIOTOMY  2011    tumor resection     CYSTOSCOPY, RETROGRADES, INSERT STENT URETER(S), COMBINED Right 7/20/2021    Procedure: 1.  Cystourethroscopy 2.  Attempted right ureteroscopy 3.  Right  retrograde pyelogram with interpretation of intraoperative fluoroscopic imaging 4.  Right ureteral dilatation 5.  Right ureteral stent placement 6.  Laser on stand-by;  Surgeon: Felix Cano MD;  Location:  OR     EXTRACORPOREAL SHOCK WAVE LITHOTRIPSY, CYSTOSCOPY, INSERT STENT URETER(S), COMBINED Bilateral 5/5/2017    Procedure: COMBINED EXTRACORPOREAL SHOCK WAVE LITHOTRIPSY, CYSTOSCOPY, INSERT STENT URETER(S);  CYSTO, URETHRAL DILATION, URETERAL LEFT STENT  PLACEMENT, LEFT ESWL.;  Surgeon: Angel Del Rio MD;  Location: [facility] OR     FOOT SURGERY      mulitple     HYSTERECTOMY, PAP NO LONGER INDICATED  2008    lap hys     LITHOTRIPSY  2010 2017     OPTICAL TRACKING SYSTEM CRANIOTOMY, EXCISE TUMOR WITH MAPPING, COMBINED Right 5/30/2018    Procedure: COMBINED OPTICAL TRACKING SYSTEM CRANIOTOMY, EXCISE TUMOR WITH MAPPING;  Right Stealth Assisted Craniotomy With Motor Mapping And Tumor Resection ;  Surgeon: Dustin Rodriguez MD;  [facility]: [facility]     ORTHOPEDIC SURGERY      feet, multiple on both     OSTEOTOMY FOOT Right 1/15/2019    Procedure: OSTEOTOMY FOOT;  Surgeon: Benjamin Griffin DPM;  [facility]     OSTEOTOMY FOOT Left 2/1/2022    Procedure: left second metatarsal Weil osteotomy;  Surgeon: Benjamin Griffin DPM;  [facility]     PARATHYROIDECTOMY  2011     RECONSTRUCT FOREFOOT WITH METATARSOPHALANGEAL (MTP) FUSION Right 1/15/2019    Procedure: RIGHT FIRST METATARSAL PHALAGEAL JOINT ARTHRODESIS, RIGHT SECOND METATARSAL WEIL OSTEOTOMY;  Surgeon: Benjamin Griffin DPM;  [facility]     REPAIR HAMMER TOE Left 2/1/2022    Procedure: CORRECTION, left second HAMMER TOE;  Surgeon: Benjamin Griffin DPM;  Location: [facility]   *  *  Current Outpatient Medications   Medication Sig Dispense Refill     acetaminophen (TYLENOL) 500 MG tablet Take 2 tablets (1,000 mg) by mouth every 8 hours as needed for mild pain 42 tablet 1     enoxaparin ANTICOAGULANT (LOVENOX) 40 MG/0.4ML syringe Inject 0.4 mLs (40 mg) Subcutaneous daily for 10 days 4 mL 0     escitalopram (LEXAPRO) 10 MG tablet Take 1 tablet (10 mg) by mouth daily 90 tablet 1     fexofenadine (ALLEGRA) 180 MG tablet Take 180 mg by mouth daily       gabapentin (NEURONTIN) 100 MG capsule Take 3 capsules (300 mg) by mouth every evening Please keep appointment 2/23/22 90 capsule 0     hydrOXYzine (ATARAX) 25 MG tablet Take 1 tablet (25 mg) by mouth every 6 hours as needed for itching 28 tablet 1      "ibuprofen (ADVIL/MOTRIN) 600 MG tablet Take 1 tablet (600 mg) by mouth every 6 hours as needed for moderate pain 28 tablet 1     Lactase (LACTAID PO) Take 1-2 tablets by mouth daily as needed for indigestion        lamoTRIgine (LAMICTAL) 100 MG tablet TAKE ONE TABLET BY MOUTH EVERY MORNING AND EVENING (TAKE ALONG WITH 200 MG TABLET FOR A TOTAL  MG TWO TIMES A DAY ) Please keep appointment 2/23/22 60 tablet 0     lamoTRIgine (LAMICTAL) 200 MG tablet TAKE ONE TABLET BY MOUTH TWICE A DAY (TAKE ALONG WITH 100 MG TABLET FOR TOTAL  MG BID).  Please keep appointment 2/23/22 60 tablet 0     levETIRAcetam (KEPPRA) 500 MG tablet 1500 mg am and 1250 mg pm 495 tablet 3     metoprolol tartrate (LOPRESSOR) 25 MG tablet Take 0.5 tablets (12.5 mg) by mouth 2 times daily 90 tablet 3     Multiple Vitamin (MULTI-VITAMINS) TABS Take 1 chew tab by mouth daily        tretinoin (RETIN-A) 0.025 % external cream Apply a pea-sized amount to each area affected by acne. Start every 3-4 nights working up to every night. 45 g 0     triamcinolone (KENALOG) 0.1 % external cream Apply topically 2 times daily To affected area in between breasts for 1-2 weeks. Tapering with improvement and restarting with flares. 60 g 2     VITAMIN D, CHOLECALCIFEROL, PO Take 1,000 Units by mouth daily            EXAM:    Vitals: /64   Ht 1.575 m (5' 2\")   LMP  (LMP Unknown)   BMI 25.97 kg/m    BMI: Body mass index is 25.97 kg/m .      All incisions remain coapted  Sutures are intact  The second toe K wire remains intact and in original position  The expected amount of edema and ecchymosis is seen  Light touch sensation is intact  No active bleeding          Again, thank you for allowing me to participate in the care of your patient.        Sincerely,        Benjamin Griffin, DPM    "

## 2022-02-09 RX ORDER — LEVETIRACETAM 500 MG/1
TABLET ORAL
Qty: 495 TABLET | Refills: 3 | Status: SHIPPED | OUTPATIENT
Start: 2022-02-09 | End: 2022-02-23

## 2022-02-09 NOTE — TELEPHONE ENCOUNTER
LEVETIRACETAM 500MG TABS  Last Written Prescription Date:   1/6/2021  Last Fill Quantity: 495,   # refills: 3  Last Office Visit :  1/21/2022  Future Office visit:  2/23/2022  495 Tabs, 3 Refills sent to pharm 2/9/2022      Noelle Trammell RN  Central Triage Red Flags/Med Refills

## 2022-02-10 ENCOUNTER — HOSPITAL ENCOUNTER (OUTPATIENT)
Dept: ULTRASOUND IMAGING | Facility: CLINIC | Age: 62
Discharge: HOME | End: 2022-02-10
Attending: STUDENT IN AN ORGANIZED HEALTH CARE EDUCATION/TRAINING PROGRAM | Admitting: STUDENT IN AN ORGANIZED HEALTH CARE EDUCATION/TRAINING PROGRAM
Payer: COMMERCIAL

## 2022-02-10 DIAGNOSIS — N20.0 RIGHT RENAL STONE: ICD-10-CM

## 2022-02-10 PROCEDURE — 76770 US EXAM ABDO BACK WALL COMP: CPT

## 2022-02-12 ENCOUNTER — HEALTH MAINTENANCE LETTER (OUTPATIENT)
Age: 62
End: 2022-02-12

## 2022-02-15 ENCOUNTER — OFFICE VISIT (OUTPATIENT)
Dept: PODIATRY | Facility: CLINIC | Age: 62
End: 2022-02-15
Payer: COMMERCIAL

## 2022-02-15 VITALS — HEIGHT: 62 IN | BODY MASS INDEX: 25.97 KG/M2

## 2022-02-15 DIAGNOSIS — Z98.890 POSTOPERATIVE STATE: Primary | ICD-10-CM

## 2022-02-15 PROCEDURE — 99024 POSTOP FOLLOW-UP VISIT: CPT | Performed by: PODIATRIST

## 2022-02-15 NOTE — PROGRESS NOTES
Assessment:      ICD-10-CM    1. Postoperative state  Z98.890        14 days s/p L foot surgery    Plan:  No orders of the defined types were placed in this encounter.      Discussed the post-operative recovery plan with the patient.    -Sutures- removed.  Steris applied  Sterile dressing re-applied.   -WB- Heel WB to operative limb in boot  -Activity- Elevate above heart level at rest.  Limit periods of dependency to <5mins per hour.  Ice behind knee of operative limb.  -Pain- opioids- po Dilaudid currently; no Rx from me  -DVT prophylaxis- none  -Abx- none    Return:  Return for Post-op, Dr Griffin.     Sharda Marley DPM                Chief Complaint:     Patient presents with:  Surgical Followup     Post-op Exam    HPI:  Barbi Tiwari is a 61 year old year old female who presents for post-op exam.    Surgery Date- 2/1/22  Post-operative day #14  Procedure- 1st MPJ arthrodesis, HTC 2,3.  Pain- controlled  Pain medication use- dilauded  Abx- none  WB status- touch WB in short boot  DVT Px- nont  Dressing clean/dry/intact?- yes        Past Medical & Surgical History:  Past Medical History:   Diagnosis Date     Acute cystitis with hematuria 10/24/2020     Allergic rhinitis 12/9/2009     Calculus of kidney 1/12/2011    Overview:  S/P LITHOTRIPSY 3/15/11      Esophageal reflux 10/22/2008     Hyperparathyroidism 01/11/2011    had surgery for it; resulted in seizures     Moderate major depression, single episode (H)      oligodendroglioma 7/23/2012     Osteoarthrosis 12/9/2009     Palpitations 6/5/2014     PONV (postoperative nausea and vomiting)      PVCs 6/5/2014     Seizure disorder (related to tumor) 08/17/2011    last seizure 3/2021      Past Surgical History:   Procedure Laterality Date     ARTHRODESIS FOOT Left 2/1/2022    Procedure: left first metatarsophalangeal joint arthrodesis, Tenotomy and capusulotomy 3rd metatarsalphalangeal joint;  Surgeon: Benjamin Griffin DPM;  Location: [facility]     COMBINED  CYSTOSCOPY, RETROGRADES, URETEROSCOPY, LASER HOLMIUM LITHOTRIPSY URETER(S), INSERT STENT Right 8/3/2021    Procedure: 1. Cystourethroscopy 2. Right ureteroscopy 3. Right retrograde pyelogram with interpretation of intraoperative fluoroscopic imaging 4. Holmium laser lithotripsy with basket stone extraction 5. Right ureteral stent placement;  Surgeon: Felix Cano MD;  Location:  OR     CRANIOTOMY  2011    tumor resection     CYSTOSCOPY, RETROGRADES, INSERT STENT URETER(S), COMBINED Right 7/20/2021    Procedure: 1.  Cystourethroscopy 2.  Attempted right ureteroscopy 3.  Right  retrograde pyelogram with interpretation of intraoperative fluoroscopic imaging 4.  Right ureteral dilatation 5.  Right ureteral stent placement 6.  Laser on stand-by;  Surgeon: Felix Cano MD;  Location:  OR     EXTRACORPOREAL SHOCK WAVE LITHOTRIPSY, CYSTOSCOPY, INSERT STENT URETER(S), COMBINED Bilateral 5/5/2017    Procedure: COMBINED EXTRACORPOREAL SHOCK WAVE LITHOTRIPSY, CYSTOSCOPY, INSERT STENT URETER(S);  CYSTO, URETHRAL DILATION, URETERAL LEFT STENT PLACEMENT, LEFT ESWL.;  Surgeon: Angel Del Rio MD;  Location:  OR     FOOT SURGERY      mulitple     HYSTERECTOMY, PAP NO LONGER INDICATED  2008    lap hys     LITHOTRIPSY  2010 2017     OPTICAL TRACKING SYSTEM CRANIOTOMY, EXCISE TUMOR WITH MAPPING, COMBINED Right 5/30/2018    Procedure: COMBINED OPTICAL TRACKING SYSTEM CRANIOTOMY, EXCISE TUMOR WITH MAPPING;  Right Stealth Assisted Craniotomy With Motor Mapping And Tumor Resection ;  Surgeon: Dustin Rodriguez MD;  [facility]     ORTHOPEDIC SURGERY      feet, multiple on both     OSTEOTOMY FOOT Right 1/15/2019    Procedure: OSTEOTOMY FOOT;  Surgeon: Benjamin Griffin DPM;  Location:  OR     OSTEOTOMY FOOT Left 2/1/2022    Procedure: left second metatarsal Weil osteotomy;  Surgeon: Benjamin Griffin DPM;  Location:  OR     PARATHYROIDECTOMY  2011     RECONSTRUCT FOREFOOT WITH METATARSOPHALANGEAL  (MTP) FUSION Right 1/15/2019    Procedure: RIGHT FIRST METATARSAL PHALAGEAL JOINT ARTHRODESIS, RIGHT SECOND METATARSAL WEIL OSTEOTOMY;  Surgeon: Benjamin Griffin DPM;  Location: [facility]     REPAIR HAMMER TOE Left 2022    Procedure: CORRECTION, left second HAMMER TOE;  Surgeon: Benjamin Griffin DPM;  Location: [facility] OR      Family History   Problem Relation Age of Onset     Arthritis Mother      Depression Mother      Respiratory Mother         COPD     LUNG DISEASE Mother      C.A.D. Father 58        heart attack     Heart Disease Father      Coronary Artery Disease Father         MI  age 54     Diabetes Brother 70         age 70     Depression Sister      Depression Son      Allergies Son      Depression Daughter      Allergies Daughter      Eczema Brother      Skin Cancer Brother      Depression Sister      Depression Sister      Anxiety Disorder Daughter         Social History:  ?  History   Smoking Status     Never Smoker   Smokeless Tobacco     Never Used     History   Drug Use No     Social History    Substance and Sexual Activity      Alcohol use: Yes        Comment: social      Allergies:  ?   Allergies   Allergen Reactions     Sulfa Drugs Hives     Benoxinate Nausea and Vomiting        Medications:    Current Outpatient Medications   Medication     acetaminophen (TYLENOL) 500 MG tablet     escitalopram (LEXAPRO) 10 MG tablet     fexofenadine (ALLEGRA) 180 MG tablet     gabapentin (NEURONTIN) 100 MG capsule     hydrOXYzine (ATARAX) 25 MG tablet     ibuprofen (ADVIL/MOTRIN) 600 MG tablet     Lactase (LACTAID PO)     lamoTRIgine (LAMICTAL) 100 MG tablet     lamoTRIgine (LAMICTAL) 200 MG tablet     levETIRAcetam (KEPPRA) 500 MG tablet     metoprolol tartrate (LOPRESSOR) 25 MG tablet     Multiple Vitamin (MULTI-VITAMINS) TABS     tretinoin (RETIN-A) 0.025 % external cream     triamcinolone (KENALOG) 0.1 % external cream     VITAMIN D, CHOLECALCIFEROL, PO     No current facility-administered  "medications for this visit.       Physical Exam:  ?  Vitals:  Ht 1.575 m (5' 2\")   LMP  (LMP Unknown)   BMI 25.97 kg/m     General:  WD/WN, in NAD.  A&O x3.    Dermatologic:    Incision  is well coapted, dehiscence absent.   Marisa-incisional erythema absent, ecchymosis absent.  Vascular:  Digital capillary refill time normal bilateral.  Skin temperature is normal  to operative site.  Focal edema- mild to operative site.   Calf of operative leg supple, without induration, generalized edema, or venous hue.  Neurologic:    Gross sensation normal to operative limb.  Gait and balance abnormal, NWB  to operative limb.  Musculoskeletal:  mild pain to palpation of foot left.  Ankle ROM  intact to operative limb.  Muscle strength intact to contralateral limb.    Imaging:   x-ray independently reviewed and interpreted by myself today.  Non-Weight-bearing views left foot dated 2/1/22, reveal 1st MPJ arthrodesis, HTC #2.          "

## 2022-02-15 NOTE — PATIENT INSTRUCTIONS
PATIENT INSTRUCTIONS    Suture Removal:  -Keep the dressing clean/dry/intact    -Continue to use compression (ACE bandage or Tubigrip) to the operative foot/ ankle.  -Continue Heel Weight bearing in the boot    Follow-up with dr Griffin as scheduled

## 2022-02-15 NOTE — LETTER
2/15/2022         RE: Barbi Tiwari  [address]  [facility] [address]        Dear Colleague,    Thank you for referring your patient, Barbi Tiwari, to the [facility]. Please see a copy of my visit note below.    Assessment:      ICD-10-CM    1. Postoperative state  Z98.890        14 days s/p L foot surgery    Plan:  No orders of the defined types were placed in this encounter.      Discussed the post-operative recovery plan with the patient.    -Sutures- removed.  Steris applied  Sterile dressing re-applied.   -WB- Heel WB to operative limb in boot  -Activity- Elevate above heart level at rest.  Limit periods of dependency to <5mins per hour.  Ice behind knee of operative limb.  -Pain- opioids- po Dilaudid currently; no Rx from me  -DVT prophylaxis- none  -Abx- none    Return:  Return for Post-op, Dr Griffin.     Sharda Marley, SANTO                Chief Complaint:     Patient presents with:  Surgical Followup     Post-op Exam    HPI:  Barbi Tiwari is a 61 year old year old female who presents for post-op exam.    Surgery Date- 2/1/22  Post-operative day #14  Procedure- 1st MPJ arthrodesis, HTC 2,3.  Pain- controlled  Pain medication use- dilauded  Abx- none  WB status- touch WB in short boot  DVT Px- nont  Dressing clean/dry/intact?- yes        Past Medical & Surgical History:  Past Medical History:   Diagnosis Date     Acute cystitis with hematuria 10/24/2020     Allergic rhinitis 12/9/2009     Calculus of kidney 1/12/2011    Overview:  S/P LITHOTRIPSY 3/15/11      Esophageal reflux 10/22/2008     Hyperparathyroidism 01/11/2011    had surgery for it; resulted in seizures     Moderate major depression, single episode (H)      oligodendroglioma 7/23/2012     Osteoarthrosis 12/9/2009     Palpitations 6/5/2014     PONV (postoperative nausea and vomiting)      PVCs 6/5/2014     Seizure disorder (related to tumor) 08/17/2011    last seizure 3/2021      Past  Surgical History:   Procedure Laterality Date     ARTHRODESIS FOOT Left 2/1/2022    Procedure: left first metatarsophalangeal joint arthrodesis, Tenotomy and capusulotomy 3rd metatarsalphalangeal joint;  Surgeon: Benjamin Griffin DPM;  Location:  OR     COMBINED CYSTOSCOPY, RETROGRADES, URETEROSCOPY, LASER HOLMIUM LITHOTRIPSY URETER(S), INSERT STENT Right 8/3/2021    Procedure: 1. Cystourethroscopy 2. Right ureteroscopy 3. Right retrograde pyelogram with interpretation of intraoperative fluoroscopic imaging 4. Holmium laser lithotripsy with basket stone extraction 5. Right ureteral stent placement;  Surgeon: Felix Cano MD;  Location:  OR     CRANIOTOMY  2011    tumor resection     CYSTOSCOPY, RETROGRADES, INSERT STENT URETER(S), COMBINED Right 7/20/2021    Procedure: 1.  Cystourethroscopy 2.  Attempted right ureteroscopy 3.  Right  retrograde pyelogram with interpretation of intraoperative fluoroscopic imaging 4.  Right ureteral dilatation 5.  Right ureteral stent placement 6.  Laser on stand-by;  Surgeon: Felix Cano MD;  Location:  OR     EXTRACORPOREAL SHOCK WAVE LITHOTRIPSY, CYSTOSCOPY, INSERT STENT URETER(S), COMBINED Bilateral 5/5/2017    Procedure: COMBINED EXTRACORPOREAL SHOCK WAVE LITHOTRIPSY, CYSTOSCOPY, INSERT STENT URETER(S);  CYSTO, URETHRAL DILATION, URETERAL LEFT STENT PLACEMENT, LEFT ESWL.;  Surgeon: Angel Del Rio MD;  Location:  OR     FOOT SURGERY      mulitple     HYSTERECTOMY, PAP NO LONGER INDICATED  2008    lap hys     LITHOTRIPSY  2010 2017     OPTICAL TRACKING SYSTEM CRANIOTOMY, EXCISE TUMOR WITH MAPPING, COMBINED Right 5/30/2018    Procedure: COMBINED OPTICAL TRACKING SYSTEM CRANIOTOMY, EXCISE TUMOR WITH MAPPING;  Right Stealth Assisted Craniotomy With Motor Mapping And Tumor Resection ;  Surgeon: Dustin Rodriguez MD;  Location: [facility] OR     ORTHOPEDIC SURGERY      feet, multiple on both     OSTEOTOMY FOOT Right 1/15/2019    Procedure: OSTEOTOMY FOOT;   Surgeon: Benjamin Griffin DPM;  Location: [facility] OR     OSTEOTOMY FOOT Left 2022    Procedure: left second metatarsal Weil osteotomy;  Surgeon: Benjamin Griffin DPM;  Location: [facility] OR     PARATHYROIDECTOMY  2011     RECONSTRUCT FOREFOOT WITH METATARSOPHALANGEAL (MTP) FUSION Right 1/15/2019    Procedure: RIGHT FIRST METATARSAL PHALAGEAL JOINT ARTHRODESIS, RIGHT SECOND METATARSAL WEIL OSTEOTOMY;  Surgeon: Benjamin Griffin DPM;  Location: [facility] OR     REPAIR HAMMER TOE Left 2022    Procedure: CORRECTION, left second HAMMER TOE;  Surgeon: Benjamin Griffin DPM;  Location: [facility] OR      Family History   Problem Relation Age of Onset     Arthritis Mother      Depression Mother      Respiratory Mother         COPD     LUNG DISEASE Mother      C.A.D. Father 58        heart attack     Heart Disease Father      Coronary Artery Disease Father         MI  age 54     Diabetes Brother 70         age 70     Depression Sister      Depression Son      Allergies Son      Depression Daughter      Allergies Daughter      Eczema Brother      Skin Cancer Brother      Depression Sister      Depression Sister      Anxiety Disorder Daughter         Social History:  ?  History   Smoking Status     Never Smoker   Smokeless Tobacco     Never Used     History   Drug Use No     Social History    Substance and Sexual Activity      Alcohol use: Yes        Comment: social      Allergies:  ?   Allergies   Allergen Reactions     Sulfa Drugs Hives     Benoxinate Nausea and Vomiting        Medications:    Current Outpatient Medications   Medication     acetaminophen (TYLENOL) 500 MG tablet     escitalopram (LEXAPRO) 10 MG tablet     fexofenadine (ALLEGRA) 180 MG tablet     gabapentin (NEURONTIN) 100 MG capsule     hydrOXYzine (ATARAX) 25 MG tablet     ibuprofen (ADVIL/MOTRIN) 600 MG tablet     Lactase (LACTAID PO)     lamoTRIgine (LAMICTAL) 100 MG tablet     lamoTRIgine (LAMICTAL) 200 MG tablet     levETIRAcetam (KEPPRA) 500 MG  "tablet     metoprolol tartrate (LOPRESSOR) 25 MG tablet     Multiple Vitamin (MULTI-VITAMINS) TABS     tretinoin (RETIN-A) 0.025 % external cream     triamcinolone (KENALOG) 0.1 % external cream     VITAMIN D, CHOLECALCIFEROL, PO     No current facility-administered medications for this visit.       Physical Exam:  ?  Vitals:  Ht 1.575 m (5' 2\")   LMP  (LMP Unknown)   BMI 25.97 kg/m     General:  WD/WN, in NAD.  A&O x3.    Dermatologic:    Incision  is well coapted, dehiscence absent.   Marisa-incisional erythema absent, ecchymosis absent.  Vascular:  Digital capillary refill time normal bilateral.  Skin temperature is normal  to operative site.  Focal edema- mild to operative site.   Calf of operative leg supple, without induration, generalized edema, or venous hue.  Neurologic:    Gross sensation normal to operative limb.  Gait and balance abnormal, NWB  to operative limb.  Musculoskeletal:  mild pain to palpation of foot left.  Ankle ROM  intact to operative limb.  Muscle strength intact to contralateral limb.    Imaging:   x-ray independently reviewed and interpreted by myself today.  Non-Weight-bearing views left foot dated 2/1/22, reveal 1st MPJ arthrodesis, HTC #2.              Again, thank you for allowing me to participate in the care of your patient.        Sincerely,        Sharda Marley DPM    "

## 2022-02-23 ENCOUNTER — VIRTUAL VISIT (OUTPATIENT)
Dept: NEUROLOGY | Facility: CLINIC | Age: 62
End: 2022-02-23
Payer: COMMERCIAL

## 2022-02-23 DIAGNOSIS — R56.9 SEIZURE (H): ICD-10-CM

## 2022-02-23 RX ORDER — LEVETIRACETAM 500 MG/1
TABLET ORAL
Qty: 495 TABLET | Refills: 3 | Status: SHIPPED | OUTPATIENT
Start: 2022-02-23 | End: 2022-11-11

## 2022-02-23 RX ORDER — LAMOTRIGINE 200 MG/1
TABLET ORAL
Qty: 180 TABLET | Refills: 3 | Status: SHIPPED | OUTPATIENT
Start: 2022-02-23 | End: 2022-11-11

## 2022-02-23 RX ORDER — LAMOTRIGINE 100 MG/1
TABLET ORAL
Qty: 135 TABLET | Refills: 3 | Status: SHIPPED | OUTPATIENT
Start: 2022-02-23 | End: 2022-11-11

## 2022-02-23 RX ORDER — GABAPENTIN 100 MG/1
200 CAPSULE ORAL EVERY EVENING
Qty: 180 CAPSULE | Refills: 3 | Status: SHIPPED | OUTPATIENT
Start: 2022-02-23 | End: 2023-03-29

## 2022-02-23 NOTE — PATIENT INSTRUCTIONS
Decrease lamotrigine by 50 mg morning and shift levetiracetam dosing. If seizure worsen, then go back to taking levetiracetam as 1500 mg morning and 1250 mg at night       Times of Days  8AM   9 pm      Medication Tablet Size Number of Tablets/Capsules Total Daily Dosage   Lamotrigine   100 mg   1/2      1       Lamotrigine   200 mg   1      1    550 mg    Gabapentin    100 mg         2    200 mg   Levetiracetam   500 mg  2.5      3    2750 mg       - Ativan for seizure clusters, would need prescription   - check labs lamotrigine, levetiracetam,  Gabapentin, ast, alt    - Follow-up 4 months   - Do not drive with lowering seizure medications   - follow up  6 months     Flakita Clark MD
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
## 2022-02-23 NOTE — PROGRESS NOTES
Called left voicemail message for patient to call back to complete the rooming process.  Keily Disla, [facility]
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
## 2022-03-04 ENCOUNTER — LAB (OUTPATIENT)
Dept: LAB | Facility: CLINIC | Age: 62
End: 2022-03-04
Payer: COMMERCIAL

## 2022-03-04 ENCOUNTER — OFFICE VISIT (OUTPATIENT)
Dept: PODIATRY | Facility: CLINIC | Age: 62
End: 2022-03-04
Payer: COMMERCIAL

## 2022-03-04 ENCOUNTER — ANCILLARY PROCEDURE (OUTPATIENT)
Dept: GENERAL RADIOLOGY | Facility: CLINIC | Age: 62
End: 2022-03-04
Attending: PODIATRIST
Payer: COMMERCIAL

## 2022-03-04 VITALS — BODY MASS INDEX: 25.97 KG/M2 | DIASTOLIC BLOOD PRESSURE: 70 MMHG | SYSTOLIC BLOOD PRESSURE: 122 MMHG | HEIGHT: 62 IN

## 2022-03-04 DIAGNOSIS — E78.00 ELEVATED CHOLESTEROL: ICD-10-CM

## 2022-03-04 DIAGNOSIS — Z98.890 POSTOPERATIVE STATE: ICD-10-CM

## 2022-03-04 DIAGNOSIS — R56.9 SEIZURE (H): ICD-10-CM

## 2022-03-04 DIAGNOSIS — Z13.220 SCREENING FOR HYPERLIPIDEMIA: ICD-10-CM

## 2022-03-04 DIAGNOSIS — Z98.890 POSTOPERATIVE STATE: Primary | ICD-10-CM

## 2022-03-04 LAB — VIT B12 SERPL-MCNC: 194 PG/ML (ref 193–986)

## 2022-03-04 PROCEDURE — 80175 DRUG SCREEN QUAN LAMOTRIGINE: CPT | Mod: 90

## 2022-03-04 PROCEDURE — 36415 COLL VENOUS BLD VENIPUNCTURE: CPT

## 2022-03-04 PROCEDURE — 99024 POSTOP FOLLOW-UP VISIT: CPT | Performed by: PODIATRIST

## 2022-03-04 PROCEDURE — 80171 DRUG SCREEN QUANT GABAPENTIN: CPT | Mod: 90

## 2022-03-04 PROCEDURE — 80177 DRUG SCRN QUAN LEVETIRACETAM: CPT | Mod: 90

## 2022-03-04 PROCEDURE — 99000 SPECIMEN HANDLING OFFICE-LAB: CPT

## 2022-03-04 PROCEDURE — 73630 X-RAY EXAM OF FOOT: CPT | Mod: LT | Performed by: RADIOLOGY

## 2022-03-04 PROCEDURE — 82306 VITAMIN D 25 HYDROXY: CPT

## 2022-03-04 PROCEDURE — 82607 VITAMIN B-12: CPT

## 2022-03-04 PROCEDURE — 84443 ASSAY THYROID STIM HORMONE: CPT

## 2022-03-04 ASSESSMENT — PATIENT HEALTH QUESTIONNAIRE - PHQ9
10. IF YOU CHECKED OFF ANY PROBLEMS, HOW DIFFICULT HAVE THESE PROBLEMS MADE IT FOR YOU TO DO YOUR WORK, TAKE CARE OF THINGS AT HOME, OR GET ALONG WITH OTHER PEOPLE: NOT DIFFICULT AT ALL
SUM OF ALL RESPONSES TO PHQ QUESTIONS 1-9: 2
SUM OF ALL RESPONSES TO PHQ QUESTIONS 1-9: 2

## 2022-03-04 NOTE — LETTER
3/4/2022         RE: Barbi Tiwari  [address] [facility] [id number]        Dear Colleague,    Thank you for referring your patient, Barbi Tiwari, to the [facility]. Please see a copy of my visit note below.    ASSESSMENT:  Encounter Diagnosis   Name Primary?     Postoperative state Yes     MEDICAL DECISION MAKING:  I personally reviewed the current x-ray images with Barbi and her  and we compared them to the preoperative images.  The K wire in the left second toe was removed without difficulty.  A light dressing was applied.  She is asked to wait a day before getting the foot wet.  She is to continue wearing the cam walker until she is at 6 weeks postop.  She can then try weaning out of the cam walker and transitioning into a supportive shoe.  Follow-up in 1 month.  I had like to see her back sooner if she has any concerns.    Disclaimer: This note consists of symbols derived from keyboarding, dictation and/or voice recognition software. As a result, there may be errors in the script that have gone undetected. Please consider this when interpreting information found in this chart.    Benjamin Griffin DPM, FACFAS, MS    [place] [facility] of Podiat[facility]/Foot & Ankle Surgery      ____________________________________________________________________    HPI:       Barbi presents today 4.5 week postop.  She is accompanied by her .  She saw Dr. Marley when I was out of town.  Sutures were removed.  No complaints.     POSTOPERATIVE DIAGNOSES:  1.  Left foot pain, severe hallux abductovalgus deformity with coexisting degenerative joint disease.  2.  Left second metatarsophalangeal joint pain.  3.  Left second hammertoe deformity.  4.  Extension deformity of the left third metatarsophalangeal joint.     PROCEDURES PERFORMED:  1.  Left first metatarsophalangeal joint arthrodesis.  2.  Left second metatarsal Weil osteotomy.  3.  Left second hammertoe correction via  "arthrodesis.  4.  Left third metatarsophalangeal joint tenotomy and capsulotomy  *  Past Medical History:   Diagnosis Date     Acute cystitis with hematuria 10/24/2020     Allergic rhinitis 12/9/2009     Calculus of kidney 1/12/2011    Overview:  S/P LITHOTRIPSY 3/15/11      Esophageal reflux 10/22/2008     Hyperparathyroidism 01/11/2011    had surgery for it; resulted in seizures     Moderate major depression, single episode (H)      oligodendroglioma 7/23/2012     Osteoarthrosis 12/9/2009     Palpitations 6/5/2014     PONV (postoperative nausea and vomiting)      PVCs 6/5/2014     Seizure disorder (related to tumor) 08/17/2011    last seizure 3/2021   *  *      EXAM:    Vitals: /70   Ht 1.575 m (5' 2\")   LMP  (LMP Unknown)   BMI 25.97 kg/m    BMI: Body mass index is 25.97 kg/m .    Constitutional:  Barbi Tiwari is in no apparent distress, appears well-nourished.  Cooperative with history and physical exam.    Vascular:  Pedal pulses are palpable for both the DP and PT arteries.  CFT < 3 sec.   Most of a postoperative edema has resolved.    Neuro: Light touch sensation is intact distal to the incisions.     Derm: The incisions have healed.  Steri-Strips remain intact.  The K wire in the second toe is intact.  Generalized xerosis of pedal skin    Musculoskeletal:    The hallux is maintained in a more rectus position and parallel to the second toe.  Second toe is more rectus in the sagittal plane with intact K wire.    XR Left Foot: Stable postoperative findings.  No hardware change or complication.            Again, thank you for allowing me to participate in the care of your patient.        Sincerely,        Benjamin Griffin, SANTO    "

## 2022-03-04 NOTE — PROGRESS NOTES
ASSESSMENT:  Encounter Diagnosis   Name Primary?     Postoperative state Yes     MEDICAL DECISION MAKING:  I personally reviewed the current x-ray images with Barbi and her  and we compared them to the preoperative images.  The K wire in the left second toe was removed without difficulty.  A light dressing was applied.  She is asked to wait a day before getting the foot wet.  She is to continue wearing the cam walker until she is at 6 weeks postop.  She can then try weaning out of the cam walker and transitioning into a supportive shoe.  Follow-up in 1 month.  I had like to see her back sooner if she has any concerns.    Disclaimer: This note consists of symbols derived from keyboarding, dictation and/or voice recognition software. As a result, there may be errors in the script that have gone undetected. Please consider this when interpreting information found in this chart.    Benjamin Griffin DPM, FACJONATHAN, MS    [place] [facility] Podiatry/Foot & Ankle Surgery      ____________________________________________________________________    HPI:       Barbi presents today 4.5 week postop.  She is accompanied by her .  She saw Dr. Marley when I was out of town.  Sutures were removed.  No complaints.     POSTOPERATIVE DIAGNOSES:  1.  Left foot pain, severe hallux abductovalgus deformity with coexisting degenerative joint disease.  2.  Left second metatarsophalangeal joint pain.  3.  Left second hammertoe deformity.  4.  Extension deformity of the left third metatarsophalangeal joint.     PROCEDURES PERFORMED:  1.  Left first metatarsophalangeal joint arthrodesis.  2.  Left second metatarsal Weil osteotomy.  3.  Left second hammertoe correction via arthrodesis.  4.  Left third metatarsophalangeal joint tenotomy and capsulotomy  *  Past Medical History:   Diagnosis Date     Acute cystitis with hematuria 10/24/2020     Allergic rhinitis 12/9/2009     Calculus of kidney 1/12/2011    Overview:  S/P LITHOTRIPSY  "3/15/11      Esophageal reflux 10/22/2008     Hyperparathyroidism 01/11/2011    had surgery for it; resulted in seizures     Moderate major depression, single episode (H)      oligodendroglioma 7/23/2012     Osteoarthrosis 12/9/2009     Palpitations 6/5/2014     PONV (postoperative nausea and vomiting)      PVCs 6/5/2014     Seizure disorder (related to tumor) 08/17/2011    last seizure 3/2021   *  *      EXAM:    Vitals: /70   Ht 1.575 m (5' 2\")   LMP  (LMP Unknown)   BMI 25.97 kg/m    BMI: Body mass index is 25.97 kg/m .    Constitutional:  Barbi Tiwari is in no apparent distress, appears well-nourished.  Cooperative with history and physical exam.    Vascular:  Pedal pulses are palpable for both the DP and PT arteries.  CFT < 3 sec.   Most of a postoperative edema has resolved.    Neuro: Light touch sensation is intact distal to the incisions.     Derm: The incisions have healed.  Steri-Strips remain intact.  The K wire in the second toe is intact.  Generalized xerosis of pedal skin    Musculoskeletal:    The hallux is maintained in a more rectus position and parallel to the second toe.  Second toe is more rectus in the sagittal plane with intact K wire.    XR Left Foot: Stable postoperative findings.  No hardware change or complication.        "

## 2022-03-04 NOTE — PROGRESS NOTES
"Video-Visit Details  Type of service:  Video Visit    Video Start Time: 04:01 PM  Video End Time: 04:18 PM    Originating Location (pt. Location): Home    Distant Location (provider location):  [facility]     Platform used for Video Visit: Prosper Gallagher MD    _____________________________________________________________    NEURO-ONCOLOGY VISIT  Mar 8, 2022    CHIEF COMPLAINT: Ms. Barbi Tiwari is a 61 year old right-handed woman with a right frontal diffuse oligodendroglioma (1p19q co-deleted), diagnosed following resection in 5/2011. She received 12 cycles of temozolomide, completed in 5/2012. Changes on imaging necessitated a repeat resection on 5/30/2018 and pathology was unchanged. No adjuvant cancer-directed therapy was initiated. Imaging over the next 1.5 years showed slow tumor progression and treatment was then initiated. She completed chemoradiotherapy on 5/30/2020. Barbi then completed 6 cycles of adjuvant-dosed temozolomide as of 11/2020. Dose escalation to 200mg/m2 was complicated by intolerable nausea.     She is now managed on imaging surveillance. Her most recent scan is without concern for cancer recurrence.     I met with Melvin () for this follow-up visit.    HISTORY OF PRESENT ILLNESS  -Barbi is doing well.   -Chronic fatigue.    Working with Dr. Clark to adjust seizure medications to potentially improve energy. Reports no recent seizures.   Vitamin D 48. Vitamin B12 is low. TSH was normal.   -Mood is \"better\" with the start of venlafaxine.   -She denies any new symptoms; no weakness, numbness, changes in vision, or headaches. Balance is off; chronic and stable.  -Continued mild word-finding issues. Able to multi-task. Agreeable to undergoing repeat neuro-psych testing.  -Had foot surgery; wearing a boot. Taking a leave from work for 3 months to allow the foot to heal.   -Melvin will be vacationing in [place] in April!    REVIEW OF " SYSTEMS  A comprehensive ROS negative except as in HPI.      MEDICATIONS   Current Outpatient Medications   Medication     acetaminophen (TYLENOL) 500 MG tablet     escitalopram (LEXAPRO) 10 MG tablet     fexofenadine (ALLEGRA) 180 MG tablet     gabapentin (NEURONTIN) 100 MG capsule     hydrOXYzine (ATARAX) 25 MG tablet     ibuprofen (ADVIL/MOTRIN) 600 MG tablet     Lactase (LACTAID PO)     lamoTRIgine (LAMICTAL) 100 MG tablet     lamoTRIgine (LAMICTAL) 200 MG tablet     levETIRAcetam (KEPPRA) 500 MG tablet     metoprolol tartrate (LOPRESSOR) 25 MG tablet     Multiple Vitamin (MULTI-VITAMINS) TABS     tretinoin (RETIN-A) 0.025 % external cream     triamcinolone (KENALOG) 0.1 % external cream     VITAMIN D, CHOLECALCIFEROL, PO     No current facility-administered medications for this visit.     DRUG ALLERGIES   Allergies   Allergen Reactions     Sulfa Drugs Hives     Benoxinate Nausea and Vomiting       ONCOLOGIC HISTORY  -4/27/2011 PRESENTATION: New onset seizure, generalized event.   -5/2011 MRB with a non-contrast enhancing right frontal mass lesion.   -5/2011 SURGERY: Craniectomy for mass resection at [facility].   PATHOLOGY: Diffuse oligodendroglioma; WHO grade II, 1p/19q co-deleted.  -6/2011-5/2012 CHEMO: Adjuvant dosed temozolomide x 12 cycles.  -4/2/2018 MRB with possible disease recurrence with a new 1.2 cm non-enhancing nodule within the cortex of the right frontal lobe adjacent to the resection cavity.  -4/12/2018 NEURO-ONC: Recommending repeat resection, appointment scheduled with Dr. Dustin Rodriguez, neurosurgery at the [facility].   -5/30/2018 SURGERY: Repeat resection with Dr. Rodriguez.   PATHOLOGY: Remains low grade, without concerning features.   -6/15/2018 NEURO-ONC: Start imaging surveillance.  -9/17/2018 NEURO-ONC/ MRB: Imaging stable, continue with surveillance.   -12/10/2018 NEURO-ONC/ MRB: Imaging stable, continue with surveillance.   -4/15/2019 NEURO-ONC/ MRB: Imaging stable, continue with  surveillance.  -8/19/2019 NEURO-ONC/ MRB: Clinically stable. Imaging largely stable, subtle increases on T2 FLAIR; continue with surveillance.  -12/16/2019 NEURO-ONC/ MRB: Clinically stable. Imaging with increased T2 FLAIR medially and laterally about the resection cavity. Referral to Dr. Figueroa with radiation oncology. Discussion with Dr. Rodriguez. Referral for repeat neuro-psychology testing.   -1/10/2020 Evaluated by Dr. Devan Figueroa.   -1/17/2020 NEURO-ONC: Tentative plan to initiate chemoradiotherapy in May-June 2020.   -2/17/2020 NEURO-PSYCH; Mild weaknesses were noted in aspects of verbal and nonverbal working memory, nonverbal recall, some aspects of executive functioning, and bilateral psychomotor speed. The remainder of her cognitive abilities were intact and performed in keeping with her above average range cognitive baseline.   -4/20 - 5/30/2020 CHEMORADS: 54 Gy in 30 fractions with concurrent temozolomide 75mg/m2 (140mg).   -5/4/2020 NEURO-ONC: Continue treatment as planned. Prescribing Clotrimazole lozenges for oral thrush.   -6/1/2020 NEURO-ONC: Completed treatment as planned.  -6/30/2020 NEURO-ONC/ MRB/ CHEMO: Clinically stable. Imaging with positive treatment effect. Starting adjuvant temozolomide 150mg/m2 (250mg), cycle 1 (start date was 7/1).   -7/28/2020 NEURO-ONC/ CHEMO: Clinically stable. Adjuvant temozolomide 200mg/m2 (320mg), cycle 2 (start date was 7/29).   -8/25/2020 NEURO-ONC/ CHEMO: Clinically stable; significant nausea/ vomiting with 200mg/m2 dosing. Adding Emend for this next cycle. Adjuvant temozolomide 150mg/m2 (250mg), cycle 3 (start date of 8/26).   -9/22/2020 NEURO-ONC/ MRB/ CHEMO: Clinically stable; less nausea/ vomiting with lowered chemotherapy dosing plus adding Emend. Imaging with no concerns, repeat in 3 months. Adjuvant temozolomide 150mg/m2 (250mg), cycle 4 (start date of 9/23).   -10/20/2020 NEURO-ONC/ CHEMO: Clinically stable; no new/ worsening issues. Experiencing pain related  to piriformis syndrome. Adjuvant temozolomide 150mg/m2 (250mg), cycle 5 (start date of 10/21).   -11/172020 NEURO-ONC/ CHEMO: Clinically stable. Adjuvant temozolomide 150mg/m2 (250mg), cycle 6 (start date of 11/18).   -12/15/2020 NEURO-ONC/ MRB: Clinically well. Imaging with no concerns. Starting imaging surveillance.   -3/16/2021 NEURO-ONC/ MRB: Clinically well. Imaging with no concerns.   -4/30/2021 Neuro-psych evaluation demonstrated weaknesses that are consistent with dysfunction of the bilateral frontal regions, but the right frontal region in particular.  Relative to her exam from February, 2020, there has been a mild decline in her thinking. In this exam, weaknesses were identified in executive functioning, attention, and both verbal and nonverbal working memory. Some aspects of cognitive speed were relative weaknesses as well. Insight into these weaknesses was limited.   -7/13/2021 NEURO-ONC/ MRB: Clinically well. Imaging stable.   -11/9/2021 NEURO-ONC/ MRB: Clinically well. Considering a trial of Zoloft. Imaging stable.   -3/8/2022 NEURO-ONC/ MRB: Clinically well. Imaging stable.     SOCIAL HISTORY   Tobacco use: Never smoker.   Alcohol use: Social.   Drug use: Denies.  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   , 2 children      PHYSICAL EXAMINATION    -Generally well appearing.  -Respiratory: No audible wheezing.   -Skin: No facial rashes.  -Psychiatric: Normal mood and affect. Pleasant, talkative.  -Neurologic:   MENTAL STATUS:     Alert, oriented.    Recall: Intact.   Speech fluent.    Comprehension intact.     CRANIAL NERVES:     Pupils are equal, round.     Extraocular movements full, denies diplopia.     Visual fields full.    Equal activation with smiling/ talking.    Hearing intact.   With normal phonation, no dysfunction of the palate or tongue.    Tongue is midline.   MOTOR: Antigravity in arms. No pronation or drift.   SENSATION: Denies numbness.  COORDINATION: Finger nose with  eyes closed slightly off on the right.     The rest of a comprehensive physical examination is deferred due to PHE (public health emergency) video visit restrictions.      MEDICAL RECORDS  Obtained and personally reviewed all available outside medical records in addition to reviewing any records available in our electronic system.     LABS  Personally reviewed all available lab results.     IMAGING  Personally reviewed MR brain imaging from today and compared to prior imaging. To my eye, the T2 FLAIR about the right frontal resection cavity has remained largely stable since 12/2020. However, there has been a slight increase in the medial portion over the past 1+ year (below). This is no associated mass effect with this change and as a result, the finding is most consistent with treatment-induced changes. There is no enhancement.     T2 FLAIR; 12/2020, 11/2021, 3/2022      Imaging was shown to and results were reviewed with Barbi and Kenneth.     Imaging and case was reviewed with reading neuro-radiologist.        IMPRESSION  Clinic time was spent discussing in detail the nature of her cancer in light of her recent imaging. This is in addition to answering questions pertaining to my recommendations and devising the plan as outlined below.     Clinically, Babri is doing well with no new subjective neurological complaints. For chronic fatigue, she is working with her primary care provider to manage her low vitamin B12 level and with Dr. Clark to adjust her seizure medication dosing. She has also started venlafaxine and this has had a positive effect on her mood. Barbi is healing well from a recent foot surgery.     Imaging as detailed above is largely stable with only a subtle change noted since stopping chemotherapy in 12/2020. The finding is most consistent with anticipated post-treatment related changes. Since Barbi has remained clinically and radiographically stable off chemotherapy for the past year, we discussed  continuing to scan at a 4 month interval or extending the imaging interval to every 6 months; both would be per NCCN guidelines. Barbi is in agreement with repeat imaging in 6 months. However, Barbi knows to call with any concerns or to report new complaints and appts can be moved sooner if needed. Will repeat imaging every 4-6 months through 11/2025 and at that time, can consider annual imaging.     Today, we discussed the recommendation for Barbi undergo repeat neuro-psych testing for ongoing evaluation of her cognition/ memory/ language function. Barbi is in agreement.    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   -No need to monitor labs while off chemotherapy.      -SEIZURE MANAGEMENT-  -Follows with Dr. Flakita Clark; Neurology, epilepsy specialist at the [facility].      -Quality of life/ MOOD/ FATIGUE-  -History of mild anxiety and depression.   -Failed Celexa due to side effect of diarrhea.   -Untreated/ undertreated depression can worsen fatigue, dysorexia, and quality of life.  -On venlafaxine.     -COGNITIVE CHANGES-  -Neuro-psych testing completed in 4/2021 with worsening deficits.  -Repeat testing recommended in ~4/2022. Placing referral today.     Return to clinic in 9/2022 + imaging.     Myrtle Gallagher MD  Neuro-oncology

## 2022-03-05 LAB — TSH SERPL DL<=0.005 MIU/L-ACNC: 1.42 MU/L (ref 0.4–4)

## 2022-03-06 LAB — DEPRECATED CALCIDIOL+CALCIFEROL SERPL-MC: 48 UG/L (ref 20–75)

## 2022-03-08 ENCOUNTER — HOSPITAL ENCOUNTER (OUTPATIENT)
Dept: MRI IMAGING | Facility: CLINIC | Age: 62
Discharge: HOME | End: 2022-03-08
Attending: PSYCHIATRY & NEUROLOGY | Admitting: PSYCHIATRY & NEUROLOGY
Payer: COMMERCIAL

## 2022-03-08 ENCOUNTER — VIRTUAL VISIT (OUTPATIENT)
Dept: ONCOLOGY | Facility: CLINIC | Age: 62
End: 2022-03-08
Attending: PSYCHIATRY & NEUROLOGY
Payer: COMMERCIAL

## 2022-03-08 DIAGNOSIS — R41.89 COGNITIVE CHANGES: ICD-10-CM

## 2022-03-08 DIAGNOSIS — C71.9 OLIGODENDROGLIOMA (H): ICD-10-CM

## 2022-03-08 DIAGNOSIS — C71.9 OLIGODENDROGLIOMA (H): Primary | ICD-10-CM

## 2022-03-08 LAB
LAMOTRIGINE SERPL-MCNC: 10.1 UG/ML
LEVETIRACETAM SERPL-MCNC: 53 UG/ML

## 2022-03-08 PROCEDURE — 99215 OFFICE O/P EST HI 40 MIN: CPT | Mod: 95 | Performed by: PSYCHIATRY & NEUROLOGY

## 2022-03-08 PROCEDURE — 255N000002 HC RX 255 OP 636: Performed by: PSYCHIATRY & NEUROLOGY

## 2022-03-08 PROCEDURE — A9585 GADOBUTROL INJECTION: HCPCS | Performed by: PSYCHIATRY & NEUROLOGY

## 2022-03-08 PROCEDURE — 70553 MRI BRAIN STEM W/O & W/DYE: CPT

## 2022-03-08 RX ORDER — GADOBUTROL 604.72 MG/ML
7 INJECTION INTRAVENOUS ONCE
Status: COMPLETED | OUTPATIENT
Start: 2022-03-08 | End: 2022-03-08

## 2022-03-08 RX ADMIN — GADOBUTROL 7 ML: 604.72 INJECTION INTRAVENOUS at 14:48

## 2022-03-08 NOTE — LETTER
"    3/8/2022         RE: Barbi Tiwari  [address] [facility] [address]        Dear Colleague,    Thank you for referring your patient, Barbi Tiwari, to the [facility]. Please see a copy of my visit note below.    Video-Visit Details  Type of service:  Video Visit    Video Start Time: 04:01 PM  Video End Time: 04:18 PM    Originating Location (pt. Location): Home    Distant Location (provider location):  [facility]     Platform used for Video Visit: Prosper Gallagher MD    _____________________________________________________________    NEURO-ONCOLOGY VISIT  Mar 8, 2022    CHIEF COMPLAINT: Ms. Barbi Tiwari is a 61 year old right-handed woman with a right frontal diffuse oligodendroglioma (1p19q co-deleted), diagnosed following resection in 5/2011. She received 12 cycles of temozolomide, completed in 5/2012. Changes on imaging necessitated a repeat resection on 5/30/2018 and pathology was unchanged. No adjuvant cancer-directed therapy was initiated. Imaging over the next 1.5 years showed slow tumor progression and treatment was then initiated. She completed chemoradiotherapy on 5/30/2020. Barbi then completed 6 cycles of adjuvant-dosed temozolomide as of 11/2020. Dose escalation to 200mg/m2 was complicated by intolerable nausea.     She is now managed on imaging surveillance. Her most recent scan is without concern for cancer recurrence.     I met with Barbi and Kenneth () for this follow-up visit.    HISTORY OF PRESENT ILLNESS  -Barbi is doing well.   -Chronic fatigue.    Working with Dr. Clark to adjust seizure medications to potentially improve energy. Reports no recent seizures.   Vitamin D 48. Vitamin B12 is low. TSH was normal.   -Mood is \"better\" with the start of venlafaxine.   -She denies any new symptoms; no weakness, numbness, changes in vision, or headaches. Balance is off; chronic and stable.  -Continued mild " word-finding issues. Able to multi-task. Agreeable to undergoing repeat neuro-psych testing.  -Had foot surgery; wearing a boot. Taking a leave from work for 3 months to allow the foot to heal.   -Melvin will be vacationing in [place] in April!    REVIEW OF SYSTEMS  A comprehensive ROS negative except as in HPI.      MEDICATIONS   Current Outpatient Medications   Medication     acetaminophen (TYLENOL) 500 MG tablet     escitalopram (LEXAPRO) 10 MG tablet     fexofenadine (ALLEGRA) 180 MG tablet     gabapentin (NEURONTIN) 100 MG capsule     hydrOXYzine (ATARAX) 25 MG tablet     ibuprofen (ADVIL/MOTRIN) 600 MG tablet     Lactase (LACTAID PO)     lamoTRIgine (LAMICTAL) 100 MG tablet     lamoTRIgine (LAMICTAL) 200 MG tablet     levETIRAcetam (KEPPRA) 500 MG tablet     metoprolol tartrate (LOPRESSOR) 25 MG tablet     Multiple Vitamin (MULTI-VITAMINS) TABS     tretinoin (RETIN-A) 0.025 % external cream     triamcinolone (KENALOG) 0.1 % external cream     VITAMIN D, CHOLECALCIFEROL, PO     No current facility-administered medications for this visit.     DRUG ALLERGIES   Allergies   Allergen Reactions     Sulfa Drugs Hives     Benoxinate Nausea and Vomiting       ONCOLOGIC HISTORY  -4/27/2011 PRESENTATION: New onset seizure, generalized event.   -5/2011 MRB with a non-contrast enhancing right frontal mass lesion.   -5/2011 SURGERY: Craniectomy for mass resection at [facility].   PATHOLOGY: Diffuse oligodendroglioma; WHO grade II, 1p/19q co-deleted.  -6/2011-5/2012 CHEMO: Adjuvant dosed temozolomide x 12 cycles.  -4/2/2018 MRB with possible disease recurrence with a new 1.2 cm non-enhancing nodule within the cortex of the right frontal lobe adjacent to the resection cavity.  -4/12/2018 NEURO-ONC: Recommending repeat resection, appointment scheduled with Dr. Dustin Rodriguez, neurosurgery at the [facility].   -5/30/2018 SURGERY: Repeat resection with Dr. Rodriguez.   PATHOLOGY: Remains low grade, without concerning features.   -6/15/2018  NEURO-ONC: Start imaging surveillance.  -9/17/2018 NEURO-ONC/ MRB: Imaging stable, continue with surveillance.   -12/10/2018 NEURO-ONC/ MRB: Imaging stable, continue with surveillance.   -4/15/2019 NEURO-ONC/ MRB: Imaging stable, continue with surveillance.  -8/19/2019 NEURO-ONC/ MRB: Clinically stable. Imaging largely stable, subtle increases on T2 FLAIR; continue with surveillance.  -12/16/2019 NEURO-ONC/ MRB: Clinically stable. Imaging with increased T2 FLAIR medially and laterally about the resection cavity. Referral to Dr. Figueroa with radiation oncology. Discussion with Dr. Rodriguez. Referral for repeat neuro-psychology testing.   -1/10/2020 Evaluated by Dr. Devan Figueroa.   -1/17/2020 NEURO-ONC: Tentative plan to initiate chemoradiotherapy in May-June 2020.   -2/17/2020 NEURO-PSYCH; Mild weaknesses were noted in aspects of verbal and nonverbal working memory, nonverbal recall, some aspects of executive functioning, and bilateral psychomotor speed. The remainder of her cognitive abilities were intact and performed in keeping with her above average range cognitive baseline.   -4/20 - 5/30/2020 CHEMORADS: 54 Gy in 30 fractions with concurrent temozolomide 75mg/m2 (140mg).   -5/4/2020 NEURO-ONC: Continue treatment as planned. Prescribing Clotrimazole lozenges for oral thrush.   -6/1/2020 NEURO-ONC: Completed treatment as planned.  -6/30/2020 NEURO-ONC/ MRB/ CHEMO: Clinically stable. Imaging with positive treatment effect. Starting adjuvant temozolomide 150mg/m2 (250mg), cycle 1 (start date was 7/1).   -7/28/2020 NEURO-ONC/ CHEMO: Clinically stable. Adjuvant temozolomide 200mg/m2 (320mg), cycle 2 (start date was 7/29).   -8/25/2020 NEURO-ONC/ CHEMO: Clinically stable; significant nausea/ vomiting with 200mg/m2 dosing. Adding Emend for this next cycle. Adjuvant temozolomide 150mg/m2 (250mg), cycle 3 (start date of 8/26).   -9/22/2020 NEURO-ONC/ MRB/ CHEMO: Clinically stable; less nausea/ vomiting with lowered chemotherapy  dosing plus adding Emend. Imaging with no concerns, repeat in 3 months. Adjuvant temozolomide 150mg/m2 (250mg), cycle 4 (start date of 9/23).   -10/20/2020 NEURO-ONC/ CHEMO: Clinically stable; no new/ worsening issues. Experiencing pain related to piriformis syndrome. Adjuvant temozolomide 150mg/m2 (250mg), cycle 5 (start date of 10/21).   -11/172020 NEURO-ONC/ CHEMO: Clinically stable. Adjuvant temozolomide 150mg/m2 (250mg), cycle 6 (start date of 11/18).   -12/15/2020 NEURO-ONC/ MRB: Clinically well. Imaging with no concerns. Starting imaging surveillance.   -3/16/2021 NEURO-ONC/ MRB: Clinically well. Imaging with no concerns.   -4/30/2021 Neuro-psych evaluation demonstrated weaknesses that are consistent with dysfunction of the bilateral frontal regions, but the right frontal region in particular.  Relative to her exam from February, 2020, there has been a mild decline in her thinking. In this exam, weaknesses were identified in executive functioning, attention, and both verbal and nonverbal working memory. Some aspects of cognitive speed were relative weaknesses as well. Insight into these weaknesses was limited.   -7/13/2021 NEURO-ONC/ MRB: Clinically well. Imaging stable.   -11/9/2021 NEURO-ONC/ MRB: Clinically well. Considering a trial of Zoloft. Imaging stable.   -3/8/2022 NEURO-ONC/ MRB: Clinically well. Imaging stable.     SOCIAL HISTORY   Tobacco use: Never smoker.   Alcohol use: Social.   Drug use: Denies.  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   , 2 children      PHYSICAL EXAMINATION    -Generally well appearing.  -Respiratory: No audible wheezing.   -Skin: No facial rashes.  -Psychiatric: Normal mood and affect. Pleasant, talkative.  -Neurologic:   MENTAL STATUS:     Alert, oriented.    Recall: Intact.   Speech fluent.    Comprehension intact.     CRANIAL NERVES:     Pupils are equal, round.     Extraocular movements full, denies diplopia.     Visual fields full.    Equal activation  with smiling/ talking.    Hearing intact.   With normal phonation, no dysfunction of the palate or tongue.    Tongue is midline.   MOTOR: Antigravity in arms. No pronation or drift.   SENSATION: Denies numbness.  COORDINATION: Finger nose with eyes closed slightly off on the right.     The rest of a comprehensive physical examination is deferred due to PHE (public health emergency) video visit restrictions.      MEDICAL RECORDS  Obtained and personally reviewed all available outside medical records in addition to reviewing any records available in our electronic system.     LABS  Personally reviewed all available lab results.     IMAGING  Personally reviewed MR brain imaging from today and compared to prior imaging. To my eye, the T2 FLAIR about the right frontal resection cavity has remained largely stable since 12/2020. However, there has been a slight increase in the medial portion over the past 1+ year (below). This is no associated mass effect with this change and as a result, the finding is most consistent with treatment-induced changes. There is no enhancement.     T2 FLAIR; 12/2020, 11/2021, 3/2022      Imaging was shown to and results were reviewed with Barbi and Kenneth.     Imaging and case was reviewed with reading neuro-radiologist.        IMPRESSION  Clinic time was spent discussing in detail the nature of her cancer in light of her recent imaging. This is in addition to answering questions pertaining to my recommendations and devising the plan as outlined below.     Clinically, Barbi is doing well with no new subjective neurological complaints. For chronic fatigue, she is working with her primary care provider to manage her low vitamin B12 level and with Dr. Clark to adjust her seizure medication dosing. She has also started venlafaxine and this has had a positive effect on her mood. Barbi is healing well from a recent foot surgery.     Imaging as detailed above is largely stable with only a subtle  change noted since stopping chemotherapy in 12/2020. The finding is most consistent with anticipated post-treatment related changes. Since Barbi has remained clinically and radiographically stable off chemotherapy for the past year, we discussed continuing to scan at a 4 month interval or extending the imaging interval to every 6 months; both would be per NCCN guidelines. Barbi is in agreement with repeat imaging in 6 months. However, Barbi knows to call with any concerns or to report new complaints and appts can be moved sooner if needed. Will repeat imaging every 4-6 months through 11/2025 and at that time, can consider annual imaging.     Today, we discussed the recommendation for Barbi undergo repeat neuro-psych testing for ongoing evaluation of her cognition/ memory/ language function. Barbi is in agreement.    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   -No need to monitor labs while off chemotherapy.      -SEIZURE MANAGEMENT-  -Follows with Dr. Flakita Clark; Neurology, epilepsy specialist at the [facility].      -Quality of life/ MOOD/ FATIGUE-  -History of mild anxiety and depression.   -Failed Celexa due to side effect of diarrhea.   -Untreated/ undertreated depression can worsen fatigue, dysorexia, and quality of life.  -On venlafaxine.     -COGNITIVE CHANGES-  -Neuro-psych testing completed in 4/2021 with worsening deficits.  -Repeat testing recommended in ~4/2022. Placing referral today.     Return to clinic in 9/2022 + imaging.     Myrtle Gallagher MD  Neuro-oncology    Barbi is a 61 year old who is being evaluated via a billable video visit.      How would you like to obtain your AVS? MyChart My chart  If the video visit is  dropped, the invitation should be resent by: Text to cell phone: cell or email  Will anyone else be joining your video visit? Yes: in person with patient. How would they like to receive their invitation? Text to cell phone: cell         Again, thank you for allowing me to participate in the care of your patient.        Sincerely,        Myrtle Gallagher MD

## 2022-03-08 NOTE — PROGRESS NOTES
Barbi is a 61 year old who is being evaluated via a billable video visit.      How would you like to obtain your AVS? MyChart My chart  If the video visit is dropped, the invitation should be resent by: Text to cell phone: cell or email  Will anyone else be joining your video visit? Yes: in person with patient. How would they like to receive their invitation? Text to cell phone: cell

## 2022-03-08 NOTE — NURSING NOTE
Pt stated decreasing seizure meds-250 mg morning 300 evening; keppra 1250mg morning 1500mg evening.    Daniel Ralph [facility] [phone]

## 2022-03-08 NOTE — LETTER
"    3/8/2022         RE: Barbi Tiwari  [address] [facility] [address]        Dear Colleague,    Thank you for referring your patient, Barbi Tiwari, to the [facility]. Please see a copy of my visit note below.    Video-Visit Details  Type of service:  Video Visit    Video Start Time: 04:01 PM  Video End Time: 04:18 PM    Originating Location (pt. Location): Home    Distant Location (provider location):  [facility]     Platform used for Video Visit: Prosper Gallagher MD    _____________________________________________________________    NEURO-ONCOLOGY VISIT  Mar 8, 2022    CHIEF COMPLAINT: Ms. Barbi Tiwari is a 61 year old right-handed woman with a right frontal diffuse oligodendroglioma (1p19q co-deleted), diagnosed following resection in 5/2011. She received 12 cycles of temozolomide, completed in 5/2012. Changes on imaging necessitated a repeat resection on 5/30/2018 and pathology was unchanged. No adjuvant cancer-directed therapy was initiated. Imaging over the next 1.5 years showed slow tumor progression and treatment was then initiated. She completed chemoradiotherapy on 5/30/2020. Barbi then completed 6 cycles of adjuvant-dosed temozolomide as of 11/2020. Dose escalation to 200mg/m2 was complicated by intolerable nausea.     She is now managed on imaging surveillance. Her most recent scan is without concern for cancer recurrence.     I met with Barbi and Kenneth () for this follow-up visit.    HISTORY OF PRESENT ILLNESS  -Barbi is doing well.   -Chronic fatigue.    Working with Dr. Clark to adjust seizure medications to potentially improve energy. Reports no recent seizures.   Vitamin D 48. Vitamin B12 is low. TSH was normal.   -Mood is \"better\" with the start of venlafaxine.   -She denies any new symptoms; no weakness, numbness, changes in vision, or headaches. Balance is off; chronic and stable.  -Continued mild " word-finding issues. Able to multi-task. Agreeable to undergoing repeat neuro-psych testing.  -Had foot surgery; wearing a boot. Taking a leave from work for 3 months to allow the foot to heal.   -Melvin will be vacationing in [place] in April!    REVIEW OF SYSTEMS  A comprehensive ROS negative except as in HPI.      MEDICATIONS   Current Outpatient Medications   Medication     acetaminophen (TYLENOL) 500 MG tablet     escitalopram (LEXAPRO) 10 MG tablet     fexofenadine (ALLEGRA) 180 MG tablet     gabapentin (NEURONTIN) 100 MG capsule     hydrOXYzine (ATARAX) 25 MG tablet     ibuprofen (ADVIL/MOTRIN) 600 MG tablet     Lactase (LACTAID PO)     lamoTRIgine (LAMICTAL) 100 MG tablet     lamoTRIgine (LAMICTAL) 200 MG tablet     levETIRAcetam (KEPPRA) 500 MG tablet     metoprolol tartrate (LOPRESSOR) 25 MG tablet     Multiple Vitamin (MULTI-VITAMINS) TABS     tretinoin (RETIN-A) 0.025 % external cream     triamcinolone (KENALOG) 0.1 % external cream     VITAMIN D, CHOLECALCIFEROL, PO     No current facility-administered medications for this visit.     DRUG ALLERGIES   Allergies   Allergen Reactions     Sulfa Drugs Hives     Benoxinate Nausea and Vomiting       ONCOLOGIC HISTORY  -4/27/2011 PRESENTATION: New onset seizure, generalized event.   -5/2011 MRB with a non-contrast enhancing right frontal mass lesion.   -5/2011 SURGERY: Craniectomy for mass resection at [facility].   PATHOLOGY: Diffuse oligodendroglioma; WHO grade II, 1p/19q co-deleted.  -6/2011-5/2012 CHEMO: Adjuvant dosed temozolomide x 12 cycles.  -4/2/2018 MRB with possible disease recurrence with a new 1.2 cm non-enhancing nodule within the cortex of the right frontal lobe adjacent to the resection cavity.  -4/12/2018 NEURO-ONC: Recommending repeat resection, appointment scheduled with Dr. Dustin Rodriguez, neurosurgery at the [facility].   -5/30/2018 SURGERY: Repeat resection with Dr. Rodriguez.   PATHOLOGY: Remains low grade, without concerning features.   -6/15/2018  NEURO-ONC: Start imaging surveillance.  -9/17/2018 NEURO-ONC/ MRB: Imaging stable, continue with surveillance.   -12/10/2018 NEURO-ONC/ MRB: Imaging stable, continue with surveillance.   -4/15/2019 NEURO-ONC/ MRB: Imaging stable, continue with surveillance.  -8/19/2019 NEURO-ONC/ MRB: Clinically stable. Imaging largely stable, subtle increases on T2 FLAIR; continue with surveillance.  -12/16/2019 NEURO-ONC/ MRB: Clinically stable. Imaging with increased T2 FLAIR medially and laterally about the resection cavity. Referral to Dr. Figueroa with radiation oncology. Discussion with Dr. Rodriguez. Referral for repeat neuro-psychology testing.   -1/10/2020 Evaluated by Dr. Devan Figueroa.   -1/17/2020 NEURO-ONC: Tentative plan to initiate chemoradiotherapy in May-June 2020.   -2/17/2020 NEURO-PSYCH; Mild weaknesses were noted in aspects of verbal and nonverbal working memory, nonverbal recall, some aspects of executive functioning, and bilateral psychomotor speed. The remainder of her cognitive abilities were intact and performed in keeping with her above average range cognitive baseline.   -4/20 - 5/30/2020 CHEMORADS: 54 Gy in 30 fractions with concurrent temozolomide 75mg/m2 (140mg).   -5/4/2020 NEURO-ONC: Continue treatment as planned. Prescribing Clotrimazole lozenges for oral thrush.   -6/1/2020 NEURO-ONC: Completed treatment as planned.  -6/30/2020 NEURO-ONC/ MRB/ CHEMO: Clinically stable. Imaging with positive treatment effect. Starting adjuvant temozolomide 150mg/m2 (250mg), cycle 1 (start date was 7/1).   -7/28/2020 NEURO-ONC/ CHEMO: Clinically stable. Adjuvant temozolomide 200mg/m2 (320mg), cycle 2 (start date was 7/29).   -8/25/2020 NEURO-ONC/ CHEMO: Clinically stable; significant nausea/ vomiting with 200mg/m2 dosing. Adding Emend for this next cycle. Adjuvant temozolomide 150mg/m2 (250mg), cycle 3 (start date of 8/26).   -9/22/2020 NEURO-ONC/ MRB/ CHEMO: Clinically stable; less nausea/ vomiting with lowered chemotherapy  dosing plus adding Emend. Imaging with no concerns, repeat in 3 months. Adjuvant temozolomide 150mg/m2 (250mg), cycle 4 (start date of 9/23).   -10/20/2020 NEURO-ONC/ CHEMO: Clinically stable; no new/ worsening issues. Experiencing pain related to piriformis syndrome. Adjuvant temozolomide 150mg/m2 (250mg), cycle 5 (start date of 10/21).   -11/172020 NEURO-ONC/ CHEMO: Clinically stable. Adjuvant temozolomide 150mg/m2 (250mg), cycle 6 (start date of 11/18).   -12/15/2020 NEURO-ONC/ MRB: Clinically well. Imaging with no concerns. Starting imaging surveillance.   -3/16/2021 NEURO-ONC/ MRB: Clinically well. Imaging with no concerns.   -4/30/2021 Neuro-psych evaluation demonstrated weaknesses that are consistent with dysfunction of the bilateral frontal regions, but the right frontal region in particular.  Relative to her exam from February, 2020, there has been a mild decline in her thinking. In this exam, weaknesses were identified in executive functioning, attention, and both verbal and nonverbal working memory. Some aspects of cognitive speed were relative weaknesses as well. Insight into these weaknesses was limited.   -7/13/2021 NEURO-ONC/ MRB: Clinically well. Imaging stable.   -11/9/2021 NEURO-ONC/ MRB: Clinically well. Considering a trial of Zoloft. Imaging stable.   -3/8/2022 NEURO-ONC/ MRB: Clinically well. Imaging stable.     SOCIAL HISTORY   Tobacco use: Never smoker.   Alcohol use: Social.   Drug use: Denies.  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   , 2 children      PHYSICAL EXAMINATION    -Generally well appearing.  -Respiratory: No audible wheezing.   -Skin: No facial rashes.  -Psychiatric: Normal mood and affect. Pleasant, talkative.  -Neurologic:   MENTAL STATUS:     Alert, oriented.    Recall: Intact.   Speech fluent.    Comprehension intact.     CRANIAL NERVES:     Pupils are equal, round.     Extraocular movements full, denies diplopia.     Visual fields full.    Equal activation  with smiling/ talking.    Hearing intact.   With normal phonation, no dysfunction of the palate or tongue.    Tongue is midline.   MOTOR: Antigravity in arms. No pronation or drift.   SENSATION: Denies numbness.  COORDINATION: Finger nose with eyes closed slightly off on the right.     The rest of a comprehensive physical examination is deferred due to PHE (public health emergency) video visit restrictions.      MEDICAL RECORDS  Obtained and personally reviewed all available outside medical records in addition to reviewing any records available in our electronic system.     LABS  Personally reviewed all available lab results.     IMAGING  Personally reviewed MR brain imaging from today and compared to prior imaging. To my eye, the T2 FLAIR about the right frontal resection cavity has remained largely stable since 12/2020. However, there has been a slight increase in the medial portion over the past 1+ year (below). This is no associated mass effect with this change and as a result, the finding is most consistent with treatment-induced changes. There is no enhancement.     T2 FLAIR; 12/2020, 11/2021, 3/2022      Imaging was shown to and results were reviewed with Barbi and Kenneth.     Imaging and case was reviewed with reading neuro-radiologist.        IMPRESSION  Clinic time was spent discussing in detail the nature of her cancer in light of her recent imaging. This is in addition to answering questions pertaining to my recommendations and devising the plan as outlined below.     Clinically, Barbi is doing well with no new subjective neurological complaints. For chronic fatigue, she is working with her primary care provider to manage her low vitamin B12 level and with Dr. Clark to adjust her seizure medication dosing. She has also started venlafaxine and this has had a positive effect on her mood. Barbi is healing well from a recent foot surgery.     Imaging as detailed above is largely stable with only a subtle  change noted since stopping chemotherapy in 12/2020. The finding is most consistent with anticipated post-treatment related changes. Since Barbi has remained clinically and radiographically stable off chemotherapy for the past year, we discussed continuing to scan at a 4 month interval or extending the imaging interval to every 6 months; both would be per NCCN guidelines. Barbi is in agreement with repeat imaging in 6 months. However, Barbi knows to call with any concerns or to report new complaints and appts can be moved sooner if needed. Will repeat imaging every 4-6 months through 11/2025 and at that time, can consider annual imaging.     Today, we discussed the recommendation for Barbi undergo repeat neuro-psych testing for ongoing evaluation of her cognition/ memory/ language function. Barbi is in agreement.    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   -No need to monitor labs while off chemotherapy.      -SEIZURE MANAGEMENT-  -Follows with Dr. Flakita Clark; Neurology, epilepsy specialist at the [facility].      -Quality of life/ MOOD/ FATIGUE-  -History of mild anxiety and depression.   -Failed Celexa due to side effect of diarrhea.   -Untreated/ undertreated depression can worsen fatigue, dysorexia, and quality of life.  -On venlafaxine.     -COGNITIVE CHANGES-  -Neuro-psych testing completed in 4/2021 with worsening deficits.  -Repeat testing recommended in ~4/2022. Placing referral today.     Return to clinic in 9/2022 + imaging.     Myrtle Gallagher MD  Neuro-oncology    Barbi is a 61 year old who is being evaluated via a billable video visit.      How would you like to obtain your AVS? MyChart My chart  If the video visit is  dropped, the invitation should be resent by: Text to cell phone: cell or email  Will anyone else be joining your video visit? Yes: in person with patient. How would they like to receive their invitation? Text to cell phone: cell         Again, thank you for allowing me to participate in the care of your patient.        Sincerely,        Myrtle Gallagher MD

## 2022-03-09 ENCOUNTER — VIRTUAL VISIT (OUTPATIENT)
Dept: INTERNAL MEDICINE | Facility: CLINIC | Age: 62
End: 2022-03-09
Payer: COMMERCIAL

## 2022-03-09 DIAGNOSIS — E53.8 VITAMIN B12 DEFICIENCY (NON ANEMIC): ICD-10-CM

## 2022-03-09 DIAGNOSIS — F32.1 MODERATE MAJOR DEPRESSION, SINGLE EPISODE (H): Primary | ICD-10-CM

## 2022-03-09 DIAGNOSIS — R10.13 DYSPEPSIA: ICD-10-CM

## 2022-03-09 LAB — GABAPENTIN SERPLBLD-MCNC: <0.5 UG/ML

## 2022-03-09 PROCEDURE — 99214 OFFICE O/P EST MOD 30 MIN: CPT | Mod: GT | Performed by: INTERNAL MEDICINE

## 2022-03-09 RX ORDER — CYANOCOBALAMIN (VITAMIN B-12) 2500 MCG
2500 TABLET, SUBLINGUAL SUBLINGUAL DAILY
Qty: 90 TABLET | Refills: 11 | Status: SHIPPED | OUTPATIENT
Start: 2022-03-09

## 2022-03-09 RX ORDER — FAMOTIDINE 10 MG
10 TABLET ORAL
COMMUNITY
Start: 2022-03-09

## 2022-03-09 ASSESSMENT — PATIENT HEALTH QUESTIONNAIRE - PHQ9
10. IF YOU CHECKED OFF ANY PROBLEMS, HOW DIFFICULT HAVE THESE PROBLEMS MADE IT FOR YOU TO DO YOUR WORK, TAKE CARE OF THINGS AT HOME, OR GET ALONG WITH OTHER PEOPLE: NOT DIFFICULT AT ALL
SUM OF ALL RESPONSES TO PHQ QUESTIONS 1-9: 2

## 2022-03-09 NOTE — PROGRESS NOTES
"Barbi is a 61 year old who is being evaluated via a billable video visit.      How would you like to obtain your AVS? MyChart  If the video visit is dropped, the invitation should be resent by: Send to e-mail at: [email or web address]  Will anyone else be joining your video visit? No      Video Start Time: 711    Assessment & Plan     Moderate major depression, single episode (H)  Better, 10 mg escitalopram seems to be a good dose     Vitamin B12 deficiency (non anemic)  Neuro checked levels and this is borderline low (194)- no symptoms.    Will try SL B12, recheck level in 2 mo   - vitamin B-12 (CYANOCOBALAMIN) 2500 MCG sublingual tablet; Take 1 tablet (2,500 mcg) by mouth daily    Dyspepsia  Prn use ok.  More aggressive anti-acid rx with PPI can be associated w/B12 def but she reports not taking these   - famotidine (PEPCID) 10 MG tablet; Take 1 tablet (10 mg) by mouth nightly as needed (heartburn)             BMI:   Estimated body mass index is 25.97 kg/m  as calculated from the following:    Height as of 3/4/22: 1.575 m (5' 2\").    Weight as of 2/2/22: 64.4 kg (142 lb).       See Patient Instructions    No follow-ups on file.    Dustin Choudhury MD  [facility] and ClinicsDANIELLE Landon is a 61 year old who presents for the following health issues     History of Present Illness       Mental Health Follow-up:  Patient presents to follow-up on Depression.Patient's depression since last visit has been:  Good  The patient is not having other symptoms associated with depression.      Any significant life events: other  Patient is not feeling anxious or having panic attacks.  Patient has no concerns about alcohol or drug use.       Today's PHQ-9         PHQ-9 Total Score: 2  PHQ-9 Q9 Thoughts of better off dead/self-harm past 2 weeks :   Not at all    How difficult have these problems made it for you to do your work, take care of things at home, or get along with other people: Not " difficult at all        She eats 2-3 servings of fruits and vegetables daily.She consumes 0 sweetened beverage(s) daily.She exercises with enough effort to increase her heart rate 9 or less minutes per day.  She exercises with enough effort to increase her heart rate 3 or less days per week.   She is taking medications regularly.             Review of Systems         Objective             Physical Exam   GENERAL: healthy, alert and no distress  PSYCH: Mentation appears normal, affect normal/bright, judgement and insight intact, normal speech and appearance well-groomed.                Video-Visit Details    Type of service:  Video Visit    Video End Time:7:32 AM    Originating Location (pt. Location): Home    Distant Location (provider location):  [facility]     Platform used for Video Visit: PiedadWell

## 2022-03-10 ENCOUNTER — MYC MEDICAL ADVICE (OUTPATIENT)
Dept: PODIATRY | Facility: CLINIC | Age: 62
End: 2022-03-10
Payer: COMMERCIAL

## 2022-03-10 ENCOUNTER — TELEPHONE (OUTPATIENT)
Dept: NEUROPSYCHOLOGY | Facility: CLINIC | Age: 62
End: 2022-03-10
Payer: COMMERCIAL

## 2022-03-10 NOTE — TELEPHONE ENCOUNTER
----- Message from Darling Aden, PhD sent at 3/9/2022  9:10 AM CST -----  Regarding: RE: Neuropsychology Referral  Please schedule patient for neuropsych testing in first available appointment with myself or Dr. Yoshi Meyer. She can be scheduled into my open NADIA slot on Monday 3/21 if she is willing to go to [place] for an earlier appointment.    Thanks!  -Darling Aden    ----- Message -----  From: Keily Brown  Sent: 3/9/2022   9:04 AM CST  To: Neuropsychology-  Subject: Neuropsychology Referral                         Please Call pt and schedule Barib for a Neuropsychology Referral per Dr. Gallagher.    Thanks   Anailsa PHILLIPS  Clinic Coordinator [facility]  INF/ 2nd Floor  [phone]

## 2022-03-10 NOTE — TELEPHONE ENCOUNTER
LVM per msg below pt needs to be scheduled with  or . Pt can be scheduled in  NADIA slot on Monday 3/21 if she is willing to go to [place].

## 2022-03-14 NOTE — TELEPHONE ENCOUNTER
Provider working on completing forms for patient.     Call to patient to confirm estimated return to work date.   Patient verified RTW on 4/25/22.     Carmelina Decker, DEBBIE

## 2022-03-16 ENCOUNTER — TRANSFERRED RECORDS (OUTPATIENT)
Dept: HEALTH INFORMATION MANAGEMENT | Facility: CLINIC | Age: 62
End: 2022-03-16
Payer: COMMERCIAL

## 2022-03-16 NOTE — TELEPHONE ENCOUNTER
Formed completed by  and faxed back to [facility] at [phone]    Left message for patient informing patient forms will be faxed    Mary Jane Foreman ATC

## 2022-03-30 ENCOUNTER — VIRTUAL VISIT (OUTPATIENT)
Dept: UROLOGY | Facility: CLINIC | Age: 62
End: 2022-03-30
Payer: COMMERCIAL

## 2022-03-30 VITALS — BODY MASS INDEX: 27.6 KG/M2 | HEIGHT: 62 IN | WEIGHT: 150 LBS

## 2022-03-30 DIAGNOSIS — N20.0 RIGHT RENAL STONE: Primary | ICD-10-CM

## 2022-03-30 PROCEDURE — 99213 OFFICE O/P EST LOW 20 MIN: CPT | Mod: GT | Performed by: STUDENT IN AN ORGANIZED HEALTH CARE EDUCATION/TRAINING PROGRAM

## 2022-03-30 ASSESSMENT — PAIN SCALES - GENERAL: PAINLEVEL: NO PAIN (0)

## 2022-03-30 NOTE — LETTER
"3/30/2022       RE: Barbi Tiwari  [address] [facility] [address]     Dear Colleague,    Thank you for referring your patient, Barbi Tiwari, to the [facility] [place] [facility]. Please see a copy of my visit note below.    *PATIENT WILL MEET IN [employer] FOR VISIT*    Barbi is a 61 year old who is being evaluated via a billable video visit.      How would you like to obtain your AVS? [employer]     If the video visit is dropped, the invitation should be resent by: Text to cell phone: [phone]      Will anyone else be joining your video visit? No         Video Start Time: 2:24 PM  Video-Visit Details    Type of service:  Video Visit    Video End Time:2:30 PM    Originating Location (pt. Location): Home    Distant Location (provider location):  [facility] [place]     Platform used for Video Visit: Prosper  CHIEF COMPLAINT   It was my pleasure to see Barbi Tiwari who is a 61 year old female for follow-up of renal calculi.      HPI:  Barbi Tiwari is a 61 year old female being seen for follow-up and discussion of results.  Duration of problem: 6 months  Previous treatments: Ureteroscopy    Doing well  No complaints at the moment  Has completed her ultrasound and 24-hour urine tests    Exam:  Ht 1.575 m (5' 2\")   Wt 68 kg (150 lb)   LMP  (LMP Unknown)   BMI 27.44 kg/m    GENERAL: Healthy, alert and no distress  EYES: Eyes grossly normal to inspection.  No discharge or erythema, or obvious scleral/conjunctival abnormalities.  RESP: No audible wheeze, cough, or visible cyanosis.  No visible retractions or increased work of breathing.    SKIN: Visible skin clear. No significant rash, abnormal pigmentation or lesions.  NEURO: Cranial nerves grossly intact.  Mentation and speech appropriate for age.  PSYCH: Mentation appears normal, affect normal/bright, judgement and insight intact, normal speech and " appearance well-groomed.    Review of Imaging:  The following imaging exams were independently viewed and interpreted by me and discussed with patient:  Renal/Bladder Ultrasound: Abnormal:   Renal cysts  No renal calculi    Review of Labs:  The following labs were reviewed by me and discussed with the patient:  litholink X2: Abnormal:   Low urine volume  Hypercalciuria  hypocitraturia      Assessment & Plan     Right renal stone  Discussed about the significance of the findings of 24-hour urine tests  She needs to increase her fluid intake and decrease additional salt in her diet as well as continue the measures of reducing red meat from her diet.  She also seems to have some hypercalciuria though she is not on any diuretics and does not have hypercalcemia  Also has hypocitraturia  I recommended that she touch base with nephrology to review these metabolic changes and see if she qualifies for additional interventions like supplementation with potassium citrate  - Adult Nephrology  Referral; Future      Felix Cano MD  [facility] [place]      ==========================    Additional Billing and Coding Information:  Review of external notes as documented above   Review of the result(s) of each unique test - 24-hour urine Litholink, ultrasound kidneys    Independent interpretation of a test performed by another physician/other qualified health care professional (not separately reported) -       Discussion of management or test interpretation with external physician/other qualified healthcare professional/appropriate source -       Diagnosis or treatment significantly limited by social determinants of health -       18 minutes spent on the date of the encounter doing chart review, review of test results, interpretation of tests, patient visit, documentation and discussion with family     ==========================

## 2022-03-30 NOTE — PROGRESS NOTES
"*PATIENT WILL MEET IN [employer] FOR VISIT*    Barbi is a 61 year old who is being evaluated via a billable video visit.      How would you like to obtain your AVS? [employer]um[employer]art     If the video visit is dropped, the invitation should be resent by: Text to cell phone: [phone]      Will anyone else be joining your video visit? No         Video Start Time: 2:24 PM  Video-Visit Details    Type of service:  Video Visit    Video End Time:2:30 PM    Originating Location (pt. Location): Home    Distant Location (provider location):  [facility] [place]     Platform used for Video Visit: Prosper  CHIEF COMPLAINT   It was my pleasure to see Barbi Tiwari who is a 61 year old female for follow-up of renal calculi.      HPI:  Barbi Tiwari is a 61 year old female being seen for follow-up and discussion of results.  Duration of problem: 6 months  Previous treatments: Ureteroscopy    Doing well  No complaints at the moment  Has completed her ultrasound and 24-hour urine tests    Exam:  Ht 1.575 m (5' 2\")   Wt 68 kg (150 lb)   LMP  (LMP Unknown)   BMI 27.44 kg/m    GENERAL: Healthy, alert and no distress  EYES: Eyes grossly normal to inspection.  No discharge or erythema, or obvious scleral/conjunctival abnormalities.  RESP: No audible wheeze, cough, or visible cyanosis.  No visible retractions or increased work of breathing.    SKIN: Visible skin clear. No significant rash, abnormal pigmentation or lesions.  NEURO: Cranial nerves grossly intact.  Mentation and speech appropriate for age.  PSYCH: Mentation appears normal, affect normal/bright, judgement and insight intact, normal speech and appearance well-groomed.    Review of Imaging:  The following imaging exams were independently viewed and interpreted by me and discussed with patient:  Renal/Bladder Ultrasound: Abnormal:   Renal cysts  No renal calculi    Review of Labs:  The following labs were reviewed by me and discussed with the " patient:  litholink X2: Abnormal:   Low urine volume  Hypercalciuria  hypocitraturia      Assessment & Plan     Right renal stone  Discussed about the significance of the findings of 24-hour urine tests  She needs to increase her fluid intake and decrease additional salt in her diet as well as continue the measures of reducing red meat from her diet.  She also seems to have some hypercalciuria though she is not on any diuretics and does not have hypercalcemia  Also has hypocitraturia  I recommended that she touch base with nephrology to review these metabolic changes and see if she qualifies for additional interventions like supplementation with potassium citrate  - Adult Nephrology  Referral; Future      Felixjey Cano MD  [facility] [place]      ==========================    Additional Billing and Coding Information:  Review of external notes as documented above   Review of the result(s) of each unique test - 24-hour urine Litholink, ultrasound kidneys    Independent interpretation of a test performed by another physician/other qualified health care professional (not separately reported) -       Discussion of management or test interpretation with external physician/other qualified healthcare professional/appropriate source -       Diagnosis or treatment significantly limited by social determinants of health -       18 minutes spent on the date of the encounter doing chart review, review of test results, interpretation of tests, patient visit, documentation and discussion with family     ==========================

## 2022-04-01 ENCOUNTER — ANCILLARY PROCEDURE (OUTPATIENT)
Dept: GENERAL RADIOLOGY | Facility: CLINIC | Age: 62
End: 2022-04-01
Attending: PODIATRIST
Payer: COMMERCIAL

## 2022-04-01 ENCOUNTER — OFFICE VISIT (OUTPATIENT)
Dept: PODIATRY | Facility: CLINIC | Age: 62
End: 2022-04-01
Payer: COMMERCIAL

## 2022-04-01 VITALS
HEIGHT: 62 IN | BODY MASS INDEX: 27.6 KG/M2 | DIASTOLIC BLOOD PRESSURE: 72 MMHG | SYSTOLIC BLOOD PRESSURE: 118 MMHG | WEIGHT: 150 LBS

## 2022-04-01 DIAGNOSIS — M79.672 LEFT FOOT PAIN: ICD-10-CM

## 2022-04-01 DIAGNOSIS — M21.42 FLAT FEET: ICD-10-CM

## 2022-04-01 DIAGNOSIS — Z98.890 POSTOPERATIVE STATE: Primary | ICD-10-CM

## 2022-04-01 DIAGNOSIS — M21.41 FLAT FEET: ICD-10-CM

## 2022-04-01 DIAGNOSIS — M25.80 SESAMOIDITIS: ICD-10-CM

## 2022-04-01 PROCEDURE — 73630 X-RAY EXAM OF FOOT: CPT | Mod: LT | Performed by: RADIOLOGY

## 2022-04-01 PROCEDURE — 99024 POSTOP FOLLOW-UP VISIT: CPT | Performed by: PODIATRIST

## 2022-04-01 NOTE — PATIENT INSTRUCTIONS
Thank you for choosing [facility] Podiatry / Foot & Ankle Surgery!    DR. SALAMANCA'S CLINIC LOCATIONS:     [facility] TRIAGE LINE: [phone] - Opt. [address]   [address] [place] [address] APPOINTMENTS: [phone]   [place] [address] RADIOLOGY: [phone]    SET UP SURGERY: [phone]    BILLING QUESTIONS: [phone]   [place] [facility] FAX: [phone]   [address]    [place] [address]      [facility] [facility] LOCATIONS  [facility]  [address]  [place] [address]  Phone: [phone]  Fax: [phone] [facility]  [address]  [place] [address]  Phone: [phone]   Fax: [phone]   [facility]  [address]  [place] [address]  Phone: [phone]  Fax: [phone] [facility]  [address]  [place] [address]  Phone: [phone]   Fax: [phone]   [facility]   [address]  [place] [address]  Phone: [phone]  Fax: [phone] * Please call any location listed to make an appointment for a casting/fitting. Your referral was sent to their central office and they will all have the order on file.

## 2022-04-01 NOTE — PROGRESS NOTES
ASSESSMENT:  Encounter Diagnoses   Name Primary?     Postoperative state Yes     Left foot pain      Sesamoiditis      Flat feet      MEDICAL DECISION MAKING:  I personally reviewed the x-ray images.  Stable.  No complications.    Her residual pain seems to be related to the tibial sesamoid.  Her plantar fat pad feels thin.    Recommendations:  Continue with thicker and stiffer soled shoes to offload the forefoot during the propulsive phase of gait  Consider purchasing an over-the-counter accommodative type orthotic device  She is referred for custom molded orthoses and have asked that they consider additional offloading of the sesamoid bones    Follow-up in 1 month.      Disclaimer: This note consists of symbols derived from keyboarding, dictation and/or voice recognition software. As a result, there may be errors in the script that have gone undetected. Please consider this when interpreting information found in this chart.    Benjamin Griffin DPM, FACFAS, MS    [place] Department of Podiatry/Foot & Ankle Surgery      ____________________________________________________________________    HPI:       Barbi follows up today over 8 weeks postop.  She wear the cam walker for 6 weeks and then successfully weaned out of it to a supportive shoe.  Continues to experience some pain with walking and standing.  She specifies his pain under the first metatarsal head.    Other pain that she had preoperatively is resolving.    She and her  are going to [place] tomorrow.  She asked if she should bring that with.    POSTOPERATIVE DIAGNOSES:  1.  Left foot pain, severe hallux abductovalgus deformity with coexisting degenerative joint disease.  2.  Left second metatarsophalangeal joint pain.  3.  Left second hammertoe deformity.  4.  Extension deformity of the left third metatarsophalangeal joint.     PROCEDURES PERFORMED:  1.  Left first metatarsophalangeal joint arthrodesis.  2.  Left second metatarsal Weil osteotomy.  3.   Left second hammertoe correction via arthrodesis.  4.  Left third metatarsophalangeal joint tenotomy and capsulotomy  *    *  Past Medical History:   Diagnosis Date     Acute cystitis with hematuria 10/24/2020     Allergic rhinitis 12/9/2009     Calculus of kidney 1/12/2011    Overview:  S/P LITHOTRIPSY 3/15/11      Esophageal reflux 10/22/2008     Hyperparathyroidism 01/11/2011    had surgery for it; resulted in seizures     Moderate major depression, single episode (H)      oligodendroglioma 7/23/2012     Osteoarthrosis 12/9/2009     Palpitations 6/5/2014     PONV (postoperative nausea and vomiting)      PVCs 6/5/2014     Seizure disorder (related to tumor) 08/17/2011    last seizure 3/2021   *  *  Past Surgical History:   Procedure Laterality Date     ARTHRODESIS FOOT Left 2/1/2022    Procedure: left first metatarsophalangeal joint arthrodesis, Tenotomy and capusulotomy 3rd metatarsalphalangeal joint;  Surgeon: Benjamin Griffin DPM;  Location:  OR     COMBINED CYSTOSCOPY, RETROGRADES, URETEROSCOPY, LASER HOLMIUM LITHOTRIPSY URETER(S), INSERT STENT Right 8/3/2021    Procedure: 1. Cystourethroscopy 2. Right ureteroscopy 3. Right retrograde pyelogram with interpretation of intraoperative fluoroscopic imaging 4. Holmium laser lithotripsy with basket stone extraction 5. Right ureteral stent placement;  Surgeon: Felix Cano MD;  Location:  OR     CRANIOTOMY  2011    tumor resection     CYSTOSCOPY, RETROGRADES, INSERT STENT URETER(S), COMBINED Right 7/20/2021    Procedure: 1.  Cystourethroscopy 2.  Attempted right ureteroscopy 3.  Right  retrograde pyelogram with interpretation of intraoperative fluoroscopic imaging 4.  Right ureteral dilatation 5.  Right ureteral stent placement 6.  Laser on stand-by;  Surgeon: Felix Cano MD;  Location:  OR     EXTRACORPOREAL SHOCK WAVE LITHOTRIPSY, CYSTOSCOPY, INSERT STENT URETER(S), COMBINED Bilateral 5/5/2017    Procedure: COMBINED EXTRACORPOREAL SHOCK  WAVE LITHOTRIPSY, CYSTOSCOPY, INSERT STENT URETER(S);  CYSTO, URETHRAL DILATION, URETERAL LEFT STENT PLACEMENT, LEFT ESWL.;  Surgeon: Angel Del Rio MD;  Location:  OR     FOOT SURGERY      mulitple     HYSTERECTOMY, PAP NO LONGER INDICATED  2008    lap hys     LITHOTRIPSY  2010 2017     OPTICAL TRACKING SYSTEM CRANIOTOMY, EXCISE TUMOR WITH MAPPING, COMBINED Right 5/30/2018    Procedure: COMBINED OPTICAL TRACKING SYSTEM CRANIOTOMY, EXCISE TUMOR WITH MAPPING;  Right Stealth Assisted Craniotomy With Motor Mapping And Tumor Resection ;  Surgeon: Dustin Rodriguez MD;  Location: [facility] OR     ORTHOPEDIC SURGERY      feet, multiple on both     OSTEOTOMY FOOT Right 1/15/2019    Procedure: OSTEOTOMY FOOT;  Surgeon: Benjamin Griffin DPM;  Location: [facility] OR     OSTEOTOMY FOOT Left 2/1/2022    Procedure: left second metatarsal Weil osteotomy;  Surgeon: Benjamin Griffin DPM;  Location:  OR     PARATHYROIDECTOMY  2011     RECONSTRUCT FOREFOOT WITH METATARSOPHALANGEAL (MTP) FUSION Right 1/15/2019    Procedure: RIGHT FIRST METATARSAL PHALAGEAL JOINT ARTHRODESIS, RIGHT SECOND METATARSAL WEIL OSTEOTOMY;  Surgeon: Benjamin Griffin DPM;  Location:  OR     REPAIR HAMMER TOE Left 2/1/2022    Procedure: CORRECTION, left second HAMMER TOE;  Surgeon: Benjamin Griffin DPM;  Location:  OR   *  *  Current Outpatient Medications   Medication Sig Dispense Refill     acetaminophen (TYLENOL) 500 MG tablet Take 2 tablets (1,000 mg) by mouth every 8 hours as needed for mild pain 42 tablet 1     CALCIUM PO        escitalopram (LEXAPRO) 10 MG tablet Take 1 tablet (10 mg) by mouth daily 90 tablet 1     famotidine (PEPCID) 10 MG tablet Take 1 tablet (10 mg) by mouth nightly as needed (heartburn)       fexofenadine (ALLEGRA) 180 MG tablet Take 180 mg by mouth daily       gabapentin (NEURONTIN) 100 MG capsule Take 2 capsules (200 mg) by mouth every evening 180 capsule 3     ibuprofen (ADVIL/MOTRIN) 600 MG tablet Take 1 tablet  "(600 mg) by mouth every 6 hours as needed for moderate pain 28 tablet 1     Lactase (LACTAID PO) Take 1-2 tablets by mouth daily as needed for indigestion        lamoTRIgine (LAMICTAL) 100 MG tablet TAKE HALF TABLET BY MOUTH EVERY MORNING AND ONE TABLET EVERY EVENING (TAKE ALONG WITH 200 MG TABLET FOR A TOTAL  mg morning and 300 MG evening) 135 tablet 3     lamoTRIgine (LAMICTAL) 200 MG tablet TAKE ONE TABLET BY MOUTH TWICE A DAY (TAKE ALONG WITH 100 MG TABLET FOR TOTAL  mg morning and 300 MG pm). 180 tablet 3     levETIRAcetam (KEPPRA) 500 MG tablet Take 2.5 Tablets (1250 mg) by mouth every morning, AND Take 3 Tablets (1500 mg)by mouth every evening. 495 tablet 3     metoprolol tartrate (LOPRESSOR) 25 MG tablet Take 0.5 tablets (12.5 mg) by mouth 2 times daily 90 tablet 3     Multiple Vitamin (MULTI-VITAMINS) TABS Take 1 chew tab by mouth daily  (Patient not taking: Reported on 3/9/2022)       tretinoin (RETIN-A) 0.025 % external cream Apply a pea-sized amount to each area affected by acne. Start every 3-4 nights working up to every night. 45 g 0     triamcinolone (KENALOG) 0.1 % external cream Apply topically 2 times daily To affected area in between breasts for 1-2 weeks. Tapering with improvement and restarting with flares. 60 g 2     vitamin B-12 (CYANOCOBALAMIN) 2500 MCG sublingual tablet Take 1 tablet (2,500 mcg) by mouth daily 90 tablet 11     VITAMIN D, CHOLECALCIFEROL, PO Take 1,000 Units by mouth daily            EXAM:    Vitals: /72   Ht 1.575 m (5' 2\")   Wt 68 kg (150 lb)   LMP  (LMP Unknown)   BMI 27.44 kg/m    BMI: Body mass index is 27.44 kg/m .       Vascular:  Pedal pulses are palpable for both the DP and PT arteries.  CFT < 3 sec.   Most of a postoperative edema has resolved.     Neuro: Light touch sensation is intact distal to the incisions.      Derm: The incisions have healed.  Steri-Strips remain intact.  The K wire in the second toe is intact.  Generalized xerosis of " pedal skin     Musculoskeletal:    The hallux is maintained in a more rectus position and parallel to the second toe.  Second toe is more rectus in the sagittal plane with intact K wire.     XR Left Foot: Stable postoperative findings.  No hardware change or complication

## 2022-04-01 NOTE — LETTER
4/1/2022         RE: Barbi Tiwari  [address]  [facility] [address]        Dear Colleague,    Thank you for referring your patient, Barbi Tiwari, to the [facility]. Please see a copy of my visit note below.    ASSESSMENT:  Encounter Diagnoses   Name Primary?     Postoperative state Yes     Left foot pain      Sesamoiditis      Flat feet      MEDICAL DECISION MAKING:  I personally reviewed the x-ray images.  Stable.  No complications.    Her residual pain seems to be related to the tibial sesamoid.  Her plantar fat pad feels thin.    Recommendations:  Continue with thicker and stiffer soled shoes to offload the forefoot during the propulsive phase of gait  Consider purchasing an over-the-counter accommodative type orthotic device  She is referred for custom molded orthoses and have asked that they consider additional offloading of the sesamoid bones    Follow-up in 1 month.      Disclaimer: This note consists of symbols derived from keyboarding, dictation and/or voice recognition software. As a result, there may be errors in the script that have gone undetected. Please consider this when interpreting information found in this chart.    Benjamin Griffin, SANTO, FACFAS, MS    [place] Department of Podiatry/Foot & Ankle Surgery      ____________________________________________________________________    HPI:       Barbi follows up today over 8 weeks postop.  She wear the cam walker for 6 weeks and then successfully weaned out of it to a supportive shoe.  Continues to experience some pain with walking and standing.  She specifies his pain under the first metatarsal head.    Other pain that she had preoperatively is resolving.    She and her  are going to [place] tomorrow.  She asked if she should bring that with.    POSTOPERATIVE DIAGNOSES:  1.  Left foot pain, severe hallux abductovalgus deformity with coexisting degenerative joint disease.  2.  Left second  metatarsophalangeal joint pain.  3.  Left second hammertoe deformity.  4.  Extension deformity of the left third metatarsophalangeal joint.     PROCEDURES PERFORMED:  1.  Left first metatarsophalangeal joint arthrodesis.  2.  Left second metatarsal Weil osteotomy.  3.  Left second hammertoe correction via arthrodesis.  4.  Left third metatarsophalangeal joint tenotomy and capsulotomy  *    *  Past Medical History:   Diagnosis Date     Acute cystitis with hematuria 10/24/2020     Allergic rhinitis 12/9/2009     Calculus of kidney 1/12/2011    Overview:  S/P LITHOTRIPSY 3/15/11      Esophageal reflux 10/22/2008     Hyperparathyroidism 01/11/2011    had surgery for it; resulted in seizures     Moderate major depression, single episode (H)      oligodendroglioma 7/23/2012     Osteoarthrosis 12/9/2009     Palpitations 6/5/2014     PONV (postoperative nausea and vomiting)      PVCs 6/5/2014     Seizure disorder (related to tumor) 08/17/2011    last seizure 3/2021   *  *  Past Surgical History:   Procedure Laterality Date     ARTHRODESIS FOOT Left 2/1/2022    Procedure: left first metatarsophalangeal joint arthrodesis, Tenotomy and capusulotomy 3rd metatarsalphalangeal joint;  Surgeon: Benjamin Griffin DPM;  Location:  OR     COMBINED CYSTOSCOPY, RETROGRADES, URETEROSCOPY, LASER HOLMIUM LITHOTRIPSY URETER(S), INSERT STENT Right 8/3/2021    Procedure: 1. Cystourethroscopy 2. Right ureteroscopy 3. Right retrograde pyelogram with interpretation of intraoperative fluoroscopic imaging 4. Holmium laser lithotripsy with basket stone extraction 5. Right ureteral stent placement;  Surgeon: Felix Cano MD;  Location:  OR     CRANIOTOMY  2011    tumor resection     CYSTOSCOPY, RETROGRADES, INSERT STENT URETER(S), COMBINED Right 7/20/2021    Procedure: 1.  Cystourethroscopy 2.  Attempted right ureteroscopy 3.  Right  retrograde pyelogram with interpretation of intraoperative fluoroscopic imaging 4.  Right ureteral  dilatation 5.  Right ureteral stent placement 6.  Laser on stand-by;  Surgeon: Felix Cano MD;  Location: [facility] OR     EXTRACORPOREAL SHOCK WAVE LITHOTRIPSY, CYSTOSCOPY, INSERT STENT URETER(S), COMBINED Bilateral 5/5/2017    Procedure: COMBINED EXTRACORPOREAL SHOCK WAVE LITHOTRIPSY, CYSTOSCOPY, INSERT STENT URETER(S);  CYSTO, URETHRAL DILATION, URETERAL LEFT STENT PLACEMENT, LEFT ESWL.;  Surgeon: Angel Del Rio MD;  Location:  OR     FOOT SURGERY      mulitple     HYSTERECTOMY, PAP NO LONGER INDICATED  2008    lap hys     LITHOTRIPSY  2010 2017     OPTICAL TRACKING SYSTEM CRANIOTOMY, EXCISE TUMOR WITH MAPPING, COMBINED Right 5/30/2018    Procedure: COMBINED OPTICAL TRACKING SYSTEM CRANIOTOMY, EXCISE TUMOR WITH MAPPING;  Right Stealth Assisted Craniotomy With Motor Mapping And Tumor Resection ;  Surgeon: Dustin Rodriguez MD;  Location: [facility] OR     ORTHOPEDIC SURGERY      feet, multiple on both     OSTEOTOMY FOOT Right 1/15/2019    Procedure: OSTEOTOMY FOOT;  Surgeon: Benjamin Griffin DPM;  Location:  OR     OSTEOTOMY FOOT Left 2/1/2022    Procedure: left second metatarsal Weil osteotomy;  Surgeon: Benjamin Griffin DPM;  Location:  OR     PARATHYROIDECTOMY  2011     RECONSTRUCT FOREFOOT WITH METATARSOPHALANGEAL (MTP) FUSION Right 1/15/2019    Procedure: RIGHT FIRST METATARSAL PHALAGEAL JOINT ARTHRODESIS, RIGHT SECOND METATARSAL WEIL OSTEOTOMY;  Surgeon: Benjamin Griffin DPM;  Location:  OR     REPAIR HAMMER TOE Left 2/1/2022    Procedure: CORRECTION, left second HAMMER TOE;  Surgeon: Benjamin Griffin DPM;  Location:  OR   *  *  Current Outpatient Medications   Medication Sig Dispense Refill     acetaminophen (TYLENOL) 500 MG tablet Take 2 tablets (1,000 mg) by mouth every 8 hours as needed for mild pain 42 tablet 1     CALCIUM PO        escitalopram (LEXAPRO) 10 MG tablet Take 1 tablet (10 mg) by mouth daily 90 tablet 1     famotidine (PEPCID) 10 MG tablet Take 1 tablet (10 mg) by  "mouth nightly as needed (heartburn)       fexofenadine (ALLEGRA) 180 MG tablet Take 180 mg by mouth daily       gabapentin (NEURONTIN) 100 MG capsule Take 2 capsules (200 mg) by mouth every evening 180 capsule 3     ibuprofen (ADVIL/MOTRIN) 600 MG tablet Take 1 tablet (600 mg) by mouth every 6 hours as needed for moderate pain 28 tablet 1     Lactase (LACTAID PO) Take 1-2 tablets by mouth daily as needed for indigestion        lamoTRIgine (LAMICTAL) 100 MG tablet TAKE HALF TABLET BY MOUTH EVERY MORNING AND ONE TABLET EVERY EVENING (TAKE ALONG WITH 200 MG TABLET FOR A TOTAL  mg morning and 300 MG evening) 135 tablet 3     lamoTRIgine (LAMICTAL) 200 MG tablet TAKE ONE TABLET BY MOUTH TWICE A DAY (TAKE ALONG WITH 100 MG TABLET FOR TOTAL  mg morning and 300 MG pm). 180 tablet 3     levETIRAcetam (KEPPRA) 500 MG tablet Take 2.5 Tablets (1250 mg) by mouth every morning, AND Take 3 Tablets (1500 mg)by mouth every evening. 495 tablet 3     metoprolol tartrate (LOPRESSOR) 25 MG tablet Take 0.5 tablets (12.5 mg) by mouth 2 times daily 90 tablet 3     Multiple Vitamin (MULTI-VITAMINS) TABS Take 1 chew tab by mouth daily  (Patient not taking: Reported on 3/9/2022)       tretinoin (RETIN-A) 0.025 % external cream Apply a pea-sized amount to each area affected by acne. Start every 3-4 nights working up to every night. 45 g 0     triamcinolone (KENALOG) 0.1 % external cream Apply topically 2 times daily To affected area in between breasts for 1-2 weeks. Tapering with improvement and restarting with flares. 60 g 2     vitamin B-12 (CYANOCOBALAMIN) 2500 MCG sublingual tablet Take 1 tablet (2,500 mcg) by mouth daily 90 tablet 11     VITAMIN D, CHOLECALCIFEROL, PO Take 1,000 Units by mouth daily            EXAM:    Vitals: /72   Ht 1.575 m (5' 2\")   Wt 68 kg (150 lb)   LMP  (LMP Unknown)   BMI 27.44 kg/m    BMI: Body mass index is 27.44 kg/m .       Vascular:  Pedal pulses are palpable for both the DP and PT " arteries.  CFT < 3 sec.   Most of a postoperative edema has resolved.     Neuro: Light touch sensation is intact distal to the incisions.      Derm: The incisions have healed.  Steri-Strips remain intact.  The K wire in the second toe is intact.  Generalized xerosis of pedal skin     Musculoskeletal:    The hallux is maintained in a more rectus position and parallel to the second toe.  Second toe is more rectus in the sagittal plane with intact K wire.     XR Left Foot: Stable postoperative findings.  No hardware change or complication      Again, thank you for allowing me to participate in the care of your patient.        Sincerely,        Benjamin Griffin DPM

## 2022-04-09 ENCOUNTER — HEALTH MAINTENANCE LETTER (OUTPATIENT)
Age: 62
End: 2022-04-09

## 2022-04-10 ENCOUNTER — MYC MEDICAL ADVICE (OUTPATIENT)
Dept: INTERNAL MEDICINE | Facility: CLINIC | Age: 62
End: 2022-04-10
Payer: COMMERCIAL

## 2022-04-10 DIAGNOSIS — R26.89 BALANCE PROBLEMS: Primary | ICD-10-CM

## 2022-04-18 ENCOUNTER — LAB (OUTPATIENT)
Dept: LAB | Facility: CLINIC | Age: 62
End: 2022-04-18
Payer: COMMERCIAL

## 2022-04-18 DIAGNOSIS — R26.89 BALANCE PROBLEMS: ICD-10-CM

## 2022-04-18 LAB
ALBUMIN SERPL-MCNC: 3.8 G/DL (ref 3.4–5)
ALP SERPL-CCNC: 110 U/L (ref 40–150)
ALT SERPL W P-5'-P-CCNC: 19 U/L (ref 0–50)
ANION GAP SERPL CALCULATED.3IONS-SCNC: 3 MMOL/L (ref 3–14)
AST SERPL W P-5'-P-CCNC: 13 U/L (ref 0–45)
BILIRUB SERPL-MCNC: 0.2 MG/DL (ref 0.2–1.3)
BUN SERPL-MCNC: 20 MG/DL (ref 7–30)
CALCIUM SERPL-MCNC: 9.8 MG/DL (ref 8.5–10.1)
CHLORIDE BLD-SCNC: 106 MMOL/L (ref 94–109)
CO2 SERPL-SCNC: 30 MMOL/L (ref 20–32)
CREAT SERPL-MCNC: 0.66 MG/DL (ref 0.52–1.04)
GFR SERPL CREATININE-BSD FRML MDRD: >90 ML/MIN/1.73M2
GLUCOSE BLD-MCNC: 120 MG/DL (ref 70–99)
POTASSIUM BLD-SCNC: 4.1 MMOL/L (ref 3.4–5.3)
PROT SERPL-MCNC: 7.1 G/DL (ref 6.8–8.8)
SODIUM SERPL-SCNC: 139 MMOL/L (ref 133–144)

## 2022-04-18 PROCEDURE — 36415 COLL VENOUS BLD VENIPUNCTURE: CPT

## 2022-04-18 PROCEDURE — 80053 COMPREHEN METABOLIC PANEL: CPT

## 2022-04-18 PROCEDURE — 80177 DRUG SCRN QUAN LEVETIRACETAM: CPT | Mod: 90

## 2022-04-18 PROCEDURE — 80175 DRUG SCREEN QUAN LAMOTRIGINE: CPT | Mod: 90

## 2022-04-18 PROCEDURE — 99000 SPECIMEN HANDLING OFFICE-LAB: CPT

## 2022-04-19 LAB
LAMOTRIGINE SERPL-MCNC: 16.2 UG/ML
LEVETIRACETAM SERPL-MCNC: 76 UG/ML

## 2022-04-26 ENCOUNTER — MYC MEDICAL ADVICE (OUTPATIENT)
Dept: PODIATRY | Facility: CLINIC | Age: 62
End: 2022-04-26
Payer: COMMERCIAL

## 2022-04-29 NOTE — TELEPHONE ENCOUNTER
Reason for Call:  Form, our goal is to have forms completed with 7 days, however, some forms may require a visit or additional information.    Type of letter, form or note:  Workability     Who is the form from?: Patient    Where did the form come from: Patient or family brought in   (via [employer])    Phone number of person requesting form: [phone]  Can we leave a detailed message on this number:  YES    Desired completion date of form: 4/29/22      How will form be returned?:  fax to [phone]    Has the patient signed a consent form for release of information (may be included with form)? NO. Verbal request.      Form was started and place in Provider Basket for provider review/ completion at [facility].      Patient calls in checking status of forms. She is hoping they can be completed today so she may RTW on 5/2/22.   No return call needed.     MADHURI Brantley RN

## 2022-04-29 NOTE — TELEPHONE ENCOUNTER
Dr. Elias SANTOS for patient and then completed forms.   Forms were returned via fax as requested to [phone].  Copy was sent to [facility].     Meryl Covarrubias ATC

## 2022-05-02 ENCOUNTER — TELEPHONE (OUTPATIENT)
Dept: NEUROLOGY | Facility: CLINIC | Age: 62
End: 2022-05-02
Payer: COMMERCIAL

## 2022-05-02 NOTE — LETTER
Patient:  Barbi Tiwari  :   1960  MRN:     [id number]        Ms.Anita MISAEL Tiwari  [address] [facility] [address]        May 13, 2022    Dear ,    We are writing to inform you of your test results.    Your levetiracetam and lamotrigine levels are high and may cause fatigue, dizziness, nausea, falls, double vision, and other symptoms. Please call our office so we may lower your seizure medications. Thank you.     Resulted Orders   Keppra (Levetiracetam) Level   Result Value Ref Range    Keppra (Levetiracetam) Level 76 (H) 10 - 40 ug/mL      Comment:      INTERPRETIVE INFORMATION: Keppra (Levetiracetam)    Therapeutic Range:  10-40 ug/mL              Toxic:  Not well Established    Pharmacokinetics of levetiracetam are affected by renal   function. Adverse effects may include somnolence, weakness,   headache and vomiting.    This levetiracetam (Keppra) immunoassay uses the [employer] reagents, which has known cross-reactivity with   the drug brivaracetam (Briviact) and may report inaccurate   results. Patients transitioning from levetiracetam to   brivaracetam or those who are using both medications should   not monitor drug concentrations with the [employer]   assay. These patients should be monitored using a validated   chromatographic methodology that distinguishes between   drugs to determine drug concentrations.  Performed By: [facility]  [address] [place] [id number]  : Sydney Scherer MD   Lamotrigine Level   Result Value Ref Range    Lamotrigine 16.2 (H) 3.0 - 15.0 ug/mL      Comment:      INTERPRETIVE INFORMATION:  Lamotrigine    Therapeutic Range:  3.0-15.0 ug/mL              Toxic:  Greater than or equal to 20 ug/mL     Pharmacokinetics varies widely, particularly with   co-medications and/or compromised renal function.  Adverse   effects may include dizziness, somnolence, nausea and   vomiting.  Performed By: [facility]  [address] [place] [address]  : Sydney Scherer MD   Comprehensive metabolic panel (BMP + Alb, Alk Phos, ALT, AST, Total. Bili, TP)   Result Value Ref Range    Sodium 139 133 - 144 mmol/L    Potassium 4.1 3.4 - 5.3 mmol/L    Chloride 106 94 - 109 mmol/L    Carbon Dioxide (CO2) 30 20 - 32 mmol/L    Anion Gap 3 3 - 14 mmol/L    Urea Nitrogen 20 7 - 30 mg/dL    Creatinine 0.66 0.52 - 1.04 mg/dL    Calcium 9.8 8.5 - 10.1 mg/dL    Glucose 120 (H) 70 - 99 mg/dL    Alkaline Phosphatase 110 40 - 150 U/L    AST 13 0 - 45 U/L    ALT 19 0 - 50 U/L    Protein Total 7.1 6.8 - 8.8 g/dL    Albumin 3.8 3.4 - 5.0 g/dL    Bilirubin Total 0.2 0.2 - 1.3 mg/dL    GFR Estimate >90 >60 mL/min/1.73m2      Comment:      Effective December 21, 2021 eGFRcr in adults is calculated using the 2021 CKD-EPI creatinine equation which includes age and gender (Kayley et al., NEJM, DOI: 10.1056/YFGZsk1975941)       Flakita Clark MD               [id number]  1960

## 2022-05-02 NOTE — TELEPHONE ENCOUNTER
----- Message from Flakita Clark MD sent at 4/29/2022  5:03 PM CDT -----  Please check in with patient if she continues to be off balance reduce lamotrigine by 50 mg/day.  I have not seen her in follow-up for some time.  ----- Message -----  From: Dustin Choudhury MD  Sent: 4/19/2022   9:46 PM CDT  To: Flakita Clark MD    Barbi has c/o of being somewhat off balance - not frankly ataxic but she messaged me and I had some labs done incld these.  Let her know if you feel any dose adjustments are in order.

## 2022-05-04 NOTE — TELEPHONE ENCOUNTER
[employer]ist message sent to patient after not receiving a call back from the voicemail that was left.

## 2022-05-24 ENCOUNTER — OFFICE VISIT (OUTPATIENT)
Dept: PODIATRY | Facility: CLINIC | Age: 62
End: 2022-05-24
Payer: COMMERCIAL

## 2022-05-24 VITALS
WEIGHT: 146.2 LBS | HEIGHT: 62 IN | SYSTOLIC BLOOD PRESSURE: 110 MMHG | DIASTOLIC BLOOD PRESSURE: 64 MMHG | BODY MASS INDEX: 26.91 KG/M2

## 2022-05-24 DIAGNOSIS — Z23 HIGH PRIORITY FOR 2019-NCOV VACCINE: Primary | ICD-10-CM

## 2022-05-24 DIAGNOSIS — Z09 SURGERY FOLLOW-UP EXAMINATION: Primary | ICD-10-CM

## 2022-05-24 PROCEDURE — 99213 OFFICE O/P EST LOW 20 MIN: CPT | Performed by: PODIATRIST

## 2022-05-24 PROCEDURE — 0064A COVID-19,PF,MODERNA (18+ YRS BOOSTER .25ML): CPT | Performed by: INTERNAL MEDICINE

## 2022-05-24 PROCEDURE — 91306 COVID-19,PF,MODERNA (18+ YRS BOOSTER .25ML): CPT | Performed by: INTERNAL MEDICINE

## 2022-05-24 NOTE — PROGRESS NOTES
ASSESSMENT:  Encounter Diagnosis   Name Primary?     Surgery follow-up examination Yes     MEDICAL DECISION MAKING:  Barbi reports doing well.  She still has some discomfort in the first metatarsophalangeal joint arthrodesis area.  This is not significantly altering her activities of daily living.  She will continue wearing quality supportive athletic shoes.  She will receive her custom molded orthoses in early June.  I personally reviewed today's x-ray images with Barbi.  Hardware unchanged.  Apparent interval osseous healing.    Follow-up on an as-needed basis at this point.    Disclaimer: This note consists of symbols derived from keyboarding, dictation and/or voice recognition software. As a result, there may be errors in the script that have gone undetected. Please consider this when interpreting information found in this chart.    Benjamin Griffin DPM, FACJONATHAN, MS    [place] [facility]      ____________________________________________________________________    HPI:       Faby follows up postoperatively.  She had left foot surgery on 2/1/2022.  At her last visit on 4/1/2022, she was doing well yet was having pain related to the tibial sesamoid.  Options discussed included thicker and stiffer soled shoes, padding, and she was referred for custom molded orthoses.    PROCEDURES PERFORMED:  1.  Left first metatarsophalangeal joint arthrodesis.  2.  Left second metatarsal Weil osteotomy.  3.  Left second hammertoe correction via arthrodesis.  4.  Left third metatarsophalangeal joint tenotomy and capsulotomy  Past Medical History:   Diagnosis Date     Acute cystitis with hematuria 10/24/2020     Allergic rhinitis 12/9/2009     Calculus of kidney 1/12/2011    Overview:  S/P LITHOTRIPSY 3/15/11      Esophageal reflux 10/22/2008     Hyperparathyroidism 01/11/2011    had surgery for it; resulted in seizures     Moderate major depression, single episode (H)      oligodendroglioma 7/23/2012      Osteoarthrosis 12/9/2009     Palpitations 6/5/2014     PONV (postoperative nausea and vomiting)      PVCs 6/5/2014     Seizure disorder (related to tumor) 08/17/2011    last seizure 3/2021   *  *  Past Surgical History:   Procedure Laterality Date     ARTHRODESIS FOOT Left 2/1/2022    Procedure: left first metatarsophalangeal joint arthrodesis, Tenotomy and capusulotomy 3rd metatarsalphalangeal joint;  Surgeon: Benjamin Griffin DPM;  Location:  OR     COMBINED CYSTOSCOPY, RETROGRADES, URETEROSCOPY, LASER HOLMIUM LITHOTRIPSY URETER(S), INSERT STENT Right 8/3/2021    Procedure: 1. Cystourethroscopy 2. Right ureteroscopy 3. Right retrograde pyelogram with interpretation of intraoperative fluoroscopic imaging 4. Holmium laser lithotripsy with basket stone extraction 5. Right ureteral stent placement;  Surgeon: Felix Cano MD;  Location:  OR     CRANIOTOMY  2011    tumor resection     CYSTOSCOPY, RETROGRADES, INSERT STENT URETER(S), COMBINED Right 7/20/2021    Procedure: 1.  Cystourethroscopy 2.  Attempted right ureteroscopy 3.  Right  retrograde pyelogram with interpretation of intraoperative fluoroscopic imaging 4.  Right ureteral dilatation 5.  Right ureteral stent placement 6.  Laser on stand-by;  Surgeon: Felix Cano MD;  Location:  OR     EXTRACORPOREAL SHOCK WAVE LITHOTRIPSY, CYSTOSCOPY, INSERT STENT URETER(S), COMBINED Bilateral 5/5/2017    Procedure: COMBINED EXTRACORPOREAL SHOCK WAVE LITHOTRIPSY, CYSTOSCOPY, INSERT STENT URETER(S);  CYSTO, URETHRAL DILATION, URETERAL LEFT STENT PLACEMENT, LEFT ESWL.;  Surgeon: Angel Del Rio MD;  Location:  OR     FOOT SURGERY      mulitple     HYSTERECTOMY, PAP NO LONGER INDICATED  2008    lap hys     LITHOTRIPSY  2010 2017     OPTICAL TRACKING SYSTEM CRANIOTOMY, EXCISE TUMOR WITH MAPPING, COMBINED Right 5/30/2018    Procedure: COMBINED OPTICAL TRACKING SYSTEM CRANIOTOMY, EXCISE TUMOR WITH MAPPING;  Right Stealth Assisted Craniotomy  With Motor Mapping And Tumor Resection ;  Surgeon: Dustin Rodriguez MD;  Location: [facility] OR     ORTHOPEDIC SURGERY      feet, multiple on both     OSTEOTOMY FOOT Right 1/15/2019    Procedure: OSTEOTOMY FOOT;  Surgeon: Benjamin Griffin DPM;  Location: [facility]     OSTEOTOMY FOOT Left 2/1/2022    Procedure: left second metatarsal Weil osteotomy;  Surgeon: Benjamin Griffin DPM;  Location: [facility]     PARATHYROIDECTOMY  2011     RECONSTRUCT FOREFOOT WITH METATARSOPHALANGEAL (MTP) FUSION Right 1/15/2019    Procedure: RIGHT FIRST METATARSAL PHALAGEAL JOINT ARTHRODESIS, RIGHT SECOND METATARSAL WEIL OSTEOTOMY;  Surgeon: Benjamin Griffin DPM;  Location: [facility]     REPAIR HAMMER TOE Left 2/1/2022    Procedure: CORRECTION, left second HAMMER TOE;  Surgeon: Benjamin Griffin DPM;  Location: [facility]   *  *  Current Outpatient Medications   Medication Sig Dispense Refill     acetaminophen (TYLENOL) 500 MG tablet Take 2 tablets (1,000 mg) by mouth every 8 hours as needed for mild pain 42 tablet 1     CALCIUM PO        escitalopram (LEXAPRO) 10 MG tablet Take 1 tablet (10 mg) by mouth daily 90 tablet 1     famotidine (PEPCID) 10 MG tablet Take 1 tablet (10 mg) by mouth nightly as needed (heartburn)       fexofenadine (ALLEGRA) 180 MG tablet Take 180 mg by mouth daily       gabapentin (NEURONTIN) 100 MG capsule Take 2 capsules (200 mg) by mouth every evening 180 capsule 3     ibuprofen (ADVIL/MOTRIN) 600 MG tablet Take 1 tablet (600 mg) by mouth every 6 hours as needed for moderate pain 28 tablet 1     Lactase (LACTAID PO) Take 1-2 tablets by mouth daily as needed for indigestion        lamoTRIgine (LAMICTAL) 100 MG tablet TAKE HALF TABLET BY MOUTH EVERY MORNING AND ONE TABLET EVERY EVENING (TAKE ALONG WITH 200 MG TABLET FOR A TOTAL  mg morning and 300 MG evening) 135 tablet 3     lamoTRIgine (LAMICTAL) 200 MG tablet TAKE ONE TABLET BY MOUTH TWICE A DAY (TAKE ALONG WITH 100 MG TABLET FOR TOTAL  mg morning and 300 MG  pm). 180 tablet 3     levETIRAcetam (KEPPRA) 500 MG tablet Take 2.5 Tablets (1250 mg) by mouth every morning, AND Take 3 Tablets (1500 mg)by mouth every evening. 495 tablet 3     metoprolol tartrate (LOPRESSOR) 25 MG tablet Take 0.5 tablets (12.5 mg) by mouth 2 times daily 90 tablet 3     Multiple Vitamin (MULTI-VITAMINS) TABS Take 1 chew tab by mouth daily  (Patient not taking: Reported on 3/9/2022)       tretinoin (RETIN-A) 0.025 % external cream Apply a pea-sized amount to each area affected by acne. Start every 3-4 nights working up to every night. 45 g 0     triamcinolone (KENALOG) 0.1 % external cream Apply topically 2 times daily To affected area in between breasts for 1-2 weeks. Tapering with improvement and restarting with flares. 60 g 2     vitamin B-12 (CYANOCOBALAMIN) 2500 MCG sublingual tablet Take 1 tablet (2,500 mcg) by mouth daily 90 tablet 11     VITAMIN D, CHOLECALCIFEROL, PO Take 1,000 Units by mouth daily            EXAM:    Vitals: LMP  (LMP Unknown)   BMI: There is no height or weight on file to calculate BMI.    Vascular:  Pedal pulses are palpable for both the DP and PT arteries.  CFT < 3 sec.   Most of a postoperative edema has resolved.     Neuro: Light touch sensation is intact distal to the incisions.      Derm: The incisions have healed.      Musculoskeletal:    The hallux is maintained in a more rectus position and parallel to the second toe.  Second toe is more rectus in the sagittal plane with intact K wire.  She is able to dorsiflex and plantarflex the second toe against resistance.     Repeat XR Left Foot: Stable postoperative findings.  No hardware change or complication.  There appears to be osseous healing.

## 2022-05-24 NOTE — LETTER
5/24/2022         RE: Barbi Tiwari  [address] [facility] [id number]        Dear Colleague,    Thank you for referring your patient, Barbi Tiwari, to the [facility]. Please see a copy of my visit note below.    ASSESSMENT:  Encounter Diagnosis   Name Primary?     Surgery follow-up examination Yes     MEDICAL DECISION MAKING:  Barbi reports doing well.  She still has some discomfort in the first metatarsophalangeal joint arthrodesis area.  This is not significantly altering her activities of daily living.  She will continue wearing quality supportive athletic shoes.  She will receive her custom molded orthoses in early June.  I personally reviewed today's x-ray images with Barbi.  Hardware unchanged.  Apparent interval osseous healing.    Follow-up on an as-needed basis at this point.    Disclaimer: This note consists of symbols derived from keyboarding, dictation and/or voice recognition software. As a result, there may be errors in the script that have gone undetected. Please consider this when interpreting information found in this chart.    Benjamin Griffin DPM, FACFAS, MS    [place] [facility]le [facility]      ____________________________________________________________________    HPI:       Faby follows up postoperatively.  She had left foot surgery on 2/1/2022.  At her last visit on 4/1/2022, she was doing well yet was having pain related to the tibial sesamoid.  Options discussed included thicker and stiffer soled shoes, padding, and she was referred for custom molded orthoses.    PROCEDURES PERFORMED:  1.  Left first metatarsophalangeal joint arthrodesis.  2.  Left second metatarsal Weil osteotomy.  3.  Left second hammertoe correction via arthrodesis.  4.  Left third metatarsophalangeal joint tenotomy and capsulotomy  Past Medical History:   Diagnosis Date     Acute cystitis with hematuria 10/24/2020     Allergic rhinitis 12/9/2009      Calculus of kidney 1/12/2011    Overview:  S/P LITHOTRIPSY 3/15/11      Esophageal reflux 10/22/2008     Hyperparathyroidism 01/11/2011    had surgery for it; resulted in seizures     Moderate major depression, single episode (H)      oligodendroglioma 7/23/2012     Osteoarthrosis 12/9/2009     Palpitations 6/5/2014     PONV (postoperative nausea and vomiting)      PVCs 6/5/2014     Seizure disorder (related to tumor) 08/17/2011    last seizure 3/2021   *  *  Past Surgical History:   Procedure Laterality Date     ARTHRODESIS FOOT Left 2/1/2022    Procedure: left first metatarsophalangeal joint arthrodesis, Tenotomy and capusulotomy 3rd metatarsalphalangeal joint;  Surgeon: Benjamin Griffin DPM;  Location:  OR     COMBINED CYSTOSCOPY, RETROGRADES, URETEROSCOPY, LASER HOLMIUM LITHOTRIPSY URETER(S), INSERT STENT Right 8/3/2021    Procedure: 1. Cystourethroscopy 2. Right ureteroscopy 3. Right retrograde pyelogram with interpretation of intraoperative fluoroscopic imaging 4. Holmium laser lithotripsy with basket stone extraction 5. Right ureteral stent placement;  Surgeon: Felix Cano MD;  Location:  OR     CRANIOTOMY  2011    tumor resection     CYSTOSCOPY, RETROGRADES, INSERT STENT URETER(S), COMBINED Right 7/20/2021    Procedure: 1.  Cystourethroscopy 2.  Attempted right ureteroscopy 3.  Right  retrograde pyelogram with interpretation of intraoperative fluoroscopic imaging 4.  Right ureteral dilatation 5.  Right ureteral stent placement 6.  Laser on stand-by;  Surgeon: Felix Cano MD;  Location:  OR     EXTRACORPOREAL SHOCK WAVE LITHOTRIPSY, CYSTOSCOPY, INSERT STENT URETER(S), COMBINED Bilateral 5/5/2017    Procedure: COMBINED EXTRACORPOREAL SHOCK WAVE LITHOTRIPSY, CYSTOSCOPY, INSERT STENT URETER(S);  CYSTO, URETHRAL DILATION, URETERAL LEFT STENT PLACEMENT, LEFT ESWL.;  Surgeon: Angel Del Rio MD;  Location:  OR     FOOT SURGERY      mulitple     HYSTERECTOMY, PAP NO LONGER INDICATED   2008    lap hys     LITHOTRIPSY  2010 2017     OPTICAL TRACKING SYSTEM CRANIOTOMY, EXCISE TUMOR WITH MAPPING, COMBINED Right 5/30/2018    Procedure: COMBINED OPTICAL TRACKING SYSTEM CRANIOTOMY, EXCISE TUMOR WITH MAPPING;  Right Stealth Assisted Craniotomy With Motor Mapping And Tumor Resection ;  Surgeon: Dustin Rodriguez MD;  Location: [facility] OR     ORTHOPEDIC SURGERY      feet, multiple on both     OSTEOTOMY FOOT Right 1/15/2019    Procedure: OSTEOTOMY FOOT;  Surgeon: Benjamin Griffin DPM;  [facility]     OSTEOTOMY FOOT Left 2/1/2022    Procedure: left second metatarsal Weil osteotomy;  Surgeon: Benjamin Griffin DPM;  Location[facility]     PARATHYROIDECTOMY  2011     RECONSTRUCT FOREFOOT WITH METATARSOPHALANGEAL (MTP) FUSION Right 1/15/2019    Procedure: RIGHT FIRST METATARSAL PHALAGEAL JOINT ARTHRODESIS, RIGHT SECOND METATARSAL WEIL OSTEOTOMY;  Surgeon: Benjamin Griffin DPM;  Location: [facility]     REPAIR HAMMER TOE Left 2/1/2022    Procedure: CORRECTION, left second HAMMER TOE;  Surgeon: Benjamin Griffin DPM;  Location: [facility]   *  *  Current Outpatient Medications   Medication Sig Dispense Refill     acetaminophen (TYLENOL) 500 MG tablet Take 2 tablets (1,000 mg) by mouth every 8 hours as needed for mild pain 42 tablet 1     CALCIUM PO        escitalopram (LEXAPRO) 10 MG tablet Take 1 tablet (10 mg) by mouth daily 90 tablet 1     famotidine (PEPCID) 10 MG tablet Take 1 tablet (10 mg) by mouth nightly as needed (heartburn)       fexofenadine (ALLEGRA) 180 MG tablet Take 180 mg by mouth daily       gabapentin (NEURONTIN) 100 MG capsule Take 2 capsules (200 mg) by mouth every evening 180 capsule 3     ibuprofen (ADVIL/MOTRIN) 600 MG tablet Take 1 tablet (600 mg) by mouth every 6 hours as needed for moderate pain 28 tablet 1     Lactase (LACTAID PO) Take 1-2 tablets by mouth daily as needed for indigestion        lamoTRIgine (LAMICTAL) 100 MG tablet TAKE HALF TABLET BY MOUTH EVERY MORNING AND ONE  TABLET EVERY EVENING (TAKE ALONG WITH 200 MG TABLET FOR A TOTAL  mg morning and 300 MG evening) 135 tablet 3     lamoTRIgine (LAMICTAL) 200 MG tablet TAKE ONE TABLET BY MOUTH TWICE A DAY (TAKE ALONG WITH 100 MG TABLET FOR TOTAL  mg morning and 300 MG pm). 180 tablet 3     levETIRAcetam (KEPPRA) 500 MG tablet Take 2.5 Tablets (1250 mg) by mouth every morning, AND Take 3 Tablets (1500 mg)by mouth every evening. 495 tablet 3     metoprolol tartrate (LOPRESSOR) 25 MG tablet Take 0.5 tablets (12.5 mg) by mouth 2 times daily 90 tablet 3     Multiple Vitamin (MULTI-VITAMINS) TABS Take 1 chew tab by mouth daily  (Patient not taking: Reported on 3/9/2022)       tretinoin (RETIN-A) 0.025 % external cream Apply a pea-sized amount to each area affected by acne. Start every 3-4 nights working up to every night. 45 g 0     triamcinolone (KENALOG) 0.1 % external cream Apply topically 2 times daily To affected area in between breasts for 1-2 weeks. Tapering with improvement and restarting with flares. 60 g 2     vitamin B-12 (CYANOCOBALAMIN) 2500 MCG sublingual tablet Take 1 tablet (2,500 mcg) by mouth daily 90 tablet 11     VITAMIN D, CHOLECALCIFEROL, PO Take 1,000 Units by mouth daily            EXAM:    Vitals: LMP  (LMP Unknown)   BMI: There is no height or weight on file to calculate BMI.    Vascular:  Pedal pulses are palpable for both the DP and PT arteries.  CFT < 3 sec.   Most of a postoperative edema has resolved.     Neuro: Light touch sensation is intact distal to the incisions.      Derm: The incisions have healed.      Musculoskeletal:    The hallux is maintained in a more rectus position and parallel to the second toe.  Second toe is more rectus in the sagittal plane with intact K wire.  She is able to dorsiflex and plantarflex the second toe against resistance.     Repeat XR Left Foot: Stable postoperative findings.  No hardware change or complication.  There appears to be osseous healing.        Again,  thank you for allowing me to participate in the care of your patient.        Sincerely,        Benajmin Griffin DPM

## 2022-05-31 ENCOUNTER — MYC MEDICAL ADVICE (OUTPATIENT)
Dept: INTERNAL MEDICINE | Facility: CLINIC | Age: 62
End: 2022-05-31
Payer: COMMERCIAL

## 2022-05-31 DIAGNOSIS — L72.3 INFLAMED SEBACEOUS CYST: Primary | ICD-10-CM

## 2022-06-01 ENCOUNTER — OFFICE VISIT (OUTPATIENT)
Dept: URGENT CARE | Facility: URGENT CARE | Age: 62
End: 2022-06-01
Payer: COMMERCIAL

## 2022-06-01 ENCOUNTER — NURSE TRIAGE (OUTPATIENT)
Dept: INTERNAL MEDICINE | Facility: CLINIC | Age: 62
End: 2022-06-01
Payer: COMMERCIAL

## 2022-06-01 ENCOUNTER — LAB (OUTPATIENT)
Dept: LAB | Facility: CLINIC | Age: 62
End: 2022-06-01
Payer: COMMERCIAL

## 2022-06-01 VITALS
DIASTOLIC BLOOD PRESSURE: 70 MMHG | SYSTOLIC BLOOD PRESSURE: 114 MMHG | OXYGEN SATURATION: 96 % | TEMPERATURE: 97.6 F | HEART RATE: 57 BPM

## 2022-06-01 DIAGNOSIS — L03.319 CELLULITIS OF TRUNK, UNSPECIFIED SITE OF TRUNK: Primary | ICD-10-CM

## 2022-06-01 DIAGNOSIS — E53.8 VITAMIN B12 DEFICIENCY (NON ANEMIC): ICD-10-CM

## 2022-06-01 DIAGNOSIS — E78.00 ELEVATED CHOLESTEROL: ICD-10-CM

## 2022-06-01 DIAGNOSIS — Z13.220 SCREENING FOR HYPERLIPIDEMIA: ICD-10-CM

## 2022-06-01 LAB — VIT B12 SERPL-MCNC: 1934 PG/ML (ref 193–986)

## 2022-06-01 PROCEDURE — 82607 VITAMIN B-12: CPT

## 2022-06-01 PROCEDURE — 99214 OFFICE O/P EST MOD 30 MIN: CPT | Performed by: PREVENTIVE MEDICINE

## 2022-06-01 PROCEDURE — 36415 COLL VENOUS BLD VENIPUNCTURE: CPT

## 2022-06-01 RX ORDER — DOXYCYCLINE 100 MG/1
100 TABLET ORAL 2 TIMES DAILY
Qty: 14 TABLET | Refills: 0 | Status: SHIPPED | OUTPATIENT
Start: 2022-06-01 | End: 2022-06-07

## 2022-06-01 NOTE — TELEPHONE ENCOUNTER
"Received a call from the patient (in relation to [employer] message received 5/31/22) stating she has a lump on her R breast that has been there for some time but recently has been causing her some discomfort and has become more red and inflamed in appearance. Patient denies fever.     1. APPEARANCE of SWELLING: \"What does it look like?\" (e.g., lymph node, insect bite, mole)      \"Looks like another nipple\"  2. SIZE: \"How large is the swelling?\" (inches, cm or compare to coins)      Bigger than a quarter   3. LOCATION: \"Where is the swelling located?\"      Under side of R breast  4. ONSET: \"When did the swelling start?\"      Last week, over the weekend pain started  5. PAIN: \"Is it painful?\" If so, ask: \"How much?\"      Right above bra line and rubs against it causing some discomfort, pain only when touching  6. ITCH: \"Does it itch?\" If so, ask: \"How much?\"      No  7. CAUSE: \"What do you think caused the swelling?\"      Patient states she had one about 2 years ago that Dr. Choudhury was able to remove, has had this area cut open and discharge removed in the past   8. OTHER SYMPTOMS: \"Do you have any other symptoms?\" (e.g., fever)      Redness and inflammation, no fever    Triage protocol recommending patient be seen by physician within 24 hours. No appointments available today and no appointments available tomorrow. Patient on the way to her clinic for a lab appointment while speaking to triage nurse. Encouraged patient to be seen in urgent care this evening for evaluation. Patient agreeable to this plan and will stop by urgent care after her lab appointment.     Reason for Disposition    [1] Swelling is painful to touch AND [2] no fever    Additional Information    Negative: Sounds like a life-threatening emergency to the triager    Negative: Small growth, spot, bump, or pigmented area of skin (e.g., moles, skin tags, wart, melanoma, skin cancer)    Negative: Inguinal hernia previously diagnosed by a physician    " Negative: Followed a skin injury    Negative: Follows an insect bite    Negative: Swelling of lymph node suspected    Negative: Swelling of vaccination site    Negative: Swelling of tongue    Negative: Swelling of lip    Negative: Swelling of eye    Negative: Swelling of entire face    Negative: Swelling of scrotum    Negative: Swelling of labia    Negative: Swelling of surgical incision    Negative: Swelling of ankle joint    Negative: Swelling of elbow joint    Negative: Swelling of knee joint    Negative: Swelling with a skin rash    Negative: Patient sounds very sick or weak to the triager    Negative: SEVERE pain (e.g., excruciating)    Negative: [1] Swelling is painful to touch AND [2] fever    Negative: [1] Swelling is red AND [2] fever    Negative: [1] Swelling is red AND [2] size > 2 inches (5.0 cm) (Exception: itchy area of skin)    Negative: [1] Swelling of groin (inguinal area) AND [2] painful    Protocols used: SKIN LUMP OR LOCALIZED SWELLING-MULU Patel RN

## 2022-06-01 NOTE — PROGRESS NOTES
Assessment & Plan     1. Cellulitis of trunk, unspecified site of trunk  - doxycycline monohydrate (ADOXA) 100 MG tablet; Take 1 tablet (100 mg) by mouth 2 times daily for 7 days  Dispense: 14 tablet; Refill: 0  Early abscess but no fluctuance at this point as well.  Heat to area three times per day.  Doxycycline for 7 days  Follow up with surgeon for further evaluation of possible subcutaneous cyst in 1-2 weeks.  Follow up sooner if worsening.       31 minutes spent on the date of the encounter doing chart review, patient visit and documentation         No follow-ups on file.    Sahil Reyna MD  [facility]    Subjective     Barbi Tiwari is a 61 year old year old female who presents to clinic today for the following health issues:    Patient presents with:  Urgent Care: Cyst on breast right side red and painful  This is a 62 yo female who has had a r breast cyst for years.  It got infected years ago and drained.  No problems since.  Was considering seeing a surgeon to have it surgically removed but never did.  Over the past 3 days, it has gotten more swollen and there is redness around it.  No drainage.      Patient Active Problem List   Diagnosis     Calculus of kidney     Hyperparathyroidism s/p surgery     GERD (gastroesophageal reflux disease)     Allergic rhinitis     Palpitations     PVC's (premature ventricular contractions)     Seizure (H)     Osteoarthritis     Elevated cholesterol     Abnormal screening cardiac CT     Lumbar radiculopathy     Advanced directives, counseling/discussion     Nephrolithiasis     Oligodendroglioma (H)     Moderate major depression, single episode (H)     S/P laparoscopic hysterectomy     Primary osteoarthritis of left knee     Acute left-sided low back pain without sciatica     Hip pain, left     Chemotherapy management, encounter for     High risk for chemotherapy-induced infectious complication     Obstruction of right ureteropelvic  junction (UPJ) due to stone       Current Outpatient Medications   Medication     acetaminophen (TYLENOL) 500 MG tablet     CALCIUM PO     doxycycline monohydrate (ADOXA) 100 MG tablet     escitalopram (LEXAPRO) 10 MG tablet     famotidine (PEPCID) 10 MG tablet     fexofenadine (ALLEGRA) 180 MG tablet     gabapentin (NEURONTIN) 100 MG capsule     ibuprofen (ADVIL/MOTRIN) 600 MG tablet     Lactase (LACTAID PO)     lamoTRIgine (LAMICTAL) 100 MG tablet     lamoTRIgine (LAMICTAL) 200 MG tablet     levETIRAcetam (KEPPRA) 500 MG tablet     metoprolol tartrate (LOPRESSOR) 25 MG tablet     Multiple Vitamin (MULTI-VITAMINS) TABS     tretinoin (RETIN-A) 0.025 % external cream     triamcinolone (KENALOG) 0.1 % external cream     vitamin B-12 (CYANOCOBALAMIN) 2500 MCG sublingual tablet     VITAMIN D, CHOLECALCIFEROL, PO     No current facility-administered medications for this visit.       Past Medical History:   Diagnosis Date     Acute cystitis with hematuria 10/24/2020     Allergic rhinitis 2009     Calculus of kidney 2011    Overview:  S/P LITHOTRIPSY 3/15/11      Esophageal reflux 10/22/2008     Hyperparathyroidism 2011    had surgery for it; resulted in seizures     Moderate major depression, single episode (H)      oligodendroglioma 2012     Osteoarthrosis 2009     Palpitations 2014     PONV (postoperative nausea and vomiting)      PVCs 2014     Seizure disorder (related to tumor) 2011    last seizure 3/2021       Social History   reports that she has never smoked. She has never used smokeless tobacco. She reports current alcohol use. She reports that she does not use drugs.    Family History   Problem Relation Age of Onset     Arthritis Mother      Depression Mother      Respiratory Mother         COPD     LUNG DISEASE Mother      C.A.D. Father 58        heart attack     Heart Disease Father      Coronary Artery Disease Father         MI  age 54     Diabetes Brother 70          age 70     Depression Sister      Depression Son      Allergies Son      Depression Daughter      Allergies Daughter      Eczema Brother      Skin Cancer Brother      Depression Sister      Depression Sister      Anxiety Disorder Daughter        Review of Systems  Constitutional, HEENT, cardiovascular, pulmonary, GI, , musculoskeletal, neuro, skin, endocrine and psych systems are negative, except as otherwise noted.      Objective    /70 (BP Location: Left arm, Patient Position: Sitting, Cuff Size: Adult Regular)   Pulse 57   Temp 97.6  F (36.4  C) (Oral)   LMP  (LMP Unknown)   SpO2 96%   Breastfeeding No   Physical Exam   GENERAL: healthy, alert and no distress  EYES: Eyes grossly normal to inspection, PERRL and conjunctivae and sclerae normal  HENT: ear canals and TM's normal, nose and mouth without ulcers or lesions  NECK: no adenopathy, no asymmetry, masses, or scars and thyroid normal to palpation  RESP: lungs clear to auscultation - no rales, rhonchi or wheezes  CV: regular rate and rhythm, normal S1 S2, no S3 or S4, no murmur, click or rub, no peripheral edema and peripheral pulses strong  MS: no gross musculoskeletal defects noted, no edema  SKIN: no suspicious lesions or rashes except redness and warmth on the inferior aspect on the right breast in a 2 inch distribution surrounding an area of unduration.  Feels like there is a cyst like structure below induration  NEURO: Normal strength and tone, mentation intact and speech normal  PSYCH: mentation appears normal, affect normal/bright

## 2022-06-02 NOTE — TELEPHONE ENCOUNTER
Please see mychart from patient and advise appropriate course of action.    See triage encounter from 6/1/22 regarding breast cyst.    Paige Patel RN  [facility] Triage Nurse

## 2022-06-07 ENCOUNTER — OFFICE VISIT (OUTPATIENT)
Dept: SURGERY | Facility: CLINIC | Age: 62
End: 2022-06-07
Payer: COMMERCIAL

## 2022-06-07 VITALS
SYSTOLIC BLOOD PRESSURE: 118 MMHG | WEIGHT: 146 LBS | HEIGHT: 62 IN | RESPIRATION RATE: 16 BRPM | BODY MASS INDEX: 26.87 KG/M2 | DIASTOLIC BLOOD PRESSURE: 78 MMHG | HEART RATE: 58 BPM | OXYGEN SATURATION: 95 %

## 2022-06-07 DIAGNOSIS — L03.319 CELLULITIS OF TRUNK, UNSPECIFIED SITE OF TRUNK: ICD-10-CM

## 2022-06-07 PROCEDURE — 99203 OFFICE O/P NEW LOW 30 MIN: CPT | Performed by: SURGERY

## 2022-06-07 RX ORDER — DOXYCYCLINE 100 MG/1
100 TABLET ORAL 2 TIMES DAILY
Qty: 4 TABLET | Refills: 0 | Status: SHIPPED | OUTPATIENT
Start: 2022-06-07 | End: 2022-09-19

## 2022-06-10 ENCOUNTER — OFFICE VISIT (OUTPATIENT)
Dept: SURGERY | Facility: CLINIC | Age: 62
End: 2022-06-10
Payer: COMMERCIAL

## 2022-06-10 ENCOUNTER — APPOINTMENT (OUTPATIENT)
Dept: LAB | Facility: CLINIC | Age: 62
End: 2022-06-10
Payer: COMMERCIAL

## 2022-06-10 VITALS
OXYGEN SATURATION: 96 % | HEART RATE: 56 BPM | DIASTOLIC BLOOD PRESSURE: 72 MMHG | RESPIRATION RATE: 16 BRPM | HEIGHT: 62 IN | SYSTOLIC BLOOD PRESSURE: 120 MMHG | WEIGHT: 146 LBS | BODY MASS INDEX: 26.87 KG/M2

## 2022-06-10 DIAGNOSIS — L72.3 SEBACEOUS CYST: Primary | ICD-10-CM

## 2022-06-10 PROCEDURE — 88304 TISSUE EXAM BY PATHOLOGIST: CPT | Performed by: PATHOLOGY

## 2022-06-10 PROCEDURE — 11402 EXC TR-EXT B9+MARG 1.1-2 CM: CPT | Performed by: SURGERY

## 2022-06-11 NOTE — PROGRESS NOTES
Assessment:   Barbi Tiwari is a 61 year old female seen in consultation at the request of Dustin Choudhury MD for a sebaceous cyst of the right breast which was recently infected. There is still some purple discoloration of the skin medial aspect of the cyst, but there is no drainage or cellulitis. At this point, it does not appear to need an incision and drainage. I have offered her excision of the cyst, which we will perform in the coming days as an office procedure.       Plan:  We have discussed the options of observation and excision.  She elects excision.  We discussed the indications, alternatives, and risks.  We discussed scarring, numbness, anesthesia, infection, bleeding, and recurrence.    We will schedule surgery later this week in the office.      HPI:  Barbi Tiwari is a 61 year old female presents today for a right breast skin mass which recently became inflamed. The patient reports that she has had a firm cyst here for many years. Many years ago, it spontaneously drained once, but she did not require incision and drainage.  Approximately 2 weeks ago, it became painful and swollen, though it never drained. She presented to urgent care, where 2 inches of indurated skin was noted, and she was started on doxycycline. Her symptoms have dramatically improved.     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    Past Surgical History:  Past Surgical History:   Procedure Laterality Date     ARTHRODESIS FOOT Left 2/1/2022    Procedure: left first metatarsophalangeal joint arthrodesis,  Tenotomy and capusulotomy 3rd metatarsalphalangeal joint;  Surgeon: Benjamin Griffin DPM;  Location:  OR     COMBINED CYSTOSCOPY, RETROGRADES, URETEROSCOPY, LASER HOLMIUM LITHOTRIPSY URETER(S), INSERT STENT Right 8/3/2021    Procedure: 1. Cystourethroscopy 2. Right ureteroscopy 3. Right retrograde pyelogram with interpretation of intraoperative fluoroscopic imaging 4. Holmium laser lithotripsy with basket stone extraction 5. Right ureteral stent placement;  Surgeon: Felix Cano MD;  Location:  OR     CRANIOTOMY  2011    tumor resection     CYSTOSCOPY, RETROGRADES, INSERT STENT URETER(S), COMBINED Right 7/20/2021    Procedure: 1.  Cystourethroscopy 2.  Attempted right ureteroscopy 3.  Right  retrograde pyelogram with interpretation of intraoperative fluoroscopic imaging 4.  Right ureteral dilatation 5.  Right ureteral stent placement 6.  Laser on stand-by;  Surgeon: Felix Cano MD;  Location:  OR     EXTRACORPOREAL SHOCK WAVE LITHOTRIPSY, CYSTOSCOPY, INSERT STENT URETER(S), COMBINED Bilateral 5/5/2017    Procedure: COMBINED EXTRACORPOREAL SHOCK WAVE LITHOTRIPSY, CYSTOSCOPY, INSERT STENT URETER(S);  CYSTO, URETHRAL DILATION, URETERAL LEFT STENT PLACEMENT, LEFT ESWL.;  Surgeon: Angel Del Rio MD;  Location:  OR     FOOT SURGERY      mulitple     HYSTERECTOMY, PAP NO LONGER INDICATED  2008    lap hys     LITHOTRIPSY  2010 2017     OPTICAL TRACKING SYSTEM CRANIOTOMY, EXCISE TUMOR WITH MAPPING, COMBINED Right 5/30/2018    Procedure: COMBINED OPTICAL TRACKING SYSTEM CRANIOTOMY, EXCISE TUMOR WITH MAPPING;  Right Stealth Assisted Craniotomy With Motor Mapping And Tumor Resection ;  Surgeon: Dustin Rodriguez MD;  Location: [facility] OR     ORTHOPEDIC SURGERY      feet, multiple on both     OSTEOTOMY FOOT Right 1/15/2019    Procedure: OSTEOTOMY FOOT;  Surgeon: Benjamin Griffin DPM;  Location:  OR     OSTEOTOMY FOOT Left 2/1/2022    Procedure: left second metatarsal Weil osteotomy;   Surgeon: Benjamin Griffin DPM;  Location: [facility] OR     PARATHYROIDECTOMY  2011     RECONSTRUCT FOREFOOT WITH METATARSOPHALANGEAL (MTP) FUSION Right 1/15/2019    Procedure: RIGHT FIRST METATARSAL PHALAGEAL JOINT ARTHRODESIS, RIGHT SECOND METATARSAL WEIL OSTEOTOMY;  Surgeon: Benjamin Griffin DPM;  Location: [facility] OR     REPAIR HAMMER TOE Left 2022    Procedure: CORRECTION, left second HAMMER TOE;  Surgeon: Benjamin Griffin DPM;  Location: [facility] OR        Social History:  Social History     Socioeconomic History     Marital status:      Spouse name: Not on file     Number of children: Not on file     Years of education: Not on file     Highest education level: Not on file   Occupational History     Occupation: RN- [employer]   Tobacco Use     Smoking status: Never Smoker     Smokeless tobacco: Never Used   Substance and Sexual Activity     Alcohol use: Yes     Comment: social     Drug use: No     Sexual activity: Yes     Partners: Male     Birth control/protection: Female Surgical     Comment: hysterectomy   Other Topics Concern     Parent/sibling w/ CABG, MI or angioplasty before 65F 55M? Yes     Comment: Father,  of MI age 54   Social History Narrative     Not on file     Social Determinants of Health     Financial Resource Strain: Not on file   Food Insecurity: Not on file   Transportation Needs: Not on file   Physical Activity: Not on file   Stress: Not on file   Social Connections: Not on file   Intimate Partner Violence: Not on file   Housing Stability: Not on file        Family History:  Family History   Problem Relation Age of Onset     Arthritis Mother      Depression Mother      Respiratory Mother         COPD     LUNG DISEASE Mother      C.A.D. Father 58        heart attack     Heart Disease Father      Coronary Artery Disease Father         MI  age 54     Diabetes Brother 70         age 70     Depression Sister      Depression Son      Allergies Son      Depression Daughter       "Allergies Daughter      Eczema Brother      Skin Cancer Brother      Depression Sister      Depression Sister      Anxiety Disorder Daughter      Family history reviewed and not pertinent.    ROS:  The 10 point review of systems is negative other than noted in the HPI and above.    PE:  /78   Pulse 58   Resp 16   Ht 1.575 m (5' 2\")   Wt 66.2 kg (146 lb)   LMP  (LMP Unknown)   SpO2 95%   BMI 26.70 kg/m    General appearance: well-developed, well-nourished, no apparent distress  HEENT:  Head normocephalic and atraumatic, pupils equal and round, conjunctivae clear, mucous membranes moist, external ears and nose normal  Lungs: respirations unlabored  Musculoskeletal:  Normal station and gait  Extremities: warm, without edema  Neurologic: alert, speech is clear, moves all extremities with good strength  Psychiatric: Mood and affect are appropriate  Skin: There is a 1 cm dermal cyst on/in the right breast in the lower inner quadrant.  The overlying skin shows a punctum. Medial to this, the skin is maroon and thinned  It is non-tender.      This note was created using voice recognition software. Undetected word substitutions or other errors may have occurred.     Time spent with the patient with greater that 50% of the time in discussion was 20 minutes.     Barbara Matamoros MD    Please route or send letter to:  Primary Care Provider (PCP)        "

## 2022-06-11 NOTE — PROGRESS NOTES
General Surgery Operative Note      Pre-operative diagnosis: Right breast subcutaneous mass   Post-operative diagnosis: Same    Procedure: Excision of right breast subcutaneous mass    Surgeon: Barbara Matamoros MD   Assistant(s): None   Anesthesia: Local    Estimated blood loss: 1 ml     Specimens: Right breast dermal cyst       The right inferior breast was prepped and draped in standard sterile fashion.  The skin surrounding the mass was anesthetized with local anesthetic.  An ellipse of skin, incorporating the central punctum was made with a length of 3 cm. The incision was made this length in order to include the discolored skin adjacent to the cyst.  The incision was carried into the subcutaneous tissue and the mass was completely excised from surrounding tissues with sharp dissection.  The mass, which measured 1 cm, was then passed off the field as specimen intact with no evidence of capsular rupture.  Hemostasis was maintained throughout with thermal cautery.  The wound was closed with 4-0 monocryl subcuticular suture and skin glue.  The patient tolerated the procedure well.      Barbara Matamoros MD

## 2022-06-14 LAB
PATH REPORT.COMMENTS IMP SPEC: NORMAL
PATH REPORT.COMMENTS IMP SPEC: NORMAL
PATH REPORT.FINAL DX SPEC: NORMAL
PATH REPORT.GROSS SPEC: NORMAL
PATH REPORT.MICROSCOPIC SPEC OTHER STN: NORMAL
PATH REPORT.RELEVANT HX SPEC: NORMAL
PHOTO IMAGE: NORMAL

## 2022-07-07 ENCOUNTER — PATIENT OUTREACH (OUTPATIENT)
Dept: ONCOLOGY | Facility: CLINIC | Age: 62
End: 2022-07-07

## 2022-07-07 NOTE — PROGRESS NOTES
[facility]: Cancer Care Short Note                                                                                          Completed chart audit to assign Oncology Care Coordination enrollment status.      Essence Rodriguez RN, BSN  Oncology/Neurosurgery Care Coordinator  [facility]    .

## 2022-08-30 ENCOUNTER — VIRTUAL VISIT (OUTPATIENT)
Dept: NEUROLOGY | Facility: CLINIC | Age: 62
End: 2022-08-30
Payer: COMMERCIAL

## 2022-08-30 VITALS — WEIGHT: 145 LBS | BODY MASS INDEX: 26.68 KG/M2 | HEIGHT: 62 IN

## 2022-08-30 DIAGNOSIS — R56.9 SEIZURE (H): Primary | ICD-10-CM

## 2022-08-30 ASSESSMENT — PATIENT HEALTH QUESTIONNAIRE - PHQ9: SUM OF ALL RESPONSES TO PHQ QUESTIONS 1-9: 7

## 2022-08-30 ASSESSMENT — PAIN SCALES - GENERAL: PAINLEVEL: NO PAIN (0)
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
## 2022-08-30 NOTE — PATIENT INSTRUCTIONS
Times of Days  8AM   9 pm      Medication Tablet Size Number of Tablets/Capsules Total Daily Dosage   Lamotrigine   100 mg         1       Lamotrigine   200 mg   1      1    550 mg    Gabapentin    100 mg         2    200 mg   Levetiracetam   500 mg  2      3    2500 mg       Month 2: lower levetiracetam 1000 mg morning and 1000 mg night. If seizure worsen, we should increase levetiracetam.         Times of Days  8AM   9 pm      Medication Tablet Size Number of Tablets/Capsules Total Daily Dosage   Lamotrigine   100 mg         1       Lamotrigine   200 mg   1      1    550 mg    Gabapentin    100 mg         2    200 mg   Levetiracetam   500 mg  2      2    2750 mg     - check labs lamotrigine, levetiracetam,  Gabapentin   - Follow-up 3 months    - seizure rescue: Valium nasal spray 5 mg for seizures more than 3 minutes or more than 2 seizures within 8 hour     Flakita Clark MD
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
## 2022-09-13 ENCOUNTER — HOSPITAL ENCOUNTER (OUTPATIENT)
Dept: MRI IMAGING | Facility: CLINIC | Age: 62
Discharge: HOME | End: 2022-09-13
Attending: PSYCHIATRY & NEUROLOGY | Admitting: PSYCHIATRY & NEUROLOGY
Payer: COMMERCIAL

## 2022-09-13 DIAGNOSIS — C71.9 OLIGODENDROGLIOMA (H): ICD-10-CM

## 2022-09-13 PROCEDURE — 255N000002 HC RX 255 OP 636: Performed by: PSYCHIATRY & NEUROLOGY

## 2022-09-13 PROCEDURE — 70553 MRI BRAIN STEM W/O & W/DYE: CPT

## 2022-09-13 PROCEDURE — A9585 GADOBUTROL INJECTION: HCPCS | Performed by: PSYCHIATRY & NEUROLOGY

## 2022-09-13 RX ORDER — GADOBUTROL 604.72 MG/ML
7 INJECTION INTRAVENOUS ONCE
Status: COMPLETED | OUTPATIENT
Start: 2022-09-13 | End: 2022-09-13

## 2022-09-13 RX ADMIN — GADOBUTROL 7 ML: 604.72 INJECTION INTRAVENOUS at 14:53

## 2022-09-16 NOTE — PROGRESS NOTES
Barbi is a 61 year old who is being evaluated via a billable video visit.      Patient stated she is in the state of [place] for the visit today.    How would you like to obtain your AVS? MyChart  If the video visit is dropped, the invitation should be resent by: Text to cell phone: [phone]  Will anyone else be joining your video visit? Sunni Martin, Virtual Visit Facilitator      Video-Visit Details  Type of service:  Video Visit    Video Start Time: 11:41 AM  Video End Time: 11:52 AM    Originating Location (pt. Location): Work    Distant Location (provider location):  [facility]     Platform used for Video Visit: Prosper Gallagher MD    _____________________________________________________________    NEURO-ONCOLOGY VISIT  Sep 19, 2022    CHIEF COMPLAINT: Ms. Barbi Tiwari is a 61 year old right-handed woman with a right frontal diffuse oligodendroglioma (1p19q co-deleted), diagnosed following resection in 5/2011. She received 12 cycles of temozolomide, completed in 5/2012. Changes on imaging necessitated a repeat resection on 5/30/2018 and pathology was unchanged. No adjuvant cancer-directed therapy was initiated. Imaging over the next 1.5 years showed slow tumor progression and treatment was then initiated. She completed chemoradiotherapy on 5/30/2020. Barbi then completed 6 cycles of adjuvant-dosed temozolomide as of 11/2020. Dose escalation to 200mg/m2 was complicated by intolerable nausea.     She is now managed on imaging surveillance. Her most recent scan is without concern for cancer recurrence.     I met with Barbi for this follow-up visit.    HISTORY OF PRESENT ILLNESS  -Barbi is doing well.   -The surgery for removal of the infected sebaceous cyst of the right breast was successful. Reports no ongoing discomfort.   -Chronic fatigue, but improved lately.    Working with Dr. Clark to adjust seizure medications to potentially improve energy. Reports no recent  "seizures.   Vitamin D 48. Vitamin B12 is low. TSH was normal.   -Mood is \"better\" with venlafaxine.   -She denies any new symptoms; no weakness, numbness, changes in vision, or headaches.   -Continued mild word-finding issues. Able to multi-task. Doing well at work.   -Had foot surgery; recovered well.       MEDICATIONS   Current Outpatient Medications   Medication     acetaminophen (TYLENOL) 500 MG tablet     CALCIUM PO     diazePAM 5 MG/0.1ML LIQD     escitalopram (LEXAPRO) 10 MG tablet     famotidine (PEPCID) 10 MG tablet     fexofenadine (ALLEGRA) 180 MG tablet     gabapentin (NEURONTIN) 100 MG capsule     ibuprofen (ADVIL/MOTRIN) 600 MG tablet     Lactase (LACTAID PO)     lamoTRIgine (LAMICTAL) 100 MG tablet     lamoTRIgine (LAMICTAL) 200 MG tablet     levETIRAcetam (KEPPRA) 500 MG tablet     metoprolol tartrate (LOPRESSOR) 25 MG tablet     tretinoin (RETIN-A) 0.025 % external cream     triamcinolone (KENALOG) 0.1 % external cream     vitamin B-12 (CYANOCOBALAMIN) 2500 MCG sublingual tablet     VITAMIN D, CHOLECALCIFEROL, PO     No current facility-administered medications for this visit.     DRUG ALLERGIES   Allergies   Allergen Reactions     Sulfa Drugs Hives     Benoxinate Nausea and Vomiting       ONCOLOGIC HISTORY  -4/27/2011 PRESENTATION: New onset seizure, generalized event.   -5/2011 MRB with a non-contrast enhancing right frontal mass lesion.   -5/2011 SURGERY: Craniectomy for mass resection at [facility].   PATHOLOGY: Diffuse oligodendroglioma; WHO grade II, 1p/19q co-deleted.  -6/2011-5/2012 CHEMO: Adjuvant dosed temozolomide x 12 cycles.  -4/2/2018 MRB with possible disease recurrence with a new 1.2 cm non-enhancing nodule within the cortex of the right frontal lobe adjacent to the resection cavity.  -4/12/2018 NEURO-ONC: Recommending repeat resection, appointment scheduled with Dr. Dustin Rodriguez, neurosurgery at the [facility].   -5/30/2018 SURGERY: Repeat resection with Dr. Rodriguez.   PATHOLOGY: Remains low " grade, without concerning features.   -6/15/2018 NEURO-ONC: Start imaging surveillance.  -9/17/2018 NEURO-ONC/ MRB: Imaging stable, continue with surveillance.   -12/10/2018 NEURO-ONC/ MRB: Imaging stable, continue with surveillance.   -4/15/2019 NEURO-ONC/ MRB: Imaging stable, continue with surveillance.  -8/19/2019 NEURO-ONC/ MRB: Clinically stable. Imaging largely stable, subtle increases on T2 FLAIR; continue with surveillance.  -12/16/2019 NEURO-ONC/ MRB: Clinically stable. Imaging with increased T2 FLAIR medially and laterally about the resection cavity. Referral to Dr. Figueroa with radiation oncology. Discussion with Dr. Rodriguez. Referral for repeat neuro-psychology testing.   -1/10/2020 Evaluated by Dr. Devan Figueroa.   -1/17/2020 NEURO-ONC: Tentative plan to initiate chemoradiotherapy in May-June 2020.   -2/17/2020 NEURO-PSYCH; Mild weaknesses were noted in aspects of verbal and nonverbal working memory, nonverbal recall, some aspects of executive functioning, and bilateral psychomotor speed. The remainder of her cognitive abilities were intact and performed in keeping with her above average range cognitive baseline.   -4/20 - 5/30/2020 CHEMORADS: 54 Gy in 30 fractions with concurrent temozolomide 75mg/m2 (140mg).   -5/4/2020 NEURO-ONC: Continue treatment as planned. Prescribing Clotrimazole lozenges for oral thrush.   -6/1/2020 NEURO-ONC: Completed treatment as planned.  -6/30/2020 NEURO-ONC/ MRB/ CHEMO: Clinically stable. Imaging with positive treatment effect. Starting adjuvant temozolomide 150mg/m2 (250mg), cycle 1 (start date was 7/1).   -7/28/2020 NEURO-ONC/ CHEMO: Clinically stable. Adjuvant temozolomide 200mg/m2 (320mg), cycle 2 (start date was 7/29).   -8/25/2020 NEURO-ONC/ CHEMO: Clinically stable; significant nausea/ vomiting with 200mg/m2 dosing. Adding Emend for this next cycle. Adjuvant temozolomide 150mg/m2 (250mg), cycle 3 (start date of 8/26).   -9/22/2020 NEURO-ONC/ MRB/ CHEMO: Clinically stable;  less nausea/ vomiting with lowered chemotherapy dosing plus adding Emend. Imaging with no concerns, repeat in 3 months. Adjuvant temozolomide 150mg/m2 (250mg), cycle 4 (start date of 9/23).   -10/20/2020 NEURO-ONC/ CHEMO: Clinically stable; no new/ worsening issues. Experiencing pain related to piriformis syndrome. Adjuvant temozolomide 150mg/m2 (250mg), cycle 5 (start date of 10/21).   -11/172020 NEURO-ONC/ CHEMO: Clinically stable. Adjuvant temozolomide 150mg/m2 (250mg), cycle 6 (start date of 11/18).   -12/15/2020 NEURO-ONC/ MRB: Clinically well. Imaging with no concerns. Starting imaging surveillance.   -3/16/2021 NEURO-ONC/ MRB: Clinically well. Imaging with no concerns.   -4/30/2021 Neuro-psych evaluation demonstrated weaknesses that are consistent with dysfunction of the bilateral frontal regions, but the right frontal region in particular.  Relative to her exam from February, 2020, there has been a mild decline in her thinking. In this exam, weaknesses were identified in executive functioning, attention, and both verbal and nonverbal working memory. Some aspects of cognitive speed were relative weaknesses as well. Insight into these weaknesses was limited.   -7/13/2021 NEURO-ONC/ MRB: Clinically well. Imaging stable.   -11/9/2021 NEURO-ONC/ MRB: Clinically well. Considering a trial of Zoloft. Imaging stable.   -3/8/2022 NEURO-ONC/ MRB: Clinically well. Imaging stable.   -9/19/2022 NEURO-ONC/ MRB: Clinically well. Imaging stable.     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   , 2 children      PHYSICAL EXAMINATION    -Generally well appearing.  -Respiratory: No audible wheezing.   -Psychiatric: Normal mood and affect. Pleasant, talkative.  -Neurologic:   MENTAL STATUS:     Alert, oriented.    Recall: Intact.   Speech fluent.    Comprehension intact.     CRANIAL NERVES:     Pupils are equal, round.      Hearing intact.   With normal phonation, no dysfunction of the palate or  tongue.   MOTOR: Antigravity in arms.   SENSATION: Denies numbness.    The rest of a comprehensive physical examination is deferred due to PHE (public health emergency) video visit restrictions.      MEDICAL RECORDS  Obtained and personally reviewed all available outside medical records in addition to reviewing any records available in our electronic system; Surgery notes for foot and sebaceous cyst removal.     LABS  Personally reviewed all available lab results.   Pathology; Right breast, lower inner tissue sebaceous cyst, excision-  -Benign epidermal inclusion cyst associated with changes consistent with rupture and foreign body type inflammatory reaction.    Vitamin B12 > 1900.     IMAGING  Personally reviewed MR brain imaging from last week and compared to prior imaging. To my eye, the T2 FLAIR about the right frontal resection cavity has no changed from imaging in 7/2021. There is no associated mass effect and as a result, the finding is most consistent with treatment-induced changes. There is no enhancement.     Imaging was shown to and results were reviewed with Barbi.     Imaging and case was reviewed with reading neuro-radiologist and at Brain Tumor Conference today.        IMPRESSION  Clinic time was spent discussing in detail the nature of her cancer in light of her recent imaging. This is in addition to answering questions pertaining to my recommendations and devising the plan as outlined below.     Clinically, Barbi is doing well with no new subjective neurological complaints. For chronic fatigue, she is working with her primary care provider to manage. With supplements, her vitamin B12 level is now >1900. I read this recent note as well as  Dr. Clark's note in which she is reducing lamictal and Keppra dosing. Venlafaxine has had a positive effect on her mood. Barbi has recovered from her foot surgery and I read the podiatry notes. Finally, she had been dealing with an infected sebaceous cyst of the  right breast which was recently removed. She has no residual discomfort to report today. I reviewed the pathology report as noted above.     Imaging as detailed above is largely stable with only a subtle change noted since stopping chemotherapy in 2020, but no change in the past year. The finding is most consistent with anticipated post-treatment related changes. I reviewed her case with the reading neuro-radiologist and at Brain Tumor Conference today and all were in agreement with this impression of her imaging. Since Barbi has remained clinically and radiographically stable off chemotherapy, will continue with imaging surveillance per NCCN guidelines. Barbi is in agreement with repeat imaging in 6 months. However, Barbi knows to call with any concerns or to report new complaints and appts can be moved sooner if needed. Will repeat imaging every 4-6 months through 11/2025 and at that time, can consider annual imaging.     Today, we discussed the recommendation for Barbi undergo repeat neuro-psych testing for ongoing evaluation of her cognition/ memory/ language function. Barbi is in agreement and is currently scheduled for this testing in 1/2023.    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   -No need to monitor labs while off chemotherapy.      -SEIZURE MANAGEMENT-  -Follows with Dr. Flakita Clark; Neurology, epilepsy specialist at the [facility].      -Quality of life/ MOOD/ FATIGUE-  -History of mild anxiety and depression.   -On venlafaxine.   -Failed Celexa due to side effect of diarrhea.   -Untreated/ undertreated depression can worsen fatigue, dysorexia, and quality of life.  -Primary care monitoring vitamin D and B12 levels.      -COGNITIVE CHANGES-  -Neuro-psych testing completed in 4/2021 with worsening deficits.  -Repeat testing recommended in ~4/2022; this appointment is scheduled for 1/2023.     Return to clinic in 3/2023 + imaging.     Myrtle Gallagher MD  Neuro-oncology

## 2022-09-19 ENCOUNTER — TUMOR CONFERENCE (OUTPATIENT)
Dept: ONCOLOGY | Facility: CLINIC | Age: 62
End: 2022-09-19

## 2022-09-19 ENCOUNTER — VIRTUAL VISIT (OUTPATIENT)
Dept: ONCOLOGY | Facility: CLINIC | Age: 62
End: 2022-09-19
Attending: PSYCHIATRY & NEUROLOGY
Payer: COMMERCIAL

## 2022-09-19 DIAGNOSIS — C71.9 OLIGODENDROGLIOMA (H): Primary | ICD-10-CM

## 2022-09-19 PROCEDURE — G0463 HOSPITAL OUTPT CLINIC VISIT: HCPCS | Mod: PN,RTG | Performed by: PSYCHIATRY & NEUROLOGY

## 2022-09-19 PROCEDURE — 99214 OFFICE O/P EST MOD 30 MIN: CPT | Mod: 95 | Performed by: PSYCHIATRY & NEUROLOGY

## 2022-09-19 NOTE — TUMOR CONFERENCE
Tumor Conference Information  Tumor Conference: Neuro-Onc  Specialties Present: Medical oncology, Radiation oncology, Pathology, Radiology, Surgery  Patient Status: Retrospective  Stage: oligodendroglioma  Treatment to Date: Surgery, Chemotherapy, Radiation  Clinical Trials: Not discussed  Genetic Testing Discussed/Recommended?: No  Supportive Care Services Discussed/Recommended?: No  Recommended Plan: Follows evidence-based guidelines (Comment: reviewed imaging - stable; follow up in 6 months with repeat MRI)  Did the review exceed 30 minutes?: did not           Documentation / Disclaimer Cancer Tumor Board Note  Cancer tumor board recommendations do not override what is determined to be reasonable care and treatment, which is dependent on the circumstances of a patient's case; the patient's medical, social, and personal concerns; and the clinical judgment of the oncologist [physician].

## 2022-09-19 NOTE — LETTER
9/19/2022         RE: Barbi Tiwari  [address] [facility] [address]        Dear Colleague,    Thank you for referring your patient, Barbi Tiwari, to the [facility]. Please see a copy of my visit note below.    Barbi is a 61 year old who is being evaluated via a billable video visit.      Patient stated she is in the state of [place] for the visit today.    How would you like to obtain your AVS? MyChart  If the video visit is dropped, the invitation should be resent by: Text to cell phone: [phone]  Will anyone else be joining your video visit? Sunni Martin Virtual Visit Facilitator      Video-Visit Details  Type of service:  Video Visit    Video Start Time: 11:41 AM  Video End Time: 11:52 AM    Originating Location (pt. Location): Work    Distant Location (provider location):  [facility] CANCER [facility]     Platform used for Video Visit: Prosper Gallagher MD    _____________________________________________________________    NEURO-ONCOLOGY VISIT  Sep 19, 2022    CHIEF COMPLAINT: Ms. Barbi Tiwari is a 61 year old right-handed woman with a right frontal diffuse oligodendroglioma (1p19q co-deleted), diagnosed following resection in 5/2011. She received 12 cycles of temozolomide, completed in 5/2012. Changes on imaging necessitated a repeat resection on 5/30/2018 and pathology was unchanged. No adjuvant cancer-directed therapy was initiated. Imaging over the next 1.5 years showed slow tumor progression and treatment was then initiated. She completed chemoradiotherapy on 5/30/2020. Barbi then completed 6 cycles of adjuvant-dosed temozolomide as of 11/2020. Dose escalation to 200mg/m2 was complicated by intolerable nausea.     She is now managed on imaging surveillance. Her most recent scan is without concern for cancer recurrence.     I met with Barbi for this follow-up visit.    HISTORY OF PRESENT ILLNESS  -Barbi is doing well.   -The  "surgery for removal of the infected sebaceous cyst of the right breast was successful. Reports no ongoing discomfort.   -Chronic fatigue, but improved lately.    Working with Dr. Clark to adjust seizure medications to potentially improve energy. Reports no recent seizures.   Vitamin D 48. Vitamin B12 is low. TSH was normal.   -Mood is \"better\" with venlafaxine.   -She denies any new symptoms; no weakness, numbness, changes in vision, or headaches.   -Continued mild word-finding issues. Able to multi-task. Doing well at work.   -Had foot surgery; recovered well.       MEDICATIONS   Current Outpatient Medications   Medication     acetaminophen (TYLENOL) 500 MG tablet     CALCIUM PO     diazePAM 5 MG/0.1ML LIQD     escitalopram (LEXAPRO) 10 MG tablet     famotidine (PEPCID) 10 MG tablet     fexofenadine (ALLEGRA) 180 MG tablet     gabapentin (NEURONTIN) 100 MG capsule     ibuprofen (ADVIL/MOTRIN) 600 MG tablet     Lactase (LACTAID PO)     lamoTRIgine (LAMICTAL) 100 MG tablet     lamoTRIgine (LAMICTAL) 200 MG tablet     levETIRAcetam (KEPPRA) 500 MG tablet     metoprolol tartrate (LOPRESSOR) 25 MG tablet     tretinoin (RETIN-A) 0.025 % external cream     triamcinolone (KENALOG) 0.1 % external cream     vitamin B-12 (CYANOCOBALAMIN) 2500 MCG sublingual tablet     VITAMIN D, CHOLECALCIFEROL, PO     No current facility-administered medications for this visit.     DRUG ALLERGIES   Allergies   Allergen Reactions     Sulfa Drugs Hives     Benoxinate Nausea and Vomiting       ONCOLOGIC HISTORY  -4/27/2011 PRESENTATION: New onset seizure, generalized event.   -5/2011 MRB with a non-contrast enhancing right frontal mass lesion.   -5/2011 SURGERY: Craniectomy for mass resection at [facility].   PATHOLOGY: Diffuse oligodendroglioma; WHO grade II, 1p/19q co-deleted.  -6/2011-5/2012 CHEMO: Adjuvant dosed temozolomide x 12 cycles.  -4/2/2018 MRB with possible disease recurrence with a new 1.2 cm non-enhancing nodule within the cortex of " the right frontal lobe adjacent to the resection cavity.  -4/12/2018 NEURO-ONC: Recommending repeat resection, appointment scheduled with Dr. Dustin Rodriguez, neurosurgery at the [facility].   -5/30/2018 SURGERY: Repeat resection with Dr. Rodriguez.   PATHOLOGY: Remains low grade, without concerning features.   -6/15/2018 NEURO-ONC: Start imaging surveillance.  -9/17/2018 NEURO-ONC/ MRB: Imaging stable, continue with surveillance.   -12/10/2018 NEURO-ONC/ MRB: Imaging stable, continue with surveillance.   -4/15/2019 NEURO-ONC/ MRB: Imaging stable, continue with surveillance.  -8/19/2019 NEURO-ONC/ MRB: Clinically stable. Imaging largely stable, subtle increases on T2 FLAIR; continue with surveillance.  -12/16/2019 NEURO-ONC/ MRB: Clinically stable. Imaging with increased T2 FLAIR medially and laterally about the resection cavity. Referral to Dr. Figueroa with radiation oncology. Discussion with Dr. Rodriguez. Referral for repeat neuro-psychology testing.   -1/10/2020 Evaluated by Dr. Devan Figueroa.   -1/17/2020 NEURO-ONC: Tentative plan to initiate chemoradiotherapy in May-June 2020.   -2/17/2020 NEURO-PSYCH; Mild weaknesses were noted in aspects of verbal and nonverbal working memory, nonverbal recall, some aspects of executive functioning, and bilateral psychomotor speed. The remainder of her cognitive abilities were intact and performed in keeping with her above average range cognitive baseline.   -4/20 - 5/30/2020 CHEMORADS: 54 Gy in 30 fractions with concurrent temozolomide 75mg/m2 (140mg).   -5/4/2020 NEURO-ONC: Continue treatment as planned. Prescribing Clotrimazole lozenges for oral thrush.   -6/1/2020 NEURO-ONC: Completed treatment as planned.  -6/30/2020 NEURO-ONC/ MRB/ CHEMO: Clinically stable. Imaging with positive treatment effect. Starting adjuvant temozolomide 150mg/m2 (250mg), cycle 1 (start date was 7/1).   -7/28/2020 NEURO-ONC/ CHEMO: Clinically stable. Adjuvant temozolomide 200mg/m2 (320mg), cycle 2 (start date was  7/29).   -8/25/2020 NEURO-ONC/ CHEMO: Clinically stable; significant nausea/ vomiting with 200mg/m2 dosing. Adding Emend for this next cycle. Adjuvant temozolomide 150mg/m2 (250mg), cycle 3 (start date of 8/26).   -9/22/2020 NEURO-ONC/ MRB/ CHEMO: Clinically stable; less nausea/ vomiting with lowered chemotherapy dosing plus adding Emend. Imaging with no concerns, repeat in 3 months. Adjuvant temozolomide 150mg/m2 (250mg), cycle 4 (start date of 9/23).   -10/20/2020 NEURO-ONC/ CHEMO: Clinically stable; no new/ worsening issues. Experiencing pain related to piriformis syndrome. Adjuvant temozolomide 150mg/m2 (250mg), cycle 5 (start date of 10/21).   -11/172020 NEURO-ONC/ CHEMO: Clinically stable. Adjuvant temozolomide 150mg/m2 (250mg), cycle 6 (start date of 11/18).   -12/15/2020 NEURO-ONC/ MRB: Clinically well. Imaging with no concerns. Starting imaging surveillance.   -3/16/2021 NEURO-ONC/ MRB: Clinically well. Imaging with no concerns.   -4/30/2021 Neuro-psych evaluation demonstrated weaknesses that are consistent with dysfunction of the bilateral frontal regions, but the right frontal region in particular.  Relative to her exam from February, 2020, there has been a mild decline in her thinking. In this exam, weaknesses were identified in executive functioning, attention, and both verbal and nonverbal working memory. Some aspects of cognitive speed were relative weaknesses as well. Insight into these weaknesses was limited.   -7/13/2021 NEURO-ONC/ MRB: Clinically well. Imaging stable.   -11/9/2021 NEURO-ONC/ MRB: Clinically well. Considering a trial of Zoloft. Imaging stable.   -3/8/2022 NEURO-ONC/ MRB: Clinically well. Imaging stable.   -9/19/2022 NEURO-ONC/ MRB: Clinically well. Imaging stable.     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   , 2 children      PHYSICAL EXAMINATION    -Generally well appearing.  -Respiratory: No audible wheezing.   -Psychiatric: Normal mood and  affect. Pleasant, talkative.  -Neurologic:   MENTAL STATUS:     Alert, oriented.    Recall: Intact.   Speech fluent.    Comprehension intact.     CRANIAL NERVES:     Pupils are equal, round.      Hearing intact.   With normal phonation, no dysfunction of the palate or tongue.   MOTOR: Antigravity in arms.   SENSATION: Denies numbness.    The rest of a comprehensive physical examination is deferred due to PHE (public health emergency) video visit restrictions.      MEDICAL RECORDS  Obtained and personally reviewed all available outside medical records in addition to reviewing any records available in our electronic system; Surgery notes for foot and sebaceous cyst removal.     LABS  Personally reviewed all available lab results.   Pathology; Right breast, lower inner tissue sebaceous cyst, excision-  -Benign epidermal inclusion cyst associated with changes consistent with rupture and foreign body type inflammatory reaction.    Vitamin B12 > 1900.     IMAGING  Personally reviewed MR brain imaging from last week and compared to prior imaging. To my eye, the T2 FLAIR about the right frontal resection cavity has no changed from imaging in 7/2021. There is no associated mass effect and as a result, the finding is most consistent with treatment-induced changes. There is no enhancement.     Imaging was shown to and results were reviewed with Barbi.     Imaging and case was reviewed with reading neuro-radiologist and at Brain Tumor Conference today.        IMPRESSION  Clinic time was spent discussing in detail the nature of her cancer in light of her recent imaging. This is in addition to answering questions pertaining to my recommendations and devising the plan as outlined below.     Clinically, Barbi is doing well with no new subjective neurological complaints. For chronic fatigue, she is working with her primary care provider to manage. With supplements, her vitamin B12 level is now >1900. I read this recent note as well  as  Dr. Clark's note in which she is reducing lamictal and Keppra dosing. Venlafaxine has had a positive effect on her mood. Barbi has recovered from her foot surgery and I read the podiatry notes. Finally, she had been dealing with an infected sebaceous cyst of the right breast which was recently removed. She has no residual discomfort to report today. I reviewed the pathology report as noted above.     Imaging as detailed above is largely stable with only a subtle change noted since stopping chemotherapy in 2020, but no change in the past year. The finding is most consistent with anticipated post-treatment related changes. I reviewed her case with the reading neuro-radiologist and at Brain Tumor Conference today and all were in agreement with this impression of her imaging. Since Barbi has remained clinically and radiographically stable off chemotherapy, will continue with imaging surveillance per NCCN guidelines. Barbi is in agreement with repeat imaging in 6 months. However, Barbi knows to call with any concerns or to report new complaints and appts can be moved sooner if needed. Will repeat imaging every 4-6 months through 11/2025 and at that time, can consider annual imaging.     Today, we discussed the recommendation for Barbi undergo repeat neuro-psych testing for ongoing evaluation of her cognition/ memory/ language function. Barbi is in agreement and is currently scheduled for this testing in 1/2023.    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   -No need to monitor labs while off chemotherapy.      -SEIZURE MANAGEMENT-  -Follows with Dr. Flakita Clark; Neurology, epilepsy specialist at the [facility].      -Quality of life/ MOOD/  FATIGUE-  -History of mild anxiety and depression.   -On venlafaxine.   -Failed Celexa due to side effect of diarrhea.   -Untreated/ undertreated depression can worsen fatigue, dysorexia, and quality of life.  -Primary care monitoring vitamin D and B12 levels.     -COGNITIVE CHANGES-  -Neuro-psych testing completed in 4/2021 with worsening deficits.  -Repeat testing recommended in ~4/2022; this appointment is scheduled for 1/2023.     Return to clinic in 3/2023 + imaging.         Again, thank you for allowing me to participate in the care of your patient.      Sincerely,    Myrtle Gallagher MD

## 2022-09-19 NOTE — NURSING NOTE
Patient verified medications and allergies are correct via eCheck-in. Patient confirms no changes at this time and/or since last reviewed by clinic staff.    Sena Martin, Virtual Facilitator

## 2022-10-10 ENCOUNTER — HEALTH MAINTENANCE LETTER (OUTPATIENT)
Age: 62
End: 2022-10-10

## 2022-10-24 ENCOUNTER — TRANSCRIBE ORDERS (OUTPATIENT)
Dept: OTHER | Age: 62
End: 2022-10-24

## 2022-10-24 DIAGNOSIS — M76.891 HAMSTRING TENDONITIS OF RIGHT THIGH: Primary | ICD-10-CM

## 2022-10-25 ENCOUNTER — THERAPY VISIT (OUTPATIENT)
Dept: PHYSICAL THERAPY | Facility: CLINIC | Age: 62
End: 2022-10-25
Payer: OTHER MISCELLANEOUS

## 2022-10-25 DIAGNOSIS — M54.16 LUMBAR RADICULOPATHY: Primary | ICD-10-CM

## 2022-10-25 DIAGNOSIS — M79.651 PAIN OF RIGHT THIGH: ICD-10-CM

## 2022-10-25 DIAGNOSIS — M79.18 BUTTOCK PAIN: ICD-10-CM

## 2022-10-25 PROCEDURE — 97110 THERAPEUTIC EXERCISES: CPT | Mod: GP | Performed by: PHYSICAL THERAPIST

## 2022-10-25 PROCEDURE — 97161 PT EVAL LOW COMPLEX 20 MIN: CPT | Mod: GP | Performed by: PHYSICAL THERAPIST

## 2022-10-25 PROCEDURE — 97112 NEUROMUSCULAR REEDUCATION: CPT | Mod: GP | Performed by: PHYSICAL THERAPIST

## 2022-10-25 NOTE — LETTER
"[address]  [address]  [facility] [id number] [phone]    2022    Re: Barbi Tiwari   :   1960  MRN:  [id number]   REFERRING PHYSICIAN:   Katina Reynoso CNP    [facility]    Date of Initial Evaluation:  10/25/2022  Visits:  Rxs Used: 1  Reason for Referral:     Buttock pain  Pain of right thigh  Lumbar radiculopathy    EVALUATION SUMMARY    [facility] Initial Evaluation -- Lumbar  Date: 2022  Barbi Tiwari is a 62 year old female with a R buttock and thigh condition.   Referral: [facility]  Work mechanical stresses:  Bending, lifting, sitting, computer work, standing  Employment status:  RN--cardiac  Leisure mechanical stresses: normal daily activities  Functional disability score (BRYAN/STarT Back):    VAS score (0-10): 2/10  Patient goals/expectations:  To not have the pain.    HISTORY:  Present symptoms: R LBP, buttock pain, posterior and anterior thigh pain, R calf  Pain quality (sharp/shooting/stabbing/aching/burning/cramping):  aching   Paresthesia (yes/no):  no  Present since (onset date): 2022.     Symptoms (improving/unchanging/worsening):  unchanging.   Symptoms commenced as a result of: boosting a patient up in bed--leaning toward her L side--felt a \"pop\" in her R posterior thigh and had pain there and in her buttock at the time.  Thigh pain mostly resolved after about 1 week, but buttock pain did not  Condition occurred in the following environment:   work   Symptoms at onset (back/thigh/leg): R posterior thigh  Constant symptoms (back/thigh/leg): none  Intermittent symptoms (back/thigh/leg): R buttock, posterior thigh, R calf  Symptoms are made worse with the following: sitting 5 minutes. Bending, lifting  Symptoms are made better with the following: moving more, getting up out of sitting position  Disturbed sleep " (yes/no):  no   Sleeping postures (prone/sup/side R/L): sides    Previous episodes (0/1-5/6-10/11+): LB--a few   Year of first episode: years ago  Previous history: previous LB history  Previous treatments: PT for LB--helpful    Specific Questions:  Cough/Sneeze/Strain (pos/neg): neg  Bowel/Bladder (normal/abnormal): normal  Gait (normal/abnormal): mildly antalgic, slightly flexed posture  Medications (nil/NSAIDS/analg/steroids/anticoag/other):  Other - Cardiac, Anti-seizure, Anti-depressants and anti-inflammatory, pain meds  Medical allergies:  sulfa  General health (excellent/good/fair/poor):  Fair to good  Pertinent medical history:  Cancer, Depression, Mental illness, Osteoarthritis, Overweight and Seizures  Imaging (None/Xray/MRI/Other):  none  Recent or major surgery (yes/no):  No; hysterectomy, parathyroid growth removal, kidney stones  Night pain (yes/no): no  Accidents (yes/no): no  Unexplained weight loss (yes/no): no  Barriers at home: no  Other red flags: no    EXAMINATION    Posture:   Sitting (good/fair/poor): fair  Standing (good/fair/poor):good  Lordosis (red/acc/normal): red?  Correction of posture (better/worse/no effect): BETTER  Lateral Shift (right/left/nil): nil  Relevant (yes/no):  na  Other Observations: na    Neurological:  Motor deficit:  normal  Reflexes:  Not assessed  Sensory deficit:  normal  Dural signs:  Not assessed    Movement Loss:   Osvaldo Mod Min Nil Pain   Flexion    x Increases R buttock and thigh pain   Extension   x  Decreases R buttock and thigh   Side Gliding R   x  Produces L hip pain   Side Gliding L   x  Produces R hip pain     Test Movements:   During: produces, abolishes, increases, decreases, no effect, centralizing, peripheralizing   After: better, worse, no better, no worse, no effect, centralized, peripheralized        Pretest symptoms standing:    Symptoms During Symptoms After ROM increased ROM decreased No Effect   FIS        Rep FIS        EIS        Rep EIS           Pretest symptoms lying: prone lying--no pain    Symptoms During Symptoms After ROM increased ROM decreased No Effect   TARA        Rep TARA        EIL Produces   LBP No Worse         Rep EIL Produces LBP  Abolishes R buttock and thigh    LB no worse  R thigh and buttock Better   x       If required, pretest symptoms:    Symptoms During Symptoms After ROM increased ROM decreased No Effect   SGIS - R        Rep SGIS - R        SGIS - L        Rep SGIS - L          Static Tests:  Sitting slouched:     Sitting erect:    Standing slouched:   Standing erect:    Lying prone in extension:   Long sitting:      Other Tests: R knee AROM:  Flexion and extension normal and painfree; R knee strength:  Flexion 4+/5 with pain, pain abolished with resisted flexion after REIL exercises    Provisional Classification:  Derangement - Asymmetrical, unilateral, symptoms below knee    Principle of Management:  Education:  Posture--use of lumbar roll in sitting and importance; POC, treatment rationale, expected response   Equipment provided:  None--has roll at home  Mechanical therapy (Y/N):  y   Extension principle:  REIL x10 reps, every 2 hours  Lateral Principle:    Flexion principle:    Other:      ASSESSMENT/PLAN:  Patient is a 62 year old female with R buttock and thigh complaints.  Provisional classification of lumbar derangement with directional preference for extension.  R buttock and thigh pain was abolished after repeated lumbar extension exercises in lying.     She has a history of LB issue, and mechanism of current injury is highly suggestive of lumbar etiology.  She should make good progress with treatment focusing on a lumbar extension program in addition to posture and body mechanics training to allow return to normal function.      Patient has the following significant findings with corresponding treatment plan.                Diagnosis 1:  R buttock and thigh pain--lumbar  Pain -  self management, education, directional  preference exercise and home program  Decreased ROM/flexibility - manual therapy, therapeutic exercise and home program  Decreased function - therapeutic activities and home program  Impaired posture - neuro re-education and home program    Cumulative Therapy Evaluation is: Low complexity.  Previous and current functional limitations:  (See Goal Flow Sheet for this information)    Short term and Long term goals: (See Goal Flow Sheet for this information)   Communication ability:  Patient appears to be able to clearly communicate and understand verbal and written communication and follow directions correctly.  Treatment Explanation - The following has been discussed with the patient:   RX ordered/plan of care  Anticipated outcomes  Possible risks and side effects  This patient would benefit from PT intervention to resume normal activities.   Rehab potential is good.    Frequency:  1 X week, once daily  Duration:  for 6 weeks  Discharge Plan:  Achieve all LTG.  Independent in home treatment program.  Reach maximal therapeutic benefit.    Thank you for your referral.    INQUIRIES  Therapist: Miriam Méndez PT   [facility] SERVICES [address]  [address]  [facility] [address]  Phone: [phone]  Fax: [phone]

## 2022-10-25 NOTE — PROGRESS NOTES
"[facility] Initial Evaluation -- Lumbar    Date: October 25, 2022  Barbi Tiwari is a 62 year old female with a R buttock and thigh condition.   Referral: [facility]  Work mechanical stresses:  Bending, lifting, sitting, computer work, standing  Employment status:  RN--cardiac  Leisure mechanical stresses: normal daily activities  Functional disability score (BRYAN/STarT Back):    VAS score (0-10): 2/10  Patient goals/expectations:  To not have the pain.    HISTORY:    Present symptoms: R LBP, buttock pain, posterior and anterior thigh pain, R calf  Pain quality (sharp/shooting/stabbing/aching/burning/cramping):  aching   Paresthesia (yes/no):  no    Present since (onset date): 08/17/2022.     Symptoms (improving/unchanging/worsening):  unchanging.     Symptoms commenced as a result of: boosting a patient up in bed--leaning toward her L side--felt a \"pop\" in her R posterior thigh and had pain there and in her buttock at the time.  Thigh pain mostly resolved after about 1 week, but buttock pain did not  Condition occurred in the following environment:   work     Symptoms at onset (back/thigh/leg): R posterior thigh  Constant symptoms (back/thigh/leg): none  Intermittent symptoms (back/thigh/leg): R buttock, posterior thigh, R calf    Symptoms are made worse with the following: sitting 5 minutes. Bending, lifting  Symptoms are made better with the following: moving more, getting up out of sitting position    Disturbed sleep (yes/no):  no Sleeping postures (prone/sup/side R/L): sides    Previous episodes (0/1-5/6-10/11+): LB--a few Year of first episode: years ago    Previous history: previous LB history  Previous treatments: PT for LB--helpful      Specific Questions:  Cough/Sneeze/Strain (pos/neg): neg  Bowel/Bladder (normal/abnormal): normal  Gait (normal/abnormal): mildly antalgic, slightly flexed posture  Medications (nil/NSAIDS/analg/steroids/anticoag/other):  Other - Cardiac, Anti-seizure, " Anti-depressants and anti-inflammatory, pain meds  Medical allergies:  sulfa  General health (excellent/good/fair/poor):  Fair to good  Pertinent medical history:  Cancer, Depression, Mental illness, Osteoarthritis, Overweight and Seizures  Imaging (None/Xray/MRI/Other):  none  Recent or major surgery (yes/no):  No; hysterectomy, parathyroid growth removal, kidney stones  Night pain (yes/no): no  Accidents (yes/no): no  Unexplained weight loss (yes/no): no  Barriers at home: no  Other red flags: no    EXAMINATION    Posture:   Sitting (good/fair/poor): fair  Standing (good/fair/poor):good  Lordosis (red/acc/normal): red?  Correction of posture (better/worse/no effect): BETTER    Lateral Shift (right/left/nil): nil  Relevant (yes/no):  na  Other Observations: na    Neurological:    Motor deficit:  normal  Reflexes:  Not assessed  Sensory deficit:  normal  Dural signs:  Not assessed    Movement Loss:   Osvaldo Mod Min Nil Pain   Flexion    x Increases R buttock and thigh pain   Extension   x  Decreases R buttock and thigh   Side Gliding R   x  Produces L hip pain   Side Gliding L   x  Produces R hip pain     Test Movements:   During: produces, abolishes, increases, decreases, no effect, centralizing, peripheralizing   After: better, worse, no better, no worse, no effect, centralized, peripheralized    Pretest symptoms standing:    Symptoms During Symptoms After ROM increased ROM decreased No Effect   FIS        Rep FIS        EIS        Rep EIS          Pretest symptoms lying: prone lying--no pain    Symptoms During Symptoms After ROM increased ROM decreased No Effect   TARA        Rep TARA        EIL Produces   LBP No Worse         Rep EIL Produces LBP  Abolishes R buttock and thigh    LB no worse  R thigh and buttock Better   x       If required, pretest symptoms:    Symptoms During Symptoms After ROM increased ROM decreased No Effect   SGIS - R        Rep SGIS - R        SGIS - L        Rep SGIS - L          Static  Tests:  Sitting slouched:     Sitting erect:    Standing slouched:   Standing erect:    Lying prone in extension:   Long sitting:      Other Tests: R knee AROM:  Flexion and extension normal and painfree; R knee strength:  Flexion 4+/5 with pain, pain abolished with resisted flexion after REIL exercises    Provisional Classification:  Derangement - Asymmetrical, unilateral, symptoms below knee    Principle of Management:  Education:  Posture--use of lumbar roll in sitting and importance; POC, treatment rationale, expected response   Equipment provided:  None--has roll at home  Mechanical therapy (Y/N):  y   Extension principle:  REIL x10 reps, every 2 hours  Lateral Principle:    Flexion principle:    Other:      ASSESSMENT/PLAN:    Patient is a 62 year old female with R buttock and thigh complaints.  Provisional classification of lumbar derangement with directional preference for extension.  R buttock and thigh pain was abolished after repeated lumbar extension exercises in lying.  She has a history of LB issue, and mechanism of current injury is highly suggestive of lumbar etiology.  She should make good progress with treatment focusing on a lumbar extension program in addition to posture and body mechanics training to allow return to normal function.      Patient has the following significant findings with corresponding treatment plan.                Diagnosis 1:  R buttock and thigh pain--lumbar  Pain -  self management, education, directional preference exercise and home program  Decreased ROM/flexibility - manual therapy, therapeutic exercise and home program  Decreased function - therapeutic activities and home program  Impaired posture - neuro re-education and home program    Cumulative Therapy Evaluation is: Low complexity.    Previous and current functional limitations:  (See Goal Flow Sheet for this information)    Short term and Long term goals: (See Goal Flow Sheet for this information)      Communication ability:  Patient appears to be able to clearly communicate and understand verbal and written communication and follow directions correctly.  Treatment Explanation - The following has been discussed with the patient:   RX ordered/plan of care  Anticipated outcomes  Possible risks and side effects  This patient would benefit from PT intervention to resume normal activities.   Rehab potential is good.    Frequency:  1 X week, once daily  Duration:  for 6 weeks  Discharge Plan:  Achieve all LTG.  Independent in home treatment program.  Reach maximal therapeutic benefit.    Please refer to the daily flowsheet for treatment today, total treatment time and time spent performing 1:1 timed codes.       Answers for HPI/ROS submitted by the patient on 10/25/2022  Reason for Visit:: Right hamstring strain.  When problem began:: 8/17/2022  How problem occurred:: Repositioning a patient at work.  Number scale: 2/10  General health as reported by patient: fair, good  Please check all that apply to your current or past medical history: cancer, depression, mental illness, overweight, osteoarthritis, seizures  Medical allergies: other  Other Allergies Detail: Sulfa  Surgeries: orthopedic surgery, cancer surgery, other  Other Surgery Detail: Hysterectomy, parathyroid growth removal, kidney stones  Medications you are currently taking: anti-depressants, anti-inflammatory, anti-seizure medication, cardiac medication, pain medication  Occupation:: RN  What are your primary job tasks: prolonged standing, pushing/pulling, other  Other Tasks Detail: Bending

## 2022-10-27 ENCOUNTER — OFFICE VISIT (OUTPATIENT)
Dept: DERMATOLOGY | Facility: CLINIC | Age: 62
End: 2022-10-27
Payer: COMMERCIAL

## 2022-10-27 DIAGNOSIS — Z86.19 HISTORY OF ONYCHOMYCOSIS: Primary | ICD-10-CM

## 2022-10-27 PROCEDURE — 99212 OFFICE O/P EST SF 10 MIN: CPT | Performed by: DERMATOLOGY

## 2022-10-27 NOTE — LETTER
10/27/2022         RE: Barbi Tiwari  [address]  [facility] [address]        Dear Colleague,    Thank you for referring your patient, Barbi Tiwari, to the [facility]. Please see a copy of my visit note below.    Barbi Tiwari is an extremely pleasant 62 year old year old female patient here today for f/u onychomycosis, naill well healed.  She notes some curling of nail on left grea toe.  Patient has no other skin complaints today.  Remainder of the HPI, Meds, PMH, Allergies, FH, and SH was reviewed in chart.      Past Medical History:   Diagnosis Date     Acute cystitis with hematuria 10/24/2020     Allergic rhinitis 12/9/2009     Calculus of kidney 1/12/2011    Overview:  S/P LITHOTRIPSY 3/15/11      Esophageal reflux 10/22/2008     Hyperparathyroidism 01/11/2011    had surgery for it; resulted in seizures     Moderate major depression, single episode (H)      oligodendroglioma 7/23/2012     Osteoarthrosis 12/9/2009     Palpitations 6/5/2014     PONV (postoperative nausea and vomiting)      PVCs 6/5/2014     Seizure disorder (related to tumor) 08/17/2011    last seizure 3/2021       Past Surgical History:   Procedure Laterality Date     ARTHRODESIS FOOT Left 2/1/2022    Procedure: left first metatarsophalangeal joint arthrodesis, Tenotomy and capusulotomy 3rd metatarsalphalangeal joint;  Surgeon: Benjamin Griffin DPM;  Location:  OR     COMBINED CYSTOSCOPY, RETROGRADES, URETEROSCOPY, LASER HOLMIUM LITHOTRIPSY URETER(S), INSERT STENT Right 8/3/2021    Procedure: 1. Cystourethroscopy 2. Right ureteroscopy 3. Right retrograde pyelogram with interpretation of intraoperative fluoroscopic imaging 4. Holmium laser lithotripsy with basket stone extraction 5. Right ureteral stent placement;  Surgeon: Felix Cano MD;  Location:  OR     CRANIOTOMY  2011    tumor resection     CYSTOSCOPY, RETROGRADES, INSERT STENT URETER(S), COMBINED Right 7/20/2021     Procedure: 1.  Cystourethroscopy 2.  Attempted right ureteroscopy 3.  Right  retrograde pyelogram with interpretation of intraoperative fluoroscopic imaging 4.  Right ureteral dilatation 5.  Right ureteral stent placement 6.  Laser on stand-by;  Surgeon: Felix Cano MD;  Location:  OR     EXTRACORPOREAL SHOCK WAVE LITHOTRIPSY, CYSTOSCOPY, INSERT STENT URETER(S), COMBINED Bilateral 5/5/2017    Procedure: COMBINED EXTRACORPOREAL SHOCK WAVE LITHOTRIPSY, CYSTOSCOPY, INSERT STENT URETER(S);  CYSTO, URETHRAL DILATION, URETERAL LEFT STENT PLACEMENT, LEFT ESWL.;  Surgeon: Angel Del Rio MD;  Location:  OR     FOOT SURGERY      mulitple     HYSTERECTOMY, PAP NO LONGER INDICATED  2008    lap hys     LITHOTRIPSY  2010 2017     OPTICAL TRACKING SYSTEM CRANIOTOMY, EXCISE TUMOR WITH MAPPING, COMBINED Right 5/30/2018    Procedure: COMBINED OPTICAL TRACKING SYSTEM CRANIOTOMY, EXCISE TUMOR WITH MAPPING;  Right Stealth Assisted Craniotomy With Motor Mapping And Tumor Resection ;  Surgeon: Dustin Rodriguez MD;  Location: [facility] OR     ORTHOPEDIC SURGERY      feet, multiple on both     OSTEOTOMY FOOT Right 1/15/2019    Procedure: OSTEOTOMY FOOT;  Surgeon: Benjamin Griffin DPM;  Location:  OR     OSTEOTOMY FOOT Left 2/1/2022    Procedure: left second metatarsal Weil osteotomy;  Surgeon: Benjamin Griffin DPM;  Location:  OR     PARATHYROIDECTOMY  2011     RECONSTRUCT FOREFOOT WITH METATARSOPHALANGEAL (MTP) FUSION Right 1/15/2019    Procedure: RIGHT FIRST METATARSAL PHALAGEAL JOINT ARTHRODESIS, RIGHT SECOND METATARSAL WEIL OSTEOTOMY;  Surgeon: Benjamin Griffin DPM;  Location:  OR     REPAIR HAMMER TOE Left 2/1/2022    Procedure: CORRECTION, left second HAMMER TOE;  Surgeon: Benjamin Griffin DPM;  Location:  OR        Family History   Problem Relation Age of Onset     Arthritis Mother      Depression Mother      Respiratory Mother         COPD     LUNG DISEASE Mother      C.A.D. Father 58         heart attack     Heart Disease Father      Coronary Artery Disease Father         MI  age 54     Diabetes Brother 70         age 70     Depression Sister      Depression Son      Allergies Son      Depression Daughter      Allergies Daughter      Eczema Brother      Skin Cancer Brother      Depression Sister      Depression Sister      Anxiety Disorder Daughter        Social History     Socioeconomic History     Marital status:      Spouse name: Not on file     Number of children: Not on file     Years of education: Not on file     Highest education level: Not on file   Occupational History     Occupation: RN- [employer]   Tobacco Use     Smoking status: Never     Smokeless tobacco: Never   Substance and Sexual Activity     Alcohol use: Yes     Comment: social     Drug use: No     Sexual activity: Yes     Partners: Male     Birth control/protection: Female Surgical     Comment: hysterectomy   Other Topics Concern     Parent/sibling w/ CABG, MI or angioplasty before 65F 55M? Yes     Comment: Father,  of MI age 54   Social History Narrative     Not on file     Social Determinants of Health     Financial Resource Strain: Not on file   Food Insecurity: Not on file   Transportation Needs: Not on file   Physical Activity: Not on file   Stress: Not on file   Social Connections: Not on file   Intimate Partner Violence: Not on file   Housing Stability: Not on file       Outpatient Encounter Medications as of 10/27/2022   Medication Sig Dispense Refill     acetaminophen (TYLENOL) 500 MG tablet Take 2 tablets (1,000 mg) by mouth every 8 hours as needed for mild pain 42 tablet 1     CALCIUM PO        diazePAM 5 MG/0.1ML LIQD Spray 5 mg in nostril as needed (seizures more than 3 minutes or more than 2 seizures within 8 hour) 2 each 1     escitalopram (LEXAPRO) 10 MG tablet Take 1 tablet (10 mg) by mouth daily 90 tablet 1     famotidine (PEPCID) 10 MG tablet Take 1 tablet (10 mg) by mouth nightly as  needed (heartburn)       fexofenadine (ALLEGRA) 180 MG tablet Take 180 mg by mouth daily       gabapentin (NEURONTIN) 100 MG capsule Take 2 capsules (200 mg) by mouth every evening 180 capsule 3     ibuprofen (ADVIL/MOTRIN) 600 MG tablet Take 1 tablet (600 mg) by mouth every 6 hours as needed for moderate pain 28 tablet 1     Lactase (LACTAID PO) Take 1-2 tablets by mouth daily as needed for indigestion        lamoTRIgine (LAMICTAL) 100 MG tablet TAKE HALF TABLET BY MOUTH EVERY MORNING AND ONE TABLET EVERY EVENING (TAKE ALONG WITH 200 MG TABLET FOR A TOTAL  mg morning and 300 MG evening) 135 tablet 3     lamoTRIgine (LAMICTAL) 200 MG tablet TAKE ONE TABLET BY MOUTH TWICE A DAY (TAKE ALONG WITH 100 MG TABLET FOR TOTAL  mg morning and 300 MG pm). 180 tablet 3     levETIRAcetam (KEPPRA) 500 MG tablet Take 2.5 Tablets (1250 mg) by mouth every morning, AND Take 3 Tablets (1500 mg)by mouth every evening. 495 tablet 3     metoprolol tartrate (LOPRESSOR) 25 MG tablet Take 0.5 tablets (12.5 mg) by mouth 2 times daily 90 tablet 3     tretinoin (RETIN-A) 0.025 % external cream Apply a pea-sized amount to each area affected by acne. Start every 3-4 nights working up to every night. 45 g 0     triamcinolone (KENALOG) 0.1 % external cream Apply topically 2 times daily To affected area in between breasts for 1-2 weeks. Tapering with improvement and restarting with flares. 60 g 2     vitamin B-12 (CYANOCOBALAMIN) 2500 MCG sublingual tablet Take 1 tablet (2,500 mcg) by mouth daily 90 tablet 11     VITAMIN D, CHOLECALCIFEROL, PO Take 1,000 Units by mouth daily        No facility-administered encounter medications on file as of 10/27/2022.             O:   NAD, WDWN, Alert & Oriented, Mood & Affect wnl, Vitals stable   Here today alone    General appearance normal   Vitals stable   Alert, oriented and in no acute distress     L 3rd toenail clear  L great lateral toenail curling into bed      Eyes: Conjunctivae/lids:Normal      ENT: Lips, buccal mucosa, tongue: normal    MSK:Normal    Cardiovascular: peripheral edema none    Pulm: Breathing Normal    Neuro/Psych: Orientation:Alert and Orientedx3 ; Mood/Affect:normal       A/P:  1. Scaly papule aculsion well healed  2. Great toenail  Trimming discussed with patient   Lateral mactrixectomy discussed with patient   She will think about  It was a pleasure speaking to Barbi Tiwari today.        Again, thank you for allowing me to participate in the care of your patient.        Sincerely,        Benjamin Forbes MD

## 2022-10-27 NOTE — PROGRESS NOTES
Barbi Tiwari is an extremely pleasant 62 year old year old female patient here today for f/u onychomycosis, naill well healed.  She notes some curling of nail on left grea toe.  Patient has no other skin complaints today.  Remainder of the HPI, Meds, PMH, Allergies, FH, and SH was reviewed in chart.      Past Medical History:   Diagnosis Date     Acute cystitis with hematuria 10/24/2020     Allergic rhinitis 12/9/2009     Calculus of kidney 1/12/2011    Overview:  S/P LITHOTRIPSY 3/15/11      Esophageal reflux 10/22/2008     Hyperparathyroidism 01/11/2011    had surgery for it; resulted in seizures     Moderate major depression, single episode (H)      oligodendroglioma 7/23/2012     Osteoarthrosis 12/9/2009     Palpitations 6/5/2014     PONV (postoperative nausea and vomiting)      PVCs 6/5/2014     Seizure disorder (related to tumor) 08/17/2011    last seizure 3/2021       Past Surgical History:   Procedure Laterality Date     ARTHRODESIS FOOT Left 2/1/2022    Procedure: left first metatarsophalangeal joint arthrodesis, Tenotomy and capusulotomy 3rd metatarsalphalangeal joint;  Surgeon: Benjamin Griffin DPM;  Location:  OR     COMBINED CYSTOSCOPY, RETROGRADES, URETEROSCOPY, LASER HOLMIUM LITHOTRIPSY URETER(S), INSERT STENT Right 8/3/2021    Procedure: 1. Cystourethroscopy 2. Right ureteroscopy 3. Right retrograde pyelogram with interpretation of intraoperative fluoroscopic imaging 4. Holmium laser lithotripsy with basket stone extraction 5. Right ureteral stent placement;  Surgeon: Felix Cano MD;  Location:  OR     CRANIOTOMY  2011    tumor resection     CYSTOSCOPY, RETROGRADES, INSERT STENT URETER(S), COMBINED Right 7/20/2021    Procedure: 1.  Cystourethroscopy 2.  Attempted right ureteroscopy 3.  Right  retrograde pyelogram with interpretation of intraoperative fluoroscopic imaging 4.  Right ureteral dilatation 5.  Right ureteral stent placement 6.  Laser on stand-by;  Surgeon: Felix Cano  MD Mateo;  Location: [facility] OR     EXTRACORPOREAL SHOCK WAVE LITHOTRIPSY, CYSTOSCOPY, INSERT STENT URETER(S), COMBINED Bilateral 2017    Procedure: COMBINED EXTRACORPOREAL SHOCK WAVE LITHOTRIPSY, CYSTOSCOPY, INSERT STENT URETER(S);  CYSTO, URETHRAL DILATION, URETERAL LEFT STENT PLACEMENT, LEFT ESWL.;  Surgeon: Angel Del Rio MD;  Location:  OR     FOOT SURGERY      mulitple     HYSTERECTOMY, PAP NO LONGER INDICATED      lap hys     LITHOTRIPSY  2010     OPTICAL TRACKING SYSTEM CRANIOTOMY, EXCISE TUMOR WITH MAPPING, COMBINED Right 2018    Procedure: COMBINED OPTICAL TRACKING SYSTEM CRANIOTOMY, EXCISE TUMOR WITH MAPPING;  Right Stealth Assisted Craniotomy With Motor Mapping And Tumor Resection ;  Surgeon: Dustin Rodriguez MD;  Location: [facility] OR     ORTHOPEDIC SURGERY      feet, multiple on both     OSTEOTOMY FOOT Right 1/15/2019    Procedure: OSTEOTOMY FOOT;  Surgeon: Benjamin Griffin DPM;  Location:  OR     OSTEOTOMY FOOT Left 2022    Procedure: left second metatarsal Weil osteotomy;  Surgeon: Benjamin Griffin DPM;  Location:  OR     PARATHYROIDECTOMY  2011     RECONSTRUCT FOREFOOT WITH METATARSOPHALANGEAL (MTP) FUSION Right 1/15/2019    Procedure: RIGHT FIRST METATARSAL PHALAGEAL JOINT ARTHRODESIS, RIGHT SECOND METATARSAL WEIL OSTEOTOMY;  Surgeon: Benjamin Griffin DPM;  Location:  OR     REPAIR HAMMER TOE Left 2022    Procedure: CORRECTION, left second HAMMER TOE;  Surgeon: Benjamin Griffin DPM;  Location:  OR        Family History   Problem Relation Age of Onset     Arthritis Mother      Depression Mother      Respiratory Mother         COPD     LUNG DISEASE Mother      C.A.D. Father 58        heart attack     Heart Disease Father      Coronary Artery Disease Father         MI  age 54     Diabetes Brother 70         age 70     Depression Sister      Depression Son      Allergies Son      Depression Daughter      Allergies Daughter      Eczema  Brother      Skin Cancer Brother      Depression Sister      Depression Sister      Anxiety Disorder Daughter        Social History     Socioeconomic History     Marital status:      Spouse name: Not on file     Number of children: Not on file     Years of education: Not on file     Highest education level: Not on file   Occupational History     Occupation: RN- [employer]   Tobacco Use     Smoking status: Never     Smokeless tobacco: Never   Substance and Sexual Activity     Alcohol use: Yes     Comment: social     Drug use: No     Sexual activity: Yes     Partners: Male     Birth control/protection: Female Surgical     Comment: hysterectomy   Other Topics Concern     Parent/sibling w/ CABG, MI or angioplasty before 65F 55M? Yes     Comment: Father,  of MI age 54   Social History Narrative     Not on file     Social Determinants of Health     Financial Resource Strain: Not on file   Food Insecurity: Not on file   Transportation Needs: Not on file   Physical Activity: Not on file   Stress: Not on file   Social Connections: Not on file   Intimate Partner Violence: Not on file   Housing Stability: Not on file       Outpatient Encounter Medications as of 10/27/2022   Medication Sig Dispense Refill     acetaminophen (TYLENOL) 500 MG tablet Take 2 tablets (1,000 mg) by mouth every 8 hours as needed for mild pain 42 tablet 1     CALCIUM PO        diazePAM 5 MG/0.1ML LIQD Spray 5 mg in nostril as needed (seizures more than 3 minutes or more than 2 seizures within 8 hour) 2 each 1     escitalopram (LEXAPRO) 10 MG tablet Take 1 tablet (10 mg) by mouth daily 90 tablet 1     famotidine (PEPCID) 10 MG tablet Take 1 tablet (10 mg) by mouth nightly as needed (heartburn)       fexofenadine (ALLEGRA) 180 MG tablet Take 180 mg by mouth daily       gabapentin (NEURONTIN) 100 MG capsule Take 2 capsules (200 mg) by mouth every evening 180 capsule 3     ibuprofen (ADVIL/MOTRIN) 600 MG tablet Take 1 tablet (600 mg) by mouth every  6 hours as needed for moderate pain 28 tablet 1     Lactase (LACTAID PO) Take 1-2 tablets by mouth daily as needed for indigestion        lamoTRIgine (LAMICTAL) 100 MG tablet TAKE HALF TABLET BY MOUTH EVERY MORNING AND ONE TABLET EVERY EVENING (TAKE ALONG WITH 200 MG TABLET FOR A TOTAL  mg morning and 300 MG evening) 135 tablet 3     lamoTRIgine (LAMICTAL) 200 MG tablet TAKE ONE TABLET BY MOUTH TWICE A DAY (TAKE ALONG WITH 100 MG TABLET FOR TOTAL  mg morning and 300 MG pm). 180 tablet 3     levETIRAcetam (KEPPRA) 500 MG tablet Take 2.5 Tablets (1250 mg) by mouth every morning, AND Take 3 Tablets (1500 mg)by mouth every evening. 495 tablet 3     metoprolol tartrate (LOPRESSOR) 25 MG tablet Take 0.5 tablets (12.5 mg) by mouth 2 times daily 90 tablet 3     tretinoin (RETIN-A) 0.025 % external cream Apply a pea-sized amount to each area affected by acne. Start every 3-4 nights working up to every night. 45 g 0     triamcinolone (KENALOG) 0.1 % external cream Apply topically 2 times daily To affected area in between breasts for 1-2 weeks. Tapering with improvement and restarting with flares. 60 g 2     vitamin B-12 (CYANOCOBALAMIN) 2500 MCG sublingual tablet Take 1 tablet (2,500 mcg) by mouth daily 90 tablet 11     VITAMIN D, CHOLECALCIFEROL, PO Take 1,000 Units by mouth daily        No facility-administered encounter medications on file as of 10/27/2022.             O:   NAD, WDWN, Alert & Oriented, Mood & Affect wnl, Vitals stable   Here today alone    General appearance normal   Vitals stable   Alert, oriented and in no acute distress     L 3rd toenail clear  L great lateral toenail curling into bed      Eyes: Conjunctivae/lids:Normal     ENT: Lips, buccal mucosa, tongue: normal    MSK:Normal    Cardiovascular: peripheral edema none    Pulm: Breathing Normal    Neuro/Psych: Orientation:Alert and Orientedx3 ; Mood/Affect:normal       A/P:  1. Scaly papule aculsion well healed  2. Great toenail  Trimming  discussed with patient   Lateral mactrixectomy discussed with patient   She will think about  It was a pleasure speaking to Barbi Tiwari today.

## 2022-10-31 ENCOUNTER — ANCILLARY PROCEDURE (OUTPATIENT)
Dept: NEUROLOGY | Facility: CLINIC | Age: 62
End: 2022-10-31
Attending: PSYCHIATRY & NEUROLOGY
Payer: COMMERCIAL

## 2022-10-31 DIAGNOSIS — R56.9 SEIZURE (H): ICD-10-CM

## 2022-11-02 ENCOUNTER — THERAPY VISIT (OUTPATIENT)
Dept: PHYSICAL THERAPY | Facility: CLINIC | Age: 62
End: 2022-11-02
Payer: OTHER MISCELLANEOUS

## 2022-11-02 ENCOUNTER — ANCILLARY PROCEDURE (OUTPATIENT)
Dept: MAMMOGRAPHY | Facility: CLINIC | Age: 62
End: 2022-11-02
Attending: INTERNAL MEDICINE
Payer: COMMERCIAL

## 2022-11-02 DIAGNOSIS — Z12.31 VISIT FOR SCREENING MAMMOGRAM: ICD-10-CM

## 2022-11-02 DIAGNOSIS — M79.651 PAIN OF RIGHT THIGH: ICD-10-CM

## 2022-11-02 DIAGNOSIS — M54.16 LUMBAR RADICULOPATHY: ICD-10-CM

## 2022-11-02 DIAGNOSIS — M79.18 BUTTOCK PAIN: Primary | ICD-10-CM

## 2022-11-02 PROCEDURE — 97110 THERAPEUTIC EXERCISES: CPT | Mod: GP | Performed by: PHYSICAL THERAPIST

## 2022-11-02 PROCEDURE — 77067 SCR MAMMO BI INCL CAD: CPT | Mod: TC | Performed by: RADIOLOGY

## 2022-11-10 ENCOUNTER — THERAPY VISIT (OUTPATIENT)
Dept: PHYSICAL THERAPY | Facility: CLINIC | Age: 62
End: 2022-11-10
Payer: OTHER MISCELLANEOUS

## 2022-11-10 DIAGNOSIS — M79.651 PAIN OF RIGHT THIGH: ICD-10-CM

## 2022-11-10 DIAGNOSIS — M54.16 LUMBAR RADICULOPATHY: ICD-10-CM

## 2022-11-10 DIAGNOSIS — M79.18 BUTTOCK PAIN: Primary | ICD-10-CM

## 2022-11-10 PROCEDURE — 97112 NEUROMUSCULAR REEDUCATION: CPT | Mod: GP | Performed by: PHYSICAL THERAPIST

## 2022-11-10 PROCEDURE — 97110 THERAPEUTIC EXERCISES: CPT | Mod: GP | Performed by: PHYSICAL THERAPIST

## 2022-11-10 PROCEDURE — 97140 MANUAL THERAPY 1/> REGIONS: CPT | Mod: GP | Performed by: PHYSICAL THERAPIST

## 2022-11-11 ENCOUNTER — VIRTUAL VISIT (OUTPATIENT)
Dept: NEUROLOGY | Facility: CLINIC | Age: 62
End: 2022-11-11
Payer: COMMERCIAL

## 2022-11-11 VITALS — HEIGHT: 62 IN | BODY MASS INDEX: 27.6 KG/M2 | WEIGHT: 150 LBS

## 2022-11-11 DIAGNOSIS — R56.9 SEIZURE (H): ICD-10-CM

## 2022-11-11 RX ORDER — LEVETIRACETAM 500 MG/1
TABLET ORAL
Qty: 270 TABLET | Refills: 3 | Status: SHIPPED | OUTPATIENT
Start: 2022-11-11 | End: 2023-11-14

## 2022-11-11 RX ORDER — LAMOTRIGINE 100 MG/1
TABLET ORAL
Qty: 90 TABLET | Refills: 3 | Status: SHIPPED | OUTPATIENT
Start: 2022-11-11 | End: 2023-11-14

## 2022-11-11 RX ORDER — LAMOTRIGINE 200 MG/1
TABLET ORAL
Qty: 180 TABLET | Refills: 3 | Status: SHIPPED | OUTPATIENT
Start: 2022-11-11 | End: 2023-11-14

## 2022-11-11 ASSESSMENT — PATIENT HEALTH QUESTIONNAIRE - PHQ9
SUM OF ALL RESPONSES TO PHQ QUESTIONS 1-9: 2
SUM OF ALL RESPONSES TO PHQ QUESTIONS 1-9: 2
10. IF YOU CHECKED OFF ANY PROBLEMS, HOW DIFFICULT HAVE THESE PROBLEMS MADE IT FOR YOU TO DO YOUR WORK, TAKE CARE OF THINGS AT HOME, OR GET ALONG WITH OTHER PEOPLE: NOT DIFFICULT AT ALL

## 2022-11-11 ASSESSMENT — PAIN SCALES - GENERAL: PAINLEVEL: NO PAIN (0)
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
## 2022-11-11 NOTE — PATIENT INSTRUCTIONS
LOWER levetiracetam         Times of Days  8AM   9 pm      Medication Tablet Size Number of Tablets/Capsules Total Daily Dosage   Lamotrigine   100 mg   1             Lamotrigine   200 mg   1      1    500 mg    Gabapentin    100 mg         2    200 mg   Levetiracetam   500 mg  1      2    1500 mg       - check labs lamotrigine, levetiracetam,  Gabapentin   - Follow-up 4 months   - seizure rescue: Valium nasal spray 5 mg for seizures more than 3 minutes or more than 2 seizures within 8 hour       Flakita Clark MD
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
## 2022-11-16 ENCOUNTER — THERAPY VISIT (OUTPATIENT)
Dept: PHYSICAL THERAPY | Facility: CLINIC | Age: 62
End: 2022-11-16
Payer: OTHER MISCELLANEOUS

## 2022-11-16 DIAGNOSIS — M79.18 BUTTOCK PAIN: Primary | ICD-10-CM

## 2022-11-16 DIAGNOSIS — M79.651 PAIN OF RIGHT THIGH: ICD-10-CM

## 2022-11-16 DIAGNOSIS — M54.16 LUMBAR RADICULOPATHY: ICD-10-CM

## 2022-11-16 PROCEDURE — 97112 NEUROMUSCULAR REEDUCATION: CPT | Mod: GP | Performed by: PHYSICAL THERAPIST

## 2022-11-16 PROCEDURE — 97110 THERAPEUTIC EXERCISES: CPT | Mod: GP | Performed by: PHYSICAL THERAPIST

## 2022-11-23 ENCOUNTER — LAB (OUTPATIENT)
Dept: LAB | Facility: CLINIC | Age: 62
End: 2022-11-23
Payer: COMMERCIAL

## 2022-11-23 ENCOUNTER — THERAPY VISIT (OUTPATIENT)
Dept: PHYSICAL THERAPY | Facility: CLINIC | Age: 62
End: 2022-11-23
Payer: OTHER MISCELLANEOUS

## 2022-11-23 DIAGNOSIS — R56.9 SEIZURE (H): ICD-10-CM

## 2022-11-23 DIAGNOSIS — E78.00 ELEVATED CHOLESTEROL: ICD-10-CM

## 2022-11-23 DIAGNOSIS — M79.18 BUTTOCK PAIN: Primary | ICD-10-CM

## 2022-11-23 DIAGNOSIS — M79.651 PAIN OF RIGHT THIGH: ICD-10-CM

## 2022-11-23 DIAGNOSIS — Z13.220 SCREENING FOR HYPERLIPIDEMIA: ICD-10-CM

## 2022-11-23 DIAGNOSIS — M54.16 LUMBAR RADICULOPATHY: ICD-10-CM

## 2022-11-23 LAB
CHOLEST SERPL-MCNC: 263 MG/DL
HDLC SERPL-MCNC: 56 MG/DL
LDLC SERPL CALC-MCNC: 190 MG/DL
LEVETIRACETAM SERPL-MCNC: 22.3 ΜG/ML (ref 10–40)
NONHDLC SERPL-MCNC: 207 MG/DL
TRIGL SERPL-MCNC: 85 MG/DL

## 2022-11-23 PROCEDURE — 80175 DRUG SCREEN QUAN LAMOTRIGINE: CPT | Mod: 90

## 2022-11-23 PROCEDURE — 97112 NEUROMUSCULAR REEDUCATION: CPT | Mod: GP | Performed by: PHYSICAL THERAPIST

## 2022-11-23 PROCEDURE — 99000 SPECIMEN HANDLING OFFICE-LAB: CPT

## 2022-11-23 PROCEDURE — 97110 THERAPEUTIC EXERCISES: CPT | Mod: GP | Performed by: PHYSICAL THERAPIST

## 2022-11-23 PROCEDURE — 80177 DRUG SCRN QUAN LEVETIRACETAM: CPT

## 2022-11-23 PROCEDURE — 36415 COLL VENOUS BLD VENIPUNCTURE: CPT

## 2022-11-23 PROCEDURE — 80061 LIPID PANEL: CPT

## 2022-11-24 LAB — LAMOTRIGINE SERPL-MCNC: 13.3 UG/ML

## 2022-11-30 ENCOUNTER — THERAPY VISIT (OUTPATIENT)
Dept: PHYSICAL THERAPY | Facility: CLINIC | Age: 62
End: 2022-11-30
Payer: OTHER MISCELLANEOUS

## 2022-11-30 DIAGNOSIS — M79.651 PAIN OF RIGHT THIGH: ICD-10-CM

## 2022-11-30 DIAGNOSIS — M79.18 BUTTOCK PAIN: Primary | ICD-10-CM

## 2022-11-30 DIAGNOSIS — M54.16 LUMBAR RADICULOPATHY: ICD-10-CM

## 2022-11-30 PROCEDURE — 97110 THERAPEUTIC EXERCISES: CPT | Mod: GP | Performed by: PHYSICAL THERAPIST

## 2022-11-30 NOTE — PROGRESS NOTES
Subjective:  HPI  Physical Exam                    Objective:  System    Physical Exam    General     ROS    Assessment/Plan:    PROGRESS  REPORT    Progress reporting period is from 10/25/2022 to 11/30/2022.       SUBJECTIVE  Subjective: Patient reports her LB has been feeling much better, but her R buttock pain is still there with walking and sitting--usually 10 minutes or so.   She has been doing lumbar extension exercises consistently which feel good on her LB and have helped her mobility.  MD ordered a R hip and lumbar MRI, but this has not been scheduled yet.    Current Pain level: 0/10.     Initial Pain level: 2/10.   Changes in function:  None.  Adverse reaction to treatment or activity: None    OBJECTIVE  Objective: R knee AROM:  flexion and extension nil loss, NE; L knee strength:  flexion 4-/5, p. posterior thigh pain; L hip strength:  extension 4/5, p. posterior thigh pain.  Lumbar AROM:  flexion nil loss, pain R thigh with return to neutral; extension min to mod loss, p. R LBP, NE thigh; B SG nil loss, NE.  No clear response to lumbar treatment so far so patient will add R hamstring remodeling exercises for hamstring strain.     ASSESSMENT/PLAN  Patient has been seen for 6 visits with treatment focusing on lumbar directional preference motion trials to assess for lumbar component to her pain with addition of R hamstring strengthening/tissue remodeling exercises to address hamstring strain.  Slow and gradual progress is expected if symptoms are solely due to a hamstring strain due to time needed for healing and tissue remodeling.  She has a history of lumbar spine issues, and her LB has been feeling better with lumbar exercises, however, these have not had effect on her R hamstring symptoms.  She  would benefit from additional visits to progress R hamstring strength and decrease pain for return to normal work and daily activities without limitation.    Updated problem list and treatment plan: Diagnosis  1:  L hamstring pain  Pain -  self management, education, directional preference exercise and home program  Decreased strength - therapeutic exercise, therapeutic activities and home program  Decreased function - therapeutic activities and home program  Impaired posture - neuro re-education and home program  STG/LTGs have been met or progress has been made towards goals:  Yes (See Goal flow sheet completed today.)  Assessment of Progress: The patient's condition has potential to improve.  Self Management Plans:  Patient has been instructed in a home treatment program.  Patient  has been instructed in self management of symptoms.  I have re-evaluated this patient and find that the nature, scope, duration and intensity of the therapy is appropriate for the medical condition of the patient.  Barbi continues to require the following intervention to meet STG and LTG's:  PT    Recommendations:  This patient would benefit from continued therapy.     Frequency:  1 X week, once daily  Duration:  for 4 weeks        Please refer to the daily flowsheet for treatment today, total treatment time and time spent performing 1:1 timed codes.

## 2022-11-30 NOTE — LETTER
[facility]  [address]  [address]  [facility] [address]  [phone]    2022    Re: Barbi Tiwari   :   1960  MRN:  [id number]   REFERRING PHYSICIAN:   Katina Reynoso MD    [facility]    Date of Initial Evaluation:  10/25/2022  Visits:  Rxs Used: 6  Reason for Referral:     Buttock pain  Pain of right thigh  Lumbar radiculopathy    PROGRESS  REPORT  Progress reporting period is from 10/25/2022 to 2022.       SUBJECTIVE  Subjective: Patient reports her LB has been feeling much better, but her R buttock pain is still there with walking and sitting--usually 10 minutes or so.   She has been doing lumbar extension exercises consistently which feel good on her LB and have helped her mobility.  MD ordered a R hip and lumbar MRI, but this has not been scheduled yet.    Current Pain level: 0/10.     Initial Pain level: 2/10.   Changes in function:  None.  Adverse reaction to treatment or activity: None    OBJECTIVE  Objective: R knee AROM:  flexion and extension nil loss, NE; L knee strength:  flexion 4-/5, p. posterior thigh pain; L hip strength:  extension 4/5, p. posterior thigh pain.  Lumbar AROM:  flexion nil loss, pain R thigh with return to neutral; extension min to mod loss, p. R LBP, NE thigh; B SG nil loss, NE.  No clear response to lumbar treatment so far so patient will add R hamstring remodeling exercises for hamstring strain.     ASSESSMENT/PLAN  Patient has been seen for 6 visits with treatment focusing on lumbar directional preference motion trials to assess for lumbar component to her pain with addition of R hamstring strengthening/tissue remodeling exercises to address hamstring strain.  Slow and gradual progress is expected if symptoms are solely due to a hamstring strain due to time needed for healing and tissue remodeling.  She has a history of lumbar spine issues, and her LB  has been feeling better with lumbar exercises, however, these have not had effect on her R hamstring symptoms.  She  would benefit from additional visits to progress R hamstring strength and decrease pain for return to normal work and daily activities without limitation.      Updated problem list and treatment plan: Diagnosis 1:  L hamstring pain  Pain -  self management, education, directional preference exercise and home program  Decreased strength - therapeutic exercise, therapeutic activities and home program  Decreased function - therapeutic activities and home program  Impaired posture - neuro re-education and home program  STG/LTGs have been met or progress has been made towards goals:  Yes (See Goal flow sheet completed today.)  Assessment of Progress: The patient's condition has potential to improve.  Self Management Plans:  Patient has been instructed in a home treatment program.  Patient  has been instructed in self management of symptoms.  I have re-evaluated this patient and find that the nature, scope, duration and intensity of the therapy is appropriate for the medical condition of the patient.  Barbi continues to require the following intervention to meet STG and LTG's:  PT    Recommendations:  This patient would benefit from continued therapy.     Frequency:  1 X week, once daily  Duration:  for 4 weeks    Thank you for your referral.    INQUIRIES  Therapist: Miriam Méndez, PT   [facility] SERVICES [address]  [address]  [facility] [address]  Phone: [phone]  Fax: [phone]

## 2022-12-08 ENCOUNTER — IMMUNIZATION (OUTPATIENT)
Dept: NURSING | Facility: CLINIC | Age: 62
End: 2022-12-08
Payer: COMMERCIAL

## 2022-12-08 ENCOUNTER — THERAPY VISIT (OUTPATIENT)
Dept: PHYSICAL THERAPY | Facility: CLINIC | Age: 62
End: 2022-12-08
Payer: OTHER MISCELLANEOUS

## 2022-12-08 DIAGNOSIS — M79.651 PAIN OF RIGHT THIGH: ICD-10-CM

## 2022-12-08 DIAGNOSIS — M79.18 BUTTOCK PAIN: Primary | ICD-10-CM

## 2022-12-08 DIAGNOSIS — M54.16 LUMBAR RADICULOPATHY: ICD-10-CM

## 2022-12-08 PROCEDURE — 0124A COVID-19 VACCINE BIVALENT BOOSTER 12+ (PFIZER): CPT

## 2022-12-08 PROCEDURE — 97110 THERAPEUTIC EXERCISES: CPT | Mod: GP | Performed by: PHYSICAL THERAPIST

## 2022-12-08 PROCEDURE — 91312 COVID-19 VACCINE BIVALENT BOOSTER 12+ (PFIZER): CPT

## 2022-12-08 PROCEDURE — 97112 NEUROMUSCULAR REEDUCATION: CPT | Mod: GP | Performed by: PHYSICAL THERAPIST

## 2022-12-08 PROCEDURE — 97140 MANUAL THERAPY 1/> REGIONS: CPT | Mod: GP | Performed by: PHYSICAL THERAPIST

## 2022-12-14 ENCOUNTER — THERAPY VISIT (OUTPATIENT)
Dept: PHYSICAL THERAPY | Facility: CLINIC | Age: 62
End: 2022-12-14
Payer: OTHER MISCELLANEOUS

## 2022-12-14 DIAGNOSIS — M79.18 BUTTOCK PAIN: Primary | ICD-10-CM

## 2022-12-14 DIAGNOSIS — M54.16 LUMBAR RADICULOPATHY: ICD-10-CM

## 2022-12-14 DIAGNOSIS — M79.651 PAIN OF RIGHT THIGH: ICD-10-CM

## 2022-12-14 PROCEDURE — 97110 THERAPEUTIC EXERCISES: CPT | Mod: GP | Performed by: PHYSICAL THERAPIST

## 2022-12-20 ENCOUNTER — THERAPY VISIT (OUTPATIENT)
Dept: PHYSICAL THERAPY | Facility: CLINIC | Age: 62
End: 2022-12-20
Payer: OTHER MISCELLANEOUS

## 2022-12-20 DIAGNOSIS — M79.18 BUTTOCK PAIN: Primary | ICD-10-CM

## 2022-12-20 DIAGNOSIS — M79.651 PAIN OF RIGHT THIGH: ICD-10-CM

## 2022-12-20 DIAGNOSIS — M54.16 LUMBAR RADICULOPATHY: ICD-10-CM

## 2022-12-20 PROCEDURE — 97110 THERAPEUTIC EXERCISES: CPT | Mod: GP | Performed by: PHYSICAL THERAPIST

## 2023-01-05 ENCOUNTER — OFFICE VISIT (OUTPATIENT)
Dept: NEUROPSYCHOLOGY | Facility: CLINIC | Age: 63
End: 2023-01-05
Attending: PSYCHIATRY & NEUROLOGY
Payer: COMMERCIAL

## 2023-01-05 DIAGNOSIS — R41.844 FRONTAL LOBE AND EXECUTIVE FUNCTION DEFICIT: Primary | ICD-10-CM

## 2023-01-05 DIAGNOSIS — C71.9 OLIGODENDROGLIOMA (H): ICD-10-CM

## 2023-01-05 DIAGNOSIS — R41.89 COGNITIVE CHANGES: ICD-10-CM

## 2023-01-05 DIAGNOSIS — F06.8 OTHER SPECIFIED MENTAL DISORDERS DUE TO KNOWN PHYSIOLOGICAL CONDITION: ICD-10-CM

## 2023-01-05 PROCEDURE — 90791 PSYCH DIAGNOSTIC EVALUATION: CPT | Performed by: CLINICAL NEUROPSYCHOLOGIST

## 2023-01-05 PROCEDURE — 96138 PSYCL/NRPSYC TECH 1ST: CPT | Performed by: CLINICAL NEUROPSYCHOLOGIST

## 2023-01-05 PROCEDURE — 96132 NRPSYC TST EVAL PHYS/QHP 1ST: CPT | Performed by: CLINICAL NEUROPSYCHOLOGIST

## 2023-01-05 PROCEDURE — 96139 PSYCL/NRPSYC TST TECH EA: CPT | Performed by: CLINICAL NEUROPSYCHOLOGIST

## 2023-01-05 NOTE — LETTER
1/5/2023       RE: Barbi Tiwari  [address] [facility] [address]     Dear Colleague,    Thank you for referring your patient, Barbi Tiwari, to the [facility] NEUROPSYCHOLOGY [place] at [facility]. Please see a copy of my visit note below.    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 She was unaccompanied to the evaluation.     The patient was in-clinic for the entirety of this evaluation. The interview for this evaluation was completed via a Zoom meeting. Testing was completed face-to-face.     BACKGROUND INFORMATION / INTERVIEW FINDINGS    Records indicate that Ms. Tiwari was diagnosed with a right frontal diffuse oligodendroglioma (1p19q co-deleted) following a resection in May 2011. She received 12 cycles of temozolomide, completed in May 2012. She had a repeat resection on 05/30/2018 due to suspected tumor progression. Pathology was unchanged. She was on imaging surveillance. Scans in 2019 and early 2020 documented tumor regrowth. Dr. Yoshi Meyer saw her for a neuropsychology exam on February 17, 2020. The exam documented a pattern of weaknesses that was felt to be consistent with subtle dysfunction of the bilateral frontal lobes. Complicating the interpretation of her profile, however, was that she was mildly anxious. Mild medication toxicity was also suspected. In the exam, mild weaknesses were noted in aspects of verbal and nonverbal working memory, nonverbal recall, some aspects of executive functioning, and bilateral psychomotor speed. She then initiated chemoradiotherapy (5400 cGy to the surgical cavity and areas of T2 FLAIR hyperintensity), which ended on May 30, 2020. She then received 6 cycles of adjuvant  temozolomide through November 2020.     Ms. Tiwari underwent a second neuropsychological assessment on April 30, 2021, under the direction of Dr. Yoshi Meyer. The exam again documented weaknesses that were felt to be consistent with dysfunction of the bilateral frontal regions, and the right frontal region in particular. In the exam, weaknesses were identified in executive functioning, attention, both verbal and nonverbal working memory, and some aspects of cognitive speed. Bilateral fine motor dexterity was slowed, more so for the left hand. Relative to her February 2020 evaluation, there was a mild decline in her thinking. There was also a mild decline in her basic visuospatial judgments. This decline was suspected to be attributable to her interval radiation treatments, although some of this decline due to disease progression was also possible. She did not report elevated symptoms of anxiety or depression.     Ms. Tiwari has continued to be managed on imaging surveillance with no further concern for cancer recurrence. Her most recent MRI of the brain on 9/13/2022 evidenced  1. Postsurgical changes of right frontoparietal craniotomy and resection cavity in the posterior right frontal lobe. The T2 prolongation marginating the resection cavity in the posterior right frontal lobe appears stable compared to the prior brain MRIs going back to 03/15/2021. 2. No nodular or abnormal enhancement marginating the resection cavity. No evidence for tumor progression.  Her other medical history includes depression, left-sided low back pain, left hip pain, left knee pain, osteoarthritis of the left knee, laparoscopic hysterectomy, nephrolithiasis, lumbar radiculopathy, premature ventricular contractions, elevated cholesterol, calculus of kidney, hyperparathyroidism, allergic rhinitis, and gastroesophageal reflux disease.  She also has simple partial seizures and follows with Dr. Flakita Clark. Her seizures consist of mouth and  facial twitching with no loss of consciousness. Ongoing concerns have been expressed about her cognition. The current follow-up evaluation was requested by Dr. Myrtle Gallagher, in this context.    On interview, Ms. Tiwari confirmed that she has not undergone additional cancer treatments since the previous evaluation and that her neuroimaging has remained stable. She stated that she has seizures rarely, with the last seizure occurring in March 2022. She stated that the next most recent seizure occurred in October 2021. However, records indicate that she had a seizure in January 2022.     Regarding cognition, Ms. Tiwari generally denied noticing a change in her cognition since the previous evaluation. She described developing word finding difficulty following her chemoradiation treatments that may be a little worse compared to the past. She has more trouble with word finding when she feels stressed. She noted that the word she was searching for will typically come back to her with time. She also noted that she has always had difficulty remembering where she left her phone. She stated that visuospatial skills were never her strength. She stated that she has not noticed a change or decline her memory or visuospatial processing. She otherwise denied cognitive difficulty. She reported that neither she nor her  have observed a change in her personality.    Regarding motor symptoms, Ms. Tiwari noted that her balance is  more off,  compared to the past. She stated that she has always been a slow walker, but she seems to walk more slowly now. She denied reduced arm swing. She reported that she slipped on ice and fell when she was walking into work this winter. She did not sustain any injuries from the fall. She noted that her gait is steadier when she walks fast. She noted that when she turns a corner too fast, she will cut it too close and bump into the wall. It does not happen more on one side compared to the  other. She also noted that when she steps forward to grab an item out of reach, she tends to turn around to get back to her initial spot rather than taking a step backward. She otherwise denied noticing awareness of a short or shuffled gait. She reported bilateral hand tremor that began a couple of years ago and occurs 1-2 hours after she takes her medications. She stated that she does not typically notice the tremor throughout the day, and she was unsure if it occurs in the evenings. She stated that the tremor is worse with anxiety. The tremor occurs both with action and at rest. However, she noted that she is able to steady her hands by putting them on something stable, such as a table. She stated that she and her treatment team have worked to reduce her antiepileptic medications to lessen the tremor, but she has not yet seen any change. She denied unilateral numbness or weakness. She reported no known reduction in facial expressiveness. She stated that she sometimes falls backward into a chair when rising from sitting. However, she attributed this to her history of back and hip problems. She noted that her handwriting is sloppier compared to the past, which she has attributed to the tremor. She did not think her handwriting was any smaller compared to the past. She stated that she has always been soft spoken, but she felt that she speaks more softly compared to the past. She reported no changes in her senses of smell, taste, vision, or hearing.     With respect to mental health, Ms. Tiwari reported that her mood is usually fine. However, she had more trouble with her mood this holiday season. She also noted that she was had felt anxious about this appointment. She reported that she was prescribed escitalopram for mood management in October 2021, but the dose was recently reduced given persistent fatigue symptoms. Nonetheless, she reported that the medication has been helpful. She reported no interval mental  health counseling or psychotherapy. She denied current suicidal ideation. She reported no history of suicide attempt. When asked about a history of hallucinations, she described instances of perceiving an object on the floor or her dresser that is not there. She also described instances of sleep walking within the last 6 months in which she will believe there is something in her hand that is not. She otherwise denied hallucinatory experiences.    Regarding other aspects of her health and medical history, she reported no history of head injury with loss of consciousness or stroke. As noted above, her last seizures occurred in March 2022, January 2022, and October 2021. She reported having a couple of fused discs in her back, which cause her pain. She also noted that she has tendonitis in her hip and injured it further after lifting a patient while at work. She is currently participating in physical therapy for her back and hip pain. However, she described feeling achy in her back and hip with prolonged sitting. She reported no known COVID-19 infection. She has received five COVID-19 vaccinations.     Ms. Tiwari reported sleeping 5-6 hours on the days that she works but noted that she will sleep 8-10 hours on the days that she has off. She reported a history of restless leg syndrome, that is treated with gabapentin. However, she noted that her gabapentin dose was reduced due to feeling tired in the mornings. She reported a history of sleep walking and acting out her dreams throughout her childhood and adolescence. She reported that she became aware that she talked in her sleep after she was . She continues to reach for items, sleepwalk, and talk in her sleep. However, as noted above, she noted that within the past 6 months she has had episodes of sleep walking in which she has believed she had something in her hand, or she perceived an object in her environment that was not really there. She stated that these  episodes are worse than her past sleep walking behaviors and appear to be progressively worsening with time. She described having a sleep walking episode approximately 3 times per week. These events do not seem to be related to stress or anxiety. However, she noted that they only occur when she sleeps in her own bed, not when she falls asleep on the couch. She reported no history of sleep apnea. She generally feels rested when she wakes but noted that she has a hard time getting up on days that she does not have to work due to low motivation. She noted that she feels she has enough energy to complete the tasks she needs to do. However, she will sleep in if she does not have anything specific to do and sometimes take a 1-2-hour nap in the afternoon. She will also nap for 2-3 hours after returning home from work. She described her appetite as good. She denied significant changes in weight. Her current medications include lamotrigine, levetiracetam, diazepam, famotidine, vitamin B-12, gabapentin, metoprolol tartrate, ibuprofen, acetaminophen, escitalopram, triamcinolone, tretinoin, calcium supplement, fexofenadine, lactase, and vitamin D. She reported consuming one glass of wine on holidays. She denied a history of heavy or problematic alcohol use. She reported no current use of tobacco or illicit substances.    Ms. Tiwari continues to live with her . She manages her own basic and instrumental daily activities. She and her  share management of the finances. She reported no mistakes in bill payment as her portion of the bill are automatically drafted from her accounts. She reported sometimes forgetting to take her medications until later in the day if she brings the medications with her to take after she arrives to work. She drives.    By way of background, Ms. Tiwari and her  remain . She has two adult children. Her educational background is unchanged. She has a master s degree.  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 She reduced her work to .6 FTE with her last chemoradiation treatments in 2020. Her performance reviews have been good.    BEHAVIORAL OBSERVATIONS  Ms. Tiwari was pleasant and cooperative with the exam. She wore contacts. She also wore reading glasses for visually based tasks. Her face appeared mildly less expressive compared to interactions in past evaluations. She appeared to lean slightly to the left while sitting. A bilateral resting tremor was observed. No gait abnormalities were observed. Her conversational speech was fluent and normal for rate, rhythm, and prosody. There was occasional word finding difficulty. Comprehension appeared normal. Mood and affect were congruent and normal. She described feeling anxious about the current evaluation. Her thought processes were linear and organized. Her effort was good. The current results are felt to be an accurate depiction of her cognitive functioning.     RESULTS OF EXAM  Her performances on standardized measures of neuropsychological functioning were as follows.      She was fully oriented to time, place, and various aspects of personal information. She was able to state the name of the current president and three of the five most recent past presidents. Her scores on stand-alone and embedded measures of cognitive performance validity were within normal limits. Auditory attention for digits was high average. Visual attention for spatial sequences was high average. Mental calculations were low average. Learning of words in a list format was exceptionally high. Short delayed recall of list words was high average. 30-minute delayed recall of list words was also high average. Delayed recognition of list words was high average. Immediate memory for simple geometric figures was average. Her drawing of a complicated geometric figure was low  average, and notable for mild inattention to figural details. Short delayed recall of the figure was average. 30-minute delayed recall of the figure was average. Delayed recognition of figural details was average. Visuospatial judgments for variably oriented lines were average. Visual problem-solving with blocks was high average.  Nonverbal reasoning for incomplete matrices was above average. Comprehension of phrases and short stories was average. Verbal associative fluency was average. Animal fluency was low average. Naming to confrontation was high average. Verbal abstract reasoning was high average. Speeded visual sequencing under focused attention was high average. A similar measure with a divided attention component was average. Speeded word reading was performed in the low average to average range. Speeded color naming was performed in the average range. Speeded inhibition of an over-learned response was performed in the low average range. Speeded visual motor coding was high average. Speeded fine motor dexterity was average for her dominant (right) hand and low average for her non-dominant (left) hand.     She did not endorse items consistent with clinically significant symptoms of depression or anxiety on self-report measures.     IMPRESSIONS  Ms. Tiwari again demonstrated weaknesses that are consistent with mild dysfunction of the bilateral frontal regions. These weaknesses are reasonably attributable to her history of oligodendroglioma, tumor resection, and chemoradiation. There is also some evidence to suggest mild medication toxicity. While there is some variability in her cognitive performance both within the current evaluation and between the current evaluation and her prior assessments, her performances are generally stable to improved when compared to her most recent neuropsychological examination in April 2021. Relative to her February 2020 evaluation, her performance is generally commensurate.  This stability argues against an underlying neurodegenerative condition or disease progression. The decline in her thinking skills between the 2020 and 2021 evaluation was most likely due to effects from her interval chemoradiation. In this exam, she demonstrated mild weaknesses in some aspects of executive functioning and working memory, including verbal fluency, mental arithmetic, and response inhibition. Bilateral fine motor dexterity is also slowed. Other cognitive abilities are generally normal and performed in keeping with her average to above average range cognitive baseline. While she is not reporting elevated symptoms of depression or anxiety at present, she described experiencing greater word finding difficulty in times of stress or anxiety in her day-to-day life. She also described significant fatigue and sleep dysfunction. I suspect that these non-neurologic symptoms contribute to day-to-day variability in her cognitive functioning, but I do not believe that they fully explain the weaknesses identified on this neuropsychological assessment.    RECOMMENDATIONS  Preliminary results and recommendations were provided to the patient and her  over the telephone on January 6th, 2023, and all questions were answered.      1. Continued neurologic care is recommended.    2. Ms. Tiwari also described several symptoms that are concerning for parkinsonism, including bilateral hand tremor, increased balance difficulty, slowed gait, falling back into her seat after standing, sloppier handwriting, softer voice, and dream enactment behaviors. Ms. Tiwari is encouraged to continue consulting with her neurologists, in person if possible, regarding these symptoms.    3. Continued mental health treatment is recommended given her report of increased word finding difficulty when she feels stressed. A referral for to psychotherapy for continued treatment of her mental health symptoms. A highly structured  cognitive-behavioral intervention focused on coping skills, stress management, anxiety, and sleep would likely be most beneficial.    4. The patient may find the following attention and organizational strategies helpful in promoting learning and memory:   a. Use cell phone reminders to help monitor upcoming appointments and due dates as well as other important tasks (e.g., when to take medications). Set the reminder to go off several times prior to the event with advanced notice (e.g., one week before, two days before, the day before, and the morning of the event).   b. Place items, such as your phone, keys, and wallet in a consistent location to avoid misplacing them.  c. She should attempt to reduce distractions at home and at work. Having a clutter-free work environment and removing distractions would help optimize her attention.  d. Taking short breaks during her day may help optimize and maintain her concentration.   e. Make to-do lists and prioritize tasks. Break down large tasks into smaller steps and check off each small step when completed.   f. Allowing for time both in learning new information and recalling information later. Some people tend to worry if they can't immediately recall something. Instead, you should take time to express a thought or complete a task rather than be critical or harsh on yourself.    5. It is recommended that the patient stay cognitively, socially, and physically active and to eat a healthy diet to promote optimal health and psychological wellness and prevent cognitive decline.     6. The current results may serve as an updated baseline of her cognitive functioning. If further difficulties are observed in the future, a referral for a neuropsychological re-evaluation at that time might be considered.     Katina Bethea, Ph.D.  Post Doctoral Fellow    Yoshi Meyer, Ph.D., L.P., ABPP  Board Certified in Clinical Neuropsychology   / Licensed Psychologist  [id number]    All services provided by the Postdoctoral Fellow were supervised by this licensed psychologist and all billing noted here is for professional services provided by the psychologist and psychometrist.     Time spent:  One unit (41 minutes) neurobehavioral status exam including interview and clinical assessment by licensed and board-certified neuropsychologist (CPT 57834). One unit (65 minutes) neuropsychological testing evaluation by licensed and board-certified neuropsychologist, including integration of patient data, interpretation of standardized test results and clinical data, clinical decision-making, treatment planning, supervision of the fellow, and report, first hour (CPT 91984). One unit (30 minutes) of psychological and neuropsychological test administration and scoring by technician, first 30 minutes (CPT 19506). Seven units (214 minutes) psychological or neuropsychological test administration and scoring by technician, subsequent 30 minutes (CPT 00499). Diagnoses: C71.9, R41.844, F06.8.

## 2023-01-06 ENCOUNTER — THERAPY VISIT (OUTPATIENT)
Dept: PHYSICAL THERAPY | Facility: CLINIC | Age: 63
End: 2023-01-06
Payer: OTHER MISCELLANEOUS

## 2023-01-06 DIAGNOSIS — M54.16 LUMBAR RADICULOPATHY: ICD-10-CM

## 2023-01-06 DIAGNOSIS — M79.651 PAIN OF RIGHT THIGH: ICD-10-CM

## 2023-01-06 DIAGNOSIS — M79.18 BUTTOCK PAIN: Primary | ICD-10-CM

## 2023-01-06 PROCEDURE — 97110 THERAPEUTIC EXERCISES: CPT | Mod: GP | Performed by: PHYSICAL THERAPIST

## 2023-01-06 NOTE — LETTER
"[address]  [address]  [facility] [id number] [phone]    2023    Re: Barbi Tiwari   :   1960  MRN:  [id number]   REFERRING PHYSICIAN:   Katina Reynoso MD    Psychiatric    Date of Initial Evaluation:  10/25/22  Visits:  Rxs Used: 10  Reason for Referral:     Buttock pain  Pain of right thigh  Lumbar radiculopathy    PROGRESS  REPORT    Progress reporting period is from 2022 to 2023.       SUBJECTIVE    Subjective: Patient reports, \"It's actually better.\"  She still has increased pain with sitting 15 minutes and  pain with walking but not always.  R buttock is still sore at the end of her workday.  She has been doing the exercises and feel they are helping.  Saw MD today who thinks she will gradually improve over time.  Wants her to finish remaining PT and then continue independently.    Current Pain level: 2/10.     Initial Pain level: 2/10.   Changes in function:  Yes, slight improvement sitting tolerance.  Adverse reaction to treatment or activity: None    OBJECTIVE    Objective: R hip extension strength improved--4+/5, and R hip AROM extension in prone full and painfree.  R knee flexion strength also improved to 4+/5 without pain.  Progressed force/load with hamstring strengthening.  Patient will continue lumbar and thoracic extension as back feels better with this also.     ASSESSMENT/PLAN    Treatment recently has focused on lumbar and thoracic extension exercises as well as hamstring strengthening and stretching/mobility to address R buttock pain.  Progress has been slow and variable throughout treatment, but she demonstrated good progress today compared to her last visit a few weeks ago.  She has a good HEP for continued progression and should do well continuing with this independently.  She will return for a follow-up visit at this point only if needed " for progression and will be discharged if no visits are scheduled in the next several weeks.    Re: Barbi Tiwari   :   1960          Updated problem list and treatment plan: Diagnosis 1:  R buttock pain  Pain -  self management, education and home program  Decreased strength -  home program  Decreased function - home program  STG/LTGs have been met or progress has been made towards goals:  Yes (See Goal flow sheet completed today.)  Assessment of Progress: The patient's condition is improving.  Self Management Plans:  Patient has been instructed in a home treatment program.  Patient is independent in a home treatment program.  Patient  has been instructed in self management of symptoms.  Patient is independent in self management of symptoms.  Barbi continues to require the following intervention to meet STG and LTG's:  PT intervention is no longer required to meet STG/LTG.    Recommendations:    Patient will continue with her HEP independently at this point and return for a follow-up visit if needed.  If no further follow-up visits are scheduled, patient will be discharged from PT.    Thank you for your referral.    INQUIRIES  Therapist: Miriam Méndez PT   [facility] SERVICES [address]  [address]  [facility] [address]  Phone: [phone]  Fax: [phone]

## 2023-01-06 NOTE — NURSING NOTE
The patient was seen for neuropsychological evaluation at the request of Myrtle Gallagher MD for the purposes of diagnostic clarification and treatment planning. 244 minutes of test administration and scoring were provided by this writer. Please see Dr. Yoshi Meyer's report for a full interpretation of the findings.    Anna Bryant  Psychometrist

## 2023-01-06 NOTE — PROGRESS NOTES
"Subjective:  HPI  Physical Exam                    Objective:  System    Physical Exam    General     ROS    Assessment/Plan:    PROGRESS  REPORT    Progress reporting period is from 11/30/2022 to 01/06/2023.       SUBJECTIVE  Subjective: Patient reports, \"It's actually better.\"  She still has increased pain with sitting 15 minutes and  pain with walking but not always.  R buttock is still sore at the end of her workday.  She has been doing the exercises and feel they are helping.  Saw MD today who thinks she will gradually improve over time.  Wants her to finish remaining PT and then continue independently.    Current Pain level: 2/10.     Initial Pain level: 2/10.   Changes in function:  Yes, slight improvement sitting tolerance.  Adverse reaction to treatment or activity: None    OBJECTIVE  Objective: R hip extension strength improved--4+/5, and R hip AROM extension in prone full and painfree.  R knee flexion strength also improved to 4+/5 without pain.  Progressed force/load with hamstring strengthening.  Patient will continue lumbar and thoracic extension as back feels better with this also.     ASSESSMENT/PLAN  Treatment recently has focused on lumbar and thoracic extension exercises as well as hamstring strengthening and stretching/mobility to address R buttock pain.  Progress has been slow and variable throughout treatment, but she demonstrated good progress today compared to her last visit a few weeks ago.  She has a good HEP for continued progression and should do well continuing with this independently.  She will return for a follow-up visit at this point only if needed for progression and will be discharged if no visits are scheduled in the next several weeks.    Updated problem list and treatment plan: Diagnosis 1:  R buttock pain  Pain -  self management, education and home program  Decreased strength -  home program  Decreased function - home program  STG/LTGs have been met or progress has been made " towards goals:  Yes (See Goal flow sheet completed today.)  Assessment of Progress: The patient's condition is improving.  Self Management Plans:  Patient has been instructed in a home treatment program.  Patient is independent in a home treatment program.  Patient  has been instructed in self management of symptoms.  Patient is independent in self management of symptoms.  Barbi continues to require the following intervention to meet STG and LTG's:  PT intervention is no longer required to meet STG/LTG.    Recommendations:  Patient will continue with her HEP independently at this point and return for a follow-up visit if needed.  If no further follow-up visits are scheduled, patient will be discharged from PT.    Please refer to the daily flowsheet for treatment today, total treatment time and time spent performing 1:1 timed codes.

## 2023-01-09 NOTE — PROGRESS NOTES
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 She was unaccompanied to the evaluation.     The patient was in-clinic for the entirety of this evaluation. The interview for this evaluation was completed via a Zoom meeting. Testing was completed face-to-face.     BACKGROUND INFORMATION / INTERVIEW FINDINGS    Records indicate that Ms. Tiwari was diagnosed with a right frontal diffuse oligodendroglioma (1p19q co-deleted) following a resection in May 2011. She received 12 cycles of temozolomide, completed in May 2012. She had a repeat resection on 05/30/2018 due to suspected tumor progression. Pathology was unchanged. She was on imaging surveillance. Scans in 2019 and early 2020 documented tumor regrowth. Dr. Yoshi Meyer saw her for a neuropsychology exam on February 17, 2020. The exam documented a pattern of weaknesses that was felt to be consistent with subtle dysfunction of the bilateral frontal lobes. Complicating the interpretation of her profile, however, was that she was mildly anxious. Mild medication toxicity was also suspected. In the exam, mild weaknesses were noted in aspects of verbal and nonverbal working memory, nonverbal recall, some aspects of executive functioning, and bilateral psychomotor speed. She then initiated chemoradiotherapy (5400 cGy to the surgical cavity and areas of T2 FLAIR hyperintensity), which ended on May 30, 2020. She then received 6 cycles of adjuvant temozolomide through November 2020.     Ms. Tiwari underwent a second neuropsychological assessment on April 30, 2021, under the direction of Dr. Yoshi Meyer. The exam again documented weaknesses that were felt to be consistent with dysfunction of the bilateral frontal regions, and the right frontal region in particular.  In the exam, weaknesses were identified in executive functioning, attention, both verbal and nonverbal working memory, and some aspects of cognitive speed. Bilateral fine motor dexterity was slowed, more so for the left hand. Relative to her February 2020 evaluation, there was a mild decline in her thinking. There was also a mild decline in her basic visuospatial judgments. This decline was suspected to be attributable to her interval radiation treatments, although some of this decline due to disease progression was also possible. She did not report elevated symptoms of anxiety or depression.     Ms. Tiwari has continued to be managed on imaging surveillance with no further concern for cancer recurrence. Her most recent MRI of the brain on 9/13/2022 evidenced  1. Postsurgical changes of right frontoparietal craniotomy and resection cavity in the posterior right frontal lobe. The T2 prolongation marginating the resection cavity in the posterior right frontal lobe appears stable compared to the prior brain MRIs going back to 03/15/2021. 2. No nodular or abnormal enhancement marginating the resection cavity. No evidence for tumor progression.  Her other medical history includes depression, left-sided low back pain, left hip pain, left knee pain, osteoarthritis of the left knee, laparoscopic hysterectomy, nephrolithiasis, lumbar radiculopathy, premature ventricular contractions, elevated cholesterol, calculus of kidney, hyperparathyroidism, allergic rhinitis, and gastroesophageal reflux disease.  She also has simple partial seizures and follows with Dr. Flakita Clark. Her seizures consist of mouth and facial twitching with no loss of consciousness. Ongoing concerns have been expressed about her cognition. The current follow-up evaluation was requested by Dr. Myrtle Gallagher, in this context.    On interview, Ms. Tiwari confirmed that she has not undergone additional cancer treatments since the previous evaluation and that  her neuroimaging has remained stable. She stated that she has seizures rarely, with the last seizure occurring in March 2022. She stated that the next most recent seizure occurred in October 2021. However, records indicate that she had a seizure in January 2022.     Regarding cognition, Ms. Tiwari generally denied noticing a change in her cognition since the previous evaluation. She described developing word finding difficulty following her chemoradiation treatments that may be a little worse compared to the past. She has more trouble with word finding when she feels stressed. She noted that the word she was searching for will typically come back to her with time. She also noted that she has always had difficulty remembering where she left her phone. She stated that visuospatial skills were never her strength. She stated that she has not noticed a change or decline her memory or visuospatial processing. She otherwise denied cognitive difficulty. She reported that neither she nor her  have observed a change in her personality.    Regarding motor symptoms, Ms. Tiwari noted that her balance is  more off,  compared to the past. She stated that she has always been a slow walker, but she seems to walk more slowly now. She denied reduced arm swing. She reported that she slipped on ice and fell when she was walking into work this winter. She did not sustain any injuries from the fall. She noted that her gait is steadier when she walks fast. She noted that when she turns a corner too fast, she will cut it too close and bump into the wall. It does not happen more on one side compared to the other. She also noted that when she steps forward to grab an item out of reach, she tends to turn around to get back to her initial spot rather than taking a step backward. She otherwise denied noticing awareness of a short or shuffled gait. She reported bilateral hand tremor that began a couple of years ago and occurs 1-2 hours  after she takes her medications. She stated that she does not typically notice the tremor throughout the day, and she was unsure if it occurs in the evenings. She stated that the tremor is worse with anxiety. The tremor occurs both with action and at rest. However, she noted that she is able to steady her hands by putting them on something stable, such as a table. She stated that she and her treatment team have worked to reduce her antiepileptic medications to lessen the tremor, but she has not yet seen any change. She denied unilateral numbness or weakness. She reported no known reduction in facial expressiveness. She stated that she sometimes falls backward into a chair when rising from sitting. However, she attributed this to her history of back and hip problems. She noted that her handwriting is sloppier compared to the past, which she has attributed to the tremor. She did not think her handwriting was any smaller compared to the past. She stated that she has always been soft spoken, but she felt that she speaks more softly compared to the past. She reported no changes in her senses of smell, taste, vision, or hearing.     With respect to mental health, Ms. Tiwari reported that her mood is usually fine. However, she had more trouble with her mood this holiday season. She also noted that she was had felt anxious about this appointment. She reported that she was prescribed escitalopram for mood management in October 2021, but the dose was recently reduced given persistent fatigue symptoms. Nonetheless, she reported that the medication has been helpful. She reported no interval mental health counseling or psychotherapy. She denied current suicidal ideation. She reported no history of suicide attempt. When asked about a history of hallucinations, she described instances of perceiving an object on the floor or her dresser that is not there. She also described instances of sleep walking within the last 6 months in  which she will believe there is something in her hand that is not. She otherwise denied hallucinatory experiences.    Regarding other aspects of her health and medical history, she reported no history of head injury with loss of consciousness or stroke. As noted above, her last seizures occurred in March 2022, January 2022, and October 2021. She reported having a couple of fused discs in her back, which cause her pain. She also noted that she has tendonitis in her hip and injured it further after lifting a patient while at work. She is currently participating in physical therapy for her back and hip pain. However, she described feeling achy in her back and hip with prolonged sitting. She reported no known COVID-19 infection. She has received five COVID-19 vaccinations.     Ms. Tiwari reported sleeping 5-6 hours on the days that she works but noted that she will sleep 8-10 hours on the days that she has off. She reported a history of restless leg syndrome, that is treated with gabapentin. However, she noted that her gabapentin dose was reduced due to feeling tired in the mornings. She reported a history of sleep walking and acting out her dreams throughout her childhood and adolescence. She reported that she became aware that she talked in her sleep after she was . She continues to reach for items, sleepwalk, and talk in her sleep. However, as noted above, she noted that within the past 6 months she has had episodes of sleep walking in which she has believed she had something in her hand, or she perceived an object in her environment that was not really there. She stated that these episodes are worse than her past sleep walking behaviors and appear to be progressively worsening with time. She described having a sleep walking episode approximately 3 times per week. These events do not seem to be related to stress or anxiety. However, she noted that they only occur when she sleeps in her own bed, not when she  falls asleep on the couch. She reported no history of sleep apnea. She generally feels rested when she wakes but noted that she has a hard time getting up on days that she does not have to work due to low motivation. She noted that she feels she has enough energy to complete the tasks she needs to do. However, she will sleep in if she does not have anything specific to do and sometimes take a 1-2-hour nap in the afternoon. She will also nap for 2-3 hours after returning home from work. She described her appetite as good. She denied significant changes in weight. Her current medications include lamotrigine, levetiracetam, diazepam, famotidine, vitamin B-12, gabapentin, metoprolol tartrate, ibuprofen, acetaminophen, escitalopram, triamcinolone, tretinoin, calcium supplement, fexofenadine, lactase, and vitamin D. She reported consuming one glass of wine on holidays. She denied a history of heavy or problematic alcohol use. She reported no current use of tobacco or illicit substances.    Ms. Tiwari continues to live with her . She manages her own basic and instrumental daily activities. She and her  share management of the finances. She reported no mistakes in bill payment as her portion of the bill are automatically drafted from her accounts. She reported sometimes forgetting to take her medications until later in the day if she brings the medications with her to take after she arrives to work. She drives.    By way of background, Ms. Tiwari and her  remain . She has two adult children. Her educational background is unchanged. She has a master s degree. 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 She reduced her work to .6 FTE with her last chemoradiation treatments in 2020. Her performance reviews have been good.    BEHAVIORAL OBSERVATIONS  Ms. Tiwari was pleasant and cooperative with the exam. She wore  contacts. She also wore reading glasses for visually based tasks. Her face appeared mildly less expressive compared to interactions in past evaluations. She appeared to lean slightly to the left while sitting. A bilateral resting tremor was observed. No gait abnormalities were observed. Her conversational speech was fluent and normal for rate, rhythm, and prosody. There was occasional word finding difficulty. Comprehension appeared normal. Mood and affect were congruent and normal. She described feeling anxious about the current evaluation. Her thought processes were linear and organized. Her effort was good. The current results are felt to be an accurate depiction of her cognitive functioning.     RESULTS OF EXAM  Her performances on standardized measures of neuropsychological functioning were as follows.      She was fully oriented to time, place, and various aspects of personal information. She was able to state the name of the current president and three of the five most recent past presidents. Her scores on stand-alone and embedded measures of cognitive performance validity were within normal limits. Auditory attention for digits was high average. Visual attention for spatial sequences was high average. Mental calculations were low average. Learning of words in a list format was exceptionally high. Short delayed recall of list words was high average. 30-minute delayed recall of list words was also high average. Delayed recognition of list words was high average. Immediate memory for simple geometric figures was average. Her drawing of a complicated geometric figure was low average, and notable for mild inattention to figural details. Short delayed recall of the figure was average. 30-minute delayed recall of the figure was average. Delayed recognition of figural details was average. Visuospatial judgments for variably oriented lines were average. Visual problem-solving with blocks was high average.  Nonverbal  reasoning for incomplete matrices was above average. Comprehension of phrases and short stories was average. Verbal associative fluency was average. Animal fluency was low average. Naming to confrontation was high average. Verbal abstract reasoning was high average. Speeded visual sequencing under focused attention was high average. A similar measure with a divided attention component was average. Speeded word reading was performed in the low average to average range. Speeded color naming was performed in the average range. Speeded inhibition of an over-learned response was performed in the low average range. Speeded visual motor coding was high average. Speeded fine motor dexterity was average for her dominant (right) hand and low average for her non-dominant (left) hand.     She did not endorse items consistent with clinically significant symptoms of depression or anxiety on self-report measures.     IMPRESSIONS  Ms. Tiwari again demonstrated weaknesses that are consistent with mild dysfunction of the bilateral frontal regions. These weaknesses are reasonably attributable to her history of oligodendroglioma, tumor resection, and chemoradiation. There is also some evidence to suggest mild medication toxicity. While there is some variability in her cognitive performance both within the current evaluation and between the current evaluation and her prior assessments, her performances are generally stable to improved when compared to her most recent neuropsychological examination in April 2021. Relative to her February 2020 evaluation, her performance is generally commensurate. This stability argues against an underlying neurodegenerative condition or disease progression. The decline in her thinking skills between the 2020 and 2021 evaluation was most likely due to effects from her interval chemoradiation. In this exam, she demonstrated mild weaknesses in some aspects of executive functioning and working memory,  including verbal fluency, mental arithmetic, and response inhibition. Bilateral fine motor dexterity is also slowed. Other cognitive abilities are generally normal and performed in keeping with her average to above average range cognitive baseline. While she is not reporting elevated symptoms of depression or anxiety at present, she described experiencing greater word finding difficulty in times of stress or anxiety in her day-to-day life. She also described significant fatigue and sleep dysfunction. I suspect that these non-neurologic symptoms contribute to day-to-day variability in her cognitive functioning, but I do not believe that they fully explain the weaknesses identified on this neuropsychological assessment.    RECOMMENDATIONS  Preliminary results and recommendations were provided to the patient and her  over the telephone on January 6th, 2023, and all questions were answered.      1. Continued neurologic care is recommended.    2. Ms. Tiwari also described several symptoms that are concerning for parkinsonism, including bilateral hand tremor, increased balance difficulty, slowed gait, falling back into her seat after standing, sloppier handwriting, softer voice, and dream enactment behaviors. Ms. Tiwari is encouraged to continue consulting with her neurologists, in person if possible, regarding these symptoms.    3. Continued mental health treatment is recommended given her report of increased word finding difficulty when she feels stressed. A referral for to psychotherapy for continued treatment of her mental health symptoms. A highly structured cognitive-behavioral intervention focused on coping skills, stress management, anxiety, and sleep would likely be most beneficial.    4. The patient may find the following attention and organizational strategies helpful in promoting learning and memory:   a. Use cell phone reminders to help monitor upcoming appointments and due dates as well as other  important tasks (e.g., when to take medications). Set the reminder to go off several times prior to the event with advanced notice (e.g., one week before, two days before, the day before, and the morning of the event).   b. Place items, such as your phone, keys, and wallet in a consistent location to avoid misplacing them.  c. She should attempt to reduce distractions at home and at work. Having a clutter-free work environment and removing distractions would help optimize her attention.  d. Taking short breaks during her day may help optimize and maintain her concentration.   e. Make to-do lists and prioritize tasks. Break down large tasks into smaller steps and check off each small step when completed.   f. Allowing for time both in learning new information and recalling information later. Some people tend to worry if they can't immediately recall something. Instead, you should take time to express a thought or complete a task rather than be critical or harsh on yourself.    5. It is recommended that the patient stay cognitively, socially, and physically active and to eat a healthy diet to promote optimal health and psychological wellness and prevent cognitive decline.     6. The current results may serve as an updated baseline of her cognitive functioning. If further difficulties are observed in the future, a referral for a neuropsychological re-evaluation at that time might be considered.     Katina Bethea, Ph.D.  Post Doctoral Fellow    Yoshi Meyer, Ph.D., L.P., ABPP  Board Certified in Clinical Neuropsychology   / Licensed Psychologist [id number]    All services provided by the Postdoctoral Fellow were supervised by this licensed psychologist and all billing noted here is for professional services provided by the psychologist and psychometrist.     Time spent:  One unit (41 minutes) neurobehavioral status exam including interview and clinical assessment by licensed and board-certified  neuropsychologist (CPT 70208). One unit (65 minutes) neuropsychological testing evaluation by licensed and board-certified neuropsychologist, including integration of patient data, interpretation of standardized test results and clinical data, clinical decision-making, treatment planning, supervision of the fellow, and report, first hour (CPT 58582). One unit (30 minutes) of psychological and neuropsychological test administration and scoring by technician, first 30 minutes (CPT 18023). Seven units (214 minutes) psychological or neuropsychological test administration and scoring by technician, subsequent 30 minutes (CPT 66790). Diagnoses: C71.9, R41.844, F06.8.

## 2023-01-12 DIAGNOSIS — F32.1 MODERATE MAJOR DEPRESSION (H): ICD-10-CM

## 2023-01-12 RX ORDER — ESCITALOPRAM OXALATE 10 MG/1
10 TABLET ORAL DAILY
Qty: 90 TABLET | Refills: 1 | Status: SHIPPED | OUTPATIENT
Start: 2023-01-12 | End: 2023-07-18

## 2023-01-15 DIAGNOSIS — I49.3 SYMPTOMATIC PREMATURE VENTRICULAR CONTRACTIONS: ICD-10-CM

## 2023-01-16 RX ORDER — METOPROLOL TARTRATE 25 MG/1
TABLET, FILM COATED ORAL
Qty: 90 TABLET | Refills: 0 | Status: SHIPPED | OUTPATIENT
Start: 2023-01-16 | End: 2023-05-04

## 2023-02-08 ENCOUNTER — NURSE TRIAGE (OUTPATIENT)
Dept: INTERNAL MEDICINE | Facility: CLINIC | Age: 63
End: 2023-02-08
Payer: COMMERCIAL

## 2023-02-08 ENCOUNTER — MYC MEDICAL ADVICE (OUTPATIENT)
Dept: INTERNAL MEDICINE | Facility: CLINIC | Age: 63
End: 2023-02-08
Payer: COMMERCIAL

## 2023-02-08 DIAGNOSIS — U07.1 INFECTION DUE TO 2019 NOVEL CORONAVIRUS: Primary | ICD-10-CM

## 2023-02-08 NOTE — TELEPHONE ENCOUNTER
Patient calling with positive Covid test with symptoms. See Triage encounter and Paxlovid protocol results below.     Reason for Disposition    [1] HIGH RISK for severe COVID complications (e.g., weak immune system, age > 64 years, obesity with BMI of 30 or higher, pregnant, chronic lung disease or other chronic medical condition) AND [2] COVID symptoms (e.g., cough, fever)  (Exceptions: Already seen by PCP and no new or worsening symptoms.)    Additional Information    Negative: SEVERE difficulty breathing (e.g., struggling for each breath, speaks in single words)    Negative: Difficult to awaken or acting confused (e.g., disoriented, slurred speech)    Negative: Bluish (or gray) lips or face now    Negative: Shock suspected (e.g., cold/pale/clammy skin, too weak to stand, low BP, rapid pulse)    Negative: Sounds like a life-threatening emergency to the triager    Negative: [1] Diagnosed or suspected COVID-19 AND [2] symptoms lasting 3 or more weeks    Negative: [1] COVID-19 exposure AND [2] no symptoms    Negative: COVID-19 vaccine reaction suspected (e.g., fever, headache, muscle aches) occurring 1 to 3 days after getting vaccine    Negative: COVID-19 vaccine, questions about    Negative: [1] Lives with someone known to have influenza (flu test positive) AND [2] flu-like symptoms (e.g., cough, runny nose, sore throat, SOB; with or without fever)    Negative: [1] Adult with possible COVID-19 symptoms AND [2] triager concerned about severity of symptoms or other causes    Negative: COVID-19 and breastfeeding, questions about    Negative: SEVERE or constant chest pain or pressure  (Exception: Mild central chest pain, present only when coughing.)    Negative: MODERATE difficulty breathing (e.g., speaks in phrases, SOB even at rest, pulse 100-120)    Negative: Headache and stiff neck (can't touch chin to chest)    Negative: Oxygen level (e.g., pulse oximetry) 90 percent or lower    Negative: Chest pain or pressure   (Exception: MILD central chest pain, present only when coughing)    Negative: Patient sounds very sick or weak to the triager    Negative: MILD difficulty breathing (e.g., minimal/no SOB at rest, SOB with walking, pulse <100)    Negative: Fever > 103 F (39.4 C)    Negative: [1] Fever > 101 F (38.3 C) AND [2] over 60 years of age    Negative: [1] Fever > 100.0 F (37.8 C) AND [2] bedridden (e.g., nursing home patient, CVA, chronic illness, recovering from surgery)    Protocols used: CORONAVIRUS (COVID-19) DIAGNOSED OR [id number]    RN COVID TREATMENT VISIT  02/08/23    Barbi DOUGLAS Te  62 year old  Current weight? 145 lbs    Has the patient been seen by a primary care provider at an [facility] or [facility] within the past two years? Yes.   Have you been in close proximity to/do you have a known exposure to a person with a confirmed case of influenza? No.     Date of positive COVID test (PCR or at home)?  2/8/2023    Current COVID symptoms: cough, fatigue, muscle or body aches, headache, sore throat and congestion or runny nose    Date COVID symptoms began: 2/7/2023    Do you have any of the following conditions that place you at risk of being very sick from COVID-19? cancer, dementia or other neurologic conditions, heart conditions, mental health conditions and overweight (BMI>25)    Is patient eligible to continue? Yes, established patient, 12 years or older weighing at least 88.2 lbs, who has COVID symptoms that started in the past 5 days and is at risk for being very sick from COVID-19.       Have you received monoclonal antibodies or oral antiviral medications since testing positive to COVID-19? No    Are you currently hospitalized for COVID-19? No    Do you have a history of hepatitis? No    Are you currently pregnant or nursing? No    Do you have a clinically significant hypersensitivity to nirmatrelvir, ritonavir, or molnupiravir? No    Do you have any history of severe renal  impairment (eGFR < 30mL/min)? No    Do you have any history of hepatic impairment or abnormalities (e.g. hepatic panel, ALT, AST, ALK Phos, bilirubin)? No    Have you had a coronary stent placed in the previous 6 months? No    Is patient eligible to continue?   Yes, patient meets all eligibility requirements for the RN COVID treatment (as denoted by all no responses above).     Current Outpatient Medications   Medication Sig Dispense Refill     acetaminophen (TYLENOL) 500 MG tablet Take 2 tablets (1,000 mg) by mouth every 8 hours as needed for mild pain 42 tablet 1     CALCIUM PO        diazePAM 5 MG/0.1ML LIQD Spray 5 mg in nostril as needed (seizures more than 3 minutes or more than 2 seizures within 8 hour) 2 each 1     escitalopram (LEXAPRO) 10 MG tablet TAKE 1 TABLET (10 MG) BY MOUTH DAILY 90 tablet 1     famotidine (PEPCID) 10 MG tablet Take 1 tablet (10 mg) by mouth nightly as needed (heartburn)       fexofenadine (ALLEGRA) 180 MG tablet Take 180 mg by mouth daily       gabapentin (NEURONTIN) 100 MG capsule Take 2 capsules (200 mg) by mouth every evening 180 capsule 3     ibuprofen (ADVIL/MOTRIN) 600 MG tablet Take 1 tablet (600 mg) by mouth every 6 hours as needed for moderate pain 28 tablet 1     Lactase (LACTAID PO) Take 1-2 tablets by mouth daily as needed for indigestion        lamoTRIgine (LAMICTAL) 100 MG tablet ONE TABLET EVERY MORNING (TAKE ALONG WITH 200 MG TABLET FOR A TOTAL  MG morning and 200 MG evening). [facility] 90 tablet 3     lamoTRIgine (LAMICTAL) 200 MG tablet TAKE ONE TABLET BY MOUTH TWICE A DAY (TAKE ALONG WITH 100 MG TABLET FOR TOTAL  mg morning and 200 MG pm). [facility] tablet 3     levETIRAcetam (KEPPRA) 500 MG tablet Take 1 Tablets (500 mg) by mouth every morning, AND Take 2 Tablets (1000 mg) by mouth every evening. [facility] 270 tablet 3     metoprolol tartrate (LOPRESSOR) 25 MG tablet TAKE ONE-HALF TABLET BY MOUTH TWICE A DAY 90 tablet 0     tretinoin  (RETIN-A) 0.025 % external cream Apply a pea-sized amount to each area affected by acne. Start every 3-4 nights working up to every night. 45 g 0     triamcinolone (KENALOG) 0.1 % external cream Apply topically 2 times daily To affected area in between breasts for 1-2 weeks. Tapering with improvement and restarting with flares. 60 g 2     vitamin B-12 (CYANOCOBALAMIN) 2500 MCG sublingual tablet Take 1 tablet (2,500 mcg) by mouth daily 90 tablet 11     VITAMIN D, CHOLECALCIFEROL, PO Take 1,000 Units by mouth daily          Medications from List 1 of the standing order (on medications that exclude the use of Paxlovid) that patient is taking: NONE. Is patient taking Fariha's Wort? No  Is patient taking Revere's Wort or any meds from List 1? No.   Medications from List 2 of the standing order (on meds that provider needs to adjust) that patient is taking: NONE. Is patient on any of the meds from List 2? No.   Medications from List 3 of standing order (on meds that a RN needs to adjust) that patient is taking: diazepam (Valium, Diastat): Is patient taking as needed and less than daily? Yes, instructed patient to stop taking diazepam while taking Paxlovid and restart diazepam 3 days after the completion of Paxlovid. Is patient on any meds from List 3? Yes. Patient is on meds from list 3. No meds require a provider visit and at least one med required RN to adjust.   In order of efficacy, Paxlovid has an approximate 90% reduction in hospitalization. Paxlovid can possibly cause altered sense of taste, diarrhea (loose, watery stools), high blood pressure, muscle aches.  The other option is molnupiravir which has an approximate 30% reduction in hospitalization. Molnupirarivr can possibly cause diarrhea (loose, watery stools), nausea (feeling sick to your stomach), dizziness, headaches.    Which treatment option does the patient prefer?   Paxlovid.   Lab Results   Component Value Date    GFRESTIMATED >90 04/18/2022        Was last eGFR reduced? No, eGFR 60 or greater/ No Result on record. Patient can receive the normal renal function dose. Paxlovid Rx sent to [place] [facility]     [facility].     Temporary change to home medications: stop taking diazepam and restart as needed 3 days after.     All medication adjustments (holds, etc) were discussed with the patient and patient was asked to repeat back (teachback) their med adjustment.  Did patient understand med adjustment? Yes, patient repeated back and understood correctly.        Reviewed the following instructions with the patient:    Paxlovid (nimatrelvir and ritonavir)    How it works  Two medicines (nirmatrelvir and ritonavir) are taken together. They stop the virus from growing. Less amount of virus is easier for your body to fight.    How to take    Medicine comes in a daily container with both medicine tablets. Take by mouth twice daily (once in the morning, once at night) for 5 days.    The number of tablets to take varies by patient.    Don't chew or break capsules. Swallow whole.    When to take  Take as soon as possible after positive COVID-19 test result, and within 5 days of your first symptoms.    Possible side effects  Can cause altered sense of taste, diarrhea (loose, watery stools), high blood pressure, muscle aches.    Justice L. Phoenix, RN

## 2023-02-22 NOTE — PROGRESS NOTES
Patient is discharged from PT per progress note dated 01/06/2023 as she did not require additional follow-up visits.

## 2023-03-06 ENCOUNTER — MYC REFILL (OUTPATIENT)
Dept: DERMATOLOGY | Facility: CLINIC | Age: 63
End: 2023-03-06
Payer: COMMERCIAL

## 2023-03-06 DIAGNOSIS — L73.8 SEBACEOUS GLAND HYPERPLASIA: ICD-10-CM

## 2023-03-07 RX ORDER — TRETINOIN 0.25 MG/G
CREAM TOPICAL
Qty: 45 G | Refills: 0 | Status: SHIPPED | OUTPATIENT
Start: 2023-03-07 | End: 2024-06-19

## 2023-03-07 NOTE — TELEPHONE ENCOUNTER
Tretinoin 0.025% cream  Last Written Prescription Date:  7/9/2021  Last Fill Quantity: 45 g,  # refills: 0   Last office visit: 10/27/2022 with prescribing provider:     Future Office Visit:   Next 5 appointments (look out 90 days)    Mar 13, 2023  4:00 PM  (Arrive by 3:45 PM)  Return Visit with Myrtle Gallagher MD  [facility] ([facility] ) [address] [place] [id number] [phone]         Routing refill request to provider for review/approval because:  Last seen 10/27/22 by Dr. Forbes for onychomycosis  Last seen by Cary Camarillo 10/20/21 but not for acne.    Padmini CACERES RN  [facility] Karina McKean  [phone]

## 2023-03-09 NOTE — PROGRESS NOTES
"NEURO-ONCOLOGY VISIT  Mar 13, 2023    CHIEF COMPLAINT: Ms. Barbi Tiwari is a 62 year old right-handed woman with a right frontal diffuse oligodendroglioma (1p19q co-deleted), diagnosed following resection in 5/2011. She received 12 cycles of temozolomide, completed in 5/2012. Changes on imaging necessitated a repeat resection on 5/30/2018 and pathology was unchanged. No adjuvant cancer-directed therapy was initiated. Imaging over the next 1.5 years showed slow tumor progression and treatment was then initiated. She completed chemoradiotherapy on 5/30/2020. Barbi then completed 6 cycles of adjuvant-dosed temozolomide as of 11/2020. Dose escalation to 200mg/m2 was complicated by intolerable nausea.     She is now managed on imaging surveillance. Her most recent scan was without concern for cancer recurrence.     I met with aBrbi and River () for this follow-up visit.    HISTORY OF PRESENT ILLNESS  -Barbi reports ongoing chronic fatigue. Her  River states that she has been sleeping more recently. There has been no improvement in energy with a dose reduction in Keppra. Seizures remain well controlled with her last seizure ~1 month ago. Mood is \"better\" with venlafaxine.   -She says she is feeling more off balanced.  -She denies any new symptoms; no weakness, numbness, changes in vision, or headaches.   -Continued mild word-finding issues. Able to multi-task. Doing well at work.       MEDICATIONS   Current Outpatient Medications   Medication     acetaminophen (TYLENOL) 500 MG tablet     CALCIUM PO     escitalopram (LEXAPRO) 10 MG tablet     famotidine (PEPCID) 10 MG tablet     gabapentin (NEURONTIN) 100 MG capsule     ibuprofen (ADVIL/MOTRIN) 600 MG tablet     Lactase (LACTAID PO)     lamoTRIgine (LAMICTAL) 100 MG tablet     lamoTRIgine (LAMICTAL) 200 MG tablet     levETIRAcetam (KEPPRA) 500 MG tablet     loratadine (CLARITIN) 10 MG tablet     metoprolol tartrate (LOPRESSOR) 25 MG tablet     tretinoin " (RETIN-A) 0.025 % external cream     triamcinolone (KENALOG) 0.1 % external cream     vitamin B-12 (CYANOCOBALAMIN) 2500 MCG sublingual tablet     VITAMIN D, CHOLECALCIFEROL, PO     diazePAM 5 MG/0.1ML LIQD     fexofenadine (ALLEGRA) 180 MG tablet     No current facility-administered medications for this visit.     DRUG ALLERGIES   Allergies   Allergen Reactions     Sulfa Drugs Hives     Adhesive Tape Rash     Benoxinate Nausea and Vomiting       ONCOLOGIC HISTORY  -4/27/2011 PRESENTATION: New onset seizure, generalized event.   -5/2011 MRB with a non-contrast enhancing right frontal mass lesion.   -5/2011 SURGERY: Craniectomy for mass resection at [facility].   PATHOLOGY: Diffuse oligodendroglioma; WHO grade II, 1p/19q co-deleted.  -6/2011-5/2012 CHEMO: Adjuvant dosed temozolomide x 12 cycles.  -4/2/2018 MRB with possible disease recurrence with a new 1.2 cm non-enhancing nodule within the cortex of the right frontal lobe adjacent to the resection cavity.  -4/12/2018 NEURO-ONC: Recommending repeat resection, appointment scheduled with Dr. Dustin Rodriguez, neurosurgery at the [facility].   -5/30/2018 SURGERY: Repeat resection with Dr. Rodriguez.   PATHOLOGY: Remains low grade, without concerning features.   -6/15/2018 NEURO-ONC: Start imaging surveillance.  -9/17/2018 NEURO-ONC/ MRB: Imaging stable, continue with surveillance.   -12/10/2018 NEURO-ONC/ MRB: Imaging stable, continue with surveillance.   -4/15/2019 NEURO-ONC/ MRB: Imaging stable, continue with surveillance.  -8/19/2019 NEURO-ONC/ MRB: Clinically stable. Imaging largely stable, subtle increases on T2 FLAIR; continue with surveillance.  -12/16/2019 NEURO-ONC/ MRB: Clinically stable. Imaging with increased T2 FLAIR medially and laterally about the resection cavity. Referral to Dr. Figueroa with radiation oncology. Discussion with Dr. Rodriguez. Referral for repeat neuro-psychology testing.   -1/10/2020 Evaluated by Dr. Devan Figueroa.   -1/17/2020 NEURO-ONC: Tentative plan to  initiate chemoradiotherapy in May-June 2020.   -2/17/2020 NEURO-PSYCH; Mild weaknesses were noted in aspects of verbal and nonverbal working memory, nonverbal recall, some aspects of executive functioning, and bilateral psychomotor speed. The remainder of her cognitive abilities were intact and performed in keeping with her above average range cognitive baseline.   -4/20 - 5/30/2020 CHEMORADS: 54 Gy in 30 fractions with concurrent temozolomide 75mg/m2 (140mg).   -5/4/2020 NEURO-ONC: Continue treatment as planned. Prescribing Clotrimazole lozenges for oral thrush.   -6/1/2020 NEURO-ONC: Completed treatment as planned.  -6/30/2020 NEURO-ONC/ MRB/ CHEMO: Clinically stable. Imaging with positive treatment effect. Starting adjuvant temozolomide 150mg/m2 (250mg), cycle 1 (start date was 7/1).   -7/28/2020 NEURO-ONC/ CHEMO: Clinically stable. Adjuvant temozolomide 200mg/m2 (320mg), cycle 2 (start date was 7/29).   -8/25/2020 NEURO-ONC/ CHEMO: Clinically stable; significant nausea/ vomiting with 200mg/m2 dosing. Adding Emend for this next cycle. Adjuvant temozolomide 150mg/m2 (250mg), cycle 3 (start date of 8/26).   -9/22/2020 NEURO-ONC/ MRB/ CHEMO: Clinically stable; less nausea/ vomiting with lowered chemotherapy dosing plus adding Emend. Imaging with no concerns, repeat in 3 months. Adjuvant temozolomide 150mg/m2 (250mg), cycle 4 (start date of 9/23).   -10/20/2020 NEURO-ONC/ CHEMO: Clinically stable; no new/ worsening issues. Experiencing pain related to piriformis syndrome. Adjuvant temozolomide 150mg/m2 (250mg), cycle 5 (start date of 10/21).   -11/172020 NEURO-ONC/ CHEMO: Clinically stable. Adjuvant temozolomide 150mg/m2 (250mg), cycle 6 (start date of 11/18).   -12/15/2020 NEURO-ONC/ MRB: Clinically well. Imaging with no concerns. Starting imaging surveillance.   -3/16/2021 NEURO-ONC/ MRB: Clinically well. Imaging with no concerns.   -4/30/2021 Neuro-psych evaluation demonstrated weaknesses that are consistent with  "dysfunction of the bilateral frontal regions, but the right frontal region in particular.  Relative to her exam from February, 2020, there has been a mild decline in her thinking. In this exam, weaknesses were identified in executive functioning, attention, and both verbal and nonverbal working memory. Some aspects of cognitive speed were relative weaknesses as well. Insight into these weaknesses was limited.   -7/13/2021 NEURO-ONC/ MRB: Clinically well. Imaging stable.   -11/9/2021 NEURO-ONC/ MRB: Clinically well. Considering a trial of Zoloft. Imaging stable.   -3/8/2022 NEURO-ONC/ MRB: Clinically well. Imaging stable.   -9/19/2022 NEURO-ONC/ MRB: Clinically well. Imaging stable.   -3/13/2023 NEURO-ONC/ MRB: Stable neuro-psych evaluation. Imaging stable.     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   , 2 children      PHYSICAL EXAMINATION  /66   Pulse 61   Temp 98  F (36.7  C) (Oral)   Resp 16   Wt 65.8 kg (145 lb)   LMP  (LMP Unknown)   SpO2 94%   BMI 26.52 kg/m     Wt Readings from Last 2 Encounters:   03/13/23 65.8 kg (145 lb)   11/11/22 68 kg (150 lb)      Ht Readings from Last 2 Encounters:   11/11/22 1.575 m (5' 2\")   08/30/22 1.575 m (5' 2\")      KPS     -Generally well appearing.  -Respiratory: No audible wheezing.   -Psychiatric: Normal mood and affect. Pleasant, talkative.  -Neurologic:   MENTAL STATUS:     Alert, oriented.    Recall: Intact.   Speech fluent.    Comprehension intact.     CRANIAL NERVES:     Pupils are equal, round, reactive to light.     Extraocular movements full, denies diplopia.     Symmetric facial movements.   Hearing intact.   With normal phonation, no dysfunction of the palate or tongue.   MOTOR: Antigravity in arms. No pronation or drift.   SENSATION: Intact to light touch throughout.   COORDINATION: Intact bilaterally to finger-nose with eyes closed.   GAIT: Steady, unassisted.    Able to toe and heel walk.   Did not attempt tandem " ana.       MEDICAL RECORDS  Personally reviewed; neuro-psych notes    LABS  Personally reviewed all available lab results; Keppra level 22.3 and Lamictal level 13.3 in 11/2022.     IMAGING  Personally reviewed MR brain imaging from today and compared to prior imaging. To my eye, the T2 FLAIR about the right frontal resection cavity has not changed from imaging done in 5/2022 with only very subtle evolution since 3/2021 (below, yellow). There is no associated mass effect and as a result, the finding is most consistent with treatment-induced changes. There is no enhancement.         Imaging was shown to and results were reviewed with Barbi and Kenneth.       IMPRESSION  Clinic time of 45 minutes reviewing data, in evaluation, and coordinating care for this high complexity visit was spent discussing in detail the nature of her cancer in light of repeat imaging. This was in addition to answering questions pertaining to my recommendations and devising the plan as outlined below.      Clinically, Barbi is noting no new subjective neurological complaints. I personally reviewed the neuropsych evaluation report from 1/2023, which noted weaknesses that were consistent with mild dysfunction of the bilateral frontal regions attributable to her history of oligodendroglioma, tumor resection, and chemoradiation. Her performance was generally stable to improved when compared to her most recent neuropsychological examination in April 2021. Per Dr. Meyer, this stability argues against an underlying neurodegenerative condition or cancer progression. It was noted that mood (depression/ anxiety), medication side effects, significant fatigue, and sleep dysfunction were contributing factors.     I read the 11/2022 note from Dr. Clark and reviewed the Keppra and Lamictal lab results.  recommended reducing the dose of Keppra. With the dose reduction, Barbi has only had 1 breakthrough seizure. However, continues to endorse chronic  significant fatigue and low cognitive stamina. My impresison would have been that her energy would have improved with this medication change. Today, Barbi endorses that her mood is good.    Imaging as detailed above is largely stable with no overt concerns. I reviewed her case with the reading neuro-radiologist, who was in agreement with this impression of her imaging. Since Barbi has remained clinically and radiographically stable off chemotherapy, will continue with imaging surveillance per NCCN guidelines. Barbi remains in agreement with repeat imaging in 6 months. However, Barbi knows to call with any concerns or to report new complaints and appointments can be moved sooner if needed. Will repeat imaging every 4-6 months through 11/2025 and at that time, can consider annual imaging.     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   -No need to monitor labs while off chemotherapy.      -SEIZURE MANAGEMENT-  -Follows with Dr. Flakita Clark; epilepsy specialist at the [facility].      -Quality of life/ MOOD/ FATIGUE-  -History of mild anxiety and depression.   -On venlafaxine.   -Failed Celexa due to side effect of diarrhea.   -Untreated/ undertreated depression can worsen fatigue, dysorexia, and quality of life.  -Primary care monitoring vitamin D and B12 levels.     -COGNITIVE CHANGES-  -Neuro-psych testing completed in 11/2022 was stable.    -If further difficulties are observed in the future, a referral for a neuropsychological re-evaluation at that time might be considered.    Patient was also seen and examined by Deon Valdovinos MS2 under my direct supervision.    Return to clinic in 9/2023 + imaging.     Myrtle Gallagher MD  Neuro-oncology

## 2023-03-13 ENCOUNTER — ONCOLOGY VISIT (OUTPATIENT)
Dept: ONCOLOGY | Facility: CLINIC | Age: 63
End: 2023-03-13
Attending: PSYCHIATRY & NEUROLOGY
Payer: COMMERCIAL

## 2023-03-13 ENCOUNTER — ANCILLARY PROCEDURE (OUTPATIENT)
Dept: MRI IMAGING | Facility: CLINIC | Age: 63
End: 2023-03-13
Attending: PSYCHIATRY & NEUROLOGY
Payer: COMMERCIAL

## 2023-03-13 VITALS
TEMPERATURE: 98 F | SYSTOLIC BLOOD PRESSURE: 107 MMHG | OXYGEN SATURATION: 94 % | RESPIRATION RATE: 16 BRPM | BODY MASS INDEX: 26.52 KG/M2 | HEART RATE: 61 BPM | DIASTOLIC BLOOD PRESSURE: 66 MMHG | WEIGHT: 145 LBS

## 2023-03-13 DIAGNOSIS — C71.9 OLIGODENDROGLIOMA (H): Primary | ICD-10-CM

## 2023-03-13 DIAGNOSIS — C71.9 OLIGODENDROGLIOMA (H): ICD-10-CM

## 2023-03-13 PROCEDURE — G0463 HOSPITAL OUTPT CLINIC VISIT: HCPCS | Performed by: PSYCHIATRY & NEUROLOGY

## 2023-03-13 PROCEDURE — 99215 OFFICE O/P EST HI 40 MIN: CPT | Performed by: PSYCHIATRY & NEUROLOGY

## 2023-03-13 PROCEDURE — A9585 GADOBUTROL INJECTION: HCPCS | Performed by: RADIOLOGY

## 2023-03-13 PROCEDURE — 70553 MRI BRAIN STEM W/O & W/DYE: CPT | Mod: GC | Performed by: RADIOLOGY

## 2023-03-13 RX ORDER — GADOBUTROL 604.72 MG/ML
7.5 INJECTION INTRAVENOUS ONCE
Status: COMPLETED | OUTPATIENT
Start: 2023-03-13 | End: 2023-03-13

## 2023-03-13 RX ORDER — LORATADINE 10 MG/1
10 TABLET ORAL DAILY
COMMUNITY

## 2023-03-13 RX ADMIN — GADOBUTROL 7.5 ML: 604.72 INJECTION INTRAVENOUS at 14:08

## 2023-03-13 ASSESSMENT — PAIN SCALES - GENERAL: PAINLEVEL: NO PAIN (0)

## 2023-03-13 NOTE — NURSING NOTE
"Oncology Rooming Note    March 13, 2023 3:43 PM   Barbi Tiwari is a 62 year old female who presents for:    Chief Complaint   Patient presents with     Oncology Clinic Visit     oligodendroglioma     Initial Vitals: /66   Pulse 61   Temp 98  F (36.7  C) (Oral)   Resp 16   Wt 65.8 kg (145 lb)   LMP  (LMP Unknown)   SpO2 94%   BMI 26.52 kg/m   Estimated body mass index is 26.52 kg/m  as calculated from the following:    Height as of 11/11/22: 1.575 m (5' 2\").    Weight as of this encounter: 65.8 kg (145 lb). Body surface area is 1.7 meters squared.  No Pain (0) Comment: Data Unavailable   No LMP recorded (lmp unknown). Patient has had a hysterectomy.  Allergies reviewed: Yes  Medications reviewed: Yes    Medications: Medication refills not needed today.  Pharmacy name entered into [facility]:    [facility] [place], [place] - [address] [place] [address] DRUG STORE #[id number] [facility] [address] [place] [facility] [address]    Clinical concerns: none       Dionna Edwards CMA            "

## 2023-03-13 NOTE — LETTER
"    3/13/2023         RE: Barbi Tiwari  [address] [facility] [address]        Dear Colleague,    Thank you for referring your patient, Barbi Tiwari, to the [facility]. Please see a copy of my visit note below.    NEURO-ONCOLOGY VISIT  Mar 13, 2023    CHIEF COMPLAINT: Ms. Barbi Tiwari is a 62 year old right-handed woman with a right frontal diffuse oligodendroglioma (1p19q co-deleted), diagnosed following resection in 5/2011. She received 12 cycles of temozolomide, completed in 5/2012. Changes on imaging necessitated a repeat resection on 5/30/2018 and pathology was unchanged. No adjuvant cancer-directed therapy was initiated. Imaging over the next 1.5 years showed slow tumor progression and treatment was then initiated. She completed chemoradiotherapy on 5/30/2020. Barbi then completed 6 cycles of adjuvant-dosed temozolomide as of 11/2020. Dose escalation to 200mg/m2 was complicated by intolerable nausea.     She is now managed on imaging surveillance. Her most recent scan was without concern for cancer recurrence.     I met with Barbi and River () for this follow-up visit.    HISTORY OF PRESENT ILLNESS  -Barbi reports ongoing chronic fatigue. Her  River states that she has been sleeping more recently. There has been no improvement in energy with a dose reduction in Keppra. Seizures remain well controlled with her last seizure ~1 month ago. Mood is \"better\" with venlafaxine.   -She says she is feeling more off balanced.  -She denies any new symptoms; no weakness, numbness, changes in vision, or headaches.   -Continued mild word-finding issues. Able to multi-task. Doing well at work.       MEDICATIONS   Current Outpatient Medications   Medication     acetaminophen (TYLENOL) 500 MG tablet     CALCIUM PO     escitalopram (LEXAPRO) 10 MG tablet     famotidine (PEPCID) 10 MG tablet     gabapentin (NEURONTIN) 100 MG capsule     ibuprofen (ADVIL/MOTRIN) 600 " MG tablet     Lactase (LACTAID PO)     lamoTRIgine (LAMICTAL) 100 MG tablet     lamoTRIgine (LAMICTAL) 200 MG tablet     levETIRAcetam (KEPPRA) 500 MG tablet     loratadine (CLARITIN) 10 MG tablet     metoprolol tartrate (LOPRESSOR) 25 MG tablet     tretinoin (RETIN-A) 0.025 % external cream     triamcinolone (KENALOG) 0.1 % external cream     vitamin B-12 (CYANOCOBALAMIN) 2500 MCG sublingual tablet     VITAMIN D, CHOLECALCIFEROL, PO     diazePAM 5 MG/0.1ML LIQD     fexofenadine (ALLEGRA) 180 MG tablet     No current facility-administered medications for this visit.     DRUG ALLERGIES   Allergies   Allergen Reactions     Sulfa Drugs Hives     Adhesive Tape Rash     Benoxinate Nausea and Vomiting       ONCOLOGIC HISTORY  -4/27/2011 PRESENTATION: New onset seizure, generalized event.   -5/2011 MRB with a non-contrast enhancing right frontal mass lesion.   -5/2011 SURGERY: Craniectomy for mass resection at [facility].   PATHOLOGY: Diffuse oligodendroglioma; WHO grade II, 1p/19q co-deleted.  -6/2011-5/2012 CHEMO: Adjuvant dosed temozolomide x 12 cycles.  -4/2/2018 MRB with possible disease recurrence with a new 1.2 cm non-enhancing nodule within the cortex of the right frontal lobe adjacent to the resection cavity.  -4/12/2018 NEURO-ONC: Recommending repeat resection, appointment scheduled with Dr. Dustin Rodriguez, neurosurgery at the [place].   -5/30/2018 SURGERY: Repeat resection with Dr. Rodriguez.   PATHOLOGY: Remains low grade, without concerning features.   -6/15/2018 NEURO-ONC: Start imaging surveillance.  -9/17/2018 NEURO-ONC/ MRB: Imaging stable, continue with surveillance.   -12/10/2018 NEURO-ONC/ MRB: Imaging stable, continue with surveillance.   -4/15/2019 NEURO-ONC/ MRB: Imaging stable, continue with surveillance.  -8/19/2019 NEURO-ONC/ MRB: Clinically stable. Imaging largely stable, subtle increases on T2 FLAIR; continue with surveillance.  -12/16/2019 NEURO-ONC/ MRB: Clinically stable. Imaging with increased T2 FLAIR  medially and laterally about the resection cavity. Referral to Dr. Figueroa with radiation oncology. Discussion with Dr. Rodriguez. Referral for repeat neuro-psychology testing.   -1/10/2020 Evaluated by Dr. Devan Figueroa.   -1/17/2020 NEURO-ONC: Tentative plan to initiate chemoradiotherapy in May-June 2020.   -2/17/2020 NEURO-PSYCH; Mild weaknesses were noted in aspects of verbal and nonverbal working memory, nonverbal recall, some aspects of executive functioning, and bilateral psychomotor speed. The remainder of her cognitive abilities were intact and performed in keeping with her above average range cognitive baseline.   -4/20 - 5/30/2020 CHEMORADS: 54 Gy in 30 fractions with concurrent temozolomide 75mg/m2 (140mg).   -5/4/2020 NEURO-ONC: Continue treatment as planned. Prescribing Clotrimazole lozenges for oral thrush.   -6/1/2020 NEURO-ONC: Completed treatment as planned.  -6/30/2020 NEURO-ONC/ MRB/ CHEMO: Clinically stable. Imaging with positive treatment effect. Starting adjuvant temozolomide 150mg/m2 (250mg), cycle 1 (start date was 7/1).   -7/28/2020 NEURO-ONC/ CHEMO: Clinically stable. Adjuvant temozolomide 200mg/m2 (320mg), cycle 2 (start date was 7/29).   -8/25/2020 NEURO-ONC/ CHEMO: Clinically stable; significant nausea/ vomiting with 200mg/m2 dosing. Adding Emend for this next cycle. Adjuvant temozolomide 150mg/m2 (250mg), cycle 3 (start date of 8/26).   -9/22/2020 NEURO-ONC/ MRB/ CHEMO: Clinically stable; less nausea/ vomiting with lowered chemotherapy dosing plus adding Emend. Imaging with no concerns, repeat in 3 months. Adjuvant temozolomide 150mg/m2 (250mg), cycle 4 (start date of 9/23).   -10/20/2020 NEURO-ONC/ CHEMO: Clinically stable; no new/ worsening issues. Experiencing pain related to piriformis syndrome. Adjuvant temozolomide 150mg/m2 (250mg), cycle 5 (start date of 10/21).   -11/172020 NEURO-ONC/ CHEMO: Clinically stable. Adjuvant temozolomide 150mg/m2 (250mg), cycle 6 (start date of 11/18).  "  -12/15/2020 NEURO-ONC/ MRB: Clinically well. Imaging with no concerns. Starting imaging surveillance.   -3/16/2021 NEURO-ONC/ MRB: Clinically well. Imaging with no concerns.   -4/30/2021 Neuro-psych evaluation demonstrated weaknesses that are consistent with dysfunction of the bilateral frontal regions, but the right frontal region in particular.  Relative to her exam from February, 2020, there has been a mild decline in her thinking. In this exam, weaknesses were identified in executive functioning, attention, and both verbal and nonverbal working memory. Some aspects of cognitive speed were relative weaknesses as well. Insight into these weaknesses was limited.   -7/13/2021 NEURO-ONC/ MRB: Clinically well. Imaging stable.   -11/9/2021 NEURO-ONC/ MRB: Clinically well. Considering a trial of Zoloft. Imaging stable.   -3/8/2022 NEURO-ONC/ MRB: Clinically well. Imaging stable.   -9/19/2022 NEURO-ONC/ MRB: Clinically well. Imaging stable.   -3/13/2023 NEURO-ONC/ MRB: Stable neuro-psych evaluation. Imaging stable.     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   , 2 children      PHYSICAL EXAMINATION  /66   Pulse 61   Temp 98  F (36.7  C) (Oral)   Resp 16   Wt 65.8 kg (145 lb)   LMP  (LMP Unknown)   SpO2 94%   BMI 26.52 kg/m     Wt Readings from Last 2 Encounters:   03/13/23 65.8 kg (145 lb)   11/11/22 68 kg (150 lb)      Ht Readings from Last 2 Encounters:   11/11/22 1.575 m (5' 2\")   08/30/22 1.575 m (5' 2\")      KPS     -Generally well appearing.  -Respiratory: No audible wheezing.   -Psychiatric: Normal mood and affect. Pleasant, talkative.  -Neurologic:   MENTAL STATUS:     Alert, oriented.    Recall: Intact.   Speech fluent.    Comprehension intact.     CRANIAL NERVES:     Pupils are equal, round, reactive to light.     Extraocular movements full, denies diplopia.     Symmetric facial movements.   Hearing intact.   With normal phonation, no dysfunction of the palate or " tongue.   MOTOR: Antigravity in arms. No pronation or drift.   SENSATION: Intact to light touch throughout.   COORDINATION: Intact bilaterally to finger-nose with eyes closed.   GAIT: Steady, unassisted.    Able to toe and heel walk.   Did not attempt tandem walk.       MEDICAL RECORDS  Personally reviewed; neuro-psych notes    LABS  Personally reviewed all available lab results; Keppra level 22.3 and Lamictal level 13.3 in 11/2022.     IMAGING  Personally reviewed MR brain imaging from today and compared to prior imaging. To my eye, the T2 FLAIR about the right frontal resection cavity has not changed from imaging done in 5/2022 with only very subtle evolution since 3/2021 (below, yellow). There is no associated mass effect and as a result, the finding is most consistent with treatment-induced changes. There is no enhancement.         Imaging was shown to and results were reviewed with Barbi and Kenneth.       IMPRESSION  Clinic time of 45 minutes reviewing data, in evaluation, and coordinating care for this high complexity visit was spent discussing in detail the nature of her cancer in light of repeat imaging. This was in addition to answering questions pertaining to my recommendations and devising the plan as outlined below.      Clinically, Barbi is noting no new subjective neurological complaints. I personally reviewed the neuropsych evaluation report from 1/2023, which noted weaknesses that were consistent with mild dysfunction of the bilateral frontal regions attributable to her history of oligodendroglioma, tumor resection, and chemoradiation. Her performance was generally stable to improved when compared to her most recent neuropsychological examination in April 2021. Per Dr. Meyer, this stability argues against an underlying neurodegenerative condition or cancer progression. It was noted that mood (depression/ anxiety), medication side effects, significant fatigue, and sleep dysfunction were contributing  factors.     I read the 11/2022 note from Dr. Clark and reviewed the Keppra and Lamictal lab results.  recommended reducing the dose of Keppra. With the dose reduction, Barbi has only had 1 breakthrough seizure. However, continues to endorse chronic significant fatigue and low cognitive stamina. My impresison would have been that her energy would have improved with this medication change. Today, Barbi endorses that her mood is good.    Imaging as detailed above is largely stable with no overt concerns. I reviewed her case with the reading neuro-radiologist, who was in agreement with this impression of her imaging. Since Barbi has remained clinically and radiographically stable off chemotherapy, will continue with imaging surveillance per NCCN guidelines. Barbi remains in agreement with repeat imaging in 6 months. However, Barbi knows to call with any concerns or to report new complaints and appointments can be moved sooner if needed. Will repeat imaging every 4-6 months through 11/2025 and at that time, can consider annual imaging.     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   -No need to monitor labs while off chemotherapy.      -SEIZURE MANAGEMENT-  -Follows with Dr. Flakita Clark; epilepsy specialist at the [facility].      -Quality of life/ MOOD/ FATIGUE-  -History of mild anxiety and depression.   -On venlafaxine.   -Failed Celexa due to side effect of diarrhea.   -Untreated/ undertreated depression can worsen fatigue, dysorexia, and quality of life.  -Primary care monitoring vitamin D and B12 levels.     -COGNITIVE CHANGES-  -Neuro-psych testing completed in 11/2022 was stable.    -If further difficulties are observed in the  future, a referral for a neuropsychological re-evaluation at that time might be considered.    Patient was also seen and examined by KAREEM Jha under my direct supervision.    Return to clinic in 9/2023 + imaging.     Myrtle Gallagher MD  Neuro-oncology

## 2023-03-13 NOTE — DISCHARGE INSTRUCTIONS
MRI Contrast Discharge Instructions    The IV contrast you received today will pass out of your body in your  urine. This will happen in the next 24 hours. You will not feel this process.  Your urine will not change color.    Drink at least 4 extra glasses of water or juice today (unless your doctor  has restricted your fluids). This reduces the stress on your kidneys.  You may take your regular medicines.    If you are on dialysis: It is best to have dialysis today.    If you have a reaction: Most reactions happen right away. If you have  any new symptoms after leaving the hospital (such as hives or swelling),  call your hospital at the correct number below. Or call your family doctor.  If you have breathing distress or wheezing, call 911.    Special instructions: ***    I have read and understand the above information.    Signature:______________________________________ Date:___________    Staff:__________________________________________ Date:___________     Time:__________    [place] [facility] Departments:    ___[place]: [phone]  ___[facility]: [phone]  ___[place]: [phone] ___[facility]: [phone]  ___[facility]: [phone]  ___[facility]: [phone]  ___[phone]  ___[facility]: [phone]  ___[phone]

## 2023-03-25 ENCOUNTER — HEALTH MAINTENANCE LETTER (OUTPATIENT)
Age: 63
End: 2023-03-25

## 2023-03-27 DIAGNOSIS — R56.9 SEIZURE (H): ICD-10-CM

## 2023-03-29 NOTE — TELEPHONE ENCOUNTER
GABAPENTIN 100MG CAPS      Last Written Prescription Date:  2-23-22  Last Fill Quantity: 180,   # refills: 3  Last Office Visit : 11-11-22  Future Office visit:  none    Routing refill request to provider for review/approval because:  Drug not on the [facility], [facility] or [facility] refill protocol or controlled substance

## 2023-03-31 RX ORDER — GABAPENTIN 100 MG/1
200 CAPSULE ORAL EVERY EVENING
Qty: 180 CAPSULE | Refills: 2 | Status: SHIPPED | OUTPATIENT
Start: 2023-03-31 | End: 2023-11-14

## 2023-05-01 ENCOUNTER — MYC MEDICAL ADVICE (OUTPATIENT)
Dept: INTERNAL MEDICINE | Facility: CLINIC | Age: 63
End: 2023-05-01
Payer: COMMERCIAL

## 2023-05-01 DIAGNOSIS — R05.1 ACUTE COUGH: Primary | ICD-10-CM

## 2023-05-01 RX ORDER — BENZONATATE 200 MG/1
200 CAPSULE ORAL 3 TIMES DAILY PRN
Qty: 30 CAPSULE | Refills: 0 | Status: SHIPPED | OUTPATIENT
Start: 2023-05-01 | End: 2023-05-11

## 2023-05-04 ENCOUNTER — MYC REFILL (OUTPATIENT)
Dept: INTERNAL MEDICINE | Facility: CLINIC | Age: 63
End: 2023-05-04
Payer: COMMERCIAL

## 2023-05-04 DIAGNOSIS — I49.3 SYMPTOMATIC PREMATURE VENTRICULAR CONTRACTIONS: ICD-10-CM

## 2023-05-04 RX ORDER — METOPROLOL TARTRATE 25 MG/1
12.5 TABLET, FILM COATED ORAL 2 TIMES DAILY
Qty: 90 TABLET | Refills: 0 | Status: SHIPPED | OUTPATIENT
Start: 2023-05-04 | End: 2023-07-31

## 2023-07-17 DIAGNOSIS — F32.1 MODERATE MAJOR DEPRESSION (H): ICD-10-CM

## 2023-07-18 RX ORDER — ESCITALOPRAM OXALATE 10 MG/1
TABLET ORAL
Qty: 90 TABLET | Refills: 1 | Status: SHIPPED | OUTPATIENT
Start: 2023-07-18 | End: 2024-03-15

## 2023-07-29 DIAGNOSIS — I49.3 SYMPTOMATIC PREMATURE VENTRICULAR CONTRACTIONS: ICD-10-CM

## 2023-07-31 RX ORDER — METOPROLOL TARTRATE 25 MG/1
TABLET, FILM COATED ORAL
Qty: 90 TABLET | Refills: 0 | Status: SHIPPED | OUTPATIENT
Start: 2023-07-31 | End: 2023-10-16

## 2023-09-05 NOTE — PROGRESS NOTES
"NEURO-ONCOLOGY VISIT  Sep 8, 2023    CHIEF COMPLAINT: Ms. 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 She received 12 cycles of temozolomide, completed in 5/2012. Changes on imaging necessitated a repeat resection on 5/30/2018 and pathology was unchanged. No adjuvant cancer-directed therapy was initiated. Imaging over the next 1.5 years showed slow tumor progression and treatment was then initiated. She completed chemoradiotherapy on 5/30/2020. Barbi then completed 6 cycles of adjuvant-dosed temozolomide as of 11/2020. Dose escalation to 200mg/m2 was complicated by intolerable nausea.     Barbi is now managed on imaging surveillance. Imaging from 2021 through her most recent scan has been without concern for cancer recurrence.     I met with Barbi and River () for this follow-up visit.    HISTORY OF PRESENT ILLNESS  -aBrbi reports ongoing chronic fatigue. Work is going well, but very exhausting.   -When Barbi is fatigued, her posture worsens and she leans forward and to the left.   She has had a fall.    When fatigued, Kenneth notes that she drags her left foot. Trips on occasion.    Per Kenneth, he states that she has challenges going down steps without a hand rail.   -Seizures remain well controlled.   -Mood is \"fine\" with venlafaxine.   -She denies any new symptoms; no numbness or changes in vision.  -Occasional stress related headaches.   -Continued mild word-finding issues. Able to multi-task.  -Going on a trip to [place] later this month.       MEDICATIONS   Current Outpatient Medications   Medication     acetaminophen (TYLENOL) 500 MG tablet     CALCIUM PO     diazePAM 5 MG/0.1ML LIQD     escitalopram (LEXAPRO) 10 MG tablet     famotidine (PEPCID) 10 MG tablet     fexofenadine (ALLEGRA) 180 MG tablet     gabapentin (NEURONTIN) 100 MG capsule     ibuprofen (ADVIL/MOTRIN) 600 MG tablet     " Lactase (LACTAID PO)     lamoTRIgine (LAMICTAL) 100 MG tablet     lamoTRIgine (LAMICTAL) 200 MG tablet     levETIRAcetam (KEPPRA) 500 MG tablet     loratadine (CLARITIN) 10 MG tablet     metoprolol tartrate (LOPRESSOR) 25 MG tablet     tretinoin (RETIN-A) 0.025 % external cream     triamcinolone (KENALOG) 0.1 % external cream     vitamin B-12 (CYANOCOBALAMIN) 2500 MCG sublingual tablet     VITAMIN D, CHOLECALCIFEROL, PO     No current facility-administered medications for this visit.     DRUG ALLERGIES   Allergies   Allergen Reactions     Sulfa Antibiotics Hives     Adhesive Tape Rash     Benoxinate Nausea and Vomiting       ONCOLOGIC HISTORY  -4/27/2011 PRESENTATION: New onset seizure, generalized event.   -5/2011 MRB with a non-contrast enhancing right frontal mass lesion.   -5/2011 SURGERY: Craniectomy for mass resection at [facility].   PATHOLOGY: WHO grade 2 oligodendroglioma; 1p/19q co-deleted.  -6/2011-5/2012 CHEMO: Adjuvant dosed temozolomide x 12 cycles.  -4/2/2018 MRB with possible disease recurrence with a new 1.2 cm non-enhancing nodule within the cortex of the right frontal lobe adjacent to the resection cavity.  -4/12/2018 NEURO-ONC: Recommending repeat resection, appointment scheduled with Dr. Dustin Rodriguez, neurosurgery at the [facility].   -5/30/2018 SURGERY: Repeat resection with Dr. Rodriguez.   PATHOLOGY: Remains low grade (WHO grade 2), without concerning features.   -6/15/2018 NEURO-ONC: Start imaging surveillance.  -9/17/2018 NEURO-ONC/ MRB: Imaging stable, continue with surveillance.   -12/10/2018 NEURO-ONC/ MRB: Imaging stable, continue with surveillance.   -4/15/2019 NEURO-ONC/ MRB: Imaging stable, continue with surveillance.  -8/19/2019 NEURO-ONC/ MRB: Clinically stable. Imaging largely stable, subtle increases on T2 FLAIR; continue with surveillance.  -12/16/2019 NEURO-ONC/ MRB: Clinically stable. Imaging with increased T2 FLAIR medially and laterally about the resection cavity. Referral to Dr. Figueroa  with radiation oncology. Discussion with Dr. Rodriguez. Referral for repeat neuro-psychology testing.   -1/10/2020 Evaluated by Dr. Devan Figueroa.   -1/17/2020 NEURO-ONC: Tentative plan to initiate chemoradiotherapy in May-June 2020.   -2/17/2020 NEURO-PSYCH; Mild weaknesses were noted in aspects of verbal and nonverbal working memory, nonverbal recall, some aspects of executive functioning, and bilateral psychomotor speed. The remainder of her cognitive abilities were intact and performed in keeping with her above average range cognitive baseline.   -4/20 - 5/30/2020 CHEMORADS: 54 Gy in 30 fractions with concurrent temozolomide 75mg/m2 (140mg).   -5/4/2020 NEURO-ONC: Continue treatment as planned. Prescribing Clotrimazole lozenges for oral thrush.   -6/1/2020 NEURO-ONC: Completed treatment as planned.  -6/30/2020 NEURO-ONC/ MRB/ CHEMO: Clinically stable. Imaging with positive treatment effect. Starting adjuvant temozolomide 150mg/m2 (250mg), cycle 1 (start date was 7/1).   -7/28/2020 NEURO-ONC/ CHEMO: Clinically stable. Adjuvant temozolomide 200mg/m2 (320mg), cycle 2 (start date was 7/29).   -8/25/2020 NEURO-ONC/ CHEMO: Clinically stable; significant nausea/ vomiting with 200mg/m2 dosing. Adding Emend for this next cycle. Adjuvant temozolomide 150mg/m2 (250mg), cycle 3 (start date of 8/26).   -9/22/2020 NEURO-ONC/ MRB/ CHEMO: Clinically stable; less nausea/ vomiting with lowered chemotherapy dosing plus adding Emend. Imaging with no concerns, repeat in 3 months. Adjuvant temozolomide 150mg/m2 (250mg), cycle 4 (start date of 9/23).   -10/20/2020 NEURO-ONC/ CHEMO: Clinically stable; no new/ worsening issues. Experiencing pain related to piriformis syndrome. Adjuvant temozolomide 150mg/m2 (250mg), cycle 5 (start date of 10/21).   -11/172020 NEURO-ONC/ CHEMO: Clinically stable. Adjuvant temozolomide 150mg/m2 (250mg), cycle 6 (start date of 11/18).   -12/15/2020 NEURO-ONC/ MRB: Clinically well. Imaging with no concerns.  "Starting imaging surveillance.   -3/16/2021 NEURO-ONC/ MRB: Clinically well. Imaging with no concerns.   -4/30/2021 Neuro-psych evaluation demonstrated weaknesses that are consistent with dysfunction of the bilateral frontal regions, but the right frontal region in particular.  Relative to her exam from February, 2020, there has been a mild decline in her thinking. In this exam, weaknesses were identified in executive functioning, attention, and both verbal and nonverbal working memory. Some aspects of cognitive speed were relative weaknesses as well. Insight into these weaknesses was limited.   -7/13/2021 NEURO-ONC/ MRB: Clinically well. Imaging stable.   -11/9/2021 NEURO-ONC/ MRB: Clinically well. Considering a trial of Zoloft. Imaging stable.   -3/8/2022 NEURO-ONC/ MRB: Clinically well. Imaging stable.   -9/19/2022 NEURO-ONC/ MRB: Clinically well. Imaging stable.   -3/13/2023 NEURO-ONC/ MRB: Stable neuro-psych evaluation. Imaging stable.   -9/8/2023 NEURO-ONC/ MRB: No new neurological concerns. Offered PT referral for evaluation of posture and gait instability. Imaging stable.    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   , 2 children      PHYSICAL EXAMINATION  /72 (BP Location: Right arm)   Pulse 59   Temp 97.8  F (36.6  C) (Oral)   Resp 16   Wt 67.1 kg (147 lb 14.4 oz)   LMP  (LMP Unknown)   SpO2 94%   BMI 27.05 kg/m     Wt Readings from Last 2 Encounters:   09/08/23 67.1 kg (147 lb 14.4 oz)   03/13/23 65.8 kg (145 lb)      Ht Readings from Last 2 Encounters:   11/11/22 1.575 m (5' 2\")   08/30/22 1.575 m (5' 2\")      KPS     -Generally well appearing.  -Psychiatric: Normal mood and affect. Pleasant, talkative.  -Neurologic:   MENTAL STATUS:     Alert, oriented.    Recall: Intact.   Speech fluent.    Comprehension intact.     CRANIAL NERVES:     Pupils are equal, round.     Symmetric facial movements.   Hearing intact.   With normal phonation, no dysfunction of the " "palate or tongue.   GAIT: Steady, unassisted.       MEDICAL RECORDS  Personally reviewed; No new notes.     LABS  Personally reviewed all available lab results; No new labs in the past 6 months.     IMAGING  Personally reviewed MR brain imaging from today and compared to prior imaging. To my eye, the T2 FLAIR about the right frontal resection cavity has not changed from imaging done in 11/2021 (below). There is no enhancement.     T2 FLAIR 11/2021, 9/2022, 9/2023;      Formal read; \"Stable postsurgical changes of right frontal tumor resection with stable surrounding T2 hyperintensity. No evidence of disease progression.\"    Imaging was shown to and results were reviewed with Barbi and Kenneth.       IMPRESSION  Clinic time of 40 minutes was spent reviewing data, in evaluation, and coordinating care for this high complexity visit. This was in addition to answering questions pertaining to my recommendations and devising the plan as outlined below.      Clinically, Barbi is noting no new subjective neurological complaints. For issues with posture when fatigued, I suggested a referral to PT for strengthen and core muscle exercises plus balance training. Barbi declined a referral at this time.     Barbi has not had any seizures even after Keppra and Lamictal were reduced a year ago. She has been wanting to follow-up again with Dr. Clark, but no appointment was ever made. I asked Barbi to reach out again to Dr. Clark's office. I have also reached out personally to inform Dr. Clark.     Imaging as detailed above is largely stable with no overt concerns. I reviewed her case with the reading neuro-radiologist, who was in agreement with this impression of her imaging. Since Barbi has remained clinically and radiographically stable off chemotherapy, will continue with imaging surveillance per NCCN guidelines with repeat imaging every 4-6 months through 11/2025 and at that time, can consider annual imaging. Barbi remains in " agreement with repeat imaging in 6 months. However, Barbi knows to call with any concerns or to report new complaints and appointments can be moved sooner if needed.    Of note, I reviewed with Barbi and Kenneth today that there may be new treatment guidelines announced in the coming months regarding initiating an IDH inhibitor (Vorasidenib), based on results from the phase 3 INDIGO trial for IDH-mutated low grade glioma patients. I will discuss this option in greater detail at follow-up.      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 Repeat imaging in 6 months.   -No need to monitor labs while off chemotherapy.      -SEIZURE MANAGEMENT-  -Follows with Dr. Flakita Clark; epilepsy specialist at the [facility].      -Quality of life/ MOOD/ FATIGUE-  -History of mild anxiety and depression.   -On venlafaxine.   -Failed Celexa due to side effect of diarrhea.   -Untreated/ undertreated depression can worsen fatigue, dysorexia, and quality of life.  -Primary care monitoring vitamin D and B12 levels.     -GAIT INSTABILITY-  -Consideration for PT referral.     -COGNITIVE CHANGES-  -Neuro-psych testing completed in 11/2022 was stable.    -If further difficulties are observed in the future, a referral for a neuropsychological re-evaluation at that time might be considered.    Return to clinic in 3/2024 + imaging.     Myrtle Gallagher MD  Neuro-oncology

## 2023-09-08 ENCOUNTER — ONCOLOGY VISIT (OUTPATIENT)
Dept: ONCOLOGY | Facility: CLINIC | Age: 63
End: 2023-09-08
Attending: PSYCHIATRY & NEUROLOGY
Payer: COMMERCIAL

## 2023-09-08 ENCOUNTER — ANCILLARY PROCEDURE (OUTPATIENT)
Dept: MRI IMAGING | Facility: CLINIC | Age: 63
End: 2023-09-08
Attending: PSYCHIATRY & NEUROLOGY
Payer: COMMERCIAL

## 2023-09-08 VITALS
WEIGHT: 147.9 LBS | DIASTOLIC BLOOD PRESSURE: 72 MMHG | OXYGEN SATURATION: 94 % | RESPIRATION RATE: 16 BRPM | TEMPERATURE: 97.8 F | SYSTOLIC BLOOD PRESSURE: 123 MMHG | HEART RATE: 59 BPM | BODY MASS INDEX: 27.05 KG/M2

## 2023-09-08 DIAGNOSIS — C71.9 OLIGODENDROGLIOMA (H): ICD-10-CM

## 2023-09-08 DIAGNOSIS — C71.9 OLIGODENDROGLIOMA (H): Primary | ICD-10-CM

## 2023-09-08 PROCEDURE — A9585 GADOBUTROL INJECTION: HCPCS | Mod: JZ | Performed by: STUDENT IN AN ORGANIZED HEALTH CARE EDUCATION/TRAINING PROGRAM

## 2023-09-08 PROCEDURE — 70553 MRI BRAIN STEM W/O & W/DYE: CPT | Mod: GC | Performed by: STUDENT IN AN ORGANIZED HEALTH CARE EDUCATION/TRAINING PROGRAM

## 2023-09-08 PROCEDURE — 99215 OFFICE O/P EST HI 40 MIN: CPT | Performed by: PSYCHIATRY & NEUROLOGY

## 2023-09-08 PROCEDURE — G0463 HOSPITAL OUTPT CLINIC VISIT: HCPCS | Performed by: PSYCHIATRY & NEUROLOGY

## 2023-09-08 RX ORDER — GADOBUTROL 604.72 MG/ML
7.5 INJECTION INTRAVENOUS ONCE
Status: COMPLETED | OUTPATIENT
Start: 2023-09-08 | End: 2023-09-08

## 2023-09-08 RX ADMIN — GADOBUTROL 6.5 ML: 604.72 INJECTION INTRAVENOUS at 14:00

## 2023-09-08 ASSESSMENT — PAIN SCALES - GENERAL: PAINLEVEL: NO PAIN (0)

## 2023-09-08 NOTE — LETTER
"    9/8/2023         RE: Barbi Tiwari  [address] [facility] [address]        Dear Colleague,    Thank you for referring your patient, Barbi Tiawri, to the [facility]. Please see a copy of my visit note below.    NEURO-ONCOLOGY VISIT  Sep 8, 2023    CHIEF COMPLAINT: Ms. 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 She received 12 cycles of temozolomide, completed in 5/2012. Changes on imaging necessitated a repeat resection on 5/30/2018 and pathology was unchanged. No adjuvant cancer-directed therapy was initiated. Imaging over the next 1.5 years showed slow tumor progression and treatment was then initiated. She completed chemoradiotherapy on 5/30/2020. Barbi then completed 6 cycles of adjuvant-dosed temozolomide as of 11/2020. Dose escalation to 200mg/m2 was complicated by intolerable nausea.     Babri is now managed on imaging surveillance. Imaging from 2021 through her most recent scan has been without concern for cancer recurrence.     I met with Barbi and River () for this follow-up visit.    HISTORY OF PRESENT ILLNESS  -Barbi reports ongoing chronic fatigue. Work is going well, but very exhausting.   -When Barbi is fatigued, her posture worsens and she leans forward and to the left.   She has had a fall.    When fatigued, Kenneth notes that she drags her left foot. Trips on occasion.    Per Kenneth, he states that she has challenges going down steps without a hand rail.   -Seizures remain well controlled.   -Mood is \"fine\" with venlafaxine.   -She denies any new symptoms; no numbness or changes in vision.  -Occasional stress related headaches.   -Continued mild word-finding issues. Able to multi-task.  -Going on a trip to [place] later this month.       MEDICATIONS   Current Outpatient Medications   Medication    acetaminophen (TYLENOL) 500 MG tablet "    CALCIUM PO    diazePAM 5 MG/0.1ML LIQD    escitalopram (LEXAPRO) 10 MG tablet    famotidine (PEPCID) 10 MG tablet    fexofenadine (ALLEGRA) 180 MG tablet    gabapentin (NEURONTIN) 100 MG capsule    ibuprofen (ADVIL/MOTRIN) 600 MG tablet    Lactase (LACTAID PO)    lamoTRIgine (LAMICTAL) 100 MG tablet    lamoTRIgine (LAMICTAL) 200 MG tablet    levETIRAcetam (KEPPRA) 500 MG tablet    loratadine (CLARITIN) 10 MG tablet    metoprolol tartrate (LOPRESSOR) 25 MG tablet    tretinoin (RETIN-A) 0.025 % external cream    triamcinolone (KENALOG) 0.1 % external cream    vitamin B-12 (CYANOCOBALAMIN) 2500 MCG sublingual tablet    VITAMIN D, CHOLECALCIFEROL, PO     No current facility-administered medications for this visit.     DRUG ALLERGIES   Allergies   Allergen Reactions    Sulfa Antibiotics Hives    Adhesive Tape Rash    Benoxinate Nausea and Vomiting       ONCOLOGIC HISTORY  -4/27/2011 PRESENTATION: New onset seizure, generalized event.   -5/2011 MRB with a non-contrast enhancing right frontal mass lesion.   -5/2011 SURGERY: Craniectomy for mass resection at [facility].   PATHOLOGY: WHO grade 2 oligodendroglioma; 1p/19q co-deleted.  -6/2011-5/2012 CHEMO: Adjuvant dosed temozolomide x 12 cycles.  -4/2/2018 MRB with possible disease recurrence with a new 1.2 cm non-enhancing nodule within the cortex of the right frontal lobe adjacent to the resection cavity.  -4/12/2018 NEURO-ONC: Recommending repeat resection, appointment scheduled with Dr. Dustin Rodriguez, neurosurgery at the [facility].   -5/30/2018 SURGERY: Repeat resection with Dr. Rodriguez.   PATHOLOGY: Remains low grade (WHO grade 2), without concerning features.   -6/15/2018 NEURO-ONC: Start imaging surveillance.  -9/17/2018 NEURO-ONC/ MRB: Imaging stable, continue with surveillance.   -12/10/2018 NEURO-ONC/ MRB: Imaging stable, continue with surveillance.   -4/15/2019 NEURO-ONC/ MRB: Imaging stable, continue with surveillance.  -8/19/2019 NEURO-ONC/ MRB: Clinically stable.  Imaging largely stable, subtle increases on T2 FLAIR; continue with surveillance.  -12/16/2019 NEURO-ONC/ MRB: Clinically stable. Imaging with increased T2 FLAIR medially and laterally about the resection cavity. Referral to Dr. Figueroa with radiation oncology. Discussion with Dr. Rodriguez. Referral for repeat neuro-psychology testing.   -1/10/2020 Evaluated by Dr. Devan Figueroa.   -1/17/2020 NEURO-ONC: Tentative plan to initiate chemoradiotherapy in May-June 2020.   -2/17/2020 NEURO-PSYCH; Mild weaknesses were noted in aspects of verbal and nonverbal working memory, nonverbal recall, some aspects of executive functioning, and bilateral psychomotor speed. The remainder of her cognitive abilities were intact and performed in keeping with her above average range cognitive baseline.   -4/20 - 5/30/2020 CHEMORADS: 54 Gy in 30 fractions with concurrent temozolomide 75mg/m2 (140mg).   -5/4/2020 NEURO-ONC: Continue treatment as planned. Prescribing Clotrimazole lozenges for oral thrush.   -6/1/2020 NEURO-ONC: Completed treatment as planned.  -6/30/2020 NEURO-ONC/ MRB/ CHEMO: Clinically stable. Imaging with positive treatment effect. Starting adjuvant temozolomide 150mg/m2 (250mg), cycle 1 (start date was 7/1).   -7/28/2020 NEURO-ONC/ CHEMO: Clinically stable. Adjuvant temozolomide 200mg/m2 (320mg), cycle 2 (start date was 7/29).   -8/25/2020 NEURO-ONC/ CHEMO: Clinically stable; significant nausea/ vomiting with 200mg/m2 dosing. Adding Emend for this next cycle. Adjuvant temozolomide 150mg/m2 (250mg), cycle 3 (start date of 8/26).   -9/22/2020 NEURO-ONC/ MRB/ CHEMO: Clinically stable; less nausea/ vomiting with lowered chemotherapy dosing plus adding Emend. Imaging with no concerns, repeat in 3 months. Adjuvant temozolomide 150mg/m2 (250mg), cycle 4 (start date of 9/23).   -10/20/2020 NEURO-ONC/ CHEMO: Clinically stable; no new/ worsening issues. Experiencing pain related to piriformis syndrome. Adjuvant temozolomide 150mg/m2  "(250mg), cycle 5 (start date of 10/21).   -11/172020 NEURO-ONC/ CHEMO: Clinically stable. Adjuvant temozolomide 150mg/m2 (250mg), cycle 6 (start date of 11/18).   -12/15/2020 NEURO-ONC/ MRB: Clinically well. Imaging with no concerns. Starting imaging surveillance.   -3/16/2021 NEURO-ONC/ MRB: Clinically well. Imaging with no concerns.   -4/30/2021 Neuro-psych evaluation demonstrated weaknesses that are consistent with dysfunction of the bilateral frontal regions, but the right frontal region in particular.  Relative to her exam from February, 2020, there has been a mild decline in her thinking. In this exam, weaknesses were identified in executive functioning, attention, and both verbal and nonverbal working memory. Some aspects of cognitive speed were relative weaknesses as well. Insight into these weaknesses was limited.   -7/13/2021 NEURO-ONC/ MRB: Clinically well. Imaging stable.   -11/9/2021 NEURO-ONC/ MRB: Clinically well. Considering a trial of Zoloft. Imaging stable.   -3/8/2022 NEURO-ONC/ MRB: Clinically well. Imaging stable.   -9/19/2022 NEURO-ONC/ MRB: Clinically well. Imaging stable.   -3/13/2023 NEURO-ONC/ MRB: Stable neuro-psych evaluation. Imaging stable.   -9/8/2023 NEURO-ONC/ MRB: No new neurological concerns. Offered PT referral for evaluation of posture and gait instability. Imaging stable.    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   , 2 children      PHYSICAL EXAMINATION  /72 (BP Location: Right arm)   Pulse 59   Temp 97.8  F (36.6  C) (Oral)   Resp 16   Wt 67.1 kg (147 lb 14.4 oz)   LMP  (LMP Unknown)   SpO2 94%   BMI 27.05 kg/m     Wt Readings from Last 2 Encounters:   09/08/23 67.1 kg (147 lb 14.4 oz)   03/13/23 65.8 kg (145 lb)      Ht Readings from Last 2 Encounters:   11/11/22 1.575 m (5' 2\")   08/30/22 1.575 m (5' 2\")      KPS     -Generally well appearing.  -Psychiatric: Normal mood and affect. Pleasant, talkative.  -Neurologic:   MENTAL " "STATUS:     Alert, oriented.    Recall: Intact.   Speech fluent.    Comprehension intact.     CRANIAL NERVES:     Pupils are equal, round.     Symmetric facial movements.   Hearing intact.   With normal phonation, no dysfunction of the palate or tongue.   GAIT: Steady, unassisted.       MEDICAL RECORDS  Personally reviewed; No new notes.     LABS  Personally reviewed all available lab results; No new labs in the past 6 months.     IMAGING  Personally reviewed MR brain imaging from today and compared to prior imaging. To my eye, the T2 FLAIR about the right frontal resection cavity has not changed from imaging done in 11/2021 (below). There is no enhancement.     T2 FLAIR 11/2021, 9/2022, 9/2023;      Formal read; \"Stable postsurgical changes of right frontal tumor resection with stable surrounding T2 hyperintensity. No evidence of disease progression.\"    Imaging was shown to and results were reviewed with Barbi and Kenneth.       IMPRESSION  Clinic time of 40 minutes was spent reviewing data, in evaluation, and coordinating care for this high complexity visit. This was in addition to answering questions pertaining to my recommendations and devising the plan as outlined below.      Clinically, Barbi is noting no new subjective neurological complaints. For issues with posture when fatigued, I suggested a referral to PT for strengthen and core muscle exercises plus balance training. Barbi declined a referral at this time.     Barbi has not had any seizures even after Keppra and Lamictal were reduced a year ago. She has been wanting to follow-up again with Dr. Clark, but no appointment was ever made. I asked Barbi to reach out again to Dr. Clark's office. I have also reached out personally to inform Dr. Clark.     Imaging as detailed above is largely stable with no overt concerns. I reviewed her case with the reading neuro-radiologist, who was in agreement with this impression of her imaging. Since Barbi has remained " clinically and radiographically stable off chemotherapy, will continue with imaging surveillance per NCCN guidelines with repeat imaging every 4-6 months through 11/2025 and at that time, can consider annual imaging. Barbi remains in agreement with repeat imaging in 6 months. However, Barbi knows to call with any concerns or to report new complaints and appointments can be moved sooner if needed.    Of note, I reviewed with Barbi and Kenneth today that there may be new treatment guidelines announced in the coming months regarding initiating an IDH inhibitor (Vorasidenib), based on results from the phase 3 INDIGO trial for IDH-mutated low grade glioma patients. I will discuss this option in greater detail at follow-up.      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 Repeat imaging in 6 months.   -No need to monitor labs while off chemotherapy.      -SEIZURE MANAGEMENT-  -Follows with Dr. Flakita Clark; epilepsy specialist at the [facility].      -Quality of life/ MOOD/ FATIGUE-  -History of mild anxiety and depression.   -On venlafaxine.   -Failed Celexa due to side effect of diarrhea.   -Untreated/ undertreated depression can worsen fatigue, dysorexia, and quality of life.  -Primary care monitoring vitamin D and B12 levels.     -GAIT INSTABILITY-  -Consideration for PT referral.     -COGNITIVE CHANGES-  -Neuro-psych testing completed in 11/2022 was stable.    -If further difficulties are observed in the future, a referral for a neuropsychological re-evaluation at that time might be considered.    Return to clinic in 3/2024 + imaging.     Myrtle Gallagher MD  Neuro-oncology

## 2023-09-08 NOTE — NURSING NOTE
"Oncology Rooming Note    September 8, 2023 2:52 PM   Barbi Tiwari is a 62 year old female who presents for:    Chief Complaint   Patient presents with    Oncology Clinic Visit     Oligodendroglioma      Initial Vitals: /72 (BP Location: Right arm)   Pulse 59   Temp 97.8  F (36.6  C) (Oral)   Resp 16   Wt 67.1 kg (147 lb 14.4 oz)   LMP  (LMP Unknown)   SpO2 94%   BMI 27.05 kg/m   Estimated body mass index is 27.05 kg/m  as calculated from the following:    Height as of 11/11/22: 1.575 m (5' 2\").    Weight as of this encounter: 67.1 kg (147 lb 14.4 oz). Body surface area is 1.71 meters squared.  No Pain (0) Comment: Data Unavailable   No LMP recorded (lmp unknown). Patient has had a hysterectomy.  Allergies reviewed: Yes  Medications reviewed: Yes    Medications: Medication refills not needed today.  Pharmacy name entered into [facility]:    [facility] [place], [place] - [address] [id number] - [place], [place] - [address] AT [facility] [address]    Clinical concerns: none       Marlena Pablo              "

## 2023-09-08 NOTE — DISCHARGE INSTRUCTIONS
MRI Contrast Discharge Instructions    The IV contrast you received today will pass out of your body in your  urine. This will happen in the next 24 hours. You will not feel this process.  Your urine will not change color.    Drink at least 4 extra glasses of water or juice today (unless your doctor  has restricted your fluids). This reduces the stress on your kidneys.  You may take your regular medicines.    If you are on dialysis: It is best to have dialysis today.    If you have a reaction: Most reactions happen right away. If you have  any new symptoms after leaving the hospital (such as hives or swelling),  call your hospital at the correct number below. Or call your family doctor.  If you have breathing distress or wheezing, call 911.    Special instructions: ***    I have read and understand the above information.    Signature:______________________________________ Date:___________    Staff:__________________________________________ Date:___________     Time:__________    [facility] Departments:    ___[place]: [phone]  ___[facility]: [phone]  ___[place]: [phone] ___[facility]: [phone]  ___[facility]: [phone]  ___[facility]: [phone]  ___[phone]  ___[facility]: [phone]  ___[phone]

## 2023-09-15 ENCOUNTER — TELEPHONE (OUTPATIENT)
Dept: NEUROLOGY | Facility: CLINIC | Age: 63
End: 2023-09-15

## 2023-10-03 ENCOUNTER — PATIENT OUTREACH (OUTPATIENT)
Dept: CARE COORDINATION | Facility: CLINIC | Age: 63
End: 2023-10-03
Payer: COMMERCIAL

## 2023-10-10 ENCOUNTER — OFFICE VISIT (OUTPATIENT)
Dept: PODIATRY | Facility: CLINIC | Age: 63
End: 2023-10-10
Payer: COMMERCIAL

## 2023-10-10 ENCOUNTER — ANCILLARY PROCEDURE (OUTPATIENT)
Dept: GENERAL RADIOLOGY | Facility: CLINIC | Age: 63
End: 2023-10-10
Attending: PODIATRIST
Payer: COMMERCIAL

## 2023-10-10 VITALS
WEIGHT: 147 LBS | HEIGHT: 62 IN | HEART RATE: 60 BPM | SYSTOLIC BLOOD PRESSURE: 98 MMHG | BODY MASS INDEX: 27.05 KG/M2 | DIASTOLIC BLOOD PRESSURE: 58 MMHG

## 2023-10-10 DIAGNOSIS — M25.872 SESAMOIDITIS OF LEFT FOOT: Primary | ICD-10-CM

## 2023-10-10 DIAGNOSIS — M79.672 CHRONIC FOOT PAIN, LEFT: ICD-10-CM

## 2023-10-10 DIAGNOSIS — G89.29 CHRONIC FOOT PAIN, LEFT: ICD-10-CM

## 2023-10-10 DIAGNOSIS — Z98.890 STATUS POST LEFT FOOT SURGERY: ICD-10-CM

## 2023-10-10 PROCEDURE — 73630 X-RAY EXAM OF FOOT: CPT | Mod: TC | Performed by: RADIOLOGY

## 2023-10-10 PROCEDURE — 99213 OFFICE O/P EST LOW 20 MIN: CPT | Performed by: PODIATRIST

## 2023-10-10 NOTE — LETTER
10/10/2023         RE: Barbi Tiwari  [address]  [facility] [address]        Dear Colleague,    Thank you for referring your patient, Barbi Tiwari, to the [facility]. Please see a copy of my visit note below.    ASSESSMENT:  Encounter Diagnoses   Name Primary?     Sesamoiditis of left foot Yes     Chronic foot pain, left      Status post left foot surgery      MEDICAL DECISION MAKING:  I personally reviewed the left foot x-ray images with the needed.  Solid bony fusion at the arthrodesis site.  Surgical hardware stable.  Do not appreciate any screw entering the sesamoid apparatus, yet there appears to be sesamoid arthritis.  I also explained that fat padding can thin as we age.  This might contribute.    Recommendations:  Offloading of the forefoot via felt metatarsal bars  Referral for custom molded orthoses with metatarsal padding  Stiffer soled shoes to offload the forefoot during the propulsive phase of gait  Avoidance of barefoot walking  Gentle calf stretching exercises  If pain persists or worsens, removal of the surgical hardware is certainly reasonable.    Follow-up on an as-needed basis.    Disclaimer: This note consists of symbols derived from keyboarding, dictation and/or voice recognition software. As a result, there may be errors in the script that have gone undetected. Please consider this when interpreting information found in this chart.    Benjamin Griffin DPM, FACFAS, [facility] Department of Podiatry/Foot & Ankle Surgery      ____________________________________________________________________    HPI:       Barbi presents today reporting left foot pain.  This started approximately 2 months ago.  She specifies the plantar aspect of the first metatarsal head region.  Pain is intermittent.  She wears quality athletic shoes.  I last evaluated her on 5/24/2022 and a final postoperative visit.  She is status post left first metatarsophalangeal joint  arthrodesis.    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 Cystourethroscopy 2. Right ureteroscopy 3. Right retrograde pyelogram with interpretation of intraoperative fluoroscopic imaging 4. Holmium laser lithotripsy with basket stone extraction 5. Right ureteral stent placement;  Surgeon: Felix Cano MD;  Location:  OR     CRANIOTOMY  2011    tumor resection     CYSTOSCOPY, RETROGRADES, INSERT STENT URETER(S), COMBINED Right 7/20/2021    Procedure: 1.  Cystourethroscopy 2.  Attempted right ureteroscopy 3.  Right  retrograde pyelogram with interpretation of intraoperative fluoroscopic imaging 4.  Right ureteral dilatation 5.  Right ureteral stent placement 6.  Laser on stand-by;  Surgeon: Felix Cano MD;  Location:  OR     EXTRACORPOREAL SHOCK WAVE LITHOTRIPSY, CYSTOSCOPY, INSERT STENT URETER(S), COMBINED Bilateral 5/5/2017    Procedure: COMBINED EXTRACORPOREAL SHOCK WAVE LITHOTRIPSY, CYSTOSCOPY, INSERT STENT URETER(S);  CYSTO, URETHRAL DILATION, URETERAL LEFT STENT  PLACEMENT, LEFT ESWL.;  Surgeon: Angel Del Rio MD;  Location:  OR     FOOT SURGERY      mulitple     HYSTERECTOMY, PAP NO LONGER INDICATED  2008    lap hys     LITHOTRIPSY  2010 2017     OPTICAL TRACKING SYSTEM CRANIOTOMY, EXCISE TUMOR WITH MAPPING, COMBINED Right 5/30/2018    Procedure: COMBINED OPTICAL TRACKING SYSTEM CRANIOTOMY, EXCISE TUMOR WITH MAPPING;  Right Stealth Assisted Craniotomy With Motor Mapping And Tumor Resection ;  Surgeon: Dustin Rodriguez MD;  [facility]     ORTHOPEDIC SURGERY      feet, multiple on both     OSTEOTOMY FOOT Right 1/15/2019    Procedure: OSTEOTOMY FOOT;  Surgeon: Benjamin Griffin DPM;  Location: [facility] OR     OSTEOTOMY FOOT Left 2/1/2022    Procedure: left second metatarsal Weil osteotomy;  Surgeon: Benjamin Griffin DPM;  Location:  OR     PARATHYROIDECTOMY  2011     RECONSTRUCT FOREFOOT WITH METATARSOPHALANGEAL (MTP) FUSION Right 1/15/2019    Procedure: RIGHT FIRST METATARSAL PHALAGEAL JOINT ARTHRODESIS, RIGHT SECOND METATARSAL WEIL OSTEOTOMY;  Surgeon: Benjamin Griffin DPM;  Location:  OR     REPAIR HAMMER TOE Left 2/1/2022    Procedure: CORRECTION, left second HAMMER TOE;  Surgeon: Benjamin Griffin DPM;  Location:  OR       EXAM:    Vitals: LMP  (LMP Unknown)   BMI: There is no height or weight on file to calculate BMI.    Vascular:  Pedal pulses are palpable for both the DP and PT arteries.  CFT < 3 sec.  No edema.      Neuro: Light touch sensation is intact to the L4, L5, S1 distributions  No evidence of weakness, spasticity, or contracture in the lower extremities.     Derm: Normal texture and turgor.  No erythema, ecchymosis, or cyanosis.  No open lesions.     There is a hyperkeratotic lesion some left tibial sesamoid.  This is the focus of her pain.  No evidence of intraepidermal bleeding or breakdown.    Musculoskeletal:    Lower extremity muscle strength is normal.  The left hallux fusion is solid clinically.  With weightbearing and  flexion at the interphalangeal joint, there is an increased interphalangeus angle.  Pain on palpation below the left tibial sesamoid.    X-Ray Findings:  I personally reviewed the left foot x-ray images.  See comments above.          Again, thank you for allowing me to participate in the care of your patient.        Sincerely,        Benjamin Griffin DPM

## 2023-10-10 NOTE — PATIENT INSTRUCTIONS
"Thank you for choosing [facility] Podiatry / Foot & Ankle Surgery!    DR. SALAMANCA'S CLINIC LOCATIONS:     [facility] TRIAGE LINE: [phone]   [address] [place] [address] APPOINTMENTS: [phone]   [place] [address] RADIOLOGY: [phone]   (Every other Tues - Wed - Fri PM) SET UP SURGERY: [phone]    PHYSICAL THERAPY: [phone]   [facility] BILLING QUESTIONS: [phone]   [address] FAX: [phone]   [place] [address]    (Thurs & Fri AM)       [place] [facility] LOCATIONS  [facility]  [address]  [place] [address]  Phone: [phone]  Fax: [phone] [facility]   [address]  [place] [address]  Phone: [phone]  Fax: [phone]   [facility] [place]  [address]  [place] [address]  Phone: [phone]  Fax: [phone] [facility]  [address]  [place] [address]  Phone: [phone]   Fax: [phone]   * Please call any location listed to make an appointment for a casting/fitting. Your referral was sent to their central office and they will all have the order on file.      FOREFOOT PAIN RECOMMENDATIONS    1) Please consider stiffer/ rigid - soled shoes as much as possible. These take stress off of the ball of your foot.    2) Avoid barefoot walking, walking in socks, flexible shoes and flip flops.    3) It is a good idea to wear a shoe at all times, including when at home.    4) Consider purchasing a felt metatarsal pad.  A good brand is \"Hapad.\"  You can find these and others online.  Also, these can be built into custom/prescription arch supports.    5) Consider a quality over-the-counter arch support. These can be found at [facility].  If Dr. Salamanca prescribed custom orthoses, please consider this option.    6) Ice and anti inflammatories can be helpful, earlier on in the process.  Anti inflammatories " are not good for you, if take for a long period of time.

## 2023-10-10 NOTE — NURSING NOTE
"Chief Complaint   Patient presents with    Consult     Pain in left great toenail and second digit- no injuries- had surgery 1.5 to  2 years ago on those toes       Initial BP 98/58   Pulse 60   Ht 1.575 m (5' 2\")   Wt 66.7 kg (147 lb)   LMP  (LMP Unknown)   BMI 26.89 kg/m   Estimated body mass index is 26.89 kg/m  as calculated from the following:    Height as of this encounter: 1.575 m (5' 2\").    Weight as of this encounter: 66.7 kg (147 lb).  Medications and allergies reviewed.      Jackelyn GRANADOS MA    "

## 2023-10-10 NOTE — PROGRESS NOTES
ASSESSMENT:  Encounter Diagnoses   Name Primary?    Sesamoiditis of left foot Yes    Chronic foot pain, left     Status post left foot surgery      MEDICAL DECISION MAKING:  I personally reviewed the left foot x-ray images with the needed.  Solid bony fusion at the arthrodesis site.  Surgical hardware stable.  Do not appreciate any screw entering the sesamoid apparatus, yet there appears to be sesamoid arthritis.  I also explained that fat padding can thin as we age.  This might contribute.    Recommendations:  Offloading of the forefoot via felt metatarsal bars  Referral for custom molded orthoses with metatarsal padding  Stiffer soled shoes to offload the forefoot during the propulsive phase of gait  Avoidance of barefoot walking  Gentle calf stretching exercises  If pain persists or worsens, removal of the surgical hardware is certainly reasonable.    Follow-up on an as-needed basis.    Disclaimer: This note consists of symbols derived from keyboarding, dictation and/or voice recognition software. As a result, there may be errors in the script that have gone undetected. Please consider this when interpreting information found in this chart.    Benjamin Griffin DPM, FACFAS, MS    [place] Department of Podiatry/Foot & Ankle Surgery      ____________________________________________________________________    HPI:       Barbi presents today reporting left foot pain.  This started approximately 2 months ago.  She specifies the plantar aspect of the first metatarsal head region.  Pain is intermittent.  She wears quality athletic shoes.  I last evaluated her on 5/24/2022 and a final postoperative visit.  She is status post left first metatarsophalangeal joint arthrodesis.    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 Cystourethroscopy 2. Right ureteroscopy 3. Right retrograde pyelogram with interpretation of intraoperative fluoroscopic imaging 4. Holmium laser lithotripsy with basket stone extraction 5. Right ureteral stent placement;  Surgeon: Felix Cano MD;  Location:  OR    CRANIOTOMY  2011    tumor resection    CYSTOSCOPY, RETROGRADES, INSERT STENT URETER(S), COMBINED Right 7/20/2021    Procedure: 1.  Cystourethroscopy 2.  Attempted right ureteroscopy 3.  Right  retrograde pyelogram with interpretation of intraoperative fluoroscopic imaging 4.  Right ureteral dilatation 5.  Right ureteral stent placement 6.  Laser on stand-by;  Surgeon: Felix Cano MD;  Location:  OR    EXTRACORPOREAL SHOCK WAVE LITHOTRIPSY, CYSTOSCOPY, INSERT STENT URETER(S), COMBINED Bilateral 5/5/2017    Procedure: COMBINED EXTRACORPOREAL SHOCK WAVE LITHOTRIPSY, CYSTOSCOPY, INSERT STENT URETER(S);  CYSTO, URETHRAL DILATION, URETERAL LEFT STENT PLACEMENT, LEFT ESWL.;  Surgeon: Angel Del Rio MD;  Location:  OR    FOOT SURGERY      mulitple    HYSTERECTOMY, PAP NO LONGER INDICATED  2008    lap hys    LITHOTRIPSY  2010 2017    OPTICAL TRACKING SYSTEM CRANIOTOMY, EXCISE TUMOR WITH MAPPING, COMBINED Right 5/30/2018     Procedure: COMBINED OPTICAL TRACKING SYSTEM CRANIOTOMY, EXCISE TUMOR WITH MAPPING;  Right Stealth Assisted Craniotomy With Motor Mapping And Tumor Resection ;  Surgeon: Dustin Rodriguez MD;  [facility]    ORTHOPEDIC SURGERY      feet, multiple on both    OSTEOTOMY FOOT Right 1/15/2019    Procedure: OSTEOTOMY FOOT;  Surgeon: Benjamin Griffin DPM;  [facility]    OSTEOTOMY FOOT Left 2/1/2022    Procedure: left second metatarsal Weil osteotomy;  Surgeon: Benjamin Griffin DPM;  [facility]    PARATHYROIDECTOMY  2011    RECONSTRUCT FOREFOOT WITH METATARSOPHALANGEAL (MTP) FUSION Right 1/15/2019    Procedure: RIGHT FIRST METATARSAL PHALAGEAL JOINT ARTHRODESIS, RIGHT SECOND METATARSAL WEIL OSTEOTOMY;  Surgeon: Benjamin Griffin DPM;  [facility]    REPAIR HAMMER TOE Left 2/1/2022    Procedure: CORRECTION, left second HAMMER TOE;  Surgeon: Benjamin Griffin DPM;  Location:  OR       EXAM:    Vitals: LMP  (LMP Unknown)   BMI: There is no height or weight on file to calculate BMI.    Vascular:  Pedal pulses are palpable for both the DP and PT arteries.  CFT < 3 sec.  No edema.      Neuro: Light touch sensation is intact to the L4, L5, S1 distributions  No evidence of weakness, spasticity, or contracture in the lower extremities.     Derm: Normal texture and turgor.  No erythema, ecchymosis, or cyanosis.  No open lesions.     There is a hyperkeratotic lesion some left tibial sesamoid.  This is the focus of her pain.  No evidence of intraepidermal bleeding or breakdown.    Musculoskeletal:    Lower extremity muscle strength is normal.  The left hallux fusion is solid clinically.  With weightbearing and flexion at the interphalangeal joint, there is an increased interphalangeus angle.  Pain on palpation below the left tibial sesamoid.    X-Ray Findings:  I personally reviewed the left foot x-ray images.  See comments above.

## 2023-10-15 DIAGNOSIS — I49.3 SYMPTOMATIC PREMATURE VENTRICULAR CONTRACTIONS: ICD-10-CM

## 2023-10-16 RX ORDER — METOPROLOL TARTRATE 25 MG/1
TABLET, FILM COATED ORAL
Qty: 90 TABLET | Refills: 0 | Status: SHIPPED | OUTPATIENT
Start: 2023-10-16 | End: 2024-01-21

## 2023-10-31 ENCOUNTER — PATIENT OUTREACH (OUTPATIENT)
Dept: CARE COORDINATION | Facility: CLINIC | Age: 63
End: 2023-10-31
Payer: COMMERCIAL

## 2023-11-14 ENCOUNTER — VIRTUAL VISIT (OUTPATIENT)
Dept: NEUROLOGY | Facility: CLINIC | Age: 63
End: 2023-11-14
Payer: COMMERCIAL

## 2023-11-14 VITALS — BODY MASS INDEX: 26.68 KG/M2 | HEIGHT: 62 IN | WEIGHT: 145 LBS

## 2023-11-14 DIAGNOSIS — G25.81 RESTLESS LEGS SYNDROME (RLS): Primary | ICD-10-CM

## 2023-11-14 DIAGNOSIS — R56.9 SEIZURE (H): ICD-10-CM

## 2023-11-14 RX ORDER — GABAPENTIN 100 MG/1
200 CAPSULE ORAL EVERY EVENING
Qty: 180 CAPSULE | Refills: 2 | Status: SHIPPED | OUTPATIENT
Start: 2023-11-14 | End: 2023-11-14

## 2023-11-14 RX ORDER — GABAPENTIN 100 MG/1
300 CAPSULE ORAL EVERY EVENING
Qty: 270 CAPSULE | Refills: 2 | Status: SHIPPED | OUTPATIENT
Start: 2023-11-14

## 2023-11-14 RX ORDER — LAMOTRIGINE 200 MG/1
TABLET ORAL
Qty: 180 TABLET | Refills: 3 | Status: SHIPPED | OUTPATIENT
Start: 2023-11-14

## 2023-11-14 RX ORDER — LEVETIRACETAM 500 MG/1
TABLET ORAL
Qty: 270 TABLET | Refills: 3 | Status: SHIPPED | OUTPATIENT
Start: 2023-11-14

## 2023-11-14 RX ORDER — LAMOTRIGINE 25 MG/1
50 TABLET ORAL EVERY MORNING
Qty: 180 TABLET | Refills: 3 | Status: SHIPPED | OUTPATIENT
Start: 2023-11-14 | End: 2024-03-14

## 2023-11-14 ASSESSMENT — PAIN SCALES - GENERAL: PAINLEVEL: NO PAIN (0)

## 2023-11-14 ASSESSMENT — PATIENT HEALTH QUESTIONNAIRE - PHQ9: SUM OF ALL RESPONSES TO PHQ QUESTIONS 1-9: 8

## 2023-11-14 NOTE — NURSING NOTE
Is the patient currently in the state of [place]? YES    Visit mode:VIDEO    If the visit is dropped, the patient can be reconnected by: VIDEO VISIT: Send to e-mail at: [email or web address]    Will anyone else be joining the visit? Yes, pt's  Kenneth will be joining.   (If patient encounters technical issues they should call [phone]    How would you like to obtain your AVS? MyChart    Are changes needed to the allergy or medication list? No    Reason for visit: RECHECK    Pt completed echeck-in an states medications and allergies are correct.      Mic CLIFFORD

## 2023-11-14 NOTE — PATIENT INSTRUCTIONS
Reduce lamotrigine and increase  gabapentin        Times of Days  8AM   8 pm      Medication Tablet Size Number of Tablets/Capsules Total Daily Dosage   Lamotrigine   25 mg  2             Lamotrigine   200 mg   1      1    500 mg    Gabapentin    100 mg         3    300 mg   Levetiracetam   500 mg  1      2    1500 mg     - She needs a primary care provider workup fatigue. Rule out sleep apnea   - check labs lamotrigine, levetiracetam   - Follow-up 4 months   - seizure rescue: Valium nasal spray 5 mg for seizures more than 3 minutes or more than 2 seizures within 8 hour     Flakita Clark MD
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
## 2023-11-14 NOTE — PROGRESS NOTES
"Virtual Visit Details    Type of service:  Video Visit   Video start 11:06 am   Video end 11:34 am   Originating Location (pt. Location): Home    Distant Location (provider location):  Off-site  Platform used for Video Visit:         [facility]    Patient:  Barbi Tiwari  :  1960   Age:  63 year old   Today's Office Visit:  2023    EPILEPSY HISTORY:   Right-handed female with a history of right frontal oligodendroglioma, status post resection in 2011, status post Temodar treatment completed in .  The patient had onset of seizure 2011, at which time she lost consciousness while she was in a car at a stoplight.  She woke up in the emergency room at [facility] and was told she had a brain tumor.  She had the resection in 2011 and had her second seizure in 2011, which was a prolonged seizure over 20 minutes.  She had repetitive clustering of left arm twitching, and it felt like she was raising her left arm, but she really was not, she states.  She clustered for 20 minutes.  She had another episode of clustering in 2012.      Sometimes she has \"internal sensation of twitchy, things hollow in her head, I feel like I can not sit still, I feel hypoglycemia\".     Seizure type 1:  The patient notices her tongue moving around in her mouth.  This is typically her warning.  The right side of her mouth, cheek and lips begins to twitch, and it moves to the left side of the mouth and cheek.  She also has left facial twitching and eye twitching, has difficulty swallowing.  She is fully aware of her environment.     INTERVAL HISTORY:   Accompanied with  River. She had 1 focal seizures with no impairment in awareness in   - one winter 2023, two 2022, one 2021, one 3/2021. Each seizure was 20 seconds. She tripped at [employer] early 2022. She continues to have balance issue. She has double vision rarely. She is very tired all day.     Family " "members have not noticed staring spells, night time convulsion, she has balance issues,  Lowering levetiracetam did not help reduce fatigue. We lowered lamotrigine and this has not made a difference in balance. She needs a primary care provider workup fatigue.     She has falling 5-10 time in the last year.     She is working with Dr. Gallagher regarding oligodendroglioma, in remission.     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 She reduced wok to 0.6 FTE.     MEDICATIONS:   1.  Levetiracetam, [place], 1000 mg AM and 1000 mg PM    2.  Lamotrigine, [place] , 300 mg at 7 a.m. and 12 p.m. (actually takes 3-4 PM or evening)  The patient takes 100 mg tablet sizes and 200 mg tablet sizes.    3.  Gabapentin 200 mg at bedtime.        Times of Days  8AM   9 pm      Medication Tablet Size Number of Tablets/Capsules Total Daily Dosage   Lamotrigine   100 mg         1       Lamotrigine   200 mg   1      1    500 mg    Gabapentin    100 mg         2    200 mg   Levetiracetam   500 mg  2      2    2000 mg         MEDICATION NOTES:  Gabapentin increase to 300 mg at bedtime worsened morning fatigue   Levetiracetam increase to 2000 mg nightly may have worsened fatigue, no change to schedule.     ROS: Mild aphasia. Imbalance. Fatigue. Anxiety. Intermittent diplopia.     SOCIAL:  [place]  She has 7 siblings, 3 sisters and 4 brothers.  She is the 7th child.  Her father passed away when she was 17 years old.  She has completed regular classes, has a master's degree in nursing.  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  She has 2 kids, adults.  She drinks rarely, maybe 1 drink a month.  No smoking. Less than 4 servings of coffee or pop per day.  No recreational drug use.     EXAMINATION:  Ht 5' 2\" (157.5 cm)   Wt 145 lb (65.8 kg)   LMP  (LMP Unknown)   BMI 26.52 kg/m     Wt " Readings from Last 2 Encounters:   11/14/23 145 lb (65.8 kg)   10/10/23 147 lb (66.7 kg)     Exam:   Alert, orientated, speech is fluent, face is symmetric, extra-ocular movement in tact, no focal deficits noted. bilateral hand tremor.       ASSESSMENT:   Ms. 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 Seizures consistent of tongue movements, right mouth/lips then left face mouth to left face with difficult swallowing. She has side effects lamotrigine we will check level and lower lamotrigine.  She has falls we will reduce lamotrigine (last level 13) and her fatigue is less likely to be secondary to antiepileptic drug.     She is not sleeping at night time because of RLS, we can increase  gabapentin  300 mg evening, take medications 8 pm.    PLAN:   Reduce lamotrigine and increase  gabapentin        Times of Days  8AM   8 pm      Medication Tablet Size Number of Tablets/Capsules Total Daily Dosage   Lamotrigine   25 mg  2             Lamotrigine   200 mg   1      1    500 mg    Gabapentin    100 mg         3    300 mg   Levetiracetam   500 mg  1      2    1500 mg     - She needs a primary care provider workup fatigue. Rule out sleep apnea   - check labs lamotrigine, levetiracetam   - Follow-up 4 months   - seizure rescue: Valium nasal spray 5 mg for seizures more than 3 minutes or more than 2 seizures within 8 hour     Flakita Clark MD   Epilepsy Service Attending   [phone]

## 2023-11-20 ENCOUNTER — IMMUNIZATION (OUTPATIENT)
Dept: NURSING | Facility: CLINIC | Age: 63
End: 2023-11-20
Payer: COMMERCIAL

## 2023-11-20 PROCEDURE — 91320 SARSCV2 VAC 30MCG TRS-SUC IM: CPT

## 2023-11-20 PROCEDURE — 90480 ADMN SARSCOV2 VAC 1/ONLY CMP: CPT

## 2023-12-06 ENCOUNTER — LAB (OUTPATIENT)
Dept: LAB | Facility: CLINIC | Age: 63
End: 2023-12-06
Payer: COMMERCIAL

## 2023-12-06 DIAGNOSIS — R56.9 SEIZURE (H): ICD-10-CM

## 2023-12-06 LAB — LEVETIRACETAM SERPL-MCNC: 44.9 ΜG/ML (ref 10–40)

## 2023-12-06 PROCEDURE — 99000 SPECIMEN HANDLING OFFICE-LAB: CPT

## 2023-12-06 PROCEDURE — 80177 DRUG SCRN QUAN LEVETIRACETAM: CPT

## 2023-12-06 PROCEDURE — 80175 DRUG SCREEN QUAN LAMOTRIGINE: CPT | Mod: 90

## 2023-12-06 PROCEDURE — 36415 COLL VENOUS BLD VENIPUNCTURE: CPT

## 2023-12-08 LAB — LAMOTRIGINE SERPL-MCNC: 15 UG/ML

## 2024-01-06 ENCOUNTER — HEALTH MAINTENANCE LETTER (OUTPATIENT)
Age: 64
End: 2024-01-06

## 2024-01-08 ENCOUNTER — MYC REFILL (OUTPATIENT)
Dept: NEUROLOGY | Facility: CLINIC | Age: 64
End: 2024-01-08

## 2024-01-08 DIAGNOSIS — R56.9 SEIZURE (H): ICD-10-CM

## 2024-01-08 RX ORDER — LAMOTRIGINE 200 MG/1
TABLET ORAL
Qty: 180 TABLET | Refills: 3 | OUTPATIENT
Start: 2024-01-08

## 2024-01-21 ENCOUNTER — MYC REFILL (OUTPATIENT)
Dept: INTERNAL MEDICINE | Facility: CLINIC | Age: 64
End: 2024-01-21
Payer: COMMERCIAL

## 2024-01-21 DIAGNOSIS — I49.3 SYMPTOMATIC PREMATURE VENTRICULAR CONTRACTIONS: ICD-10-CM

## 2024-01-22 RX ORDER — METOPROLOL TARTRATE 25 MG/1
12.5 TABLET, FILM COATED ORAL 2 TIMES DAILY
Qty: 90 TABLET | Refills: 0 | Status: SHIPPED | OUTPATIENT
Start: 2024-01-22 | End: 2024-04-11

## 2024-01-23 NOTE — TELEPHONE ENCOUNTER
RX filled in Dr. Choudhury's abscence, please direct future refills to him.      Has appt in March,

## 2024-01-25 ENCOUNTER — ANCILLARY PROCEDURE (OUTPATIENT)
Dept: MAMMOGRAPHY | Facility: CLINIC | Age: 64
End: 2024-01-25
Attending: INTERNAL MEDICINE
Payer: COMMERCIAL

## 2024-01-25 DIAGNOSIS — Z12.31 VISIT FOR SCREENING MAMMOGRAM: ICD-10-CM

## 2024-01-25 PROCEDURE — 77067 SCR MAMMO BI INCL CAD: CPT | Mod: TC | Performed by: RADIOLOGY

## 2024-01-29 DIAGNOSIS — R56.9 SEIZURE (H): ICD-10-CM

## 2024-02-02 RX ORDER — LAMOTRIGINE 100 MG/1
TABLET ORAL
Qty: 90 TABLET | Refills: 3 | OUTPATIENT
Start: 2024-02-02

## 2024-02-21 NOTE — PROGRESS NOTES
NEURO-ONCOLOGY INITIAL VISIT  Apr 13, 2018    CHIEF COMPLAINT: Ms. Barbi Tiwari is a 57 year old right-handed woman with a right frontal diffuse oligodendroglioma (1p19q co-deleted), diagnosed following resection in 5/2011. She is presenting to this initial clinic visit as referred by Dr. Neo Daugherty for evaluation and recommendations on treatment in the setting of possible disease recurrence on MR brain imaging from 4/2/2018.     Accompanying her to this visit is Kenneth ().       HISTORY OF PRESENT ILLNESS  A summary of the patient s oncologic history is as follows;   -4/27/2011 PRESENTATION: New onset seizure, generalized event.   -5/2011 MRB with a non-contrast enhancing right frontal mass lesion.   -5/2011 SURGERY: Craniectomy for mass resection at [facility].   PATHOLOGY: Diffuse oligodendroglioma; WHO grade II, 1p/19q co-deleted.  -6/2011-5/2012 CHEMO: Adjuvant dosed temozolomide x 12 cycles.  -4/2/2018 MRB with possible disease recurrence with a new 1.2 cm non-enhancing nodule within the cortex of the right frontal lobe adjacent to the resection cavity.  -4/12/2018 NEURO-ONC: Recommending repeat resection, appointment scheduled with Dr. Dustin Rodriguez, neurosurgery at the [facility].     Today in clinic;   She denies any new symptoms; no weakness, numbness, headaches or visual changes. Continues to experience chonic, low-grade fatigued attributed to her anti-seizure medications; Lamictal and Keppra. Also on gabapentin for to help with RLS and sleep initiation. No change in her cognitive function. 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    Continues to have 1-2 episodes/ months of right sided facial twitching associated with some difficulty talking and swallowing. She has had a generalized seizure.        Off all steroids.    Fertility preservation was not discussed with Barbi as she underwent total laparoscopic hysterectomy in 2008 for fibroid  uterus.    REVIEW OF SYSTEMS  A comprehensive ROS negative except as in HPI.      MEDICATIONS   Current Outpatient Prescriptions   Medication Sig Dispense Refill     LORazepam (ATIVAN) 0.5 MG tablet Take 1 tablet (0.5 mg) by mouth every 6 hours as needed for anxiety 30 tablet 0     gabapentin (NEURONTIN) 100 MG capsule 1.5 hrs before bedtime for 7 nights, increase to 2 pills if not effective after 7 days, increase to 3 pills after 7 days if not completely effective 90 capsule 1     Omeprazole Magnesium (PRILOSEC OTC PO) Take 20 mg by mouth       albuterol (PROAIR HFA/PROVENTIL HFA/VENTOLIN HFA) 108 (90 BASE) MCG/ACT Inhaler Inhale 2 puffs into the lungs every 6 hours as needed for shortness of breath / dyspnea or wheezing 1 Inhaler 0     ascorbic acid 500 MG TABS Take 500 mg by mouth 2 times daily       ferrous sulfate (IRON) 325 (65 FE) MG tablet Take 325 mg by mouth 2 times daily Reported on 5/15/2017       VITAMIN D, CHOLECALCIFEROL, PO Take 1,000 Units by mouth 2 times daily Reported on 5/15/2017       LAMOTRIGINE PO Take 300 mg by mouth 2 times daily       ranitidine (ZANTAC) 150 MG tablet Take 1 tablet (150 mg) by mouth 2 times daily as needed for heartburn       COMPOUND (CMPD RX) - PHARMACY TO MIX COMPOUNDED MEDICATION Apply 1g daily for 2 weeks, then twice weekly to vagina 20 g 11     tretinoin (RETIN-A) 0.05 % cream Spread a pea size amount into affected area topically at bedtime.  Use sunscreen SPF>20. 45 g 11     acetaminophen (TYLENOL) 500 MG tablet Take 1,000 mg by mouth every 6 hours if needed. Max acetaminophen dose: 4000mg in 24 hrs.       ibuprofen (ADVIL,MOTRIN) 200 MG tablet Take 4 tablets by mouth 4 times daily if needed for Pain or Headache. (rare use)       Multiple Vitamin (MULTI-VITAMINS) TABS take 1 tablet by oral route once daily with food       levETIRAcetam (KEPPRA) 500 MG tablet 1,500 mg 2 times daily Take 3 tablets am and 3 tablets pm       [DISCONTINUED] gabapentin (NEURONTIN) 100 MG  capsule Take two pills (200 MG) 1 and a 1/2 hours before bed. 60 capsule 1     DRUG ALLERGIES   Allergies   Allergen Reactions     Benoxinate Nausea and Vomiting     Sulfa Drugs Hives     IMMUNIZATIONS   Immunization History   Administered Date(s) Administered     Influenza (IIV3) PF 10/15/2014     Tdap (Adacel,Boostrix) 02/26/2013     PAST MEDICAL HISTORY   Past Medical History:   Diagnosis Date     Allergic rhinitis 12/9/2009     Calculus of kidney 1/12/2011    Overview:  S/P LITHOTRIPSY 3/15/11      Esophageal reflux 10/22/2008     Hyperparathyroidism s/p surgery 1/11/2011     Hyperparathyroidism s/p surgery 1/11/2011     oligodendroglioma 7/23/2012     Osteoarthrosis 12/9/2009     Palpitations 6/5/2014     PVCs 6/5/2014     Seizure disorder (related to tumor) 8/17/2011     PAST SURGICAL HISTORY   Past Surgical History:   Procedure Laterality Date     CRANIOTOMY  2011    tumor resection     EXTRACORPOREAL SHOCK WAVE LITHOTRIPSY, CYSTOSCOPY, INSERT STENT URETER(S), COMBINED Bilateral 5/5/2017    Procedure: COMBINED EXTRACORPOREAL SHOCK WAVE LITHOTRIPSY, CYSTOSCOPY, INSERT STENT URETER(S);  CYSTO, URETHRAL DILATION, URETERAL LEFT STENT PLACEMENT, LEFT ESWL.;  Surgeon: Angel Del Rio MD;  Location: [facility] OR     FOOT SURGERY      mulitple     HYSTERECTOMY, PAP NO LONGER INDICATED  2008    lap hys     LITHOTRIPSY  2010 2017     PARATHYROIDECTOMY  2011     SOCIAL HISTORY   History   Smoking Status     Never Smoker   Smokeless Tobacco     Never Used      Alcohol use Yes   Comment: 1-2 per month, only at social events    History   Drug Use No     Employment: Nurse.   , 2 children (son 23 and daughter 21)    FAMILY HISTORY   Family History   Problem Relation Age of Onset     Arthritis Mother      Depression Mother      Respiratory Mother      COPD     C.A.D. Father 58     heart attack     DIABETES Brother 70     Depression Sister      Depression Son      Allergies Son      Depression Daughter      Allergies  "Daughter      Eczema Brother      Depression Sister      Depression Sister        PHYSICAL EXAMINATION  /76  Pulse 59  Temp 96.6  F (35.9  C) (Tympanic)  Resp 16  Ht 1.575 m (5' 2.01\")  Wt 67.2 kg (148 lb 3.2 oz)  SpO2 99%  BMI 27.1 kg/m2  Wt Readings from Last 2 Encounters:   04/13/18 67.2 kg (148 lb 3.2 oz)   04/09/18 67.9 kg (149 lb 9.6 oz)    Ht Readings from Last 2 Encounters:   04/13/18 1.575 m (5' 2.01\")   04/09/18 1.575 m (5' 2.01\")     KPS: 100    -Generally well appearing.  -Throat: No oral thrush.  -Respiratory: Normal breath sounds, no audible wheezing.   -Skin: No rashes. Healed head incision.  -Hematologic/ lymphatic: No abnormal bruising. No leg swelling.  -Psychiatric: Normal mood and affect, tearful at times. Pleasant, talkative.  -Neurologic:   MENTAL STATUS:     Alert, oriented to date.    Recall: Immediate 3/3, delayed 3/3.    Speech fluent. Comprehension intact to multi-step commands.   Normal naming, repetition. Able to read.   Good right-left orientation.     CRANIAL NERVES:     Discs flat on fundoscopy.    Pupils are equal, round, reactive to light.     Extraocular movements full, patient denies diplopia.     Visual fields full.     Facial sensation intact to light touch.   Decreased activation with smiling/ talking on the left side of face.    Hearing intact.   Palate moves symmetrically.     Sternocleidomastoid and trapezius strength intact.    Tongue midline.  MOTOR:    Normal and symmetric tone.   Grossly 5/5 throughout.    No pronation or drift. No orbiting.   Able to rise from a chair without use of arms.   On toe/ heel walk, equal distance from floor to heels/ toes.   SENSATION:    Intact to light touch throughout.  COORDINATION:   Intact finger-nose with eyes open and closed.   REFLEXES:    Slightly more brisk on the left hand.    Toes not tested. No clonus. No Hoffmans.   No grasp.    GAIT:  Walks without assistance. Slightly antalgic, left foot pain.    Good speed. " Normal stride length and heel strike. Normal turns. Normal arm swing.   Able to toe, heel walk. Able to tandem walk.       MEDICAL RECORDS  Obtained and personally reviewed all available outside medical records in addition to reviewing any records available in our electronic system.     LABS  Personally reviewed all available lab results.     IMAGING  Personally reviewed MR brain imaging from 4/2017 and compared to that performed in 4/2018. To my eye, there is no new contrast enhancement, however, there is a nodular area of T2 FLAIR changes on the medial lips of the right frontal resection cavity.     Imaging was shown to and results were reviewed with Melvin.     Imaging and case will be reviewed and discussed at Brain Tumor Conference.       IMPRESSION  For the 60 minute appointment, more than 50% of the encounter was spent discussing in detail the nature of this tumor in light of the recent changes on imaging, providing emotional support, answering questions pertaining to my recommendations, and devising the treatment plan as outlined below. It was explained to Barbi and Kenneth that she has a brain tumor for which there is currently no cure. Therefore, cancer-directed treatment strives to slow further growth and increase the time interval to recurrence.    Imaging is concerning for disease recurrence given the new non-contrast enhancing nodular lesion on the medial aspect of the right frontal resection cavity. She is grossly asymptomatic with no increase in severity or frequency of seizures. With regard to cancer-directed therapy, a multimodal treatment approach first involves attempting a maximally safe total surgical resection. Following surgery, treatment will be directed based on review of the pathology.    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  -Treatment options including chemotherapy +/- radiation or even close imaging surveillance to be directed by the pathology results.    -SEIZURE MANAGEMENT-  -Continue to follow with currently epileptologist.   -Currently on VPA and Keppra.     -Quality of life/ MOOD/ FATIGUE-  -Denies any mood issues.  -Continue to monitor mood as untreated/ undertreated depression can worsen fatigue, dysorexia, and quality of life.  -Issues with sleep well controlled.     Return to clinic pending plan for surgery.     In the meantime, Barbi knows to call with questions or concerns or to report new complaints and can be seen sooner if needed. Urgent evaluation is needed in the setting of acute onset of severe headache, abrupt change in mental status, on-going seizures, new focal deficits, or new leg swelling/ pain. Everyone in attendance voiced understanding.    Myrtle Gallagher MD  Neuro-oncology   (2) more than 100 beats/min

## 2024-03-14 ENCOUNTER — VIRTUAL VISIT (OUTPATIENT)
Dept: NEUROLOGY | Facility: CLINIC | Age: 64
End: 2024-03-14
Payer: COMMERCIAL

## 2024-03-14 VITALS — BODY MASS INDEX: 27.6 KG/M2 | HEIGHT: 62 IN | WEIGHT: 150 LBS

## 2024-03-14 DIAGNOSIS — R56.9 SEIZURE (H): ICD-10-CM

## 2024-03-14 DIAGNOSIS — G25.81 RESTLESS LEGS SYNDROME (RLS): Primary | ICD-10-CM

## 2024-03-14 ASSESSMENT — PAIN SCALES - GENERAL: PAINLEVEL: NO PAIN (1)

## 2024-03-14 NOTE — PROGRESS NOTES
Virtual Visit Details    Type of service:  Video Visit   Video Start 11:39 am   Video End: 11: 50 am   Originating Location (pt. Location): Home    Distant Location (provider location):  On-site  Platform used for Video Visit: [facility] Epilepsy Care Progress Note    Patient:  Barbi Tiwari  :  1960   Age:  63 year old   Today's Office Visit:  3/14/2024      EPILEPSY HISTORY copied forward:   Right-handed female with a history of right frontal oligodendroglioma, status post resection in 2011, status post Temodar treatment completed in .  The patient had onset of seizure 2011, at which time she lost consciousness while she was in a car at a stoplight.  She woke up in the emergency room at [facility] and was told she had a brain tumor.  She had the resection in 2011 and had her second seizure in 2011, which was a prolonged seizure over 20 minutes.  She had repetitive clustering of left arm twitching, and it felt like she was raising her left arm, but she really was not, she states.  She clustered for 20 minutes.  She had another episode of clustering in 2012.      INTERVAL HISTORY:   Visit alone, without  River. She had 1 focal seizures with no impairment in awareness in   - one winter 2023, two 2022, one 2021, one 3/2021. Each seizure was 20 seconds. Seizure semiology. On current antiepileptic drug, she feels sleepy, she tripped on bump in the road twice, she thinks her walking is stable, she does waver to the left. She denies experiencing dizziness, has double vision if she takes medications with no food, no nausea, no vomiting, no abdominal pain, no mood changes, no ER visits, no hospitalizations, and had no significant fall with trauma.  It seems lowering her lamotrigine did decrease her falls.  she had 5-10 falls per year, in  she had two years. She is has irregular sleep pattern, she gives me an example I took a 2 hour nap, then went to bed at 2  am. I offered sleep consult and she declined. She had sleep study and was told she had rest less leg. She feels better on  Gabapentin at night, she wakes up less at night. I encouraged her to talk to primary care provider about fatigue.     Family members have not noticed staring spells, night time convulsion.   Lowering levetiracetam did not help reduce fatigue. We lowered lamotrigine and this has not made a difference in balance. She needs a primary care provider workup fatigue.     She is working with Dr. Gallagher regarding oligodendroglioma, in remission.     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 She reduced wok to 0.6 FTE.     Seizure Type:   Seizure type 1:  The patient notices her tongue moving around in her mouth.  This is typically her warning.  The right side of her mouth, cheek and lips begins to twitch, and it moves to the left side of the mouth and cheek.  She also has left facial twitching and eye twitching, has difficulty swallowing.  She is fully aware of her environment.     MEDICATIONS:   1.  Levetiracetam, [place]   2.  Lamotrigine, [place]   3.  Gabapentin       Times of Days  8AM   8 pm      Medication Tablet Size Number of Tablets/Capsules Total Daily Dosage   Lamotrigine   25 mg  2             Lamotrigine   200 mg   1      1    500 mg    Gabapentin    100 mg         3    300 mg   Levetiracetam   500 mg  1      2    1500 mg       MEDICATION NOTES:  Gabapentin increase to 300 mg at bedtime worsened morning fatigue   Levetiracetam increase to 2000 mg nightly may have worsened fatigue, no change to schedule.       SOCIAL copied forward:  [place]  She has 7 siblings, 3 sisters and 4 brothers.  She is the 7th child.  Her father passed away when she was 17 years old.  She has completed regular classes, has a master's degree in nursing.  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   "She has worked there for 30+ years.  She has 2 kids, adults.  She drinks rarely, maybe 1 drink a month.  No smoking. Less than 4 servings of coffee or pop per day.  No recreational drug use.     EXAMINATION:  Ht 5' 2\" (157.5 cm)   Wt 150 lb (68 kg)   LMP  (LMP Unknown)   BMI 27.44 kg/m     Wt Readings from Last 2 Encounters:   03/14/24 150 lb (68 kg)   11/14/23 145 lb (65.8 kg)     Exam:   Alert, orientated, speech is fluent, face is symmetric, extra-ocular movement in tact, no focal deficits noted. bilateral hand tremor.       ASSESSMENT:   Ms. 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 Seizures consistent of tongue movements, right mouth/lips then left face mouth to left face with difficult swallowing. She has side effects lamotrigine with falls, her last lamotrigine level of 15. We will lower lamotrigine. When we lower antiepileptic drug there may be breakthrough seizures. Continue levetiracetam and  Gabapentin. She is has RLS on gabapentin. I encouraged her follow up with sleep consult.     She is working with Dr. Gallagher regarding oligodendroglioma, in remission.     PLAN:   Reduce lamotrigine (200 mg twice a day)       Times of Days  8AM   8 pm      Medication Tablet Size Number of Tablets/Capsules Total Daily Dosage                  Lamotrigine   200 mg   1      1    540 mg    Gabapentin    100 mg         3    300 mg   Levetiracetam   500 mg  1      2    1500 mg     - She needs a primary care provider workup fatigue.   - Follow up with sleep doctor RLS - gave sleep consult. Sleep therapist on hygiene will helpful  - check labs lamotrigine, levetiracetam   - Follow-up 8 months   - seizure rescue: Valium nasal spray 5 mg for seizures more than 3 minutes or more than 2 seizures within 8 hour   - She is working with Dr. Gallagher regarding oligodendroglioma, in remission.     Flakita Clark MD   Epilepsy Service Attending   [phone]          "

## 2024-03-14 NOTE — NURSING NOTE
"Patient reviewed medications and allergies in Mychart during e-check in and said everything looked correct.      Is the patient currently in the state of [place]? YES    Visit mode:VIDEO    If the visit is dropped, the patient can be reconnected by: VIDEO VISIT: Text to cell phone:   Telephone Information:   Mobile [phone]       Will anyone else be joining the visit?  Kenneth-Pt's    (If patient encounters technical issues they should call [phone]    How would you like to obtain your AVS? MyChart    Are changes needed to the allergy or medication list? Pt stated no med changes    Reason for visit: RECHECK (Patient said, \" Decrease dose of a medications, still having balance problems.\")      Xiomara Montoya VVF     "
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
## 2024-03-15 ENCOUNTER — MYC REFILL (OUTPATIENT)
Dept: INTERNAL MEDICINE | Facility: CLINIC | Age: 64
End: 2024-03-15
Payer: COMMERCIAL

## 2024-03-15 DIAGNOSIS — F32.1 MODERATE MAJOR DEPRESSION (H): ICD-10-CM

## 2024-03-15 RX ORDER — ESCITALOPRAM OXALATE 10 MG/1
10 TABLET ORAL DAILY
Qty: 90 TABLET | Refills: 0 | Status: SHIPPED | OUTPATIENT
Start: 2024-03-15 | End: 2024-04-11

## 2024-03-19 NOTE — PROGRESS NOTES
Barbi is a 63 year old  female who presents for annual exam.     Besides routine health maintenance, she has no other health concerns today .    HPI:  The patient's PCP is  Dustin Choudhury MD.      Patient here today for her annual GYN exam.  She had a negative mammogram in January.  She is a new patient to our clinic.  She has some vaginal dryness and pain with intercourse.  She has been prescribed vaginal estrogen in the past but has not had the best compliance.  She is a cardiac nurse and still working.  She status post ANIA.  Ovaries are intact.  She has never had a bone scan but does have a family history of osteoporosis.      GYNECOLOGIC HISTORY:    No LMP recorded (lmp unknown). Patient has had a hysterectomy.      Her current contraception method is: hysterectomy.  She  reports that she has never smoked. She has never been exposed to tobacco smoke. She has never used smokeless tobacco.    Patient is sexually active.  STD testing offered?  Declined  Last PHQ-9 score on record =       3/20/2024    10:22 AM   PHQ-9 SCORE   PHQ-9 Total Score 5     Last GAD7 score on record =       3/20/2024    10:22 AM   ELBERT-7 SCORE   Total Score 0     Alcohol Score = 1    HEALTH MAINTENANCE:  Cholesterol: (  Cholesterol   Date Value Ref Range Status   2022 263 (H) <200 mg/dL Final   2017 246 (H) <200 mg/dL Final     Comment:     Desirable:       <200 mg/dl   2015 249 (H) <200 mg/dL Final     Comment:     LDL Cholesterol is the primary guide to therapy.   The NCEP recommends further evaluation of: patients with cholesterol greater   than 200 mg/dL if additional risk factors are present, cholesterol greater   than   240 mg/dL, triglycerides greater than 150 mg/dL, or HDL less than 40 mg/dL.        Health maintenance updated:  yes    Care Gaps    Overdue          Never done URINE DRUG SCREEN (Yearly)     Never done DEPRESSION ACTION PLAN (Once)     MAR 20  2020 YEARLY PREVENTIVE VISIT (Yearly)  Last  completed: Mar 20, 2019     MARLENI 21  2021 Pneumococcal Vaccine: Pediatrics (0 to 5 Years) and At-Risk Patients (6 to 64 Years) (2 of 2 - PCV)  Last completed: 2020     Never done RSV VACCINE (Pregnancy & 60+) (1 - 1-dose 60+ series)     MAR 24  2022 ADVANCE CARE PLANNING (Every 5 Years)  Last completed: Mar 24, 2017     FEB 26  2023 DTAP/TDAP/TD IMMUNIZATION (3 - Td or Tdap)  Last completed:  LIPID (Yearly)  Last completed: 2022         Upcoming          MAY 14  2024 PHQ-9 (Every 6 Months)  Last completed:  MAMMO SCREENING (Yearly)  Last completed: 2024     MAR 9  2025 COLORECTAL CANCER SCREENING (COLONOSCOPY) (Every 10 Years)  Last completed: Mar 9, 2015     APR 18  2025 GLUCOSE (Every 3 Years)  Last completed: 2022       HISTORY:  OB History    Para Term  AB Living   2 2 2 0 0 2   SAB IAB Ectopic Multiple Live Births   0 0 0 0 0      # Outcome Date GA Lbr Isaias/2nd Weight Sex Delivery Anes PTL Lv   2 Term            1 Term                Patient Active Problem List   Diagnosis    Calculus of kidney    Hyperparathyroidism s/p surgery    GERD (gastroesophageal reflux disease)    Allergic rhinitis    Palpitations    PVC's (premature ventricular contractions)    Seizure (H)    Osteoarthritis    Elevated cholesterol    Abnormal screening cardiac CT    Lumbar radiculopathy    Advanced directives, counseling/discussion    Nephrolithiasis    Oligodendroglioma (H)    Moderate major depression, single episode (H)    S/P laparoscopic hysterectomy    Primary osteoarthritis of left knee    Acute left-sided low back pain without sciatica    Hip pain, left    Chemotherapy management, encounter for    High risk for chemotherapy-induced infectious complication    Obstruction of right ureteropelvic junction (UPJ) due to stone    Buttock pain    Pain of right thigh     Past Surgical History:   Procedure Laterality Date    ARTHRODESIS  FOOT Left 2/1/2022    Procedure: left first metatarsophalangeal joint arthrodesis, Tenotomy and capusulotomy 3rd metatarsalphalangeal joint;  Surgeon: Benjamin Griffin DPM;  Location:  OR    COMBINED CYSTOSCOPY, RETROGRADES, URETEROSCOPY, LASER HOLMIUM LITHOTRIPSY URETER(S), INSERT STENT Right 8/3/2021    Procedure: 1. Cystourethroscopy 2. Right ureteroscopy 3. Right retrograde pyelogram with interpretation of intraoperative fluoroscopic imaging 4. Holmium laser lithotripsy with basket stone extraction 5. Right ureteral stent placement;  Surgeon: Felix Cano MD;  Location:  OR    CRANIOTOMY  2011    tumor resection    CYSTOSCOPY, RETROGRADES, INSERT STENT URETER(S), COMBINED Right 7/20/2021    Procedure: 1.  Cystourethroscopy 2.  Attempted right ureteroscopy 3.  Right  retrograde pyelogram with interpretation of intraoperative fluoroscopic imaging 4.  Right ureteral dilatation 5.  Right ureteral stent placement 6.  Laser on stand-by;  Surgeon: Felix Cano MD;  Location:  OR    EXTRACORPOREAL SHOCK WAVE LITHOTRIPSY, CYSTOSCOPY, INSERT STENT URETER(S), COMBINED Bilateral 5/5/2017    Procedure: COMBINED EXTRACORPOREAL SHOCK WAVE LITHOTRIPSY, CYSTOSCOPY, INSERT STENT URETER(S);  CYSTO, URETHRAL DILATION, URETERAL LEFT STENT PLACEMENT, LEFT ESWL.;  Surgeon: Angel Del Rio MD;  Location:  OR    FOOT SURGERY      mulitple    HYSTERECTOMY, PAP NO LONGER INDICATED  2008    lap hys    LITHOTRIPSY  2010 2017    OPTICAL TRACKING SYSTEM CRANIOTOMY, EXCISE TUMOR WITH MAPPING, COMBINED Right 5/30/2018    Procedure: COMBINED OPTICAL TRACKING SYSTEM CRANIOTOMY, EXCISE TUMOR WITH MAPPING;  Right Stealth Assisted Craniotomy With Motor Mapping And Tumor Resection ;  Surgeon: Dustin Rodriguez MD;  Location: [facility] OR    ORTHOPEDIC SURGERY      feet, multiple on both    OSTEOTOMY FOOT Right 1/15/2019    Procedure: OSTEOTOMY FOOT;  Surgeon: Benjamin Griffin DPM;  Location:  OR    OSTEOTOMY FOOT  Left 2022    Procedure: left second metatarsal Weil osteotomy;  Surgeon: Benjamin Griffin DPM;  Location: [facility] OR    PARATHYROIDECTOMY  2011    RECONSTRUCT FOREFOOT WITH METATARSOPHALANGEAL (MTP) FUSION Right 1/15/2019    Procedure: RIGHT FIRST METATARSAL PHALAGEAL JOINT ARTHRODESIS, RIGHT SECOND METATARSAL WEIL OSTEOTOMY;  Surgeon: Benjamin Griffin DPM;  Location: [facility] OR    REPAIR HAMMER TOE Left 2022    Procedure: CORRECTION, left second HAMMER TOE;  Surgeon: Benjamin Griffin DPM;  Location: [facility] OR      Social History     Tobacco Use    Smoking status: Never     Passive exposure: Never    Smokeless tobacco: Never   Substance Use Topics    Alcohol use: Yes     Comment: social      Problem (# of Occurrences) Relation (Name,Age of Onset)    Anxiety Disorder (1) Daughter (Lexus)    Allergies (2) Son, Daughter    Arthritis (1) Mother (Latanya)    Depression (6) Mother (Latanya), Sister (Lesley), Son, Daughter, Sister (Karina), Sister (Dasia)    Diabetes (1) Brother (Jack, 70):  age 70    Heart Disease (1) Father (Bean)    Respiratory (1) Mother (Latanya): COPD    C.A.D. (1) Father (Bean, 58): heart attack    Eczema (1) Brother (Jc)    Coronary Artery Disease (1) Father (Bean): MI  age 54    LUNG DISEASE (1) Mother (Latanya)    Skin Cancer (1) Brother (Jc)              Current Outpatient Medications   Medication Sig    acetaminophen (TYLENOL) 500 MG tablet Take 2 tablets (1,000 mg) by mouth every 8 hours as needed for mild pain    CALCIUM PO     escitalopram (LEXAPRO) 10 MG tablet Take 1 tablet (10 mg) by mouth daily    famotidine (PEPCID) 10 MG tablet Take 1 tablet (10 mg) by mouth nightly as needed (heartburn)    gabapentin (NEURONTIN) 100 MG capsule Take 3 capsules (300 mg) by mouth every evening    ibuprofen (ADVIL/MOTRIN) 600 MG tablet Take 1 tablet (600 mg) by mouth every 6 hours as needed for moderate pain    Lactase (LACTAID PO) Take 1-2 tablets by mouth daily as needed for  "indigestion     lamoTRIgine (LAMICTAL) 200 MG tablet TAKE ONE TABLET BY MOUTH TWICE A DAY (TAKE ALONG WITH 25 MG TABLET FOR TOTAL  mg morning and 200 MG pm). [facility]    levETIRAcetam (KEPPRA) 500 MG tablet Take 1 Tablets (500 mg) by mouth every morning, AND Take 2 Tablets (1000 mg) by mouth every evening. [facility]    loratadine (CLARITIN) 10 MG tablet Take 10 mg by mouth daily    metoprolol tartrate (LOPRESSOR) 25 MG tablet Take 0.5 tablets (12.5 mg) by mouth 2 times daily    tretinoin (RETIN-A) 0.025 % external cream Apply a pea-sized amount to each area affected by acne. Start every 3-4 nights working up to every night.    vitamin B-12 (CYANOCOBALAMIN) 2500 MCG sublingual tablet Take 1 tablet (2,500 mcg) by mouth daily    VITAMIN D, CHOLECALCIFEROL, PO Take 1,000 Units by mouth daily     triamcinolone (KENALOG) 0.1 % external cream Apply topically 2 times daily To affected area in between breasts for 1-2 weeks. Tapering with improvement and restarting with flares. (Patient not taking: Reported on 3/20/2024)     No current facility-administered medications for this visit.     Allergies   Allergen Reactions    Sulfa Antibiotics Hives    Adhesive Tape Rash    Benoxinate Nausea and Vomiting       Past medical, surgical, social and family histories were reviewed and updated in EPIC.    ROS:   12 point review of systems negative other than symptoms noted below or in the HPI.  No urinary frequency or dysuria, bladder or kidney problems    EXAM:  /64   Ht 1.58 m (5' 2.2\")   Wt 66.5 kg (146 lb 9.6 oz)   LMP  (LMP Unknown)   BMI 26.64 kg/m     BMI: Body mass index is 26.64 kg/m .    PHYSICAL EXAM:  Constitutional:   Appearance: Well nourished, well developed, alert, in no acute distress  Breasts: Inspection of Breasts:  No lymphadenopathy present., Palpation of Breasts and Axillae:  No masses present on palpation, no breast tenderness., Axillary Lymph Nodes:  No lymphadenopathy present., and No " nodularity, asymmetry or nipple discharge bilaterally.  Neurologic:    Mental Status:  Oriented X3.  Normal strength and tone, sensory exam                grossly normal, mentation intact and speech normal.    Psychiatric:   Mentation appears normal and affect normal/bright.         Pelvic Exam:  External Genitalia:     Normal appearance for age, no discharge present, no tenderness present, no inflammatory lesions present, color normal  Vagina:     Vaginal wall prolapse,  no discharge present, no inflammatory lesions present, no masses present  Bladder:     Nontender to palpation  Urethra:   Urethral Body:  Urethra palpation normal, urethra structural support normal   Urethral Meatus:  No erythema or lesions present  Cervix:     Surgically absent  Uterus:     Surgically absent  Adnexa:     No adnexal tenderness present, no adnexal masses present  Perineum:     Perineum within normal limits, no evidence of trauma, no rashes or skin lesions present  Anus:     Anus within normal limits, no hemorrhoids present  Inguinal Lymph Nodes:     No lymphadenopathy present  Pubic Hair:     Normal pubic hair distribution for age  Genitalia and Groin:     No rashes present, no lesions present, no areas of discoloration, no masses present    COUNSELING:   Special attention given to:        Regular exercise       Healthy diet/nutrition       Osteoporosis prevention/bone health       (Marisa)menopause management    BMI: Body mass index is 26.64 kg/m .    ASSESSMENT:  63 year old female with satisfactory annual exam.    ICD-10-CM    1. Encounter for gynecological examination (general) (routine) without abnormal findings  Z01.419       2. S/P laparoscopic hysterectomy  Z90.710       3. Special screening for osteoporosis  Z13.820 DX Bone Density      4. Postmenopausal  Z78.0 DX Bone Density          PLAN:  Normal PM gyn exam. Vag E2 cream discussed. Discussed Dexa. No further pap screening needed.     CAMDEN Goins CNP

## 2024-03-20 ENCOUNTER — OFFICE VISIT (OUTPATIENT)
Dept: OBGYN | Facility: CLINIC | Age: 64
End: 2024-03-20
Payer: COMMERCIAL

## 2024-03-20 VITALS
HEIGHT: 62 IN | WEIGHT: 146.6 LBS | SYSTOLIC BLOOD PRESSURE: 100 MMHG | DIASTOLIC BLOOD PRESSURE: 64 MMHG | BODY MASS INDEX: 26.98 KG/M2

## 2024-03-20 DIAGNOSIS — Z78.0 POSTMENOPAUSAL: ICD-10-CM

## 2024-03-20 DIAGNOSIS — Z13.820 SPECIAL SCREENING FOR OSTEOPOROSIS: ICD-10-CM

## 2024-03-20 DIAGNOSIS — Z90.710 S/P LAPAROSCOPIC HYSTERECTOMY: ICD-10-CM

## 2024-03-20 DIAGNOSIS — Z01.419 ENCOUNTER FOR GYNECOLOGICAL EXAMINATION (GENERAL) (ROUTINE) WITHOUT ABNORMAL FINDINGS: Primary | ICD-10-CM

## 2024-03-20 PROCEDURE — 99386 PREV VISIT NEW AGE 40-64: CPT | Performed by: NURSE PRACTITIONER

## 2024-03-20 PROCEDURE — 99459 PELVIC EXAMINATION: CPT | Performed by: NURSE PRACTITIONER

## 2024-03-20 ASSESSMENT — PATIENT HEALTH QUESTIONNAIRE - PHQ9
SUM OF ALL RESPONSES TO PHQ QUESTIONS 1-9: 5
5. POOR APPETITE OR OVEREATING: NOT AT ALL

## 2024-03-20 ASSESSMENT — ANXIETY QUESTIONNAIRES
7. FEELING AFRAID AS IF SOMETHING AWFUL MIGHT HAPPEN: NOT AT ALL
GAD7 TOTAL SCORE: 0
5. BEING SO RESTLESS THAT IT IS HARD TO SIT STILL: NOT AT ALL
GAD7 TOTAL SCORE: 0
IF YOU CHECKED OFF ANY PROBLEMS ON THIS QUESTIONNAIRE, HOW DIFFICULT HAVE THESE PROBLEMS MADE IT FOR YOU TO DO YOUR WORK, TAKE CARE OF THINGS AT HOME, OR GET ALONG WITH OTHER PEOPLE: NOT DIFFICULT AT ALL
3. WORRYING TOO MUCH ABOUT DIFFERENT THINGS: NOT AT ALL
2. NOT BEING ABLE TO STOP OR CONTROL WORRYING: NOT AT ALL
1. FEELING NERVOUS, ANXIOUS, OR ON EDGE: NOT AT ALL
6. BECOMING EASILY ANNOYED OR IRRITABLE: NOT AT ALL

## 2024-04-11 ENCOUNTER — OFFICE VISIT (OUTPATIENT)
Dept: INTERNAL MEDICINE | Facility: CLINIC | Age: 64
End: 2024-04-11
Payer: COMMERCIAL

## 2024-04-11 VITALS
DIASTOLIC BLOOD PRESSURE: 56 MMHG | WEIGHT: 146.9 LBS | BODY MASS INDEX: 27.03 KG/M2 | SYSTOLIC BLOOD PRESSURE: 98 MMHG | HEIGHT: 62 IN | TEMPERATURE: 97.5 F | HEART RATE: 62 BPM | RESPIRATION RATE: 14 BRPM | OXYGEN SATURATION: 97 %

## 2024-04-11 DIAGNOSIS — F32.1 MODERATE MAJOR DEPRESSION (H): ICD-10-CM

## 2024-04-11 DIAGNOSIS — E78.2 MIXED HYPERLIPIDEMIA: ICD-10-CM

## 2024-04-11 DIAGNOSIS — R53.83 OTHER FATIGUE: ICD-10-CM

## 2024-04-11 DIAGNOSIS — Z00.00 ROUTINE GENERAL MEDICAL EXAMINATION AT A HEALTH CARE FACILITY: Primary | ICD-10-CM

## 2024-04-11 DIAGNOSIS — R93.1 AGATSTON CORONARY ARTERY CALCIUM SCORE LESS THAN 100: ICD-10-CM

## 2024-04-11 DIAGNOSIS — F32.1 MODERATE MAJOR DEPRESSION, SINGLE EPISODE (H): ICD-10-CM

## 2024-04-11 DIAGNOSIS — Z13.1 SCREENING FOR DIABETES MELLITUS: ICD-10-CM

## 2024-04-11 DIAGNOSIS — I49.3 PVC'S (PREMATURE VENTRICULAR CONTRACTIONS): ICD-10-CM

## 2024-04-11 DIAGNOSIS — C71.9 OLIGODENDROGLIOMA (H): ICD-10-CM

## 2024-04-11 DIAGNOSIS — Z13.220 LIPID SCREENING: ICD-10-CM

## 2024-04-11 PROCEDURE — 90471 IMMUNIZATION ADMIN: CPT | Performed by: INTERNAL MEDICINE

## 2024-04-11 PROCEDURE — 99396 PREV VISIT EST AGE 40-64: CPT | Mod: 25 | Performed by: INTERNAL MEDICINE

## 2024-04-11 PROCEDURE — 90715 TDAP VACCINE 7 YRS/> IM: CPT | Performed by: INTERNAL MEDICINE

## 2024-04-11 PROCEDURE — 99214 OFFICE O/P EST MOD 30 MIN: CPT | Mod: 25 | Performed by: INTERNAL MEDICINE

## 2024-04-11 RX ORDER — ESCITALOPRAM OXALATE 10 MG/1
10 TABLET ORAL DAILY
Qty: 90 TABLET | Refills: 3 | Status: SHIPPED | OUTPATIENT
Start: 2024-04-11

## 2024-04-11 SDOH — HEALTH STABILITY: PHYSICAL HEALTH: ON AVERAGE, HOW MANY DAYS PER WEEK DO YOU ENGAGE IN MODERATE TO STRENUOUS EXERCISE (LIKE A BRISK WALK)?: 2 DAYS

## 2024-04-11 ASSESSMENT — PATIENT HEALTH QUESTIONNAIRE - PHQ9
SUM OF ALL RESPONSES TO PHQ QUESTIONS 1-9: 5
SUM OF ALL RESPONSES TO PHQ QUESTIONS 1-9: 5
10. IF YOU CHECKED OFF ANY PROBLEMS, HOW DIFFICULT HAVE THESE PROBLEMS MADE IT FOR YOU TO DO YOUR WORK, TAKE CARE OF THINGS AT HOME, OR GET ALONG WITH OTHER PEOPLE: SOMEWHAT DIFFICULT

## 2024-04-11 ASSESSMENT — SOCIAL DETERMINANTS OF HEALTH (SDOH): HOW OFTEN DO YOU GET TOGETHER WITH FRIENDS OR RELATIVES?: ONCE A WEEK

## 2024-04-11 NOTE — PROGRESS NOTES
"Preventive Care Visit  [facility]  Dustin Choudhury MD, Internal Medicine  Apr 11, 2024      Assessment & Plan     Routine general medical examination at a health care facility  Updated screening, immunizations, prevention.  Please see health maintenance list, care gaps     Oligodendroglioma (H)  Per neuro-oncology - disease burden quite stable over  last several yrs.     Other fatigue  Chronic- most likely result of her meds , sleep schedule than anything else.    - TSH with free T4 reflex; Future  - Cortisol; Future    PVC's (premature ventricular contractions)  Quite rare- possibly more s/e from b-b than benefit from it. Ok to stop metoprolol entirely to see if this helps with fatigue    Agatston coronary artery calcium score less than 100  Mixed hyperlipidemia  Cor CaCT score of 5 10 yrs ago- some fam hx as well. My rec to her is if her LDL remains elevated given how well she has done from a oncology standpoint she has more to again from looking at prev strategies aimed at reducing her overall CV risk- thus I recommend starting a statin     Moderate major depression, single episode (H)  Cont escitalopram     Screening for diabetes mellitus  - Basic metabolic panel  (Ca, Cl, CO2, Creat, Gluc, K, Na, BUN); Future              BMI  Estimated body mass index is 26.65 kg/m  as calculated from the following:    Height as of this encounter: 1.581 m (5' 2.25\").    Weight as of this encounter: 66.6 kg (146 lb 14.4 oz).       Counseling  Appropriate preventive services were discussed with this patient, including applicable screening as appropriate for fall prevention, nutrition, physical activity, Tobacco-use cessation, weight loss and cognition.  Checklist reviewing preventive services available has been given to the patient.  Reviewed patient's diet, addressing concerns and/or questions.   She is at risk for lack of exercise and has been provided with information to increase physical activity for " the benefit of her well-being.   The patient's PHQ-9 score is consistent with mild depression. She was provided with information regarding depression.           Scotty Landon is a 63 year old, presenting for the following:  Physical      Health Care Directive  Patient does not have a Health Care Directive or Living Will: Discussed advance care planning with patient; however, patient declined at this time.    HPI            4/11/2024   General Health   How would you rate your overall physical health? Good         4/11/2024   Nutrition   Three or more servings of calcium each day? Yes   Diet: Regular (no restrictions)   How many servings of fruit and vegetables per day? (!) 2-3   How many sweetened beverages each day? 0-1         4/11/2024   Exercise   Days per week of moderate/strenous exercise 2 days   (!) EXERCISE CONCERN      4/11/2024   Social Factors   Frequency of gathering with friends or relatives Once a week   Worry food won't last until get money to buy more No   Food not last or not have enough money for food? No   Do you have housing?  Yes   Are you worried about losing your housing? No   Lack of transportation? No   Unable to get utilities (heat,electricity)? No         4/11/2024   Fall Risk   Fallen 2 or more times in the past year? No   Trouble with walking or balance? Yes   Gait Speed Test Interpretation Less than or equal to 5.00 seconds - PASS           4/11/2024   Dental   Dentist two times every year? Yes         4/11/2024   TB Screening   Were you born outside of the [place]? No       Today's PHQ-9 Score:       4/11/2024     8:00 AM   PHQ-9 SCORE   PHQ-9 Total Score MyChart 5 (Mild depression)   PHQ-9 Total Score 5         4/11/2024   Substance Use   Alcohol more than 3/day or more than 7/wk No   Do you use any other substances recreationally? No     Social History     Tobacco Use    Smoking status: Never     Passive exposure: Never    Smokeless tobacco: Never   Substance Use Topics    Alcohol  "use: Not Currently     Comment: social    Drug use: No           1/25/2024   LAST FHS-7 RESULTS   1st degree relative breast or ovarian cancer No   Any relative bilateral breast cancer No   Any male have breast cancer No   Any ONE woman have BOTH breast AND ovarian cancer No   Any woman with breast cancer before 50yrs No   2 or more relatives with breast AND/OR ovarian cancer No   2 or more relatives with breast AND/OR bowel cancer No        Mammogram Screening - Mammogram every 1-2 years updated in Health Maintenance based on mutual decision making        4/11/2024   STI Screening   New sexual partner(s) since last STI/HIV test? No     History of abnormal Pap smear: Status post benign hysterectomy. Health Maintenance and Surgical History updated.       ASCVD Risk   The 10-year ASCVD risk score (Shelli ESTRADA, et al., 2019) is: 2.9%    Values used to calculate the score:      Age: 63 years      Sex: Female      Is Non- : No      Diabetic: No      Tobacco smoker: No      Systolic Blood Pressure: 98 mmHg      Is BP treated: No      HDL Cholesterol: 56 mg/dL      Total Cholesterol: 263 mg/dL           Reviewed and updated as needed this visit by Provider                             Objective    Exam  BP 98/56   Pulse 62   Temp 97.5  F (36.4  C) (Temporal)   Resp 14   Ht 1.581 m (5' 2.25\")   Wt 66.6 kg (146 lb 14.4 oz)   LMP  (LMP Unknown)   SpO2 97%   BMI 26.65 kg/m     Estimated body mass index is 26.65 kg/m  as calculated from the following:    Height as of this encounter: 1.581 m (5' 2.25\").    Weight as of this encounter: 66.6 kg (146 lb 14.4 oz).    Physical Exam  GENERAL: alert and no distress  EYES: Eyes grossly normal to inspection, PERRL and conjunctivae and sclerae normal  HENT: normal cephalic/atraumatic, ear canals and TM's normal, nose and mouth without ulcers or lesions, oropharynx clear, and oral mucous membranes moist  NECK: no adenopathy, no asymmetry, masses, or " scars  RESP: lungs clear to auscultation - no rales, rhonchi or wheezes  CV: regular rate and rhythm, normal S1 S2, no S3 or S4, no murmur, click or rub, no peripheral edema  ABDOMEN: soft, nontender, no hepatosplenomegaly, no masses and bowel sounds normal  MS: no gross musculoskeletal defects noted, no edema  SKIN: no suspicious lesions or rashes  NEURO: Normal strength and tone, mentation intact and speech normal  PSYCH: mentation appears normal, affect normal/bright  LYMPH: no cervical adenopathy        Signed Electronically by: Dustin Choudhury MD

## 2024-04-11 NOTE — PATIENT INSTRUCTIONS
Preventive Care Advice   This is general advice given by our system to help you stay healthy. However, your care team may have specific advice just for you. Please talk to your care team about your preventive care needs.  Nutrition  Eat 5 or more servings of fruits and vegetables each day.  Try wheat bread, brown rice and whole grain pasta (instead of white bread, rice, and pasta).  Get enough calcium and vitamin D. Check the label on foods and aim for 100% of the RDA (recommended daily allowance).  Lifestyle  Exercise at least 150 minutes each week   (30 minutes a day, 5 days a week).  Do muscle strengthening activities 2 days a week. These help control your weight and prevent disease.  No smoking.  Wear sunscreen to prevent skin cancer.  Have a dental exam and cleaning every 6 months.  Yearly exams  See your health care team every year to talk about:  Any changes in your health.  Any medicines your care team has prescribed.  Preventive care, family planning, and ways to prevent chronic diseases.  Shots (vaccines)   HPV shots (up to age 26), if you've never had them before.  Hepatitis B shots (up to age 59), if you've never had them before.  COVID-19 shot: Get this shot when it's due.  Flu shot: Get a flu shot every year.  Tetanus shot: Get a tetanus shot every 10 years.  Pneumococcal, hepatitis A, and RSV shots: Ask your care team if you need these based on your risk.  Shingles shot (for age 50 and up).  General health tests  Diabetes screening:  Starting at age 35, Get screened for diabetes at least every 3 years.  If you are younger than age 35, ask your care team if you should be screened for diabetes.  Cholesterol test: At age 39, start having a cholesterol test every 5 years, or more often if advised.  Bone density scan (DEXA): At age 50, ask your care team if you should have this scan for osteoporosis (brittle bones).  Hepatitis C: Get tested at least once in your life.  STIs (sexually transmitted  infections)  Before age 24: Ask your care team if you should be screened for STIs.  After age 24: Get screened for STIs if you're at risk. You are at risk for STIs (including HIV) if:  You are sexually active with more than one person.  You don't use condoms every time.  You or a partner was diagnosed with a sexually transmitted infection.  If you are at risk for HIV, ask about PrEP medicine to prevent HIV.  Get tested for HIV at least once in your life, whether you are at risk for HIV or not.  Cancer screening tests  Cervical cancer screening: If you have a cervix, begin getting regular cervical cancer screening tests at age 21. Most people who have regular screenings with normal results can stop after age 65. Talk about this with your provider.  Breast cancer scan (mammogram): If you've ever had breasts, begin having regular mammograms starting at age 40. This is a scan to check for breast cancer.  Colon cancer screening: It is important to start screening for colon cancer at age 45.  Have a colonoscopy test every 10 years (or more often if you're at risk) Or, ask your provider about stool tests like a FIT test every year or Cologuard test every 3 years.  To learn more about your testing options, visit: https://www.Lango/238724.pdf.  For help making a decision, visit: https://bit.ly/ju15151.  Prostate cancer screening test: If you have a prostate and are age 55 to 69, ask your provider if you would benefit from a yearly prostate cancer screening test.  Lung cancer screening: If you are a current or former smoker age 50 to 80, ask your care team if ongoing lung cancer screenings are right for you.  For informational purposes only. Not to replace the advice of your health care provider. Copyright   2023 [facility]. All rights reserved. Clinically reviewed by the [facility]. TapHome 389237 - REV 01/24.    Preventing Falls: Care Instructions  Injuries and health  problems such as trouble walking or poor eyesight can increase your risk of falling. So can some medicines. But there are things you can do to help prevent falls. You can exercise to get stronger. You can also arrange your home to make it safer.    Talk to your doctor about the medicines you take. Ask if any of them increase the risk of falls and whether they can be changed or stopped.   Try to exercise regularly. It can help improve your strength and balance. This can help lower your risk of falling.     Practice fall safety and prevention.    Wear low-heeled shoes that fit well and give your feet good support. Talk to your doctor if you have foot problems that make this hard.  Carry a cellphone or wear a medical alert device that you can use to call for help.  Use stepladders instead of chairs to reach high objects. Don't climb if you're at risk for falls. Ask for help, if needed.  Wear the correct eyeglasses, if you need them.    Make your home safer.    Remove rugs, cords, clutter, and furniture from walkways.  Keep your house well lit. Use night-lights in hallways and bathrooms.  Install and use sturdy handrails on stairways.  Wear nonskid footwear, even inside. Don't walk barefoot or in socks without shoes.    Be safe outside.    Use handrails, curb cuts, and ramps whenever possible.  Keep your hands free by using a shoulder bag or backpack.  Try to walk in well-lit areas. Watch out for uneven ground, changes in pavement, and debris.  Be careful in the winter. Walk on the grass or gravel when sidewalks are slippery. Use de-icer on steps and walkways. Add non-slip devices to shoes.    Put grab bars and nonskid mats in your shower or tub and near the toilet. Try to use a shower chair or bath bench when bathing.   Get into a tub or shower by putting in your weaker leg first. Get out with your strong side first. Have a phone or medical alert device in the bathroom with you.   Where can you learn more?  Go to  "https://www.Satomi.net/patiented  Enter G117 in the search box to learn more about \"Preventing Falls: Care Instructions.\"  Current as of: July 17, 2023               Content Version: 14.0    2980-1756 [employer].   Care instructions adapted under license by your healthcare professional. If you have questions about a medical condition or this instruction, always ask your healthcare professional. [employer] disclaims any warranty or liability for your use of this information.      Learning About Depression Screening  What is depression screening?  Depression screening is a way to see if you have depression symptoms. It may be done by a doctor or counselor. It's often part of a routine checkup. That's because your mental health is just as important as your physical health.  Depression is a mental health condition that affects how you feel, think, and act. You may:  Have less energy.  Lose interest in your daily activities.  Feel sad and grouchy for a long time.  Depression is very common. It affects people of all ages.  Many things can lead to depression. Some people become depressed after they have a stroke or find out they have a major illness like cancer or heart disease. The death of a loved one or a breakup may lead to depression. It can run in families. Most experts believe that a combination of inherited genes and stressful life events can cause it.  What happens during screening?  You may be asked to fill out a form about your depression symptoms. You and the doctor will discuss your answers. The doctor may ask you more questions to learn more about how you think, act, and feel.  What happens after screening?  If you have symptoms of depression, your doctor will talk to you about your options.  Doctors usually treat depression with medicines or counseling. Often, combining the two works best. Many people don't get help because they think that they'll get over the depression on " "their own. But people with depression may not get better unless they get treatment.  The cause of depression is not well understood. There may be many factors involved. But if you have depression, it's not your fault.  A serious symptom of depression is thinking about death or suicide. If you or someone you care about talks about this or about feeling hopeless, get help right away.  It's important to know that depression can be treated. Medicine, counseling, and self-care may help.  Where can you learn more?  Go to https://www.Xinrong.net/patiented  Enter T185 in the search box to learn more about \"Learning About Depression Screening.\"  Current as of: June 24, 2023               Content Version: 14.0    5070-5786 [facility].   Care instructions adapted under license by your healthcare professional. If you have questions about a medical condition or this instruction, always ask your healthcare professional. [employer], [employer] disclaims any warranty or liability for your use of this information.      "

## 2024-04-18 ENCOUNTER — LAB (OUTPATIENT)
Dept: LAB | Facility: CLINIC | Age: 64
End: 2024-04-18
Payer: COMMERCIAL

## 2024-04-18 ENCOUNTER — ANCILLARY PROCEDURE (OUTPATIENT)
Dept: MRI IMAGING | Facility: CLINIC | Age: 64
End: 2024-04-18
Attending: PSYCHIATRY & NEUROLOGY
Payer: COMMERCIAL

## 2024-04-18 DIAGNOSIS — Z13.220 LIPID SCREENING: ICD-10-CM

## 2024-04-18 DIAGNOSIS — Z13.1 SCREENING FOR DIABETES MELLITUS: ICD-10-CM

## 2024-04-18 DIAGNOSIS — G25.81 RESTLESS LEGS SYNDROME (RLS): ICD-10-CM

## 2024-04-18 DIAGNOSIS — R56.9 SEIZURE (H): ICD-10-CM

## 2024-04-18 DIAGNOSIS — R53.83 OTHER FATIGUE: ICD-10-CM

## 2024-04-18 DIAGNOSIS — C71.9 OLIGODENDROGLIOMA (H): ICD-10-CM

## 2024-04-18 LAB
ANION GAP SERPL CALCULATED.3IONS-SCNC: 9 MMOL/L (ref 7–15)
BUN SERPL-MCNC: 22.3 MG/DL (ref 8–23)
CALCIUM SERPL-MCNC: 9.2 MG/DL (ref 8.8–10.2)
CHLORIDE SERPL-SCNC: 106 MMOL/L (ref 98–107)
CHOLEST SERPL-MCNC: 238 MG/DL
CREAT SERPL-MCNC: 0.72 MG/DL (ref 0.51–0.95)
DEPRECATED HCO3 PLAS-SCNC: 28 MMOL/L (ref 22–29)
EGFRCR SERPLBLD CKD-EPI 2021: >90 ML/MIN/1.73M2
FASTING STATUS PATIENT QL REPORTED: YES
GLUCOSE SERPL-MCNC: 95 MG/DL (ref 70–99)
HDLC SERPL-MCNC: 43 MG/DL
LDLC SERPL CALC-MCNC: 167 MG/DL
NONHDLC SERPL-MCNC: 195 MG/DL
POTASSIUM SERPL-SCNC: 4 MMOL/L (ref 3.4–5.3)
SODIUM SERPL-SCNC: 143 MMOL/L (ref 135–145)
TRIGL SERPL-MCNC: 139 MG/DL
TSH SERPL DL<=0.005 MIU/L-ACNC: 1.1 UIU/ML (ref 0.3–4.2)

## 2024-04-18 PROCEDURE — 82533 TOTAL CORTISOL: CPT | Performed by: INTERNAL MEDICINE

## 2024-04-18 PROCEDURE — A9585 GADOBUTROL INJECTION: HCPCS | Performed by: RADIOLOGY

## 2024-04-18 PROCEDURE — 80177 DRUG SCRN QUAN LEVETIRACETAM: CPT | Performed by: PSYCHIATRY & NEUROLOGY

## 2024-04-18 PROCEDURE — 80061 LIPID PANEL: CPT | Performed by: PATHOLOGY

## 2024-04-18 PROCEDURE — 84443 ASSAY THYROID STIM HORMONE: CPT | Performed by: PATHOLOGY

## 2024-04-18 PROCEDURE — 80048 BASIC METABOLIC PNL TOTAL CA: CPT | Performed by: PATHOLOGY

## 2024-04-18 PROCEDURE — 80175 DRUG SCREEN QUAN LAMOTRIGINE: CPT | Mod: 90 | Performed by: PATHOLOGY

## 2024-04-18 PROCEDURE — 70553 MRI BRAIN STEM W/O & W/DYE: CPT | Performed by: RADIOLOGY

## 2024-04-18 PROCEDURE — 99000 SPECIMEN HANDLING OFFICE-LAB: CPT | Performed by: PATHOLOGY

## 2024-04-18 PROCEDURE — 36415 COLL VENOUS BLD VENIPUNCTURE: CPT | Performed by: PATHOLOGY

## 2024-04-18 RX ORDER — GADOBUTROL 604.72 MG/ML
7.5 INJECTION INTRAVENOUS ONCE
Status: COMPLETED | OUTPATIENT
Start: 2024-04-18 | End: 2024-04-18

## 2024-04-18 RX ADMIN — GADOBUTROL 6.5 ML: 604.72 INJECTION INTRAVENOUS at 07:36

## 2024-04-19 LAB
CORTIS SERPL-MCNC: 5.7 UG/DL
LEVETIRACETAM SERPL-MCNC: 28.8 ΜG/ML (ref 10–40)

## 2024-04-20 LAB — LAMOTRIGINE SERPL-MCNC: 11.1 UG/ML

## 2024-04-22 ENCOUNTER — MYC MEDICAL ADVICE (OUTPATIENT)
Dept: INTERNAL MEDICINE | Facility: CLINIC | Age: 64
End: 2024-04-22
Payer: COMMERCIAL

## 2024-04-22 DIAGNOSIS — R93.1 AGATSTON CAC SCORE, <100: ICD-10-CM

## 2024-04-22 DIAGNOSIS — E78.2 MIXED HYPERLIPIDEMIA: Primary | ICD-10-CM

## 2024-04-22 RX ORDER — ATORVASTATIN CALCIUM 20 MG/1
20 TABLET, FILM COATED ORAL AT BEDTIME
Qty: 90 TABLET | Refills: 3 | Status: SHIPPED | OUTPATIENT
Start: 2024-04-22

## 2024-04-25 NOTE — PROGRESS NOTES
"NEURO-ONCOLOGY VISIT  Apr 29, 2024    CHIEF COMPLAINT: Ms. 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 She received 12 cycles of temozolomide, completed in 5/2012. Changes on imaging necessitated a repeat resection on 5/30/2018 and pathology was unchanged. No adjuvant cancer-directed therapy was initiated. Imaging over the next 1.5 years showed slow tumor progression and treatment was then initiated. She completed chemoradiotherapy on 5/30/2020. Barbi then completed 6 cycles of adjuvant-dosed temozolomide as of 11/2020. Dose escalation to 200mg/m2 was complicated by intolerable nausea.     Barbi is now managed on imaging surveillance. Imaging from 2021 through her most recent scan in 4/2024 has been without concern for cancer recurrence.     I met with Barbi for this follow-up visit.    HISTORY OF PRESENT ILLNESS  -Barbi reports ongoing issues with gait instability, specifically leaning forward and to the left when walking.   -Seizures remain well controlled.   -Mood is \"good\".   -She denies any new symptoms; no numbness or changes in vision.  -Occasional stress related headaches.   -Continued mild word-finding issues. Able to multi-task. Work is stressful, but going well.       MEDICATIONS   Current Outpatient Medications   Medication Sig Dispense Refill    acetaminophen (TYLENOL) 500 MG tablet Take 2 tablets (1,000 mg) by mouth every 8 hours as needed for mild pain 42 tablet 1    atorvastatin (LIPITOR) 20 MG tablet Take 1 tablet (20 mg) by mouth at bedtime 90 tablet 3    CALCIUM PO       escitalopram (LEXAPRO) 10 MG tablet Take 1 tablet (10 mg) by mouth daily 90 tablet 3    famotidine (PEPCID) 10 MG tablet Take 1 tablet (10 mg) by mouth nightly as needed (heartburn)      gabapentin (NEURONTIN) 100 MG capsule Take 3 capsules (300 mg) by mouth every evening 270 capsule 2    ibuprofen (ADVIL/MOTRIN) " 600 MG tablet Take 1 tablet (600 mg) by mouth every 6 hours as needed for moderate pain 28 tablet 1    Lactase (LACTAID PO) Take 1-2 tablets by mouth daily as needed for indigestion       lamoTRIgine (LAMICTAL) 200 MG tablet TAKE ONE TABLET BY MOUTH TWICE A DAY (TAKE ALONG WITH 25 MG TABLET FOR TOTAL  mg morning and 200 MG pm). mft [facility]star 180 tablet 3    levETIRAcetam (KEPPRA) 500 MG tablet Take 1 Tablets (500 mg) by mouth every morning, AND Take 2 Tablets (1000 mg) by mouth every evening. mft [facility]star 270 tablet 3    loratadine (CLARITIN) 10 MG tablet Take 10 mg by mouth daily      Multiple Vitamin (MULTIVITAMIN ADULT PO) Take by mouth daily      tretinoin (RETIN-A) 0.025 % external cream Apply a pea-sized amount to each area affected by acne. Start every 3-4 nights working up to every night. 45 g 0    triamcinolone (KENALOG) 0.1 % external cream Apply topically 2 times daily To affected area in between breasts for 1-2 weeks. Tapering with improvement and restarting with flares. (Patient not taking: Reported on 4/11/2024) 60 g 2    vitamin B-12 (CYANOCOBALAMIN) 2500 MCG sublingual tablet Take 1 tablet (2,500 mcg) by mouth daily 90 tablet 11    VITAMIN D, CHOLECALCIFEROL, PO Take 1,000 Units by mouth daily        No current facility-administered medications for this visit.     DRUG ALLERGIES   Allergies   Allergen Reactions    Sulfa Antibiotics Hives    Adhesive Tape Rash    Benoxinate Nausea and Vomiting       ONCOLOGIC HISTORY  -4/27/2011 PRESENTATION: New onset seizure, generalized event.   -5/2011 MRB with a non-contrast enhancing right frontal mass lesion.   -5/2011 SURGERY: Craniectomy for mass resection at [facility].   PATHOLOGY: WHO grade 2 oligodendroglioma; 1p/19q co-deleted.  -6/2011-5/2012 CHEMO: Adjuvant dosed temozolomide x 12 cycles.  -4/2/2018 MRB with possible disease recurrence with a new 1.2 cm non-enhancing nodule within the cortex of the right frontal lobe adjacent to the resection  cavity.  -4/12/2018 NEURO-ONC: Recommending repeat resection, appointment scheduled with Dr. Dustin Rodriguez, neurosurgery at the [facility].   -5/30/2018 SURGERY: Repeat resection with Dr. Rodriguez.   PATHOLOGY: Remains low grade (WHO grade 2), without concerning features.   -6/15/2018 NEURO-ONC: Start imaging surveillance.  -9/17/2018 NEURO-ONC/ MRB: Imaging stable, continue with surveillance.   -12/10/2018 NEURO-ONC/ MRB: Imaging stable, continue with surveillance.   -4/15/2019 NEURO-ONC/ MRB: Imaging stable, continue with surveillance.  -8/19/2019 NEURO-ONC/ MRB: Clinically stable. Imaging largely stable, subtle increases on T2 FLAIR; continue with surveillance.  -12/16/2019 NEURO-ONC/ MRB: Clinically stable. Imaging with increased T2 FLAIR medially and laterally about the resection cavity. Referral to Dr. Figueroa with radiation oncology. Discussion with Dr. Rodriguez. Referral for repeat neuro-psychology testing.   -1/10/2020 Evaluated by Dr. Devan Figueroa.   -1/17/2020 NEURO-ONC: Tentative plan to initiate chemoradiotherapy in May-June 2020.   -2/17/2020 NEURO-PSYCH; Mild weaknesses were noted in aspects of verbal and nonverbal working memory, nonverbal recall, some aspects of executive functioning, and bilateral psychomotor speed. The remainder of her cognitive abilities were intact and performed in keeping with her above average range cognitive baseline.   -4/20 - 5/30/2020 CHEMORADS: 54 Gy in 30 fractions with concurrent temozolomide 75mg/m2 (140mg).   -5/4/2020 NEURO-ONC: Continue treatment as planned. Prescribing Clotrimazole lozenges for oral thrush.   -6/1/2020 NEURO-ONC: Completed treatment as planned.  -6/30/2020 NEURO-ONC/ MRB/ CHEMO: Clinically stable. Imaging with positive treatment effect. Starting adjuvant temozolomide 150mg/m2 (250mg), cycle 1 (start date was 7/1).   -7/28/2020 NEURO-ONC/ CHEMO: Clinically stable. Adjuvant temozolomide 200mg/m2 (320mg), cycle 2 (start date was 7/29).   -8/25/2020 NEURO-ONC/ CHEMO:  Clinically stable; significant nausea/ vomiting with 200mg/m2 dosing. Adding Emend for this next cycle. Adjuvant temozolomide 150mg/m2 (250mg), cycle 3 (start date of 8/26).   -9/22/2020 NEURO-ONC/ MRB/ CHEMO: Clinically stable; less nausea/ vomiting with lowered chemotherapy dosing plus adding Emend. Imaging with no concerns, repeat in 3 months. Adjuvant temozolomide 150mg/m2 (250mg), cycle 4 (start date of 9/23).   -10/20/2020 NEURO-ONC/ CHEMO: Clinically stable; no new/ worsening issues. Experiencing pain related to piriformis syndrome. Adjuvant temozolomide 150mg/m2 (250mg), cycle 5 (start date of 10/21).   -11/172020 NEURO-ONC/ CHEMO: Clinically stable. Adjuvant temozolomide 150mg/m2 (250mg), cycle 6 (start date of 11/18).   -12/15/2020 NEURO-ONC/ MRB: Clinically well. Imaging with no concerns. Starting imaging surveillance.   -3/16/2021 NEURO-ONC/ MRB: Clinically well. Imaging with no concerns.   -4/30/2021 Neuro-psych evaluation demonstrated weaknesses that are consistent with dysfunction of the bilateral frontal regions, but the right frontal region in particular.  Relative to her exam from February, 2020, there has been a mild decline in her thinking. In this exam, weaknesses were identified in executive functioning, attention, and both verbal and nonverbal working memory. Some aspects of cognitive speed were relative weaknesses as well. Insight into these weaknesses was limited.   -7/13/2021 NEURO-ONC/ MRB: Clinically well. Imaging stable.   -11/9/2021 NEURO-ONC/ MRB: Clinically well. Considering a trial of Zoloft. Imaging stable.   -3/8/2022 NEURO-ONC/ MRB: Clinically well. Imaging stable.   -9/19/2022 NEURO-ONC/ MRB: Clinically well. Imaging stable.   -3/13/2023 NEURO-ONC/ MRB: Stable neuro-psych evaluation. Imaging stable.   -9/8/2023 NEURO-ONC/ MRB: No new neurological concerns. Offered PT referral for evaluation of posture and gait instability. Imaging stable.  -4/29/2024 NEURO-ONC/ MRB: No new  "neurological concerns. Imaging stable.    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   , 2 children      PHYSICAL EXAMINATION  /75 (BP Location: Right arm, Patient Position: Sitting, Cuff Size: Adult Regular)   Pulse 74   Temp 98  F (36.7  C) (Oral)   Resp 16   Wt 66.2 kg (145 lb 14.4 oz)   LMP  (LMP Unknown)   SpO2 94%   BMI 26.47 kg/m     Wt Readings from Last 2 Encounters:   04/29/24 66.2 kg (145 lb 14.4 oz)   04/11/24 66.6 kg (146 lb 14.4 oz)      Ht Readings from Last 2 Encounters:   04/11/24 1.581 m (5' 2.25\")   03/20/24 1.58 m (5' 2.2\")      KPS     -Generally well appearing.  -Psychiatric: Normal mood and affect. Pleasant, talkative.  -Neurologic:   MENTAL STATUS:     Alert, oriented.    Recall: Intact.   Speech fluent.    Comprehension intact.     CRANIAL NERVES:     Symmetric facial movements.   Hearing intact.   With normal phonation, no dysfunction of the palate or tongue.   GAIT: Steady, unassisted.       MEDICAL RECORDS  Personally reviewed; Neurology note from November in which Dr. Clark recommended reducing lamotrigine dosage and increasing gabapentin dosage. Recommended primary care work-up fatigue, including referral to sleep medicine to rule out sleep apnea.     LABS  Personally reviewed all available lab results; Keppra (44.9) and Lamictal (15) in 12/2023.     IMAGING  Personally reviewed MR brain imaging from today and compared to prior imaging. To my eye, the T2 FLAIR about the right frontal resection cavity has not changed from imaging done 2 years ago. There is no enhancement.     Formal read; \"Postsurgical changes without evidence for disease progression.\"    Imaging was shown to and results were reviewed with Barbi.       IMPRESSION  On date of service, 33 minutes was spent in clinic and 6 minutes was spent preparing for the visit through extensive chart review and coordinating care for this high complexity visit. The following is in explanation for " the recommendations used to define the plan.       Clinically, Barbi is noting no new subjective neurological complaints. She continues to endorse ongoing issues with gait instability, specifically leaning forward and to the left when walking. I read Dr. Clark's note and I agree with further investigation to the cause of her chronic fatigued. Barbi denies any recent seizures even after dosing changes were made.    Imaging as detailed above is largely stable with no overt concerns. I reviewed her case with the reading neuro-radiologist, who was in agreement with this impression of her imaging. Since Barbi has remained clinically and radiographically stable off chemotherapy, will continue with imaging surveillance per NCCN guidelines with repeat imaging every 4-6 months through 11/2025 and at that time, can consider annual imaging. Barbi remains in agreement with repeat imaging in 6 months. However, Barbi knows to call with any concerns or to report new complaints and appointments can be moved sooner if needed.    Finally today, I discussed with Barbi the advancements in the field of neuro-oncology, which includes the results of the phase 3 INDIGO trial that showed prolonged imaging stability for patients with a low risk/ treatment naive, IDH-mutated low grade glioma treated with an IDH inhibitor. I discussed that currently, there is no indication for use of any IDH inhibitor in patients with low grade gliomas that have already received indicated standard upfront therapy. It is my impression that clinical investigation of an IDH inhibitor for this patient population is to be anticipated in the near future and I will continue to keep them updated as indications change.      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 Repeat imaging in 6 months.   -No need to monitor labs while off chemotherapy.      -SEIZURE MANAGEMENT-  -Follows with Dr. Flakita Clark; epilepsy specialist at the [facility].      -Quality of life/ MOOD/ FATIGUE-  -History of mild anxiety and depression.   -On Lexapro.   -Failed Celexa due to side effect of diarrhea.   -Untreated/ undertreated depression can worsen fatigue, dysorexia, and quality of life.  -Primary care monitoring vitamin D and B12 levels.  -Agree with recommendation for referral to sleep medicine.      -GAIT INSTABILITY-  -Consideration for PT referral.     -COGNITIVE CHANGES-  -Neuro-psych testing completed in 11/2022 was stable.    -If further difficulties are observed in the future, a referral for a neuropsychological re-evaluation at that time might be considered.    Return to clinic in 10/2024 + imaging.     Myrtle Gallagher MD  Neuro-oncology

## 2024-04-29 ENCOUNTER — ONCOLOGY VISIT (OUTPATIENT)
Dept: ONCOLOGY | Facility: CLINIC | Age: 64
End: 2024-04-29
Attending: PSYCHIATRY & NEUROLOGY
Payer: COMMERCIAL

## 2024-04-29 VITALS
BODY MASS INDEX: 26.47 KG/M2 | DIASTOLIC BLOOD PRESSURE: 75 MMHG | OXYGEN SATURATION: 94 % | RESPIRATION RATE: 16 BRPM | HEART RATE: 74 BPM | TEMPERATURE: 98 F | WEIGHT: 145.9 LBS | SYSTOLIC BLOOD PRESSURE: 121 MMHG

## 2024-04-29 DIAGNOSIS — C71.9 OLIGODENDROGLIOMA (H): Primary | ICD-10-CM

## 2024-04-29 PROCEDURE — 99214 OFFICE O/P EST MOD 30 MIN: CPT | Performed by: PSYCHIATRY & NEUROLOGY

## 2024-04-29 PROCEDURE — 99213 OFFICE O/P EST LOW 20 MIN: CPT | Performed by: PSYCHIATRY & NEUROLOGY

## 2024-04-29 ASSESSMENT — PAIN SCALES - GENERAL: PAINLEVEL: NO PAIN (0)

## 2024-04-29 NOTE — LETTER
"    4/29/2024         RE: Barbi Tiwari  [address] [facility] [address]        Dear Colleague,    Thank you for referring your patient, Barbi Tiwari, to the [facility]. Please see a copy of my visit note below.    NEURO-ONCOLOGY VISIT  Apr 29, 2024    CHIEF COMPLAINT: Ms. 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 She received 12 cycles of temozolomide, completed in 5/2012. Changes on imaging necessitated a repeat resection on 5/30/2018 and pathology was unchanged. No adjuvant cancer-directed therapy was initiated. Imaging over the next 1.5 years showed slow tumor progression and treatment was then initiated. She completed chemoradiotherapy on 5/30/2020. Barbi then completed 6 cycles of adjuvant-dosed temozolomide as of 11/2020. Dose escalation to 200mg/m2 was complicated by intolerable nausea.     Barbi is now managed on imaging surveillance. Imaging from 2021 through her most recent scan in 4/2024 has been without concern for cancer recurrence.     I met with Barbi for this follow-up visit.    HISTORY OF PRESENT ILLNESS  -Barbi reports ongoing issues with gait instability, specifically leaning forward and to the left when walking.   -Seizures remain well controlled.   -Mood is \"good\".   -She denies any new symptoms; no numbness or changes in vision.  -Occasional stress related headaches.   -Continued mild word-finding issues. Able to multi-task. Work is stressful, but going well.       MEDICATIONS   Current Outpatient Medications   Medication Sig Dispense Refill    acetaminophen (TYLENOL) 500 MG tablet Take 2 tablets (1,000 mg) by mouth every 8 hours as needed for mild pain 42 tablet 1    atorvastatin (LIPITOR) 20 MG tablet Take 1 tablet (20 mg) by mouth at bedtime 90 tablet 3    CALCIUM PO       escitalopram (LEXAPRO) 10 MG tablet Take 1 tablet (10 " mg) by mouth daily 90 tablet 3    famotidine (PEPCID) 10 MG tablet Take 1 tablet (10 mg) by mouth nightly as needed (heartburn)      gabapentin (NEURONTIN) 100 MG capsule Take 3 capsules (300 mg) by mouth every evening 270 capsule 2    ibuprofen (ADVIL/MOTRIN) 600 MG tablet Take 1 tablet (600 mg) by mouth every 6 hours as needed for moderate pain 28 tablet 1    Lactase (LACTAID PO) Take 1-2 tablets by mouth daily as needed for indigestion       lamoTRIgine (LAMICTAL) 200 MG tablet TAKE ONE TABLET BY MOUTH TWICE A DAY (TAKE ALONG WITH 25 MG TABLET FOR TOTAL  mg morning and 200 MG pm). mft [facility] 180 tablet 3    levETIRAcetam (KEPPRA) 500 MG tablet Take 1 Tablets (500 mg) by mouth every morning, AND Take 2 Tablets (1000 mg) by mouth every evening. mft [facility] 270 tablet 3    loratadine (CLARITIN) 10 MG tablet Take 10 mg by mouth daily      Multiple Vitamin (MULTIVITAMIN ADULT PO) Take by mouth daily      tretinoin (RETIN-A) 0.025 % external cream Apply a pea-sized amount to each area affected by acne. Start every 3-4 nights working up to every night. 45 g 0    triamcinolone (KENALOG) 0.1 % external cream Apply topically 2 times daily To affected area in between breasts for 1-2 weeks. Tapering with improvement and restarting with flares. (Patient not taking: Reported on 4/11/2024) 60 g 2    vitamin B-12 (CYANOCOBALAMIN) 2500 MCG sublingual tablet Take 1 tablet (2,500 mcg) by mouth daily 90 tablet 11    VITAMIN D, CHOLECALCIFEROL, PO Take 1,000 Units by mouth daily        No current facility-administered medications for this visit.     DRUG ALLERGIES   Allergies   Allergen Reactions    Sulfa Antibiotics Hives    Adhesive Tape Rash    Benoxinate Nausea and Vomiting       ONCOLOGIC HISTORY  -4/27/2011 PRESENTATION: New onset seizure, generalized event.   -5/2011 MRB with a non-contrast enhancing right frontal mass lesion.   -5/2011 SURGERY: Craniectomy for mass resection at [facility].   PATHOLOGY: WHO grade 2  oligodendroglioma; 1p/19q co-deleted.  -6/2011-5/2012 CHEMO: Adjuvant dosed temozolomide x 12 cycles.  -4/2/2018 MRB with possible disease recurrence with a new 1.2 cm non-enhancing nodule within the cortex of the right frontal lobe adjacent to the resection cavity.  -4/12/2018 NEURO-ONC: Recommending repeat resection, appointment scheduled with Dr. Dustin Rodriguez, neurosurgery at the [facility].   -5/30/2018 SURGERY: Repeat resection with Dr. Rodriguez.   PATHOLOGY: Remains low grade (WHO grade 2), without concerning features.   -6/15/2018 NEURO-ONC: Start imaging surveillance.  -9/17/2018 NEURO-ONC/ MRB: Imaging stable, continue with surveillance.   -12/10/2018 NEURO-ONC/ MRB: Imaging stable, continue with surveillance.   -4/15/2019 NEURO-ONC/ MRB: Imaging stable, continue with surveillance.  -8/19/2019 NEURO-ONC/ MRB: Clinically stable. Imaging largely stable, subtle increases on T2 FLAIR; continue with surveillance.  -12/16/2019 NEURO-ONC/ MRB: Clinically stable. Imaging with increased T2 FLAIR medially and laterally about the resection cavity. Referral to Dr. Figueroa with radiation oncology. Discussion with Dr. Rodriguez. Referral for repeat neuro-psychology testing.   -1/10/2020 Evaluated by Dr. Devan Figueroa.   -1/17/2020 NEURO-ONC: Tentative plan to initiate chemoradiotherapy in May-June 2020.   -2/17/2020 NEURO-PSYCH; Mild weaknesses were noted in aspects of verbal and nonverbal working memory, nonverbal recall, some aspects of executive functioning, and bilateral psychomotor speed. The remainder of her cognitive abilities were intact and performed in keeping with her above average range cognitive baseline.   -4/20 - 5/30/2020 CHEMORADS: 54 Gy in 30 fractions with concurrent temozolomide 75mg/m2 (140mg).   -5/4/2020 NEURO-ONC: Continue treatment as planned. Prescribing Clotrimazole lozenges for oral thrush.   -6/1/2020 NEURO-ONC: Completed treatment as planned.  -6/30/2020 NEURO-ONC/ MRB/ CHEMO: Clinically stable. Imaging with  positive treatment effect. Starting adjuvant temozolomide 150mg/m2 (250mg), cycle 1 (start date was 7/1).   -7/28/2020 NEURO-ONC/ CHEMO: Clinically stable. Adjuvant temozolomide 200mg/m2 (320mg), cycle 2 (start date was 7/29).   -8/25/2020 NEURO-ONC/ CHEMO: Clinically stable; significant nausea/ vomiting with 200mg/m2 dosing. Adding Emend for this next cycle. Adjuvant temozolomide 150mg/m2 (250mg), cycle 3 (start date of 8/26).   -9/22/2020 NEURO-ONC/ MRB/ CHEMO: Clinically stable; less nausea/ vomiting with lowered chemotherapy dosing plus adding Emend. Imaging with no concerns, repeat in 3 months. Adjuvant temozolomide 150mg/m2 (250mg), cycle 4 (start date of 9/23).   -10/20/2020 NEURO-ONC/ CHEMO: Clinically stable; no new/ worsening issues. Experiencing pain related to piriformis syndrome. Adjuvant temozolomide 150mg/m2 (250mg), cycle 5 (start date of 10/21).   -11/172020 NEURO-ONC/ CHEMO: Clinically stable. Adjuvant temozolomide 150mg/m2 (250mg), cycle 6 (start date of 11/18).   -12/15/2020 NEURO-ONC/ MRB: Clinically well. Imaging with no concerns. Starting imaging surveillance.   -3/16/2021 NEURO-ONC/ MRB: Clinically well. Imaging with no concerns.   -4/30/2021 Neuro-psych evaluation demonstrated weaknesses that are consistent with dysfunction of the bilateral frontal regions, but the right frontal region in particular.  Relative to her exam from February, 2020, there has been a mild decline in her thinking. In this exam, weaknesses were identified in executive functioning, attention, and both verbal and nonverbal working memory. Some aspects of cognitive speed were relative weaknesses as well. Insight into these weaknesses was limited.   -7/13/2021 NEURO-ONC/ MRB: Clinically well. Imaging stable.   -11/9/2021 NEURO-ONC/ MRB: Clinically well. Considering a trial of Zoloft. Imaging stable.   -3/8/2022 NEURO-ONC/ MRB: Clinically well. Imaging stable.   -9/19/2022 NEURO-ONC/ MRB: Clinically well. Imaging stable.  "  -3/13/2023 NEURO-ONC/ MRB: Stable neuro-psych evaluation. Imaging stable.   -9/8/2023 NEURO-ONC/ MRB: No new neurological concerns. Offered PT referral for evaluation of posture and gait instability. Imaging stable.  -4/29/2024 NEURO-ONC/ MRB: No new neurological concerns. Imaging stable.    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   , 2 children      PHYSICAL EXAMINATION  /75 (BP Location: Right arm, Patient Position: Sitting, Cuff Size: Adult Regular)   Pulse 74   Temp 98  F (36.7  C) (Oral)   Resp 16   Wt 66.2 kg (145 lb 14.4 oz)   LMP  (LMP Unknown)   SpO2 94%   BMI 26.47 kg/m     Wt Readings from Last 2 Encounters:   04/29/24 66.2 kg (145 lb 14.4 oz)   04/11/24 66.6 kg (146 lb 14.4 oz)      Ht Readings from Last 2 Encounters:   04/11/24 1.581 m (5' 2.25\")   03/20/24 1.58 m (5' 2.2\")      KPS     -Generally well appearing.  -Psychiatric: Normal mood and affect. Pleasant, talkative.  -Neurologic:   MENTAL STATUS:     Alert, oriented.    Recall: Intact.   Speech fluent.    Comprehension intact.     CRANIAL NERVES:     Symmetric facial movements.   Hearing intact.   With normal phonation, no dysfunction of the palate or tongue.   GAIT: Steady, unassisted.       MEDICAL RECORDS  Personally reviewed; Neurology note from November in which Dr. Clark recommended reducing lamotrigine dosage and increasing gabapentin dosage. Recommended primary care work-up fatigue, including referral to sleep medicine to rule out sleep apnea.     LABS  Personally reviewed all available lab results; Keppra (44.9) and Lamictal (15) in 12/2023.     IMAGING  Personally reviewed MR brain imaging from today and compared to prior imaging. To my eye, the T2 FLAIR about the right frontal resection cavity has not changed from imaging done 2 years ago. There is no enhancement.     Formal read; \"Postsurgical changes without evidence for disease progression.\"    Imaging was shown to and results were " reviewed with Barbi.       IMPRESSION  On date of service, 33 minutes was spent in clinic and 6 minutes was spent preparing for the visit through extensive chart review and coordinating care for this high complexity visit. The following is in explanation for the recommendations used to define the plan.       Clinically, Barbi is noting no new subjective neurological complaints. She continues to endorse ongoing issues with gait instability, specifically leaning forward and to the left when walking. I read Dr. Clark's note and I agree with further investigation to the cause of her chronic fatigued. Barbi denies any recent seizures even after dosing changes were made.    Imaging as detailed above is largely stable with no overt concerns. I reviewed her case with the reading neuro-radiologist, who was in agreement with this impression of her imaging. Since Barbi has remained clinically and radiographically stable off chemotherapy, will continue with imaging surveillance per NCCN guidelines with repeat imaging every 4-6 months through 11/2025 and at that time, can consider annual imaging. Barbi remains in agreement with repeat imaging in 6 months. However, Barbi knows to call with any concerns or to report new complaints and appointments can be moved sooner if needed.    Finally today, I discussed with Barbi the advancements in the field of neuro-oncology, which includes the results of the phase 3 INDIGO trial that showed prolonged imaging stability for patients with a low risk/ treatment naive, IDH-mutated low grade glioma treated with an IDH inhibitor. I discussed that currently, there is no indication for use of any IDH inhibitor in patients with low grade gliomas that have already received indicated standard upfront therapy. It is my impression that clinical investigation of an IDH inhibitor for this patient population is to be anticipated in the near future and I will continue to keep them updated as indications  change.      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 Repeat imaging in 6 months.   -No need to monitor labs while off chemotherapy.      -SEIZURE MANAGEMENT-  -Follows with Dr. Flakita Clark; epilepsy specialist at the [facility].      -Quality of life/ MOOD/ FATIGUE-  -History of mild anxiety and depression.   -On Lexapro.   -Failed Celexa due to side effect of diarrhea.   -Untreated/ undertreated depression can worsen fatigue, dysorexia, and quality of life.  -Primary care monitoring vitamin D and B12 levels.  -Agree with recommendation for referral to sleep medicine.      -GAIT INSTABILITY-  -Consideration for PT referral.     -COGNITIVE CHANGES-  -Neuro-psych testing completed in 11/2022 was stable.    -If further difficulties are observed in the future, a referral for a neuropsychological re-evaluation at that time might be considered.    Return to clinic in 10/2024 + imaging.   Myrtle Gallagher MD  Neuro-oncology

## 2024-04-29 NOTE — NURSING NOTE
"Oncology Rooming Note    April 29, 2024 10:25 AM   Barbi Tiwari is a 63 year old female who presents for:    Chief Complaint   Patient presents with    Oncology Clinic Visit     Oligodendroglioma     Initial Vitals: /75 (BP Location: Right arm, Patient Position: Sitting, Cuff Size: Adult Regular)   Pulse 74   Temp 98  F (36.7  C) (Oral)   Resp 16   Wt 66.2 kg (145 lb 14.4 oz)   LMP  (LMP Unknown)   SpO2 94%   BMI 26.47 kg/m   Estimated body mass index is 26.47 kg/m  as calculated from the following:    Height as of 4/11/24: 1.581 m (5' 2.25\").    Weight as of this encounter: 66.2 kg (145 lb 14.4 oz). Body surface area is 1.71 meters squared.  No Pain (0) Comment: Data Unavailable   No LMP recorded (lmp unknown). Patient has had a hysterectomy.  Allergies reviewed: Yes  Medications reviewed: Yes    Medications: Medication refills not needed today.  Pharmacy name entered into [facility]:    [facility] [place], [place] - [address] [id number] [place], [place] - [address] AT [facility] [address] [facility]    Frailty Screening:   Is the patient here for a new oncology consult visit in cancer care? 2. No      Clinical concerns: none       Marlena Pablo              "

## 2024-05-25 ENCOUNTER — MYC MEDICAL ADVICE (OUTPATIENT)
Dept: INTERNAL MEDICINE | Facility: CLINIC | Age: 64
End: 2024-05-25
Payer: COMMERCIAL

## 2024-05-25 DIAGNOSIS — I49.3 SYMPTOMATIC PREMATURE VENTRICULAR CONTRACTIONS: ICD-10-CM

## 2024-05-28 RX ORDER — METOPROLOL TARTRATE 25 MG/1
12.5 TABLET, FILM COATED ORAL 2 TIMES DAILY
Qty: 90 TABLET | Refills: 3 | Status: SHIPPED | OUTPATIENT
Start: 2024-05-28

## 2024-05-28 NOTE — TELEPHONE ENCOUNTER
Please advise if patient needs appointment to restart Metoprolol.    Per 4/11 OC Notes:  PVC's (premature ventricular contractions)  Quite rare- possibly more s/e from b-b than benefit from it. Ok to stop metoprolol entirely to see if this helps with fatigue

## 2024-06-03 NOTE — LETTER
"    8/25/2020         RE: Barbi Tiwari  [address] [facility] [address]        Dear Colleague,    Thank you for referring your patient, Barbi Tiwari, to the [facility]. Please see a copy of my visit note below.    Barbi Tiwari is a 59 year old female who is being evaluated via a billable video visit.      The patient has been notified of following:     \"This video visit will be conducted via a call between you and your physician/provider. We have found that certain health care needs can be provided without the need for an in-person physical exam.  This service lets us provide the care you need with a video conversation.  If a prescription is necessary we can send it directly to your pharmacy.  If lab work is needed we can place an order for that and you can then stop by our lab to have the test done at a later time.    Video visits are billed at different rates depending on your insurance coverage.  Please reach out to your insurance provider with any questions.    If during the course of the call the physician/provider feels a video visit is not appropriate, you will not be charged for this service.\"    Patient has given verbal consent for Video visit? Yes  How would you like to obtain your AVS? MyChart  If you are dropped from the video visit, the video invite should be resent to: Send to e-mail at: [email or web address]  Will anyone else be joining your video visit? Yes:  Kenneth. How would they like to receive their invitation? Send to e-mail at: [email or web address]      Video-Visit Details  Type of service:  Video Visit    Video Start Time: 3:51 PM  Video End Time: 4:21 PM    Originating Location (pt. Location): Home    Distant Location (provider location):  [facility]     Platform used for Video Visit: Prosper Gallagher MD    _________________________________________    NEURO-ONCOLOGY VISIT  Aug 25, 2020    CHIEF COMPLAINT: Ms. Barbi Tiwari " is a 59 year old right-handed woman with a right frontal diffuse oligodendroglioma (1p19q co-deleted), diagnosed following resection in 5/2011. She received 12 cycles of temozolomide, completed in 5/2012. Changes on imaging necessitated a repeat resection on 5/30/2018 and pathology was unchanged. No adjuvant cancer-directed therapy was initiated. Imaging over the next 1.5 years showed tumor progression and treatment was recommended. She completed chemoradiotherapy on 5/30/2020. Barbi is currently managed on adjuvant-dosed temozolomide. Dose escalation to 200mg/m2 was complicated by intolerable nausea.     I met with Barbi and Kenneth () for this follow-up visit.      HISTORY OF PRESENT ILLNESS  -Most recent cycle of temozolomide went very poorly. Chemotherapy dosing has been complicated by nausea and vomiting. The last cycle was dose increased and she was taking Compazine and Ativan. Requesting the addition of Emend. Denies issues with constipation on current bowel regimen.  -Discussed a dose reduction for cycle 3 and Barbi is wanting to do this.   -She denies any new symptoms; no weakness, numbness, changes in vision, or headaches.   -Balance is off; chronic and stable.  -No change in seizure frequency.   -Low energy. Not able to exercise.   -Continued mild word-finding issues, otherwise no change in her cognitive function.   -Working part-time she going well, just tiring.       -Mood was more down with the issues experienced during this last cycle. Tearful in our visit today when recounting her experience.     REVIEW OF SYSTEMS  A comprehensive ROS negative except as in HPI.      MEDICATIONS   Current Outpatient Medications   Medication     acetaminophen (TYLENOL) 500 MG tablet     aprepitant (EMEND TRI-PACK) 80 & 125 MG MISC     ascorbic acid 500 MG TABS     calcium carbonate (TUMS) 500 MG chewable tablet     ciclopirox (PENLAC) 8 % external solution     COMPOUNDED NON-CONTROLLED SUBSTANCE (CMPD RX) -  PHARMACY TO MIX COMPOUNDED MEDICATION     fexofenadine (ALLEGRA) 180 MG tablet     gabapentin (NEURONTIN) 100 MG capsule     ibuprofen (ADVIL/MOTRIN) 200 MG tablet     Lactase (LACTAID PO)     lamoTRIgine (LAMICTAL) 100 MG tablet     lamoTRIgine (LAMICTAL) 200 MG tablet     levETIRAcetam (KEPPRA) 500 MG tablet     LORazepam (ATIVAN) 0.5 MG tablet     metoprolol tartrate (LOPRESSOR) 25 MG tablet     Multiple Vitamin (MULTI-VITAMINS) TABS     prochlorperazine (COMPAZINE) 10 MG tablet     tretinoin (RETIN-A) 0.025 % external cream     VITAMIN D, CHOLECALCIFEROL, PO     temozolomide (TEMODAR) 250 MG capsule     Current Facility-Administered Medications   Medication     lidocaine 1 % injection 4 mL     lidocaine 1 % injection 4 mL     triamcinolone (KENALOG-40) injection 40 mg     triamcinolone (KENALOG-40) injection 40 mg     DRUG ALLERGIES   Allergies   Allergen Reactions     Sulfa Drugs Hives     Benoxinate Nausea and Vomiting       ONCOLOGIC HISTORY  -4/27/2011 PRESENTATION: New onset seizure, generalized event.   -5/2011 MRB with a non-contrast enhancing right frontal mass lesion.   -5/2011 SURGERY: Craniectomy for mass resection at [facility].   PATHOLOGY: Diffuse oligodendroglioma; WHO grade II, 1p/19q co-deleted.  -6/2011-5/2012 CHEMO: Adjuvant dosed temozolomide x 12 cycles.  -4/2/2018 MRB with possible disease recurrence with a new 1.2 cm non-enhancing nodule within the cortex of the right frontal lobe adjacent to the resection cavity.  -4/12/2018 NEURO-ONC: Recommending repeat resection, appointment scheduled with Dr. Dustin Rodriguez, neurosurgery at the [facility].   -5/30/2018 SURGERY: Repeat resection with Dr. Rodriguez.   PATHOLOGY: Remains low grade, without concerning features.   -6/15/2018 NEURO-ONC: Start imaging surveillance.  -9/17/2018 NEURO-ONC/ MRB: Imaging stable, continue with surveillance.   -12/10/2018 NEURO-ONC/ MRB: Imaging stable, continue with surveillance.   -4/15/2019 NEURO-ONC/ MRB: Imaging stable,  continue with surveillance.  -8/19/2019 NEURO-ONC/ MRB: Clinically stable. Imaging largely stable, subtle increases on T2 FLAIR; continue with surveillance.  -12/16/2019 NEURO-ONC/ MRB: Clinically stable. Imaging with increased T2 FLAIR medially and laterally about the resection cavity. Referral to Dr. Figueroa with radiation oncology. Discussion with Dr. Rodriguez. Referral for repeat neuro-psychology testing.   -1/10/2020 Evaluated by Dr. Devan Figueroa.   -1/17/2020 NEURO-ONC: Tentative plan to initiate chemoradiotherapy in May-June 2020.   -2/17/2020 NEURO-PSYCH; Mild weaknesses were noted in aspects of verbal and nonverbal working memory, nonverbal recall, some aspects of executive functioning, and bilateral psychomotor speed. The remainder of her cognitive abilities were intact and performed in keeping with her above average range cognitive baseline.   -4/20 - 5/30/2020 CHEMORADS: 54 Gy in 30 fractions with concurrent temozolomide 75mg/m2 (140mg).   -5/4/2020 NEURO-ONC: Continue treatment as planned. Prescribing Clotrimazole lozenges for oral thrush.   -6/1/2020 NEURO-ONC: Completed treatment as planned.  -6/30/2020 NEURO-ONC/ MRB/ CHEMO: Clinically stable. Imaging with positive treatment effect. Starting adjuvant temozolomide 150mg/m2 (250mg), cycle 1 (start date was 7/1).   -7/28/2020 NEURO-ONC/ CHEMO: Clinically stable. Adjuvant temozolomide 200mg/m2 (320mg), cycle 2 (start date was 7/29).   -8/25/2020 NEURO-ONC/ CHEMO: Clinically stable; significant nausea/ vomiting with 200mg/m2 dosing. Adding Emend for this next cycle. Adjuvant temozolomide 150mg/m2 (250mg), cycle 3 (start date of 8/26).     SOCIAL HISTORY   Tobacco use: Never smoker.   Alcohol use: Social.   Drug use: Denies marijuana use.  Supplement, complimentary/ alternative medicine: None.   Employment: RN.   , 2 children      PHYSICAL EXAMINATION  -Generally well appearing.  -Respiratory: No audible wheezing.   -Skin: No facial rashes.  -Psychiatric:  Normal mood and affect. Pleasant, talkative.  -Neurologic:   MENTAL STATUS:     Alert, oriented to date.    Recall: Intact.    Speech fluent.    Comprehension intact.     CRANIAL NERVES:     Pupils are equal, round, reactive to light.     Extraocular movements full, patient denies diplopia.     Equal activation with smiling/ talking on the left side of face.    Hearing intact.   With normal phonation, no dysfunction of the palate or tongue.   MOTOR: Antigravity in arms.  COORDINATION: Intact with using the computer/ phone.   SENSATION: Intact to light touch.     The rest of a comprehensive physical examination is deferred due to PHE (public health emergency) video visit restrictions.        MEDICAL RECORDS  Obtained and personally reviewed all available outside medical records in addition to reviewing any records available in our electronic system.     LABS  Personally reviewed all available lab results.     IMAGING  No new neuro-imaging to review.        IMPRESSION  Clinic time was spent discussing in detail the nature of this tumor in light of her current treatment plan. This is in addition to providing emotional support, answering questions pertaining to my recommendations, and devising the treatment plan as outlined below.     Clinically, Barbi is doing fairly well with no new subjective neurological complaints or increase in seizure frequency, however, the last cycle of chemotherapy was very poorly tolerated due to much nausea and vomiting. Also experiencing continued ongoing fatigue and all of this is leading to depression and a decreased quality of life. As a result, will dose reduce back to 150mg/m2 for cycle 3. Will add on Emend tri-pack for improved control of nausea. Will repeat imaging next month.     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   -Did not tolerate 200mg/m2 dosing.   -Supportive medications; Compazine + Ativan + Emend tri-pack and bowel regimen.    Dexamethasone or medical marijuana can be alternative options and/ or since Emend is only D1-3, there is a potential of breakthrough nausea on D4-5.  -Repeat 28 day cycle if WBC >= 3, ANC >= 1.5, HgB >= 10, and platelets >= 100.  -Surveillance labs reviewed, at goal for chemotherapy.  -Will repeat CBC in 2 (at [facility]) and 4 weeks and repeat CMP every 4 weeks.  -Repeat imaging next month.     -SEIZURE MANAGEMENT-  -Follows with Dr. Flakita Clark; Neurology, epilepsy specialist at the [facility].      -Quality of life/ MOOD/ FATIGUE-  -History of mild anxiety and depression.   -Continue to monitor mood as untreated/ undertreated depression can worsen fatigue, dysorexia, and quality of life.  -Will need to readdress at next visit, particularly if nausea is under better control.   -Issues with sleep, on gabapentin.     -COGNITIVE CHANGES-  -Neuro-psych testing completed with mild deficits notes. Repeat testing recommended in 2/2021.    Return to clinic virtually on 9/22 with repeat imaging + labs earlier that same day.     In the meantime, Barbi knows to call with questions or concerns or to report new complaints and can be seen sooner if needed.    Myrtle Gallagher MD  Neuro-oncology    Again, thank you for allowing me to participate in the care of your patient.        Sincerely,        Myrtle Gallagher MD     Him/He

## 2024-06-17 PROBLEM — Z71.89 ADVANCED DIRECTIVES, COUNSELING/DISCUSSION: Status: RESOLVED | Noted: 2017-03-24 | Resolved: 2024-06-17

## 2024-06-19 ENCOUNTER — MYC REFILL (OUTPATIENT)
Dept: DERMATOLOGY | Facility: CLINIC | Age: 64
End: 2024-06-19
Payer: COMMERCIAL

## 2024-06-19 DIAGNOSIS — L73.8 SEBACEOUS GLAND HYPERPLASIA: ICD-10-CM

## 2024-06-21 ENCOUNTER — MYC REFILL (OUTPATIENT)
Dept: NEUROLOGY | Facility: CLINIC | Age: 64
End: 2024-06-21

## 2024-06-21 DIAGNOSIS — R56.9 SEIZURE (H): ICD-10-CM

## 2024-06-21 RX ORDER — LEVETIRACETAM 500 MG/1
TABLET ORAL
Qty: 270 TABLET | Refills: 3 | Status: CANCELLED | OUTPATIENT
Start: 2024-06-21

## 2024-06-24 RX ORDER — TRETINOIN 0.25 MG/G
CREAM TOPICAL
Qty: 45 G | Refills: 0 | Status: SHIPPED | OUTPATIENT
Start: 2024-06-24

## 2024-06-24 NOTE — TELEPHONE ENCOUNTER
Routing to provider for approval/denial since med does not meet protocol    Comfort DOWNEY RN  Dermatology   [phone]

## 2024-07-21 ENCOUNTER — MYC REFILL (OUTPATIENT)
Dept: INTERNAL MEDICINE | Facility: CLINIC | Age: 64
End: 2024-07-21
Payer: COMMERCIAL

## 2024-07-21 DIAGNOSIS — E78.2 MIXED HYPERLIPIDEMIA: ICD-10-CM

## 2024-07-21 DIAGNOSIS — R93.1 AGATSTON CAC SCORE, <100: ICD-10-CM

## 2024-07-22 RX ORDER — ATORVASTATIN CALCIUM 20 MG/1
20 TABLET, FILM COATED ORAL AT BEDTIME
Qty: 90 TABLET | Refills: 3 | OUTPATIENT
Start: 2024-07-22

## 2024-10-14 ENCOUNTER — MYC REFILL (OUTPATIENT)
Dept: INTERNAL MEDICINE | Facility: CLINIC | Age: 64
End: 2024-10-14
Payer: COMMERCIAL

## 2024-10-14 DIAGNOSIS — R93.1 AGATSTON CAC SCORE, <100: ICD-10-CM

## 2024-10-14 DIAGNOSIS — E78.2 MIXED HYPERLIPIDEMIA: ICD-10-CM

## 2024-10-15 RX ORDER — ATORVASTATIN CALCIUM 20 MG/1
20 TABLET, FILM COATED ORAL AT BEDTIME
Qty: 90 TABLET | Refills: 1 | Status: SHIPPED | OUTPATIENT
Start: 2024-10-15

## 2024-10-23 NOTE — PROGRESS NOTES
NEURO-ONCOLOGY VISIT  Oct 28, 2024    CHIEF COMPLAINT: Ms. 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 She received 12 cycles of temozolomide, completed in 5/2012. Changes on imaging necessitated a repeat resection on 5/30/2018 and pathology was unchanged. No adjuvant cancer-directed therapy was initiated. Imaging over the next 1.5 years showed slow tumor progression and treatment was then initiated. She completed chemoradiotherapy on 5/30/2020. Barbi then completed 6 cycles of adjuvant-dosed temozolomide as of 11/2020. Dose escalation to 200mg/m2 was complicated by intolerable nausea.     Barbi is now managed on imaging surveillance. Imaging from 2021 through her most recent scan in 10/2024 has been without concern for cancer recurrence.     I met with Barbi for this follow-up visit.    HISTORY OF PRESENT ILLNESS  -Barbi reports that she continues to sleep a lot when she is not working, but attributes this to her lack of exercise. 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 She denies losing interest in other activities. She denies depression.   -Seizures remain well controlled.  -She denies any new symptoms; no numbness or changes in vision.  -Denies recurrent headaches.       MEDICATIONS   Current Outpatient Medications   Medication Sig Dispense Refill    acetaminophen (TYLENOL) 500 MG tablet Take 2 tablets (1,000 mg) by mouth every 8 hours as needed for mild pain 42 tablet 1    atorvastatin (LIPITOR) 20 MG tablet Take 1 tablet (20 mg) by mouth at bedtime. 90 tablet 1    CALCIUM PO       escitalopram (LEXAPRO) 10 MG tablet Take 1 tablet (10 mg) by mouth daily 90 tablet 3    famotidine (PEPCID) 10 MG tablet Take 1 tablet (10 mg) by mouth nightly as needed (heartburn)      gabapentin (NEURONTIN) 100 MG capsule Take 3 capsules (300 mg) by  mouth every evening 270 capsule 2    ibuprofen (ADVIL/MOTRIN) 600 MG tablet Take 1 tablet (600 mg) by mouth every 6 hours as needed for moderate pain 28 tablet 1    Lactase (LACTAID PO) Take 1-2 tablets by mouth daily as needed for indigestion       lamoTRIgine (LAMICTAL) 200 MG tablet TAKE ONE TABLET BY MOUTH TWICE A DAY (TAKE ALONG WITH 25 MG TABLET FOR TOTAL  mg morning and 200 MG pm). [facility] (Patient taking differently: Take 200 mg by mouth 2 times daily. TAKE ONE TABLET BY MOUTH TWICE A DAY) 180 tablet 3    levETIRAcetam (KEPPRA) 500 MG tablet Take 1 Tablets (500 mg) by mouth every morning, AND Take 2 Tablets (1000 mg) by mouth every evening. [facility] 270 tablet 3    loratadine (CLARITIN) 10 MG tablet Take 10 mg by mouth daily      metoprolol tartrate (LOPRESSOR) 25 MG tablet Take 0.5 tablets (12.5 mg) by mouth 2 times daily 90 tablet 3    Multiple Vitamin (MULTIVITAMIN ADULT PO) Take by mouth daily      tretinoin (RETIN-A) 0.025 % external cream Apply a pea-sized amount to each area affected by acne. Start every 3-4 nights working up to every night. 45 g 0    vitamin B-12 (CYANOCOBALAMIN) 2500 MCG sublingual tablet Take 1 tablet (2,500 mcg) by mouth daily 90 tablet 11    VITAMIN D, CHOLECALCIFEROL, PO Take 1,000 Units by mouth daily       triamcinolone (KENALOG) 0.1 % external cream Apply topically 2 times daily To affected area in between breasts for 1-2 weeks. Tapering with improvement and restarting with flares. (Patient not taking: Reported on 10/28/2024) 60 g 2     No current facility-administered medications for this visit.     DRUG ALLERGIES   Allergies   Allergen Reactions    Sulfa Antibiotics Hives    Adhesive Tape Rash    Benoxinate Nausea and Vomiting       ONCOLOGIC HISTORY  -4/27/2011 PRESENTATION: New onset seizure, generalized event.   -5/2011 MRB with a non-contrast enhancing right frontal mass lesion.   -5/2011 SURGERY: Craniectomy for mass resection at [facility].   PATHOLOGY:  WHO grade 2 oligodendroglioma; 1p/19q co-deleted.  -6/2011-5/2012 CHEMO: Adjuvant dosed temozolomide x 12 cycles.  -4/2/2018 MRB with possible disease recurrence with a new 1.2 cm non-enhancing nodule within the cortex of the right frontal lobe adjacent to the resection cavity.  -4/12/2018 NEURO-ONC: Recommending repeat resection, appointment scheduled with Dr. Dustin Rodriguez, neurosurgery at the [facility].   -5/30/2018 SURGERY: Repeat resection with Dr. Rodriguez.   PATHOLOGY: Remains low grade (WHO grade 2), without concerning features.   -6/15/2018 NEURO-ONC: Start imaging surveillance.  -9/17/2018 NEURO-ONC/ MRB: Imaging stable, continue with surveillance.   -12/10/2018 NEURO-ONC/ MRB: Imaging stable, continue with surveillance.   -4/15/2019 NEURO-ONC/ MRB: Imaging stable, continue with surveillance.  -8/19/2019 NEURO-ONC/ MRB: Clinically stable. Imaging largely stable, subtle increases on T2 FLAIR; continue with surveillance.  -12/16/2019 NEURO-ONC/ MRB: Clinically stable. Imaging with increased T2 FLAIR medially and laterally about the resection cavity. Referral to Dr. Figueroa with radiation oncology. Discussion with Dr. Rodriguez. Referral for repeat neuro-psychology testing.   -1/10/2020 Evaluated by Dr. Devan Figueroa.   -1/17/2020 NEURO-ONC: Tentative plan to initiate chemoradiotherapy in May-June 2020.   -2/17/2020 NEURO-PSYCH; Mild weaknesses were noted in aspects of verbal and nonverbal working memory, nonverbal recall, some aspects of executive functioning, and bilateral psychomotor speed. The remainder of her cognitive abilities were intact and performed in keeping with her above average range cognitive baseline.   -4/20 - 5/30/2020 CHEMORADS: 54 Gy in 30 fractions with concurrent temozolomide 75mg/m2 (140mg).   -5/4/2020 NEURO-ONC: Continue treatment as planned. Prescribing Clotrimazole lozenges for oral thrush.   -6/1/2020 NEURO-ONC: Completed treatment as planned.  -6/30/2020 NEURO-ONC/ MRB/ CHEMO: Clinically stable.  Imaging with positive treatment effect. Starting adjuvant temozolomide 150mg/m2 (250mg), cycle 1 (start date was 7/1).   -7/28/2020 NEURO-ONC/ CHEMO: Clinically stable. Adjuvant temozolomide 200mg/m2 (320mg), cycle 2 (start date was 7/29).   -8/25/2020 NEURO-ONC/ CHEMO: Clinically stable; significant nausea/ vomiting with 200mg/m2 dosing. Adding Emend for this next cycle. Adjuvant temozolomide 150mg/m2 (250mg), cycle 3 (start date of 8/26).   -9/22/2020 NEURO-ONC/ MRB/ CHEMO: Clinically stable; less nausea/ vomiting with lowered chemotherapy dosing plus adding Emend. Imaging with no concerns, repeat in 3 months. Adjuvant temozolomide 150mg/m2 (250mg), cycle 4 (start date of 9/23).   -10/20/2020 NEURO-ONC/ CHEMO: Clinically stable; no new/ worsening issues. Experiencing pain related to piriformis syndrome. Adjuvant temozolomide 150mg/m2 (250mg), cycle 5 (start date of 10/21).   -11/172020 NEURO-ONC/ CHEMO: Clinically stable. Adjuvant temozolomide 150mg/m2 (250mg), cycle 6 (start date of 11/18).   -12/15/2020 NEURO-ONC/ MRB: Clinically well. Imaging with no concerns. Starting imaging surveillance.   -3/16/2021 NEURO-ONC/ MRB: Clinically well. Imaging with no concerns.   -4/30/2021 Neuro-psych evaluation demonstrated weaknesses that are consistent with dysfunction of the bilateral frontal regions, but the right frontal region in particular.  Relative to her exam from February, 2020, there has been a mild decline in her thinking. In this exam, weaknesses were identified in executive functioning, attention, and both verbal and nonverbal working memory. Some aspects of cognitive speed were relative weaknesses as well. Insight into these weaknesses was limited.   -7/13/2021 NEURO-ONC/ MRB: Clinically well. Imaging stable.   -11/9/2021 NEURO-ONC/ MRB: Clinically well. Considering a trial of Zoloft. Imaging stable.   -3/8/2022 NEURO-ONC/ MRB: Clinically well. Imaging stable.   -9/19/2022 NEURO-ONC/ MRB: Clinically well.  "Imaging stable.   -3/13/2023 NEURO-ONC/ MRB: Stable neuro-psych evaluation. Imaging stable.   -9/8/2023 NEURO-ONC/ MRB: No new neurological concerns. Offered PT referral for evaluation of posture and gait instability. Imaging stable.  -4/29/2024 NEURO-ONC/ MRB: No new neurological concerns. Imaging stable.  -10/28/2024 NEURO-ONC/ MRB: No new neurological concerns. Imaging stable.    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   , 2 children      PHYSICAL EXAMINATION  /76 (BP Location: Right arm, Patient Position: Sitting, Cuff Size: Adult Regular)   Pulse 57   Temp 98.3  F (36.8  C) (Oral)   Resp 12   Ht 1.59 m (5' 2.6\")   Wt 68.5 kg (151 lb)   LMP  (LMP Unknown)   SpO2 95%   BMI 27.09 kg/m     Wt Readings from Last 2 Encounters:   10/28/24 68.5 kg (151 lb)   04/29/24 66.2 kg (145 lb 14.4 oz)      Ht Readings from Last 2 Encounters:   10/28/24 1.59 m (5' 2.6\")   04/11/24 1.581 m (5' 2.25\")      KPS     -Generally well appearing.  -Psychiatric: Normal mood and affect. Pleasant, talkative.  -Neurologic:   MENTAL STATUS:     Alert, oriented.    Recall: Intact.   Speech fluent.    Comprehension intact.     CRANIAL NERVES:     Symmetric facial movements.   Hearing intact.   With normal phonation, no dysfunction of the palate or tongue.   GAIT: Steady, unassisted.       MEDICAL RECORDS  Personally reviewed; No recent encounters.     LABS  Personally reviewed all available lab results; Keppra (28.8) and Lamictal (11.1) in 4/2024.     IMAGING  Personally reviewed MR brain imaging from today and compared to prior imaging. To my eye, the T2 FLAIR about the right frontal resection cavity has not changed from imaging done 3 years ago. There is no enhancement.     Formal read; \"In this patient with history of grade 2 oligodendroglioma diagnosed following resection, status post chemoradiotherapy, now on adjuvant temozolomide. There are postsurgical changes of tumor resection, without " "evidence for disease progression.\"    Imaging was shown to and results were reviewed with Barbi.       IMPRESSION  On date of service, 35 minutes was spent in clinic and 7 minutes was spent preparing for the visit through extensive chart review and coordinating care for this high complexity visit. The following is in explanation for the recommendations used to define the plan.       Barbi is not noting any new subjective neurological complaints. Unfortunately, she continues to endorse ongoing fatigue, but denies any concern for low mood. Barbi denies any recent seizures. She is looking forward to meeting with her new epileptologist in January to discuss whether a further dose-reduction in anti-seizure medications would be appropriate as she attributes her fatigue to medication side effects.    Imaging as detailed above is largely stable as compared to her 2021 scan with no overt concerns. I personally reviewed Barbi's imaging and case at Brain Tumor Conference and all in attendance were in agreement with this impression. Therefore, the plan is to continue with imaging surveillance per NCCN guidelines with repeat imaging every 4-6 months through 11/2025 and at that time, can consider annual imaging. Barbi remains in agreement with repeat imaging in 6 months. However, Barbi knows to call with any concerns or to report new complaints and appointments can be moved sooner if needed.    Today, I again had a conversation with Barbi about the option of starting \"maintenance\" cancer-directed therapy with an IDH inhibitor; Voranigo (vorasidenib) 40MG daily. Voranigo is now FDA approved for treatment of IDH-mutated low grade/ grade 2 astrocytomas and oligodendrogliomas. This is based on results from the phase 3 INDIGO study that showed prolonged imaging stability for patients with an IDH-mutated grade 2 glioma treated with an IDH inhibitor. Side effects of Voranigo can include fatigue, diarrhea, muscle pain, seizure, reduced " neutrophils, and hepatic toxicity. Since the INDIGO study only enrolled treatment naive patients, there is a lack of supportive evidence to know if Voranigo will have the same benefit in patients that have already received therapy. As seen currently, Barbi's imaging demonstrates a sustained/ ongoing positive response to her prior therapy which ended ~4 years ago. So, after weighing the risks/ benefits plus lack of specific evidence-based support, we are all in agreement to hold on the use of this therapy and to continue with surveillance imaging alone for now.     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 Repeat imaging in 6 months.   -No need to monitor labs while off chemotherapy.      -SEIZURE MANAGEMENT-  -Follows with neurology.    -Quality of life/ MOOD/ FATIGUE-  -History of mild anxiety and depression.   -On Lexapro.   -Failed Celexa due to side effect of diarrhea.   -Untreated/ undertreated depression can worsen fatigue, dysorexia, and quality of life.  -Primary care monitoring vitamin D and B12 levels.    -GAIT INSTABILITY-  -Consideration for PT referral.     -COGNITIVE CHANGES-  -Neuro-psych testing completed in 11/2022 was stable.    -If further difficulties are observed in the future, a referral for a neuropsychological re-evaluation at that time might be considered.    Return to clinic in 4/2025 + imaging.     The longitudinal plan of care for the diagnosis(es)/condition(s) as documented were addressed during this visit. Due to the added complexity in care, I will continue to support Barbi in the subsequent management and with ongoing continuity of care.     Barbi was also seen by Dr. Janie Zarate, Hematology/Oncology Fellow under my direct  supervision.     Myrtle Gallagher MD  Neuro-oncology

## 2024-10-28 ENCOUNTER — ONCOLOGY VISIT (OUTPATIENT)
Dept: ONCOLOGY | Facility: CLINIC | Age: 64
End: 2024-10-28
Attending: PSYCHIATRY & NEUROLOGY
Payer: COMMERCIAL

## 2024-10-28 ENCOUNTER — TUMOR CONFERENCE (OUTPATIENT)
Dept: ONCOLOGY | Facility: CLINIC | Age: 64
End: 2024-10-28
Payer: COMMERCIAL

## 2024-10-28 ENCOUNTER — ANCILLARY PROCEDURE (OUTPATIENT)
Dept: MRI IMAGING | Facility: CLINIC | Age: 64
End: 2024-10-28
Attending: PSYCHIATRY & NEUROLOGY
Payer: COMMERCIAL

## 2024-10-28 VITALS
SYSTOLIC BLOOD PRESSURE: 119 MMHG | TEMPERATURE: 98.3 F | WEIGHT: 151 LBS | OXYGEN SATURATION: 95 % | HEART RATE: 57 BPM | RESPIRATION RATE: 12 BRPM | DIASTOLIC BLOOD PRESSURE: 76 MMHG | BODY MASS INDEX: 26.75 KG/M2 | HEIGHT: 63 IN

## 2024-10-28 DIAGNOSIS — C71.9 OLIGODENDROGLIOMA (H): ICD-10-CM

## 2024-10-28 DIAGNOSIS — C71.9 OLIGODENDROGLIOMA (H): Primary | ICD-10-CM

## 2024-10-28 PROCEDURE — G2211 COMPLEX E/M VISIT ADD ON: HCPCS | Performed by: PSYCHIATRY & NEUROLOGY

## 2024-10-28 PROCEDURE — A9585 GADOBUTROL INJECTION: HCPCS | Performed by: RADIOLOGY

## 2024-10-28 PROCEDURE — 99213 OFFICE O/P EST LOW 20 MIN: CPT | Performed by: PSYCHIATRY & NEUROLOGY

## 2024-10-28 PROCEDURE — 99215 OFFICE O/P EST HI 40 MIN: CPT | Performed by: PSYCHIATRY & NEUROLOGY

## 2024-10-28 PROCEDURE — 70553 MRI BRAIN STEM W/O & W/DYE: CPT | Performed by: RADIOLOGY

## 2024-10-28 RX ORDER — GADOBUTROL 604.72 MG/ML
7.5 INJECTION INTRAVENOUS ONCE
Status: COMPLETED | OUTPATIENT
Start: 2024-10-28 | End: 2024-10-28

## 2024-10-28 RX ADMIN — GADOBUTROL 6.5 ML: 604.72 INJECTION INTRAVENOUS at 11:49

## 2024-10-28 ASSESSMENT — PAIN SCALES - GENERAL: PAINLEVEL_OUTOF10: NO PAIN (0)

## 2024-10-28 NOTE — NURSING NOTE
"Oncology Rooming Note    October 28, 2024 2:29 PM   Barbi Tiwari is a 64 year old female who presents for:    Chief Complaint   Patient presents with    Oncology Clinic Visit     RTN Oligodendroglioma      Initial Vitals: /76 (BP Location: Right arm, Patient Position: Sitting, Cuff Size: Adult Regular)   Pulse 57   Temp 98.3  F (36.8  C) (Oral)   Resp 12   Ht 1.59 m (5' 2.6\")   Wt 68.5 kg (151 lb)   LMP  (LMP Unknown)   SpO2 95%   BMI 27.09 kg/m   Estimated body mass index is 27.09 kg/m  as calculated from the following:    Height as of this encounter: 1.59 m (5' 2.6\").    Weight as of this encounter: 68.5 kg (151 lb). Body surface area is 1.74 meters squared.  No Pain (0) Comment: Data Unavailable   No LMP recorded (lmp unknown). Patient has had a hysterectomy.  Allergies reviewed: Yes  Medications reviewed: Yes    Medications: Medication refills not needed today.  Pharmacy name entered into [facility]:    [facility] [place], [place] - [address] [id number] [place], [place] - [address] AT [facility]    Frailty Screening:   Is the patient here for a new oncology consult visit in cancer care? 2. No      Clinical concerns: none      Glenn Bethea             "

## 2024-10-28 NOTE — LETTER
10/28/2024      Barbi Tiwari  [address] [facility] [address]      Dear Colleague,    Thank you for referring your patient, Barbi Tiwari, to the [facility]. Please see a copy of my visit note below.    NEURO-ONCOLOGY VISIT  Oct 28, 2024    CHIEF COMPLAINT: Ms. 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 She received 12 cycles of temozolomide, completed in 5/2012. Changes on imaging necessitated a repeat resection on 5/30/2018 and pathology was unchanged. No adjuvant cancer-directed therapy was initiated. Imaging over the next 1.5 years showed slow tumor progression and treatment was then initiated. She completed chemoradiotherapy on 5/30/2020. Barbi then completed 6 cycles of adjuvant-dosed temozolomide as of 11/2020. Dose escalation to 200mg/m2 was complicated by intolerable nausea.     Barbi is now managed on imaging surveillance. Imaging from 2021 through her most recent scan in 10/2024 has been without concern for cancer recurrence.     I met with Barbi for this follow-up visit.    HISTORY OF PRESENT ILLNESS  -Barbi reports that she continues to sleep a lot when she is not working, but attributes this to her lack of exercise. 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 She denies losing interest in other activities. She denies depression.   -Seizures remain well controlled.  -She denies any new symptoms; no numbness or changes in vision.  -Denies recurrent headaches.       MEDICATIONS   Current Outpatient Medications   Medication Sig Dispense Refill     acetaminophen (TYLENOL) 500 MG tablet Take 2 tablets (1,000 mg) by mouth every 8 hours as needed for mild pain 42 tablet 1     atorvastatin (LIPITOR) 20 MG tablet Take 1 tablet (20 mg) by mouth at bedtime. 90 tablet 1     CALCIUM PO         escitalopram (LEXAPRO) 10 MG tablet Take 1 tablet (10 mg) by mouth daily 90 tablet 3     famotidine (PEPCID) 10 MG tablet Take 1 tablet (10 mg) by mouth nightly as needed (heartburn)       gabapentin (NEURONTIN) 100 MG capsule Take 3 capsules (300 mg) by mouth every evening 270 capsule 2     ibuprofen (ADVIL/MOTRIN) 600 MG tablet Take 1 tablet (600 mg) by mouth every 6 hours as needed for moderate pain 28 tablet 1     Lactase (LACTAID PO) Take 1-2 tablets by mouth daily as needed for indigestion        lamoTRIgine (LAMICTAL) 200 MG tablet TAKE ONE TABLET BY MOUTH TWICE A DAY (TAKE ALONG WITH 25 MG TABLET FOR TOTAL  mg morning and 200 MG pm). [facility] northar (Patient taking differently: Take 200 mg by mouth 2 times daily. TAKE ONE TABLET BY MOUTH TWICE A DAY) 180 tablet 3     levETIRAcetam (KEPPRA) 500 MG tablet Take 1 Tablets (500 mg) by mouth every morning, AND Take 2 Tablets (1000 mg) by mouth every evening. [facility] 270 tablet 3     loratadine (CLARITIN) 10 MG tablet Take 10 mg by mouth daily       metoprolol tartrate (LOPRESSOR) 25 MG tablet Take 0.5 tablets (12.5 mg) by mouth 2 times daily 90 tablet 3     Multiple Vitamin (MULTIVITAMIN ADULT PO) Take by mouth daily       tretinoin (RETIN-A) 0.025 % external cream Apply a pea-sized amount to each area affected by acne. Start every 3-4 nights working up to every night. 45 g 0     vitamin B-12 (CYANOCOBALAMIN) 2500 MCG sublingual tablet Take 1 tablet (2,500 mcg) by mouth daily 90 tablet 11     VITAMIN D, CHOLECALCIFEROL, PO Take 1,000 Units by mouth daily        triamcinolone (KENALOG) 0.1 % external cream Apply topically 2 times daily To affected area in between breasts for 1-2 weeks. Tapering with improvement and restarting with flares. (Patient not taking: Reported on 10/28/2024) 60 g 2     No current facility-administered medications for this visit.     DRUG ALLERGIES   Allergies   Allergen Reactions     Sulfa Antibiotics Hives     Adhesive Tape Rash      Benoxinate Nausea and Vomiting       ONCOLOGIC HISTORY  -4/27/2011 PRESENTATION: New onset seizure, generalized event.   -5/2011 MRB with a non-contrast enhancing right frontal mass lesion.   -5/2011 SURGERY: Craniectomy for mass resection at [facility].   PATHOLOGY: WHO grade 2 oligodendroglioma; 1p/19q co-deleted.  -6/2011-5/2012 CHEMO: Adjuvant dosed temozolomide x 12 cycles.  -4/2/2018 MRB with possible disease recurrence with a new 1.2 cm non-enhancing nodule within the cortex of the right frontal lobe adjacent to the resection cavity.  -4/12/2018 NEURO-ONC: Recommending repeat resection, appointment scheduled with Dr. Dustin Rodriguez, neurosurgery at the [facility].   -5/30/2018 SURGERY: Repeat resection with Dr. Rodriguez.   PATHOLOGY: Remains low grade (WHO grade 2), without concerning features.   -6/15/2018 NEURO-ONC: Start imaging surveillance.  -9/17/2018 NEURO-ONC/ MRB: Imaging stable, continue with surveillance.   -12/10/2018 NEURO-ONC/ MRB: Imaging stable, continue with surveillance.   -4/15/2019 NEURO-ONC/ MRB: Imaging stable, continue with surveillance.  -8/19/2019 NEURO-ONC/ MRB: Clinically stable. Imaging largely stable, subtle increases on T2 FLAIR; continue with surveillance.  -12/16/2019 NEURO-ONC/ MRB: Clinically stable. Imaging with increased T2 FLAIR medially and laterally about the resection cavity. Referral to Dr. Figueroa with radiation oncology. Discussion with Dr. Rodriguez. Referral for repeat neuro-psychology testing.   -1/10/2020 Evaluated by Dr. Devan Figueroa.   -1/17/2020 NEURO-ONC: Tentative plan to initiate chemoradiotherapy in May-June 2020.   -2/17/2020 NEURO-PSYCH; Mild weaknesses were noted in aspects of verbal and nonverbal working memory, nonverbal recall, some aspects of executive functioning, and bilateral psychomotor speed. The remainder of her cognitive abilities were intact and performed in keeping with her above average range cognitive baseline.   -4/20 - 5/30/2020 CHEMORADS: 54 Gy in 30  fractions with concurrent temozolomide 75mg/m2 (140mg).   -5/4/2020 NEURO-ONC: Continue treatment as planned. Prescribing Clotrimazole lozenges for oral thrush.   -6/1/2020 NEURO-ONC: Completed treatment as planned.  -6/30/2020 NEURO-ONC/ MRB/ CHEMO: Clinically stable. Imaging with positive treatment effect. Starting adjuvant temozolomide 150mg/m2 (250mg), cycle 1 (start date was 7/1).   -7/28/2020 NEURO-ONC/ CHEMO: Clinically stable. Adjuvant temozolomide 200mg/m2 (320mg), cycle 2 (start date was 7/29).   -8/25/2020 NEURO-ONC/ CHEMO: Clinically stable; significant nausea/ vomiting with 200mg/m2 dosing. Adding Emend for this next cycle. Adjuvant temozolomide 150mg/m2 (250mg), cycle 3 (start date of 8/26).   -9/22/2020 NEURO-ONC/ MRB/ CHEMO: Clinically stable; less nausea/ vomiting with lowered chemotherapy dosing plus adding Emend. Imaging with no concerns, repeat in 3 months. Adjuvant temozolomide 150mg/m2 (250mg), cycle 4 (start date of 9/23).   -10/20/2020 NEURO-ONC/ CHEMO: Clinically stable; no new/ worsening issues. Experiencing pain related to piriformis syndrome. Adjuvant temozolomide 150mg/m2 (250mg), cycle 5 (start date of 10/21).   -11/172020 NEURO-ONC/ CHEMO: Clinically stable. Adjuvant temozolomide 150mg/m2 (250mg), cycle 6 (start date of 11/18).   -12/15/2020 NEURO-ONC/ MRB: Clinically well. Imaging with no concerns. Starting imaging surveillance.   -3/16/2021 NEURO-ONC/ MRB: Clinically well. Imaging with no concerns.   -4/30/2021 Neuro-psych evaluation demonstrated weaknesses that are consistent with dysfunction of the bilateral frontal regions, but the right frontal region in particular.  Relative to her exam from February, 2020, there has been a mild decline in her thinking. In this exam, weaknesses were identified in executive functioning, attention, and both verbal and nonverbal working memory. Some aspects of cognitive speed were relative weaknesses as well. Insight into these weaknesses was  "limited.   -7/13/2021 NEURO-ONC/ MRB: Clinically well. Imaging stable.   -11/9/2021 NEURO-ONC/ MRB: Clinically well. Considering a trial of Zoloft. Imaging stable.   -3/8/2022 NEURO-ONC/ MRB: Clinically well. Imaging stable.   -9/19/2022 NEURO-ONC/ MRB: Clinically well. Imaging stable.   -3/13/2023 NEURO-ONC/ MRB: Stable neuro-psych evaluation. Imaging stable.   -9/8/2023 NEURO-ONC/ MRB: No new neurological concerns. Offered PT referral for evaluation of posture and gait instability. Imaging stable.  -4/29/2024 NEURO-ONC/ MRB: No new neurological concerns. Imaging stable.  -10/28/2024 NEURO-ONC/ MRB: No new neurological concerns. Imaging stable.    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   , 2 children      PHYSICAL EXAMINATION  /76 (BP Location: Right arm, Patient Position: Sitting, Cuff Size: Adult Regular)   Pulse 57   Temp 98.3  F (36.8  C) (Oral)   Resp 12   Ht 1.59 m (5' 2.6\")   Wt 68.5 kg (151 lb)   LMP  (LMP Unknown)   SpO2 95%   BMI 27.09 kg/m     Wt Readings from Last 2 Encounters:   10/28/24 68.5 kg (151 lb)   04/29/24 66.2 kg (145 lb 14.4 oz)      Ht Readings from Last 2 Encounters:   10/28/24 1.59 m (5' 2.6\")   04/11/24 1.581 m (5' 2.25\")      KPS     -Generally well appearing.  -Psychiatric: Normal mood and affect. Pleasant, talkative.  -Neurologic:   MENTAL STATUS:     Alert, oriented.    Recall: Intact.   Speech fluent.    Comprehension intact.     CRANIAL NERVES:     Symmetric facial movements.   Hearing intact.   With normal phonation, no dysfunction of the palate or tongue.   GAIT: Steady, unassisted.       MEDICAL RECORDS  Personally reviewed; No recent encounters.     LABS  Personally reviewed all available lab results; Keppra (28.8) and Lamictal (11.1) in 4/2024.     IMAGING  Personally reviewed MR brain imaging from today and compared to prior imaging. To my eye, the T2 FLAIR about the right frontal resection cavity has not changed from imaging done " "3 years ago. There is no enhancement.     Formal read; \"In this patient with history of grade 2 oligodendroglioma diagnosed following resection, status post chemoradiotherapy, now on adjuvant temozolomide. There are postsurgical changes of tumor resection, without evidence for disease progression.\"    Imaging was shown to and results were reviewed with Barbi.       IMPRESSION  On date of service, 35 minutes was spent in clinic and 7 minutes was spent preparing for the visit through extensive chart review and coordinating care for this high complexity visit. The following is in explanation for the recommendations used to define the plan.       Barbi is not noting any new subjective neurological complaints. Unfortunately, she continues to endorse ongoing fatigue, but denies any concern for low mood. Barbi denies any recent seizures. She is looking forward to meeting with her new epileptologist in January to discuss whether a further dose-reduction in anti-seizure medications would be appropriate as she attributes her fatigue to medication side effects.    Imaging as detailed above is largely stable as compared to her 2021 scan with no overt concerns. I personally reviewed Barbi's imaging and case at Brain Tumor Conference and all in attendance were in agreement with this impression. Therefore, the plan is to continue with imaging surveillance per NCCN guidelines with repeat imaging every 4-6 months through 11/2025 and at that time, can consider annual imaging. Barbi remains in agreement with repeat imaging in 6 months. However, Barbi knows to call with any concerns or to report new complaints and appointments can be moved sooner if needed.    Today, I again had a conversation with Barbi about the option of starting \"maintenance\" cancer-directed therapy with an IDH inhibitor; Voranigo (vorasidenib) 40MG daily. Voranigo is now FDA approved for treatment of IDH-mutated low grade/ grade 2 astrocytomas and " oligodendrogliomas. This is based on results from the phase 3 INDIGO study that showed prolonged imaging stability for patients with an IDH-mutated grade 2 glioma treated with an IDH inhibitor. Side effects of Voranigo can include fatigue, diarrhea, muscle pain, seizure, reduced neutrophils, and hepatic toxicity. Since the INDIGO study only enrolled treatment naive patients, there is a lack of supportive evidence to know if Voranigo will have the same benefit in patients that have already received therapy. As seen currently, Barbi's imaging demonstrates a sustained/ ongoing positive response to her prior therapy which ended ~4 years ago. So, after weighing the risks/ benefits plus lack of specific evidence-based support, we are all in agreement to hold on the use of this therapy and to continue with surveillance imaging alone for now.     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 Repeat imaging in 6 months.   -No need to monitor labs while off chemotherapy.      -SEIZURE MANAGEMENT-  -Follows with neurology.    -Quality of life/ MOOD/ FATIGUE-  -History of mild anxiety and depression.   -On Lexapro.   -Failed Celexa due to side effect of diarrhea.   -Untreated/ undertreated depression can worsen fatigue, dysorexia, and quality of life.  -Primary care monitoring vitamin D and B12 levels.    -GAIT INSTABILITY-  -Consideration for PT referral.     -COGNITIVE CHANGES-  -Neuro-psych testing completed in 11/2022 was stable.    -If further difficulties are observed in the future, a referral for a neuropsychological re-evaluation at that time might be considered.    Return to clinic in 4/2025 + imaging.     The longitudinal plan of care for the  diagnosis(es)/condition(s) as documented were addressed during this visit. Due to the added complexity in care, I will continue to support Barbi in the subsequent management and with ongoing continuity of care.     Barbi was also seen by Dr. Janie Zarate, Hematology/Oncology Fellow under my direct supervision.     Myrtle Gallagher MD  Neuro-oncology    Again, thank you for allowing me to participate in the care of your patient.        Sincerely,        Myrtle Gallagher MD

## 2024-11-09 ENCOUNTER — MYC MEDICAL ADVICE (OUTPATIENT)
Dept: INTERNAL MEDICINE | Facility: CLINIC | Age: 64
End: 2024-11-09
Payer: COMMERCIAL

## 2024-11-11 ENCOUNTER — NURSE TRIAGE (OUTPATIENT)
Dept: INTERNAL MEDICINE | Facility: CLINIC | Age: 64
End: 2024-11-11
Payer: COMMERCIAL

## 2024-11-11 DIAGNOSIS — M25.511 ACUTE PAIN OF RIGHT SHOULDER: Primary | ICD-10-CM

## 2024-11-11 NOTE — TELEPHONE ENCOUNTER
"Patient having right shoulder pain x 2 weeks and right-sided neck pain x 1 week. She really just wants a referral to PT but agrees to see you if needed. Home treatment measures for shoulder and neck pain reviewed with patient.     Can you consider: E-visit for referral? Referral only with no visit? Visit with you (virtual or in -person? ). She declines disposition of recommended of same day appointment (ie urgent care).     Please advise/your plan please.    Thank you-    Vanesa Bhatia RN        Reason for Disposition   Shoulder pains with exertion (e.g., walking) and pain goes away on resting and not present now    Additional Information   Negative: Shock suspected (e.g., cold/pale/clammy skin, too weak to stand, low BP, rapid pulse)   Negative: Similar pain previously and it was from 'heart attack'   Negative: Similar pain previously and it was from 'angina' and not relieved by nitroglycerin   Negative: Sounds like a life-threatening emergency to the triager   Negative: Chest pain   Negative: Followed an injury to shoulder   Negative: Difficulty breathing or unusual sweating (e.g., sweating without exertion)   Negative: Pain lasting > 5 minutes and pain also present in chest  (Exception: Pain is clearly made worse by movement.)   Negative: Age > 40 and no obvious cause and pain even when not moving the arm  (Exception: Pain is clearly made worse by moving arm or bending neck.)   Negative: Red area or streak and fever   Negative: Swollen joint and fever   Negative: Entire arm is swollen   Negative: Patient sounds very sick or weak to the triager    Answer Assessment - Initial Assessment Questions  1. ONSET: \"When did the pain start?\"      Couple weeks ago  2. LOCATION: \"Where is the pain located?\"      No pain at rest. \"Inside hollow near right shoulder bone\" and also about 4 inches down same arm.   3. PAIN: \"How bad is the pain?\" (Scale 1-10; or mild, moderate, severe)    - MILD (1-3): doesn't interfere with " "normal activities    - MODERATE (4-7): interferes with normal activities (e.g., work or school) or awakens from sleep    - SEVERE (8-10): excruciating pain, unable to do any normal activities, unable to move arm at all due to pain      4-heavy objects causes pain but able to move joint  4. WORK OR EXERCISE: \"Has there been any recent work or exercise that involved this part of the body?\"      Yoga and first noticed during yoga pose  5. CAUSE: \"What do you think is causing the shoulder pain?\"      Unknown but wonders about arthritis; or overuse injury at work   6. OTHER SYMPTOMS: \"Do you have any other symptoms?\" (e.g., neck pain, swelling, rash, fever, numbness, weakness)      Neck pain about one week ago. Right side of neck.   7. PREGNANCY: \"Is there any chance you are pregnant?\" \"When was your last menstrual period?\"      NA    Protocols used: Shoulder Pain-A-[place]    "

## 2024-11-11 NOTE — TELEPHONE ENCOUNTER
Patient Contact    Attempt # 1    Was call answered?  Yes.  Relayed provider message. Pt verbalizes understanding and agrees to plan of care.

## 2024-11-16 ASSESSMENT — ACTIVITIES OF DAILY LIVING (ADL)
TOUCHING_THE_BACK_OF_YOUR_NECK: 0
AT_ITS_WORST?: 4
PUSHING_WITH_THE_INVOLVED_ARM: 3
CARRYING_A_HEAVY_OBJECT_OF_10_POUNDS: 0
PUTTING_ON_YOUR_PANTS: 0
WHEN_LYING_ON_THE_INVOLVED_SIDE: 3
REMOVING_SOMETHING_FROM_YOUR_BACK_POCKET: 1
PLEASE_INDICATE_YOR_PRIMARY_REASON_FOR_REFERRAL_TO_THERAPY:: SHOULDER
REACHING_FOR_SOMETHING_ON_A_HIGH_SHELF: 4
WASHING_YOUR_BACK: 3
PLACING_AN_OBJECT_ON_A_HIGH_SHELF: 4
WASHING_YOUR_HAIR?: 0
PUTTING_ON_AN_UNDERSHIRT_OR_A_PULLOVER_SWEATER: 0
PUTTING_ON_A_SHIRT_THAT_BUTTONS_DOWN_THE_FRONT: 0

## 2024-11-18 ENCOUNTER — THERAPY VISIT (OUTPATIENT)
Dept: PHYSICAL THERAPY | Facility: CLINIC | Age: 64
End: 2024-11-18
Attending: INTERNAL MEDICINE
Payer: COMMERCIAL

## 2024-11-18 DIAGNOSIS — M25.511 ACUTE PAIN OF RIGHT SHOULDER: ICD-10-CM

## 2024-11-18 PROCEDURE — 97110 THERAPEUTIC EXERCISES: CPT | Mod: GP | Performed by: PHYSICAL THERAPIST

## 2024-11-18 PROCEDURE — 97161 PT EVAL LOW COMPLEX 20 MIN: CPT | Mod: GP | Performed by: PHYSICAL THERAPIST

## 2024-11-18 NOTE — PROGRESS NOTES
PHYSICAL THERAPY EVALUATION  Type of Visit: Evaluation        Fall Risk Screen:  Fall screen completed by: PT  Have you fallen 2 or more times in the past year?: No  Have you fallen and had an injury in the past year?: No  Is patient a fall risk?: No    Subjective  Pt. complains of right shoulder that has been present for 3 weeks.  Mechanism/History of injury/symptoms: Unknown cause.   Patient s chief complaints: Right shoulder pain.  Symptoms are exacerbated by reaching above shoulder height and behind her back.  Symptoms are relieved by rest.   Current function restrictions: Reaching above shoulder height and behind her back.  Previous functional status as reported by patient: Unlimited.  Patient is interested in a home exercise program to continue independently.          Presenting condition or subjective complaint: (Patient-Rptd) Pain in right shoulder (inside) that moves down my upper arm and into the right side of my neck when tilting my head.  Date of onset: 10/28/24    Relevant medical history: (Patient-Rptd) Cancer; Depression; Incontinence; Osteoarthritis; Overweight; Pain at night or rest; Radiation treatment; Seizures   Dates & types of surgery:      Prior diagnostic imaging/testing results:       Prior therapy history for the same diagnosis, illness or injury: (Patient-Rptd) No      Prior Level of Function  Transfers: Independent  Ambulation: Independent  ADL: Independent  IADL:     Living Environment  Social support: (Patient-Rptd) With a significant other or spouse   Type of home: (Patient-Rptd) House; 2-story   Stairs to enter the home: (Patient-Rptd) Yes (Patient-Rptd) 2 Is there a railing: (Patient-Rptd) No     Ramp: (Patient-Rptd) No   Stairs inside the home: (Patient-Rptd) Yes (Patient-Rptd) 10 Is there a railing: (Patient-Rptd) Yes     Help at home: (Patient-Rptd) None  Equipment owned:       Employment: (Patient-Rptd) Yes (Patient-Rptd) RN  Hobbies/Interests: (Patient-Rptd) Reading,  SciFi    Patient goals for therapy: (Patient-Rptd) Lift my arm over my head and do my light yoga.    Pain assessment: Pain present 4/10     Objective   Right shoulder active and passive range of motion within functional limits and symmetrical with left  Combined extension and internal rotation of right shoulder with endrange pain  Positive impingement screening on right  Negative cervical screening today  Palpable tenderness to right proximal long head of the biceps  Right shoulder strength: Flexion 4 out of 5, abduction 4 out of 5, external rotation 4 out of 5, internal rotation 5 out of 5  Left shoulder strength 5 out of 5 for all movements      Assessment & Plan   CLINICAL IMPRESSIONS  Medical Diagnosis: Acute pain of right shoulder    Treatment Diagnosis: right shoulder pain and weakness   Impression/Assessment: Patient is a 64 year old female with right shoulder complaints.  The following significant findings have been identified: Pain, Decreased ROM/flexibility, Decreased strength, Impaired muscle performance, and Decreased activity tolerance. These impairments interfere with their ability to perform self care tasks, work tasks, recreational activities, and household chores as compared to previous level of function.     Clinical Decision Making (Complexity):  Clinical Presentation: Stable/Uncomplicated  Clinical Presentation Rationale: based on medical and personal factors listed in PT evaluation  Clinical Decision Making (Complexity): Low complexity    PLAN OF CARE  Treatment Interventions:  Interventions: Neuromuscular Re-education, Therapeutic Activity, Therapeutic Exercise    Long Term Goals     PT Goal 1  Goal Description: Independent with HEP  Rationale: to maximize safety and independence with performance of ADLs and functional tasks;to maximize safety and independence within the home;to maximize safety and independence within the community  Target Date: 12/09/24      Frequency of Treatment: 1 x  week  Duration of Treatment: 3 weeks    Recommended Referrals to Other Professionals:   Education Assessment:   Learner/Method: Patient;Listening;Demonstration;Pictures/Video  Education Comments: Pt educated on plan of care    Risks and benefits of evaluation/treatment have been explained.   Patient/Family/caregiver agrees with Plan of Care.     Evaluation Time:     PT Eval, Low Complexity Minutes (88803): 15  Evaluation Only     Signing Clinician: Hola Wyatt PT

## 2024-11-26 ENCOUNTER — OFFICE VISIT (OUTPATIENT)
Dept: PODIATRY | Facility: CLINIC | Age: 64
End: 2024-11-26
Payer: COMMERCIAL

## 2024-11-26 VITALS — SYSTOLIC BLOOD PRESSURE: 113 MMHG | BODY MASS INDEX: 27.09 KG/M2 | DIASTOLIC BLOOD PRESSURE: 76 MMHG | WEIGHT: 151 LBS

## 2024-11-26 DIAGNOSIS — M79.675 GREAT TOE PAIN, LEFT: ICD-10-CM

## 2024-11-26 DIAGNOSIS — L60.0 INGROWING LEFT GREAT TOENAIL: Primary | ICD-10-CM

## 2024-11-26 PROCEDURE — 11750 EXCISION NAIL&NAIL MATRIX: CPT | Mod: TA | Performed by: PODIATRIST

## 2024-11-26 NOTE — PROGRESS NOTES
"ASSESSMENT:  Encounter Diagnoses   Name Primary?    Ingrowing left great toenail, both edges Yes    Great toe pain, left      MEDICAL DECISION MAKING:  We discussed the cause and nature of ingrown toenails  There are no clinical signs of infection.  After discussion regarding options, she elected to proceed with a partial chemical matrixectomy of the medial and lateral edge, left great toe.      Chemical Matrixectomy/ Permanent nail removal:    The chemical matrixectomy procedure was reviewed, including risks, benefits, and post-procedure cares.  The risk of discomfort and infection was discussed.  The chance of nail regrowth was discussed.  Verbal and written consent was obtained.  The site was marked and the \"Time Out\" called.      The base of the left great toe was injected with 3 cc of  2% Lidocaine plain.  The toe was then prepped with betadine solution.  A tourniquet was applied around the base of the toe to for hemostasis.   Next the toe was checked for adequate anesthesia.  The medial and lateral nail was freed from the nail bed and marginal soft tissue attachments with a small spatula. A longitudinal cut in the nail was made 2-3mm from the each skin fold via a nail splitter.  It was completed under the eponychium with a [place] blade.)  The nail edges wers firmly grasped with a hemostat and removed in total.      Using small applicator sticks, three 30 second applications of phenol to the nail matrix were performed.  The area was diluted with a copious amount of isopropyl alcohol.  It was blotted dry.    Next the tourniquet was removed. Bacitracin ointment was applied to the nail bed, followed by a compressive dressing.  Barbi Tiwari tolerated the procedure well. Post-procedure instruction handout was provided.    Benjamin Griffin DPM, FACFAS, MS    [place] [facility] Ankle [facility]      Disclaimer: This note consists of symbols derived from keyboarding, dictation and/or voice recognition " software. As a result, there may be errors in the script that have gone undetected. Please consider this when interpreting information found in this chart.    Benjamin Griffin DPM, FACFAS, MS    [facility]/Foot & Ankle Surgery      ____________________________________________________________________    HPI:       Barbi returns today to have ingrown toenails removed.  She has pain involving both great toes.  She is able to manage the right.  She is thinking having the edges of the left removed.  Pain is existed on and off for years.  She has noticed changes in the nails.  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    *  Past Medical History:   Diagnosis Date    Acute cystitis with hematuria 10/24/2020    Allergic rhinitis 12/9/2009    Calculus of kidney 1/12/2011    Overview:  S/P LITHOTRIPSY 3/15/11     Esophageal reflux 10/22/2008    Hyperparathyroidism 01/11/2011    had surgery for it; resulted in seizures    Moderate major depression, single episode (H)     oligodendroglioma 7/23/2012    Osteoarthrosis 12/9/2009    Palpitations 6/5/2014    PONV (postoperative nausea and vomiting)     PVCs 6/5/2014    Seizure disorder (related to tumor) 08/17/2011    last seizure 3/2021   *  *  Past Surgical History:   Procedure Laterality Date    ARTHRODESIS FOOT Left 2/1/2022    Procedure: left first metatarsophalangeal joint arthrodesis, Tenotomy and capusulotomy 3rd metatarsalphalangeal joint;  Surgeon: Benjamin Griffin DPM;  Location:  OR    COMBINED CYSTOSCOPY, RETROGRADES, URETEROSCOPY, LASER HOLMIUM LITHOTRIPSY URETER(S), INSERT STENT Right 8/3/2021    Procedure: 1. Cystourethroscopy 2. Right ureteroscopy 3. Right retrograde pyelogram with interpretation of intraoperative fluoroscopic imaging 4. Holmium laser lithotripsy with basket stone extraction 5. Right ureteral stent placement;  Surgeon: Felix Cano MD;  Location:  OR    CRANIOTOMY  2011     tumor resection    CYSTOSCOPY, RETROGRADES, INSERT STENT URETER(S), COMBINED Right 7/20/2021    Procedure: 1.  Cystourethroscopy 2.  Attempted right ureteroscopy 3.  Right  retrograde pyelogram with interpretation of intraoperative fluoroscopic imaging 4.  Right ureteral dilatation 5.  Right ureteral stent placement 6.  Laser on stand-by;  Surgeon: Felix Cano MD;  Location:  OR    EXTRACORPOREAL SHOCK WAVE LITHOTRIPSY, CYSTOSCOPY, INSERT STENT URETER(S), COMBINED Bilateral 5/5/2017    Procedure: COMBINED EXTRACORPOREAL SHOCK WAVE LITHOTRIPSY, CYSTOSCOPY, INSERT STENT URETER(S);  CYSTO, URETHRAL DILATION, URETERAL LEFT STENT PLACEMENT, LEFT ESWL.;  Surgeon: Angel Del Rio MD;  Location:  OR    FOOT SURGERY      mulitple    HYSTERECTOMY, PAP NO LONGER INDICATED  2008    lap hys    LITHOTRIPSY  2010 2017    OPTICAL TRACKING SYSTEM CRANIOTOMY, EXCISE TUMOR WITH MAPPING, COMBINED Right 5/30/2018    Procedure: COMBINED OPTICAL TRACKING SYSTEM CRANIOTOMY, EXCISE TUMOR WITH MAPPING;  Right Stealth Assisted Craniotomy With Motor Mapping And Tumor Resection ;  Surgeon: Dustin Rodriguez MD;  [facility]    ORTHOPEDIC SURGERY      feet, multiple on both    OSTEOTOMY FOOT Right 1/15/2019    Procedure: OSTEOTOMY FOOT;  Surgeon: Benjamin Griffin DPM;  Location:  OR    OSTEOTOMY FOOT Left 2/1/2022    Procedure: left second metatarsal Weil osteotomy;  Surgeon: Benjamin Griffin DPM;  Location:  OR    PARATHYROIDECTOMY  2011    RECONSTRUCT FOREFOOT WITH METATARSOPHALANGEAL (MTP) FUSION Right 1/15/2019    Procedure: RIGHT FIRST METATARSAL PHALAGEAL JOINT ARTHRODESIS, RIGHT SECOND METATARSAL WEIL OSTEOTOMY;  Surgeon: Benjamin Griffin DPM;  Location:  OR    REPAIR HAMMER TOE Left 2/1/2022    Procedure: CORRECTION, left second HAMMER TOE;  Surgeon: Benjamin Griffin DPM;  Location:  OR   *  *  Current Outpatient Medications   Medication Sig Dispense Refill    acetaminophen (TYLENOL) 500 MG  tablet Take 2 tablets (1,000 mg) by mouth every 8 hours as needed for mild pain 42 tablet 1    atorvastatin (LIPITOR) 20 MG tablet Take 1 tablet (20 mg) by mouth at bedtime. 90 tablet 1    CALCIUM PO       escitalopram (LEXAPRO) 10 MG tablet Take 1 tablet (10 mg) by mouth daily 90 tablet 3    famotidine (PEPCID) 10 MG tablet Take 1 tablet (10 mg) by mouth nightly as needed (heartburn)      gabapentin (NEURONTIN) 100 MG capsule Take 3 capsules (300 mg) by mouth every evening 270 capsule 2    ibuprofen (ADVIL/MOTRIN) 600 MG tablet Take 1 tablet (600 mg) by mouth every 6 hours as needed for moderate pain 28 tablet 1    Lactase (LACTAID PO) Take 1-2 tablets by mouth daily as needed for indigestion       lamoTRIgine (LAMICTAL) 200 MG tablet TAKE ONE TABLET BY MOUTH TWICE A DAY (TAKE ALONG WITH 25 MG TABLET FOR TOTAL  mg morning and 200 MG pm). [facility] northar (Patient taking differently: Take 200 mg by mouth 2 times daily. TAKE ONE TABLET BY MOUTH TWICE A DAY) 180 tablet 3    levETIRAcetam (KEPPRA) 500 MG tablet Take 1 Tablets (500 mg) by mouth every morning, AND Take 2 Tablets (1000 mg) by mouth every evening. [facility] 270 tablet 3    loratadine (CLARITIN) 10 MG tablet Take 10 mg by mouth daily      metoprolol tartrate (LOPRESSOR) 25 MG tablet Take 0.5 tablets (12.5 mg) by mouth 2 times daily 90 tablet 3    Multiple Vitamin (MULTIVITAMIN ADULT PO) Take by mouth daily      tretinoin (RETIN-A) 0.025 % external cream Apply a pea-sized amount to each area affected by acne. Start every 3-4 nights working up to every night. 45 g 0    triamcinolone (KENALOG) 0.1 % external cream Apply topically 2 times daily To affected area in between breasts for 1-2 weeks. Tapering with improvement and restarting with flares. (Patient not taking: Reported on 10/28/2024) 60 g 2    vitamin B-12 (CYANOCOBALAMIN) 2500 MCG sublingual tablet Take 1 tablet (2,500 mcg) by mouth daily 90 tablet 11    VITAMIN D, CHOLECALCIFEROL, PO Take 1,000  Units by mouth daily            EXAM:    Vitals: LMP  (LMP Unknown)   BMI: There is no height or weight on file to calculate BMI.    Vascular:  Pedal pulses are palpable for both the DP and PT arteries.  CFT < 3 sec.  No edema.      Neuro: Light touch sensation is intact to the L4, L5, S1 distributions  No evidence of weakness, spasticity, or contracture in the lower extremities.     Derm:   The left hallux nail is mildly discolored.  It is fairly normal in texture and clarity otherwise.  The nail is severely incurvated.  Tenderness on palpation to the medial lateral skin fold.  Tenderness with downward pressure on the nail plate.

## 2024-11-26 NOTE — PATIENT INSTRUCTIONS
Thank you for choosing [facility] Podiatry / Foot & Ankle Surgery!    DR. SALAMANCA'S CLINIC LOCATIONS:     [facility] TRIAGE LINE: [phone]   [address] [place] [address] APPOINTMENTS: [phone]   [place] [address] RADIOLOGY: [phone]   (Every other Tues - Wed - Fri PM) SET UP SURGERY: [phone]    PHYSICAL THERAPY: [phone]   [facility] SPECIALTY BILLING QUESTIONS: [phone] [address] FAX: [phone]   [place] [address]    (Thurs & Fri AM)         DR. SALAMANCA'S RECOMMENDATIONS FOR HEALING AFTER A NAIL REMOVAL PROCEDURE    1. Try to keep the bandage on until bedtime or the morning after your procedure.     2. Some bleeding is normal. If bleeding seems excessive to you, place ice on top of your foot for 15-20 minutes, elevate your foot above heart level and reinforce the dressing (add additional covering)    3. Over the counter pain medication (tylenol / ibuprofen), elevating your foot and cold application is all you should need for pain control. Pain is usually easily managed.      4. For 1-2 weeks, soak your foot twice a day in mild, skin friendly soap water solution for 15 minutes (dish soap, hand soap, body wash, etc). After soaking, blot the area dry and apply a regular band aid. An antibiotic ointment is not needed.  If the guaze dressing sticks to your toe, soak your foot for a few minutes with the dressing on. This should help loosen it.     6. It is normal to experience some discomfort and redness around the nail for several days following the procedure. Drainage will likely appear a red and/or yellow. This is normal. If your toe is still draining fluid after 2 weeks, continue soaking.    7. Initial discomfort might last for 2-3 days. You may resume with regular activity as soon as you want,  as long as you keep the wound clean, dry and follow the soaking instruction. It is recommended that you do not enter public swimming pools/hot tubs while your toe is  draining.    8. If you are experiencing worsening pain and redness or notice pus after 2-3 days please contact the clinic. Ask to speak with a triage nurse and they will inform our team of your symptoms and we can advise if a follow up is needed.      IF YOU HAD A PERMANENT NAIL REMOVAL PROCEDURE    - Healing will take longer and you might need to soak for 2-3 weeks. You are healing from the nail being removed and the chemical burn.  - Expect some drainage, crust  and redness. This is from the acid (phenol) that was applied and the chemical burn.  - After soaking, use a Q-tip to clean under and around the skin where the nail was removed. This helps get rid of the brownish material and helps the wound drain  - Sometimes it is hard to know if the redness and drainage is normal versus a developing infection. If in doubt, reach out to Dr. Griffin's office via My Chart or phone.   - If redness extends back to the middle of the toe, you should have it looked at in clinic.     After 2-3 days, if you are experiencing worsening pain and redness, or notice pus, please contact the clinic. Ask to speak with a triage nurse and they will inform our team of your symptoms and we can advise if a follow up is needed.     Here is a list of routine foot care resources, which includes toenail trimming and callus/corn management.     This is not a referral. It is your responsibility to contact the organization and your insurance to confirm cost and coverage.      ROUTINE FOOT CARE (NAIL TRIMMING / CALLUSES)      [facility]  [phone]   [facility]  [phone]  Multiple locations   [facility]  [phone] Dr. Kirk and Dr. Domingo  [address] [address] [phone]   [facility]  [phone]  [place]

## 2024-11-26 NOTE — LETTER
"11/26/2024      Barbi Tiwari  [address] [facility] [address]      Dear Colleague,    Thank you for referring your patient, Barbi Tiwari, to the [facility]. Please see a copy of my visit note below.    ASSESSMENT:  Encounter Diagnoses   Name Primary?     Ingrowing left great toenail, both edges Yes     Great toe pain, left      MEDICAL DECISION MAKING:  We discussed the cause and nature of ingrown toenails  There are no clinical signs of infection.  After discussion regarding options, she elected to proceed with a partial chemical matrixectomy of the medial and lateral edge, left great toe.      Chemical Matrixectomy/ Permanent nail removal:    The chemical matrixectomy procedure was reviewed, including risks, benefits, and post-procedure cares.  The risk of discomfort and infection was discussed.  The chance of nail regrowth was discussed.  Verbal and written consent was obtained.  The site was marked and the \"Time Out\" called.      The base of the left great toe was injected with 3 cc of  2% Lidocaine plain.  The toe was then prepped with betadine solution.  A tourniquet was applied around the base of the toe to for hemostasis.   Next the toe was checked for adequate anesthesia.  The medial and lateral nail was freed from the nail bed and marginal soft tissue attachments with a small spatula. A longitudinal cut in the nail was made 2-3mm from the each skin fold via a nail splitter.  It was completed under the eponychium with a [place] blade.)  The nail edges wers firmly grasped with a hemostat and removed in total.      Using small applicator sticks, three 30 second applications of phenol to the nail matrix were performed.  The area was diluted with a copious amount of isopropyl alcohol.  It was blotted dry.    Next the tourniquet was removed. Bacitracin ointment was applied to the nail bed, followed by a compressive dressing.  Barbi Tiwari tolerated the " procedure well. Post-procedure instruction handout was provided.    Benjamin Griffin DPM, TYREL, MS    [place] [facility] of Podiatry/Foot & Ankle Surgery      Disclaimer: This note consists of symbols derived from keyboarding, dictation and/or voice recognition software. As a result, there may be errors in the script that have gone undetected. Please consider this when interpreting information found in this chart.    Benjamin Griffin DPM, TYREL, MS    [place] [facility] Pod[facility]ry/Foot & Ankle Surgery      ____________________________________________________________________    HPI:       Barbi returns today to have ingrown toenails removed.  She has pain involving both great toes.  She is able to manage the right.  She is thinking having the edges of the left removed.  Pain is existed on and off for years.  She has noticed changes in the nails.  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    *  Past Medical History:   Diagnosis Date     Acute cystitis with hematuria 10/24/2020     Allergic rhinitis 12/9/2009     Calculus of kidney 1/12/2011    Overview:  S/P LITHOTRIPSY 3/15/11      Esophageal reflux 10/22/2008     Hyperparathyroidism 01/11/2011    had surgery for it; resulted in seizures     Moderate major depression, single episode (H)      oligodendroglioma 7/23/2012     Osteoarthrosis 12/9/2009     Palpitations 6/5/2014     PONV (postoperative nausea and vomiting)      PVCs 6/5/2014     Seizure disorder (related to tumor) 08/17/2011    last seizure 3/2021   *  *  Past Surgical History:   Procedure Laterality Date     ARTHRODESIS FOOT Left 2/1/2022    Procedure: left first metatarsophalangeal joint arthrodesis, Tenotomy and capusulotomy 3rd metatarsalphalangeal joint;  Surgeon: Benjamin Griffin DPM;  Location: [facility] OR     COMBINED CYSTOSCOPY, RETROGRADES, URETEROSCOPY, LASER HOLMIUM LITHOTRIPSY URETER(S), INSERT STENT Right 8/3/2021    Procedure: 1. Cystourethroscopy 2.  Right ureteroscopy 3. Right retrograde pyelogram with interpretation of intraoperative fluoroscopic imaging 4. Holmium laser lithotripsy with basket stone extraction 5. Right ureteral stent placement;  Surgeon: Felix Cano MD;  Location:  OR     CRANIOTOMY  2011    tumor resection     CYSTOSCOPY, RETROGRADES, INSERT STENT URETER(S), COMBINED Right 7/20/2021    Procedure: 1.  Cystourethroscopy 2.  Attempted right ureteroscopy 3.  Right  retrograde pyelogram with interpretation of intraoperative fluoroscopic imaging 4.  Right ureteral dilatation 5.  Right ureteral stent placement 6.  Laser on stand-by;  Surgeon: Felix Cano MD;  Location:  OR     EXTRACORPOREAL SHOCK WAVE LITHOTRIPSY, CYSTOSCOPY, INSERT STENT URETER(S), COMBINED Bilateral 5/5/2017    Procedure: COMBINED EXTRACORPOREAL SHOCK WAVE LITHOTRIPSY, CYSTOSCOPY, INSERT STENT URETER(S);  CYSTO, URETHRAL DILATION, URETERAL LEFT STENT PLACEMENT, LEFT ESWL.;  Surgeon: Angel Del Rio MD;  Location:  OR     FOOT SURGERY      mulitple     HYSTERECTOMY, PAP NO LONGER INDICATED  2008    lap hys     LITHOTRIPSY  2010 2017     OPTICAL TRACKING SYSTEM CRANIOTOMY, EXCISE TUMOR WITH MAPPING, COMBINED Right 5/30/2018    Procedure: COMBINED OPTICAL TRACKING SYSTEM CRANIOTOMY, EXCISE TUMOR WITH MAPPING;  Right Stealth Assisted Craniotomy With Motor Mapping And Tumor Resection ;  Surgeon: Dustin Rodriguez MD;  Location:  OR     ORTHOPEDIC SURGERY      feet, multiple on both     OSTEOTOMY FOOT Right 1/15/2019    Procedure: OSTEOTOMY FOOT;  Surgeon: Benjamin Griffin DPM;  Location:  OR     OSTEOTOMY FOOT Left 2/1/2022    Procedure: left second metatarsal Weil osteotomy;  Surgeon: Benjamin Griffin DPM;  Location:  OR     PARATHYROIDECTOMY  2011     RECONSTRUCT FOREFOOT WITH METATARSOPHALANGEAL (MTP) FUSION Right 1/15/2019    Procedure: RIGHT FIRST METATARSAL PHALAGEAL JOINT ARTHRODESIS, RIGHT SECOND METATARSAL WEIL OSTEOTOMY;   Surgeon: Benjamin Griffin DPM;  Location: [facility]     REPAIR HAMMER TOE Left 2/1/2022    Procedure: CORRECTION, left second HAMMER TOE;  Surgeon: Benjamin Griffin DPM;  Location: [facility] OR   *  *  Current Outpatient Medications   Medication Sig Dispense Refill     acetaminophen (TYLENOL) 500 MG tablet Take 2 tablets (1,000 mg) by mouth every 8 hours as needed for mild pain 42 tablet 1     atorvastatin (LIPITOR) 20 MG tablet Take 1 tablet (20 mg) by mouth at bedtime. 90 tablet 1     CALCIUM PO        escitalopram (LEXAPRO) 10 MG tablet Take 1 tablet (10 mg) by mouth daily 90 tablet 3     famotidine (PEPCID) 10 MG tablet Take 1 tablet (10 mg) by mouth nightly as needed (heartburn)       gabapentin (NEURONTIN) 100 MG capsule Take 3 capsules (300 mg) by mouth every evening 270 capsule 2     ibuprofen (ADVIL/MOTRIN) 600 MG tablet Take 1 tablet (600 mg) by mouth every 6 hours as needed for moderate pain 28 tablet 1     Lactase (LACTAID PO) Take 1-2 tablets by mouth daily as needed for indigestion        lamoTRIgine (LAMICTAL) 200 MG tablet TAKE ONE TABLET BY MOUTH TWICE A DAY (TAKE ALONG WITH 25 MG TABLET FOR TOTAL  mg morning and 200 MG pm). tam smith (Patient taking differently: Take 200 mg by mouth 2 times daily. TAKE ONE TABLET BY MOUTH TWICE A DAY) 180 tablet 3     levETIRAcetam (KEPPRA) 500 MG tablet Take 1 Tablets (500 mg) by mouth every morning, AND Take 2 Tablets (1000 mg) by mouth every evening. [facility] northar 270 tablet 3     loratadine (CLARITIN) 10 MG tablet Take 10 mg by mouth daily       metoprolol tartrate (LOPRESSOR) 25 MG tablet Take 0.5 tablets (12.5 mg) by mouth 2 times daily 90 tablet 3     Multiple Vitamin (MULTIVITAMIN ADULT PO) Take by mouth daily       tretinoin (RETIN-A) 0.025 % external cream Apply a pea-sized amount to each area affected by acne. Start every 3-4 nights working up to every night. 45 g 0     triamcinolone (KENALOG) 0.1 % external cream Apply topically 2 times daily To  affected area in between breasts for 1-2 weeks. Tapering with improvement and restarting with flares. (Patient not taking: Reported on 10/28/2024) 60 g 2     vitamin B-12 (CYANOCOBALAMIN) 2500 MCG sublingual tablet Take 1 tablet (2,500 mcg) by mouth daily 90 tablet 11     VITAMIN D, CHOLECALCIFEROL, PO Take 1,000 Units by mouth daily            EXAM:    Vitals: LMP  (LMP Unknown)   BMI: There is no height or weight on file to calculate BMI.    Vascular:  Pedal pulses are palpable for both the DP and PT arteries.  CFT < 3 sec.  No edema.      Neuro: Light touch sensation is intact to the L4, L5, S1 distributions  No evidence of weakness, spasticity, or contracture in the lower extremities.     Derm:   The left hallux nail is mildly discolored.  It is fairly normal in texture and clarity otherwise.  The nail is severely incurvated.  Tenderness on palpation to the medial lateral skin fold.  Tenderness with downward pressure on the nail plate.        Again, thank you for allowing me to participate in the care of your patient.        Sincerely,        Benjamin Griffin DPM

## 2024-11-27 ENCOUNTER — IMMUNIZATION (OUTPATIENT)
Dept: INTERNAL MEDICINE | Facility: CLINIC | Age: 64
End: 2024-11-27
Payer: COMMERCIAL

## 2024-11-27 PROCEDURE — 91320 SARSCV2 VAC 30MCG TRS-SUC IM: CPT

## 2024-11-27 PROCEDURE — 90480 ADMN SARSCOV2 VAC 1/ONLY CMP: CPT

## 2024-12-16 ENCOUNTER — MYC REFILL (OUTPATIENT)
Dept: NEUROLOGY | Facility: CLINIC | Age: 64
End: 2024-12-16

## 2024-12-16 DIAGNOSIS — G25.81 RESTLESS LEGS SYNDROME (RLS): ICD-10-CM

## 2024-12-16 DIAGNOSIS — R56.9 SEIZURE (H): ICD-10-CM

## 2024-12-17 RX ORDER — LEVETIRACETAM 500 MG/1
TABLET ORAL
Qty: 270 TABLET | Refills: 0 | Status: SHIPPED | OUTPATIENT
Start: 2024-12-17

## 2024-12-17 RX ORDER — GABAPENTIN 100 MG/1
300 CAPSULE ORAL EVERY EVENING
Qty: 270 CAPSULE | Refills: 0 | Status: SHIPPED | OUTPATIENT
Start: 2024-12-17

## 2024-12-17 NOTE — TELEPHONE ENCOUNTER
Last seen: 03/14/2024  RTC: 8 months from 03/14/2024  Cancel: No  No-show: No  Next appt: 01/03/2025    Incoming refill from pt via [employer]    Medication requested: levETIRAcetam (KEPPRA) 500 MG tablet   Directions: Take 1 Tablets (500 mg) by mouth every morning, AND Take 2 Tablets (1000 mg) by mouth every evening.   Qty: 270 with 0 refills  Last refilled: 11/14/2023    Medication requested: gabapentin (NEURONTIN) 100 MG capsule   Directions: Take 3 capsules (300) mg by mouth every evening  Qty: 270 with 0 refills  Last refilled: 11/14/2023    Medication refill approved per refill protocol   
Awake/Alert and oriented to person, place and time/Symptoms improved

## 2024-12-26 ENCOUNTER — PATIENT OUTREACH (OUTPATIENT)
Dept: CARE COORDINATION | Facility: CLINIC | Age: 64
End: 2024-12-26
Payer: COMMERCIAL

## 2025-01-13 ENCOUNTER — OFFICE VISIT (OUTPATIENT)
Dept: NEUROLOGY | Facility: CLINIC | Age: 65
End: 2025-01-13
Payer: COMMERCIAL

## 2025-01-13 VITALS
HEART RATE: 61 BPM | OXYGEN SATURATION: 95 % | SYSTOLIC BLOOD PRESSURE: 107 MMHG | DIASTOLIC BLOOD PRESSURE: 69 MMHG | RESPIRATION RATE: 16 BRPM

## 2025-01-13 DIAGNOSIS — R56.9 SEIZURE (H): Primary | ICD-10-CM

## 2025-01-13 RX ORDER — LEVETIRACETAM 500 MG/1
TABLET ORAL
Qty: 270 TABLET | Refills: 0 | Status: SHIPPED | OUTPATIENT
Start: 2025-01-13

## 2025-01-13 ASSESSMENT — PAIN SCALES - GENERAL: PAINLEVEL_OUTOF10: NO PAIN (0)
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
## 2025-01-13 NOTE — PATIENT INSTRUCTIONS
It was nice to meet you today. We discussed weaning of the lamotrigine given the possible side effects of fatigue. I think its reasonable to proceed with this as Lamotrigine was needed sg-operatively and things have significantly improved since then.    PLAN  Plan for Lamotrigine wean:  Month 0 - increase LEV to 1000 mg BID   Month 1 100 mg am 200 mg pm  Month 2 100 mg am 100 mg pm  Month 3 0 mg am     100 mg pm  Month 4 0 mg am    0 mg PM  I suggested trying PT and OT for the unsteady gait and leaning towards the left.   See you again in 6 months
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
## 2025-01-21 DIAGNOSIS — R56.9 SEIZURE (H): ICD-10-CM

## 2025-01-21 RX ORDER — LAMOTRIGINE 200 MG/1
200 TABLET ORAL 2 TIMES DAILY
Qty: 180 TABLET | Refills: 3 | Status: SHIPPED | OUTPATIENT
Start: 2025-01-21 | End: 2026-01-21

## 2025-01-23 ENCOUNTER — PATIENT OUTREACH (OUTPATIENT)
Dept: CARE COORDINATION | Facility: CLINIC | Age: 65
End: 2025-01-23
Payer: COMMERCIAL

## 2025-03-10 PROBLEM — M25.511 ACUTE PAIN OF RIGHT SHOULDER: Status: RESOLVED | Noted: 2024-11-18 | Resolved: 2025-03-10

## 2025-03-10 PROBLEM — M54.50 ACUTE LEFT-SIDED LOW BACK PAIN WITHOUT SCIATICA: Status: RESOLVED | Noted: 2020-01-22 | Resolved: 2025-03-10

## 2025-03-10 PROBLEM — M25.552 HIP PAIN, LEFT: Status: RESOLVED | Noted: 2020-01-22 | Resolved: 2025-03-10

## 2025-03-12 ENCOUNTER — MYC MEDICAL ADVICE (OUTPATIENT)
Dept: OBGYN | Facility: CLINIC | Age: 65
End: 2025-03-12
Payer: COMMERCIAL

## 2025-03-12 NOTE — TELEPHONE ENCOUNTER
"Panel Management Review    Date of last visit with a [facility] provider: Lety on 3/20/24.  Date of next visit with a [facility] provider:  IM  on 4/02/25.    Health Maintenance List    Health Maintenance   Topic Date Due    URINE DRUG SCREEN  Never done    DEPRESSION ACTION PLAN  Never done    MAMMO SCREENING  01/25/2025    COLORECTAL CANCER SCREENING  03/09/2025    YEARLY PREVENTIVE VISIT  04/11/2025    LIPID  04/18/2025    PHQ-9  06/20/2025    GLUCOSE  04/18/2027    ADVANCE CARE PLANNING  04/11/2029    DTAP/TDAP/TD IMMUNIZATION (4 - Td or Tdap) 04/11/2034    INFLUENZA VACCINE  Completed    Pneumococcal Vaccine: 50+ Years  Completed    ZOSTER IMMUNIZATION  Completed    RSV VACCINE  Completed    COVID-19 Vaccine  Completed    HEPATITIS C SCREENING  Addressed    HIV SCREENING  Addressed    HPV IMMUNIZATION  Aged Out    MENINGITIS IMMUNIZATION  Aged Out    PAP  Discontinued       Composite cancer screening  Chart review shows that this patient is due/due soon for the following Colonoscopy  No results found for: \"PAP\"  Past Surgical History:   Procedure Laterality Date    ARTHRODESIS FOOT Left 2/1/2022    Procedure: left first metatarsophalangeal joint arthrodesis, Tenotomy and capusulotomy 3rd metatarsalphalangeal joint;  Surgeon: Benjamin Griffin DPM;  Location:  OR    COMBINED CYSTOSCOPY, RETROGRADES, URETEROSCOPY, LASER HOLMIUM LITHOTRIPSY URETER(S), INSERT STENT Right 8/3/2021    Procedure: 1. Cystourethroscopy 2. Right ureteroscopy 3. Right retrograde pyelogram with interpretation of intraoperative fluoroscopic imaging 4. Holmium laser lithotripsy with basket stone extraction 5. Right ureteral stent placement;  Surgeon: Felix Cano MD;  Location:  OR    CRANIOTOMY  2011    tumor resection    CYSTOSCOPY, RETROGRADES, INSERT STENT URETER(S), COMBINED Right 7/20/2021    Procedure: 1.  Cystourethroscopy 2.  Attempted right ureteroscopy 3.  Right  retrograde pyelogram with " interpretation of intraoperative fluoroscopic imaging 4.  Right ureteral dilatation 5.  Right ureteral stent placement 6.  Laser on stand-by;  Surgeon: Felix Cano MD;  Location:  OR    EXTRACORPOREAL SHOCK WAVE LITHOTRIPSY, CYSTOSCOPY, INSERT STENT URETER(S), COMBINED Bilateral 5/5/2017    Procedure: COMBINED EXTRACORPOREAL SHOCK WAVE LITHOTRIPSY, CYSTOSCOPY, INSERT STENT URETER(S);  CYSTO, URETHRAL DILATION, URETERAL LEFT STENT PLACEMENT, LEFT ESWL.;  Surgeon: Angel Del Rio MD;  Location:  OR    FOOT SURGERY      mulitple    HYSTERECTOMY, PAP NO LONGER INDICATED  2008    lap hys    LITHOTRIPSY  2010 2017    OPTICAL TRACKING SYSTEM CRANIOTOMY, EXCISE TUMOR WITH MAPPING, COMBINED Right 5/30/2018    Procedure: COMBINED OPTICAL TRACKING SYSTEM CRANIOTOMY, EXCISE TUMOR WITH MAPPING;  Right Stealth Assisted Craniotomy With Motor Mapping And Tumor Resection ;  Surgeon: Dustin Rodriguez MD;  Location: [facility] OR    ORTHOPEDIC SURGERY      feet, multiple on both    OSTEOTOMY FOOT Right 1/15/2019    Procedure: OSTEOTOMY FOOT;  Surgeon: Benjamin Griffin DPM;  Location:  OR    OSTEOTOMY FOOT Left 2/1/2022    Procedure: left second metatarsal Weil osteotomy;  Surgeon: Benjamin Griffin DPM;  Location:  OR    PARATHYROIDECTOMY  2011    RECONSTRUCT FOREFOOT WITH METATARSOPHALANGEAL (MTP) FUSION Right 1/15/2019    Procedure: RIGHT FIRST METATARSAL PHALAGEAL JOINT ARTHRODESIS, RIGHT SECOND METATARSAL WEIL OSTEOTOMY;  Surgeon: Benjamin Griffin DPM;  Location:  OR    REPAIR HAMMER TOE Left 2/1/2022    Procedure: CORRECTION, left second HAMMER TOE;  Surgeon: Benjamin Griffin DPM;  Location:  OR       Summary:    Patient is due/failing the following:   Colonoscopy    Action needed: Patient needs referral/order: Colonoscopy    Type of outreach:  Sent [employer] message.      Staff Signature:  Tabitha Leslie MA on 3/12/2025 at 10:23 AM

## 2025-03-18 ENCOUNTER — MYC REFILL (OUTPATIENT)
Dept: INTERNAL MEDICINE | Facility: CLINIC | Age: 65
End: 2025-03-18
Payer: COMMERCIAL

## 2025-03-18 DIAGNOSIS — F32.1 MODERATE MAJOR DEPRESSION (H): ICD-10-CM

## 2025-03-18 RX ORDER — ESCITALOPRAM OXALATE 10 MG/1
10 TABLET ORAL DAILY
Qty: 90 TABLET | Refills: 1 | OUTPATIENT
Start: 2025-03-18

## 2025-03-25 ENCOUNTER — ANCILLARY PROCEDURE (OUTPATIENT)
Dept: MAMMOGRAPHY | Facility: CLINIC | Age: 65
End: 2025-03-25
Attending: INTERNAL MEDICINE
Payer: COMMERCIAL

## 2025-03-25 DIAGNOSIS — Z12.31 VISIT FOR SCREENING MAMMOGRAM: ICD-10-CM

## 2025-03-25 PROCEDURE — 77063 BREAST TOMOSYNTHESIS BI: CPT | Mod: TC | Performed by: RADIOLOGY

## 2025-03-25 PROCEDURE — 77067 SCR MAMMO BI INCL CAD: CPT | Mod: TC | Performed by: RADIOLOGY

## 2025-04-01 SDOH — HEALTH STABILITY: PHYSICAL HEALTH
ON AVERAGE, HOW MANY DAYS PER WEEK DO YOU ENGAGE IN MODERATE TO STRENUOUS EXERCISE (LIKE A BRISK WALK)?: PATIENT DECLINED

## 2025-04-01 SDOH — HEALTH STABILITY: PHYSICAL HEALTH: ON AVERAGE, HOW MANY MINUTES DO YOU ENGAGE IN EXERCISE AT THIS LEVEL?: PATIENT DECLINED

## 2025-04-01 ASSESSMENT — SOCIAL DETERMINANTS OF HEALTH (SDOH): HOW OFTEN DO YOU GET TOGETHER WITH FRIENDS OR RELATIVES?: ONCE A WEEK

## 2025-04-03 ENCOUNTER — OFFICE VISIT (OUTPATIENT)
Dept: INTERNAL MEDICINE | Facility: CLINIC | Age: 65
End: 2025-04-03
Payer: COMMERCIAL

## 2025-04-03 ENCOUNTER — ORDERS ONLY (AUTO-RELEASED) (OUTPATIENT)
Dept: INTERNAL MEDICINE | Facility: CLINIC | Age: 65
End: 2025-04-03

## 2025-04-03 VITALS
WEIGHT: 147.6 LBS | TEMPERATURE: 97.8 F | HEART RATE: 64 BPM | OXYGEN SATURATION: 95 % | DIASTOLIC BLOOD PRESSURE: 82 MMHG | BODY MASS INDEX: 26.48 KG/M2 | SYSTOLIC BLOOD PRESSURE: 98 MMHG

## 2025-04-03 DIAGNOSIS — C71.9 OLIGODENDROGLIOMA (H): ICD-10-CM

## 2025-04-03 DIAGNOSIS — F32.1 MODERATE MAJOR DEPRESSION (H): ICD-10-CM

## 2025-04-03 DIAGNOSIS — I49.3 SYMPTOMATIC PREMATURE VENTRICULAR CONTRACTIONS: ICD-10-CM

## 2025-04-03 DIAGNOSIS — E78.2 MIXED HYPERLIPIDEMIA: ICD-10-CM

## 2025-04-03 DIAGNOSIS — Z12.11 SCREEN FOR COLON CANCER: ICD-10-CM

## 2025-04-03 DIAGNOSIS — R10.13 DYSPEPSIA: ICD-10-CM

## 2025-04-03 DIAGNOSIS — Z00.00 ROUTINE GENERAL MEDICAL EXAMINATION AT A HEALTH CARE FACILITY: Primary | ICD-10-CM

## 2025-04-03 DIAGNOSIS — R93.1 AGATSTON CAC SCORE, <100: ICD-10-CM

## 2025-04-03 RX ORDER — ESCITALOPRAM OXALATE 10 MG/1
10 TABLET ORAL DAILY
Qty: 90 TABLET | Refills: 3 | Status: SHIPPED | OUTPATIENT
Start: 2025-04-03

## 2025-04-03 RX ORDER — OMEPRAZOLE 20 MG/1
20 CAPSULE, DELAYED RELEASE ORAL DAILY
Qty: 90 CAPSULE | Refills: 3 | Status: SHIPPED | OUTPATIENT
Start: 2025-04-03

## 2025-04-03 RX ORDER — METOPROLOL TARTRATE 25 MG/1
12.5 TABLET, FILM COATED ORAL 2 TIMES DAILY
Qty: 90 TABLET | Refills: 3 | Status: SHIPPED | OUTPATIENT
Start: 2025-04-03

## 2025-04-03 RX ORDER — ATORVASTATIN CALCIUM 20 MG/1
20 TABLET, FILM COATED ORAL AT BEDTIME
Qty: 90 TABLET | Refills: 3 | Status: SHIPPED | OUTPATIENT
Start: 2025-04-03

## 2025-04-03 NOTE — PROGRESS NOTES
"Preventive Care Visit  [facility]  Dustin Choudhury MD, Internal Medicine  Apr 3, 2025      Assessment & Plan     Routine general medical examination at a health care facility  Updated screening, immunizations, prevention.  Please see health maintenance list, care gaps     Agatston CAC score, <100  Mixed hyperlipidemia  Cont statin   - Lipid panel reflex to direct LDL Fasting; Future  - Basic metabolic panel  (Ca, Cl, CO2, Creat, Gluc, K, Na, BUN); Future  - atorvastatin (LIPITOR) 20 MG tablet; Take 1 tablet (20 mg) by mouth at bedtime.    Dyspepsia  Using her famotidine almost daily- switch to PPI.   - omeprazole (PRILOSEC) 20 MG DR capsule; Take 1 capsule (20 mg) by mouth daily.    Oligodendroglioma (H)  Stable - cont f/u onc-neurology    Moderate major depression (H)  Cont ssri    Screen for colon cancer  - [employer]); Future    Patient has been advised of split billing requirements and indicates understanding: Yes        BMI  Estimated body mass index is 26.48 kg/m  as calculated from the following:    Height as of 10/28/24: 1.59 m (5' 2.6\").    Weight as of this encounter: 67 kg (147 lb 9.6 oz).       Counseling  Appropriate preventive services were addressed with this patient via screening, questionnaire, or discussion as appropriate for fall prevention, nutrition, physical activity, Tobacco-use cessation, social engagement, weight loss and cognition.  Checklist reviewing preventive services available has been given to the patient.  Reviewed patient's diet, addressing concerns and/or questions.   She is at risk for psychosocial distress and has been provided with information to reduce risk.     The longitudinal plan of care for the diagnosis(es)/condition(s) as documented were addressed during this visit. Due to the added complexity in care, I will continue to support Barbi in the subsequent management and with ongoing continuity of care.    Regular exercise  See Patient " Stacie Landon is a 64 year old, presenting for the following:  Physical        4/3/2025     1:08 PM   Additional Questions   Roomed by Colleen         HPI  No new complaints. Feels she is doing fairly well.            Advance Care Planning  Patient does not have a Health Care Directive: Discussed advance care planning with patient; information given to patient to review.      4/1/2025   General Health   How would you rate your overall physical health? Good   Feel stress (tense, anxious, or unable to sleep) To some extent   (!) STRESS CONCERN      4/1/2025   Nutrition   Three or more servings of calcium each day? Yes   Diet: Regular (no restrictions)   How many servings of fruit and vegetables per day? (!) 2-3   How many sweetened beverages each day? 0-1         4/1/2025   Exercise   Days per week of moderate/strenous exercise Patient declined   Average minutes spent exercising at this level Patient declined         4/1/2025   Social Factors   Frequency of gathering with friends or relatives Once a week   Worry food won't last until get money to buy more No   Food not last or not have enough money for food? No   Do you have housing? (Housing is defined as stable permanent housing and does not include staying ouside in a car, in a tent, in an abandoned building, in an overnight shelter, or couch-surfing.) No   Are you worried about losing your housing? No   Lack of transportation? No   Unable to get utilities (heat,electricity)? No   Want help with housing or utility concern? No   (!) HOUSING CONCERN PRESENT      4/1/2025   Fall Risk   Fallen 2 or more times in the past year? No   Trouble with walking or balance? Yes           4/1/2025   Dental   Dentist two times every year? Yes           4/11/2024   TB Screening   Were you born outside of the [place]? No                   4/1/2025   Substance Use   Alcohol more than 3/day or more than 7/wk No   Do you use any other substances recreationally? No  "    Social History     Tobacco Use    Smoking status: Never     Passive exposure: Never    Smokeless tobacco: Never   Vaping Use    Vaping status: Never Used   Substance Use Topics    Alcohol use: Yes     Comment: social    Drug use: No           3/25/2025   LAST FHS-7 RESULTS   1st degree relative breast or ovarian cancer No   Any relative bilateral breast cancer No   Any male have breast cancer No   Any ONE woman have BOTH breast AND ovarian cancer No   Any woman with breast cancer before 50yrs No   2 or more relatives with breast AND/OR ovarian cancer No   2 or more relatives with breast AND/OR bowel cancer No        Mammogram Screening - Mammogram every 1-2 years updated in Health Maintenance based on mutual decision making        4/1/2025   STI Screening   New sexual partner(s) since last STI/HIV test? No     History of abnormal Pap smear: Status post hysterectomy with removal of cervix and no history of CIN2 or greater or cervical cancer. Health Maintenance and Surgical History updated.       ASCVD Risk   The 10-year ASCVD risk score (Shelli ESTRADA, et al., 2019) is: 3.4%    Values used to calculate the score:      Age: 64 years      Sex: Female      Is Non- : No      Diabetic: No      Tobacco smoker: No      Systolic Blood Pressure: 98 mmHg      Is BP treated: No      HDL Cholesterol: 43 mg/dL      Total Cholesterol: 238 mg/dL           Reviewed and updated as needed this visit by Provider      Problems    Fam Hx                     Objective    Exam  BP 98/82   Pulse 64   Temp 97.8  F (36.6  C) (Temporal)   Wt 67 kg (147 lb 9.6 oz)   LMP  (LMP Unknown)   SpO2 95%   BMI 26.48 kg/m     Estimated body mass index is 26.48 kg/m  as calculated from the following:    Height as of 10/28/24: 1.59 m (5' 2.6\").    Weight as of this encounter: 67 kg (147 lb 9.6 oz).    Physical Exam  GENERAL: alert and no distress  EYES: Eyes grossly normal to inspection, PERRL and conjunctivae and " sclerae normal  HENT: ear canals and TM's normal, nose and mouth without ulcers or lesions  NECK: no adenopathy, no asymmetry, masses, or scars  RESP: lungs clear to auscultation - no rales, rhonchi or wheezes  CV: regular rate and rhythm, normal S1 S2, no S3 or S4, no murmur, click or rub, no peripheral edema  ABDOMEN: soft, nontender, no hepatosplenomegaly, no masses and bowel sounds normal  MS: no gross musculoskeletal defects noted, no edema  SKIN: no suspicious lesions or rashes  NEURO: Normal strength and tone, mentation intact and speech normal  PSYCH: mentation appears normal, affect normal/bright  LYMPH: no cervical adenopathy        Signed Electronically by: Dustin Choudhury MD

## 2025-04-03 NOTE — PATIENT INSTRUCTIONS
Patient Education   Preventive Care Advice   This is general advice given by our system to help you stay healthy. However, your care team may have specific advice just for you. Please talk to your care team about your preventive care needs.  Nutrition  Eat 5 or more servings of fruits and vegetables each day.  Try wheat bread, brown rice and whole grain pasta (instead of white bread, rice, and pasta).  Get enough calcium and vitamin D. Check the label on foods and aim for 100% of the RDA (recommended daily allowance).  Lifestyle  Exercise at least 150 minutes each week  (30 minutes a day, 5 days a week).  Do muscle strengthening activities 2 days a week. These help control your weight and prevent disease.  No smoking.  Wear sunscreen to prevent skin cancer.  Have a dental exam and cleaning every 6 months.  Yearly exams  See your health care team every year to talk about:  Any changes in your health.  Any medicines your care team has prescribed.  Preventive care, family planning, and ways to prevent chronic diseases.  Shots (vaccines)   HPV shots (up to age 26), if you've never had them before.  Hepatitis B shots (up to age 59), if you've never had them before.  COVID-19 shot: Get this shot when it's due.  Flu shot: Get a flu shot every year.  Tetanus shot: Get a tetanus shot every 10 years.  Pneumococcal, hepatitis A, and RSV shots: Ask your care team if you need these based on your risk.  Shingles shot (for age 50 and up)  General health tests  Diabetes screening:  Starting at age 35, Get screened for diabetes at least every 3 years.  If you are younger than age 35, ask your care team if you should be screened for diabetes.  Cholesterol test: At age 39, start having a cholesterol test every 5 years, or more often if advised.  Bone density scan (DEXA): At age 50, ask your care team if you should have this scan for osteoporosis (brittle bones).  Hepatitis C: Get tested at least once in your life.  STIs (sexually  transmitted infections)  Before age 24: Ask your care team if you should be screened for STIs.  After age 24: Get screened for STIs if you're at risk. You are at risk for STIs (including HIV) if:  You are sexually active with more than one person.  You don't use condoms every time.  You or a partner was diagnosed with a sexually transmitted infection.  If you are at risk for HIV, ask about PrEP medicine to prevent HIV.  Get tested for HIV at least once in your life, whether you are at risk for HIV or not.  Cancer screening tests  Cervical cancer screening: If you have a cervix, begin getting regular cervical cancer screening tests starting at age 21.  Breast cancer scan (mammogram): If you've ever had breasts, begin having regular mammograms starting at age 40. This is a scan to check for breast cancer.  Colon cancer screening: It is important to start screening for colon cancer at age 45.  Have a colonoscopy test every 10 years (or more often if you're at risk) Or, ask your provider about stool tests like a FIT test every year or Cologuard test every 3 years.  To learn more about your testing options, visit:   .  For help making a decision, visit:   https://bit.ly/lv06742.  Prostate cancer screening test: If you have a prostate, ask your care team if a prostate cancer screening test (PSA) at age 55 is right for you.  Lung cancer screening: If you are a current or former smoker ages 50 to 80, ask your care team if ongoing lung cancer screenings are right for you.  For informational purposes only. Not to replace the advice of your health care provider. Copyright   2023 [facility]. All rights reserved. Clinically reviewed by the [facility] Transitions Program. [employer] 466113 - REV 01/24.  Preventing Falls: Care Instructions  Injuries and health problems such as trouble walking or poor eyesight can increase your risk of falling. So can some medicines. But there are things you can do to help  "prevent falls. You can exercise to get stronger. You can also arrange your home to make it safer.    Talk to your doctor about the medicines you take. Ask if any of them increase the risk of falls and whether they can be changed or stopped.   Try to exercise regularly. It can help improve your strength and balance. This can help lower your risk of falling.         Practice fall safety and prevention.   Wear low-heeled shoes that fit well and give your feet good support. Talk to your doctor if you have foot problems that make this hard.  Carry a cellphone or wear a medical alert device that you can use to call for help.  Use stepladders instead of chairs to reach high objects. Don't climb if you're at risk for falls. Ask for help, if needed.  Wear the correct eyeglasses, if you need them.        Make your home safer.   Remove rugs, cords, clutter, and furniture from walkways.  Keep your house well lit. Use night-lights in hallways and bathrooms.  Install and use sturdy handrails on stairways.  Wear nonskid footwear, even inside. Don't walk barefoot or in socks without shoes.        Be safe outside.   Use handrails, curb cuts, and ramps whenever possible.  Keep your hands free by using a shoulder bag or backpack.  Try to walk in well-lit areas. Watch out for uneven ground, changes in pavement, and debris.  Be careful in the winter. Walk on the grass or gravel when sidewalks are slippery. Use de-icer on steps and walkways. Add non-slip devices to shoes.    Put grab bars and nonskid mats in your shower or tub and near the toilet. Try to use a shower chair or bath bench when bathing.   Get into a tub or shower by putting in your weaker leg first. Get out with your strong side first. Have a phone or medical alert device in the bathroom with you.   Where can you learn more?  Go to https://www.[employer].net/patiented  Enter G117 in the search box to learn more about \"Preventing Falls: Care Instructions.\"  Current as of: " July 31, 2024  Content Version: 14.4    2186-3231 [employer].   Care instructions adapted under license by your healthcare professional. If you have questions about a medical condition or this instruction, always ask your healthcare professional. [employer] disclaims any warranty or liability for your use of this information.    Learning About Stress  What is stress?     Stress is your body's response to a hard situation. Your body can have a physical, emotional, or mental response. Stress is a fact of life for most people, and it affects everyone differently. What causes stress for you may not be stressful for someone else.  A lot of things can cause stress. You may feel stress when you go on a job interview, take a test, or run a race. This kind of short-term stress is normal and even useful. It can help you if you need to work hard or react quickly. For example, stress can help you finish an important job on time.  Long-term stress is caused by ongoing stressful situations or events. Examples of long-term stress include long-term health problems, ongoing problems at work, or conflicts in your family. Long-term stress can harm your health.  How does stress affect your health?  When you are stressed, your body responds as though you are in danger. It makes hormones that speed up your heart, make you breathe faster, and give you a burst of energy. This is called the fight-or-flight stress response. If the stress is over quickly, your body goes back to normal and no harm is done.  But if stress happens too often or lasts too long, it can have bad effects. Long-term stress can make you more likely to get sick, and it can make symptoms of some diseases worse. If you tense up when you are stressed, you may develop neck, shoulder, or low back pain. Stress is linked to high blood pressure and heart disease.  Stress also harms your emotional health. It can make you zhong, tense, or depressed. Your  relationships may suffer, and you may not do well at work or school.  What can you do to manage stress?  You can try these things to help manage stress:   Do something active. Exercise or activity can help reduce stress. Walking is a great way to get started. Even everyday activities such as housecleaning or yard work can help.  Try yoga or kayleigh chi. These techniques combine exercise and meditation. You may need some training at first to learn them.  Do something you enjoy. For example, listen to music or go to a movie. Practice your hobby or do volunteer work.  Meditate. This can help you relax, because you are not worrying about what happened before or what may happen in the future.  Do guided imagery. Imagine yourself in any setting that helps you feel calm. You can use online videos, books, or a teacher to guide you.  Do breathing exercises. For example:  From a standing position, bend forward from the waist with your knees slightly bent. Let your arms dangle close to the floor.  Breathe in slowly and deeply as you return to a standing position. Roll up slowly and lift your head last.  Hold your breath for just a few seconds in the standing position.  Breathe out slowly and bend forward from the waist.  Let your feelings out. Talk, laugh, cry, and express anger when you need to. Talking with supportive friends or family, a counselor, or a gurvinder leader about your feelings is a healthy way to relieve stress. Avoid discussing your feelings with people who make you feel worse.  Write. It may help to write about things that are bothering you. This helps you find out how much stress you feel and what is causing it. When you know this, you can find better ways to cope.  What can you do to prevent stress?  You might try some of these things to help prevent stress:  Manage your time. This helps you find time to do the things you want and need to do.  Get enough sleep. Your body recovers from the stresses of the day while  "you are sleeping.  Get support. Your family, friends, and community can make a difference in how you experience stress.  Limit your news feed. Avoid or limit time on social media or news that may make you feel stressed.  Do something active. Exercise or activity can help reduce stress. Walking is a great way to get started.  Where can you learn more?  Go to https://www.[employer].net/patiented  Enter N032 in the search box to learn more about \"Learning About Stress.\"  Current as of: October 24, 2024  Content Version: 14.4    4559-6467 [employer].   Care instructions adapted under license by your healthcare professional. If you have questions about a medical condition or this instruction, always ask your healthcare professional. [employer] disclaims any warranty or liability for your use of this information.       "

## 2025-04-08 ENCOUNTER — LAB (OUTPATIENT)
Dept: LAB | Facility: CLINIC | Age: 65
End: 2025-04-08
Payer: COMMERCIAL

## 2025-04-08 DIAGNOSIS — E78.2 MIXED HYPERLIPIDEMIA: ICD-10-CM

## 2025-04-08 DIAGNOSIS — R56.9 SEIZURE (H): ICD-10-CM

## 2025-04-08 LAB
ANION GAP SERPL CALCULATED.3IONS-SCNC: 12 MMOL/L (ref 7–15)
BUN SERPL-MCNC: 19.4 MG/DL (ref 8–23)
CALCIUM SERPL-MCNC: 9.5 MG/DL (ref 8.8–10.4)
CHLORIDE SERPL-SCNC: 107 MMOL/L (ref 98–107)
CHOLEST SERPL-MCNC: 175 MG/DL
CREAT SERPL-MCNC: 0.73 MG/DL (ref 0.51–0.95)
EGFRCR SERPLBLD CKD-EPI 2021: >90 ML/MIN/1.73M2
FASTING STATUS PATIENT QL REPORTED: YES
FASTING STATUS PATIENT QL REPORTED: YES
GLUCOSE SERPL-MCNC: 103 MG/DL (ref 70–99)
HCO3 SERPL-SCNC: 24 MMOL/L (ref 22–29)
HDLC SERPL-MCNC: 50 MG/DL
LDLC SERPL CALC-MCNC: 96 MG/DL
LEVETIRACETAM SERPL-MCNC: 38.9 ÂΜG/ML (ref 10–40)
NONHDLC SERPL-MCNC: 125 MG/DL
POTASSIUM SERPL-SCNC: 4.2 MMOL/L (ref 3.4–5.3)
SODIUM SERPL-SCNC: 143 MMOL/L (ref 135–145)
TRIGL SERPL-MCNC: 143 MG/DL

## 2025-04-08 PROCEDURE — 80061 LIPID PANEL: CPT

## 2025-04-08 PROCEDURE — 80048 BASIC METABOLIC PNL TOTAL CA: CPT

## 2025-04-08 PROCEDURE — 80177 DRUG SCRN QUAN LEVETIRACETAM: CPT

## 2025-04-08 PROCEDURE — 36415 COLL VENOUS BLD VENIPUNCTURE: CPT

## 2025-04-22 NOTE — PROGRESS NOTES
NEURO-ONCOLOGY VISIT  Apr 28, 2025    CHIEF COMPLAINT: Ms. 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 She received 12 cycles of temozolomide, completed in 5/2012. Changes on imaging necessitated a repeat resection on 5/30/2018 and pathology was unchanged. No adjuvant cancer-directed therapy was initiated. Imaging over the next 1.5 years showed slow tumor progression and treatment was then initiated. She completed chemoradiotherapy on 5/30/2020. Barbi then completed 6 cycles of adjuvant-dosed temozolomide as of 11/2020. Dose escalation to 200mg/m2 was complicated by intolerable nausea.     Barbi is now managed on imaging surveillance. Imaging from 2021 through her most recent scan in 4/2025 has been without concern for cancer recurrence.     I met with Barbi for this follow-up visit.    HISTORY OF PRESENT ILLNESS  -Barbi reports that she continues to sleep a lot.   She is weaning off Lamictal.    Trying to push herself to walk everyday. She walks about 20 minutes on days that she does not work.    Work has been going well.   -Seizures remain well controlled.   Last recurrent seizure was in January in the setting of being ill with norovirus.   -She denies any new symptoms; no numbness, weakness, or changes in vision.   Denies recurrent headaches.       MEDICATIONS   Current Outpatient Medications   Medication Sig Dispense Refill    atorvastatin (LIPITOR) 20 MG tablet Take 1 tablet (20 mg) by mouth at bedtime. 90 tablet 3    CALCIUM PO       escitalopram (LEXAPRO) 10 MG tablet Take 1 tablet (10 mg) by mouth daily. 90 tablet 3    gabapentin (NEURONTIN) 100 MG capsule Take 3 capsules (300 mg) by mouth every evening. 270 capsule 0    Lactase (LACTAID PO) Take 1-2 tablets by mouth daily as needed for indigestion       lamoTRIgine (LAMICTAL) 200 MG tablet Take 1 tablet (200 mg) by mouth 2 times daily.  TAKE ONE TABLET BY MOUTH TWICE A  tablet 3    levETIRAcetam (KEPPRA) 500 MG tablet Take 2 Tablets (1000 mg) by mouth every morning, AND Take 2 Tablets (1000 mg) by mouth every evening. mft [facility] 270 tablet 0    loratadine (CLARITIN) 10 MG tablet Take 10 mg by mouth daily      metoprolol tartrate (LOPRESSOR) 25 MG tablet Take 0.5 tablets (12.5 mg) by mouth 2 times daily. 90 tablet 3    Multiple Vitamin (MULTIVITAMIN ADULT PO) Take by mouth daily      omeprazole (PRILOSEC) 20 MG DR capsule Take 1 capsule (20 mg) by mouth daily. 90 capsule 3    tretinoin (RETIN-A) 0.025 % external cream Apply a pea-sized amount to each area affected by acne. Start every 3-4 nights working up to every night. 45 g 0    triamcinolone (KENALOG) 0.1 % external cream Apply topically 2 times daily To affected area in between breasts for 1-2 weeks. Tapering with improvement and restarting with flares. 60 g 2    vitamin B-12 (CYANOCOBALAMIN) 2500 MCG sublingual tablet Take 1 tablet (2,500 mcg) by mouth daily 90 tablet 11    VITAMIN D, CHOLECALCIFEROL, PO Take 1,000 Units by mouth daily        No current facility-administered medications for this visit.     DRUG ALLERGIES   Allergies   Allergen Reactions    Sulfa Antibiotics Hives    Adhesive Tape Rash    Benoxinate Nausea and Vomiting       ONCOLOGIC HISTORY  -4/27/2011 PRESENTATION: New onset seizure, generalized event.   -5/2011 MRB with a non-contrast enhancing right frontal mass lesion.   -5/2011 SURGERY: Craniectomy for mass resection at [facility].   PATHOLOGY: WHO grade 2 oligodendroglioma; 1p/19q co-deleted.  -6/2011-5/2012 CHEMO: Adjuvant dosed temozolomide x 12 cycles.  -4/2/2018 MRB with possible disease recurrence with a new 1.2 cm non-enhancing nodule within the cortex of the right frontal lobe adjacent to the resection cavity.  -4/12/2018 NEURO-ONC: Recommending repeat resection, appointment scheduled with Dr. Dustin Rodriguez, neurosurgery at the [facility].   -5/30/2018 SURGERY:  Repeat resection with Dr. Rodriguez.   PATHOLOGY: Remains low grade (WHO grade 2), without concerning features.   -6/15/2018 NEURO-ONC: Start imaging surveillance.  -9/17/2018 NEURO-ONC/ MRB: Imaging stable, continue with surveillance.   -12/10/2018 NEURO-ONC/ MRB: Imaging stable, continue with surveillance.   -4/15/2019 NEURO-ONC/ MRB: Imaging stable, continue with surveillance.  -8/19/2019 NEURO-ONC/ MRB: Clinically stable. Imaging largely stable, subtle increases on T2 FLAIR; continue with surveillance.  -12/16/2019 NEURO-ONC/ MRB: Clinically stable. Imaging with increased T2 FLAIR medially and laterally about the resection cavity. Referral to Dr. Figueroa with radiation oncology. Discussion with Dr. Rodriguez. Referral for repeat neuro-psychology testing.   -1/10/2020 Evaluated by Dr. Devan Figueroa.   -1/17/2020 NEURO-ONC: Tentative plan to initiate chemoradiotherapy in May-June 2020.   -2/17/2020 NEURO-PSYCH; Mild weaknesses were noted in aspects of verbal and nonverbal working memory, nonverbal recall, some aspects of executive functioning, and bilateral psychomotor speed. The remainder of her cognitive abilities were intact and performed in keeping with her above average range cognitive baseline.   -4/20 - 5/30/2020 CHEMORADS: 54 Gy in 30 fractions with concurrent temozolomide 75mg/m2 (140mg).   -5/4/2020 NEURO-ONC: Continue treatment as planned. Prescribing Clotrimazole lozenges for oral thrush.   -6/1/2020 NEURO-ONC: Completed treatment as planned.  -6/30/2020 NEURO-ONC/ MRB/ CHEMO: Clinically stable. Imaging with positive treatment effect. Starting adjuvant temozolomide 150mg/m2 (250mg), cycle 1 (start date was 7/1).   -7/28/2020 NEURO-ONC/ CHEMO: Clinically stable. Adjuvant temozolomide 200mg/m2 (320mg), cycle 2 (start date was 7/29).   -8/25/2020 NEURO-ONC/ CHEMO: Clinically stable; significant nausea/ vomiting with 200mg/m2 dosing. Adding Emend for this next cycle. Adjuvant temozolomide 150mg/m2 (250mg), cycle 3  (start date of 8/26).   -9/22/2020 NEURO-ONC/ MRB/ CHEMO: Clinically stable; less nausea/ vomiting with lowered chemotherapy dosing plus adding Emend. Imaging with no concerns, repeat in 3 months. Adjuvant temozolomide 150mg/m2 (250mg), cycle 4 (start date of 9/23).   -10/20/2020 NEURO-ONC/ CHEMO: Clinically stable; no new/ worsening issues. Experiencing pain related to piriformis syndrome. Adjuvant temozolomide 150mg/m2 (250mg), cycle 5 (start date of 10/21).   -11/172020 NEURO-ONC/ CHEMO: Clinically stable. Adjuvant temozolomide 150mg/m2 (250mg), cycle 6 (start date of 11/18).   -12/15/2020 NEURO-ONC/ MRB: Clinically well. Imaging with no concerns. Starting imaging surveillance.   -3/16/2021 NEURO-ONC/ MRB: Clinically well. Imaging with no concerns.   -4/30/2021 Neuro-psych evaluation demonstrated weaknesses that are consistent with dysfunction of the bilateral frontal regions, but the right frontal region in particular.  Relative to her exam from February, 2020, there has been a mild decline in her thinking. In this exam, weaknesses were identified in executive functioning, attention, and both verbal and nonverbal working memory. Some aspects of cognitive speed were relative weaknesses as well. Insight into these weaknesses was limited.   -7/13/2021 NEURO-ONC/ MRB: Clinically well. Imaging stable.   -11/9/2021 NEURO-ONC/ MRB: Clinically well. Considering a trial of Zoloft. Imaging stable.   -3/8/2022 NEURO-ONC/ MRB: Clinically well. Imaging stable.   -9/19/2022 NEURO-ONC/ MRB: Clinically well. Imaging stable.   -3/13/2023 NEURO-ONC/ MRB: Stable neuro-psych evaluation. Imaging stable.   -9/8/2023 NEURO-ONC/ MRB: No new neurological concerns. Offered PT referral for evaluation of posture and gait instability. Imaging stable.  -4/29/2024 NEURO-ONC/ MRB: No new neurological concerns. Imaging stable.  -10/28/2024 NEURO-ONC/ MRB: No new neurological concerns. Imaging stable.  -4/28/2025 NEURO-ONC/ MRB: No new  "neurological concerns. Ongoing fatigue, tapering off Lamictal. Imaging stable.    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   , 2 children      PHYSICAL EXAMINATION  /68 (BP Location: Right arm, Patient Position: Sitting, Cuff Size: Adult Regular)   Pulse 82   Temp 98  F (36.7  C) (Oral)   Resp 16   Wt 69.3 kg (152 lb 11.2 oz)   LMP  (LMP Unknown)   SpO2 93%   BMI 27.40 kg/m     Wt Readings from Last 2 Encounters:   04/28/25 69.3 kg (152 lb 11.2 oz)   04/03/25 67 kg (147 lb 9.6 oz)      Ht Readings from Last 2 Encounters:   10/28/24 1.59 m (5' 2.6\")   04/11/24 1.581 m (5' 2.25\")      KPS     -Generally well appearing.  -Psychiatric: Normal mood and affect. Pleasant, talkative.  -Neurologic:   MENTAL STATUS:     Alert, oriented.    Recall: Intact.   Speech fluent.    Comprehension intact.     CRANIAL NERVES:     Symmetric facial movements.   Hearing intact.   With normal phonation, no dysfunction of the palate or tongue.   GAIT: Steady, unassisted.       MEDICAL RECORDS  Personally reviewed; Primary care visit from this month with Dr. Choudhury, indicated for routine general medical examination. Recommendations for lipid pain, BMP, and Cologuard for colon cancer screening.    LABS  Personally reviewed all available lab results; LDL 96, BMP with Cr of 0.73 in 4/2025.     IMAGING  Personally reviewed MR brain imaging from today and compared to prior imaging. To my eye, the T2 FLAIR about the right frontal resection cavity has not changed from imaging done 3 years ago. There is no enhancement.     Formal read; \"In this patient with history of grade 2 oligodendroglioma diagnosed following resection, status post chemotherapy: There are postsurgical changes of tumor resection without evidence for disease progression.\"    Imaging was shown to and results were reviewed with Barbi.       IMPRESSION  On date of service, 35 minutes was spent in clinic and 7 minutes was spent preparing for " the visit through extensive chart review and coordinating care for this high complexity visit. The following is in explanation for the recommendations used to define the plan.       Barbi is not noting any new subjective neurological complaints. Unfortunately, she continues to endorse ongoing fatigue, but denies any concern for low mood. Even with tapering off Lamictal, her degree of fatigue has not improved. She had a provoked seizure in January in the setting of being ill, but otherwise, denies any recent seizures.     Imaging as detailed above is largely stable as compared to her 2021 scan with no overt concerns. I personally reviewed Barbi's imaging and case at Brain Tumor Conference and all in attendance were in agreement with this impression. Therefore, the plan is to continue with imaging surveillance per NCCN guidelines with repeat imaging every 4-6 months through ~11/2025 and at that time, when imaging is again stable, can consider annual imaging. Barbi remains in agreement with repeat imaging in 6 months. However, Barbi knows to call with any concerns or to report new complaints and appointments can be moved sooner if needed.    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 Repeat imaging in 6 months.   -No need to monitor labs while off chemotherapy.      -SEIZURE MANAGEMENT-  -Follows with neurology.   Weaning off Lamictal.    Continuing on Keppra.     -Quality of life/ MOOD/ FATIGUE-  -History of mild anxiety and depression.   -On Lexapro.   -Failed Celexa due to side effect of diarrhea.   -Untreated/ undertreated depression can worsen fatigue, dysorexia, and quality of life.  -Primary care monitoring vitamin D and B12 levels.    -GAIT  INSTABILITY-  -Consideration for PT referral.     -COGNITIVE CHANGES-  -Neuro-psych testing completed in 11/2022 was stable.    -If further difficulties are observed in the future, a referral for a neuropsychological re-evaluation at that time might be considered.    Return to clinic in 10/2025 + imaging.     The longitudinal plan of care for the diagnosis(es)/condition(s) as documented were addressed during this visit. Due to the added complexity in care, I will continue to support Barbi in the subsequent management and with ongoing continuity of care.     Myrtle Gallagher MD  Neuro-oncology

## 2025-04-28 ENCOUNTER — ANCILLARY PROCEDURE (OUTPATIENT)
Dept: MRI IMAGING | Facility: CLINIC | Age: 65
End: 2025-04-28
Attending: PSYCHIATRY & NEUROLOGY
Payer: COMMERCIAL

## 2025-04-28 ENCOUNTER — ONCOLOGY VISIT (OUTPATIENT)
Dept: ONCOLOGY | Facility: CLINIC | Age: 65
End: 2025-04-28
Attending: PSYCHIATRY & NEUROLOGY
Payer: COMMERCIAL

## 2025-04-28 ENCOUNTER — TUMOR CONFERENCE (OUTPATIENT)
Dept: ONCOLOGY | Facility: CLINIC | Age: 65
End: 2025-04-28
Payer: COMMERCIAL

## 2025-04-28 VITALS
HEART RATE: 82 BPM | SYSTOLIC BLOOD PRESSURE: 105 MMHG | BODY MASS INDEX: 27.4 KG/M2 | DIASTOLIC BLOOD PRESSURE: 68 MMHG | OXYGEN SATURATION: 93 % | TEMPERATURE: 98 F | RESPIRATION RATE: 16 BRPM | WEIGHT: 152.7 LBS

## 2025-04-28 DIAGNOSIS — C71.9 OLIGODENDROGLIOMA (H): ICD-10-CM

## 2025-04-28 DIAGNOSIS — C71.9 OLIGODENDROGLIOMA (H): Primary | ICD-10-CM

## 2025-04-28 PROCEDURE — 70553 MRI BRAIN STEM W/O & W/DYE: CPT | Performed by: RADIOLOGY

## 2025-04-28 PROCEDURE — G2211 COMPLEX E/M VISIT ADD ON: HCPCS | Performed by: PSYCHIATRY & NEUROLOGY

## 2025-04-28 PROCEDURE — 99213 OFFICE O/P EST LOW 20 MIN: CPT | Performed by: PSYCHIATRY & NEUROLOGY

## 2025-04-28 PROCEDURE — 99214 OFFICE O/P EST MOD 30 MIN: CPT | Performed by: PSYCHIATRY & NEUROLOGY

## 2025-04-28 PROCEDURE — A9585 GADOBUTROL INJECTION: HCPCS | Performed by: RADIOLOGY

## 2025-04-28 RX ORDER — GADOBUTROL 604.72 MG/ML
7.5 INJECTION INTRAVENOUS ONCE
Status: COMPLETED | OUTPATIENT
Start: 2025-04-28 | End: 2025-04-28

## 2025-04-28 RX ADMIN — GADOBUTROL 6.5 ML: 604.72 INJECTION INTRAVENOUS at 12:20

## 2025-04-28 ASSESSMENT — PAIN SCALES - GENERAL: PAINLEVEL_OUTOF10: NO PAIN (0)

## 2025-04-28 NOTE — LETTER
4/28/2025      Barbi Tiwari  [address] [facility] [address]      Dear Colleague,    Thank you for referring your patient, Barbi Tiwari, to the [facility]. Please see a copy of my visit note below.    NEURO-ONCOLOGY VISIT  Apr 28, 2025    CHIEF COMPLAINT: Ms. 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 She received 12 cycles of temozolomide, completed in 5/2012. Changes on imaging necessitated a repeat resection on 5/30/2018 and pathology was unchanged. No adjuvant cancer-directed therapy was initiated. Imaging over the next 1.5 years showed slow tumor progression and treatment was then initiated. She completed chemoradiotherapy on 5/30/2020. Barbi then completed 6 cycles of adjuvant-dosed temozolomide as of 11/2020. Dose escalation to 200mg/m2 was complicated by intolerable nausea.     Barbi is now managed on imaging surveillance. Imaging from 2021 through her most recent scan in 4/2025 has been without concern for cancer recurrence.     I met with Barbi for this follow-up visit.    HISTORY OF PRESENT ILLNESS  -Barbi reports that she continues to sleep a lot.   She is weaning off Lamictal.    Trying to push herself to walk everyday. She walks about 20 minutes on days that she does not work.    Work has been going well.   -Seizures remain well controlled.   Last recurrent seizure was in January in the setting of being ill with norovirus.   -She denies any new symptoms; no numbness, weakness, or changes in vision.   Denies recurrent headaches.       MEDICATIONS   Current Outpatient Medications   Medication Sig Dispense Refill     atorvastatin (LIPITOR) 20 MG tablet Take 1 tablet (20 mg) by mouth at bedtime. 90 tablet 3     CALCIUM PO        escitalopram (LEXAPRO) 10 MG tablet Take 1 tablet (10 mg) by mouth daily. 90 tablet 3     gabapentin (NEURONTIN)  100 MG capsule Take 3 capsules (300 mg) by mouth every evening. 270 capsule 0     Lactase (LACTAID PO) Take 1-2 tablets by mouth daily as needed for indigestion        lamoTRIgine (LAMICTAL) 200 MG tablet Take 1 tablet (200 mg) by mouth 2 times daily. TAKE ONE TABLET BY MOUTH TWICE A  tablet 3     levETIRAcetam (KEPPRA) 500 MG tablet Take 2 Tablets (1000 mg) by mouth every morning, AND Take 2 Tablets (1000 mg) by mouth every evening. mft [facility] 270 tablet 0     loratadine (CLARITIN) 10 MG tablet Take 10 mg by mouth daily       metoprolol tartrate (LOPRESSOR) 25 MG tablet Take 0.5 tablets (12.5 mg) by mouth 2 times daily. 90 tablet 3     Multiple Vitamin (MULTIVITAMIN ADULT PO) Take by mouth daily       omeprazole (PRILOSEC) 20 MG DR capsule Take 1 capsule (20 mg) by mouth daily. 90 capsule 3     tretinoin (RETIN-A) 0.025 % external cream Apply a pea-sized amount to each area affected by acne. Start every 3-4 nights working up to every night. 45 g 0     triamcinolone (KENALOG) 0.1 % external cream Apply topically 2 times daily To affected area in between breasts for 1-2 weeks. Tapering with improvement and restarting with flares. 60 g 2     vitamin B-12 (CYANOCOBALAMIN) 2500 MCG sublingual tablet Take 1 tablet (2,500 mcg) by mouth daily 90 tablet 11     VITAMIN D, CHOLECALCIFEROL, PO Take 1,000 Units by mouth daily        No current facility-administered medications for this visit.     DRUG ALLERGIES   Allergies   Allergen Reactions     Sulfa Antibiotics Hives     Adhesive Tape Rash     Benoxinate Nausea and Vomiting       ONCOLOGIC HISTORY  -4/27/2011 PRESENTATION: New onset seizure, generalized event.   -5/2011 MRB with a non-contrast enhancing right frontal mass lesion.   -5/2011 SURGERY: Craniectomy for mass resection at [facility].   PATHOLOGY: WHO grade 2 oligodendroglioma; 1p/19q co-deleted.  -6/2011-5/2012 CHEMO: Adjuvant dosed temozolomide x 12 cycles.  -4/2/2018 MRB with possible disease recurrence  with a new 1.2 cm non-enhancing nodule within the cortex of the right frontal lobe adjacent to the resection cavity.  -4/12/2018 NEURO-ONC: Recommending repeat resection, appointment scheduled with Dr. Dustin Rodriguez, neurosurgery at the [facility].   -5/30/2018 SURGERY: Repeat resection with Dr. Rodriguez.   PATHOLOGY: Remains low grade (WHO grade 2), without concerning features.   -6/15/2018 NEURO-ONC: Start imaging surveillance.  -9/17/2018 NEURO-ONC/ MRB: Imaging stable, continue with surveillance.   -12/10/2018 NEURO-ONC/ MRB: Imaging stable, continue with surveillance.   -4/15/2019 NEURO-ONC/ MRB: Imaging stable, continue with surveillance.  -8/19/2019 NEURO-ONC/ MRB: Clinically stable. Imaging largely stable, subtle increases on T2 FLAIR; continue with surveillance.  -12/16/2019 NEURO-ONC/ MRB: Clinically stable. Imaging with increased T2 FLAIR medially and laterally about the resection cavity. Referral to Dr. Figueroa with radiation oncology. Discussion with Dr. Rodriguez. Referral for repeat neuro-psychology testing.   -1/10/2020 Evaluated by Dr. Devan Figueroa.   -1/17/2020 NEURO-ONC: Tentative plan to initiate chemoradiotherapy in May-June 2020.   -2/17/2020 NEURO-PSYCH; Mild weaknesses were noted in aspects of verbal and nonverbal working memory, nonverbal recall, some aspects of executive functioning, and bilateral psychomotor speed. The remainder of her cognitive abilities were intact and performed in keeping with her above average range cognitive baseline.   -4/20 - 5/30/2020 CHEMORADS: 54 Gy in 30 fractions with concurrent temozolomide 75mg/m2 (140mg).   -5/4/2020 NEURO-ONC: Continue treatment as planned. Prescribing Clotrimazole lozenges for oral thrush.   -6/1/2020 NEURO-ONC: Completed treatment as planned.  -6/30/2020 NEURO-ONC/ MRB/ CHEMO: Clinically stable. Imaging with positive treatment effect. Starting adjuvant temozolomide 150mg/m2 (250mg), cycle 1 (start date was 7/1).   -7/28/2020 NEURO-ONC/ CHEMO: Clinically  stable. Adjuvant temozolomide 200mg/m2 (320mg), cycle 2 (start date was 7/29).   -8/25/2020 NEURO-ONC/ CHEMO: Clinically stable; significant nausea/ vomiting with 200mg/m2 dosing. Adding Emend for this next cycle. Adjuvant temozolomide 150mg/m2 (250mg), cycle 3 (start date of 8/26).   -9/22/2020 NEURO-ONC/ MRB/ CHEMO: Clinically stable; less nausea/ vomiting with lowered chemotherapy dosing plus adding Emend. Imaging with no concerns, repeat in 3 months. Adjuvant temozolomide 150mg/m2 (250mg), cycle 4 (start date of 9/23).   -10/20/2020 NEURO-ONC/ CHEMO: Clinically stable; no new/ worsening issues. Experiencing pain related to piriformis syndrome. Adjuvant temozolomide 150mg/m2 (250mg), cycle 5 (start date of 10/21).   -11/172020 NEURO-ONC/ CHEMO: Clinically stable. Adjuvant temozolomide 150mg/m2 (250mg), cycle 6 (start date of 11/18).   -12/15/2020 NEURO-ONC/ MRB: Clinically well. Imaging with no concerns. Starting imaging surveillance.   -3/16/2021 NEURO-ONC/ MRB: Clinically well. Imaging with no concerns.   -4/30/2021 Neuro-psych evaluation demonstrated weaknesses that are consistent with dysfunction of the bilateral frontal regions, but the right frontal region in particular.  Relative to her exam from February, 2020, there has been a mild decline in her thinking. In this exam, weaknesses were identified in executive functioning, attention, and both verbal and nonverbal working memory. Some aspects of cognitive speed were relative weaknesses as well. Insight into these weaknesses was limited.   -7/13/2021 NEURO-ONC/ MRB: Clinically well. Imaging stable.   -11/9/2021 NEURO-ONC/ MRB: Clinically well. Considering a trial of Zoloft. Imaging stable.   -3/8/2022 NEURO-ONC/ MRB: Clinically well. Imaging stable.   -9/19/2022 NEURO-ONC/ MRB: Clinically well. Imaging stable.   -3/13/2023 NEURO-ONC/ MRB: Stable neuro-psych evaluation. Imaging stable.   -9/8/2023 NEURO-ONC/ MRB: No new neurological concerns. Offered PT  "referral for evaluation of posture and gait instability. Imaging stable.  -4/29/2024 NEURO-ONC/ MRB: No new neurological concerns. Imaging stable.  -10/28/2024 NEURO-ONC/ MRB: No new neurological concerns. Imaging stable.  -4/28/2025 NEURO-ONC/ MRB: No new neurological concerns. Ongoing fatigue, tapering off Lamictal. Imaging stable.    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   , 2 children      PHYSICAL EXAMINATION  /68 (BP Location: Right arm, Patient Position: Sitting, Cuff Size: Adult Regular)   Pulse 82   Temp 98  F (36.7  C) (Oral)   Resp 16   Wt 69.3 kg (152 lb 11.2 oz)   LMP  (LMP Unknown)   SpO2 93%   BMI 27.40 kg/m     Wt Readings from Last 2 Encounters:   04/28/25 69.3 kg (152 lb 11.2 oz)   04/03/25 67 kg (147 lb 9.6 oz)      Ht Readings from Last 2 Encounters:   10/28/24 1.59 m (5' 2.6\")   04/11/24 1.581 m (5' 2.25\")      KPS     -Generally well appearing.  -Psychiatric: Normal mood and affect. Pleasant, talkative.  -Neurologic:   MENTAL STATUS:     Alert, oriented.    Recall: Intact.   Speech fluent.    Comprehension intact.     CRANIAL NERVES:     Symmetric facial movements.   Hearing intact.   With normal phonation, no dysfunction of the palate or tongue.   GAIT: Steady, unassisted.       MEDICAL RECORDS  Personally reviewed; Primary care visit from this month with Dr. Choudhury, indicated for routine general medical examination. Recommendations for lipid pain, BMP, and Cologuard for colon cancer screening.    LABS  Personally reviewed all available lab results; LDL 96, BMP with Cr of 0.73 in 4/2025.     IMAGING  Personally reviewed MR brain imaging from today and compared to prior imaging. To my eye, the T2 FLAIR about the right frontal resection cavity has not changed from imaging done 3 years ago. There is no enhancement.     Formal read; \"In this patient with history of grade 2 oligodendroglioma diagnosed following resection, status post chemotherapy: " "There are postsurgical changes of tumor resection without evidence for disease progression.\"    Imaging was shown to and results were reviewed with Barbi.       IMPRESSION  On date of service, 35 minutes was spent in clinic and 7 minutes was spent preparing for the visit through extensive chart review and coordinating care for this high complexity visit. The following is in explanation for the recommendations used to define the plan.       Barbi is not noting any new subjective neurological complaints. Unfortunately, she continues to endorse ongoing fatigue, but denies any concern for low mood. Even with tapering off Lamictal, her degree of fatigue has not improved. She had a provoked seizure in January in the setting of being ill, but otherwise, denies any recent seizures.     Imaging as detailed above is largely stable as compared to her 2021 scan with no overt concerns. I personally reviewed Barbi's imaging and case at Brain Tumor Conference and all in attendance were in agreement with this impression. Therefore, the plan is to continue with imaging surveillance per NCCN guidelines with repeat imaging every 4-6 months through ~11/2025 and at that time, when imaging is again stable, can consider annual imaging. Barbi remains in agreement with repeat imaging in 6 months. However, Barbi knows to call with any concerns or to report new complaints and appointments can be moved sooner if needed.    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 Repeat imaging in 6 months.   -No need to monitor labs while off chemotherapy.      -SEIZURE MANAGEMENT-  -Follows with neurology.   Weaning off Lamictal.    Continuing on Keppra.     -Quality of life/ MOOD/ FATIGUE-  -History " of mild anxiety and depression.   -On Lexapro.   -Failed Celexa due to side effect of diarrhea.   -Untreated/ undertreated depression can worsen fatigue, dysorexia, and quality of life.  -Primary care monitoring vitamin D and B12 levels.    -GAIT INSTABILITY-  -Consideration for PT referral.     -COGNITIVE CHANGES-  -Neuro-psych testing completed in 11/2022 was stable.    -If further difficulties are observed in the future, a referral for a neuropsychological re-evaluation at that time might be considered.    Return to clinic in 10/2025 + imaging.     The longitudinal plan of care for the diagnosis(es)/condition(s) as documented were addressed during this visit. Due to the added complexity in care, I will continue to support Barbi in the subsequent management and with ongoing continuity of care.     Myrtle Gallagher MD  Neuro-oncology      Again, thank you for allowing me to participate in the care of your patient.        Sincerely,        Myrtle Gallagher MD    Electronically signed

## 2025-04-28 NOTE — NURSING NOTE
"Oncology Rooming Note    April 28, 2025 2:23 PM   Barbi Tiwari is a 64 year old female who presents for:    Chief Complaint   Patient presents with    Oncology Clinic Visit     Ogliodendroglioma     Initial Vitals: /68 (BP Location: Right arm, Patient Position: Sitting, Cuff Size: Adult Regular)   Pulse 82   Temp 98  F (36.7  C) (Oral)   Resp 16   Wt 69.3 kg (152 lb 11.2 oz)   LMP  (LMP Unknown)   SpO2 93%   BMI 27.40 kg/m   Estimated body mass index is 27.4 kg/m  as calculated from the following:    Height as of 10/28/24: 1.59 m (5' 2.6\").    Weight as of this encounter: 69.3 kg (152 lb 11.2 oz). Body surface area is 1.75 meters squared.  No Pain (0) Comment: Data Unavailable   No LMP recorded (lmp unknown). Patient has had a hysterectomy.  Allergies reviewed: Yes  Medications reviewed: Yes    Medications: Medication refills not needed today.  Pharmacy name entered into [facility]:    [facility] [place], [place] - [address] [id number] [place], [place] - [address] [facility]    Frailty Screening:   Is the patient here for a new oncology consult visit in cancer care? 2. No      Clinical concerns: Pt reports no new concerns today.       Cherry Engle, EMT     "

## 2025-05-05 LAB — NONINV COLON CA DNA+OCC BLD SCRN STL QL: NEGATIVE

## 2025-05-19 DIAGNOSIS — R56.9 SEIZURE (H): ICD-10-CM

## 2025-05-22 RX ORDER — LEVETIRACETAM 500 MG/1
TABLET ORAL
Qty: 270 TABLET | Refills: 1 | Status: SHIPPED | OUTPATIENT
Start: 2025-05-22

## 2025-05-22 NOTE — TELEPHONE ENCOUNTER
Last Written Prescription:     levETIRAcetam (KEPPRA) 500 MG tablet 270 tablet 0 1/13/2025 -- Yes   Sig: Take 2 Tablets (1000 mg) by mouth every morning, AND Take 2 Tablets (1000 mg) by mouth every evening. t northar     ----------------------  Last Visit Date: 1/13/25  Plan  Increase levetiracetam to 1000 mg twice daily  Future Visit Date: 7/18/25 Juliann DEMPSEY  ----------------------  4/8/25 Keppra (Levetiracetam) Level  10.0 - 40.0  g/mL 38.9       Refill decision: Medication refilled per  Medication Refill in Ambulatory Care  policy.       Request from pharmacy:  Requested Prescriptions   Pending Prescriptions Disp Refills    levETIRAcetam (KEPPRA) 500 MG tablet [Pharmacy Med Name: LEVETIRACETAM 500MG TABS] 270 tablet 0     Sig: TAKE 2 TABLETS (1000 MG) BY MOUTH EVERY MORNING, AND TAKE 2 TABLETS (1000 MG) BY MOUTH EVERY EVENING.       There is no refill protocol information for this order

## 2025-05-27 DIAGNOSIS — R56.9 SEIZURE (H): ICD-10-CM

## 2025-05-27 DIAGNOSIS — G25.81 RESTLESS LEGS SYNDROME (RLS): ICD-10-CM

## 2025-05-29 RX ORDER — GABAPENTIN 100 MG/1
CAPSULE ORAL
Qty: 270 CAPSULE | Refills: 1 | Status: SHIPPED | OUTPATIENT
Start: 2025-05-29

## 2025-05-29 NOTE — TELEPHONE ENCOUNTER
Rx Authorization:  Requested Medication/ Dose: Gabapentin 100MG CAPS  Date last refill ordered: 12/17/24  Quantity ordered: 270 Caps  # refills:   Date of last clinic visit with ordering provider: 1/13/25  Date of next clinic visit with ordering provider: 7/18/25  All pertinent protocol data (lab date/result):   Include pertinent information from patients message:

## 2025-05-29 NOTE — TELEPHONE ENCOUNTER
Last Written Prescription:    gabapentin (NEURONTIN) 100 MG capsule   270 capsule 0 12/17/2024     ----------------------  Last Visit Date: 1-  Future Visit Date: 7-  ----------------------    Refill decision: Medication unable to be refilled by RN due to: Medication not included in refill protocol policy     Request from pharmacy:  Requested Prescriptions   Pending Prescriptions Disp Refills    gabapentin (NEURONTIN) 100 MG capsule [Pharmacy Med Name: GABAPENTIN 100MG CAPS] 270 capsule 0     Sig: TAKE THREE CAPSULES BY MOUTH EVERY EVENING       There is no refill protocol information for this order

## 2025-06-04 ENCOUNTER — TELEPHONE (OUTPATIENT)
Dept: NEUROLOGY | Facility: CLINIC | Age: 65
End: 2025-06-04
Payer: COMMERCIAL

## 2025-06-04 NOTE — TELEPHONE ENCOUNTER
Left Voicemail (1st Attempt) and Sent Mychart (1st Attempt) for the patient to call back and schedule the following:    Appointment type: Return Seizure  Provider: Dr. Humphreys  Return date: July 2025  Specialty phone number: [phone]  Additional appointment(s) needed: NA  Additonal Notes:     Reschedule

## 2025-06-19 NOTE — ADDENDUM NOTE
Addended by: JACK QUEEN on: 6/30/2020 04:26 PM     Modules accepted: Orders     Group Topic:  Process Group     Date: 6/19/2025  Start Time:  9:00 AM  End Time: 10:00 AM  Facilitators: Pop Cordova LCSW    Focus: process group  Number in attendance: 8    Method: Group  Attendance: Present  Participation: Active  Patient Response: Appropriate feedback  Mood: Anxious, Depressed, and Nervous  Affect: Type: Anxious and Depressed   Range: Blunted/flat   Congruency: Congruent   Stability: Stable  Behavior/Socialization: Cooperative  Thought Process: Goal-directed  Task Performance: Follows directions  Patient Evaluation: Independent - full participation  Appearance/Hygiene:  Appropriate  Appetite: Eating well   Sleep: Sleeping well  Sensory Motor: Normal   Medication:Compliant   Suicidal ideation: Denies suicidal ideation, plan, or intent.   Homicidal ideation: Denies homicidal ideation, plan, or intent.  Self Injury: patient denies

## 2025-06-23 ENCOUNTER — MYC REFILL (OUTPATIENT)
Dept: INTERNAL MEDICINE | Facility: CLINIC | Age: 65
End: 2025-06-23
Payer: COMMERCIAL

## 2025-06-23 DIAGNOSIS — F32.1 MODERATE MAJOR DEPRESSION (H): ICD-10-CM

## 2025-06-23 DIAGNOSIS — R10.13 DYSPEPSIA: ICD-10-CM

## 2025-06-23 DIAGNOSIS — I49.3 SYMPTOMATIC PREMATURE VENTRICULAR CONTRACTIONS: ICD-10-CM

## 2025-06-24 RX ORDER — ESCITALOPRAM OXALATE 10 MG/1
10 TABLET ORAL DAILY
Qty: 90 TABLET | Refills: 3 | OUTPATIENT
Start: 2025-06-24

## 2025-06-24 RX ORDER — METOPROLOL TARTRATE 25 MG/1
12.5 TABLET, FILM COATED ORAL 2 TIMES DAILY
Qty: 90 TABLET | Refills: 3 | OUTPATIENT
Start: 2025-06-24

## 2025-06-24 RX ORDER — OMEPRAZOLE 20 MG/1
20 CAPSULE, DELAYED RELEASE ORAL DAILY
Qty: 90 CAPSULE | Refills: 3 | OUTPATIENT
Start: 2025-06-24

## 2025-07-01 NOTE — PROGRESS NOTES
"Interval HPI:   No acute events overnight. Patient in room 315/315 A. Blood glucose stable. BG at goal on current insulin regimen (SSI ). Steroid use- None .   5 Days Post-Op  Renal function- Normal   Vasopressors-  None     Diet Heart Healthy Fluid - 1500mL; Standard Tray     Eating:   <25%  Nausea: No  Hypoglycemia and intervention: No  Fever: No  TPN and/or TF: No    BP (!) 148/67 (BP Location: Left arm, Patient Position: Sitting)   Pulse 87   Temp 97 °F (36.1 °C) (Oral)   Resp 16   Ht 5' 5" (1.651 m)   Wt 73.3 kg (161 lb 9.6 oz)   SpO2 95%   Breastfeeding No   BMI 26.89 kg/m²     Labs Reviewed and Include    Recent Labs   Lab 07/01/25  0559   *   CALCIUM 8.0*   ALBUMIN 2.7*   PROT 5.1*      K 3.5   CO2 26      BUN 25*   CREATININE 0.7   ALKPHOS 119   ALT 39   AST 43   BILITOT 1.1*     Lab Results   Component Value Date    WBC 10.36 07/01/2025    HGB 9.8 (L) 07/01/2025    HCT 29.9 (L) 07/01/2025    MCV 69 (L) 07/01/2025     07/01/2025     No results for input(s): "TSH", "FREET4" in the last 168 hours.  Lab Results   Component Value Date    HGBA1C 5.4 06/19/2025       Nutritional status:   Body mass index is 26.89 kg/m².  Lab Results   Component Value Date    ALBUMIN 2.7 (L) 07/01/2025    ALBUMIN 2.9 (L) 06/30/2025    ALBUMIN 4.3 06/21/2025     No results found for: "PREALBUMIN"    Estimated Creatinine Clearance: 73.9 mL/min (based on SCr of 0.7 mg/dL).    Accu-Checks  Recent Labs     06/28/25  2105 06/29/25  0249 06/29/25  0758 06/29/25  1149 06/29/25  1705 06/29/25  2159 06/30/25  0313 06/30/25  0811 06/30/25  1123 06/30/25  1543   POCTGLUCOSE 131* 110 110 137* 121* 173* 127* 105 150* 123*       Current Medications and/or Treatments Impacting Glycemic Control  Immunotherapy:    Immunosuppressants       None          Steroids:   Hormones (From admission, onward)      None          Pressors:    Autonomic Drugs (From admission, onward)      None          Hyperglycemia/Diabetes " NAME  Barbi Tiwari    MRN  [id number]      [birth year]     AGE  62     SEX  Female     HANDEDNESS Right     EDUCATION 18     [place]  23     PROVIDER       TECH  AJ     STATION  OP            ORIENTATION      Time  0     Personal Info.     Place      Presidents             WAIS-IV         WMI: 100    VENECIA: 115 RDS: 12             Raw SS    Similarities  29 12    Block Design 35 10    Matrix Reasoning 22 15    Digit Span  32 13    Arithmetic  11 7    Coding  73 13            AVLT   1   T1 T2 T3 T4 T5   11 15 13 14 15   TB T6      7 14        Raw T %ile   Trial 1-5  68 76 >98   Short Delay Recall 14 61 84-92   30' Delay Recall 13 57 69-83   Recognition Hits 15 61 84-92   Recognition FP 0     Memory Efficiency 1.96 48 32-68          ALICE-O COMPLEX FIGURE       Raw T %ile   Copy  30  6-10   Short Delay Recall 14.5 46 34   Long Delay Recall 14.5 45 31   Recognition Total 20 48 42   Time to Copy 289  >16           BVRT   E     Raw z/expected   Correct  7 7    Incorrect  4 3           COWAT FAS      Raw 40      SS 10      T 43             ANIMAL FLUENCY      Raw 17      SS 9      T 40              BOSTON NAMING TEST     Raw 59 /60     SS 15      %ile 73-87             COMPLEX IDEATIONAL MATERIAL     Raw 12      SS 12      T 51             TRAIL MAKING TEST       Time Errors SSa %ile   A 23 0 13 78-91   B 76 1 10 22-41          STROOP        Raw %ile     Word 92 18-20     Color 70 28-39     C/W 30 13-17             RORO   H   Raw 25      %ile 55-74             GROOVED PEGBOARD       Raw Drops SS T   RH 71 0 8 44   LH 83 0 7 41          BDI-II       Raw 13      Interp. Minimal             DEMIAN       Raw 0      Interp. Minimal             WMS-III        Raw SS     Spat. Span 17 13            DCT       E-Score 11           Medications:   Antihyperglycemics (From admission, onward)      Start     Stop Route Frequency Ordered    06/29/25 1054  insulin aspart U-100 pen 0-5 Units         -- SubQ Before meals & nightly PRN 06/29/25 0973

## 2025-07-23 ENCOUNTER — OFFICE VISIT (OUTPATIENT)
Dept: NEUROLOGY | Facility: CLINIC | Age: 65
End: 2025-07-23
Payer: COMMERCIAL

## 2025-07-23 ENCOUNTER — MYC REFILL (OUTPATIENT)
Dept: DERMATOLOGY | Facility: CLINIC | Age: 65
End: 2025-07-23

## 2025-07-23 VITALS — RESPIRATION RATE: 16 BRPM | HEART RATE: 54 BPM | OXYGEN SATURATION: 100 %

## 2025-07-23 DIAGNOSIS — L30.9 DERMATITIS: ICD-10-CM

## 2025-07-23 DIAGNOSIS — R56.9 SEIZURE (H): ICD-10-CM

## 2025-07-23 RX ORDER — TRIAMCINOLONE ACETONIDE 1 MG/G
CREAM TOPICAL 2 TIMES DAILY
Qty: 60 G | Refills: 2 | OUTPATIENT
Start: 2025-07-23

## 2025-07-23 RX ORDER — LEVETIRACETAM 500 MG/1
TABLET ORAL
Qty: 450 TABLET | Refills: 3 | Status: SHIPPED | OUTPATIENT
Start: 2025-07-23 | End: 2025-07-24

## 2025-07-23 NOTE — TELEPHONE ENCOUNTER
Triamcinolone cream denied and pt notified to schedule an appt via my chart.    Padmini CACERES RN  [facility] [place]  [phone]    
Unknown
none

## 2025-07-23 NOTE — NURSING NOTE
Chief Complaint   Patient presents with    RECHECK     Return Seizure           Pulse 54   Resp 16   LMP  (LMP Unknown)   SpO2 100%           Ban Camilo
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
## 2025-07-24 RX ORDER — LEVETIRACETAM 500 MG/1
TABLET ORAL
Qty: 450 TABLET | Refills: 3 | Status: SHIPPED | OUTPATIENT
Start: 2025-07-24

## 2025-08-23 ENCOUNTER — OFFICE VISIT (OUTPATIENT)
Dept: URGENT CARE | Facility: URGENT CARE | Age: 65
End: 2025-08-23
Payer: COMMERCIAL

## 2025-08-23 VITALS
WEIGHT: 152 LBS | RESPIRATION RATE: 18 BRPM | HEART RATE: 61 BPM | BODY MASS INDEX: 27.97 KG/M2 | TEMPERATURE: 96.5 F | OXYGEN SATURATION: 95 % | DIASTOLIC BLOOD PRESSURE: 72 MMHG | HEIGHT: 62 IN | SYSTOLIC BLOOD PRESSURE: 114 MMHG

## 2025-08-23 DIAGNOSIS — N10 ACUTE PYELONEPHRITIS: Primary | ICD-10-CM

## 2025-08-23 DIAGNOSIS — R30.0 DYSURIA: ICD-10-CM

## 2025-08-23 PROBLEM — M79.18 BUTTOCK PAIN: Status: RESOLVED | Noted: 2022-10-25 | Resolved: 2025-08-23

## 2025-08-23 PROBLEM — M79.651 PAIN OF RIGHT THIGH: Status: RESOLVED | Noted: 2022-10-25 | Resolved: 2025-08-23

## 2025-08-23 LAB
ALBUMIN UR-MCNC: 100 MG/DL
APPEARANCE UR: CLEAR
BACTERIA #/AREA URNS HPF: ABNORMAL /HPF
BILIRUB UR QL STRIP: NEGATIVE
COLOR UR AUTO: YELLOW
GLUCOSE UR STRIP-MCNC: NEGATIVE MG/DL
HGB UR QL STRIP: ABNORMAL
KETONES UR STRIP-MCNC: NEGATIVE MG/DL
LEUKOCYTE ESTERASE UR QL STRIP: ABNORMAL
NITRATE UR QL: NEGATIVE
PH UR STRIP: 6 [PH] (ref 5–7)
RBC #/AREA URNS AUTO: ABNORMAL /HPF
SP GR UR STRIP: 1.02 (ref 1–1.03)
SQUAMOUS #/AREA URNS AUTO: ABNORMAL /LPF
UROBILINOGEN UR STRIP-ACNC: 0.2 E.U./DL
WBC #/AREA URNS AUTO: ABNORMAL /HPF

## 2025-08-23 PROCEDURE — 87088 URINE BACTERIA CULTURE: CPT | Performed by: NURSE PRACTITIONER

## 2025-08-23 PROCEDURE — 3078F DIAST BP <80 MM HG: CPT | Performed by: NURSE PRACTITIONER

## 2025-08-23 PROCEDURE — 87086 URINE CULTURE/COLONY COUNT: CPT | Performed by: NURSE PRACTITIONER

## 2025-08-23 PROCEDURE — 81001 URINALYSIS AUTO W/SCOPE: CPT | Performed by: NURSE PRACTITIONER

## 2025-08-23 PROCEDURE — 87186 SC STD MICRODIL/AGAR DIL: CPT | Performed by: NURSE PRACTITIONER

## 2025-08-23 PROCEDURE — 99213 OFFICE O/P EST LOW 20 MIN: CPT | Performed by: NURSE PRACTITIONER

## 2025-08-23 PROCEDURE — 3074F SYST BP LT 130 MM HG: CPT | Performed by: NURSE PRACTITIONER

## 2025-08-23 RX ORDER — CIPROFLOXACIN 500 MG/1
500 TABLET, FILM COATED ORAL 2 TIMES DAILY
Qty: 14 TABLET | Refills: 0 | Status: SHIPPED | OUTPATIENT
Start: 2025-08-23 | End: 2025-08-30

## 2025-08-27 LAB
BACTERIA UR CULT: ABNORMAL
BACTERIA UR CULT: ABNORMAL

## (undated) DEVICE — SPONGE SURGIFOAM 100 1974

## (undated) DEVICE — DRAPE SHEET REV FOLD 3/4 9349

## (undated) DEVICE — CRANIOTOME ADULT ANSPACH A-CRN

## (undated) DEVICE — LINEN HALF SHEET 5512

## (undated) DEVICE — TOURNIQUET SGL  BLADDER 30"X4" BLUE 5921030135

## (undated) DEVICE — LINEN TOWEL PACK X5 5464

## (undated) DEVICE — Device

## (undated) DEVICE — SYR 10ML FINGER CONTROL W/O NDL 309695

## (undated) DEVICE — BLADE SAW OSCILLATING STRYK MED 9.0X25X0.38MM 2296-003-111

## (undated) DEVICE — SHEATH URETERAL ACCESS NAVIGATOR 11/13FRX36CM 250-203

## (undated) DEVICE — SUCTION MANIFOLD NEPTUNE SGL

## (undated) DEVICE — 2MM DRILL BIT

## (undated) DEVICE — ESU PENCIL W/HOLSTER E2350H

## (undated) DEVICE — DRILL BIT ARTHREX MTP 2.0MM AR-8944-22

## (undated) DEVICE — PREP CHLORAPREP 26ML TINTED ORANGE  260815

## (undated) DEVICE — CATH URETERAL WHISTLE 5FR 115CM 136405

## (undated) DEVICE — LINEN FULL SHEET 5511

## (undated) DEVICE — CATH URETERAL FLEX TIP TIGERTAIL 06FRX70CM 139006

## (undated) DEVICE — NDL 25GA 1.5" 305127

## (undated) DEVICE — PREP POVIDONE IODINE SOLUTION 10% 4OZ

## (undated) DEVICE — SYR BULB IRRIG 50ML LATEX FREE 0035280

## (undated) DEVICE — SOL NACL 0.9% IRRIG 1000ML BOTTLE 2F7124

## (undated) DEVICE — STRAP UNIVERSAL POSITIONING 2-PIECE 4X47X76" 91-287

## (undated) DEVICE — DRAPE MINI C-ARM 4003

## (undated) DEVICE — SU VICRYL 3-0 PS-1 18" UND J683

## (undated) DEVICE — PREP POVIDONE IODINE SCRUB 7.5% 120ML

## (undated) DEVICE — DRAPE MAYO STAND 23X54 8337

## (undated) DEVICE — DRSG KERLIX FLUFFS X5

## (undated) DEVICE — INFLATION DEVICE 10ML HIGH PRESSURE

## (undated) DEVICE — PREP POVIDONE IODINE SOLUTION 10% 120ML

## (undated) DEVICE — LINEN TOWEL PACK X6 WHITE 5487

## (undated) DEVICE — STPL SKIN 35W 059037

## (undated) DEVICE — TUBING IRRIG TUR Y TYPE 96" LF 6543-01

## (undated) DEVICE — SU PROLENE 4-0 FS-2 18' 8683G

## (undated) DEVICE — SU VICRYL 4-0 PS-2 18" UND J496H

## (undated) DEVICE — SOL WATER IRRIG 1000ML BOTTLE 2F7114

## (undated) DEVICE — IOM SUPPLIES

## (undated) DEVICE — DILATATION SYSTEM URETERAL 6-16FR M0062401000

## (undated) DEVICE — DRAPE U SPLIT 74X120" 29440

## (undated) DEVICE — GUIDEWIRE URO STR STIFF .035"X150CM NITINOL 150NSS35

## (undated) DEVICE — BASKET NITINOL TIPLESS HALO  1.5FRX120CM 554120

## (undated) DEVICE — BAG CLEAR TRASH 1.3M 39X33" P4040C

## (undated) DEVICE — UROSEAL ADJUSTABLE ENDOSCOPIC VALVE

## (undated) DEVICE — RAD RX ISOVUE 300 (50ML) 61% IOPAMIDOL CHARGE PER ML

## (undated) DEVICE — PAD CHUX UNDERPAD 23X24" 7136

## (undated) DEVICE — DRILL BIT ARTHREX BB TAK MTP AR-13226

## (undated) DEVICE — GLOVE PROTEXIS W/NEU-THERA 8.0  2D73TE80

## (undated) DEVICE — SUCTION CANISTER MEDIVAC LINER 3000ML W/LID 65651-530

## (undated) DEVICE — SOL NACL 0.9% INJ 1000ML BAG 2B1324X

## (undated) DEVICE — MARKER SPHERES PASSIVE MEDT PACK 5 8801075

## (undated) DEVICE — GUIDE WIRE ARTHREX W/TROCAR TIP .062" AR-8941K

## (undated) DEVICE — BLADE KNIFE SURG 15 371115

## (undated) DEVICE — SURGICEL HEMOSTAT 4X8" 1952

## (undated) DEVICE — IOM MONITORING 15 MIN

## (undated) DEVICE — GUIDEWIRE

## (undated) DEVICE — BNDG ROLLER GAUZE CONFORM 3"X4YD 41-53

## (undated) DEVICE — GLOVE PROTEXIS BLUE W/NEU-THERA 8.0  2D73EB80

## (undated) DEVICE — ESU GROUND PAD ADULT W/CORD E7507

## (undated) DEVICE — DRAPE POUCH IRR 1016

## (undated) DEVICE — GUIDEWIRE SENSOR DUAL FLEX STR 0.035"X150CM M0066703080

## (undated) DEVICE — DRSG GAUZE 4X4" 3033

## (undated) DEVICE — GLOVE PROTEXIS BLUE W/NEU-THERA 8.5  2D73EB85

## (undated) DEVICE — GLOVE PROTEXIS W/NEU-THERA 7.0  2D73TE70

## (undated) DEVICE — SPONGE COTTONOID 1/2X1 1/2" 20-06S

## (undated) DEVICE — CAST PADDING 4" COTTON WEBRIL UNSTERILE 9084

## (undated) DEVICE — SOL NACL 0.9% IRRIG 3000ML BAG 2B7477

## (undated) DEVICE — BNDG ROLLER GAUZE CONFORM 4"X4YD 41-54

## (undated) DEVICE — RX BACITRACIN OINTMENT 0.9G 1/32OZ CUR001109

## (undated) DEVICE — NDL BLUNT 17GA 1.5" 8881202330

## (undated) DEVICE — GLOVE PROTEXIS MICRO 8.0  2D73PM80

## (undated) DEVICE — CAST PADDING 4" UNSTERILE 9044

## (undated) DEVICE — BUR ROUTER 1.4X12.8MM ANSPACH S-1R

## (undated) DEVICE — GLOVE PROTEXIS MICRO 7.5  2D73PM75

## (undated) DEVICE — SOL NACL 0.9% IRRIG 1000ML BOTTLE 07138-09

## (undated) DEVICE — SPONGE COTTONOID 1/4X1/4" 20-01S

## (undated) DEVICE — ESU GROUND PAD UNIVERSAL W/O CORD

## (undated) DEVICE — DRSG TEGADERM 2 1/2X 2 3/4"

## (undated) DEVICE — SOL WATER IRRIG 3000ML BAG 2B7117

## (undated) DEVICE — REAMER ARTHREX METATARSAL 18MM AR-8944MR-18

## (undated) DEVICE — SU PROLENE 4-0 PC-3 18" 8634G

## (undated) DEVICE — SU VICRYL 2-0 CT-2 CR 8X18" J726D

## (undated) DEVICE — BLADE SAW SAGITTAL STRK MICRO 5H MANDIB/MAXILL 2296-003-124

## (undated) DEVICE — PACK CYSTOSCOPY SBA15CYFSI

## (undated) DEVICE — PACK CYSTO CUSTOM RIDGES

## (undated) DEVICE — SYR 20ML LL W/O NDL 302830

## (undated) DEVICE — PACK SET-UP STD 9102

## (undated) DEVICE — SOL RINGERS LACTATED 1000ML BAG 07953-09

## (undated) DEVICE — PIN SKULL MAYFIELD ADULT TITANIUM 3/PK A1120

## (undated) DEVICE — BLADE CLIPPER SGL USE 9680

## (undated) DEVICE — PERFORATOR 14MM CODMAN

## (undated) DEVICE — DRAPE MICROSCOPE LEICA 54X150" AR8033650

## (undated) DEVICE — ADH FLOSEAL W/HUMAN THROMBIN 5ML W/APPLICATOR TIP ADS201844

## (undated) DEVICE — SPONGE COTTONOID 1/2X3" 20-07S

## (undated) DEVICE — SU ETHILON 3-0 PS-1 18" 1663H

## (undated) DEVICE — PACK CRANIOTOMY

## (undated) DEVICE — DRSG ABDOMINAL 07 1/2X8" 7197D

## (undated) DEVICE — SPONGE COTTONOID 1/2X1/2" 20-04S

## (undated) DEVICE — BB-TAK

## (undated) DEVICE — ESU ELEC BLADE 2.75" COATED/INSULATED E1455

## (undated) DEVICE — CATH BALLOON DILATATION UROMAX ULTRA 18FRX4CM M0062251020

## (undated) DEVICE — COVER FOOTSWITCH W/CINCH 20X24" 923267

## (undated) DEVICE — REAMER ARTHREX PHALANGEAL 18MM AR-8944PR-18

## (undated) DEVICE — SPONGE COTTONOID 1X3" 20-10S

## (undated) DEVICE — IMM LIMB ELEVATOR DC40-0203

## (undated) DEVICE — BNDG ELASTIC 4"X5YDS UNSTERILE 6611-40

## (undated) DEVICE — SUCTION MANIFOLD DORNOCH ULTRA CART UL-CL500

## (undated) DEVICE — SYR 30ML LL W/O NDL 302832

## (undated) DEVICE — SU VICRYL 3-0 SH CR 8X18" J774

## (undated) DEVICE — PACK EXTREMITY SOP15EXFSD

## (undated) DEVICE — WIPES FOLEY CARE SURESTEP PROVON DFC100

## (undated) DEVICE — ESU CORD BIPOLAR AND IRR TUBING AESCULAP US355

## (undated) DEVICE — PREP SKIN SCRUB TRAY 4461A

## (undated) DEVICE — SU NUROLON 4-0 TF CR 8X18" C584D

## (undated) DEVICE — GLOVE PROTEXIS POWDER FREE SMT 7.0  2D72PT70X

## (undated) DEVICE — LINEN TOWEL PACK X30 5481

## (undated) DEVICE — BASIN SET SINGLE STERILE 13752-624

## (undated) DEVICE — STENT URETERAL INLAY OPTIMA 4.7FRX24CM 788424: Type: IMPLANTABLE DEVICE | Site: URETER | Status: NON-FUNCTIONAL

## (undated) DEVICE — CLIP RANEY

## (undated) DEVICE — DRAPE STERI TOWEL LG 1010

## (undated) DEVICE — DRAPE POUCH INSTRUMENT 1018

## (undated) DEVICE — COVER CAMERA VIDEO LASER 9X96" 04-CC227

## (undated) DEVICE — PIN GUARD 0.062 GREEN C-062

## (undated) DEVICE — CAST PADDING 4" STERILE 9044S

## (undated) DEVICE — DRILL BIT ARTHREX 1.7MM AR-8916-14

## (undated) DEVICE — BLADE SAW SAGITTAL 25.5X9.5X.4MM FINE LINVATEC 5023-138

## (undated) DEVICE — DECANTER BAG 2002S

## (undated) DEVICE — PREP CHLORAPREP CLEAR 3ML 260400

## (undated) RX ORDER — EPHEDRINE SULFATE 50 MG/ML
INJECTION, SOLUTION INTRAMUSCULAR; INTRAVENOUS; SUBCUTANEOUS
Status: DISPENSED
Start: 2021-08-03

## (undated) RX ORDER — PROPOFOL 10 MG/ML
INJECTION, EMULSION INTRAVENOUS
Status: DISPENSED
Start: 2017-05-05

## (undated) RX ORDER — LIDOCAINE HYDROCHLORIDE 20 MG/ML
INJECTION, SOLUTION EPIDURAL; INFILTRATION; INTRACAUDAL; PERINEURAL
Status: DISPENSED
Start: 2022-02-01

## (undated) RX ORDER — PROPOFOL 10 MG/ML
INJECTION, EMULSION INTRAVENOUS
Status: DISPENSED
Start: 2019-01-15

## (undated) RX ORDER — LIDOCAINE HYDROCHLORIDE 20 MG/ML
INJECTION, SOLUTION EPIDURAL; INFILTRATION; INTRACAUDAL; PERINEURAL
Status: DISPENSED
Start: 2019-01-15

## (undated) RX ORDER — PROPOFOL 10 MG/ML
INJECTION, EMULSION INTRAVENOUS
Status: DISPENSED
Start: 2021-08-03

## (undated) RX ORDER — PROPOFOL 10 MG/ML
INJECTION, EMULSION INTRAVENOUS
Status: DISPENSED
Start: 2021-07-20

## (undated) RX ORDER — FENTANYL CITRATE 50 UG/ML
INJECTION, SOLUTION INTRAMUSCULAR; INTRAVENOUS
Status: DISPENSED
Start: 2017-05-05

## (undated) RX ORDER — FENTANYL CITRATE 50 UG/ML
INJECTION, SOLUTION INTRAMUSCULAR; INTRAVENOUS
Status: DISPENSED
Start: 2021-07-20

## (undated) RX ORDER — GLYCOPYRROLATE 0.2 MG/ML
INJECTION INTRAMUSCULAR; INTRAVENOUS
Status: DISPENSED
Start: 2021-08-03

## (undated) RX ORDER — PROPOFOL 10 MG/ML
INJECTION, EMULSION INTRAVENOUS
Status: DISPENSED
Start: 2022-02-01

## (undated) RX ORDER — FENTANYL CITRATE 50 UG/ML
INJECTION, SOLUTION INTRAMUSCULAR; INTRAVENOUS
Status: DISPENSED
Start: 2022-02-01

## (undated) RX ORDER — EPHEDRINE SULFATE 50 MG/ML
INJECTION, SOLUTION INTRAMUSCULAR; INTRAVENOUS; SUBCUTANEOUS
Status: DISPENSED
Start: 2018-05-30

## (undated) RX ORDER — DEXAMETHASONE SODIUM PHOSPHATE 4 MG/ML
INJECTION, SOLUTION INTRA-ARTICULAR; INTRALESIONAL; INTRAMUSCULAR; INTRAVENOUS; SOFT TISSUE
Status: DISPENSED
Start: 2017-05-05

## (undated) RX ORDER — FENTANYL CITRATE 50 UG/ML
INJECTION, SOLUTION INTRAMUSCULAR; INTRAVENOUS
Status: DISPENSED
Start: 2018-05-30

## (undated) RX ORDER — LIDOCAINE HYDROCHLORIDE 20 MG/ML
INJECTION, SOLUTION EPIDURAL; INFILTRATION; INTRACAUDAL; PERINEURAL
Status: DISPENSED
Start: 2018-05-30

## (undated) RX ORDER — PROPOFOL 10 MG/ML
INJECTION, EMULSION INTRAVENOUS
Status: DISPENSED
Start: 2018-05-30

## (undated) RX ORDER — BUPIVACAINE HYDROCHLORIDE 5 MG/ML
INJECTION, SOLUTION EPIDURAL; INTRACAUDAL
Status: DISPENSED
Start: 2019-01-15

## (undated) RX ORDER — ONDANSETRON 2 MG/ML
INJECTION INTRAMUSCULAR; INTRAVENOUS
Status: DISPENSED
Start: 2019-01-15

## (undated) RX ORDER — BUPIVACAINE HYDROCHLORIDE AND EPINEPHRINE 5; 5 MG/ML; UG/ML
INJECTION, SOLUTION EPIDURAL; INTRACAUDAL; PERINEURAL
Status: DISPENSED
Start: 2018-05-30

## (undated) RX ORDER — DEXAMETHASONE SODIUM PHOSPHATE 4 MG/ML
INJECTION, SOLUTION INTRA-ARTICULAR; INTRALESIONAL; INTRAMUSCULAR; INTRAVENOUS; SOFT TISSUE
Status: DISPENSED
Start: 2021-07-20

## (undated) RX ORDER — ACETAMINOPHEN 325 MG/1
TABLET ORAL
Status: DISPENSED
Start: 2018-05-30

## (undated) RX ORDER — BUPIVACAINE HYDROCHLORIDE 5 MG/ML
INJECTION, SOLUTION EPIDURAL; INTRACAUDAL
Status: DISPENSED
Start: 2022-02-01

## (undated) RX ORDER — SCOLOPAMINE TRANSDERMAL SYSTEM 1 MG/1
PATCH, EXTENDED RELEASE TRANSDERMAL
Status: DISPENSED
Start: 2019-01-15

## (undated) RX ORDER — ONDANSETRON 2 MG/ML
INJECTION INTRAMUSCULAR; INTRAVENOUS
Status: DISPENSED
Start: 2017-05-05

## (undated) RX ORDER — FENTANYL CITRATE 0.05 MG/ML
INJECTION, SOLUTION INTRAMUSCULAR; INTRAVENOUS
Status: DISPENSED
Start: 2022-02-01

## (undated) RX ORDER — ACETAMINOPHEN 325 MG/1
TABLET ORAL
Status: DISPENSED
Start: 2021-08-03

## (undated) RX ORDER — CEFAZOLIN SODIUM 2 G/100ML
INJECTION, SOLUTION INTRAVENOUS
Status: DISPENSED
Start: 2019-01-15

## (undated) RX ORDER — LIDOCAINE HYDROCHLORIDE 20 MG/ML
INJECTION, SOLUTION INFILTRATION; PERINEURAL
Status: DISPENSED
Start: 2019-01-15

## (undated) RX ORDER — DEXAMETHASONE SODIUM PHOSPHATE 4 MG/ML
INJECTION, SOLUTION INTRA-ARTICULAR; INTRALESIONAL; INTRAMUSCULAR; INTRAVENOUS; SOFT TISSUE
Status: DISPENSED
Start: 2019-01-15

## (undated) RX ORDER — ONDANSETRON 2 MG/ML
INJECTION INTRAMUSCULAR; INTRAVENOUS
Status: DISPENSED
Start: 2018-05-30

## (undated) RX ORDER — CEFAZOLIN SODIUM 1 G/3ML
INJECTION, POWDER, FOR SOLUTION INTRAMUSCULAR; INTRAVENOUS
Status: DISPENSED
Start: 2018-05-30

## (undated) RX ORDER — SCOLOPAMINE TRANSDERMAL SYSTEM 1 MG/1
PATCH, EXTENDED RELEASE TRANSDERMAL
Status: DISPENSED
Start: 2018-05-30

## (undated) RX ORDER — CEFAZOLIN SODIUM 2 G/100ML
INJECTION, SOLUTION INTRAVENOUS
Status: DISPENSED
Start: 2018-05-30

## (undated) RX ORDER — FENTANYL CITRATE 50 UG/ML
INJECTION, SOLUTION INTRAMUSCULAR; INTRAVENOUS
Status: DISPENSED
Start: 2021-08-03

## (undated) RX ORDER — GLYCOPYRROLATE 0.2 MG/ML
INJECTION, SOLUTION INTRAMUSCULAR; INTRAVENOUS
Status: DISPENSED
Start: 2018-05-30

## (undated) RX ORDER — ONDANSETRON 2 MG/ML
INJECTION INTRAMUSCULAR; INTRAVENOUS
Status: DISPENSED
Start: 2021-07-20

## (undated) RX ORDER — ONDANSETRON 2 MG/ML
INJECTION INTRAMUSCULAR; INTRAVENOUS
Status: DISPENSED
Start: 2022-02-01

## (undated) RX ORDER — FENTANYL CITRATE 50 UG/ML
INJECTION, SOLUTION INTRAMUSCULAR; INTRAVENOUS
Status: DISPENSED
Start: 2019-01-15

## (undated) RX ORDER — BACITRACIN 50000 [IU]/1
INJECTION, POWDER, FOR SOLUTION INTRAMUSCULAR
Status: DISPENSED
Start: 2018-05-30

## (undated) RX ORDER — LORAZEPAM 0.5 MG/1
TABLET ORAL
Status: DISPENSED
Start: 2020-04-09

## (undated) RX ORDER — DEXAMETHASONE SODIUM PHOSPHATE 4 MG/ML
INJECTION, SOLUTION INTRA-ARTICULAR; INTRALESIONAL; INTRAMUSCULAR; INTRAVENOUS; SOFT TISSUE
Status: DISPENSED
Start: 2021-08-03

## (undated) RX ORDER — DEXAMETHASONE SODIUM PHOSPHATE 4 MG/ML
INJECTION, SOLUTION INTRA-ARTICULAR; INTRALESIONAL; INTRAMUSCULAR; INTRAVENOUS; SOFT TISSUE
Status: DISPENSED
Start: 2022-02-01

## (undated) RX ORDER — ONDANSETRON 2 MG/ML
INJECTION INTRAMUSCULAR; INTRAVENOUS
Status: DISPENSED
Start: 2021-08-03

## (undated) RX ORDER — GLYCOPYRROLATE 0.2 MG/ML
INJECTION INTRAMUSCULAR; INTRAVENOUS
Status: DISPENSED
Start: 2021-07-20

## (undated) RX ORDER — CEFAZOLIN SODIUM 2 G/100ML
INJECTION, SOLUTION INTRAVENOUS
Status: DISPENSED
Start: 2021-08-03

## (undated) RX ORDER — DEXAMETHASONE SODIUM PHOSPHATE 4 MG/ML
INJECTION, SOLUTION INTRA-ARTICULAR; INTRALESIONAL; INTRAMUSCULAR; INTRAVENOUS; SOFT TISSUE
Status: DISPENSED
Start: 2018-05-30

## (undated) RX ORDER — CEFAZOLIN SODIUM 2 G/100ML
INJECTION, SOLUTION INTRAVENOUS
Status: DISPENSED
Start: 2021-07-20

## (undated) RX ORDER — FENTANYL CITRATE-0.9 % NACL/PF 10 MCG/ML
PLASTIC BAG, INJECTION (ML) INTRAVENOUS
Status: DISPENSED
Start: 2021-08-03

## (undated) RX ORDER — LIDOCAINE HYDROCHLORIDE 10 MG/ML
INJECTION, SOLUTION EPIDURAL; INFILTRATION; INTRACAUDAL; PERINEURAL
Status: DISPENSED
Start: 2021-08-03

## (undated) RX ORDER — LIDOCAINE HYDROCHLORIDE 10 MG/ML
INJECTION, SOLUTION EPIDURAL; INFILTRATION; INTRACAUDAL; PERINEURAL
Status: DISPENSED
Start: 2021-07-20

## (undated) RX ORDER — SODIUM CHLORIDE 9 MG/ML
INJECTION, SOLUTION INTRAVENOUS
Status: DISPENSED
Start: 2018-05-30

## (undated) RX ORDER — LIDOCAINE HYDROCHLORIDE 20 MG/ML
INJECTION, SOLUTION EPIDURAL; INFILTRATION; INTRACAUDAL; PERINEURAL
Status: DISPENSED
Start: 2017-05-05

## (undated) RX ORDER — CEFAZOLIN SODIUM 1 G/3ML
INJECTION, POWDER, FOR SOLUTION INTRAMUSCULAR; INTRAVENOUS
Status: DISPENSED
Start: 2022-02-01